# Patient Record
Sex: FEMALE | Race: OTHER | HISPANIC OR LATINO
[De-identification: names, ages, dates, MRNs, and addresses within clinical notes are randomized per-mention and may not be internally consistent; named-entity substitution may affect disease eponyms.]

---

## 2023-04-20 ENCOUNTER — TRANSCRIPTION ENCOUNTER (OUTPATIENT)
Age: 58
End: 2023-04-20

## 2023-04-20 ENCOUNTER — INPATIENT (INPATIENT)
Facility: HOSPITAL | Age: 58
LOS: 0 days | Discharge: ANOTHER HOSPITAL | DRG: 65 | End: 2023-04-21
Attending: INTERNAL MEDICINE | Admitting: INTERNAL MEDICINE
Payer: MEDICAID

## 2023-04-20 VITALS
HEIGHT: 62 IN | RESPIRATION RATE: 16 BRPM | DIASTOLIC BLOOD PRESSURE: 72 MMHG | SYSTOLIC BLOOD PRESSURE: 119 MMHG | OXYGEN SATURATION: 100 % | HEART RATE: 66 BPM | WEIGHT: 179.9 LBS

## 2023-04-20 DIAGNOSIS — Z90.710 ACQUIRED ABSENCE OF BOTH CERVIX AND UTERUS: Chronic | ICD-10-CM

## 2023-04-20 DIAGNOSIS — Z98.890 OTHER SPECIFIED POSTPROCEDURAL STATES: Chronic | ICD-10-CM

## 2023-04-20 DIAGNOSIS — I63.9 CEREBRAL INFARCTION, UNSPECIFIED: ICD-10-CM

## 2023-04-20 DIAGNOSIS — Z90.49 ACQUIRED ABSENCE OF OTHER SPECIFIED PARTS OF DIGESTIVE TRACT: Chronic | ICD-10-CM

## 2023-04-20 LAB
ALBUMIN SERPL ELPH-MCNC: 3.8 G/DL — SIGNIFICANT CHANGE UP (ref 3.5–5)
ALP SERPL-CCNC: 113 U/L — SIGNIFICANT CHANGE UP (ref 40–120)
ALT FLD-CCNC: 56 U/L DA — SIGNIFICANT CHANGE UP (ref 10–60)
ANION GAP SERPL CALC-SCNC: 6 MMOL/L — SIGNIFICANT CHANGE UP (ref 5–17)
APTT BLD: 35.3 SEC — SIGNIFICANT CHANGE UP (ref 27.5–35.5)
AST SERPL-CCNC: 28 U/L — SIGNIFICANT CHANGE UP (ref 10–40)
BASOPHILS # BLD AUTO: 0.01 K/UL — SIGNIFICANT CHANGE UP (ref 0–0.2)
BASOPHILS NFR BLD AUTO: 0.2 % — SIGNIFICANT CHANGE UP (ref 0–2)
BILIRUB SERPL-MCNC: 0.6 MG/DL — SIGNIFICANT CHANGE UP (ref 0.2–1.2)
BUN SERPL-MCNC: 20 MG/DL — HIGH (ref 7–18)
CALCIUM SERPL-MCNC: 9.4 MG/DL — SIGNIFICANT CHANGE UP (ref 8.4–10.5)
CHLORIDE SERPL-SCNC: 106 MMOL/L — SIGNIFICANT CHANGE UP (ref 96–108)
CO2 SERPL-SCNC: 28 MMOL/L — SIGNIFICANT CHANGE UP (ref 22–31)
CREAT SERPL-MCNC: 0.72 MG/DL — SIGNIFICANT CHANGE UP (ref 0.5–1.3)
EGFR: 98 ML/MIN/1.73M2 — SIGNIFICANT CHANGE UP
EOSINOPHIL # BLD AUTO: 0 K/UL — SIGNIFICANT CHANGE UP (ref 0–0.5)
EOSINOPHIL NFR BLD AUTO: 0 % — SIGNIFICANT CHANGE UP (ref 0–6)
ETHANOL SERPL-MCNC: 4 MG/DL — SIGNIFICANT CHANGE UP (ref 0–10)
GLUCOSE SERPL-MCNC: 200 MG/DL — HIGH (ref 70–99)
HCT VFR BLD CALC: 42.4 % — SIGNIFICANT CHANGE UP (ref 34.5–45)
HGB BLD-MCNC: 14.2 G/DL — SIGNIFICANT CHANGE UP (ref 11.5–15.5)
IMM GRANULOCYTES NFR BLD AUTO: 0.2 % — SIGNIFICANT CHANGE UP (ref 0–0.9)
INR BLD: 1.05 RATIO — SIGNIFICANT CHANGE UP (ref 0.88–1.16)
LACTATE SERPL-SCNC: 2.4 MMOL/L — HIGH (ref 0.7–2)
LYMPHOCYTES # BLD AUTO: 0.69 K/UL — LOW (ref 1–3.3)
LYMPHOCYTES # BLD AUTO: 11.3 % — LOW (ref 13–44)
MAGNESIUM SERPL-MCNC: 2.2 MG/DL — SIGNIFICANT CHANGE UP (ref 1.6–2.6)
MCHC RBC-ENTMCNC: 29.6 PG — SIGNIFICANT CHANGE UP (ref 27–34)
MCHC RBC-ENTMCNC: 33.5 GM/DL — SIGNIFICANT CHANGE UP (ref 32–36)
MCV RBC AUTO: 88.5 FL — SIGNIFICANT CHANGE UP (ref 80–100)
MONOCYTES # BLD AUTO: 0.11 K/UL — SIGNIFICANT CHANGE UP (ref 0–0.9)
MONOCYTES NFR BLD AUTO: 1.8 % — LOW (ref 2–14)
NEUTROPHILS # BLD AUTO: 5.29 K/UL — SIGNIFICANT CHANGE UP (ref 1.8–7.4)
NEUTROPHILS NFR BLD AUTO: 86.5 % — HIGH (ref 43–77)
NRBC # BLD: 0 /100 WBCS — SIGNIFICANT CHANGE UP (ref 0–0)
PHOSPHATE SERPL-MCNC: 3.1 MG/DL — SIGNIFICANT CHANGE UP (ref 2.5–4.5)
PLATELET # BLD AUTO: 281 K/UL — SIGNIFICANT CHANGE UP (ref 150–400)
POTASSIUM SERPL-MCNC: 3.6 MMOL/L — SIGNIFICANT CHANGE UP (ref 3.5–5.3)
POTASSIUM SERPL-SCNC: 3.6 MMOL/L — SIGNIFICANT CHANGE UP (ref 3.5–5.3)
PROT SERPL-MCNC: 7.9 G/DL — SIGNIFICANT CHANGE UP (ref 6–8.3)
PROTHROM AB SERPL-ACNC: 12.5 SEC — SIGNIFICANT CHANGE UP (ref 10.5–13.4)
RBC # BLD: 4.79 M/UL — SIGNIFICANT CHANGE UP (ref 3.8–5.2)
RBC # FLD: 12.7 % — SIGNIFICANT CHANGE UP (ref 10.3–14.5)
SODIUM SERPL-SCNC: 140 MMOL/L — SIGNIFICANT CHANGE UP (ref 135–145)
TROPONIN I, HIGH SENSITIVITY RESULT: 40.9 NG/L — SIGNIFICANT CHANGE UP
WBC # BLD: 6.11 K/UL — SIGNIFICANT CHANGE UP (ref 3.8–10.5)
WBC # FLD AUTO: 6.11 K/UL — SIGNIFICANT CHANGE UP (ref 3.8–10.5)

## 2023-04-20 PROCEDURE — 99291 CRITICAL CARE FIRST HOUR: CPT

## 2023-04-20 PROCEDURE — 70498 CT ANGIOGRAPHY NECK: CPT | Mod: 26,MA

## 2023-04-20 PROCEDURE — 93010 ELECTROCARDIOGRAM REPORT: CPT

## 2023-04-20 PROCEDURE — 70496 CT ANGIOGRAPHY HEAD: CPT | Mod: 26,MA

## 2023-04-20 PROCEDURE — 0042T: CPT | Mod: MA

## 2023-04-20 RX ORDER — ALBUTEROL 90 UG/1
2 AEROSOL, METERED ORAL EVERY 6 HOURS
Refills: 0 | Status: DISCONTINUED | OUTPATIENT
Start: 2023-04-20 | End: 2023-04-21

## 2023-04-20 RX ORDER — SODIUM CHLORIDE 9 MG/ML
1000 INJECTION INTRAMUSCULAR; INTRAVENOUS; SUBCUTANEOUS ONCE
Refills: 0 | Status: COMPLETED | OUTPATIENT
Start: 2023-04-20 | End: 2023-04-20

## 2023-04-20 RX ORDER — DEXTROSE MONOHYDRATE, SODIUM CHLORIDE, AND POTASSIUM CHLORIDE 50; .745; 4.5 G/1000ML; G/1000ML; G/1000ML
1000 INJECTION, SOLUTION INTRAVENOUS
Refills: 0 | Status: DISCONTINUED | OUTPATIENT
Start: 2023-04-20 | End: 2023-04-21

## 2023-04-20 RX ORDER — ASPIRIN/CALCIUM CARB/MAGNESIUM 324 MG
300 TABLET ORAL DAILY
Refills: 0 | Status: DISCONTINUED | OUTPATIENT
Start: 2023-04-20 | End: 2023-04-21

## 2023-04-20 RX ORDER — PANTOPRAZOLE SODIUM 20 MG/1
40 TABLET, DELAYED RELEASE ORAL DAILY
Refills: 0 | Status: DISCONTINUED | OUTPATIENT
Start: 2023-04-20 | End: 2023-04-21

## 2023-04-20 RX ORDER — ACETAMINOPHEN 500 MG
1000 TABLET ORAL ONCE
Refills: 0 | Status: COMPLETED | OUTPATIENT
Start: 2023-04-20 | End: 2023-04-20

## 2023-04-20 RX ORDER — CHLORHEXIDINE GLUCONATE 213 G/1000ML
1 SOLUTION TOPICAL
Refills: 0 | Status: DISCONTINUED | OUTPATIENT
Start: 2023-04-20 | End: 2023-04-21

## 2023-04-20 RX ADMIN — Medication 400 MILLIGRAM(S): at 22:32

## 2023-04-20 RX ADMIN — DEXTROSE MONOHYDRATE, SODIUM CHLORIDE, AND POTASSIUM CHLORIDE 100 MILLILITER(S): 50; .745; 4.5 INJECTION, SOLUTION INTRAVENOUS at 23:39

## 2023-04-20 RX ADMIN — Medication 1000 MILLIGRAM(S): at 23:48

## 2023-04-20 RX ADMIN — Medication 300 MILLIGRAM(S): at 23:40

## 2023-04-20 RX ADMIN — SODIUM CHLORIDE 1000 MILLILITER(S): 9 INJECTION INTRAMUSCULAR; INTRAVENOUS; SUBCUTANEOUS at 19:17

## 2023-04-20 NOTE — ED ADULT NURSE NOTE - ED STAT RN HANDOFF DETAILS
pt stable for transfer to ICU, report given to ASAEL Reilly. pt stable for transfer to ICU, report given to ASAEL Chakraborty.

## 2023-04-20 NOTE — H&P ADULT - NSHPPHYSICALEXAM_GEN_ALL_CORE
Vital Signs Last 24 Hrs  T(C): 37.2 (20 Apr 2023 20:37), Max: 37.2 (20 Apr 2023 20:37)  T(F): 99 (20 Apr 2023 20:37), Max: 99 (20 Apr 2023 20:37)  HR: 74 (20 Apr 2023 20:37) (66 - 89)  BP: 147/86 (20 Apr 2023 20:37) (119/72 - 155/82)  RR: 18 (20 Apr 2023 20:37) (15 - 18)  SpO2: 95% (20 Apr 2023 20:37) (95% - 100%)    Parameters below as of 20 Apr 2023 20:37  Patient On (Oxygen Delivery Method): room air    GENERAL: NAD, lying in bed comfortably  HEAD:  Atraumatic, Normocephalic  EYES: EOMI, PERRLA, conjunctiva and sclera clear  ENT: Moist mucous membranes  NECK: Supple, No JVD  CHEST/LUNG: Clear to auscultation bilaterally; No rales, rhonchi, wheezing, or rubs. Unlabored respirations  HEART: Regular rate and rhythm; No murmurs, rubs, or gallops  ABDOMEN: Bowel sounds present; Soft, Nontender, Nondistended. No hepatomegaly  EXTREMITIES:  2+ Peripheral Pulses, brisk capillary refill. No clubbing, cyanosis, or edema  NERVOUS SYSTEM:  Alert & Oriented X3, speech clear. No deficits   MSK: FROM all 4 extremities, full and equal strength  SKIN: No rashes or lesions

## 2023-04-20 NOTE — ED ADULT TRIAGE NOTE - CHIEF COMPLAINT QUOTE
weak, lethargic since 0900  4 pm, numbness to Rt arm , slow verbal response  Last time seen normal at 0800 today

## 2023-04-20 NOTE — H&P ADULT - HISTORY OF PRESENT ILLNESS
Pt is a 56 y/o F presenting with right sided weakness and gaze deviation. Patient was undergoing preparation for colonoscopy. LKN 8am. As per daughter at 8am patient was playing with her grandchildren but around 840 am patient was getting dressed and fell and subsequently felt weak after. Daughter reports that patient had some episodes of vomiting at this time. She remained in bed for the remainder of the day. Daughter reports some slurred speech noted 1230-1PM and around 4PM, the patient's sister noted right sided weakness at which time EMS was called and patient was brought to ED. Arrives as CODE STROKE. Daughter reports that throughout day patient was in bed, not walking and not using her hands much, thus confirms that last time patient was at baseline and walking was at 8am. Pt is a 56 yo F from home, independent with PMH of Asthma, CAD (not on  meds), peripheral neuropathy and PSH of BATOOL, cholecystectomy, right knee surgery presented to the ED with right sided weakness and right facial numbness from this morning. Patient was undergoing preparation for colonoscopy. As per daughter at 8am patient was playing with her grandchildren but around 840 am patient was getting dressed and fell and subsequently felt weak after. Daughter reports that patient had some episodes of vomiting at this time. She had a syncopal episode at around 10 am and was witnessed by brother. No head trauma, She regained conscious after few minutes. She remained in bed for the remainder of the day. Daughter reports some slurred speech noted 1230-1PM and also was confused after. and around 4PM, the patient's sister noted right sided weakness at which time EMS was called and patient was brought to ED. No c/o chest pain, shortness of breath, fever, headache, abdominal pain, urinary complaints. No recent travel/sickness/ change in meds.

## 2023-04-20 NOTE — PATIENT PROFILE ADULT - LANGUAGE ASSISTANCE NEEDED
pt prefers to have daughter translate/No-Patient/Caregiver offered and refused free interpretation services.

## 2023-04-20 NOTE — PATIENT PROFILE ADULT - HOW PATIENT ADDRESSED, PROFILE
CAD (Coronary Artery Disease)    DM (Diabetes Mellitus)    ESRD (end stage renal disease) on dialysis    GERD (Gastroesophageal Reflux Disease)    HTN (Hypertension)    Hx of iron deficiency anemia    Hypercholesteremia    Ileostomy in place    Kidney Stones  s/p lithotripsy Radha

## 2023-04-20 NOTE — ED ADULT NURSE NOTE - OBJECTIVE STATEMENT
BIB EMS from home for AMS last seen normal 8am today, code stroke initiated BIB EMS from home for AMS last seen normal 8am today, on arrival arouse to verbal stimuli unable to fallow commands. code stroke initiated.

## 2023-04-20 NOTE — ED PROVIDER NOTE - PHYSICAL EXAMINATION
GENERAL: somnolent; arousable to stimuli   HEAD:  Atraumatic, Normocephalic  EYES: gaze deviation to right that can be overcome   ENT: MMM; oropharynx clear  NECK: Supple, No JVD  CHEST/LUNG: Clear to auscultation bilaterally; No wheeze  HEART: Regular rate and rhythm; No murmurs, rubs, or gallops  ABDOMEN: Soft, Nontender, Nondistended; Bowel sounds present  EXTREMITIES:  2+ Peripheral Pulses, No clubbing, cyanosis, or edema  PSYCH: AAOx1 to self  NEUROLOGY: see NIHSS for full details. +R sided hemiparesis +RUE dysmetria +R facial droop  SKIN: No rashes or lesions

## 2023-04-20 NOTE — ED PROVIDER NOTE - CRITICAL CARE ATTENDING CONTRIBUTION TO CARE
56 y/o F hx of CAD (no PCI) presenting with right sided weakness and gaze deviation.   Patient was undergoing preparation for colonoscopy the night prior. LKN 8am  NIHSS 10 on presentation. Out of window for TNK  CODE STROKE activated  CTH/CTA demonstrating acute cerebellar infarct with V3/V4 occlusion in posterior circulation   TeleStroke consulted to determine if patient candidate for endovascular thrombectomy, but deemed not a candidate to due evidence of evolving infarct on imaging.   ICU consulted for frequent neuro-checks given lethargy and risk for decompensation/hydrocephalus   Requiring frequent reassessment to determine no further clinical deterioration.    The patient's condition is critical. Management options were put in place to ensure further deterioration does not occur. In addition to the usual care provided, I have spent additional time with this patient through but not limited to the following: additional documentation  reviewing test results,  discussing with consultants,  discussing with patient / patient's family prognosis and course of care,  reassessment of patient's status and response to interventions. 58 y/o F hx of CAD (no PCI) presenting with right sided weakness and gaze deviation.   Patient was undergoing preparation for colonoscopy the night prior. LKN 8am  NIHSS 10 on presentation. Out of window for TNK  CODE STROKE activated  CTH/CTA demonstrating acute cerebellar infarct with V3/V4 occlusion in posterior circulation   TeleStroke consulted to determine if patient candidate for endovascular thrombectomy, but deemed not a candidate to due evidence of evolving infarct on imaging.   ICU consulted for frequent neuro-checks given lethargy and risk for decompensation/hydrocephalus   Requiring frequent reassessment to determine no further clinical deterioration as well as coordination with specialists to ensure patient receives proper care.    The patient's condition is critical. Management options were put in place to ensure further deterioration does not occur. In addition to the usual care provided, I have spent additional time with this patient through but not limited to the following: additional documentation  reviewing test results,  discussing with consultants,  discussing with patient / patient's family prognosis and course of care,  reassessment of patient's status and response to interventions.

## 2023-04-20 NOTE — ED PROVIDER NOTE - CLINICAL SUMMARY MEDICAL DECISION MAKING FREE TEXT BOX
56 y/o F hx of CAD (no PCI) presenting with right sided weakness and gaze deviation.   Patient was undergoing preparation for colonoscopy the night prior. LKN 8am  NIHSS 10 on presentation. Out of window for TNK  CODE STROKE activated  CTH/CTA demonstrating acute cerebellar infarct with V3/V4 occlusion in posterior circulation   TeleStroke consulted to determine if patient candidate for endovascular thrombectomy, but deemed not a candidate to due evidence of evolving infarct on imaging.   ICU consulted for frequent neuro-checks given lethargy and risk for decompensation/hydrocephalus. Accepted to ICU.   Requiring frequent reassessment to determine no further clinical deterioration.

## 2023-04-20 NOTE — STROKE CODE NOTE - ASSESSMENT/PLAN
57yoF unknown PMH presented with R sided weakness and ataxia. Last seen normal 8am. Of note, pt started colonoscopy bowel prep last night and thought she was just dehydrated when she felt unwell today.   NIHSS 10    -outside window for thrombolytic  - CTA showed V3/V4 occlusion  - CTH shows acute R PICA infarct  - not a candidate for thrombectomy due to evolving infarct on CTH  - recommend permissive HTN <220, fluid resus  - q1h neurochecks, monitor for lethargy or concern for hydrocephalus with posterior circulation stroke      Case discussed with Dr David

## 2023-04-20 NOTE — ED PROVIDER NOTE - PROGRESS NOTE DETAILS
Spoke with telestroke neurology regarding posterior circulation occlusion V3/V4. Given that patient already has evolving infarct on CT scan, would not be a candidate for endovascular thrombectomy. ICU consulted given neurology recommendation for q1 hour neurochecks. Accepted by ICu

## 2023-04-20 NOTE — ED PROVIDER NOTE - NSICDXPASTSURGICALHX_GEN_ALL_CORE_FT
PAST SURGICAL HISTORY:  S/P cholecystectomy     S/P right knee surgery     S/P total abdominal hysterectomy

## 2023-04-20 NOTE — H&P ADULT - NSHPREVIEWOFSYSTEMS_GEN_ALL_CORE
CONSTITUTIONAL: No weakness, fevers or chills, no weight loss   EYES/ENT: No visual changes;  No vertigo or throat pain   NECK: No pain or stiffness  RESPIRATORY: No cough, wheezing, hemoptysis; No shortness of breath  CARDIOVASCULAR: No chest pain or palpitations  GASTROINTESTINAL: No abdominal or epigastric pain. No nausea, vomiting, or hematemesis; No diarrhea or constipation. No melena or hematochezia.  GENITOURINARY: No discharge, hematuria  NEUROLOGICAL: No numbness or weakness  All other review of systems is negative unless indicated above. CONSTITUTIONAL: No weakness, fevers or chills, no weight loss   EYES/ENT: No visual changes;  No vertigo or throat pain   NECK: No pain or stiffness  RESPIRATORY: No cough, wheezing, hemoptysis; No shortness of breath  CARDIOVASCULAR: No chest pain or palpitations  GASTROINTESTINAL: No abdominal or epigastric pain. No hematemesis; No diarrhea or constipation. No melena or hematochezia.  GENITOURINARY: No discharge, hematuria  NEUROLOGICAL: as mentioned above  All other review of systems is negative unless indicated above.

## 2023-04-20 NOTE — PATIENT PROFILE ADULT - FALL HARM RISK - HARM RISK INTERVENTIONS

## 2023-04-20 NOTE — H&P ADULT - NSICDXPASTMEDICALHX_GEN_ALL_CORE_FT
PAST MEDICAL HISTORY:  Asthma     CAD (coronary artery disease)      PAST MEDICAL HISTORY:  Asthma     CAD (coronary artery disease)     Peripheral neuropathy

## 2023-04-20 NOTE — STROKE CODE NOTE - NIH STROKE SCALE: 3. VISUAL, QM
Patient was endorsed to my care. He has a history of depression with suicidal ideation. He is medically cleared and requires psychiatric hospitalization. They prefer [facility] hospitalization. They are awaiting final placement per [facility] crisis. No new issues occurred during my shift. (0) No visual loss

## 2023-04-20 NOTE — H&P ADULT - ATTENDING COMMENTS
56 yo F from home, with PMH of Asthma, CAD (not on  meds), peripheral neuropathy and PSH of BATOOL, cholecystectomy, right knee surgery presented to the ED with right sided weakness and right facial numbness from this morning. CODE STROKE called in ED. NIHSS score of 10. - CTA showed V3/V4 occlusion. CTH shows acute right PICA infarct. Telestroke consulted with Dr. Lugo at [facility]. Admitted to ICU for CVA requiring neuro checks Q1h.     Assessment   Acute ischemic CVA    PLAN:  - CTA head showed right vertebral arterial occlusion at its dural crossing junction V3 Atlantic and V4 intracranial segments with likely reversal of flow in its intracranial segment from the basilar. Possible posterior circulation infarct.  - CT brain perfusion scan showed acute infarction of the right posterior medial cerebellum within the right pica distribution.  Near equal volume abnormality in inflow and time to maximum perfusion imaging. Additional regions of delay of perfusion possible ischemia within the left posterior cerebral arterial distribution  - NIHSS on admission 10  - Not a candidate for thrombectomy due to evolving infarct on CTH  - failed dysphagia screen in ED  - NPO now  - started on rectal aspirin  - will start on PO Aspirin, Plavix (ABCD score >=4) and high intensity atorvastatin  - started on IVF  - Neurochecks Q1h  - aspiration precautions  - Overnight Telestroke consulted with Dr. Lugo at [facility]  - Neurology Dr. Gibson to be consulted  - Permissive HTN for 24 hors with goal of Systolics < 220  #Carotid Aneurysms  - CTA neck showed Saccular aneurysms of each internal carotid artery, approximately 0.8 cm on the right and 1.2 x 0.9 cm on the left. Possible tiny third saccular aneurysm on the right     Keep normothermic   Optimize glycemic control keep Glu 140 - 180   Supportive care including DVT prophylaxis

## 2023-04-20 NOTE — H&P ADULT - ASSESSMENT
ASSESSMENT:    PLAN:  #NEURO  #Cerebrovascular Accident  - pt presented with right sided weakness ad gaze deviation  - CTA head showed right vertebral arterial occlusion at its dural crossing junction V3 Atlantic and V4 intracranial segments with likely reversal of flow in its intracranial segment from the basilar. Possible posterior circulation infarct.  - CT brain perfusion scan showed acute infarction of the right posterior medial cerebellum within the right pica distribution.  Near equal volume abnormality in inflow and time to maximum perfusion imaging. Additional regions of delay of perfusion possible ischemia within the left posterior cerebral arterial distribution  - NIHSS on admission 10  - Not a candidate for thrombectomy due to evolving infarct on CTH  - started on Aspirin, Plavix and hig intensity atorvastatin  - Neurochecks Q1h  - Neurology Dr. Gibson to be consulted    #Carotid Aneurysms  - CTA neck showed Saccular aneurysms of each internal carotid artery, approximately 0.8 cm on the right and 1.2 x 0.9 cm on the left. Possible tiny third saccular aneurysm on the right    #PULMONARY  - not in respiratory distress and saturating well on RA    #CARDIOVASCULAR  - HR and BPs stable now  - Permissive HTN for 24 hors with goal of Systolics < 220    #GASTROINTESTINAL  - no active issues    #RENAL  - no active issues    #INFECTIOUS DISEASE  - no active issues    #ENDOCRINE  - no active issues    #HEME  - no active issues    #PROPHYLAXIS  -DVT                 SCDs  -GI                       PPI          DISPO                                  ADMIT TO ICU  CODE STATUS                           FULL CODE ASSESSMENT:    PLAN:  #NEURO  #Cerebrovascular Accident  - pt presented with right sided weakness ad gaze deviation  - CTA head showed right vertebral arterial occlusion at its dural crossing junction V3 Atlantic and V4 intracranial segments with likely reversal of flow in its intracranial segment from the basilar. Possible posterior circulation infarct.  - CT brain perfusion scan showed acute infarction of the right posterior medial cerebellum within the right pica distribution.  Near equal volume abnormality in inflow and time to maximum perfusion imaging. Additional regions of delay of perfusion possible ischemia within the left posterior cerebral arterial distribution  - NIHSS on admission 10  - Not a candidate for thrombectomy due to evolving infarct on CTH  - started on Aspirin, Plavix (ABCD score >2) and high intensity atorvastatin  - Neurochecks Q1h  - Neurology Dr. Gibson to be consulted    #Carotid Aneurysms  - CTA neck showed Saccular aneurysms of each internal carotid artery, approximately 0.8 cm on the right and 1.2 x 0.9 cm on the left. Possible tiny third saccular aneurysm on the right    #PULMONARY  - not in respiratory distress and saturating well on RA    #CARDIOVASCULAR  - HR and BPs stable now  - Permissive HTN for 24 hors with goal of Systolics < 220    #GASTROINTESTINAL  - no active issues    #RENAL  - no active issues    #INFECTIOUS DISEASE  - no active issues    #ENDOCRINE  - no active issues    #HEME  - no active issues    #PROPHYLAXIS  -DVT                 SCDs  -GI                       PPI          DISPO                                  ADMIT TO ICU  CODE STATUS                           FULL CODE ASSESSMENT:  Pt is a 56 yo F from home, independent with PMH of Asthma, CAD (not on  meds), peripheral neuropathy and PSH of BATOOL, cholecystectomy, right knee surgery presented to the ED with right sided weakness and right facial numbness from this morning. CODE STROKE called in ED. NIHSS score of 10. - CTA showed V3/V4 occlusion. CTH shows acute right PICA infarct. Overnight Telestroke consulted with Dr. Lugo at [facility]. Admitted to ICU for CVA requiring neuro checks Q1h.    PLAN:  #NEURO  #Cerebrovascular Accident  - pt presented with right sided weakness ad gaze deviation  - CTA head showed right vertebral arterial occlusion at its dural crossing junction V3 Atlantic and V4 intracranial segments with likely reversal of flow in its intracranial segment from the basilar. Possible posterior circulation infarct.  - CT brain perfusion scan showed acute infarction of the right posterior medial cerebellum within the right pica distribution.  Near equal volume abnormality in inflow and time to maximum perfusion imaging. Additional regions of delay of perfusion possible ischemia within the left posterior cerebral arterial distribution  - NIHSS on admission 10  - Not a candidate for thrombectomy due to evolving infarct on CTH  - failed dysphagia screen in ED  - NPO now  - started on rectal aspirin  - will start on PO Aspirin, Plavix (ABCD score >=4) and high intensity atorvastatin  - started on IVF  - Neurochecks Q1h  - aspiration precautions  - Overnight Telestroke consulted with Dr. Lugo at [facility]  - Neurology Dr. Gibson to be consulted    #Peripheral Neuropathy  at home on gabapentin  - primary team to confirm dose    #Carotid Aneurysms  - CTA neck showed Saccular aneurysms of each internal carotid artery, approximately 0.8 cm on the right and 1.2 x 0.9 cm on the left. Possible tiny third saccular aneurysm on the right    #PULMONARY  #Asthma  - not in respiratory distress and saturating well on RA  - started on home med of albuterol inhaler PRN    #CARDIOVASCULAR  - HR and BPs stable now  - Permissive HTN for 24 hors with goal of Systolics < 220    #GASTROINTESTINAL  - no active issues    #RENAL  - no active issues    #INFECTIOUS DISEASE  - no active issues    #ENDOCRINE  - no active issues    #HEME  - no active issues    #PROPHYLAXIS  -DVT                 SCDs  -GI                       PPI          DISPO                                  ADMIT TO ICU  CODE STATUS                           FULL CODE

## 2023-04-20 NOTE — ED PROVIDER NOTE - OBJECTIVE STATEMENT
58 y/o F presenting with right sided weakness and gaze deviation.     Patient was undergoing preparation for colonoscopy. LKN 8am. As per daughter at 8am patient was playing with her grandchildren but around 840 am patient was getting dressed and fell and subsequently felt weak after. Daughter reports that patient had some episodes of vomiting at this time. She remained in bed for the remainder of the day. Daughter reports some slurred speech noted 1230-1PM and around 4PM, the patient's sister noted right sided weakness at which time EMS was called and patient was brought to ED. Arrives as CODE STROKE. Daughter reports that throughout day patient was in bed, not walking and not using her hands much, thus confirms that last time patient was at baseline and walking was at 8am. 58 y/o F hx of CAD (no PCI) presenting with right sided weakness and gaze deviation.     Patient was undergoing preparation for colonoscopy. LKN 8am. As per daughter at 8am patient was playing with her grandchildren but around 840 am patient was getting dressed and fell and subsequently felt weak after. Daughter reports that patient had some episodes of vomiting at this time. She remained in bed for the remainder of the day. Daughter reports some slurred speech noted 1230-1PM and around 4PM, the patient's sister noted right sided weakness at which time EMS was called and patient was brought to ED. Arrives as CODE STROKE. Daughter reports that throughout day patient was in bed, not walking and not using her hands much, thus confirms that last time patient was at baseline and walking was at 8am. 58 y/o F hx of CAD (no PCI) presenting with right sided weakness and gaze deviation.     Patient was undergoing preparation for colonoscopy. LKN 8am. As per daughter at 8am patient was playing with her grandchildren but around 840 am patient was getting dressed and fell and subsequently felt weak after. Daughter reports that patient had some episodes of vomiting at this time. She remained in bed for the remainder of the day. Daughter reports some slurred speech noted 1230-1PM and around 4PM, the patient's sister noted right sided weakness at which time EMS was called and patient was brought to ED. Arrives as CODE STROKE. Daughter reports that throughout day patient was in bed, not walking and not using her hands much, thus confirms that last time patient was at baseline and walking was at 8am.  Notes mild headache but otherwise no chest pain/sob/fevers/chills/abdominal pain/dysuria

## 2023-04-21 ENCOUNTER — RESULT REVIEW (OUTPATIENT)
Age: 58
End: 2023-04-21

## 2023-04-21 ENCOUNTER — TRANSCRIPTION ENCOUNTER (OUTPATIENT)
Age: 58
End: 2023-04-21

## 2023-04-21 ENCOUNTER — INPATIENT (INPATIENT)
Facility: HOSPITAL | Age: 58
LOS: 33 days | Discharge: NURSING, REHAB OR LONG-TERM CARE | DRG: 3 | End: 2023-05-25
Attending: STUDENT IN AN ORGANIZED HEALTH CARE EDUCATION/TRAINING PROGRAM | Admitting: STUDENT IN AN ORGANIZED HEALTH CARE EDUCATION/TRAINING PROGRAM
Payer: COMMERCIAL

## 2023-04-21 VITALS
RESPIRATION RATE: 16 BRPM | DIASTOLIC BLOOD PRESSURE: 101 MMHG | HEART RATE: 70 BPM | TEMPERATURE: 99 F | OXYGEN SATURATION: 96 % | SYSTOLIC BLOOD PRESSURE: 168 MMHG

## 2023-04-21 VITALS
SYSTOLIC BLOOD PRESSURE: 137 MMHG | OXYGEN SATURATION: 95 % | HEART RATE: 75 BPM | DIASTOLIC BLOOD PRESSURE: 93 MMHG | RESPIRATION RATE: 14 BRPM

## 2023-04-21 DIAGNOSIS — J45.909 UNSPECIFIED ASTHMA, UNCOMPLICATED: ICD-10-CM

## 2023-04-21 DIAGNOSIS — Z98.890 OTHER SPECIFIED POSTPROCEDURAL STATES: Chronic | ICD-10-CM

## 2023-04-21 DIAGNOSIS — I25.10 ATHEROSCLEROTIC HEART DISEASE OF NATIVE CORONARY ARTERY WITHOUT ANGINA PECTORIS: ICD-10-CM

## 2023-04-21 DIAGNOSIS — Z90.710 ACQUIRED ABSENCE OF BOTH CERVIX AND UTERUS: Chronic | ICD-10-CM

## 2023-04-21 DIAGNOSIS — I63.9 CEREBRAL INFARCTION, UNSPECIFIED: ICD-10-CM

## 2023-04-21 DIAGNOSIS — Z90.49 ACQUIRED ABSENCE OF OTHER SPECIFIED PARTS OF DIGESTIVE TRACT: Chronic | ICD-10-CM

## 2023-04-21 DIAGNOSIS — G62.9 POLYNEUROPATHY, UNSPECIFIED: ICD-10-CM

## 2023-04-21 LAB
ALBUMIN SERPL ELPH-MCNC: 3.6 G/DL — SIGNIFICANT CHANGE UP (ref 3.5–5)
ALBUMIN SERPL ELPH-MCNC: 3.8 G/DL — SIGNIFICANT CHANGE UP (ref 3.3–5)
ALP SERPL-CCNC: 104 U/L — SIGNIFICANT CHANGE UP (ref 40–120)
ALP SERPL-CCNC: 112 U/L — SIGNIFICANT CHANGE UP (ref 40–120)
ALT FLD-CCNC: 59 U/L DA — SIGNIFICANT CHANGE UP (ref 10–60)
ALT FLD-CCNC: 91 U/L — HIGH (ref 10–45)
ANION GAP SERPL CALC-SCNC: 3 MMOL/L — LOW (ref 5–17)
ANION GAP SERPL CALC-SCNC: 9 MMOL/L — SIGNIFICANT CHANGE UP (ref 5–17)
APTT BLD: 29.9 SEC — SIGNIFICANT CHANGE UP (ref 27.5–35.5)
AST SERPL-CCNC: 41 U/L — HIGH (ref 10–40)
AST SERPL-CCNC: 74 U/L — HIGH (ref 10–40)
BASOPHILS # BLD AUTO: 0.02 K/UL — SIGNIFICANT CHANGE UP (ref 0–0.2)
BASOPHILS NFR BLD AUTO: 0.2 % — SIGNIFICANT CHANGE UP (ref 0–2)
BILIRUB SERPL-MCNC: 0.6 MG/DL — SIGNIFICANT CHANGE UP (ref 0.2–1.2)
BILIRUB SERPL-MCNC: 0.9 MG/DL — SIGNIFICANT CHANGE UP (ref 0.2–1.2)
BLD GP AB SCN SERPL QL: NEGATIVE — SIGNIFICANT CHANGE UP
BLD GP AB SCN SERPL QL: NEGATIVE — SIGNIFICANT CHANGE UP
BUN SERPL-MCNC: 11 MG/DL — SIGNIFICANT CHANGE UP (ref 7–23)
BUN SERPL-MCNC: 15 MG/DL — SIGNIFICANT CHANGE UP (ref 7–18)
CALCIUM SERPL-MCNC: 8.8 MG/DL — SIGNIFICANT CHANGE UP (ref 8.4–10.5)
CALCIUM SERPL-MCNC: 9 MG/DL — SIGNIFICANT CHANGE UP (ref 8.4–10.5)
CHLORIDE SERPL-SCNC: 109 MMOL/L — HIGH (ref 96–108)
CHLORIDE SERPL-SCNC: 109 MMOL/L — HIGH (ref 96–108)
CO2 SERPL-SCNC: 24 MMOL/L — SIGNIFICANT CHANGE UP (ref 22–31)
CO2 SERPL-SCNC: 29 MMOL/L — SIGNIFICANT CHANGE UP (ref 22–31)
CREAT SERPL-MCNC: 0.52 MG/DL — SIGNIFICANT CHANGE UP (ref 0.5–1.3)
CREAT SERPL-MCNC: 0.65 MG/DL — SIGNIFICANT CHANGE UP (ref 0.5–1.3)
EGFR: 103 ML/MIN/1.73M2 — SIGNIFICANT CHANGE UP
EGFR: 109 ML/MIN/1.73M2 — SIGNIFICANT CHANGE UP
EOSINOPHIL # BLD AUTO: 0 K/UL — SIGNIFICANT CHANGE UP (ref 0–0.5)
EOSINOPHIL NFR BLD AUTO: 0 % — SIGNIFICANT CHANGE UP (ref 0–6)
GLUCOSE BLDC GLUCOMTR-MCNC: 132 MG/DL — HIGH (ref 70–99)
GLUCOSE SERPL-MCNC: 148 MG/DL — HIGH (ref 70–99)
GLUCOSE SERPL-MCNC: 149 MG/DL — HIGH (ref 70–99)
HCT VFR BLD CALC: 39.3 % — SIGNIFICANT CHANGE UP (ref 34.5–45)
HCT VFR BLD CALC: 41.2 % — SIGNIFICANT CHANGE UP (ref 34.5–45)
HGB BLD-MCNC: 13.2 G/DL — SIGNIFICANT CHANGE UP (ref 11.5–15.5)
HGB BLD-MCNC: 13.8 G/DL — SIGNIFICANT CHANGE UP (ref 11.5–15.5)
IMM GRANULOCYTES NFR BLD AUTO: 0.2 % — SIGNIFICANT CHANGE UP (ref 0–0.9)
INR BLD: 1.1 — SIGNIFICANT CHANGE UP (ref 0.88–1.16)
LACTATE SERPL-SCNC: 1.8 MMOL/L — SIGNIFICANT CHANGE UP (ref 0.7–2)
LYMPHOCYTES # BLD AUTO: 1.54 K/UL — SIGNIFICANT CHANGE UP (ref 1–3.3)
LYMPHOCYTES # BLD AUTO: 18.5 % — SIGNIFICANT CHANGE UP (ref 13–44)
MAGNESIUM SERPL-MCNC: 2 MG/DL — SIGNIFICANT CHANGE UP (ref 1.6–2.6)
MAGNESIUM SERPL-MCNC: 2.2 MG/DL — SIGNIFICANT CHANGE UP (ref 1.6–2.6)
MCHC RBC-ENTMCNC: 29.4 PG — SIGNIFICANT CHANGE UP (ref 27–34)
MCHC RBC-ENTMCNC: 29.9 PG — SIGNIFICANT CHANGE UP (ref 27–34)
MCHC RBC-ENTMCNC: 33.5 GM/DL — SIGNIFICANT CHANGE UP (ref 32–36)
MCHC RBC-ENTMCNC: 33.6 GM/DL — SIGNIFICANT CHANGE UP (ref 32–36)
MCV RBC AUTO: 87.8 FL — SIGNIFICANT CHANGE UP (ref 80–100)
MCV RBC AUTO: 89.1 FL — SIGNIFICANT CHANGE UP (ref 80–100)
MONOCYTES # BLD AUTO: 0.52 K/UL — SIGNIFICANT CHANGE UP (ref 0–0.9)
MONOCYTES NFR BLD AUTO: 6.3 % — SIGNIFICANT CHANGE UP (ref 2–14)
MRSA PCR RESULT.: SIGNIFICANT CHANGE UP
NEUTROPHILS # BLD AUTO: 6.22 K/UL — SIGNIFICANT CHANGE UP (ref 1.8–7.4)
NEUTROPHILS NFR BLD AUTO: 74.8 % — SIGNIFICANT CHANGE UP (ref 43–77)
NRBC # BLD: 0 /100 WBCS — SIGNIFICANT CHANGE UP (ref 0–0)
NRBC # BLD: 0 /100 WBCS — SIGNIFICANT CHANGE UP (ref 0–0)
PA ADP PRP-ACNC: 313 PRU — SIGNIFICANT CHANGE UP (ref 194–418)
PHOSPHATE SERPL-MCNC: 2.5 MG/DL — SIGNIFICANT CHANGE UP (ref 2.5–4.5)
PHOSPHATE SERPL-MCNC: 2.7 MG/DL — SIGNIFICANT CHANGE UP (ref 2.5–4.5)
PLATELET # BLD AUTO: 266 K/UL — SIGNIFICANT CHANGE UP (ref 150–400)
PLATELET # BLD AUTO: 270 K/UL — SIGNIFICANT CHANGE UP (ref 150–400)
PLATELET RESPONSE ASPIRIN RESULT: 409 ARU — SIGNIFICANT CHANGE UP (ref 350–700)
POTASSIUM SERPL-MCNC: 3.6 MMOL/L — SIGNIFICANT CHANGE UP (ref 3.5–5.3)
POTASSIUM SERPL-MCNC: 3.8 MMOL/L — SIGNIFICANT CHANGE UP (ref 3.5–5.3)
POTASSIUM SERPL-SCNC: 3.6 MMOL/L — SIGNIFICANT CHANGE UP (ref 3.5–5.3)
POTASSIUM SERPL-SCNC: 3.8 MMOL/L — SIGNIFICANT CHANGE UP (ref 3.5–5.3)
PROT SERPL-MCNC: 6.7 G/DL — SIGNIFICANT CHANGE UP (ref 6–8.3)
PROT SERPL-MCNC: 7.3 G/DL — SIGNIFICANT CHANGE UP (ref 6–8.3)
PROTHROM AB SERPL-ACNC: 13.1 SEC — SIGNIFICANT CHANGE UP (ref 10.5–13.4)
RBC # BLD: 4.41 M/UL — SIGNIFICANT CHANGE UP (ref 3.8–5.2)
RBC # BLD: 4.69 M/UL — SIGNIFICANT CHANGE UP (ref 3.8–5.2)
RBC # FLD: 12.8 % — SIGNIFICANT CHANGE UP (ref 10.3–14.5)
RBC # FLD: 12.9 % — SIGNIFICANT CHANGE UP (ref 10.3–14.5)
RH IG SCN BLD-IMP: POSITIVE — SIGNIFICANT CHANGE UP
RH IG SCN BLD-IMP: POSITIVE — SIGNIFICANT CHANGE UP
S AUREUS DNA NOSE QL NAA+PROBE: SIGNIFICANT CHANGE UP
SODIUM SERPL-SCNC: 141 MMOL/L — SIGNIFICANT CHANGE UP (ref 135–145)
SODIUM SERPL-SCNC: 142 MMOL/L — SIGNIFICANT CHANGE UP (ref 135–145)
TROPONIN T SERPL-MCNC: <0.01 NG/ML — SIGNIFICANT CHANGE UP (ref 0–0.01)
TSH SERPL-MCNC: 0.15 UIU/ML — LOW (ref 0.27–4.2)
WBC # BLD: 10.37 K/UL — SIGNIFICANT CHANGE UP (ref 3.8–10.5)
WBC # BLD: 8.32 K/UL — SIGNIFICANT CHANGE UP (ref 3.8–10.5)
WBC # FLD AUTO: 10.37 K/UL — SIGNIFICANT CHANGE UP (ref 3.8–10.5)
WBC # FLD AUTO: 8.32 K/UL — SIGNIFICANT CHANGE UP (ref 3.8–10.5)

## 2023-04-21 PROCEDURE — 83605 ASSAY OF LACTIC ACID: CPT

## 2023-04-21 PROCEDURE — 70450 CT HEAD/BRAIN W/O DYE: CPT | Mod: 26,77

## 2023-04-21 PROCEDURE — 80307 DRUG TEST PRSMV CHEM ANLYZR: CPT

## 2023-04-21 PROCEDURE — 99285 EMERGENCY DEPT VISIT HI MDM: CPT

## 2023-04-21 PROCEDURE — 82962 GLUCOSE BLOOD TEST: CPT

## 2023-04-21 PROCEDURE — 61120 BURR HOLE FOR VENTR PUNCTURE: CPT | Mod: 59

## 2023-04-21 PROCEDURE — 61345 OTH CRANIAL DCMPRN PST FOSSA: CPT

## 2023-04-21 PROCEDURE — 88304 TISSUE EXAM BY PATHOLOGIST: CPT | Mod: 26

## 2023-04-21 PROCEDURE — 80053 COMPREHEN METABOLIC PANEL: CPT

## 2023-04-21 PROCEDURE — 69990 MICROSURGERY ADD-ON: CPT | Mod: 59

## 2023-04-21 PROCEDURE — 84100 ASSAY OF PHOSPHORUS: CPT

## 2023-04-21 PROCEDURE — 83735 ASSAY OF MAGNESIUM: CPT

## 2023-04-21 PROCEDURE — 85610 PROTHROMBIN TIME: CPT

## 2023-04-21 PROCEDURE — 70498 CT ANGIOGRAPHY NECK: CPT

## 2023-04-21 PROCEDURE — 99291 CRITICAL CARE FIRST HOUR: CPT

## 2023-04-21 PROCEDURE — 70450 CT HEAD/BRAIN W/O DYE: CPT | Mod: MA

## 2023-04-21 PROCEDURE — 70496 CT ANGIOGRAPHY HEAD: CPT | Mod: 26

## 2023-04-21 PROCEDURE — 87640 STAPH A DNA AMP PROBE: CPT

## 2023-04-21 PROCEDURE — 36415 COLL VENOUS BLD VENIPUNCTURE: CPT

## 2023-04-21 PROCEDURE — 87641 MR-STAPH DNA AMP PROBE: CPT

## 2023-04-21 PROCEDURE — 84484 ASSAY OF TROPONIN QUANT: CPT

## 2023-04-21 PROCEDURE — 0042T: CPT

## 2023-04-21 PROCEDURE — 88311 DECALCIFY TISSUE: CPT | Mod: 26

## 2023-04-21 PROCEDURE — 70498 CT ANGIOGRAPHY NECK: CPT | Mod: 26

## 2023-04-21 PROCEDURE — 85730 THROMBOPLASTIN TIME PARTIAL: CPT

## 2023-04-21 PROCEDURE — 70496 CT ANGIOGRAPHY HEAD: CPT | Mod: MA

## 2023-04-21 PROCEDURE — 93005 ELECTROCARDIOGRAM TRACING: CPT

## 2023-04-21 PROCEDURE — 85025 COMPLETE CBC W/AUTO DIFF WBC: CPT

## 2023-04-21 DEVICE — SURGICEL 4 X 8": Type: IMPLANTABLE DEVICE | Status: FUNCTIONAL

## 2023-04-21 DEVICE — CATH EVD BACTISEAL: Type: IMPLANTABLE DEVICE | Status: FUNCTIONAL

## 2023-04-21 DEVICE — MATRIX DURAGEN PLUS DURAL REGENERATION 4X5IN: Type: IMPLANTABLE DEVICE | Status: FUNCTIONAL

## 2023-04-21 DEVICE — COLLAGEN DURAMATRIX ONLAY 4X5IN: Type: IMPLANTABLE DEVICE | Status: FUNCTIONAL

## 2023-04-21 DEVICE — SURGIFOAM PAD 8CM X 12.5CM X 10MM (100): Type: IMPLANTABLE DEVICE | Status: FUNCTIONAL

## 2023-04-21 DEVICE — SURGIFLO HEMOSTATIC MATRIX KIT: Type: IMPLANTABLE DEVICE | Status: FUNCTIONAL

## 2023-04-21 DEVICE — SURGICEL FIBRILLAR 4 X 4": Type: IMPLANTABLE DEVICE | Status: FUNCTIONAL

## 2023-04-21 RX ORDER — MANNITOL
90 POWDER (GRAM) MISCELLANEOUS ONCE
Refills: 0 | Status: COMPLETED | OUTPATIENT
Start: 2023-04-21 | End: 2023-04-21

## 2023-04-21 RX ORDER — CHLORHEXIDINE GLUCONATE 213 G/1000ML
1 SOLUTION TOPICAL EVERY 12 HOURS
Refills: 0 | Status: COMPLETED | OUTPATIENT
Start: 2023-04-21 | End: 2023-04-22

## 2023-04-21 RX ORDER — POVIDONE-IODINE 5 %
1 AEROSOL (ML) TOPICAL ONCE
Refills: 0 | Status: DISCONTINUED | OUTPATIENT
Start: 2023-04-21 | End: 2023-04-22

## 2023-04-21 RX ORDER — ATORVASTATIN CALCIUM 80 MG/1
40 TABLET, FILM COATED ORAL AT BEDTIME
Refills: 0 | Status: DISCONTINUED | OUTPATIENT
Start: 2023-04-21 | End: 2023-04-21

## 2023-04-21 RX ORDER — SODIUM CHLORIDE 5 G/100ML
500 INJECTION, SOLUTION INTRAVENOUS
Refills: 0 | Status: DISCONTINUED | OUTPATIENT
Start: 2023-04-21 | End: 2023-04-21

## 2023-04-21 RX ORDER — SODIUM CHLORIDE 5 G/100ML
250 INJECTION, SOLUTION INTRAVENOUS ONCE
Refills: 0 | Status: COMPLETED | OUTPATIENT
Start: 2023-04-21 | End: 2023-04-21

## 2023-04-21 RX ORDER — ATORVASTATIN CALCIUM 80 MG/1
80 TABLET, FILM COATED ORAL AT BEDTIME
Refills: 0 | Status: DISCONTINUED | OUTPATIENT
Start: 2023-04-21 | End: 2023-04-22

## 2023-04-21 RX ORDER — CLOPIDOGREL BISULFATE 75 MG/1
75 TABLET, FILM COATED ORAL DAILY
Refills: 0 | Status: DISCONTINUED | OUTPATIENT
Start: 2023-04-21 | End: 2023-04-21

## 2023-04-21 RX ORDER — ACETAMINOPHEN 500 MG
650 TABLET ORAL ONCE
Refills: 0 | Status: COMPLETED | OUTPATIENT
Start: 2023-04-21 | End: 2023-04-21

## 2023-04-21 RX ORDER — SODIUM CHLORIDE 5 G/100ML
500 INJECTION, SOLUTION INTRAVENOUS
Refills: 0 | Status: DISCONTINUED | OUTPATIENT
Start: 2023-04-21 | End: 2023-04-22

## 2023-04-21 RX ORDER — MORPHINE SULFATE 50 MG/1
1 CAPSULE, EXTENDED RELEASE ORAL ONCE
Refills: 0 | Status: DISCONTINUED | OUTPATIENT
Start: 2023-04-21 | End: 2023-04-21

## 2023-04-21 RX ORDER — ACETAMINOPHEN 500 MG
650 TABLET ORAL EVERY 6 HOURS
Refills: 0 | Status: DISCONTINUED | OUTPATIENT
Start: 2023-04-21 | End: 2023-04-22

## 2023-04-21 RX ORDER — DESMOPRESSIN ACETATE 0.1 MG/1
35 TABLET ORAL ONCE
Refills: 0 | Status: DISCONTINUED | OUTPATIENT
Start: 2023-04-21 | End: 2023-04-22

## 2023-04-21 RX ORDER — SENNA PLUS 8.6 MG/1
2 TABLET ORAL AT BEDTIME
Refills: 0 | Status: DISCONTINUED | OUTPATIENT
Start: 2023-04-21 | End: 2023-04-22

## 2023-04-21 RX ORDER — SODIUM CHLORIDE 5 G/100ML
250 INJECTION, SOLUTION INTRAVENOUS ONCE
Refills: 0 | Status: DISCONTINUED | OUTPATIENT
Start: 2023-04-21 | End: 2023-04-21

## 2023-04-21 RX ORDER — SODIUM CHLORIDE 9 MG/ML
500 INJECTION, SOLUTION INTRAVENOUS ONCE
Refills: 0 | Status: DISCONTINUED | OUTPATIENT
Start: 2023-04-21 | End: 2023-04-21

## 2023-04-21 RX ORDER — ACETAMINOPHEN 500 MG
1000 TABLET ORAL ONCE
Refills: 0 | Status: DISCONTINUED | OUTPATIENT
Start: 2023-04-21 | End: 2023-04-21

## 2023-04-21 RX ORDER — DESMOPRESSIN ACETATE 0.1 MG/1
20 TABLET ORAL ONCE
Refills: 0 | Status: COMPLETED | OUTPATIENT
Start: 2023-04-21 | End: 2023-04-21

## 2023-04-21 RX ORDER — ACETAMINOPHEN 500 MG
1000 TABLET ORAL ONCE
Refills: 0 | Status: COMPLETED | OUTPATIENT
Start: 2023-04-21 | End: 2023-04-21

## 2023-04-21 RX ORDER — ASPIRIN/CALCIUM CARB/MAGNESIUM 324 MG
81 TABLET ORAL DAILY
Refills: 0 | Status: DISCONTINUED | OUTPATIENT
Start: 2023-04-21 | End: 2023-04-21

## 2023-04-21 RX ADMIN — Medication 650 MILLIGRAM(S): at 01:25

## 2023-04-21 RX ADMIN — Medication 400 MILLIGRAM(S): at 10:24

## 2023-04-21 RX ADMIN — SODIUM CHLORIDE 750 MILLILITER(S): 5 INJECTION, SOLUTION INTRAVENOUS at 14:25

## 2023-04-21 RX ADMIN — MORPHINE SULFATE 1 MILLIGRAM(S): 50 CAPSULE, EXTENDED RELEASE ORAL at 02:55

## 2023-04-21 RX ADMIN — Medication 81 MILLIGRAM(S): at 13:25

## 2023-04-21 RX ADMIN — Medication 900 GRAM(S): at 18:06

## 2023-04-21 RX ADMIN — SODIUM CHLORIDE 50 MILLILITER(S): 5 INJECTION, SOLUTION INTRAVENOUS at 14:25

## 2023-04-21 RX ADMIN — PANTOPRAZOLE SODIUM 40 MILLIGRAM(S): 20 TABLET, DELAYED RELEASE ORAL at 13:24

## 2023-04-21 RX ADMIN — Medication 650 MILLIGRAM(S): at 00:25

## 2023-04-21 RX ADMIN — MORPHINE SULFATE 1 MILLIGRAM(S): 50 CAPSULE, EXTENDED RELEASE ORAL at 03:00

## 2023-04-21 RX ADMIN — DESMOPRESSIN ACETATE 220 MICROGRAM(S): 0.1 TABLET ORAL at 14:02

## 2023-04-21 RX ADMIN — Medication 400 MILLIGRAM(S): at 17:02

## 2023-04-21 RX ADMIN — CHLORHEXIDINE GLUCONATE 1 APPLICATION(S): 213 SOLUTION TOPICAL at 06:32

## 2023-04-21 RX ADMIN — Medication 1000 MILLIGRAM(S): at 10:30

## 2023-04-21 RX ADMIN — Medication 1000 MILLIGRAM(S): at 18:39

## 2023-04-21 RX ADMIN — CLOPIDOGREL BISULFATE 75 MILLIGRAM(S): 75 TABLET, FILM COATED ORAL at 13:25

## 2023-04-21 NOTE — PROGRESS NOTE ADULT - SUBJECTIVE AND OBJECTIVE BOX
NSCU ATTENDING -- ADDITIONAL PROGRESS NOTE    Nighttime rounds were performed -- please refer to earlier Progress Note for HPI details.    56 y/o F with history of asthma and peripheral neuropathy transferred to [facility] from [facility] for management of malignant posterior fossa stroke.  The patient developed symptoms at approximately 0830 on 4/20: dysconjugate gaze, ataxia, facial droop, dysarthria.  She did not present to [facility] until later on in the afternoon during which she was not a candidate for TNK.  CTP showing bilateral cerebellar perfusion mismatch, CTA showing R. V4 occlusion and large bilateral cavernous/terminal ICA aneurysms.  Patient was admitted to the ICU and monitored.  On 4/21 the patient became more lethargic, at which point a repeat CTH was performed, showing bilateral cerebellar strokes with lateral compression of the fourth ventricle on the right.  Patient accepted to [facility] by Dr. Valadez, at which point he was given 250cc 3% NaCl, started on 50cc/h, given 20mcg DDAVP and transferred as tier 1.    Upon arrival to [facility], NIHSS = 12, but mental status slowly declined from lethargy to near obtundation.  Repeat CTH showing stable mass effect and hydrocephalus.  Patient taken emergently for SOC depression with EVD placement.      ICU Vital Signs Last 24 Hrs  T(C): 37.3 (21 Apr 2023 18:36), Max: 37.4 (21 Apr 2023 15:50)  T(F): 99.1 (21 Apr 2023 18:13), Max: 99.4 (21 Apr 2023 15:50)  HR: 71 (21 Apr 2023 18:36) (66 - 97)  BP: 144/64 (21 Apr 2023 18:36) (104/85 - 168/101)  BP(mean): 92 (21 Apr 2023 18:36) (75 - 127)  RR: 18 (21 Apr 2023 18:36) (12 - 23)  SpO2: 97% (21 Apr 2023 18:36) (92% - 97%)      04-21-23 @ 07:01  -  04-21-23 @ 19:43  --------------------------------------------------------  IN: 60 mL / OUT: 950 mL / NET: -890 mL      EXAM:   Neuro :    MS:     CN: PERRL, (+)gaze is dysconjugate (plegic to adduction OD, 'down and out,' and nystagmus with leftward gaze OS), (+)L. lower facial weakness, hearing grossly intact    Mot: bulk normal, tone normal, power full bilateral upper and lower proximal extremities  Chest: Clear, nonlabored respirations, no adventitious lung sounds bilaterally  Heart regular rate/rhythm, present S1/S2, no murmurs or rubs  Abdomen:soft and nontender   Extremities: No edema        MEDICATIONS:   acetaminophen     Tablet .. 650 milliGRAM(s) Oral every 6 hours PRN  atorvastatin 80 milliGRAM(s) Oral at bedtime  chlorhexidine 2% Cloths 1 Application(s) Topical every 12 hours  povidone iodine 5% Nasal Swab 1 Application(s) Both Nostrils once  senna 2 Tablet(s) Oral at bedtime  sodium chloride 3%. 500 milliLiter(s) (30 mL/Hr) IV Continuous <Continuous>      LABS:                      13.2   10.37 )-----------( 270      ( 21 Apr 2023 17:03 )             39.3     04-21    142  |  109<H>  |  11  ----------------------------<  149<H>  3.8   |  24  |  0.52    Ca    8.8      21 Apr 2023 17:03  Phos  2.7     04-21  Mg     2.0     04-21    TPro  6.7  /  Alb  3.8  /  TBili  0.9  /  DBili  x   /  AST  74<H>  /  ALT  91<H>  /  AlkPhos  112  04-21    LIVER FUNCTIONS - ( 21 Apr 2023 17:03 )  Alb: 3.8 g/dL / Pro: 6.7 g/dL / ALK PHOS: 112 U/L / ALT: 91 U/L / AST: 74 U/L / GGT: x             ASSESSMENT:   Bilateral cerebellar hemispheric strokes, post stroke day #1  Bilateral carotid terminus aneurysms  R. V4 occlusion  History of peripheral neuropathy and asthma    PLAN:  NEURO: Q1h neuro checks  to OR for SOC + EVD placement  EVD  Holding ASA/plavix (s/p DDAVP at [facility])  Sedation  Analgesia  Stroke core measures, stroke neurology consult  Activity: Bedrest    PULM:   CXR, ABG  Albuterol prn    CARDIO:   SBP goal 100-160, TTE w/ bubble, trop/baseline EKG    GI: NPO, will need enteric access    /RENAL:  On 3% @50cc/h (for goal Na 145-155), s/p 1g/kg mannitol on 4/21.  Monitor BMPs Q6   Garsia catheter for strict Is/Os  Check serum osm    HEME:  Maintain Hb > 7.0, PLT > 100,000, holding chemoppx    ID:  Monitor for infectious s/s, fever curve, leukocytosis    ENDO:   Glu goal 140-180mg/dL  ISS   Check A1c, LDL, TSH    SOCIAL/FAMILY:  [] awaiting [] updated at bedside [] family meeting    CODE STATUS:  [] Full Code [] DNR [] DNI [] Palliative/Comfort Care    DISPOSITION:  [] ICU [] Stroke Unit [] Floor [] EMU [] RCU [] PC    Time spent: ___ [] critical care minutes     NSCU ATTENDING -- ADDITIONAL PROGRESS NOTE    Nighttime rounds were performed -- please refer to earlier Progress Note for HPI details.    56 y/o F with history of asthma and peripheral neuropathy transferred to [facility] from [facility] for management of malignant posterior fossa stroke.  The patient developed symptoms at approximately 0830 on 4/20: dysconjugate gaze, ataxia, facial droop, dysarthria.  She did not present to [facility] until later on in the afternoon during which she was not a candidate for TNK.  CTP showing bilateral cerebellar perfusion mismatch, CTA showing R. V4 occlusion and large bilateral cavernous/terminal ICA aneurysms.  Patient was admitted to the ICU and monitored.  On 4/21 the patient became more lethargic, at which point a repeat CTH was performed, showing bilateral cerebellar strokes with lateral compression of the fourth ventricle on the right.  Patient accepted to [facility] by Dr. Valadez, at which point he was given 250cc 3% NaCl, started on 50cc/h, given 20mcg DDAVP and transferred as tier 1.     Upon arrival to [facility], NIHSS = 12, but mental status slowly declined from lethargy to near obtundation.  Repeat CTH showing stable mass effect and hydrocephalus. Patient taken emergently for SOC depression with EVD placement.      ICU Vital Signs Last 24 Hrs  T(C): 36.4 (22 Apr 2023 01:20), Max: 37.4 (21 Apr 2023 15:50)  T(F): 97.5 (22 Apr 2023 01:20), Max: 99.4 (21 Apr 2023 15:50)  HR: 71 (21 Apr 2023 18:36) (66 - 97)  BP: 144/64 (21 Apr 2023 18:36) (104/85 - 168/101)  BP(mean): 92 (21 Apr 2023 18:36) (75 - 127)  RR: 18 (21 Apr 2023 18:36) (12 - 23)  SpO2: 97% (21 Apr 2023 18:36) (92% - 97%)      04-21-23 @ 07:01  -  04-22-23 @ 01:41  --------------------------------------------------------  IN: 60 mL / OUT: 950 mL / NET: -890 mL      Vent settings:   AC 14/450/50/5      EXAM:   Neuro: Sedated and intubated  MS: Difficult to assess as on sedation, fresh post op.   CN: Pupils 2mm and brisk  Motor: Flaccid  Chest: ETT, Clear, nonlabored respirations, no adventitious lung sounds bilaterally  Heart regular rate/rhythm, present S1/S2, no murmurs or rubs  Abdomen: Soft and nontender   Extremities: No edema      MEDICATIONS:   acetaminophen     Tablet .. 650 milliGRAM(s) Oral every 6 hours PRN  atorvastatin 80 milliGRAM(s) Oral at bedtime  chlorhexidine 2% Cloths 1 Application(s) Topical every 12 hours  povidone iodine 5% Nasal Swab 1 Application(s) Both Nostrils once  senna 2 Tablet(s) Oral at bedtime  sodium chloride 3%. 500 milliLiter(s) (30 mL/Hr) IV Continuous <Continuous>      LABS:                   13.2   10.37 )-----------( 270      ( 21 Apr 2023 17:03 )             39.3     04-21    142  |  109<H>  |  11  ----------------------------<  149<H>  3.8   |  24  |  0.52    Ca    8.8      21 Apr 2023 17:03  Phos  2.7     04-21  Mg     2.0     04-21    TPro  6.7  /  Alb  3.8  /  TBili  0.9  /  DBili  x   /  AST  74<H>  /  ALT  91<H>  /  AlkPhos  112  04-21    LIVER FUNCTIONS - ( 21 Apr 2023 17:03 )  Alb: 3.8 g/dL / Pro: 6.7 g/dL / ALK PHOS: 112 U/L / ALT: 91 U/L / AST: 74 U/L / GGT: x           ABG - ( 22 Apr 2023 00:08 )  pH, Arterial: 7.37  pH, Blood: x     /  pCO2: 35    /  pO2: 284   / HCO3: 20    / Base Excess: -4.4  /  SaO2: x           ASSESSMENT:   Bilateral cerebellar hemispheric strokes  POD 0 s/p SOC for foramen magnum decompression and R parietooccipital EVD    Bilateral carotid terminus aneurysms  R. V4 occlusion  History of peripheral neuropathy and asthma      PLAN:  NEURO: Q1h neurochecks  S/P OR for SOC + EVD placement  EVD at 50ndK3N, ICPs, CPP, output.   Post op CT head reviewed. Repeat in am.  SG ELENA- monitor output  Holding ASA/plavix- see heme  Sedation: Propofol. RASS 0-neg 1  Analgesia: Fentanyl 25mcg Q2 prn for vent synchrony, analgesia  Stroke core measures, stroke neurology consult  Possible DSA next week  Activity: Bedrest    PULM: Full vent support  CXR, ABG  Albuterol prn  VAP bundle  CPAP as tolerated    CARDIAC:   SBP goal 100-140  TTE w/ bubble, trop/baseline EKG  Wean neosynephrine as tolerated (vasoplegia while on propofol)    GI: NPO, will need enteric access  PPI while intubated  Bowel regimen     /RENAL:  S/P 3% @50cc/h (for goal Na 140-145, s/p 1g/kg mannitol on 4/21.  Monitor BMPs post op and Q6   Garsia catheter for strict Is/Os  Check serum osm    HEME: S/P platelets and DDAVP for ASA/plavix reversal  Maintain Hb > 7.0, PLT > 100,000  Holding chemoppx as fresh post op  SCDs  LE dopplers    ID:  Monitor for infectious s/s, fever curve, leukocytosis  Post op Ancef    ENDO:   Glu goal 140-180mg/dL  ISS   Check A1c, LDL, TSH    SOCIAL/FAMILY:  [] awaiting [x] updated at bedside [] family meeting    CODE STATUS:  [x] Full Code [] DNR [] DNI [] Palliative/Comfort Care    DISPOSITION:  [x] ICU [] Stroke Unit [] Floor [] EMU [] RCU [] PC    Time spent: 60 critical care minutes

## 2023-04-21 NOTE — H&P ADULT - HISTORY OF PRESENT ILLNESS
58 yo F with PMH of Asthma, CAD (not on  meds), peripheral neuropathy and PSH of BATOOL, cholecystectomy, right knee surgery presented to [place] ED on 4/20 with right sided weakness and right facial numbness. Patient was undergoing preparation for colonoscopy. As per daughter at 8am patient was playing with her grandchildren but around 840 am patient was getting dressed and fell and subsequently felt weak after. Daughter reports that patient had some episodes of vomiting at this time. She had a syncopal episode at around 10 am and was witnessed by brother. No head trauma, She regained conscious after few minutes. She remained in bed for the remainder of the day. Daughter reports some slurred speech noted 1230-1PM and also was confused after. and around 4PM, the patient's sister noted right sided weakness at which time EMS was called and patient was brought to ED. No c/o chest pain, shortness of breath, fever, headache, abdominal pain, urinary complaints. No recent travel/sickness/ change in meds. Stroke code in ER: CTH neg for heme, Right PICA distribution acute infarction. CTA with saccular aneurysms of b/l carotids, approximately 0.8 cm on the right and 1.2 x 0.9 cm on the left. Possible tiny third saccular aneurysm on the right Posterior intracranialcirculation: Right vertebral arterial occlusion at its dural crossing junction V3 Atlantic and V4 intracranial segments with likely reversal of flow in its intracranial segment from the basilar. Right PICA faintly seen. 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  Not candidate for TNK/mechanical thrombectomy. CT scan repeated which showed: 1. Brain: Progression of acute infarction within the right cerebellar hemisphere, also extending into the left superior cerebellar hemisphere. New mass effect on the fourth ventricle causing stenosis versus occlusion with new third and lateral ventricular dilatation indicating ventricular obstruction at the level of the fourth ventricle. New upward and downward herniation of cerebellar parenchyma Right carotid system: No hemodynamically significant stenosis. Left carotid system: No hemodynamically significant stenosis. Vertebral circulation: Patent. Anterior intracranial circulation: Intact. Bilateral internal carotid saccular aneurysms. These findings are unchanged. Posterior intracranial circulation:    Improved flow within the right vertebral artery since 4/20/2023. New focal stenosis mid left vertebral artery, etiology uncertain, consider vasospasm and extrinsic compression in addition to new embolic disease. Brain perfusion: Perfusion images demonstrate normalization of the perfusion abnormality in the right cerebellar hemisphere present on 4/20/2023 despite evidence of progression of acute infarction.  Areas of apparent ischemia within the posterior fossa have progressed in extent, also again involving the left posterior cerebral arterial distribution. Tx to [facility] for SOC watch. On admission to [facility], NIHSS 12.  56 yo F with PMH of Asthma, CAD (not on  meds), peripheral neuropathy and PSH of BATOOL, cholecystectomy, right knee surgery presented to [place] ED on 4/20 with right sided weakness and right facial numbness. Patient was undergoing preparation for colonoscopy. As per daughter at 8am patient was playing with her grandchildren but around 840 am patient was getting dressed and fell and subsequently felt weak after. Daughter reports that patient had some episodes of vomiting at this time. She had a syncopal episode at around 10 am and was witnessed by brother. No head trauma, She regained conscious after few minutes. She remained in bed for the remainder of the day. Daughter reports some slurred speech noted 1230-1PM and also was confused after. and around 4PM, the patient's sister noted right sided weakness at which time EMS was called and patient was brought to ED. No c/o chest pain, shortness of breath, fever, headache, abdominal pain, urinary complaints. No recent travel/sickness/ change in meds. Stroke code in ER: CTH neg for heme, Right PICA distribution acute infarction. CTA with saccular aneurysms of b/l carotids, approximately 0.8 cm on the right and 1.2 x 0.9 cm on the left. Possible tiny third saccular aneurysm on the right Posterior intracranial circulation: Right vertebral arterial occlusion at its dural crossing junction V3 Atlantic and V4 intracranial segments with likely reversal of flow in its intracranial segment from the basilar. Right PICA faintly seen. 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  Not candidate for TNK/mechanical thrombectomy. CT scan repeated which showed: 1. Brain: Progression of acute infarction within the right cerebellar hemisphere, also extending into the left superior cerebellar hemisphere. New mass effect on the fourth ventricle causing stenosis versus occlusion with new third and lateral ventricular dilatation indicating ventricular obstruction at the level of the fourth ventricle. New upward and downward herniation of cerebellar parenchyma Right carotid system: No hemodynamically significant stenosis. Left carotid system: No hemodynamically significant stenosis. Vertebral circulation: Patent. Anterior intracranial circulation: Intact. Bilateral internal carotid saccular aneurysms. These findings are unchanged. Posterior intracranial circulation:    Improved flow within the right vertebral artery since 4/20/2023. New focal stenosis mid left vertebral artery, etiology uncertain, consider vasospasm and extrinsic compression in addition to new embolic disease. Brain perfusion: Perfusion images demonstrate normalization of the perfusion abnormality in the right cerebellar hemisphere present on 4/20/2023 despite evidence of progression of acute infarction.  Areas of apparent ischemia within the posterior fossa have progressed in extent, also again involving the left posterior cerebral arterial distribution. Tx to [facility] for SOC watch. On admission to [facility], NIHSS 12.

## 2023-04-21 NOTE — PHYSICAL THERAPY INITIAL EVALUATION ADULT - PERTINENT HX OF CURRENT PROBLEM, REHAB EVAL
Patient was brought to ED from home with weakness on  RUE and R LE; numbness on R side of face. CT Head shows progression of acute infarct within R cerebellar hemisphere extending into L superior cerebellar hemisphere.

## 2023-04-21 NOTE — DISCHARGE NOTE PROVIDER - HOSPITAL COURSE
Pt is a 56 yo F from home, independent with PMH of Asthma, CAD (not on  meds), peripheral neuropathy and PSH of BATOOL, cholecystectomy, right knee surgery presented to the ED with right sided weakness and right facial numbness from this morning. Patient was undergoing preparation for colonoscopy. As per daughter at 8am patient was playing with her grandchildren but around 840 am patient was getting dressed and fell and subsequently felt weak after. Daughter reports that patient had some episodes of vomiting at this time. She had a syncopal episode at around 10 am and was witnessed by brother. No head trauma, She regained conscious after few minutes. She remained in bed for the remainder of the day. Daughter reports some slurred speech noted 1230-1PM and also was confused after. and around 4PM, the patient's sister noted right sided weakness at which time EMS was called and patient was brought to ED. No c/o chest pain, shortness of breath, fever, headache, abdominal pain, urinary complaints. No recent travel/sickness/ change in meds. In the ED pt was a code stroke, she had CT head done performed which showed: No intracranial hemorrhage is appreciated. No intracranial mass lesion is appreciated. Right PICA distribution acute infarction MR may provide higher sensitivity imaging evaluation for the clinical indication of acute infarction. Pt had a CTA performed which showed: Right carotid system: No hemodynamically significant stenosis. Left carotid system: No hemodynamically significant stenosis. Vertebral circulation: Patent. Anterior intracranial circulation: Saccular aneurysms of each internal carotid artery, approximately 0.8 cm on the right and 1.2 x 0.9 cm on the left. Possible tiny third saccular aneurysm on the right Posterior intracranialcirculation: Right vertebral arterial occlusion at its dural crossing junction V3 Atlantic and V4 intracranial segments with likely reversal of flow in its intracranial segment from the basilar. Right PICA faintly seen. 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  Near equal volume abnormality in inflow and time to maximum perfusion imaging. Additional regions of delay of perfusion possible ischemia within the left posterior cerebral arterial distribution. ED attending Spoke with tele stroke neurology regarding posterior circulation occlusion V3/V4. Given that patient already has evolving infarct on CT scan, would not be a candidate for endovascular thrombectomy. Pt had CT scan repeated which showed: 1. Brain: Progression of acute infarction within the right cerebellar hemisphere, also extending into the left superior cerebellar hemisphere. New mass effect on the fourth ventricle causing stenosis versus occlusion with new third and lateral ventricular dilatation indicating ventricular obstruction at the level of the fourth ventricle. New upward and downward herniation of cerebellar parenchyma Right carotid system: No hemodynamically significant stenosis. Left carotid system: No hemodynamically significant stenosis. Vertebral circulation: Patent. Anterior intracranial circulation: Intact. Bilateral internal carotid saccular aneurysms. These findings are unchanged. Posterior intracranial circulation:    Improved flow within the right vertebral artery since 4/20/2023. New focal stenosis mid left vertebral artery, etiology uncertain, consider vasospasm and extrinsic compression in addition to new embolic disease. Brain perfusion: Perfusion images demonstrate normalization of the perfusion abnormality in the right cerebellar hemisphere present on 4/20/2023 despite evidence of progression of acute infarction.  Areas of apparent ischemia within the posterior fossa have progressed in extent, also again involving the left posterior cerebral arterial distribution. [facility] was contacted, spoke with Stroke team, Dr. Esa Bueno, who recommended getting the pt transferred to a facility with neuro ICU. [facility] was contacted but did not have any beds. [facility] was reached who accepted the pt, Dr. Kenyon Lopez and Dr. Ric Caballero. Pt is being transferred for further management.            Pt is a 58 yo F from home, independent with PMH of Asthma, CAD (not on  meds), peripheral neuropathy and PSH of BATOOL, cholecystectomy, right knee surgery presented to the ED with right sided weakness and right facial numbness from this morning. Patient was undergoing preparation for colonoscopy. As per daughter at 8am patient was playing with her grandchildren but around 840 am patient was getting dressed and fell and subsequently felt weak after. Daughter reports that patient had some episodes of vomiting at this time. She had a syncopal episode at around 10 am and was witnessed by brother. No head trauma, She regained conscious after few minutes. She remained in bed for the remainder of the day. Daughter reports some slurred speech noted 1230-1PM and also was confused after. and around 4PM, the patient's sister noted right sided weakness at which time EMS was called and patient was brought to ED. No c/o chest pain, shortness of breath, fever, headache, abdominal pain, urinary complaints. No recent travel/sickness/ change in meds. In the ED pt was a code stroke, she had CT head done performed which showed: No intracranial hemorrhage is appreciated. No intracranial mass lesion is appreciated. Right PICA distribution acute infarction MR may provide higher sensitivity imaging evaluation for the clinical indication of acute infarction. Pt had a CTA performed which showed: Right carotid system: No hemodynamically significant stenosis. Left carotid system: No hemodynamically significant stenosis. Vertebral circulation: Patent. Anterior intracranial circulation: Saccular aneurysms of each internal carotid artery, approximately 0.8 cm on the right and 1.2 x 0.9 cm on the left. Possible tiny third saccular aneurysm on the right Posterior intracranialcirculation: Right vertebral arterial occlusion at its dural crossing junction V3 Atlantic and V4 intracranial segments with likely reversal of flow in its intracranial segment from the basilar. Right PICA faintly seen. 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  Near equal volume abnormality in inflow and time to maximum perfusion imaging. Additional regions of delay of perfusion possible ischemia within the left posterior cerebral arterial distribution. ED attending Spoke with tele stroke neurology regarding posterior circulation occlusion V3/V4. Given that patient already has evolving infarct on CT scan, would not be a candidate for endovascular thrombectomy. Pt had CT scan repeated which showed: 1. Brain: Progression of acute infarction within the right cerebellar hemisphere, also extending into the left superior cerebellar hemisphere. New mass effect on the fourth ventricle causing stenosis versus occlusion with new third and lateral ventricular dilatation indicating ventricular obstruction at the level of the fourth ventricle. New upward and downward herniation of cerebellar parenchyma Right carotid system: No hemodynamically significant stenosis. Left carotid system: No hemodynamically significant stenosis. Vertebral circulation: Patent. Anterior intracranial circulation: Intact. Bilateral internal carotid saccular aneurysms. These findings are unchanged. Posterior intracranial circulation:    Improved flow within the right vertebral artery since 4/20/2023. New focal stenosis mid left vertebral artery, etiology uncertain, consider vasospasm and extrinsic compression in addition to new embolic disease. Brain perfusion: Perfusion images demonstrate normalization of the perfusion abnormality in the right cerebellar hemisphere present on 4/20/2023 despite evidence of progression of acute infarction.  Areas of apparent ischemia within the posterior fossa have progressed in extent, also again involving the left posterior cerebral arterial distribution. [facility] was contacted, spoke with Stroke team, Dr. Esa Bueno, who recommended getting the pt transferred to a facility with neuro ICU. [facility] was contacted but did not have any beds. [facility] was reached who accepted the pt, Dr. Kenyon Lopez and Dr. Ric Caballero. Pt is being transferred for further management.       for a full account of hospital course please refer to actual medical records for this is a brief summery

## 2023-04-21 NOTE — PHYSICAL THERAPY INITIAL EVALUATION ADULT - DIAGNOSIS, PT EVAL
Patient presented with deficits in AROM on  R UE and R LE(towards the end range), co-ordination deficits, headache worsening with supine to sit transfers

## 2023-04-21 NOTE — H&P ADULT - PROBLEM SELECTOR PLAN 1
- Vital/neuro q1   - CTH complete- pending official read  - Hold aspirin/plavix   - crani watch  - Needs DSA (Monday/Tuesday if stable) for B/L carotid aneurysms  - stroke consulted, f/u recs  - Plan for emergent craniectomy today w/ Dr. Valadez  - 1U of platelets ordered prior to OR

## 2023-04-21 NOTE — CHART NOTE - NSCHARTNOTEFT_GEN_A_CORE
Had NIH between 7 - 10 but slowly becoming more lethargic. Called and discussed with stroke neurology fellow at [facility] Dr. Bueno who recommended repeat CT/CTA. Repeat scans performed, now demonstrating worsening area of ischemic with mass effect. Re-consulted Dr. Bueno, no role for mechanical thrombectomy but could require neurosurgical evaluation. Called [facility], [facility], [facility] Kenyon Lopez and neurosurgery attending Dr. Dumont, recommended DDAVP and 3% hypertonic saline at 70mL/hour, and transfer to [facility]. Extensive update provided to patient and family.

## 2023-04-21 NOTE — PROGRESS NOTE ADULT - SUBJECTIVE AND OBJECTIVE BOX
INTERVAL HPI/OVERNIGHT EVENTS: ***    PRESSORS: [ ] YES [ ] NO  WHICH:    Antimicrobial:    Cardiovascular:    Pulmonary:  albuterol    90 MICROgram(s) HFA Inhaler 2 Puff(s) Inhalation every 6 hours PRN    Hematalogic:    Other:  aspirin Suppository 300 milliGRAM(s) Rectal daily  chlorhexidine 2% Cloths 1 Application(s) Topical <User Schedule>  dextrose 5% + sodium chloride 0.9% with potassium chloride 20 mEq/L 1000 milliLiter(s) IV Continuous <Continuous>  pantoprazole  Injectable 40 milliGRAM(s) IV Push daily    albuterol    90 MICROgram(s) HFA Inhaler 2 Puff(s) Inhalation every 6 hours PRN  aspirin Suppository 300 milliGRAM(s) Rectal daily  chlorhexidine 2% Cloths 1 Application(s) Topical <User Schedule>  dextrose 5% + sodium chloride 0.9% with potassium chloride 20 mEq/L 1000 milliLiter(s) IV Continuous <Continuous>  pantoprazole  Injectable 40 milliGRAM(s) IV Push daily    Drug Dosing Weight  Height (cm): 157.5 (20 Apr 2023 17:51)  Weight (kg): 85 (21 Apr 2023 06:00)  BMI (kg/m2): 34.3 (21 Apr 2023 06:00)  BSA (m2): 1.86 (21 Apr 2023 06:00)    CENTRAL LINE: [ ] YES [ ] NO  LOCATION:   DATE INSERTED:  REMOVE: [ ] YES [ ] NO  EXPLAIN:    JAUREGUI: [ ] YES [ ] NO    DATE INSERTED:  REMOVE:  [ ] YES [ ] NO  EXPLAIN:    A-LINE:  [ ] YES [ ] NO  LOCATION:   DATE INSERTED:  REMOVE:  [ ] YES [ ] NO  EXPLAIN:    PMH -reviewed admission note, no change since admission  PAST MEDICAL & SURGICAL HISTORY:  Asthma      CAD (coronary artery disease)      Peripheral neuropathy      S/P total abdominal hysterectomy      S/P cholecystectomy      S/P right knee surgery          ICU Vital Signs Last 24 Hrs  T(C): 37 (21 Apr 2023 06:00), Max: 37.2 (20 Apr 2023 20:37)  T(F): 98.6 (21 Apr 2023 06:00), Max: 99 (20 Apr 2023 20:37)  HR: 84 (21 Apr 2023 07:00) (66 - 97)  BP: 137/68 (21 Apr 2023 07:00) (104/85 - 158/89)  BP(mean): 88 (21 Apr 2023 07:00) (75 - 107)  ABP: --  ABP(mean): --  RR: 14 (21 Apr 2023 07:00) (12 - 23)  SpO2: 95% (21 Apr 2023 07:00) (95% - 100%)    O2 Parameters below as of 21 Apr 2023 06:00  Patient On (Oxygen Delivery Method): room air                  04-20 @ 07:01  -  04-21 @ 07:00  --------------------------------------------------------  IN: 800 mL / OUT: 0 mL / NET: 800 mL            PHYSICAL EXAM:    GENERAL: NAD, well-groomed, well-developed  HEAD:  Atraumatic, Normocephalic  EYES: EOMI, PERRLA, conjunctiva and sclera clear  ENMT: No tonsillar erythema, exudates, or enlargement; Moist mucous membranes, Good dentition, No lesions  NECK: Supple, normal appearance, No JVD; Normal thyroid; Trachea midline  NERVOUS SYSTEM:  Alert & Oriented X3,  Motor Strength 5/5 B/L upper and lower extremities; DTRs 2+ intact and symmetric  CHEST/LUNG: No chest deformity; Normal percussion bilaterally; No rales, rhonchi, wheezing   HEART: Regular rate and rhythm; No murmurs, rubs, or gallops  ABDOMEN: Soft, Nontender, Nondistended; Bowel sounds present  EXTREMITIES:  2+ Peripheral Pulses, No clubbing, cyanosis, or edema  LYMPH: No lymphadenopathy noted  SKIN: No rashes or lesions;  Good capillary refill      LABS:  CBC Full  -  ( 21 Apr 2023 04:55 )  WBC Count : 8.32 K/uL  RBC Count : 4.69 M/uL  Hemoglobin : 13.8 g/dL  Hematocrit : 41.2 %  Platelet Count - Automated : 266 K/uL  Mean Cell Volume : 87.8 fl  Mean Cell Hemoglobin : 29.4 pg  Mean Cell Hemoglobin Concentration : 33.5 gm/dL  Auto Neutrophil # : 6.22 K/uL  Auto Lymphocyte # : 1.54 K/uL  Auto Monocyte # : 0.52 K/uL  Auto Eosinophil # : 0.00 K/uL  Auto Basophil # : 0.02 K/uL  Auto Neutrophil % : 74.8 %  Auto Lymphocyte % : 18.5 %  Auto Monocyte % : 6.3 %  Auto Eosinophil % : 0.0 %  Auto Basophil % : 0.2 %    04-21    141  |  109<H>  |  15  ----------------------------<  148<H>  3.6   |  29  |  0.65    Ca    9.0      21 Apr 2023 04:55  Phos  2.5     04-21  Mg     2.2     04-21    TPro  7.3  /  Alb  3.6  /  TBili  0.6  /  DBili  x   /  AST  41<H>  /  ALT  59  /  AlkPhos  104  04-21    PT/INR - ( 20 Apr 2023 18:15 )   PT: 12.5 sec;   INR: 1.05 ratio         PTT - ( 20 Apr 2023 18:15 )  PTT:35.3 sec        RADIOLOGY & ADDITIONAL STUDIES REVIEWED:  ***    [ ]GOALS OF CARE DISCUSSION WITH PATIENT/FAMILY/PROXY:    CRITICAL CARE TIME SPENT: 35 minutes INTERVAL HPI/OVERNIGHT EVENTS: No acute overnight events. Pt seen at bedside, Ruby pink.     PRESSORS: [ ] YES [ ] NO  WHICH:    Antimicrobial:    Cardiovascular:    Pulmonary:  albuterol    90 MICROgram(s) HFA Inhaler 2 Puff(s) Inhalation every 6 hours PRN    Hematalogic:    Other:  aspirin Suppository 300 milliGRAM(s) Rectal daily  chlorhexidine 2% Cloths 1 Application(s) Topical <User Schedule>  dextrose 5% + sodium chloride 0.9% with potassium chloride 20 mEq/L 1000 milliLiter(s) IV Continuous <Continuous>  pantoprazole  Injectable 40 milliGRAM(s) IV Push daily    albuterol    90 MICROgram(s) HFA Inhaler 2 Puff(s) Inhalation every 6 hours PRN  aspirin Suppository 300 milliGRAM(s) Rectal daily  chlorhexidine 2% Cloths 1 Application(s) Topical <User Schedule>  dextrose 5% + sodium chloride 0.9% with potassium chloride 20 mEq/L 1000 milliLiter(s) IV Continuous <Continuous>  pantoprazole  Injectable 40 milliGRAM(s) IV Push daily    Drug Dosing Weight  Height (cm): 157.5 (20 Apr 2023 17:51)  Weight (kg): 85 (21 Apr 2023 06:00)  BMI (kg/m2): 34.3 (21 Apr 2023 06:00)  BSA (m2): 1.86 (21 Apr 2023 06:00)    CENTRAL LINE: [ ] YES [ ] NO  LOCATION:   DATE INSERTED:  REMOVE: [ ] YES [ ] NO  EXPLAIN:    JAUREGUI: [ ] YES [ ] NO    DATE INSERTED:  REMOVE:  [ ] YES [ ] NO  EXPLAIN:    A-LINE:  [ ] YES [ ] NO  LOCATION:   DATE INSERTED:  REMOVE:  [ ] YES [ ] NO  EXPLAIN:    PMH -reviewed admission note, no change since admission  PAST MEDICAL & SURGICAL HISTORY:  Asthma      CAD (coronary artery disease)      Peripheral neuropathy      S/P total abdominal hysterectomy      S/P cholecystectomy      S/P right knee surgery          ICU Vital Signs Last 24 Hrs  T(C): 37 (21 Apr 2023 06:00), Max: 37.2 (20 Apr 2023 20:37)  T(F): 98.6 (21 Apr 2023 06:00), Max: 99 (20 Apr 2023 20:37)  HR: 84 (21 Apr 2023 07:00) (66 - 97)  BP: 137/68 (21 Apr 2023 07:00) (104/85 - 158/89)  BP(mean): 88 (21 Apr 2023 07:00) (75 - 107)  ABP: --  ABP(mean): --  RR: 14 (21 Apr 2023 07:00) (12 - 23)  SpO2: 95% (21 Apr 2023 07:00) (95% - 100%)    O2 Parameters below as of 21 Apr 2023 06:00  Patient On (Oxygen Delivery Method): room air                  04-20 @ 07:01  -  04-21 @ 07:00  --------------------------------------------------------  IN: 800 mL / OUT: 0 mL / NET: 800 mL            PHYSICAL EXAM:    GENERAL: NAD, well-groomed, well-developed  HEAD:  Atraumatic, Normocephalic  EYES: EOMI, PERRLA, conjunctiva and sclera clear  ENMT: No tonsillar erythema, exudates, or enlargement; Moist mucous membranes, Good dentition, No lesions  NECK: Supple, normal appearance, No JVD; Normal thyroid; Trachea midline  NERVOUS SYSTEM:  Alert & Oriented X3,  Motor Strength 5/5 B/L upper and lower extremities; DTRs 2+ intact and symmetric  CHEST/LUNG: No chest deformity; Normal percussion bilaterally; No rales, rhonchi, wheezing   HEART: Regular rate and rhythm; No murmurs, rubs, or gallops  ABDOMEN: Soft, Nontender, Nondistended; Bowel sounds present  EXTREMITIES:  2+ Peripheral Pulses, No clubbing, cyanosis, or edema  LYMPH: No lymphadenopathy noted  SKIN: No rashes or lesions;  Good capillary refill      LABS:  CBC Full  -  ( 21 Apr 2023 04:55 )  WBC Count : 8.32 K/uL  RBC Count : 4.69 M/uL  Hemoglobin : 13.8 g/dL  Hematocrit : 41.2 %  Platelet Count - Automated : 266 K/uL  Mean Cell Volume : 87.8 fl  Mean Cell Hemoglobin : 29.4 pg  Mean Cell Hemoglobin Concentration : 33.5 gm/dL  Auto Neutrophil # : 6.22 K/uL  Auto Lymphocyte # : 1.54 K/uL  Auto Monocyte # : 0.52 K/uL  Auto Eosinophil # : 0.00 K/uL  Auto Basophil # : 0.02 K/uL  Auto Neutrophil % : 74.8 %  Auto Lymphocyte % : 18.5 %  Auto Monocyte % : 6.3 %  Auto Eosinophil % : 0.0 %  Auto Basophil % : 0.2 %    04-21    141  |  109<H>  |  15  ----------------------------<  148<H>  3.6   |  29  |  0.65    Ca    9.0      21 Apr 2023 04:55  Phos  2.5     04-21  Mg     2.2     04-21    TPro  7.3  /  Alb  3.6  /  TBili  0.6  /  DBili  x   /  AST  41<H>  /  ALT  59  /  AlkPhos  104  04-21    PT/INR - ( 20 Apr 2023 18:15 )   PT: 12.5 sec;   INR: 1.05 ratio         PTT - ( 20 Apr 2023 18:15 )  PTT:35.3 sec        RADIOLOGY & ADDITIONAL STUDIES REVIEWED:  ***    [ ]GOALS OF CARE DISCUSSION WITH PATIENT/FAMILY/PROXY:    CRITICAL CARE TIME SPENT: 35 minutes INTERVAL HPI/OVERNIGHT EVENTS: No acute overnight events. Pt seen at bedside, lerthargic, AAOx3. NIHSS score 6 this morning. NIHSS now 8. Pt complaining of posterior headache    PRESSORS: [ ] YES [x] NO  WHICH:    Antimicrobial:    Cardiovascular:    Pulmonary:  albuterol    90 MICROgram(s) HFA Inhaler 2 Puff(s) Inhalation every 6 hours PRN    Hematalogic:    Other:  aspirin Suppository 300 milliGRAM(s) Rectal daily  chlorhexidine 2% Cloths 1 Application(s) Topical <User Schedule>  dextrose 5% + sodium chloride 0.9% with potassium chloride 20 mEq/L 1000 milliLiter(s) IV Continuous <Continuous>  pantoprazole  Injectable 40 milliGRAM(s) IV Push daily    albuterol    90 MICROgram(s) HFA Inhaler 2 Puff(s) Inhalation every 6 hours PRN  aspirin Suppository 300 milliGRAM(s) Rectal daily  chlorhexidine 2% Cloths 1 Application(s) Topical <User Schedule>  dextrose 5% + sodium chloride 0.9% with potassium chloride 20 mEq/L 1000 milliLiter(s) IV Continuous <Continuous>  pantoprazole  Injectable 40 milliGRAM(s) IV Push daily    Drug Dosing Weight  Height (cm): 157.5 (20 Apr 2023 17:51)  Weight (kg): 85 (21 Apr 2023 06:00)  BMI (kg/m2): 34.3 (21 Apr 2023 06:00)  BSA (m2): 1.86 (21 Apr 2023 06:00)    CENTRAL LINE: [ ] YES [x] NO  LOCATION:   DATE INSERTED:  REMOVE: [ ] YES [ ] NO  EXPLAIN:    JAUREGUI: [ ] YES [x] NO    DATE INSERTED:  REMOVE:  [ ] YES [ ] NO  EXPLAIN:    A-LINE:  [ ] YES [x] NO  LOCATION:   DATE INSERTED:  REMOVE:  [ ] YES [ ] NO  EXPLAIN:    PMH -reviewed admission note, no change since admission  PAST MEDICAL & SURGICAL HISTORY:  Asthma      CAD (coronary artery disease)      Peripheral neuropathy      S/P total abdominal hysterectomy      S/P cholecystectomy      S/P right knee surgery          ICU Vital Signs Last 24 Hrs  T(C): 37 (21 Apr 2023 06:00), Max: 37.2 (20 Apr 2023 20:37)  T(F): 98.6 (21 Apr 2023 06:00), Max: 99 (20 Apr 2023 20:37)  HR: 84 (21 Apr 2023 07:00) (66 - 97)  BP: 137/68 (21 Apr 2023 07:00) (104/85 - 158/89)  BP(mean): 88 (21 Apr 2023 07:00) (75 - 107)  ABP: --  ABP(mean): --  RR: 14 (21 Apr 2023 07:00) (12 - 23)  SpO2: 95% (21 Apr 2023 07:00) (95% - 100%)    O2 Parameters below as of 21 Apr 2023 06:00  Patient On (Oxygen Delivery Method): room air                  04-20 @ 07:01  -  04-21 @ 07:00  --------------------------------------------------------  IN: 800 mL / OUT: 0 mL / NET: 800 mL            PHYSICAL EXAM:    GENERAL: NAD, well-groomed, well-developed  HEAD:  Atraumatic, Normocephalic  EYES: (+) right eye partial gaze palsy, PERRLA, conjunctiva and sclera clear  ENMT: No tonsillar erythema, exudates, or enlargement; Moist mucous membranes, Good dentition, No lesions  NECK: Supple, normal appearance, No JVD; Normal thyroid; Trachea midline  NERVOUS SYSTEM:  (+) right eye partial gaze palsy, right arm drift, left facial droop, dysarthria, NIHSS 8. Alert & Oriented X3 and symmetric  CHEST/LUNG: No chest deformity; Normal percussion bilaterally; No rales, rhonchi, wheezing   HEART: Regular rate and rhythm; No murmurs, rubs, or gallops  ABDOMEN: Soft, Nontender, Nondistended; Bowel sounds present  EXTREMITIES:  2+ Peripheral Pulses, No clubbing, cyanosis, or edema  LYMPH: No lymphadenopathy noted  SKIN: No rashes or lesions;  Good capillary refill      LABS:  CBC Full  -  ( 21 Apr 2023 04:55 )  WBC Count : 8.32 K/uL  RBC Count : 4.69 M/uL  Hemoglobin : 13.8 g/dL  Hematocrit : 41.2 %  Platelet Count - Automated : 266 K/uL  Mean Cell Volume : 87.8 fl  Mean Cell Hemoglobin : 29.4 pg  Mean Cell Hemoglobin Concentration : 33.5 gm/dL  Auto Neutrophil # : 6.22 K/uL  Auto Lymphocyte # : 1.54 K/uL  Auto Monocyte # : 0.52 K/uL  Auto Eosinophil # : 0.00 K/uL  Auto Basophil # : 0.02 K/uL  Auto Neutrophil % : 74.8 %  Auto Lymphocyte % : 18.5 %  Auto Monocyte % : 6.3 %  Auto Eosinophil % : 0.0 %  Auto Basophil % : 0.2 %    04-21    141  |  109<H>  |  15  ----------------------------<  148<H>  3.6   |  29  |  0.65    Ca    9.0      21 Apr 2023 04:55  Phos  2.5     04-21  Mg     2.2     04-21    TPro  7.3  /  Alb  3.6  /  TBili  0.6  /  DBili  x   /  AST  41<H>  /  ALT  59  /  AlkPhos  104  04-21    PT/INR - ( 20 Apr 2023 18:15 )   PT: 12.5 sec;   INR: 1.05 ratio         PTT - ( 20 Apr 2023 18:15 )  PTT:35.3 sec        RADIOLOGY & ADDITIONAL STUDIES REVIEWED:  ***    [ ]GOALS OF CARE DISCUSSION WITH PATIENT/FAMILY/PROXY:    CRITICAL CARE TIME SPENT: 35 minutes INTERVAL HPI/OVERNIGHT EVENTS: No acute overnight events. Pt seen at bedside, lerthargic, AAOx3. NIHSS score 6 this morning. NIHSS now 8. Pt complaining of posterior headache    PRESSORS: [ ] YES [x] NO  WHICH:    Antimicrobial:    Cardiovascular:    Pulmonary:  albuterol    90 MICROgram(s) HFA Inhaler 2 Puff(s) Inhalation every 6 hours PRN    Hematalogic:    Other:  aspirin Suppository 300 milliGRAM(s) Rectal daily  chlorhexidine 2% Cloths 1 Application(s) Topical <User Schedule>  dextrose 5% + sodium chloride 0.9% with potassium chloride 20 mEq/L 1000 milliLiter(s) IV Continuous <Continuous>  pantoprazole  Injectable 40 milliGRAM(s) IV Push daily    albuterol    90 MICROgram(s) HFA Inhaler 2 Puff(s) Inhalation every 6 hours PRN  aspirin Suppository 300 milliGRAM(s) Rectal daily  chlorhexidine 2% Cloths 1 Application(s) Topical <User Schedule>  dextrose 5% + sodium chloride 0.9% with potassium chloride 20 mEq/L 1000 milliLiter(s) IV Continuous <Continuous>  pantoprazole  Injectable 40 milliGRAM(s) IV Push daily    Drug Dosing Weight  Height (cm): 157.5 (20 Apr 2023 17:51)  Weight (kg): 85 (21 Apr 2023 06:00)  BMI (kg/m2): 34.3 (21 Apr 2023 06:00)  BSA (m2): 1.86 (21 Apr 2023 06:00)    CENTRAL LINE: [ ] YES [x] NO  LOCATION:   DATE INSERTED:  REMOVE: [ ] YES [ ] NO  EXPLAIN:    JAUREGUI: [ ] YES [x] NO    DATE INSERTED:  REMOVE:  [ ] YES [ ] NO  EXPLAIN:    A-LINE:  [ ] YES [x] NO  LOCATION:   DATE INSERTED:  REMOVE:  [ ] YES [ ] NO  EXPLAIN:    PMH -reviewed admission note, no change since admission  PAST MEDICAL & SURGICAL HISTORY:  Asthma      CAD (coronary artery disease)      Peripheral neuropathy      S/P total abdominal hysterectomy      S/P cholecystectomy      S/P right knee surgery          ICU Vital Signs Last 24 Hrs  T(C): 37 (21 Apr 2023 06:00), Max: 37.2 (20 Apr 2023 20:37)  T(F): 98.6 (21 Apr 2023 06:00), Max: 99 (20 Apr 2023 20:37)  HR: 84 (21 Apr 2023 07:00) (66 - 97)  BP: 137/68 (21 Apr 2023 07:00) (104/85 - 158/89)  BP(mean): 88 (21 Apr 2023 07:00) (75 - 107)  ABP: --  ABP(mean): --  RR: 14 (21 Apr 2023 07:00) (12 - 23)  SpO2: 95% (21 Apr 2023 07:00) (95% - 100%)    O2 Parameters below as of 21 Apr 2023 06:00  Patient On (Oxygen Delivery Method): room air                  04-20 @ 07:01  -  04-21 @ 07:00  --------------------------------------------------------  IN: 800 mL / OUT: 0 mL / NET: 800 mL            PHYSICAL EXAM:    GENERAL: NAD, well-groomed, well-developed  HEAD:  Atraumatic, Normocephalic  EYES: (+) right eye partial gaze palsy, PERRLA, conjunctiva and sclera clear  ENMT: No tonsillar erythema, exudates, or enlargement; Moist mucous membranes, Good dentition, No lesions  NECK: Supple, normal appearance, No JVD; Normal thyroid; Trachea midline  NERVOUS SYSTEM:  (+) right eye partial gaze palsy, right arm drift, left facial droop, dysarthria, NIHSS 8. Alert & Oriented X3 and symmetric  CHEST/LUNG: No chest deformity; Normal percussion bilaterally; No rales, rhonchi, wheezing   HEART: Regular rate and rhythm; No murmurs, rubs, or gallops  ABDOMEN: Soft, Nontender, Nondistended; Bowel sounds present  EXTREMITIES:  2+ Peripheral Pulses, No clubbing, cyanosis, or edema  LYMPH: No lymphadenopathy noted  SKIN: No rashes or lesions;  Good capillary refill      LABS:  CBC Full  -  ( 21 Apr 2023 04:55 )  WBC Count : 8.32 K/uL  RBC Count : 4.69 M/uL  Hemoglobin : 13.8 g/dL  Hematocrit : 41.2 %  Platelet Count - Automated : 266 K/uL  Mean Cell Volume : 87.8 fl  Mean Cell Hemoglobin : 29.4 pg  Mean Cell Hemoglobin Concentration : 33.5 gm/dL  Auto Neutrophil # : 6.22 K/uL  Auto Lymphocyte # : 1.54 K/uL  Auto Monocyte # : 0.52 K/uL  Auto Eosinophil # : 0.00 K/uL  Auto Basophil # : 0.02 K/uL  Auto Neutrophil % : 74.8 %  Auto Lymphocyte % : 18.5 %  Auto Monocyte % : 6.3 %  Auto Eosinophil % : 0.0 %  Auto Basophil % : 0.2 %    04-21    141  |  109<H>  |  15  ----------------------------<  148<H>  3.6   |  29  |  0.65    Ca    9.0      21 Apr 2023 04:55  Phos  2.5     04-21  Mg     2.2     04-21    TPro  7.3  /  Alb  3.6  /  TBili  0.6  /  DBili  x   /  AST  41<H>  /  ALT  59  /  AlkPhos  104  04-21    PT/INR - ( 20 Apr 2023 18:15 )   PT: 12.5 sec;   INR: 1.05 ratio         PTT - ( 20 Apr 2023 18:15 )  PTT:35.3 sec        RADIOLOGY & ADDITIONAL STUDIES REVIEWED:  ***    < from: CT Angio Neck w/ IV Cont (04.21.23 @ 12:10) >    ACC: [id number] EXAM:  CT BRAIN PERFUSION MAPS STROKE   ORDERED BY: ZONIA MCKNIGHT     ACC: [id number] EXAM:  CT ANGIO BRAIN STROKE PROTC IC   ORDERED BY: ZONIA MCKNIGHT     ACC: [id number] EXAM:  CT BRAIN   ORDERED BY: ZONIA MCKNIGHT     ACC: [id number] EXAM:  CT ANGIO NECK (W)AW IC   ORDERED BY: ZONIA MCKNIGHT     PROCEDURE DATE:  04/21/2023          INTERPRETATION:  Four examinations were performed on this patient:  1.  CT head without IV contrast  2.  CT Angiography of the carotid arteries with IV contrast (and without,   if performed)  3.  CT Angiography of the intracranial circulation with IV contrast (and   without, if performed)  4.  CT brain perfusion:   CT head with and without IV contrast      CLINICAL INFORMATION:      CVA/STROKE   PICA INFARCT    TECHNIQUE (CT head):  Contiguous axial 3 mm thick images were acquired.    This data set was reconstructed in the sagittal and coronal planes.    Contrast was not administered for this examination.    TECHNIQUE (CTAexaminations):   CT angiography images at 1 mm thickness   were acquired from the skull base to the skull vertex as a single helical   acquisition.   Images were acquired during rapid bolus intravenous   administration of  nonionic contrast  volume 130 cc administered  (or   default volume 100 mL).  This data set was reconstructed sagittal and   coronal images.  3D MIP reconstruction images were obtained.    Post   processing angiographic reconstruction of images was performed. This data   set was  reconstructed  and reviewed in multiple projections to evaluate   carotid morphology and intracranial vessels.   The magnitude of stenosis   was evaluated based on the diameter of an intact distal of the internal   carotid artery using NASCET criteria.    TECHNIQUE (CT brain perfusion):  Multiple sequential acquisitions through   the head during dynamic rapid bolus administration of intravenous   contrast administration 130 cc administered.   Helical 10 mm images were   obtained at multiple time points Perfusion data is reconstructed as   cerebral perfusion, cerebral blood volume, time to maximum perfusion and   mean transit time color-coded images at 10 mm thick sections.    ARTIFICIAL INTELLIGENCE:   This study was analyzed with AI algorithms   including deep machinelearning to assist in the evaluation of this brain   perfusion data set.    DOSE INFORMATION:   This scan was performed using automatic exposure   control (radiation dose reduction software) to obtain a diagnostic image   quality scan with patient dose as low as reasonably achievable.   Total   DLP for this examination is estimated at 3026 mGy-cm.    COMPARISON:    CT head stroke code angiography and perfusion from the   preceding day available for review.      FINDINGS:    CT HEAD:    BRAIN and CSF SPACES: Evolving acute infarction within the posterior   fossa has progressed. Acute infarction now involves a larger proportion   of the right cerebellar hemisphere extending into the vermis and superior   aspect, on 4/20/2023 was more limited within the inferior medial right   cerebellar hemisphere. Portions of the right lateral cerebellar   hemisphere remains spared. However, acute infarction may also extend into   the left superior cerebellar hemisphere. This more extensive acute   infarction is associated with no mass effect on the fourth ventricle,   appearing to significantly compromise or include the fourth ventricle in   the superior aspect near the inflow of the cerebral aqueduct. There is   there is likely mass effect on the cerebral aqueduct with upward   herniation partially effacing the quadrigeminal plate cistern and   impressing directly upon the inferior colliculus. At least mild downward   herniation at the right cerebellar peduncle is also present.    Increased mass effect within the posterior fossa is associated with no   dilatation of the lateral ventricles. This is evident at the temporal   horns and throughout. The third ventricle is now also mildly dilated.    The supratentorial brain demonstrates no parenchymal lesion. Cerebral   cortex remains intact, with preserved gray-white matter differentiation.   No hemorrhage is recognized.    HEAD AND NECK STRUCTURES:  The orbits are unremarkable.  The paranasal   sinuses remain clear. The left frontal sinus ishypoplastic. The nasal   septum deviates leftward right in its anterior aspect.  The nasopharynx   is symmetric.  The central skull base is intact.  The temporal bones   demonstrate patent petrous air cells.  The calvarium appears unremarkable.    CTA CAROTID CIRCULATION:    The thoracic aortic arch appears intact.  The great vessel origins remain   patent.    The right carotid circulation demonstrates an intact common carotid   artery.  The carotid bulb is intact  without hemodynamically significant   stenosis.  The internal carotid is patent to the skull base.    The left carotid circulation demonstrates an intact common carotid   artery.  The carotid bulb is intact  without hemodynamically significant   stenosis.  The internal carotid is patent to the skull base.    The vertebral arteries are patent.  The degree of vertebral asymmetry is   within physiologic limits. Each vertebral artery ascends in the neck with   near constant caliber.    CTA INTRACRANIAL CIRCULATION:    The ANTERIOR circulation demonstrates again demonstrates adequate inflow   from the internal carotid arteries, patent anterior cerebral and middle   cerebral arteries and bilateral cavernous carotid saccular aneurysms.    The POSTERIOR circulation demonstrates improved inflow at the abnormal   intracranial segment of the right vertebral artery identified on   4/20/2023. The right PICA is better demonstrated as a patent vessel.   There is improved flow that extends in continuity to the basilar   formation. The left vertebral artery again demonstrates a patent height.   However, demonstrates a new segment of high-grade narrowing in its middle   third (axial image 239) over a length of approximately 0.5 cm.    The principal dural venous sinuses are patent.  This includes the   superior sagittal sinus anterior and posterior limbs.  Each transverse   sinus is patent to sigmoid sinuses and patent jugular veins.    No abnormal vessel termination, intracranial aneurysm or vascular   malformation is identified.    CT BRAIN PERFUSION:    The arterial input function is rapid and physiologic in appearance. The   venous output function follows at an appropriate delay and higher peak,   physiologic.  The cursors for these data acquisitions appear   appropriatelypositioned.    Cerebral perfusion images demonstrate normalization of the previously   abnormal diminished inflow perfusion within the right posterior inferior   medial cerebellar hemisphere, location now inconspicuous.  The volume of   brain parenchyma receiving cerebral blood flow diminished further than   30% is now 0 mL. at a lower threshold of less than 34% no abnormality is   found in the right frontal parasagittal white matter.    Cerebral blood volume images demonstrate asymmetric region of abnormal   blood volume in the right frontal lobe corresponding to the low-grade   perfusion defect.    Mean transit time of brain perfusion and time to maximum perfusion images   demonstrate symmetric defects within the cerebellar parenchyma and within   the left posterior cerebral arterial distribution at threshold below a   greater than 6 seconds. These regions have a volume of 39 mL, increased   from the preceding day.   At the lower threshold of 4 seconds delay to   time to maximum perfusion within each cerebral hemisphere there are   patchy regions of delayed perfusion within the anterior to posterior   parasagittal distribution corresponding to the end arterial watershed   zones between the arterial distributions of the principal anterior middle   and posterior cerebral arteries, physiologic versus low level ischemia at   these locations. This abnormality includes the location of interval   perfusion abnormality in the right frontal lobe and is asymmetrically   greater on the right than the left.    ADDITIONAL FINDINGS:   None.      IMPRESSION:    1. Brain: Progression of acute infarction within the right cerebellar   hemisphere, also extending into the left superior cerebellar hemisphere.   New mass effect on the fourth ventricle causing stenosis versus occlusion   with new third and lateral ventricular dilatation indicating ventricular   obstruction at the level of the fourth ventricle. New upward and downward   herniation of cerebellar parenchyma    2.  Right carotid system:    No hemodynamically significant stenosis.    3.   Left carotid system:     No hemodynamically significant stenosis.    4.   Vertebral circulation:    Patent.    5.  Anterior intracranial circulation:     Intact. Bilateral internal   carotid saccular aneurysms. These findings are unchanged.    6.  Posterior intracranial circulation:    Improved flow within the right   vertebral artery since 4/20/2023. New focal stenosis mid left vertebral   artery, etiology uncertain, consider vasospasm and extrinsic compression   in addition to new embolic disease.    7.  Brain perfusion: Perfusion images demonstrate normalization of the   perfusion abnormality in the right cerebellar hemisphere present on   4/20/2023 despite evidence of progression of acute infarction.  Areas of   apparent ischemia within the posterior fossa have progressed in extent,   also again involving the left posterior cerebral arterial distribution      CRITICAL VALUE:    I discussed this case with the treating ICU physician   at  4/21/2023 12:30 PM.  Hospital policies for critical values including   read back policy were followed.  The verbal communication of the critical   value supplements this written report.    --- End of Report ---            VÍCTOR MARCELINO MD; Attending Radiologist  This document has been electronically signed. Apr 21 2023 12:58PM    < end of copied text >    [ ]GOALS OF CARE DISCUSSION WITH PATIENT/FAMILY/PROXY:    CRITICAL CARE TIME SPENT: 35 minutes

## 2023-04-21 NOTE — PROGRESS NOTE ADULT - ASSESSMENT
ASSESSMENT:  Pt is a 58 yo F from home, independent with PMH of Asthma, CAD (not on  meds), peripheral neuropathy and PSH of BATOOL, cholecystectomy, right knee surgery presented to the ED with right sided weakness and right facial numbness from this morning. CODE STROKE called in ED. NIHSS score of 10. - CTA showed V3/V4 occlusion. CTH shows acute right PICA infarct. Overnight Telestroke consulted with Dr. Lugo at [facility]. Admitted to ICU for CVA requiring neuro checks Q1h.    PLAN:  #NEURO  #Cerebrovascular Accident  - pt presented with right sided weakness ad gaze deviation  - CTA head showed right vertebral arterial occlusion at its dural crossing junction V3 Atlantic and V4 intracranial segments with likely reversal of flow in its intracranial segment from the basilar. Possible posterior circulation infarct.  - CT brain perfusion scan showed acute infarction of the right posterior medial cerebellum within the right pica distribution.  Near equal volume abnormality in inflow and time to maximum perfusion imaging. Additional regions of delay of perfusion possible ischemia within the left posterior cerebral arterial distribution  - NIHSS on admission 10  - Not a candidate for thrombectomy due to evolving infarct on CTH  - passed dysphagia screen  - started on rectal aspirin  - started on PO Aspirin, Plavix (ABCD score >=4) and high intensity atorvastatin  - started on IVF  - Neurochecks Q1h  - aspiration precautions  - Overnight Telestroke consulted with Dr. Lugo at [facility]  - Neurology Dr. Gibson to be consulted  - f/u repeat CT perfusion and CTH    #Peripheral Neuropathy  at home on gabapentin  - primary team to confirm dose    #Carotid Aneurysms  - CTA neck showed Saccular aneurysms of each internal carotid artery, approximately 0.8 cm on the right and 1.2 x 0.9 cm on the left. Possible tiny third saccular aneurysm on the right    #PULMONARY  #Asthma  - not in respiratory distress and saturating well on RA  - started on home med of albuterol inhaler PRN    #CARDIOVASCULAR  - HR and BPs stable now  - Permissive HTN for 24 hors with goal of Systolics < 220    #GASTROINTESTINAL  - no active issues    #RENAL  - no active issues    #INFECTIOUS DISEASE  - no active issues    #ENDOCRINE  - no active issues    #HEME  - no active issues    #PROPHYLAXIS  -DVT                 SCDs  -GI                       PPI          DISPO                                  ADMIT TO ICU  CODE STATUS                           FULL CODE

## 2023-04-21 NOTE — PROGRESS NOTE ADULT - ATTENDING COMMENTS
IMP: Pt is a 56 yo woman from home, independent with PMH of Asthma, CAD (not on  meds), peripheral neuropathy and PSH of BATOOL, cholecystectomy, right knee surgery presented to the ED with right sided weakness and right facial numbness from this morning. CODE STROKE called in ED. NIHSS score of 10. - CTA showed V3/V4 occlusion. CTH shows acute right PICA infarct. Overnight Telestroke consulted with Dr. Lugo at [facility]. Admitted to ICU for CVA requiring neuro checks Q1h.    PLAN:  #NEURO  #Cerebrovascular Accident  - pt presented with right sided weakness ad gaze deviation  - CTA head showed right vertebral arterial occlusion at its dural crossing junction V3 Atlantic and V4 intracranial segments with likely reversal of flow in its intracranial segment from the basilar. Possible posterior circulation infarct.  - CT brain perfusion scan showed acute infarction of the right posterior medial cerebellum within the right pica distribution.  Near equal volume abnormality in inflow and time to maximum perfusion imaging. Additional regions of delay of perfusion possible ischemia within the left posterior cerebral arterial distribution  - NIHSS on admission 10  - Not a candidate for thrombectomy due to evolving infarct on CTH  - passed dysphagia screen  - started on rectal aspirin  - started on PO Aspirin, Plavix (ABCD score >=4) and high intensity atorvastatin  - started on IVF  - Neurochecks Q1h  - aspiration precautions  - Overnight Telestroke consulted with Dr. Lugo at [facility]  - Neurology Dr. Gibson to be consulted  - f/u repeat CT perfusion and CTH as above : now demonstrating worsening area of ischemic with mass effect  - Pat is becomig more lethargic   - Transfer to  if surgical intervention is needed     #Peripheral Neuropathy  at home on gabapentin  - primary team to confirm dose    #Carotid Aneurysms  - CTA neck showed Saccular aneurysms of each internal carotid artery, approximately 0.8 cm on the right and 1.2 x 0.9 cm on the left. Possible tiny third saccular aneurysm on the right    #PULMONARY  #Asthma  - not in respiratory distress and saturating well on RA  - started on home med of albuterol inhaler PRN  - May need to be intubated if mental status progressively worst for airway protection     #CARDIOVASCULAR  - HR and BPs stable now  - Permissive HTN for 24 hors with goal of Systolics < 220    #GASTROINTESTINAL  - no active issues    #RENAL  - no active issues    #INFECTIOUS DISEASE  - no active issues    #ENDOCRINE  - no active issues    #HEME  - no active issues    #PROPHYLAXIS  -DVT                 SCDs  -GI                       PPI

## 2023-04-21 NOTE — DISCHARGE NOTE PROVIDER - NSDCQMANTITHROM_GEN_A_CORE
Add 72837 Cpt? (Important Note: In 2017 The Use Of 96008 Is Being Tracked By Cms To Determine Future Global Period Reimbursement For Global Periods): no Detail Level: Detailed Body Location Override (Optional - Billing Will Still Be Based On Selected Body Map Location If Applicable): Central Anterior neck No, not prescribed...

## 2023-04-21 NOTE — DISCHARGE NOTE PROVIDER - NSDCCPCAREPLAN_GEN_ALL_CORE_FT
PRINCIPAL DISCHARGE DIAGNOSIS  Diagnosis: Acute cerebrovascular accident (CVA)  Assessment and Plan of Treatment: you came into the hospital with a stroke that was confirmed by a CT scan. We spoke with neurologist who recomended getting you transfered to a neuro ICU at [facility] for further management.

## 2023-04-21 NOTE — PHYSICAL THERAPY INITIAL EVALUATION ADULT - FUNCTIONAL LIMITATIONS, PT EVAL
MODIFIED BLANCHE SCALE:    4: Moderately Severe Disability: Unable to walk without assistance and unable to look after own bodily needs without assistance./self-care/home management

## 2023-04-21 NOTE — PROGRESS NOTE ADULT - SUBJECTIVE AND OBJECTIVE BOX
***********************************************  ADULT NSICU PROGRESS NOTE  MAKSIM TRIVEDI [id number] [facility] 08EA [id number]  ***********************************************    HPI: 56 yo F with history of     ROS: negative except per mentioned above in 24h interval events.      HOSPITAL COURSE CARRIED FORWARD:    VITALS:    ICU Vital Signs Last 24 Hrs  T(C): 37.4 (21 Apr 2023 15:50), Max: 37.4 (21 Apr 2023 15:50)  T(F): 99.4 (21 Apr 2023 15:50), Max: 99.4 (21 Apr 2023 15:50)  HR: 73 (21 Apr 2023 17:00) (66 - 97)  BP: 150/70 (21 Apr 2023 17:00) (104/85 - 168/101)  BP(mean): 100 (21 Apr 2023 17:00) (75 - 127)  ABP: --  ABP(mean): --  RR: 19 (21 Apr 2023 17:00) (12 - 23)  SpO2: 96% (21 Apr 2023 17:00) (92% - 97%)    O2 Parameters below as of 21 Apr 2023 17:00  Patient On (Oxygen Delivery Method): room air                I&O's Summary    21 Apr 2023 07:01  -  21 Apr 2023 18:04  --------------------------------------------------------  IN: 0 mL / OUT: 150 mL / NET: -150 mL        EXAM:     Giovanna Coma Scale:     General: normocephalic, atraumatic, laying in bed, in no distress  Neuro     MS: A/Ox3, cooperative, normal attention, no neglect, comprehension intact, speech with preserved fluency, naming, and repetition    CN: PERRL, VF FTC, EOMI and no ptosis bilaterally, sensation intact to crude touch V1-V3, face symmetric, hearing grossly intact    Mot: bulk normal, tone normal, power 5/5 in bilateral upper and lower proximal extremities    Sens: intact to crude touch in bilateral upper and lower extremities    Reflexes: deferred    Coord: no dysmetria or ataxia on finger to nose/heel to shin, respectively, no focal bradykinetic movements    Gait: deferred  Chest: nonlabored respirations, no adventitious lung sounds bilaterally, heart regular rate/rhythm, present S1/S2, no murmurs or rubs  Abdomen: nondistended, soft and nontender without peritoneal signs, normoactive bowel sounds  Extremities: no clubbing, well-perfused, no edema    LABORATORY DATA:                            13.2   10.37 )-----------( 270      ( 21 Apr 2023 17:03 )             39.3     04-21    142  |  109<H>  |  11  ----------------------------<  149<H>  3.8   |  24  |  0.52    Ca    8.8      21 Apr 2023 17:03  Phos  2.7     04-21  Mg     2.0     04-21    TPro  6.7  /  Alb  3.8  /  TBili  0.9  /  DBili  x   /  AST  74<H>  /  ALT  91<H>  /  AlkPhos  112  04-21    LIVER FUNCTIONS - ( 21 Apr 2023 17:03 )  Alb: 3.8 g/dL / Pro: 6.7 g/dL / ALK PHOS: 112 U/L / ALT: 91 U/L / AST: 74 U/L / GGT: x           PT/INR - ( 21 Apr 2023 17:03 )   PT: 13.1 sec;   INR: 1.10          PTT - ( 21 Apr 2023 17:03 )  PTT:29.9 sec    IMAGING DATA:    CARDIOLOGY DATA:    MICROBIOLOGY DATA:        MEDICATIONS  (STANDING):  mannitol 20% IVPB 90 Gram(s) IV Intermittent once  sodium chloride 3%. 500 milliLiter(s) (30 mL/Hr) IV Continuous <Continuous>    MEDICATIONS  (PRN):      ***********************************************  ASSESSMENT AND PLAN  ***********************************************        NEURO - admit NSICU, Q1h neuro checks / Q1h vital signs  PULM - SpO2 goal > 92%, supplemental O2 and pulm toileting as needed  CARDIO - BP goal   GI - bowel regimen, stool count, diet:    /RENAL - monitor UOP/volume status, BUN/SCr  HEME - maintain Hb > 7.0, PLT > ***  ID - monitor for infectious s/s, fever curve, leukocytosis  ENDO - SGlu goal < 200 ***********************************************  ADULT NSICU PROGRESS NOTE  MAKSIM TRIVEDI [id number] [facility] 08EA [id number]  ***********************************************    HPI: 58 yo F with history of asthma and peripheral neuropathy transferred to [facility] NSICU from [facility] for management of malignant posterior fossa stroke.  The patient developed symptoms at approximately 0830 on 4/20: dysconjugate gaze, ataxia, facial droop, dysarthria.  She did not present to [facility] until later on in the afternoon during which she was not a candidate for TNK.  CTP showing bilateral cerebellar perfusion mismatch, CTA showing R. V4 occlusion and large bilateral cavernous/terminal ICA aneurysms.  Patient was admitted to the ICU and monitored.  On 4/21 the patient became more lethargic, at which point a repeat CTH was performed, showing bilateral cerebellar strokes with lateral compression of the fourth ventricle on the right.  Patient accepted to [facility] by Dr. Valadez, at which point he was given 250cc 3% NaCl, started on 50cc/h, given 20mcg DDAVP and transferred as tier 1.    Upon arrival to [facility], NIHSS = 12, but mental status slowly declined from lethargy to near obtundation.  Repeat CTH showing stable mass effect and hydrocephalus.  Patient taken emergently for SOC depression with EVD placement.    ROS: negative except per mentioned above in 24h interval events.      VITALS:    ICU Vital Signs Last 24 Hrs  T(C): 37.4 (21 Apr 2023 15:50), Max: 37.4 (21 Apr 2023 15:50)  T(F): 99.4 (21 Apr 2023 15:50), Max: 99.4 (21 Apr 2023 15:50)  HR: 73 (21 Apr 2023 17:00) (66 - 97)  BP: 150/70 (21 Apr 2023 17:00) (104/85 - 168/101)  BP(mean): 100 (21 Apr 2023 17:00) (75 - 127)  ABP: --  ABP(mean): --  RR: 19 (21 Apr 2023 17:00) (12 - 23)  SpO2: 96% (21 Apr 2023 17:00) (92% - 97%)    O2 Parameters below as of 21 Apr 2023 17:00  Patient On (Oxygen Delivery Method): room air    I&O's Summary    21 Apr 2023 07:01  -  21 Apr 2023 18:04  --------------------------------------------------------  IN: 0 mL / OUT: 150 mL / NET: -150 mL    EXAM:     Murchison Coma Scale: 3/4/6 = 13 -> 2/3/6 = 11    General: normocephalic, atraumatic, laying in bed, in no distress  Neuro     MS: obtunded, unable to mantain eye opening longer than 10seconds, oriented to self and hospital, does not state date, speech is fluent and comprehension is intact    CN: PERRL, (+)gaze is dysconjugate (plegic to adduction OD, 'down and out,' and nystagmus with leftward gaze OS), (+)L. lower facial weakness, hearing grossly intact    Mot: bulk normal, tone normal, power full bilateral upper and lower proximal extremities    Coord: bilateral UE ataxia    Gait: deferred  Chest: nonlabored respirations, no adventitious lung sounds bilaterally, heart regular rate/rhythm, present S1/S2, no murmurs or rubs  Abdomen: nondistended, soft and nontender without peritoneal signs, normoactive bowel sounds  Extremities: no clubbing, well-perfused, no edema    NIHSS = 12    LABORATORY DATA:                            13.2   10.37 )-----------( 270      ( 21 Apr 2023 17:03 )             39.3     04-21    142  |  109<H>  |  11  ----------------------------<  149<H>  3.8   |  24  |  0.52    Ca    8.8      21 Apr 2023 17:03  Phos  2.7     04-21  Mg     2.0     04-21    TPro  6.7  /  Alb  3.8  /  TBili  0.9  /  DBili  x   /  AST  74<H>  /  ALT  91<H>  /  AlkPhos  112  04-21    LIVER FUNCTIONS - ( 21 Apr 2023 17:03 )  Alb: 3.8 g/dL / Pro: 6.7 g/dL / ALK PHOS: 112 U/L / ALT: 91 U/L / AST: 74 U/L / GGT: x           PT/INR - ( 21 Apr 2023 17:03 )   PT: 13.1 sec;   INR: 1.10          PTT - ( 21 Apr 2023 17:03 )  PTT:29.9 sec    IMAGING DATA:    CARDIOLOGY DATA:    MICROBIOLOGY DATA:        MEDICATIONS  (STANDING):  mannitol 20% IVPB 90 Gram(s) IV Intermittent once  sodium chloride 3%. 500 milliLiter(s) (30 mL/Hr) IV Continuous <Continuous>    MEDICATIONS  (PRN):      ***********************************************  ASSESSMENT AND PLAN  ***********************************************    #Bilateral cerebellar hemispheric strokes, post stroke day #1  #Bilateral carotid terminus aneurysms  #R. V4 occlusion  #History of peripheral neuropathy and asthma    NEURO - admit NSICU, Q1h neuro checks / Q1h vital signs, repeat CTH showing hydro/evolution fo posterior fossa strokes, to OR for SOC + EVD placement, holding ASA/plavix (s/p DDAVP at [facility]), pain control when post operative  PULM - SpO2 goal > 92%, supplemental O2 and pulm toileting as needed, airway watch  CARDIO - BP goal < 160, TTE w/ bubble, trop/baseline EKG  GI - bowel regimen, stool count, diet: NPO, will need DHT  /RENAL - monitor UOP/volume status, BUN/SCr, 3% @50cc/h, s/p 1g/kg mannitol on 4/21, barrios, q6h SNa  HEME - maintain Hb > 7.0, PLT > 100,000, holding chemoppx  ID - monitor for infectious s/s, fever curve, leukocytosis  ENDO - SGlu goal < 200, RASSI

## 2023-04-21 NOTE — H&P ADULT - ASSESSMENT
56 yo F with PMH of Asthma, CAD (not on  meds), peripheral neuropathy and PSH of BATOOL, cholecystectomy, right knee surgery presented to [facility] ED on 4/20 with right sided weakness and right facial numbness. On repeat imaging of brain: Progression of acute infarction within the right cerebellar hemisphere, also extending into the left superior cerebellar hemisphere. New mass effect on the fourth ventricle causing stenosis versus occlusion with new third and lateral ventricular dilatation indicating ventricular obstruction at the level of the fourth ventricle. New upward and downward herniation of cerebellar parenchyma. Pt transferred to [facility] for further management. Pending emergenct subocciptial craniectomy.

## 2023-04-21 NOTE — H&P ADULT - NSHPLABSRESULTS_GEN_ALL_CORE
< from: CT Head No Cont (04.21.23 @ 12:09) >    IMPRESSION:    1. Brain: Progression of acute infarction within the right cerebellar   hemisphere, also extending into the left superior cerebellar hemisphere.   New mass effect on the fourth ventricle causing stenosis versus occlusion   with new third and lateral ventricular dilatation indicating ventricular   obstruction at the level of the fourth ventricle. New upward and downward   herniation of cerebellar parenchyma    2.  Right carotid system:    No hemodynamically significant stenosis.    3.   Left carotid system:     No hemodynamically significant stenosis.    4.   Vertebral circulation:    Patent.    5.  Anterior intracranial circulation:     Intact. Bilateral internal   carotid saccular aneurysms. These findings are unchanged.    6.  Posterior intracranial circulation:    Improved flow within the right   vertebral artery since 4/20/2023. New focal stenosis mid left vertebral   artery, etiology uncertain, consider vasospasm and extrinsic compression   in addition to new embolic disease.    7.  Brain perfusion: Perfusion images demonstrate normalization of the   perfusion abnormality in the right cerebellar hemisphere present on   4/20/2023 despite evidence of progression of acute infarction.  Areas of   apparent ischemia within the posterior fossa have progressed in extent,   also again involving the left posterior cerebral arterial distribution      CRITICAL VALUE:    I discussed this case with the treating ICU physician   at  4/21/2023 12:30 PM.  Hospital policies for critical values including   read back policy were followed.  The verbal communication of the critical   value supplements this written report.    --- End of Report ---      < end of copied text >    < from: CT Head No Cont (04.21.23 @ 17:33) >    IMPRESSION: Since 4/21/2023 at 11:57 AM, there is again an acute infarct   involving the right greater than left cerebellar hemispheres and the   cerebellar vermis. There is continued mass effect on the fourth ventricle   and cerebral aqueduct with effacement of the basal cisterns and   persistent dilation of the ventricular system.    Hydrocephalus, unchanged.    --- End of Report ---    < end of copied text >

## 2023-04-21 NOTE — DISCHARGE NOTE PROVIDER - NSDCMRMEDTOKEN_GEN_ALL_CORE_FT
Albuterol (Eqv-ProAir HFA) 90 mcg/inh inhalation aerosol: 2 puff(s) inhaled every 6 hours as needed for  shortness of breath and/or wheezing

## 2023-04-21 NOTE — PRE-ANESTHESIA EVALUATION ADULT - NSANTHPMHFT_GEN_ALL_CORE
57 year old female with PMH Asthma, CAD, Peripheral Neuropathy presenting with right sided weakness and right facial numbness due to posterior fossa stroke scheduled for emergency occipital craniectomy and extraventricular drain placement.     Cardiac: Positive for CAD. Hypertensive upon arrival.   Pulmonary: Positive for Asthma  Renal: Creatinine 0.52  Hepatic: Transaminases elevated at AST 74, ALT 91  Gastrointestinal: No known complaints/history  Endocrine: Peripheral neuropathy. unknown DM history.   Neurologic: Active occipital/posterior fossa stroke  Hematologic: Received aspirin and plavix. Received DDAVP    PSH: Right knee surgery, cholecystectomy, total abdominal hytsterectomy.

## 2023-04-21 NOTE — H&P ADULT - NSHPPHYSICALEXAM_GEN_ALL_CORE
General: patient seen laying supine in bed in NAD on RA  Neuro: AAOx3 (Congolese speaking), slurred speech, FC, OE to voice, L CN III palsy, R facial droop, b/l UE ataxia, b/l  pronator drift, strength 5/5 b/l UE and LE  HEENT: PERRL, EOMI  Neck: supple  Cardiac: RRR, S1S2  Pulmonary: chest rise symmetric  Abdomen: soft, nontender, nondistended  Ext: perfusing well  Skin: warm, dry

## 2023-04-22 LAB
A1C WITH ESTIMATED AVERAGE GLUCOSE RESULT: 5.9 % — HIGH (ref 4–5.6)
ALBUMIN SERPL ELPH-MCNC: 3.4 G/DL — SIGNIFICANT CHANGE UP (ref 3.3–5)
ALP SERPL-CCNC: 84 U/L — SIGNIFICANT CHANGE UP (ref 40–120)
ALT FLD-CCNC: 106 U/L — HIGH (ref 10–45)
ANION GAP SERPL CALC-SCNC: 11 MMOL/L — SIGNIFICANT CHANGE UP (ref 5–17)
ANION GAP SERPL CALC-SCNC: 8 MMOL/L — SIGNIFICANT CHANGE UP (ref 5–17)
ANION GAP SERPL CALC-SCNC: 9 MMOL/L — SIGNIFICANT CHANGE UP (ref 5–17)
APTT BLD: 20.7 SEC — LOW (ref 27.5–35.5)
AST SERPL-CCNC: 93 U/L — HIGH (ref 10–40)
BASE EXCESS BLDA CALC-SCNC: -3.1 MMOL/L — LOW (ref -2–3)
BASE EXCESS BLDA CALC-SCNC: -4.4 MMOL/L — LOW (ref -2–3)
BASE EXCESS BLDV CALC-SCNC: -1.4 MMOL/L — SIGNIFICANT CHANGE UP (ref -2–3)
BILIRUB SERPL-MCNC: 0.5 MG/DL — SIGNIFICANT CHANGE UP (ref 0.2–1.2)
BUN SERPL-MCNC: 10 MG/DL — SIGNIFICANT CHANGE UP (ref 7–23)
BUN SERPL-MCNC: 11 MG/DL — SIGNIFICANT CHANGE UP (ref 7–23)
BUN SERPL-MCNC: 11 MG/DL — SIGNIFICANT CHANGE UP (ref 7–23)
CA-I BLDA-SCNC: 1.13 MMOL/L — LOW (ref 1.15–1.33)
CA-I SERPL-SCNC: 1.16 MMOL/L — SIGNIFICANT CHANGE UP (ref 1.15–1.33)
CALCIUM SERPL-MCNC: 7.8 MG/DL — LOW (ref 8.4–10.5)
CALCIUM SERPL-MCNC: 8.3 MG/DL — LOW (ref 8.4–10.5)
CALCIUM SERPL-MCNC: 8.3 MG/DL — LOW (ref 8.4–10.5)
CHLORIDE SERPL-SCNC: 108 MMOL/L — SIGNIFICANT CHANGE UP (ref 96–108)
CHLORIDE SERPL-SCNC: 111 MMOL/L — HIGH (ref 96–108)
CHLORIDE SERPL-SCNC: 113 MMOL/L — HIGH (ref 96–108)
CHOLEST SERPL-MCNC: 155 MG/DL — SIGNIFICANT CHANGE UP
CK SERPL-CCNC: 126 U/L — SIGNIFICANT CHANGE UP (ref 25–170)
CO2 BLDA-SCNC: 21 MMOL/L — SIGNIFICANT CHANGE UP (ref 19–24)
CO2 BLDA-SCNC: 23 MMOL/L — SIGNIFICANT CHANGE UP (ref 19–24)
CO2 BLDV-SCNC: 23.9 MMOL/L — SIGNIFICANT CHANGE UP (ref 22–26)
CO2 SERPL-SCNC: 19 MMOL/L — LOW (ref 22–31)
CO2 SERPL-SCNC: 22 MMOL/L — SIGNIFICANT CHANGE UP (ref 22–31)
CO2 SERPL-SCNC: 23 MMOL/L — SIGNIFICANT CHANGE UP (ref 22–31)
COHGB MFR BLDA: 1.2 % — SIGNIFICANT CHANGE UP
CREAT SERPL-MCNC: 0.42 MG/DL — LOW (ref 0.5–1.3)
CREAT SERPL-MCNC: 0.45 MG/DL — LOW (ref 0.5–1.3)
CREAT SERPL-MCNC: 0.52 MG/DL — SIGNIFICANT CHANGE UP (ref 0.5–1.3)
EGFR: 109 ML/MIN/1.73M2 — SIGNIFICANT CHANGE UP
EGFR: 113 ML/MIN/1.73M2 — SIGNIFICANT CHANGE UP
EGFR: 115 ML/MIN/1.73M2 — SIGNIFICANT CHANGE UP
ESTIMATED AVERAGE GLUCOSE: 123 MG/DL — HIGH (ref 68–114)
GAS PNL BLDA: SIGNIFICANT CHANGE UP
GAS PNL BLDA: SIGNIFICANT CHANGE UP
GAS PNL BLDV: 140 MMOL/L — SIGNIFICANT CHANGE UP (ref 136–145)
GAS PNL BLDV: SIGNIFICANT CHANGE UP
GLUCOSE BLDA-MCNC: 214 MG/DL — HIGH (ref 70–99)
GLUCOSE BLDC GLUCOMTR-MCNC: 134 MG/DL — HIGH (ref 70–99)
GLUCOSE BLDC GLUCOMTR-MCNC: 155 MG/DL — HIGH (ref 70–99)
GLUCOSE BLDC GLUCOMTR-MCNC: 212 MG/DL — HIGH (ref 70–99)
GLUCOSE SERPL-MCNC: 152 MG/DL — HIGH (ref 70–99)
GLUCOSE SERPL-MCNC: 168 MG/DL — HIGH (ref 70–99)
GLUCOSE SERPL-MCNC: 216 MG/DL — HIGH (ref 70–99)
HCO3 BLDA-SCNC: 20 MMOL/L — LOW (ref 21–28)
HCO3 BLDA-SCNC: 22 MMOL/L — SIGNIFICANT CHANGE UP (ref 21–28)
HCO3 BLDV-SCNC: 23 MMOL/L — SIGNIFICANT CHANGE UP (ref 22–29)
HCT VFR BLD CALC: 31.7 % — LOW (ref 34.5–45)
HCT VFR BLD CALC: 31.9 % — LOW (ref 34.5–45)
HDLC SERPL-MCNC: 42 MG/DL — LOW
HGB BLD-MCNC: 10.5 G/DL — LOW (ref 11.5–15.5)
HGB BLD-MCNC: 10.8 G/DL — LOW (ref 11.5–15.5)
HGB BLDA-MCNC: 12.3 G/DL — SIGNIFICANT CHANGE UP (ref 11.7–16.1)
INR BLD: 1.17 — HIGH (ref 0.88–1.16)
LIPID PNL WITH DIRECT LDL SERPL: 92 MG/DL — SIGNIFICANT CHANGE UP
MAGNESIUM SERPL-MCNC: 1.8 MG/DL — SIGNIFICANT CHANGE UP (ref 1.6–2.6)
MAGNESIUM SERPL-MCNC: 2.3 MG/DL — SIGNIFICANT CHANGE UP (ref 1.6–2.6)
MCHC RBC-ENTMCNC: 30.1 PG — SIGNIFICANT CHANGE UP (ref 27–34)
MCHC RBC-ENTMCNC: 30.5 PG — SIGNIFICANT CHANGE UP (ref 27–34)
MCHC RBC-ENTMCNC: 33.1 GM/DL — SIGNIFICANT CHANGE UP (ref 32–36)
MCHC RBC-ENTMCNC: 33.9 GM/DL — SIGNIFICANT CHANGE UP (ref 32–36)
MCV RBC AUTO: 90.1 FL — SIGNIFICANT CHANGE UP (ref 80–100)
MCV RBC AUTO: 90.8 FL — SIGNIFICANT CHANGE UP (ref 80–100)
METHGB MFR BLDA: 0.8 % — SIGNIFICANT CHANGE UP
NON HDL CHOLESTEROL: 113 MG/DL — SIGNIFICANT CHANGE UP
NRBC # BLD: 0 /100 WBCS — SIGNIFICANT CHANGE UP (ref 0–0)
NRBC # BLD: 0 /100 WBCS — SIGNIFICANT CHANGE UP (ref 0–0)
OSMOLALITY SERPL: 306 MOSM/KG — HIGH (ref 275–300)
OXYHGB MFR BLDA: 97.8 % — HIGH (ref 90–95)
PA ADP PRP-ACNC: 326 PRU — SIGNIFICANT CHANGE UP (ref 194–418)
PCO2 BLDA: 35 MMHG — SIGNIFICANT CHANGE UP (ref 32–45)
PCO2 BLDA: 39 MMHG — SIGNIFICANT CHANGE UP (ref 32–45)
PCO2 BLDV: 36 MMHG — LOW (ref 39–42)
PH BLDA: 7.36 — SIGNIFICANT CHANGE UP (ref 7.35–7.45)
PH BLDA: 7.37 — SIGNIFICANT CHANGE UP (ref 7.35–7.45)
PH BLDV: 7.41 — SIGNIFICANT CHANGE UP (ref 7.32–7.43)
PHOSPHATE SERPL-MCNC: 2.5 MG/DL — SIGNIFICANT CHANGE UP (ref 2.5–4.5)
PHOSPHATE SERPL-MCNC: 3.1 MG/DL — SIGNIFICANT CHANGE UP (ref 2.5–4.5)
PLATELET # BLD AUTO: 293 K/UL — SIGNIFICANT CHANGE UP (ref 150–400)
PLATELET # BLD AUTO: 388 K/UL — SIGNIFICANT CHANGE UP (ref 150–400)
PO2 BLDA: 139 MMHG — HIGH (ref 83–108)
PO2 BLDA: 284 MMHG — HIGH (ref 83–108)
PO2 BLDV: 47 MMHG — HIGH (ref 25–45)
POTASSIUM BLDA-SCNC: 3.4 MMOL/L — LOW (ref 3.5–5.1)
POTASSIUM BLDV-SCNC: 3.6 MMOL/L — SIGNIFICANT CHANGE UP (ref 3.5–5.1)
POTASSIUM SERPL-MCNC: 3.4 MMOL/L — LOW (ref 3.5–5.3)
POTASSIUM SERPL-MCNC: 3.4 MMOL/L — LOW (ref 3.5–5.3)
POTASSIUM SERPL-MCNC: 4 MMOL/L — SIGNIFICANT CHANGE UP (ref 3.5–5.3)
POTASSIUM SERPL-SCNC: 3.4 MMOL/L — LOW (ref 3.5–5.3)
POTASSIUM SERPL-SCNC: 3.4 MMOL/L — LOW (ref 3.5–5.3)
POTASSIUM SERPL-SCNC: 4 MMOL/L — SIGNIFICANT CHANGE UP (ref 3.5–5.3)
PROCALCITONIN SERPL-MCNC: 0.02 NG/ML — SIGNIFICANT CHANGE UP (ref 0.02–0.1)
PROT SERPL-MCNC: 5.8 G/DL — LOW (ref 6–8.3)
PROTHROM AB SERPL-ACNC: 13.9 SEC — HIGH (ref 10.5–13.4)
RBC # BLD: 3.49 M/UL — LOW (ref 3.8–5.2)
RBC # BLD: 3.54 M/UL — LOW (ref 3.8–5.2)
RBC # FLD: 13.3 % — SIGNIFICANT CHANGE UP (ref 10.3–14.5)
RBC # FLD: 13.4 % — SIGNIFICANT CHANGE UP (ref 10.3–14.5)
SAO2 % BLDA: 99.3 % — HIGH (ref 94–98)
SAO2 % BLDA: 99.8 % — HIGH (ref 94–98)
SAO2 % BLDV: 81.5 % — SIGNIFICANT CHANGE UP (ref 67–88)
SODIUM BLDA-SCNC: 143 MMOL/L — SIGNIFICANT CHANGE UP (ref 136–145)
SODIUM SERPL-SCNC: 140 MMOL/L — SIGNIFICANT CHANGE UP (ref 135–145)
SODIUM SERPL-SCNC: 141 MMOL/L — SIGNIFICANT CHANGE UP (ref 135–145)
SODIUM SERPL-SCNC: 143 MMOL/L — SIGNIFICANT CHANGE UP (ref 135–145)
TRIGL SERPL-MCNC: 106 MG/DL — SIGNIFICANT CHANGE UP
WBC # BLD: 8.61 K/UL — SIGNIFICANT CHANGE UP (ref 3.8–10.5)
WBC # BLD: 9.68 K/UL — SIGNIFICANT CHANGE UP (ref 3.8–10.5)
WBC # FLD AUTO: 8.61 K/UL — SIGNIFICANT CHANGE UP (ref 3.8–10.5)
WBC # FLD AUTO: 9.68 K/UL — SIGNIFICANT CHANGE UP (ref 3.8–10.5)

## 2023-04-22 PROCEDURE — 99291 CRITICAL CARE FIRST HOUR: CPT

## 2023-04-22 PROCEDURE — 70450 CT HEAD/BRAIN W/O DYE: CPT | Mod: 26

## 2023-04-22 PROCEDURE — 70450 CT HEAD/BRAIN W/O DYE: CPT | Mod: 26,77

## 2023-04-22 PROCEDURE — 71045 X-RAY EXAM CHEST 1 VIEW: CPT | Mod: 26,76

## 2023-04-22 PROCEDURE — 93970 EXTREMITY STUDY: CPT | Mod: 26

## 2023-04-22 RX ORDER — ONDANSETRON 8 MG/1
4 TABLET, FILM COATED ORAL EVERY 6 HOURS
Refills: 0 | Status: DISCONTINUED | OUTPATIENT
Start: 2023-04-22 | End: 2023-04-28

## 2023-04-22 RX ORDER — IPRATROPIUM/ALBUTEROL SULFATE 18-103MCG
3 AEROSOL WITH ADAPTER (GRAM) INHALATION EVERY 6 HOURS
Refills: 0 | Status: DISCONTINUED | OUTPATIENT
Start: 2023-04-22 | End: 2023-04-25

## 2023-04-22 RX ORDER — ACETAMINOPHEN 500 MG
650 TABLET ORAL EVERY 6 HOURS
Refills: 0 | Status: DISCONTINUED | OUTPATIENT
Start: 2023-04-22 | End: 2023-04-28

## 2023-04-22 RX ORDER — CHLORHEXIDINE GLUCONATE 213 G/1000ML
15 SOLUTION TOPICAL EVERY 12 HOURS
Refills: 0 | Status: DISCONTINUED | OUTPATIENT
Start: 2023-04-22 | End: 2023-04-23

## 2023-04-22 RX ORDER — GLUCAGON INJECTION, SOLUTION 0.5 MG/.1ML
1 INJECTION, SOLUTION SUBCUTANEOUS ONCE
Refills: 0 | Status: DISCONTINUED | OUTPATIENT
Start: 2023-04-22 | End: 2023-04-23

## 2023-04-22 RX ORDER — POLYETHYLENE GLYCOL 3350 17 G/17G
17 POWDER, FOR SOLUTION ORAL DAILY
Refills: 0 | Status: DISCONTINUED | OUTPATIENT
Start: 2023-04-22 | End: 2023-04-23

## 2023-04-22 RX ORDER — SENNA PLUS 8.6 MG/1
2 TABLET ORAL AT BEDTIME
Refills: 0 | Status: DISCONTINUED | OUTPATIENT
Start: 2023-04-22 | End: 2023-04-28

## 2023-04-22 RX ORDER — SODIUM CHLORIDE 5 G/100ML
500 INJECTION, SOLUTION INTRAVENOUS
Refills: 0 | Status: DISCONTINUED | OUTPATIENT
Start: 2023-04-22 | End: 2023-04-23

## 2023-04-22 RX ORDER — NICARDIPINE HYDROCHLORIDE 30 MG/1
5 CAPSULE, EXTENDED RELEASE ORAL
Qty: 40 | Refills: 0 | Status: DISCONTINUED | OUTPATIENT
Start: 2023-04-22 | End: 2023-04-23

## 2023-04-22 RX ORDER — DEXTROSE 50 % IN WATER 50 %
12.5 SYRINGE (ML) INTRAVENOUS ONCE
Refills: 0 | Status: DISCONTINUED | OUTPATIENT
Start: 2023-04-22 | End: 2023-04-22

## 2023-04-22 RX ORDER — ATORVASTATIN CALCIUM 80 MG/1
80 TABLET, FILM COATED ORAL AT BEDTIME
Refills: 0 | Status: DISCONTINUED | OUTPATIENT
Start: 2023-04-22 | End: 2023-04-28

## 2023-04-22 RX ORDER — PANTOPRAZOLE SODIUM 20 MG/1
40 TABLET, DELAYED RELEASE ORAL DAILY
Refills: 0 | Status: DISCONTINUED | OUTPATIENT
Start: 2023-04-22 | End: 2023-04-23

## 2023-04-22 RX ORDER — FENTANYL CITRATE 50 UG/ML
25 INJECTION INTRAVENOUS ONCE
Refills: 0 | Status: DISCONTINUED | OUTPATIENT
Start: 2023-04-22 | End: 2023-04-22

## 2023-04-22 RX ORDER — INSULIN LISPRO 100/ML
VIAL (ML) SUBCUTANEOUS EVERY 6 HOURS
Refills: 0 | Status: DISCONTINUED | OUTPATIENT
Start: 2023-04-22 | End: 2023-04-22

## 2023-04-22 RX ORDER — CHLORHEXIDINE GLUCONATE 213 G/1000ML
1 SOLUTION TOPICAL
Refills: 0 | Status: DISCONTINUED | OUTPATIENT
Start: 2023-04-22 | End: 2023-04-28

## 2023-04-22 RX ORDER — INSULIN LISPRO 100/ML
VIAL (ML) SUBCUTANEOUS AT BEDTIME
Refills: 0 | Status: DISCONTINUED | OUTPATIENT
Start: 2023-04-22 | End: 2023-04-22

## 2023-04-22 RX ORDER — MAGNESIUM SULFATE 500 MG/ML
2 VIAL (ML) INJECTION ONCE
Refills: 0 | Status: COMPLETED | OUTPATIENT
Start: 2023-04-22 | End: 2023-04-22

## 2023-04-22 RX ORDER — POTASSIUM CHLORIDE 20 MEQ
10 PACKET (EA) ORAL
Refills: 0 | Status: COMPLETED | OUTPATIENT
Start: 2023-04-22 | End: 2023-04-22

## 2023-04-22 RX ORDER — FENTANYL CITRATE 50 UG/ML
25 INJECTION INTRAVENOUS EVERY 4 HOURS
Refills: 0 | Status: DISCONTINUED | OUTPATIENT
Start: 2023-04-22 | End: 2023-04-23

## 2023-04-22 RX ORDER — DEXTROSE 50 % IN WATER 50 %
25 SYRINGE (ML) INTRAVENOUS ONCE
Refills: 0 | Status: DISCONTINUED | OUTPATIENT
Start: 2023-04-22 | End: 2023-04-23

## 2023-04-22 RX ORDER — PHENYLEPHRINE HYDROCHLORIDE 10 MG/ML
0.1 INJECTION INTRAVENOUS
Qty: 40 | Refills: 0 | Status: DISCONTINUED | OUTPATIENT
Start: 2023-04-22 | End: 2023-04-22

## 2023-04-22 RX ORDER — DEXTROSE 50 % IN WATER 50 %
15 SYRINGE (ML) INTRAVENOUS ONCE
Refills: 0 | Status: DISCONTINUED | OUTPATIENT
Start: 2023-04-22 | End: 2023-04-22

## 2023-04-22 RX ORDER — PROPOFOL 10 MG/ML
5 INJECTION, EMULSION INTRAVENOUS
Qty: 1000 | Refills: 0 | Status: DISCONTINUED | OUTPATIENT
Start: 2023-04-22 | End: 2023-04-22

## 2023-04-22 RX ORDER — DEXTROSE 50 % IN WATER 50 %
25 SYRINGE (ML) INTRAVENOUS ONCE
Refills: 0 | Status: DISCONTINUED | OUTPATIENT
Start: 2023-04-22 | End: 2023-04-22

## 2023-04-22 RX ORDER — POLYETHYLENE GLYCOL 3350 17 G/17G
17 POWDER, FOR SOLUTION ORAL DAILY
Refills: 0 | Status: DISCONTINUED | OUTPATIENT
Start: 2023-04-22 | End: 2023-04-22

## 2023-04-22 RX ORDER — CEFAZOLIN SODIUM 1 G
2000 VIAL (EA) INJECTION EVERY 8 HOURS
Refills: 0 | Status: COMPLETED | OUTPATIENT
Start: 2023-04-22 | End: 2023-04-25

## 2023-04-22 RX ORDER — POTASSIUM CHLORIDE 20 MEQ
40 PACKET (EA) ORAL ONCE
Refills: 0 | Status: COMPLETED | OUTPATIENT
Start: 2023-04-22 | End: 2023-04-22

## 2023-04-22 RX ORDER — SODIUM CHLORIDE 5 G/100ML
500 INJECTION, SOLUTION INTRAVENOUS
Refills: 0 | Status: DISCONTINUED | OUTPATIENT
Start: 2023-04-22 | End: 2023-04-22

## 2023-04-22 RX ORDER — HYDRALAZINE HCL 50 MG
10 TABLET ORAL ONCE
Refills: 0 | Status: COMPLETED | OUTPATIENT
Start: 2023-04-22 | End: 2023-04-22

## 2023-04-22 RX ORDER — SODIUM CHLORIDE 9 MG/ML
1000 INJECTION, SOLUTION INTRAVENOUS
Refills: 0 | Status: DISCONTINUED | OUTPATIENT
Start: 2023-04-22 | End: 2023-04-22

## 2023-04-22 RX ORDER — POTASSIUM PHOSPHATE, MONOBASIC POTASSIUM PHOSPHATE, DIBASIC 236; 224 MG/ML; MG/ML
30 INJECTION, SOLUTION INTRAVENOUS ONCE
Refills: 0 | Status: COMPLETED | OUTPATIENT
Start: 2023-04-22 | End: 2023-04-22

## 2023-04-22 RX ORDER — FENTANYL CITRATE 50 UG/ML
12.5 INJECTION INTRAVENOUS EVERY 4 HOURS
Refills: 0 | Status: DISCONTINUED | OUTPATIENT
Start: 2023-04-22 | End: 2023-04-22

## 2023-04-22 RX ORDER — SODIUM CHLORIDE 9 MG/ML
1000 INJECTION INTRAMUSCULAR; INTRAVENOUS; SUBCUTANEOUS
Refills: 0 | Status: DISCONTINUED | OUTPATIENT
Start: 2023-04-22 | End: 2023-04-22

## 2023-04-22 RX ADMIN — CHLORHEXIDINE GLUCONATE 15 MILLILITER(S): 213 SOLUTION TOPICAL at 06:09

## 2023-04-22 RX ADMIN — Medication 100 MILLIGRAM(S): at 23:24

## 2023-04-22 RX ADMIN — CHLORHEXIDINE GLUCONATE 1 APPLICATION(S): 213 SOLUTION TOPICAL at 06:17

## 2023-04-22 RX ADMIN — Medication 100 MILLIGRAM(S): at 06:16

## 2023-04-22 RX ADMIN — FENTANYL CITRATE 25 MICROGRAM(S): 50 INJECTION INTRAVENOUS at 12:22

## 2023-04-22 RX ADMIN — FENTANYL CITRATE 25 MICROGRAM(S): 50 INJECTION INTRAVENOUS at 15:10

## 2023-04-22 RX ADMIN — SODIUM CHLORIDE 75 MILLILITER(S): 9 INJECTION INTRAMUSCULAR; INTRAVENOUS; SUBCUTANEOUS at 02:29

## 2023-04-22 RX ADMIN — FENTANYL CITRATE 25 MICROGRAM(S): 50 INJECTION INTRAVENOUS at 06:07

## 2023-04-22 RX ADMIN — FENTANYL CITRATE 25 MICROGRAM(S): 50 INJECTION INTRAVENOUS at 12:37

## 2023-04-22 RX ADMIN — FENTANYL CITRATE 25 MICROGRAM(S): 50 INJECTION INTRAVENOUS at 06:25

## 2023-04-22 RX ADMIN — PANTOPRAZOLE SODIUM 40 MILLIGRAM(S): 20 TABLET, DELAYED RELEASE ORAL at 11:29

## 2023-04-22 RX ADMIN — SODIUM CHLORIDE 75 MILLILITER(S): 9 INJECTION INTRAMUSCULAR; INTRAVENOUS; SUBCUTANEOUS at 11:15

## 2023-04-22 RX ADMIN — SODIUM CHLORIDE 75 MILLILITER(S): 5 INJECTION, SOLUTION INTRAVENOUS at 23:24

## 2023-04-22 RX ADMIN — Medication 10 MILLIGRAM(S): at 06:42

## 2023-04-22 RX ADMIN — NICARDIPINE HYDROCHLORIDE 25 MG/HR: 30 CAPSULE, EXTENDED RELEASE ORAL at 19:51

## 2023-04-22 RX ADMIN — NICARDIPINE HYDROCHLORIDE 25 MG/HR: 30 CAPSULE, EXTENDED RELEASE ORAL at 11:16

## 2023-04-22 RX ADMIN — Medication 100 MILLIEQUIVALENT(S): at 06:09

## 2023-04-22 RX ADMIN — Medication 100 MILLIEQUIVALENT(S): at 05:09

## 2023-04-22 RX ADMIN — Medication 100 MILLIGRAM(S): at 16:14

## 2023-04-22 RX ADMIN — SODIUM CHLORIDE 60 MILLILITER(S): 5 INJECTION, SOLUTION INTRAVENOUS at 17:24

## 2023-04-22 RX ADMIN — CHLORHEXIDINE GLUCONATE 15 MILLILITER(S): 213 SOLUTION TOPICAL at 17:25

## 2023-04-22 RX ADMIN — FENTANYL CITRATE 25 MICROGRAM(S): 50 INJECTION INTRAVENOUS at 23:50

## 2023-04-22 RX ADMIN — Medication 25 GRAM(S): at 03:30

## 2023-04-22 RX ADMIN — POTASSIUM PHOSPHATE, MONOBASIC POTASSIUM PHOSPHATE, DIBASIC 83.33 MILLIMOLE(S): 236; 224 INJECTION, SOLUTION INTRAVENOUS at 07:24

## 2023-04-22 RX ADMIN — FENTANYL CITRATE 25 MICROGRAM(S): 50 INJECTION INTRAVENOUS at 22:59

## 2023-04-22 RX ADMIN — FENTANYL CITRATE 25 MICROGRAM(S): 50 INJECTION INTRAVENOUS at 14:55

## 2023-04-22 RX ADMIN — Medication 2: at 06:52

## 2023-04-22 RX ADMIN — Medication 100 MILLIEQUIVALENT(S): at 03:30

## 2023-04-22 NOTE — CONSULT NOTE ADULT - ASSESSMENT
57 year old female w/ PMH of asthma, CAD (not on meds), peripheral neuropathy and PSH of BATOOL, cholechystectomy, and right knee surgery presented to [place] ED on 4/20 w/ right sided weakness and right facial numbness. Initially found to have a right PICA distribution acute infarct and a right vertebral artery occlusion. Not a candidate for any intervention. On repeat imaging had progression of acute infarct within the right and left cerebellar hemisphere with mass effect on the fourth ventricle and hydrocephalus and upward and downward herniation of the cerebellar parenchyma. She was transferred to [facility] on 4/21 and underwent an emergent SOC foramen magnum decompression and right parietal/occipital EVD placement. Stroke was consulted for further recommendations.     1)Secondary stroke prevention  - Holding all antiplatelets   - start Atorvastatin 80mg PO daily when cleared by NSGY    2) Stroke risk factors  - A1C: 5.9  - LDL: 92  - TSH: 0.152  - Consider hypercoagulable workup  - Obtain Echo w/ bubble study    3) Further management  - pending repeat CTH today  - obtain MRI brain without  - recommend SBP goal < 140, per primary team  - recommend q1hr stroke neuro checks  - may need outpt neurology follow up  - provide stroke education    DVT prophylaxis   - SCDs, chemical DVT prophylaxis on hold    To be discussed with Neurology Attending Dr. Kenyon Lopez 57 year old female w/ PMH of asthma, CAD (not on meds), peripheral neuropathy and PSH of BATOOL, cholecystectomy and right knee surgery presented to [place] ED on 4/20 w/ right sided weakness and right facial numbness. Initially found to have a right PICA distribution acute infarct and a right vertebral artery occlusion. Not a candidate for any intervention. On repeat imaging had progression of acute infarct within the right and left cerebellar hemisphere with mass effect on the fourth ventricle and hydrocephalus and upward and downward herniation of the cerebellar parenchyma. She was transferred to [facility] on 4/21 and underwent an emergent SOC foramen magnum decompression and right parietal/occipital EVD placement. Stroke was consulted for further recommendations.     1)Secondary stroke prevention  - Holding all antiplatelets   - start Atorvastatin 80mg PO daily when cleared by NSGY    2) Stroke risk factors  - A1C: 5.9  - LDL: 92  - TSH: 0.152  - Consider hypercoagulable workup  - Obtain Echo w/ bubble study, may eventually need SALVADOR    3) Further management  - pending repeat CTH today  - obtain MRI brain without when stable  - recommend SBP goal < 140, per primary team  - recommend q1hr stroke neuro checks  - may need outpt neurology follow up  - provide stroke education    DVT prophylaxis   - SCDs, chemical DVT prophylaxis on hold    To be discussed with Neurology Attending Dr. Kenyon Lopez

## 2023-04-22 NOTE — PROGRESS NOTE ADULT - SUBJECTIVE AND OBJECTIVE BOX
***********************************************  ADULT NSICU PROGRESS NOTE  MAKSIM TRIVEDI [id number] [facility] 08EA [id number] [id number]  ***********************************************    INTERVAL:    ROS: negative except per mentioned above in 24h interval events.      HPI: 56 yo F with history of asthma and peripheral neuropathy transferred to [facility] NSICU from [facility] for management of malignant posterior fossa stroke.  The patient developed symptoms at approximately 0830 on 4/20: dysconjugate gaze, ataxia, facial droop, dysarthria.  She did not present to [facility] until later on in the afternoon during which she was not a candidate for TNK.  CTP showing bilateral cerebellar perfusion mismatch, CTA showing R. V4 occlusion and large bilateral cavernous/terminal ICA aneurysms.  Patient was admitted to the ICU and monitored.  On 4/21 the patient became more lethargic, at which point a repeat CTH was performed, showing bilateral cerebellar strokes with lateral compression of the fourth ventricle on the right.  Patient accepted to [facility] by Dr. Valadez, at which point he was given 250cc 3% NaCl, started on 50cc/h, given 20mcg DDAVP and transferred as tier 1.    Upon arrival to [facility], NIHSS = 12, but mental status slowly declined from lethargy to near obtundation.  Repeat CTH showing stable mass effect and hydrocephalus.  Patient taken emergently for SOC depression with EVD placement.      VITALS:    ICU Vital Signs Last 24 Hrs  T(C): 37.4 (21 Apr 2023 15:50), Max: 37.4 (21 Apr 2023 15:50)  T(F): 99.4 (21 Apr 2023 15:50), Max: 99.4 (21 Apr 2023 15:50)  HR: 73 (21 Apr 2023 17:00) (66 - 97)  BP: 150/70 (21 Apr 2023 17:00) (104/85 - 168/101)  BP(mean): 100 (21 Apr 2023 17:00) (75 - 127)  ABP: --  ABP(mean): --  RR: 19 (21 Apr 2023 17:00) (12 - 23)  SpO2: 96% (21 Apr 2023 17:00) (92% - 97%)    O2 Parameters below as of 21 Apr 2023 17:00  Patient On (Oxygen Delivery Method): room air    I&O's Summary    21 Apr 2023 07:01  -  21 Apr 2023 18:04  --------------------------------------------------------  IN: 0 mL / OUT: 150 mL / NET: -150 mL    EXAM:     [place] Coma Scale: 3/4/6 = 13 -> 2/3/6 = 11    General: normocephalic, atraumatic, laying in bed, in no distress  Neuro     MS: obtunded, unable to mantain eye opening longer than 10seconds, oriented to self and hospital, does not state date, speech is fluent and comprehension is intact    CN: PERRL, (+)gaze is dysconjugate (plegic to adduction OD, 'down and out,' and nystagmus with leftward gaze OS), (+)L. lower facial weakness, hearing grossly intact    Mot: bulk normal, tone normal, power full bilateral upper and lower proximal extremities    Coord: bilateral UE ataxia    Gait: deferred  Chest: nonlabored respirations, no adventitious lung sounds bilaterally, heart regular rate/rhythm, present S1/S2, no murmurs or rubs  Abdomen: nondistended, soft and nontender without peritoneal signs, normoactive bowel sounds  Extremities: no clubbing, well-perfused, no edema    NIHSS = 12    LABORATORY DATA:                            13.2   10.37 )-----------( 270      ( 21 Apr 2023 17:03 )             39.3     04-21    142  |  109<H>  |  11  ----------------------------<  149<H>  3.8   |  24  |  0.52    Ca    8.8      21 Apr 2023 17:03  Phos  2.7     04-21  Mg     2.0     04-21    TPro  6.7  /  Alb  3.8  /  TBili  0.9  /  DBili  x   /  AST  74<H>  /  ALT  91<H>  /  AlkPhos  112  04-21    LIVER FUNCTIONS - ( 21 Apr 2023 17:03 )  Alb: 3.8 g/dL / Pro: 6.7 g/dL / ALK PHOS: 112 U/L / ALT: 91 U/L / AST: 74 U/L / GGT: x           PT/INR - ( 21 Apr 2023 17:03 )   PT: 13.1 sec;   INR: 1.10          PTT - ( 21 Apr 2023 17:03 )  PTT:29.9 sec    IMAGING DATA:    CARDIOLOGY DATA:    MICROBIOLOGY DATA:        MEDICATIONS  (STANDING):  mannitol 20% IVPB 90 Gram(s) IV Intermittent once  sodium chloride 3%. 500 milliLiter(s) (30 mL/Hr) IV Continuous <Continuous>    MEDICATIONS  (PRN):      ***********************************************  ASSESSMENT AND PLAN  ***********************************************    #Bilateral cerebellar hemispheric strokes, post stroke day #1  #Bilateral carotid terminus aneurysms  #R. V4 occlusion  #History of peripheral neuropathy and asthma    NEURO - admit NSICU, Q1h neuro checks / Q1h vital signs, repeat CTH showing hydro/evolution fo posterior fossa strokes, to OR for SOC + EVD placement, holding ASA/plavix (s/p DDAVP at [facility]), pain control when post operative  PULM - SpO2 goal > 92%, supplemental O2 and pulm toileting as needed, airway watch  CARDIO - BP goal < 160, TTE w/ bubble, trop/baseline EKG  GI - bowel regimen, stool count, diet: NPO, will need DHT  /RENAL - monitor UOP/volume status, BUN/SCr, 3% @50cc/h, s/p 1g/kg mannitol on 4/21, barrios, q6h SNa  HEME - maintain Hb > 7.0, PLT > 100,000, holding chemoppx  ID - monitor for infectious s/s, fever curve, leukocytosis  ENDO - SGlu goal < 200, RASSI ***********************************************  ADULT NSICU PROGRESS NOTE  MAKSIM TRIVEDI [id number] [facility] 08EA 807 01  ***********************************************    INTERVAL:    ROS: negative except per mentioned above in 24h interval events.      HPI: 56 yo F with history of asthma and peripheral neuropathy transferred to [facility] NSICU from [facility] for management of malignant posterior fossa stroke.  The patient developed symptoms at approximately 0830 on 4/20: dysconjugate gaze, ataxia, facial droop, dysarthria.  She did not present to [facility] until later on in the afternoon during which she was not a candidate for TNK.  CTP showing bilateral cerebellar perfusion mismatch, CTA showing R. V4 occlusion and large bilateral cavernous/terminal ICA aneurysms.  Patient was admitted to the ICU and monitored.  On 4/21 the patient became more lethargic, at which point a repeat CTH was performed, showing bilateral cerebellar strokes with lateral compression of the fourth ventricle on the right.  Patient accepted to [facility] by Dr. Valadez, at which point he was given 250cc 3% NaCl, started on 50cc/h, given 20mcg DDAVP and transferred as tier 1.    Upon arrival to [facility], NIHSS = 12, but mental status slowly declined from lethargy to near obtundation.  Repeat CTH showing stable mass effect and hydrocephalus.  Patient taken emergently for SOC depression with EVD placement.      VITALS:    ICU Vital Signs Last 24 Hrs  T(C): 37.4 (21 Apr 2023 15:50), Max: 37.4 (21 Apr 2023 15:50)  T(F): 99.4 (21 Apr 2023 15:50), Max: 99.4 (21 Apr 2023 15:50)  HR: 73 (21 Apr 2023 17:00) (66 - 97)  BP: 150/70 (21 Apr 2023 17:00) (104/85 - 168/101)  BP(mean): 100 (21 Apr 2023 17:00) (75 - 127)  ABP: --  ABP(mean): --  RR: 19 (21 Apr 2023 17:00) (12 - 23)  SpO2: 96% (21 Apr 2023 17:00) (92% - 97%)    O2 Parameters below as of 21 Apr 2023 17:00  Patient On (Oxygen Delivery Method): room air    I&O's Summary    21 Apr 2023 07:01  -  21 Apr 2023 18:04  --------------------------------------------------------  IN: 0 mL / OUT: 150 mL / NET: -150 mL    EXAM: PENDING UPDATE    [place] Coma Scale: 3/4/6 = 13 -> 2/3/6 = 11    General: normocephalic, atraumatic, laying in bed, in no distress  Neuro     MS: obtunded, unable to mantain eye opening longer than 10seconds, oriented to self and hospital, does not state date, speech is fluent and comprehension is intact    CN: PERRL, (+)gaze is dysconjugate (plegic to adduction OD, 'down and out,' and nystagmus with leftward gaze OS), (+)L. lower facial weakness, hearing grossly intact    Mot: bulk normal, tone normal, power full bilateral upper and lower proximal extremities    Coord: bilateral UE ataxia    Gait: deferred  Chest: nonlabored respirations, no adventitious lung sounds bilaterally, heart regular rate/rhythm, present S1/S2, no murmurs or rubs  Abdomen: nondistended, soft and nontender without peritoneal signs, normoactive bowel sounds  Extremities: no clubbing, well-perfused, no edema    NIHSS = 12    LABORATORY DATA:                            13.2   10.37 )-----------( 270      ( 21 Apr 2023 17:03 )             39.3     04-21    142  |  109<H>  |  11  ----------------------------<  149<H>  3.8   |  24  |  0.52    Ca    8.8      21 Apr 2023 17:03  Phos  2.7     04-21  Mg     2.0     04-21    TPro  6.7  /  Alb  3.8  /  TBili  0.9  /  DBili  x   /  AST  74<H>  /  ALT  91<H>  /  AlkPhos  112  04-21    LIVER FUNCTIONS - ( 21 Apr 2023 17:03 )  Alb: 3.8 g/dL / Pro: 6.7 g/dL / ALK PHOS: 112 U/L / ALT: 91 U/L / AST: 74 U/L / GGT: x           PT/INR - ( 21 Apr 2023 17:03 )   PT: 13.1 sec;   INR: 1.10          PTT - ( 21 Apr 2023 17:03 )  PTT:29.9 sec    IMAGING DATA:    CARDIOLOGY DATA:    MICROBIOLOGY DATA:        MEDICATIONS  (STANDING):  mannitol 20% IVPB 90 Gram(s) IV Intermittent once  sodium chloride 3%. 500 milliLiter(s) (30 mL/Hr) IV Continuous <Continuous>    MEDICATIONS  (PRN):      ***********************************************  ASSESSMENT AND PLAN  ***********************************************    #Bilateral cerebellar hemispheric strokes, post stroke day #1  #Bilateral carotid terminus aneurysms  #R. V4 occlusion  #History of peripheral neuropathy and asthma    NEURO - admit NSICU, Q1h neuro checks / Q1h vital signs, repeat CTH showing hydro/evolution fo posterior fossa strokes, to OR for SOC + EVD placement, holding ASA/plavix (s/p DDAVP at [facility]), pain control when post operative  PULM - SpO2 goal > 92%, supplemental O2 and pulm toileting as needed, airway watch  CARDIO - BP goal < 160, TTE w/ bubble, trop/baseline EKG  GI - bowel regimen, stool count, diet: NPO, will need DHT  /RENAL - monitor UOP/volume status, BUN/SCr, 3% @50cc/h, s/p 1g/kg mannitol on 4/21, barrios, q6h SNa  HEME - maintain Hb > 7.0, PLT > 100,000, holding chemoppx  ID - monitor for infectious s/s, fever curve, leukocytosis  ENDO - SGlu goal < 200, RASSI ***********************************************  ADULT NSICU PROGRESS NOTE  MAKSIM TRIVEDI [id number] [facility] 08EA 807 01  ***********************************************    INTERVAL:    - S/p SOC for foramen magnum decompression and R. parietooccipital EVD    ROS: negative except per mentioned above in 24h interval events.      HPI: 56 yo F with history of asthma and peripheral neuropathy transferred to [facility] malignant [facility] fossa stroke.  The patient developed symptoms at approximately 0830 on 4/20: dysconjugate gaze, ataxia, facial droop, dysarthria.  [facility] later on in the afternoon during which she was not a candidate for TNK.  CTP showing bilateral cerebellar perfusion mismatch, CTA showing R. V4 occlusion and large bilateral cavernous/terminal ICA aneurysms.  Patient was admitted to the ICU and monitored.  On 4/21 the patient became more lethargic, at which point a repeat CTH was performed, showing bilateral cerebellar strokes with lateral compression of the fourth ventricle on the right.  Patient accepted to [facility] by Dr. Valadez, at which point he was given 250cc 3% NaCl, started on 50cc/h, given 20mcg DDAVP and transferred as tier 1.    Upon arrival to [facility], NIHSS = 12, but mental status slowly declined from lethargy to near obtundation.  Repeat CTH showing stable mass effect and hydrocephalus.  Patient taken emergently for SOC depression with EVD placement.      VITALS:    ICU Vital Signs Last 24 Hrs  T(C): 37.6 (22 Apr 2023 17:25), Max: 37.6 (22 Apr 2023 17:25)  T(F): 99.6 (22 Apr 2023 17:25), Max: 99.6 (22 Apr 2023 17:25)  HR: 69 (22 Apr 2023 17:00) (62 - 87)  BP: 141/65 (22 Apr 2023 17:00) (116/55 - 153/68)  BP(mean): 93 (22 Apr 2023 17:00) (79 - 109)  ABP: 113/60 (22 Apr 2023 09:00) (113/60 - 161/81)  ABP(mean): 78 (22 Apr 2023 09:00) (78 - 113)  RR: 14 (22 Apr 2023 17:00) (12 - 18)  SpO2: 97% (22 Apr 2023 17:00) (96% - 100%)    O2 Parameters below as of 22 Apr 2023 17:00  Patient On (Oxygen Delivery Method): ventilator,CPAP    O2 Concentration (%): 50          EXAM: PENDING UPDATE    [place] Coma Scale: 2/1T/6 = 9    General: normocephalic, atraumatic, laying in bed, in no distress  Neuro     MS: lethargic (currently off sedation), unable to mantain eye opening longer than 10seconds, oriented to self and hospital, does not state date, speech is fluent and comprehension is intact    CN: PERRL, (+)gaze is dysconjugate (plegic to adduction OD, 'down and out,' and nystagmus with leftward gaze OS), (+)L. lower facial weakness,     Mot: bulk normal, tone normal, power UPPER 0/3, LOWER 3/3    Coord: unable to assess due to mental status    Gait: deferred  Chest: mechanically ventilated, coarse breath sounds, heart regular rate/rhythm, present S1/S2, no murmurs or rubs  Abdomen: nondistended, soft and nontender without peritoneal signs, normoactive bowel sounds  Extremities: no clubbing, well-perfused, no edema    LABORATORY DATA:    ABG - ( 22 Apr 2023 01:43 )  pH, Arterial: 7.36  pH, Blood: x     /  pCO2: 39    /  pO2: 139   / HCO3: 22    / Base Excess: -3.1  /  SaO2: 99.3                                    10.8   9.68  )-----------( 293      ( 22 Apr 2023 05:30 )             31.9     04-22    141  |  111<H>  |  11  ----------------------------<  168<H>  4.0   |  22  |  0.42<L>    Ca    8.3<L>      22 Apr 2023 05:30  Phos  3.1     04-22  Mg     2.3     04-22    TPro  5.8<L>  /  Alb  3.4  /  TBili  0.5  /  DBili  x   /  AST  93<H>  /  ALT  106<H>  /  AlkPhos  84  04-22      MEDICATIONS  (STANDING):  atorvastatin 80 milliGRAM(s) Oral at bedtime  ceFAZolin   IVPB 2000 milliGRAM(s) IV Intermittent every 8 hours  chlorhexidine 0.12% Liquid 15 milliLiter(s) Oral Mucosa every 12 hours  dextrose 50% Injectable 25 Gram(s) IV Push once  glucagon  Injectable 1 milliGRAM(s) IntraMuscular once  niCARdipine Infusion 5 mG/Hr (25 mL/Hr) IV Continuous <Continuous>  pantoprazole  Injectable 40 milliGRAM(s) IV Push daily  polyethylene glycol 3350 17 Gram(s) Oral daily  senna 2 Tablet(s) Oral at bedtime  sodium chloride 3%. 500 milliLiter(s) (60 mL/Hr) IV Continuous <Continuous>    MEDICATIONS  (PRN):  acetaminophen     Tablet .. 650 milliGRAM(s) Oral every 6 hours PRN Temp greater or equal to 38C (100.4F), Mild Pain (1 - 3)  albuterol/ipratropium for Nebulization 3 milliLiter(s) Nebulizer every 6 hours PRN Shortness of Breath and/or Wheezing  fentaNYL    Injectable 25 MICROGram(s) IV Push every 4 hours PRN Severe Pain (7 - 10)  ondansetron Injectable 4 milliGRAM(s) IV Push every 6 hours PRN Nausea and/or Vomiting      ***********************************************  ASSESSMENT AND PLAN  ***********************************************    #Bilateral cerebellar hemispheric strokes, post stroke day #2  #S/p SOC for foramen magnum decompression and R. parieto-occipital placement of EVD, POD#1  #Bilateral carotid terminus aneurysms  #R. V4 occlusion  #History of peripheral neuropathy and asthma    NEURO - admit NSICU, Q1h neuro checks / Q1h vital signs, repeat CTH in AM, R. PO EVD (monitor output and ICP), holding ASA/plavix ([facility]VP [facility]), pain control w/ prn fentanyl  PULM - SpO2 goal > 92%, supplemental O2 and pulm toileting as needed, pressure support ventilation 10/5/30%, will tentatively plan for extubation tomorrow  CARDIO - BP goal < 160, TTE w/ bubble, will need SALVADOR  GI - bowel regimen, stool count, diet: NPO, will need DHT  /RENAL - monitor UOP/volume status, BUN/SCr, 3% @50cc/h, s/p 1g/kg mannitol on 4/21, barrios, q6h SNa  HEME - maintain Hb > 7.0, PLT > 100,000, holding chemoppx  ID - monitor for infectious s/s, fever curve, leukocytosis  ENDO - SGlu goal < 200, RASSI ***********************************************  ADULT NSICU PROGRESS NOTE  MAKSIM TRIVEDI [id number] [facility] 08EA 807 01  ***********************************************    INTERVAL:    - S/p SOC for foramen magnum decompression and R. parietooccipital EVD    ROS: negative except per mentioned above in 24h interval events.      HPI: 56 yo F with history of asthma and peripheral neuropathy transferred to [facility] NSICU from [facility] for management of malignant posterior fossa stroke.  The patient developed symptoms at approximately 0830 on 4/20: dysconjugate gaze, ataxia, facial droop, dysarthria.  She did not present to [facility] until later on in the afternoon during which she was not a candidate for TNK.  CTP showing bilateral cerebellar perfusion mismatch, CTA showing R. V4 occlusion and large bilateral cavernous/terminal ICA aneurysms.  Patient was admitted to the ICU and monitored.  On 4/21 the patient became more lethargic, at which point a repeat CTH was performed, showing bilateral cerebellar strokes with lateral compression of the fourth ventricle on the right.  Patient accepted to [facility] by Dr. Valadez, at which point he was given 250cc 3% NaCl, started on 50cc/h, given 20mcg DDAVP and transferred as tier 1.    Upon arrival to [facility], NIHSS = 12, but mental status slowly declined from lethargy to near obtundation.  Repeat CTH showing stable mass effect and hydrocephalus.  Patient taken emergently for SOC depression with EVD placement.      VITALS:    ICU Vital Signs Last 24 Hrs  T(C): 37.6 (22 Apr 2023 17:25), Max: 37.6 (22 Apr 2023 17:25)  T(F): 99.6 (22 Apr 2023 17:25), Max: 99.6 (22 Apr 2023 17:25)  HR: 69 (22 Apr 2023 17:00) (62 - 87)  BP: 141/65 (22 Apr 2023 17:00) (116/55 - 153/68)  BP(mean): 93 (22 Apr 2023 17:00) (79 - 109)  ABP: 113/60 (22 Apr 2023 09:00) (113/60 - 161/81)  ABP(mean): 78 (22 Apr 2023 09:00) (78 - 113)  RR: 14 (22 Apr 2023 17:00) (12 - 18)  SpO2: 97% (22 Apr 2023 17:00) (96% - 100%)    O2 Parameters below as of 22 Apr 2023 17:00  Patient On (Oxygen Delivery Method): ventilator,CPAP    O2 Concentration (%): 50          EXAM: PENDING UPDATE    Giovanna Coma Scale: 2/1T/6 = 9    General: normocephalic, atraumatic, laying in bed, in no distress  Neuro     MS: lethargic (currently off sedation), unable to mantain eye opening longer than 10seconds, oriented to self and hospital, does not state date, speech is fluent and comprehension is intact    CN: PERRL, (+)gaze is dysconjugate (plegic to adduction OD, 'down and out,' and nystagmus with leftward gaze OS), (+)L. lower facial weakness,     Mot: bulk normal, tone normal, power UPPER 0/3, LOWER 3/3    Coord: unable to assess due to mental status    Gait: deferred  Chest: mechanically ventilated, coarse breath sounds, heart regular rate/rhythm, present S1/S2, no murmurs or rubs  Abdomen: nondistended, soft and nontender without peritoneal signs, normoactive bowel sounds  Extremities: no clubbing, well-perfused, no edema    LABORATORY DATA:    ABG - ( 22 Apr 2023 01:43 )  pH, Arterial: 7.36  pH, Blood: x     /  pCO2: 39    /  pO2: 139   / HCO3: 22    / Base Excess: -3.1  /  SaO2: 99.3                                    10.8   9.68  )-----------( 293      ( 22 Apr 2023 05:30 )             31.9     04-22    141  |  111<H>  |  11  ----------------------------<  168<H>  4.0   |  22  |  0.42<L>    Ca    8.3<L>      22 Apr 2023 05:30  Phos  3.1     04-22  Mg     2.3     04-22    TPro  5.8<L>  /  Alb  3.4  /  TBili  0.5  /  DBili  x   /  AST  93<H>  /  ALT  106<H>  /  AlkPhos  84  04-22      MEDICATIONS  (STANDING):  atorvastatin 80 milliGRAM(s) Oral at bedtime  ceFAZolin   IVPB 2000 milliGRAM(s) IV Intermittent every 8 hours  chlorhexidine 0.12% Liquid 15 milliLiter(s) Oral Mucosa every 12 hours  dextrose 50% Injectable 25 Gram(s) IV Push once  glucagon  Injectable 1 milliGRAM(s) IntraMuscular once  niCARdipine Infusion 5 mG/Hr (25 mL/Hr) IV Continuous <Continuous>  pantoprazole  Injectable 40 milliGRAM(s) IV Push daily  polyethylene glycol 3350 17 Gram(s) Oral daily  senna 2 Tablet(s) Oral at bedtime  sodium chloride 3%. 500 milliLiter(s) (60 mL/Hr) IV Continuous <Continuous>    MEDICATIONS  (PRN):  acetaminophen     Tablet .. 650 milliGRAM(s) Oral every 6 hours PRN Temp greater or equal to 38C (100.4F), Mild Pain (1 - 3)  albuterol/ipratropium for Nebulization 3 milliLiter(s) Nebulizer every 6 hours PRN Shortness of Breath and/or Wheezing  fentaNYL    Injectable 25 MICROGram(s) IV Push every 4 hours PRN Severe Pain (7 - 10)  ondansetron Injectable 4 milliGRAM(s) IV Push every 6 hours PRN Nausea and/or Vomiting      ***********************************************  ASSESSMENT AND PLAN  ***********************************************    #Bilateral cerebellar hemispheric strokes, post stroke day #2  #S/p SOC for foramen magnum decompression and R. parieto-occipital placement of EVD, POD#1  #Bilateral carotid terminus aneurysms  #R. V4 occlusion  #History of peripheral neuropathy and asthma    NEURO - admit NSICU, Q1h neuro checks / Q1h vital signs, repeat CTH in AM, R. PO EVD (monitor output and ICP), holding ASA/plavix (s/p DDAVP at [facility]), pain control w/ prn fentanyl  PULM - SpO2 goal > 92%, supplemental O2 and pulm toileting as needed, pressure support ventilation 10/5/30%, will tentatively plan for extubation tomorrow  CARDIO - BP goal < 160, TTE w/ bubble, will need SALVADOR  GI - bowel regimen, stool count, diet: NPO, will need DHT  /RENAL - monitor UOP/volume status, BUN/SCr, 3% NaCl, q6h Sodium  HEME - maintain Hb > 7.0, PLT > 100,000, holding chemoppx  ID - monitor for infectious s/s, fever curve, leukocytosis  ENDO - SGlu goal < 200, RASSI

## 2023-04-22 NOTE — PROGRESS NOTE ADULT - SUBJECTIVE AND OBJECTIVE BOX
HPI:  58 yo F with PMH of Asthma, CAD (not on  meds), peripheral neuropathy and PSH of BATOOL, cholecystectomy, right knee surgery presented to [place] ED on 4/20 with right sided weakness and right facial numbness. Patient was undergoing preparation for colonoscopy. As per daughter at 8am patient was playing with her grandchildren but around 840 am patient was getting dressed and fell and subsequently felt weak after. Daughter reports that patient had some episodes of vomiting at this time. She had a syncopal episode at around 10 am and was witnessed by brother. No head trauma, She regained conscious after few minutes. She remained in bed for the remainder of the day. Daughter reports some slurred speech noted 1230-1PM and also was confused after. and around 4PM, the patient's sister noted right sided weakness at which time EMS was called and patient was brought to ED. No c/o chest pain, shortness of breath, fever, headache, abdominal pain, urinary complaints. No recent travel/sickness/ change in meds. Stroke code in ER: CTH neg for heme, Right PICA distribution acute infarction. CTA with saccular aneurysms of b/l carotids, approximately 0.8 cm on the right and 1.2 x 0.9 cm on the left. Possible tiny third saccular aneurysm on the right Posterior intracranial circulation: Right vertebral arterial occlusion at its dural crossing junction V3 Atlantic and V4 intracranial segments with likely reversal of flow in its intracranial segment from the basilar. Right PICA faintly seen. 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  Not candidate for TNK/mechanical thrombectomy. CT scan repeated which showed: 1. Brain: Progression of acute infarction within the right cerebellar hemisphere, also extending into the left superior cerebellar hemisphere. New mass effect on the fourth ventricle causing stenosis versus occlusion with new third and lateral ventricular dilatation indicating ventricular obstruction at the level of the fourth ventricle. New upward and downward herniation of cerebellar parenchyma Right carotid system: No hemodynamically significant stenosis. Left carotid system: No hemodynamically significant stenosis. Vertebral circulation: Patent. Anterior intracranial circulation: Intact. Bilateral internal carotid saccular aneurysms. These findings are unchanged. Posterior intracranial circulation:    Improved flow within the right vertebral artery since 4/20/2023. New focal stenosis mid left vertebral artery, etiology uncertain, consider vasospasm and extrinsic compression in addition to new embolic disease. Brain perfusion: Perfusion images demonstrate normalization of the perfusion abnormality in the right cerebellar hemisphere present on 4/20/2023 despite evidence of progression of acute infarction.  Areas of apparent ischemia within the posterior fossa have progressed in extent, also again involving the left posterior cerebral arterial distribution. Tx to [facility] for SOC watch. On admission to [facility], NIHSS 12.  (21 Apr 2023 16:50)        Hospital Course:   4/21: Admitted for crani watch, CTH complete w/ unchanged hydrocephalus, taken for emergent occipital craniectomy.   4/22: POD1. Remains intubated overnight, on propofol and eliel. EVD@[id number]. Pending repeat CTH this am.      Vital Signs Last 24 Hrs  T(C): 36.4 (22 Apr 2023 01:20), Max: 37.4 (21 Apr 2023 15:50)  T(F): 97.5 (22 Apr 2023 01:20), Max: 99.4 (21 Apr 2023 15:50)  HR: 81 (22 Apr 2023 01:18) (66 - 97)  BP: 126/59 (22 Apr 2023 01:18) (104/85 - 168/101)  BP(mean): 85 (22 Apr 2023 01:18) (75 - 127)  RR: 18 (21 Apr 2023 18:36) (12 - 23)  SpO2: 100% (22 Apr 2023 01:18) (92% - 100%)    Parameters below as of 22 Apr 2023 01:06  Patient On (Oxygen Delivery Method): ventilator    O2 Concentration (%): 50    I&O's Detail    21 Apr 2023 07:01  -  22 Apr 2023 02:07  --------------------------------------------------------  IN:    sodium chloride 3%: 60 mL  Total IN: 60 mL    OUT:    Indwelling Catheter - Urethral (mL): 800 mL    Voided (mL): 150 mL  Total OUT: 950 mL    Total NET: -890 mL        I&O's Summary    21 Apr 2023 07:01  -  22 Apr 2023 02:07  --------------------------------------------------------  IN: 60 mL / OUT: 950 mL / NET: -890 mL        PHYSICAL EXAM:  General: NAD, comfortably lying in bed, intubated on full vent support  Cardiovascular: regular rate and rhythm   Respiratory: nonlabored breathing, normal chest rise   GI: abdomen soft, nontender, nondistended  Neuro: intubated, sedated  Pupils 2mm briskly reactive b/l  Minimal response in motors to noxious due to post op sedation   Extremities: distal pulses 2+ x4  Incision/wound: crani incision c/d/i, EVD site c/d/i  Drain: +EVD@[id number],+ELENA(SG) to half suction       TUBES/LINES:  [] CVC  [] A-line  [] Lumbar Drain  [] Ventriculostomy  [] ELENA  [] Barrios  [] NGT   [] Other    DIET:  [] NPO  [] Mechanical  [] Tube feeds    LABS:                        10.5   8.61  )-----------( 388      ( 22 Apr 2023 01:38 )             31.7     04-22    143  |  113<H>  |  11  ----------------------------<  216<H>  3.4<L>   |  19<L>  |  0.52    Ca    7.8<L>      22 Apr 2023 01:38  Phos  2.5     04-22  Mg     1.8     04-22    TPro  5.8<L>  /  Alb  3.4  /  TBili  0.5  /  DBili  x   /  AST  93<H>  /  ALT  106<H>  /  AlkPhos  84  04-22    PT/INR - ( 21 Apr 2023 17:03 )   PT: 13.1 sec;   INR: 1.10          PTT - ( 21 Apr 2023 17:03 )  PTT:29.9 sec    CARDIAC MARKERS ( 21 Apr 2023 17:03 )  x     / <0.01 ng/mL / x     / x     / x          CAPILLARY BLOOD GLUCOSE      POCT Blood Glucose.: 212 mg/dL (22 Apr 2023 00:20)  POCT Blood Glucose.: 132 mg/dL (21 Apr 2023 16:38)      Drug Levels: [] N/A    CSF Analysis: [] N/A      Allergies    No Known Allergies    Intolerances        Home Medications:  Albuterol (Eqv-ProAir HFA) 90 mcg/inh inhalation aerosol: 2 puff(s) inhaled every 6 hours as needed for  shortness of breath and/or wheezing (20 Apr 2023 22:36)      MEDICATIONS:  Antibiotics:  ceFAZolin   IVPB 2000 milliGRAM(s) IV Intermittent every 8 hours    Neuro:  acetaminophen     Tablet .. 650 milliGRAM(s) Oral every 6 hours PRN  fentaNYL    Injectable 25 MICROGram(s) IV Push every 4 hours PRN  ondansetron Injectable 4 milliGRAM(s) IV Push every 6 hours PRN  propofol Infusion 5 MICROgram(s)/kG/Min IV Continuous <Continuous>    Anticoagulation:    OTHER:  albuterol/ipratropium for Nebulization 3 milliLiter(s) Nebulizer every 6 hours PRN  atorvastatin 80 milliGRAM(s) Oral at bedtime  chlorhexidine 0.12% Liquid 15 milliLiter(s) Oral Mucosa every 12 hours  chlorhexidine 2% Cloths 1 Application(s) Topical every 12 hours  dextrose 50% Injectable 25 Gram(s) IV Push once  dextrose 50% Injectable 12.5 Gram(s) IV Push once  dextrose 50% Injectable 25 Gram(s) IV Push once  dextrose Oral Gel 15 Gram(s) Oral once PRN  glucagon  Injectable 1 milliGRAM(s) IntraMuscular once  insulin lispro (ADMELOG) corrective regimen sliding scale   SubCutaneous every 6 hours  pantoprazole  Injectable 40 milliGRAM(s) IV Push daily  phenylephrine    Infusion 0.1 MICROgram(s)/kG/Min IV Continuous <Continuous>  polyethylene glycol 3350 17 Gram(s) Oral daily  senna 2 Tablet(s) Oral at bedtime    IVF:  dextrose 5%. 1000 milliLiter(s) IV Continuous <Continuous>  dextrose 5%. 1000 milliLiter(s) IV Continuous <Continuous>    CULTURES:    RADIOLOGY & ADDITIONAL TESTS:      ASSESSMENT:  58 yo F with PMH of Asthma, CAD (not on  meds), peripheral neuropathy and PSH of BATOOL, cholecystectomy, right knee surgery presented to [place] ED on 4/20 with right sided weakness and right facial numbness. On repeat imaging of brain: Progression of acute infarction within the right cerebellar hemisphere, also extending into the left superior cerebellar hemisphere. New mass effect on the fourth ventricle causing stenosis versus occlusion with new third and lateral ventricular dilatation indicating ventricular obstruction at the level of the fourth ventricle. New upward and downward herniation of cerebellar parenchyma. Pt transferred to [facility] for further management. Now s/p SOC foramen magnum decompression and right parietal/occipital EVD placement (4/21).    Neuro   - Vital/coma neuro q1   - pain control prn  - HOB@30   - propofol for sedation   - fentanyl prn  - CTH complete- hydrocephalus unchanged  - CTH post op: with new heme in right lateral vent   - Hold aspirin/plavix - s/p DDAVP x2 and platlets x3  - Needs DSA (Monday/Tuesday if stable) for B/L carotid aneurysms  - stroke consulted, f/u recs  - ELENA (SG) to half suction  - EVD@[id number]  - pending repeat CTH in am    Cardio  - -140  - Eliel prn, wean as tolerated   - echo pending    Pulm  - intubated, full vent support  - vent settings 14/450/50/5  - albutrol prn for asthma     GI  - NPO   - protonnix   - bowel regimen     Renal  - barrios  - Na goal 140-145  - electrolytes prn     Heme  - SCD's, no SQL  - LE dopplers pending   - 1U platelets ordered preop  - intraop: 3 units platelts, 35 mcg of DDAVP    Endo   - ISS     ID  - ancef while ELENA in place     DISPO:  NSICU, full code, PT/OT    D/w Dr. Valadez and Dr. Lopez     Assessment:  Present when checked    []  GCS  E   V  M     Heart Failure: []Acute, [] acute on chronic , []chronic  Heart Failure:  [] Diastolic (HFpEF), [] Systolic (HFrEF), []Combined (HFpEF and HFrEF), [] RHF, [] Pulm HTN, [] Other    [] MAMTA, [] ATN, [] AIN, [] other  [] CKD1, [] CKD2, [] CKD 3, [] CKD 4, [] CKD 5, []ESRD    Encephalopathy: [] Metabolic, [] Hepatic, [] toxic, [] Neurological, [] Other    Abnormal Nurtitional Status: [] malnurtition (see nutrition note), [ ]underweight: BMI < 19, [] morbid obesity: BMI >40, [] Cachexia    [] Sepsis  [] hypovolemic shock,[] cardiogenic shock, [] hemorrhagic shock, [] neuogenic shock  [] Acute Respiratory Failure  []Cerebral edema, [] Brain compression/ herniation,   [] Functional quadriplegia  [] Acute blood loss anemia

## 2023-04-22 NOTE — CONSULT NOTE ADULT - SUBJECTIVE AND OBJECTIVE BOX
HPI:  56 yo F with PMH of Asthma, CAD (not on  meds), peripheral neuropathy and PSH of BATOOL, cholecystectomy, right knee surgery presented to [place] ED on 4/20 with right sided weakness and right facial numbness. On repeat imaging of brain: Progression of acute infarction within the right cerebellar hemisphere, also extending into the left superior cerebellar hemisphere. New mass effect on the fourth ventricle causing stenosis versus occlusion with new third and lateral ventricular dilatation indicating ventricular obstruction at the level of the fourth ventricle. New upward and downward herniation of cerebellar parenchyma. Pt transferred to [facility] for further management. Now s/p SOC foramen magnum decompression and right parietal/occipital EVD placement (4/21).    ENT consulted for NGT feeding tube placement. Primary team attempted multiple times but encountered resistance in nasopharynx. Patient is s/p suboccipital craniectomy and is to be placed in cervical collar soon.    ALLERGIES  No Known Allergies      PAST MEDICAL & SURGICAL HISTORY:  Asthma  CAD (coronary artery disease)  Peripheral neuropathy  S/P total abdominal hysterectomy  S/P cholecystectomy  S/P right knee surgery    MEDICATIONS  (STANDING):  atorvastatin 80 milliGRAM(s) Oral at bedtime  ceFAZolin   IVPB 2000 milliGRAM(s) IV Intermittent every 8 hours  chlorhexidine 0.12% Liquid 15 milliLiter(s) Oral Mucosa every 12 hours  dextrose 50% Injectable 25 Gram(s) IV Push once  glucagon  Injectable 1 milliGRAM(s) IntraMuscular once  niCARdipine Infusion 5 mG/Hr (25 mL/Hr) IV Continuous <Continuous>  pantoprazole  Injectable 40 milliGRAM(s) IV Push daily  polyethylene glycol 3350 17 Gram(s) Oral daily  senna 2 Tablet(s) Oral at bedtime  sodium chloride 3%. 500 milliLiter(s) (60 mL/Hr) IV Continuous <Continuous>    MEDICATIONS  (PRN):  acetaminophen     Tablet .. 650 milliGRAM(s) Oral every 6 hours PRN Temp greater or equal to 38C (100.4F), Mild Pain (1 - 3)  albuterol/ipratropium for Nebulization 3 milliLiter(s) Nebulizer every 6 hours PRN Shortness of Breath and/or Wheezing  fentaNYL    Injectable 25 MICROGram(s) IV Push every 4 hours PRN Severe Pain (7 - 10)  ondansetron Injectable 4 milliGRAM(s) IV Push every 6 hours PRN Nausea and/or Vomiting    SOCIAL HISTORY:  deferred    FAMILY HISTORY:  deferred        REVIEW OF SYSTEMS: deferred    LABS:  CBC-                        10.8   9.68  )-----------( 293      ( 22 Apr 2023 05:30 )             31.9     04-22    141  |  111<H>  |  11  ----------------------------<  168<H>  4.0   |  22  |  0.42<L>    Ca    8.3<L>      22 Apr 2023 05:30  Phos  3.1     04-22  Mg     2.3     04-22    TPro  5.8<L>  /  Alb  3.4  /  TBili  0.5  /  DBili  x   /  AST  93<H>  /  ALT  106<H>  /  AlkPhos  84  04-22    Coagulation Studies-  PT/INR - ( 22 Apr 2023 02:37 )   PT: 13.9 sec;   INR: 1.17          PTT - ( 22 Apr 2023 02:37 )  PTT:20.7 sec  Endocrine Panel-  --  --  8.3 mg/dL  --  --  7.8 mg/dL  0.152 uIU/mL  --  8.8 mg/dL  --  --  9.0 mg/dL  --  --  9.4 mg/dL      Vital Signs Last 24 Hrs  T(C): 36.3 (22 Apr 2023 14:00), Max: 37.3 (21 Apr 2023 18:13)  T(F): 97.4 (22 Apr 2023 14:00), Max: 99.1 (21 Apr 2023 18:13)  HR: 69 (22 Apr 2023 17:00) (62 - 86)  BP: 141/65 (22 Apr 2023 17:00) (116/55 - 153/68)  BP(mean): 93 (22 Apr 2023 17:00) (79 - 109)  RR: 14 (22 Apr 2023 17:00) (12 - 18)  SpO2: 97% (22 Apr 2023 17:00) (97% - 100%)    Parameters below as of 22 Apr 2023 17:00  Patient On (Oxygen Delivery Method): ventilator,CPAP    O2 Concentration (%): 50  PHYSICAL EXAM:  ENT EXAM-   Constitutional: intubated, lethargic, responsive only to some verbal and physical stimuli     Head:  normocephalic, atraumatic. s/p SOC  Nose: deviatiated septum to right, clear anteriorly. no gross external deformity.  OC/OP:  intubated, no bleeding from mouth  Neck:  soft, flat  Lymph:  No cervical adenopathy.    Nasal Endoscopy Findings:  Verbal consent obtained from present family members. Afrin applied to nasal cavity bilaterally. Nasal cavity clear bilaterally. with septal deviation to left. NGT placed under flexible endoscopic guidance in both nostrils. Short A-P diameter of nasopharynx precluding NGT (weighted and sump) from passing inferiorly via either nostril. Moderate nasopharyngeal edema. Nasal trumpet inserted into left nasal cavity and into nasopharynx. Weighted 12Fr NGT passed through nasal trumpet which was then cut and removed.     RADIOLOGY & ADDITIONAL STUDIES:    A/P:  57y Female as above now s/p NGT placement using endoscopic guidance and nasal trumpet    - F/u CXR to confirm placement in stomach  - Primary team can adjust length as needed   HPI:  58 yo F with PMH of Asthma, CAD (not on  meds), peripheral neuropathy and PSH of BATOOL, cholecystectomy, right knee surgery presented to [place] ED on 4/20 with right sided weakness and right facial numbness. On repeat imaging of brain: Progression of acute infarction within the right cerebellar hemisphere, also extending into the left superior cerebellar hemisphere. New mass effect on the fourth ventricle causing stenosis versus occlusion with new third and lateral ventricular dilatation indicating ventricular obstruction at the level of the fourth ventricle. New upward and downward herniation of cerebellar parenchyma. Pt transferred to [facility] for further management. Now s/p SOC foramen magnum decompression and right parietal/occipital EVD placement (4/21).    ENT consulted for NGT feeding tube placement. Primary team attempted multiple times but encountered resistance in nasopharynx. Patient is s/p suboccipital craniectomy and is to be placed in cervical collar soon.    ALLERGIES  No Known Allergies      PAST MEDICAL & SURGICAL HISTORY:  Asthma  CAD (coronary artery disease)  Peripheral neuropathy  S/P total abdominal hysterectomy  S/P cholecystectomy  S/P right knee surgery    MEDICATIONS  (STANDING):  atorvastatin 80 milliGRAM(s) Oral at bedtime  ceFAZolin   IVPB 2000 milliGRAM(s) IV Intermittent every 8 hours  chlorhexidine 0.12% Liquid 15 milliLiter(s) Oral Mucosa every 12 hours  dextrose 50% Injectable 25 Gram(s) IV Push once  glucagon  Injectable 1 milliGRAM(s) IntraMuscular once  niCARdipine Infusion 5 mG/Hr (25 mL/Hr) IV Continuous <Continuous>  pantoprazole  Injectable 40 milliGRAM(s) IV Push daily  polyethylene glycol 3350 17 Gram(s) Oral daily  senna 2 Tablet(s) Oral at bedtime  sodium chloride 3%. 500 milliLiter(s) (60 mL/Hr) IV Continuous <Continuous>    MEDICATIONS  (PRN):  acetaminophen     Tablet .. 650 milliGRAM(s) Oral every 6 hours PRN Temp greater or equal to 38C (100.4F), Mild Pain (1 - 3)  albuterol/ipratropium for Nebulization 3 milliLiter(s) Nebulizer every 6 hours PRN Shortness of Breath and/or Wheezing  fentaNYL    Injectable 25 MICROGram(s) IV Push every 4 hours PRN Severe Pain (7 - 10)  ondansetron Injectable 4 milliGRAM(s) IV Push every 6 hours PRN Nausea and/or Vomiting    SOCIAL HISTORY:  deferred    FAMILY HISTORY:  deferred        REVIEW OF SYSTEMS: deferred    LABS:  CBC-                        10.8   9.68  )-----------( 293      ( 22 Apr 2023 05:30 )             31.9     04-22    141  |  111<H>  |  11  ----------------------------<  168<H>  4.0   |  22  |  0.42<L>    Ca    8.3<L>      22 Apr 2023 05:30  Phos  3.1     04-22  Mg     2.3     04-22    TPro  5.8<L>  /  Alb  3.4  /  TBili  0.5  /  DBili  x   /  AST  93<H>  /  ALT  106<H>  /  AlkPhos  84  04-22    Coagulation Studies-  PT/INR - ( 22 Apr 2023 02:37 )   PT: 13.9 sec;   INR: 1.17          PTT - ( 22 Apr 2023 02:37 )  PTT:20.7 sec  Endocrine Panel-  --  --  8.3 mg/dL  --  --  7.8 mg/dL  0.152 uIU/mL  --  8.8 mg/dL  --  --  9.0 mg/dL  --  --  9.4 mg/dL      Vital Signs Last 24 Hrs  T(C): 36.3 (22 Apr 2023 14:00), Max: 37.3 (21 Apr 2023 18:13)  T(F): 97.4 (22 Apr 2023 14:00), Max: 99.1 (21 Apr 2023 18:13)  HR: 69 (22 Apr 2023 17:00) (62 - 86)  BP: 141/65 (22 Apr 2023 17:00) (116/55 - 153/68)  BP(mean): 93 (22 Apr 2023 17:00) (79 - 109)  RR: 14 (22 Apr 2023 17:00) (12 - 18)  SpO2: 97% (22 Apr 2023 17:00) (97% - 100%)    Parameters below as of 22 Apr 2023 17:00  Patient On (Oxygen Delivery Method): ventilator,CPAP    O2 Concentration (%): 50  PHYSICAL EXAM:  ENT EXAM-   Constitutional: intubated, lethargic, responsive only to some verbal and physical stimuli     Head:  normocephalic, atraumatic. s/p SOC  Nose: deviatiated septum to right, clear anteriorly. no gross external deformity.  OC/OP:  intubated, no bleeding from mouth  Neck:  soft, flat  Lymph:  No cervical adenopathy.    Nasal Endoscopy Findings:  Verbal consent obtained from present family members. With assistance form nursing, patient head elevated in supine position. Afrin applied to nasal cavity bilaterally. Nasal cavity clear bilaterally. with septal deviation to left. NGT placed under flexible endoscopic guidance in both nostrils. Short A-P diameter of nasopharynx precluding NGT (weighted and sump) from passing inferiorly via either nostril. Moderate nasopharyngeal edema. Nasal trumpet inserted into left nasal cavity and into nasopharynx. Weighted 12Fr NGT passed through nasal trumpet which was then cut and removed.     RADIOLOGY & ADDITIONAL STUDIES:    A/P:  57y Female as above now s/p NGT placement using endoscopic guidance and nasal trumpet    - F/u CXR to confirm placement in stomach  - Primary team can adjust length as needed

## 2023-04-22 NOTE — PROGRESS NOTE ADULT - SUBJECTIVE AND OBJECTIVE BOX
NSCU ATTENDING -- ADDITIONAL PROGRESS NOTE    Nighttime rounds were performed -- please refer to earlier Progress Note for HPI details.    58 y/o F with history of asthma and peripheral neuropathy transferred to [facility] from [facility] for management of malignant posterior fossa stroke.  The patient developed symptoms at approximately 0830 on 4/20: dysconjugate gaze, ataxia, facial droop, dysarthria.  She did not present to [facility] until later on in the afternoon during which she was not a candidate for TNK.  CTP showing bilateral cerebellar perfusion mismatch, CTA showing R. V4 occlusion and large bilateral cavernous/terminal ICA aneurysms.  Patient was admitted to the ICU and monitored.  On 4/21 the patient became more lethargic, at which point a repeat CTH was performed, showing bilateral cerebellar strokes with lateral compression of the fourth ventricle on the right.  Patient accepted to [facility] by Dr. Valadez, at which point he was given 250cc 3% NaCl, started on 50cc/h, given 20mcg DDAVP and transferred as tier 1.     Upon arrival to [facility], NIHSS = 12, but mental status slowly declined from lethargy to near obtundation.  Repeat CTH showing stable mass effect and hydrocephalus. Patient taken emergently for SOC depression with EVD placement.        ICU Vital Signs Last 24 Hrs  T(C): 37.6 (22 Apr 2023 17:25), Max: 37.6 (22 Apr 2023 17:25)  T(F): 99.6 (22 Apr 2023 17:25), Max: 99.6 (22 Apr 2023 17:25)  HR: 69 (22 Apr 2023 17:00) (62 - 87)  BP: 141/65 (22 Apr 2023 17:00) (116/55 - 153/68)  BP(mean): 93 (22 Apr 2023 17:00) (79 - 109)  ABP: 113/60 (22 Apr 2023 09:00) (113/60 - 161/81)  ABP(mean): 78 (22 Apr 2023 09:00) (78 - 113)  RR: 14 (22 Apr 2023 17:00) (12 - 18)  SpO2: 97% (22 Apr 2023 17:00) (96% - 100%)      04-21-23 @ 07:01  -  04-22-23 @ 07:00  --------------------------------------------------------  IN: 685.8 mL / OUT: 1579 mL / NET: -893.2 mL    04-22-23 @ 07:01  -  04-22-23 @ 18:27  --------------------------------------------------------  IN: 1457.5 mL / OUT: 970 mL / NET: 487.5 mL      Mode: CPAP with PS, FiO2: 30, PEEP: 5, PS: 10, MAP: 8, PIP: 15    EXAM:   Neuro:  MS:  CN: Pupils 2mm and brisk  Motor:  Chest: ETT, Clear, nonlabored respirations, no adventitious lung sounds bilaterally  Heart regular rate/rhythm, present S1/S2, no murmurs or rubs  Abdomen: Soft and nontender   Extremities: No edema      MEDICATIONS:   acetaminophen     Tablet .. 650 milliGRAM(s) Oral every 6 hours PRN  albuterol/ipratropium for Nebulization 3 milliLiter(s) Nebulizer every 6 hours PRN  atorvastatin 80 milliGRAM(s) Oral at bedtime  ceFAZolin   IVPB 2000 milliGRAM(s) IV Intermittent every 8 hours  chlorhexidine 0.12% Liquid 15 milliLiter(s) Oral Mucosa every 12 hours  dextrose 50% Injectable 25 Gram(s) IV Push once  fentaNYL    Injectable 25 MICROGram(s) IV Push every 4 hours PRN  glucagon  Injectable 1 milliGRAM(s) IntraMuscular once  niCARdipine Infusion 5 mG/Hr (25 mL/Hr) IV Continuous <Continuous>  ondansetron Injectable 4 milliGRAM(s) IV Push every 6 hours PRN  pantoprazole  Injectable 40 milliGRAM(s) IV Push daily  polyethylene glycol 3350 17 Gram(s) Oral daily  senna 2 Tablet(s) Oral at bedtime  sodium chloride 3%. 500 milliLiter(s) (60 mL/Hr) IV Continuous <Continuous>      LABS:                       10.8   9.68  )-----------( 293      ( 22 Apr 2023 05:30 )             31.9     04-22    141  |  111<H>  |  11  ----------------------------<  168<H>  4.0   |  22  |  0.42<L>    Ca    8.3<L>      22 Apr 2023 05:30  Phos  3.1     04-22  Mg     2.3     04-22    TPro  5.8<L>  /  Alb  3.4  /  TBili  0.5  /  DBili  x   /  AST  93<H>  /  ALT  106<H>  /  AlkPhos  84  04-22    LIVER FUNCTIONS - ( 22 Apr 2023 01:38 )  Alb: 3.4 g/dL / Pro: 5.8 g/dL / ALK PHOS: 84 U/L / ALT: 106 U/L / AST: 93 U/L / GGT: x           ABG - ( 22 Apr 2023 01:43 )  pH, Arterial: 7.36  pH, Blood: x     /  pCO2: 39    /  pO2: 139   / HCO3: 22    / Base Excess: -3.1  /  SaO2: 99.3          ASSESSMENT:   Bilateral cerebellar hemispheric strokes  POD 1 s/p SOC for foramen magnum decompression and R parietooccipital EVD  Bilateral carotid terminus aneurysms  R. V4 occlusion  History of peripheral neuropathy and asthma      PLAN:  NEURO: Q1h neurochecks  S/P OR for SOC + EVD placement  EVD at [id number], ICPs, CPP, output.   Post op CT head reviewed. CT 4/22:   SG ELENA- monitor output  Holding ASA/plavix- see heme. Continue Statin  Sedation: None. Analgesia: Fentanyl 25mcg Q4 prn for vent synchrony, analgesia  Stroke core measures, stroke neurology consult  Possible DSA next week  Activity: Bedrest    PULM: Full vent support  CXR, ABG  Albuterol prn  VAP bundle  CPAP as tolerated    CARDIAC:   SBP goal 100-140  TTE w/ bubble, trop/baseline EKG    GI: NPO, will need enteric access  PPI while intubated  Bowel regimen     /RENAL:  S/P 3% @60cc/h (for goal Na 145-140  Monitor BMPs post op and Q6   Garsia catheter for strict Is/Os  Serum osm    HEME: S/P platelets and DDAVP for ASA/plavix reversal  Maintain Hb > 7.0, PLT > 100,000  Holding chemoppx as fresh post op  SCDs  LE dopplers    ID:  Monitor for infectious s/s, fever curve, leukocytosis  Post op Ancef    ENDO:   Glu goal 140-180mg/dL  ISS   Check A1c, LDL, TSH    SOCIAL/FAMILY:  [] awaiting [x] updated at bedside [] family meeting    CODE STATUS:  [x] Full Code [] DNR [] DNI [] Palliative/Comfort Care    DISPOSITION:  [x] ICU [] Stroke Unit [] Floor [] EMU [] RCU [] PC    Time spent: 45 critical care minutes     NSCU ATTENDING -- ADDITIONAL PROGRESS NOTE    Nighttime rounds were performed -- please refer to earlier Progress Note for HPI details.    58 y/o F with history of asthma and peripheral neuropathy transferred to [facility]ICU from [facility] for management of malignant posterior fossa stroke.  The patient developed symptoms at approximately 0830 on 4/20: dysconjugate gaze, ataxia, facial droop, dysarthria.  She did not present to [facility] until later on in the afternoon during which she was not a candidate for TNK.  CTP showing bilateral cerebellar perfusion mismatch, CTA showing R. V4 occlusion and large bilateral cavernous/terminal ICA aneurysms.  Patient was admitted to the ICU and monitored.  On 4/21 the patient became more lethargic, at which point a repeat CTH was performed, showing bilateral cerebellar strokes with lateral compression of the fourth ventricle on the right.  Patient accepted to [facility] by Dr. Valadez, at which point he was given 250cc 3% NaCl, started on 50cc/h, given 20mcg DDAVP and transferred as tier 1.     Upon arrival to [facility], NIHSS = 12, but mental status slowly declined from lethargy to near obtundation.  Repeat CTH showing stable mass effect and hydrocephalus. Patient taken emergently for SOC depression with EVD placement.      ICU Vital Signs Last 24 Hrs  T(C): 37.6 (22 Apr 2023 17:25), Max: 37.6 (22 Apr 2023 17:25)  T(F): 99.6 (22 Apr 2023 17:25), Max: 99.6 (22 Apr 2023 17:25)  HR: 69 (22 Apr 2023 17:00) (62 - 87)  BP: 141/65 (22 Apr 2023 17:00) (116/55 - 153/68)  BP(mean): 93 (22 Apr 2023 17:00) (79 - 109)  ABP: 113/60 (22 Apr 2023 09:00) (113/60 - 161/81)  ABP(mean): 78 (22 Apr 2023 09:00) (78 - 113)  RR: 14 (22 Apr 2023 17:00) (12 - 18)  SpO2: 97% (22 Apr 2023 17:00) (96% - 100%)      04-21-23 @ 07:01  -  04-22-23 @ 07:00  --------------------------------------------------------  IN: 685.8 mL / OUT: 1579 mL / NET: -893.2 mL    04-22-23 @ 07:01  -  04-22-23 @ 18:27  --------------------------------------------------------  IN: 1457.5 mL / OUT: 970 mL / NET: 487.5 mL      Mode: CPAP with PS, FiO2: 30, PEEP: 5, PS: 10, MAP: 8, PIP: 15    EXAM:   MS: Patient opens eyes to voice/ commands  CN: Pupils 3mm and brisk, tracks, grimaces  Motor: RUE 0/5, LUE AG, LLE proximally AG, distally 5/5  RLE: distally AG   PF/DF 5/5  Chest: ETT, clear, no wheeze  Heart regular rate/rhythm, present S1/S2, no murmurs or rubs  Abdomen: Soft and nontender   Extremities: No edema      MEDICATIONS:   acetaminophen     Tablet .. 650 milliGRAM(s) Oral every 6 hours PRN  albuterol/ipratropium for Nebulization 3 milliLiter(s) Nebulizer every 6 hours PRN  atorvastatin 80 milliGRAM(s) Oral at bedtime  ceFAZolin   IVPB 2000 milliGRAM(s) IV Intermittent every 8 hours  chlorhexidine 0.12% Liquid 15 milliLiter(s) Oral Mucosa every 12 hours  dextrose 50% Injectable 25 Gram(s) IV Push once  fentaNYL    Injectable 25 MICROGram(s) IV Push every 4 hours PRN  glucagon  Injectable 1 milliGRAM(s) IntraMuscular once  niCARdipine Infusion 5 mG/Hr (25 mL/Hr) IV Continuous <Continuous>  ondansetron Injectable 4 milliGRAM(s) IV Push every 6 hours PRN  pantoprazole  Injectable 40 milliGRAM(s) IV Push daily  polyethylene glycol 3350 17 Gram(s) Oral daily  senna 2 Tablet(s) Oral at bedtime  sodium chloride 3%. 500 milliLiter(s) (60 mL/Hr) IV Continuous <Continuous>      LABS:                       10.8   9.68  )-----------( 293      ( 22 Apr 2023 05:30 )             31.9     04-22    141  |  111<H>  |  11  ----------------------------<  168<H>  4.0   |  22  |  0.42<L>    Ca    8.3<L>      22 Apr 2023 05:30  Phos  3.1     04-22  Mg     2.3     04-22    TPro  5.8<L>  /  Alb  3.4  /  TBili  0.5  /  DBili  x   /  AST  93<H>  /  ALT  106<H>  /  AlkPhos  84  04-22    LIVER FUNCTIONS - ( 22 Apr 2023 01:38 )  Alb: 3.4 g/dL / Pro: 5.8 g/dL / ALK PHOS: 84 U/L / ALT: 106 U/L / AST: 93 U/L / GGT: x           ABG - ( 22 Apr 2023 01:43 )  pH, Arterial: 7.36  pH, Blood: x     /  pCO2: 39    /  pO2: 139   / HCO3: 22    / Base Excess: -3.1  /  SaO2: 99.3          ASSESSMENT:   Bilateral cerebellar hemispheric strokes  POD 1 s/p SOC for foramen magnum decompression and R parietooccipital EVD  Bilateral carotid terminus aneurysms  R. V4 occlusion  History of peripheral neuropathy and asthma      PLAN:  NEURO: Q1h neurochecks  S/P OR for SOC + EVD placement  EVD at [id number], ICPs, CPP, output.   Post op CT head reviewed. CT 4/22:   SG ELENA- monitor output  Holding ASA/plavix- see heme. Continue Statin  Sedation: None. Analgesia: Fentanyl 25mcg Q4 prn for vent synchrony, analgesia  Stroke core measures, stroke neurology consult  Possible DSA next week  Activity: Bedrest    PULM: Full vent support  CXR, ABG  Albuterol prn  VAP bundle  CPAP as tolerated    CARDIAC:   SBP goal 100-140  TTE w/ bubble, trop/baseline EKG    GI: NPO, will need enteric access  PPI while intubated  Bowel regimen     /RENAL:  S/P 3% @60cc/h (for goal Na 145-140  Monitor BMPs post op and Q6   Garsia catheter for strict Is/Os  Serum osm    HEME: S/P platelets and DDAVP for ASA/plavix reversal  Maintain Hb > 7.0, PLT > 100,000  Holding chemoppx as fresh post op  SCDs  LE dopplers    ID:  Monitor for infectious s/s, fever curve, leukocytosis  Post op Ancef    ENDO:   Glu goal 140-180mg/dL  ISS   Check A1c, LDL, TSH    SOCIAL/FAMILY:  [] awaiting [x] updated at bedside [] family meeting    CODE STATUS:  [x] Full Code [] DNR [] DNI [] Palliative/Comfort Care    DISPOSITION:  [x] ICU [] Stroke Unit [] Floor [] EMU [] RCU [] PC    Time spent: 45 critical care minutes     NSCU ATTENDING -- ADDITIONAL PROGRESS NOTE    Nighttime rounds were performed -- please refer to earlier Progress Note for HPI details.    58 y/o F with history of asthma and peripheral neuropathy transferred to [facility] NSICU from [facility] for management of malignant posterior fossa stroke.  The patient developed symptoms at approximately 0830 on 4/20: dysconjugate gaze, ataxia, facial droop, dysarthria.  She did not present to [facility] until later on in the afternoon during which she was not a candidate for TNK.  CTP showing bilateral cerebellar perfusion mismatch, CTA showing R. V4 occlusion and large bilateral cavernous/terminal ICA aneurysms.  Patient was admitted to the ICU and monitored.  On 4/21 the patient became more lethargic, at which point a repeat CTH was performed, showing bilateral cerebellar strokes with lateral compression of the fourth ventricle on the right.  Patient accepted to [facility] by Dr. Valadez, at which point he was given 250cc 3% NaCl, started on 50cc/h, given 20mcg DDAVP and transferred as tier 1.     Upon arrival to [facility], NIHSS = 12, but mental status slowly declined from lethargy to near obtundation.  Repeat CTH showing stable mass effect and hydrocephalus. Patient taken emergently for SOC depression with EVD placement.    ICU Vital Signs Last 24 Hrs  T(C): 37.6 (22 Apr 2023 17:25), Max: 37.6 (22 Apr 2023 17:25)  T(F): 99.6 (22 Apr 2023 17:25), Max: 99.6 (22 Apr 2023 17:25)  HR: 69 (22 Apr 2023 17:00) (62 - 87)  BP: 141/65 (22 Apr 2023 17:00) (116/55 - 153/68)  BP(mean): 93 (22 Apr 2023 17:00) (79 - 109)  ABP: 113/60 (22 Apr 2023 09:00) (113/60 - 161/81)  ABP(mean): 78 (22 Apr 2023 09:00) (78 - 113)  RR: 14 (22 Apr 2023 17:00) (12 - 18)  SpO2: 97% (22 Apr 2023 17:00) (96% - 100%)      04-21-23 @ 07:01  -  04-22-23 @ 07:00  --------------------------------------------------------  IN: 685.8 mL / OUT: 1579 mL / NET: -893.2 mL    04-22-23 @ 07:01  -  04-22-23 @ 18:27  --------------------------------------------------------  IN: 1457.5 mL / OUT: 970 mL / NET: 487.5 mL      Mode: CPAP with PS, FiO2: 30, PEEP: 5, PS: 10, MAP: 8, PIP: 15    EXAM:   MS: Patient opens eyes to voice/ commands  CN: Pupils 3mm and brisk, tracks, grimaces  Motor: RUE 0/5, LUE AG, LLE proximally AG, distally 5/5  RLE: distally AG   PF/DF 5/5  Chest: ETT, clear, no wheeze  Heart regular rate/rhythm, present S1/S2, no murmurs or rubs  Abdomen: Soft and nontender   Extremities: No edema      MEDICATIONS:   acetaminophen     Tablet .. 650 milliGRAM(s) Oral every 6 hours PRN  albuterol/ipratropium for Nebulization 3 milliLiter(s) Nebulizer every 6 hours PRN  atorvastatin 80 milliGRAM(s) Oral at bedtime  ceFAZolin   IVPB 2000 milliGRAM(s) IV Intermittent every 8 hours  chlorhexidine 0.12% Liquid 15 milliLiter(s) Oral Mucosa every 12 hours  dextrose 50% Injectable 25 Gram(s) IV Push once  fentaNYL    Injectable 25 MICROGram(s) IV Push every 4 hours PRN  glucagon  Injectable 1 milliGRAM(s) IntraMuscular once  niCARdipine Infusion 5 mG/Hr (25 mL/Hr) IV Continuous <Continuous>  ondansetron Injectable 4 milliGRAM(s) IV Push every 6 hours PRN  pantoprazole  Injectable 40 milliGRAM(s) IV Push daily  polyethylene glycol 3350 17 Gram(s) Oral daily  senna 2 Tablet(s) Oral at bedtime  sodium chloride 3%. 500 milliLiter(s) (60 mL/Hr) IV Continuous <Continuous>      LABS:                       10.8   9.68  )-----------( 293      ( 22 Apr 2023 05:30 )             31.9     04-22    141  |  111<H>  |  11  ----------------------------<  168<H>  4.0   |  22  |  0.42<L>    Ca    8.3<L>      22 Apr 2023 05:30  Phos  3.1     04-22  Mg     2.3     04-22    TPro  5.8<L>  /  Alb  3.4  /  TBili  0.5  /  DBili  x   /  AST  93<H>  /  ALT  106<H>  /  AlkPhos  84  04-22    LIVER FUNCTIONS - ( 22 Apr 2023 01:38 )  Alb: 3.4 g/dL / Pro: 5.8 g/dL / ALK PHOS: 84 U/L / ALT: 106 U/L / AST: 93 U/L / GGT: x           ABG - ( 22 Apr 2023 01:43 )  pH, Arterial: 7.36  pH, Blood: x     /  pCO2: 39    /  pO2: 139   / HCO3: 22    / Base Excess: -3.1  /  SaO2: 99.3          ASSESSMENT:   Bilateral cerebellar hemispheric strokes  POD 1 s/p SOC for foramen magnum decompression and R parietooccipital EVD  Bilateral carotid terminus aneurysms  R. V4 occlusion  History of peripheral neuropathy and asthma      PLAN:  NEURO: Q1h neurochecks  S/P OR for SOC + EVD placement  EVD at [id number], ICPs, CPP, output.   Post op CT head reviewed. CT 4/22:   SG ELENA- monitor output  Holding ASA/plavix- see heme. Continue Statin  Sedation: None. Analgesia: Fentanyl 25mcg Q4 prn for vent synchrony, analgesia  Stroke core measures, stroke neurology consult  Possible DSA next week  Activity: Bedrest    PULM: Full vent support  CXR, ABG  Albuterol prn  VAP bundle  CPAP as tolerated    CARDIAC:   SBP goal 100-140  TTE w/ bubble, trop/baseline EKG    GI: NPO, will need enteric access  PPI while intubated  Bowel regimen     /RENAL:  S/P 3% @60cc/h (for goal Na 145-140  Monitor BMPs post op and Q6   Garsia catheter for strict Is/Os  Serum osm    HEME: S/P platelets and DDAVP for ASA/plavix reversal  Maintain Hb > 7.0, PLT > 100,000  Holding chemoppx as fresh post op  SCDs  LE dopplers    ID:  Monitor for infectious s/s, fever curve, leukocytosis  Post op Ancef    ENDO:   Glu goal 140-180mg/dL  ISS   Check A1c, LDL, TSH    SOCIAL/FAMILY:  [] awaiting [x] updated at bedside [] family meeting    CODE STATUS:  [x] Full Code [] DNR [] DNI [] Palliative/Comfort Care    DISPOSITION:  [x] ICU [] Stroke Unit [] Floor [] EMU [] RCU [] PC    Time spent: 45 critical care minutes

## 2023-04-22 NOTE — PROVIDER CONTACT NOTE (OTHER) - ASSESSMENT
Pt responds to voice, following commands on b/l LEs and LUE. T 101.1 HR 83. /59 RR 12 O2Sat 100% on 50% FiO2

## 2023-04-22 NOTE — CONSULT NOTE ADULT - SUBJECTIVE AND OBJECTIVE BOX
**STROKE CODE CONSULT NOTE**    Last known well time/Time of onset of symptoms:     HPI: 57 year old female w/ past medical hx of asthma, CAD (not on meds), peripheral neuropathy, total abdominal hysterectomy, cholecystectomy, presented to [facility] on 4/20 with right weakness and right facial numbness that she noted after getting dressed in the morning. History was obtained from chart review as patient is intubated and unable to contribute at this time. She had multiple episodes of vomiting and a syncopal event which she regained consciousness after a few minutes. In the afternoon she was noted to have slurred speech and right sided weakness and was BIBEMS to [facility].  Stroke code was called in the ED. CTH negative for hemorrhage, showed right PICA distribution acute infarct. CTA showed large saccular aneurysms of b/l carotids, right vertebral occlusion at V3, V4 with likely reversal of flow from the basilar, right PICA faintly seen. CTP showed bilateral cerebellar perfusion mismatch. Out of the window for Tenecteplase and not deemed a candidate for mechanical thrombectomy. Repeat CT showed progression of acute infarct in right cerebellum, extending into the left superior cerebellar hemisphere with mass effect on the fourth ventricle and likely obstruction with new third and lateral ventricular dilatation. Given hypertonics and 20mcg DDAVP. She was transferred to [facility] for suboccipital crani watch, on arrival she was noted to be awake but lethargic with NIHSS of 12. CT scan was repeated on arrival to [facility] that showed acute infarcts R>L cerebellar hemispheres and the cerebellar vermis with continued mass effect on the fourth ventricle and effacement of the basal cisterns and dilation of the ventricular system.  Mental status declined from lethargy to need obtundation and the patient was taken for an emergent SOC crani for decompression and had an R parietooccipital EVD placed. She remained intubated overnight on propofol, sedation weaned this AM with plan for CPAP trials and possible extubation.      T(C): 36.4 (04-22-23 @ 05:44), Max: 37.4 (04-21-23 @ 15:50)  HR: 67 (04-22-23 @ 08:46) (62 - 81)  BP: 143/65 (04-22-23 @ 08:00) (116/55 - 168/101)  RR: 14 (04-22-23 @ 08:00) (14 - 19)  SpO2: 100% (04-22-23 @ 08:46) (96% - 100%)    PAST MEDICAL & SURGICAL HISTORY:  Asthma      CAD (coronary artery disease)      Peripheral neuropathy      S/P total abdominal hysterectomy      S/P cholecystectomy      S/P right knee surgery          FAMILY HISTORY:  No pertinent family history in first degree relatives        SOCIAL HISTORY:   Patient lives with self at home.   Smoking status: Unable to assess  Drinking: Unable to assess  Drug Use: Unable to assess    ROS: Unable to assess d/t patient's condition    MEDICATIONS  (STANDING):  atorvastatin 80 milliGRAM(s) Oral at bedtime  ceFAZolin   IVPB 2000 milliGRAM(s) IV Intermittent every 8 hours  chlorhexidine 0.12% Liquid 15 milliLiter(s) Oral Mucosa every 12 hours  dextrose 5%. 1000 milliLiter(s) (50 mL/Hr) IV Continuous <Continuous>  dextrose 5%. 1000 milliLiter(s) (100 mL/Hr) IV Continuous <Continuous>  dextrose 50% Injectable 25 Gram(s) IV Push once  dextrose 50% Injectable 12.5 Gram(s) IV Push once  dextrose 50% Injectable 25 Gram(s) IV Push once  glucagon  Injectable 1 milliGRAM(s) IntraMuscular once  insulin lispro (ADMELOG) corrective regimen sliding scale   SubCutaneous every 6 hours  niCARdipine Infusion 5 mG/Hr (25 mL/Hr) IV Continuous <Continuous>  pantoprazole  Injectable 40 milliGRAM(s) IV Push daily  polyethylene glycol 3350 17 Gram(s) Oral daily  senna 2 Tablet(s) Oral at bedtime  sodium chloride 0.9%. 1000 milliLiter(s) (75 mL/Hr) IV Continuous <Continuous>    MEDICATIONS  (PRN):  acetaminophen     Tablet .. 650 milliGRAM(s) Oral every 6 hours PRN Temp greater or equal to 38C (100.4F), Mild Pain (1 - 3)  albuterol/ipratropium for Nebulization 3 milliLiter(s) Nebulizer every 6 hours PRN Shortness of Breath and/or Wheezing  dextrose Oral Gel 15 Gram(s) Oral once PRN Blood Glucose LESS THAN 70 milliGRAM(s)/deciliter  fentaNYL    Injectable 25 MICROGram(s) IV Push every 4 hours PRN Severe Pain (7 - 10)  ondansetron Injectable 4 milliGRAM(s) IV Push every 6 hours PRN Nausea and/or Vomiting    Allergies    No Known Allergies    Intolerances      Vital Signs Last 24 Hrs  T(C): 36.4 (22 Apr 2023 05:44), Max: 37.4 (21 Apr 2023 15:50)  T(F): 97.5 (22 Apr 2023 05:44), Max: 99.4 (21 Apr 2023 15:50)  HR: 67 (22 Apr 2023 08:46) (62 - 86)  BP: 143/65 (22 Apr 2023 08:00) (116/55 - 168/101)  BP(mean): 94 (22 Apr 2023 08:00) (79 - 127)  RR: 14 (22 Apr 2023 08:00) (12 - 19)  SpO2: 100% (22 Apr 2023 08:46) (94% - 100%)    Parameters below as of 22 Apr 2023 08:00  Patient On (Oxygen Delivery Method): ventilator    O2 Concentration (%): 50    Physical exam:  Constitutional: Intubated, appears comfortable  Eyes: Anicteric sclerae, moist conjunctivae, see below for CNs  Neck: trachea midline  Cardiovascular: Regular rate and rhythm on monitor  Pulmonary: No use of accessory muscles, tolerating vent settings  GI: Abdomen soft  Extremities: no edema    Neurologic:  -Mental status: Opens eyes to voice, able to track. Follows simple commands.  -Cranial nerves:   II: Blink to threat grossly intact   III, IV, VI: Pupils equally round and reactive to light, 2mm and brisk  V:  Corneal reflex intact  VII: Face appears symmetric  IX, X: Cough and gag intact  Motor/Sensation: LUE at least 3/5, able to hold antigravity for >10 seconds, RUE 0/5. Withdraws to tactile stimuli in b/l lower extremities.          NIHSS: 17    Fingerstick Blood Glucose: CAPILLARY BLOOD GLUCOSE  165 (20 Apr 2023 18:57)      POCT Blood Glucose.: 155 mg/dL (22 Apr 2023 06:49)    LABS:                        10.8   9.68  )-----------( 293      ( 22 Apr 2023 05:30 )             31.9     04-22    141  |  111<H>  |  11  ----------------------------<  168<H>  4.0   |  22  |  0.42<L>    Ca    8.3<L>      22 Apr 2023 05:30  Phos  3.1     04-22  Mg     2.3     04-22    TPro  5.8<L>  /  Alb  3.4  /  TBili  0.5  /  DBili  x   /  AST  93<H>  /  ALT  106<H>  /  AlkPhos  84  04-22    PT/INR - ( 22 Apr 2023 02:37 )   PT: 13.9 sec;   INR: 1.17          PTT - ( 22 Apr 2023 02:37 )  PTT:20.7 sec  CARDIAC MARKERS ( 22 Apr 2023 01:38 )  x     / x     / 126 U/L / x     / x      CARDIAC MARKERS ( 21 Apr 2023 17:03 )  x     / <0.01 ng/mL / x     / x     / x              RADIOLOGY & ADDITIONAL STUDIES:    < from: CT Brain Stroke Protocol (04.20.23 @ 18:03) >  IMPRESSION:    1. No intracranial hemorrhage is appreciated.    2. No intracranial mass lesion is appreciated.    3.  Right PICA distribution acute infarction    < end of copied text >    < from: CT Angio Brain Stroke Protocol  w/ IV Cont (04.20.23 @ 18:18) >  IMPRESSION:    1.   Right carotid system:    No hemodynamically significant stenosis.    2.   Left carotid system:     No hemodynamically significant stenosis.    3.   Vertebral circulation:    Patent.    4.  Anterior intracranial circulation:     Saccular aneurysms of each   internal carotid artery, approximately 0.8 cm on the right and 1.2 x 0.9   cm on the left. Possible tiny third saccular aneurysm on the right    5.  Posterior intracranialcirculation:    Right vertebral arterial   occlusion at its dural crossing junction V3 Atlantic and V4 intracranial   segments with likely reversal of flow in its intracranial segment from   the basilar. Right PICA faintly seen    6. Brain perfusion:Acute infarction of the right posterior medial   cerebellum within the right pica distribution.  Near equal volume   abnormality in inflow and time to maximum perfusion imaging. Additional   regions of delay of perfusion possible ischemia within the left posterior   cerebral arterial distribution    < from: CT Head No Cont (04.21.23 @ 12:09) >  IMPRESSION:    1. Brain: Progression of acute infarction within the right cerebellar   hemisphere, also extending into the left superior cerebellar hemisphere.   New mass effect on the fourth ventricle causing stenosis versus occlusion   with new third and lateral ventricular dilatation indicating ventricular   obstruction at the level of the fourth ventricle. New upward and downward   herniation of cerebellar parenchyma    2.  Right carotid system:    No hemodynamically significant stenosis.    3.   Left carotid system:     No hemodynamically significant stenosis.    4.   Vertebral circulation:    Patent.    5.  Anterior intracranial circulation:     Intact. Bilateral internal   carotid saccular aneurysms. These findings are unchanged.    6.  Posterior intracranial circulation:    Improved flow within the right   vertebral artery since 4/20/2023. New focal stenosis mid left vertebral   artery, etiology uncertain, consider vasospasm and extrinsic compression   in addition to new embolic disease.    7.  Brain perfusion: Perfusion images demonstrate normalization of the   perfusion abnormality in the right cerebellar hemisphere present on   4/20/2023 despite evidence of progression of acute infarction.  Areas of   apparent ischemia within the posterior fossa have progressed in extent,   also again involving the left posterior cerebral arterial distribution    < end of copied text >    < end of copied text >    < from: CT Head No Cont (04.21.23 @ 17:33) >  IMPRESSION: Since 4/21/2023 at 11:57 AM, there is again an acute infarct   involving the right greater than left cerebellar hemispheres and the   cerebellar vermis. There is continued mass effect on the fourth ventricle   and cerebral aqueduct with effacement of the basal cisterns and   persistent dilation of the ventricular system.    < end of copied text >                  **STROKE CONSULT NOTE**    Last known well time/Time of onset of symptoms:     HPI: 57 year old female w/ past medical hx of asthma, CAD (not on meds), peripheral neuropathy, total abdominal hysterectomy, cholecystectomy, presented to [facility] on 4/20 with right weakness and right facial numbness that she noted after getting dressed in the morning. History was obtained from chart review as patient is intubated and unable to contribute at this time. She had multiple episodes of vomiting and a syncopal event which she regained consciousness after a few minutes. In the afternoon she was noted to have slurred speech and right sided weakness and was BIBEMS to [facility].  Stroke code was called in the ED. CTH negative for hemorrhage, showed right PICA distribution acute infarct. CTA showed large saccular aneurysms of b/l carotids, right vertebral occlusion at V3, V4 with likely reversal of flow from the basilar, right PICA faintly seen. CTP showed bilateral cerebellar perfusion mismatch. Out of the window for Tenecteplase and not deemed a candidate for mechanical thrombectomy. Repeat CT showed progression of acute infarct in right cerebellum, extending into the left superior cerebellar hemisphere with mass effect on the fourth ventricle and likely obstruction with new third and lateral ventricular dilatation. Given hypertonics and 20mcg DDAVP. She was transferred to [facility] for suboccipital crani watch, on arrival she was noted to be awake but lethargic with NIHSS of 12. CT scan was repeated on arrival to [facility] that showed acute infarcts R>L cerebellar hemispheres and the cerebellar vermis with continued mass effect on the fourth ventricle and effacement of the basal cisterns and dilation of the ventricular system.  Mental status declined from lethargy to need obtundation and the patient was taken for an emergent SOC crani for decompression and had an R parietooccipital EVD placed. She remained intubated overnight on propofol, sedation weaned this AM with plan for CPAP trials and possible extubation.      T(C): 36.4 (04-22-23 @ 05:44), Max: 37.4 (04-21-23 @ 15:50)  HR: 67 (04-22-23 @ 08:46) (62 - 81)  BP: 143/65 (04-22-23 @ 08:00) (116/55 - 168/101)  RR: 14 (04-22-23 @ 08:00) (14 - 19)  SpO2: 100% (04-22-23 @ 08:46) (96% - 100%)    PAST MEDICAL & SURGICAL HISTORY:  Asthma      CAD (coronary artery disease)      Peripheral neuropathy      S/P total abdominal hysterectomy      S/P cholecystectomy      S/P right knee surgery          FAMILY HISTORY:  No pertinent family history in first degree relatives        SOCIAL HISTORY:   Patient lives with self at home.   Smoking status: Unable to assess  Drinking: Unable to assess  Drug Use: Unable to assess    ROS: Unable to assess d/t patient's condition    MEDICATIONS  (STANDING):  atorvastatin 80 milliGRAM(s) Oral at bedtime  ceFAZolin   IVPB 2000 milliGRAM(s) IV Intermittent every 8 hours  chlorhexidine 0.12% Liquid 15 milliLiter(s) Oral Mucosa every 12 hours  dextrose 5%. 1000 milliLiter(s) (50 mL/Hr) IV Continuous <Continuous>  dextrose 5%. 1000 milliLiter(s) (100 mL/Hr) IV Continuous <Continuous>  dextrose 50% Injectable 25 Gram(s) IV Push once  dextrose 50% Injectable 12.5 Gram(s) IV Push once  dextrose 50% Injectable 25 Gram(s) IV Push once  glucagon  Injectable 1 milliGRAM(s) IntraMuscular once  insulin lispro (ADMELOG) corrective regimen sliding scale   SubCutaneous every 6 hours  niCARdipine Infusion 5 mG/Hr (25 mL/Hr) IV Continuous <Continuous>  pantoprazole  Injectable 40 milliGRAM(s) IV Push daily  polyethylene glycol 3350 17 Gram(s) Oral daily  senna 2 Tablet(s) Oral at bedtime  sodium chloride 0.9%. 1000 milliLiter(s) (75 mL/Hr) IV Continuous <Continuous>    MEDICATIONS  (PRN):  acetaminophen     Tablet .. 650 milliGRAM(s) Oral every 6 hours PRN Temp greater or equal to 38C (100.4F), Mild Pain (1 - 3)  albuterol/ipratropium for Nebulization 3 milliLiter(s) Nebulizer every 6 hours PRN Shortness of Breath and/or Wheezing  dextrose Oral Gel 15 Gram(s) Oral once PRN Blood Glucose LESS THAN 70 milliGRAM(s)/deciliter  fentaNYL    Injectable 25 MICROGram(s) IV Push every 4 hours PRN Severe Pain (7 - 10)  ondansetron Injectable 4 milliGRAM(s) IV Push every 6 hours PRN Nausea and/or Vomiting    Allergies    No Known Allergies    Intolerances      Vital Signs Last 24 Hrs  T(C): 36.4 (22 Apr 2023 05:44), Max: 37.4 (21 Apr 2023 15:50)  T(F): 97.5 (22 Apr 2023 05:44), Max: 99.4 (21 Apr 2023 15:50)  HR: 67 (22 Apr 2023 08:46) (62 - 86)  BP: 143/65 (22 Apr 2023 08:00) (116/55 - 168/101)  BP(mean): 94 (22 Apr 2023 08:00) (79 - 127)  RR: 14 (22 Apr 2023 08:00) (12 - 19)  SpO2: 100% (22 Apr 2023 08:46) (94% - 100%)    Parameters below as of 22 Apr 2023 08:00  Patient On (Oxygen Delivery Method): ventilator    O2 Concentration (%): 50    Physical exam:  Constitutional: Intubated, appears comfortable  Eyes: Anicteric sclerae, moist conjunctivae, see below for CNs  Neck: trachea midline  Cardiovascular: Regular rate and rhythm on monitor  Pulmonary: No use of accessory muscles, tolerating vent settings  GI: Abdomen soft  Extremities: no edema    Neurologic:  -Mental status: Opens eyes to voice, able to track. Follows simple commands.  -Cranial nerves:   II: Blink to threat grossly intact   III, IV, VI: Pupils equally round and reactive to light, 2mm and brisk  V:  Corneal reflex intact  VII: Face appears symmetric  IX, X: Cough and gag intact  Motor/Sensation: LUE at least 3/5, able to hold antigravity for >10 seconds, RUE 0/5. Does not react to pain. Withdraws to tactile stimuli in b/l lower extremities.          NIHSS: 17    Fingerstick Blood Glucose: CAPILLARY BLOOD GLUCOSE  165 (20 Apr 2023 18:57)      POCT Blood Glucose.: 155 mg/dL (22 Apr 2023 06:49)    LABS:                        10.8   9.68  )-----------( 293      ( 22 Apr 2023 05:30 )             31.9     04-22    141  |  111<H>  |  11  ----------------------------<  168<H>  4.0   |  22  |  0.42<L>    Ca    8.3<L>      22 Apr 2023 05:30  Phos  3.1     04-22  Mg     2.3     04-22    TPro  5.8<L>  /  Alb  3.4  /  TBili  0.5  /  DBili  x   /  AST  93<H>  /  ALT  106<H>  /  AlkPhos  84  04-22    PT/INR - ( 22 Apr 2023 02:37 )   PT: 13.9 sec;   INR: 1.17          PTT - ( 22 Apr 2023 02:37 )  PTT:20.7 sec  CARDIAC MARKERS ( 22 Apr 2023 01:38 )  x     / x     / 126 U/L / x     / x      CARDIAC MARKERS ( 21 Apr 2023 17:03 )  x     / <0.01 ng/mL / x     / x     / x              RADIOLOGY & ADDITIONAL STUDIES:    < from: CT Brain Stroke Protocol (04.20.23 @ 18:03) >  IMPRESSION:    1. No intracranial hemorrhage is appreciated.    2. No intracranial mass lesion is appreciated.    3.  Right PICA distribution acute infarction    < end of copied text >    < from: CT Angio Brain Stroke Protocol  w/ IV Cont (04.20.23 @ 18:18) >  IMPRESSION:    1.   Right carotid system:    No hemodynamically significant stenosis.    2.   Left carotid system:     No hemodynamically significant stenosis.    3.   Vertebral circulation:    Patent.    4.  Anterior intracranial circulation:     Saccular aneurysms of each   internal carotid artery, approximately 0.8 cm on the right and 1.2 x 0.9   cm on the left. Possible tiny third saccular aneurysm on the right    5.  Posterior intracranialcirculation:    Right vertebral arterial   occlusion at its dural crossing junction V3 Atlantic and V4 intracranial   segments with likely reversal of flow in its intracranial segment from   the basilar. Right PICA faintly seen    6. Brain perfusion:Acute infarction of the right posterior medial   cerebellum within the right pica distribution.  Near equal volume   abnormality in inflow and time to maximum perfusion imaging. Additional   regions of delay of perfusion possible ischemia within the left posterior   cerebral arterial distribution    < from: CT Head No Cont (04.21.23 @ 12:09) >  IMPRESSION:    1. Brain: Progression of acute infarction within the right cerebellar   hemisphere, also extending into the left superior cerebellar hemisphere.   New mass effect on the fourth ventricle causing stenosis versus occlusion   with new third and lateral ventricular dilatation indicating ventricular   obstruction at the level of the fourth ventricle. New upward and downward   herniation of cerebellar parenchyma    2.  Right carotid system:    No hemodynamically significant stenosis.    3.   Left carotid system:     No hemodynamically significant stenosis.    4.   Vertebral circulation:    Patent.    5.  Anterior intracranial circulation:     Intact. Bilateral internal   carotid saccular aneurysms. These findings are unchanged.    6.  Posterior intracranial circulation:    Improved flow within the right   vertebral artery since 4/20/2023. New focal stenosis mid left vertebral   artery, etiology uncertain, consider vasospasm and extrinsic compression   in addition to new embolic disease.    7.  Brain perfusion: Perfusion images demonstrate normalization of the   perfusion abnormality in the right cerebellar hemisphere present on   4/20/2023 despite evidence of progression of acute infarction.  Areas of   apparent ischemia within the posterior fossa have progressed in extent,   also again involving the left posterior cerebral arterial distribution    < end of copied text >    < end of copied text >    < from: CT Head No Cont (04.21.23 @ 17:33) >  IMPRESSION: Since 4/21/2023 at 11:57 AM, there is again an acute infarct   involving the right greater than left cerebellar hemispheres and the   cerebellar vermis. There is continued mass effect on the fourth ventricle   and cerebral aqueduct with effacement of the basal cisterns and   persistent dilation of the ventricular system.    < end of copied text >                  **STROKE CONSULT NOTE**    Last known well time/Time of onset of symptoms:     HPI: 57 year old female w/ past medical hx of asthma, CAD (not on meds), peripheral neuropathy, total abdominal hysterectomy, cholecystectomy, presented to [facility] on 4/20 with right weakness and right facial numbness that she noted after getting dressed in the morning. History was obtained from chart review as patient is intubated and unable to contribute at this time. She had multiple episodes of vomiting and a syncopal event which she regained consciousness after a few minutes. In the afternoon she was noted to have slurred speech and right sided weakness and was BIBEMS to [facility].  Stroke code was called in the ED. CTH negative for hemorrhage, showed right PICA distribution acute infarct. CTA showed large saccular aneurysms of b/l carotids, right vertebral occlusion at V3, V4 with likely reversal of flow from the basilar, right PICA faintly seen. CTP showed bilateral cerebellar perfusion mismatch. Out of the window for Tenecteplase and not deemed a candidate for mechanical thrombectomy. Repeat CT showed progression of acute infarct in right cerebellum, extending into the left superior cerebellar hemisphere with mass effect on the fourth ventricle and likely obstruction with new third and lateral ventricular dilatation. Given hypertonics and 20mcg DDAVP. She was transferred to [facility] for suboccipital crani watch, on arrival she was noted to be awake but lethargic with NIHSS of 12. CT scan was repeated on arrival to [facility] that showed acute infarcts R>L cerebellar hemispheres and the cerebellar vermis with continued mass effect on the fourth ventricle and effacement of the basal cisterns and dilation of the ventricular system.  Mental status declined from lethargy to need obtundation and the patient was taken for an emergent SOC crani for decompression and had an R parietooccipital EVD placed. She remained intubated overnight on propofol, sedation weaned this AM with plan for CPAP trials and possible extubation.  Pending repeat CTH this AM.     T(C): 36.4 (04-22-23 @ 05:44), Max: 37.4 (04-21-23 @ 15:50)  HR: 67 (04-22-23 @ 08:46) (62 - 81)  BP: 143/65 (04-22-23 @ 08:00) (116/55 - 168/101)  RR: 14 (04-22-23 @ 08:00) (14 - 19)  SpO2: 100% (04-22-23 @ 08:46) (96% - 100%)    PAST MEDICAL & SURGICAL HISTORY:  Asthma      CAD (coronary artery disease)      Peripheral neuropathy      S/P total abdominal hysterectomy      S/P cholecystectomy      S/P right knee surgery          FAMILY HISTORY:  No pertinent family history in first degree relatives        SOCIAL HISTORY:   Patient lives with self at home.   Smoking status: Unable to assess  Drinking: Unable to assess  Drug Use: Unable to assess    ROS: Unable to assess d/t patient's condition    MEDICATIONS  (STANDING):  atorvastatin 80 milliGRAM(s) Oral at bedtime  ceFAZolin   IVPB 2000 milliGRAM(s) IV Intermittent every 8 hours  chlorhexidine 0.12% Liquid 15 milliLiter(s) Oral Mucosa every 12 hours  dextrose 5%. 1000 milliLiter(s) (50 mL/Hr) IV Continuous <Continuous>  dextrose 5%. 1000 milliLiter(s) (100 mL/Hr) IV Continuous <Continuous>  dextrose 50% Injectable 25 Gram(s) IV Push once  dextrose 50% Injectable 12.5 Gram(s) IV Push once  dextrose 50% Injectable 25 Gram(s) IV Push once  glucagon  Injectable 1 milliGRAM(s) IntraMuscular once  insulin lispro (ADMELOG) corrective regimen sliding scale   SubCutaneous every 6 hours  niCARdipine Infusion 5 mG/Hr (25 mL/Hr) IV Continuous <Continuous>  pantoprazole  Injectable 40 milliGRAM(s) IV Push daily  polyethylene glycol 3350 17 Gram(s) Oral daily  senna 2 Tablet(s) Oral at bedtime  sodium chloride 0.9%. 1000 milliLiter(s) (75 mL/Hr) IV Continuous <Continuous>    MEDICATIONS  (PRN):  acetaminophen     Tablet .. 650 milliGRAM(s) Oral every 6 hours PRN Temp greater or equal to 38C (100.4F), Mild Pain (1 - 3)  albuterol/ipratropium for Nebulization 3 milliLiter(s) Nebulizer every 6 hours PRN Shortness of Breath and/or Wheezing  dextrose Oral Gel 15 Gram(s) Oral once PRN Blood Glucose LESS THAN 70 milliGRAM(s)/deciliter  fentaNYL    Injectable 25 MICROGram(s) IV Push every 4 hours PRN Severe Pain (7 - 10)  ondansetron Injectable 4 milliGRAM(s) IV Push every 6 hours PRN Nausea and/or Vomiting    Allergies    No Known Allergies    Intolerances      Vital Signs Last 24 Hrs  T(C): 36.4 (22 Apr 2023 05:44), Max: 37.4 (21 Apr 2023 15:50)  T(F): 97.5 (22 Apr 2023 05:44), Max: 99.4 (21 Apr 2023 15:50)  HR: 67 (22 Apr 2023 08:46) (62 - 86)  BP: 143/65 (22 Apr 2023 08:00) (116/55 - 168/101)  BP(mean): 94 (22 Apr 2023 08:00) (79 - 127)  RR: 14 (22 Apr 2023 08:00) (12 - 19)  SpO2: 100% (22 Apr 2023 08:46) (94% - 100%)    Parameters below as of 22 Apr 2023 08:00  Patient On (Oxygen Delivery Method): ventilator    O2 Concentration (%): 50    Physical exam:  Constitutional: Intubated, appears comfortable  Eyes: Anicteric sclerae, moist conjunctivae, see below for CNs  Neck: trachea midline  Cardiovascular: Regular rate and rhythm on monitor  Pulmonary: No use of accessory muscles, tolerating vent settings  GI: Abdomen soft  Extremities: no edema    Neurologic:  -Mental status: Opens eyes to voice, able to track. Follows simple commands.  -Cranial nerves:   II: Blink to threat grossly intact   III, IV, VI: Pupils equally round and reactive to light, 2mm and brisk  V:  Corneal reflex intact  VII: Face appears symmetric  IX, X: Cough and gag intact  Motor/Sensation: LUE at least 3/5, able to hold antigravity for >10 seconds, RUE 0/5. Does not react to pain. Withdraws to tactile stimuli in b/l lower extremities.          NIHSS: 17    Fingerstick Blood Glucose: CAPILLARY BLOOD GLUCOSE  165 (20 Apr 2023 18:57)      POCT Blood Glucose.: 155 mg/dL (22 Apr 2023 06:49)    LABS:                        10.8   9.68  )-----------( 293      ( 22 Apr 2023 05:30 )             31.9     04-22    141  |  111<H>  |  11  ----------------------------<  168<H>  4.0   |  22  |  0.42<L>    Ca    8.3<L>      22 Apr 2023 05:30  Phos  3.1     04-22  Mg     2.3     04-22    TPro  5.8<L>  /  Alb  3.4  /  TBili  0.5  /  DBili  x   /  AST  93<H>  /  ALT  106<H>  /  AlkPhos  84  04-22    PT/INR - ( 22 Apr 2023 02:37 )   PT: 13.9 sec;   INR: 1.17          PTT - ( 22 Apr 2023 02:37 )  PTT:20.7 sec  CARDIAC MARKERS ( 22 Apr 2023 01:38 )  x     / x     / 126 U/L / x     / x      CARDIAC MARKERS ( 21 Apr 2023 17:03 )  x     / <0.01 ng/mL / x     / x     / x              RADIOLOGY & ADDITIONAL STUDIES:    < from: CT Brain Stroke Protocol (04.20.23 @ 18:03) >  IMPRESSION:    1. No intracranial hemorrhage is appreciated.    2. No intracranial mass lesion is appreciated.    3.  Right PICA distribution acute infarction    < end of copied text >    < from: CT Angio Brain Stroke Protocol  w/ IV Cont (04.20.23 @ 18:18) >  IMPRESSION:    1.   Right carotid system:    No hemodynamically significant stenosis.    2.   Left carotid system:     No hemodynamically significant stenosis.    3.   Vertebral circulation:    Patent.    4.  Anterior intracranial circulation:     Saccular aneurysms of each   internal carotid artery, approximately 0.8 cm on the right and 1.2 x 0.9   cm on the left. Possible tiny third saccular aneurysm on the right    5.  Posterior intracranialcirculation:    Right vertebral arterial   occlusion at its dural crossing junction V3 Atlantic and V4 intracranial   segments with likely reversal of flow in its intracranial segment from   the basilar. Right PICA faintly seen    6. Brain perfusion:Acute infarction of the right posterior medial   cerebellum within the right pica distribution.  Near equal volume   abnormality in inflow and time to maximum perfusion imaging. Additional   regions of delay of perfusion possible ischemia within the left posterior   cerebral arterial distribution    < from: CT Head No Cont (04.21.23 @ 12:09) >  IMPRESSION:    1. Brain: Progression of acute infarction within the right cerebellar   hemisphere, also extending into the left superior cerebellar hemisphere.   New mass effect on the fourth ventricle causing stenosis versus occlusion   with new third and lateral ventricular dilatation indicating ventricular   obstruction at the level of the fourth ventricle. New upward and downward   herniation of cerebellar parenchyma    2.  Right carotid system:    No hemodynamically significant stenosis.    3.   Left carotid system:     No hemodynamically significant stenosis.    4.   Vertebral circulation:    Patent.    5.  Anterior intracranial circulation:     Intact. Bilateral internal   carotid saccular aneurysms. These findings are unchanged.    6.  Posterior intracranial circulation:    Improved flow within the right   vertebral artery since 4/20/2023. New focal stenosis mid left vertebral   artery, etiology uncertain, consider vasospasm and extrinsic compression   in addition to new embolic disease.    7.  Brain perfusion: Perfusion images demonstrate normalization of the   perfusion abnormality in the right cerebellar hemisphere present on   4/20/2023 despite evidence of progression of acute infarction.  Areas of   apparent ischemia within the posterior fossa have progressed in extent,   also again involving the left posterior cerebral arterial distribution    < end of copied text >    < end of copied text >    < from: CT Head No Cont (04.21.23 @ 17:33) >  IMPRESSION: Since 4/21/2023 at 11:57 AM, there is again an acute infarct   involving the right greater than left cerebellar hemispheres and the   cerebellar vermis. There is continued mass effect on the fourth ventricle   and cerebral aqueduct with effacement of the basal cisterns and   persistent dilation of the ventricular system.    < end of copied text >                  **STROKE CONSULT NOTE**    Last known well time/Time of onset of symptoms:     HPI: 57 year old female w/ past medical hx of asthma, CAD (not on meds), peripheral neuropathy, total abdominal hysterectomy, cholecystectomy, presented to [facility] on 4/20 with right weakness and ataxia that was noted by her family in the afternoon. History was obtained from chart review as patient is intubated and unable to contribute at this time. She was last seen normal on 4/20 around 0800 by her family. Later had a fall and then syncopal event which she regained consciousness after a few minutes and went to rest in bed. In the afternoon she was noted by family to have slurred speech and right sided weakness and was BIBEMS to [facility].  Stroke code was called in the ED. CTH negative for hemorrhage, showed right PICA distribution acute infarct. CTA showed large saccular aneurysms of b/l carotids, right vertebral occlusion at V3, V4 with likely reversal of flow from the basilar, right PICA faintly seen. CTP showed bilateral cerebellar perfusion mismatch. Out of the window for Tenecteplase and not deemed a candidate for mechanical thrombectomy. She was started on ASA, Plavix at [facility]. She was found to have fluctuating exam at [facility] and was thought to be more lethargic, CT's were repeated. Repeat CT showed progression of acute infarct in right cerebellum, extending into the left superior cerebellar hemisphere with mass effect on the fourth ventricle and likely obstruction with new third and lateral ventricular dilatation. Given hypertonics and 20mcg DDAVP. She was transferred to [facility] for suboccipital crani watch, on arrival she was noted to be awake but lethargic with NIHSS of 12. CT scan was repeated on arrival to [facility] that showed acute infarcts R>L cerebellar hemispheres and the cerebellar vermis with continued mass effect on the fourth ventricle and effacement of the basal cisterns and dilation of the ventricular system.  Mental status declined from lethargy to need obtundation and the patient was taken for an emergent SOC crani for decompression and had an R parietooccipital EVD placed. Given DDAVP, 3units platelets. She remained intubated overnight on propofol, sedation weaned this AM with plan for CPAP trials and possible extubation.  Pending repeat CTH this AM.     T(C): 36.4 (04-22-23 @ 05:44), Max: 37.4 (04-21-23 @ 15:50)  HR: 67 (04-22-23 @ 08:46) (62 - 81)  BP: 143/65 (04-22-23 @ 08:00) (116/55 - 168/101)  RR: 14 (04-22-23 @ 08:00) (14 - 19)  SpO2: 100% (04-22-23 @ 08:46) (96% - 100%)    PAST MEDICAL & SURGICAL HISTORY:  Asthma      CAD (coronary artery disease)      Peripheral neuropathy      S/P total abdominal hysterectomy      S/P cholecystectomy      S/P right knee surgery          FAMILY HISTORY:  No pertinent family history in first degree relatives        SOCIAL HISTORY:   Patient lives with self at home.   Smoking status: Unable to assess  Drinking: Unable to assess  Drug Use: Unable to assess    ROS: Unable to assess d/t patient's condition    MEDICATIONS  (STANDING):  atorvastatin 80 milliGRAM(s) Oral at bedtime  ceFAZolin   IVPB 2000 milliGRAM(s) IV Intermittent every 8 hours  chlorhexidine 0.12% Liquid 15 milliLiter(s) Oral Mucosa every 12 hours  dextrose 5%. 1000 milliLiter(s) (50 mL/Hr) IV Continuous <Continuous>  dextrose 5%. 1000 milliLiter(s) (100 mL/Hr) IV Continuous <Continuous>  dextrose 50% Injectable 25 Gram(s) IV Push once  dextrose 50% Injectable 12.5 Gram(s) IV Push once  dextrose 50% Injectable 25 Gram(s) IV Push once  glucagon  Injectable 1 milliGRAM(s) IntraMuscular once  insulin lispro (ADMELOG) corrective regimen sliding scale   SubCutaneous every 6 hours  niCARdipine Infusion 5 mG/Hr (25 mL/Hr) IV Continuous <Continuous>  pantoprazole  Injectable 40 milliGRAM(s) IV Push daily  polyethylene glycol 3350 17 Gram(s) Oral daily  senna 2 Tablet(s) Oral at bedtime  sodium chloride 0.9%. 1000 milliLiter(s) (75 mL/Hr) IV Continuous <Continuous>    MEDICATIONS  (PRN):  acetaminophen     Tablet .. 650 milliGRAM(s) Oral every 6 hours PRN Temp greater or equal to 38C (100.4F), Mild Pain (1 - 3)  albuterol/ipratropium for Nebulization 3 milliLiter(s) Nebulizer every 6 hours PRN Shortness of Breath and/or Wheezing  dextrose Oral Gel 15 Gram(s) Oral once PRN Blood Glucose LESS THAN 70 milliGRAM(s)/deciliter  fentaNYL    Injectable 25 MICROGram(s) IV Push every 4 hours PRN Severe Pain (7 - 10)  ondansetron Injectable 4 milliGRAM(s) IV Push every 6 hours PRN Nausea and/or Vomiting    Allergies    No Known Allergies    Intolerances      Vital Signs Last 24 Hrs  T(C): 36.4 (22 Apr 2023 05:44), Max: 37.4 (21 Apr 2023 15:50)  T(F): 97.5 (22 Apr 2023 05:44), Max: 99.4 (21 Apr 2023 15:50)  HR: 67 (22 Apr 2023 08:46) (62 - 86)  BP: 143/65 (22 Apr 2023 08:00) (116/55 - 168/101)  BP(mean): 94 (22 Apr 2023 08:00) (79 - 127)  RR: 14 (22 Apr 2023 08:00) (12 - 19)  SpO2: 100% (22 Apr 2023 08:46) (94% - 100%)    Parameters below as of 22 Apr 2023 08:00  Patient On (Oxygen Delivery Method): ventilator    O2 Concentration (%): 50    Physical exam:  Constitutional: Intubated, appears comfortable  Eyes: Anicteric sclerae, moist conjunctivae, see below for CNs  Neck: trachea midline  Cardiovascular: Regular rate and rhythm on monitor  Pulmonary: No use of accessory muscles, tolerating vent settings  GI: Abdomen soft  Extremities: no edema    Neurologic:  -Mental status: Opens eyes to voice, able to track. Follows simple commands.  -Cranial nerves:   II: Blink to threat grossly intact   III, IV, VI: Pupils equally round and reactive to light, 2mm and brisk  V:  Corneal reflex intact  VII: Face appears symmetric  IX, X: Cough and gag intact  Motor/Sensation: LUE at least 3/5, able to hold antigravity for >10 seconds, RUE 0/5. Does not react to pain. Withdraws to tactile stimuli in b/l lower extremities.          NIHSS: 17    Fingerstick Blood Glucose: CAPILLARY BLOOD GLUCOSE  165 (20 Apr 2023 18:57)      POCT Blood Glucose.: 155 mg/dL (22 Apr 2023 06:49)    LABS:                        10.8   9.68  )-----------( 293      ( 22 Apr 2023 05:30 )             31.9     04-22    141  |  111<H>  |  11  ----------------------------<  168<H>  4.0   |  22  |  0.42<L>    Ca    8.3<L>      22 Apr 2023 05:30  Phos  3.1     04-22  Mg     2.3     04-22    TPro  5.8<L>  /  Alb  3.4  /  TBili  0.5  /  DBili  x   /  AST  93<H>  /  ALT  106<H>  /  AlkPhos  84  04-22    PT/INR - ( 22 Apr 2023 02:37 )   PT: 13.9 sec;   INR: 1.17          PTT - ( 22 Apr 2023 02:37 )  PTT:20.7 sec  CARDIAC MARKERS ( 22 Apr 2023 01:38 )  x     / x     / 126 U/L / x     / x      CARDIAC MARKERS ( 21 Apr 2023 17:03 )  x     / <0.01 ng/mL / x     / x     / x              RADIOLOGY & ADDITIONAL STUDIES:    < from: CT Brain Stroke Protocol (04.20.23 @ 18:03) >  IMPRESSION:    1. No intracranial hemorrhage is appreciated.    2. No intracranial mass lesion is appreciated.    3.  Right PICA distribution acute infarction    < end of copied text >    < from: CT Angio Brain Stroke Protocol  w/ IV Cont (04.20.23 @ 18:18) >  IMPRESSION:    1.   Right carotid system:    No hemodynamically significant stenosis.    2.   Left carotid system:     No hemodynamically significant stenosis.    3.   Vertebral circulation:    Patent.    4.  Anterior intracranial circulation:     Saccular aneurysms of each   internal carotid artery, approximately 0.8 cm on the right and 1.2 x 0.9   cm on the left. Possible tiny third saccular aneurysm on the right    5.  Posterior intracranialcirculation:    Right vertebral arterial   occlusion at its dural crossing junction V3 Atlantic and V4 intracranial   segments with likely reversal of flow in its intracranial segment from   the basilar. Right PICA faintly seen    6. Brain perfusion:Acute infarction of the right posterior medial   cerebellum within the right pica distribution.  Near equal volume   abnormality in inflow and time to maximum perfusion imaging. Additional   regions of delay of perfusion possible ischemia within the left posterior   cerebral arterial distribution    < from: CT Head No Cont (04.21.23 @ 12:09) >  IMPRESSION:    1. Brain: Progression of acute infarction within the right cerebellar   hemisphere, also extending into the left superior cerebellar hemisphere.   New mass effect on the fourth ventricle causing stenosis versus occlusion   with new third and lateral ventricular dilatation indicating ventricular   obstruction at the level of the fourth ventricle. New upward and downward   herniation of cerebellar parenchyma    2.  Right carotid system:    No hemodynamically significant stenosis.    3.   Left carotid system:     No hemodynamically significant stenosis.    4.   Vertebral circulation:    Patent.    5.  Anterior intracranial circulation:     Intact. Bilateral internal   carotid saccular aneurysms. These findings are unchanged.    6.  Posterior intracranial circulation:    Improved flow within the right   vertebral artery since 4/20/2023. New focal stenosis mid left vertebral   artery, etiology uncertain, consider vasospasm and extrinsic compression   in addition to new embolic disease.    7.  Brain perfusion: Perfusion images demonstrate normalization of the   perfusion abnormality in the right cerebellar hemisphere present on   4/20/2023 despite evidence of progression of acute infarction.  Areas of   apparent ischemia within the posterior fossa have progressed in extent,   also again involving the left posterior cerebral arterial distribution    < end of copied text >    < end of copied text >    < from: CT Head No Cont (04.21.23 @ 17:33) >  IMPRESSION: Since 4/21/2023 at 11:57 AM, there is again an acute infarct   involving the right greater than left cerebellar hemispheres and the   cerebellar vermis. There is continued mass effect on the fourth ventricle   and cerebral aqueduct with effacement of the basal cisterns and   persistent dilation of the ventricular system.    < end of copied text >

## 2023-04-23 LAB
ALBUMIN SERPL ELPH-MCNC: 3.5 G/DL — SIGNIFICANT CHANGE UP (ref 3.3–5)
ALBUMIN SERPL ELPH-MCNC: SIGNIFICANT CHANGE UP (ref 3.3–5)
ALP SERPL-CCNC: 101 U/L — SIGNIFICANT CHANGE UP (ref 40–120)
ALP SERPL-CCNC: SIGNIFICANT CHANGE UP (ref 40–120)
ALT FLD-CCNC: 136 U/L — HIGH (ref 10–45)
ALT FLD-CCNC: SIGNIFICANT CHANGE UP (ref 10–45)
ANION GAP SERPL CALC-SCNC: 6 MMOL/L — SIGNIFICANT CHANGE UP (ref 5–17)
ANION GAP SERPL CALC-SCNC: 8 MMOL/L — SIGNIFICANT CHANGE UP (ref 5–17)
ANION GAP SERPL CALC-SCNC: 8 MMOL/L — SIGNIFICANT CHANGE UP (ref 5–17)
ANION GAP SERPL CALC-SCNC: SIGNIFICANT CHANGE UP MMOL/L (ref 5–17)
APPEARANCE CSF: SIGNIFICANT CHANGE UP
APPEARANCE SPUN FLD: COLORLESS — SIGNIFICANT CHANGE UP
APPEARANCE UR: CLEAR — SIGNIFICANT CHANGE UP
APTT BLD: 29.1 SEC — SIGNIFICANT CHANGE UP (ref 27.5–35.5)
AST SERPL-CCNC: 89 U/L — HIGH (ref 10–40)
AST SERPL-CCNC: SIGNIFICANT CHANGE UP (ref 10–40)
BACTERIA # UR AUTO: PRESENT /HPF
BASE EXCESS BLDV CALC-SCNC: 0.7 MMOL/L — SIGNIFICANT CHANGE UP (ref -2–3)
BASOPHILS # BLD AUTO: 0.02 K/UL — SIGNIFICANT CHANGE UP (ref 0–0.2)
BASOPHILS NFR BLD AUTO: 0.3 % — SIGNIFICANT CHANGE UP (ref 0–2)
BILIRUB DIRECT SERPL-MCNC: <0.2 MG/DL — SIGNIFICANT CHANGE UP (ref 0–0.3)
BILIRUB INDIRECT FLD-MCNC: SIGNIFICANT CHANGE UP MG/DL (ref 0.2–1)
BILIRUB SERPL-MCNC: 0.7 MG/DL — SIGNIFICANT CHANGE UP (ref 0.2–1.2)
BILIRUB SERPL-MCNC: SIGNIFICANT CHANGE UP (ref 0.2–1.2)
BILIRUB UR-MCNC: NEGATIVE — SIGNIFICANT CHANGE UP
BUN SERPL-MCNC: 10 MG/DL — SIGNIFICANT CHANGE UP (ref 7–23)
BUN SERPL-MCNC: 12 MG/DL — SIGNIFICANT CHANGE UP (ref 7–23)
BUN SERPL-MCNC: 12 MG/DL — SIGNIFICANT CHANGE UP (ref 7–23)
BUN SERPL-MCNC: 13 MG/DL — SIGNIFICANT CHANGE UP (ref 7–23)
CA-I SERPL-SCNC: 1.22 MMOL/L — SIGNIFICANT CHANGE UP (ref 1.15–1.33)
CALCIUM SERPL-MCNC: 8.2 MG/DL — LOW (ref 8.4–10.5)
CALCIUM SERPL-MCNC: 8.3 MG/DL — LOW (ref 8.4–10.5)
CALCIUM SERPL-MCNC: 8.5 MG/DL — SIGNIFICANT CHANGE UP (ref 8.4–10.5)
CALCIUM SERPL-MCNC: SIGNIFICANT CHANGE UP (ref 8.4–10.5)
CHLORIDE SERPL-SCNC: 113 MMOL/L — HIGH (ref 96–108)
CHLORIDE SERPL-SCNC: 114 MMOL/L — HIGH (ref 96–108)
CHLORIDE SERPL-SCNC: 115 MMOL/L — HIGH (ref 96–108)
CHLORIDE SERPL-SCNC: SIGNIFICANT CHANGE UP (ref 96–108)
CO2 BLDV-SCNC: 26.6 MMOL/L — HIGH (ref 22–26)
CO2 SERPL-SCNC: 22 MMOL/L — SIGNIFICANT CHANGE UP (ref 22–31)
CO2 SERPL-SCNC: 23 MMOL/L — SIGNIFICANT CHANGE UP (ref 22–31)
CO2 SERPL-SCNC: 24 MMOL/L — SIGNIFICANT CHANGE UP (ref 22–31)
CO2 SERPL-SCNC: 24 MMOL/L — SIGNIFICANT CHANGE UP (ref 22–31)
COLOR CSF: ABNORMAL
COLOR SPEC: YELLOW — SIGNIFICANT CHANGE UP
CREAT SERPL-MCNC: 0.4 MG/DL — LOW (ref 0.5–1.3)
CREAT SERPL-MCNC: 0.41 MG/DL — LOW (ref 0.5–1.3)
CREAT SERPL-MCNC: 0.43 MG/DL — LOW (ref 0.5–1.3)
CREAT SERPL-MCNC: SIGNIFICANT CHANGE UP MG/DL (ref 0.5–1.3)
DIFF PNL FLD: ABNORMAL
EGFR: 114 ML/MIN/1.73M2 — SIGNIFICANT CHANGE UP
EGFR: 115 ML/MIN/1.73M2 — SIGNIFICANT CHANGE UP
EGFR: 116 ML/MIN/1.73M2 — SIGNIFICANT CHANGE UP
EOSINOPHIL # BLD AUTO: 0 K/UL — SIGNIFICANT CHANGE UP (ref 0–0.5)
EOSINOPHIL NFR BLD AUTO: 0 % — SIGNIFICANT CHANGE UP (ref 0–6)
EPI CELLS # UR: SIGNIFICANT CHANGE UP /HPF (ref 0–5)
GAS PNL BLDV: 143 MMOL/L — SIGNIFICANT CHANGE UP (ref 136–145)
GAS PNL BLDV: SIGNIFICANT CHANGE UP
GAS PNL BLDV: SIGNIFICANT CHANGE UP
GLUCOSE CSF-MCNC: 87 MG/DL — HIGH (ref 40–70)
GLUCOSE SERPL-MCNC: 140 MG/DL — HIGH (ref 70–99)
GLUCOSE SERPL-MCNC: 149 MG/DL — HIGH (ref 70–99)
GLUCOSE SERPL-MCNC: 154 MG/DL — HIGH (ref 70–99)
GLUCOSE SERPL-MCNC: 154 MG/DL — HIGH (ref 70–99)
GLUCOSE UR QL: NEGATIVE — SIGNIFICANT CHANGE UP
GRAM STN FLD: SIGNIFICANT CHANGE UP
GRAM STN FLD: SIGNIFICANT CHANGE UP
HCO3 BLDV-SCNC: 25 MMOL/L — SIGNIFICANT CHANGE UP (ref 22–29)
HCT VFR BLD CALC: 30 % — LOW (ref 34.5–45)
HGB BLD-MCNC: 9.8 G/DL — LOW (ref 11.5–15.5)
IMM GRANULOCYTES NFR BLD AUTO: 0.3 % — SIGNIFICANT CHANGE UP (ref 0–0.9)
INR BLD: 1.11 — SIGNIFICANT CHANGE UP (ref 0.88–1.16)
KETONES UR-MCNC: NEGATIVE — SIGNIFICANT CHANGE UP
LEUKOCYTE ESTERASE UR-ACNC: NEGATIVE — SIGNIFICANT CHANGE UP
LYMPHOCYTES # BLD AUTO: 1.11 K/UL — SIGNIFICANT CHANGE UP (ref 1–3.3)
LYMPHOCYTES # BLD AUTO: 16.5 % — SIGNIFICANT CHANGE UP (ref 13–44)
LYMPHOCYTES # CSF: 2 % — LOW (ref 40–80)
MAGNESIUM SERPL-MCNC: 1.9 MG/DL — SIGNIFICANT CHANGE UP (ref 1.6–2.6)
MCHC RBC-ENTMCNC: 30.1 PG — SIGNIFICANT CHANGE UP (ref 27–34)
MCHC RBC-ENTMCNC: 32.7 GM/DL — SIGNIFICANT CHANGE UP (ref 32–36)
MCV RBC AUTO: 92 FL — SIGNIFICANT CHANGE UP (ref 80–100)
MONOCYTES # BLD AUTO: 0.59 K/UL — SIGNIFICANT CHANGE UP (ref 0–0.9)
MONOCYTES NFR BLD AUTO: 8.8 % — SIGNIFICANT CHANGE UP (ref 2–14)
NEUTROPHILS # BLD AUTO: 4.98 K/UL — SIGNIFICANT CHANGE UP (ref 1.8–7.4)
NEUTROPHILS # CSF: 98 % — HIGH (ref 0–6)
NEUTROPHILS NFR BLD AUTO: 74.1 % — SIGNIFICANT CHANGE UP (ref 43–77)
NITRITE UR-MCNC: NEGATIVE — SIGNIFICANT CHANGE UP
NRBC # BLD: 0 /100 WBCS — SIGNIFICANT CHANGE UP (ref 0–0)
NRBC NFR CSF: 168 /UL — HIGH (ref 0–5)
PCO2 BLDV: 40 MMHG — SIGNIFICANT CHANGE UP (ref 39–42)
PH BLDV: 7.41 — SIGNIFICANT CHANGE UP (ref 7.32–7.43)
PH UR: 6 — SIGNIFICANT CHANGE UP (ref 5–8)
PLATELET # BLD AUTO: 252 K/UL — SIGNIFICANT CHANGE UP (ref 150–400)
PLATELET RESPONSE ASPIRIN RESULT: 566 ARU — SIGNIFICANT CHANGE UP (ref 350–700)
PO2 BLDV: 94 MMHG — HIGH (ref 25–45)
POTASSIUM BLDV-SCNC: 3.3 MMOL/L — LOW (ref 3.5–5.1)
POTASSIUM SERPL-MCNC: 3.6 MMOL/L — SIGNIFICANT CHANGE UP (ref 3.5–5.3)
POTASSIUM SERPL-MCNC: 4 MMOL/L — SIGNIFICANT CHANGE UP (ref 3.5–5.3)
POTASSIUM SERPL-MCNC: 4.1 MMOL/L — SIGNIFICANT CHANGE UP (ref 3.5–5.3)
POTASSIUM SERPL-MCNC: SIGNIFICANT CHANGE UP (ref 3.5–5.3)
POTASSIUM SERPL-SCNC: 3.6 MMOL/L — SIGNIFICANT CHANGE UP (ref 3.5–5.3)
POTASSIUM SERPL-SCNC: 4 MMOL/L — SIGNIFICANT CHANGE UP (ref 3.5–5.3)
POTASSIUM SERPL-SCNC: 4.1 MMOL/L — SIGNIFICANT CHANGE UP (ref 3.5–5.3)
POTASSIUM SERPL-SCNC: SIGNIFICANT CHANGE UP (ref 3.5–5.3)
PROT CSF-MCNC: 112 MG/DL — HIGH (ref 15–45)
PROT SERPL-MCNC: 6.1 G/DL — SIGNIFICANT CHANGE UP (ref 6–8.3)
PROT SERPL-MCNC: SIGNIFICANT CHANGE UP (ref 6–8.3)
PROT UR-MCNC: ABNORMAL MG/DL
PROTHROM AB SERPL-ACNC: 13.2 SEC — SIGNIFICANT CHANGE UP (ref 10.5–13.4)
RBC # BLD: 3.26 M/UL — LOW (ref 3.8–5.2)
RBC # CSF: HIGH /UL (ref 0–0)
RBC # FLD: 13.2 % — SIGNIFICANT CHANGE UP (ref 10.3–14.5)
RBC CASTS # UR COMP ASSIST: > 10 /HPF
SAO2 % BLDV: 98.6 % — HIGH (ref 67–88)
SODIUM SERPL-SCNC: 144 MMOL/L — SIGNIFICANT CHANGE UP (ref 135–145)
SODIUM SERPL-SCNC: 145 MMOL/L — SIGNIFICANT CHANGE UP (ref 135–145)
SODIUM SERPL-SCNC: 146 MMOL/L — HIGH (ref 135–145)
SODIUM SERPL-SCNC: SIGNIFICANT CHANGE UP (ref 135–145)
SP GR SPEC: >=1.03 — SIGNIFICANT CHANGE UP (ref 1–1.03)
SPECIMEN SOURCE: SIGNIFICANT CHANGE UP
SPECIMEN SOURCE: SIGNIFICANT CHANGE UP
TUBE TYPE: SIGNIFICANT CHANGE UP
UROBILINOGEN FLD QL: 0.2 E.U./DL — SIGNIFICANT CHANGE UP
WBC # BLD: 6.72 K/UL — SIGNIFICANT CHANGE UP (ref 3.8–10.5)
WBC # FLD AUTO: 6.72 K/UL — SIGNIFICANT CHANGE UP (ref 3.8–10.5)
WBC UR QL: < 5 /HPF — SIGNIFICANT CHANGE UP

## 2023-04-23 PROCEDURE — 99024 POSTOP FOLLOW-UP VISIT: CPT

## 2023-04-23 PROCEDURE — 99291 CRITICAL CARE FIRST HOUR: CPT | Mod: 24

## 2023-04-23 RX ORDER — SODIUM CHLORIDE 5 G/100ML
500 INJECTION, SOLUTION INTRAVENOUS
Refills: 0 | Status: DISCONTINUED | OUTPATIENT
Start: 2023-04-23 | End: 2023-04-24

## 2023-04-23 RX ORDER — TRAMADOL HYDROCHLORIDE 50 MG/1
25 TABLET ORAL EVERY 4 HOURS
Refills: 0 | Status: DISCONTINUED | OUTPATIENT
Start: 2023-04-23 | End: 2023-04-24

## 2023-04-23 RX ORDER — HYDROCORTISONE 20 MG
60 TABLET ORAL ONCE
Refills: 0 | Status: COMPLETED | OUTPATIENT
Start: 2023-04-23 | End: 2023-04-23

## 2023-04-23 RX ORDER — DOXAZOSIN MESYLATE 4 MG
2 TABLET ORAL AT BEDTIME
Refills: 0 | Status: DISCONTINUED | OUTPATIENT
Start: 2023-04-23 | End: 2023-04-25

## 2023-04-23 RX ORDER — HYDROMORPHONE HYDROCHLORIDE 2 MG/ML
0.25 INJECTION INTRAMUSCULAR; INTRAVENOUS; SUBCUTANEOUS ONCE
Refills: 0 | Status: DISCONTINUED | OUTPATIENT
Start: 2023-04-23 | End: 2023-04-23

## 2023-04-23 RX ORDER — ENOXAPARIN SODIUM 100 MG/ML
40 INJECTION SUBCUTANEOUS EVERY 24 HOURS
Refills: 0 | Status: DISCONTINUED | OUTPATIENT
Start: 2023-04-23 | End: 2023-04-23

## 2023-04-23 RX ORDER — SODIUM CHLORIDE 9 MG/ML
1000 INJECTION INTRAMUSCULAR; INTRAVENOUS; SUBCUTANEOUS
Refills: 0 | Status: DISCONTINUED | OUTPATIENT
Start: 2023-04-23 | End: 2023-04-24

## 2023-04-23 RX ORDER — POLYETHYLENE GLYCOL 3350 17 G/17G
17 POWDER, FOR SOLUTION ORAL DAILY
Refills: 0 | Status: DISCONTINUED | OUTPATIENT
Start: 2023-04-23 | End: 2023-04-28

## 2023-04-23 RX ORDER — DOXAZOSIN MESYLATE 4 MG
2 TABLET ORAL AT BEDTIME
Refills: 0 | Status: DISCONTINUED | OUTPATIENT
Start: 2023-04-23 | End: 2023-04-23

## 2023-04-23 RX ADMIN — SODIUM CHLORIDE 30 MILLILITER(S): 5 INJECTION, SOLUTION INTRAVENOUS at 20:28

## 2023-04-23 RX ADMIN — Medication 100 MILLIGRAM(S): at 23:37

## 2023-04-23 RX ADMIN — SODIUM CHLORIDE 60 MILLILITER(S): 5 INJECTION, SOLUTION INTRAVENOUS at 09:38

## 2023-04-23 RX ADMIN — CHLORHEXIDINE GLUCONATE 1 APPLICATION(S): 213 SOLUTION TOPICAL at 05:41

## 2023-04-23 RX ADMIN — Medication 650 MILLIGRAM(S): at 17:00

## 2023-04-23 RX ADMIN — PANTOPRAZOLE SODIUM 40 MILLIGRAM(S): 20 TABLET, DELAYED RELEASE ORAL at 11:50

## 2023-04-23 RX ADMIN — HYDROMORPHONE HYDROCHLORIDE 0.25 MILLIGRAM(S): 2 INJECTION INTRAMUSCULAR; INTRAVENOUS; SUBCUTANEOUS at 23:04

## 2023-04-23 RX ADMIN — CHLORHEXIDINE GLUCONATE 15 MILLILITER(S): 213 SOLUTION TOPICAL at 05:41

## 2023-04-23 RX ADMIN — Medication 100 MILLIGRAM(S): at 15:58

## 2023-04-23 RX ADMIN — ATORVASTATIN CALCIUM 80 MILLIGRAM(S): 80 TABLET, FILM COATED ORAL at 21:04

## 2023-04-23 RX ADMIN — Medication 650 MILLIGRAM(S): at 10:02

## 2023-04-23 RX ADMIN — Medication 2 MILLIGRAM(S): at 18:25

## 2023-04-23 RX ADMIN — POLYETHYLENE GLYCOL 3350 17 GRAM(S): 17 POWDER, FOR SOLUTION ORAL at 11:50

## 2023-04-23 RX ADMIN — FENTANYL CITRATE 25 MICROGRAM(S): 50 INJECTION INTRAVENOUS at 05:37

## 2023-04-23 RX ADMIN — FENTANYL CITRATE 25 MICROGRAM(S): 50 INJECTION INTRAVENOUS at 06:00

## 2023-04-23 RX ADMIN — Medication 40 MILLIEQUIVALENT(S): at 03:45

## 2023-04-23 RX ADMIN — Medication 650 MILLIGRAM(S): at 09:02

## 2023-04-23 RX ADMIN — Medication 650 MILLIGRAM(S): at 15:58

## 2023-04-23 RX ADMIN — Medication 100 MILLIGRAM(S): at 07:31

## 2023-04-23 RX ADMIN — SENNA PLUS 2 TABLET(S): 8.6 TABLET ORAL at 21:03

## 2023-04-23 RX ADMIN — Medication 60 MILLIGRAM(S): at 11:50

## 2023-04-23 RX ADMIN — SODIUM CHLORIDE 50 MILLILITER(S): 9 INJECTION INTRAMUSCULAR; INTRAVENOUS; SUBCUTANEOUS at 20:29

## 2023-04-23 RX ADMIN — HYDROMORPHONE HYDROCHLORIDE 0.25 MILLIGRAM(S): 2 INJECTION INTRAMUSCULAR; INTRAVENOUS; SUBCUTANEOUS at 21:43

## 2023-04-23 NOTE — PROVIDER CONTACT NOTE (OTHER) - SITUATION
Now that pt is extubated, right facial droop visualized. Neuro status otherwise the same. AxOx3, hypophonic and dysarthric but verbalizing.

## 2023-04-23 NOTE — PROGRESS NOTE ADULT - SUBJECTIVE AND OBJECTIVE BOX
Neurology Stroke Progress Note    INTERVAL HPI/OVERNIGHT EVENTS:  Patient seen and examined, remains intubated off sedation appears comfortable. Started 3% gtt yesterday. ICP's have been <10. Febrile 4/22 evening, pan cultured. Plan for extubation and repeat CTH today.     MEDICATIONS  (STANDING):  atorvastatin 80 milliGRAM(s) Oral at bedtime  ceFAZolin   IVPB 2000 milliGRAM(s) IV Intermittent every 8 hours  chlorhexidine 0.12% Liquid 15 milliLiter(s) Oral Mucosa every 12 hours  chlorhexidine 2% Cloths 1 Application(s) Topical <User Schedule>  enoxaparin Injectable 40 milliGRAM(s) SubCutaneous every 24 hours  pantoprazole  Injectable 40 milliGRAM(s) IV Push daily  polyethylene glycol 3350 17 Gram(s) Oral daily  senna 2 Tablet(s) Oral at bedtime  sodium chloride 3%. 500 milliLiter(s) (60 mL/Hr) IV Continuous <Continuous>    MEDICATIONS  (PRN):  acetaminophen   Oral Liquid .. 650 milliGRAM(s) Oral every 6 hours PRN Temp greater or equal to 38C (100.4F), Mild Pain (1 - 3)  albuterol/ipratropium for Nebulization 3 milliLiter(s) Nebulizer every 6 hours PRN Shortness of Breath and/or Wheezing  fentaNYL    Injectable 25 MICROGram(s) IV Push every 4 hours PRN Severe Pain (7 - 10)  ondansetron Injectable 4 milliGRAM(s) IV Push every 6 hours PRN Nausea and/or Vomiting      Allergies    No Known Allergies    Intolerances        Vital Signs Last 24 Hrs  T(C): 37.6 (23 Apr 2023 09:00), Max: 38.4 (22 Apr 2023 19:00)  T(F): 99.7 (23 Apr 2023 09:00), Max: 101.1 (22 Apr 2023 19:00)  HR: 74 (23 Apr 2023 09:30) (59 - 87)  BP: 151/68 (23 Apr 2023 09:30) (124/59 - 171/75)  BP(mean): 98 (23 Apr 2023 09:30) (85 - 133)  RR: 15 (23 Apr 2023 09:30) (12 - 16)  SpO2: 97% (23 Apr 2023 09:30) (95% - 100%)    Parameters below as of 23 Apr 2023 09:30  Patient On (Oxygen Delivery Method): ventilator,CPAP 5/5    O2 Concentration (%): 30    Physical exam:  General: No acute distress, awakens to voice, tolerating ETT  Eyes: Anicteric sclerae, moist conjunctivae, see below for CNs  Neck: trachea midline  Cardiovascular: Regular rate and rhythm on monitor   Pulmonary: No use of accessory muscles  GI: Abdomen soft  Extremities:  no edema    Neurologic:  -Mental status: Awakens to voice, ETT in place. Following commands.   -Cranial nerves:   II: BTT intact.  III, IV, VI: Extraocular movements are grossly intact without nystagmus. Pupils equally round and reactive to light 2mm and brisk  V:  Facial sensation V1-V3 grossly intact   VII: Face appears symmetric, difficult to assess d/t ETT  Motor: Normal bulk and tone. LUE at least 4/5 able to lift for >10 seconds, weakly resists. RUE able to maintain slight antigravity 3-/5, weak  noted today. Bilateral lower extremities able to lift antigravity at least 3/5, unable to assess for drive but patient moving spontaneously and following commands.   Sensation: Sensation grossly intact patient moves to tactile stimuli on the left and right lower extremity, painful stim on the right upper extremity.   Coordination: Unable to test      LABS:                        9.8    6.72  )-----------( 252      ( 23 Apr 2023 05:30 )             30.0     04-23    144  |  114<H>  |  12  ----------------------------<  154<H>  4.1   |  24  |  0.43<L>    Ca    8.5      23 Apr 2023 07:00  Phos  3.1     04-22  Mg     See Note     04-23    TPro  See Note  /  Alb  See Note  /  TBili  See Note  /  DBili  x   /  AST  See Note  /  ALT  See Note  /  AlkPhos  See Note  04-23    PT/INR - ( 22 Apr 2023 02:37 )   PT: 13.9 sec;   INR: 1.17          PTT - ( 22 Apr 2023 02:37 )  PTT:20.7 sec  Urinalysis Basic - ( 22 Apr 2023 20:28 )    Color: Yellow / Appearance: Clear / SG: >=1.030 / pH: x  Gluc: x / Ketone: NEGATIVE  / Bili: Negative / Urobili: 0.2 E.U./dL   Blood: x / Protein: Trace mg/dL / Nitrite: NEGATIVE   Leuk Esterase: NEGATIVE / RBC: > 10 /HPF / WBC < 5 /HPF   Sq Epi: x / Non Sq Epi: x / Bacteria: Present /HPF        RADIOLOGY & ADDITIONAL TESTS:    < from: CT Head No Cont (04.22.23 @ 15:30) >  IMPRESSION:  Intervalincrease in right intraventricular hemorrhage, possibly   representing redistribution. Stable ventricular size and position of the   right intraventricular drainage catheter.    Continued evolution of bilateral cerebellar infarcts with associated post   surgical changes, as above. No change in mass effect on the fourth   ventricle and cerebral aqueduct with effacement of the quadrigeminal   cistern and crowding of the foramen magnum.    < end of copied text >

## 2023-04-23 NOTE — DIETITIAN INITIAL EVALUATION ADULT - PERTINENT MEDS FT
MEDICATIONS  (STANDING):  atorvastatin 80 milliGRAM(s) Oral at bedtime  ceFAZolin   IVPB 2000 milliGRAM(s) IV Intermittent every 8 hours  chlorhexidine 2% Cloths 1 Application(s) Topical <User Schedule>  enoxaparin Injectable 40 milliGRAM(s) SubCutaneous every 24 hours  hydrocortisone sodium succinate Injectable 60 milliGRAM(s) IV Push once  pantoprazole  Injectable 40 milliGRAM(s) IV Push daily  polyethylene glycol 3350 17 Gram(s) Oral daily  senna 2 Tablet(s) Oral at bedtime  sodium chloride 3%. 500 milliLiter(s) (60 mL/Hr) IV Continuous <Continuous>    MEDICATIONS  (PRN):  acetaminophen   Oral Liquid .. 650 milliGRAM(s) Oral every 6 hours PRN Temp greater or equal to 38C (100.4F), Mild Pain (1 - 3)  albuterol/ipratropium for Nebulization 3 milliLiter(s) Nebulizer every 6 hours PRN Shortness of Breath and/or Wheezing  fentaNYL    Injectable 25 MICROGram(s) IV Push every 4 hours PRN Severe Pain (7 - 10)  ondansetron Injectable 4 milliGRAM(s) IV Push every 6 hours PRN Nausea and/or Vomiting

## 2023-04-23 NOTE — DIETITIAN INITIAL EVALUATION ADULT - NS FNS DIET ORDER
Diet, NPO with Tube Feed:   Tube Feeding Modality: Nasogastric  Glucerna 1.2 Ras (GLUCERNARTH)  Total Volume for 24 Hours (mL): 1200  Total Number of Cans: 5  Continuous  Starting Tube Feed Rate {mL per Hour}: 20  Increase Tube Feed Rate by (mL): 20     Every 4 hours  Until Goal Tube Feed Rate (mL per Hour): 50  Tube Feed Duration (in Hours): 24  Tube Feed Start Time: 21:00 (04-22-23 @ 20:56)

## 2023-04-23 NOTE — PROGRESS NOTE ADULT - SUBJECTIVE AND OBJECTIVE BOX
***********************************************  ADULT NSICU PM PROGRESS NOTE  MAKSIM TRIVEDI [id number] [facility] [id number]  ***********************************************    PM Note: extubated to HFNC w/ oral airway & ETCO2 monitoring.  VBG showing acceptable ventilation.  Hypercoag/autoimmune profiles sent.        ROS: negative except per mentioned above in 24h interval events.      HOSPITAL COURSE:  HPI: 56 yo F with history of asthma and peripheral neuropathy transferred to [facility] NSICU from [facility] for management of malignant posterior fossa stroke.  The patient developed symptoms at approximately 0830 on 4/20: dysconjugate gaze, ataxia, facial droop, dysarthria.  She did not present to [facility] until later on in the afternoon during which she was not a candidate for TNK.  CTP showing bilateral cerebellar perfusion mismatch, CTA showing R. V4 occlusion and large bilateral cavernous/terminal ICA aneurysms.  Patient was admitted to the ICU and monitored.  On 4/21 the patient became more lethargic, at which point a repeat CTH was performed, showing bilateral cerebellar strokes with lateral compression of the fourth ventricle on the right.  Patient accepted to [facility] by Dr. Valadez, at which point he was given 250cc 3% NaCl, started on 50cc/h, given 20mcg DDAVP and transferred as tier 1.  Upon arrival to [facility], NIHSS = 12, but mental status slowly declined from lethargy to near obtundation.  Repeat CTH showing stable mass effect and hydrocephalus.  Patient taken emergently for SOC depression with EVD placement.    4/22: rescan for RUE flaccidity, increase in posterior fossa edema      VITALS:    ICU Vital Signs Last 24 Hrs  T(C): 37.3 (23 Apr 2023 21:00), Max: 38.1 (23 Apr 2023 00:00)  T(F): 99.2 (23 Apr 2023 21:00), Max: 100.6 (23 Apr 2023 00:00)  HR: 64 (23 Apr 2023 21:00) (59 - 89)  BP: 147/67 (23 Apr 2023 21:00) (128/60 - 171/75)  BP(mean): 97 (23 Apr 2023 21:00) (87 - 133)  ABP: --  ABP(mean): --  RR: 14 (23 Apr 2023 21:00) (10 - 18)  SpO2: 100% (23 Apr 2023 21:00) (95% - 100%)    O2 Parameters below as of 23 Apr 2023 21:00  Patient On (Oxygen Delivery Method): nasal cannula  O2 Flow (L/min): 6            EXAM:     Giovanna Coma Scale: 3/1/6 = 10    General: normocephalic, suboccipital crani wound, laying in bed, in no distress  Neuro     MS: Giovanna Coma Scale: 3/1/6 = 10, follows commands, cooperative with examination    CN: PERRL, (+)R. gaze, plegia to left gaze    Mot: bulk normal, tone normal, power UPPER 2(withdrawals POB)/3(at least), LOWER 3/3  Chest: (+)upper airway rhonchi, lung fields are CTA, heart regular rate/rhythm, present S1/S2, no murmurs or rubs  Abdomen: nondistended, soft and nontender without peritoneal signs, normoactive bowel sounds  Extremities: no clubbing, well-perfused, no edema    LABORATORY DATA:    ABG - ( 22 Apr 2023 01:43 )  pH, Arterial: 7.36  pH, Blood: x     /  pCO2: 39    /  pO2: 139   / HCO3: 22    / Base Excess: -3.1  /  SaO2: 99.3                                    9.8    6.72  )-----------( 252      ( 23 Apr 2023 05:30 )             30.0     04-23    145  |  113<H>  |  13  ----------------------------<  154<H>  3.6   |  24  |  0.41<L>    Ca    8.2<L>      23 Apr 2023 18:27  Phos  3.1     04-22  Mg     See Note     04-23    TPro  6.1  /  Alb  3.5  /  TBili  0.7  /  DBili  <0.2  /  AST  89<H>  /  ALT  136<H>  /  AlkPhos  101  04-23      Culture - Sputum (collected 04-23-23 @ 10:14)  Source: ET Tube ET Tube  Gram Stain (04-23-23 @ 16:56):    Rare epithelial cells    Rare White blood cells    No organisms seen      MEDICATIONS  (STANDING):  atorvastatin 80 milliGRAM(s) Oral at bedtime  ceFAZolin   IVPB 2000 milliGRAM(s) IV Intermittent every 8 hours  chlorhexidine 2% Cloths 1 Application(s) Topical <User Schedule>  doxazosin 2 milliGRAM(s) Oral at bedtime  polyethylene glycol 3350 17 Gram(s) Oral daily  senna 2 Tablet(s) Oral at bedtime  sodium chloride 0.9%. 1000 milliLiter(s) (50 mL/Hr) IV Continuous <Continuous>  sodium chloride 3%. 500 milliLiter(s) (30 mL/Hr) IV Continuous <Continuous>    MEDICATIONS  (PRN):  acetaminophen   Oral Liquid .. 650 milliGRAM(s) Oral every 6 hours PRN Temp greater or equal to 38C (100.4F), Mild Pain (1 - 3)  albuterol/ipratropium for Nebulization 3 milliLiter(s) Nebulizer every 6 hours PRN Shortness of Breath and/or Wheezing  fentaNYL    Injectable 25 MICROGram(s) IV Push every 4 hours PRN Severe Pain (7 - 10)  ondansetron Injectable 4 milliGRAM(s) IV Push every 6 hours PRN Nausea and/or Vomiting      ***********************************************  ASSESSMENT AND PLAN  ***********************************************    #Bilateral cerebellar hemispheric strokes, post stroke day #3  #S/p SOC for foramen magnum decompression and R. parieto-occipital placement of EVD, POD#2  #Bilateral carotid terminus aneurysms  #R. V4 occlusion  #History of peripheral neuropathy and asthma    NEURO - admit NSICU, Q1h neuro checks / Q1h vital signs, R. PO EVD (monitor output and ICP), holding ASA/plavix (s/p DDAVP at [facility]), pain control w/ prn fentanyl, pending DSA w/ Dr. Minor, autoimmune panel/hypercoag panel  PULM - SpO2 goal > 92%, supplemental O2 and pulm toileting as needed, s/p solumedrol 60mg iv 4h prior to extubation  on 4/23  CARDIO - BP goal < 160, TTE w/ bubble, will need SALVADOR  GI - bowel regimen, stool count, diet: NPO, DHT, holding TFs for now  /RENAL - monitor UOP/volume status, BUN/SCr, 3% NaCl, q6h Sodium, SNa goal 140 - 145  HEME - maintain Hb > 7.0, PLT > 100,000, SQL  ID - monitor for infectious s/s, fever curve, leukocytosis  ENDO - SGlu goal < 200, RASSI

## 2023-04-23 NOTE — AIRWAY REMOVAL NOTE  ADULT & PEDS - ARTIFICAL AIRWAY REMOVAL COMMENTS
NSICU team at bedside, OPA applied and pt placed on HFNC. Minimal secretions. MD Lopez aware, will continue to monitor.

## 2023-04-23 NOTE — DIETITIAN INITIAL EVALUATION ADULT - OTHER CALCULATIONS
IBW used for calculations as pt >120% of IBW (177%). Needs adjusted for critical care requiring vent support.

## 2023-04-23 NOTE — PROGRESS NOTE ADULT - ASSESSMENT
57 year old female w/ PMH of asthma, CAD (not on meds), peripheral neuropathy and PSH of BATOOL, cholecystectomy and right knee surgery presented to [place] ED on 4/20 w/ right sided weakness and right facial numbness. Initially found to have a right PICA distribution acute infarct and a right vertebral artery occlusion. Not a candidate for any intervention. On repeat imaging had progression of acute infarct within the right and left cerebellar hemisphere with mass effect on the fourth ventricle and hydrocephalus and upward and downward herniation of the cerebellar parenchyma. She was transferred to [facility] on 4/21 and underwent an emergent SOC foramen magnum decompression and right parietal/occipital EVD placement.  Stroke was consulted for further recommendations.     1)Secondary stroke prevention  - Holding all antiplatelets   - start Atorvastatin 80mg PO daily when cleared by NSGY    2) Stroke risk factors  - A1C: 5.9  - LDL: 92  - TSH: 0.152  - Consider hypercoagulable workup  - Obtain Echo w/ bubble study, may eventually need SALVADOR    3) Further management  - pending repeat CTH today  - CTH 4/22: Interval increase in right intraventricular hemorrhage, possibly redistribution. Stable ventricular size. Evolution of bilateral cerebellar infarcts. Stable mass effect on the fourth ventricle and cerebral aqueduct with effacement of the quadrigeminal cistern and crowding of the foramen magnum.  - consider MRI brain when stable, although strokes are seen on CT  - recommend SBP goal < 140, per primary team  - recommend q1hr stroke neuro checks  - Plan for diagnostic angio on 4/24 as patient was found to have 0.8cm right ICA aneurysm, 1.2x0.9cm left ICA aneurysm  - may need outpt neurology follow up  - provide stroke education    DVT prophylaxis   - SCDs, chemical DVT prophylaxis on hold    To be discussed with Neurology Attending Dr. Kenyon Lopez 57 year old female w/ PMH of asthma, CAD (not on meds), peripheral neuropathy and PSH of BATOOL, cholecystectomy and right knee surgery presented to [place] ED on 4/20 w/ right sided weakness and right facial numbness. Initially found to have a right PICA distribution acute infarct and a right vertebral artery occlusion. Not a candidate for any intervention. On repeat imaging had progression of acute infarct within the right and left cerebellar hemisphere with mass effect on the fourth ventricle and hydrocephalus and upward and downward herniation of the cerebellar parenchyma. She was transferred to [facility] on 4/21 and underwent an emergent SOC foramen magnum decompression and right parietal/occipital EVD placement.  Stroke was consulted for further recommendations.     1)Secondary stroke prevention  - Holding all antiplatelets   - start Atorvastatin 80mg PO daily when cleared by NSGY    2) Stroke risk factors  - A1C: 5.9  - LDL: 92  - TSH: 0.152  - Consider hypercoagulable workup  - Obtain Echo w/ bubble study, may eventually need SALVADOR    3) Further management  - pending repeat CTH today  - CTH 4/22: Interval increase in right intraventricular hemorrhage, possibly redistribution. Stable ventricular size. Evolution of bilateral cerebellar infarcts. Stable mass effect on the fourth ventricle and cerebral aqueduct with effacement of the quadrigeminal cistern and crowding of the foramen magnum.  - consider MRI brain when stable, although strokes are seen on CT  - recommend SBP goal < 140, per primary team  - recommend q1hr stroke neuro checks  - Plan for diagnostic angio to eval 0.8cm right ICA aneurysm, 1.2x0.9cm left ICA aneurysm  - may need outpt neurology follow up  - provide stroke education    DVT prophylaxis   - SCDs, chemical DVT prophylaxis on hold    To be discussed with Neurology Attending Dr. Kenyon Lopez

## 2023-04-23 NOTE — DIETITIAN INITIAL EVALUATION ADULT - DIET TYPE
Recommend SLP evaluation prior to PO intake to assess for safest, least restrictive texture modification

## 2023-04-23 NOTE — DIETITIAN INITIAL EVALUATION ADULT - PROBLEM/PLAN-1
09/17/18 1400   Group 1   Start Time 1230   Stop Time 1330   Length (min) 60 Min   Group Type Inpatient   Group Name group psychotherapy   Focus of Group anger   Attendance Late   Participation Active   Patient Response Appropriate feedback;Attentive   Group Notes Pt participated in the activity and was focused during group      Left message to call back.    Sarah Guzman RN.     DISPLAY PLAN FREE TEXT

## 2023-04-23 NOTE — PROGRESS NOTE ADULT - SUBJECTIVE AND OBJECTIVE BOX
HPI:  56 yo F with PMH of Asthma, CAD (not on  meds), peripheral neuropathy and PSH of BATOOL, cholecystectomy, right knee surgery presented to [place] ED on 4/20 with right sided weakness and right facial numbness. Patient was undergoing preparation for colonoscopy. As per daughter at 8am patient was playing with her grandchildren but around 840 am patient was getting dressed and fell and subsequently felt weak after. Daughter reports that patient had some episodes of vomiting at this time. She had a syncopal episode at around 10 am and was witnessed by brother. No head trauma, She regained conscious after few minutes. She remained in bed for the remainder of the day. Daughter reports some slurred speech noted 1230-1PM and also was confused after. and around 4PM, the patient's sister noted right sided weakness at which time EMS was called and patient was brought to ED. No c/o chest pain, shortness of breath, fever, headache, abdominal pain, urinary complaints. No recent travel/sickness/ change in meds. Stroke code in ER: CTH neg for heme, Right PICA distribution acute infarction. CTA with saccular aneurysms of b/l carotids, approximately 0.8 cm on the right and 1.2 x 0.9 cm on the left. Possible tiny third saccular aneurysm on the right Posterior intracranial circulation: Right vertebral arterial occlusion at its dural crossing junction V3 Atlantic and V4 intracranial segments with likely reversal of flow in its intracranial segment from the basilar. Right PICA faintly seen. 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  Not candidate for TNK/mechanical thrombectomy. CT scan repeated which showed: 1. Brain: Progression of acute infarction within the right cerebellar hemisphere, also extending into the left superior cerebellar hemisphere. New mass effect on the fourth ventricle causing stenosis versus occlusion with new third and lateral ventricular dilatation indicating ventricular obstruction at the level of the fourth ventricle. New upward and downward herniation of cerebellar parenchyma Right carotid system: No hemodynamically significant stenosis. Left carotid system: No hemodynamically significant stenosis. Vertebral circulation: Patent. Anterior intracranial circulation: Intact. Bilateral internal carotid saccular aneurysms. These findings are unchanged. Posterior intracranial circulation:    Improved flow within the right vertebral artery since 4/20/2023. New focal stenosis mid left vertebral artery, etiology uncertain, consider vasospasm and extrinsic compression in addition to new embolic disease. Brain perfusion: Perfusion images demonstrate normalization of the perfusion abnormality in the right cerebellar hemisphere present on 4/20/2023 despite evidence of progression of acute infarction.  Areas of apparent ischemia within the posterior fossa have progressed in extent, also again involving the left posterior cerebral arterial distribution. Tx to [facility] for SOC watch. On admission to [facility], NIHSS 12.  (21 Apr 2023 16:50)    INTERVAL EVENTS:  Remains intubated, tolerating CPAP. Possible extubation today.    HOSPITAL COURSE:  4/21: Admitted for crani watch, CTH complete w/ unchanged hydrocephalus, taken for emergent occipital craniectomy.   4/22: POD1. Remains intubated overnight, on propofol and dank. EVD@[id number]. Pending repeat CTH this am. Given hydralazine 10mg x1. Started on cardene for SBP high 140s-150s. Sub-therapeutic on plavix, therapeutic on asa, rpt asa accumetrics until subtherapeutic. LE dopplers neg for DVT, but showing superficial venous thrombosis in the proximal portion of the right greater saphenous vein. Started on 3% @60 for na goal 145-150. NGT placed by ENT. Febrile, pancultured.  4/23: POD 1. 3% increased to 75. NGT readjusted.       Vital Signs Last 24 Hrs  T(C): 38.1 (23 Apr 2023 00:00), Max: 38.4 (22 Apr 2023 19:00)  T(F): 100.6 (23 Apr 2023 00:00), Max: 101.1 (22 Apr 2023 19:00)  HR: 75 (23 Apr 2023 00:00) (62 - 87)  BP: 139/63 (22 Apr 2023 23:00) (116/55 - 153/68)  BP(mean): 91 (22 Apr 2023 23:00) (79 - 109)  RR: 12 (22 Apr 2023 23:00) (12 - 16)  SpO2: 95% (23 Apr 2023 00:00) (95% - 100%)    Parameters below as of 23 Apr 2023 01:00  Patient On (Oxygen Delivery Method): ventilator,CPAP    O2 Concentration (%): 50    I&O's Summary    21 Apr 2023 07:01  -  22 Apr 2023 07:00  --------------------------------------------------------  IN: 685.8 mL / OUT: 1579 mL / NET: -893.2 mL    22 Apr 2023 07:01  -  23 Apr 2023 00:23  --------------------------------------------------------  IN: 2095 mL / OUT: 1848 mL / NET: 247 mL        PHYSICAL EXAM:  General: NAD, comfortably lying in bed, intubated, off sedation  Cardiovascular: regular rate and rhythm   Respiratory: nonlabored breathing, normal chest rise   GI: abdomen soft, nontender, nondistended  Neuro: opens eyes to voice, tracks, Pupils 2mm briskly reactive b/l, EOMI, follows commands, RUE 0/5, LUE antigravity, LLE 5/5, RLE distally antigravity (dorsi/plantar flex 5/5)  Extremities: distal pulses 2+ x4  Incision/wound: crani incision c/d/i, EVD site c/d/i  Drain: +EVD@[id number],+ELENA(SG) to half suction     LABS:                        10.8   9.68  )-----------( 293      ( 22 Apr 2023 05:30 )             31.9     04-22    140  |  108  |  10  ----------------------------<  152<H>  3.4<L>   |  23  |  0.45<L>    Ca    8.3<L>      22 Apr 2023 20:37  Phos  3.1     04-22  Mg     2.3     04-22    TPro  5.8<L>  /  Alb  3.4  /  TBili  0.5  /  DBili  x   /  AST  93<H>  /  ALT  106<H>  /  AlkPhos  84  04-22    PT/INR - ( 22 Apr 2023 02:37 )   PT: 13.9 sec;   INR: 1.17          PTT - ( 22 Apr 2023 02:37 )  PTT:20.7 sec    CARDIAC MARKERS ( 22 Apr 2023 01:38 )  x     / x     / 126 U/L / x     / x      CARDIAC MARKERS ( 21 Apr 2023 17:03 )  x     / <0.01 ng/mL / x     / x     / x          CAPILLARY BLOOD GLUCOSE      POCT Blood Glucose.: 134 mg/dL (22 Apr 2023 11:27)  POCT Blood Glucose.: 155 mg/dL (22 Apr 2023 06:49)      Drug Levels: [] N/A    CSF Analysis: [] N/A  Glucose, CSF: 87 mg/dL (04-22 @ 20:28)  Protein, CSF: 112 mg/dL (04-22 @ 20:28)      Allergies    No Known Allergies    Intolerances      MEDICATIONS:  Antibiotics:  ceFAZolin   IVPB 2000 milliGRAM(s) IV Intermittent every 8 hours    Neuro:  acetaminophen   Oral Liquid .. 650 milliGRAM(s) Oral every 6 hours PRN  fentaNYL    Injectable 25 MICROGram(s) IV Push every 4 hours PRN  ondansetron Injectable 4 milliGRAM(s) IV Push every 6 hours PRN    Anticoagulation:    OTHER:  albuterol/ipratropium for Nebulization 3 milliLiter(s) Nebulizer every 6 hours PRN  atorvastatin 80 milliGRAM(s) Oral at bedtime  chlorhexidine 0.12% Liquid 15 milliLiter(s) Oral Mucosa every 12 hours  chlorhexidine 2% Cloths 1 Application(s) Topical <User Schedule>  dextrose 50% Injectable 25 Gram(s) IV Push once  glucagon  Injectable 1 milliGRAM(s) IntraMuscular once  niCARdipine Infusion 5 mG/Hr IV Continuous <Continuous>  pantoprazole  Injectable 40 milliGRAM(s) IV Push daily  polyethylene glycol 3350 17 Gram(s) Oral daily  senna 2 Tablet(s) Oral at bedtime    IVF:  potassium chloride   Powder 40 milliEquivalent(s) Oral once  sodium chloride 3%. 500 milliLiter(s) IV Continuous <Continuous>    CULTURES:    RADIOLOGY & ADDITIONAL TESTS:      ASSESSMENT:  56 yo F with PMH of Asthma, CAD (not on meds), peripheral presented to [place] ED on 4/20 with right sided weakness and right facial numbness. Acute infarction within the right cerebellar hemisphere, also extending into the left superior cerebellar hemisphere. New mass effect on the fourth ventricle causing stenosis versus occlusion with new third and lateral ventricular dilatation indicating ventricular obstruction at the level of the fourth ventricle. New upward and downward herniation of cerebellar parenchyma. Pt transferred to [facility] for further management. NIHSS 12. Now s/p SOC foramen magnum decompression and right parietal/occipital EVD placement (4/21).    Neuro   - Vitals/neuro q1h   - fentanyl PRN  - Needs DSA (Monday/Tuesday if stable) for B/L carotid aneurysms  - SG ELENA drain to half suction, monitor output  - EVD@[id number]; monitor ICP/output  - CTH 4/22 with increase right IVH, possibly redistribution. Stable vent size. Repeat 4/23   - stroke consulted, f/u recs  - soft collar    Cardio  - -140; on cardene gtt   - echo pending    Pulm  - intubated, CPAP 30/10/5  - albuterol prn for asthma     GI  - NGT placed by ENT; TF started 4/22, hold in AM for possible extubation  - Protonix 40mg qd for GI ppx while intubated   - bowel regimen     Renal  - f/u TOV (Garsia 4/21-22)  - Na goal 145-155  - 3% @ 75    Heme  - SCDs, hold DVT chemoppx  - s/p 3 units platelets, 35 mcg of DDAVP for ASA/Plavix reversal  - LE dopplers 4/22 neg for DVT, but showing superficial venous thrombosis in the proximal portion of the right greater saphenous vein; consider repeat doppler in 1 week    Endo   - ISS   - A1C 5.9    ID  - Ancef while ELENA in place   - febrile, f/u panculture 4/22    DISPO:  NSICU, full code, PT/OT pending    D/w Dr. Valadez and Dr. Bueno

## 2023-04-23 NOTE — DIETITIAN INITIAL EVALUATION ADULT - PERTINENT LABORATORY DATA
04-23    144  |  114<H>  |  12  ----------------------------<  154<H>  4.1   |  24  |  0.43<L>    Ca    8.5      23 Apr 2023 07:00  Phos  3.1     04-22  Mg     See Note     04-23    TPro  6.1  /  Alb  3.5  /  TBili  0.7  /  DBili  <0.2  /  AST  89<H>  /  ALT  136<H>  /  AlkPhos  101  04-23  POCT Blood Glucose.: 134 mg/dL (04-22-23 @ 11:27)  A1C with Estimated Average Glucose Result: 5.9 % (04-22-23 @ 05:30)

## 2023-04-23 NOTE — PROGRESS NOTE ADULT - SUBJECTIVE AND OBJECTIVE BOX
***********************************************  ADULT NSICU PROGRESS NOTE  MAKSIM TRIVEDI [id number] [facility] [id number] [id number]  ***********************************************    INTERVAL:      ROS: negative except per mentioned above in 24h interval events.      HPI: 58 yo F with history of asthma and peripheral neuropathy transferred to [facility] NSICU from [facility] for management of malignant posterior fossa stroke.  The patient developed symptoms at approximately 0830 on 4/20: dysconjugate gaze, ataxia, facial droop, dysarthria.  She did not present to [facility] until later on in the afternoon during which she was not a candidate for TNK.  CTP showing bilateral cerebellar perfusion mismatch, CTA showing R. V4 occlusion and large bilateral cavernous/terminal ICA aneurysms.  Patient was admitted to the ICU and monitored.  On 4/21 the patient became more lethargic, at which point a repeat CTH was performed, showing bilateral cerebellar strokes with lateral compression of the fourth ventricle on the right.  Patient accepted to [facility] by Dr. Valadez, at which point he was given 250cc 3% NaCl, started on 50cc/h, given 20mcg DDAVP and transferred as tier 1.    Upon arrival to [facility], NIHSS = 12, but mental status slowly declined from lethargy to near obtundation.  Repeat CTH showing stable mass effect and hydrocephalus.  Patient taken emergently for SOC depression with EVD placement.      VITALS:    ICU Vital Signs Last 24 Hrs  T(C): 37.6 (22 Apr 2023 17:25), Max: 37.6 (22 Apr 2023 17:25)  T(F): 99.6 (22 Apr 2023 17:25), Max: 99.6 (22 Apr 2023 17:25)  HR: 69 (22 Apr 2023 17:00) (62 - 87)  BP: 141/65 (22 Apr 2023 17:00) (116/55 - 153/68)  BP(mean): 93 (22 Apr 2023 17:00) (79 - 109)  ABP: 113/60 (22 Apr 2023 09:00) (113/60 - 161/81)  ABP(mean): 78 (22 Apr 2023 09:00) (78 - 113)  RR: 14 (22 Apr 2023 17:00) (12 - 18)  SpO2: 97% (22 Apr 2023 17:00) (96% - 100%)    O2 Parameters below as of 22 Apr 2023 17:00  Patient On (Oxygen Delivery Method): ventilator,CPAP    O2 Concentration (%): 50          EXAM: PENDING UPDATE    [place] Coma Scale: 2/1T/6 = 9    General: normocephalic, atraumatic, laying in bed, in no distress  Neuro     MS: lethargic (currently off sedation), unable to mantain eye opening longer than 10seconds, oriented to self and hospital, does not state date, speech is fluent and comprehension is intact    CN: PERRL, (+)gaze is dysconjugate (plegic to adduction OD, 'down and out,' and nystagmus with leftward gaze OS), (+)L. lower facial weakness,     Mot: bulk normal, tone normal, power UPPER 0/3, LOWER 3/3    Coord: unable to assess due to mental status    Gait: deferred  Chest: mechanically ventilated, coarse breath sounds, heart regular rate/rhythm, present S1/S2, no murmurs or rubs  Abdomen: nondistended, soft and nontender without peritoneal signs, normoactive bowel sounds  Extremities: no clubbing, well-perfused, no edema    LABORATORY DATA:    ABG - ( 22 Apr 2023 01:43 )  pH, Arterial: 7.36  pH, Blood: x     /  pCO2: 39    /  pO2: 139   / HCO3: 22    / Base Excess: -3.1  /  SaO2: 99.3                                    10.8   9.68  )-----------( 293      ( 22 Apr 2023 05:30 )             31.9     04-22    141  |  111<H>  |  11  ----------------------------<  168<H>  4.0   |  22  |  0.42<L>    Ca    8.3<L>      22 Apr 2023 05:30  Phos  3.1     04-22  Mg     2.3     04-22    TPro  5.8<L>  /  Alb  3.4  /  TBili  0.5  /  DBili  x   /  AST  93<H>  /  ALT  106<H>  /  AlkPhos  84  04-22      MEDICATIONS  (STANDING):  atorvastatin 80 milliGRAM(s) Oral at bedtime  ceFAZolin   IVPB 2000 milliGRAM(s) IV Intermittent every 8 hours  chlorhexidine 0.12% Liquid 15 milliLiter(s) Oral Mucosa every 12 hours  dextrose 50% Injectable 25 Gram(s) IV Push once  glucagon  Injectable 1 milliGRAM(s) IntraMuscular once  niCARdipine Infusion 5 mG/Hr (25 mL/Hr) IV Continuous <Continuous>  pantoprazole  Injectable 40 milliGRAM(s) IV Push daily  polyethylene glycol 3350 17 Gram(s) Oral daily  senna 2 Tablet(s) Oral at bedtime  sodium chloride 3%. 500 milliLiter(s) (60 mL/Hr) IV Continuous <Continuous>    MEDICATIONS  (PRN):  acetaminophen     Tablet .. 650 milliGRAM(s) Oral every 6 hours PRN Temp greater or equal to 38C (100.4F), Mild Pain (1 - 3)  albuterol/ipratropium for Nebulization 3 milliLiter(s) Nebulizer every 6 hours PRN Shortness of Breath and/or Wheezing  fentaNYL    Injectable 25 MICROGram(s) IV Push every 4 hours PRN Severe Pain (7 - 10)  ondansetron Injectable 4 milliGRAM(s) IV Push every 6 hours PRN Nausea and/or Vomiting      ***********************************************  ASSESSMENT AND PLAN  ***********************************************    #Bilateral cerebellar hemispheric strokes, post stroke day #2  #S/p SOC for foramen magnum decompression and R. parieto-occipital placement of EVD, POD#1  #Bilateral carotid terminus aneurysms  #R. V4 occlusion  #History of peripheral neuropathy and asthma    NEURO - admit NSICU, Q1h neuro checks / Q1h vital signs, repeat CTH in AM, R. PO EVD (monitor output and ICP), holding ASA/plavix (s/p DDAVP at [facility]), pain control w/ prn fentanyl  PULM - SpO2 goal > 92%, supplemental O2 and pulm toileting as needed, pressure support ventilation 10/5/30%, will tentatively plan for extubation tomorrow  CARDIO - BP goal < 160, TTE w/ bubble, will need SALVADOR  GI - bowel regimen, stool count, diet: NPO, will need DHT  /RENAL - monitor UOP/volume status, BUN/SCr, 3% NaCl, q6h Sodium  HEME - maintain Hb > 7.0, PLT > 100,000, holding chemoppx  ID - monitor for infectious s/s, fever curve, leukocytosis  ENDO - SGlu goal < 200, RASSI ***********************************************  ADULT NSICU PROGRESS NOTE  MAKSIM TRIVEDI [id number] [facility] 08EA [id number]  ***********************************************    INTERVAL: no acute overnight events  - No cuff leak this morning; solu-medrol 60x1, will attempt extubation this evening      ROS: negative except per mentioned above in 24h interval events.      HOSPITAL COURSE:  HPI: 58 yo F with history of asthma and peripheral neuropathy transferred [facility] management of malignant posterior fossa stroke.  The patient developed symptoms at approximately 0830 on 4/20: dysconjugate gaze, ataxia, facial droop, dysarthria.  She did not [facility] to [facility] until later on in the afternoon during which she was not a candidate for TNK.  CTP showing bilateral cerebellar perfusion mismatch, CTA showing R. V4 occlusion and large bilateral cavernous/terminal ICA aneurysms.  Patient was admitted to the ICU and monitored.  On 4/21 the patient became more lethargic, at which point a repeat CTH was performed, showing bilateral cerebellar strokes with lateral compression of the fourth ventricle on the right.  Patient accepted to [facility] by Dr. Valadez, at which point he was given 250cc 3% NaCl, started on 50cc/h, given 20mcg DDAVP and transferred as tier 1.  Upon arrival to [facility], NIHSS = 12, but mental status slowly declined from lethargy to near obtundation.  Repeat CTH showing stable mass effect and hydrocephalus.  Patient taken emergently for SOC depression with EVD placement.    4/22: rescan for RUE flaccidity, increase in posterior fossa edema      VITALS:    ICU Vital Signs Last 24 Hrs  T(C): 37.6 (23 Apr 2023 09:00), Max: 38.4 (22 Apr 2023 19:00)  T(F): 99.7 (23 Apr 2023 09:00), Max: 101.1 (22 Apr 2023 19:00)  HR: 73 (23 Apr 2023 11:00) (59 - 87)  BP: 140/62 (23 Apr 2023 11:00) (124/59 - 171/75)  BP(mean): 89 (23 Apr 2023 11:00) (85 - 133)  ABP: --  ABP(mean): --  RR: 12 (23 Apr 2023 11:00) (12 - 18)  SpO2: 97% (23 Apr 2023 11:00) (95% - 100%)    O2 Parameters below as of 23 Apr 2023 11:00  Patient On (Oxygen Delivery Method): ventilator,CPAP 5/5    O2 Concentration (%): 30          EXAM: PENDING UPDATE    [place] Coma Scale: 3/1T/6 = 10    General: normocephalic, atraumatic, laying in bed, in no distress  Neuro     MS: [place] Coma Scale: 3/1T/6 = 10, follows commands, cooperative with examination, normal attention    CN: PERRL, (+)R. gaze, plegia to left gaze     Mot: bulk normal, tone normal, power UPPER 0/3, LOWER 3/3  Chest: mechanically ventilated, coarse breath sounds, heart regular rate/rhythm, present S1/S2, no murmurs or rubs  Abdomen: nondistended, soft and nontender without peritoneal signs, normoactive bowel sounds  Extremities: no clubbing, well-perfused, no edema    LABORATORY DATA:    ABG - ( 22 Apr 2023 01:43 )  pH, Arterial: 7.36  pH, Blood: x     /  pCO2: 39    /  pO2: 139   / HCO3: 22    / Base Excess: -3.1  /  SaO2: 99.3                                    9.8    6.72  )-----------( 252      ( 23 Apr 2023 05:30 )             30.0     04-23    144  |  114<H>  |  12  ----------------------------<  154<H>  4.1   |  24  |  0.43<L>    Ca    8.5      23 Apr 2023 07:00  Phos  3.1     04-22  Mg     See Note     04-23    TPro  6.1  /  Alb  3.5  /  TBili  0.7  /  DBili  <0.2  /  AST  89<H>  /  ALT  136<H>  /  AlkPhos  101  04-23      Culture - CSF with Gram Stain (collected 04-22-23 @ 20:37)  Source: .CSF CSF  Gram Stain (04-23-23 @ 00:38):    Few to Moderate WBCs    No organisms seen    Culture - Blood (collected 04-22-23 @ 20:28)  Source: .Blood Blood-Peripheral  Preliminary Report (04-23-23 @ 11:00):    No growth at 12 hours    Culture - Blood (collected 04-22-23 @ 20:28)  Source: .Blood Blood-Peripheral  Preliminary Report (04-23-23 @ 11:00):    No growth at 12 hours      MEDICATIONS  (STANDING):  atorvastatin 80 milliGRAM(s) Oral at bedtime  ceFAZolin   IVPB 2000 milliGRAM(s) IV Intermittent every 8 hours  chlorhexidine 2% Cloths 1 Application(s) Topical <User Schedule>  enoxaparin Injectable 40 milliGRAM(s) SubCutaneous every 24 hours  hydrocortisone sodium succinate Injectable 60 milliGRAM(s) IV Push once  pantoprazole  Injectable 40 milliGRAM(s) IV Push daily  polyethylene glycol 3350 17 Gram(s) Oral daily  senna 2 Tablet(s) Oral at bedtime  sodium chloride 3%. 500 milliLiter(s) (60 mL/Hr) IV Continuous <Continuous>    MEDICATIONS  (PRN):  acetaminophen   Oral Liquid .. 650 milliGRAM(s) Oral every 6 hours PRN Temp greater or equal to 38C (100.4F), Mild Pain (1 - 3)  albuterol/ipratropium for Nebulization 3 milliLiter(s) Nebulizer every 6 hours PRN Shortness of Breath and/or Wheezing  fentaNYL    Injectable 25 MICROGram(s) IV Push every 4 hours PRN Severe Pain (7 - 10)  ondansetron Injectable 4 milliGRAM(s) IV Push every 6 hours PRN Nausea and/or Vomiting      ***********************************************  ASSESSMENT AND PLAN  ***********************************************    #Bilateral cerebellar hemispheric strokes, post stroke day #3  #S/p SOC for foramen magnum decompression and R. parieto-occipital placement of EVD, POD#2  #Bilateral carotid terminus aneurysms  #R. V4 occlusion  #History of peripheral neuropathy and asthma    NEURO - admit NSICU, Q1h neuro checks / Q1h vital signs, R. PO EVD (monitor output and ICP), holding ASA/plavix ([facility] DDAVP [facility]), pain control w/ prn fentanyl, DSA monday versus tuesday (Dr. Minor), autoimmune panel/hypercoag panel  PULM - SpO2 goal > 92%, supplemental O2 and pulm toileting as needed, pressure support ventilation 5/5/30%, will tentatively plan for extubation later today after 60 solu-medrol (abscence of cuff leak)  CARDIO - BP goal < 160, TTE w/ bubble, will need SALVADOR  GI - bowel regimen, stool count, diet: NPO, will need DHT  /RENAL - monitor UOP/volume status, BUN/SCr, 3% NaCl, q6h Sodium  HEME - maintain Hb > 7.0, PLT > 100,000, holding chemoppx  ID - monitor for infectious s/s, fever curve, leukocytosis  ENDO - SGlu goal < 200, RASSI

## 2023-04-23 NOTE — AIRWAY REMOVAL NOTE  ADULT & PEDS - POSITIVE LEAK TEST
no leak at 11AM; steroid dose given per MD Lopez. No leak heard at 5pm on test, MD Lopez aware prior to extubation/done

## 2023-04-23 NOTE — DIETITIAN INITIAL EVALUATION ADULT - EDUCATION DIETARY MODIFICATIONS
Type 2 diabetes mellitus with diabetic nephropathy, with long-term current use of insulin unable to verbalize/demonstrate/(0) unable to meet; needs instruction

## 2023-04-23 NOTE — PATIENT PROFILE ADULT - INTERNATIONAL TRAVEL
Ana was advised per verbal release that information was sent to them in the mail, they had tried to call and schedule and got no answer.  I gave her the number to Dr. Jaramillo office to call and schedule appt   No

## 2023-04-23 NOTE — DIETITIAN INITIAL EVALUATION ADULT - OTHER INFO
58 yo F with PMH of Asthma, CAD (not on meds), peripheral presented to [place] ED on 4/20 with right sided weakness and right facial numbness. Acute infarction within the right cerebellar hemisphere, also extending into the left superior cerebellar hemisphere. New mass effect on the fourth ventricle causing stenosis versus occlusion with new third and lateral ventricular dilatation indicating ventricular obstruction at the level of the fourth ventricle. New upward and downward herniation of cerebellar parenchyma. Pt transferred to [facility] for further management. NIHSS 12. Now s/p SOC foramen magnum decompression and right parietal/occipital EVD placement (4/21).    On assessment, pt resting in bed on vent support. CPAP to vent- plan for possible extubation today. Tmax 99.7F. - off all sedation and pressor support. Currently remains NPO with EN feeds via NGT. No n/v/d/c. No abd distention or discomfort. Skin surgical incision, yissel score 16.   58 yo F with PMH of Asthma, CAD (not on meds), peripheral presented to [place] ED on 4/20 with right sided weakness and right facial numbness. Acute infarction within the right cerebellar hemisphere, also extending into the left superior cerebellar hemisphere. New mass effect on the fourth ventricle causing stenosis versus occlusion with new third and lateral ventricular dilatation indicating ventricular obstruction at the level of the fourth ventricle. New upward and downward herniation of cerebellar parenchyma. Pt transferred to [facility] for further management. NIHSS 12. Now s/p SOC foramen magnum decompression and right parietal/occipital EVD placement (4/21).    On assessment, pt resting in bed on vent support. CPAP to vent- plan for possible extubation today. Tmax 99.7F. - off all sedation and pressor support. Currently remains NPO with EN feeds via NGT. No n/v/d/c. No abd distention or discomfort. Skin surgical incision, yissel score 16. Edema: +2 pitting right hand and wrist. Unable to assess UBW nor appetite PTA. NKFA per EMR. Education deferred. RD to follow

## 2023-04-23 NOTE — PATIENT PROFILE ADULT - FALL HARM RISK - HARM RISK INTERVENTIONS
[facility]    Hospitalist Progress Note    Assessment & Plan   73 year old male who was admitted on 1/15/2020. With confusion and UTI:    Impression:   Principal Problem:    Catheter-associated urinary tract infection (H)  Active Problems:    Hx of Right CVA with Left hemiparesis -- 2010    Toxic encephalopathy    Urinary retention, S/P Suprapubic Catheter     Seizure disorder related to CVA       Plan:  Discussed with brother -- agreeable to DNR/DNI, and OK with discharge back to LT, but memory care unable to take him until tomorrow.     DVT Prophylaxis: Pneumatic Compression Devices  Code Status: DNR/DNI    Disposition: Expected discharge tomorrow    Dioni Miller MD  Pager [phone]  Cell Phone [phone]  Text Page (7am to 6pm)    Interval History   No complaints, improved oral intake.     Physical Exam   Temp: 97.5  F (36.4  C) Temp src: Oral BP: 111/61 Pulse: 64 Heart Rate: 65 Resp: 16 SpO2: 99 % O2 Device: None (Room air)    Vitals:    01/15/20 0329 01/15/20 1453 01/18/20 0611   Weight: 77.5 kg (170 lb 13.7 oz) 72.3 kg (159 lb 4.8 oz) 71.1 kg (156 lb 12.8 oz)     Vital Signs with Ranges  Temp:  [97.3  F (36.3  C)-98  F (36.7  C)] 97.5  F (36.4  C)  Pulse:  [64] 64  Heart Rate:  [59-65] 65  Resp:  [16-18] 16  BP: (101-145)/(46-68) 111/61  SpO2:  [97 %-99 %] 99 %  I/O last 3 completed shifts:  In: 2630 [P.O.:730; I.V.:1900]  Out: 1850 [Urine:1850]    # Pain Assessment:  Current Pain Score 1/18/2020   Patient currently in pain? denies   Pain score (0-10) -   Josiah verdin pain level was assessed and he currently denies pain.        Constitutional: Awake, alert, cooperative, no apparent distress  Respiratory: Clear to auscultation bilaterally, no crackles or wheezing  Cardiovascular: Regular rate and rhythm, normal S1 and S2, and no murmur noted  GI: Normal bowel sounds, soft, non-distended, non-tender  Extrem: No calf tenderness, no ankle edema  Neuro: Ox1, left hemiparersis  Has  suprapubic catheter     Medications       amoxicillin-clavulanate  750 mg Oral BID     apixaban ANTICOAGULANT  2.5 mg Oral BID     finasteride  5 mg Oral Daily     multivitamin w/minerals  1 tablet Oral Daily     perampanel  4 mg Oral At Bedtime     senna-docusate  1 tablet Oral BID     simvastatin  10 mg Oral At Bedtime     sodium chloride (PF)  3 mL Intracatheter Q8H     valproic acid  1,000 mg Oral At Bedtime     valproic acid  500 mg Oral QAM     zonisamide  300 mg Oral QAM       Data   Recent Labs   Lab 01/16/20  0801 01/15/20  0336   WBC 3.9* 4.7   HGB 11.2* 12.1*   MCV 99 100    165    141   POTASSIUM 4.0 3.9   CHLORIDE 113* 110*   CO2 26 25   BUN 14 19   CR 0.65* 0.71   ANIONGAP 2* 6   BREANN 8.4* 8.3*   GLC 90 93   ALBUMIN 2.2* 2.4*   PROTTOTAL 6.6* 7.2   BILITOTAL 0.1* 0.2   ALKPHOS 59 65   ALT 10 11   AST 11 14       Imaging:   No results found for this or any previous visit (from the past 24 hour(s)).    Assistance with ambulation/Assistance OOB with selected safe patient handling equipment/Communicate Risk of Fall with Harm to all staff/Discuss with provider need for PT consult/Monitor gait and stability/Reinforce activity limits and safety measures with patient and family/Sit up slowly, dangle for a short time, stand at bedside before walking/Tailored Fall Risk Interventions/Use of alarms - bed, chair and/or voice tab/Visual Cue: Yellow wristband and red socks/Bed in lowest position, wheels locked, appropriate side rails in place/Call bell, personal items and telephone in reach/Instruct patient to call for assistance before getting out of bed or chair/Non-slip footwear when patient is out of bed/[place] to call system/Physically safe environment - no spills, clutter or unnecessary equipment/Purposeful Proactive Rounding/Room/bathroom lighting operational, light cord in reach

## 2023-04-24 LAB
ALBUMIN SERPL ELPH-MCNC: 3.4 G/DL — SIGNIFICANT CHANGE UP (ref 3.3–5)
ALP SERPL-CCNC: 99 U/L — SIGNIFICANT CHANGE UP (ref 40–120)
ALT FLD-CCNC: 107 U/L — HIGH (ref 10–45)
ANION GAP SERPL CALC-SCNC: 5 MMOL/L — SIGNIFICANT CHANGE UP (ref 5–17)
ANION GAP SERPL CALC-SCNC: 7 MMOL/L — SIGNIFICANT CHANGE UP (ref 5–17)
ANION GAP SERPL CALC-SCNC: 8 MMOL/L — SIGNIFICANT CHANGE UP (ref 5–17)
AST SERPL-CCNC: 50 U/L — HIGH (ref 10–40)
BILIRUB SERPL-MCNC: 0.5 MG/DL — SIGNIFICANT CHANGE UP (ref 0.2–1.2)
BUN SERPL-MCNC: 12 MG/DL — SIGNIFICANT CHANGE UP (ref 7–23)
BUN SERPL-MCNC: 13 MG/DL — SIGNIFICANT CHANGE UP (ref 7–23)
BUN SERPL-MCNC: 14 MG/DL — SIGNIFICANT CHANGE UP (ref 7–23)
C3 SERPL-MCNC: 111 MG/DL — SIGNIFICANT CHANGE UP (ref 81–157)
C4 SERPL-MCNC: 24 MG/DL — SIGNIFICANT CHANGE UP (ref 13–39)
CALCIUM SERPL-MCNC: 8.3 MG/DL — LOW (ref 8.4–10.5)
CALCIUM SERPL-MCNC: 8.4 MG/DL — SIGNIFICANT CHANGE UP (ref 8.4–10.5)
CALCIUM SERPL-MCNC: 8.6 MG/DL — SIGNIFICANT CHANGE UP (ref 8.4–10.5)
CHLORIDE SERPL-SCNC: 110 MMOL/L — HIGH (ref 96–108)
CHLORIDE SERPL-SCNC: 110 MMOL/L — HIGH (ref 96–108)
CHLORIDE SERPL-SCNC: 114 MMOL/L — HIGH (ref 96–108)
CO2 SERPL-SCNC: 24 MMOL/L — SIGNIFICANT CHANGE UP (ref 22–31)
CO2 SERPL-SCNC: 26 MMOL/L — SIGNIFICANT CHANGE UP (ref 22–31)
CO2 SERPL-SCNC: 26 MMOL/L — SIGNIFICANT CHANGE UP (ref 22–31)
CREAT SERPL-MCNC: 0.36 MG/DL — LOW (ref 0.5–1.3)
CREAT SERPL-MCNC: 0.37 MG/DL — LOW (ref 0.5–1.3)
CREAT SERPL-MCNC: 0.4 MG/DL — LOW (ref 0.5–1.3)
EGFR: 116 ML/MIN/1.73M2 — SIGNIFICANT CHANGE UP
EGFR: 118 ML/MIN/1.73M2 — SIGNIFICANT CHANGE UP
EGFR: 119 ML/MIN/1.73M2 — SIGNIFICANT CHANGE UP
GLUCOSE SERPL-MCNC: 129 MG/DL — HIGH (ref 70–99)
GLUCOSE SERPL-MCNC: 130 MG/DL — HIGH (ref 70–99)
GLUCOSE SERPL-MCNC: 133 MG/DL — HIGH (ref 70–99)
HAV IGM SER-ACNC: SIGNIFICANT CHANGE UP
HBV CORE AB SER-ACNC: SIGNIFICANT CHANGE UP
HBV CORE IGM SER-ACNC: SIGNIFICANT CHANGE UP
HBV SURFACE AB SER-ACNC: REACTIVE
HBV SURFACE AG SER-ACNC: SIGNIFICANT CHANGE UP
HCT VFR BLD CALC: 29.9 % — LOW (ref 34.5–45)
HGB BLD-MCNC: 10 G/DL — LOW (ref 11.5–15.5)
LACTATE CSF-MCNC: 4 MMOL/L — HIGH (ref 1.1–2.4)
MAGNESIUM SERPL-MCNC: 2.1 MG/DL — SIGNIFICANT CHANGE UP (ref 1.6–2.6)
MCHC RBC-ENTMCNC: 30.5 PG — SIGNIFICANT CHANGE UP (ref 27–34)
MCHC RBC-ENTMCNC: 33.4 GM/DL — SIGNIFICANT CHANGE UP (ref 32–36)
MCV RBC AUTO: 91.2 FL — SIGNIFICANT CHANGE UP (ref 80–100)
NRBC # BLD: 0 /100 WBCS — SIGNIFICANT CHANGE UP (ref 0–0)
PHOSPHATE SERPL-MCNC: 1.9 MG/DL — LOW (ref 2.5–4.5)
PLATELET # BLD AUTO: 241 K/UL — SIGNIFICANT CHANGE UP (ref 150–400)
POTASSIUM SERPL-MCNC: 3.4 MMOL/L — LOW (ref 3.5–5.3)
POTASSIUM SERPL-MCNC: 3.7 MMOL/L — SIGNIFICANT CHANGE UP (ref 3.5–5.3)
POTASSIUM SERPL-MCNC: 4.3 MMOL/L — SIGNIFICANT CHANGE UP (ref 3.5–5.3)
POTASSIUM SERPL-SCNC: 3.4 MMOL/L — LOW (ref 3.5–5.3)
POTASSIUM SERPL-SCNC: 3.7 MMOL/L — SIGNIFICANT CHANGE UP (ref 3.5–5.3)
POTASSIUM SERPL-SCNC: 4.3 MMOL/L — SIGNIFICANT CHANGE UP (ref 3.5–5.3)
PROT SERPL-MCNC: 6.1 G/DL — SIGNIFICANT CHANGE UP (ref 6–8.3)
RBC # BLD: 3.28 M/UL — LOW (ref 3.8–5.2)
RBC # FLD: 13.2 % — SIGNIFICANT CHANGE UP (ref 10.3–14.5)
SODIUM SERPL-SCNC: 141 MMOL/L — SIGNIFICANT CHANGE UP (ref 135–145)
SODIUM SERPL-SCNC: 143 MMOL/L — SIGNIFICANT CHANGE UP (ref 135–145)
SODIUM SERPL-SCNC: 146 MMOL/L — HIGH (ref 135–145)
WBC # BLD: 6.5 K/UL — SIGNIFICANT CHANGE UP (ref 3.8–10.5)
WBC # FLD AUTO: 6.5 K/UL — SIGNIFICANT CHANGE UP (ref 3.8–10.5)

## 2023-04-24 PROCEDURE — 99024 POSTOP FOLLOW-UP VISIT: CPT

## 2023-04-24 PROCEDURE — 99291 CRITICAL CARE FIRST HOUR: CPT

## 2023-04-24 PROCEDURE — 99233 SBSQ HOSP IP/OBS HIGH 50: CPT

## 2023-04-24 PROCEDURE — 93306 TTE W/DOPPLER COMPLETE: CPT | Mod: 26

## 2023-04-24 PROCEDURE — 70450 CT HEAD/BRAIN W/O DYE: CPT | Mod: 26

## 2023-04-24 RX ORDER — HYDRALAZINE HCL 50 MG
10 TABLET ORAL ONCE
Refills: 0 | Status: COMPLETED | OUTPATIENT
Start: 2023-04-24 | End: 2023-04-24

## 2023-04-24 RX ORDER — POTASSIUM CHLORIDE 20 MEQ
40 PACKET (EA) ORAL ONCE
Refills: 0 | Status: COMPLETED | OUTPATIENT
Start: 2023-04-24 | End: 2023-04-24

## 2023-04-24 RX ORDER — SODIUM CHLORIDE 9 MG/ML
4 INJECTION INTRAMUSCULAR; INTRAVENOUS; SUBCUTANEOUS EVERY 6 HOURS
Refills: 0 | Status: DISCONTINUED | OUTPATIENT
Start: 2023-04-24 | End: 2023-04-28

## 2023-04-24 RX ORDER — ACETYLCYSTEINE 200 MG/ML
4 VIAL (ML) MISCELLANEOUS EVERY 6 HOURS
Refills: 0 | Status: DISCONTINUED | OUTPATIENT
Start: 2023-04-24 | End: 2023-04-28

## 2023-04-24 RX ORDER — ENOXAPARIN SODIUM 100 MG/ML
40 INJECTION SUBCUTANEOUS EVERY 24 HOURS
Refills: 0 | Status: DISCONTINUED | OUTPATIENT
Start: 2023-04-24 | End: 2023-04-25

## 2023-04-24 RX ORDER — POTASSIUM PHOSPHATE, MONOBASIC POTASSIUM PHOSPHATE, DIBASIC 236; 224 MG/ML; MG/ML
30 INJECTION, SOLUTION INTRAVENOUS ONCE
Refills: 0 | Status: COMPLETED | OUTPATIENT
Start: 2023-04-24 | End: 2023-04-24

## 2023-04-24 RX ORDER — HYDROMORPHONE HYDROCHLORIDE 2 MG/ML
0.25 INJECTION INTRAMUSCULAR; INTRAVENOUS; SUBCUTANEOUS ONCE
Refills: 0 | Status: DISCONTINUED | OUTPATIENT
Start: 2023-04-24 | End: 2023-04-24

## 2023-04-24 RX ORDER — OXYCODONE HYDROCHLORIDE 5 MG/1
5 TABLET ORAL ONCE
Refills: 0 | Status: DISCONTINUED | OUTPATIENT
Start: 2023-04-24 | End: 2023-04-24

## 2023-04-24 RX ORDER — OXYCODONE HYDROCHLORIDE 5 MG/1
5 TABLET ORAL EVERY 4 HOURS
Refills: 0 | Status: DISCONTINUED | OUTPATIENT
Start: 2023-04-24 | End: 2023-04-28

## 2023-04-24 RX ORDER — SODIUM CHLORIDE 5 G/100ML
500 INJECTION, SOLUTION INTRAVENOUS
Refills: 0 | Status: DISCONTINUED | OUTPATIENT
Start: 2023-04-24 | End: 2023-04-24

## 2023-04-24 RX ORDER — AMLODIPINE BESYLATE 2.5 MG/1
5 TABLET ORAL DAILY
Refills: 0 | Status: DISCONTINUED | OUTPATIENT
Start: 2023-04-24 | End: 2023-04-28

## 2023-04-24 RX ORDER — OXYCODONE HYDROCHLORIDE 5 MG/1
5 TABLET ORAL EVERY 4 HOURS
Refills: 0 | Status: DISCONTINUED | OUTPATIENT
Start: 2023-04-24 | End: 2023-04-24

## 2023-04-24 RX ORDER — SODIUM CHLORIDE 9 MG/ML
1000 INJECTION INTRAMUSCULAR; INTRAVENOUS; SUBCUTANEOUS
Refills: 0 | Status: DISCONTINUED | OUTPATIENT
Start: 2023-04-24 | End: 2023-04-24

## 2023-04-24 RX ORDER — ACETAMINOPHEN 500 MG
1000 TABLET ORAL ONCE
Refills: 0 | Status: COMPLETED | OUTPATIENT
Start: 2023-04-24 | End: 2023-04-24

## 2023-04-24 RX ORDER — SODIUM CHLORIDE 5 G/100ML
500 INJECTION, SOLUTION INTRAVENOUS
Refills: 0 | Status: DISCONTINUED | OUTPATIENT
Start: 2023-04-24 | End: 2023-04-25

## 2023-04-24 RX ADMIN — OXYCODONE HYDROCHLORIDE 5 MILLIGRAM(S): 5 TABLET ORAL at 19:36

## 2023-04-24 RX ADMIN — POLYETHYLENE GLYCOL 3350 17 GRAM(S): 17 POWDER, FOR SOLUTION ORAL at 11:26

## 2023-04-24 RX ADMIN — TRAMADOL HYDROCHLORIDE 25 MILLIGRAM(S): 50 TABLET ORAL at 03:00

## 2023-04-24 RX ADMIN — HYDROMORPHONE HYDROCHLORIDE 0.25 MILLIGRAM(S): 2 INJECTION INTRAMUSCULAR; INTRAVENOUS; SUBCUTANEOUS at 21:26

## 2023-04-24 RX ADMIN — OXYCODONE HYDROCHLORIDE 5 MILLIGRAM(S): 5 TABLET ORAL at 18:50

## 2023-04-24 RX ADMIN — Medication 1000 MILLIGRAM(S): at 14:16

## 2023-04-24 RX ADMIN — Medication 100 MILLIGRAM(S): at 17:00

## 2023-04-24 RX ADMIN — POTASSIUM PHOSPHATE, MONOBASIC POTASSIUM PHOSPHATE, DIBASIC 83.33 MILLIMOLE(S): 236; 224 INJECTION, SOLUTION INTRAVENOUS at 08:24

## 2023-04-24 RX ADMIN — TRAMADOL HYDROCHLORIDE 25 MILLIGRAM(S): 50 TABLET ORAL at 10:00

## 2023-04-24 RX ADMIN — AMLODIPINE BESYLATE 5 MILLIGRAM(S): 2.5 TABLET ORAL at 14:38

## 2023-04-24 RX ADMIN — TRAMADOL HYDROCHLORIDE 25 MILLIGRAM(S): 50 TABLET ORAL at 02:00

## 2023-04-24 RX ADMIN — Medication 4 MILLILITER(S): at 21:42

## 2023-04-24 RX ADMIN — HYDROMORPHONE HYDROCHLORIDE 0.25 MILLIGRAM(S): 2 INJECTION INTRAMUSCULAR; INTRAVENOUS; SUBCUTANEOUS at 07:42

## 2023-04-24 RX ADMIN — TRAMADOL HYDROCHLORIDE 25 MILLIGRAM(S): 50 TABLET ORAL at 09:14

## 2023-04-24 RX ADMIN — Medication 10 MILLIGRAM(S): at 14:38

## 2023-04-24 RX ADMIN — HYDROMORPHONE HYDROCHLORIDE 0.25 MILLIGRAM(S): 2 INJECTION INTRAMUSCULAR; INTRAVENOUS; SUBCUTANEOUS at 06:26

## 2023-04-24 RX ADMIN — CHLORHEXIDINE GLUCONATE 1 APPLICATION(S): 213 SOLUTION TOPICAL at 05:26

## 2023-04-24 RX ADMIN — Medication 100 MILLIGRAM(S): at 07:42

## 2023-04-24 RX ADMIN — HYDROMORPHONE HYDROCHLORIDE 0.25 MILLIGRAM(S): 2 INJECTION INTRAMUSCULAR; INTRAVENOUS; SUBCUTANEOUS at 21:45

## 2023-04-24 RX ADMIN — ATORVASTATIN CALCIUM 80 MILLIGRAM(S): 80 TABLET, FILM COATED ORAL at 21:23

## 2023-04-24 RX ADMIN — Medication 1000 MILLIGRAM(S): at 13:38

## 2023-04-24 RX ADMIN — Medication 2 MILLIGRAM(S): at 21:24

## 2023-04-24 RX ADMIN — Medication 1 CAPSULE(S): at 17:45

## 2023-04-24 RX ADMIN — Medication 1 CAPSULE(S): at 17:08

## 2023-04-24 RX ADMIN — OXYCODONE HYDROCHLORIDE 5 MILLIGRAM(S): 5 TABLET ORAL at 14:16

## 2023-04-24 RX ADMIN — OXYCODONE HYDROCHLORIDE 5 MILLIGRAM(S): 5 TABLET ORAL at 13:39

## 2023-04-24 RX ADMIN — Medication 3 MILLILITER(S): at 20:04

## 2023-04-24 RX ADMIN — SENNA PLUS 2 TABLET(S): 8.6 TABLET ORAL at 21:24

## 2023-04-24 RX ADMIN — Medication 40 MILLIEQUIVALENT(S): at 05:26

## 2023-04-24 NOTE — PROGRESS NOTE ADULT - SUBJECTIVE AND OBJECTIVE BOX
Neurology Stroke Progress Note    INTERVAL HPI/OVERNIGHT EVENTS:  Patient seen and examined.  number [id number] used. Underwent repeat NCHCT this morning, successfully extubated now on nasal cannula    MEDICATIONS  (STANDING):  atorvastatin 80 milliGRAM(s) Oral at bedtime  ceFAZolin   IVPB 2000 milliGRAM(s) IV Intermittent every 8 hours  chlorhexidine 2% Cloths 1 Application(s) Topical <User Schedule>  doxazosin 2 milliGRAM(s) Oral at bedtime  polyethylene glycol 3350 17 Gram(s) Oral daily  senna 2 Tablet(s) Oral at bedtime  sodium chloride 0.9%. 1000 milliLiter(s) (30 mL/Hr) IV Continuous <Continuous>  sodium chloride 3%. 500 milliLiter(s) (50 mL/Hr) IV Continuous <Continuous>    MEDICATIONS  (PRN):  acetaminophen   Oral Liquid .. 650 milliGRAM(s) Oral every 6 hours PRN Temp greater or equal to 38C (100.4F), Mild Pain (1 - 3)  albuterol/ipratropium for Nebulization 3 milliLiter(s) Nebulizer every 6 hours PRN Shortness of Breath and/or Wheezing  ondansetron Injectable 4 milliGRAM(s) IV Push every 6 hours PRN Nausea and/or Vomiting  traMADol 25 milliGRAM(s) Oral every 4 hours PRN Moderate Pain (4 - 6)      Allergies    No Known Allergies    Intolerances        Vital Signs Last 24 Hrs  T(C): 36.8 (24 Apr 2023 09:10), Max: 37.6 (23 Apr 2023 14:00)  T(F): 98.2 (24 Apr 2023 09:10), Max: 99.7 (23 Apr 2023 14:00)  HR: 72 (24 Apr 2023 12:00) (56 - 89)  BP: 179/82 (24 Apr 2023 12:00) (128/60 - 179/82)  BP(mean): 118 (24 Apr 2023 12:00) (87 - 118)  RR: 17 (24 Apr 2023 12:00) (10 - 24)  SpO2: 99% (24 Apr 2023 12:00) (95% - 100%)    Parameters below as of 24 Apr 2023 12:00  Patient On (Oxygen Delivery Method): nasal cannula  O2 Flow (L/min): 3      Physical exam:  General: No acute distress  Eyes: Anicteric sclerae, moist conjunctivae, see below for CNs  Neck: trachea midline  Cardiovascular: Regular rate and rhythm, noted to have intermittent PVC's   Pulmonary:  No use of accessory muscles  GI: Abdomen soft, non-distended, non-tender  Extremities: no edema    Neurologic:  -Mental status: Lethargic, arouses briefly to voice and able to follow most commands. Cannot show two fingers on right hand when asked to but able to attempt to smile and close/open eyes when asked as well as move extremities. Dysarthric, attempts to say name and that she is in the hospital.   -Cranial nerves:   II: Inconsistent blink to threat.  III, IV, VI: right gaze preference but able to cross midline. pupils 3mm and brisk.    VII: Face appears grossly symmetrical/  Motor: left upper extremity at least 4/5 able to lift for >10 seconds. right upper extremity 2/5 with weak hand . Bilateral lower extremities able to lift antigravity at least 3/5, left lower extremity able to maintain higher compared to right lower extremity. right lower extremity with drift but does not hit bed within 5 seconds.   Sensation: grossly intact  Coordination: unable to perform 2/2 lethargy and weakness    LABS:                        10.0   6.50  )-----------( 241      ( 24 Apr 2023 02:39 )             29.9     04-24    141  |  110<H>  |  12  ----------------------------<  130<H>  4.3   |  26  |  0.36<L>    Ca    8.4      24 Apr 2023 10:14  Phos  1.9     04-24  Mg     2.1     04-24    TPro  6.1  /  Alb  3.4  /  TBili  0.5  /  DBili  x   /  AST  50<H>  /  ALT  107<H>  /  AlkPhos  99  04-24    PT/INR - ( 23 Apr 2023 18:51 )   PT: 13.2 sec;   INR: 1.11          PTT - ( 23 Apr 2023 18:51 )  PTT:29.1 sec  Urinalysis Basic - ( 22 Apr 2023 20:28 )    Color: Yellow / Appearance: Clear / SG: >=1.030 / pH: x  Gluc: x / Ketone: NEGATIVE  / Bili: Negative / Urobili: 0.2 E.U./dL   Blood: x / Protein: Trace mg/dL / Nitrite: NEGATIVE   Leuk Esterase: NEGATIVE / RBC: > 10 /HPF / WBC < 5 /HPF   Sq Epi: x / Non Sq Epi: x / Bacteria: Present /HPF        RADIOLOGY & ADDITIONAL TESTS:

## 2023-04-24 NOTE — PHYSICAL THERAPY INITIAL EVALUATION ADULT - PERTINENT HX OF CURRENT PROBLEM, REHAB EVAL
58yo Romanian-speaking F w/ PMH of asthma, CAD (not on meds), peripheral neuropathy and PSH of BATOOL, cholecystectomy and right knee surgery presented to [place] ED on 4/20 w/ right sided weakness and right facial numbness. Initially found to have a right PICA distribution acute infarct and a right vertebral artery occlusion. Not a candidate for any intervention. On repeat imaging had progression of acute infarct within the right and left cerebellar hemisphere with mass effect on the fourth ventricle and hydrocephalus and upward and downward herniation of the cerebellar parenchyma. She was transferred to [facility] on 4/21 and underwent an emergent SOC foramen magnum decompression and right parietal/occipital EVD placement.  Stroke was consulted for further recommendations. Now s/p occipital craniectomy.

## 2023-04-24 NOTE — PHYSICAL THERAPY INITIAL EVALUATION ADULT - IMPAIRMENTS FOUND, PT EVAL
aerobic capacity/endurance/arousal, attention, and cognition/cranial and peripheral nerve integrity/fine motor/gait, locomotion, and balance/gross motor/muscle strength/neuromotor development and sensory integration/poor safety awareness/posture/ROM/sensory integrity

## 2023-04-24 NOTE — PROGRESS NOTE ADULT - SUBJECTIVE AND OBJECTIVE BOX
=============================  NSCU ATTENDING PROGRESS NOTE  =============================    Patient is a 56 y/o F with history of asthma and peripheral neuropathy transferred to [facility] NSICU from [facility] for management of malignant posterior fossa stroke.  The patient developed symptoms at approximately 0830 on 4/20: dysconjugate gaze, ataxia, facial droop, dysarthria.  She did not present to [facility] until later on in the afternoon during which she was not a candidate for TNK.  CTP showing bilateral cerebellar perfusion mismatch, CTA showing R. V4 occlusion and large bilateral cavernous/terminal ICA aneurysms.  Patient was admitted to the ICU and monitored.  On 4/21 the patient became more lethargic, at which point a repeat CTH was performed, showing bilateral cerebellar strokes with lateral compression of the fourth ventricle on the right.  Patient accepted to [facility] by Dr. Valadez, at which point he was given 250cc 3% NaCl, started on 50cc/h, given 20mcg DDAVP and transferred as tier 1.     Upon arrival to [facility], NIHSS = 12, but mental status slowly declined from lethargy to near obtundation.  Repeat CTH showing stable mass effect and hydrocephalus. Patient taken emergently for SOC depression with EVD placement.    HOSP COURSE:       ICU Vital Signs Last 24 Hrs  T(C): 36.9 (24 Apr 2023 06:12), Max: 37.6 (23 Apr 2023 09:00)  T(F): 98.4 (24 Apr 2023 06:12), Max: 99.7 (23 Apr 2023 09:00)  HR: 61 (24 Apr 2023 07:00) (56 - 89)  BP: 152/64 (24 Apr 2023 07:00) (128/60 - 163/74)  BP(mean): 92 (24 Apr 2023 07:00) (87 - 106)  RR: 13 (24 Apr 2023 07:00) (10 - 24)  SpO2: 100% (24 Apr 2023 07:00) (95% - 100%)      04-23-23 @ 07:01  -  04-24-23 @ 07:00  --------------------------------------------------------  IN: 2295 mL / OUT: 2597 mL / NET: -302 mL      EXAM:   MS: Patient opens eyes to voice/ commands  CN: Pupils 3mm and brisk, tracks, grimaces  Motor: RUE 0/5, LUE AG, LLE proximally AG, distally 5/5  RLE: distally AG   PF/DF 5/5  Chest: Clear, no wheeze  Heart regular rate/rhythm, present S1/S2, no murmurs or rubs  Abdomen: Soft and nontender   Extremities: No edema      MEDICATIONS:   acetaminophen   Oral Liquid .. 650 milliGRAM(s) Oral every 6 hours PRN  albuterol/ipratropium for Nebulization 3 milliLiter(s) Nebulizer every 6 hours PRN  atorvastatin 80 milliGRAM(s) Oral at bedtime  ceFAZolin   IVPB 2000 milliGRAM(s) IV Intermittent every 8 hours  chlorhexidine 2% Cloths 1 Application(s) Topical <User Schedule>  doxazosin 2 milliGRAM(s) Oral at bedtime  ondansetron Injectable 4 milliGRAM(s) IV Push every 6 hours PRN  polyethylene glycol 3350 17 Gram(s) Oral daily  potassium phosphate IVPB 30 milliMole(s) IV Intermittent once  senna 2 Tablet(s) Oral at bedtime  sodium chloride 0.9%. 1000 milliLiter(s) (50 mL/Hr) IV Continuous <Continuous>  sodium chloride 3%. 500 milliLiter(s) (30 mL/Hr) IV Continuous <Continuous>  traMADol 25 milliGRAM(s) Oral every 4 hours PRN      LABS:                         10.0   6.50  )-----------( 241      ( 24 Apr 2023 02:39 )             29.9     04-24    146<H>  |  114<H>  |  14  ----------------------------<  129<H>  3.4<L>   |  24  |  0.40<L>    Ca    8.3<L>      24 Apr 2023 02:39  Phos  1.9     04-24  Mg     2.1     04-24    TPro  6.1  /  Alb  3.5  /  TBili  0.7  /  DBili  <0.2  /  AST  89<H>  /  ALT  136<H>  /  AlkPhos  101  04-23    LIVER FUNCTIONS - ( 23 Apr 2023 07:00 )  Alb: 3.5 g/dL / Pro: 6.1 g/dL / ALK PHOS: 101 U/L / ALT: 136 U/L / AST: 89 U/L / GGT: x            =============================  NSCU ATTENDING PROGRESS NOTE  =============================    Patient is a 56 y/o F with history of asthma and peripheral neuropathy transferred to [facility] NSICU from [facility] for management of malignant posterior fossa stroke.  The patient developed symptoms at approximately 0830 on 4/20: dysconjugate gaze, ataxia, facial droop, dysarthria.  She did not present to [facility] until later on in the afternoon during which she was not a candidate for TNK.  CTP showing bilateral cerebellar perfusion mismatch, CTA showing R. V4 occlusion and large bilateral cavernous/terminal ICA aneurysms.  Patient was admitted to the ICU and monitored.  On 4/21 the patient became more lethargic, at which point a repeat CTH was performed, showing bilateral cerebellar strokes with lateral compression of the fourth ventricle on the right.  Patient accepted to [facility] by Dr. Valadez, at which point he was given 250cc 3% NaCl, started on 50cc/h, given 20mcg DDAVP and transferred as tier 1.     Upon arrival to [facility], NIHSS = 12, but mental status slowly declined from lethargy to near obtundation.  Repeat CTH showing stable mass effect and hydrocephalus. Patient taken emergently for SOC depression with EVD placement.      ICU Vital Signs Last 24 Hrs  T(C): 36.9 (24 Apr 2023 06:12), Max: 37.6 (23 Apr 2023 09:00)  T(F): 98.4 (24 Apr 2023 06:12), Max: 99.7 (23 Apr 2023 09:00)  HR: 61 (24 Apr 2023 07:00) (56 - 89)  BP: 152/64 (24 Apr 2023 07:00) (128/60 - 163/74)  BP(mean): 92 (24 Apr 2023 07:00) (87 - 106)  RR: 13 (24 Apr 2023 07:00) (10 - 24)  SpO2: 100% (24 Apr 2023 07:00) (95% - 100%)      04-23-23 @ 07:01  -  04-24-23 @ 07:00  --------------------------------------------------------  IN: 2295 mL / OUT: 2597 mL / NET: -302 mL      EXAM:   MS: Patient opens eyes to voice/ commands. Oriented x 2-3 by nodding  CN: Pupils 3mm and brisk, tracks,  Motor: Power 5/5 LUE, 3/5 in RUE  B/L lowers 4/5 - some effort dependence  Chest: Clear, no wheeze  Heart regular rate/rhythm, present S1/S2, no murmurs or rubs  Abdomen: Soft and nontender   Extremities: No edema      MEDICATIONS:   acetaminophen   Oral Liquid .. 650 milliGRAM(s) Oral every 6 hours PRN  albuterol/ipratropium for Nebulization 3 milliLiter(s) Nebulizer every 6 hours PRN  atorvastatin 80 milliGRAM(s) Oral at bedtime  ceFAZolin   IVPB 2000 milliGRAM(s) IV Intermittent every 8 hours  chlorhexidine 2% Cloths 1 Application(s) Topical <User Schedule>  doxazosin 2 milliGRAM(s) Oral at bedtime  ondansetron Injectable 4 milliGRAM(s) IV Push every 6 hours PRN  polyethylene glycol 3350 17 Gram(s) Oral daily  potassium phosphate IVPB 30 milliMole(s) IV Intermittent once  senna 2 Tablet(s) Oral at bedtime  sodium chloride 0.9%. 1000 milliLiter(s) (50 mL/Hr) IV Continuous <Continuous>  sodium chloride 3%. 500 milliLiter(s) (30 mL/Hr) IV Continuous <Continuous>  traMADol 25 milliGRAM(s) Oral every 4 hours PRN      LABS:                         10.0   6.50  )-----------( 241      ( 24 Apr 2023 02:39 )             29.9     04-24    146<H>  |  114<H>  |  14  ----------------------------<  129<H>  3.4<L>   |  24  |  0.40<L>    Ca    8.3<L>      24 Apr 2023 02:39  Phos  1.9     04-24  Mg     2.1     04-24    TPro  6.1  /  Alb  3.5  /  TBili  0.7  /  DBili  <0.2  /  AST  89<H>  /  ALT  136<H>  /  AlkPhos  101  04-23    LIVER FUNCTIONS - ( 23 Apr 2023 07:00 )  Alb: 3.5 g/dL / Pro: 6.1 g/dL / ALK PHOS: 101 U/L / ALT: 136 U/L / AST: 89 U/L / GGT: x

## 2023-04-24 NOTE — OCCUPATIONAL THERAPY INITIAL EVALUATION ADULT - REFERRAL TO ANOTHER SERVICE NEEDED, OT EVAL
PT following; Requested Speech Language Pathology consult (for Language eval due to dysarthria) Neurosx MD Bueno aware./physical therapy/speech language pathology

## 2023-04-24 NOTE — PROGRESS NOTE ADULT - SUBJECTIVE AND OBJECTIVE BOX
PM EVENTS: no acute overnight events as of documentation time of this note.  SNa 143    ROS: negative except as mentioned above.    T(C): 37.2 (04-24-23 @ 17:00), Max: 37.3 (04-23-23 @ 20:00)  HR: 66 (04-24-23 @ 18:00) (56 - 78)  BP: 137/62 (04-24-23 @ 18:00) (128/60 - 179/82)  RR: 12 (04-24-23 @ 18:00) (9 - 24)  SpO2: 94% (04-24-23 @ 18:00) (94% - 100%)    Mode: standby      I&O's Summary    23 Apr 2023 07:01  -  24 Apr 2023 07:00  --------------------------------------------------------  IN: 2295 mL / OUT: 2598 mL / NET: -303 mL    24 Apr 2023 07:01  -  24 Apr 2023 18:31  --------------------------------------------------------  IN: 1348 mL / OUT: 712 mL / NET: 636 mL        EXAM:     [place] Coma Scale: 3/1/6 = 10    General: normocephalic, suboccipital crani wound, laying in bed, in no distress  Neuro     MS: [place] Coma Scale: 3/1/6 = 10, follows commands, cooperative with examination    CN: PERRL, (+)R. gaze, plegia to left gaze    Mot: bulk normal, tone normal, power UPPER 2(withdrawals POB)/3(at least), LOWER 3/3  Chest: (+)upper airway rhonchi, lung fields are CTA, heart regular rate/rhythm, present S1/S2, no murmurs or rubs  Abdomen: nondistended, soft and nontender without peritoneal signs, normoactive bowel sounds  Extremities: no clubbing, well-perfused, no edema                                10.0   6.50  )-----------( 241      ( 24 Apr 2023 02:39 )             29.9     04-24    143  |  110<H>  |  13  ----------------------------<  133<H>  3.7   |  26  |  0.37<L>    Ca    8.6      24 Apr 2023 17:16  Phos  1.9     04-24  Mg     2.1     04-24    TPro  6.1  /  Alb  3.4  /  TBili  0.5  /  DBili  x   /  AST  50<H>  /  ALT  107<H>  /  AlkPhos  99  04-24      MEDICATIONS  (STANDING):  amLODIPine   Tablet 5 milliGRAM(s) Oral daily  atorvastatin 80 milliGRAM(s) Oral at bedtime  ceFAZolin   IVPB 2000 milliGRAM(s) IV Intermittent every 8 hours  chlorhexidine 2% Cloths 1 Application(s) Topical <User Schedule>  doxazosin 2 milliGRAM(s) Oral at bedtime  polyethylene glycol 3350 17 Gram(s) Oral daily  senna 2 Tablet(s) Oral at bedtime  sodium chloride 3%. 500 milliLiter(s) (50 mL/Hr) IV Continuous <Continuous>    MEDICATIONS  (PRN):  acetaminophen   Oral Liquid .. 650 milliGRAM(s) Oral every 6 hours PRN Temp greater or equal to 38C (100.4F), Mild Pain (1 - 3)  acetaminophen 300 mG/butalbital 50 mG/ caffeine 40 mG 1 Capsule(s) Oral every 6 hours PRN headache  albuterol/ipratropium for Nebulization 3 milliLiter(s) Nebulizer every 6 hours PRN Shortness of Breath and/or Wheezing  ondansetron Injectable 4 milliGRAM(s) IV Push every 6 hours PRN Nausea and/or Vomiting  oxyCODONE    IR 5 milliGRAM(s) Oral every 4 hours PRN Moderate Pain (4 - 6)      Please see the day's note documented by Dr. Bueno for detailed ongoing assessment and plan.      #Bilateral cerebellar hemispheric strokes, post stroke day #3  #S/p SOC for foramen magnum decompression and R. parieto-occipital placement of EVD, POD#2  #Bilateral carotid terminus aneurysms  #R. V4 occlusion  #History of peripheral neuropathy and asthma    - Remains in NSICU for frequent neuro checks, cerebral edema/hemorrhagic transformation watch, tenuous respiratory status  - F/u Chem 7 tonight, stroke workup ongoing         PM EVENTS: no acute overnight events as of documentation time of this note.  SNa 143    ROS: negative except as mentioned above.    T(C): 37.2 (04-24-23 @ 17:00), Max: 37.3 (04-23-23 @ 20:00)  HR: 66 (04-24-23 @ 18:00) (56 - 78)  BP: 137/62 (04-24-23 @ 18:00) (128/60 - 179/82)  RR: 12 (04-24-23 @ 18:00) (9 - 24)  SpO2: 94% (04-24-23 @ 18:00) (94% - 100%)    Mode: standby      I&O's Summary    23 Apr 2023 07:01  -  24 Apr 2023 07:00  --------------------------------------------------------  IN: 2295 mL / OUT: 2598 mL / NET: -303 mL    24 Apr 2023 07:01  -  24 Apr 2023 18:31  --------------------------------------------------------  IN: 1348 mL / OUT: 712 mL / NET: 636 mL        EXAM:     [place] Coma Scale: 4/1/6 = 11    General: normocephalic, suboccipital crani wound, laying in bed, in no distress  Neuro     MS: [place] Coma Scale: 4/1/6 = 11, follows commands, cooperative with examination    CN: PERRL, (+)R. gaze, plegia to left gaze    Mot: bulk normal, tone normal, power UPPER 2(withdrawals POB)/3(at least), LOWER 3/3  Chest: (+)upper airway rhonchi, lung fields are CTA, heart regular rate/rhythm, present S1/S2, no murmurs or rubs  Abdomen: nondistended, soft and nontender without peritoneal signs, normoactive bowel sounds  Extremities: no clubbing, well-perfused, no edema                                10.0   6.50  )-----------( 241      ( 24 Apr 2023 02:39 )             29.9     04-24    143  |  110<H>  |  13  ----------------------------<  133<H>  3.7   |  26  |  0.37<L>    Ca    8.6      24 Apr 2023 17:16  Phos  1.9     04-24  Mg     2.1     04-24    TPro  6.1  /  Alb  3.4  /  TBili  0.5  /  DBili  x   /  AST  50<H>  /  ALT  107<H>  /  AlkPhos  99  04-24      MEDICATIONS  (STANDING):  amLODIPine   Tablet 5 milliGRAM(s) Oral daily  atorvastatin 80 milliGRAM(s) Oral at bedtime  ceFAZolin   IVPB 2000 milliGRAM(s) IV Intermittent every 8 hours  chlorhexidine 2% Cloths 1 Application(s) Topical <User Schedule>  doxazosin 2 milliGRAM(s) Oral at bedtime  polyethylene glycol 3350 17 Gram(s) Oral daily  senna 2 Tablet(s) Oral at bedtime  sodium chloride 3%. 500 milliLiter(s) (50 mL/Hr) IV Continuous <Continuous>    MEDICATIONS  (PRN):  acetaminophen   Oral Liquid .. 650 milliGRAM(s) Oral every 6 hours PRN Temp greater or equal to 38C (100.4F), Mild Pain (1 - 3)  acetaminophen 300 mG/butalbital 50 mG/ caffeine 40 mG 1 Capsule(s) Oral every 6 hours PRN headache  albuterol/ipratropium for Nebulization 3 milliLiter(s) Nebulizer every 6 hours PRN Shortness of Breath and/or Wheezing  ondansetron Injectable 4 milliGRAM(s) IV Push every 6 hours PRN Nausea and/or Vomiting  oxyCODONE    IR 5 milliGRAM(s) Oral every 4 hours PRN Moderate Pain (4 - 6)      Please see the day's note documented by Dr. Bueno for detailed ongoing assessment and plan.      #Bilateral cerebellar hemispheric strokes, post stroke day #3  #S/p SOC for foramen magnum decompression and R. parieto-occipital placement of EVD, POD#2  #Bilateral carotid terminus aneurysms  #R. V4 occlusion  #History of peripheral neuropathy and asthma    - Remains in NSICU for frequent neuro checks, cerebral edema/hemorrhagic transformation watch, tenuous respiratory status  - F/u Chem 7 tonight, stroke workup ongoing

## 2023-04-24 NOTE — OCCUPATIONAL THERAPY INITIAL EVALUATION ADULT - VISUAL ASSESSMENT: TRACKING
Pt with R gaze preference. Unable to formally assess visual tracking, pt able to track therapists moving on R side up to midline.

## 2023-04-24 NOTE — PHYSICAL THERAPY INITIAL EVALUATION ADULT - GENERAL OBSERVATIONS, REHAB EVAL
PT IE Completed. MRS: #5. Pt received semi-supine in bed, +multiple IVs, L wrist restraint, +Purewick, +EVD, +ICU monitoring, rectal probe, SCDs (disconnected) NG, lethargic A&Ox2, +2LNC, reporting posterior head pain (RN aware), occipital crani dressing with dried bloody drainage, 1 crani ELENA, family at bedside, and agreeable to work with PT, ASAEL Boogie notified. OT Iad present throughout. Pt transferred from OSH for acute bilateral CVA with 4th ventricle mass, hydrocephalus and herniation of cerebellar parenchyma. PT exam shows flaccid RUE, impaired sitting balance and overall functional strength, garbled speech and lethargic. Pt completed bed mobility with MaxAx2 person assist. Pt left as found, ASAEL Boogie at bedside, +call bell within reach, Nsx team updated of pt's mobility and SBP elevated to 170s (outside of parameters) , Pt can benefit from PT in order to improve quality of life by increasing strength/endurance/balance with functional mobility and ADLS.

## 2023-04-24 NOTE — PHYSICAL THERAPY INITIAL EVALUATION ADULT - IMPAIRMENTS CONTRIBUTING IMPAIRED BED MOBILITY, REHAB EVAL
impaired balance/cognition/impaired coordination/impaired motor control/impaired postural control/decreased strength No

## 2023-04-24 NOTE — PHYSICAL THERAPY INITIAL EVALUATION ADULT - PASSIVE RANGE OF MOTION EXAMINATION, REHAB EVAL
Left UE Passive ROM was WFL (within functional limits)/Right UE Passive ROM was WFL (within functional limits)/bilateral lower extremity Passive ROM was WFL (within functional limits)

## 2023-04-24 NOTE — CHART NOTE - NSCHARTNOTEFT_GEN_A_CORE
POD 3. Cont 3% with NS while NPO, start TF today. TF started. LFTs downtrending. Sodium 141. Increased 3% to 50, NS to 30. Repeat BMP ordered for 5:30PM. Tramadol 25 for pain with no relief, oxy 5 and 1g tylenol ordered, stability CT stable, speech language consult for dysarthria. Given hydralazine 10mg IVP for SBP>160, started amlodipine 5mg. C/o mild headache, given fioricet x1. Echo with bubble completed, negative for PFO, EF 55-60%. stroke neuro rec hypercoagulable and vasculitis w/u, labs ordered as well as SALVADOR and loop recorder placement.

## 2023-04-24 NOTE — PHYSICAL THERAPY INITIAL EVALUATION ADULT - SITTING BALANCE: STATIC
Pt requiring MaxAx1 to maintain sitting; Present with L lateral shift. Pt also reports dizziness./poor minus
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
## 2023-04-24 NOTE — OCCUPATIONAL THERAPY INITIAL EVALUATION ADULT - GENERAL OBSERVATIONS, REHAB EVAL
OT IE Completed. MRS 5. Pt's RN Chuyita and Neurosx DIEGO Marques cleared pt for OT. Pt received semisupine in bed, +tele, +2L O2 via NC, +EVD, +cranial ELENA drain, +suboccipital dressing in tact (mild saturation RN aware), +b/l SCDs (disconnected), +NGT (disconnected), +L wrist restraint, +LUE IVs infusing, +purewick, +rectal probe, NAD, agreeable to OT. PT Betsey present, pt's  and 2 sons present. Pt tolerated session fairly, lethargic throughout. Pt left as found, all lines in tact, needs in reach, ASAEL Boogie present at end of session.

## 2023-04-24 NOTE — OCCUPATIONAL THERAPY INITIAL EVALUATION ADULT - NS ASR FOLLOW COMMAND OT EVAL
able to follow approx 70% one step commands. Pt able to respond to yes/no questions in Sinhala/able to follow single-step instructions

## 2023-04-24 NOTE — OCCUPATIONAL THERAPY INITIAL EVALUATION ADULT - PERTINENT HX OF CURRENT PROBLEM, REHAB EVAL
56 yo F with PMH of Asthma, CAD (not on meds), peripheral presented to [place] ED on 4/20 with right sided weakness and right facial numbness. Acute infarction within the right cerebellar hemisphere, also extending into the left superior cerebellar hemisphere. New mass effect on the fourth ventricle causing stenosis versus occlusion with new third and lateral ventricular dilatation indicating ventricular obstruction at the level of the fourth ventricle. New upward and downward herniation of cerebellar parenchyma. Pt transferred to [facility] for further management. NIHSS 12.

## 2023-04-24 NOTE — OCCUPATIONAL THERAPY INITIAL EVALUATION ADULT - DIAGNOSIS, OT EVAL
Pt presents with dysarthria, decreased command following, flaccid RUE, RLE weakness, decreased postural control, decreased functional endurance, decreased cognitive processing, impacting her ability to independently complete ADLs, functional transfers, and functional mobility.

## 2023-04-24 NOTE — PROGRESS NOTE ADULT - ASSESSMENT
57 year old female w/ PMH of asthma, CAD (not on meds), peripheral neuropathy and PSH of BATOOL, cholecystectomy and right knee surgery presented to [place] ED on 4/20 w/ right sided weakness and right facial numbness. Initially found to have a right PICA distribution acute infarct and a right vertebral artery occlusion. Not a candidate for any intervention. On repeat imaging had progression of acute infarct within the right and left cerebellar hemisphere with mass effect on the fourth ventricle and hydrocephalus and upward and downward herniation of the cerebellar parenchyma. She was transferred to [facility] on 4/21 and underwent an emergent SOC foramen magnum decompression and right parietal/occipital EVD placement.  Stroke was consulted for further recommendations.     1)Secondary stroke prevention  - Holding all antiplatelets   - continue Atorvastatin 80mg PO daily when cleared by NSGY    2) Stroke risk factors  - A1C: 5.9  - LDL: 92  - TSH: 0.152  - hypercoagulable work up sent as well as vasculitis panel  - Obtain Echo w/ bubble study, will eventually need SALVADOR as well as ILR    3) Further management  - pending repeat CTH today; performed, will follow up official report  - consider MRI brain when stable, although strokes are seen on CT  - recommend SBP goal < 140, per primary team  - recommend q1hr stroke neuro checks  - Plan for diagnostic angio to eval 0.8cm right ICA aneurysm, 1.2x0.9cm left ICA aneurysm  - may need outpt neurology follow up  - provide stroke education    DVT prophylaxis   - SCDs, chemical DVT prophylaxis on hold    To be discussed with Neurology Attending Dr. Kenyon Lopez   57 year old female w/ PMH of asthma, CAD (not on meds), peripheral neuropathy and PSH of BATOOL, cholecystectomy and right knee surgery presented to [place] ED on 4/20 w/ right sided weakness and right facial numbness. Initially found to have a right PICA distribution acute infarct and a right vertebral artery occlusion. Not a candidate for any intervention. On repeat imaging had progression of acute infarct within the right and left cerebellar hemisphere with mass effect on the fourth ventricle and hydrocephalus and upward and downward herniation of the cerebellar parenchyma. She was transferred to [facility] on 4/21 and underwent an emergent SOC foramen magnum decompression and right parietal/occipital EVD placement.  Stroke was consulted for further recommendations.     1)Secondary stroke prevention  - Holding all antiplatelets   - continue Atorvastatin 80mg PO daily when cleared by NSGY    2) Stroke risk factors  - A1C: 5.9  - LDL: 92  - TSH: 0.152  - hypercoagulable work up sent as well as vasculitis panel  - Obtain Echo w/ bubble study, will eventually need SALVADOR as well as ILR    3) Further management  - pending repeat CTH today; performed, will follow up official report  - consider MRI brain when stable, although strokes are seen on CT  - recommend SBP goal < 140, per primary team  - recommend q1hr stroke neuro checks  - Plan for diagnostic angio to eval 0.8cm right ICA aneurysm, 1.2x0.9cm left ICA aneurysm  - may need outpt neurology follow up  - provide stroke education    DVT prophylaxis   - SCDs, chemical DVT prophylaxis on hold    To be discussed with Neurology Attending Dr. Farrah Maria   57 year old female w/ PMH of asthma, CAD (not on meds), peripheral neuropathy and PSH of BATOOL, cholecystectomy and right knee surgery presented to [place] ED on 4/20 w/ right sided weakness and right facial numbness. Initially found to have a right PICA distribution acute infarct and a right vertebral artery occlusion. Not a candidate for any intervention. On repeat imaging had progression of acute infarct within the right and left cerebellar hemisphere with mass effect on the fourth ventricle and hydrocephalus and upward and downward herniation of the cerebellar parenchyma. She was transferred to [facility] on 4/21 and underwent an emergent SOC foramen magnum decompression and right parietal/occipital EVD placement.  Stroke was consulted for further recommendations.     1)Secondary stroke prevention  - Holding all antiplatelets   - continue Atorvastatin 80mg PO daily when cleared by NSGY    2) Stroke risk factors  - A1C: 5.9  - LDL: 92  - TSH: 0.152  - hypercoagulable work up sent as well as vasculitis panel -  would add ESR/CRP, CHRISTIANE/BINDU, RF, HIV, syphilis, lupus Anticoagulant, anti- Beta 2 GP, and cardiolipin ABs  - Obtain Echo w/ bubble study, will eventually need SALVADOR as well as ILR    3) Further management  - pending repeat CTH today; performed, will follow up official report  - consider MRI brain with and without contrast when stable  - recommend SBP goal < 140, per primary team  - recommend q1hr stroke neuro checks  - Plan for diagnostic angio to eval 0.8cm right ICA aneurysm, 1.2x0.9cm left ICA aneurysm  - may need outpt neurology follow up  - provide stroke education    DVT prophylaxis   - SCDs, chemical DVT prophylaxis on hold    To be discussed with Neurology Attending Dr. Farrah Maria

## 2023-04-24 NOTE — PHYSICAL THERAPY INITIAL EVALUATION ADULT - BED MOBILITY LIMITATIONS, REHAB EVAL
decreased ability to use arms for pushing/pulling/decreased ability to use legs for bridging/pushing/impaired ability to control trunk for mobility Pt sat EOB with MaxAx1 for ~3mins. Reported dizziness./decreased ability to use arms for pushing/pulling/decreased ability to use legs for bridging/pushing/impaired ability to control trunk for mobility
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
## 2023-04-24 NOTE — PHYSICAL THERAPY INITIAL EVALUATION ADULT - MANUAL MUSCLE TESTING RESULTS, REHAB EVAL
See OT note for UE strength testing. LE MMTs grossly assessed in supine only as pt unable to tolerate sitting for increased time and demonstrates decreased command following.

## 2023-04-24 NOTE — OCCUPATIONAL THERAPY INITIAL EVALUATION ADULT - RANGE OF MOTION EXAMINATION, UPPER EXTREMITY
RUE flaccid however PROM WNL./Left UE Active ROM was WNL (within normal limits)/Left UE Passive ROM was WNL (within normal limits)/Right UE Passive ROM was WNL (within normal limits)

## 2023-04-24 NOTE — OCCUPATIONAL THERAPY INITIAL EVALUATION ADULT - MANUAL MUSCLE TESTING RESULTS, REHAB EVAL
LUE shoulder grossly 3-/5, L elbow flexion/extension grossly 3-/5, L  strength 4-/5. RUE shoulder flexion/extension 0/5, R elbow flexion/extension 0/5, R  0/5. L hip flexion grossly 3-/5, L knee flexion/extension grossly 3-/5, L ankle DF/PF 3-/5. R hip flexion grossly 2/5, R knee flexion/extension grossly 2/5, R ankle DF/PF 2/5.

## 2023-04-24 NOTE — PROGRESS NOTE ADULT - SUBJECTIVE AND OBJECTIVE BOX
HPI:  56 yo F with PMH of Asthma, CAD (not on  meds), peripheral neuropathy and PSH of BATOOL, cholecystectomy, right knee surgery presented to [place] ED on 4/20 with right sided weakness and right facial numbness. Patient was undergoing preparation for colonoscopy. As per daughter at 8am patient was playing with her grandchildren but around 840 am patient was getting dressed and fell and subsequently felt weak after. Daughter reports that patient had some episodes of vomiting at this time. She had a syncopal episode at around 10 am and was witnessed by brother. No head trauma, She regained conscious after few minutes. She remained in bed for the remainder of the day. Daughter reports some slurred speech noted 1230-1PM and also was confused after. and around 4PM, the patient's sister noted right sided weakness at which time EMS was called and patient was brought to ED. No c/o chest pain, shortness of breath, fever, headache, abdominal pain, urinary complaints. No recent travel/sickness/ change in meds. Stroke code in ER: CTH neg for heme, Right PICA distribution acute infarction. CTA with saccular aneurysms of b/l carotids, approximately 0.8 cm on the right and 1.2 x 0.9 cm on the left. Possible tiny third saccular aneurysm on the right Posterior intracranial circulation: Right vertebral arterial occlusion at its dural crossing junction V3 Atlantic and V4 intracranial segments with likely reversal of flow in its intracranial segment from the basilar. Right PICA faintly seen. 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  Not candidate for TNK/mechanical thrombectomy. CT scan repeated which showed: 1. Brain: Progression of acute infarction within the right cerebellar hemisphere, also extending into the left superior cerebellar hemisphere. New mass effect on the fourth ventricle causing stenosis versus occlusion with new third and lateral ventricular dilatation indicating ventricular obstruction at the level of the fourth ventricle. New upward and downward herniation of cerebellar parenchyma Right carotid system: No hemodynamically significant stenosis. Left carotid system: No hemodynamically significant stenosis. Vertebral circulation: Patent. Anterior intracranial circulation: Intact. Bilateral internal carotid saccular aneurysms. These findings are unchanged. Posterior intracranial circulation:    Improved flow within the right vertebral artery since 4/20/2023. New focal stenosis mid left vertebral artery, etiology uncertain, consider vasospasm and extrinsic compression in addition to new embolic disease. Brain perfusion: Perfusion images demonstrate normalization of the perfusion abnormality in the right cerebellar hemisphere present on 4/20/2023 despite evidence of progression of acute infarction.  Areas of apparent ischemia within the posterior fossa have progressed in extent, also again involving the left posterior cerebral arterial distribution. Tx to [facility] for SOC watch. On admission to [facility], NIHSS 12.  (21 Apr 2023 16:50)    INTERVAL EVENTS:  Extubated yest to HFNC, weaned to NC. Neuro exam improving.     HOSPITAL COURSE:  4/21: Admitted for crani watch, CTH complete w/ unchanged hydrocephalus, taken for emergent occipital craniectomy.   4/22: POD1. Remains intubated overnight, on propofol and dank. EVD@24ilW24. Pending repeat CTH this am. Given hydralazine 10mg x1. Started on cardene for SBP high 140s-150s. Sub-therapeutic on plavix, therapeutic on asa, rpt asa accumetrics until subtherapeutic. LE dopplers neg for DVT, but showing superficial venous thrombosis in the proximal portion of the right greater saphenous vein. Started on 3% @60 for na goal 145-150. NGT placed by ENT. Febrile, pancultured.  4/23: POD 2. 3% increased to 75. NGT readjusted. UA neg. SBP liberalized to 160. Plan to extubate. 3% decreased to 60. Failed extubation d/t no cuff leak, given 60mg solumedrol, plan to extubate in 6 hrs. SCx1 for post void residual >400, started on cardura at bedtime. New blood in buretrol, stopped SQL. Clot in EVD cleared. Neuro exam improving.   4/24: POD 3. Cont 3% with NS while NPO, start TF today.       Vital Signs Last 24 Hrs  T(C): 37.3 (23 Apr 2023 21:00), Max: 38.1 (23 Apr 2023 00:29)  T(F): 99.2 (23 Apr 2023 21:00), Max: 100.6 (23 Apr 2023 00:29)  HR: 56 (24 Apr 2023 00:00) (56 - 89)  BP: 135/64 (23 Apr 2023 23:00) (128/60 - 171/75)  BP(mean): 92 (23 Apr 2023 23:00) (87 - 133)  RR: 17 (24 Apr 2023 00:00) (10 - 18)  SpO2: 100% (24 Apr 2023 00:00) (95% - 100%)    Parameters below as of 24 Apr 2023 00:00  Patient On (Oxygen Delivery Method): nasal cannula  O2 Flow (L/min): 6      I&O's Summary    22 Apr 2023 07:01  -  23 Apr 2023 07:00  --------------------------------------------------------  IN: 2595 mL / OUT: 2157 mL / NET: 438 mL    23 Apr 2023 07:01  -  24 Apr 2023 00:26  --------------------------------------------------------  IN: 1705 mL / OUT: 1646 mL / NET: 59 mL        PHYSICAL EXAM:  General: NAD, comfortably lying in bed, intubated, off sedation  Cardiovascular: regular rate and rhythm   Respiratory: nonlabored breathing, normal chest rise   GI: abdomen soft, nontender, nondistended  Neuro: opens eyes to voice, tracks, Pupils 2mm briskly reactive b/l, EOMI, hypophonic and dysarthric, AOx3 with significant encouragement, follows commands, RUE briskly withdraws, HG 1/5, LUE 5/5, LLE 5/5, RLE 4/5  Extremities: distal pulses 2+ x4  Incision/wound: crani incision c/d/i, EVD site c/d/i  Drain: +EVD@64psP15,+ELENA(SG) to half suction     LABS:                        9.8    6.72  )-----------( 252      ( 23 Apr 2023 05:30 )             30.0     04-23    145  |  113<H>  |  13  ----------------------------<  154<H>  3.6   |  24  |  0.41<L>    Ca    8.2<L>      23 Apr 2023 18:27  Phos  3.1     04-22  Mg     See Note     04-23    TPro  6.1  /  Alb  3.5  /  TBili  0.7  /  DBili  <0.2  /  AST  89<H>  /  ALT  136<H>  /  AlkPhos  101  04-23    PT/INR - ( 23 Apr 2023 18:51 )   PT: 13.2 sec;   INR: 1.11          PTT - ( 23 Apr 2023 18:51 )  PTT:29.1 sec  Urinalysis Basic - ( 22 Apr 2023 20:28 )    Color: Yellow / Appearance: Clear / SG: >=1.030 / pH: x  Gluc: x / Ketone: NEGATIVE  / Bili: Negative / Urobili: 0.2 E.U./dL   Blood: x / Protein: Trace mg/dL / Nitrite: NEGATIVE   Leuk Esterase: NEGATIVE / RBC: > 10 /HPF / WBC < 5 /HPF   Sq Epi: x / Non Sq Epi: x / Bacteria: Present /HPF      CARDIAC MARKERS ( 22 Apr 2023 01:38 )  x     / x     / 126 U/L / x     / x          CAPILLARY BLOOD GLUCOSE          Drug Levels: [] N/A    CSF Analysis: [] N/A      Allergies    No Known Allergies    Intolerances      MEDICATIONS:  Antibiotics:  ceFAZolin   IVPB 2000 milliGRAM(s) IV Intermittent every 8 hours    Neuro:  acetaminophen   Oral Liquid .. 650 milliGRAM(s) Oral every 6 hours PRN  ondansetron Injectable 4 milliGRAM(s) IV Push every 6 hours PRN  traMADol 25 milliGRAM(s) Oral every 4 hours PRN    Anticoagulation:    OTHER:  albuterol/ipratropium for Nebulization 3 milliLiter(s) Nebulizer every 6 hours PRN  atorvastatin 80 milliGRAM(s) Oral at bedtime  chlorhexidine 2% Cloths 1 Application(s) Topical <User Schedule>  doxazosin 2 milliGRAM(s) Oral at bedtime  polyethylene glycol 3350 17 Gram(s) Oral daily  senna 2 Tablet(s) Oral at bedtime    IVF:  sodium chloride 0.9%. 1000 milliLiter(s) IV Continuous <Continuous>  sodium chloride 3%. 500 milliLiter(s) IV Continuous <Continuous>    CULTURES:  Culture Results:   No growth at 1 day. (04-22 @ 20:28)  Culture Results:   No growth at 1 day. (04-22 @ 20:28)    RADIOLOGY & ADDITIONAL TESTS:      ASSESSMENT:  56 yo F with PMH of Asthma, CAD (not on meds), peripheral presented to [place] ED on 4/20 with right sided weakness and right facial numbness. Acute infarction within the right cerebellar hemisphere, also extending into the left superior cerebellar hemisphere. New mass effect on the fourth ventricle causing stenosis versus occlusion with new third and lateral ventricular dilatation indicating ventricular obstruction at the level of the fourth ventricle. New upward and downward herniation of cerebellar parenchyma. Pt transferred to [facility] for further management. NIHSS 12. Now s/p SOC foramen magnum decompression and right parietal/occipital EVD placement (4/21).    Neuro   - Vitals/neuro q1h   - Plan for DSA for B/L carotid aneurysms with Dr. Minor this admission  - SG ELENA drain to half suction, monitor output  - EVD@85ysU85; monitor ICP/output  - CTH 4/22 with increase right IVH, possibly redistribution. Stable vent size.   - stroke consulted, f/u recs    Cardio  - -160  - TTE pending    Pulm  - extubated 4/23, on NC  - albuterol prn for asthma     GI  - NGT placed by ENT; start TF 4/23  - bowel regimen     Renal  - Na goal 145-150, 3% @ 30, NS @ 50  - Voiding   - cardura 2mg at bedtime for urinary retention     Heme  - SCDs  - s/p 3 units platelets, 35 mcg of DDAVP for ASA/Plavix reversal  - LE dopplers 4/22 neg for DVT, but showing superficial venous thrombosis in the proximal portion of the right greater saphenous vein; consider repeat doppler in 1 week    Endo   - no issues   - A1C 5.9    ID  - Ancef while ELENA in place   - febrile, f/u panculture 4/22    DISPO:  NSICU, full code, PT/OT pending    D/w Dr. Valadez and Dr. Bueno    HPI:  58 yo F with PMH of Asthma, CAD (not on  meds), peripheral neuropathy and PSH of BATOOL, cholecystectomy, right knee surgery presented to [place] ED on 4/20 with right sided weakness and right facial numbness. Patient was undergoing preparation for colonoscopy. As per daughter at 8am patient was playing with her grandchildren but around 840 am patient was getting dressed and fell and subsequently felt weak after. Daughter reports that patient had some episodes of vomiting at this time. She had a syncopal episode at around 10 am and was witnessed by brother. No head trauma, She regained conscious after few minutes. She remained in bed for the remainder of the day. Daughter reports some slurred speech noted 1230-1PM and also was confused after. and around 4PM, the patient's sister noted right sided weakness at which time EMS was called and patient was brought to ED. No c/o chest pain, shortness of breath, fever, headache, abdominal pain, urinary complaints. No recent travel/sickness/ change in meds. Stroke code in ER: CTH neg for heme, Right PICA distribution acute infarction. CTA with saccular aneurysms of b/l carotids, approximately 0.8 cm on the right and 1.2 x 0.9 cm on the left. Possible tiny third saccular aneurysm on the right Posterior intracranial circulation: Right vertebral arterial occlusion at its dural crossing junction V3 Atlantic and V4 intracranial segments with likely reversal of flow in its intracranial segment from the basilar. Right PICA faintly seen. 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  Not candidate for TNK/mechanical thrombectomy. CT scan repeated which showed: 1. Brain: Progression of acute infarction within the right cerebellar hemisphere, also extending into the left superior cerebellar hemisphere. New mass effect on the fourth ventricle causing stenosis versus occlusion with new third and lateral ventricular dilatation indicating ventricular obstruction at the level of the fourth ventricle. New upward and downward herniation of cerebellar parenchyma Right carotid system: No hemodynamically significant stenosis. Left carotid system: No hemodynamically significant stenosis. Vertebral circulation: Patent. Anterior intracranial circulation: Intact. Bilateral internal carotid saccular aneurysms. These findings are unchanged. Posterior intracranial circulation:    Improved flow within the right vertebral artery since 4/20/2023. New focal stenosis mid left vertebral artery, etiology uncertain, consider vasospasm and extrinsic compression in addition to new embolic disease. Brain perfusion: Perfusion images demonstrate normalization of the perfusion abnormality in the right cerebellar hemisphere present on 4/20/2023 despite evidence of progression of acute infarction.  Areas of apparent ischemia within the posterior fossa have progressed in extent, also again involving the left posterior cerebral arterial distribution. Tx to [facility] for SOC watch. On admission to [facility], NIHSS 12.  (21 Apr 2023 16:50)    INTERVAL EVENTS:  Extubated yest to HFNC, weaned to NC. Neuro exam improving.     HOSPITAL COURSE:  4/21: Admitted for crani watch, CTH complete w/ unchanged hydrocephalus, taken for emergent occipital craniectomy.   4/22: POD1. Remains intubated overnight, on propofol and dank. EVD@32vwO72. Pending repeat CTH this am. Given hydralazine 10mg x1. Started on cardene for SBP high 140s-150s. Sub-therapeutic on plavix, therapeutic on asa, rpt asa accumetrics until subtherapeutic. LE dopplers neg for DVT, but showing superficial venous thrombosis in the proximal portion of the right greater saphenous vein. Started on 3% @60 for na goal 145-150. NGT placed by ENT. Febrile, pancultured.  4/23: POD 2. 3% increased to 75. NGT readjusted. UA neg. SBP liberalized to 160. Plan to extubate. 3% decreased to 60. Failed extubation d/t no cuff leak, given 60mg solumedrol, plan to extubate in 6 hrs. SCx1 for post void residual >400, started on cardura at bedtime. New blood in buretrol, stopped SQL. Clot in EVD cleared. Neuro exam improving.   4/24: POD 3. Cont 3% with NS while NPO, start TF today.       Vital Signs Last 24 Hrs  T(C): 37.3 (23 Apr 2023 21:00), Max: 38.1 (23 Apr 2023 00:29)  T(F): 99.2 (23 Apr 2023 21:00), Max: 100.6 (23 Apr 2023 00:29)  HR: 56 (24 Apr 2023 00:00) (56 - 89)  BP: 135/64 (23 Apr 2023 23:00) (128/60 - 171/75)  BP(mean): 92 (23 Apr 2023 23:00) (87 - 133)  RR: 17 (24 Apr 2023 00:00) (10 - 18)  SpO2: 100% (24 Apr 2023 00:00) (95% - 100%)    Parameters below as of 24 Apr 2023 00:00  Patient On (Oxygen Delivery Method): nasal cannula  O2 Flow (L/min): 6      I&O's Summary    22 Apr 2023 07:01  -  23 Apr 2023 07:00  --------------------------------------------------------  IN: 2595 mL / OUT: 2157 mL / NET: 438 mL    23 Apr 2023 07:01  -  24 Apr 2023 00:26  --------------------------------------------------------  IN: 1705 mL / OUT: 1646 mL / NET: 59 mL        PHYSICAL EXAM:  General: NAD, comfortably lying in bed, on NC  Cardiovascular: regular rate and rhythm   Respiratory: nonlabored breathing, normal chest rise   GI: abdomen soft, nontender, nondistended; +NGT  Neuro: opens eyes to voice, tracks, Pupils 2mm briskly reactive b/l, EOMI, hypophonic and dysarthric, AOx3 with significant encouragement, follows commands, RUE briskly withdraws, HG 1/5, LUE 5/5, LLE 5/5, RLE 4/5  Extremities: distal pulses 2+ x4  Incision/wound: crani incision c/d/i, EVD site c/d/i  Drain: +EVD@[id number],+ELENA(SG) to half suction     LABS:                        9.8    6.72  )-----------( 252      ( 23 Apr 2023 05:30 )             30.0     04-23    145  |  113<H>  |  13  ----------------------------<  154<H>  3.6   |  24  |  0.41<L>    Ca    8.2<L>      23 Apr 2023 18:27  Phos  3.1     04-22  Mg     See Note     04-23    TPro  6.1  /  Alb  3.5  /  TBili  0.7  /  DBili  <0.2  /  AST  89<H>  /  ALT  136<H>  /  AlkPhos  101  04-23    PT/INR - ( 23 Apr 2023 18:51 )   PT: 13.2 sec;   INR: 1.11          PTT - ( 23 Apr 2023 18:51 )  PTT:29.1 sec  Urinalysis Basic - ( 22 Apr 2023 20:28 )    Color: Yellow / Appearance: Clear / SG: >=1.030 / pH: x  Gluc: x / Ketone: NEGATIVE  / Bili: Negative / Urobili: 0.2 E.U./dL   Blood: x / Protein: Trace mg/dL / Nitrite: NEGATIVE   Leuk Esterase: NEGATIVE / RBC: > 10 /HPF / WBC < 5 /HPF   Sq Epi: x / Non Sq Epi: x / Bacteria: Present /HPF      CARDIAC MARKERS ( 22 Apr 2023 01:38 )  x     / x     / 126 U/L / x     / x          CAPILLARY BLOOD GLUCOSE          Drug Levels: [] N/A    CSF Analysis: [] N/A      Allergies    No Known Allergies    Intolerances      MEDICATIONS:  Antibiotics:  ceFAZolin   IVPB 2000 milliGRAM(s) IV Intermittent every 8 hours    Neuro:  acetaminophen   Oral Liquid .. 650 milliGRAM(s) Oral every 6 hours PRN  ondansetron Injectable 4 milliGRAM(s) IV Push every 6 hours PRN  traMADol 25 milliGRAM(s) Oral every 4 hours PRN    Anticoagulation:    OTHER:  albuterol/ipratropium for Nebulization 3 milliLiter(s) Nebulizer every 6 hours PRN  atorvastatin 80 milliGRAM(s) Oral at bedtime  chlorhexidine 2% Cloths 1 Application(s) Topical <User Schedule>  doxazosin 2 milliGRAM(s) Oral at bedtime  polyethylene glycol 3350 17 Gram(s) Oral daily  senna 2 Tablet(s) Oral at bedtime    IVF:  sodium chloride 0.9%. 1000 milliLiter(s) IV Continuous <Continuous>  sodium chloride 3%. 500 milliLiter(s) IV Continuous <Continuous>    CULTURES:  Culture Results:   No growth at 1 day. (04-22 @ 20:28)  Culture Results:   No growth at 1 day. (04-22 @ 20:28)    RADIOLOGY & ADDITIONAL TESTS:      ASSESSMENT:  58 yo F with PMH of Asthma, CAD (not on meds), peripheral presented to [place] ED on 4/20 with right sided weakness and right facial numbness. Acute infarction within the right cerebellar hemisphere, also extending into the left superior cerebellar hemisphere. New mass effect on the fourth ventricle causing stenosis versus occlusion with new third and lateral ventricular dilatation indicating ventricular obstruction at the level of the fourth ventricle. New upward and downward herniation of cerebellar parenchyma. Pt transferred to [facility] for further management. NIHSS 12. Now s/p SOC foramen magnum decompression and right parietal/occipital EVD placement (4/21).    Neuro   - Vitals/neuro q1h   - Plan for DSA for B/L carotid aneurysms with Dr. Minor this admission  - SG ELENA drain to half suction, monitor output  - EVD@[id number]; monitor ICP/output  - CTH 4/22 with increase right IVH, possibly redistribution. Stable vent size.   - stroke consulted, f/u recs    Cardio  - -160  - TTE pending    Pulm  - extubated 4/23, on NC  - albuterol prn for asthma     GI  - NGT placed by ENT; start TF 4/23  - bowel regimen     Renal  - Na goal 145-150, 3% @ 30, NS @ 50  - Voiding   - cardura 2mg at bedtime for urinary retention     Heme  - SCDs  - s/p 3 units platelets, 35 mcg of DDAVP for ASA/Plavix reversal  - LE dopplers 4/22 neg for DVT, but showing superficial venous thrombosis in the proximal portion of the right greater saphenous vein; consider repeat doppler in 1 week    Endo   - no issues   - A1C 5.9    ID  - Ancef while ELENA in place   - febrile, f/u panculture 4/22    DISPO:  NSICU, full code, PT/OT pending    D/w Dr. Valadez and Dr. Bueno

## 2023-04-24 NOTE — OCCUPATIONAL THERAPY INITIAL EVALUATION ADULT - SENSORY TESTS
Cranial nerve testing limited due to decreased command following and lethargy. CN Testing: V1-V3 sensation in tact; b/l frontalis intact; b/l buccinator intact; smile symmetrical (however pt with closed mouth smile, unable to show teeth); tongue protrusion at midline with decreased strength, b/l lateralization in tact; b/l eyes open/close intact

## 2023-04-24 NOTE — OCCUPATIONAL THERAPY INITIAL EVALUATION ADULT - IMPAIRMENTS CONTRIBUTING IMPAIRED BED MOBILITY, REHAB EVAL
impaired balance/cognition/impaired coordination/impaired motor control/abnormal muscle tone/pain/decreased strength

## 2023-04-24 NOTE — OCCUPATIONAL THERAPY INITIAL EVALUATION ADULT - MD ORDER
Pt is POD#2 s/p suboccipital craniectomy foramen magnum decompression and right parietal/occipital EVD placement performed on 4/22/23.

## 2023-04-24 NOTE — OCCUPATIONAL THERAPY INITIAL EVALUATION ADULT - MODIFIED CLINICAL TEST OF SENSORY INTEGRATION IN BALANCE TEST
Pt sat EOB ~3 mins with decreased head/neck control requiring min-mod A to bring head to midline position (pt resting head in L lateral flexion). Pt reporting dizziness in sitting, increased SBP (outside parameters, see vitals section for details) furthermore pt returned to semisupine, RN Chuyita made aware and Neurosx MD Bueno made aware.

## 2023-04-24 NOTE — PHYSICAL THERAPY INITIAL EVALUATION ADULT - MODALITIES TREATMENT COMMENTS
Cranial Nerves II - XII: II: R gaze preference intermittently but mostly dysconjugate gaze. III, IV, VI: Deferred 2/2 lethargy. V: grossly facial sensation intact to light touch V1-V3 b/l VII: no ptosis, able to furrow brows and close both eyebrows VIII: NT XI: NT b/l XII: tongue protrusion midline and able to deviate R/L. Further testing to occur in future sessions.

## 2023-04-24 NOTE — OCCUPATIONAL THERAPY INITIAL EVALUATION ADULT - LEVEL OF CONSCIOUSNESS, OT EVAL
Pt requiring max verbal cues to maintain eyes open throughout. Eyes open approx 60% of session./lethargic/somnolent

## 2023-04-24 NOTE — OCCUPATIONAL THERAPY INITIAL EVALUATION ADULT - BALANCE DISTURBANCE, IDENTIFIED IMPAIRMENT CONTRIBUTE, REHAB EVAL
impaired coordination/impaired motor control/pain/impaired postural control/decreased strength Doctor's office

## 2023-04-24 NOTE — PROVIDER CONTACT NOTE (OTHER) - SITUATION
Pt's SpO2 desaturated to 89%; OPA placed and used 14Fr catheter to suction pharyngeally. While suctioning, SpO2 dropped to high 60's. Stopped suctioning and pt returned to SpO2 92% on RA

## 2023-04-25 LAB
ALBUMIN SERPL ELPH-MCNC: 3.2 G/DL — LOW (ref 3.3–5)
ALP SERPL-CCNC: 100 U/L — SIGNIFICANT CHANGE UP (ref 40–120)
ALT FLD-CCNC: 76 U/L — HIGH (ref 10–45)
ANION GAP SERPL CALC-SCNC: 5 MMOL/L — SIGNIFICANT CHANGE UP (ref 5–17)
ANION GAP SERPL CALC-SCNC: 6 MMOL/L — SIGNIFICANT CHANGE UP (ref 5–17)
ANION GAP SERPL CALC-SCNC: 8 MMOL/L — SIGNIFICANT CHANGE UP (ref 5–17)
ANTI-RIBONUCLEAR PROTEIN: 0.3 AI — SIGNIFICANT CHANGE UP
AST SERPL-CCNC: 31 U/L — SIGNIFICANT CHANGE UP (ref 10–40)
AT III ACT/NOR PPP CHRO: 117 % — SIGNIFICANT CHANGE UP (ref 85–135)
BASE EXCESS BLDA CALC-SCNC: 2.9 MMOL/L — SIGNIFICANT CHANGE UP (ref -2–3)
BASE EXCESS BLDA CALC-SCNC: 3.9 MMOL/L — HIGH (ref -2–3)
BILIRUB SERPL-MCNC: 0.4 MG/DL — SIGNIFICANT CHANGE UP (ref 0.2–1.2)
BUN SERPL-MCNC: 12 MG/DL — SIGNIFICANT CHANGE UP (ref 7–23)
BUN SERPL-MCNC: 13 MG/DL — SIGNIFICANT CHANGE UP (ref 7–23)
BUN SERPL-MCNC: 14 MG/DL — SIGNIFICANT CHANGE UP (ref 7–23)
CALCIUM SERPL-MCNC: 8.3 MG/DL — LOW (ref 8.4–10.5)
CALCIUM SERPL-MCNC: 8.3 MG/DL — LOW (ref 8.4–10.5)
CALCIUM SERPL-MCNC: 8.6 MG/DL — SIGNIFICANT CHANGE UP (ref 8.4–10.5)
CHLORIDE SERPL-SCNC: 108 MMOL/L — SIGNIFICANT CHANGE UP (ref 96–108)
CHLORIDE SERPL-SCNC: 110 MMOL/L — HIGH (ref 96–108)
CHLORIDE SERPL-SCNC: 112 MMOL/L — HIGH (ref 96–108)
CO2 BLDA-SCNC: 29 MMOL/L — HIGH (ref 19–24)
CO2 BLDA-SCNC: 30 MMOL/L — HIGH (ref 19–24)
CO2 SERPL-SCNC: 27 MMOL/L — SIGNIFICANT CHANGE UP (ref 22–31)
CO2 SERPL-SCNC: 27 MMOL/L — SIGNIFICANT CHANGE UP (ref 22–31)
CO2 SERPL-SCNC: 28 MMOL/L — SIGNIFICANT CHANGE UP (ref 22–31)
CREAT SERPL-MCNC: 0.36 MG/DL — LOW (ref 0.5–1.3)
CREAT SERPL-MCNC: 0.37 MG/DL — LOW (ref 0.5–1.3)
CREAT SERPL-MCNC: 0.43 MG/DL — LOW (ref 0.5–1.3)
CRP SERPL-MCNC: 17.4 MG/L — HIGH (ref 0–4)
CULTURE RESULTS: SIGNIFICANT CHANGE UP
DSDNA AB SER-ACNC: <12 IU/ML — SIGNIFICANT CHANGE UP
EGFR: 114 ML/MIN/1.73M2 — SIGNIFICANT CHANGE UP
EGFR: 118 ML/MIN/1.73M2 — SIGNIFICANT CHANGE UP
EGFR: 119 ML/MIN/1.73M2 — SIGNIFICANT CHANGE UP
ENA SM AB FLD QL: <0.2 AI — SIGNIFICANT CHANGE UP
ERYTHROCYTE [SEDIMENTATION RATE] IN BLOOD: 19 MM/HR — SIGNIFICANT CHANGE UP
GAS PNL BLDA: SIGNIFICANT CHANGE UP
GBM IGG SER-ACNC: <0.2 — SIGNIFICANT CHANGE UP (ref 0–0.9)
GLUCOSE SERPL-MCNC: 120 MG/DL — HIGH (ref 70–99)
GLUCOSE SERPL-MCNC: 137 MG/DL — HIGH (ref 70–99)
GLUCOSE SERPL-MCNC: 144 MG/DL — HIGH (ref 70–99)
HCO3 BLDA-SCNC: 28 MMOL/L — SIGNIFICANT CHANGE UP (ref 21–28)
HCO3 BLDA-SCNC: 28 MMOL/L — SIGNIFICANT CHANGE UP (ref 21–28)
HCT VFR BLD CALC: 31.5 % — LOW (ref 34.5–45)
HGB BLD-MCNC: 10.3 G/DL — LOW (ref 11.5–15.5)
HIV 1+2 AB+HIV1 P24 AG SERPL QL IA: SIGNIFICANT CHANGE UP
MAGNESIUM SERPL-MCNC: 2 MG/DL — SIGNIFICANT CHANGE UP (ref 1.6–2.6)
MCHC RBC-ENTMCNC: 29.8 PG — SIGNIFICANT CHANGE UP (ref 27–34)
MCHC RBC-ENTMCNC: 32.7 GM/DL — SIGNIFICANT CHANGE UP (ref 32–36)
MCV RBC AUTO: 91 FL — SIGNIFICANT CHANGE UP (ref 80–100)
NRBC # BLD: 0 /100 WBCS — SIGNIFICANT CHANGE UP (ref 0–0)
PCO2 BLDA: 42 MMHG — SIGNIFICANT CHANGE UP (ref 32–45)
PCO2 BLDA: 43 MMHG — SIGNIFICANT CHANGE UP (ref 32–45)
PH BLDA: 7.42 — SIGNIFICANT CHANGE UP (ref 7.35–7.45)
PH BLDA: 7.44 — SIGNIFICANT CHANGE UP (ref 7.35–7.45)
PHOSPHATE SERPL-MCNC: 2.8 MG/DL — SIGNIFICANT CHANGE UP (ref 2.5–4.5)
PLATELET # BLD AUTO: 254 K/UL — SIGNIFICANT CHANGE UP (ref 150–400)
PO2 BLDA: 67 MMHG — LOW (ref 83–108)
PO2 BLDA: 89 MMHG — SIGNIFICANT CHANGE UP (ref 83–108)
POTASSIUM SERPL-MCNC: 3.6 MMOL/L — SIGNIFICANT CHANGE UP (ref 3.5–5.3)
POTASSIUM SERPL-MCNC: 3.6 MMOL/L — SIGNIFICANT CHANGE UP (ref 3.5–5.3)
POTASSIUM SERPL-MCNC: 3.8 MMOL/L — SIGNIFICANT CHANGE UP (ref 3.5–5.3)
POTASSIUM SERPL-SCNC: 3.6 MMOL/L — SIGNIFICANT CHANGE UP (ref 3.5–5.3)
POTASSIUM SERPL-SCNC: 3.6 MMOL/L — SIGNIFICANT CHANGE UP (ref 3.5–5.3)
POTASSIUM SERPL-SCNC: 3.8 MMOL/L — SIGNIFICANT CHANGE UP (ref 3.5–5.3)
PROT C ACT/NOR PPP: 119 % — SIGNIFICANT CHANGE UP (ref 74–150)
PROT S FREE AG PPP IA-ACNC: 107 % — SIGNIFICANT CHANGE UP (ref 61–131)
PROT SERPL-MCNC: 5.7 G/DL — LOW (ref 6–8.3)
RBC # BLD: 3.46 M/UL — LOW (ref 3.8–5.2)
RBC # FLD: 12.9 % — SIGNIFICANT CHANGE UP (ref 10.3–14.5)
RHEUMATOID FACT SERPL-ACNC: <10 IU/ML — SIGNIFICANT CHANGE UP (ref 0–13)
SAO2 % BLDA: 96.6 % — SIGNIFICANT CHANGE UP (ref 94–98)
SAO2 % BLDA: 99.3 % — HIGH (ref 94–98)
SODIUM SERPL-SCNC: 141 MMOL/L — SIGNIFICANT CHANGE UP (ref 135–145)
SODIUM SERPL-SCNC: 145 MMOL/L — SIGNIFICANT CHANGE UP (ref 135–145)
SODIUM SERPL-SCNC: 145 MMOL/L — SIGNIFICANT CHANGE UP (ref 135–145)
SPECIMEN SOURCE: SIGNIFICANT CHANGE UP
T PALLIDUM AB TITR SER: NEGATIVE — SIGNIFICANT CHANGE UP
WBC # BLD: 5.84 K/UL — SIGNIFICANT CHANGE UP (ref 3.8–10.5)
WBC # FLD AUTO: 5.84 K/UL — SIGNIFICANT CHANGE UP (ref 3.8–10.5)

## 2023-04-25 PROCEDURE — 71045 X-RAY EXAM CHEST 1 VIEW: CPT | Mod: 26,77

## 2023-04-25 PROCEDURE — 71045 X-RAY EXAM CHEST 1 VIEW: CPT | Mod: 26

## 2023-04-25 PROCEDURE — 31575 DIAGNOSTIC LARYNGOSCOPY: CPT

## 2023-04-25 PROCEDURE — 99291 CRITICAL CARE FIRST HOUR: CPT

## 2023-04-25 PROCEDURE — 99024 POSTOP FOLLOW-UP VISIT: CPT

## 2023-04-25 PROCEDURE — 99233 SBSQ HOSP IP/OBS HIGH 50: CPT

## 2023-04-25 PROCEDURE — 99221 1ST HOSP IP/OBS SF/LOW 40: CPT | Mod: 25

## 2023-04-25 PROCEDURE — 70450 CT HEAD/BRAIN W/O DYE: CPT | Mod: 26

## 2023-04-25 RX ORDER — PANTOPRAZOLE SODIUM 20 MG/1
40 TABLET, DELAYED RELEASE ORAL
Refills: 0 | Status: DISCONTINUED | OUTPATIENT
Start: 2023-04-25 | End: 2023-04-25

## 2023-04-25 RX ORDER — IPRATROPIUM/ALBUTEROL SULFATE 18-103MCG
3 AEROSOL WITH ADAPTER (GRAM) INHALATION EVERY 6 HOURS
Refills: 0 | Status: DISCONTINUED | OUTPATIENT
Start: 2023-04-25 | End: 2023-04-28

## 2023-04-25 RX ORDER — POTASSIUM CHLORIDE 20 MEQ
10 PACKET (EA) ORAL ONCE
Refills: 0 | Status: COMPLETED | OUTPATIENT
Start: 2023-04-25 | End: 2023-04-25

## 2023-04-25 RX ORDER — CHLORHEXIDINE GLUCONATE 213 G/1000ML
15 SOLUTION TOPICAL EVERY 12 HOURS
Refills: 0 | Status: DISCONTINUED | OUTPATIENT
Start: 2023-04-25 | End: 2023-04-28

## 2023-04-25 RX ORDER — PANTOPRAZOLE SODIUM 20 MG/1
40 TABLET, DELAYED RELEASE ORAL DAILY
Refills: 0 | Status: DISCONTINUED | OUTPATIENT
Start: 2023-04-25 | End: 2023-04-25

## 2023-04-25 RX ORDER — SODIUM CHLORIDE 5 G/100ML
500 INJECTION, SOLUTION INTRAVENOUS
Refills: 0 | Status: DISCONTINUED | OUTPATIENT
Start: 2023-04-25 | End: 2023-04-26

## 2023-04-25 RX ORDER — DEXMEDETOMIDINE HYDROCHLORIDE IN 0.9% SODIUM CHLORIDE 4 UG/ML
0.2 INJECTION INTRAVENOUS
Qty: 400 | Refills: 0 | Status: DISCONTINUED | OUTPATIENT
Start: 2023-04-25 | End: 2023-04-25

## 2023-04-25 RX ORDER — DEXMEDETOMIDINE HYDROCHLORIDE IN 0.9% SODIUM CHLORIDE 4 UG/ML
0.2 INJECTION INTRAVENOUS
Qty: 400 | Refills: 0 | Status: DISCONTINUED | OUTPATIENT
Start: 2023-04-25 | End: 2023-04-28

## 2023-04-25 RX ORDER — PANTOPRAZOLE SODIUM 20 MG/1
40 TABLET, DELAYED RELEASE ORAL DAILY
Refills: 0 | Status: DISCONTINUED | OUTPATIENT
Start: 2023-04-25 | End: 2023-04-28

## 2023-04-25 RX ORDER — SODIUM CHLORIDE 9 MG/ML
3 INJECTION INTRAMUSCULAR; INTRAVENOUS; SUBCUTANEOUS EVERY 6 HOURS
Refills: 0 | Status: DISCONTINUED | OUTPATIENT
Start: 2023-04-25 | End: 2023-04-28

## 2023-04-25 RX ORDER — SUCCINYLCHOLINE CHLORIDE 100 MG/5ML
100 SYRINGE (ML) INTRAVENOUS ONCE
Refills: 0 | Status: COMPLETED | OUTPATIENT
Start: 2023-04-25 | End: 2023-04-25

## 2023-04-25 RX ORDER — POTASSIUM CHLORIDE 20 MEQ
40 PACKET (EA) ORAL ONCE
Refills: 0 | Status: COMPLETED | OUTPATIENT
Start: 2023-04-25 | End: 2023-04-25

## 2023-04-25 RX ORDER — SODIUM,POTASSIUM PHOSPHATES 278-250MG
1 POWDER IN PACKET (EA) ORAL ONCE
Refills: 0 | Status: DISCONTINUED | OUTPATIENT
Start: 2023-04-25 | End: 2023-04-25

## 2023-04-25 RX ORDER — POTASSIUM PHOSPHATE, MONOBASIC POTASSIUM PHOSPHATE, DIBASIC 236; 224 MG/ML; MG/ML
15 INJECTION, SOLUTION INTRAVENOUS ONCE
Refills: 0 | Status: COMPLETED | OUTPATIENT
Start: 2023-04-25 | End: 2023-04-25

## 2023-04-25 RX ORDER — FENTANYL CITRATE 50 UG/ML
12.5 INJECTION INTRAVENOUS ONCE
Refills: 0 | Status: DISCONTINUED | OUTPATIENT
Start: 2023-04-25 | End: 2023-04-25

## 2023-04-25 RX ORDER — ENOXAPARIN SODIUM 100 MG/ML
40 INJECTION SUBCUTANEOUS EVERY 24 HOURS
Refills: 0 | Status: DISCONTINUED | OUTPATIENT
Start: 2023-04-25 | End: 2023-04-27

## 2023-04-25 RX ORDER — DOXAZOSIN MESYLATE 4 MG
4 TABLET ORAL AT BEDTIME
Refills: 0 | Status: DISCONTINUED | OUTPATIENT
Start: 2023-04-25 | End: 2023-04-26

## 2023-04-25 RX ORDER — FENTANYL CITRATE 50 UG/ML
25 INJECTION INTRAVENOUS
Refills: 0 | Status: DISCONTINUED | OUTPATIENT
Start: 2023-04-25 | End: 2023-04-28

## 2023-04-25 RX ORDER — PROPOFOL 10 MG/ML
10 INJECTION, EMULSION INTRAVENOUS
Qty: 1000 | Refills: 0 | Status: DISCONTINUED | OUTPATIENT
Start: 2023-04-25 | End: 2023-04-25

## 2023-04-25 RX ORDER — DOXAZOSIN MESYLATE 4 MG
2 TABLET ORAL ONCE
Refills: 0 | Status: COMPLETED | OUTPATIENT
Start: 2023-04-25 | End: 2023-04-25

## 2023-04-25 RX ORDER — POTASSIUM CHLORIDE 20 MEQ
40 PACKET (EA) ORAL ONCE
Refills: 0 | Status: DISCONTINUED | OUTPATIENT
Start: 2023-04-25 | End: 2023-04-25

## 2023-04-25 RX ORDER — HYDROMORPHONE HYDROCHLORIDE 2 MG/ML
0.25 INJECTION INTRAMUSCULAR; INTRAVENOUS; SUBCUTANEOUS ONCE
Refills: 0 | Status: DISCONTINUED | OUTPATIENT
Start: 2023-04-25 | End: 2023-04-25

## 2023-04-25 RX ADMIN — Medication 40 MILLIEQUIVALENT(S): at 22:59

## 2023-04-25 RX ADMIN — POTASSIUM PHOSPHATE, MONOBASIC POTASSIUM PHOSPHATE, DIBASIC 62.5 MILLIMOLE(S): 236; 224 INJECTION, SOLUTION INTRAVENOUS at 08:30

## 2023-04-25 RX ADMIN — FENTANYL CITRATE 12.5 MICROGRAM(S): 50 INJECTION INTRAVENOUS at 10:10

## 2023-04-25 RX ADMIN — Medication 4 MILLILITER(S): at 10:00

## 2023-04-25 RX ADMIN — Medication 4 MILLIGRAM(S): at 21:39

## 2023-04-25 RX ADMIN — Medication 4 MILLILITER(S): at 22:23

## 2023-04-25 RX ADMIN — FENTANYL CITRATE 25 MICROGRAM(S): 50 INJECTION INTRAVENOUS at 23:36

## 2023-04-25 RX ADMIN — DEXMEDETOMIDINE HYDROCHLORIDE IN 0.9% SODIUM CHLORIDE 4.42 MICROGRAM(S)/KG/HR: 4 INJECTION INTRAVENOUS at 23:37

## 2023-04-25 RX ADMIN — Medication 2 MILLIGRAM(S): at 15:07

## 2023-04-25 RX ADMIN — Medication 1 CAPSULE(S): at 00:45

## 2023-04-25 RX ADMIN — SENNA PLUS 2 TABLET(S): 8.6 TABLET ORAL at 21:40

## 2023-04-25 RX ADMIN — FENTANYL CITRATE 12.5 MICROGRAM(S): 50 INJECTION INTRAVENOUS at 10:00

## 2023-04-25 RX ADMIN — Medication 100 MILLIGRAM(S): at 07:41

## 2023-04-25 RX ADMIN — FENTANYL CITRATE 25 MICROGRAM(S): 50 INJECTION INTRAVENOUS at 13:25

## 2023-04-25 RX ADMIN — PANTOPRAZOLE SODIUM 40 MILLIGRAM(S): 20 TABLET, DELAYED RELEASE ORAL at 11:20

## 2023-04-25 RX ADMIN — Medication 3 MILLILITER(S): at 17:29

## 2023-04-25 RX ADMIN — SODIUM CHLORIDE 3 GRAM(S): 9 INJECTION INTRAMUSCULAR; INTRAVENOUS; SUBCUTANEOUS at 15:07

## 2023-04-25 RX ADMIN — Medication 100 MILLIGRAM(S): at 17:11

## 2023-04-25 RX ADMIN — Medication 3 MILLILITER(S): at 22:24

## 2023-04-25 RX ADMIN — ATORVASTATIN CALCIUM 80 MILLIGRAM(S): 80 TABLET, FILM COATED ORAL at 21:39

## 2023-04-25 RX ADMIN — ENOXAPARIN SODIUM 40 MILLIGRAM(S): 100 INJECTION SUBCUTANEOUS at 00:07

## 2023-04-25 RX ADMIN — SODIUM CHLORIDE 4 MILLILITER(S): 9 INJECTION INTRAMUSCULAR; INTRAVENOUS; SUBCUTANEOUS at 05:14

## 2023-04-25 RX ADMIN — SODIUM CHLORIDE 4 MILLILITER(S): 9 INJECTION INTRAMUSCULAR; INTRAVENOUS; SUBCUTANEOUS at 10:11

## 2023-04-25 RX ADMIN — Medication 3 MILLILITER(S): at 10:00

## 2023-04-25 RX ADMIN — CHLORHEXIDINE GLUCONATE 15 MILLILITER(S): 213 SOLUTION TOPICAL at 17:11

## 2023-04-25 RX ADMIN — Medication 100 MILLIEQUIVALENT(S): at 08:30

## 2023-04-25 RX ADMIN — Medication 4 MILLILITER(S): at 05:13

## 2023-04-25 RX ADMIN — FENTANYL CITRATE 12.5 MICROGRAM(S): 50 INJECTION INTRAVENOUS at 09:45

## 2023-04-25 RX ADMIN — ENOXAPARIN SODIUM 40 MILLIGRAM(S): 100 INJECTION SUBCUTANEOUS at 21:40

## 2023-04-25 RX ADMIN — FENTANYL CITRATE 25 MICROGRAM(S): 50 INJECTION INTRAVENOUS at 18:49

## 2023-04-25 RX ADMIN — Medication 1 CAPSULE(S): at 00:07

## 2023-04-25 RX ADMIN — Medication 4 MILLILITER(S): at 17:28

## 2023-04-25 RX ADMIN — DEXMEDETOMIDINE HYDROCHLORIDE IN 0.9% SODIUM CHLORIDE 4.42 MICROGRAM(S)/KG/HR: 4 INJECTION INTRAVENOUS at 10:11

## 2023-04-25 RX ADMIN — OXYCODONE HYDROCHLORIDE 5 MILLIGRAM(S): 5 TABLET ORAL at 15:12

## 2023-04-25 RX ADMIN — AMLODIPINE BESYLATE 5 MILLIGRAM(S): 2.5 TABLET ORAL at 05:20

## 2023-04-25 RX ADMIN — FENTANYL CITRATE 25 MICROGRAM(S): 50 INJECTION INTRAVENOUS at 18:24

## 2023-04-25 RX ADMIN — FENTANYL CITRATE 25 MICROGRAM(S): 50 INJECTION INTRAVENOUS at 13:40

## 2023-04-25 RX ADMIN — Medication 100 MILLIGRAM(S): at 10:10

## 2023-04-25 RX ADMIN — FENTANYL CITRATE 12.5 MICROGRAM(S): 50 INJECTION INTRAVENOUS at 10:25

## 2023-04-25 RX ADMIN — CHLORHEXIDINE GLUCONATE 1 APPLICATION(S): 213 SOLUTION TOPICAL at 05:20

## 2023-04-25 RX ADMIN — OXYCODONE HYDROCHLORIDE 5 MILLIGRAM(S): 5 TABLET ORAL at 15:46

## 2023-04-25 RX ADMIN — FENTANYL CITRATE 25 MICROGRAM(S): 50 INJECTION INTRAVENOUS at 23:19

## 2023-04-25 RX ADMIN — Medication 100 MILLIGRAM(S): at 02:13

## 2023-04-25 RX ADMIN — SODIUM CHLORIDE 3 GRAM(S): 9 INJECTION INTRAMUSCULAR; INTRAVENOUS; SUBCUTANEOUS at 21:41

## 2023-04-25 NOTE — PROGRESS NOTE ADULT - ASSESSMENT
57 year old female w/ PMH of asthma, CAD (not on meds), peripheral neuropathy and PSH of BATOOL, cholecystectomy and right knee surgery presented to [place] ED on 4/20 w/ right sided weakness and right facial numbness. Initially found to have a right PICA distribution acute infarct and a right vertebral artery occlusion. Not a candidate for any intervention. On repeat imaging had progression of acute infarct within the right and left cerebellar hemisphere with mass effect on the fourth ventricle and hydrocephalus and upward and downward herniation of the cerebellar parenchyma. She was transferred to [facility] on 4/21 and underwent an emergent SOC foramen magnum decompression and right parietal/occipital EVD placement.  Stroke was consulted for further recommendations.     1)Secondary stroke prevention  - Holding all antiplatelets   - continue Atorvastatin 80mg PO daily when cleared by NSGY    2) Stroke risk factors  - A1C: 5.9  - LDL: 92  - TSH: 0.152  - hypercoagulable work up sent as well as vasculitis panel   - CRP 17.4/ESR 19    3) Further management  - pending repeat CTH today  - consider MRI brain with and without contrast when stable  - recommend SBP goal < 140, per primary team  - recommend q1hr stroke neuro checks  - Plan for diagnostic angio to eval 0.8cm right ICA aneurysm, 1.2x0.9cm left ICA aneurysm  - may need outpt neurology follow up  - provide stroke education    DVT prophylaxis   - SCDs, chemical DVT prophylaxis on hold    Discussed with Neurology Attending Dr. Farrah Maria 57 year old female w/ PMH of asthma, CAD (not on meds), peripheral neuropathy and PSH of BATOOL, cholecystectomy and right knee surgery presented to [place] ED on 4/20 w/ right sided weakness and right facial numbness. Initially found to have a right PICA distribution acute infarct and a right vertebral artery occlusion. Not a candidate for any intervention. On repeat imaging had progression of acute infarct within the right and left cerebellar hemisphere with mass effect on the fourth ventricle and hydrocephalus and upward and downward herniation of the cerebellar parenchyma. She was transferred to [facility] on 4/21 and underwent an emergent SOC foramen magnum decompression and right parietal/occipital EVD placement.  Stroke was consulted for further recommendations.     1)Secondary stroke prevention  - Holding all antiplatelets   - continue Atorvastatin 80mg PO daily when cleared by NSGY    2) Stroke risk factors  - A1C: 5.9  - LDL: 92  - TSH: 0.152  - hypercoagulable work up sent as well as vasculitis panel sent, f/u results  - CRP 17.4/ESR 19    3) Further management  - repeat CTH today- stable  - consider MRI brain with and without contrast when stable  - recommend SBP goal < 140, per primary team  - recommend q1hr stroke neuro checks  - Plan for diagnostic angio to eval 0.8cm right ICA aneurysm, 1.2x0.9cm left ICA aneurysm  - may need outpt neurology follow up  - provide stroke education    DVT prophylaxis   - SCDs, chemical DVT prophylaxis on hold    Discussed with Neurology Attending Dr. Farrah Maria

## 2023-04-25 NOTE — CHART NOTE - NSCHARTNOTEFT_GEN_A_CORE
Patient on HFNC 40/40 overnight, receiving nebs/chest PT. At approx 6am, pt with increased secretions, poor cough and unable to clear secretions. Oral airway placed and 14Fr suction used; most secretions were pooled in oropharynx. O2 sats were persistently in high 80s/low 90s after deep suctioning. HFNC settings increased without significant improvement. Pt with increased work of breathing. Given significant strokes and tenuous respiratory status, anesthesia called to intubate pt. CXR confirmed proper position of ETT. Pt sedated on propofol gtt. Plan for repeat CTH today. no

## 2023-04-25 NOTE — PROGRESS NOTE ADULT - ASSESSMENT
ASSESSMENT:   Bilateral cerebellar hemispheric strokes  POD 4 s/p SOC for foramen magnum decompression and R parietooccipital EVD  Bilateral carotid terminus aneurysms  R. V4 occlusion  History of peripheral neuropathy and asthma  Bulbar dysfunction. Respiratory insufficiency    PLAN:  NEURO: Q1h neurochecks  EVD at 87emV0O, ICPs, CPP, output.   Post op CT head reviewed. CT 4/22:   SG ELENA- monitor output  Holding ASA/plavix- see heme. Continue Statin  Stroke core measures, stroke neurology consult  Will need MRI brain w and w/o  Hypercoag and vasculitis w/u  Pending DSA   Activity: Bedrest    PULM: Reintubated  ABG, CXR  Will discuss early trach/PEG with family  Pulm toilet    CARDIAC:   SBP goal 100-140  TTE w/ bubble, trop/baseline EKG    GI: NGT  PPI no longer indicated  Bowel regimen     /RENAL: Urine retention  On 3% at 30cc/hr for Na goal 145-150  Monitor BMPs Q6  Strict Is/Os  Continue cardura    HEME: S/P 3U platelets and 35mcg DDAVP for ASA/plavix reversal  Maintain Hb > 7.0, PLT > 100,000  SCDs, SQL pending stable CT head  LE dopplers: superficial venous thrombosis in proximal portion of right greater saphenous vein  Repeat dopplers 4/29    ID:  Monitor for infectious s/s, fever curve, leukocytosis  Post op Ancef per NSGY    ENDO:   Glu goal 140-180mg/dL  ISS   A1c, LDL, TSH    SOCIAL/FAMILY:  [] awaiting [x] updated at bedside [] family meeting    CODE STATUS:  [x] Full Code [] DNR [] DNI [] Palliative/Comfort Care    DISPOSITION:  [x] ICU [] Stroke Unit [] Floor [] EMU [] RCU [] PC    Time spent: 60 critical care minutes     ASSESSMENT:   Bilateral cerebellar hemispheric strokes  POD 4 s/p SOC for foramen magnum decompression and R parietooccipital EVD  Bilateral carotid terminus aneurysms  R. V4 occlusion  History of peripheral neuropathy and asthma  Bulbar dysfunction. Respiratory insufficiency    PLAN:  NEURO: Q1h neurochecks  EVD at 0cmH2O, ICPs, CPP, output.   Post op CT head reviewed. CT 4/24: stable.  CT 4/25.  SG ELENA- monitor output. Off suction.   Holding ASA/plavix until 4/28. Continue Statin  Stroke core measures, stroke neurology consult  Will need MRI brain w and w/o  Hypercoagulable and vasculitis w/u  Pending DSA   Activity: Bedrest. PT/OT as tolerated.    PULM: Reintubated  ABG, CXR  Will discuss early trach/PEG with family  Pulm toilet    CARDIAC:   SBP goal 100-160  TTE w/ bubble: No PFO EF 55-60%, mild LVH.  Amlodipine    GI: NGT, CXR to confirm. Transaminitis  Diet: NPO for now. Start TFs later today  PPI as intubated  Bowel regimen   LBM: 4/25  LFTs normalized    /RENAL: Urine retention  On 3% at 50cc/hr for Na goal 145-150; salt tabs  Monitor BMPs Q6  Strict Is/Os  Continue cardura (increase to 4mg)    HEME: S/P 3U platelets and 35mcg DDAVP for ASA/plavix reversal  Maintain Hb > 7.0, PLT > 100,000  SCDs, SQL Anti Xa 4/27 2:00am  LE dopplers: superficial venous thrombosis in proximal portion of right greater saphenous vein  Repeat dopplers 4/29    ID:  Monitor for infectious s/s, fever curve, leukocytosis  Post op Ancef per NSGY  Febrile 4/23. Cultures NGTD (UA, blood, CSF,    ENDO:   Glu goal 140-180mg/dL  ISS   A1c 5.9, LDL92, TSH    SOCIAL/FAMILY:  [] awaiting [x] updated at bedside [] family meeting    CODE STATUS:  [x] Full Code [] DNR [] DNI [] Palliative/Comfort Care    DISPOSITION:  [x] ICU [] Stroke Unit [] Floor [] EMU [] RCU [] PC    Time spent: 60 critical care minutes

## 2023-04-25 NOTE — PROVIDER CONTACT NOTE (OTHER) - ASSESSMENT
pts work of breathing increased. RR was above 20 (see vitals flowsheet). pt became tachycardic, BP was increasing. HFNC increased to 50/50 with no improvement.

## 2023-04-25 NOTE — CHART NOTE - NSCHARTNOTEFT_GEN_A_CORE
POD 4. Cont HFNC. Increased secertions on HFNC, reintubated. CXR confirmed good placement. EVD dropped to 5cmH20 for goal of draining 5-10cc/hr and drain taken off suction. Pending CTH. EVD drained 0cc/hr, dropped to 0. NGT replaced. Dc'd fioricet. Started on PPI for intubation. Increased cardura to 4mg for urinary retention, given an additional 2mg now. Started salt tabs 3 q 6. Given 12.5mg fentanyl x1. NGT replaced, CXR shown in lung. NGT removed, repeat CXR showing no pneumothorax. Pending ENT consult for NGT placement. CTH stable. NGT replaced by ENT, confirmed in proper spot. Restarted TF. [id number]. Na 141 from 146. Increased 3% to 60. EVD raised to 3cmH20. ETT pulled back 1cm by RT.

## 2023-04-25 NOTE — CONSULT NOTE ADULT - ASSESSMENT
-------------------------------  ASSESSMENT/PLAN:    IMPRESSION: MAKSIM TRIVEDI  is a  57y Female as above now s/p NGT placement using endoscopic guidance and nasal trumpet    Plan:  - F/u CXR to confirm placement in stomach  - Primary team can adjust length as needed  - Patient discussed with Dr. Suazo who agrees       Thank you for the kind referral and for allowing me to share in the care of MAKSIM TRIVEDI If you have any questions, please do not hesitate to contact me.     Sincerely,  Jasmyne Larry PA-C  04-25-23 @ 13:19

## 2023-04-25 NOTE — PROGRESS NOTE ADULT - SUBJECTIVE AND OBJECTIVE BOX
HPI:  58 yo F with PMH of Asthma, CAD (not on  meds), peripheral neuropathy and PSH of BATOOL, cholecystectomy, right knee surgery presented to [facility] ED on 4/20 with right sided weakness and right facial numbness. Patient was undergoing preparation for colonoscopy. As per daughter at 8am patient was playing with her grandchildren but around 840 am patient was getting dressed and fell and subsequently felt weak after. Daughter reports that patient had some episodes of vomiting at this time. She had a syncopal episode at around 10 am and was witnessed by brother. No head trauma, She regained conscious after few minutes. She remained in bed for the remainder of the day. Daughter reports some slurred speech noted 1230-1PM and also was confused after. and around 4PM, the patient's sister noted right sided weakness at which time EMS was called and patient was brought to ED. No c/o chest pain, shortness of breath, fever, headache, abdominal pain, urinary complaints. No recent travel/sickness/ change in meds. Stroke code in ER: CTH neg for heme, Right PICA distribution acute infarction. CTA with saccular aneurysms of b/l carotids, approximately 0.8 cm on the right and 1.2 x 0.9 cm on the left. Possible tiny third saccular aneurysm on the right Posterior intracranial circulation: Right vertebral arterial occlusion at its dural crossing junction V3 Atlantic and V4 intracranial segments with likely reversal of flow in its intracranial segment from the basilar. Right PICA faintly seen. 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  Not candidate for TNK/mechanical thrombectomy. CT scan repeated which showed: 1. Brain: Progression of acute infarction within the right cerebellar hemisphere, also extending into the left superior cerebellar hemisphere. New mass effect on the fourth ventricle causing stenosis versus occlusion with new third and lateral ventricular dilatation indicating ventricular obstruction at the level of the fourth ventricle. New upward and downward herniation of cerebellar parenchyma Right carotid system: No hemodynamically significant stenosis. Left carotid system: No hemodynamically significant stenosis. Vertebral circulation: Patent. Anterior intracranial circulation: Intact. Bilateral internal carotid saccular aneurysms. These findings are unchanged. Posterior intracranial circulation:    Improved flow within the right vertebral artery since 4/20/2023. New focal stenosis mid left vertebral artery, etiology uncertain, consider vasospasm and extrinsic compression in addition to new embolic disease. Brain perfusion: Perfusion images demonstrate normalization of the perfusion abnormality in the right cerebellar hemisphere present on 4/20/2023 despite evidence of progression of acute infarction.  Areas of apparent ischemia within the posterior fossa have progressed in extent, also again involving the left posterior cerebral arterial distribution. Tx to [facility] for SOC watch. On admission to [facility], NIHSS 12.  (21 Apr 2023 16:50)    INTERVAL EVENTS:  Increased secretions, improving with nebs, chest PT, deep suctioning. HFNC re-applied for desats during suctioning.    HOSPITAL COURSE:  4/21: Admitted for crani watch, CTH complete w/ unchanged hydrocephalus, taken for emergent occipital craniectomy.   4/22: POD1. Remains intubated overnight, on propofol and dank. EVD@[id number]. Pending repeat CTH this am. Given hydralazine 10mg x1. Started on cardene for SBP high 140s-150s. Sub-therapeutic on plavix, therapeutic on asa, rpt asa accumetrics until subtherapeutic. LE dopplers neg for DVT, but showing superficial venous thrombosis in the proximal portion of the right greater saphenous vein. Started on 3% @60 for na goal 145-150. NGT placed by ENT. Febrile, pancultured.  4/23: POD 2. 3% increased to 75. NGT readjusted. UA neg. SBP liberalized to 160. Plan to extubate. 3% decreased to 60. Failed extubation d/t no cuff leak, given 60mg solumedrol, plan to extubate in 6 hrs. SCx1 for post void residual >400, started on cardura at bedtime. New blood in buretrol, stopped SQL. Clot in EVD cleared. Neuro exam improving.   4/24: POD 3. Cont 3% with NS while NPO, start TF today. TF started. LFTs downtrending. Sodium 141. Increased 3% to 50, NS to 30. Repeat BMP ordered for 5:30PM. Tramadol 25 for pain with no relief, oxy 5 and 1g tylenol ordered, stability CT stable, speech language consult for dysarthria. Given hydralazine 10mg IVP for SBP>160, started amlodipine 5mg. C/o mild headache, given fioricet x1, stroke neuro rec SALVADOR and loop recorder placement. Echo with bubble completed, negative for PFO, EF 55-60%. J HFNC started for desats with suctioning.   4/25: POD 4. Cont HFNC.       Vital Signs Last 24 Hrs  T(C): 37.5 (24 Apr 2023 21:30), Max: 37.5 (24 Apr 2023 21:30)  T(F): 99.5 (24 Apr 2023 21:30), Max: 99.5 (24 Apr 2023 21:30)  HR: 67 (24 Apr 2023 23:00) (57 - 82)  BP: 129/60 (24 Apr 2023 23:00) (129/58 - 179/82)  BP(mean): 86 (24 Apr 2023 23:00) (84 - 118)  RR: 12 (24 Apr 2023 23:00) (9 - 24)  SpO2: 96% (24 Apr 2023 23:00) (89% - 100%)    Parameters below as of 24 Apr 2023 23:00  Patient On (Oxygen Delivery Method): nasal cannula, high flow  O2 Flow (L/min): 40  O2 Concentration (%): 40    I&O's Summary    23 Apr 2023 07:01  -  24 Apr 2023 07:00  --------------------------------------------------------  IN: 2295 mL / OUT: 2598 mL / NET: -303 mL    24 Apr 2023 07:01  -  25 Apr 2023 00:14  --------------------------------------------------------  IN: 2018 mL / OUT: 1639 mL / NET: 379 mL      PHYSICAL EXAM:  General: NAD, comfortably lying in bed, on NC  Cardiovascular: regular rate and rhythm   Respiratory: nonlabored breathing, normal chest rise   GI: abdomen soft, nontender, nondistended; +NGT  Neuro: opens eyes to voice, tracks, Pupils 2mm briskly reactive b/l, EOMI, hypophonic and dysarthric, AOx3 with significant encouragement, follows commands, RUE briskly withdraws, HG 1/5, LUE 5/5, LLE 5/5, RLE 4/5  Extremities: distal pulses 2+ x4  Incision/wound: crani incision c/d/i, EVD site c/d/i  Drain: +EVD@[id number],+ELENA(SG) to half suction     LABS:                        10.0   6.50  )-----------( 241      ( 24 Apr 2023 02:39 )             29.9     04-24    143  |  110<H>  |  13  ----------------------------<  133<H>  3.7   |  26  |  0.37<L>    Ca    8.6      24 Apr 2023 17:16  Phos  1.9     04-24  Mg     2.1     04-24    TPro  6.1  /  Alb  3.4  /  TBili  0.5  /  DBili  x   /  AST  50<H>  /  ALT  107<H>  /  AlkPhos  99  04-24    PT/INR - ( 23 Apr 2023 18:51 )   PT: 13.2 sec;   INR: 1.11          PTT - ( 23 Apr 2023 18:51 )  PTT:29.1 sec        CAPILLARY BLOOD GLUCOSE          Drug Levels: [] N/A    CSF Analysis: [] N/A      Allergies    No Known Allergies    Intolerances      MEDICATIONS:  Antibiotics:  ceFAZolin   IVPB 2000 milliGRAM(s) IV Intermittent every 8 hours    Neuro:  acetaminophen   Oral Liquid .. 650 milliGRAM(s) Oral every 6 hours PRN  acetaminophen 300 mG/butalbital 50 mG/ caffeine 40 mG 1 Capsule(s) Oral every 6 hours PRN  ondansetron Injectable 4 milliGRAM(s) IV Push every 6 hours PRN  oxyCODONE    IR 5 milliGRAM(s) Oral every 4 hours PRN    Anticoagulation:  enoxaparin Injectable 40 milliGRAM(s) SubCutaneous every 24 hours    OTHER:  acetylcysteine 20%  Inhalation 4 milliLiter(s) Inhalation every 6 hours  albuterol/ipratropium for Nebulization 3 milliLiter(s) Nebulizer every 6 hours PRN  amLODIPine   Tablet 5 milliGRAM(s) Oral daily  atorvastatin 80 milliGRAM(s) Oral at bedtime  chlorhexidine 2% Cloths 1 Application(s) Topical <User Schedule>  doxazosin 2 milliGRAM(s) Oral at bedtime  polyethylene glycol 3350 17 Gram(s) Oral daily  senna 2 Tablet(s) Oral at bedtime  sodium chloride 3%  Inhalation 4 milliLiter(s) Inhalation every 6 hours    IVF:  sodium chloride 3%. 500 milliLiter(s) IV Continuous <Continuous>    CULTURES:  Culture Results:   Normal Respiratory Domi present to date (04-23 @ 10:14)  Culture Results:   No growth to date (04-22 @ 20:37)    RADIOLOGY & ADDITIONAL TESTS:      ASSESSMENT:  58 yo F with PMH of Asthma, CAD (not on meds), peripheral presented to [facility] ED on 4/20 with right sided weakness and right facial numbness. Acute infarction within the right cerebellar hemisphere, also extending into the left superior cerebellar hemisphere. New mass effect on the fourth ventricle causing stenosis versus occlusion with new third and lateral ventricular dilatation indicating ventricular obstruction at the level of the fourth ventricle. New upward and downward herniation of cerebellar parenchyma. Pt transferred to [facility] for further management. NIHSS 12. Now s/p SOC foramen magnum decompression and right parietal/occipital EVD placement (4/21).    Neuro   - Vitals/neuro q1h   - Plan for DSA for B/L carotid aneurysms with Dr. Minor this admission  - SG ELENA drain to half suction, monitor output  - EVD@[id number]; monitor ICP/output  - CTH 4/22 with increase right IVH, possibly redistribution. Stable vent size.   - CTH 4/24 stable  - Repeat CTH 4/24: stable.   - stroke consulted, f/u recs  - pain control with fioricet prn, oxycodone prn    Cardio  - -160  - TTE 4/24: negative for PFO, mild LVH, mild dilation of L atrium, EF 55-60%  - Amlodipine 5mg daily started 4/24    Pulm  - extubated 4/23, HFNC 40/40  - albuterol prn for asthma     GI  - NGT placed by ENT; started TF Glucerna @ 50  - bowel regimen   - last BM 4/24    Renal  - Na goal 145-150, 3% @ 50  - Voiding   - cardura 2mg at bedtime for urinary retention     Heme  - SCDs, SQL for DVT ppx  - s/p 3 units platelets, 35 mcg of DDAVP for ASA/Plavix reversal  - LE dopplers 4/22 neg for DVT, but showing superficial venous thrombosis in the proximal portion of the right greater saphenous vein; consider repeat doppler in 1 week    Endo   - no issues   - A1C 5.9    ID  - Ancef while ELENA in place   - f/u panculture 4/22    DISPO:  NSICU, full code  PT/OT rec acute inpatient rehab: patient can tolerate 3 hrs PT/OT daily      D/w Dr. Valadez and Dr. Lopez    HPI:  56 yo F with PMH of Asthma, CAD (not on  meds), peripheral neuropathy and PSH of BATOOL, cholecystectomy, right knee surgery presented to [facility] ED on 4/20 with right sided weakness and right facial numbness. Patient was undergoing preparation for colonoscopy. As per daughter at 8am patient was playing with her grandchildren but around 840 am patient was getting dressed and fell and subsequently felt weak after. Daughter reports that patient had some episodes of vomiting at this time. She had a syncopal episode at around 10 am and was witnessed by brother. No head trauma, She regained conscious after few minutes. She remained in bed for the remainder of the day. Daughter reports some slurred speech noted 1230-1PM and also was confused after. and around 4PM, the patient's sister noted right sided weakness at which time EMS was called and patient was brought to ED. No c/o chest pain, shortness of breath, fever, headache, abdominal pain, urinary complaints. No recent travel/sickness/ change in meds. Stroke code in ER: CTH neg for heme, Right PICA distribution acute infarction. CTA with saccular aneurysms of b/l carotids, approximately 0.8 cm on the right and 1.2 x 0.9 cm on the left. Possible tiny third saccular aneurysm on the right Posterior intracranial circulation: Right vertebral arterial occlusion at its dural crossing junction V3 Atlantic and V4 intracranial segments with likely reversal of flow in its intracranial segment from the basilar. Right PICA faintly seen. 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  Not candidate for TNK/mechanical thrombectomy. CT scan repeated which showed: 1. Brain: Progression of acute infarction within the right cerebellar hemisphere, also extending into the left superior cerebellar hemisphere. New mass effect on the fourth ventricle causing stenosis versus occlusion with new third and lateral ventricular dilatation indicating ventricular obstruction at the level of the fourth ventricle. New upward and downward herniation of cerebellar parenchyma Right carotid system: No hemodynamically significant stenosis. Left carotid system: No hemodynamically significant stenosis. Vertebral circulation: Patent. Anterior intracranial circulation: Intact. Bilateral internal carotid saccular aneurysms. These findings are unchanged. Posterior intracranial circulation:    Improved flow within the right vertebral artery since 4/20/2023. New focal stenosis mid left vertebral artery, etiology uncertain, consider vasospasm and extrinsic compression in addition to new embolic disease. Brain perfusion: Perfusion images demonstrate normalization of the perfusion abnormality in the right cerebellar hemisphere present on 4/20/2023 despite evidence of progression of acute infarction.  Areas of apparent ischemia within the posterior fossa have progressed in extent, also again involving the left posterior cerebral arterial distribution. Tx to [facility] for SOC watch. On admission to [facility], NIHSS 12.  (21 Apr 2023 16:50)    INTERVAL EVENTS:  Increased secretions, improving with nebs, chest PT, deep suctioning. HFNC re-applied for desats during suctioning.    HOSPITAL COURSE:  4/21: Admitted for crani watch, CTH complete w/ unchanged hydrocephalus, taken for emergent occipital craniectomy.   4/22: POD1. Remains intubated overnight, on propofol and dank. EVD@[id number]. Pending repeat CTH this am. Given hydralazine 10mg x1. Started on cardene for SBP high 140s-150s. Sub-therapeutic on plavix, therapeutic on asa, rpt asa accumetrics until subtherapeutic. LE dopplers neg for DVT, but showing superficial venous thrombosis in the proximal portion of the right greater saphenous vein. Started on 3% @60 for na goal 145-150. NGT placed by ENT. Febrile, pancultured.  4/23: POD 2. 3% increased to 75. NGT readjusted. UA neg. SBP liberalized to 160. Plan to extubate. 3% decreased to 60. Failed extubation d/t no cuff leak, given 60mg solumedrol, plan to extubate in 6 hrs. SCx1 for post void residual >400, started on cardura at bedtime. New blood in buretrol, stopped SQL. Clot in EVD cleared. Neuro exam improving.   4/24: POD 3. Cont 3% with NS while NPO, start TF today. TF started. LFTs downtrending. Sodium 141. Increased 3% to 50, NS to 30. Repeat BMP ordered for 5:30PM. Tramadol 25 for pain with no relief, oxy 5 and 1g tylenol ordered, stability CT stable, speech language consult for dysarthria. Given hydralazine 10mg IVP for SBP>160, started amlodipine 5mg. C/o mild headache, given fioricet x1, stroke neuro rec SALVADOR and loop recorder placement. Echo with bubble completed, negative for PFO, EF 55-60%. J HFNC started for desats with suctioning.   4/25: POD 4. Cont HFNC.       Vital Signs Last 24 Hrs  T(C): 37.5 (24 Apr 2023 21:30), Max: 37.5 (24 Apr 2023 21:30)  T(F): 99.5 (24 Apr 2023 21:30), Max: 99.5 (24 Apr 2023 21:30)  HR: 67 (24 Apr 2023 23:00) (57 - 82)  BP: 129/60 (24 Apr 2023 23:00) (129/58 - 179/82)  BP(mean): 86 (24 Apr 2023 23:00) (84 - 118)  RR: 12 (24 Apr 2023 23:00) (9 - 24)  SpO2: 96% (24 Apr 2023 23:00) (89% - 100%)    Parameters below as of 24 Apr 2023 23:00  Patient On (Oxygen Delivery Method): nasal cannula, high flow  O2 Flow (L/min): 40  O2 Concentration (%): 40    I&O's Summary    23 Apr 2023 07:01  -  24 Apr 2023 07:00  --------------------------------------------------------  IN: 2295 mL / OUT: 2598 mL / NET: -303 mL    24 Apr 2023 07:01  -  25 Apr 2023 00:14  --------------------------------------------------------  IN: 2018 mL / OUT: 1639 mL / NET: 379 mL      PHYSICAL EXAM:  General: NAD, comfortably lying in bed, on NC  Cardiovascular: regular rate and rhythm   Respiratory: nonlabored breathing, normal chest rise   GI: abdomen soft, nontender, nondistended; +NGT  Neuro: opens eyes to voice, tracks, Pupils 2mm briskly reactive b/l, EOMI, hypophonic and dysarthric, AOx3 with significant encouragement, follows commands, RUE 2/5 proximally, HG 1/5, LUE 5/5, LLE 5/5, RLE 4/5  Extremities: distal pulses 2+ x4  Incision/wound: crani incision c/d/i, EVD site c/d/i  Drain: +EVD@[id number],+ELENA(SG) to half suction     LABS:                        10.0   6.50  )-----------( 241      ( 24 Apr 2023 02:39 )             29.9     04-24    143  |  110<H>  |  13  ----------------------------<  133<H>  3.7   |  26  |  0.37<L>    Ca    8.6      24 Apr 2023 17:16  Phos  1.9     04-24  Mg     2.1     04-24    TPro  6.1  /  Alb  3.4  /  TBili  0.5  /  DBili  x   /  AST  50<H>  /  ALT  107<H>  /  AlkPhos  99  04-24    PT/INR - ( 23 Apr 2023 18:51 )   PT: 13.2 sec;   INR: 1.11          PTT - ( 23 Apr 2023 18:51 )  PTT:29.1 sec        CAPILLARY BLOOD GLUCOSE          Drug Levels: [] N/A    CSF Analysis: [] N/A      Allergies    No Known Allergies    Intolerances      MEDICATIONS:  Antibiotics:  ceFAZolin   IVPB 2000 milliGRAM(s) IV Intermittent every 8 hours    Neuro:  acetaminophen   Oral Liquid .. 650 milliGRAM(s) Oral every 6 hours PRN  acetaminophen 300 mG/butalbital 50 mG/ caffeine 40 mG 1 Capsule(s) Oral every 6 hours PRN  ondansetron Injectable 4 milliGRAM(s) IV Push every 6 hours PRN  oxyCODONE    IR 5 milliGRAM(s) Oral every 4 hours PRN    Anticoagulation:  enoxaparin Injectable 40 milliGRAM(s) SubCutaneous every 24 hours    OTHER:  acetylcysteine 20%  Inhalation 4 milliLiter(s) Inhalation every 6 hours  albuterol/ipratropium for Nebulization 3 milliLiter(s) Nebulizer every 6 hours PRN  amLODIPine   Tablet 5 milliGRAM(s) Oral daily  atorvastatin 80 milliGRAM(s) Oral at bedtime  chlorhexidine 2% Cloths 1 Application(s) Topical <User Schedule>  doxazosin 2 milliGRAM(s) Oral at bedtime  polyethylene glycol 3350 17 Gram(s) Oral daily  senna 2 Tablet(s) Oral at bedtime  sodium chloride 3%  Inhalation 4 milliLiter(s) Inhalation every 6 hours    IVF:  sodium chloride 3%. 500 milliLiter(s) IV Continuous <Continuous>    CULTURES:  Culture Results:   Normal Respiratory Domi present to date (04-23 @ 10:14)  Culture Results:   No growth to date (04-22 @ 20:37)    RADIOLOGY & ADDITIONAL TESTS:      ASSESSMENT:  56 yo F with PMH of Asthma, CAD (not on meds), peripheral presented to [facility] ED on 4/20 with right sided weakness and right facial numbness. Acute infarction within the right cerebellar hemisphere, also extending into the left superior cerebellar hemisphere. New mass effect on the fourth ventricle causing stenosis versus occlusion with new third and lateral ventricular dilatation indicating ventricular obstruction at the level of the fourth ventricle. New upward and downward herniation of cerebellar parenchyma. Pt transferred to [facility] for further management. NIHSS 12. Now s/p SOC foramen magnum decompression and right parietal/occipital EVD placement (4/21).    Neuro   - Vitals/neuro q1h   - Plan for DSA for B/L carotid aneurysms with Dr. Minor this admission  - SG ELENA drain to half suction, monitor output  - EVD@[id number]; monitor ICP/output  - CTH 4/22 with increase right IVH, possibly redistribution. Stable vent size.   - CTH 4/24 stable  - Repeat CTH 4/24: stable.   - stroke consulted, f/u recs  - pain control with fioricet prn, oxycodone prn    Cardio  - -160  - TTE 4/24: negative for PFO, mild LVH, mild dilation of L atrium, EF 55-60%  - Amlodipine 5mg daily started 4/24    Pulm  - extubated 4/23, HFNC 40/40  - albuterol prn for asthma     GI  - NGT placed by ENT; started TF Glucerna @ 50  - bowel regimen   - last BM 4/24    Renal  - Na goal 145-150, 3% @ 50  - Voiding   - cardura 2mg at bedtime for urinary retention     Heme  - SCDs, SQL for DVT ppx  - s/p 3 units platelets, 35 mcg of DDAVP for ASA/Plavix reversal  - LE dopplers 4/22 neg for DVT, but showing superficial venous thrombosis in the proximal portion of the right greater saphenous vein; consider repeat doppler in 1 week    Endo   - no issues   - A1C 5.9    ID  - Ancef while ELENA in place   - f/u panculture 4/22    DISPO:  NSICU, full code  PT/OT rec acute inpatient rehab: patient can tolerate 3 hrs PT/OT daily      D/w Dr. Valadez and Dr. Lopez

## 2023-04-25 NOTE — PROGRESS NOTE ADULT - SUBJECTIVE AND OBJECTIVE BOX
Neurology Stroke Progress Note    INTERVAL HPI/OVERNIGHT EVENTS:  Patient seen and examined patient intubated this morning for increased secretions. Now sedated with precedex. Opens eyes to voice/tactile and following commands.     MEDICATIONS  (STANDING):  acetylcysteine 20%  Inhalation 4 milliLiter(s) Inhalation every 6 hours  albuterol/ipratropium for Nebulization 3 milliLiter(s) Nebulizer every 6 hours  amLODIPine   Tablet 5 milliGRAM(s) Oral daily  atorvastatin 80 milliGRAM(s) Oral at bedtime  chlorhexidine 0.12% Liquid 15 milliLiter(s) Oral Mucosa every 12 hours  chlorhexidine 2% Cloths 1 Application(s) Topical <User Schedule>  dexMEDEtomidine Infusion 0.2 MICROgram(s)/kG/Hr (4.42 mL/Hr) IV Continuous <Continuous>  doxazosin 4 milliGRAM(s) Oral at bedtime  enoxaparin Injectable 40 milliGRAM(s) SubCutaneous every 24 hours  pantoprazole  Injectable 40 milliGRAM(s) IV Push daily  polyethylene glycol 3350 17 Gram(s) Oral daily  senna 2 Tablet(s) Oral at bedtime  sodium chloride 3 Gram(s) Oral every 6 hours  sodium chloride 3%  Inhalation 4 milliLiter(s) Inhalation every 6 hours  sodium chloride 3%. 500 milliLiter(s) (60 mL/Hr) IV Continuous <Continuous>    MEDICATIONS  (PRN):  acetaminophen   Oral Liquid .. 650 milliGRAM(s) Oral every 6 hours PRN Temp greater or equal to 38C (100.4F), Mild Pain (1 - 3)  fentaNYL    Injectable 25 MICROGram(s) IV Push every 2 hours PRN Severe Pain (7 - 10), vent dysynchrony  ondansetron Injectable 4 milliGRAM(s) IV Push every 6 hours PRN Nausea and/or Vomiting  oxyCODONE    IR 5 milliGRAM(s) Oral every 4 hours PRN Moderate Pain (4 - 6)      Allergies    No Known Allergies    Intolerances        Vital Signs Last 24 Hrs  T(C): 38 (25 Apr 2023 15:00), Max: 38 (25 Apr 2023 15:00)  T(F): 100.4 (25 Apr 2023 15:00), Max: 100.4 (25 Apr 2023 15:00)  HR: 66 (25 Apr 2023 17:00) (63 - 100)  BP: 133/64 (25 Apr 2023 17:00) (124/57 - 171/76)  BP(mean): 92 (25 Apr 2023 17:00) (81 - 111)  RR: 16 (25 Apr 2023 17:00) (11 - 23)  SpO2: 96% (25 Apr 2023 17:00) (86% - 100%)    Parameters below as of 25 Apr 2023 17:00  Patient On (Oxygen Delivery Method): ventilator  O2 Flow (L/min): 30      Physical exam:  General: No acute distress, awake and alert  Eyes: Anicteric sclerae, moist conjunctivae, see below for CNs  Neck: trachea midline  Cardiovascular: Regular rate and rhythm on monitor  Pulmonary: No use of accessory muscles  GI: Abdomen soft  Extremities: no edema    Neurologic:  -Mental status: Awakens to voice, ETT in place. Following commands.   -Cranial nerves:   II: BTT diminished on the right. +corneals L>R.  III, IV, VI: Extraocular movements are grossly intact without nystagmus. Pupils equally round and reactive to light 2mm and brisk  V:  Facial sensation V1-V3 grossly intact   VII: Face appears symmetric, difficult to assess d/t ETT  Motor: Normal bulk and tone. LUE at least 4/5 able to lift for >10 seconds, weakly resists. RUE weak  noted today, unable to lift antigravity and no movement. Bilateral lower extremities able to lift antigravity at least 3/5, unable to assess for drive but patient moving spontaneously and following commands.   Sensation: Sensation grossly intact patient moves to tactile stimuli on the left and right lower extremity, painful stim on the right upper extremity.   Coordination: Unable to test      LABS:                        10.3   5.84  )-----------( 254      ( 25 Apr 2023 05:30 )             31.5     04-25    141  |  108  |  13  ----------------------------<  120<H>  3.8   |  27  |  0.36<L>    Ca    8.3<L>      25 Apr 2023 14:48  Phos  2.8     04-25  Mg     2.0     04-25    TPro  5.7<L>  /  Alb  3.2<L>  /  TBili  0.4  /  DBili  x   /  AST  31  /  ALT  76<H>  /  AlkPhos  100  04-25    PT/INR - ( 23 Apr 2023 18:51 )   PT: 13.2 sec;   INR: 1.11          PTT - ( 23 Apr 2023 18:51 )  PTT:29.1 sec      RADIOLOGY & ADDITIONAL TESTS:     Neurology Stroke Progress Note    INTERVAL HPI/OVERNIGHT EVENTS:  Patient seen and examined patient intubated this morning for increased secretions. Now sedated with precedex. Opens eyes to voice/tactile and following commands. Patient's daughter reports that after her second child the patient suffered from three miscarriages, no other history of clotting issues.    MEDICATIONS  (STANDING):  acetylcysteine 20%  Inhalation 4 milliLiter(s) Inhalation every 6 hours  albuterol/ipratropium for Nebulization 3 milliLiter(s) Nebulizer every 6 hours  amLODIPine   Tablet 5 milliGRAM(s) Oral daily  atorvastatin 80 milliGRAM(s) Oral at bedtime  chlorhexidine 0.12% Liquid 15 milliLiter(s) Oral Mucosa every 12 hours  chlorhexidine 2% Cloths 1 Application(s) Topical <User Schedule>  dexMEDEtomidine Infusion 0.2 MICROgram(s)/kG/Hr (4.42 mL/Hr) IV Continuous <Continuous>  doxazosin 4 milliGRAM(s) Oral at bedtime  enoxaparin Injectable 40 milliGRAM(s) SubCutaneous every 24 hours  pantoprazole  Injectable 40 milliGRAM(s) IV Push daily  polyethylene glycol 3350 17 Gram(s) Oral daily  senna 2 Tablet(s) Oral at bedtime  sodium chloride 3 Gram(s) Oral every 6 hours  sodium chloride 3%  Inhalation 4 milliLiter(s) Inhalation every 6 hours  sodium chloride 3%. 500 milliLiter(s) (60 mL/Hr) IV Continuous <Continuous>    MEDICATIONS  (PRN):  acetaminophen   Oral Liquid .. 650 milliGRAM(s) Oral every 6 hours PRN Temp greater or equal to 38C (100.4F), Mild Pain (1 - 3)  fentaNYL    Injectable 25 MICROGram(s) IV Push every 2 hours PRN Severe Pain (7 - 10), vent dysynchrony  ondansetron Injectable 4 milliGRAM(s) IV Push every 6 hours PRN Nausea and/or Vomiting  oxyCODONE    IR 5 milliGRAM(s) Oral every 4 hours PRN Moderate Pain (4 - 6)      Allergies    No Known Allergies    Intolerances        Vital Signs Last 24 Hrs  T(C): 38 (25 Apr 2023 15:00), Max: 38 (25 Apr 2023 15:00)  T(F): 100.4 (25 Apr 2023 15:00), Max: 100.4 (25 Apr 2023 15:00)  HR: 66 (25 Apr 2023 17:00) (63 - 100)  BP: 133/64 (25 Apr 2023 17:00) (124/57 - 171/76)  BP(mean): 92 (25 Apr 2023 17:00) (81 - 111)  RR: 16 (25 Apr 2023 17:00) (11 - 23)  SpO2: 96% (25 Apr 2023 17:00) (86% - 100%)    Parameters below as of 25 Apr 2023 17:00  Patient On (Oxygen Delivery Method): ventilator  O2 Flow (L/min): 30      Physical exam:  General: No acute distress, awake and alert  Eyes: Anicteric sclerae, moist conjunctivae, see below for CNs  Neck: trachea midline  Cardiovascular: Regular rate and rhythm on monitor  Pulmonary: No use of accessory muscles  GI: Abdomen soft  Extremities: no edema    Neurologic:  -Mental status: Awakens to voice, ETT in place. Following commands.   -Cranial nerves:   II: BTT diminished on the right. +corneals L>R.  III, IV, VI: Extraocular movements are grossly intact without nystagmus. Pupils equally round and reactive to light 2mm and brisk  V:  Facial sensation V1-V3 grossly intact   VII: Face appears symmetric, difficult to assess d/t ETT  Motor: Normal bulk and tone. LUE at least 4/5 able to lift for >10 seconds, weakly resists. RUE weak  noted today, unable to lift antigravity and no movement. Bilateral lower extremities able to lift antigravity at least 3/5, unable to assess for drive but patient moving spontaneously and following commands.   Sensation: Sensation grossly intact patient moves to tactile stimuli on the left and right lower extremity, painful stim on the right upper extremity.   Coordination: Unable to test      LABS:                        10.3   5.84  )-----------( 254      ( 25 Apr 2023 05:30 )             31.5     04-25    141  |  108  |  13  ----------------------------<  120<H>  3.8   |  27  |  0.36<L>    Ca    8.3<L>      25 Apr 2023 14:48  Phos  2.8     04-25  Mg     2.0     04-25    TPro  5.7<L>  /  Alb  3.2<L>  /  TBili  0.4  /  DBili  x   /  AST  31  /  ALT  76<H>  /  AlkPhos  100  04-25    PT/INR - ( 23 Apr 2023 18:51 )   PT: 13.2 sec;   INR: 1.11          PTT - ( 23 Apr 2023 18:51 )  PTT:29.1 sec      RADIOLOGY & ADDITIONAL TESTS:

## 2023-04-25 NOTE — PROVIDER CONTACT NOTE (OTHER) - SITUATION
LCA Cine(s)  injected and visualized utilizing power injector system. pt was not tolerating HFNC at 40/40

## 2023-04-25 NOTE — PROGRESS NOTE ADULT - SUBJECTIVE AND OBJECTIVE BOX
PM EVENTS: no acute overnight events as of documentation time of this note.    ROS: negative except as mentioned above.    T(C): 37.9 (04-25-23 @ 17:49), Max: 38 (04-25-23 @ 15:00)  HR: 80 (04-25-23 @ 19:00) (63 - 100)  BP: 137/63 (04-25-23 @ 19:00) (124/57 - 171/76)  RR: 18 (04-25-23 @ 19:00) (11 - 23)  SpO2: 96% (04-25-23 @ 19:00) (86% - 100%)    Mode: AC/ CMV (Assist Control/ Continuous Mandatory Ventilation)  RR (machine): 16  TV (machine): 300  FiO2: 30  PEEP: 5  ITime: 1  MAP: 11  PIP: 17      I&O's Summary    24 Apr 2023 07:01  -  25 Apr 2023 07:00  --------------------------------------------------------  IN: 2743.3 mL / OUT: 2377 mL / NET: 366.3 mL    25 Apr 2023 07:01  -  25 Apr 2023 19:54  --------------------------------------------------------  IN: 904.6 mL / OUT: 1181 mL / NET: -276.4 mL        EXAM:     [place] Coma Scale:     General: normocephalic, atraumatic, laying in bed, in no distress  Neuro     MS: A/Ox3, cooperative, normal attention, no neglect, comprehension intact, speech with preserved fluency, naming, and repetition    CN: PERRL, VF FTC, EOMI and no ptosis bilaterally, sensation intact to crude touch V1-V3, face symmetric, hearing grossly intact    Mot: bulk normal, tone normal, power 5/5 in bilateral upper and lower proximal extremities    Sens: intact to crude touch in bilateral upper and lower extremities    Reflexes: deferred    Coord: no dysmetria or ataxia on finger to nose/heel to shin, respectively, no focal bradykinetic movements    Gait: deferred  Chest: nonlabored respirations, no adventitious lung sounds bilaterally, heart regular rate/rhythm, present S1/S2, no murmurs or rubs  Abdomen: nondistended, soft and nontender without peritoneal signs, normoactive bowel sounds  Extremities: no clubbing, well-perfused, no edema      ABG - ( 25 Apr 2023 12:08 )  pH, Arterial: 7.44  pH, Blood: x     /  pCO2: 42    /  pO2: 89    / HCO3: 28    / Base Excess: 3.9   /  SaO2: 99.3                                    10.3   5.84  )-----------( 254      ( 25 Apr 2023 05:30 )             31.5     04-25    141  |  108  |  13  ----------------------------<  120<H>  3.8   |  27  |  0.36<L>    Ca    8.3<L>      25 Apr 2023 14:48  Phos  2.8     04-25  Mg     2.0     04-25    TPro  5.7<L>  /  Alb  3.2<L>  /  TBili  0.4  /  DBili  x   /  AST  31  /  ALT  76<H>  /  AlkPhos  100  04-25      MEDICATIONS  (STANDING):  acetylcysteine 20%  Inhalation 4 milliLiter(s) Inhalation every 6 hours  albuterol/ipratropium for Nebulization 3 milliLiter(s) Nebulizer every 6 hours  amLODIPine   Tablet 5 milliGRAM(s) Oral daily  atorvastatin 80 milliGRAM(s) Oral at bedtime  chlorhexidine 0.12% Liquid 15 milliLiter(s) Oral Mucosa every 12 hours  chlorhexidine 2% Cloths 1 Application(s) Topical <User Schedule>  dexMEDEtomidine Infusion 0.2 MICROgram(s)/kG/Hr (4.42 mL/Hr) IV Continuous <Continuous>  doxazosin 4 milliGRAM(s) Oral at bedtime  enoxaparin Injectable 40 milliGRAM(s) SubCutaneous every 24 hours  pantoprazole  Injectable 40 milliGRAM(s) IV Push daily  polyethylene glycol 3350 17 Gram(s) Oral daily  senna 2 Tablet(s) Oral at bedtime  sodium chloride 3 Gram(s) Oral every 6 hours  sodium chloride 3%  Inhalation 4 milliLiter(s) Inhalation every 6 hours  sodium chloride 3%. 500 milliLiter(s) (60 mL/Hr) IV Continuous <Continuous>    MEDICATIONS  (PRN):  acetaminophen   Oral Liquid .. 650 milliGRAM(s) Oral every 6 hours PRN Temp greater or equal to 38C (100.4F), Mild Pain (1 - 3)  fentaNYL    Injectable 25 MICROGram(s) IV Push every 2 hours PRN Severe Pain (7 - 10), vent dysynchrony  ondansetron Injectable 4 milliGRAM(s) IV Push every 6 hours PRN Nausea and/or Vomiting  oxyCODONE    IR 5 milliGRAM(s) Oral every 4 hours PRN Moderate Pain (4 - 6)      Please see the day's note documented by  *** for detailed ongoing assessment and plan.  Additions to plan are as follows:    - None.         PM EVENTS: no acute overnight events as of documentation time of this note.    ROS: negative except as mentioned above.    T(C): 37.9 (04-25-23 @ 17:49), Max: 38 (04-25-23 @ 15:00)  HR: 80 (04-25-23 @ 19:00) (63 - 100)  BP: 137/63 (04-25-23 @ 19:00) (124/57 - 171/76)  RR: 18 (04-25-23 @ 19:00) (11 - 23)  SpO2: 96% (04-25-23 @ 19:00) (86% - 100%)    Mode: AC/ CMV (Assist Control/ Continuous Mandatory Ventilation)  RR (machine): 16  TV (machine): 300  FiO2: 30  PEEP: 5  ITime: 1  MAP: 11  PIP: 17      I&O's Summary    24 Apr 2023 07:01  -  25 Apr 2023 07:00  --------------------------------------------------------  IN: 2743.3 mL / OUT: 2377 mL / NET: 366.3 mL    25 Apr 2023 07:01  -  25 Apr 2023 19:54  --------------------------------------------------------  IN: 904.6 mL / OUT: 1181 mL / NET: -276.4 mL        EXAM:     [place] Coma Scale:     General: normocephalic, atraumatic, laying in bed, in no distress  Neuro     MS: A/Ox3, cooperative, normal attention, no neglect, comprehension intact, speech with preserved fluency, naming, and repetition    CN: PERRL, VF FTC, EOMI and no ptosis bilaterally, sensation intact to crude touch V1-V3, face symmetric, hearing grossly intact    Mot: bulk normal, tone normal, power 5/5 in bilateral upper and lower proximal extremities    Sens: intact to crude touch in bilateral upper and lower extremities    Reflexes: deferred    Coord: no dysmetria or ataxia on finger to nose/heel to shin, respectively, no focal bradykinetic movements    Gait: deferred  Chest: nonlabored respirations, no adventitious lung sounds bilaterally, heart regular rate/rhythm, present S1/S2, no murmurs or rubs  Abdomen: nondistended, soft and nontender without peritoneal signs, normoactive bowel sounds  Extremities: no clubbing, well-perfused, no edema      ABG - ( 25 Apr 2023 12:08 )  pH, Arterial: 7.44  pH, Blood: x     /  pCO2: 42    /  pO2: 89    / HCO3: 28    / Base Excess: 3.9   /  SaO2: 99.3                                    10.3   5.84  )-----------( 254      ( 25 Apr 2023 05:30 )             31.5     04-25    141  |  108  |  13  ----------------------------<  120<H>  3.8   |  27  |  0.36<L>    Ca    8.3<L>      25 Apr 2023 14:48  Phos  2.8     04-25  Mg     2.0     04-25    TPro  5.7<L>  /  Alb  3.2<L>  /  TBili  0.4  /  DBili  x   /  AST  31  /  ALT  76<H>  /  AlkPhos  100  04-25      MEDICATIONS  (STANDING):  acetylcysteine 20%  Inhalation 4 milliLiter(s) Inhalation every 6 hours  albuterol/ipratropium for Nebulization 3 milliLiter(s) Nebulizer every 6 hours  amLODIPine   Tablet 5 milliGRAM(s) Oral daily  atorvastatin 80 milliGRAM(s) Oral at bedtime  chlorhexidine 0.12% Liquid 15 milliLiter(s) Oral Mucosa every 12 hours  chlorhexidine 2% Cloths 1 Application(s) Topical <User Schedule>  dexMEDEtomidine Infusion 0.2 MICROgram(s)/kG/Hr (4.42 mL/Hr) IV Continuous <Continuous>  doxazosin 4 milliGRAM(s) Oral at bedtime  enoxaparin Injectable 40 milliGRAM(s) SubCutaneous every 24 hours  pantoprazole  Injectable 40 milliGRAM(s) IV Push daily  polyethylene glycol 3350 17 Gram(s) Oral daily  senna 2 Tablet(s) Oral at bedtime  sodium chloride 3 Gram(s) Oral every 6 hours  sodium chloride 3%  Inhalation 4 milliLiter(s) Inhalation every 6 hours  sodium chloride 3%. 500 milliLiter(s) (60 mL/Hr) IV Continuous <Continuous>    MEDICATIONS  (PRN):  acetaminophen   Oral Liquid .. 650 milliGRAM(s) Oral every 6 hours PRN Temp greater or equal to 38C (100.4F), Mild Pain (1 - 3)  fentaNYL    Injectable 25 MICROGram(s) IV Push every 2 hours PRN Severe Pain (7 - 10), vent dysynchrony  ondansetron Injectable 4 milliGRAM(s) IV Push every 6 hours PRN Nausea and/or Vomiting  oxyCODONE    IR 5 milliGRAM(s) Oral every 4 hours PRN Moderate Pain (4 - 6)      Please see the day's note documented by Dr. Bueno for detailed ongoing assessment and plan.

## 2023-04-25 NOTE — CONSULT NOTE ADULT - SUBJECTIVE AND OBJECTIVE BOX
Telephone Encounter by Bernardo Howard MD at 09/27/18 08:16 AM     Author:  Bernardo Howard MD Service:  (none) Author Type:  Physician     Filed:  09/27/18 08:17 AM Encounter Date:  9/26/2018 Status:  Signed     :  Bernardo Howard MD (Physician)            I'm not sure what more we can do here - I had an appointment with him yesterday which he cancelled as he was too weak to come in per patient. He reported worsening symptoms and was informed he needed to be seen asap (either here by me or in the ER if he is unable to leave the house due to his weakness).  He declined both options and per nurse his wife was demanding that he just be given an antibiotic over the phone.  They were informed that that course of action is inappropriate and unsafe.[RH1.1M]       Revision History        User Key Date/Time User Provider Type Action    > RH1.1 09/27/18 08:17 AM Bernardo Howard MD Physician Sign    M - Manual               OTOLARYNGOLOGY (ENT) CONSULTATION NOTE    PATIENT: MAKSIM TRIVEDI     MRN: [id number]       : 67  DATE OF ADMISSION:23  DATE OF SERVICE:  23 @ 13:18    CHIEF COMPLAINT: unable to place NGT     HISTORY OF PRESENT ILLNESS:  MAKSIM TRIVEDI  58 yo F with PMH of Asthma, CAD (not on  meds), peripheral neuropathy and PSH of BATOOL, cholecystectomy, right knee surgery presented to [place] ED on  with right sided weakness and right facial numbness. On repeat imaging of brain: Progression of acute infarction within the right cerebellar hemisphere, also extending into the left superior cerebellar hemisphere. New mass effect on the fourth ventricle causing stenosis versus occlusion with new third and lateral ventricular dilatation indicating ventricular obstruction at the level of the fourth ventricle. New upward and downward herniation of cerebellar parenchyma. Pt transferred to [facility] for further management. Now s/p SOC foramen magnum decompression and right parietal/occipital EVD placement ().    ENT consulted for NGT feeding tube placement. Primary team attempted multiple times but encountered resistance in nasopharynx. Patient is s/p suboccipital craniectomy and is to be placed in cervical collar soon. ENT placed NGT prior on , however NGT was removed when patient was reintubated. ENT consulted again for NGT placement  after multiple failed attempts by primary team.     PAST MEDICAL HISTORY:  Asthma    CAD (coronary artery disease)    Peripheral neuropathy        CURRENT MEDICATIONS   acetaminophen   Oral Liquid .. 650 Oral PRN  acetylcysteine 20%  Inhalation 4 Inhalation  albuterol/ipratropium for Nebulization 3 Nebulizer  amLODIPine   Tablet 5 Oral  atorvastatin 80 Oral  ceFAZolin   IVPB 2000 IV Intermittent  chlorhexidine 0.12% Liquid 15 Oral Mucosa  chlorhexidine 2% Cloths 1 Topical  dexMEDEtomidine Infusion 0.2 IV Continuous  doxazosin 2 Oral  doxazosin 4 Oral  enoxaparin Injectable 40 SubCutaneous  fentaNYL    Injectable 25 IV Push PRN  ondansetron Injectable 4 IV Push PRN  oxyCODONE    IR 5 Oral PRN  pantoprazole  Injectable 40 IV Push  polyethylene glycol 3350 17 Oral  senna 2 Oral  sodium chloride 3 Oral  sodium chloride 3%  Inhalation 4 Inhalation  sodium chloride 3%. 500 IV Continuous      HOME MEDICATIONS:  Albuterol (Eqv-ProAir HFA) 90 mcg/inh inhalation aerosol      ALLERGIES:  No Known Allergies    SOCIAL HISTORY: Pertinent included in HPI   FAMILY HISTORY  No pertinent family history in first degree relatives        SURGICAL HISTORY:  S/P total abdominal hysterectomy    S/P cholecystectomy    S/P right knee surgery        REVIEW OF SYSTEMS: The patient was asked and responded to a review of systems regarding the following symptoms: constitutional, eye, ears, nose, mouth, throat, cardiovascular, respiratory. Pertinent factors have been included in the HPI.     PHYSICAL EXAMINATION:    The pertinent positive and negative examination findings were:    Constitutional: intubated, lethargic, responsive only to some verbal and physical stimuli     Head:  normocephalic, atraumatic. s/p SOC  Nose: deviatiated septum to right, clear anteriorly. no gross external deformity.  OC/OP:  intubated, no bleeding from mouth  Neck:  soft, flat  Lymph:  No cervical adenopathy.    Vital Signs:  T(C): 37.7 (23 @ 09:00), Max: 37.7 (23 @ 09:00)  HR: 63 (23 @ 13:00) (63 - 100)  BP: 133/63 (23 @ 13:00) (124/57 - 175/77)  RR: 17 (23 @ 13:00) (11 - 23)  SpO2: 98% (23 @ 13:00) (86% - 100%)                        10.3   5.84  )-----------( 254      ( 2023 05:30 )             31.5    04-    145  |  110<H>  |  12  ----------------------------<  144<H>  3.6   |  27  |  0.37<L>    Ca    8.3<L>      2023 05:30  Phos  2.8     04-25  Mg     2.0     04-25    TPro  5.7<L>  /  Alb  3.2<L>  /  TBili  0.4  /  DBili  x   /  AST  31  /  ALT  76<H>  /  AlkPhos  100  04-25   PT/INR - ( 2023 18:51 )   PT: 13.2 sec;   INR: 1.11          PTT - ( 2023 18:51 )  PTT:29.1 [id number]          PROCEDURE NOTE:PROCEDURE NOTE:    Procedure: Flexible laryngoscopy (CPT 43099)     Pre-Procedure Diagnosis: respiratory distress requiring intubation     Post-Procedure Diagnosis: same       Indications for Procedure: MAKSIM TRIVEDI is [id number] See above full HPI for further details. A detailed assessment of the nasal cavity, nasopharynx, hypopharynx, oropharynx, and larynx was required. An indirect mirror examination was not sufficient for complete evaluation due to patient discomfort or incomplete view. Therefore flexible laryngoscopy was performed.       Description of Procedure: After obtaining verbal consent, a flexible fiberoptic laryngoscope was inserted into the right nasal cavity. The nasal anatomy was examined for evidence of  nasal cavity obstruction. The septum was examined for clinically significant deviation.  Passing the flexible scope into the oropharynx and hypopharynx allowed examination of the base of tongue and vallecula and epiglottis. The piriform sinuses were examined for lesions and pooling of secretions or visible aspiration. The false vocal cords and true vocal cords were examined. The true vocal cords were examined for mobility, symmetry and closure. The visualized portion of the subglottis examined. The pharynx was examined for  visible extrinsic compression. The patient tolerated the procedure well without complications.      Findings: Afrin applied to nasal cavity bilaterally. Nasal cavity clear bilaterally. with septal deviation to left. NGT placed under flexible endoscopic guidance in both nostrils. Short A-P diameter of nasopharynx precluding NGT (weighted and sump) from passing inferiorly via either nostril. Moderate nasopharyngeal edema. Nasal trumpet inserted into left nasal cavity and into nasopharynx. Weighted 12Fr NGT passed through nasal trumpet which was then cut and removed.

## 2023-04-25 NOTE — PROGRESS NOTE ADULT - SUBJECTIVE AND OBJECTIVE BOX
=============================  NSCU ATTENDING PROGRESS NOTE  =============================    Patient is a 58 y/o F with history of asthma and peripheral neuropathy transferred to [facility] NSICU from [facility] for management of malignant posterior fossa stroke.  The patient developed symptoms at approximately 0830 on 4/20: dysconjugate gaze, ataxia, facial droop, dysarthria.  She did not present to [facility] until later on in the afternoon during which she was not a candidate for TNK.  CTP showing bilateral cerebellar perfusion mismatch, CTA showing R. V4 occlusion and large bilateral cavernous/terminal ICA aneurysms.  Patient was admitted to the ICU and monitored.  On 4/21 the patient became more lethargic, at which point a repeat CTH was performed, showing bilateral cerebellar strokes with lateral compression of the fourth ventricle on the right.  Patient accepted to [facility] by Dr. Valadez, at which point he was given 250cc 3% NaCl, started on 50cc/h, given 20mcg DDAVP and transferred as tier 1.     Upon arrival to [facility], NIHSS = 12, but mental status slowly declined from lethargy to near obtundation.  Repeat CTH showing stable mass effect and hydrocephalus. Patient taken emergently for SOC depression with EVD placement.      ICU Vital Signs Last 24 Hrs  T(C): 37.4 (25 Apr 2023 05:38), Max: 37.5 (24 Apr 2023 21:30)  T(F): 99.3 (25 Apr 2023 05:38), Max: 99.5 (24 Apr 2023 21:30)  HR: 76 (25 Apr 2023 05:39) (64 - 88)  BP: 144/66 (25 Apr 2023 05:00) (124/57 - 179/82)  BP(mean): 95 (25 Apr 2023 05:00) (81 - 118)  RR: 13 (25 Apr 2023 05:00) (9 - 23)  SpO2: 99% (25 Apr 2023 05:39) (89% - 100%)      04-24-23 @ 07:01  -  04-25-23 @ 07:00  --------------------------------------------------------  IN: 2688 mL / OUT: 1762 mL / NET: 926 mL      EXAM:   MS: Patient opens eyes to voice/ commands. Oriented x 2-3 by nodding  HEENT: ETT  CN: Pupils 3mm and brisk, tracks,  Motor: Power 5/5 LUE, 3/5 in RUE  B/L lowers 4/5 - some effort dependence  Chest: Diminished  Heart regular rate/rhythm, present S1/S2, no murmurs or rubs  Abdomen: Soft and nontender   Extremities: No edema      MEDICATIONS:   acetaminophen   Oral Liquid .. 650 milliGRAM(s) Oral every 6 hours PRN  acetaminophen 300 mG/butalbital 50 mG/ caffeine 40 mG 1 Capsule(s) Oral every 6 hours PRN  acetylcysteine 20%  Inhalation 4 milliLiter(s) Inhalation every 6 hours  albuterol/ipratropium for Nebulization 3 milliLiter(s) Nebulizer every 6 hours PRN  amLODIPine   Tablet 5 milliGRAM(s) Oral daily  atorvastatin 80 milliGRAM(s) Oral at bedtime  ceFAZolin   IVPB 2000 milliGRAM(s) IV Intermittent every 8 hours  chlorhexidine 0.12% Liquid 15 milliLiter(s) Oral Mucosa every 12 hours  chlorhexidine 2% Cloths 1 Application(s) Topical <User Schedule>  doxazosin 2 milliGRAM(s) Oral at bedtime  enoxaparin Injectable 40 milliGRAM(s) SubCutaneous every 24 hours  ondansetron Injectable 4 milliGRAM(s) IV Push every 6 hours PRN  oxyCODONE    IR 5 milliGRAM(s) Oral every 4 hours PRN  polyethylene glycol 3350 17 Gram(s) Oral daily  potassium chloride  10 mEq/100 mL IVPB 10 milliEquivalent(s) IV Intermittent once  potassium phosphate IVPB 15 milliMole(s) IV Intermittent once  propofol Infusion 10 MICROgram(s)/kG/Min (5.3 mL/Hr) IV Continuous <Continuous>  senna 2 Tablet(s) Oral at bedtime  sodium chloride 3%  Inhalation 4 milliLiter(s) Inhalation every 6 hours  sodium chloride 3%. 500 milliLiter(s) (50 mL/Hr) IV Continuous <Continuous>  succinylcholine Injectable 100 milliGRAM(s) IV Push once    LABS:                     10.3   5.84  )-----------( 254      ( 25 Apr 2023 05:30 )             31.5     04-25    145  |  110<H>  |  12  ----------------------------<  144<H>  3.6   |  27  |  0.37<L>    Ca    8.3<L>      25 Apr 2023 05:30  Phos  2.8     04-25  Mg     2.0     04-25    TPro  5.7<L>  /  Alb  3.2<L>  /  TBili  0.4  /  DBili  x   /  AST  31  /  ALT  76<H>  /  AlkPhos  100  04-25    LIVER FUNCTIONS - ( 25 Apr 2023 05:30 )  Alb: 3.2 g/dL / Pro: 5.7 g/dL / ALK PHOS: 100 U/L / ALT: 76 U/L / AST: 31 U/L / GGT: x           ABG - ( 25 Apr 2023 06:50 )  pH, Arterial: 7.42  pH, Blood: x     /  pCO2: 43    /  pO2: 67    / HCO3: 28    / Base Excess: 2.9   /  SaO2: 96.6                     =============================  NSCU ATTENDING PROGRESS NOTE  =============================    Patient is a 56 y/o F with history of asthma and peripheral neuropathy transferred to [facility] NSICU from [facility] for management of malignant posterior fossa stroke.  The patient developed symptoms at approximately 0830 on 4/20: dysconjugate gaze, ataxia, facial droop, dysarthria.  She did not present to [facility] until later on in the afternoon during which she was not a candidate for TNK.  CTP showing bilateral cerebellar perfusion mismatch, CTA showing R. V4 occlusion and large bilateral cavernous/terminal ICA aneurysms.  Patient was admitted to the ICU and monitored.  On 4/21 the patient became more lethargic, at which point a repeat CTH was performed, showing bilateral cerebellar strokes with lateral compression of the fourth ventricle on the right.  Patient accepted to [facility] by Dr. Valadez, at which point he was given 250cc 3% NaCl, started on 50cc/h, given 20mcg DDAVP and transferred as tier 1.     Upon arrival to [facility], NIHSS = 12, but mental status slowly declined from lethargy to near obtundation.  Repeat CTH showing stable mass effect and hydrocephalus. Patient taken emergently for SOC depression with EVD placement.      Patient reintubated due to poor clearance of secretions    ICU Vital Signs Last 24 Hrs  T(C): 37.4 (25 Apr 2023 05:38), Max: 37.5 (24 Apr 2023 21:30)  T(F): 99.3 (25 Apr 2023 05:38), Max: 99.5 (24 Apr 2023 21:30)  HR: 76 (25 Apr 2023 05:39) (64 - 88)  BP: 144/66 (25 Apr 2023 05:00) (124/57 - 179/82)  BP(mean): 95 (25 Apr 2023 05:00) (81 - 118)  RR: 13 (25 Apr 2023 05:00) (9 - 23)  SpO2: 99% (25 Apr 2023 05:39) (89% - 100%)      04-24-23 @ 07:01  -  04-25-23 @ 07:00  --------------------------------------------------------  IN: 2688 mL / OUT: 1762 mL / NET: 926 mL      EXAM:   MS: Patient opens eyes to voice/ commands. Oriented x 2-3 by nodding  HEENT: ETT  CN: Pupils 3mm and brisk, tracks,  Motor: Power 5/5 LUE, 3/5 in RUE  B/L lowers 4/5 - some effort dependence  Chest: Diminished  Heart regular rate/rhythm, present S1/S2, no murmurs or rubs  Abdomen: Soft and nontender   Extremities: No edema      MEDICATIONS:   acetaminophen   Oral Liquid .. 650 milliGRAM(s) Oral every 6 hours PRN  acetaminophen 300 mG/butalbital 50 mG/ caffeine 40 mG 1 Capsule(s) Oral every 6 hours PRN  acetylcysteine 20%  Inhalation 4 milliLiter(s) Inhalation every 6 hours  albuterol/ipratropium for Nebulization 3 milliLiter(s) Nebulizer every 6 hours PRN  amLODIPine   Tablet 5 milliGRAM(s) Oral daily  atorvastatin 80 milliGRAM(s) Oral at bedtime  ceFAZolin   IVPB 2000 milliGRAM(s) IV Intermittent every 8 hours  chlorhexidine 0.12% Liquid 15 milliLiter(s) Oral Mucosa every 12 hours  chlorhexidine 2% Cloths 1 Application(s) Topical <User Schedule>  doxazosin 2 milliGRAM(s) Oral at bedtime  enoxaparin Injectable 40 milliGRAM(s) SubCutaneous every 24 hours  ondansetron Injectable 4 milliGRAM(s) IV Push every 6 hours PRN  oxyCODONE    IR 5 milliGRAM(s) Oral every 4 hours PRN  polyethylene glycol 3350 17 Gram(s) Oral daily  potassium chloride  10 mEq/100 mL IVPB 10 milliEquivalent(s) IV Intermittent once  potassium phosphate IVPB 15 milliMole(s) IV Intermittent once  propofol Infusion 10 MICROgram(s)/kG/Min (5.3 mL/Hr) IV Continuous <Continuous>  senna 2 Tablet(s) Oral at bedtime  sodium chloride 3%  Inhalation 4 milliLiter(s) Inhalation every 6 hours  sodium chloride 3%. 500 milliLiter(s) (50 mL/Hr) IV Continuous <Continuous>  succinylcholine Injectable 100 milliGRAM(s) IV Push once      LABS:                     10.3   5.84  )-----------( 254      ( 25 Apr 2023 05:30 )             31.5     04-25    145  |  110<H>  |  12  ----------------------------<  144<H>  3.6   |  27  |  0.37<L>    Ca    8.3<L>      25 Apr 2023 05:30  Phos  2.8     04-25  Mg     2.0     04-25    TPro  5.7<L>  /  Alb  3.2<L>  /  TBili  0.4  /  DBili  x   /  AST  31  /  ALT  76<H>  /  AlkPhos  100  04-25    LIVER FUNCTIONS - ( 25 Apr 2023 05:30 )  Alb: 3.2 g/dL / Pro: 5.7 g/dL / ALK PHOS: 100 U/L / ALT: 76 U/L / AST: 31 U/L / GGT: x           ABG - ( 25 Apr 2023 06:50 )  pH, Arterial: 7.42  pH, Blood: x     /  pCO2: 43    /  pO2: 67    / HCO3: 28    / Base Excess: 2.9   /  SaO2: 96.6

## 2023-04-26 LAB
ANA TITR SER: NEGATIVE — SIGNIFICANT CHANGE UP
ANION GAP SERPL CALC-SCNC: 5 MMOL/L — SIGNIFICANT CHANGE UP (ref 5–17)
ANION GAP SERPL CALC-SCNC: 6 MMOL/L — SIGNIFICANT CHANGE UP (ref 5–17)
ANION GAP SERPL CALC-SCNC: 7 MMOL/L — SIGNIFICANT CHANGE UP (ref 5–17)
AUTO DIFF PNL BLD: NEGATIVE — SIGNIFICANT CHANGE UP
BUN SERPL-MCNC: 14 MG/DL — SIGNIFICANT CHANGE UP (ref 7–23)
BUN SERPL-MCNC: 15 MG/DL — SIGNIFICANT CHANGE UP (ref 7–23)
BUN SERPL-MCNC: 15 MG/DL — SIGNIFICANT CHANGE UP (ref 7–23)
C-ANCA SER-ACNC: NEGATIVE — SIGNIFICANT CHANGE UP
CALCIUM SERPL-MCNC: 8.5 MG/DL — SIGNIFICANT CHANGE UP (ref 8.4–10.5)
CALCIUM SERPL-MCNC: 8.7 MG/DL — SIGNIFICANT CHANGE UP (ref 8.4–10.5)
CALCIUM SERPL-MCNC: 8.9 MG/DL — SIGNIFICANT CHANGE UP (ref 8.4–10.5)
CHLORIDE SERPL-SCNC: 115 MMOL/L — HIGH (ref 96–108)
CHLORIDE SERPL-SCNC: 115 MMOL/L — HIGH (ref 96–108)
CHLORIDE SERPL-SCNC: 116 MMOL/L — HIGH (ref 96–108)
CO2 SERPL-SCNC: 27 MMOL/L — SIGNIFICANT CHANGE UP (ref 22–31)
CO2 SERPL-SCNC: 27 MMOL/L — SIGNIFICANT CHANGE UP (ref 22–31)
CO2 SERPL-SCNC: 28 MMOL/L — SIGNIFICANT CHANGE UP (ref 22–31)
CREAT SERPL-MCNC: 0.35 MG/DL — LOW (ref 0.5–1.3)
CREAT SERPL-MCNC: 0.42 MG/DL — LOW (ref 0.5–1.3)
CREAT SERPL-MCNC: 0.42 MG/DL — LOW (ref 0.5–1.3)
EGFR: 115 ML/MIN/1.73M2 — SIGNIFICANT CHANGE UP
EGFR: 115 ML/MIN/1.73M2 — SIGNIFICANT CHANGE UP
EGFR: 120 ML/MIN/1.73M2 — SIGNIFICANT CHANGE UP
GAS PNL BLDA: SIGNIFICANT CHANGE UP
GLUCOSE SERPL-MCNC: 141 MG/DL — HIGH (ref 70–99)
GLUCOSE SERPL-MCNC: 143 MG/DL — HIGH (ref 70–99)
GLUCOSE SERPL-MCNC: 166 MG/DL — HIGH (ref 70–99)
HCT VFR BLD CALC: 30 % — LOW (ref 34.5–45)
HGB BLD-MCNC: 9.7 G/DL — LOW (ref 11.5–15.5)
MAGNESIUM SERPL-MCNC: 2.2 MG/DL — SIGNIFICANT CHANGE UP (ref 1.6–2.6)
MCHC RBC-ENTMCNC: 29.9 PG — SIGNIFICANT CHANGE UP (ref 27–34)
MCHC RBC-ENTMCNC: 32.3 GM/DL — SIGNIFICANT CHANGE UP (ref 32–36)
MCV RBC AUTO: 92.6 FL — SIGNIFICANT CHANGE UP (ref 80–100)
MPO AB + PR3 PNL SER: SIGNIFICANT CHANGE UP
NRBC # BLD: 0 /100 WBCS — SIGNIFICANT CHANGE UP (ref 0–0)
P-ANCA SER-ACNC: NEGATIVE — SIGNIFICANT CHANGE UP
PHOSPHATE SERPL-MCNC: 3.2 MG/DL — SIGNIFICANT CHANGE UP (ref 2.5–4.5)
PLATELET # BLD AUTO: 246 K/UL — SIGNIFICANT CHANGE UP (ref 150–400)
POTASSIUM SERPL-MCNC: 3.7 MMOL/L — SIGNIFICANT CHANGE UP (ref 3.5–5.3)
POTASSIUM SERPL-MCNC: 3.9 MMOL/L — SIGNIFICANT CHANGE UP (ref 3.5–5.3)
POTASSIUM SERPL-MCNC: 4.1 MMOL/L — SIGNIFICANT CHANGE UP (ref 3.5–5.3)
POTASSIUM SERPL-SCNC: 3.7 MMOL/L — SIGNIFICANT CHANGE UP (ref 3.5–5.3)
POTASSIUM SERPL-SCNC: 3.9 MMOL/L — SIGNIFICANT CHANGE UP (ref 3.5–5.3)
POTASSIUM SERPL-SCNC: 4.1 MMOL/L — SIGNIFICANT CHANGE UP (ref 3.5–5.3)
RBC # BLD: 3.24 M/UL — LOW (ref 3.8–5.2)
RBC # FLD: 13 % — SIGNIFICANT CHANGE UP (ref 10.3–14.5)
SODIUM SERPL-SCNC: 147 MMOL/L — HIGH (ref 135–145)
SODIUM SERPL-SCNC: 148 MMOL/L — HIGH (ref 135–145)
SODIUM SERPL-SCNC: 151 MMOL/L — HIGH (ref 135–145)
WBC # BLD: 4.9 K/UL — SIGNIFICANT CHANGE UP (ref 3.8–10.5)
WBC # FLD AUTO: 4.9 K/UL — SIGNIFICANT CHANGE UP (ref 3.8–10.5)

## 2023-04-26 PROCEDURE — 99291 CRITICAL CARE FIRST HOUR: CPT

## 2023-04-26 PROCEDURE — 99233 SBSQ HOSP IP/OBS HIGH 50: CPT

## 2023-04-26 PROCEDURE — 71045 X-RAY EXAM CHEST 1 VIEW: CPT | Mod: 26,77

## 2023-04-26 PROCEDURE — 71045 X-RAY EXAM CHEST 1 VIEW: CPT | Mod: 26

## 2023-04-26 RX ORDER — POTASSIUM CHLORIDE 20 MEQ
10 PACKET (EA) ORAL ONCE
Refills: 0 | Status: COMPLETED | OUTPATIENT
Start: 2023-04-26 | End: 2023-04-26

## 2023-04-26 RX ORDER — DOXAZOSIN MESYLATE 4 MG
4 TABLET ORAL ONCE
Refills: 0 | Status: COMPLETED | OUTPATIENT
Start: 2023-04-26 | End: 2023-04-26

## 2023-04-26 RX ORDER — DOXAZOSIN MESYLATE 4 MG
8 TABLET ORAL AT BEDTIME
Refills: 0 | Status: DISCONTINUED | OUTPATIENT
Start: 2023-04-26 | End: 2023-04-28

## 2023-04-26 RX ADMIN — DEXMEDETOMIDINE HYDROCHLORIDE IN 0.9% SODIUM CHLORIDE 4.42 MICROGRAM(S)/KG/HR: 4 INJECTION INTRAVENOUS at 14:21

## 2023-04-26 RX ADMIN — POLYETHYLENE GLYCOL 3350 17 GRAM(S): 17 POWDER, FOR SOLUTION ORAL at 11:54

## 2023-04-26 RX ADMIN — AMLODIPINE BESYLATE 5 MILLIGRAM(S): 2.5 TABLET ORAL at 05:16

## 2023-04-26 RX ADMIN — Medication 3 MILLILITER(S): at 23:11

## 2023-04-26 RX ADMIN — Medication 10 MILLIEQUIVALENT(S): at 07:45

## 2023-04-26 RX ADMIN — SODIUM CHLORIDE 4 MILLILITER(S): 9 INJECTION INTRAMUSCULAR; INTRAVENOUS; SUBCUTANEOUS at 17:39

## 2023-04-26 RX ADMIN — ENOXAPARIN SODIUM 40 MILLIGRAM(S): 100 INJECTION SUBCUTANEOUS at 21:34

## 2023-04-26 RX ADMIN — SODIUM CHLORIDE 4 MILLILITER(S): 9 INJECTION INTRAMUSCULAR; INTRAVENOUS; SUBCUTANEOUS at 05:48

## 2023-04-26 RX ADMIN — SODIUM CHLORIDE 3 GRAM(S): 9 INJECTION INTRAMUSCULAR; INTRAVENOUS; SUBCUTANEOUS at 16:11

## 2023-04-26 RX ADMIN — SODIUM CHLORIDE 4 MILLILITER(S): 9 INJECTION INTRAMUSCULAR; INTRAVENOUS; SUBCUTANEOUS at 09:54

## 2023-04-26 RX ADMIN — PANTOPRAZOLE SODIUM 40 MILLIGRAM(S): 20 TABLET, DELAYED RELEASE ORAL at 11:52

## 2023-04-26 RX ADMIN — Medication 4 MILLILITER(S): at 05:42

## 2023-04-26 RX ADMIN — ATORVASTATIN CALCIUM 80 MILLIGRAM(S): 80 TABLET, FILM COATED ORAL at 21:50

## 2023-04-26 RX ADMIN — Medication 4 MILLILITER(S): at 09:54

## 2023-04-26 RX ADMIN — SODIUM CHLORIDE 4 MILLILITER(S): 9 INJECTION INTRAMUSCULAR; INTRAVENOUS; SUBCUTANEOUS at 23:11

## 2023-04-26 RX ADMIN — Medication 650 MILLIGRAM(S): at 11:53

## 2023-04-26 RX ADMIN — Medication 8 MILLIGRAM(S): at 21:35

## 2023-04-26 RX ADMIN — CHLORHEXIDINE GLUCONATE 15 MILLILITER(S): 213 SOLUTION TOPICAL at 17:10

## 2023-04-26 RX ADMIN — OXYCODONE HYDROCHLORIDE 5 MILLIGRAM(S): 5 TABLET ORAL at 16:30

## 2023-04-26 RX ADMIN — Medication 650 MILLIGRAM(S): at 21:07

## 2023-04-26 RX ADMIN — SODIUM CHLORIDE 50 MILLILITER(S): 5 INJECTION, SOLUTION INTRAVENOUS at 05:17

## 2023-04-26 RX ADMIN — SENNA PLUS 2 TABLET(S): 8.6 TABLET ORAL at 21:34

## 2023-04-26 RX ADMIN — Medication 4 MILLIGRAM(S): at 11:55

## 2023-04-26 RX ADMIN — SODIUM CHLORIDE 3 GRAM(S): 9 INJECTION INTRAMUSCULAR; INTRAVENOUS; SUBCUTANEOUS at 03:18

## 2023-04-26 RX ADMIN — OXYCODONE HYDROCHLORIDE 5 MILLIGRAM(S): 5 TABLET ORAL at 10:15

## 2023-04-26 RX ADMIN — Medication 650 MILLIGRAM(S): at 12:00

## 2023-04-26 RX ADMIN — SODIUM CHLORIDE 50 MILLILITER(S): 5 INJECTION, SOLUTION INTRAVENOUS at 14:49

## 2023-04-26 RX ADMIN — OXYCODONE HYDROCHLORIDE 5 MILLIGRAM(S): 5 TABLET ORAL at 21:51

## 2023-04-26 RX ADMIN — Medication 4 MILLILITER(S): at 23:11

## 2023-04-26 RX ADMIN — CHLORHEXIDINE GLUCONATE 15 MILLILITER(S): 213 SOLUTION TOPICAL at 05:16

## 2023-04-26 RX ADMIN — Medication 3 MILLILITER(S): at 09:54

## 2023-04-26 RX ADMIN — Medication 4 MILLILITER(S): at 17:39

## 2023-04-26 RX ADMIN — OXYCODONE HYDROCHLORIDE 5 MILLIGRAM(S): 5 TABLET ORAL at 18:00

## 2023-04-26 RX ADMIN — OXYCODONE HYDROCHLORIDE 5 MILLIGRAM(S): 5 TABLET ORAL at 21:34

## 2023-04-26 RX ADMIN — Medication 3 MILLILITER(S): at 05:47

## 2023-04-26 RX ADMIN — CHLORHEXIDINE GLUCONATE 1 APPLICATION(S): 213 SOLUTION TOPICAL at 05:16

## 2023-04-26 RX ADMIN — SODIUM CHLORIDE 3 GRAM(S): 9 INJECTION INTRAMUSCULAR; INTRAVENOUS; SUBCUTANEOUS at 09:31

## 2023-04-26 RX ADMIN — Medication 650 MILLIGRAM(S): at 19:47

## 2023-04-26 RX ADMIN — OXYCODONE HYDROCHLORIDE 5 MILLIGRAM(S): 5 TABLET ORAL at 09:31

## 2023-04-26 RX ADMIN — SODIUM CHLORIDE 3 GRAM(S): 9 INJECTION INTRAMUSCULAR; INTRAVENOUS; SUBCUTANEOUS at 21:34

## 2023-04-26 RX ADMIN — Medication 3 MILLILITER(S): at 17:38

## 2023-04-26 NOTE — PROGRESS NOTE ADULT - SUBJECTIVE AND OBJECTIVE BOX
SUBJECTIVE: Unable to assess d/t mental status. Appears comfortable with family in the room.     HOSPITAL COURSE:  4/21: Admitted for crani watch, CTH complete w/ unchanged hydrocephalus, taken for emergent occipital craniectomy.   4/22: POD1. Remains intubated overnight, on propofol and dank. EVD@29gwG38. Pending repeat CTH this am. Given hydralazine 10mg x1. Started on cardene for SBP high 140s-150s. Sub-therapeutic on plavix, therapeutic on asa, rpt asa accumetrics until subtherapeutic. LE dopplers neg for DVT, but showing superficial venous thrombosis in the proximal portion of the right greater saphenous vein. Started on 3% @60 for na goal 145-150. NGT placed by ENT. Febrile, pancultured.  4/23: POD 2. 3% increased to 75. NGT readjusted. UA neg. SBP liberalized to 160. Plan to extubate. 3% decreased to 60. Failed extubation d/t no cuff leak, given 60mg solumedrol, plan to extubate in 6 hrs. SCx1 for post void residual >400, started on cardura at bedtime. New blood in buretrol, stopped SQL. Clot in EVD cleared. Neuro exam improving.   4/24: POD 3. Cont 3% with NS while NPO, start TF today. TF started. LFTs downtrending. Sodium 141. Increased 3% to 50, NS to 30. Repeat BMP ordered for 5:30PM. Tramadol 25 for pain with no relief, oxy 5 and 1g tylenol ordered, stability CT stable, speech language consult for dysarthria. Given hydralazine 10mg IVP for SBP>160, started amlodipine 5mg. C/o mild headache, given fioricet x1, stroke neuro rec SALVADOR and loop recorder placement. Echo with bubble completed, negative for PFO, EF 55-60%. J HFNC started for desats with suctioning.   4/25: POD 4. Cont HFNC. Increased secertions on HFNC, reintubated. CXR confirmed good placement. EVD dropped to 5cmH20 for goal of draining 5-10cc/hr and SG ELENA drain taken off suction. Pending CTH. EVD drained 0cc/hr, dropped to 0. NGT replaced. Dc'd fioricet. Started on PPI for intubation. Increased cardura to 4mg for urinary retention, given an additional 2mg now. Started salt tabs 3 q 6. Given 12.5mg fentanyl x1. NGT replaced, CXR shown in lung. NGT removed, repeat CXR showing no pneumothorax. Pending ENT consult for NGT placement. CTH stable. NGT replaced by ENT, confirmed in proper spot. Restarted TF. Tetsphi809.4F. Na 141 from 146. Increased 3% to 60. EVD raised to 3cmH20. ETT pulled back 1cm by RT.   4/26: POD 5 SOC. Intubated, sedated with precedex     Vital Signs Last 24 Hrs  T(C): 37.5 (26 Apr 2023 00:09), Max: 38 (25 Apr 2023 15:00)  T(F): 99.5 (26 Apr 2023 00:09), Max: 100.4 (25 Apr 2023 15:00)  HR: 64 (26 Apr 2023 00:00) (62 - 100)  BP: 112/55 (26 Apr 2023 00:00) (112/55 - 165/77)  BP(mean): 79 (26 Apr 2023 00:00) (79 - 111)  RR: 16 (26 Apr 2023 00:00) (12 - 21)  SpO2: 97% (26 Apr 2023 00:00) (86% - 100%)    Parameters below as of 26 Apr 2023 00:00  Patient On (Oxygen Delivery Method): ventilator    O2 Concentration (%): 30    I&O's Summary    24 Apr 2023 07:01  -  25 Apr 2023 07:00  --------------------------------------------------------  IN: 2743.3 mL / OUT: 2377 mL / NET: 366.3 mL    25 Apr 2023 07:01  -  26 Apr 2023 00:33  --------------------------------------------------------  IN: 1434.6 mL / OUT: 1512 mL / NET: -77.4 mL    PHYSICAL EXAM:  General: NAD, comfortably lying in bed, intubated   Cardiovascular: regular rate and rhythm   Respiratory: nonlabored breathing, normal chest rise   GI: abdomen soft, nontender, nondistended; +NGT  Neuro: opens eyes to voice, tracks, Pupils 2mm briskly reactive b/l, EOMI, follows commands, RUE 2/5 proximally, HG 1/5, LUE 5/5, LLE 5/5, RLE 4/5  Extremities: distal pulses 2+ x 4  Incision/wound: crani incision c/d/i, EVD site c/d/i  Drain: +EVD@ 3 cmH20,+ELENA(SG) off suction     LABS:                        10.3   5.84  )-----------( 254      ( 25 Apr 2023 05:30 )             31.5     04-25    145  |  112<H>  |  14  ----------------------------<  137<H>  3.6   |  28  |  0.43<L>    Ca    8.6      25 Apr 2023 21:53  Phos  2.8     04-25  Mg     2.0     04-25    TPro  5.7<L>  /  Alb  3.2<L>  /  TBili  0.4  /  DBili  x   /  AST  31  /  ALT  76<H>  /  AlkPhos  100  04-25            CAPILLARY BLOOD GLUCOSE          Drug Levels: [] N/A    CSF Analysis: [] N/A      Allergies    No Known Allergies    Intolerances      MEDICATIONS:  Antibiotics:    Neuro:  acetaminophen   Oral Liquid .. 650 milliGRAM(s) Oral every 6 hours PRN  dexMEDEtomidine Infusion 0.2 MICROgram(s)/kG/Hr IV Continuous <Continuous>  fentaNYL    Injectable 25 MICROGram(s) IV Push every 2 hours PRN  ondansetron Injectable 4 milliGRAM(s) IV Push every 6 hours PRN  oxyCODONE    IR 5 milliGRAM(s) Oral every 4 hours PRN    Anticoagulation:  enoxaparin Injectable 40 milliGRAM(s) SubCutaneous every 24 hours    OTHER:  acetylcysteine 20%  Inhalation 4 milliLiter(s) Inhalation every 6 hours  albuterol/ipratropium for Nebulization 3 milliLiter(s) Nebulizer every 6 hours  amLODIPine   Tablet 5 milliGRAM(s) Oral daily  atorvastatin 80 milliGRAM(s) Oral at bedtime  chlorhexidine 0.12% Liquid 15 milliLiter(s) Oral Mucosa every 12 hours  chlorhexidine 2% Cloths 1 Application(s) Topical <User Schedule>  doxazosin 4 milliGRAM(s) Oral at bedtime  pantoprazole  Injectable 40 milliGRAM(s) IV Push daily  polyethylene glycol 3350 17 Gram(s) Oral daily  senna 2 Tablet(s) Oral at bedtime  sodium chloride 3%  Inhalation 4 milliLiter(s) Inhalation every 6 hours    IVF:  sodium chloride 3 Gram(s) Oral every 6 hours  sodium chloride 3%. 500 milliLiter(s) IV Continuous <Continuous>    CULTURES:  Culture Results:   Normal Respiratory Domi present (04-23 @ 10:14)  Culture Results:   No growth to date (04-22 @ 20:37)    RADIOLOGY & ADDITIONAL TESTS:  < from: CT Head No Cont (04.25.23 @ 10:50) >    INTERPRETATION:  Clinical history/reason for exam: Status post   suboccipital craniectomy, follow-up    Technique: Portable CT of the head is performed. 5 mm and 1.25 mm images   are provided, reviewed in bone and soft tissue algorithm. No contrast   material was used.    Comparison:CT head 4/24/2023    Findings:    Again noted status post bilateral suboccipital craniectomy. There is a   stablesmall extra-axial fluid collection deep to craniectomy site. Right   occipital approach EVD entering via a right occipital miroslava hole and   terminating in the right lateral ventricle in stable position. There is   stable intraventricular hemorrhage. Ventricles are stable in size and   configuration from prior exam.    There is no new intracranial hemorrhage..    Continued evolution of bilateral cerebellar infarctions are noted..    No acute calvarial fracture. Surgical drainage catheters noted in the   craniectomy bed..    The mastoid air cells and visualized paranasal sinuses are well   ventilated.    Impression:    Since 4/24/2023, no significant interval change.    < end of copied text >      ASSESSMENT:  56 yo F with PMH of Asthma, CAD (not on meds), peripheral presented to [place] ED on 4/20 with right sided weakness and right facial numbness. Acute infarction within the right cerebellar hemisphere, also extending into the left superior cerebellar hemisphere. New mass effect on the fourth ventricle causing stenosis versus occlusion with new third and lateral ventricular dilatation indicating ventricular obstruction at the level of the fourth ventricle. New upward and downward herniation of cerebellar parenchyma. Pt transferred to [facility] for further management. NIHSS 12. Now s/p SOC foramen magnum decompression and right parietal/occipital EVD placement (4/21). Course c/b respiratory insuffiency d/t bulbar dysfunction.     Neuro   - Vitals/neuro q1h   - Plan for DSA for B/L carotid aneurysms with Dr. Minor this admission  - SG ELENA drain off suction, monitor output  - EVD@3cmH20; monitor ICP/output  - CTH 4/22 with increase right IVH, possibly redistribution. Stable vent size.   - Repeat CTH 4/24: stable, 4/25 stable   - stroke consulted, f/u recs  - pain control with tylenol prn, oxycodone prn  - sedation: precedex gtt   - analgesia: fentanyl prn     Cardio  - -160  - TTE 4/24: negative for PFO, mild LVH, mild dilation of L atrium, EF 55-60%  - Amlodipine 5mg daily started 4/24    Pulm  - reintubated 4/25 on /30/16/8  - Chest PT, nebs q 6     GI  - NGT placed by ENT; started TF Glucerna @ 50  - Bowel regimen, last BM 4/24  - Protonix while intubated  - Trend LFTs q 72 hrs (4/28)     Renal  - Na goal 145-150, 3% @ 50, salt tabs 3 q 6  - cardura 4mg at bedtime for urinary retention   - Voiding     Heme  - SCDs, SQL for DVT ppx  - s/p 3 units platelets, 35 mcg of DDAVP for ASA/Plavix reversal  - LE dopplers 4/22 neg for DVT, but showing superficial venous thrombosis in the proximal portion of the right greater saphenous vein; consider repeat doppler in 1 week    Endo   - no issues     ID  - Ancef while ELENA in place   - f/u panculture 4/22, NGTD    DISPO:  - NSICU, full code  - PT/OT rec acute inpatient rehab: patient can tolerate 3 hrs PT/OT daily    D/w Dr. Valadez and Dr. Lopez

## 2023-04-26 NOTE — CONSULT NOTE ADULT - SUBJECTIVE AND OBJECTIVE BOX
Patient is a 56 y/o F with history of asthma and peripheral neuropathy transferred to [facility] NSICU from [facility] for management of malignant posterior fossa stroke. The patient developed symptoms at approximately 0830 on 4/20: dysconjugate gaze, ataxia, facial droop, dysarthria.  She did not present to [facility] until later on in the afternoon during which she was not a candidate for TNK. CTA head/neck showing R V4 occlusion and large bilateral cavernous/terminal ICA aneurysms.  Patient was admitted to the ICU and monitored.  On 4/21 the patient became more lethargic, at which point a repeat CTH was performed, showing bilateral cerebellar strokes with lateral compression of the fourth ventricle on the right.  Patient accepted to [facility] by Dr. Valadez, at which point he was given 250cc 3% NaCl, started on 50cc/h, given 20mcg DDAVP and transferred as tier 1.     Upon arrival to [facility], mental status slowly declined from lethargy to near obtundation.  Repeat CTH showing stable mass effect and hydrocephalus. Patient taken emergently for suboccipital craniotomy with EVD placement. Patient remained intubated post-operatively until 4/24/23 after which she had an uneventful extubation. However, by the next morning, she was noted to have difficulty tolerating secretions and was re-intubated for airway protection.     ROS: Negative except as noted above. Full ROS not obtained; patient is intubated and sedated on precedex    PE:   Gen: Intubated, sedated, intermittently opens eyes spontaneously and occasionally will respond to questions appropriately  OC/OP: Orotracheally intubated, limited oral cavity exam, no obvious trauma or lesions of the oral cavity, limited examination of the oropharynx  Neck: Somewhat short neck, able to be extended, palpable cricoid cartilage 2-3 finger breaths above the sternal notch  Resp: Mechanically ventilated

## 2023-04-26 NOTE — PROGRESS NOTE ADULT - ASSESSMENT
57 year old female w/ PMH of asthma, CAD (not on meds), peripheral neuropathy and PSH of BATOOL, cholecystectomy and right knee surgery presented to [place] ED on 4/20 w/ right sided weakness and right facial numbness. Initially found to have a right PICA distribution acute infarct and a right vertebral artery occlusion. Not a candidate for any intervention. On repeat imaging had progression of acute infarct within the right and left cerebellar hemisphere with mass effect on the fourth ventricle and hydrocephalus and upward and downward herniation of the cerebellar parenchyma. She was transferred to [facility] on 4/21 and underwent an emergent SOC foramen magnum decompression and right parietal/occipital EVD placement.  Stroke was consulted for further recommendations.     1)Secondary stroke prevention  - Holding all antiplatelets   - continue Atorvastatin 80mg PO daily when cleared by NSGY    2) Stroke risk factors  - A1C: 5.9  - LDL: 92  - TSH: 0.152  - hypercoagulable work up sent as well as vasculitis panel sent, f/u results  - CRP 17.4/ESR 19    3) Further management  - repeat CTH 4/25- stable  - consider MRI brain with and without contrast when stable  - recommend SBP goal < 140, per primary team  - recommend q1hr stroke neuro checks  - Plan for diagnostic angio to eval 0.8cm right ICA aneurysm, 1.2x0.9cm left ICA aneurysm  - may need outpt neurology follow up  - provide stroke education    DVT prophylaxis   - SCDs, chemical DVT prophylaxis on hold    Discussed with Neurology Attending Dr. Farrah Maria   57 year old female w/ PMH of asthma, CAD (not on meds), peripheral neuropathy and PSH of BATOOL, cholecystectomy and right knee surgery presented to [place] ED on 4/20 w/ right sided weakness and right facial numbness. Initially found to have a right PICA distribution acute infarct and a right vertebral artery occlusion. Not a candidate for any intervention. On repeat imaging had progression of acute infarct within the right and left cerebellar hemisphere with mass effect on the fourth ventricle and hydrocephalus and upward and downward herniation of the cerebellar parenchyma. She was transferred to [facility] on 4/21 and underwent an emergent SOC foramen magnum decompression and right parietal/occipital EVD placement.  Stroke was consulted for further recommendations.     1)Secondary stroke prevention  - Holding all antiplatelets (plan to likely start aspirin 4/28)   - Atorvastatin 80mg PO daily when cleared by NSGY    2) Stroke risk factors  - A1C: 5.9  - LDL: 92  - TSH: 0.152  - hypercoagulable work up sent as well as vasculitis panel sent, f/u results  - CRP 17.4/ESR 19    3) Further management  - repeat CTH 4/25- stable  - consider MRI brain with and without contrast when stable   - recommend SBP goal < 140, per primary team  - recommend q1hr stroke neuro checks  - Plan for diagnostic angio likely 4/28 to eval posterior circulation for dissection, 0.8cm right ICA aneurysm, 1.2x0.9cm left ICA aneurysm  - may need outpt neurology follow up  - provide stroke education    DVT prophylaxis   - SCDs, DVT prophylaxis with Lovenox    Discussed with Neurology Attending Dr. Farrah Maria   57 year old female w/ PMH of asthma, CAD (not on meds), peripheral neuropathy and PSH of BATOOL, cholecystectomy and right knee surgery presented to [place] ED on 4/20 w/ right sided weakness and right facial numbness. Initially found to have a right PICA distribution acute infarct and a right vertebral artery occlusion. Not a candidate for any intervention. On repeat imaging had progression of acute infarct within the right and left cerebellar hemisphere with mass effect on the fourth ventricle and hydrocephalus and upward and downward herniation of the cerebellar parenchyma. She was transferred to [facility] on 4/21 and underwent an emergent SOC foramen magnum decompression and right parietal/occipital EVD placement.  Stroke was consulted for further recommendations.     1)Secondary stroke prevention  - Holding all antiplatelets (plan to likely start aspirin 4/28)   - Atorvastatin 80mg PO daily when cleared by NSGY    2) Stroke risk factors  - A1C: 5.9  - LDL: 92  - TSH: 0.152  - hypercoagulable work up sent as well as vasculitis panel sent, f/u results  - CRP 17.4/ESR 19  - Collateral from family that patient suffered from 3 miscarriages after her first 2 children and that the patient's mother suffered from an "aortic tear" that has been worsening    3) Further management  - repeat CTH 4/25- stable  - consider MRI brain with and without contrast when stable   - recommend SBP goal < 140, per primary team  - recommend q1hr stroke neuro checks  - Plan for diagnostic angio likely 4/28 to eval posterior circulation for dissection, 0.8cm right ICA aneurysm, 1.2x0.9cm left ICA aneurysm  - may need outpt neurology follow up  - provide stroke education    DVT prophylaxis   - SCDs, DVT prophylaxis with Lovenox    Discussed with Neurology Attending Dr. Farrah Maria

## 2023-04-26 NOTE — PROGRESS NOTE ADULT - ASSESSMENT
ASSESSMENT:   Bilateral cerebellar hemispheric strokes  POD 5 s/p SOC for foramen magnum decompression and R parietooccipital EVD  Bilateral carotid terminus aneurysms  R. V4 occlusion  History of peripheral neuropathy and asthma  Bulbar dysfunction. Respiratory insufficiency    PLAN:  NEURO: Q1h neurochecks  EVD at 3cmH2O, ICPs, CPP, output.   Post op CT head reviewed. CT 4/24, 25 stable.  SG ELENA- monitor output. Off suction.   Holding ASA/plavix until 4/28. Continue Statin  Stroke core measures, stroke neurology following  Will need MRI brain w and w/o  Hypercoagulable and vasculitis w/u  Pending DSA   Activity: Bedrest. PT/OT as tolerated.    PULM: Reintubated  ABG, CXR  Will discuss early trach/PEG with family  Pulm toilet    CARDIAC:   SBP goal 100-160  TTE w/ bubble: No PFO EF 55-60%, mild LVH.  Amlodipine    GI:  Diet: TFs   PPI as intubated  Bowel regimen   LBM: 4/25  LFTs normalized    /RENAL: Urine retention  On 3% at 50cc/hr for Na goal 145-150; salt tabs  Monitor BMPs Q6  Strict Is/Os  Continue cardura     HEME: S/P 3U platelets and 35mcg DDAVP for ASA/plavix reversal  Maintain Hb > 7.0, PLT > 100,000  SCDs, SQL Anti Xa 4/27 2:00am  LE dopplers: superficial venous thrombosis in proximal portion of right greater saphenous vein  Repeat dopplers 4/29    ID:  Monitor for infectious s/s, fever curve, leukocytosis  Post op Ancef per NSGY  Febrile 4/23. Cultures NGTD (UA, blood, CSF,    ENDO:   Glu goal 140-180mg/dL  ISS   A1c 5.9, LDL92, TSH    SOCIAL/FAMILY:  [] awaiting [x] updated at bedside [] family meeting    CODE STATUS:  [x] Full Code [] DNR [] DNI [] Palliative/Comfort Care    DISPOSITION:  [x] ICU [] Stroke Unit [] Floor [] EMU [] RCU [] PC    Time spent: 60 critical care minutes     ASSESSMENT:   Bilateral cerebellar hemispheric strokes  POD 5 s/p SOC for foramen magnum decompression and R parietooccipital EVD  Bilateral carotid terminus aneurysms  R. V4 occlusion  History of peripheral neuropathy and asthma  Bulbar dysfunction. Respiratory insufficiency    PLAN:  NEURO: Q1h neurochecks  EVD at 3cmH2O, ICPs, CPP, output.   Post op CT head reviewed. CT 4/24, 25 stable.  SG ELENA- monitor output. Off suction.   Holding ASA/plavix until 4/28. Continue Statin  Stroke core measures, stroke neurology following  Will need MRI brain w and w/o  Hypercoagulable and vasculitis w/u  Pending DSA   Activity: Bedrest. PT/OT as tolerated.    PULM: Reintubated; secretions thin  ABG repeat, CXR clear  Will discuss early trach/PEG with family  Continue pulm toilet    CARDIAC:   SBP goal 100-160  TTE w/ bubble: No PFO EF 55-60%, mild LVH.  On amlodipine    GI:  Diet: TFs   PPI as intubated  Bowel regimen   LBM: 4/25  LFTs normalized; repeat 4/28    /RENAL: Urine retention  On 3% at 50cc/hr for Na goal 140-150; salt tabs  Monitor chloride  Monitor BMPs Q6  Strict Is/Os  Continue cardura (increase to 8mg)    HEME: S/P 3U platelets and 35mcg DDAVP for ASA/plavix reversal  Maintain Hb > 7.0, PLT > 100,000  SCDs, SQL Anti Xa 4/27 2:00am  LE dopplers: superficial venous thrombosis in proximal portion of right greater saphenous vein  Repeat dopplers 4/29  Follow up hypercoagulable profille    ID:  Monitor for infectious s/s, fever curve, leukocytosis  Post op Ancef per NSGY  Febrile 4/23. Cultures NGTD (UA, blood, CSF)    ENDO:   Glu goal 140-180mg/dL  ISS   A1c 5.9, LDL92, TSH    SOCIAL/FAMILY:  [] awaiting [x] updated at bedside [] family meeting    CODE STATUS:  [x] Full Code [] DNR [] DNI [] Palliative/Comfort Care    DISPOSITION:  [x] ICU [] Stroke Unit [] Floor [] EMU [] RCU [] PC    Time spent: 60 critical care minutes

## 2023-04-26 NOTE — PROGRESS NOTE ADULT - SUBJECTIVE AND OBJECTIVE BOX
Neurology Stroke Progress Note    INTERVAL HPI/OVERNIGHT EVENTS:  Patient seen and examined no acute overnight events, appears more attentive this morning.     MEDICATIONS  (STANDING):  acetylcysteine 20%  Inhalation 4 milliLiter(s) Inhalation every 6 hours  albuterol/ipratropium for Nebulization 3 milliLiter(s) Nebulizer every 6 hours  amLODIPine   Tablet 5 milliGRAM(s) Oral daily  atorvastatin 80 milliGRAM(s) Oral at bedtime  chlorhexidine 0.12% Liquid 15 milliLiter(s) Oral Mucosa every 12 hours  chlorhexidine 2% Cloths 1 Application(s) Topical <User Schedule>  dexMEDEtomidine Infusion 0.2 MICROgram(s)/kG/Hr (4.42 mL/Hr) IV Continuous <Continuous>  doxazosin 4 milliGRAM(s) Oral at bedtime  enoxaparin Injectable 40 milliGRAM(s) SubCutaneous every 24 hours  pantoprazole  Injectable 40 milliGRAM(s) IV Push daily  polyethylene glycol 3350 17 Gram(s) Oral daily  senna 2 Tablet(s) Oral at bedtime  sodium chloride 3 Gram(s) Oral every 6 hours  sodium chloride 3%  Inhalation 4 milliLiter(s) Inhalation every 6 hours  sodium chloride 3%. 500 milliLiter(s) (50 mL/Hr) IV Continuous <Continuous>    MEDICATIONS  (PRN):  acetaminophen   Oral Liquid .. 650 milliGRAM(s) Oral every 6 hours PRN Temp greater or equal to 38C (100.4F), Mild Pain (1 - 3)  fentaNYL    Injectable 25 MICROGram(s) IV Push every 2 hours PRN Severe Pain (7 - 10), vent dysynchrony  ondansetron Injectable 4 milliGRAM(s) IV Push every 6 hours PRN Nausea and/or Vomiting  oxyCODONE    IR 5 milliGRAM(s) Oral every 4 hours PRN Moderate Pain (4 - 6)      Allergies    No Known Allergies    Intolerances        Vital Signs Last 24 Hrs  T(C): 36.6 (26 Apr 2023 07:00), Max: 38 (25 Apr 2023 15:00)  T(F): 97.9 (26 Apr 2023 07:00), Max: 100.4 (25 Apr 2023 15:00)  HR: 60 (26 Apr 2023 09:00) (56 - 94)  BP: 152/69 (26 Apr 2023 09:00) (106/51 - 165/77)  BP(mean): 99 (26 Apr 2023 09:00) (74 - 111)  RR: 20 (26 Apr 2023 09:00) (14 - 20)  SpO2: 98% (26 Apr 2023 09:00) (92% - 99%)    Parameters below as of 26 Apr 2023 09:00  Patient On (Oxygen Delivery Method): ventilator    O2 Concentration (%): 30    Physical exam:  General: No acute distress, awake and alert  Eyes: Anicteric sclerae, moist conjunctivae, see below for CNs  Neck: trachea midline  Cardiovascular: Regular rate and rhythm on monitor  Pulmonary: No use of accessory muscles  GI: Abdomen soft  Extremities: no edema    Neurologic:  -Mental status: Awakens to voice, ETT in place. Following commands.   -Cranial nerves:   II: BTT diminished on the right. +corneals b/l.  III, IV, VI: Extraocular movements are grossly intact without nystagmus, no gaze preference appreciated. Pupils equally round and reactive to light 2mm and brisk  VII: Face appears symmetric, difficult to assess d/t ETT  Motor: Normal bulk and tone. LUE at least 4/5 able to lift for >10 seconds, weakly resists. RUE weak  noted, unable to lift antigravity with gravity removed appears to have 2/5 strength proximally. Bilateral lower extremities able to lift antigravity at least 4/5, unable to assess for drive but patient moving spontaneously and following commands.   Sensation: Sensation grossly intact patient moves to tactile stimuli on the left and right lower extremity, painful stim on the right upper extremity.   Coordination: Unable to test        LABS:                        9.7    4.90  )-----------( 246      ( 26 Apr 2023 04:29 )             30.0     04-26    147<H>  |  115<H>  |  15  ----------------------------<  141<H>  3.9   |  27  |  0.42<L>    Ca    8.5      26 Apr 2023 04:29  Phos  3.2     04-26  Mg     2.2     04-26    TPro  5.7<L>  /  Alb  3.2<L>  /  TBili  0.4  /  DBili  x   /  AST  31  /  ALT  76<H>  /  AlkPhos  100  04-25          RADIOLOGY & ADDITIONAL TESTS:    < from: CT Head No Cont (04.25.23 @ 10:50) >  Findings:    Again noted status post bilateral suboccipital craniectomy. There is a   stablesmall extra-axial fluid collection deep to craniectomy site. Right   occipital approach EVD entering via a right occipital miroslava hole and   terminating in the right lateral ventricle in stable position. There is   stable intraventricular hemorrhage. Ventricles are stable in size and   configuration from prior exam.    There is no new intracranial hemorrhage..    Continued evolution of bilateral cerebellar infarctions are noted..    No acute calvarial fracture. Surgical drainage catheters noted in the   craniectomy bed..    The mastoid air cells and visualized paranasal sinuses are well   ventilated.    Impression:    Since 4/24/2023, no significant interval change.           Neurology Stroke Progress Note    INTERVAL HPI/OVERNIGHT EVENTS:  Patient seen and examined no acute overnight events, appears more attentive this morning.     MEDICATIONS  (STANDING):  acetylcysteine 20%  Inhalation 4 milliLiter(s) Inhalation every 6 hours  albuterol/ipratropium for Nebulization 3 milliLiter(s) Nebulizer every 6 hours  amLODIPine   Tablet 5 milliGRAM(s) Oral daily  atorvastatin 80 milliGRAM(s) Oral at bedtime  chlorhexidine 0.12% Liquid 15 milliLiter(s) Oral Mucosa every 12 hours  chlorhexidine 2% Cloths 1 Application(s) Topical <User Schedule>  dexMEDEtomidine Infusion 0.2 MICROgram(s)/kG/Hr (4.42 mL/Hr) IV Continuous <Continuous>  doxazosin 4 milliGRAM(s) Oral at bedtime  enoxaparin Injectable 40 milliGRAM(s) SubCutaneous every 24 hours  pantoprazole  Injectable 40 milliGRAM(s) IV Push daily  polyethylene glycol 3350 17 Gram(s) Oral daily  senna 2 Tablet(s) Oral at bedtime  sodium chloride 3 Gram(s) Oral every 6 hours  sodium chloride 3%  Inhalation 4 milliLiter(s) Inhalation every 6 hours  sodium chloride 3%. 500 milliLiter(s) (50 mL/Hr) IV Continuous <Continuous>    MEDICATIONS  (PRN):  acetaminophen   Oral Liquid .. 650 milliGRAM(s) Oral every 6 hours PRN Temp greater or equal to 38C (100.4F), Mild Pain (1 - 3)  fentaNYL    Injectable 25 MICROGram(s) IV Push every 2 hours PRN Severe Pain (7 - 10), vent dysynchrony  ondansetron Injectable 4 milliGRAM(s) IV Push every 6 hours PRN Nausea and/or Vomiting  oxyCODONE    IR 5 milliGRAM(s) Oral every 4 hours PRN Moderate Pain (4 - 6)      Allergies    No Known Allergies    Intolerances        Vital Signs Last 24 Hrs  T(C): 36.6 (26 Apr 2023 07:00), Max: 38 (25 Apr 2023 15:00)  T(F): 97.9 (26 Apr 2023 07:00), Max: 100.4 (25 Apr 2023 15:00)  HR: 60 (26 Apr 2023 09:00) (56 - 94)  BP: 152/69 (26 Apr 2023 09:00) (106/51 - 165/77)  BP(mean): 99 (26 Apr 2023 09:00) (74 - 111)  RR: 20 (26 Apr 2023 09:00) (14 - 20)  SpO2: 98% (26 Apr 2023 09:00) (92% - 99%)    Parameters below as of 26 Apr 2023 09:00  Patient On (Oxygen Delivery Method): ventilator    O2 Concentration (%): 30    Physical exam:  General: No acute distress, awake and alert  Eyes: Anicteric sclerae, moist conjunctivae, see below for CNs  Neck: trachea midline  Cardiovascular: Regular rate and rhythm on monitor  Pulmonary: No use of accessory muscles  GI: Abdomen soft  Extremities: no edema    Neurologic:  -Mental status: Awakens to voice, ETT in place. Following commands.   -Cranial nerves:   II: BTT diminished on the right. +corneals b/l.  III, IV, VI: Unable to adduct R eye, upward beating nystagmus noted. Pupils equally round and reactive to light 2mm and brisk  VII: Face appears symmetric, difficult to assess d/t ETT  Motor: Normal bulk and tone. LUE at least 4/5 able to lift for >10 seconds, weakly resists. RUE weak  noted, unable to lift antigravity with gravity removed appears to have 2/5 strength proximally. Bilateral lower extremities able to lift antigravity at least 4/5, unable to assess for drive but patient moving spontaneously and following commands.   Sensation: Sensation grossly intact patient moves to tactile stimuli on the left and right lower extremity, painful stim on the right upper extremity.   Coordination: Unable to test        LABS:                        9.7    4.90  )-----------( 246      ( 26 Apr 2023 04:29 )             30.0     04-26    147<H>  |  115<H>  |  15  ----------------------------<  141<H>  3.9   |  27  |  0.42<L>    Ca    8.5      26 Apr 2023 04:29  Phos  3.2     04-26  Mg     2.2     04-26    TPro  5.7<L>  /  Alb  3.2<L>  /  TBili  0.4  /  DBili  x   /  AST  31  /  ALT  76<H>  /  AlkPhos  100  04-25          RADIOLOGY & ADDITIONAL TESTS:    < from: CT Head No Cont (04.25.23 @ 10:50) >  Findings:    Again noted status post bilateral suboccipital craniectomy. There is a   stablesmall extra-axial fluid collection deep to craniectomy site. Right   occipital approach EVD entering via a right occipital miroslava hole and   terminating in the right lateral ventricle in stable position. There is   stable intraventricular hemorrhage. Ventricles are stable in size and   configuration from prior exam.    There is no new intracranial hemorrhage..    Continued evolution of bilateral cerebellar infarctions are noted..    No acute calvarial fracture. Surgical drainage catheters noted in the   craniectomy bed..    The mastoid air cells and visualized paranasal sinuses are well   ventilated.    Impression:    Since 4/24/2023, no significant interval change.

## 2023-04-26 NOTE — PROGRESS NOTE ADULT - SUBJECTIVE AND OBJECTIVE BOX
PM EVENTS: no acute overnight events as of documentation time of this note.    ROS: negative except as mentioned above.    T(C): 37.9 (04-25-23 @ 17:49), Max: 38 (04-25-23 @ 15:00)  HR: 80 (04-25-23 @ 19:00) (63 - 100)  BP: 137/63 (04-25-23 @ 19:00) (124/57 - 171/76)  RR: 18 (04-25-23 @ 19:00) (11 - 23)  SpO2: 96% (04-25-23 @ 19:00) (86% - 100%)    Mode: AC/ CMV (Assist Control/ Continuous Mandatory Ventilation)  RR (machine): 16  TV (machine): 300  FiO2: 30  PEEP: 5  ITime: 1  MAP: 11  PIP: 17      I&O's Summary    24 Apr 2023 07:01  -  25 Apr 2023 07:00  --------------------------------------------------------  IN: 2743.3 mL / OUT: 2377 mL / NET: 366.3 mL    25 Apr 2023 07:01  -  25 Apr 2023 19:54  --------------------------------------------------------  IN: 904.6 mL / OUT: 1181 mL / NET: -276.4 mL        EXAM:     [place] Coma Scale:     General: normocephalic, atraumatic, laying in bed, in no distress  Neuro     MS: A/Ox3, cooperative, normal attention, no neglect, comprehension intact, speech with preserved fluency, naming, and repetition    CN: PERRL, VF FTC, EOMI and no ptosis bilaterally, sensation intact to crude touch V1-V3, face symmetric, hearing grossly intact    Mot: bulk normal, tone normal, power 5/5 in bilateral upper and lower proximal extremities    Sens: intact to crude touch in bilateral upper and lower extremities    Reflexes: deferred    Coord: no dysmetria or ataxia on finger to nose/heel to shin, respectively, no focal bradykinetic movements    Gait: deferred  Chest: nonlabored respirations, no adventitious lung sounds bilaterally, heart regular rate/rhythm, present S1/S2, no murmurs or rubs  Abdomen: nondistended, soft and nontender without peritoneal signs, normoactive bowel sounds  Extremities: no clubbing, well-perfused, no edema      ABG - ( 25 Apr 2023 12:08 )  pH, Arterial: 7.44  pH, Blood: x     /  pCO2: 42    /  pO2: 89    / HCO3: 28    / Base Excess: 3.9   /  SaO2: 99.3                                    10.3   5.84  )-----------( 254      ( 25 Apr 2023 05:30 )             31.5     04-25    141  |  108  |  13  ----------------------------<  120<H>  3.8   |  27  |  0.36<L>    Ca    8.3<L>      25 Apr 2023 14:48  Phos  2.8     04-25  Mg     2.0     04-25    TPro  5.7<L>  /  Alb  3.2<L>  /  TBili  0.4  /  DBili  x   /  AST  31  /  ALT  76<H>  /  AlkPhos  100  04-25      MEDICATIONS  (STANDING):  acetylcysteine 20%  Inhalation 4 milliLiter(s) Inhalation every 6 hours  albuterol/ipratropium for Nebulization 3 milliLiter(s) Nebulizer every 6 hours  amLODIPine   Tablet 5 milliGRAM(s) Oral daily  atorvastatin 80 milliGRAM(s) Oral at bedtime  chlorhexidine 0.12% Liquid 15 milliLiter(s) Oral Mucosa every 12 hours  chlorhexidine 2% Cloths 1 Application(s) Topical <User Schedule>  dexMEDEtomidine Infusion 0.2 MICROgram(s)/kG/Hr (4.42 mL/Hr) IV Continuous <Continuous>  doxazosin 4 milliGRAM(s) Oral at bedtime  enoxaparin Injectable 40 milliGRAM(s) SubCutaneous every 24 hours  pantoprazole  Injectable 40 milliGRAM(s) IV Push daily  polyethylene glycol 3350 17 Gram(s) Oral daily  senna 2 Tablet(s) Oral at bedtime  sodium chloride 3 Gram(s) Oral every 6 hours  sodium chloride 3%  Inhalation 4 milliLiter(s) Inhalation every 6 hours  sodium chloride 3%. 500 milliLiter(s) (60 mL/Hr) IV Continuous <Continuous>    MEDICATIONS  (PRN):  acetaminophen   Oral Liquid .. 650 milliGRAM(s) Oral every 6 hours PRN Temp greater or equal to 38C (100.4F), Mild Pain (1 - 3)  fentaNYL    Injectable 25 MICROGram(s) IV Push every 2 hours PRN Severe Pain (7 - 10), vent dysynchrony  ondansetron Injectable 4 milliGRAM(s) IV Push every 6 hours PRN Nausea and/or Vomiting  oxyCODONE    IR 5 milliGRAM(s) Oral every 4 hours PRN Moderate Pain (4 - 6)      Please see the day's note documented by Dr. Bueno for detailed ongoing assessment and plan.   PM EVENTS: no acute overnight events as of documentation time of this note.    ROS: negative except as mentioned above.    ICU Vital Signs Last 24 Hrs  T(C): 37.5 (26 Apr 2023 21:00), Max: 37.5 (26 Apr 2023 21:00)  T(F): 99.5 (26 Apr 2023 21:00), Max: 99.5 (26 Apr 2023 21:00)  HR: 60 (27 Apr 2023 00:00) (55 - 71)  BP: 105/51 (27 Apr 2023 00:00) (105/51 - 153/69)  BP(mean): 73 (27 Apr 2023 00:00) (73 - 103)  ABP: --  ABP(mean): --  RR: 20 (27 Apr 2023 00:00) (10 - 29)  SpO2: 97% (27 Apr 2023 00:00) (95% - 100%)    O2 Parameters below as of 27 Apr 2023 00:00  Patient On (Oxygen Delivery Method): ventilator    O2 Concentration (%): 30        EXAM:     [place] Coma Scale: 4/1/6 = 11    General: normocephalic, suboccipital crani wound, laying in bed, in no distress  Neuro     MS: [place] Coma Scale: 4/1/6 = 11, follows commands, cooperative with examination    CN: PERRL, (+)R. gaze, plegia to left gaze    Mot: bulk normal, tone normal, power UPPER 2(withdrawals POB)/3(at least), LOWER 3/3  Chest: (+)upper airway rhonchi, lung fields are CTA, heart regular rate/rhythm, present S1/S2, no murmurs or rubs  Abdomen: nondistended, soft and nontender without peritoneal signs, normoactive bowel sounds  Extremities: no clubbing, well-perfused, no edema      ABG - ( 26 Apr 2023 14:52 )  pH, Arterial: 7.42  pH, Blood: x     /  pCO2: 46    /  pO2: 96    / HCO3: 30    / Base Excess: 4.5   /  SaO2: 99.7                                    9.7    4.90  )-----------( 246      ( 26 Apr 2023 04:29 )             30.0     04-26    151<H>  |  116<H>  |  15  ----------------------------<  166<H>  3.7   |  28  |  0.42<L>    Ca    8.9      26 Apr 2023 20:12  Phos  3.2     04-26  Mg     2.2     04-26    TPro  5.7<L>  /  Alb  3.2<L>  /  TBili  0.4  /  DBili  x   /  AST  31  /  ALT  76<H>  /  AlkPhos  100  04-25      MEDICATIONS  (STANDING):  acetylcysteine 20%  Inhalation 4 milliLiter(s) Inhalation every 6 hours  albuterol/ipratropium for Nebulization 3 milliLiter(s) Nebulizer every 6 hours  amLODIPine   Tablet 5 milliGRAM(s) Oral daily  atorvastatin 80 milliGRAM(s) Oral at bedtime  chlorhexidine 0.12% Liquid 15 milliLiter(s) Oral Mucosa every 12 hours  chlorhexidine 2% Cloths 1 Application(s) Topical <User Schedule>  dexMEDEtomidine Infusion 0.2 MICROgram(s)/kG/Hr (4.42 mL/Hr) IV Continuous <Continuous>  doxazosin 8 milliGRAM(s) Oral at bedtime  enoxaparin Injectable 40 milliGRAM(s) SubCutaneous every 24 hours  pantoprazole  Injectable 40 milliGRAM(s) IV Push daily  polyethylene glycol 3350 17 Gram(s) Oral daily  senna 2 Tablet(s) Oral at bedtime  sodium chloride 3 Gram(s) Oral every 6 hours  sodium chloride 3%  Inhalation 4 milliLiter(s) Inhalation every 6 hours    MEDICATIONS  (PRN):  acetaminophen   Oral Liquid .. 650 milliGRAM(s) Oral every 6 hours PRN Temp greater or equal to 38C (100.4F), Mild Pain (1 - 3)  fentaNYL    Injectable 25 MICROGram(s) IV Push every 2 hours PRN Severe Pain (7 - 10), vent dysynchrony  ondansetron Injectable 4 milliGRAM(s) IV Push every 6 hours PRN Nausea and/or Vomiting  oxyCODONE    IR 5 milliGRAM(s) Oral every 4 hours PRN Moderate Pain (4 - 6)        Please see the day's note documented by Dr. Bueno for detailed ongoing assessment and plan.      - PS to VC/AC  - Trach/PEG planning  - EVD    Remains in NSICU

## 2023-04-26 NOTE — CONSULT NOTE ADULT - ASSESSMENT
57F with bilateral cerebellar hemispheric strokes, POD 5 s/p SOC for foramen magnum decompression and R parietooccipital EVD, bilateral carotid terminus aneurysms, R V4 occlusion. ENT consulted for tracheostomy. Family is amenable. Coagulation studies are normal. Anatomy is amenable to tracheostomy.  - ENT team will coordinate date/time for bedside procedure vs OR  - NPO/IVF the evening prior to surgery  - pre-op CBC, CMP, T&S, COVID  - page ENT with questions/concerns

## 2023-04-26 NOTE — PROGRESS NOTE ADULT - SUBJECTIVE AND OBJECTIVE BOX
=============================  NSCU ATTENDING PROGRESS NOTE  =============================    Patient is a 56 y/o F with history of asthma and peripheral neuropathy transferred to [facility] NSICU from [facility] for management of malignant posterior fossa stroke.  The patient developed symptoms at approximately 0830 on 4/20: dysconjugate gaze, ataxia, facial droop, dysarthria.  She did not present to [facility] until later on in the afternoon during which she was not a candidate for TNK.  CTP showing bilateral cerebellar perfusion mismatch, CTA showing R. V4 occlusion and large bilateral cavernous/terminal ICA aneurysms.  Patient was admitted to the ICU and monitored.  On 4/21 the patient became more lethargic, at which point a repeat CTH was performed, showing bilateral cerebellar strokes with lateral compression of the fourth ventricle on the right.  Patient accepted to [facility] by Dr. Valadez, at which point he was given 250cc 3% NaCl, started on 50cc/h, given 20mcg DDAVP and transferred as tier 1.     Upon arrival to [facility], NIHSS = 12, but mental status slowly declined from lethargy to near obtundation.  Repeat CTH showing stable mass effect and hydrocephalus. Patient taken emergently for SOC depression with EVD placement.    Patient reintubated due to poor clearance of secretions      ICU Vital Signs Last 24 Hrs  T(C): 36.6 (26 Apr 2023 07:00), Max: 38 (25 Apr 2023 15:00)  T(F): 97.9 (26 Apr 2023 07:00), Max: 100.4 (25 Apr 2023 15:00)  HR: 59 (26 Apr 2023 07:00) (56 - 94)  BP: 153/69 (26 Apr 2023 07:00) (106/51 - 165/77)  BP(mean): 99 (26 Apr 2023 07:00) (74 - 111)  RR: 18 (26 Apr 2023 07:00) (14 - 20)  SpO2: 98% (26 Apr 2023 07:00) (92% - 99%)      04-25-23 @ 07:01  -  04-26-23 @ 07:00  --------------------------------------------------------  IN: 2048.6 mL / OUT: 2640 mL / NET: -591.4 mL    04-26-23 @ 07:01  -  04-26-23 @ 08:18  --------------------------------------------------------  IN: 104 mL / OUT: 2 mL / NET: 102 mL      Mode: AC/ CMV (Assist Control/ Continuous Mandatory Ventilation), RR (machine): 16, TV (machine): 300, FiO2: 30, PEEP: 5, ITime: 1, MAP: 8, PIP: 16      EXAM:   MS: Patient opens eyes to voice/ commands. Oriented x 2-3 by nodding  HEENT: ETT  CN: Pupils 3mm and brisk, tracks,  Motor: Power 5/5 LUE, 3/5 in RUE  B/L lowers 4/5 - some effort dependence  Chest: Diminished  Heart regular rate/rhythm, present S1/S2, no murmurs or rubs  Abdomen: Soft and nontender   Extremities: No edema      MEDICATIONS:   acetaminophen   Oral Liquid .. 650 milliGRAM(s) Oral every 6 hours PRN  acetylcysteine 20%  Inhalation 4 milliLiter(s) Inhalation every 6 hours  albuterol/ipratropium for Nebulization 3 milliLiter(s) Nebulizer every 6 hours  amLODIPine   Tablet 5 milliGRAM(s) Oral daily  atorvastatin 80 milliGRAM(s) Oral at bedtime  chlorhexidine 0.12% Liquid 15 milliLiter(s) Oral Mucosa every 12 hours  chlorhexidine 2% Cloths 1 Application(s) Topical <User Schedule>  dexMEDEtomidine Infusion 0.2 MICROgram(s)/kG/Hr (4.42 mL/Hr) IV Continuous <Continuous>  doxazosin 4 milliGRAM(s) Oral at bedtime  enoxaparin Injectable 40 milliGRAM(s) SubCutaneous every 24 hours  fentaNYL    Injectable 25 MICROGram(s) IV Push every 2 hours PRN  ondansetron Injectable 4 milliGRAM(s) IV Push every 6 hours PRN  oxyCODONE    IR 5 milliGRAM(s) Oral every 4 hours PRN  pantoprazole  Injectable 40 milliGRAM(s) IV Push daily  polyethylene glycol 3350 17 Gram(s) Oral daily  senna 2 Tablet(s) Oral at bedtime  sodium chloride 3 Gram(s) Oral every 6 hours  sodium chloride 3%  Inhalation 4 milliLiter(s) Inhalation every 6 hours  sodium chloride 3%. 500 milliLiter(s) (50 mL/Hr) IV Continuous <Continuous>               LABS:              9.7    4.90  )-----------( 246      ( 26 Apr 2023 04:29 )             30.0     04-26    147<H>  |  115<H>  |  15  ----------------------------<  141<H>  3.9   |  27  |  0.42<L>    Ca    8.5      26 Apr 2023 04:29  Phos  3.2     04-26  Mg     2.2     04-26    TPro  5.7<L>  /  Alb  3.2<L>  /  TBili  0.4  /  DBili  x   /  AST  31  /  ALT  76<H>  /  AlkPhos  100  04-25    LIVER FUNCTIONS - ( 25 Apr 2023 05:30 )  Alb: 3.2 g/dL / Pro: 5.7 g/dL / ALK PHOS: 100 U/L / ALT: 76 U/L / AST: 31 U/L / GGT: x           ABG - ( 25 Apr 2023 12:08 )  pH, Arterial: 7.44  pH, Blood: x     /  pCO2: 42    /  pO2: 89    / HCO3: 28    / Base Excess: 3.9   /  SaO2: 99.3                               =============================  NSCU ATTENDING PROGRESS NOTE  =============================    Patient is a 56 y/o F with history of asthma and peripheral neuropathy transferred to [facility] NSICU from [facility] for management of malignant posterior fossa stroke.  The patient developed symptoms at approximately 0830 on 4/20: dysconjugate gaze, ataxia, facial droop, dysarthria.  She did not present to [facility] until later on in the afternoon during which she was not a candidate for TNK.  CTP showing bilateral cerebellar perfusion mismatch, CTA showing R. V4 occlusion and large bilateral cavernous/terminal ICA aneurysms.  Patient was admitted to the ICU and monitored.  On 4/21 the patient became more lethargic, at which point a repeat CTH was performed, showing bilateral cerebellar strokes with lateral compression of the fourth ventricle on the right.  Patient accepted to [facility] by Dr. Valadez, at which point he was given 250cc 3% NaCl, started on 50cc/h, given 20mcg DDAVP and transferred as tier 1.     Upon arrival to [facility], NIHSS = 12, but mental status slowly declined from lethargy to near obtundation.  Repeat CTH showing stable mass effect and hydrocephalus. Patient taken emergently for SOC depression with EVD placement.    Patient reintubated due to poor clearance of secretions    ICU Vital Signs Last 24 Hrs  T(C): 36.6 (26 Apr 2023 07:00), Max: 38 (25 Apr 2023 15:00)  T(F): 97.9 (26 Apr 2023 07:00), Max: 100.4 (25 Apr 2023 15:00)  HR: 59 (26 Apr 2023 07:00) (56 - 94)  BP: 153/69 (26 Apr 2023 07:00) (106/51 - 165/77)  BP(mean): 99 (26 Apr 2023 07:00) (74 - 111)  RR: 18 (26 Apr 2023 07:00) (14 - 20)  SpO2: 98% (26 Apr 2023 07:00) (92% - 99%)      04-25-23 @ 07:01  -  04-26-23 @ 07:00  --------------------------------------------------------  IN: 2048.6 mL / OUT: 2640 mL / NET: -591.4 mL    04-26-23 @ 07:01  -  04-26-23 @ 08:18  --------------------------------------------------------  IN: 104 mL / OUT: 2 mL / NET: 102 mL      Mode: AC/ CMV (Assist Control/ Continuous Mandatory Ventilation), RR (machine): 16, TV (machine): 300, FiO2: 30, PEEP: 8, ITime: 1, MAP: 8, PIP: 16    EXAM:   MS: Patient opens eyes to voice/ commands. Oriented x 2-3 by nodding  HEENT: ETT  CN: Pupils 3mm and brisk, tracks,  Motor: Power 5/5 LUE, 3/5 in RUE  B/L lowers 4/5 - some effort dependence  Chest: Diminished  Heart regular rate/rhythm, present S1/S2, no murmurs or rubs  Abdomen: Soft and nontender   Extremities: No edema      MEDICATIONS:   acetaminophen   Oral Liquid .. 650 milliGRAM(s) Oral every 6 hours PRN  acetylcysteine 20%  Inhalation 4 milliLiter(s) Inhalation every 6 hours  albuterol/ipratropium for Nebulization 3 milliLiter(s) Nebulizer every 6 hours  amLODIPine   Tablet 5 milliGRAM(s) Oral daily  atorvastatin 80 milliGRAM(s) Oral at bedtime  chlorhexidine 0.12% Liquid 15 milliLiter(s) Oral Mucosa every 12 hours  chlorhexidine 2% Cloths 1 Application(s) Topical <User Schedule>  dexMEDEtomidine Infusion 0.2 MICROgram(s)/kG/Hr (4.42 mL/Hr) IV Continuous <Continuous>  doxazosin 4 milliGRAM(s) Oral at bedtime  enoxaparin Injectable 40 milliGRAM(s) SubCutaneous every 24 hours  fentaNYL    Injectable 25 MICROGram(s) IV Push every 2 hours PRN  ondansetron Injectable 4 milliGRAM(s) IV Push every 6 hours PRN  oxyCODONE    IR 5 milliGRAM(s) Oral every 4 hours PRN  pantoprazole  Injectable 40 milliGRAM(s) IV Push daily  polyethylene glycol 3350 17 Gram(s) Oral daily  senna 2 Tablet(s) Oral at bedtime  sodium chloride 3 Gram(s) Oral every 6 hours  sodium chloride 3%  Inhalation 4 milliLiter(s) Inhalation every 6 hours  sodium chloride 3%. 500 milliLiter(s) (50 mL/Hr) IV Continuous <Continuous>               LABS:              9.7    4.90  )-----------( 246      ( 26 Apr 2023 04:29 )             30.0     04-26    147<H>  |  115<H>  |  15  ----------------------------<  141<H>  3.9   |  27  |  0.42<L>    Ca    8.5      26 Apr 2023 04:29  Phos  3.2     04-26  Mg     2.2     04-26    TPro  5.7<L>  /  Alb  3.2<L>  /  TBili  0.4  /  DBili  x   /  AST  31  /  ALT  76<H>  /  AlkPhos  100  04-25    LIVER FUNCTIONS - ( 25 Apr 2023 05:30 )  Alb: 3.2 g/dL / Pro: 5.7 g/dL / ALK PHOS: 100 U/L / ALT: 76 U/L / AST: 31 U/L / GGT: x           ABG - ( 25 Apr 2023 12:08 )  pH, Arterial: 7.44  pH, Blood: x     /  pCO2: 42    /  pO2: 89    / HCO3: 28    / Base Excess: 3.9   /  SaO2: 99.3

## 2023-04-26 NOTE — CHART NOTE - NSCHARTNOTEFT_GEN_A_CORE
Admitting Diagnosis:   Patient is a 57y old  Female who presents with a chief complaint of bilateral cerebellar strokes (26 Apr 2023 09:41)    PAST MEDICAL & SURGICAL HISTORY:  Asthma  CAD (coronary artery disease)  Peripheral neuropathy  S/P total abdominal hysterectomy  S/P cholecystectomy  S/P right knee surgery    Current Nutrition Order:  NPO diet  EN regimen: Glucerna 1.2 @ 50 ml/hr x24hrs via NGT to provide 1200 ml TV, 1440 kcal, 72g pro.     PO Intake: Good (%) [   ]  Fair (50-75%) [   ] Poor (<25%) [   ]- N/A    GI Issues:   WDL, no n/v/d/c   No abd distention or discomfort  NGT in place for EN feeds    Pain:  No pain noted at this time    Skin Integrity:  Surgical incision, yissel score 15  EVD in place  +1 pitting edema BL hands  No pressure ulcers noted    Labs:   04-26    147<H>  |  115<H>  |  15  ----------------------------<  141<H>  3.9   |  27  |  0.42<L>    Ca    8.5      26 Apr 2023 04:29  Phos  3.2     04-26  Mg     2.2     04-26    TPro  5.7<L>  /  Alb  3.2<L>  /  TBili  0.4  /  DBili  x   /  AST  31  /  ALT  76<H>  /  AlkPhos  100  04-25    Medications:  MEDICATIONS  (STANDING):  acetylcysteine 20%  Inhalation 4 milliLiter(s) Inhalation every 6 hours  albuterol/ipratropium for Nebulization 3 milliLiter(s) Nebulizer every 6 hours  amLODIPine   Tablet 5 milliGRAM(s) Oral daily  atorvastatin 80 milliGRAM(s) Oral at bedtime  chlorhexidine 0.12% Liquid 15 milliLiter(s) Oral Mucosa every 12 hours  chlorhexidine 2% Cloths 1 Application(s) Topical <User Schedule>  dexMEDEtomidine Infusion 0.2 MICROgram(s)/kG/Hr (4.42 mL/Hr) IV Continuous <Continuous>  doxazosin 8 milliGRAM(s) Oral at bedtime  enoxaparin Injectable 40 milliGRAM(s) SubCutaneous every 24 hours  pantoprazole  Injectable 40 milliGRAM(s) IV Push daily  polyethylene glycol 3350 17 Gram(s) Oral daily  senna 2 Tablet(s) Oral at bedtime  sodium chloride 3 Gram(s) Oral every 6 hours  sodium chloride 3%  Inhalation 4 milliLiter(s) Inhalation every 6 hours  sodium chloride 3%. 500 milliLiter(s) (50 mL/Hr) IV Continuous <Continuous>    MEDICATIONS  (PRN):  acetaminophen   Oral Liquid .. 650 milliGRAM(s) Oral every 6 hours PRN Temp greater or equal to 38C (100.4F), Mild Pain (1 - 3)  fentaNYL    Injectable 25 MICROGram(s) IV Push every 2 hours PRN Severe Pain (7 - 10), vent dysynchrony  ondansetron Injectable 4 milliGRAM(s) IV Push every 6 hours PRN Nausea and/or Vomiting  oxyCODONE    IR 5 milliGRAM(s) Oral every 4 hours PRN Moderate Pain (4 - 6)    Admitting Anthropometrics:  Height for BMI (FEET)	5 Feet  Height for BMI (INCHES)	2 Inch(s)  Height for BMI (CENTIMETERS)	157.48 Centimeter(s)  Weight for BMI (lbs)	195 lb  Weight for BMI (kg)	88.5 kg  Body Mass Index	35.6     Weight Change:   No new weights obtained during this admission. Please see nutr recs below.     Nutrition Focused Physical Exam: Completed [   ]  Not Pertinent [   ]    Estimated energy needs:   IBW used for calculations as pt >120% of IBW (177%). Needs adjusted for critical care requiring vent support.  Kcal (25-30 kcal/kg): 7152-5223 kcal  Protein (1.4-1.6 g/kg): 70-80g pro  **Fluids per team    Subjective:   58 yo F with PMH of Asthma, CAD (not on meds), peripheral presented to [place] ED on 4/20 with right sided weakness and right facial numbness. Acute infarction within the right cerebellar hemisphere, also extending into the left superior cerebellar hemisphere. New mass effect on the fourth ventricle causing stenosis versus occlusion with new third and lateral ventricular dilatation indicating ventricular obstruction at the level of the fourth ventricle. New upward and downward herniation of cerebellar parenchyma. Pt transferred to [facility] for further management. NIHSS 12. Now s/p SOC foramen magnum decompression and right parietal/occipital EVD placement (4/21). Pt was weaned and extubated 4/23, but quickly reintubated 4/25. Remains in NSICU. On assessment, pt resting in bed remains on full vent support. Vent; AC/VC. Tmax 97.9F, MAP 95. Currently remains NPO with EN feeds running at goal rate meeting 100% of est needs. No n/v/d/c. No abd distention or discomfort. No s/sx of intolerance. Education deferred at this time. RD to follow.      Previous Nutrition Diagnosis: Inadequate Oral Intake RT inability to meet est needs via PO AEB NPO, reliant on EN feeds to meet 100% of est needs    Active [ x  ]  Resolved [   ]    If resolved, new PES:     Goal:  Consistently meet >75% est needs via feasible route    Recommendations:  1. NPO diet   >> Recommend SLP evaluation prior to PO intake to assess for safest, least restrictive texture modification   2. Cont with current EN regimen: Glucerna 1.2 @ 50 ml/hr x24hrs via NGT to provide 1200 ml TV, 1440 kcal, 72g pro.   >>FWF per team  >> Maintain aspiration precautions at all times  3. Pain and bowel regimen per team   4. Cont to monitor lytes and replete prn   5. RD diet edu prn     Education:   Deferred at this time    Risk Level: High [ x  ] Moderate [   ] Low [   ]

## 2023-04-27 ENCOUNTER — TRANSCRIPTION ENCOUNTER (OUTPATIENT)
Age: 58
End: 2023-04-27

## 2023-04-27 LAB
ANION GAP SERPL CALC-SCNC: 5 MMOL/L — SIGNIFICANT CHANGE UP (ref 5–17)
B2 GLYCOPROT1 AB SER QL: NEGATIVE — SIGNIFICANT CHANGE UP
BUN SERPL-MCNC: 17 MG/DL — SIGNIFICANT CHANGE UP (ref 7–23)
CALCIUM SERPL-MCNC: 8.9 MG/DL — SIGNIFICANT CHANGE UP (ref 8.4–10.5)
CARDIOLIPIN AB SER-ACNC: POSITIVE
CARDIOLIPIN IGM SER-MCNC: 28.4 MPL — HIGH (ref 0–12.5)
CARDIOLIPIN IGM SER-MCNC: <5 GPL — SIGNIFICANT CHANGE UP (ref 0–12.5)
CHLORIDE SERPL-SCNC: 114 MMOL/L — HIGH (ref 96–108)
CO2 SERPL-SCNC: 30 MMOL/L — SIGNIFICANT CHANGE UP (ref 22–31)
CREAT SERPL-MCNC: 0.42 MG/DL — LOW (ref 0.5–1.3)
CULTURE RESULTS: SIGNIFICANT CHANGE UP
CULTURE RESULTS: SIGNIFICANT CHANGE UP
DEPRECATED CARDIOLIPIN IGA SER: 9.2 APL — SIGNIFICANT CHANGE UP (ref 0–12.5)
DNA PLOIDY SPEC FC-IMP: SIGNIFICANT CHANGE UP
EGFR: 115 ML/MIN/1.73M2 — SIGNIFICANT CHANGE UP
GLUCOSE SERPL-MCNC: 138 MG/DL — HIGH (ref 70–99)
HCT VFR BLD CALC: 27.9 % — LOW (ref 34.5–45)
HGB BLD-MCNC: 9.1 G/DL — LOW (ref 11.5–15.5)
LMWH PPP CHRO-ACNC: 0.23 IU/ML — LOW (ref 0.5–1.1)
MAGNESIUM SERPL-MCNC: 2.2 MG/DL — SIGNIFICANT CHANGE UP (ref 1.6–2.6)
MCHC RBC-ENTMCNC: 30.2 PG — SIGNIFICANT CHANGE UP (ref 27–34)
MCHC RBC-ENTMCNC: 32.6 GM/DL — SIGNIFICANT CHANGE UP (ref 32–36)
MCV RBC AUTO: 92.7 FL — SIGNIFICANT CHANGE UP (ref 80–100)
NRBC # BLD: 0 /100 WBCS — SIGNIFICANT CHANGE UP (ref 0–0)
PHOSPHATE SERPL-MCNC: 4 MG/DL — SIGNIFICANT CHANGE UP (ref 2.5–4.5)
PLATELET # BLD AUTO: 219 K/UL — SIGNIFICANT CHANGE UP (ref 150–400)
POTASSIUM SERPL-MCNC: 3.7 MMOL/L — SIGNIFICANT CHANGE UP (ref 3.5–5.3)
POTASSIUM SERPL-SCNC: 3.7 MMOL/L — SIGNIFICANT CHANGE UP (ref 3.5–5.3)
PTR INTERPRETATION: SIGNIFICANT CHANGE UP
RBC # BLD: 3.01 M/UL — LOW (ref 3.8–5.2)
RBC # FLD: 13.5 % — SIGNIFICANT CHANGE UP (ref 10.3–14.5)
SODIUM SERPL-SCNC: 149 MMOL/L — HIGH (ref 135–145)
SPECIMEN SOURCE: SIGNIFICANT CHANGE UP
SPECIMEN SOURCE: SIGNIFICANT CHANGE UP
WBC # BLD: 6.8 K/UL — SIGNIFICANT CHANGE UP (ref 3.8–10.5)
WBC # FLD AUTO: 6.8 K/UL — SIGNIFICANT CHANGE UP (ref 3.8–10.5)

## 2023-04-27 PROCEDURE — 99291 CRITICAL CARE FIRST HOUR: CPT

## 2023-04-27 PROCEDURE — 71045 X-RAY EXAM CHEST 1 VIEW: CPT | Mod: 26

## 2023-04-27 PROCEDURE — 99024 POSTOP FOLLOW-UP VISIT: CPT

## 2023-04-27 RX ORDER — SODIUM CHLORIDE 9 MG/ML
1000 INJECTION INTRAMUSCULAR; INTRAVENOUS; SUBCUTANEOUS
Refills: 0 | Status: DISCONTINUED | OUTPATIENT
Start: 2023-04-28 | End: 2023-04-28

## 2023-04-27 RX ORDER — POTASSIUM CHLORIDE 20 MEQ
20 PACKET (EA) ORAL
Refills: 0 | Status: COMPLETED | OUTPATIENT
Start: 2023-04-27 | End: 2023-04-27

## 2023-04-27 RX ORDER — FENTANYL CITRATE 50 UG/ML
12.5 INJECTION INTRAVENOUS ONCE
Refills: 0 | Status: DISCONTINUED | OUTPATIENT
Start: 2023-04-27 | End: 2023-04-27

## 2023-04-27 RX ORDER — POTASSIUM PHOSPHATE, MONOBASIC POTASSIUM PHOSPHATE, DIBASIC 236; 224 MG/ML; MG/ML
30 INJECTION, SOLUTION INTRAVENOUS ONCE
Refills: 0 | Status: COMPLETED | OUTPATIENT
Start: 2023-04-27 | End: 2023-04-27

## 2023-04-27 RX ADMIN — DEXMEDETOMIDINE HYDROCHLORIDE IN 0.9% SODIUM CHLORIDE 4.42 MICROGRAM(S)/KG/HR: 4 INJECTION INTRAVENOUS at 11:06

## 2023-04-27 RX ADMIN — PANTOPRAZOLE SODIUM 40 MILLIGRAM(S): 20 TABLET, DELAYED RELEASE ORAL at 11:37

## 2023-04-27 RX ADMIN — Medication 8 MILLIGRAM(S): at 22:40

## 2023-04-27 RX ADMIN — OXYCODONE HYDROCHLORIDE 5 MILLIGRAM(S): 5 TABLET ORAL at 03:08

## 2023-04-27 RX ADMIN — SODIUM CHLORIDE 3 GRAM(S): 9 INJECTION INTRAMUSCULAR; INTRAVENOUS; SUBCUTANEOUS at 15:35

## 2023-04-27 RX ADMIN — SODIUM CHLORIDE 4 MILLILITER(S): 9 INJECTION INTRAMUSCULAR; INTRAVENOUS; SUBCUTANEOUS at 05:32

## 2023-04-27 RX ADMIN — Medication 4 MILLILITER(S): at 16:33

## 2023-04-27 RX ADMIN — FENTANYL CITRATE 25 MICROGRAM(S): 50 INJECTION INTRAVENOUS at 22:59

## 2023-04-27 RX ADMIN — Medication 4 MILLILITER(S): at 05:35

## 2023-04-27 RX ADMIN — Medication 650 MILLIGRAM(S): at 10:27

## 2023-04-27 RX ADMIN — Medication 20 MILLIEQUIVALENT(S): at 05:15

## 2023-04-27 RX ADMIN — OXYCODONE HYDROCHLORIDE 5 MILLIGRAM(S): 5 TABLET ORAL at 14:30

## 2023-04-27 RX ADMIN — POLYETHYLENE GLYCOL 3350 17 GRAM(S): 17 POWDER, FOR SOLUTION ORAL at 11:38

## 2023-04-27 RX ADMIN — CHLORHEXIDINE GLUCONATE 15 MILLILITER(S): 213 SOLUTION TOPICAL at 18:46

## 2023-04-27 RX ADMIN — OXYCODONE HYDROCHLORIDE 5 MILLIGRAM(S): 5 TABLET ORAL at 10:28

## 2023-04-27 RX ADMIN — SODIUM CHLORIDE 4 MILLILITER(S): 9 INJECTION INTRAMUSCULAR; INTRAVENOUS; SUBCUTANEOUS at 16:32

## 2023-04-27 RX ADMIN — Medication 3 MILLILITER(S): at 22:16

## 2023-04-27 RX ADMIN — SENNA PLUS 2 TABLET(S): 8.6 TABLET ORAL at 22:40

## 2023-04-27 RX ADMIN — Medication 4 MILLILITER(S): at 11:21

## 2023-04-27 RX ADMIN — AMLODIPINE BESYLATE 5 MILLIGRAM(S): 2.5 TABLET ORAL at 05:17

## 2023-04-27 RX ADMIN — Medication 20 MILLIEQUIVALENT(S): at 07:10

## 2023-04-27 RX ADMIN — SODIUM CHLORIDE 3 GRAM(S): 9 INJECTION INTRAMUSCULAR; INTRAVENOUS; SUBCUTANEOUS at 03:08

## 2023-04-27 RX ADMIN — POTASSIUM PHOSPHATE, MONOBASIC POTASSIUM PHOSPHATE, DIBASIC 83.33 MILLIMOLE(S): 236; 224 INJECTION, SOLUTION INTRAVENOUS at 08:52

## 2023-04-27 RX ADMIN — SODIUM CHLORIDE 4 MILLILITER(S): 9 INJECTION INTRAMUSCULAR; INTRAVENOUS; SUBCUTANEOUS at 11:22

## 2023-04-27 RX ADMIN — SODIUM CHLORIDE 3 GRAM(S): 9 INJECTION INTRAMUSCULAR; INTRAVENOUS; SUBCUTANEOUS at 22:40

## 2023-04-27 RX ADMIN — Medication 3 MILLILITER(S): at 11:20

## 2023-04-27 RX ADMIN — FENTANYL CITRATE 25 MICROGRAM(S): 50 INJECTION INTRAVENOUS at 12:15

## 2023-04-27 RX ADMIN — OXYCODONE HYDROCHLORIDE 5 MILLIGRAM(S): 5 TABLET ORAL at 03:38

## 2023-04-27 RX ADMIN — ATORVASTATIN CALCIUM 80 MILLIGRAM(S): 80 TABLET, FILM COATED ORAL at 22:40

## 2023-04-27 RX ADMIN — OXYCODONE HYDROCHLORIDE 5 MILLIGRAM(S): 5 TABLET ORAL at 09:12

## 2023-04-27 RX ADMIN — Medication 3 MILLILITER(S): at 05:35

## 2023-04-27 RX ADMIN — Medication 3 MILLILITER(S): at 16:33

## 2023-04-27 RX ADMIN — Medication 4 MILLILITER(S): at 22:16

## 2023-04-27 RX ADMIN — Medication 650 MILLIGRAM(S): at 17:44

## 2023-04-27 RX ADMIN — FENTANYL CITRATE 12.5 MICROGRAM(S): 50 INJECTION INTRAVENOUS at 03:33

## 2023-04-27 RX ADMIN — Medication 650 MILLIGRAM(S): at 09:12

## 2023-04-27 RX ADMIN — FENTANYL CITRATE 25 MICROGRAM(S): 50 INJECTION INTRAVENOUS at 17:40

## 2023-04-27 RX ADMIN — FENTANYL CITRATE 12.5 MICROGRAM(S): 50 INJECTION INTRAVENOUS at 03:50

## 2023-04-27 RX ADMIN — FENTANYL CITRATE 25 MICROGRAM(S): 50 INJECTION INTRAVENOUS at 18:00

## 2023-04-27 RX ADMIN — SODIUM CHLORIDE 3 GRAM(S): 9 INJECTION INTRAMUSCULAR; INTRAVENOUS; SUBCUTANEOUS at 09:12

## 2023-04-27 RX ADMIN — CHLORHEXIDINE GLUCONATE 15 MILLILITER(S): 213 SOLUTION TOPICAL at 05:17

## 2023-04-27 RX ADMIN — OXYCODONE HYDROCHLORIDE 5 MILLIGRAM(S): 5 TABLET ORAL at 15:30

## 2023-04-27 RX ADMIN — Medication 650 MILLIGRAM(S): at 16:21

## 2023-04-27 RX ADMIN — FENTANYL CITRATE 25 MICROGRAM(S): 50 INJECTION INTRAVENOUS at 11:37

## 2023-04-27 RX ADMIN — CHLORHEXIDINE GLUCONATE 1 APPLICATION(S): 213 SOLUTION TOPICAL at 05:17

## 2023-04-27 NOTE — PROGRESS NOTE ADULT - SUBJECTIVE AND OBJECTIVE BOX
PRE-OP NOTE     Planned procedure: tracheostomy  Surgeon:  Jessica    A/P:   58yo F w Bilateral cerebellar hemispheric strokes sp SOC for foramen magnum decompression and R parietooccipital EVD, Bilateral carotid terminus aneurysms, History of peripheral neuropathy and asthma, Bulbar dysfunction. Respiratory insufficiency. Intubated and failed extubation, reintubated 4/25/23.     -To OR 4/28/23  -Operative consent in chart. Family agreeable  -NPO and hold all tube feeds for OR, IVF  -Pain/nausea control  -SQH, SCDs  - Please page with questions  ([place]/MEET)  - pre-op labs coags  - need to hold lovenox tonight      Nito Alcazar MD  Department of Otolaryngology - Head and Neck Surgery

## 2023-04-27 NOTE — PROGRESS NOTE ADULT - SUBJECTIVE AND OBJECTIVE BOX
S/Overnight events:  JIM overnight. Patient resting comfortably.       Hospital Course:   4/21: Admitted for crani watch, CTH complete w/ unchanged hydrocephalus, taken for emergent occipital craniectomy.   4/22: POD1. Remains intubated overnight, on propofol and dank. EVD@[id number]. Pending repeat CTH this am. Given hydralazine 10mg x1. Started on cardene for SBP high 140s-150s. Sub-therapeutic on plavix, therapeutic on asa, rpt asa accumetrics until subtherapeutic. LE dopplers neg for DVT, but showing superficial venous thrombosis in the proximal portion of the right greater saphenous vein. Started on 3% @60 for na goal 145-150. NGT placed by ENT. Febrile, pancultured.  4/23: POD 2. 3% increased to 75. NGT readjusted. UA neg. SBP liberalized to 160. Plan to extubate. 3% decreased to 60. Failed extubation d/t no cuff leak, given 60mg solumedrol, plan to extubate in 6 hrs. SCx1 for post void residual >400, started on cardura at bedtime. New blood in buretrol, stopped SQL. Clot in EVD cleared. Neuro exam improving.   4/24: POD 3. Cont 3% with NS while NPO, start TF today. TF started. LFTs downtrending. Sodium 141. Increased 3% to 50, NS to 30. Repeat BMP ordered for 5:30PM. Tramadol 25 for pain with no relief, oxy 5 and 1g tylenol ordered, stability CT stable, speech language consult for dysarthria. Given hydralazine 10mg IVP for SBP>160, started amlodipine 5mg. C/o mild headache, given fioricet x1, stroke neuro rec SALVADOR and loop recorder placement. Echo with bubble completed, negative for PFO, EF 55-60%. J HFNC started for desats with suctioning.   4/25: POD 4. Cont HFNC. Increased secertions on HFNC, reintubated. CXR confirmed good placement. EVD dropped to 5cmH20 for goal of draining 5-10cc/hr and SG ELENA drain taken off suction. Pending CTH. EVD drained 0cc/hr, dropped to 0. NGT replaced. Dc'd fioricet. Started on PPI for intubation. Increased cardura to 4mg for urinary retention, given an additional 2mg now. Started salt tabs 3 q 6. Given 12.5mg fentanyl x1. NGT replaced, CXR shown in lung. NGT removed, repeat CXR showing no pneumothorax. Pending ENT consult for NGT placement. CTH stable. NGT replaced by ENT, confirmed in proper spot. Restarted TF. [id number]. Na 141 from 146. Increased 3% to 60. EVD raised to 3cmH20. ETT pulled back 1cm by RT.  4/26: POD 5 SOC. Intubated, remains on precedex, ABG ordered with improving PaO2, BMP sodium 148, 3% changed to 30cc/hr, cardura increased to 8mg for tonight, tolerating cpap  4/27: POD 6 SOC. Respiratory rate sustained 6, returned to FVS. Na 151, dc'd 3%, f/u AM sodium. Nurse noticed ETT 19 at the lip and was 20 prior, CXR shows ETT @ 5cm above jono. Pending advancement of ETT 1cm.       Vital Signs Last 24 Hrs  T(C): 37.5 (26 Apr 2023 21:00), Max: 37.5 (26 Apr 2023 21:00)  T(F): 99.5 (26 Apr 2023 21:00), Max: 99.5 (26 Apr 2023 21:00)  HR: 60 (27 Apr 2023 00:00) (55 - 71)  BP: 105/51 (27 Apr 2023 00:00) (105/51 - 153/69)  BP(mean): 73 (27 Apr 2023 00:00) (73 - 103)  RR: 20 (27 Apr 2023 00:00) (10 - 29)  SpO2: 97% (27 Apr 2023 00:00) (95% - 100%)    Parameters below as of 27 Apr 2023 00:00  Patient On (Oxygen Delivery Method): ventilator    O2 Concentration (%): 30    I&O's Detail    25 Apr 2023 07:01  -  26 Apr 2023 07:00  --------------------------------------------------------  IN:    Dexmedetomidine: 64 mL    Dexmedetomidine: 44 mL    Enteral Tube Flush: 60 mL    Glucerna: 610 mL    Propofol: 10.6 mL    sodium chloride 3%: 450 mL    sodium chloride 3%: 810 mL  Total IN: 2048.6 mL    OUT:    Drain (mL): 30 mL    External Ventricular Device (mL): 180 mL    Intermittent Catheterization - Urethral (mL): 680 mL    Voided (mL): 1750 mL  Total OUT: 2640 mL    Total NET: -591.4 mL      26 Apr 2023 07:01  -  27 Apr 2023 00:48  --------------------------------------------------------  IN:    Dexmedetomidine: 68 mL    Enteral Tube Flush: 305 mL    Glucerna: 850 mL    sodium chloride 3%: 650 mL  Total IN: 1873 mL    OUT:    Drain (mL): 10 mL    External Ventricular Device (mL): 203 mL    Intermittent Catheterization - Urethral (mL): 450 mL    Voided (mL): 925 mL  Total OUT: 1588 mL    Total NET: 285 mL        I&O's Summary    25 Apr 2023 07:01  -  26 Apr 2023 07:00  --------------------------------------------------------  IN: 2048.6 mL / OUT: 2640 mL / NET: -591.4 mL    26 Apr 2023 07:01  -  27 Apr 2023 00:48  --------------------------------------------------------  IN: 1873 mL / OUT: 1588 mL / NET: 285 mL      PHYSICAL EXAM:  Constitutional: awake, resting comfortably in bed, NAD.   Respiratory: + intubated on FVS. non-labored breathing. Normal chest rise.   Cardiovascular: Regular rate and rhythm  Gastrointestinal:  +NGT. Soft, nontender, nondistended.  .  Vascular: Extremities warm, no ulcers, no discoloration of skin.   Neurological: Gen: OES, tracks, FC, R gaze does not cross midline.     CN II-XII grossly intact.    Motor: RUE/RLE 3/5. LUE/LLE 5/5.      Sens: Sensation intact to light touch throughout.    Extremities: warm and well perfused.   Wound/incision: SOC incision c/d/i with aquacell in place. +EVD     TUBES/LINES:  [] CVC  [] A-line  [] Lumbar Drain  [x] Ventriculostomy  [] Other    DIET:  [] NPO  [] Mechanical  [x] Tube feeds    LABS:                        9.7    4.90  )-----------( 246      ( 26 Apr 2023 04:29 )             30.0     04-26    151<H>  |  116<H>  |  15  ----------------------------<  166<H>  3.7   |  28  |  0.42<L>    Ca    8.9      26 Apr 2023 20:12  Phos  3.2     04-26  Mg     2.2     04-26    TPro  5.7<L>  /  Alb  3.2<L>  /  TBili  0.4  /  DBili  x   /  AST  31  /  ALT  76<H>  /  AlkPhos  100  04-25            CAPILLARY BLOOD GLUCOSE          Drug Levels: [] N/A    CSF Analysis: [] N/A      Allergies    No Known Allergies    Intolerances      MEDICATIONS:  Antibiotics:    Neuro:  acetaminophen   Oral Liquid .. 650 milliGRAM(s) Oral every 6 hours PRN  dexMEDEtomidine Infusion 0.2 MICROgram(s)/kG/Hr IV Continuous <Continuous>  fentaNYL    Injectable 25 MICROGram(s) IV Push every 2 hours PRN  ondansetron Injectable 4 milliGRAM(s) IV Push every 6 hours PRN  oxyCODONE    IR 5 milliGRAM(s) Oral every 4 hours PRN    Anticoagulation:  enoxaparin Injectable 40 milliGRAM(s) SubCutaneous every 24 hours    OTHER:  acetylcysteine 20%  Inhalation 4 milliLiter(s) Inhalation every 6 hours  albuterol/ipratropium for Nebulization 3 milliLiter(s) Nebulizer every 6 hours  amLODIPine   Tablet 5 milliGRAM(s) Oral daily  atorvastatin 80 milliGRAM(s) Oral at bedtime  chlorhexidine 0.12% Liquid 15 milliLiter(s) Oral Mucosa every 12 hours  chlorhexidine 2% Cloths 1 Application(s) Topical <User Schedule>  doxazosin 8 milliGRAM(s) Oral at bedtime  pantoprazole  Injectable 40 milliGRAM(s) IV Push daily  polyethylene glycol 3350 17 Gram(s) Oral daily  senna 2 Tablet(s) Oral at bedtime  sodium chloride 3%  Inhalation 4 milliLiter(s) Inhalation every 6 hours    IVF:  sodium chloride 3 Gram(s) Oral every 6 hours    CULTURES:  Culture Results:   Normal Respiratory Domi present (04-23 @ 10:14)  Culture Results:   No growth to date (04-22 @ 20:37)    RADIOLOGY & ADDITIONAL TESTS:      ASSESSMENT:  58 yo F with PMH of Asthma, CAD (not on meds), peripheral presented to [place] ED on 4/20 with right sided weakness and right facial numbness. Acute infarction within the right cerebellar hemisphere, also extending into the left superior cerebellar hemisphere. New mass effect on the fourth ventricle causing stenosis versus occlusion with new third and lateral ventricular dilatation indicating ventricular obstruction at the level of the fourth ventricle. New upward and downward herniation of cerebellar parenchyma. Pt transferred to [facility] for further management. NIHSS 12. Now s/p SOC foramen magnum decompression and right parietal/occipital EVD placement (4/21). Course c/b respiratory insuffiency d/t bulbar dysfunction.     STROKE    No pertinent family history in first degree relatives    Handoff    Asthma    CAD (coronary artery disease)    Peripheral neuropathy    Cerebellar stroke    Cerebellar stroke    Cerebellar infarct    CAD (coronary artery disease)    Asthma    Peripheral neuropathy    Decompressive craniectomy    Insertion, external ventricular drain    S/P total abdominal hysterectomy    S/P cholecystectomy    S/P right knee surgery    [email or web address]        PLAN:    Neuro   - Vitals/neuro q1h   - Plan for DSA for B/L carotid aneurysms with Dr. Minor this admission  - SG ELENA drain off suction, monitor output  - EVD@3cmH20; monitor ICP/output  - CTH 4/22 with increase right IVH, possibly redistribution. Stable vent size.   - Repeat CTH 4/24: stable, 4/25 stable   - stroke consulted, f/u recs  - pain control with tylenol prn, oxycodone prn  - sedation: precedex gtt   - analgesia: fentanyl prn     Cardio  - -160  - TTE 4/24: negative for PFO, mild LVH, mild dilation of L atrium, EF 55-60%  - Amlodipine 5mg daily started 4/24    Pulm  - reintubated 4/25 on /30/16/8  - Chest PT, nebs q 6     GI  - NGT placed by ENT; started TF Glucerna @ 50  - Bowel regimen, last BM 4/24  - Protonix while intubated  - Trend LFTs q 72 hrs (4/28)     Renal  - Na goal 145-150, salt tabs 3 q 6  - cardura 8mg at bedtime for urinary retention   - SC prn    Heme  - SCDs, SQL for DVT ppx  - s/p 3 units platelets, 35 mcg of DDAVP for ASA/Plavix reversal  - LE dopplers 4/22 neg for DVT, but showing superficial venous thrombosis in the proximal portion of the right greater saphenous vein; consider repeat doppler in 1 week    Endo   - no issues     ID  - f/u panculture 4/22, NGTD    DISPO:  - NSICU, full code  - PT/OT rec acute inpatient rehab: patient can tolerate 3 hrs PT/OT daily    D/w Dr. Valadez and Dr. Lopez       Assessment:  Present when checked    []  GCS  E   V  M     Heart Failure: []Acute, [] acute on chronic , []chronic  Heart Failure:  [] Diastolic (HFpEF), [] Systolic (HFrEF), []Combined (HFpEF and HFrEF), [] RHF, [] Pulm HTN, [] Other    [] MAMTA, [] ATN, [] AIN, [] other  [] CKD1, [] CKD2, [] CKD 3, [] CKD 4, [] CKD 5, []ESRD    Encephalopathy: [] Metabolic, [] Hepatic, [] toxic, [] Neurological, [] Other    Abnormal Nurtitional Status: [] malnurtition (see nutrition note), [ ]underweight: BMI < 19, [] morbid obesity: BMI >40, [] Cachexia    [] Sepsis  [] hypovolemic shock,[] cardiogenic shock, [] hemorrhagic shock, [] neuogenic shock  [] Acute Respiratory Failure  []Cerebral edema, [] Brain compression/ herniation,   [] Functional quadriplegia  [] Acute blood loss anemia

## 2023-04-27 NOTE — PROGRESS NOTE ADULT - SUBJECTIVE AND OBJECTIVE BOX
PM EVENTS: no acute overnight events as of documentation time of this note.    ROS: negative except as mentioned above.    ICU Vital Signs Last 24 Hrs  T(C): 37.7 (27 Apr 2023 17:00), Max: 37.7 (27 Apr 2023 17:00)  T(F): 99.9 (27 Apr 2023 17:00), Max: 99.9 (27 Apr 2023 17:00)  HR: 60 (27 Apr 2023 18:00) (55 - 77)  BP: 98/52 (27 Apr 2023 18:00) (98/52 - 138/63)  BP(mean): 73 (27 Apr 2023 18:00) (73 - 96)  ABP: --  ABP(mean): --  RR: 18 (27 Apr 2023 18:00) (10 - 26)  SpO2: 97% (27 Apr 2023 18:00) (94% - 100%)    O2 Parameters below as of 27 Apr 2023 18:00  Patient On (Oxygen Delivery Method): ventilator    O2 Concentration (%): 30          EXAM:     [place] Coma Scale: 4/1/6 = 11    General: normocephalic, suboccipital crani wound, laying in bed, in no distress  Neuro     MS: [place] Coma Scale: 4/1/6 = 11, follows commands, cooperative with examination    CN: PERRL, (+)R. gaze, plegia to left gaze    Mot: bulk normal, tone normal, power UPPER 2(withdrawals POB)/3(at least), LOWER 3/3  Chest: (+)upper airway rhonchi, lung fields are CTA, heart regular rate/rhythm, present S1/S2, no murmurs or rubs  Abdomen: nondistended, soft and nontender without peritoneal signs, normoactive bowel sounds  Extremities: no clubbing, well-perfused, no edema      ABG - ( 26 Apr 2023 14:52 )  pH, Arterial: 7.42  pH, Blood: x     /  pCO2: 46    /  pO2: 96    / HCO3: 30    / Base Excess: 4.5   /  SaO2: 99.7                                    9.1    6.80  )-----------( 219      ( 27 Apr 2023 02:19 )             27.9     04-27    149<H>  |  114<H>  |  17  ----------------------------<  138<H>  3.7   |  30  |  0.42<L>    Ca    8.9      27 Apr 2023 02:19  Phos  4.0     04-27  Mg     2.2     04-27        MEDICATIONS  (STANDING):  acetylcysteine 20%  Inhalation 4 milliLiter(s) Inhalation every 6 hours  albuterol/ipratropium for Nebulization 3 milliLiter(s) Nebulizer every 6 hours  amLODIPine   Tablet 5 milliGRAM(s) Oral daily  atorvastatin 80 milliGRAM(s) Oral at bedtime  chlorhexidine 0.12% Liquid 15 milliLiter(s) Oral Mucosa every 12 hours  chlorhexidine 2% Cloths 1 Application(s) Topical <User Schedule>  dexMEDEtomidine Infusion 0.2 MICROgram(s)/kG/Hr (4.42 mL/Hr) IV Continuous <Continuous>  doxazosin 8 milliGRAM(s) Oral at bedtime  pantoprazole  Injectable 40 milliGRAM(s) IV Push daily  polyethylene glycol 3350 17 Gram(s) Oral daily  senna 2 Tablet(s) Oral at bedtime  sodium chloride 3 Gram(s) Oral every 6 hours  sodium chloride 3%  Inhalation 4 milliLiter(s) Inhalation every 6 hours    MEDICATIONS  (PRN):  acetaminophen   Oral Liquid .. 650 milliGRAM(s) Oral every 6 hours PRN Temp greater or equal to 38C (100.4F), Mild Pain (1 - 3)  fentaNYL    Injectable 25 MICROGram(s) IV Push every 2 hours PRN Severe Pain (7 - 10), vent dysynchrony  ondansetron Injectable 4 milliGRAM(s) IV Push every 6 hours PRN Nausea and/or Vomiting  oxyCODONE    IR 5 milliGRAM(s) Oral every 4 hours PRN Moderate Pain (4 - 6)      Please see the day's note documented by Dr. Bueno for detailed ongoing assessment and plan.      - Ventilatory support  - Trach/PEG planning  - EVD    Remains in NSICU PM EVENTS: no acute overnight events as of documentation time of this note.    ROS: negative except as mentioned above.    ICU Vital Signs Last 24 Hrs  T(C): 37.7 (27 Apr 2023 17:00), Max: 37.7 (27 Apr 2023 17:00)  T(F): 99.9 (27 Apr 2023 17:00), Max: 99.9 (27 Apr 2023 17:00)  HR: 60 (27 Apr 2023 18:00) (55 - 77)  BP: 98/52 (27 Apr 2023 18:00) (98/52 - 138/63)  BP(mean): 73 (27 Apr 2023 18:00) (73 - 96)  ABP: --  ABP(mean): --  RR: 18 (27 Apr 2023 18:00) (10 - 26)  SpO2: 97% (27 Apr 2023 18:00) (94% - 100%)    O2 Parameters below as of 27 Apr 2023 18:00  Patient On (Oxygen Delivery Method): ventilator    O2 Concentration (%): 30          EXAM:     [place] Coma Scale: 4/1/6 = 11    General: normocephalic, suboccipital crani wound, laying in bed, in no distress  Neuro     MS: [place] Coma Scale: 4/1/6 = 11, follows commands, cooperative with examination    CN: PERRL, (+)R. gaze, plegia to left gaze    Mot: bulk normal, tone normal, power UPPER 3/4, LOWER 3(at least)/3(at least)  Extremities: no clubbing, well-perfused, no edema      ABG - ( 26 Apr 2023 14:52 )  pH, Arterial: 7.42  pH, Blood: x     /  pCO2: 46    /  pO2: 96    / HCO3: 30    / Base Excess: 4.5   /  SaO2: 99.7                                    9.1    6.80  )-----------( 219      ( 27 Apr 2023 02:19 )             27.9     04-27    149<H>  |  114<H>  |  17  ----------------------------<  138<H>  3.7   |  30  |  0.42<L>    Ca    8.9      27 Apr 2023 02:19  Phos  4.0     04-27  Mg     2.2     04-27        MEDICATIONS  (STANDING):  acetylcysteine 20%  Inhalation 4 milliLiter(s) Inhalation every 6 hours  albuterol/ipratropium for Nebulization 3 milliLiter(s) Nebulizer every 6 hours  amLODIPine   Tablet 5 milliGRAM(s) Oral daily  atorvastatin 80 milliGRAM(s) Oral at bedtime  chlorhexidine 0.12% Liquid 15 milliLiter(s) Oral Mucosa every 12 hours  chlorhexidine 2% Cloths 1 Application(s) Topical <User Schedule>  dexMEDEtomidine Infusion 0.2 MICROgram(s)/kG/Hr (4.42 mL/Hr) IV Continuous <Continuous>  doxazosin 8 milliGRAM(s) Oral at bedtime  pantoprazole  Injectable 40 milliGRAM(s) IV Push daily  polyethylene glycol 3350 17 Gram(s) Oral daily  senna 2 Tablet(s) Oral at bedtime  sodium chloride 3 Gram(s) Oral every 6 hours  sodium chloride 3%  Inhalation 4 milliLiter(s) Inhalation every 6 hours    MEDICATIONS  (PRN):  acetaminophen   Oral Liquid .. 650 milliGRAM(s) Oral every 6 hours PRN Temp greater or equal to 38C (100.4F), Mild Pain (1 - 3)  fentaNYL    Injectable 25 MICROGram(s) IV Push every 2 hours PRN Severe Pain (7 - 10), vent dysynchrony  ondansetron Injectable 4 milliGRAM(s) IV Push every 6 hours PRN Nausea and/or Vomiting  oxyCODONE    IR 5 milliGRAM(s) Oral every 4 hours PRN Moderate Pain (4 - 6)    Please see the day's note documented by Dr. Bueno for detailed ongoing assessment and plan.      - Ventilatory support  - Trach/PEG planning  - EVD    Remains in NSICU

## 2023-04-27 NOTE — CHART NOTE - NSCHARTNOTEFT_GEN_A_CORE
4/27: POD 6 SOC. Respiratory rate sustained 6, returned to FVS. Na 151, dc'd 3%, f/u AM sodium. Nurse noticed ETT 19 at the lip and was 20 prior, CXR shows ETT @ 5cm above jono, ET tube advanced 1cm, repeat CXR confirms appropriate placement. Thick inline secretions with suctioning. ENT trach placement Fri likely, PEG likely Mon. Keep ELENA drain until no output, EVD to remaine @3. Start ASA 81mg daily tomorrow.

## 2023-04-27 NOTE — PROGRESS NOTE ADULT - ASSESSMENT
ASSESSMENT:   Bilateral cerebellar hemispheric strokes  POD 6s/p SOC for foramen magnum decompression and R parietooccipital EVD  Bilateral carotid terminus aneurysms  R. V4 occlusion  History of peripheral neuropathy and asthma  Bulbar dysfunction. Respiratory insufficiency    PLAN:  NEURO: Q1h neurochecks  EVD at 3cmH2O, ICPs, CPP, output.   Post op CT head reviewed. CT 4/24, 25 stable.  SG ELENA- monitor output. Off suction.   Holding ASA/plavix until 4/28 per NSGY. Continue Statin  Stroke core measures, stroke neurology following  Will need MRI brain w and w/o  Hypercoagulable and vasculitis w/u  Pending DSA   Activity: Bedrest. PT/OT as tolerated.    PULM: Reintubated; secretions thin  ABG repeat, CXR clear  ENT consult for trach  Continue pulm toilet    CARDIAC:   SBP goal 100-160  TTE w/ bubble: No PFO EF 55-60%, mild LVH.  On amlodipine    GI:  Diet: TFs   PPI as intubated  Bowel regimen   LBM: 4/25  LFTs normalized; repeat 4/28  Gen surgery consult for PEG    /RENAL: Urine retention  Na goal 140-145. DC 3%  Monitor BMPs Q6  Strict Is/Os  Continue cardura  8mg    HEME: S/P 3U platelets and 35mcg DDAVP for ASA/plavix reversal  Maintain Hb > 7.0, PLT > 100,000  SCDs, SQL Anti Xa 4/27 2:00am  LE dopplers: superficial venous thrombosis in proximal portion of right greater saphenous vein  Repeat dopplers 4/29  Follow up hypercoagulable profille    ID:  Monitor for infectious s/s, fever curve, leukocytosis  Post op Ancef per NSGY  Febrile 4/23. Cultures NGTD (UA, blood, CSF)    ENDO:   Glu goal 140-180mg/dL  ISS   A1c 5.9, LDL92, TSH    SOCIAL/FAMILY:  [] awaiting [x] updated at bedside [] family meeting    CODE STATUS:  [x] Full Code [] DNR [] DNI [] Palliative/Comfort Care    DISPOSITION:  [x] ICU [] Stroke Unit [] Floor [] EMU [] RCU [] PC    Time spent: 60 critical care minutes     ASSESSMENT:   Bilateral cerebellar hemispheric strokes  POD 6 S/P  SOC for foramen magnum decompression and R parietooccipital EVD  Bilateral carotid terminus aneurysms  R. V4 occlusion  History of peripheral neuropathy and asthma  Bulbar dysfunction. Respiratory insufficiency    PLAN:  NEURO: Q1h neurochecks  EVD at 3cmH2O.Monitor ICPs, CPP, output.   Post op CT head reviewed. CT 4/24, 25 stable.  SG ELENA- monitor output. Off suction.   Start ASA/plavix on 4/28 per NSGY. Continue Statin  Stroke core measures, stroke neurology following  Will need MRI brain w and w/o  Hypercoagulable and vasculitis w/u  Pending DSA   Sedation: Precedex. RASS 0- neg 1  Activity: PT/OT as tolerated.    PULM: Reintubated; secretions thick  ABG repeat, CXR clear  ENT consult for trach  Continue pulm toilet  CPAP as tolerated    CARDIAC:   SBP goal 100-160  TTE w/ bubble: No PFO EF 55-60%, mild LVH.  On amlodipine    GI:  Diet: TFs   PPI as intubated  Bowel regimen LBM: 4/25  LFTs normalized; repeat 4/28  Gen surgery consult for PEG    /RENAL: Urine retention  Na goal 140-145. Off 3%  Monitor BMPs daily  Monitor Is/Os  Continue cardura  8mg    HEME: Rule out hypercoagulability; history of 2 miscarriages; rule out antiphospholipid Ab syndrome  Maintain Hb > 7.0, PLT > 100,000  SCDs, SQL Anti Xa 4/27 2:00am  LE dopplers: superficial venous thrombosis in proximal portion of right greater saphenous vein  Repeat dopplers 4/29  Follow up hypercoagulable profile: Anticardiolipin Ab positive.  Heme      ID:  Monitor for infectious s/s, fever curve, leukocytosis  Post op Ancef per NSGY  Febrile 4/23. Cultures NGTD (UA, blood, CSF)    ENDO:   Glu goal 140-180mg/dL  ISS   A1c 5.9, LDL92, TSH    SOCIAL/FAMILY:  [] awaiting [x] updated at bedside [] family meeting    CODE STATUS:  [x] Full Code [] DNR [] DNI [] Palliative/Comfort Care    DISPOSITION:  [x] ICU [] Stroke Unit [] Floor [] EMU [] RCU [] PC    Time spent: 60 critical care minutes

## 2023-04-27 NOTE — PROGRESS NOTE ADULT - SUBJECTIVE AND OBJECTIVE BOX
=============================  NSCU ATTENDING PROGRESS NOTE  =============================    Patient is a 58 y/o F with history of asthma and peripheral neuropathy transferred to [facility] from [facility] for management of malignant posterior fossa stroke.  The patient developed symptoms at approximately 0830 on 4/20: dysconjugate gaze, ataxia, facial droop, dysarthria.  She did not present to [facility] until later on in the afternoon during which she was not a candidate for TNK.  CTP showing bilateral cerebellar perfusion mismatch, CTA showing R. V4 occlusion and large bilateral cavernous/terminal ICA aneurysms.  Patient was admitted to the ICU and monitored.  On 4/21 the patient became more lethargic, at which point a repeat CTH was performed, showing bilateral cerebellar strokes with lateral compression of the fourth ventricle on the right.  Patient accepted to [facility] by Dr. Valadez, at which point he was given 250cc 3% NaCl, started on 50cc/h, given 20mcg DDAVP and transferred as tier 1.     Upon arrival to [facility], NIHSS = 12, but mental status slowly declined from lethargy to near obtundation.  Repeat CTH showing stable mass effect and hydrocephalus. Patient taken emergently for SOC depression with EVD placement.    Patient reintubated due to poor clearance of secretions      ICU Vital Signs Last 24 Hrs  T(C): 36.7 (27 Apr 2023 05:27), Max: 37.5 (26 Apr 2023 21:00)  T(F): 98.1 (27 Apr 2023 05:27), Max: 99.5 (26 Apr 2023 21:00)  HR: 66 (27 Apr 2023 07:00) (55 - 71)  BP: 125/58 (27 Apr 2023 07:00) (105/51 - 152/69)  BP(mean): 84 (27 Apr 2023 07:00) (73 - 103)  RR: 15 (27 Apr 2023 07:00) (10 - 29)  SpO2: 97% (27 Apr 2023 07:00) (96% - 100%)      04-26-23 @ 07:01  -  04-27-23 @ 07:00  --------------------------------------------------------  IN: 2397 mL / OUT: 1631 mL / NET: 766 mL      Mode: AC/ CMV (Assist Control/ Continuous Mandatory Ventilation), RR (machine): 16, TV (machine): 300, FiO2: 30, PEEP: 8, ITime: 1, MAP: 12, PIP: 17      EXAM:   MS: Patient opens eyes to voice/ commands. Oriented x 2-3 by nodding  HEENT: ETT  CN: Pupils 3mm and brisk, tracks,  Motor: Power 5/5 LUE, RUE 2/5, withdraws  B/L lowers 4/5 - some effort dependence  Chest: Diminished  Heart regular rate/rhythm, present S1/S2, no murmurs or rubs  Abdomen: Soft and nontender   Extremities: No edema      MEDICATIONS:   acetaminophen   Oral Liquid .. 650 milliGRAM(s) Oral every 6 hours PRN  acetylcysteine 20%  Inhalation 4 milliLiter(s) Inhalation every 6 hours  albuterol/ipratropium for Nebulization 3 milliLiter(s) Nebulizer every 6 hours  amLODIPine   Tablet 5 milliGRAM(s) Oral daily  atorvastatin 80 milliGRAM(s) Oral at bedtime  chlorhexidine 0.12% Liquid 15 milliLiter(s) Oral Mucosa every 12 hours  chlorhexidine 2% Cloths 1 Application(s) Topical <User Schedule>  dexMEDEtomidine Infusion 0.2 MICROgram(s)/kG/Hr (4.42 mL/Hr) IV Continuous <Continuous>  doxazosin 8 milliGRAM(s) Oral at bedtime  enoxaparin Injectable 40 milliGRAM(s) SubCutaneous every 24 hours  fentaNYL    Injectable 25 MICROGram(s) IV Push every 2 hours PRN  ondansetron Injectable 4 milliGRAM(s) IV Push every 6 hours PRN  oxyCODONE    IR 5 milliGRAM(s) Oral every 4 hours PRN  pantoprazole  Injectable 40 milliGRAM(s) IV Push daily  polyethylene glycol 3350 17 Gram(s) Oral daily  potassium phosphate IVPB 30 milliMole(s) IV Intermittent once  senna 2 Tablet(s) Oral at bedtime  sodium chloride 3 Gram(s) Oral every 6 hours  sodium chloride 3%  Inhalation 4 milliLiter(s) Inhalation every 6 hours    LABS:                       9.1    6.80  )-----------( 219      ( 27 Apr 2023 02:19 )             27.9     04-27    149<H>  |  114<H>  |  17  ----------------------------<  138<H>  3.7   |  30  |  0.42<L>    Ca    8.9      27 Apr 2023 02:19  Phos  4.0     04-27  Mg     2.2     04-27      ABG - ( 26 Apr 2023 14:52 )  pH, Arterial: 7.42  pH, Blood: x     /  pCO2: 46    /  pO2: 96    / HCO3: 30    / Base Excess: 4.5   /  SaO2: 99.7                     =============================  NSCU ATTENDING PROGRESS NOTE  =============================    Patient is a 58 y/o F with history of asthma and peripheral neuropathy transferred to [facility]ICU from [facility] for management of malignant posterior fossa stroke.  The patient developed symptoms at approximately 0830 on 4/20: dysconjugate gaze, ataxia, facial droop, dysarthria.  She did not present to [facility] until later on in the afternoon during which she was not a candidate for TNK.  CTP showing bilateral cerebellar perfusion mismatch, CTA showing R. V4 occlusion and large bilateral cavernous/terminal ICA aneurysms.  Patient was admitted to the ICU and monitored.  On 4/21 the patient became more lethargic, at which point a repeat CTH was performed, showing bilateral cerebellar strokes with lateral compression of the fourth ventricle on the right.  Patient accepted to [facility] by Dr. Valadez, at which point he was given 250cc 3% NaCl, started on 50cc/h, given 20mcg DDAVP and transferred as tier 1.     Upon arrival to [facility], NIHSS = 12, but mental status slowly declined from lethargy to near obtundation.  Repeat CTH showing stable mass effect and hydrocephalus. Patient taken emergently for SOC depression with EVD placement.    Patient reintubated due to poor clearance of secretions      ICU Vital Signs Last 24 Hrs  T(C): 36.7 (27 Apr 2023 05:27), Max: 37.5 (26 Apr 2023 21:00)  T(F): 98.1 (27 Apr 2023 05:27), Max: 99.5 (26 Apr 2023 21:00)  HR: 66 (27 Apr 2023 07:00) (55 - 71)  BP: 125/58 (27 Apr 2023 07:00) (105/51 - 152/69)  BP(mean): 84 (27 Apr 2023 07:00) (73 - 103)  RR: 15 (27 Apr 2023 07:00) (10 - 29)  SpO2: 97% (27 Apr 2023 07:00) (96% - 100%)      04-26-23 @ 07:01  -  04-27-23 @ 07:00  --------------------------------------------------------  IN: 2397 mL / OUT: 1631 mL / NET: 766 mL      Mode: AC/ CMV (Assist Control/ Continuous Mandatory Ventilation), RR (machine): 16, TV (machine): 300, FiO2: 30, PEEP: 8, ITime: 1, MAP: 12, PIP: 17      EXAM:   MS: Patient opens eyes to voice/ commands. Oriented x 3 by nodding  HEENT: ETT  CN: Pupils 3mm and brisk, tracks,  Motor: Power 5/5 LUE, RUE 2/5, withdraws  B/L lowers 4/5 - some effort dependence  Chest: Diminished  Heart regular rate/rhythm, present S1/S2, no murmurs or rubs  Abdomen: Soft and nontender   Extremities: No edema      MEDICATIONS:   acetaminophen   Oral Liquid .. 650 milliGRAM(s) Oral every 6 hours PRN  acetylcysteine 20%  Inhalation 4 milliLiter(s) Inhalation every 6 hours  albuterol/ipratropium for Nebulization 3 milliLiter(s) Nebulizer every 6 hours  amLODIPine   Tablet 5 milliGRAM(s) Oral daily  atorvastatin 80 milliGRAM(s) Oral at bedtime  chlorhexidine 0.12% Liquid 15 milliLiter(s) Oral Mucosa every 12 hours  chlorhexidine 2% Cloths 1 Application(s) Topical <User Schedule>  dexMEDEtomidine Infusion 0.2 MICROgram(s)/kG/Hr (4.42 mL/Hr) IV Continuous <Continuous>  doxazosin 8 milliGRAM(s) Oral at bedtime  enoxaparin Injectable 40 milliGRAM(s) SubCutaneous every 24 hours  fentaNYL    Injectable 25 MICROGram(s) IV Push every 2 hours PRN  ondansetron Injectable 4 milliGRAM(s) IV Push every 6 hours PRN  oxyCODONE    IR 5 milliGRAM(s) Oral every 4 hours PRN  pantoprazole  Injectable 40 milliGRAM(s) IV Push daily  polyethylene glycol 3350 17 Gram(s) Oral daily  potassium phosphate IVPB 30 milliMole(s) IV Intermittent once  senna 2 Tablet(s) Oral at bedtime  sodium chloride 3 Gram(s) Oral every 6 hours  sodium chloride 3%  Inhalation 4 milliLiter(s) Inhalation every 6 hours    LABS:                       9.1    6.80  )-----------( 219      ( 27 Apr 2023 02:19 )             27.9     04-27    149<H>  |  114<H>  |  17  ----------------------------<  138<H>  3.7   |  30  |  0.42<L>    Ca    8.9      27 Apr 2023 02:19  Phos  4.0     04-27  Mg     2.2     04-27      ABG - ( 26 Apr 2023 14:52 )  pH, Arterial: 7.42  pH, Blood: x     /  pCO2: 46    /  pO2: 96    / HCO3: 30    / Base Excess: 4.5   /  SaO2: 99.7

## 2023-04-28 ENCOUNTER — TRANSCRIPTION ENCOUNTER (OUTPATIENT)
Age: 58
End: 2023-04-28

## 2023-04-28 DIAGNOSIS — D62 ACUTE POSTHEMORRHAGIC ANEMIA: ICD-10-CM

## 2023-04-28 DIAGNOSIS — R76.0 RAISED ANTIBODY TITER: ICD-10-CM

## 2023-04-28 LAB
ALBUMIN SERPL ELPH-MCNC: 3.3 G/DL — SIGNIFICANT CHANGE UP (ref 3.3–5)
ALP SERPL-CCNC: 100 U/L — SIGNIFICANT CHANGE UP (ref 40–120)
ALT FLD-CCNC: 36 U/L — SIGNIFICANT CHANGE UP (ref 10–45)
ANION GAP SERPL CALC-SCNC: 11 MMOL/L — SIGNIFICANT CHANGE UP (ref 5–17)
APTT BLD: 31.6 SEC — SIGNIFICANT CHANGE UP (ref 27.5–35.5)
AST SERPL-CCNC: 14 U/L — SIGNIFICANT CHANGE UP (ref 10–40)
BASE EXCESS BLDV CALC-SCNC: 6.6 MMOL/L — HIGH (ref -2–3)
BASE EXCESS BLDV CALC-SCNC: 7.6 MMOL/L — HIGH (ref -2–3)
BILIRUB SERPL-MCNC: 0.4 MG/DL — SIGNIFICANT CHANGE UP (ref 0.2–1.2)
BLD GP AB SCN SERPL QL: NEGATIVE — SIGNIFICANT CHANGE UP
BUN SERPL-MCNC: 15 MG/DL — SIGNIFICANT CHANGE UP (ref 7–23)
CA-I SERPL-SCNC: 1.21 MMOL/L — SIGNIFICANT CHANGE UP (ref 1.15–1.33)
CALCIUM SERPL-MCNC: 8.3 MG/DL — LOW (ref 8.4–10.5)
CHLORIDE SERPL-SCNC: 104 MMOL/L — SIGNIFICANT CHANGE UP (ref 96–108)
CO2 BLDV-SCNC: 33.3 MMOL/L — HIGH (ref 22–26)
CO2 BLDV-SCNC: 36.2 MMOL/L — HIGH (ref 22–26)
CO2 SERPL-SCNC: 31 MMOL/L — SIGNIFICANT CHANGE UP (ref 22–31)
CONFIRM APTT STACLOT: POSITIVE
CREAT SERPL-MCNC: 0.47 MG/DL — LOW (ref 0.5–1.3)
DRVVT RATIO: 1.05 RATIO — HIGH (ref 0–1.03)
DRVVT SCREEN TO CONFIRM RATIO: ABNORMAL
EGFR: 112 ML/MIN/1.73M2 — SIGNIFICANT CHANGE UP
GAS PNL BLDV: 141 MMOL/L — SIGNIFICANT CHANGE UP (ref 136–145)
GAS PNL BLDV: SIGNIFICANT CHANGE UP
GLUCOSE SERPL-MCNC: 130 MG/DL — HIGH (ref 70–99)
HCO3 BLDV-SCNC: 32 MMOL/L — HIGH (ref 22–29)
HCO3 BLDV-SCNC: 34 MMOL/L — HIGH (ref 22–29)
HCT VFR BLD CALC: 27.8 % — LOW (ref 34.5–45)
HGB BLD-MCNC: 9.1 G/DL — LOW (ref 11.5–15.5)
INR BLD: 1.08 — SIGNIFICANT CHANGE UP (ref 0.88–1.16)
MAGNESIUM SERPL-MCNC: 2.2 MG/DL — SIGNIFICANT CHANGE UP (ref 1.6–2.6)
MCHC RBC-ENTMCNC: 30.2 PG — SIGNIFICANT CHANGE UP (ref 27–34)
MCHC RBC-ENTMCNC: 32.7 GM/DL — SIGNIFICANT CHANGE UP (ref 32–36)
MCV RBC AUTO: 92.4 FL — SIGNIFICANT CHANGE UP (ref 80–100)
NRBC # BLD: 0 /100 WBCS — SIGNIFICANT CHANGE UP (ref 0–0)
PCO2 BLDV: 47 MMHG — HIGH (ref 39–42)
PCO2 BLDV: 57 MMHG — HIGH (ref 39–42)
PH BLDV: 7.39 — SIGNIFICANT CHANGE UP (ref 7.32–7.43)
PH BLDV: 7.44 — HIGH (ref 7.32–7.43)
PHOSPHATE SERPL-MCNC: 3.9 MG/DL — SIGNIFICANT CHANGE UP (ref 2.5–4.5)
PLATELET # BLD AUTO: 213 K/UL — SIGNIFICANT CHANGE UP (ref 150–400)
PO2 BLDV: 43 MMHG — SIGNIFICANT CHANGE UP (ref 25–45)
PO2 BLDV: 67 MMHG — HIGH (ref 25–45)
POTASSIUM BLDV-SCNC: 3.8 MMOL/L — SIGNIFICANT CHANGE UP (ref 3.5–5.1)
POTASSIUM SERPL-MCNC: 3.8 MMOL/L — SIGNIFICANT CHANGE UP (ref 3.5–5.3)
POTASSIUM SERPL-SCNC: 3.8 MMOL/L — SIGNIFICANT CHANGE UP (ref 3.5–5.3)
PROT SERPL-MCNC: 5.6 G/DL — LOW (ref 6–8.3)
PROTHROM AB SERPL-ACNC: 12.9 SEC — SIGNIFICANT CHANGE UP (ref 10.5–13.4)
RBC # BLD: 3.01 M/UL — LOW (ref 3.8–5.2)
RBC # FLD: 13.2 % — SIGNIFICANT CHANGE UP (ref 10.3–14.5)
RH IG SCN BLD-IMP: POSITIVE — SIGNIFICANT CHANGE UP
SAO2 % BLDV: 75.3 % — SIGNIFICANT CHANGE UP (ref 67–88)
SAO2 % BLDV: 93.9 % — HIGH (ref 67–88)
SODIUM SERPL-SCNC: 146 MMOL/L — HIGH (ref 135–145)
WBC # BLD: 9.28 K/UL — SIGNIFICANT CHANGE UP (ref 3.8–10.5)
WBC # FLD AUTO: 9.28 K/UL — SIGNIFICANT CHANGE UP (ref 3.8–10.5)

## 2023-04-28 PROCEDURE — 99291 CRITICAL CARE FIRST HOUR: CPT

## 2023-04-28 PROCEDURE — 71045 X-RAY EXAM CHEST 1 VIEW: CPT | Mod: 26

## 2023-04-28 PROCEDURE — 70450 CT HEAD/BRAIN W/O DYE: CPT | Mod: 26

## 2023-04-28 PROCEDURE — 31600 PLANNED TRACHEOSTOMY: CPT

## 2023-04-28 PROCEDURE — ZZZZZ: CPT

## 2023-04-28 PROCEDURE — 99232 SBSQ HOSP IP/OBS MODERATE 35: CPT

## 2023-04-28 PROCEDURE — 99221 1ST HOSP IP/OBS SF/LOW 40: CPT

## 2023-04-28 DEVICE — TUBE TRACH SZ 6 CUFF FLEX REUSE: Type: IMPLANTABLE DEVICE | Status: FUNCTIONAL

## 2023-04-28 RX ORDER — DOXAZOSIN MESYLATE 4 MG
8 TABLET ORAL AT BEDTIME
Refills: 0 | Status: DISCONTINUED | OUTPATIENT
Start: 2023-04-28 | End: 2023-05-01

## 2023-04-28 RX ORDER — ENOXAPARIN SODIUM 100 MG/ML
40 INJECTION SUBCUTANEOUS EVERY 24 HOURS
Refills: 0 | Status: DISCONTINUED | OUTPATIENT
Start: 2023-04-28 | End: 2023-04-28

## 2023-04-28 RX ORDER — CHLORHEXIDINE GLUCONATE 213 G/1000ML
1 SOLUTION TOPICAL
Refills: 0 | Status: DISCONTINUED | OUTPATIENT
Start: 2023-04-28 | End: 2023-05-02

## 2023-04-28 RX ORDER — DEXMEDETOMIDINE HYDROCHLORIDE IN 0.9% SODIUM CHLORIDE 4 UG/ML
0.2 INJECTION INTRAVENOUS
Qty: 400 | Refills: 0 | Status: DISCONTINUED | OUTPATIENT
Start: 2023-04-28 | End: 2023-04-28

## 2023-04-28 RX ORDER — AMLODIPINE BESYLATE 2.5 MG/1
5 TABLET ORAL DAILY
Refills: 0 | Status: DISCONTINUED | OUTPATIENT
Start: 2023-04-28 | End: 2023-05-01

## 2023-04-28 RX ORDER — IPRATROPIUM/ALBUTEROL SULFATE 18-103MCG
3 AEROSOL WITH ADAPTER (GRAM) INHALATION EVERY 6 HOURS
Refills: 0 | Status: DISCONTINUED | OUTPATIENT
Start: 2023-04-28 | End: 2023-05-01

## 2023-04-28 RX ORDER — FENTANYL CITRATE 50 UG/ML
0.5 INJECTION INTRAVENOUS
Qty: 2500 | Refills: 0 | Status: DISCONTINUED | OUTPATIENT
Start: 2023-04-28 | End: 2023-05-01

## 2023-04-28 RX ORDER — CHLORHEXIDINE GLUCONATE 213 G/1000ML
15 SOLUTION TOPICAL EVERY 12 HOURS
Refills: 0 | Status: DISCONTINUED | OUTPATIENT
Start: 2023-04-28 | End: 2023-04-28

## 2023-04-28 RX ORDER — FENTANYL CITRATE 50 UG/ML
25 INJECTION INTRAVENOUS
Refills: 0 | Status: DISCONTINUED | OUTPATIENT
Start: 2023-04-28 | End: 2023-04-28

## 2023-04-28 RX ORDER — ONDANSETRON 8 MG/1
4 TABLET, FILM COATED ORAL EVERY 6 HOURS
Refills: 0 | Status: DISCONTINUED | OUTPATIENT
Start: 2023-04-28 | End: 2023-05-02

## 2023-04-28 RX ORDER — SODIUM CHLORIDE 9 MG/ML
1000 INJECTION INTRAMUSCULAR; INTRAVENOUS; SUBCUTANEOUS
Refills: 0 | Status: DISCONTINUED | OUTPATIENT
Start: 2023-04-28 | End: 2023-04-29

## 2023-04-28 RX ORDER — POTASSIUM CHLORIDE 20 MEQ
10 PACKET (EA) ORAL
Refills: 0 | Status: COMPLETED | OUTPATIENT
Start: 2023-04-28 | End: 2023-04-28

## 2023-04-28 RX ORDER — NOREPINEPHRINE BITARTRATE/D5W 8 MG/250ML
0.05 PLASTIC BAG, INJECTION (ML) INTRAVENOUS
Qty: 8 | Refills: 0 | Status: DISCONTINUED | OUTPATIENT
Start: 2023-04-28 | End: 2023-04-28

## 2023-04-28 RX ORDER — PROPOFOL 10 MG/ML
10 INJECTION, EMULSION INTRAVENOUS
Qty: 500 | Refills: 0 | Status: DISCONTINUED | OUTPATIENT
Start: 2023-04-28 | End: 2023-04-28

## 2023-04-28 RX ORDER — PANTOPRAZOLE SODIUM 20 MG/1
40 TABLET, DELAYED RELEASE ORAL DAILY
Refills: 0 | Status: DISCONTINUED | OUTPATIENT
Start: 2023-04-28 | End: 2023-05-02

## 2023-04-28 RX ORDER — OXYCODONE HYDROCHLORIDE 5 MG/1
5 TABLET ORAL EVERY 4 HOURS
Refills: 0 | Status: DISCONTINUED | OUTPATIENT
Start: 2023-04-28 | End: 2023-05-02

## 2023-04-28 RX ORDER — SODIUM CHLORIDE 9 MG/ML
3 INJECTION INTRAMUSCULAR; INTRAVENOUS; SUBCUTANEOUS EVERY 6 HOURS
Refills: 0 | Status: DISCONTINUED | OUTPATIENT
Start: 2023-04-28 | End: 2023-05-01

## 2023-04-28 RX ORDER — POTASSIUM CHLORIDE 20 MEQ
20 PACKET (EA) ORAL ONCE
Refills: 0 | Status: DISCONTINUED | OUTPATIENT
Start: 2023-04-28 | End: 2023-04-28

## 2023-04-28 RX ORDER — POLYETHYLENE GLYCOL 3350 17 G/17G
17 POWDER, FOR SOLUTION ORAL DAILY
Refills: 0 | Status: DISCONTINUED | OUTPATIENT
Start: 2023-04-28 | End: 2023-04-30

## 2023-04-28 RX ORDER — SENNA PLUS 8.6 MG/1
2 TABLET ORAL AT BEDTIME
Refills: 0 | Status: DISCONTINUED | OUTPATIENT
Start: 2023-04-28 | End: 2023-05-02

## 2023-04-28 RX ORDER — SODIUM CHLORIDE 9 MG/ML
4 INJECTION INTRAMUSCULAR; INTRAVENOUS; SUBCUTANEOUS EVERY 6 HOURS
Refills: 0 | Status: DISCONTINUED | OUTPATIENT
Start: 2023-04-28 | End: 2023-05-01

## 2023-04-28 RX ORDER — FENTANYL CITRATE 50 UG/ML
25 INJECTION INTRAVENOUS ONCE
Refills: 0 | Status: DISCONTINUED | OUTPATIENT
Start: 2023-04-28 | End: 2023-04-30

## 2023-04-28 RX ORDER — ATORVASTATIN CALCIUM 80 MG/1
80 TABLET, FILM COATED ORAL AT BEDTIME
Refills: 0 | Status: DISCONTINUED | OUTPATIENT
Start: 2023-04-28 | End: 2023-05-02

## 2023-04-28 RX ORDER — PROPOFOL 10 MG/ML
10 INJECTION, EMULSION INTRAVENOUS
Qty: 1000 | Refills: 0 | Status: DISCONTINUED | OUTPATIENT
Start: 2023-04-28 | End: 2023-04-28

## 2023-04-28 RX ORDER — CHLORHEXIDINE GLUCONATE 213 G/1000ML
15 SOLUTION TOPICAL EVERY 12 HOURS
Refills: 0 | Status: DISCONTINUED | OUTPATIENT
Start: 2023-04-28 | End: 2023-05-02

## 2023-04-28 RX ORDER — ACETYLCYSTEINE 200 MG/ML
4 VIAL (ML) MISCELLANEOUS EVERY 6 HOURS
Refills: 0 | Status: DISCONTINUED | OUTPATIENT
Start: 2023-04-28 | End: 2023-05-01

## 2023-04-28 RX ADMIN — FENTANYL CITRATE 25 MICROGRAM(S): 50 INJECTION INTRAVENOUS at 18:04

## 2023-04-28 RX ADMIN — Medication 4 MILLILITER(S): at 22:04

## 2023-04-28 RX ADMIN — AMLODIPINE BESYLATE 5 MILLIGRAM(S): 2.5 TABLET ORAL at 05:40

## 2023-04-28 RX ADMIN — Medication 100 MILLIEQUIVALENT(S): at 08:43

## 2023-04-28 RX ADMIN — FENTANYL CITRATE 25 MICROGRAM(S): 50 INJECTION INTRAVENOUS at 00:23

## 2023-04-28 RX ADMIN — SODIUM CHLORIDE 4 MILLILITER(S): 9 INJECTION INTRAMUSCULAR; INTRAVENOUS; SUBCUTANEOUS at 16:51

## 2023-04-28 RX ADMIN — SENNA PLUS 2 TABLET(S): 8.6 TABLET ORAL at 22:46

## 2023-04-28 RX ADMIN — SODIUM CHLORIDE 4 MILLILITER(S): 9 INJECTION INTRAMUSCULAR; INTRAVENOUS; SUBCUTANEOUS at 22:08

## 2023-04-28 RX ADMIN — POLYETHYLENE GLYCOL 3350 17 GRAM(S): 17 POWDER, FOR SOLUTION ORAL at 19:36

## 2023-04-28 RX ADMIN — Medication 4 MILLILITER(S): at 10:06

## 2023-04-28 RX ADMIN — Medication 3 MILLILITER(S): at 16:47

## 2023-04-28 RX ADMIN — SODIUM CHLORIDE 3 GRAM(S): 9 INJECTION INTRAMUSCULAR; INTRAVENOUS; SUBCUTANEOUS at 04:28

## 2023-04-28 RX ADMIN — SODIUM CHLORIDE 3 GRAM(S): 9 INJECTION INTRAMUSCULAR; INTRAVENOUS; SUBCUTANEOUS at 19:36

## 2023-04-28 RX ADMIN — FENTANYL CITRATE 25 MICROGRAM(S): 50 INJECTION INTRAVENOUS at 06:47

## 2023-04-28 RX ADMIN — DEXMEDETOMIDINE HYDROCHLORIDE IN 0.9% SODIUM CHLORIDE 4.42 MICROGRAM(S)/KG/HR: 4 INJECTION INTRAVENOUS at 04:41

## 2023-04-28 RX ADMIN — Medication 4 MILLILITER(S): at 05:27

## 2023-04-28 RX ADMIN — PANTOPRAZOLE SODIUM 40 MILLIGRAM(S): 20 TABLET, DELAYED RELEASE ORAL at 19:37

## 2023-04-28 RX ADMIN — FENTANYL CITRATE 4.42 MICROGRAM(S)/KG/HR: 50 INJECTION INTRAVENOUS at 19:04

## 2023-04-28 RX ADMIN — FENTANYL CITRATE 25 MICROGRAM(S): 50 INJECTION INTRAVENOUS at 07:09

## 2023-04-28 RX ADMIN — CHLORHEXIDINE GLUCONATE 15 MILLILITER(S): 213 SOLUTION TOPICAL at 18:04

## 2023-04-28 RX ADMIN — FENTANYL CITRATE 25 MICROGRAM(S): 50 INJECTION INTRAVENOUS at 03:17

## 2023-04-28 RX ADMIN — CHLORHEXIDINE GLUCONATE 15 MILLILITER(S): 213 SOLUTION TOPICAL at 05:40

## 2023-04-28 RX ADMIN — SODIUM CHLORIDE 80 MILLILITER(S): 9 INJECTION INTRAMUSCULAR; INTRAVENOUS; SUBCUTANEOUS at 00:20

## 2023-04-28 RX ADMIN — FENTANYL CITRATE 25 MICROGRAM(S): 50 INJECTION INTRAVENOUS at 03:25

## 2023-04-28 RX ADMIN — Medication 8 MILLIGRAM(S): at 22:46

## 2023-04-28 RX ADMIN — Medication 4 MILLILITER(S): at 16:48

## 2023-04-28 RX ADMIN — Medication 100 MILLIEQUIVALENT(S): at 07:22

## 2023-04-28 RX ADMIN — ATORVASTATIN CALCIUM 80 MILLIGRAM(S): 80 TABLET, FILM COATED ORAL at 22:46

## 2023-04-28 RX ADMIN — SODIUM CHLORIDE 80 MILLILITER(S): 9 INJECTION INTRAMUSCULAR; INTRAVENOUS; SUBCUTANEOUS at 16:26

## 2023-04-28 RX ADMIN — CHLORHEXIDINE GLUCONATE 1 APPLICATION(S): 213 SOLUTION TOPICAL at 05:40

## 2023-04-28 RX ADMIN — AMLODIPINE BESYLATE 5 MILLIGRAM(S): 2.5 TABLET ORAL at 19:35

## 2023-04-28 RX ADMIN — Medication 3 MILLILITER(S): at 22:05

## 2023-04-28 RX ADMIN — Medication 3 MILLILITER(S): at 10:06

## 2023-04-28 RX ADMIN — Medication 3 MILLILITER(S): at 05:26

## 2023-04-28 NOTE — BRIEF OPERATIVE NOTE - OPERATION/FINDINGS
Tracheostomy tube exchange performed under sterile condition. Old tracheostomy tube noted to have  balloon cut. The new tube was exchanged with a 6 shiley cuffed tracheostomy tube. Secured with x4 2-0 silk sutures and a soft velcro tie. End tidal volume was confirmed. Surgicel x3 pieces were placed into the tracheostomy stoma. Ms. Garnica was s/p open tracheotomy in OR by ENT service a few hours earlier on 4/38/2023.  The tracheostomy tube was found to have no  balloon (only 2 cm of tubing) after she was noted to be losing volumes on the ventilator.  ENT service was called to assess, and decision was made to do urgent trach tube exchange at bedside in ICU.  Informed consent was obtained from patient's  via Malay language line  after risks and benefits were obtained.    The patient was sedated with IV fentanyl and propofol.  She was positioned supine, and care was taken not to disturb her ventricular drain or occipital craniotomy defect.  FiO2 was increased to 100%.  She was prepped and draped in sterile fashion.  Timeout protocol was performed.  Surgicel gauze was removed from the stoma, and sutures were removed from the trach tube. Retractors were placed in the stoma.  The 6 Shiley tracheotomy tube was removed.  A hook was placed under the cricoid, and the tracheal window was easily visualized.  The new 6 cuffed Shiley tracheotomy tube was placed easily, and the  balloon was inflated.  After the ventilator circuit was connected, she had visible chest rise and CO2 return.  Surgicel gauze was placed into the stoma.  The horizontal incision was closed with 4-0 chromic suture on the right side to reduce stomal wound.  The trach tube flange was secured to the skin with 2-0 silk sutures.  An Allevyn trach sponge and Velcro trach tube ties were placed appropriately.  FiO2 was returned to her baseline.  The patient tolerated the procedure in stable condition. INDICATIONS:  Ms. Garnica was s/p open tracheotomy in OR by ENT service a few hours earlier on 4/38/2023.  The tracheostomy tube was found to have no  balloon (only 2 cm of tubing) after she was noted to be losing volumes on the ventilator.  ENT service was called to assess, and decision was made to do urgent trach tube exchange at bedside in ICU.  Informed consent was obtained from patient's  via Bahamian language line  after risks and benefits were obtained.      PROCEDURE:  The patient was sedated with IV fentanyl and propofol.  She was positioned supine, and care was taken not to disturb her ventricular drain or occipital craniotomy defect.  FiO2 was increased to 100%.  She was prepped and draped in sterile fashion.  Timeout protocol was performed.  Surgicel gauze was removed from the stoma, and sutures were removed from the trach tube. Retractors were placed in the stoma.  The 6 Shiley tracheotomy tube was removed.  A hook was placed under the cricoid, and the tracheal window was easily visualized.  The new 6 cuffed Shiley tracheotomy tube was placed easily, and the  balloon was inflated.  After the ventilator circuit was connected, she had visible chest rise and CO2 return.  Surgicel gauze was placed into the stoma.  The horizontal incision was closed with 4-0 chromic suture on the right side to reduce stomal wound.  The trach tube flange was secured to the skin with 2-0 silk sutures.  An Allevyn trach sponge and Velcro trach tube ties were placed appropriately.  FiO2 was returned to her baseline.  The patient tolerated the procedure in stable condition.

## 2023-04-28 NOTE — PROGRESS NOTE ADULT - SUBJECTIVE AND OBJECTIVE BOX
HPI:  58 yo F with PMH of Asthma, CAD (not on  meds), peripheral neuropathy and PSH of BATOOL, cholecystectomy, right knee surgery presented to [place] ED on 4/20 with right sided weakness and right facial numbness. Patient was undergoing preparation for colonoscopy. As per daughter at 8am patient was playing with her grandchildren but around 840 am patient was getting dressed and fell and subsequently felt weak after. Daughter reports that patient had some episodes of vomiting at this time. She had a syncopal episode at around 10 am and was witnessed by brother. No head trauma, She regained conscious after few minutes. She remained in bed for the remainder of the day. Daughter reports some slurred speech noted 1230-1PM and also was confused after. and around 4PM, the patient's sister noted right sided weakness at which time EMS was called and patient was brought to ED. No c/o chest pain, shortness of breath, fever, headache, abdominal pain, urinary complaints. No recent travel/sickness/ change in meds. Stroke code in ER: CTH neg for heme, Right PICA distribution acute infarction. CTA with saccular aneurysms of b/l carotids, approximately 0.8 cm on the right and 1.2 x 0.9 cm on the left. Possible tiny third saccular aneurysm on the right Posterior intracranial circulation: Right vertebral arterial occlusion at its dural crossing junction V3 Atlantic and V4 intracranial segments with likely reversal of flow in its intracranial segment from the basilar. Right PICA faintly seen. 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  Not candidate for TNK/mechanical thrombectomy. CT scan repeated which showed: 1. Brain: Progression of acute infarction within the right cerebellar hemisphere, also extending into the left superior cerebellar hemisphere. New mass effect on the fourth ventricle causing stenosis versus occlusion with new third and lateral ventricular dilatation indicating ventricular obstruction at the level of the fourth ventricle. New upward and downward herniation of cerebellar parenchyma Right carotid system: No hemodynamically significant stenosis. Left carotid system: No hemodynamically significant stenosis. Vertebral circulation: Patent. Anterior intracranial circulation: Intact. Bilateral internal carotid saccular aneurysms. These findings are unchanged. Posterior intracranial circulation:    Improved flow within the right vertebral artery since 4/20/2023. New focal stenosis mid left vertebral artery, etiology uncertain, consider vasospasm and extrinsic compression in addition to new embolic disease. Brain perfusion: Perfusion images demonstrate normalization of the perfusion abnormality in the right cerebellar hemisphere present on 4/20/2023 despite evidence of progression of acute infarction.  Areas of apparent ischemia within the posterior fossa have progressed in extent, also again involving the left posterior cerebral arterial distribution. Tx to [facility] for SOC watch. On admission to [facility], NIHSS 12.  (21 Apr 2023 16:50)    OVERNIGHT EVENTS: Episode of presumed discomfort (biting on endotracheal tube), given fentanyl 25mg x 1 IV. Remains neuro stable on full vent support.    HOSPITAL COURSE:      Vital Signs Last 24 Hrs  T(C): 37.6 (28 Apr 2023 00:19), Max: 37.7 (27 Apr 2023 17:00)  T(F): 99.7 (28 Apr 2023 00:19), Max: 99.9 (27 Apr 2023 17:00)  HR: 62 (28 Apr 2023 00:00) (59 - 77)  BP: 112/57 (28 Apr 2023 00:00) (98/50 - 138/63)  BP(mean): 82 (28 Apr 2023 00:00) (71 - 96)  RR: 19 (28 Apr 2023 00:00) (15 - 26)  SpO2: 95% (28 Apr 2023 00:00) (93% - 99%)    Parameters below as of 28 Apr 2023 00:00  Patient On (Oxygen Delivery Method): ventilator    O2 Concentration (%): 30    I&O's Summary    26 Apr 2023 07:01  -  27 Apr 2023 07:00  --------------------------------------------------------  IN: 2451 mL / OUT: 1635 mL / NET: 816 mL    27 Apr 2023 07:01  -  28 Apr 2023 00:33  --------------------------------------------------------  IN: 1730 mL / OUT: 1568 mL / NET: 162 mL        PHYSICAL EXAM: +precedex, performed in Nepalese   General: NAD, pt lying in bed, +intubated   HEENT: PERRL 2mm briskly reactive. Face appears symmetric, +right gaze, cannot cross midline +NGT/ETT   Cardiovascular: RRR, normal S1 and S2   Respiratory: lungs CTAB, no wheezing, rhonchi, or crackles   GI: normoactive BS to auscultation, abd soft, NTND   Neuro: A&Ox1 with nodding to choices, Follows simple commands (open and close eyes, simple motor). LUE/LLE 5/5, RUE distally 4/5, proximally 3/5, +right drift (hits bed), RLE 3/5. SILT throughout   Extremities: warm and well perfused.   Wound/incision: +SOC incision, C/D/I   Drain: +SGJP in place to thumbprint suction, +EVD open at 3cmH20.       TUBES/LINES:  [] Garsia  [] Lumbar Drain  [x] Wound Drains  [x] Others - EVD      DIET:  [x] NPO - for trach   [] Mechanical  [] Tube feeds    LABS:                        9.1    6.80  )-----------( 219      ( 27 Apr 2023 02:19 )             27.9     04-27    149<H>  |  114<H>  |  17  ----------------------------<  138<H>  3.7   |  30  |  0.42<L>    Ca    8.9      27 Apr 2023 02:19  Phos  4.0     04-27  Mg     2.2     04-27              CAPILLARY BLOOD GLUCOSE          Drug Levels: [] N/A    CSF Analysis: [] N/A      Allergies    No Known Allergies    Intolerances      MEDICATIONS:  Antibiotics:    Neuro:  acetaminophen   Oral Liquid .. 650 milliGRAM(s) Oral every 6 hours PRN  dexMEDEtomidine Infusion 0.2 MICROgram(s)/kG/Hr IV Continuous <Continuous>  fentaNYL    Injectable 25 MICROGram(s) IV Push every 2 hours PRN  ondansetron Injectable 4 milliGRAM(s) IV Push every 6 hours PRN  oxyCODONE    IR 5 milliGRAM(s) Oral every 4 hours PRN    Anticoagulation:    OTHER:  acetylcysteine 20%  Inhalation 4 milliLiter(s) Inhalation every 6 hours  albuterol/ipratropium for Nebulization 3 milliLiter(s) Nebulizer every 6 hours  amLODIPine   Tablet 5 milliGRAM(s) Oral daily  atorvastatin 80 milliGRAM(s) Oral at bedtime  chlorhexidine 0.12% Liquid 15 milliLiter(s) Oral Mucosa every 12 hours  chlorhexidine 2% Cloths 1 Application(s) Topical <User Schedule>  doxazosin 8 milliGRAM(s) Oral at bedtime  pantoprazole  Injectable 40 milliGRAM(s) IV Push daily  polyethylene glycol 3350 17 Gram(s) Oral daily  senna 2 Tablet(s) Oral at bedtime  sodium chloride 3%  Inhalation 4 milliLiter(s) Inhalation every 6 hours    IVF:  sodium chloride 3 Gram(s) Oral every 6 hours  sodium chloride 0.9%. 1000 milliLiter(s) IV Continuous <Continuous>    CULTURES:  Culture Results:   Normal Respiratory Domi present (04-23 @ 10:14)  Culture Results:   No growth to date (04-22 @ 20:37)    RADIOLOGY & ADDITIONAL TESTS:  < from: Xray Chest 1 View- PORTABLE-Routine (Xray Chest 1 View- PORTABLE-Routine in AM.) (04.27.23 @ 05:18) >  FINDINGS: Size of cardiac silhouette unchanged. Probable cardiomegaly.   Endotracheal tube tip 3.2 cm proximal to jono. The lungs are grossly   clear. No pleural effusion or pneumothorax. NG tube noted with distal   aspect obscured in abdomen.    IMPRESSION:  NG tube noted with distal aspect obscured in abdomen.    < end of copied text >      ASSESSMENT:  58 yo F with PMH of Asthma, CAD (not on meds), peripheral presented to [place] ED on 4/20 with right sided weakness and right facial numbness. Acute infarction within the right cerebellar hemisphere, also extending into the left superior cerebellar hemisphere. New mass effect on the fourth ventricle causing stenosis versus occlusion with new third and lateral ventricular dilatation indicating ventricular obstruction at the level of the fourth ventricle. New upward and downward herniation of cerebellar parenchyma. Pt transferred to [facility] for further management. NIHSS 12. Now s/p SOC foramen magnum decompression and right parietal/occipital EVD placement (4/21). Course c/b respiratory insuffiency d/t bulbar dysfunction.   STROKE    No pertinent family history in first degree relatives    Handoff    Asthma    CAD (coronary artery disease)    Peripheral neuropathy    Cerebellar stroke    Cerebellar stroke    Cerebellar infarct    CAD (coronary artery disease)    Asthma    Peripheral neuropathy    Decompressive craniectomy    Insertion, external ventricular drain    S/P total abdominal hysterectomy    S/P cholecystectomy    S/P right knee surgery    [email or web address]        PLAN:  Neuro   - Vitals/neuro q1h   - Plan for DSA for B/L carotid aneurysms and new left vertebral stenosis with Dr. Minor this admission  - SG ELENA drain off suction, monitor output  - EVD@3cmH20; monitor ICP/output  - CTH 4/22 with increase right IVH, possibly redistribution. Stable vent size.   - Repeat CTH 4/24: stable, 4/25 stable   - stroke consulted, f/u recs  - pain control with tylenol prn, oxycodone prn  - sedation: precedex gtt   - analgesia: fentanyl prn     Cardio  - -160  - TTE 4/24: negative for PFO, mild LVH, mild dilation of L atrium, EF 55-60%  - Amlodipine 5mg daily started 4/24    Pulm  - reintubated 4/25 on /30/16/8  - Chest PT, nebs q 6   - pending trach placement     GI  - NGT placed by ENT; started TF Glucerna @ 50, pending PEG placement   - Bowel regimen, last BM 4/25  - Protonix while intubated  - Trend LFTs q 72 hrs (4/28)     Renal  - Na goal 140-145, salt tabs 3 q 6  - cardura 8mg at bedtime for urinary retention   - SC prn    Endo   - no issues     Heme  - SCDs, SQL held for trach  - antixa 0.23 4/27  - s/p 3 units platelets, 35 mcg of DDAVP for ASA/Plavix reversal  - LE dopplers 4/22 neg for DVT, but showing superficial venous thrombosis in the proximal portion of the right greater saphenous vein; consider repeat doppler in 1 week  - Positive anticardiolipin Ab 4/27    ID  - f/u panculture 4/22, NGTD    DISPO:  - NSICU, full code  - PT/OT rec acute inpatient rehab: patient can tolerate 3 hrs PT/OT daily    D/w Dr. Valadez and Dr. Lopez     Assessment:  Present when checked    []  GCS  E   V  M     Heart Failure: []Acute, [] acute on chronic , []chronic  Heart Failure:  [] Diastolic (HFpEF), [] Systolic (HFrEF), []Combined (HFpEF and HFrEF), [] RHF, [] Pulm HTN, [] Other    [] MAMTA, [] ATN, [] AIN, [] other  [] CKD1, [] CKD2, [] CKD 3, [] CKD 4, [] CKD 5, []ESRD    Encephalopathy: [] Metabolic, [] Hepatic, [] toxic, [] Neurological, [] Other    Abnormal Nurtitional Status: [] malnurtition (see nutrition note), [ ]underweight: BMI < 19, [] morbid obesity: BMI >40, [] Cachexia    [] Sepsis  [] hypovolemic shock,[] cardiogenic shock, [] hemorrhagic shock, [] neuogenic shock  [] Acute Respiratory Failure  []Cerebral edema, [] Brain compression/ herniation,   [] Functional quadriplegia  [] Acute blood loss anemia

## 2023-04-28 NOTE — CONSULT NOTE ADULT - ASSESSMENT
57 year old female w/ PMH of asthma, CAD (not on meds), HTN, prior miscarriages X3, peripheral neuropathy and PSH of BATOOL, cholecystectomy and right knee surgery presented to [place] ED on 4/20 w/ right sided weakness and right facial numbness. Now s/p Trach with NGT feeding access. General Surgery consulted for placement of PEG feeding access. Afebrile and HDS. Abdomen soft with well healed incision. No abdominal imaging to review.     - Plan for bedside PEG placement on Monday in ICU  - Plan discussed with family, consent placed in chart, all questions answered  - Preop on Sunday: Preop for OR: NPO after MN: hold tube feeds, AM CBC, BMP, Mg, Phos, Coag, TandS  - Surgery Team 4C will continue to follow. Please page Team 4 with questions/clinical changes. [phone]

## 2023-04-28 NOTE — PROGRESS NOTE ADULT - NS ATTEND AMEND GEN_ALL_CORE FT
The patient is a 57-year-old female with a history of CAD, hypertension, prior miscarriages X3, and neuropathy admitted with a right cerebellar stroke setting of right intracranial vertebral artery occlusion with course complicated by progressive infarction involving bilateral cerebellar hemispheres and L PCA territory, new L VA stenosis, significant edema mass effect with hydrocephalus status post SOC and EVD placement 4/21. Most recent scan shows stable R>L cerebellar infarcts and mild hemorrhage of the R cerebellar infarct though more superior view is limited by scan. TTE with LAE but otherwise unremarkable.     Agree with plan for angiogram, pending serum vasculitis labs. Ideally would have SALVADOR. MRI w/ w/o contrast when able. ASA when able from surgical standpoint. Continue statin.
The patient is a 57-year-old female with a history of CAD, hypertension, prior miscarriages X3, and neuropathy admitted with a right cerebellar stroke setting of right intracranial vertebral artery occlusion with course complicated by progressive infarction involving bilateral cerebellar hemispheres and L PCA territory, new L VA stenosis, significant edema mass effect with hydrocephalus status post SOC and EVD placement 4/21. TTE with LAE but otherwise unremarkable.  +anti-CL AB, LA. Hematology eval pending. May need SALVADOR/ILR.  Angio pending to better evaluate aneurysms/potential VA dissections. ASA when able. Continue statin.
The patient is a 57-year-old female with a history of CAD, hypertension, prior miscarriages X3, and neuropathy admitted with a right cerebellar stroke setting of right intracranial vertebral artery occlusion with course complicated by progressive infarction involving bilateral cerebellar hemispheres and L PCA territory, new L VA stenosis, significant edema mass effect with hydrocephalus status post SOC and EVD placement 4/21. Most recent scan shows stable R>L cerebellar infarcts and mild hemorrhage of the R cerebellar infarct though more superior view is limited by scan. TTE with LAE but otherwise unremarkable.     Agree with plan for angiogram, pending hypercoag labs. Ideally would have SALVADOR. May need ILR. MRI w/ w/o contrast when able. May need ILR. ASA when able from surgical standpoint. Continue statin.
The patient is a 57-year-old female with a history of CAD, hypertension, and neuropathy admitted with a right cerebellar stroke setting of right intracranial vertebral artery occlusion with course complicated by progressive infarction involving bilateral cerebellar hemispheres and L PCA territory, new L VA stenosis, significant edema mass effect with hydrocephalus status post SOC and EVD placement 4/21. Most recent scan shows stable R>L cerebellar infarcts and mild hemorrhage of the R cerebellar infarct though more superior view is limited by scan. CSF with 246K RBC, 168 TNCs, protein 112, glucose 87.     Agree with plan for angiogram, pending serum vasculitis labs. Would add ESR/CRP, CHRISTIANE/BINDU, RF, HIV, syphilis, lupus Anticoagulant, anti- Beta 2 GP, and cardiolipin ABs. Will consider additional vasculitis studies pending angio. MRI w/ w/o contrast when able. ASA when able from surgical standpoint. Continue statin.

## 2023-04-28 NOTE — PROGRESS NOTE ADULT - SUBJECTIVE AND OBJECTIVE BOX
Neurology Stroke Progress Note    INTERVAL HPI/OVERNIGHT EVENTS:  Patient seen and examined. Daughter used for translation.    MEDICATIONS  (STANDING):    MEDICATIONS  (PRN):      Allergies    No Known Allergies    Intolerances        Vital Signs Last 24 Hrs  T(C): 37.6 (28 Apr 2023 11:20), Max: 37.9 (28 Apr 2023 06:16)  T(F): 99.7 (28 Apr 2023 09:37), Max: 100.2 (28 Apr 2023 06:16)  HR: 62 (28 Apr 2023 11:20) (55 - 78)  BP: 117/56 (28 Apr 2023 11:20) (98/50 - 138/63)  BP(mean): 81 (28 Apr 2023 11:20) (71 - 91)  RR: 21 (28 Apr 2023 11:20) (16 - 24)  SpO2: 95% (28 Apr 2023 11:20) (93% - 99%)    Parameters below as of 28 Apr 2023 11:20    O2 Flow (L/min): 30  O2 Concentration (%): 30    Neurologic:  -Mental status: Awakens to voice, ETT in place. Following simple commands.   -Cranial nerves:   II: BTT diminished on the right. +corneals b/l.  III, IV, VI: R gaze, Pupils equally round and reactive to light 2mm and brisk  VII: Face appears symmetric, difficult to assess d/t ETT  Motor: Normal bulk and tone. LUE at least 4/5 able to lift for >10 seconds, weakly resists. RUE weak  noted, unable to lift antigravity with gravity removed appears to have 2/5 strength proximally. Bilateral lower extremities able to lift antigravity at least 4/5, unable to assess for drive but patient moving spontaneously and following commands.   Sensation: Sensation grossly intact patient moves to tactile stimuli on the left and right lower extremity, painful stim on the right upper extremity.   Coordination: Unable to test      LABS:                        9.1    9.28  )-----------( 213      ( 28 Apr 2023 05:13 )             27.8     04-28    146<H>  |  104  |  15  ----------------------------<  130<H>  3.8   |  31  |  0.47<L>    Ca    8.3<L>      28 Apr 2023 05:13  Phos  3.9     04-28  Mg     2.2     04-28    TPro  5.6<L>  /  Alb  3.3  /  TBili  0.4  /  DBili  x   /  AST  14  /  ALT  36  /  AlkPhos  100  04-28    PT/INR - ( 28 Apr 2023 05:13 )   PT: 12.9 sec;   INR: 1.08          PTT - ( 28 Apr 2023 05:13 )  PTT:31.6 sec      RADIOLOGY & ADDITIONAL TESTS:  < from: CT Head No Cont (04.28.23 @ 11:41) >  Impression:    Since prior CT head 4/25/2023, decreased size of the lateral and third   ventricles. Otherwise no significant change.    < end of copied text >

## 2023-04-28 NOTE — CONSULT NOTE ADULT - SUBJECTIVE AND OBJECTIVE BOX
*** NOTE IN PROGRESS ***    Hematology Oncology Progress / Consult Note      HPI:  58 yo F with PMH of Asthma, CAD (not on  meds), peripheral neuropathy and PSH of BATOOL, cholecystectomy, right knee surgery presented to [place] ED on 4/20 with right sided weakness and right facial numbness. Patient was undergoing preparation for colonoscopy. As per daughter at 8am patient was playing with her grandchildren but around 840 am patient was getting dressed and fell and subsequently felt weak after. Daughter reports that patient had some episodes of vomiting at this time. She had a syncopal episode at around 10 am and was witnessed by brother. No head trauma, She regained conscious after few minutes. She remained in bed for the remainder of the day. Daughter reports some slurred speech noted 1230-1PM and also was confused after. and around 4PM, the patient's sister noted right sided weakness at which time EMS was called and patient was brought to ED. No c/o chest pain, shortness of breath, fever, headache, abdominal pain, urinary complaints. No recent travel/sickness/ change in meds. Stroke code in ER: CTH neg for heme, Right PICA distribution acute infarction. CTA with saccular aneurysms of b/l carotids, approximately 0.8 cm on the right and 1.2 x 0.9 cm on the left. Possible tiny third saccular aneurysm on the right Posterior intracranial circulation: Right vertebral arterial occlusion at its dural crossing junction V3 Atlantic and V4 intracranial segments with likely reversal of flow in its intracranial segment from the basilar. Right PICA faintly seen. 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  Not candidate for TNK/mechanical thrombectomy. CT scan repeated which showed: 1. Brain: Progression of acute infarction within the right cerebellar hemisphere, also extending into the left superior cerebellar hemisphere. New mass effect on the fourth ventricle causing stenosis versus occlusion with new third and lateral ventricular dilatation indicating ventricular obstruction at the level of the fourth ventricle. New upward and downward herniation of cerebellar parenchyma Right carotid system: No hemodynamically significant stenosis. Left carotid system: No hemodynamically significant stenosis. Vertebral circulation: Patent. Anterior intracranial circulation: Intact. Bilateral internal carotid saccular aneurysms. These findings are unchanged. Posterior intracranial circulation:    Improved flow within the right vertebral artery since 4/20/2023. New focal stenosis mid left vertebral artery, etiology uncertain, consider vasospasm and extrinsic compression in addition to new embolic disease. Brain perfusion: Perfusion images demonstrate normalization of the perfusion abnormality in the right cerebellar hemisphere present on 4/20/2023 despite evidence of progression of acute infarction.  Areas of apparent ischemia within the posterior fossa have progressed in extent, also again involving the left posterior cerebral arterial distribution. Tx to [facility] for SOC watch. On admission to [facility], NIHSS 12.  (21 Apr 2023 16:50)      Interval History:    SUBJECTIVE: Patient seen and examined at bedside.    OBJECTIVE:    VITAL SIGNS:  ICU Vital Signs Last 24 Hrs  T(C): 37.6 (28 Apr 2023 11:20), Max: 37.9 (28 Apr 2023 06:16)  T(F): 99.7 (28 Apr 2023 09:37), Max: 100.2 (28 Apr 2023 06:16)  HR: 62 (28 Apr 2023 11:20) (55 - 78)  BP: 117/56 (28 Apr 2023 11:20) (98/50 - 138/63)  BP(mean): 81 (28 Apr 2023 11:20) (71 - 91)  ABP: --  ABP(mean): --  RR: 21 (28 Apr 2023 11:20) (16 - 24)  SpO2: 95% (28 Apr 2023 11:20) (93% - 99%)    O2 Parameters below as of 28 Apr 2023 11:20    O2 Flow (L/min): 30  O2 Concentration (%): 30      Mode: standby    04-27 @ 07:01 - 04-28 @ 07:00  --------------------------------------------------------  IN: 2562 mL / OUT: 1846 mL / NET: 716 mL    04-28 @ 07:01 - 04-28 @ 13:42  --------------------------------------------------------  IN: 284 mL / OUT: 227 mL / NET: 57 mL      CAPILLARY BLOOD GLUCOSE          PHYSICAL EXAM:  General: NAD, answering questions, pleasantly conversant  HEENT: NC/AT; PERRL, clear conjunctiva  Neck: supple  Respiratory: CTA b/l  Cardiovascular: +S1/S2; RRR  Abdomen: soft, NT/ND; +BS x4  Extremities: WWP, 2+ peripheral pulses b/l; no LE edema  Skin: normal color and turgor; no rash  Neurological:     MEDICATIONS:  MEDICATIONS  (STANDING):    MEDICATIONS  (PRN):      ALLERGIES:  Allergies    No Known Allergies    Intolerances        LABS:                        9.1    9.28  )-----------( 213      ( 28 Apr 2023 05:13 )             27.8     04-28    146<H>  |  104  |  15  ----------------------------<  130<H>  3.8   |  31  |  0.47<L>    Ca    8.3<L>      28 Apr 2023 05:13  Phos  3.9     04-28  Mg     2.2     04-28    TPro  5.6<L>  /  Alb  3.3  /  TBili  0.4  /  DBili  x   /  AST  14  /  ALT  36  /  AlkPhos  100  04-28    PT/INR - ( 28 Apr 2023 05:13 )   PT: 12.9 sec;   INR: 1.08          PTT - ( 28 Apr 2023 05:13 )  PTT:31.6 sec                RADIOLOGY & ADDITIONAL TESTS: Reviewed.   Hematology Oncology Consult Note      HPI:  56 yo F with PMH of Asthma, CAD (not on  meds), peripheral neuropathy and PSH of BATOOL, cholecystectomy, right knee surgery presented to [place] ED on 4/20 with right sided weakness and right facial numbness. Patient was undergoing preparation for colonoscopy. As per daughter at 8am patient was playing with her grandchildren but around 840 am patient was getting dressed and fell and subsequently felt weak after. Daughter reports that patient had some episodes of vomiting at this time. She had a syncopal episode at around 10 am and was witnessed by brother. No head trauma, She regained conscious after few minutes. She remained in bed for the remainder of the day. Daughter reports some slurred speech noted 1230-1PM and also was confused after. and around 4PM, the patient's sister noted right sided weakness at which time EMS was called and patient was brought to ED. No c/o chest pain, shortness of breath, fever, headache, abdominal pain, urinary complaints. No recent travel/sickness/ change in meds. Stroke code in ER: CTH neg for heme, Right PICA distribution acute infarction. CTA with saccular aneurysms of b/l carotids, approximately 0.8 cm on the right and 1.2 x 0.9 cm on the left. Possible tiny third saccular aneurysm on the right Posterior intracranial circulation: Right vertebral arterial occlusion at its dural crossing junction V3 Atlantic and V4 intracranial segments with likely reversal of flow in its intracranial segment from the basilar. Right PICA faintly seen. 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  Not candidate for TNK/mechanical thrombectomy. CT scan repeated which showed: 1. Brain: Progression of acute infarction within the right cerebellar hemisphere, also extending into the left superior cerebellar hemisphere. New mass effect on the fourth ventricle causing stenosis versus occlusion with new third and lateral ventricular dilatation indicating ventricular obstruction at the level of the fourth ventricle. New upward and downward herniation of cerebellar parenchyma Right carotid system: No hemodynamically significant stenosis. Left carotid system: No hemodynamically significant stenosis. Vertebral circulation: Patent. Anterior intracranial circulation: Intact. Bilateral internal carotid saccular aneurysms. These findings are unchanged. Posterior intracranial circulation:    Improved flow within the right vertebral artery since 4/20/2023. New focal stenosis mid left vertebral artery, etiology uncertain, consider vasospasm and extrinsic compression in addition to new embolic disease. Brain perfusion: Perfusion images demonstrate normalization of the perfusion abnormality in the right cerebellar hemisphere present on 4/20/2023 despite evidence of progression of acute infarction.  Areas of apparent ischemia within the posterior fossa have progressed in extent, also again involving the left posterior cerebral arterial distribution. Tx to [facility] for SOC watch. On admission to [facility], NIHSS 12.  (21 Apr 2023 16:50)      Interval History: s/p emergent SOC foramen magnum decompression and right parietal/occipital EVD placement.    SUBJECTIVE: Patient seen and examined at bedside with patient's children. Unable to assess full review of systems due to patients mental status due to recent procedure.    OBJECTIVE:    VITAL SIGNS:  ICU Vital Signs Last 24 Hrs  T(C): 37.6 (28 Apr 2023 11:20), Max: 37.9 (28 Apr 2023 06:16)  T(F): 99.7 (28 Apr 2023 09:37), Max: 100.2 (28 Apr 2023 06:16)  HR: 62 (28 Apr 2023 11:20) (55 - 78)  BP: 117/56 (28 Apr 2023 11:20) (98/50 - 138/63)  BP(mean): 81 (28 Apr 2023 11:20) (71 - 91)  ABP: --  ABP(mean): --  RR: 21 (28 Apr 2023 11:20) (16 - 24)  SpO2: 95% (28 Apr 2023 11:20) (93% - 99%)    O2 Parameters below as of 28 Apr 2023 11:20    O2 Flow (L/min): 30  O2 Concentration (%): 30      Mode: standby    04-27 @ 07:01 - 04-28 @ 07:00  --------------------------------------------------------  IN: 2562 mL / OUT: 1846 mL / NET: 716 mL    04-28 @ 07:01 - 04-28 @ 13:42  --------------------------------------------------------  IN: 284 mL / OUT: 227 mL / NET: 57 mL      CAPILLARY BLOOD GLUCOSE          PHYSICAL EXAM:  General: resting, NAD, overweight  HEENT: NC/AT; PERRL, clear conjunctiva  Neck: supple, +trach  Respiratory: CTA b/l  Cardiovascular: +S1/S2; RRR  Abdomen: soft, NT/ND; +BS x4  Extremities: WWP, 2+ peripheral pulses b/l; no LE edema  Skin: normal color and turgor; no rash  Neurological: sleeping    MEDICATIONS:  MEDICATIONS  (STANDING):    MEDICATIONS  (PRN):      ALLERGIES:  Allergies    No Known Allergies    Intolerances        LABS:                        9.1    9.28  )-----------( 213      ( 28 Apr 2023 05:13 )             27.8     04-28    146<H>  |  104  |  15  ----------------------------<  130<H>  3.8   |  31  |  0.47<L>    Ca    8.3<L>      28 Apr 2023 05:13  Phos  3.9     04-28  Mg     2.2     04-28    TPro  5.6<L>  /  Alb  3.3  /  TBili  0.4  /  DBili  x   /  AST  14  /  ALT  36  /  AlkPhos  100  04-28    PT/INR - ( 28 Apr 2023 05:13 )   PT: 12.9 sec;   INR: 1.08          PTT - ( 28 Apr 2023 05:13 )  PTT:31.6 sec                RADIOLOGY & ADDITIONAL TESTS: Reviewed.

## 2023-04-28 NOTE — PROGRESS NOTE ADULT - ASSESSMENT
57 year old female w/ PMH of asthma, CAD (not on meds), HTN, prior miscarriages X3, peripheral neuropathy and PSH of BATOOL, cholecystectomy and right knee surgery presented to [place] ED on 4/20 w/ right sided weakness and right facial numbness. Initially found to have a right PICA distribution acute infarct and a right vertebral artery occlusion. Not a candidate for any intervention. On repeat imaging had progression of acute infarct within the right and left cerebellar hemisphere with mass effect on the fourth ventricle and hydrocephalus and upward and downward herniation of the cerebellar parenchyma. She was transferred to [facility] on 4/21 and underwent an emergent SOC foramen magnum decompression and right parietal/occipital EVD placement. Stroke was consulted for further recommendations. TTE with LAE but otherwise unremarkable.       1)Secondary stroke prevention  - ASA when able from surgical standpoint. Continue statin.     2) Stroke risk factors  - A1C: 5.9  - LDL: 92  - TSH: 0.152  - hypercoagulable work up sent as well as vasculitis panel sent, f/u results  - follow up repeat anti-cardiolipin level outpatient in 12 weeks  - CRP 17.4/ESR 19  - Collateral from family that patient suffered from 3 miscarriages after her first 2 children and that the patient's mother suffered from an "aortic tear" that has been worsening    3) Further management  - consider MRI brain with and without contrast when stable   - recommend SBP goal < 140, per primary team  - recommend q1hr stroke neuro checks  - Plan for diagnostic angio to eval posterior circulation for dissection, 0.8cm right ICA aneurysm, 1.2x0.9cm left ICA aneurysm  - Ideally would have SALVADOR. May need ILR.  - outpt neurology follow up  - provide stroke education    DVT prophylaxis   - SCDs, DVT prophylaxis with Lovenox    Discussed with Neurology Attending Dr. Farrah Maria   57 year old female w/ PMH of asthma, CAD (not on meds), HTN, prior miscarriages X3, peripheral neuropathy and PSH of BATOOL, cholecystectomy and right knee surgery presented to [place] ED on 4/20 w/ right sided weakness and right facial numbness. Initially found to have a right PICA distribution acute infarct and a right vertebral artery occlusion. Not a candidate for any intervention. On repeat imaging had progression of acute infarct within the right and left cerebellar hemisphere with mass effect on the fourth ventricle and hydrocephalus and upward and downward herniation of the cerebellar parenchyma. She was transferred to [facility] on 4/21 and underwent an emergent SOC foramen magnum decompression and right parietal/occipital EVD placement. Stroke was consulted for further recommendations. TTE with LAE but otherwise unremarkable.       1)Secondary stroke prevention  - ASA when able from surgical standpoint. Continue statin.     2) Stroke risk factors  - A1C: 5.9  - LDL: 92  - TSH: 0.152  - hypercoagulable work up sent as well as vasculitis panel sent, f/u results - so far pos DRVVT, hexagonal phase, anticardiolipin  - agree with heme consult given + hypercoags so far and hx of miscarriages  - follow up repeat anti-cardiolipin level outpatient in 12 weeks  - CRP 17.4/ESR 19  - Collateral from family that patient suffered from 3 miscarriages after her first 2 children and that the patient's mother suffered from an "aortic tear" that has been worsening    3) Further management  - consider MRI brain with and without contrast when stable   - recommend SBP goal < 140, per primary team  - recommend q1hr stroke neuro checks  - Plan for diagnostic angio to eval posterior circulation for dissection, 0.8cm right ICA aneurysm, 1.2x0.9cm left ICA aneurysm  - Ideally would have SALVADOR. May need ILR.  - outpt neurology follow up  - provide stroke education    DVT prophylaxis   - SCDs, DVT prophylaxis with Lovenox    Discussed with Neurology Attending Dr. Farrah Maria   57 year old female w/ PMH of asthma, CAD (not on meds), HTN, prior miscarriages X3, peripheral neuropathy and PSH of BATOOL, cholecystectomy and right knee surgery presented to [place] ED on 4/20 w/ right sided weakness and right facial numbness. Initially found to have a right PICA distribution acute infarct and a right vertebral artery occlusion. Not a candidate for any intervention. On repeat imaging had progression of acute infarct within the right and left cerebellar hemisphere with mass effect on the fourth ventricle and hydrocephalus and upward and downward herniation of the cerebellar parenchyma. She was transferred to [facility] on 4/21 and underwent an emergent SOC foramen magnum decompression and right parietal/occipital EVD placement. Stroke was consulted for further recommendations. TTE with LAE but otherwise unremarkable.       1)Secondary stroke prevention  - ASA when able from surgical standpoint. Continue statin.     2) Stroke risk factors  - A1C: 5.9  - LDL: 92  - TSH: 0.152  - +anti-CL AB, LA. Hematology eval pending.     - follow up repeat anti-cardiolipin level outpatient in 12 weeks  - CRP 17.4/ESR 19  - Collateral from family that patient suffered from 3 miscarriages after her first 2 children and that the patient's mother suffered from an "aortic tear" that has been worsening    3) Further management  - consider MRI brain with and without contrast when stable   - recommend SBP goal < 140, per primary team  - recommend q1hr stroke neuro checks  - angio pending to better evaluate aneurysms/potential VA dissections.   - May need SALVADOR/ILR  - outpt neurology follow up  - provide stroke education    DVT prophylaxis   - per primary team    Discussed with Neurology Attending Dr. Farrah Maria

## 2023-04-28 NOTE — PROGRESS NOTE ADULT - ASSESSMENT
ASSESSMENT:   Bilateral cerebellar hemispheric strokes  POD 7 S/P  SOC for foramen magnum decompression and R parietooccipital EVD  Bilateral carotid terminus aneurysms  R. V4 occlusion  History of peripheral neuropathy and asthma  Bulbar dysfunction. Respiratory insufficiency    PLAN:  NEURO: Q1h neurochecks  EVD at 3cmH2O.Monitor ICPs, CPP, output.   Post op CT head reviewed. CT 4/24, 25 stable.  SG ELENA- monitor output. Off suction.   Start ASA/plavix on 4/28 per NSGY (held as patient pending trach today). Continue Statin  Stroke core measures, stroke neurology following. Hypercoagulable and vasculitis w/u  Will need MRI brain w and w/o.Pending DSA.  Sedation: Precedex. RASS 0- neg 1  Activity: PT/OT as tolerated.    PULM: Reintubated; secretions thick  ABG repeat, CXR clear  ENT consult for trach  Continue pulm toilet  CPAP as tolerated    CARDIAC:   SBP goal 100-160  TTE w/ bubble: No PFO EF 55-60%, mild LVH.  On amlodipine    GI:  Diet: TFs   PPI as intubated  Bowel regimen LBM: 4/25  LFTs normalized; repeat 4/28  Gen surgery consult for PEG    /RENAL: Urine retention  Na goal 140-145. Off 3%  Monitor BMPs daily  Monitor Is/Os  Continue cardura  8mg    HEME: Rule out hypercoagulability; history of 2 miscarriages; rule out antiphospholipid Ab syndrome  Maintain Hb > 7.0, PLT > 100,000  SCDs, SQL Anti Xa 4/27 2:00am  LE dopplers: superficial venous thrombosis in proximal portion of right greater saphenous vein  Repeat dopplers 4/29  Follow up hypercoagulable profile: Anticardiolipin Ab positive.  Heme      ID:  Monitor for infectious s/s, fever curve, leukocytosis  Post op Ancef per NSGY  Febrile 4/23. Cultures NGTD (UA, blood, CSF)    ENDO:   Glu goal 140-180mg/dL  ISS   A1c 5.9, LDL92, TSH    SOCIAL/FAMILY:  [] awaiting [x] updated at bedside [] family meeting    CODE STATUS:  [x] Full Code [] DNR [] DNI [] Palliative/Comfort Care    DISPOSITION:  [x] ICU [] Stroke Unit [] Floor [] EMU [] RCU [] PC    Time spent: 60 critical care minutes     ASSESSMENT:   Bilateral cerebellar hemispheric strokes  POD 7 S/P  SOC for foramen magnum decompression and R parietooccipital EVD  Bilateral carotid terminus aneurysms  R. V4 occlusion  History of peripheral neuropathy and asthma  Bulbar dysfunction. Respiratory insufficiency    PLAN:  NEURO: Q1h neurochecks  EVD at 3cmH2O.Monitor ICPs, CPP, output.   Post op CT head reviewed. CT 4/24, 25 stable.  SG ELENA- monitor output. Off suction.   Start ASA/plavix on 4/28 per NSGY (held as patient pending trach today). Continue Statin  Stroke core measures, stroke neurology following. Hypercoagulable and vasculitis w/u  Will need MRI brain w and w/o. Pending DSA likely   Sedation: Precedex. RASS 0- neg 1  Activity: PT/OT as tolerated.    PULM: Reintubated; secretions thick  ABG repeat, CXR clear  ENT on for trach; padmini for 4/28  Continue pulm toilet  CPAP as tolerated    CARDIAC:   SBP goal 100-160  TTE w/ bubble: No PFO EF 55-60%, mild LVH.  On amlodipine    GI:  Diet: TFs   PPI as intubated  Bowel regimen LBM: 4/27  LFTs normalized; repeat 4/28  Gen surgery consult for PEG- likely 5/1    /RENAL: Urine retention  Na goal 140-145. Continue salt tabs  Monitor BMPs daily  Monitor Is/Os  Continue cardura  8mg    HEME: Rule out hypercoagulability; history of 2 miscarriages; rule out antiphospholipid Ab syndrome  Maintain Hb > 7.0, PLT > 100,000  SCDs, SQL Anti Xa 4/27 2:00am  LE dopplers: superficial venous thrombosis in proximal portion of right greater saphenous vein  Repeat dopplers 4/29  Follow up hypercoagulable profile: Anticardiolipin Ab positive.  Heme c/s pending    ID:  Monitor for infectious s/s, fever curve, leukocytosis  Post op Ancef per NSGY  Febrile 4/23. Cultures NGTD (UA, blood, CSF)    ENDO:   Glu goal 140-180mg/dL  ISS   A1c 5.9, LDL92, TSH    SOCIAL/FAMILY:  [] awaiting [x] updated at bedside [] family meeting    CODE STATUS:  [x] Full Code [] DNR [] DNI [] Palliative/Comfort Care    DISPOSITION:  [x] ICU [] Stroke Unit [] Floor [] EMU [] RCU [] PCU    Time spent: 60 critical care minutes

## 2023-04-28 NOTE — CONSULT NOTE ADULT - SUBJECTIVE AND OBJECTIVE BOX
Surgery Consult    Consulting Surgical Team:   [ ] Team 1 - Colorectal/Surgical Oncology ([phone])    [ ] Team 2 - MIS/Bariatric Surgery ([phone])     [ x ] Team 4 - Acute Care Surgery ([phone])     [ ] Team 5 - General Surgery/Breast/Pediatric Surgery ([phone])    Consulting Attending: Dr. Gant    HPI:  56 yo F with PMH of Asthma, CAD (not on  meds), peripheral neuropathy and PSH of BATOOL, cholecystectomy, right knee surgery presented to [place] ED on 4/20 with right sided weakness and right facial numbness. Patient was undergoing preparation for colonoscopy. As per daughter at 8am patient was playing with her grandchildren but around 840 am patient was getting dressed and fell and subsequently felt weak after. Daughter reports that patient had some episodes of vomiting at this time. She had a syncopal episode at around 10 am and was witnessed by brother. No head trauma, She regained conscious after few minutes. She remained in bed for the remainder of the day. Daughter reports some slurred speech noted 1230-1PM and also was confused after. and around 4PM, the patient's sister noted right sided weakness at which time EMS was called and patient was brought to ED. No c/o chest pain, shortness of breath, fever, headache, abdominal pain, urinary complaints. No recent travel/sickness/ change in meds. Stroke code in ER: CTH neg for heme, Right PICA distribution acute infarction. CTA with saccular aneurysms of b/l carotids, approximately 0.8 cm on the right and 1.2 x 0.9 cm on the left. Possible tiny third saccular aneurysm on the right Posterior intracranial circulation: Right vertebral arterial occlusion at its dural crossing junction V3 [place] and V4 intracranial segments with likely reversal of flow in its intracranial segment from the basilar. Right PICA faintly seen. 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  Not candidate for TNK/mechanical thrombectomy. CT scan repeated which showed: 1. Brain: Progression of acute infarction within the right cerebellar hemisphere, also extending into the left superior cerebellar hemisphere. New mass effect on the fourth ventricle causing stenosis versus occlusion with new third and lateral ventricular dilatation indicating ventricular obstruction at the level of the fourth ventricle. New upward and downward herniation of cerebellar parenchyma Right carotid system: No hemodynamically significant stenosis. Left carotid system: No hemodynamically significant stenosis. Vertebral circulation: Patent. Anterior intracranial circulation: Intact. Bilateral internal carotid saccular aneurysms. These findings are unchanged. Posterior intracranial circulation:    Improved flow within the right vertebral artery since 4/20/2023. New focal stenosis mid left vertebral artery, etiology uncertain, consider vasospasm and extrinsic compression in addition to new embolic disease. Brain perfusion: Perfusion images demonstrate normalization of the perfusion abnormality in the right cerebellar hemisphere present on 4/20/2023 despite evidence of progression of acute infarction.  Areas of apparent ischemia within the posterior fossa have progressed in extent, also again involving the left posterior cerebral arterial distribution. Tx to [facility] for SOC watch. On admission to [facility], NIHSS 12.  (21 Apr 2023 16:50)      General Surgery consulted for PEG placement. Pt with hx of open cholecystectomy admitted to NSxg service for management of cerebellar bleed. Currently receiving tube feeds via DHT and tolerating well. S/p Trach today. WIth bowel movements.       PAST MEDICAL HISTORY:  Asthma    CAD (coronary artery disease)    Peripheral neuropathy        PAST SURGICAL HISTORY:  S/P total abdominal hysterectomy    S/P cholecystectomy    S/P right knee surgery        MEDICATIONS:      ALLERGIES:  No Known Allergies      SOCHX:   Social History:      FAMHX:   FAMILY HISTORY:  No pertinent family history in first degree relatives          Vitals:  Vital Signs Last 24 Hrs  T(C): 36.8 (28 Apr 2023 14:10), Max: 37.9 (28 Apr 2023 06:16)  T(F): 98.2 (28 Apr 2023 14:10), Max: 100.2 (28 Apr 2023 06:16)  HR: 50 (28 Apr 2023 16:10) (50 - 78)  BP: 120/60 (28 Apr 2023 16:10) (98/50 - 129/60)  BP(mean): 86 (28 Apr 2023 16:10) (71 - 86)  RR: 16 (28 Apr 2023 16:10) (12 - 24)  SpO2: 97% (28 Apr 2023 16:10) (93% - 99%)    Parameters below as of 28 Apr 2023 15:40  Patient On (Oxygen Delivery Method): ventilator      Mode: standby      PHYSICAL EXAM:  Physical Exam  General: NAD, resting comfortably in bed  Pulm: Nonlabored breathing, no respiratory distress, Trach  Abd: soft, non distended, upper midline incision well healed  Extrem: WWP, no edema,    I&o's:  I&O's Summary    27 Apr 2023 07:01  -  28 Apr 2023 07:00  --------------------------------------------------------  IN: 2562 mL / OUT: 1846 mL / NET: 716 mL    28 Apr 2023 07:01  -  28 Apr 2023 17:29  --------------------------------------------------------  IN: 394 mL / OUT: 478 mL / NET: -84 mL        LABS:                        9.1    9.28  )-----------( 213      ( 28 Apr 2023 05:13 )             27.8     04-28    146<H>  |  104  |  15  ----------------------------<  130<H>  3.8   |  31  |  0.47<L>    Ca    8.3<L>      28 Apr 2023 05:13  Phos  3.9     04-28  Mg     2.2     04-28    TPro  5.6<L>  /  Alb  3.3  /  TBili  0.4  /  DBili  x   /  AST  14  /  ALT  36  /  AlkPhos  100  04-28    Lactate:    PT/INR - ( 28 Apr 2023 05:13 )   PT: 12.9 sec;   INR: 1.08          PTT - ( 28 Apr 2023 05:13 )  PTT:31.6 sec              MICRO:     IMAGING:

## 2023-04-28 NOTE — CONSULT NOTE ADULT - ASSESSMENT
56 y/o history of pregnancy loss, presented with stroke which converted to Hemorrhagic and was found to have positive lupus anticoagulants and minimally elevated IgM anticardiolipin... was also found to have a saphenous vein thrombosis in spite of SQ daily Lovenox 57 year old female w/ PMH of asthma, CAD (not on meds), HTN, prior miscarriages X3, peripheral neuropathy and PSH of BATOOL, cholecystectomy and right knee surgery presented to [place] ED on 4/20 w/ right sided weakness and right facial numbness. Initially found to have a right PICA distribution acute infarct and a right vertebral artery occlusion. Not a candidate for any intervention. On repeat imaging had progression of acute infarct within the right and left cerebellar hemisphere with mass effect on the fourth ventricle and hydrocephalus and upward and downward herniation of the cerebellar parenchyma. She was transferred to [facility] on 4/21 and underwent an emergent SOC foramen magnum decompression and right parietal/occipital EVD placement. Hematology consulted for hypercoagulable work up.    # Acute CVA  Per patient's children, no personal history of clotting or bleeding disorder. Patient's mother with history of stroke. Daughter endorses that patient had 3 miscarriages (early). She thinks her mother was possibly on some type of blood thinner following a pregnancy at [facility] in 2009, but does not remember the name or reason, which was stopped after a few months. No other reported family history of clotting. Factor V Leiden and Prothrombin gene mutation negative. Lupus anticoagulant was positive 1.05 (range 0-1.03), however, the patient had received low molecular weight heparin for DVT ppx and may represent a false positive (as LA is a clot based assay). Beta 2 glycoprotein negative. Anticardiolipin antibody screen positive, with elevated IgM 28.4, but titers below threshold for APS in [place] criteria (>40 Mpl).     Of note, patient with bilateral LE ultrasound on 4/22/23 showing no evidence of deep venous thrombosis in either lower extremity, but positive for superficial venous thrombosis is present in the proximal portion of the right greater saphenous vein in the thigh.    Plan:  - Would repeat Lupus Anticoagulant in AM while patient is off of all heparin based products, if negative this is unlikely to be APLS  - Repeat ultrasound of bilateral LE for clot location, size, and evidence of propogation  - These results would indicate whether patient met criteria for full vs prophylactic anticoagulation (when feasible from a neurosurgical perspective) and in general patient will remain at risk for DVT formation given likely prolonged immobility    # Anemia, normocytic  No reported history of anemia, possibly 2/2 critical illness and recent procedures.  - Check folate, B12, iron studies with total binding, and ferritin    Discussed with hematology oncology attending Dr. Ting Hernandez. Recommendations are considered final after attending attestation.

## 2023-04-28 NOTE — PROGRESS NOTE ADULT - TIME BILLING
Review of chart information, interpretation of data, evaluation of patient, coordination of care.

## 2023-04-28 NOTE — PROGRESS NOTE ADULT - SUBJECTIVE AND OBJECTIVE BOX
PM EVENTS:     - S/p tracheostomy. 6CN75R placed  - Upon return to floor,  blood appeared to be missing, found underneath IV pole  - S/p trach exchange at bedside w/ ENT 6CN75R, prop gtt, 50fent    ROS: negative except as mentioned above.      ICU Vital Signs Last 24 Hrs  T(C): 37.4 (28 Apr 2023 17:15), Max: 37.9 (28 Apr 2023 06:16)  T(F): 99.4 (28 Apr 2023 17:15), Max: 100.2 (28 Apr 2023 06:16)  HR: 55 (28 Apr 2023 19:00) (50 - 78)  BP: 110/51 (28 Apr 2023 19:00) (98/50 - 129/60)  BP(mean): 73 (28 Apr 2023 19:00) (71 - 91)  ABP: --  ABP(mean): --  RR: 11 (28 Apr 2023 19:00) (11 - 24)  SpO2: 99% (28 Apr 2023 19:00) (93% - 100%)    O2 Parameters below as of 28 Apr 2023 17:00  Patient On (Oxygen Delivery Method): ventilator    O2 Concentration (%): 30        EXAM:     [place] Coma Scale: 4/1/6 = 11    General: normocephalic, suboccipital crani wound, laying in bed, in no distress  Neuro     MS: [place] Coma Scale: 4/1/6 = 11, follows commands, cooperative with examination    CN: PERRL, (+)R. gaze, plegia to left gaze    Mot: bulk normal, tone normal, power UPPER 3/4, LOWER 3(at least)/3(at least)  Extremities: no clubbing, well-perfused, no edema                            9.1    9.28  )-----------( 213      ( 28 Apr 2023 05:13 )             27.8     04-28    146<H>  |  104  |  15  ----------------------------<  130<H>  3.8   |  31  |  0.47<L>    Ca    8.3<L>      28 Apr 2023 05:13  Phos  3.9     04-28  Mg     2.2     04-28    TPro  5.6<L>  /  Alb  3.3  /  TBili  0.4  /  DBili  x   /  AST  14  /  ALT  36  /  AlkPhos  100  04-28      MEDICATIONS  (STANDING):  acetylcysteine 20%  Inhalation 4 milliLiter(s) Inhalation every 6 hours  albuterol/ipratropium for Nebulization 3 milliLiter(s) Nebulizer every 6 hours  amLODIPine   Tablet 5 milliGRAM(s) Oral daily  atorvastatin 80 milliGRAM(s) Oral at bedtime  chlorhexidine 0.12% Liquid 15 milliLiter(s) Oral Mucosa every 12 hours  chlorhexidine 2% Cloths 1 Application(s) Topical <User Schedule>  dexMEDEtomidine Infusion 0.2 MICROgram(s)/kG/Hr (4.42 mL/Hr) IV Continuous <Continuous>  doxazosin 8 milliGRAM(s) Oral at bedtime  fentaNYL    Injectable 25 MICROGram(s) IV Push once  fentaNYL   Infusion 0.5 MICROgram(s)/kG/Hr (4.42 mL/Hr) IV Continuous <Continuous>  norepinephrine Infusion 0.05 MICROgram(s)/kG/Min (8.29 mL/Hr) IV Continuous <Continuous>  pantoprazole  Injectable 40 milliGRAM(s) IV Push daily  polyethylene glycol 3350 17 Gram(s) Oral daily  propofol Infusion 10 MICROgram(s)/kG/Min (5.3 mL/Hr) IV Continuous <Continuous>  senna 2 Tablet(s) Oral at bedtime  sodium chloride 3 Gram(s) Oral every 6 hours  sodium chloride 0.9%. 1000 milliLiter(s) (80 mL/Hr) IV Continuous <Continuous>  sodium chloride 3%  Inhalation 4 milliLiter(s) Inhalation every 6 hours    MEDICATIONS  (PRN):  ondansetron Injectable 4 milliGRAM(s) IV Push every 6 hours PRN Nausea and/or Vomiting  oxyCODONE    IR 5 milliGRAM(s) Oral every 4 hours PRN Moderate Pain (4 - 6)      Please see the day's note documented by Dr. Bueno for detailed ongoing assessment and plan.      - Ventilatory support  - PEG planning  - EVD    Remains in NSICU PM EVENTS:     - S/p tracheostomy. 6CN75R placed  - Upon return to floor,  blood appeared to be missing, found underneath IV pole  - S/p trach exchange at bedside w/ ENT 6CN75R, prop gtt, 50fent    ROS: negative except as mentioned above.      ICU Vital Signs Last 24 Hrs  T(C): 37.4 (28 Apr 2023 17:15), Max: 37.9 (28 Apr 2023 06:16)  T(F): 99.4 (28 Apr 2023 17:15), Max: 100.2 (28 Apr 2023 06:16)  HR: 55 (28 Apr 2023 19:00) (50 - 78)  BP: 110/51 (28 Apr 2023 19:00) (98/50 - 129/60)  BP(mean): 73 (28 Apr 2023 19:00) (71 - 91)  ABP: --  ABP(mean): --  RR: 11 (28 Apr 2023 19:00) (11 - 24)  SpO2: 99% (28 Apr 2023 19:00) (93% - 100%)    O2 Parameters below as of 28 Apr 2023 17:00  Patient On (Oxygen Delivery Method): ventilator    O2 Concentration (%): 30        EXAM:     [place] Coma Scale: 4/1/6 = 11    General: normocephalic, suboccipital crani wound, tracheostomy, laying in bed, in no distress  Neuro     MS: [place] Coma Scale: 4/1/6 = 11, follows commands, cooperative with examination    CN: PERRL, (+)R. gaze, plegia to left gaze    Mot: bulk normal, tone normal, power UPPER 3/4, LOWER 4/3  Extremities: no clubbing, well-perfused, no edema                            9.1    9.28  )-----------( 213      ( 28 Apr 2023 05:13 )             27.8     04-28    146<H>  |  104  |  15  ----------------------------<  130<H>  3.8   |  31  |  0.47<L>    Ca    8.3<L>      28 Apr 2023 05:13  Phos  3.9     04-28  Mg     2.2     04-28    TPro  5.6<L>  /  Alb  3.3  /  TBili  0.4  /  DBili  x   /  AST  14  /  ALT  36  /  AlkPhos  100  04-28      MEDICATIONS  (STANDING):  acetylcysteine 20%  Inhalation 4 milliLiter(s) Inhalation every 6 hours  albuterol/ipratropium for Nebulization 3 milliLiter(s) Nebulizer every 6 hours  amLODIPine   Tablet 5 milliGRAM(s) Oral daily  atorvastatin 80 milliGRAM(s) Oral at bedtime  chlorhexidine 0.12% Liquid 15 milliLiter(s) Oral Mucosa every 12 hours  chlorhexidine 2% Cloths 1 Application(s) Topical <User Schedule>  dexMEDEtomidine Infusion 0.2 MICROgram(s)/kG/Hr (4.42 mL/Hr) IV Continuous <Continuous>  doxazosin 8 milliGRAM(s) Oral at bedtime  fentaNYL    Injectable 25 MICROGram(s) IV Push once  fentaNYL   Infusion 0.5 MICROgram(s)/kG/Hr (4.42 mL/Hr) IV Continuous <Continuous>  norepinephrine Infusion 0.05 MICROgram(s)/kG/Min (8.29 mL/Hr) IV Continuous <Continuous>  pantoprazole  Injectable 40 milliGRAM(s) IV Push daily  polyethylene glycol 3350 17 Gram(s) Oral daily  propofol Infusion 10 MICROgram(s)/kG/Min (5.3 mL/Hr) IV Continuous <Continuous>  senna 2 Tablet(s) Oral at bedtime  sodium chloride 3 Gram(s) Oral every 6 hours  sodium chloride 0.9%. 1000 milliLiter(s) (80 mL/Hr) IV Continuous <Continuous>  sodium chloride 3%  Inhalation 4 milliLiter(s) Inhalation every 6 hours    MEDICATIONS  (PRN):  ondansetron Injectable 4 milliGRAM(s) IV Push every 6 hours PRN Nausea and/or Vomiting  oxyCODONE    IR 5 milliGRAM(s) Oral every 4 hours PRN Moderate Pain (4 - 6)      Please see the day's note documented by Dr. Bueno for detailed ongoing assessment and plan.      - Ventilatory support  - PEG planning  - EVD    Remains in NSICU

## 2023-04-28 NOTE — CHART NOTE - NSCHARTNOTEFT_GEN_A_CORE
Patient s/p tracheostomy with ENT today, found to have cuff cut. ENT to replace at bedside. Holding TFs until resolved. SQL and ASA also held with hematology consulted for anti-cardiolipin antibody positive findings. Will f/u further lab studies. Continue EVD drainange. Plan for PEG on Monday and Cerebral angiogram on Tuesday.

## 2023-04-28 NOTE — PROGRESS NOTE ADULT - SUBJECTIVE AND OBJECTIVE BOX
=============================  NSCU ATTENDING PROGRESS NOTE  =============================    Patient is a 58 y/o F with history of asthma and peripheral neuropathy transferred to [facility] NS[facility] from Replaced by [facility] for management of malignant posterior fossa stroke.  The patient developed symptoms at approximately 0830 on 4/20: dysconjugate gaze, ataxia, facial droop, dysarthria.  She did not present to [facility] until later on in the afternoon during which she was not a candidate for TNK.  CTP showing bilateral cerebellar perfusion mismatch, CTA showing R. V4 occlusion and large bilateral cavernous/terminal ICA aneurysms.  Patient was admitted to the ICU and monitored.  On 4/21 the patient became more lethargic, at which point a repeat CTH was performed, showing bilateral cerebellar strokes with lateral compression of the fourth ventricle on the right.  Patient accepted to [facility] by Dr. Valadez, at which point he was given 250cc 3% NaCl, started on 50cc/h, given 20mcg DDAVP and transferred as tier 1.     Upon arrival to [facility], NIHSS = 12, but mental status slowly declined from lethargy to near obtundation.  Repeat CTH showing stable mass effect and hydrocephalus. Patient taken emergently for SOC depression with EVD placement.    Patient reintubated due to poor clearance of secretions      ICU Vital Signs Last 24 Hrs  T(C): 37.9 (28 Apr 2023 06:16), Max: 37.9 (28 Apr 2023 06:16)  T(F): 100.2 (28 Apr 2023 06:16), Max: 100.2 (28 Apr 2023 06:16)  HR: 72 (28 Apr 2023 07:00) (55 - 77)  BP: 129/60 (28 Apr 2023 07:00) (98/50 - 138/63)  BP(mean): 86 (28 Apr 2023 07:00) (71 - 96)  RR: 24 (28 Apr 2023 07:00) (16 - 26)  SpO2: 96% (28 Apr 2023 07:00) (93% - 99%)      04-27-23 @ 07:01  -  04-28-23 @ 07:00  --------------------------------------------------------  IN: 2462 mL / OUT: 1846 mL / NET: 616 mL      Mode: AC/ CMV (Assist Control/ Continuous Mandatory Ventilation), RR (machine): 16, TV (machine): 300, FiO2: 30, PEEP: 8, ITime: 1, MAP: 11, PIP: 16      EXAM:   MS: Patient opens eyes to voice/ commands. Oriented x 3 by nodding  HEENT: ETT  CN: Pupils 3mm and brisk, tracks,  Motor: Power 5/5 LUE, RUE 3/5  B/L lowers 4/5 - some effort dependence  Chest: Diminished breath sounds B/L  Heart regular rate/rhythm, present S1/S2, no murmurs or rubs  Abdomen: Soft and nontender   Extremities: No edema      MEDICATIONS:   acetaminophen   Oral Liquid .. 650 milliGRAM(s) Oral every 6 hours PRN  acetylcysteine 20%  Inhalation 4 milliLiter(s) Inhalation every 6 hours  albuterol/ipratropium for Nebulization 3 milliLiter(s) Nebulizer every 6 hours  amLODIPine   Tablet 5 milliGRAM(s) Oral daily  atorvastatin 80 milliGRAM(s) Oral at bedtime  chlorhexidine 0.12% Liquid 15 milliLiter(s) Oral Mucosa every 12 hours  chlorhexidine 2% Cloths 1 Application(s) Topical <User Schedule>  dexMEDEtomidine Infusion 0.2 MICROgram(s)/kG/Hr (4.42 mL/Hr) IV Continuous <Continuous>  doxazosin 8 milliGRAM(s) Oral at bedtime  fentaNYL    Injectable 25 MICROGram(s) IV Push every 2 hours PRN  ondansetron Injectable 4 milliGRAM(s) IV Push every 6 hours PRN  oxyCODONE    IR 5 milliGRAM(s) Oral every 4 hours PRN  pantoprazole  Injectable 40 milliGRAM(s) IV Push daily  polyethylene glycol 3350 17 Gram(s) Oral daily  potassium chloride  10 mEq/100 mL IVPB 10 milliEquivalent(s) IV Intermittent every 1 hour  senna 2 Tablet(s) Oral at bedtime  sodium chloride 3 Gram(s) Oral every 6 hours  sodium chloride 0.9%. 1000 milliLiter(s) (80 mL/Hr) IV Continuous <Continuous>  sodium chloride 3%  Inhalation 4 milliLiter(s) Inhalation every 6 hours    LABS:                       9.1    9.28  )-----------( 213      ( 28 Apr 2023 05:13 )             27.8     04-28    146<H>  |  104  |  15  ----------------------------<  130<H>  3.8   |  31  |  0.47<L>    Ca    8.3<L>      28 Apr 2023 05:13  Phos  3.9     04-28  Mg     2.2     04-28    TPro  5.6<L>  /  Alb  3.3  /  TBili  0.4  /  DBili  x   /  AST  14  /  ALT  36  /  AlkPhos  100  04-28    LIVER FUNCTIONS - ( 28 Apr 2023 05:13 )  Alb: 3.3 g/dL / Pro: 5.6 g/dL / ALK PHOS: 100 U/L / ALT: 36 U/L / AST: 14 U/L / GGT: x           ABG - ( 26 Apr 2023 14:52 )  pH, Arterial: 7.42  pH, Blood: x     /  pCO2: 46    /  pO2: 96    / HCO3: 30    / Base Excess: 4.5   /  SaO2: 99.7                           =============================  NSCU ATTENDING PROGRESS NOTE  =============================    Patient is a 56 y/o F with history of asthma and peripheral neuropathy transferred to [facility]ICU from [facility] for management of malignant posterior fossa stroke.  The patient developed symptoms at approximately 0830 on 4/20: dysconjugate gaze, ataxia, facial droop, dysarthria.  She did not present to [facility] until later on in the afternoon during which she was not a candidate for TNK.  CTP showing bilateral cerebellar perfusion mismatch, CTA showing R. V4 occlusion and large bilateral cavernous/terminal ICA aneurysms.  Patient was admitted to the ICU and monitored.  On 4/21 the patient became more lethargic, at which point a repeat CTH was performed, showing bilateral cerebellar strokes with lateral compression of the fourth ventricle on the right.  Patient accepted to [facility] by Dr. Valadez, at which point he was given 250cc 3% NaCl, started on 50cc/h, given 20mcg DDAVP and transferred as tier 1.     Upon arrival to [facility], NIHSS = 12, but mental status slowly declined from lethargy to near obtundation.  Repeat CTH showing stable mass effect and hydrocephalus. Patient taken emergently for SOC depression with EVD placement.    Patient reintubated due to poor clearance of secretions      ICU Vital Signs Last 24 Hrs  T(C): 37.9 (28 Apr 2023 06:16), Max: 37.9 (28 Apr 2023 06:16)  T(F): 100.2 (28 Apr 2023 06:16), Max: 100.2 (28 Apr 2023 06:16)  HR: 72 (28 Apr 2023 07:00) (55 - 77)  BP: 129/60 (28 Apr 2023 07:00) (98/50 - 138/63)  BP(mean): 86 (28 Apr 2023 07:00) (71 - 96)  RR: 24 (28 Apr 2023 07:00) (16 - 26)  SpO2: 96% (28 Apr 2023 07:00) (93% - 99%)      04-27-23 @ 07:01  -  04-28-23 @ 07:00  --------------------------------------------------------  IN: 2462 mL / OUT: 1846 mL / NET: 616 mL      Mode: AC/ CMV (Assist Control/ Continuous Mandatory Ventilation), RR (machine): 16, TV (machine): 300, FiO2: 30, PEEP: 8, ITime: 1, MAP: 11, PIP: 16      EXAM:   MS: Patient opens eyes to voice/ commands. Oriented x 3 by nodding  HEENT: ETT  CN: Pupils 3mm and brisk, tracks  Motor: Power 5/5 LUE, RUE 3/5  B/L lowers 4/5 - some effort dependence  Chest: Diminished breath sounds B/L  Heart regular rate/rhythm, present S1/S2, no murmurs or rubs  Abdomen: Soft and nontender   Extremities: No edema      MEDICATIONS:   acetaminophen   Oral Liquid .. 650 milliGRAM(s) Oral every 6 hours PRN  acetylcysteine 20%  Inhalation 4 milliLiter(s) Inhalation every 6 hours  albuterol/ipratropium for Nebulization 3 milliLiter(s) Nebulizer every 6 hours  amLODIPine   Tablet 5 milliGRAM(s) Oral daily  atorvastatin 80 milliGRAM(s) Oral at bedtime  chlorhexidine 0.12% Liquid 15 milliLiter(s) Oral Mucosa every 12 hours  chlorhexidine 2% Cloths 1 Application(s) Topical <User Schedule>  dexMEDEtomidine Infusion 0.2 MICROgram(s)/kG/Hr (4.42 mL/Hr) IV Continuous <Continuous>  doxazosin 8 milliGRAM(s) Oral at bedtime  fentaNYL    Injectable 25 MICROGram(s) IV Push every 2 hours PRN  ondansetron Injectable 4 milliGRAM(s) IV Push every 6 hours PRN  oxyCODONE    IR 5 milliGRAM(s) Oral every 4 hours PRN  pantoprazole  Injectable 40 milliGRAM(s) IV Push daily  polyethylene glycol 3350 17 Gram(s) Oral daily  potassium chloride  10 mEq/100 mL IVPB 10 milliEquivalent(s) IV Intermittent every 1 hour  senna 2 Tablet(s) Oral at bedtime  sodium chloride 3 Gram(s) Oral every 6 hours  sodium chloride 0.9%. 1000 milliLiter(s) (80 mL/Hr) IV Continuous <Continuous>  sodium chloride 3%  Inhalation 4 milliLiter(s) Inhalation every 6 hours    LABS:                       9.1    9.28  )-----------( 213      ( 28 Apr 2023 05:13 )             27.8     04-28    146<H>  |  104  |  15  ----------------------------<  130<H>  3.8   |  31  |  0.47<L>    Ca    8.3<L>      28 Apr 2023 05:13  Phos  3.9     04-28  Mg     2.2     04-28    TPro  5.6<L>  /  Alb  3.3  /  TBili  0.4  /  DBili  x   /  AST  14  /  ALT  36  /  AlkPhos  100  04-28    LIVER FUNCTIONS - ( 28 Apr 2023 05:13 )  Alb: 3.3 g/dL / Pro: 5.6 g/dL / ALK PHOS: 100 U/L / ALT: 36 U/L / AST: 14 U/L / GGT: x           ABG - ( 26 Apr 2023 14:52 )  pH, Arterial: 7.42  pH, Blood: x     /  pCO2: 46    /  pO2: 96    / HCO3: 30    / Base Excess: 4.5   /  SaO2: 99.7

## 2023-04-29 LAB
ANION GAP SERPL CALC-SCNC: 9 MMOL/L — SIGNIFICANT CHANGE UP (ref 5–17)
BASE EXCESS BLDA CALC-SCNC: 4.1 MMOL/L — HIGH (ref -2–3)
BUN SERPL-MCNC: 15 MG/DL — SIGNIFICANT CHANGE UP (ref 7–23)
CALCIUM SERPL-MCNC: 8.8 MG/DL — SIGNIFICANT CHANGE UP (ref 8.4–10.5)
CHLORIDE SERPL-SCNC: 102 MMOL/L — SIGNIFICANT CHANGE UP (ref 96–108)
CO2 BLDA-SCNC: 30 MMOL/L — HIGH (ref 19–24)
CO2 SERPL-SCNC: 29 MMOL/L — SIGNIFICANT CHANGE UP (ref 22–31)
CREAT SERPL-MCNC: 0.39 MG/DL — LOW (ref 0.5–1.3)
D DIMER BLD IA.RAPID-MCNC: 767 NG/ML DDU — HIGH
EGFR: 117 ML/MIN/1.73M2 — SIGNIFICANT CHANGE UP
FERRITIN SERPL-MCNC: 160 NG/ML — HIGH (ref 15–150)
GLUCOSE SERPL-MCNC: 128 MG/DL — HIGH (ref 70–99)
HCO3 BLDA-SCNC: 28 MMOL/L — SIGNIFICANT CHANGE UP (ref 21–28)
HCT VFR BLD CALC: 28.7 % — LOW (ref 34.5–45)
HGB BLD-MCNC: 9.6 G/DL — LOW (ref 11.5–15.5)
IRON SATN MFR SERPL: 14 % — SIGNIFICANT CHANGE UP (ref 14–50)
IRON SATN MFR SERPL: 27 UG/DL — LOW (ref 30–160)
MAGNESIUM SERPL-MCNC: 1.9 MG/DL — SIGNIFICANT CHANGE UP (ref 1.6–2.6)
MCHC RBC-ENTMCNC: 30.1 PG — SIGNIFICANT CHANGE UP (ref 27–34)
MCHC RBC-ENTMCNC: 33.4 GM/DL — SIGNIFICANT CHANGE UP (ref 32–36)
MCV RBC AUTO: 90 FL — SIGNIFICANT CHANGE UP (ref 80–100)
NRBC # BLD: 0 /100 WBCS — SIGNIFICANT CHANGE UP (ref 0–0)
PCO2 BLDA: 41 MMHG — SIGNIFICANT CHANGE UP (ref 32–45)
PH BLDA: 7.45 — SIGNIFICANT CHANGE UP (ref 7.35–7.45)
PHOSPHATE SERPL-MCNC: 3.1 MG/DL — SIGNIFICANT CHANGE UP (ref 2.5–4.5)
PLATELET # BLD AUTO: 223 K/UL — SIGNIFICANT CHANGE UP (ref 150–400)
PO2 BLDA: 97 MMHG — SIGNIFICANT CHANGE UP (ref 83–108)
POTASSIUM SERPL-MCNC: 3.5 MMOL/L — SIGNIFICANT CHANGE UP (ref 3.5–5.3)
POTASSIUM SERPL-SCNC: 3.5 MMOL/L — SIGNIFICANT CHANGE UP (ref 3.5–5.3)
RBC # BLD: 3.19 M/UL — LOW (ref 3.8–5.2)
RBC # BLD: 3.2 M/UL — LOW (ref 3.8–5.2)
RBC # FLD: 12.6 % — SIGNIFICANT CHANGE UP (ref 10.3–14.5)
RETICS #: 69.1 K/UL — SIGNIFICANT CHANGE UP (ref 25–125)
RETICS/RBC NFR: 2.2 % — SIGNIFICANT CHANGE UP (ref 0.5–2.5)
SAO2 % BLDA: 99.6 % — HIGH (ref 94–98)
SODIUM SERPL-SCNC: 140 MMOL/L — SIGNIFICANT CHANGE UP (ref 135–145)
TIBC SERPL-MCNC: 200 UG/DL — LOW (ref 220–430)
UIBC SERPL-MCNC: 173 UG/DL — SIGNIFICANT CHANGE UP (ref 110–370)
WBC # BLD: 7.14 K/UL — SIGNIFICANT CHANGE UP (ref 3.8–10.5)
WBC # FLD AUTO: 7.14 K/UL — SIGNIFICANT CHANGE UP (ref 3.8–10.5)

## 2023-04-29 PROCEDURE — 71045 X-RAY EXAM CHEST 1 VIEW: CPT | Mod: 26

## 2023-04-29 PROCEDURE — 36600 WITHDRAWAL OF ARTERIAL BLOOD: CPT

## 2023-04-29 PROCEDURE — 93970 EXTREMITY STUDY: CPT | Mod: 26

## 2023-04-29 PROCEDURE — 36415 COLL VENOUS BLD VENIPUNCTURE: CPT

## 2023-04-29 PROCEDURE — 99291 CRITICAL CARE FIRST HOUR: CPT

## 2023-04-29 RX ORDER — ACETAMINOPHEN 500 MG
650 TABLET ORAL EVERY 6 HOURS
Refills: 0 | Status: DISCONTINUED | OUTPATIENT
Start: 2023-04-29 | End: 2023-04-30

## 2023-04-29 RX ORDER — ACETAMINOPHEN 500 MG
650 TABLET ORAL EVERY 6 HOURS
Refills: 0 | Status: DISCONTINUED | OUTPATIENT
Start: 2023-04-29 | End: 2023-04-29

## 2023-04-29 RX ORDER — CHLORHEXIDINE GLUCONATE 213 G/1000ML
15 SOLUTION TOPICAL EVERY 12 HOURS
Refills: 0 | Status: DISCONTINUED | OUTPATIENT
Start: 2023-04-29 | End: 2023-04-29

## 2023-04-29 RX ORDER — MAGNESIUM SULFATE 500 MG/ML
1 VIAL (ML) INJECTION ONCE
Refills: 0 | Status: COMPLETED | OUTPATIENT
Start: 2023-04-29 | End: 2023-04-29

## 2023-04-29 RX ORDER — ENOXAPARIN SODIUM 100 MG/ML
40 INJECTION SUBCUTANEOUS EVERY 24 HOURS
Refills: 0 | Status: DISCONTINUED | OUTPATIENT
Start: 2023-04-29 | End: 2023-05-01

## 2023-04-29 RX ORDER — POTASSIUM CHLORIDE 20 MEQ
40 PACKET (EA) ORAL ONCE
Refills: 0 | Status: COMPLETED | OUTPATIENT
Start: 2023-04-29 | End: 2023-04-29

## 2023-04-29 RX ADMIN — CHLORHEXIDINE GLUCONATE 15 MILLILITER(S): 213 SOLUTION TOPICAL at 05:38

## 2023-04-29 RX ADMIN — Medication 40 MILLIEQUIVALENT(S): at 08:48

## 2023-04-29 RX ADMIN — Medication 4 MILLILITER(S): at 09:23

## 2023-04-29 RX ADMIN — Medication 4 MILLILITER(S): at 21:13

## 2023-04-29 RX ADMIN — AMLODIPINE BESYLATE 5 MILLIGRAM(S): 2.5 TABLET ORAL at 05:38

## 2023-04-29 RX ADMIN — FENTANYL CITRATE 4.42 MICROGRAM(S)/KG/HR: 50 INJECTION INTRAVENOUS at 21:29

## 2023-04-29 RX ADMIN — ENOXAPARIN SODIUM 40 MILLIGRAM(S): 100 INJECTION SUBCUTANEOUS at 21:30

## 2023-04-29 RX ADMIN — Medication 3 MILLILITER(S): at 05:53

## 2023-04-29 RX ADMIN — Medication 3 MILLILITER(S): at 21:13

## 2023-04-29 RX ADMIN — Medication 4 MILLILITER(S): at 05:52

## 2023-04-29 RX ADMIN — SENNA PLUS 2 TABLET(S): 8.6 TABLET ORAL at 21:29

## 2023-04-29 RX ADMIN — PANTOPRAZOLE SODIUM 40 MILLIGRAM(S): 20 TABLET, DELAYED RELEASE ORAL at 11:26

## 2023-04-29 RX ADMIN — ATORVASTATIN CALCIUM 80 MILLIGRAM(S): 80 TABLET, FILM COATED ORAL at 21:31

## 2023-04-29 RX ADMIN — CHLORHEXIDINE GLUCONATE 15 MILLILITER(S): 213 SOLUTION TOPICAL at 17:44

## 2023-04-29 RX ADMIN — SODIUM CHLORIDE 3 GRAM(S): 9 INJECTION INTRAMUSCULAR; INTRAVENOUS; SUBCUTANEOUS at 00:48

## 2023-04-29 RX ADMIN — SODIUM CHLORIDE 3 GRAM(S): 9 INJECTION INTRAMUSCULAR; INTRAVENOUS; SUBCUTANEOUS at 05:38

## 2023-04-29 RX ADMIN — Medication 650 MILLIGRAM(S): at 21:59

## 2023-04-29 RX ADMIN — Medication 650 MILLIGRAM(S): at 21:28

## 2023-04-29 RX ADMIN — SODIUM CHLORIDE 4 MILLILITER(S): 9 INJECTION INTRAMUSCULAR; INTRAVENOUS; SUBCUTANEOUS at 09:36

## 2023-04-29 RX ADMIN — SODIUM CHLORIDE 3 GRAM(S): 9 INJECTION INTRAMUSCULAR; INTRAVENOUS; SUBCUTANEOUS at 23:17

## 2023-04-29 RX ADMIN — SODIUM CHLORIDE 3 GRAM(S): 9 INJECTION INTRAMUSCULAR; INTRAVENOUS; SUBCUTANEOUS at 17:44

## 2023-04-29 RX ADMIN — Medication 3 MILLILITER(S): at 09:24

## 2023-04-29 RX ADMIN — Medication 4 MILLILITER(S): at 16:48

## 2023-04-29 RX ADMIN — POLYETHYLENE GLYCOL 3350 17 GRAM(S): 17 POWDER, FOR SOLUTION ORAL at 11:26

## 2023-04-29 RX ADMIN — SODIUM CHLORIDE 4 MILLILITER(S): 9 INJECTION INTRAMUSCULAR; INTRAVENOUS; SUBCUTANEOUS at 17:00

## 2023-04-29 RX ADMIN — CHLORHEXIDINE GLUCONATE 1 APPLICATION(S): 213 SOLUTION TOPICAL at 05:38

## 2023-04-29 RX ADMIN — Medication 3 MILLILITER(S): at 16:49

## 2023-04-29 RX ADMIN — SODIUM CHLORIDE 4 MILLILITER(S): 9 INJECTION INTRAMUSCULAR; INTRAVENOUS; SUBCUTANEOUS at 05:54

## 2023-04-29 RX ADMIN — Medication 100 GRAM(S): at 08:48

## 2023-04-29 RX ADMIN — SODIUM CHLORIDE 3 GRAM(S): 9 INJECTION INTRAMUSCULAR; INTRAVENOUS; SUBCUTANEOUS at 11:26

## 2023-04-29 RX ADMIN — SODIUM CHLORIDE 4 MILLILITER(S): 9 INJECTION INTRAMUSCULAR; INTRAVENOUS; SUBCUTANEOUS at 21:13

## 2023-04-29 RX ADMIN — Medication 8 MILLIGRAM(S): at 21:31

## 2023-04-29 NOTE — PROGRESS NOTE ADULT - ASSESSMENT
57 year old female w/ PMH of asthma, CAD (not on meds), HTN, prior miscarriages X3, peripheral neuropathy and PSH of BATOOL, cholecystectomy and right knee surgery presented to [place] ED on 4/20 w/ right sided weakness and right facial numbness. Now s/p Trach with NGT feeding access. General Surgery consulted for placement of PEG feeding access. Afebrile and HDS. Abdomen soft with well healed incision. No abdominal imaging to review.     Recommend  - Monitor fevers  - Plan for bedside PEG placement on Monday in ICU  - Plan discussed with family, consent placed in chart, all questions answered  - Preop on Sunday: Preop for OR: NPO after MN: hold tube feeds, AM CBC, BMP, Mg, Phos, Coag, TandS  - Surgery Team 4C will continue to follow. Please page Team 4 with questions/clinical changes. [phone]

## 2023-04-29 NOTE — PROGRESS NOTE ADULT - SUBJECTIVE AND OBJECTIVE BOX
56yo F w Bilateral cerebellar hemispheric strokes sp SOC for foramen magnum decompression and R parietooccipital EVD, Bilateral carotid terminus aneurysms, History of peripheral neuropathy and asthma, Bulbar dysfunction. Respiratory insufficiency. Intubated and failed extubation, reintubated 4/25/23. Now s/p creation tracheostomy 4/28/23 c/b tracheostomy tube exchange POD0 due to damaged tracheostomy tube.    Interval 4/29: NAEON. Saturating well on vent with no cuff leak.    ROS:  Negative except above    PE:   ICU Vital Signs Last 24 Hrs  T(C): 37.2 (29 Apr 2023 05:44), Max: 37.6 (28 Apr 2023 11:20)  T(F): 99 (29 Apr 2023 05:44), Max: 99.7 (29 Apr 2023 00:59)  HR: 80 (29 Apr 2023 10:00) (50 - 81)  BP: 121/55 (29 Apr 2023 10:00) (106/52 - 155/70)  BP(mean): 79 (29 Apr 2023 10:00) (73 - 100)  ABP: --  ABP(mean): --  RR: 16 (29 Apr 2023 10:00) (11 - 21)  SpO2: 95% (29 Apr 2023 10:00) (94% - 100%)    O2 Parameters below as of 29 Apr 2023 10:00  Patient On (Oxygen Delivery Method): ventilator    O2 Concentration (%): 40    Gen: Sedated, intermittently responsive to questions  Eyes: Open spontaneously  Nose: Nares clear anteriorly  OC/OP: Dry mucosa, no lesions  Neck: [id number] tracheostomy in place, secured with 4-point suture and trach tie, barrier dressing in place, cuff up  Resp: Mechanically ventilated

## 2023-04-29 NOTE — PROGRESS NOTE ADULT - NUTRITIONAL ASSESSMENT
56yo F w Bilateral cerebellar hemispheric strokes sp SOC for foramen magnum decompression and R parietooccipital EVD, Bilateral carotid terminus aneurysms, History of peripheral neuropathy and asthma, Bulbar dysfunction. Respiratory insufficiency. Intubated and failed extubation, reintubated 4/25/23. Now s/p creation tracheostomy 4/28/23 c/b tracheostomy tube exchange POD0 due to damaged tracheostomy tube.    - routine tracheostomy care: gentle suctioning PRN, exchange inner cannula qd, humidification of supplemental O2  - ENT to remove sutures and exchange tracheostomy POD7  - page ENT with questions/concerns

## 2023-04-29 NOTE — PROGRESS NOTE ADULT - SUBJECTIVE AND OBJECTIVE BOX
HPI:  58 yo F with PMH of Asthma, CAD (not on  meds), peripheral neuropathy and PSH of BATOOL, cholecystectomy, right knee surgery presented to [place] ED on 4/20 with right sided weakness and right facial numbness. Patient was undergoing preparation for colonoscopy. As per daughter at 8am patient was playing with her grandchildren but around 840 am patient was getting dressed and fell and subsequently felt weak after. Daughter reports that patient had some episodes of vomiting at this time. She had a syncopal episode at around 10 am and was witnessed by brother. No head trauma, She regained conscious after few minutes. She remained in bed for the remainder of the day. Daughter reports some slurred speech noted 1230-1PM and also was confused after. and around 4PM, the patient's sister noted right sided weakness at which time EMS was called and patient was brought to ED. No c/o chest pain, shortness of breath, fever, headache, abdominal pain, urinary complaints. No recent travel/sickness/ change in meds. Stroke code in ER: CTH neg for heme, Right PICA distribution acute infarction. CTA with saccular aneurysms of b/l carotids, approximately 0.8 cm on the right and 1.2 x 0.9 cm on the left. Possible tiny third saccular aneurysm on the right Posterior intracranial circulation: Right vertebral arterial occlusion at its dural crossing junction V3 Atlantic and V4 intracranial segments with likely reversal of flow in its intracranial segment from the basilar. Right PICA faintly seen. 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  Not candidate for TNK/mechanical thrombectomy. CT scan repeated which showed: 1. Brain: Progression of acute infarction within the right cerebellar hemisphere, also extending into the left superior cerebellar hemisphere. New mass effect on the fourth ventricle causing stenosis versus occlusion with new third and lateral ventricular dilatation indicating ventricular obstruction at the level of the fourth ventricle. New upward and downward herniation of cerebellar parenchyma Right carotid system: No hemodynamically significant stenosis. Left carotid system: No hemodynamically significant stenosis. Vertebral circulation: Patent. Anterior intracranial circulation: Intact. Bilateral internal carotid saccular aneurysms. These findings are unchanged. Posterior intracranial circulation:    Improved flow within the right vertebral artery since 4/20/2023. New focal stenosis mid left vertebral artery, etiology uncertain, consider vasospasm and extrinsic compression in addition to new embolic disease. Brain perfusion: Perfusion images demonstrate normalization of the perfusion abnormality in the right cerebellar hemisphere present on 4/20/2023 despite evidence of progression of acute infarction.  Areas of apparent ischemia within the posterior fossa have progressed in extent, also again involving the left posterior cerebral arterial distribution. Tx to [facility] for SOC watch. On admission to [facility], NIHSS 12.  (21 Apr 2023 16:50)    OVERNIGHT EVENTS: JIM overnight, neuro stable remains on fentanyl drip s/p trach placement, remains off sedation.    HOSPITAL COURSE:  4/21: Admitted for crani watch, CTH complete w/ unchanged hydrocephalus, taken for emergent occipital craniectomy.   4/22: POD1. Remains intubated overnight, on propofol and dank. EVD@[id number]. Pending repeat CTH this am. Given hydralazine 10mg x1. Started on cardene for SBP high 140s-150s. Sub-therapeutic on plavix, therapeutic on asa, rpt asa accumetrics until subtherapeutic. LE dopplers neg for DVT, but showing superficial venous thrombosis in the proximal portion of the right greater saphenous vein. Started on 3% @60 for na goal 145-150. NGT placed by ENT. Febrile, pancultured.  4/23: POD 2. 3% increased to 75. NGT readjusted. UA neg. SBP liberalized to 160. Plan to extubate. 3% decreased to 60. Failed extubation d/t no cuff leak, given 60mg solumedrol, plan to extubate in 6 hrs. SCx1 for post void residual >400, started on cardura at bedtime. New blood in buretrol, stopped SQL. Clot in EVD cleared. Neuro exam improving.   4/24: POD 3. Cont 3% with NS while NPO, start TF today. TF started. LFTs downtrending. Sodium 141. Increased 3% to 50, NS to 30. Repeat BMP ordered for 5:30PM. Tramadol 25 for pain with no relief, oxy 5 and 1g tylenol ordered, stability CT stable, speech language consult for dysarthria. Given hydralazine 10mg IVP for SBP>160, started amlodipine 5mg. C/o mild headache, given fioricet x1, stroke neuro rec SALVADOR and loop recorder placement. Echo with bubble completed, negative for PFO, EF 55-60%. J HFNC started for desats with suctioning.   4/25: POD 4. Cont HFNC. Increased secertions on HFNC, reintubated. CXR confirmed good placement. EVD dropped to 5cmH20 for goal of draining 5-10cc/hr and SG ELENA drain taken off suction. Pending CTH. EVD drained 0cc/hr, dropped to 0. NGT replaced. Dc'd fioricet. Started on PPI for intubation. Increased cardura to 4mg for urinary retention, given an additional 2mg now. Started salt tabs 3 q 6. Given 12.5mg fentanyl x1. NGT replaced, CXR shown in lung. NGT removed, repeat CXR showing no pneumothorax. Pending ENT consult for NGT placement. CTH stable. NGT replaced by ENT, confirmed in proper spot. Restarted TF. [id number]. Na 141 from 146. Increased 3% to 60. EVD raised to 3cmH20. ETT pulled back 1cm by RT.  4/26: POD 5 SOC. Intubated, remains on precedex, ABG ordered with improving PaO2, BMP sodium 148, 3% changed to 30cc/hr, cardura increased to 8mg for tonight, tolerating cpap  4/27: POD 6 SOC. Respiratory rate sustained 6, returned to FVS. Na 151, dc'd 3%, f/u AM sodium. Nurse noticed ETT 19 at the lip and was 20 prior, CXR shows ETT @ 5cm above jono, ET tube advanced 1cm, repeat CXR confirms appropriate placement. Thick inline secretions with suctioning. ENT trach placement Fri likely, PEG likely Mon. Keep ELENA drain until no output, EVD to remain @3. Start ASA 81mg daily tomorrow.   4/28: POD7 SOC, neuro stable, given fentanyl x 1 overnight for presumed discomfort (biting on ETT), remains on full vent support. CTH today stable in comparison to 4/25. 6 cuffed trach placed by ENT in OR, but came down without cuff. Replaced at bedside by ENT. On fentanyl gtt.   4/29: POD8 SOC, JIM overnight     Vital Signs Last 24 Hrs  T(C): 37.5 (28 Apr 2023 22:05), Max: 37.9 (28 Apr 2023 06:16)  T(F): 99.5 (28 Apr 2023 22:05), Max: 100.2 (28 Apr 2023 06:16)  HR: 68 (29 Apr 2023 00:00) (50 - 78)  BP: 129/67 (29 Apr 2023 00:00) (106/52 - 155/70)  BP(mean): 89 (29 Apr 2023 00:00) (73 - 100)  RR: 17 (29 Apr 2023 00:00) (11 - 24)  SpO2: 94% (29 Apr 2023 00:00) (93% - 100%)    Parameters below as of 29 Apr 2023 00:00  Patient On (Oxygen Delivery Method): ventilator    O2 Concentration (%): 40    I&O's Summary    27 Apr 2023 07:01  -  28 Apr 2023 07:00  --------------------------------------------------------  IN: 2562 mL / OUT: 1846 mL / NET: 716 mL    28 Apr 2023 07:01  -  29 Apr 2023 00:27  --------------------------------------------------------  IN: 1476.2 mL / OUT: 1169 mL / NET: 307.2 mL        PHYSICAL EXAM: Exam performed in Greek   General: NAD, pt lying in bed,   HEENT: PERRL 2mm briskly reactive. Face appears symmetric, +right gaze, cannot cross midline +trach, +NGT   Cardiovascular: RRR, normal S1 and S2   Respiratory: lungs CTAB, no wheezing, rhonchi, or crackles   GI: normoactive BS to auscultation, abd soft, NTND   Neuro: A&Ox2 with nodding to choices, Follows simple commands (open and close eyes, simple motor). LUE/LLE 5/5, RUE handgrip 3/5, bicep tricep 4/5, proximally 2-3/5, +right drift (hits bed), RLE 4/5. SILT throughout   Extremities: warm and well perfused.   Wound/incision: +SOC incision, C/D/I   Drain: +SGJP in place to thumbprint suction, +EVD open at 3cmH20.     TUBES/LINES:  [] Garsia  [x] Lumbar Drain - SGJP   [] Wound Drains  [x] Others - EVD      DIET:  [] NPO  [] Mechanical  [x] Tube feeds    LABS:                        9.1    9.28  )-----------( 213      ( 28 Apr 2023 05:13 )             27.8     04-28    146<H>  |  104  |  15  ----------------------------<  130<H>  3.8   |  31  |  0.47<L>    Ca    8.3<L>      28 Apr 2023 05:13  Phos  3.9     04-28  Mg     2.2     04-28    TPro  5.6<L>  /  Alb  3.3  /  TBili  0.4  /  DBili  x   /  AST  14  /  ALT  36  /  AlkPhos  100  04-28    PT/INR - ( 28 Apr 2023 05:13 )   PT: 12.9 sec;   INR: 1.08          PTT - ( 28 Apr 2023 05:13 )  PTT:31.6 sec        CAPILLARY BLOOD GLUCOSE          Drug Levels: [] N/A    CSF Analysis: [] N/A      Allergies    No Known Allergies    Intolerances      MEDICATIONS:  Antibiotics:    Neuro:  fentaNYL    Injectable 25 MICROGram(s) IV Push once  fentaNYL   Infusion 0.5 MICROgram(s)/kG/Hr IV Continuous <Continuous>  ondansetron Injectable 4 milliGRAM(s) IV Push every 6 hours PRN  oxyCODONE    IR 5 milliGRAM(s) Oral every 4 hours PRN    Anticoagulation:    OTHER:  acetylcysteine 20%  Inhalation 4 milliLiter(s) Inhalation every 6 hours  albuterol/ipratropium for Nebulization 3 milliLiter(s) Nebulizer every 6 hours  amLODIPine   Tablet 5 milliGRAM(s) Oral daily  atorvastatin 80 milliGRAM(s) Oral at bedtime  chlorhexidine 0.12% Liquid 15 milliLiter(s) Oral Mucosa every 12 hours  chlorhexidine 2% Cloths 1 Application(s) Topical <User Schedule>  doxazosin 8 milliGRAM(s) Oral at bedtime  pantoprazole  Injectable 40 milliGRAM(s) IV Push daily  polyethylene glycol 3350 17 Gram(s) Oral daily  senna 2 Tablet(s) Oral at bedtime  sodium chloride 3%  Inhalation 4 milliLiter(s) Inhalation every 6 hours    IVF:  sodium chloride 3 Gram(s) Oral every 6 hours  sodium chloride 0.9%. 1000 milliLiter(s) IV Continuous <Continuous>    CULTURES:  Culture Results:   Normal Respiratory Domi present (04-23 @ 10:14)  Culture Results:   No growth to date (04-22 @ 20:37)    RADIOLOGY & ADDITIONAL TESTS:  < from: CT Head No Cont (04.28.23 @ 11:41) >    Findings:    Again demonstrated status post bilateral suboccipital craniectomy. Stable   extra-axial fluid collection deep to craniectomy site. Right occipital   approach EVD in stable position. There is decreased size of the lateral   and third ventricles. Again demonstrated is intraventricular hemorrhage.   There is hemorrhage along the catheter tract which is not significantly   changed from prior exam.    No new intracranial hemorrhage is noted..    Continued evolution of bilateral cerebellar infarctions..    There is no acute calvarial fracture..    Nasogastric tube is partially visualized.    Impression:    Since prior CT head 4/25/2023, decreased size of the lateral and third   ventricles. Otherwise no significant change.    < end of copied text >      ASSESSMENT:  58 yo F with PMH of Asthma, CAD (not on meds), peripheral presented to [place] ED on 4/20 with right sided weakness and right facial numbness. Acute infarction within the right cerebellar hemisphere, also extending into the left superior cerebellar hemisphere. New mass effect on the fourth ventricle causing stenosis versus occlusion with new third and lateral ventricular dilatation indicating ventricular obstruction at the level of the fourth ventricle. New upward and downward herniation of cerebellar parenchyma. Pt transferred to [facility] for further management. NIHSS 12. Now s/p SOC foramen magnum decompression and right parietal/occipital EVD placement (4/21). Course c/b respiratory insuffiency d/t bulbar dysfunction, s/p trach 4/28/23.     STROKE    No pertinent family history in first degree relatives    Handoff    Asthma    CAD (coronary artery disease)    Peripheral neuropathy    Cerebellar stroke    Respiratory failure, unspecified with hypoxia    Cerebellar stroke    Respiratory failure, unspecified with hypoxia    Cerebellar infarct    CAD (coronary artery disease)    Asthma    Peripheral neuropathy    Lupus anticoagulant positive    Anemia due to acute blood loss    Decompressive craniectomy    Insertion, external ventricular drain    Planned tracheostomy    Tracheostomy tube change    S/P total abdominal hysterectomy    S/P cholecystectomy    S/P right knee surgery    [email or web address]        PLAN:  Neuro   - Vitals/neuro q1h   - Plan for DSA for B/L carotid aneurysms and new left vertebral stenosis with Dr. Minor this admission  - SG ELENA drain off suction, monitor output  - EVD@3cmH20; monitor ICP/output  - CTH 4/22 with increase right IVH, possibly redistribution. Stable vent size.   - Repeat CTH 4/24: stable, 4/25 stable   - stroke consulted, f/u recs  - pain control with tylenol prn, oxycodone prn  - analgesia: fentanyl gtt prn     Cardio  - -160  - TTE 4/24: negative for PFO, mild LVH, mild dilation of L atrium, EF 55-60%  - Amlodipine 5mg daily started 4/24    Pulm  - reintubated 4/25 on /40/16/8  - Chest PT, nebs q 6   - 6 cuffed trach placed by ENT 4/28, missing cuff, replaced trach at bedside.    GI  - NGT placed by ENT; started TF Glucerna @ 50, pending PEG placement   - Bowel regimen, last BM 4/28  - Protonix while intubated  - Trend LFTs q 72 hrs     Renal  - Na goal 140-145, salt tabs 3 q 6  - cardura 8mg at bedtime for urinary retention   - voiding via primafit     Endo   - no issues     Heme  - SCDs, SQL held for trach  - antixa 0.23 4/27  - s/p 3 units platelets, 35 mcg of DDAVP for ASA/Plavix reversal  - LE dopplers 4/22 neg for DVT, but showing superficial venous thrombosis in the proximal portion of the right greater saphenous vein; consider repeat doppler in 1 week  - Positive anticardiolipin Ab 4/27  - Repeat lupus panel pending     ID  - f/u panculture 4/22, NGTD    DISPO:  - NSICU, full code  - PT/OT rec acute inpatient rehab: patient can tolerate 3 hrs PT/OT daily    D/w Dr. Valadez and Dr. Lopez     Assessment:  Present when checked    []  GCS  E   V  M     Heart Failure: []Acute, [] acute on chronic , []chronic  Heart Failure:  [] Diastolic (HFpEF), [] Systolic (HFrEF), []Combined (HFpEF and HFrEF), [] RHF, [] Pulm HTN, [] Other    [] MAMTA, [] ATN, [] AIN, [] other  [] CKD1, [] CKD2, [] CKD 3, [] CKD 4, [] CKD 5, []ESRD    Encephalopathy: [] Metabolic, [] Hepatic, [] toxic, [] Neurological, [] Other    Abnormal Nurtitional Status: [] malnurtition (see nutrition note), [ ]underweight: BMI < 19, [] morbid obesity: BMI >40, [] Cachexia    [] Sepsis  [] hypovolemic shock,[] cardiogenic shock, [] hemorrhagic shock, [] neuogenic shock  [] Acute Respiratory Failure  []Cerebral edema, [] Brain compression/ herniation,   [] Functional quadriplegia  [] Acute blood loss anemia

## 2023-04-29 NOTE — PROGRESS NOTE ADULT - SUBJECTIVE AND OBJECTIVE BOX
SUBJECTIVE: Pt seen and examined at bedside this am by surgery team. Patient is lying comfortably in bed with no complaints. Denies pain. Patient with trach and dobhoff tube with feeds.   Fever to 101.3 today.     MEDICATIONS  (STANDING):  acetylcysteine 20%  Inhalation 4 milliLiter(s) Inhalation every 6 hours  albuterol/ipratropium for Nebulization 3 milliLiter(s) Nebulizer every 6 hours  amLODIPine   Tablet 5 milliGRAM(s) Oral daily  atorvastatin 80 milliGRAM(s) Oral at bedtime  chlorhexidine 0.12% Liquid 15 milliLiter(s) Oral Mucosa every 12 hours  chlorhexidine 2% Cloths 1 Application(s) Topical <User Schedule>  doxazosin 8 milliGRAM(s) Oral at bedtime  enoxaparin Injectable 40 milliGRAM(s) SubCutaneous every 24 hours  fentaNYL    Injectable 25 MICROGram(s) IV Push once  fentaNYL   Infusion 0.5 MICROgram(s)/kG/Hr (4.42 mL/Hr) IV Continuous <Continuous>  pantoprazole  Injectable 40 milliGRAM(s) IV Push daily  polyethylene glycol 3350 17 Gram(s) Oral daily  senna 2 Tablet(s) Oral at bedtime  sodium chloride 3 Gram(s) Oral every 6 hours  sodium chloride 3%  Inhalation 4 milliLiter(s) Inhalation every 6 hours    MEDICATIONS  (PRN):  acetaminophen   Oral Liquid .. 650 milliGRAM(s) Oral every 6 hours PRN Temp greater or equal to 38.5C (101.3F)  ondansetron Injectable 4 milliGRAM(s) IV Push every 6 hours PRN Nausea and/or Vomiting  oxyCODONE    IR 5 milliGRAM(s) Oral every 4 hours PRN Moderate Pain (4 - 6)      Vital Signs Last 24 Hrs  T(C): 38.2 (29 Apr 2023 23:00), Max: 38.5 (29 Apr 2023 21:00)  T(F): 100.8 (29 Apr 2023 23:00), Max: 101.3 (29 Apr 2023 21:00)  HR: 76 (29 Apr 2023 23:00) (66 - 100)  BP: 104/53 (29 Apr 2023 23:00) (101/50 - 158/70)  BP(mean): 75 (29 Apr 2023 23:00) (71 - 101)  RR: 16 (29 Apr 2023 23:00) (16 - 18)  SpO2: 95% (29 Apr 2023 23:00) (94% - 99%)    Parameters below as of 29 Apr 2023 23:00  Patient On (Oxygen Delivery Method): ventilator    O2 Concentration (%): 40    Physical Exam  General: Patient is doing well and lying in bed comfortably  Constitutional: awake and alert  Pulm: Nonlabored breathing, no respiratory distress; trach in place  CV: Regular rate and rhythm, normal sinus rhythm  Abd:  soft, nontender, nondistended. No rebound, no guarding.   Extremities: warm, well perfused, no edema  Drains: wildaoff    I&O's Detail    28 Apr 2023 07:01  -  29 Apr 2023 07:00  --------------------------------------------------------  IN:    Dexmedetomidine: 36 mL    Dexmedetomidine: 30 mL    Enteral Tube Flush: 270 mL    FentaNYL: 90.9 mL    Glucerna: 410 mL    IV PiggyBack: 100 mL    Propofol: 5.3 mL    sodium chloride 0.9%: 160 mL    sodium chloride 0.9%: 1040 mL  Total IN: 2142.2 mL    OUT:    Drain (mL): 40 mL    External Ventricular Device (mL): 238 mL    Norepinephrine: 0 mL    Voided (mL): 1675 mL  Total OUT: 1953 mL    Total NET: 189.2 mL      29 Apr 2023 07:01  -  29 Apr 2023 23:33  --------------------------------------------------------  IN:    Enteral Tube Flush: 560 mL    FentaNYL: 126.7 mL    Glucerna: 850 mL    IV PiggyBack: 100 mL  Total IN: 1636.7 mL    OUT:    Drain (mL): 75 mL    External Ventricular Device (mL): 96 mL    Voided (mL): 1100 mL  Total OUT: 1271 mL    Total NET: 365.7 mL        LABS:                        9.6    7.14  )-----------( 223      ( 29 Apr 2023 04:46 )             28.7     04-29    140  |  102  |  15  ----------------------------<  128<H>  3.5   |  29  |  0.39<L>    Ca    8.8      29 Apr 2023 04:46  Phos  3.1     04-29  Mg     1.9     04-29    TPro  5.6<L>  /  Alb  3.3  /  TBili  0.4  /  DBili  x   /  AST  14  /  ALT  36  /  AlkPhos  100  04-28    PT/INR - ( 28 Apr 2023 05:13 )   PT: 12.9 sec;   INR: 1.08          PTT - ( 28 Apr 2023 05:13 )  PTT:31.6 sec

## 2023-04-29 NOTE — CHART NOTE - NSCHARTNOTEFT_GEN_A_CORE
Incision cleaned and dressing changed. On fentanyl gtt. EKG completed due to increased frequency PVCs on telemetry, frequency of PVCs improved with titrating up fentanyl gtt. ABG drawn.  Repeat LE dopplers completed showing persistant superficial thrombus, no DVT. No EVD waveform during day, flushed distally without improvement. Exam unchanged.  EVD dropped to 0 for low output in afternoon.

## 2023-04-29 NOTE — PROGRESS NOTE ADULT - SUBJECTIVE AND OBJECTIVE BOX
=============================  NSCU ATTENDING PROGRESS NOTE  =============================    Patient is a 58 y/o F with history of asthma and peripheral neuropathy transferred to [facility] NSICU from [facility] for management of malignant posterior fossa stroke.  The patient developed symptoms at approximately 0830 on 4/20: dysconjugate gaze, ataxia, facial droop, dysarthria.  She did not present to [facility] until later on in the afternoon during which she was not a candidate for TNK.  CTP showing bilateral cerebellar perfusion mismatch, CTA showing R. V4 occlusion and large bilateral cavernous/terminal ICA aneurysms.  Patient was admitted to the ICU and monitored.  On 4/21 the patient became more lethargic, at which point a repeat CTH was performed, showing bilateral cerebellar strokes with lateral compression of the fourth ventricle on the right.  Patient accepted to [facility] by Dr. Valadez, at which point he was given 250cc 3% NaCl, started on 50cc/h, given 20mcg DDAVP and transferred as tier 1.     Upon arrival to [facility], NIHSS = 12, but mental status slowly declined from lethargy to near obtundation.  Repeat CTH showing stable mass effect and hydrocephalus. Patient taken emergently for SOC depression with EVD placement.    Patient reintubated due to poor clearance of secretions            ICU Vital Signs Last 24 Hrs  T(C): 37.2 (29 Apr 2023 05:44), Max: 37.6 (28 Apr 2023 09:37)  T(F): 99 (29 Apr 2023 05:44), Max: 99.7 (28 Apr 2023 09:37)  HR: 66 (29 Apr 2023 06:00) (50 - 81)  BP: 142/65 (29 Apr 2023 06:00) (106/52 - 155/70)  BP(mean): 93 (29 Apr 2023 06:00) (73 - 100)  ABP: --  ABP(mean): --  RR: 16 (29 Apr 2023 06:00) (11 - 24)  SpO2: 95% (29 Apr 2023 06:00) (94% - 100%)      04-28-23 @ 07:01  -  04-29-23 @ 07:00  --------------------------------------------------------  IN: 1962.2 mL / OUT: 1943 mL / NET: 19.2 mL        EXAM:   MS: Patient opens eyes to voice/ commands. Oriented x 3 by nodding  HEENT: ETT  CN: Pupils 3mm and brisk, tracks  Motor: Power 5/5 LUE, RUE 3/5  B/L lowers 4/5 - some effort dependence  Chest: Diminished breath sounds B/L  Heart regular rate/rhythm, present S1/S2, no murmurs or rubs  Abdomen: Soft and nontender   Extremities: No edema      Mode: AC/ CMV (Assist Control/ Continuous Mandatory Ventilation), RR (machine): 16, TV (machine): 400, FiO2: 40, PEEP: 8, ITime: 1, MAP: 13, PIP: 22  acetylcysteine 20%  Inhalation 4 milliLiter(s) Inhalation every 6 hours  albuterol/ipratropium for Nebulization 3 milliLiter(s) Nebulizer every 6 hours  amLODIPine   Tablet 5 milliGRAM(s) Oral daily  atorvastatin 80 milliGRAM(s) Oral at bedtime  chlorhexidine 0.12% Liquid 15 milliLiter(s) Oral Mucosa every 12 hours  chlorhexidine 2% Cloths 1 Application(s) Topical <User Schedule>  doxazosin 8 milliGRAM(s) Oral at bedtime  fentaNYL    Injectable 25 MICROGram(s) IV Push once  fentaNYL   Infusion 0.5 MICROgram(s)/kG/Hr (4.42 mL/Hr) IV Continuous <Continuous>  ondansetron Injectable 4 milliGRAM(s) IV Push every 6 hours PRN  oxyCODONE    IR 5 milliGRAM(s) Oral every 4 hours PRN  pantoprazole  Injectable 40 milliGRAM(s) IV Push daily  polyethylene glycol 3350 17 Gram(s) Oral daily  senna 2 Tablet(s) Oral at bedtime  sodium chloride 3 Gram(s) Oral every 6 hours  sodium chloride 3%  Inhalation 4 milliLiter(s) Inhalation every 6 hours                          9.6    7.14  )-----------( 223      ( 29 Apr 2023 04:46 )             28.7     04-29    140  |  102  |  15  ----------------------------<  128<H>  3.5   |  29  |  0.39<L>    Ca    8.8      29 Apr 2023 04:46  Phos  3.1     04-29  Mg     1.9     04-29    TPro  5.6<L>  /  Alb  3.3  /  TBili  0.4  /  DBili  x   /  AST  14  /  ALT  36  /  AlkPhos  100  04-28    LIVER FUNCTIONS - ( 28 Apr 2023 05:13 )  Alb: 3.3 g/dL / Pro: 5.6 g/dL / ALK PHOS: 100 U/L / ALT: 36 U/L / AST: 14 U/L / GGT: x                                      =============================  NSCU ATTENDING PROGRESS NOTE  =============================    Patient is a 58 y/o F with history of asthma and peripheral neuropathy transferred to [facility] NSICU from [facility] for management of malignant posterior fossa stroke.  The patient developed symptoms at approximately 0830 on 4/20: dysconjugate gaze, ataxia, facial droop, dysarthria.  She did not present to [facility] until later on in the afternoon during which she was not a candidate for TNK.  CTP showing bilateral cerebellar perfusion mismatch, CTA showing R. V4 occlusion and large bilateral cavernous/terminal ICA aneurysms.  Patient was admitted to the ICU and monitored.  On 4/21 the patient became more lethargic, at which point a repeat CTH was performed, showing bilateral cerebellar strokes with lateral compression of the fourth ventricle on the right.  Patient accepted to [facility] by Dr. Valadez, at which point he was given 250cc 3% NaCl, started on 50cc/h, given 20mcg DDAVP and transferred as tier 1.     Upon arrival to [facility], NIHSS = 12, but mental status slowly declined from lethargy to near obtundation.  Repeat CTH showing stable mass effect and hydrocephalus. Patient taken emergently for SOC depression with EVD placement.    Patient reintubated due to poor clearance of secretions      ICU Vital Signs Last 24 Hrs  T(C): 37.2 (29 Apr 2023 05:44), Max: 37.6 (28 Apr 2023 09:37)  T(F): 99 (29 Apr 2023 05:44), Max: 99.7 (28 Apr 2023 09:37)  HR: 66 (29 Apr 2023 06:00) (50 - 81)  BP: 142/65 (29 Apr 2023 06:00) (106/52 - 155/70)  BP(mean): 93 (29 Apr 2023 06:00) (73 - 100)  RR: 16 (29 Apr 2023 06:00) (11 - 24)  SpO2: 95% (29 Apr 2023 06:00) (94% - 100%)      04-28-23 @ 07:01  -  04-29-23 @ 07:00  --------------------------------------------------------  IN: 1962.2 mL / OUT: 1943 mL / NET: 19.2 mL      Mode: AC/ CMV (Assist Control/ Continuous Mandatory Ventilation), RR (machine): 16, TV (machine): 400, FiO2: 40, PEEP: 8, ITime: 1, MAP: 13, PIP: 22      EXAM:   MS: Patient opens eyes to voice/ commands. Oriented x 1-2 by nodding  HEENT: Trach to vent, NGT L nare  CN: Pupils 3mm and brisk, tracks  Motor: Power 5/5 LUE, RUE 3/5  B/L lowers 4/5   Chest: Diminished breath sounds B/L  Heart regular rate/rhythm, present S1/S2, no murmurs or rubs  Abdomen: Soft and nontender   Extremities: No edema      MEDICATIONS:   acetylcysteine 20%  Inhalation 4 milliLiter(s) Inhalation every 6 hours  albuterol/ipratropium for Nebulization 3 milliLiter(s) Nebulizer every 6 hours  amLODIPine   Tablet 5 milliGRAM(s) Oral daily  atorvastatin 80 milliGRAM(s) Oral at bedtime  chlorhexidine 0.12% Liquid 15 milliLiter(s) Oral Mucosa every 12 hours  chlorhexidine 2% Cloths 1 Application(s) Topical <User Schedule>  doxazosin 8 milliGRAM(s) Oral at bedtime  fentaNYL    Injectable 25 MICROGram(s) IV Push once  fentaNYL   Infusion 0.5 MICROgram(s)/kG/Hr (4.42 mL/Hr) IV Continuous <Continuous>  ondansetron Injectable 4 milliGRAM(s) IV Push every 6 hours PRN  oxyCODONE    IR 5 milliGRAM(s) Oral every 4 hours PRN  pantoprazole  Injectable 40 milliGRAM(s) IV Push daily  polyethylene glycol 3350 17 Gram(s) Oral daily  senna 2 Tablet(s) Oral at bedtime  sodium chloride 3 Gram(s) Oral every 6 hours  sodium chloride 3%  Inhalation 4 milliLiter(s) Inhalation every 6 hours    LABS:                      9.6    7.14  )-----------( 223      ( 29 Apr 2023 04:46 )             28.7     04-29    140  |  102  |  15  ----------------------------<  128<H>  3.5   |  29  |  0.39<L>    Ca    8.8      29 Apr 2023 04:46  Phos  3.1     04-29  Mg     1.9     04-29    TPro  5.6<L>  /  Alb  3.3  /  TBili  0.4  /  DBili  x   /  AST  14  /  ALT  36  /  AlkPhos  100  04-28    LIVER FUNCTIONS - ( 28 Apr 2023 05:13 )  Alb: 3.3 g/dL / Pro: 5.6 g/dL / ALK PHOS: 100 U/L / ALT: 36 U/L / AST: 14 U/L / GGT: x

## 2023-04-29 NOTE — PROGRESS NOTE ADULT - ASSESSMENT
ASSESSMENT:   Bilateral cerebellar hemispheric strokes  POD 8 S/P  SOC for foramen magnum decompression and R parietooccipital EVD  Bilateral carotid terminus aneurysms  R. V4 occlusion  History of peripheral neuropathy and asthma  Bulbar dysfunction. Respiratory insufficiency    PLAN:  NEURO: Q1h neurochecks  EVD at 3cmH2O.Monitor ICPs, CPP, output.   Post op CT head reviewed. CT 4/24, 25 stable.  SG ELENA- monitor output. Off suction.   Start ASA/plavix on 4/28 per NSGY (held as patient pending trach today). Continue Statin  Stroke core measures, stroke neurology following. Hypercoagulable and vasculitis w/u  Will need MRI brain w and w/o. Pending DSA likely   Sedation: Fentanyl gtt, Precedex. RASS 0- neg 1  Activity: PT/OT as tolerated.    PULM: Reintubated; secretions thick  ABG repeat, CXR clear  S/P trach on 4/28. Exchanged at bedside 4/28 as *  Continue pulm toilet  CPAP as tolerated    CARDIAC:   SBP goal 100-160  TTE w/ bubble: No PFO EF 55-60%, mild LVH.  On amlodipine    GI:  Diet: TFs   PPI as intubated  Bowel regimen LBM: 4/27  LFTs normalized; repeat 4/28  Gen surgery consult for PEG- likely 5/1    /RENAL: Urine retention  Na goal 140-145. Continue salt tabs  Monitor BMPs daily  Monitor Is/Os  Continue cardura  8mg    HEME: Rule out hypercoagulability; history of 2 miscarriages; rule out antiphospholipid Ab syndrome  Maintain Hb > 7.0, PLT > 100,000  SCDs, SQL Anti Xa 4/27 2:00am  LE dopplers: superficial venous thrombosis in proximal portion of right greater saphenous vein  Repeat dopplers 4/29  Follow up hypercoagulable profile: Anticardiolipin Ab positive.  Heme c/s pending    ID:  Monitor for infectious s/s, fever curve, leukocytosis  Post op Ancef per NSGY  Febrile 4/23. Cultures NGTD (UA, blood, CSF)    ENDO:   Glu goal 140-180mg/dL  ISS   A1c 5.9, LDL92, TSH    SOCIAL/FAMILY:  [] awaiting [x] updated at bedside [] family meeting    CODE STATUS:  [x] Full Code [] DNR [] DNI [] Palliative/Comfort Care    DISPOSITION:  [x] ICU [] Stroke Unit [] Floor [] EMU [] RCU [] PCU    Time spent: 60 critical care minutes     ASSESSMENT:   Bilateral cerebellar hemispheric strokes  POD 8 S/P  SOC for foramen magnum decompression and R parietooccipital EVD  Bilateral carotid terminus aneurysms  R. V4 occlusion  History of peripheral neuropathy and asthma  Bulbar dysfunction. Respiratory insufficiency    PLAN:  NEURO: Q1h neurochecks  EVD at 3cmH2O.Monitor ICPs, CPP, output.   Post op CT head reviewed. CT 4/24, 25 stable.  SG ELENA- monitor output. Off suction.   Start ASA after PEG (held as preop and with bloody secretions. Continue Statin  Stroke core measures, stroke neurology following. Hypercoagulable and vasculitis w/u  Will need MRI brain w and w/o. Pending DSA likely 5/2.  Sedation: Fentanyl gtt, Off precedex. RASS 0- neg 1  Activity: PT/OT as tolerated.    PULM: Trached  Exchanged at bedside 4/28.  Continue pulm toilet  ABG, CXR  CPAP as tolerated    CARDIAC:   SBP goal 100-160  TTE w/ bubble: No PFO EF 55-60%, mild LVH.  On amlodipine    GI:  Diet: TFs at goal  PPI as intubated  Bowel regimen LBM: 4/28  LFTs normalized;  Gen surgery consult for PEG- likely 5/1    /RENAL: Urine retention  Na goal 140-145. Continue salt tabs. Monitor BMPs daily  Monitor Is/Os  Continue cardura 8mg    HEME: Rule out hypercoagulability; history of 2 miscarriages; rule out antiphospholipid Ab syndrome  Maintain Hb > 7.0, PLT > 100,000  SCDs, SQL Anti Xa 4/27 2:00am  LE dopplers: superficial venous thrombosis in proximal portion of right greater saphenous vein  Repeat dopplers 4/29  Follow up hypercoagulable profile: Anticardiolipin Ab positive, lupus anticoagulant positive (repeat pending)  Heme following, appreciate recommendation  Anemia w/u    ID:  Monitor for infectious s/s, fever curve, leukocytosis  Febrile 4/23. Cultures NGTD (UA, blood, CSF). CXR clear.     ENDO:   Glu goal 140-180mg/dL  ISS   A1c 5.9, LDL92, TSH    SOCIAL/FAMILY:  [] awaiting [x] updated at bedside [] family meeting    CODE STATUS:  [x] Full Code [] DNR [] DNI [] Palliative/Comfort Care    DISPOSITION:  [x] ICU [] Stroke Unit [] Floor [] EMU [] RCU [] PCU    Time spent: 60 critical care minutes

## 2023-04-30 ENCOUNTER — TRANSCRIPTION ENCOUNTER (OUTPATIENT)
Age: 58
End: 2023-04-30

## 2023-04-30 LAB
ANION GAP SERPL CALC-SCNC: 7 MMOL/L — SIGNIFICANT CHANGE UP (ref 5–17)
APPEARANCE CSF: SIGNIFICANT CHANGE UP
APPEARANCE SPUN FLD: SIGNIFICANT CHANGE UP
APPEARANCE UR: ABNORMAL
APTT BLD: 33 SEC — SIGNIFICANT CHANGE UP (ref 27.5–35.5)
BACTERIA # UR AUTO: PRESENT /HPF
BILIRUB UR-MCNC: NEGATIVE — SIGNIFICANT CHANGE UP
BLD GP AB SCN SERPL QL: NEGATIVE — SIGNIFICANT CHANGE UP
BUN SERPL-MCNC: 18 MG/DL — SIGNIFICANT CHANGE UP (ref 7–23)
CALCIUM SERPL-MCNC: 7.8 MG/DL — LOW (ref 8.4–10.5)
CHLORIDE SERPL-SCNC: 103 MMOL/L — SIGNIFICANT CHANGE UP (ref 96–108)
CO2 SERPL-SCNC: 28 MMOL/L — SIGNIFICANT CHANGE UP (ref 22–31)
COLOR CSF: ABNORMAL
COLOR SPEC: YELLOW — SIGNIFICANT CHANGE UP
COMMENT - URINE: SIGNIFICANT CHANGE UP
CREAT SERPL-MCNC: 0.45 MG/DL — LOW (ref 0.5–1.3)
CSF COMMENTS: SIGNIFICANT CHANGE UP
DIFF PNL FLD: ABNORMAL
EGFR: 113 ML/MIN/1.73M2 — SIGNIFICANT CHANGE UP
EPI CELLS # UR: SIGNIFICANT CHANGE UP /HPF (ref 0–5)
FOLATE SERPL-MCNC: 12 NG/ML — SIGNIFICANT CHANGE UP
GLUCOSE CSF-MCNC: 89 MG/DL — HIGH (ref 40–70)
GLUCOSE SERPL-MCNC: 152 MG/DL — HIGH (ref 70–99)
GLUCOSE UR QL: NEGATIVE — SIGNIFICANT CHANGE UP
GRAM STN FLD: SIGNIFICANT CHANGE UP
GRAM STN FLD: SIGNIFICANT CHANGE UP
HCT VFR BLD CALC: 26.8 % — LOW (ref 34.5–45)
HGB BLD-MCNC: 8.8 G/DL — LOW (ref 11.5–15.5)
INR BLD: 1.11 — SIGNIFICANT CHANGE UP (ref 0.88–1.16)
KETONES UR-MCNC: NEGATIVE — SIGNIFICANT CHANGE UP
LEUKOCYTE ESTERASE UR-ACNC: ABNORMAL
LYMPHOCYTES # CSF: 1 % — LOW (ref 40–80)
MAGNESIUM SERPL-MCNC: 2.2 MG/DL — SIGNIFICANT CHANGE UP (ref 1.6–2.6)
MCHC RBC-ENTMCNC: 29.8 PG — SIGNIFICANT CHANGE UP (ref 27–34)
MCHC RBC-ENTMCNC: 32.8 GM/DL — SIGNIFICANT CHANGE UP (ref 32–36)
MCV RBC AUTO: 90.8 FL — SIGNIFICANT CHANGE UP (ref 80–100)
MRSA PCR RESULT.: NEGATIVE — SIGNIFICANT CHANGE UP
NEUTROPHILS # CSF: 0 % — SIGNIFICANT CHANGE UP (ref 0–6)
NITRITE UR-MCNC: POSITIVE
NRBC # BLD: 0 /100 WBCS — SIGNIFICANT CHANGE UP (ref 0–0)
NRBC NFR CSF: 1 /UL — SIGNIFICANT CHANGE UP (ref 0–5)
PH UR: 6.5 — SIGNIFICANT CHANGE UP (ref 5–8)
PHOSPHATE SERPL-MCNC: 3.7 MG/DL — SIGNIFICANT CHANGE UP (ref 2.5–4.5)
PLATELET # BLD AUTO: 213 K/UL — SIGNIFICANT CHANGE UP (ref 150–400)
POTASSIUM SERPL-MCNC: 3.5 MMOL/L — SIGNIFICANT CHANGE UP (ref 3.5–5.3)
POTASSIUM SERPL-SCNC: 3.5 MMOL/L — SIGNIFICANT CHANGE UP (ref 3.5–5.3)
PROCALCITONIN SERPL-MCNC: 0.03 NG/ML — SIGNIFICANT CHANGE UP (ref 0.02–0.1)
PROT CSF-MCNC: 21 MG/DL — SIGNIFICANT CHANGE UP (ref 15–45)
PROT UR-MCNC: ABNORMAL MG/DL
PROTHROM AB SERPL-ACNC: 13.2 SEC — SIGNIFICANT CHANGE UP (ref 10.5–13.4)
RBC # BLD: 2.95 M/UL — LOW (ref 3.8–5.2)
RBC # CSF: 3050 /UL — HIGH (ref 0–0)
RBC # FLD: 13 % — SIGNIFICANT CHANGE UP (ref 10.3–14.5)
RBC CASTS # UR COMP ASSIST: < 5 /HPF — SIGNIFICANT CHANGE UP
RH IG SCN BLD-IMP: POSITIVE — SIGNIFICANT CHANGE UP
S AUREUS DNA NOSE QL NAA+PROBE: NEGATIVE — SIGNIFICANT CHANGE UP
SODIUM SERPL-SCNC: 138 MMOL/L — SIGNIFICANT CHANGE UP (ref 135–145)
SP GR SPEC: 1.02 — SIGNIFICANT CHANGE UP (ref 1–1.03)
SPECIMEN SOURCE: SIGNIFICANT CHANGE UP
SPECIMEN SOURCE: SIGNIFICANT CHANGE UP
TUBE TYPE: SIGNIFICANT CHANGE UP
UROBILINOGEN FLD QL: 4 E.U./DL
VIT B12 SERPL-MCNC: 1403 PG/ML — HIGH (ref 232–1245)
WBC # BLD: 7.63 K/UL — SIGNIFICANT CHANGE UP (ref 3.8–10.5)
WBC # FLD AUTO: 7.63 K/UL — SIGNIFICANT CHANGE UP (ref 3.8–10.5)
WBC UR QL: ABNORMAL /HPF

## 2023-04-30 PROCEDURE — 99024 POSTOP FOLLOW-UP VISIT: CPT

## 2023-04-30 PROCEDURE — 99291 CRITICAL CARE FIRST HOUR: CPT

## 2023-04-30 RX ORDER — SODIUM CHLORIDE 9 MG/ML
500 INJECTION INTRAMUSCULAR; INTRAVENOUS; SUBCUTANEOUS ONCE
Refills: 0 | Status: COMPLETED | OUTPATIENT
Start: 2023-04-30 | End: 2023-04-30

## 2023-04-30 RX ORDER — PIPERACILLIN AND TAZOBACTAM 4; .5 G/20ML; G/20ML
4.5 INJECTION, POWDER, LYOPHILIZED, FOR SOLUTION INTRAVENOUS EVERY 8 HOURS
Refills: 0 | Status: DISCONTINUED | OUTPATIENT
Start: 2023-04-30 | End: 2023-05-01

## 2023-04-30 RX ORDER — POTASSIUM CHLORIDE 20 MEQ
40 PACKET (EA) ORAL ONCE
Refills: 0 | Status: COMPLETED | OUTPATIENT
Start: 2023-04-30 | End: 2023-04-30

## 2023-04-30 RX ORDER — PIPERACILLIN AND TAZOBACTAM 4; .5 G/20ML; G/20ML
4.5 INJECTION, POWDER, LYOPHILIZED, FOR SOLUTION INTRAVENOUS ONCE
Refills: 0 | Status: COMPLETED | OUTPATIENT
Start: 2023-04-30 | End: 2023-04-30

## 2023-04-30 RX ORDER — PIPERACILLIN AND TAZOBACTAM 4; .5 G/20ML; G/20ML
4.5 INJECTION, POWDER, LYOPHILIZED, FOR SOLUTION INTRAVENOUS EVERY 8 HOURS
Refills: 0 | Status: DISCONTINUED | OUTPATIENT
Start: 2023-04-30 | End: 2023-04-30

## 2023-04-30 RX ORDER — SODIUM CHLORIDE 9 MG/ML
1000 INJECTION INTRAMUSCULAR; INTRAVENOUS; SUBCUTANEOUS
Refills: 0 | Status: DISCONTINUED | OUTPATIENT
Start: 2023-04-30 | End: 2023-05-01

## 2023-04-30 RX ORDER — ACETAMINOPHEN 500 MG
650 TABLET ORAL EVERY 6 HOURS
Refills: 0 | Status: DISCONTINUED | OUTPATIENT
Start: 2023-04-30 | End: 2023-05-02

## 2023-04-30 RX ORDER — PIPERACILLIN AND TAZOBACTAM 4; .5 G/20ML; G/20ML
4.5 INJECTION, POWDER, LYOPHILIZED, FOR SOLUTION INTRAVENOUS EVERY 6 HOURS
Refills: 0 | Status: DISCONTINUED | OUTPATIENT
Start: 2023-04-30 | End: 2023-04-30

## 2023-04-30 RX ORDER — PIPERACILLIN AND TAZOBACTAM 4; .5 G/20ML; G/20ML
3.38 INJECTION, POWDER, LYOPHILIZED, FOR SOLUTION INTRAVENOUS EVERY 8 HOURS
Refills: 0 | Status: DISCONTINUED | OUTPATIENT
Start: 2023-04-30 | End: 2023-04-30

## 2023-04-30 RX ADMIN — SODIUM CHLORIDE 1000 MILLILITER(S): 9 INJECTION INTRAMUSCULAR; INTRAVENOUS; SUBCUTANEOUS at 02:08

## 2023-04-30 RX ADMIN — Medication 4 MILLILITER(S): at 17:11

## 2023-04-30 RX ADMIN — PANTOPRAZOLE SODIUM 40 MILLIGRAM(S): 20 TABLET, DELAYED RELEASE ORAL at 13:14

## 2023-04-30 RX ADMIN — SODIUM CHLORIDE 1000 MILLILITER(S): 9 INJECTION INTRAMUSCULAR; INTRAVENOUS; SUBCUTANEOUS at 00:18

## 2023-04-30 RX ADMIN — SODIUM CHLORIDE 4 MILLILITER(S): 9 INJECTION INTRAMUSCULAR; INTRAVENOUS; SUBCUTANEOUS at 22:29

## 2023-04-30 RX ADMIN — ENOXAPARIN SODIUM 40 MILLIGRAM(S): 100 INJECTION SUBCUTANEOUS at 21:25

## 2023-04-30 RX ADMIN — Medication 8 MILLIGRAM(S): at 21:26

## 2023-04-30 RX ADMIN — Medication 3 MILLILITER(S): at 11:48

## 2023-04-30 RX ADMIN — CHLORHEXIDINE GLUCONATE 15 MILLILITER(S): 213 SOLUTION TOPICAL at 06:27

## 2023-04-30 RX ADMIN — FENTANYL CITRATE 4.42 MICROGRAM(S)/KG/HR: 50 INJECTION INTRAVENOUS at 23:42

## 2023-04-30 RX ADMIN — CHLORHEXIDINE GLUCONATE 1 APPLICATION(S): 213 SOLUTION TOPICAL at 06:19

## 2023-04-30 RX ADMIN — PIPERACILLIN AND TAZOBACTAM 200 GRAM(S): 4; .5 INJECTION, POWDER, LYOPHILIZED, FOR SOLUTION INTRAVENOUS at 13:13

## 2023-04-30 RX ADMIN — SODIUM CHLORIDE 3 GRAM(S): 9 INJECTION INTRAMUSCULAR; INTRAVENOUS; SUBCUTANEOUS at 23:26

## 2023-04-30 RX ADMIN — PIPERACILLIN AND TAZOBACTAM 25 GRAM(S): 4; .5 INJECTION, POWDER, LYOPHILIZED, FOR SOLUTION INTRAVENOUS at 21:27

## 2023-04-30 RX ADMIN — Medication 3 MILLILITER(S): at 19:10

## 2023-04-30 RX ADMIN — Medication 3 MILLILITER(S): at 22:30

## 2023-04-30 RX ADMIN — CHLORHEXIDINE GLUCONATE 15 MILLILITER(S): 213 SOLUTION TOPICAL at 17:41

## 2023-04-30 RX ADMIN — SODIUM CHLORIDE 4 MILLILITER(S): 9 INJECTION INTRAMUSCULAR; INTRAVENOUS; SUBCUTANEOUS at 05:32

## 2023-04-30 RX ADMIN — Medication 4 MILLILITER(S): at 22:30

## 2023-04-30 RX ADMIN — SODIUM CHLORIDE 3 GRAM(S): 9 INJECTION INTRAMUSCULAR; INTRAVENOUS; SUBCUTANEOUS at 06:27

## 2023-04-30 RX ADMIN — SODIUM CHLORIDE 4 MILLILITER(S): 9 INJECTION INTRAMUSCULAR; INTRAVENOUS; SUBCUTANEOUS at 19:10

## 2023-04-30 RX ADMIN — PIPERACILLIN AND TAZOBACTAM 25 GRAM(S): 4; .5 INJECTION, POWDER, LYOPHILIZED, FOR SOLUTION INTRAVENOUS at 17:41

## 2023-04-30 RX ADMIN — AMLODIPINE BESYLATE 5 MILLIGRAM(S): 2.5 TABLET ORAL at 06:27

## 2023-04-30 RX ADMIN — Medication 4 MILLILITER(S): at 05:32

## 2023-04-30 RX ADMIN — Medication 4 MILLILITER(S): at 11:46

## 2023-04-30 RX ADMIN — SODIUM CHLORIDE 3 GRAM(S): 9 INJECTION INTRAMUSCULAR; INTRAVENOUS; SUBCUTANEOUS at 13:22

## 2023-04-30 RX ADMIN — Medication 40 MILLIEQUIVALENT(S): at 10:19

## 2023-04-30 RX ADMIN — Medication 3 MILLILITER(S): at 05:31

## 2023-04-30 RX ADMIN — SODIUM CHLORIDE 4 MILLILITER(S): 9 INJECTION INTRAMUSCULAR; INTRAVENOUS; SUBCUTANEOUS at 11:47

## 2023-04-30 RX ADMIN — SODIUM CHLORIDE 3 GRAM(S): 9 INJECTION INTRAMUSCULAR; INTRAVENOUS; SUBCUTANEOUS at 17:41

## 2023-04-30 RX ADMIN — ATORVASTATIN CALCIUM 80 MILLIGRAM(S): 80 TABLET, FILM COATED ORAL at 21:26

## 2023-04-30 RX ADMIN — SENNA PLUS 2 TABLET(S): 8.6 TABLET ORAL at 21:27

## 2023-04-30 NOTE — PROGRESS NOTE ADULT - SUBJECTIVE AND OBJECTIVE BOX
56yo F w Bilateral cerebellar hemispheric strokes sp SOC for foramen magnum decompression and R parietooccipital EVD, Bilateral carotid terminus aneurysms, History of peripheral neuropathy and asthma, Bulbar dysfunction. Respiratory insufficiency. Intubated and failed extubation, reintubated 4/25/23. Now s/p creation tracheostomy 4/28/23 c/b tracheostomy tube exchange POD0 due to damaged tracheostomy tube.    Interval 4/29: NAEON. Saturating well on vent with no cuff leak.  Interval 4/30: NAEON. Saturating well on vent with no cuff leak.     ROS:  Negative except above    PE:   ICU Vital Signs Last 24 Hrs  T(C): 37.6 (30 Apr 2023 09:09), Max: 38.5 (29 Apr 2023 21:00)  T(F): 99.7 (30 Apr 2023 09:09), Max: 101.3 (29 Apr 2023 21:00)  HR: 75 (30 Apr 2023 10:00) (66 - 100)  BP: 115/56 (30 Apr 2023 10:00) (77/38 - 158/70)  BP(mean): 81 (30 Apr 2023 10:00) (52 - 101)  ABP: --  ABP(mean): --  RR: 17 (30 Apr 2023 10:00) (16 - 23)  SpO2: 95% (30 Apr 2023 10:00) (94% - 99%)    O2 Parameters below as of 30 Apr 2023 10:00  Patient On (Oxygen Delivery Method): ventilator    O2 Concentration (%): 40        Gen: Sedated, intermittently responsive to questions  Eyes: Open spontaneously  Nose: Nares clear anteriorly  OC/OP: Dry mucosa, no lesions  Neck: [id number] tracheostomy in place, secured with 4-point suture and trach tie, barrier dressing in place, cuff up  Resp: Mechanically ventilated

## 2023-04-30 NOTE — PROGRESS NOTE ADULT - SUBJECTIVE AND OBJECTIVE BOX
HPI:  56 yo F with PMH of Asthma, CAD (not on  meds), peripheral neuropathy and PSH of BATOOL, cholecystectomy, right knee surgery presented to [facility] on 4/20 with right sided weakness and right facial numbness. Patient was undergoing preparation for colonoscopy. As per daughter at 8am patient was playing with her grandchildren but around 840 am patient was getting dressed and fell and subsequently felt weak after. Daughter reports that patient had some episodes of vomiting at this time. She had a syncopal episode at around 10 am and was witnessed by brother. No head trauma, She regained conscious after few minutes. She remained in bed for the remainder of the day. Daughter reports some slurred speech noted 1230-1PM and also was confused after. and around 4PM, the patient's sister noted right sided weakness at which time EMS was called and patient was brought to ED. No c/o chest pain, shortness of breath, fever, headache, abdominal pain, urinary complaints. No recent travel/sickness/ change in meds. Stroke code in ER: CTH neg for heme, Right PICA distribution acute infarction. CTA with saccular aneurysms of b/l carotids, approximately 0.8 cm on the right and 1.2 x 0.9 cm on the left. Possible tiny third saccular aneurysm on the right Posterior intracranialcirculation: Right vertebral arterial occlusion at its dural crossing junction V3 Atlantic and V4 intracranial segments with likely reversal of flow in its intracranial segment from the basilar. Right PICA faintly seen. 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  Not candidate for TNK/mechanical thrombectomy. CT scan repeated which showed: 1. Brain: Progression of acute infarction within the right cerebellar hemisphere, also extending into the left superior cerebellar hemisphere. New mass effect on the fourth ventricle causing stenosis versus occlusion with new third and lateral ventricular dilatation indicating ventricular obstruction at the level of the fourth ventricle. New upward and downward herniation of cerebellar parenchyma Right carotid system: No hemodynamically significant stenosis. Left carotid system: No hemodynamically significant stenosis. Vertebral circulation: Patent. Anterior intracranial circulation: Intact. Bilateral internal carotid saccular aneurysms. These findings are unchanged. Posterior intracranial circulation:    Improved flow within the right vertebral artery since 4/20/2023. New focal stenosis mid left vertebral artery, etiology uncertain, consider vasospasm and extrinsic compression in addition to new embolic disease. Brain perfusion: Perfusion images demonstrate normalization of the perfusion abnormality in the right cerebellar hemisphere present on 4/20/2023 despite evidence of progression of acute infarction.  Areas of apparent ischemia within the posterior fossa have progressed in extent, also again involving the left posterior cerebral arterial distribution. Tx to [facility] for SOC watch. On admission to [facility], NIHSS 12.       Hospital Course:   4/21: Admitted for crani watch, CTH complete w/ unchanged hydrocephalus, taken for emergent occipital craniectomy.   4/22: POD1. Remains intubated overnight, on propofol and dank. EVD@[id number]. Pending repeat CTH this am. Given hydralazine 10mg x1. Started on cardene for SBP high 140s-150s. Sub-therapeutic on plavix, therapeutic on asa, rpt asa accumetrics until subtherapeutic. LE dopplers neg for DVT, but showing superficial venous thrombosis in the proximal portion of the right greater saphenous vein. Started on 3% @60 for na goal 145-150. NGT placed by ENT. Febrile, pancultured.  4/23: POD 2. 3% increased to 75. NGT readjusted. UA neg. SBP liberalized to 160. Plan to extubate. 3% decreased to 60. Failed extubation d/t no cuff leak, given 60mg solumedrol, plan to extubate in 6 hrs. SCx1 for post void residual >400, started on cardura at bedtime. New blood in buretrol, stopped SQL. Clot in EVD cleared. Neuro exam improving.   4/24: POD 3. Cont 3% with NS while NPO, start TF today. TF started. LFTs downtrending. Sodium 141. Increased 3% to 50, NS to 30. Repeat BMP ordered for 5:30PM. Tramadol 25 for pain with no relief, oxy 5 and 1g tylenol ordered, stability CT stable, speech language consult for dysarthria. Given hydralazine 10mg IVP for SBP>160, started amlodipine 5mg. C/o mild headache, given fioricet x1, stroke neuro rec SALVADOR and loop recorder placement. Echo with bubble completed, negative for PFO, EF 55-60%. J HFNC started for desats with suctioning.   4/25: POD 4. Cont HFNC. Increased secertions on HFNC, reintubated. CXR confirmed good placement. EVD dropped to 5cmH20 for goal of draining 5-10cc/hr and SG ELENA drain taken off suction. Pending CTH. EVD drained 0cc/hr, dropped to 0. NGT replaced. Dc'd fioricet. Started on PPI for intubation. Increased cardura to 4mg for urinary retention, given an additional 2mg now. Started salt tabs 3 q 6. Given 12.5mg fentanyl x1. NGT replaced, CXR shown in lung. NGT removed, repeat CXR showing no pneumothorax. Pending ENT consult for NGT placement. CTH stable. NGT replaced by ENT, confirmed in proper spot. Restarted TF. [id number]. Na 141 from 146. Increased 3% to 60. EVD raised to 3cmH20. ETT pulled back 1cm by RT.  4/26: POD 5 SOC. Intubated, remains on precedex, ABG ordered with improving PaO2, BMP sodium 148, 3% changed to 30cc/hr, cardura increased to 8mg for tonight, tolerating cpap  4/27: POD 6 SOC. Respiratory rate sustained 6, returned to FVS. Na 151, dc'd 3%, f/u AM sodium. Nurse noticed ETT 19 at the lip and was 20 prior, CXR shows ETT @ 5cm above jono, ET tube advanced 1cm, repeat CXR confirms appropriate placement. Thick inline secretions with suctioning. ENT trach placement Fri likely, PEG likely Mon. Keep ELENA drain until no output, EVD to remain @3. Start ASA 81mg daily tomorrow.   4/28: POD7 SOC, neuro stable, given fentanyl x 1 overnight for presumed discomfort (biting on ETT), remains on full vent support. CTH today stable in comparison to 4/25. 6 cuffed trach placed by ENT in OR, but came down without cuff. Replaced at bedside by ENT. On fentanyl gtt.   4/29: POD8 SOC, JIM overnight. Incision cleaned and dressing changed. On fentanyl gtt. EKG completed due to increased frequency PVCs on telemetry, frequency of PVCs improved with titrating up fentanyl gtt. ABG drawn.  Repeat LE dopplers completed showing persistant superficial thrombus, no DVT. No EVD waveform during day, flushed distally without improvement. Exam unchanged.  EVD dropped to 0 for low output in afternoon.   4/30: POD9. Neuro stable, febrile to 101.3F, given tylenol and pan cx sent. SBP dipped to low 90s, HR stable in 70s with some PVCs, decreased fentanyl gtt and given 500cc bolus of NS. Pend PEG placement Mon and angio Tues. D/c ELENA drain today. F/u heme recs.         Vital Signs Last 24 Hrs  T(C): 37.3 (30 Apr 2023 01:13), Max: 38.5 (29 Apr 2023 21:00)  T(F): 99.1 (30 Apr 2023 01:13), Max: 101.3 (29 Apr 2023 21:00)  HR: 74 (30 Apr 2023 01:00) (66 - 100)  BP: 108/54 (30 Apr 2023 01:00) (77/38 - 158/70)  BP(mean): 76 (30 Apr 2023 01:00) (52 - 101)  RR: 17 (30 Apr 2023 01:00) (16 - 18)  SpO2: 99% (30 Apr 2023 01:00) (94% - 99%)    Parameters below as of 30 Apr 2023 01:00  Patient On (Oxygen Delivery Method): ventilator    O2 Concentration (%): 40    I&O's Detail    28 Apr 2023 07:01  -  29 Apr 2023 07:00  --------------------------------------------------------  IN:    Dexmedetomidine: 36 mL    Dexmedetomidine: 30 mL    Enteral Tube Flush: 270 mL    FentaNYL: 90.9 mL    Glucerna: 410 mL    IV PiggyBack: 100 mL    Propofol: 5.3 mL    sodium chloride 0.9%: 160 mL    sodium chloride 0.9%: 1040 mL  Total IN: 2142.2 mL    OUT:    Drain (mL): 40 mL    External Ventricular Device (mL): 238 mL    Norepinephrine: 0 mL    Voided (mL): 1675 mL  Total OUT: 1953 mL    Total NET: 189.2 mL      29 Apr 2023 07:01  -  30 Apr 2023 01:35  --------------------------------------------------------  IN:    Enteral Tube Flush: 560 mL    FentaNYL: 129 mL    Glucerna: 950 mL    IV PiggyBack: 100 mL    Sodium Chloride 0.9% Bolus: 500 mL  Total IN: 2239 mL    OUT:    Drain (mL): 75 mL    External Ventricular Device (mL): 103 mL    Voided (mL): 1100 mL  Total OUT: 1278 mL    Total NET: 961 mL        I&O's Summary    28 Apr 2023 07:01  -  29 Apr 2023 07:00  --------------------------------------------------------  IN: 2142.2 mL / OUT: 1953 mL / NET: 189.2 mL    29 Apr 2023 07:01  -  30 Apr 2023 01:35  --------------------------------------------------------  IN: 2239 mL / OUT: 1278 mL / NET: 961 mL        PHYSICAL EXAM:  General: NAD, pt is comfortably laying in bed, A&O x 1-2 with choice (nods head, examined in [place]ic), +trach to full vent   HEENT: OE spont and to voice, R gaze preference (does not cross midline), PERRL 2mm, attempts to track   Cardiovascular: RRR, normal S1 and S2 + PVCs   Respiratory: lungs CTAB, no wheezing, rhonchi, or crackles   GI: normoactive BS to auscultation, abd soft, NTND   Neuro: nods head Y/N to questions, moves all extremities spontaneously, PRAVEEN/LL 5/5, RU/RLE 3/5   Wound/incision: SOC incision site with sutures and dressing C/D/I   Drain: SG ELENA x1, EVD @0cmH2O, open     TUBES/LINES:  [] CVC  [] A-line  [] Lumbar Drain  [X] Ventriculostomy  [X] Other - wound drain     DIET:  [] NPO  [] Mechanical  [X] Tube feeds      LABS:  PT/INR - ( 28 Apr 2023 05:13 )   PT: 12.9 sec;   INR: 1.08          PTT - ( 28 Apr 2023 05:13 )  PTT:31.6 sec        CAPILLARY BLOOD GLUCOSE          Drug Levels: [] N/A    CSF Analysis: [] N/A      Allergies    No Known Allergies    Intolerances      MEDICATIONS:  Antibiotics:    Neuro:  acetaminophen   Oral Liquid .. 650 milliGRAM(s) Oral every 6 hours PRN  fentaNYL   Infusion 0.5 MICROgram(s)/kG/Hr IV Continuous <Continuous>  ondansetron Injectable 4 milliGRAM(s) IV Push every 6 hours PRN  oxyCODONE    IR 5 milliGRAM(s) Oral every 4 hours PRN    Anticoagulation:  enoxaparin Injectable 40 milliGRAM(s) SubCutaneous every 24 hours    OTHER:  acetylcysteine 20%  Inhalation 4 milliLiter(s) Inhalation every 6 hours  albuterol/ipratropium for Nebulization 3 milliLiter(s) Nebulizer every 6 hours  amLODIPine   Tablet 5 milliGRAM(s) Oral daily  atorvastatin 80 milliGRAM(s) Oral at bedtime  chlorhexidine 0.12% Liquid 15 milliLiter(s) Oral Mucosa every 12 hours  chlorhexidine 2% Cloths 1 Application(s) Topical <User Schedule>  doxazosin 8 milliGRAM(s) Oral at bedtime  pantoprazole  Injectable 40 milliGRAM(s) IV Push daily  polyethylene glycol 3350 17 Gram(s) Oral daily  senna 2 Tablet(s) Oral at bedtime  sodium chloride 3%  Inhalation 4 milliLiter(s) Inhalation every 6 hours    IVF:  sodium chloride 3 Gram(s) Oral every 6 hours  sodium chloride 0.9%. 1000 milliLiter(s) IV Continuous <Continuous>    CULTURES:  Culture Results:   Normal Respiratory Domi present (04-23 @ 10:14)  Culture Results:   No growth to date (04-22 @ 20:37)    RADIOLOGY & ADDITIONAL TESTS:      ASSESSMENT:  56 yo F with PMH of Asthma, CAD (not on meds), peripheral presented to [facility] ED on 4/20 with right sided weakness and right facial numbness. Acute infarction within the right cerebellar hemisphere, also extending into the left superior cerebellar hemisphere. New mass effect on the fourth ventricle causing stenosis versus occlusion with new third and lateral ventricular dilatation indicating ventricular obstruction at the level of the fourth ventricle. New upward and downward herniation of cerebellar parenchyma. Pt transferred to [facility] for further management. NIHSS 12. Now s/p SOC foramen magnum decompression and right parietal/occipital EVD placement (4/21). Course c/b respiratory insuffiency d/t bulbar dysfunction, s/p trach 4/28/23.       STROKE    No pertinent family history in first degree relatives    Handoff    Asthma    CAD (coronary artery disease)    Peripheral neuropathy    Cerebellar stroke    Respiratory failure, unspecified with hypoxia    Cerebellar stroke    Respiratory failure, unspecified with hypoxia    Cerebellar infarct    CAD (coronary artery disease)    Asthma    Peripheral neuropathy    Lupus anticoagulant positive    Anemia due to acute blood loss    Decompressive craniectomy    Insertion, external ventricular drain    Planned tracheostomy    Tracheostomy tube change    S/P total abdominal hysterectomy    S/P cholecystectomy    S/P right knee surgery    [email or web address]        PLAN:  Neuro   - Vitals/neuro q1h   - Plan for DSA for B/L carotid aneurysms and new left vertebral stenosis with Dr. Minor this admission  - SG ELENA drain off suction, monitor output  - EVD@0cmH20; monitor ICP/output  - CTH 4/22 with increase right IVH, possibly redistribution. Stable vent size.   - Repeat CTH 4/24: stable, 4/25 stable   - stroke consulted, f/u recs  - pain control with tylenol prn, oxycodone prn, fent gtt (weaning)     Cardio  - -160  - TTE 4/24: negative for PFO, mild LVH, mild dilation of L atrium, EF 55-60%  - Amlodipine 5mg daily started 4/24    Pulm  - reintubated 4/25 on /40/16/8  - Chest PT, nebs q 6   - 6 cuffed trach placed by ENT 4/28, missing cuff, replaced trach at bedside. --> ENT to remove trach sutures POD7 (5/5)    GI  - NGT placed by ENT; started TF Glucerna @ 50, pending PEG placement 5/1  - Bowel regimen, last BM 4/28  - Protonix while intubated  - Trend LFTs q 72 hrs     Renal  - Na goal 140-145, salt tabs 3 q 6  - cardura 8mg at bedtime for urinary retention   - voiding via primafit     Endo   - no issues     Heme  - SCDs, SQL 40mg QD (ppx) resumed 4/30  - antixa 0.23 4/27  - s/p 3 units platelets, 35 mcg of DDAVP for ASA/Plavix reversal  - LE dopplers 4/22 neg for DVT, but showing superficial venous thrombosis in the proximal portion of the right greater saphenous vein; repeat on 4/29 with persistant superficial thrombus   - Heme following for positive anticardiolipin Ab 4/27, recs appreciated   - Repeat lupus panel pending     ID  - f/u panculture 4/22, NGTD    DISPO:  - NSICU, full code  - PT/OT rec acute inpatient rehab: patient can tolerate 3 hrs PT/OT daily    D/w Dr. Valadez and Dr. Bueno

## 2023-04-30 NOTE — CHART NOTE - NSCHARTNOTEFT_GEN_A_CORE
Febrile to 101.3F o/n, given tylenol and pan cx sent. SBP dipped to low 90s o/n, HR stable in 70s with some PVCs, decreased fentanyl gtt and given 500cc bolus of NS x 2. Pend PEG placement Mon and angio Tues. D/c ELENA drain today. F/u heme recs. Positive UA. Infiltrates found on CXR. Started on Zosyn 4.5g Q6hrs.

## 2023-04-30 NOTE — PROGRESS NOTE ADULT - ASSESSMENT
58yo F w Bilateral cerebellar hemispheric strokes sp SOC for foramen magnum decompression and R parietooccipital EVD, Bilateral carotid terminus aneurysms, History of peripheral neuropathy and asthma, Bulbar dysfunction. Respiratory insufficiency. Intubated and failed extubation, reintubated 4/25/23. Now s/p creation tracheostomy 4/28/23 c/b tracheostomy tube exchange POD0 due to damaged tracheostomy tube.    - routine tracheostomy care: gentle suctioning PRN, exchange inner cannula qd, humidification of supplemental O2  - ENT to remove sutures and exchange tracheostomy POD7  - page ENT with questions/concerns

## 2023-04-30 NOTE — PROGRESS NOTE ADULT - ASSESSMENT
ASSESSMENT:   Bilateral cerebellar hemispheric strokes  POD 9 S/P  SOC for foramen magnum decompression and R parietooccipital EVD  Bilateral carotid terminus aneurysms  R. V4 occlusion  History of peripheral neuropathy and asthma  Bulbar dysfunction. Respiratory insufficiency    PLAN:  NEURO: Q1h neurochecks  EVD at 0cmH2O.Monitor ICPs, CPP, output.   Post op CT head reviewed. CT 4/24, 25 stable.  SG ELENA- monitor output. Off suction.   Start ASA after PEG (held as preop and with bloody secretions. Continue Statin  Stroke core measures, stroke neurology following. Hypercoagulable and vasculitis w/u  Will need MRI brain w and w/o. Pending DSA likely 5/2.  Sedation: Fentanyl gtt, Off precedex. RASS 0- neg 1  Activity: PT/OT as tolerated.    PULM: Trached  Exchanged at bedside 4/28.  Continue pulm toilet  ABG, CXR  CPAP as tolerated    CARDIAC:   SBP goal 100-160  TTE w/ bubble: No PFO EF 55-60%, mild LVH.  On amlodipine    GI:  Diet: TFs at goal; NPO at MN for PEG  PPI as intubated  Bowel regimen LBM: 4/28  Gen surgery consult for PEG- likely 5/1    /RENAL: Urine retention  Na goal 140-145. Continue salt tabs. Monitor BMPs daily  Monitor Is/Os  Continue cardura 8mg    HEME: Rule out hypercoagulability; history of 2 miscarriages; rule out antiphospholipid Ab syndrome  SCDs, SQL Anti Xa: 0.23  LE dopplers: superficial venous thrombosis in proximal portion of right greater saphenous vein  Repeat dopplers stable 4/29  Follow up hypercoagulable profile: Anticardiolipin Ab positive, lupus anticoagulant positive (repeat pending)**  Heme following, appreciate recommendation  Anemia w/u    ID: [id number]   UA 4/30   Cultures 4/23 NGTD (UA, blood, CSF). CXR clear.       ENDO:   Glu goal 140-180mg/dL  ISS   A1c 5.9, LDL 92, TSH    SOCIAL/FAMILY:  [] awaiting [x] updated at bedside [] family meeting    CODE STATUS:  [x] Full Code [] DNR [] DNI [] Palliative/Comfort Care    DISPOSITION:  [x] ICU [] Stroke Unit [] Floor [] EMU [] RCU [] PCU    Time spent: 60 critical care minutes     ASSESSMENT:   Bilateral cerebellar hemispheric strokes  POD 9 S/PSOC for foramen magnum decompression and R parietooccipital EVD  Bilateral carotid terminus aneurysms  R. V4 occlusion  History of peripheral neuropathy and asthma  Bulbar dysfunction. Respiratory insufficiency    PLAN:  NEURO: Q1h neurochecks  EVD at 0cmH2O. Monitor ICPs, CPP, output.   Post op CT head reviewed. CT 4/24, 25 stable.  SG ELENA- monitor output. Off suction. DC ELENA drain  Start ASA after PEG (held as preop and with blood tinged secretions) Continue Statin  Stroke core measures, stroke neurology following. Hypercoagulable and vasculitis w/u  Will need MRI brain w and w/o. Pending DSA likely 5/2.  Sedation: Fentanyl gtt. RASS 0- neg 1  Activity: PT/OT as tolerated.    PULM: Trached  Exchanged at bedside 4/28.  Continue pulm toilet  ABG, CXR  CPAP as tolerated  POCUS    CARDIAC:   SBP goal 100-160  TTE w/ bubble: No PFO EF 55-60%, mild LVH.  On amlodipine    GI:  Diet: TFs at goal; NPO at MN for PEG  PPI as intubated  Bowel regimen LBM: 4/30  Gen surgery consult for PEG- likely 5/1    /RENAL: Urine retention  Na goal 135-145. Continue salt tabs. Monitor BMPs daily  Monitor Is/Os. Has primafit  Continue cardura 8mg    HEME: Rule out hypercoagulability; history of 2 miscarriages; rule out antiphospholipid Ab syndrome  SCDs, SQL Anti Xa: 0.23  LE dopplers: superficial venous thrombosis in proximal portion of right greater saphenous vein  Repeat dopplers stable 4/29  Follow up hypercoagulable profile: Anticardiolipin Ab positive, lupus anticoagulant positive (repeat pending)  Heme following, appreciate recommendation  Anemia w/u    ID: Fevers   UA positive 4/30, CXR LLL infiltrate, sputum with WBCs and GPC in pairs   Start Zosyn empirically (possible HCAP)  Cultures 4/23 NGTD (UA, blood, CSF). CXR clear.  MRSA nares    ENDO:   Glu goal 140-180mg/dL  ISS   A1c 5.9, LDL 92, TSH    SOCIAL/FAMILY:  [] awaiting [x] updated at bedside [] family meeting    CODE STATUS:  [x] Full Code [] DNR [] DNI [] Palliative/Comfort Care    DISPOSITION:  [x] ICU [] Stroke Unit [] Floor [] EMU [] RCU [] PCU    Time spent: 60 critical care minutes     ASSESSMENT:   Bilateral cerebellar hemispheric strokes  POD 9 S/PSOC for foramen magnum decompression and R parietooccipital EVD  Bilateral carotid terminus aneurysms  R. V4 occlusion  History of peripheral neuropathy and asthma  Bulbar dysfunction. Respiratory insufficiency    PLAN:  NEURO: Q1h neurochecks  EVD at 0cmH2O. Monitor ICPs, CPP, output.   Post op CT head reviewed. CT 4/24, 25 stable.  SG ELENA- monitor output. Off suction. DC ELENA drain  Start ASA after PEG (held as preop and with blood tinged secretions) Continue Statin  Stroke core measures, stroke neurology following. Hypercoagulable and vasculitis w/u  Will need MRI brain w and w/o. Pending DSA likely 5/2.  Sedation: Fentanyl gtt. RASS 0- neg 1  Activity: PT/OT as tolerated.    PULM: Trached  Exchanged at bedside 4/28.  Continue pulm toilet  ABG, CXR  CPAP as tolerated  POCUS    CARDIAC:   SBP goal 100-160  TTE w/ bubble: No PFO EF 55-60%, mild LVH. ?SALVADOR/ILR  On amlodipine    GI:  Diet: TFs at goal; NPO at MN for PEG  PPI as intubated  Bowel regimen LBM: 4/30  Gen surgery consult for PEG- likely 5/1    /RENAL: Urine retention  Na goal 135-145. Continue salt tabs. Monitor BMPs daily  Monitor Is/Os. Has primafit  Continue cardura 8mg    HEME: Rule out hypercoagulability; history of 2 miscarriages; rule out antiphospholipid Ab syndrome  SCDs, SQL Anti Xa: 0.23  LE dopplers: superficial venous thrombosis in proximal portion of right greater saphenous vein  Repeat dopplers stable 4/29  Follow up hypercoagulable profile: Anticardiolipin Ab positive, lupus anticoagulant positive (repeat pending)  Heme following, appreciate recommendation  Anemia w/u    ID: Fevers   UA positive 4/30, CXR LLL infiltrate, sputum with WBCs and GPC in pairs   Start Zosyn empirically (possible HCAP)  Cultures 4/23 NGTD (UA, blood, CSF). CXR clear.  MRSA nares    ENDO:   Glu goal 140-180mg/dL  ISS   A1c 5.9, LDL 92, TSH    SOCIAL/FAMILY:  [] awaiting [x] updated at bedside [] family meeting    CODE STATUS:  [x] Full Code [] DNR [] DNI [] Palliative/Comfort Care    DISPOSITION:  [x] ICU [] Stroke Unit [] Floor [] EMU [] RCU [] PCU    Time spent: 60 critical care minutes

## 2023-04-30 NOTE — PROGRESS NOTE ADULT - ASSESSMENT
57 year old female w/ PMH of asthma, CAD (not on meds), HTN, prior miscarriages X3, peripheral neuropathy and PSH of BATOOL, cholecystectomy and right knee surgery presented to [facility] ED on 4/20 w/ right sided weakness and right facial numbness. Now s/p Trach with NGT feeding access. General Surgery consulted for placement of PEG feeding access. Afebrile and HDS. Abdomen soft with well healed incision. No abdominal imaging to review.     Recommendations:  - Plan for bedside PEG placement on Monday in ICU  - Plan discussed with family, consent placed in chart, all questions answered  - Preop on Sunday: Preop for OR: NPO after MN: hold tube feeds, AM CBC, BMP, Mg, Phos, Coag, TandSx2  - Surgery Team 4C will continue to follow. Please page Team 4 with questions/clinical changes. [phone]

## 2023-04-30 NOTE — PROCEDURE NOTE - ADDITIONAL PROCEDURE DETAILS
7cc CSF drawn from ventriculostomy in a sterile manner with out complication
Sites cleansed with chlorhexidine, two sets of blood cultures collected in sterile fashion via venipuncture x2. No complications, tolerated well.
Ultrasound guided
All necessary equipment and materials for sterile procedure were gathered. Procedure, pt name and  were all confirmed. EVD was clamped and CSF port was cleaned thoroughly with chlorhexidine prep. 7 cc of CSF were drained and placed into two collection cups in sterile fashion. Once complete, EVD was opened back up. Pt tolerated procedure well and there were no complications.
icu team at bedside maintained care of pt throughout intubation- resp at beside place pt on vent following intubation
Pt was informed on steps of procedure, location/patient name/ confirmed. Drain insertion site was visualized. Insertion site and surrounding area was cleaned with chlorhexidine, drain was taken off suction. ELENA drain suture was visualized and removed without difficulty. ELENA drain was removed without resistance, and pressure was held to insertion site with gauze. A single suture was placed spanning the drain insertion site. Incision and stapled drain site were dressed with tegaderm and gauze. Pt tolerated procedure well.

## 2023-04-30 NOTE — PROGRESS NOTE ADULT - SUBJECTIVE AND OBJECTIVE BOX
GENERAL SURGERY CONSULT NOTE    SUBJECTIVE:  Patient seen and examined by chief resident and team on AM rounds.     MEDICATIONS  (STANDING):  acetylcysteine 20%  Inhalation 4 milliLiter(s) Inhalation every 6 hours  albuterol/ipratropium for Nebulization 3 milliLiter(s) Nebulizer every 6 hours  amLODIPine   Tablet 5 milliGRAM(s) Oral daily  atorvastatin 80 milliGRAM(s) Oral at bedtime  chlorhexidine 0.12% Liquid 15 milliLiter(s) Oral Mucosa every 12 hours  chlorhexidine 2% Cloths 1 Application(s) Topical <User Schedule>  doxazosin 8 milliGRAM(s) Oral at bedtime  enoxaparin Injectable 40 milliGRAM(s) SubCutaneous every 24 hours  fentaNYL   Infusion 0.5 MICROgram(s)/kG/Hr (4.42 mL/Hr) IV Continuous <Continuous>  pantoprazole  Injectable 40 milliGRAM(s) IV Push daily  piperacillin/tazobactam IVPB.. 4.5 Gram(s) IV Intermittent every 8 hours  polyethylene glycol 3350 17 Gram(s) Oral daily  senna 2 Tablet(s) Oral at bedtime  sodium chloride 3 Gram(s) Oral every 6 hours  sodium chloride 0.9%. 1000 milliLiter(s) (75 mL/Hr) IV Continuous <Continuous>  sodium chloride 3%  Inhalation 4 milliLiter(s) Inhalation every 6 hours    MEDICATIONS  (PRN):  acetaminophen   Oral Liquid .. 650 milliGRAM(s) Oral every 6 hours PRN Temp greater or equal to 38.5C (101.3F)  ondansetron Injectable 4 milliGRAM(s) IV Push every 6 hours PRN Nausea and/or Vomiting  oxyCODONE    IR 5 milliGRAM(s) Oral every 4 hours PRN Moderate Pain (4 - 6)      Vital Signs Last 24 Hrs  T(C): 37.6 (2023 09:09), Max: 38.5 (2023 21:00)  T(F): 99.7 (2023 09:09), Max: 101.3 (2023 21:00)  HR: 79 (2023 11:00) (66 - 100)  BP: 117/56 (2023 11:00) (77/38 - 158/70)  BP(mean): 80 (2023 11:00) (52 - 101)  RR: 19 (2023 11:00) (16 - 23)  SpO2: 96% (2023 11:00) (95% - 99%)    Parameters below as of 2023 11:00  Patient On (Oxygen Delivery Method): ventilator    O2 Concentration (%): 40    PHYSICAL EXAM:  Constitutional: A&Ox3    Respiratory: non labored breathing, no respiratory distress    Cardiovascular: NSR, RRR    Gastrointestinal:                 Incision:    Genitourinary:    Extremities: (-) edema                  I&O's Detail    2023 07:01  -  2023 07:00  --------------------------------------------------------  IN:    Enteral Tube Flush: 620 mL    FentaNYL: 150 mL    Glucerna: 1200 mL    IV PiggyBack: 100 mL    Sodium Chloride 0.9% Bolus: 1000 mL  Total IN: 3070 mL    OUT:    Drain (mL): 155 mL    External Ventricular Device (mL): 132 mL    Intermittent Catheterization - Urethral (mL): 250 mL    Voided (mL): 1400 mL  Total OUT: 1937 mL    Total NET: 1133 mL      2023 07:01  -  2023 11:27  --------------------------------------------------------  IN:    Enteral Tube Flush: 60 mL    FentaNYL: 19.3 mL    Glucerna: 250 mL  Total IN: 329.3 mL    OUT:    Drain (mL): 50 mL    External Ventricular Device (mL): 18 mL  Total OUT: 68 mL    Total NET: 261.3 mL          LABS:                        8.8    7.63  )-----------( 213      ( 2023 04:55 )             26.8     30    138  |  103  |  18  ----------------------------<  152<H>  3.5   |  28  |  0.45<L>    Ca    7.8<L>      2023 04:55  Phos  3.7     04-30  Mg     2.2     04-30      PT/INR - ( 2023 04:55 )   PT: 13.2 sec;   INR: 1.11          PTT - ( 2023 04:55 )  PTT:33.0 sec  Urinalysis Basic - ( 2023 05:11 )    Color: Yellow / Appearance: Cloudy / S.025 / pH: x  Gluc: x / Ketone: NEGATIVE  / Bili: Negative / Urobili: 4.0 E.U./dL   Blood: x / Protein: Trace mg/dL / Nitrite: POSITIVE   Leuk Esterase: Moderate / RBC: < 5 /HPF / WBC Many /HPF   Sq Epi: x / Non Sq Epi: x / Bacteria: Present /HPF        RADIOLOGY & ADDITIONAL STUDIES: GENERAL SURGERY CONSULT NOTE    SUBJECTIVE:  Patient seen and examined by chief resident and team on AM rounds.     MEDICATIONS  (STANDING):  acetylcysteine 20%  Inhalation 4 milliLiter(s) Inhalation every 6 hours  albuterol/ipratropium for Nebulization 3 milliLiter(s) Nebulizer every 6 hours  amLODIPine   Tablet 5 milliGRAM(s) Oral daily  atorvastatin 80 milliGRAM(s) Oral at bedtime  chlorhexidine 0.12% Liquid 15 milliLiter(s) Oral Mucosa every 12 hours  chlorhexidine 2% Cloths 1 Application(s) Topical <User Schedule>  doxazosin 8 milliGRAM(s) Oral at bedtime  enoxaparin Injectable 40 milliGRAM(s) SubCutaneous every 24 hours  fentaNYL   Infusion 0.5 MICROgram(s)/kG/Hr (4.42 mL/Hr) IV Continuous <Continuous>  pantoprazole  Injectable 40 milliGRAM(s) IV Push daily  piperacillin/tazobactam IVPB.. 4.5 Gram(s) IV Intermittent every 8 hours  polyethylene glycol 3350 17 Gram(s) Oral daily  senna 2 Tablet(s) Oral at bedtime  sodium chloride 3 Gram(s) Oral every 6 hours  sodium chloride 0.9%. 1000 milliLiter(s) (75 mL/Hr) IV Continuous <Continuous>  sodium chloride 3%  Inhalation 4 milliLiter(s) Inhalation every 6 hours    MEDICATIONS  (PRN):  acetaminophen   Oral Liquid .. 650 milliGRAM(s) Oral every 6 hours PRN Temp greater or equal to 38.5C (101.3F)  ondansetron Injectable 4 milliGRAM(s) IV Push every 6 hours PRN Nausea and/or Vomiting  oxyCODONE    IR 5 milliGRAM(s) Oral every 4 hours PRN Moderate Pain (4 - 6)      Vital Signs Last 24 Hrs  T(C): 37.6 (2023 09:09), Max: 38.5 (2023 21:00)  T(F): 99.7 (2023 09:09), Max: 101.3 (2023 21:00)  HR: 79 (2023 11:00) (66 - 100)  BP: 117/56 (2023 11:00) (77/38 - 158/70)  BP(mean): 80 (2023 11:00) (52 - 101)  RR: 19 (2023 11:00) (16 - 23)  SpO2: 96% (2023 11:00) (95% - 99%)    Parameters below as of 2023 11:00  Patient On (Oxygen Delivery Method): ventilator    O2 Concentration (%): 40    PHYSICAL EXAM:  Constitutional: Patient opens eyes to voice/ commands. Oriented x 1-2 by nodding  HEENT: Trach to vent, NGT L nare  Respiratory: non labored breathing, no respiratory distress  Cardiovascular: NSR, RRR  Gastrointestinal: soft, NTND abdomen. no rebound or guarding.   Extremities: (-) edema          I&O's Detail    2023 07:01  -  2023 07:00  --------------------------------------------------------  IN:    Enteral Tube Flush: 620 mL    FentaNYL: 150 mL    Glucerna: 1200 mL    IV PiggyBack: 100 mL    Sodium Chloride 0.9% Bolus: 1000 mL  Total IN: 3070 mL    OUT:    Drain (mL): 155 mL    External Ventricular Device (mL): 132 mL    Intermittent Catheterization - Urethral (mL): 250 mL    Voided (mL): 1400 mL  Total OUT: 1937 mL    Total NET: 1133 mL      2023 07:01  -  2023 11:27  --------------------------------------------------------  IN:    Enteral Tube Flush: 60 mL    FentaNYL: 19.3 mL    Glucerna: 250 mL  Total IN: 329.3 mL    OUT:    Drain (mL): 50 mL    External Ventricular Device (mL): 18 mL  Total OUT: 68 mL    Total NET: 261.3 mL          LABS:                        8.8    7.63  )-----------( 213      ( 2023 04:55 )             26.8     04-30    138  |  103  |  18  ----------------------------<  152<H>  3.5   |  28  |  0.45<L>    Ca    7.8<L>      2023 04:55  Phos  3.7     04-  Mg     2.2     -30      PT/INR - ( 2023 04:55 )   PT: 13.2 sec;   INR: 1.11          PTT - ( 2023 04:55 )  PTT:33.0 sec  Urinalysis Basic - ( 2023 05:11 )    Color: Yellow / Appearance: Cloudy / S.025 / pH: x  Gluc: x / Ketone: NEGATIVE  / Bili: Negative / Urobili: 4.0 E.U./dL   Blood: x / Protein: Trace mg/dL / Nitrite: POSITIVE   Leuk Esterase: Moderate / RBC: < 5 /HPF / WBC Many /HPF   Sq Epi: x / Non Sq Epi: x / Bacteria: Present /HPF        RADIOLOGY & ADDITIONAL STUDIES: GENERAL SURGERY CONSULT NOTE    SUBJECTIVE:  Patient seen and examined by chief resident and team on AM rounds.     MEDICATIONS  (STANDING):  acetylcysteine 20%  Inhalation 4 milliLiter(s) Inhalation every 6 hours  albuterol/ipratropium for Nebulization 3 milliLiter(s) Nebulizer every 6 hours  amLODIPine   Tablet 5 milliGRAM(s) Oral daily  atorvastatin 80 milliGRAM(s) Oral at bedtime  chlorhexidine 0.12% Liquid 15 milliLiter(s) Oral Mucosa every 12 hours  chlorhexidine 2% Cloths 1 Application(s) Topical <User Schedule>  doxazosin 8 milliGRAM(s) Oral at bedtime  enoxaparin Injectable 40 milliGRAM(s) SubCutaneous every 24 hours  fentaNYL   Infusion 0.5 MICROgram(s)/kG/Hr (4.42 mL/Hr) IV Continuous <Continuous>  pantoprazole  Injectable 40 milliGRAM(s) IV Push daily  piperacillin/tazobactam IVPB.. 4.5 Gram(s) IV Intermittent every 8 hours  polyethylene glycol 3350 17 Gram(s) Oral daily  senna 2 Tablet(s) Oral at bedtime  sodium chloride 3 Gram(s) Oral every 6 hours  sodium chloride 0.9%. 1000 milliLiter(s) (75 mL/Hr) IV Continuous <Continuous>  sodium chloride 3%  Inhalation 4 milliLiter(s) Inhalation every 6 hours    MEDICATIONS  (PRN):  acetaminophen   Oral Liquid .. 650 milliGRAM(s) Oral every 6 hours PRN Temp greater or equal to 38.5C (101.3F)  ondansetron Injectable 4 milliGRAM(s) IV Push every 6 hours PRN Nausea and/or Vomiting  oxyCODONE    IR 5 milliGRAM(s) Oral every 4 hours PRN Moderate Pain (4 - 6)      Vital Signs Last 24 Hrs  T(C): 37.6 (2023 09:09), Max: 38.5 (2023 21:00)  T(F): 99.7 (2023 09:09), Max: 101.3 (2023 21:00)  HR: 79 (2023 11:00) (66 - 100)  BP: 117/56 (2023 11:00) (77/38 - 158/70)  BP(mean): 80 (2023 11:00) (52 - 101)  RR: 19 (2023 11:00) (16 - 23)  SpO2: 96% (2023 11:00) (95% - 99%)    Parameters below as of 2023 11:00  Patient On (Oxygen Delivery Method): ventilator    O2 Concentration (%): 40    PHYSICAL EXAM:  Constitutional: Patient opens eyes to voice.  HEENT: Tracheostomy to vent, NGT in left nostril  Respiratory: non labored breathing, no respiratory distress  Cardiovascular: NSR, RRR  Gastrointestinal: soft, NTND abdomen. no rebound or guarding.   Extremities: (-) edema          I&O's Detail    2023 07:01  -  2023 07:00  --------------------------------------------------------  IN:    Enteral Tube Flush: 620 mL    FentaNYL: 150 mL    Glucerna: 1200 mL    IV PiggyBack: 100 mL    Sodium Chloride 0.9% Bolus: 1000 mL  Total IN: 3070 mL    OUT:    Drain (mL): 155 mL    External Ventricular Device (mL): 132 mL    Intermittent Catheterization - Urethral (mL): 250 mL    Voided (mL): 1400 mL  Total OUT: 1937 mL    Total NET: 1133 mL      2023 07:01  -  2023 11:27  --------------------------------------------------------  IN:    Enteral Tube Flush: 60 mL    FentaNYL: 19.3 mL    Glucerna: 250 mL  Total IN: 329.3 mL    OUT:    Drain (mL): 50 mL    External Ventricular Device (mL): 18 mL  Total OUT: 68 mL    Total NET: 261.3 mL          LABS:                        8.8    7.63  )-----------( 213      ( 2023 04:55 )             26.8     04-30    138  |  103  |  18  ----------------------------<  152<H>  3.5   |  28  |  0.45<L>    Ca    7.8<L>      2023 04:55  Phos  3.7     04-30  Mg     2.2     04-30      PT/INR - ( 2023 04:55 )   PT: 13.2 sec;   INR: 1.11          PTT - ( 2023 04:55 )  PTT:33.0 sec  Urinalysis Basic - ( 2023 05:11 )    Color: Yellow / Appearance: Cloudy / S.025 / pH: x  Gluc: x / Ketone: NEGATIVE  / Bili: Negative / Urobili: 4.0 E.U./dL   Blood: x / Protein: Trace mg/dL / Nitrite: POSITIVE   Leuk Esterase: Moderate / RBC: < 5 /HPF / WBC Many /HPF   Sq Epi: x / Non Sq Epi: x / Bacteria: Present /HPF        RADIOLOGY & ADDITIONAL STUDIES:

## 2023-04-30 NOTE — PROGRESS NOTE ADULT - SUBJECTIVE AND OBJECTIVE BOX
=============================  NSCU ATTENDING PROGRESS NOTE  =============================    HPI:  58 yo F with PMH of Asthma, CAD (not on  meds), peripheral neuropathy and PSH of BATOOL, cholecystectomy, right knee surgery. She presented to [place] ED on 4/20 with right sided weakness and right facial numbness. Patient was undergoing preparation for colonoscopy. As per daughter at 8am patient was playing with her grandchildren but around 840 am patient was getting dressed and fell and subsequently felt weak after. Daughter reports that patient had some episodes of vomiting at this time. She had a syncopal episode at around 10 am and was witnessed by brother. No head trauma, She regained conscious after few minutes. She remained in bed for the remainder of the day. Daughter reports some slurred speech noted 1230-1PM and also was confused after. and around 4PM, the patient's sister noted right sided weakness at which time EMS was called and patient was brought to ED. No c/o chest pain, shortness of breath, fever, headache, abdominal pain, urinary complaints. No recent travel/sickness/ change in meds. Stroke code in ER: CTH neg for heme, Right PICA distribution acute infarction. CTA with saccular aneurysms of b/l carotids, approximately 0.8 cm on the right and 1.2 x 0.9 cm on the left. Possible tiny third saccular aneurysm on the right Posterior intracranial circulation: Right vertebral arterial occlusion at its dural crossing junction V3 Atlantic and V4 intracranial segments with likely reversal of flow in its intracranial segment from the basilar. Right PICA faintly seen. 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  Not candidate for TNK/mechanical thrombectomy. CT scan repeated which showed: 1. Brain: Progression of acute infarction within the right cerebellar hemisphere, also extending into the left superior cerebellar hemisphere. New mass effect on the fourth ventricle causing stenosis versus occlusion with new third and lateral ventricular dilatation indicating ventricular obstruction at the level of the fourth ventricle. New upward and downward herniation of cerebellar parenchyma Right carotid system: No hemodynamically significant stenosis. Left carotid system: No hemodynamically significant stenosis. Vertebral circulation: Patent. Anterior intracranial circulation: Intact. Bilateral internal carotid saccular aneurysms. These findings are unchanged. Posterior intracranial circulation:    Improved flow within the right vertebral artery since 4/20/2023. New focal stenosis mid left vertebral artery, etiology uncertain, consider vasospasm and extrinsic compression in addition to new embolic disease. Brain perfusion: Perfusion images demonstrate normalization of the perfusion abnormality in the right cerebellar hemisphere present on 4/20/2023 despite evidence of progression of acute infarction.  Areas of apparent ischemia within the posterior fossa have progressed in extent, also again involving the left posterior cerebral arterial distribution. Tx to [facility] for SOC watch.   Patient given 250cc 3% NaCl, started on 3% 50cc/h, given 20mcg DDAVP and transferred as tier 1.     On admission to [facility], NIHSS 12.  Upon arrival to [facility], mental status slowly declined from lethargy to near obtundation.  Repeat CT head showed stable mass effect and hydrocephalus. Patient taken emergently for SOC depression with EVD placement.    Patient reintubated due to poor clearance of secretions.  (21 Apr 2023 16:50)      On admission, the patient was:  GCS:  Noe-Norman:  modified Michelle:  ICH score:  NIHSS: 12      ICU Vital Signs Last 24 Hrs  T(C): 37.3 (30 Apr 2023 05:49), Max: 38.5 (29 Apr 2023 21:00)  T(F): 99.1 (30 Apr 2023 05:49), Max: 101.3 (29 Apr 2023 21:00)  HR: 90 (30 Apr 2023 07:00) (66 - 100)  BP: 126/61 (30 Apr 2023 07:00) (77/38 - 158/70)  BP(mean): 88 (30 Apr 2023 07:00) (52 - 101)  RR: 16 (30 Apr 2023 07:00) (16 - 23)  SpO2: 98% (30 Apr 2023 07:00) (94% - 99%)      04-29-23 @ 07:01  -  04-30-23 @ 07:00  --------------------------------------------------------  IN: 3070 mL / OUT: 1837 mL / NET: 1233 mL    04-30-23 @ 07:01  -  04-30-23 @ 07:11  --------------------------------------------------------  IN: 54.4 mL / OUT: 0 mL / NET: 54.4 mL    Mode: AC/ CMV (Assist Control/ Continuous Mandatory Ventilation), RR (machine): 16, TV (machine): 400, FiO2: 40, PEEP: 8, ITime: 1, MAP: 12, PIP: 25        EXAM:   MS: Patient opens eyes to voice/ commands. Oriented x 1-2 by nodding  HEENT: Trach to vent, NGT L nare  CN: Pupils 3mm and brisk, tracks  Motor: Power 5/5 LUE, RUE 3/5  B/L lowers 4/5   Chest: Diminished breath sounds B/L  Heart regular rate/rhythm, present S1/S2, no murmurs or rubs  Abdomen: Soft and nontender   Extremities: No edema    MEDICATIONS:   acetaminophen   Oral Liquid .. 650 milliGRAM(s) Oral every 6 hours PRN  acetylcysteine 20%  Inhalation 4 milliLiter(s) Inhalation every 6 hours  albuterol/ipratropium for Nebulization 3 milliLiter(s) Nebulizer every 6 hours  amLODIPine   Tablet 5 milliGRAM(s) Oral daily  atorvastatin 80 milliGRAM(s) Oral at bedtime  chlorhexidine 0.12% Liquid 15 milliLiter(s) Oral Mucosa every 12 hours  chlorhexidine 2% Cloths 1 Application(s) Topical <User Schedule>  doxazosin 8 milliGRAM(s) Oral at bedtime  enoxaparin Injectable 40 milliGRAM(s) SubCutaneous every 24 hours  fentaNYL   Infusion 0.5 MICROgram(s)/kG/Hr (4.42 mL/Hr) IV Continuous <Continuous>  ondansetron Injectable 4 milliGRAM(s) IV Push every 6 hours PRN  oxyCODONE    IR 5 milliGRAM(s) Oral every 4 hours PRN  pantoprazole  Injectable 40 milliGRAM(s) IV Push daily  polyethylene glycol 3350 17 Gram(s) Oral daily  senna 2 Tablet(s) Oral at bedtime  sodium chloride 3 Gram(s) Oral every 6 hours  sodium chloride 0.9%. 1000 milliLiter(s) (75 mL/Hr) IV Continuous <Continuous>  sodium chloride 3%  Inhalation 4 milliLiter(s) Inhalation every 6 hours    LABS:                      8.8    7.63  )-----------( 213      ( 30 Apr 2023 04:55 )             26.8     04-30    138  |  103  |  18  ----------------------------<  152<H>  3.5   |  28  |  0.45<L>    Ca    7.8<L>      30 Apr 2023 04:55  Phos  3.7     04-30  Mg     2.2     04-30        ABG - ( 29 Apr 2023 13:14 )  pH, Arterial: 7.45  pH, Blood: x     /  pCO2: 41    /  pO2: 97    / HCO3: 28    / Base Excess: 4.1   /  SaO2: 99.6                                   =============================  NSCU ATTENDING PROGRESS NOTE  =============================    HPI:  58 y/o F with PMH of Asthma, CAD (not on  meds), peripheral neuropathy and PSH of BATOOL, cholecystectomy, right knee surgery. She presented to [place] ED on 4/20 with right sided weakness and right facial numbness. Patient was undergoing preparation for colonoscopy. As per daughter at 8am patient was playing with her grandchildren but around 840 am patient was getting dressed and fell and subsequently felt weak after. Daughter reports that patient had some episodes of vomiting at this time. She had a syncopal episode at around 10 am and was witnessed by brother. No head trauma, She regained conscious after few minutes. She remained in bed for the remainder of the day. Daughter reports some slurred speech noted 1230-1PM and also was confused after. and around 4PM, the patient's sister noted right sided weakness at which time EMS was called and patient was brought to ED. No c/o chest pain, shortness of breath, fever, headache, abdominal pain, urinary complaints. No recent travel/sickness/ change in meds. Stroke code in ER: CTH neg for heme, Right PICA distribution acute infarction. CTA with saccular aneurysms of b/l carotids, approximately 0.8 cm on the right and 1.2 x 0.9 cm on the left. Possible tiny third saccular aneurysm on the right Posterior intracranial circulation: Right vertebral arterial occlusion at its dural crossing junction V3 Atlantic and V4 intracranial segments with likely reversal of flow in its intracranial segment from the basilar. Right PICA faintly seen. Brain perfusion: Acute infarction of the right posterior medial cerebellum within the right PICA distribution.  Not candidate for TNK/mechanical thrombectomy. CT scan repeated which showed: 1. Brain: Progression of acute infarction within the right cerebellar hemisphere, also extending into the left superior cerebellar hemisphere. New mass effect on the fourth ventricle causing stenosis versus occlusion with new third and lateral ventricular dilatation indicating ventricular obstruction at the level of the fourth ventricle. New upward and downward herniation of cerebellar parenchyma Right carotid system: No hemodynamically significant stenosis. Left carotid system: No hemodynamically significant stenosis. Vertebral circulation: Patent. Anterior intracranial circulation: Intact. Bilateral internal carotid saccular aneurysms. Posterior intracranial circulation:    Improved flow within the right vertebral artery since 4/20/2023. New focal stenosis mid left vertebral artery, etiology uncertain, consider vasospasm and extrinsic compression in addition to new embolic disease. Brain perfusion: Perfusion images demonstrate normalization of the perfusion abnormality in the right cerebellar hemisphere present on 4/20/2023 despite evidence of progression of acute infarction.  Areas of apparent ischemia within the posterior fossa have progressed in extent, also again involving the left posterior cerebral arterial distribution. Tx to [facility] for SOC watch.   Patient given 250cc 3% NaCl, started on 3% 50cc/h, given 20mcg DDAVP and transferred as tier 1.     On admission to [facility], NIHSS 12.  Upon arrival to [facility], mental status slowly declined from lethargy to near obtundation.  Repeat CT head showed stable mass effect and hydrocephalus. Patient taken emergently for SOC depression with EVD placement.    Patient reintubated due to poor clearance of secretions.  (21 Apr 2023 16:50)      On admission, the patient was:  GCS:  Noe-Norman:  modified Michelle:  ICH score:  NIHSS: 12    ICU Vital Signs Last 24 Hrs  T(C): 37.3 (30 Apr 2023 05:49), Max: 38.5 (29 Apr 2023 21:00)  T(F): 99.1 (30 Apr 2023 05:49), Max: 101.3 (29 Apr 2023 21:00)  HR: 90 (30 Apr 2023 07:00) (66 - 100)  BP: 126/61 (30 Apr 2023 07:00) (77/38 - 158/70)  BP(mean): 88 (30 Apr 2023 07:00) (52 - 101)  RR: 16 (30 Apr 2023 07:00) (16 - 23)  SpO2: 98% (30 Apr 2023 07:00) (94% - 99%)      04-29-23 @ 07:01  -  04-30-23 @ 07:00  --------------------------------------------------------  IN: 3070 mL / OUT: 1837 mL / NET: 1233 mL    04-30-23 @ 07:01  -  04-30-23 @ 07:11  --------------------------------------------------------  IN: 54.4 mL / OUT: 0 mL / NET: 54.4 mL    Mode: AC/ CMV (Assist Control/ Continuous Mandatory Ventilation), RR (machine): 16, TV (machine): 400, FiO2: 40, PEEP: 8, ITime: 1, MAP: 12, PIP: 25        EXAM:   MS: Patient opens eyes to voice/ commands. Oriented x 1-2 by nodding  HEENT: Trach to vent, NGT L nare  CN: Pupils 3mm and brisk, tracks  Motor: Power 5/5 LUE, RUE 3/5  B/L lowers 4/5   Chest: Diminished breath sounds B/L  Heart regular rate/rhythm, present S1/S2, no murmurs or rubs  Abdomen: Soft and nontender   Extremities: No edema    MEDICATIONS:   acetaminophen   Oral Liquid .. 650 milliGRAM(s) Oral every 6 hours PRN  acetylcysteine 20%  Inhalation 4 milliLiter(s) Inhalation every 6 hours  albuterol/ipratropium for Nebulization 3 milliLiter(s) Nebulizer every 6 hours  amLODIPine   Tablet 5 milliGRAM(s) Oral daily  atorvastatin 80 milliGRAM(s) Oral at bedtime  chlorhexidine 0.12% Liquid 15 milliLiter(s) Oral Mucosa every 12 hours  chlorhexidine 2% Cloths 1 Application(s) Topical <User Schedule>  doxazosin 8 milliGRAM(s) Oral at bedtime  enoxaparin Injectable 40 milliGRAM(s) SubCutaneous every 24 hours  fentaNYL   Infusion 0.5 MICROgram(s)/kG/Hr (4.42 mL/Hr) IV Continuous <Continuous>  ondansetron Injectable 4 milliGRAM(s) IV Push every 6 hours PRN  oxyCODONE    IR 5 milliGRAM(s) Oral every 4 hours PRN  pantoprazole  Injectable 40 milliGRAM(s) IV Push daily  polyethylene glycol 3350 17 Gram(s) Oral daily  senna 2 Tablet(s) Oral at bedtime  sodium chloride 3 Gram(s) Oral every 6 hours  sodium chloride 0.9%. 1000 milliLiter(s) (75 mL/Hr) IV Continuous <Continuous>  sodium chloride 3%  Inhalation 4 milliLiter(s) Inhalation every 6 hours    LABS:                      8.8    7.63  )-----------( 213      ( 30 Apr 2023 04:55 )             26.8     04-30    138  |  103  |  18  ----------------------------<  152<H>  3.5   |  28  |  0.45<L>    Ca    7.8<L>      30 Apr 2023 04:55  Phos  3.7     04-30  Mg     2.2     04-30        ABG - ( 29 Apr 2023 13:14 )  pH, Arterial: 7.45  pH, Blood: x     /  pCO2: 41    /  pO2: 97    / HCO3: 28    / Base Excess: 4.1   /  SaO2: 99.6                                   =============================  NSCU ATTENDING PROGRESS NOTE  =============================    HPI:  56 y/o F with PMH of Asthma, CAD (not on  meds), peripheral neuropathy and PSH of BATOOL, cholecystectomy, right knee surgery. She presented to [place] ED on 4/20 with right sided weakness and right facial numbness. Patient was undergoing preparation for colonoscopy. As per daughter at 8am patient was playing with her grandchildren but around 840 am patient was getting dressed and fell and subsequently felt weak after. Daughter reports that patient had some episodes of vomiting at this time. She had a syncopal episode at around 10 am and was witnessed by brother. No head trauma, She regained conscious after few minutes. She remained in bed for the remainder of the day. Daughter reports some slurred speech noted 1230-1PM and also was confused after. and around 4PM, the patient's sister noted right sided weakness at which time EMS was called and patient was brought to ED. No c/o chest pain, shortness of breath, fever, headache, abdominal pain, urinary complaints. No recent travel/sickness/ change in meds. Stroke code in ER: CTH neg for heme, Right PICA distribution acute infarction. CTA with saccular aneurysms of b/l carotids, approximately 0.8 cm on the right and 1.2 x 0.9 cm on the left. Possible tiny third saccular aneurysm on the right Posterior intracranial circulation: Right vertebral arterial occlusion at its dural crossing junction V3 Atlantic and V4 intracranial segments with likely reversal of flow in its intracranial segment from the basilar. Right PICA faintly seen. Brain perfusion: Acute infarction of the right posterior medial cerebellum within the right PICA distribution.  Not candidate for TNK/mechanical thrombectomy. CT scan repeated which showed: 1. Brain: Progression of acute infarction within the right cerebellar hemisphere, also extending into the left superior cerebellar hemisphere. New mass effect on the fourth ventricle causing stenosis versus occlusion with new third and lateral ventricular dilatation indicating ventricular obstruction at the level of the fourth ventricle. New upward and downward herniation of cerebellar parenchyma Right carotid system: No hemodynamically significant stenosis. Left carotid system: No hemodynamically significant stenosis. Vertebral circulation: Patent. Anterior intracranial circulation: Intact. Bilateral internal carotid saccular aneurysms. Posterior intracranial circulation:    Improved flow within the right vertebral artery since 4/20/2023. New focal stenosis mid left vertebral artery, etiology uncertain, consider vasospasm and extrinsic compression in addition to new embolic disease. Brain perfusion: Perfusion images demonstrate normalization of the perfusion abnormality in the right cerebellar hemisphere present on 4/20/2023 despite evidence of progression of acute infarction.  Areas of apparent ischemia within the posterior fossa have progressed in extent, also again involving the left posterior cerebral arterial distribution. Tx to [facility] for SOC watch.   Patient given 250cc 3% NaCl, started on 3% 50cc/h, given 20mcg DDAVP and transferred as tier 1.     On admission to [facility], NIHSS 12.  Upon arrival to [facility], mental status slowly declined from lethargy to near obtundation.  Repeat CT head showed stable mass effect and hydrocephalus. Patient taken emergently for SOC depression with EVD placement.    Patient reintubated due to poor clearance of secretions.  (21 Apr 2023 16:50)      On admission, the patient was:  GCS:  Noe-Norman:  modified Michelle:  ICH score:  NIHSS: 12    ICU Vital Signs Last 24 Hrs  T(C): 37.3 (30 Apr 2023 05:49), Max: 38.5 (29 Apr 2023 21:00)  T(F): 99.1 (30 Apr 2023 05:49), Max: 101.3 (29 Apr 2023 21:00)  HR: 90 (30 Apr 2023 07:00) (66 - 100)  BP: 126/61 (30 Apr 2023 07:00) (77/38 - 158/70)  BP(mean): 88 (30 Apr 2023 07:00) (52 - 101)  RR: 16 (30 Apr 2023 07:00) (16 - 23)  SpO2: 98% (30 Apr 2023 07:00) (94% - 99%)      04-29-23 @ 07:01  -  04-30-23 @ 07:00  --------------------------------------------------------  IN: 3070 mL / OUT: 1837 mL / NET: 1233 mL    04-30-23 @ 07:01  -  04-30-23 @ 07:11  --------------------------------------------------------  IN: 54.4 mL / OUT: 0 mL / NET: 54.4 mL      Mode: AC/ CMV (Assist Control/ Continuous Mandatory Ventilation), RR (machine): 16, TV (machine): 400, FiO2: 40, PEEP: 8, ITime: 1, MAP: 12, PIP: 25      EXAM:   MS: Patient opens eyes to voice/ commands. Oriented x 1-2 by nodding  HEENT: Trach to vent, NGT L nare  CN: Pupils 3mm and brisk, tracks  Motor: Power 5/5 LUE, RUE 3/5  B/L lowers 4/5   Chest: Diminished breath sounds B/L  Heart regular rate/rhythm, present S1/S2, no murmurs or rubs  Abdomen: Soft and nontender   Extremities: No edema    MEDICATIONS:   acetaminophen   Oral Liquid .. 650 milliGRAM(s) Oral every 6 hours PRN  acetylcysteine 20%  Inhalation 4 milliLiter(s) Inhalation every 6 hours  albuterol/ipratropium for Nebulization 3 milliLiter(s) Nebulizer every 6 hours  amLODIPine   Tablet 5 milliGRAM(s) Oral daily  atorvastatin 80 milliGRAM(s) Oral at bedtime  chlorhexidine 0.12% Liquid 15 milliLiter(s) Oral Mucosa every 12 hours  chlorhexidine 2% Cloths 1 Application(s) Topical <User Schedule>  doxazosin 8 milliGRAM(s) Oral at bedtime  enoxaparin Injectable 40 milliGRAM(s) SubCutaneous every 24 hours  fentaNYL   Infusion 0.5 MICROgram(s)/kG/Hr (4.42 mL/Hr) IV Continuous <Continuous>  ondansetron Injectable 4 milliGRAM(s) IV Push every 6 hours PRN  oxyCODONE    IR 5 milliGRAM(s) Oral every 4 hours PRN  pantoprazole  Injectable 40 milliGRAM(s) IV Push daily  polyethylene glycol 3350 17 Gram(s) Oral daily  senna 2 Tablet(s) Oral at bedtime  sodium chloride 3 Gram(s) Oral every 6 hours  sodium chloride 0.9%. 1000 milliLiter(s) (75 mL/Hr) IV Continuous <Continuous>  sodium chloride 3%  Inhalation 4 milliLiter(s) Inhalation every 6 hours    LABS:                      8.8    7.63  )-----------( 213      ( 30 Apr 2023 04:55 )             26.8     04-30    138  |  103  |  18  ----------------------------<  152<H>  3.5   |  28  |  0.45<L>    Ca    7.8<L>      30 Apr 2023 04:55  Phos  3.7     04-30  Mg     2.2     04-30        ABG - ( 29 Apr 2023 13:14 )  pH, Arterial: 7.45  pH, Blood: x     /  pCO2: 41    /  pO2: 97    / HCO3: 28    / Base Excess: 4.1   /  SaO2: 99.6

## 2023-05-01 ENCOUNTER — TRANSCRIPTION ENCOUNTER (OUTPATIENT)
Age: 58
End: 2023-05-01

## 2023-05-01 LAB
ANION GAP SERPL CALC-SCNC: 9 MMOL/L — SIGNIFICANT CHANGE UP (ref 5–17)
APTT BLD: 32 SEC — SIGNIFICANT CHANGE UP (ref 27.5–35.5)
BLD GP AB SCN SERPL QL: NEGATIVE — SIGNIFICANT CHANGE UP
BUN SERPL-MCNC: 16 MG/DL — SIGNIFICANT CHANGE UP (ref 7–23)
CALCIUM SERPL-MCNC: 8.8 MG/DL — SIGNIFICANT CHANGE UP (ref 8.4–10.5)
CHLORIDE SERPL-SCNC: 105 MMOL/L — SIGNIFICANT CHANGE UP (ref 96–108)
CO2 SERPL-SCNC: 26 MMOL/L — SIGNIFICANT CHANGE UP (ref 22–31)
CONFIRM APTT STACLOT: POSITIVE
CREAT SERPL-MCNC: 0.46 MG/DL — LOW (ref 0.5–1.3)
DRVVT RATIO: 1.03 RATIO — SIGNIFICANT CHANGE UP (ref 0–1.03)
DRVVT SCREEN TO CONFIRM RATIO: SIGNIFICANT CHANGE UP
EGFR: 112 ML/MIN/1.73M2 — SIGNIFICANT CHANGE UP
GLUCOSE SERPL-MCNC: 128 MG/DL — HIGH (ref 70–99)
HCT VFR BLD CALC: 28.3 % — LOW (ref 34.5–45)
HGB BLD-MCNC: 9.3 G/DL — LOW (ref 11.5–15.5)
INR BLD: 1.13 — SIGNIFICANT CHANGE UP (ref 0.88–1.16)
MAGNESIUM SERPL-MCNC: 2.3 MG/DL — SIGNIFICANT CHANGE UP (ref 1.6–2.6)
MCHC RBC-ENTMCNC: 30.1 PG — SIGNIFICANT CHANGE UP (ref 27–34)
MCHC RBC-ENTMCNC: 32.9 GM/DL — SIGNIFICANT CHANGE UP (ref 32–36)
MCV RBC AUTO: 91.6 FL — SIGNIFICANT CHANGE UP (ref 80–100)
NRBC # BLD: 0 /100 WBCS — SIGNIFICANT CHANGE UP (ref 0–0)
PHOSPHATE SERPL-MCNC: 3.6 MG/DL — SIGNIFICANT CHANGE UP (ref 2.5–4.5)
PLATELET # BLD AUTO: 224 K/UL — SIGNIFICANT CHANGE UP (ref 150–400)
POTASSIUM SERPL-MCNC: 4.1 MMOL/L — SIGNIFICANT CHANGE UP (ref 3.5–5.3)
POTASSIUM SERPL-SCNC: 4.1 MMOL/L — SIGNIFICANT CHANGE UP (ref 3.5–5.3)
PROTHROM AB SERPL-ACNC: 13.5 SEC — HIGH (ref 10.5–13.4)
RBC # BLD: 3.09 M/UL — LOW (ref 3.8–5.2)
RBC # FLD: 13.1 % — SIGNIFICANT CHANGE UP (ref 10.3–14.5)
RH IG SCN BLD-IMP: POSITIVE — SIGNIFICANT CHANGE UP
SODIUM SERPL-SCNC: 140 MMOL/L — SIGNIFICANT CHANGE UP (ref 135–145)
WBC # BLD: 8.18 K/UL — SIGNIFICANT CHANGE UP (ref 3.8–10.5)
WBC # FLD AUTO: 8.18 K/UL — SIGNIFICANT CHANGE UP (ref 3.8–10.5)

## 2023-05-01 PROCEDURE — 99291 CRITICAL CARE FIRST HOUR: CPT

## 2023-05-01 PROCEDURE — 43246 EGD PLACE GASTROSTOMY TUBE: CPT | Mod: 53

## 2023-05-01 PROCEDURE — 99222 1ST HOSP IP/OBS MODERATE 55: CPT

## 2023-05-01 PROCEDURE — 71045 X-RAY EXAM CHEST 1 VIEW: CPT | Mod: 26,77

## 2023-05-01 PROCEDURE — 71045 X-RAY EXAM CHEST 1 VIEW: CPT | Mod: 26

## 2023-05-01 RX ORDER — MIDAZOLAM HYDROCHLORIDE 1 MG/ML
2 INJECTION, SOLUTION INTRAMUSCULAR; INTRAVENOUS ONCE
Refills: 0 | Status: DISCONTINUED | OUTPATIENT
Start: 2023-05-01 | End: 2023-05-01

## 2023-05-01 RX ORDER — IPRATROPIUM/ALBUTEROL SULFATE 18-103MCG
3 AEROSOL WITH ADAPTER (GRAM) INHALATION EVERY 8 HOURS
Refills: 0 | Status: DISCONTINUED | OUTPATIENT
Start: 2023-05-01 | End: 2023-05-02

## 2023-05-01 RX ORDER — SODIUM CHLORIDE 9 MG/ML
1 INJECTION INTRAMUSCULAR; INTRAVENOUS; SUBCUTANEOUS EVERY 6 HOURS
Refills: 0 | Status: DISCONTINUED | OUTPATIENT
Start: 2023-05-01 | End: 2023-05-01

## 2023-05-01 RX ORDER — CARVEDILOL PHOSPHATE 80 MG/1
3.12 CAPSULE, EXTENDED RELEASE ORAL EVERY 12 HOURS
Refills: 0 | Status: DISCONTINUED | OUTPATIENT
Start: 2023-05-01 | End: 2023-05-02

## 2023-05-01 RX ORDER — SODIUM CHLORIDE 9 MG/ML
4 INJECTION INTRAMUSCULAR; INTRAVENOUS; SUBCUTANEOUS EVERY 8 HOURS
Refills: 0 | Status: DISCONTINUED | OUTPATIENT
Start: 2023-05-01 | End: 2023-05-02

## 2023-05-01 RX ORDER — FENTANYL CITRATE 50 UG/ML
12.5 INJECTION INTRAVENOUS
Refills: 0 | Status: DISCONTINUED | OUTPATIENT
Start: 2023-05-01 | End: 2023-05-02

## 2023-05-01 RX ORDER — SODIUM CHLORIDE 9 MG/ML
1 INJECTION INTRAMUSCULAR; INTRAVENOUS; SUBCUTANEOUS EVERY 6 HOURS
Refills: 0 | Status: DISCONTINUED | OUTPATIENT
Start: 2023-05-01 | End: 2023-05-02

## 2023-05-01 RX ORDER — ACETYLCYSTEINE 200 MG/ML
4 VIAL (ML) MISCELLANEOUS EVERY 8 HOURS
Refills: 0 | Status: DISCONTINUED | OUTPATIENT
Start: 2023-05-01 | End: 2023-05-02

## 2023-05-01 RX ORDER — DOXAZOSIN MESYLATE 4 MG
4 TABLET ORAL AT BEDTIME
Refills: 0 | Status: DISCONTINUED | OUTPATIENT
Start: 2023-05-01 | End: 2023-05-02

## 2023-05-01 RX ORDER — ERTAPENEM SODIUM 1 G/1
1000 INJECTION, POWDER, LYOPHILIZED, FOR SOLUTION INTRAMUSCULAR; INTRAVENOUS EVERY 24 HOURS
Refills: 0 | Status: DISCONTINUED | OUTPATIENT
Start: 2023-05-01 | End: 2023-05-02

## 2023-05-01 RX ORDER — SODIUM CHLORIDE 9 MG/ML
1000 INJECTION INTRAMUSCULAR; INTRAVENOUS; SUBCUTANEOUS
Refills: 0 | Status: DISCONTINUED | OUTPATIENT
Start: 2023-05-01 | End: 2023-05-02

## 2023-05-01 RX ORDER — ACETAMINOPHEN 500 MG
1000 TABLET ORAL ONCE
Refills: 0 | Status: COMPLETED | OUTPATIENT
Start: 2023-05-01 | End: 2023-05-01

## 2023-05-01 RX ADMIN — PIPERACILLIN AND TAZOBACTAM 25 GRAM(S): 4; .5 INJECTION, POWDER, LYOPHILIZED, FOR SOLUTION INTRAVENOUS at 05:48

## 2023-05-01 RX ADMIN — SODIUM CHLORIDE 4 MILLILITER(S): 9 INJECTION INTRAMUSCULAR; INTRAVENOUS; SUBCUTANEOUS at 05:40

## 2023-05-01 RX ADMIN — Medication 4 MILLILITER(S): at 18:30

## 2023-05-01 RX ADMIN — Medication 4 MILLILITER(S): at 05:42

## 2023-05-01 RX ADMIN — SODIUM CHLORIDE 4 MILLILITER(S): 9 INJECTION INTRAMUSCULAR; INTRAVENOUS; SUBCUTANEOUS at 10:03

## 2023-05-01 RX ADMIN — CHLORHEXIDINE GLUCONATE 15 MILLILITER(S): 213 SOLUTION TOPICAL at 05:47

## 2023-05-01 RX ADMIN — MIDAZOLAM HYDROCHLORIDE 2 MILLIGRAM(S): 1 INJECTION, SOLUTION INTRAMUSCULAR; INTRAVENOUS at 10:29

## 2023-05-01 RX ADMIN — AMLODIPINE BESYLATE 5 MILLIGRAM(S): 2.5 TABLET ORAL at 05:47

## 2023-05-01 RX ADMIN — Medication 3 MILLILITER(S): at 21:31

## 2023-05-01 RX ADMIN — Medication 3 MILLILITER(S): at 05:39

## 2023-05-01 RX ADMIN — PIPERACILLIN AND TAZOBACTAM 25 GRAM(S): 4; .5 INJECTION, POWDER, LYOPHILIZED, FOR SOLUTION INTRAVENOUS at 13:23

## 2023-05-01 RX ADMIN — SODIUM CHLORIDE 4 MILLILITER(S): 9 INJECTION INTRAMUSCULAR; INTRAVENOUS; SUBCUTANEOUS at 18:30

## 2023-05-01 RX ADMIN — Medication 4 MILLILITER(S): at 10:02

## 2023-05-01 RX ADMIN — Medication 4 MILLILITER(S): at 21:31

## 2023-05-01 RX ADMIN — Medication 1000 MILLIGRAM(S): at 23:25

## 2023-05-01 RX ADMIN — Medication 3 MILLILITER(S): at 10:03

## 2023-05-01 RX ADMIN — Medication 400 MILLIGRAM(S): at 23:09

## 2023-05-01 RX ADMIN — CHLORHEXIDINE GLUCONATE 15 MILLILITER(S): 213 SOLUTION TOPICAL at 17:43

## 2023-05-01 RX ADMIN — SODIUM CHLORIDE 1 GRAM(S): 9 INJECTION INTRAMUSCULAR; INTRAVENOUS; SUBCUTANEOUS at 12:43

## 2023-05-01 RX ADMIN — PANTOPRAZOLE SODIUM 40 MILLIGRAM(S): 20 TABLET, DELAYED RELEASE ORAL at 12:39

## 2023-05-01 RX ADMIN — Medication 3 MILLILITER(S): at 15:14

## 2023-05-01 RX ADMIN — ERTAPENEM SODIUM 120 MILLIGRAM(S): 1 INJECTION, POWDER, LYOPHILIZED, FOR SOLUTION INTRAMUSCULAR; INTRAVENOUS at 22:42

## 2023-05-01 RX ADMIN — CHLORHEXIDINE GLUCONATE 1 APPLICATION(S): 213 SOLUTION TOPICAL at 05:43

## 2023-05-01 RX ADMIN — SODIUM CHLORIDE 3 GRAM(S): 9 INJECTION INTRAMUSCULAR; INTRAVENOUS; SUBCUTANEOUS at 05:48

## 2023-05-01 NOTE — PROGRESS NOTE ADULT - ASSESSMENT
57 year old female w/ PMH of asthma, CAD (not on meds), HTN, prior miscarriages X3, peripheral neuropathy and PSH of BATOOL, cholecystectomy and right knee surgery presented to [place] ED on 4/20 w/ right sided weakness and right facial numbness. Initially found to have a right PICA distribution acute infarct and a right vertebral artery occlusion. Not a candidate for any intervention. On repeat imaging had progression of acute infarct within the right and left cerebellar hemisphere with mass effect on the fourth ventricle and hydrocephalus and upward and downward herniation of the cerebellar parenchyma. She was transferred to [facility] on 4/21 and underwent an emergent SOC foramen magnum decompression and right parietal/occipital EVD placement. Hematology consulted for hypercoagulable work up.    # Acute CVA  Per patient's children, no personal history of clotting or bleeding disorder. Patient's mother with history of stroke. Daughter endorses that patient had 3 miscarriages (early). She thinks her mother was possibly on some type of blood thinner following a pregnancy at [facility] in 2009, but does not remember the name or reason, which was stopped after a few months. No other reported family history of clotting. Factor V Leiden and Prothrombin gene mutation negative. Lupus anticoagulant was positive 1.05 (range 0-1.03), however, the patient had received low molecular weight heparin for DVT ppx and may represent a false positive (as LA is a clot based assay). Beta 2 glycoprotein negative. Anticardiolipin antibody screen positive, with elevated IgM 28.4, but titers below threshold for APS in Sappor criteria (>40 Mpl).     Of note, patient with bilateral LE ultrasound on 4/22/23 showing no evidence of deep venous thrombosis in either lower extremity, but positive for superficial venous thrombosis is present in the proximal portion of the right greater saphenous vein in the thigh.    Plan:  - Lupus anticoagulant negative (04/29/23) while off heparin products. Anti-[id number] screen (04/25) negative. Hexagonal phase (04/29/23). Anticardiolipin Ab IgG < 5, IgM 28.4, IgA 9.2. The patient does not meet criteria for APLS.   - Repeat Duplex LE (04/29/23) shows persistent superficial venous thrombosis (superficial right greater saphenous vein); with minimal involvement of the junction with common femoral vein.   - Would advise supportive treatment of superficial venous thrombosis with warm compresses. Consider Aspirin 81 mg QD as outpatient if cleared by Neurology/Stroke team.     # Anemia, normocytic  - No reported history of anemia. Possibly 2/2 critical illness and recent procedures.  - Iron studies consistent with anemia of chronic disease, possible component of concomitant iron deficiency anemia. Folate 12. B12 1403.   - ESR 19    To discuss with Dr. Gee Kilgore 57 year old female w/ PMH of asthma, CAD (not on meds), HTN, prior miscarriages X3, peripheral neuropathy and PSH of BATOOL, cholecystectomy and right knee surgery presented to [facility] ED on 4/20 w/ right sided weakness and right facial numbness. Initially found to have a right PICA distribution acute infarct and a right vertebral artery occlusion. Not a candidate for any intervention. On repeat imaging had progression of acute infarct within the right and left cerebellar hemisphere with mass effect on the fourth ventricle and hydrocephalus and upward and downward herniation of the cerebellar parenchyma. She was transferred to [facility] on 4/21 and underwent an emergent SOC foramen magnum decompression and right parietal/occipital EVD placement. Hematology consulted for hypercoagulable work up.    # Acute CVA  Per patient's children, no personal history of clotting or bleeding disorder. Patient's mother with history of stroke. Daughter endorses that patient had 3 miscarriages (early). She thinks her mother was possibly on some type of blood thinner following a pregnancy at [facility] in 2009, but does not remember the name or reason, which was stopped after a few months. No other reported family history of clotting. Factor V Leiden and Prothrombin gene mutation negative. Lupus anticoagulant was positive 1.05 (range 0-1.03), however, the patient had received low molecular weight heparin for DVT ppx and may represent a false positive (as LA is a clot based assay). Beta 2 glycoprotein negative. Anticardiolipin antibody screen positive, with elevated IgM 28.4, but titers below threshold for APS in Sappor criteria (>40 Mpl).     Of note, patient with bilateral LE ultrasound on 4/22/23 showing no evidence of deep venous thrombosis in either lower extremity, but positive for superficial venous thrombosis is present in the proximal portion of the right greater saphenous vein in the thigh.    Plan:  - Lupus anticoagulant negative (04/29/23) while off heparin products. Anti-[id number] screen (04/25) negative. Hexagonal phase (04/29/23). Anticardiolipin Ab IgG < 5, IgM 28.4, IgA 9.2. The patient does not meet criteria for APLS.   - Repeat Duplex LE (04/29/23) shows persistent superficial venous thrombosis (superficial right greater saphenous vein); with minimal involvement of the junction with common femoral vein.   - Consider repeating Venous Duplex LE in 2-3 days to document stability of the SVT given its approximation to the CFV.   - There remains concern for hypercoagulable state given her history of possible DVT during pregnancy and early miscarriages.   - Send Protein C function assay with reflex, Protein S activity, Protein S free antigen, and anti-thrombin III assay with reflex as patient is off heparin products.     # Anemia, normocytic  - No reported history of anemia. Possibly 2/2 critical illness and recent procedures.  - Iron studies consistent with anemia of chronic disease, possible component of concomitant iron deficiency anemia. Folate 12. B12 1403.   - ESR 19    Discussed with Dr. Gee Kilgore

## 2023-05-01 NOTE — PROGRESS NOTE ADULT - SUBJECTIVE AND OBJECTIVE BOX
INTERVAL HISTORY: HPI:  58 yo F with PMH of Asthma, CAD (not on  meds), peripheral neuropathy and PSH of BATOOL, cholecystectomy, right knee surgery presented to [place] ED on  with right sided weakness and right facial numbness. Patient was undergoing preparation for colonoscopy. As per daughter at 8am patient was playing with her grandchildren but around 840 am patient was getting dressed and fell and subsequently felt weak after. Daughter reports that patient had some episodes of vomiting at this time. She had a syncopal episode at around 10 am and was witnessed by brother. No head trauma, She regained conscious after few minutes. She remained in bed for the remainder of the day. Daughter reports some slurred speech noted 1230-1PM and also was confused after. and around 4PM, the patient's sister noted right sided weakness at which time EMS was called and patient was brought to ED. No c/o chest pain, shortness of breath, fever, headache, abdominal pain, urinary complaints. No recent travel/sickness/ change in meds. Stroke code in ER: CTH neg for heme, Right PICA distribution acute infarction. CTA with saccular aneurysms of b/l carotids, approximately 0.8 cm on the right and 1.2 x 0.9 cm on the left. Possible tiny third saccular aneurysm on the right Posterior intracranial circulation: Right vertebral arterial occlusion at its dural crossing junction V3 Atlantic and V4 intracranial segments with likely reversal of flow in its intracranial segment from the basilar. Right PICA faintly seen. 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  Not candidate for TNK/mechanical thrombectomy. CT scan repeated which showed: 1. Brain: Progression of acute infarction within the right cerebellar hemisphere, also extending into the left superior cerebellar hemisphere. New mass effect on the fourth ventricle causing stenosis versus occlusion with new third and lateral ventricular dilatation indicating ventricular obstruction at the level of the fourth ventricle. New upward and downward herniation of cerebellar parenchyma Right carotid system: No hemodynamically significant stenosis. Left carotid system: No hemodynamically significant stenosis. Vertebral circulation: Patent. Anterior intracranial circulation: Intact. Bilateral internal carotid saccular aneurysms. These findings are unchanged. Posterior intracranial circulation:    Improved flow within the right vertebral artery since 2023. New focal stenosis mid left vertebral artery, etiology uncertain, consider vasospasm and extrinsic compression in addition to new embolic disease. Brain perfusion: Perfusion images demonstrate normalization of the perfusion abnormality in the right cerebellar hemisphere present on 2023 despite evidence of progression of acute infarction.  Areas of apparent ischemia within the posterior fossa have progressed in extent, also again involving the left posterior cerebral arterial distribution. Tx to [facility] for SOC watch. On admission to [facility], NIHSS 12.  (2023 16:50)      MEDICATIONS  (STANDING):  acetylcysteine 20%  Inhalation 4 milliLiter(s) Inhalation every 8 hours  albuterol/ipratropium for Nebulization 3 milliLiter(s) Nebulizer every 8 hours  atorvastatin 80 milliGRAM(s) Oral at bedtime  carvedilol 3.125 milliGRAM(s) Oral every 12 hours  chlorhexidine 0.12% Liquid 15 milliLiter(s) Oral Mucosa every 12 hours  chlorhexidine 2% Cloths 1 Application(s) Topical <User Schedule>  doxazosin 4 milliGRAM(s) Oral at bedtime  pantoprazole  Injectable 40 milliGRAM(s) IV Push daily  piperacillin/tazobactam IVPB.. 4.5 Gram(s) IV Intermittent every 8 hours  senna 2 Tablet(s) Oral at bedtime  sodium chloride 1 Gram(s) Oral every 6 hours  sodium chloride 0.9%. 1000 milliLiter(s) (85 mL/Hr) IV Continuous <Continuous>  sodium chloride 3%  Inhalation 4 milliLiter(s) Inhalation every 8 hours    MEDICATIONS  (PRN):  acetaminophen   Oral Liquid .. 650 milliGRAM(s) Oral every 6 hours PRN Temp greater or equal to 38C (100.4F), Mild Pain (1 - 3)  fentaNYL    Injectable 12.5 MICROGram(s) IV Push every 2 hours PRN Severe Pain (7 - 10)  ondansetron Injectable 4 milliGRAM(s) IV Push every 6 hours PRN Nausea and/or Vomiting  oxyCODONE    IR 5 milliGRAM(s) Oral every 4 hours PRN Moderate Pain (4 - 6)      Drug Dosing Weight  Height (cm): 157.5 (2023 10:49)  Weight (kg): 88.4 (2023 11:20)  BMI (kg/m2): 35.6 (2023 11:20)  BSA (m2): 1.89 (2023 11:20)    PAST MEDICAL & SURGICAL HISTORY:  Asthma      CAD (coronary artery disease)      Peripheral neuropathy      S/P total abdominal hysterectomy      S/P cholecystectomy      S/P right knee surgery          REVIEW OF SYSTEMS: [ ] Unable to Assess due to neurologic exam   [ ] All ROS addressed below are non-contributory, except:  Neuro: [ ] Headache [ ] Back pain [ ] Numbness [ ] Weakness [ ] Ataxia [ ] Dizziness [ ] Aphasia [ ] Dysarthria [ ] Visual disturbance  Resp: [ ] Shortness of breath/dyspnea, [ ] Orthopnea [ ] Cough  CV: [ ] Chest pain [ ] Palpitation [ ] Lightheadedness [ ] Syncope  Renal: [ ] Thirst [ ] Edema  GI: [ ] Nausea [ ] Emesis [ ] Abdominal pain [ ] Constipation [ ] Diarrhea  Hem: [ ] Hematemesis [ ] bright red blood per rectum  ID: [ ] Fever [ ] Chills [ ] Dysuria  ENT: [ ] Rhinorrhea    PHYSICAL EXAM:  General: No Acute Distress, +trach in place   Neurological: AOx2 to choice, R eye 6th nerve palsy, b/l horizontal nystagmus, b/l UE & LE dysmetria, RUE & RLE 4+/5 w/ mild drift   Pulmonary: mild b/l rhonci   Cardiovascular: sinus justo   Gastrointestinal: Soft, Nontender, Nondistended   Extremities: No calf tenderness   Incision: CDI    ICU Vital Signs Last 24 Hrs  T(C): 37.2 (01 May 2023 14:22), Max: 37.9 (2023 20:15)  T(F): 99 (01 May 2023 14:22), Max: 100.3 (2023 20:15)  HR: 69 (01 May 2023 16:00) (61 - 78)  BP: 123/63 (01 May 2023 16:00) (101/48 - 139/57)  BP(mean): 91 (01 May 2023 16:00) (69 - 91)  RR: 16 (01 May 2023 16:00) (16 - 27)  SpO2: 98% (01 May 2023 16:00) (94% - 99%)    O2 Parameters below as of 01 May 2023 16:00  Patient On (Oxygen Delivery Method): ventilator    O2 Concentration (%): 40        I&O's Detail    2023 07:01  -  01 May 2023 07:00  --------------------------------------------------------  IN:    Enteral Tube Flush: 280 mL    FentaNYL: 164.5 mL    Glucerna: 800 mL    IV PiggyBack: 350 mL    sodium chloride 0.9%: 600 mL  Total IN: 2194.5 mL    OUT:    Drain (mL): 50 mL    External Ventricular Device (mL): 245 mL    Voided (mL): 1400 mL  Total OUT: 1695 mL    Total NET: 499.5 mL      01 May 2023 07:  -  01 May 2023 17:20  --------------------------------------------------------  IN:    FentaNYL: 7.1 mL    Glucerna: 100 mL    IV PiggyBack: 50 mL    sodium chloride 0.9%: 300 mL    sodium chloride 0.9%: 170 mL  Total IN: 627.1 mL    OUT:    External Ventricular Device (mL): 133 mL    Voided (mL): 850 mL  Total OUT: 983 mL    Total NET: -355.9 mL        Mode: AC/ CMV (Assist Control/ Continuous Mandatory Ventilation)  RR (machine): 16  TV (machine): 400  FiO2: 40  PEEP: 8  ITime: 1  MAP: 11  PIP: 20        LABS:  CBC Full  -  ( 01 May 2023 05:26 )  WBC Count : 8.18 K/uL  RBC Count : 3.09 M/uL  Hemoglobin : 9.3 g/dL  Hematocrit : 28.3 %  Platelet Count - Automated : 224 K/uL  Mean Cell Volume : 91.6 fl  Mean Cell Hemoglobin : 30.1 pg  Mean Cell Hemoglobin Concentration : 32.9 gm/dL  Auto Neutrophil # : x  Auto Lymphocyte # : x  Auto Monocyte # : x  Auto Eosinophil # : x  Auto Basophil # : x  Auto Neutrophil % : x  Auto Lymphocyte % : x  Auto Monocyte % : x  Auto Eosinophil % : x  Auto Basophil % : x        140  |  105  |  16  ----------------------------<  128<H>  4.1   |  26  |  0.46<L>    Ca    8.8      01 May 2023 05:26  Phos  3.6     05-  Mg     2.3     05-01      PT/INR - ( 01 May 2023 05:26 )   PT: 13.5 sec;   INR: 1.13          PTT - ( 01 May 2023 05:26 )  PTT:32.0 sec  Urinalysis Basic - ( 2023 05:11 )    Color: Yellow / Appearance: Cloudy / S.025 / pH: x  Gluc: x / Ketone: NEGATIVE  / Bili: Negative / Urobili: 4.0 E.U./dL   Blood: x / Protein: Trace mg/dL / Nitrite: POSITIVE   Leuk Esterase: Moderate / RBC: < 5 /HPF / WBC Many /HPF   Sq Epi: x / Non Sq Epi: x / Bacteria: Present /HPF        RADIOLOGY & ADDITIONAL STUDIES:

## 2023-05-01 NOTE — PROGRESS NOTE ADULT - SUBJECTIVE AND OBJECTIVE BOX
SUBJECTIVE: Pt seen and examined at bedside this am. On trach, off pressors.    MEDICATIONS  (STANDING):  acetylcysteine 20%  Inhalation 4 milliLiter(s) Inhalation every 8 hours  albuterol/ipratropium for Nebulization 3 milliLiter(s) Nebulizer every 8 hours  atorvastatin 80 milliGRAM(s) Oral at bedtime  carvedilol 3.125 milliGRAM(s) Oral every 12 hours  chlorhexidine 0.12% Liquid 15 milliLiter(s) Oral Mucosa every 12 hours  chlorhexidine 2% Cloths 1 Application(s) Topical <User Schedule>  doxazosin 4 milliGRAM(s) Oral at bedtime  pantoprazole  Injectable 40 milliGRAM(s) IV Push daily  piperacillin/tazobactam IVPB.. 4.5 Gram(s) IV Intermittent every 8 hours  senna 2 Tablet(s) Oral at bedtime  sodium chloride 1 Gram(s) Oral every 6 hours  sodium chloride 0.9%. 1000 milliLiter(s) (85 mL/Hr) IV Continuous <Continuous>  sodium chloride 3%  Inhalation 4 milliLiter(s) Inhalation every 8 hours    MEDICATIONS  (PRN):  acetaminophen   Oral Liquid .. 650 milliGRAM(s) Oral every 6 hours PRN Temp greater or equal to 38C (100.4F), Mild Pain (1 - 3)  fentaNYL    Injectable 12.5 MICROGram(s) IV Push every 2 hours PRN Severe Pain (7 - 10)  ondansetron Injectable 4 milliGRAM(s) IV Push every 6 hours PRN Nausea and/or Vomiting  oxyCODONE    IR 5 milliGRAM(s) Oral every 4 hours PRN Moderate Pain (4 - 6)      Vital Signs Last 24 Hrs  T(C): 37.2 (01 May 2023 14:22), Max: 37.9 (2023 20:15)  T(F): 99 (01 May 2023 14:22), Max: 100.3 (2023 20:15)  HR: 70 (01 May 2023 15:00) (61 - 80)  BP: 113/62 (01 May 2023 15:00) (101/48 - 139/57)  BP(mean): 82 (01 May 2023 15:00) (69 - 90)  RR: 16 (01 May 2023 15:00) (16 - 27)  SpO2: 98% (01 May 2023 15:00) (94% - 99%)    Parameters below as of 01 May 2023 15:00  Patient On (Oxygen Delivery Method): ventilator    O2 Concentration (%): 40    Physical Exam  General: NAD, resting comfortably in bed  Pulmonary: trach, vent, Nonlabored breathing, no respiratory distress  CV: NSR  Abd: soft, NT/ND, no guarding, scar in upper abdomen  Extremities: (-) edema, warm, well-perfused      I&O's Detail    2023 07:01  -  01 May 2023 07:00  --------------------------------------------------------  IN:    Enteral Tube Flush: 280 mL    FentaNYL: 164.5 mL    Glucerna: 800 mL    IV PiggyBack: 350 mL    sodium chloride 0.9%: 600 mL  Total IN: 2194.5 mL    OUT:    Drain (mL): 50 mL    External Ventricular Device (mL): 245 mL    Voided (mL): 1400 mL  Total OUT: 1695 mL    Total NET: 499.5 mL      01 May 2023 07:01  -  01 May 2023 15:56  --------------------------------------------------------  IN:    FentaNYL: 7.1 mL    Glucerna: 100 mL    IV PiggyBack: 50 mL    sodium chloride 0.9%: 300 mL    sodium chloride 0.9%: 170 mL  Total IN: 627.1 mL    OUT:    External Ventricular Device (mL): 116 mL    Voided (mL): 850 mL  Total OUT: 966 mL    Total NET: -338.9 mL          LABS:                        9.3    8.18  )-----------( 224      ( 01 May 2023 05:26 )             28.3     05-01    140  |  105  |  16  ----------------------------<  128<H>  4.1   |  26  |  0.46<L>    Ca    8.8      01 May 2023 05:26  Phos  3.6     05-  Mg     2.3     05-01      PT/INR - ( 01 May 2023 05:26 )   PT: 13.5 sec;   INR: 1.13          PTT - ( 01 May 2023 05:26 )  PTT:32.0 sec  Urinalysis Basic - ( 2023 05:11 )    Color: Yellow / Appearance: Cloudy / S.025 / pH: x  Gluc: x / Ketone: NEGATIVE  / Bili: Negative / Urobili: 4.0 E.U./dL   Blood: x / Protein: Trace mg/dL / Nitrite: POSITIVE   Leuk Esterase: Moderate / RBC: < 5 /HPF / WBC Many /HPF   Sq Epi: x / Non Sq Epi: x / Bacteria: Present /HPF        RADIOLOGY & ADDITIONAL STUDIES:

## 2023-05-01 NOTE — PROGRESS NOTE ADULT - SUBJECTIVE AND OBJECTIVE BOX
58yo F w Bilateral cerebellar hemispheric strokes sp SOC for foramen magnum decompression and R parietooccipital EVD, Bilateral carotid terminus aneurysms, History of peripheral neuropathy and asthma, Bulbar dysfunction. Respiratory insufficiency. Intubated and failed extubation, reintubated 4/25/23. Now s/p creation tracheostomy 4/28/23 c/b tracheostomy tube exchange POD0 due to damaged tracheostomy tube.    Interval 4/29: NAEON. Saturating well on vent with no cuff leak.  Interval 4/30: NAEON. Saturating well on vent with no cuff leak.   Interval 5/1: No acute overnight events.  Remains on vent. No cuff leak.     ALLERGIES  No Known Allergies    MEDICATIONS  (STANDING):  acetylcysteine 20%  Inhalation 4 milliLiter(s) Inhalation every 6 hours  albuterol/ipratropium for Nebulization 3 milliLiter(s) Nebulizer every 6 hours  amLODIPine   Tablet 5 milliGRAM(s) Oral daily  atorvastatin 80 milliGRAM(s) Oral at bedtime  chlorhexidine 0.12% Liquid 15 milliLiter(s) Oral Mucosa every 12 hours  chlorhexidine 2% Cloths 1 Application(s) Topical <User Schedule>  doxazosin 8 milliGRAM(s) Oral at bedtime  enoxaparin Injectable 40 milliGRAM(s) SubCutaneous every 24 hours  fentaNYL   Infusion 0.5 MICROgram(s)/kG/Hr (4.42 mL/Hr) IV Continuous <Continuous>  pantoprazole  Injectable 40 milliGRAM(s) IV Push daily  piperacillin/tazobactam IVPB.. 4.5 Gram(s) IV Intermittent every 8 hours  senna 2 Tablet(s) Oral at bedtime  sodium chloride 3 Gram(s) Oral every 6 hours  sodium chloride 0.9%. 1000 milliLiter(s) (75 mL/Hr) IV Continuous <Continuous>  sodium chloride 3%  Inhalation 4 milliLiter(s) Inhalation every 6 hours    MEDICATIONS  (PRN):  acetaminophen   Oral Liquid .. 650 milliGRAM(s) Enteral Tube every 6 hours PRN Temp greater or equal to 38C (100.4F), Mild Pain (1 - 3)  ondansetron Injectable 4 milliGRAM(s) IV Push every 6 hours PRN Nausea and/or Vomiting  oxyCODONE    IR 5 milliGRAM(s) Oral every 4 hours PRN Moderate Pain (4 - 6)    LABS                         9.3    8.18  )-----------( 224      ( 01 May 2023 05:26 )             28.3    05-01    140  |  105  |  16  ----------------------------<  128<H>  4.1   |  26  |  0.46<L>    Ca    8.8      01 May 2023 05:26  Phos  3.6     05-01  Mg     2.3     05-01        PT/INR - ( 01 May 2023 05:26 )   PT: 13.5 sec;   INR: 1.13          PTT - ( 01 May 2023 05:26 )  PTT:32.0 sec        INs & OUTs  Drains:     04-30 @ 07:01  -  05-01 @ 07:00  --------------------------------------------------------  OUT:    Drain (mL): 50 mL    External Ventricular Device (mL): 245 mL  Total OUT: 295 mL        VITAL SIGNS:  ICU Vital Signs Last 24 Hrs  T(C): 36.4 (01 May 2023 05:18), Max: 37.9 (30 Apr 2023 20:15)  T(F): 97.6 (01 May 2023 05:18), Max: 100.3 (30 Apr 2023 20:15)  HR: 62 (01 May 2023 07:00) (61 - 91)  BP: 115/55 (01 May 2023 07:00) (101/48 - 119/58)  BP(mean): 79 (01 May 2023 07:00) (69 - 82)  ABP: --  ABP(mean): --  RR: 16 (01 May 2023 07:00) (16 - 27)  SpO2: 96% (01 May 2023 07:00) (94% - 98%)    O2 Parameters below as of 01 May 2023 07:00  Patient On (Oxygen Delivery Method): ventilator    O2 Concentration (%): 40      Gen: Sedated, intermittently responsive to questions  Eyes: Open spontaneously  Nose: Nares clear anteriorly  OC/OP: Dry mucosa, no lesions  Neck: [id number] tracheostomy in place, secured with 4-point suture and trach tie, barrier dressing in place, cuff up  Resp: Mechanically ventilated

## 2023-05-01 NOTE — CONSULT NOTE ADULT - ATTENDING COMMENTS
57F h/o CAD, asthma, peripheral neuropathy p/w acute onset of R sided weakness and R facial numbness on 4/20, sent to [facility], found to have malignant posterior fossa stroke. She was intubated and initially monitored at ICU, then became more lethargic, found to have b/l cerebellar stroke with lateral compression of 4th ventricle on R. She was transferred to [facility] and underwent EVC placement on 4/22. She was extubated on 4/23, then reinstubated on 4/25 due to increased secretion and hypoxia. She became febrile to 100.4.  She got trach on 4/28. She became febrile to 101 on 4/29.  She was pan-cultured. ET tube culture growing Pleuralibacter gergoviae and strep spp. BCx ngtd. UCx growing 100k E.coli.  Source of fever likely either VAP or UTI. She was started on zosyn. Since Pleuralibacter can be ESBL producing, switch zosyn to ertapenem 1g IV q24h.  f/u susceptibility. f/u BCx and CSF culture.    Team 1 will follow you.  Case d/w primary team.    Victoria Singer MD, MS  Infectious Disease attending  work cell [phone]   For any questions during evening/weekend/holiday, please page ID on call
Discussed with fellow on rounds

## 2023-05-01 NOTE — PROGRESS NOTE ADULT - SUBJECTIVE AND OBJECTIVE BOX
LENGTH OF HOSPITAL STAY: 10d    SUBJECTIVE: No acute overnight events    HISTORY OF PRESENTING ILLNESS:   58 yo F with PMH of Asthma, CAD (not on  meds), peripheral neuropathy and PSH of BATOOL, cholecystectomy, right knee surgery presented to [place] ED on  with right sided weakness and right facial numbness. Patient was undergoing preparation for colonoscopy. As per daughter at 8am patient was playing with her grandchildren but around 840 am patient was getting dressed and fell and subsequently felt weak after. Daughter reports that patient had some episodes of vomiting at this time. She had a syncopal episode at around 10 am and was witnessed by brother. No head trauma, She regained conscious after few minutes. She remained in bed for the remainder of the day. Daughter reports some slurred speech noted 1230-1PM and also was confused after. and around 4PM, the patient's sister noted right sided weakness at which time EMS was called and patient was brought to ED. No c/o chest pain, shortness of breath, fever, headache, abdominal pain, urinary complaints. No recent travel/sickness/ change in meds. Stroke code in ER: CTH neg for heme, Right PICA distribution acute infarction. CTA with saccular aneurysms of b/l carotids, approximately 0.8 cm on the right and 1.2 x 0.9 cm on the left. Possible tiny third saccular aneurysm on the right Posterior intracranial circulation: Right vertebral arterial occlusion at its dural crossing junction V3 Atlantic and V4 intracranial segments with likely reversal of flow in its intracranial segment from the basilar. Right PICA faintly seen. 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  Not candidate for TNK/mechanical thrombectomy. CT scan repeated which showed: 1. Brain: Progression of acute infarction within the right cerebellar hemisphere, also extending into the left superior cerebellar hemisphere. New mass effect on the fourth ventricle causing stenosis versus occlusion with new third and lateral ventricular dilatation indicating ventricular obstruction at the level of the fourth ventricle. New upward and downward herniation of cerebellar parenchyma Right carotid system: No hemodynamically significant stenosis. Left carotid system: No hemodynamically significant stenosis. Vertebral circulation: Patent. Anterior intracranial circulation: Intact. Bilateral internal carotid saccular aneurysms. These findings are unchanged. Posterior intracranial circulation:    Improved flow within the right vertebral artery since 2023. New focal stenosis mid left vertebral artery, etiology uncertain, consider vasospasm and extrinsic compression in addition to new embolic disease. Brain perfusion: Perfusion images demonstrate normalization of the perfusion abnormality in the right cerebellar hemisphere present on 2023 despite evidence of progression of acute infarction.  Areas of apparent ischemia within the posterior fossa have progressed in extent, also again involving the left posterior cerebral arterial distribution. Tx to [facility] for SOC watch. On admission to [facility], NIHSS 12.  (2023 16:50)    PAST MEDICAL & SURGICAL HISTORY:  Asthma  CAD (coronary artery disease)  Peripheral neuropathy  S/P total abdominal hysterectomy  S/P cholecystectomy  S/P right knee surgery    ALLERGIES:  No Known Allergies    MEDICATIONS:  STANDING MEDICATIONS  acetylcysteine 20%  Inhalation 4 milliLiter(s) Inhalation every 8 hours  albuterol/ipratropium for Nebulization 3 milliLiter(s) Nebulizer every 8 hours  atorvastatin 80 milliGRAM(s) Oral at bedtime  carvedilol 3.125 milliGRAM(s) Oral every 12 hours  chlorhexidine 0.12% Liquid 15 milliLiter(s) Oral Mucosa every 12 hours  chlorhexidine 2% Cloths 1 Application(s) Topical <User Schedule>  doxazosin 4 milliGRAM(s) Oral at bedtime  pantoprazole  Injectable 40 milliGRAM(s) IV Push daily  piperacillin/tazobactam IVPB.. 4.5 Gram(s) IV Intermittent every 8 hours  senna 2 Tablet(s) Oral at bedtime  sodium chloride 1 Gram(s) Oral every 6 hours  sodium chloride 0.9%. 1000 milliLiter(s) IV Continuous <Continuous>  sodium chloride 3%  Inhalation 4 milliLiter(s) Inhalation every 8 hours    PRN MEDICATIONS  acetaminophen   Oral Liquid .. 650 milliGRAM(s) Oral every 6 hours PRN  fentaNYL    Injectable 12.5 MICROGram(s) IV Push every 2 hours PRN  ondansetron Injectable 4 milliGRAM(s) IV Push every 6 hours PRN  oxyCODONE    IR 5 milliGRAM(s) Oral every 4 hours PRN    VITALS:   T(F): 98  HR: 70  BP: 128/63  RR: 21  SpO2: 97%    LABS:                        9.3    8.18  )-----------( 224      ( 01 May 2023 05:26 )             28.3     05-01    140  |  105  |  16  ----------------------------<  128<H>  4.1   |  26  |  0.46<L>    Ca    8.8      01 May 2023 05:26  Phos  3.6     05  Mg     2.3         PT/INR - ( 01 May 2023 05:26 )   PT: 13.5 sec;   INR: 1.13       PTT - ( 01 May 2023 05:26 )  PTT:32.0 sec  Urinalysis Basic - ( 2023 05:11 )    Color: Yellow / Appearance: Cloudy / S.025 / pH: x  Gluc: x / Ketone: NEGATIVE  / Bili: Negative / Urobili: 4.0 E.U./dL   Blood: x / Protein: Trace mg/dL / Nitrite: POSITIVE   Leuk Esterase: Moderate / RBC: < 5 /HPF / WBC Many /HPF   Sq Epi: x / Non Sq Epi: x / Bacteria: Present /HPF    ABG - ( 2023 13:14 )  pH, Arterial: 7.45  pH, Blood: x     /  pCO2: 41    /  pO2: 97    / HCO3: 28    / Base Excess: 4.1   /  SaO2: 99.6      Culture - CSF with Gram Stain (collected 2023 06:51)  Source: .CSF CSF  Gram Stain (2023 08:35):    No organisms seen    No polymorphonuclear cells seen  Preliminary Report (01 May 2023 10:19):    No growth to date.    Culture - Urine (collected 2023 05:11)  Source: Catheterized None  Preliminary Report (01 May 2023 11:21):    Culture in progress    Urinalysis with Rflx Culture (collected 2023 05:11)    Culture - Blood (collected 2023 21:07)  Source: .Blood Blood  Preliminary Report (2023 23:01):    No growth at 1 day.    Culture - Blood (collected 2023 21:07)  Source: .Blood Blood  Preliminary Report (2023 23:01):    No growth at 1 day.    Culture - Sputum (collected 2023 21:06)  Source: ET Tube ET Tube  Gram Stain (2023 06:57):    Rare epithelial cells    Numerous White blood cells    Few Gram positive cocci in pairs    RADIOLOGY:  < from: US Duplex Venous Lower Ext Complete, Bilateral (23 @ 12:18) >  No evidence of acute deep venous thrombosis in either lower extremity.  Persistent thrombosis of the superficial right greater saphenous vein   with minimal involvement of the junction with CFV.    < end of copied text >    PHYSICAL EXAM:  Constitutional: WDWN resting comfortably in bed; NAD  Head: NC/AT  Eyes: PERRL, EOMI, anicteric sclera  ENT: no nasal discharge; uvula midline, no oropharyngeal erythema or exudates; MMM  Neck: supple; no JVD or thyromegaly  Respiratory: CTA B/L; no W/R/R, no retractions  Cardiac: +S1/S2; RRR; no M/R/G; PMI non-displaced  Gastrointestinal: soft, NT/ND; no rebound or guarding; +BSx4  Back: spine midline, no bony tenderness or step-offs; no CVAT B/L  Extremities: WWP, no clubbing or cyanosis; no peripheral edema. Capillary refill <2 sec  Musculoskeletal: NROM x4; no joint swelling, tenderness or erythema  Vascular: 2+ radial, femoral, DP/PT pulses B/L  Dermatologic: skin warm, dry and intact; no rashes, wounds, or scars  Lymphatic: no submandibular or cervical LAD  Neurologic: AAOx3; CNII-XII grossly intact; no focal deficits  Psychiatric: affect and characteristics of appearance, verbalizations, behaviors are appropriate     LENGTH OF HOSPITAL STAY: 10d    SUBJECTIVE: No acute overnight events    HISTORY OF PRESENTING ILLNESS:   56 yo F with PMH of Asthma, CAD (not on  meds), peripheral neuropathy and PSH of BATOOL, cholecystectomy, right knee surgery presented to [place] ED on  with right sided weakness and right facial numbness. Patient was undergoing preparation for colonoscopy. As per daughter at 8am patient was playing with her grandchildren but around 840 am patient was getting dressed and fell and subsequently felt weak after. Daughter reports that patient had some episodes of vomiting at this time. She had a syncopal episode at around 10 am and was witnessed by brother. No head trauma, She regained conscious after few minutes. She remained in bed for the remainder of the day. Daughter reports some slurred speech noted 1230-1PM and also was confused after. and around 4PM, the patient's sister noted right sided weakness at which time EMS was called and patient was brought to ED. No c/o chest pain, shortness of breath, fever, headache, abdominal pain, urinary complaints. No recent travel/sickness/ change in meds. Stroke code in ER: CTH neg for heme, Right PICA distribution acute infarction. CTA with saccular aneurysms of b/l carotids, approximately 0.8 cm on the right and 1.2 x 0.9 cm on the left. Possible tiny third saccular aneurysm on the right Posterior intracranial circulation: Right vertebral arterial occlusion at its dural crossing junction V3 Atlantic and V4 intracranial segments with likely reversal of flow in its intracranial segment from the basilar. Right PICA faintly seen. 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  Not candidate for TNK/mechanical thrombectomy. CT scan repeated which showed: 1. Brain: Progression of acute infarction within the right cerebellar hemisphere, also extending into the left superior cerebellar hemisphere. New mass effect on the fourth ventricle causing stenosis versus occlusion with new third and lateral ventricular dilatation indicating ventricular obstruction at the level of the fourth ventricle. New upward and downward herniation of cerebellar parenchyma Right carotid system: No hemodynamically significant stenosis. Left carotid system: No hemodynamically significant stenosis. Vertebral circulation: Patent. Anterior intracranial circulation: Intact. Bilateral internal carotid saccular aneurysms. These findings are unchanged. Posterior intracranial circulation:    Improved flow within the right vertebral artery since 2023. New focal stenosis mid left vertebral artery, etiology uncertain, consider vasospasm and extrinsic compression in addition to new embolic disease. Brain perfusion: Perfusion images demonstrate normalization of the perfusion abnormality in the right cerebellar hemisphere present on 2023 despite evidence of progression of acute infarction.  Areas of apparent ischemia within the posterior fossa have progressed in extent, also again involving the left posterior cerebral arterial distribution. Tx to [facility] for SOC watch. On admission to [facility], NIHSS 12.  (2023 16:50)    PAST MEDICAL & SURGICAL HISTORY:  Asthma  CAD (coronary artery disease)  Peripheral neuropathy  S/P total abdominal hysterectomy  S/P cholecystectomy  S/P right knee surgery    ALLERGIES:  No Known Allergies    MEDICATIONS:  STANDING MEDICATIONS  acetylcysteine 20%  Inhalation 4 milliLiter(s) Inhalation every 8 hours  albuterol/ipratropium for Nebulization 3 milliLiter(s) Nebulizer every 8 hours  atorvastatin 80 milliGRAM(s) Oral at bedtime  carvedilol 3.125 milliGRAM(s) Oral every 12 hours  chlorhexidine 0.12% Liquid 15 milliLiter(s) Oral Mucosa every 12 hours  chlorhexidine 2% Cloths 1 Application(s) Topical <User Schedule>  doxazosin 4 milliGRAM(s) Oral at bedtime  pantoprazole  Injectable 40 milliGRAM(s) IV Push daily  piperacillin/tazobactam IVPB.. 4.5 Gram(s) IV Intermittent every 8 hours  senna 2 Tablet(s) Oral at bedtime  sodium chloride 1 Gram(s) Oral every 6 hours  sodium chloride 0.9%. 1000 milliLiter(s) IV Continuous <Continuous>  sodium chloride 3%  Inhalation 4 milliLiter(s) Inhalation every 8 hours    PRN MEDICATIONS  acetaminophen   Oral Liquid .. 650 milliGRAM(s) Oral every 6 hours PRN  fentaNYL    Injectable 12.5 MICROGram(s) IV Push every 2 hours PRN  ondansetron Injectable 4 milliGRAM(s) IV Push every 6 hours PRN  oxyCODONE    IR 5 milliGRAM(s) Oral every 4 hours PRN    VITALS:   T(F): 98  HR: 70  BP: 128/63  RR: 21  SpO2: 97%    LABS:                        9.3    8.18  )-----------( 224      ( 01 May 2023 05:26 )             28.3     05-01    140  |  105  |  16  ----------------------------<  128<H>  4.1   |  26  |  0.46<L>    Ca    8.8      01 May 2023 05:26  Phos  3.6     05  Mg     2.3         PT/INR - ( 01 May 2023 05:26 )   PT: 13.5 sec;   INR: 1.13       PTT - ( 01 May 2023 05:26 )  PTT:32.0 sec  Urinalysis Basic - ( 2023 05:11 )    Color: Yellow / Appearance: Cloudy / S.025 / pH: x  Gluc: x / Ketone: NEGATIVE  / Bili: Negative / Urobili: 4.0 E.U./dL   Blood: x / Protein: Trace mg/dL / Nitrite: POSITIVE   Leuk Esterase: Moderate / RBC: < 5 /HPF / WBC Many /HPF   Sq Epi: x / Non Sq Epi: x / Bacteria: Present /HPF    ABG - ( 2023 13:14 )  pH, Arterial: 7.45  pH, Blood: x     /  pCO2: 41    /  pO2: 97    / HCO3: 28    / Base Excess: 4.1   /  SaO2: 99.6      Culture - CSF with Gram Stain (collected 2023 06:51)  Source: .CSF CSF  Gram Stain (2023 08:35):    No organisms seen    No polymorphonuclear cells seen  Preliminary Report (01 May 2023 10:19):    No growth to date.    Culture - Urine (collected 2023 05:11)  Source: Catheterized None  Preliminary Report (01 May 2023 11:21):    Culture in progress    Urinalysis with Rflx Culture (collected 2023 05:11)    Culture - Blood (collected 2023 21:07)  Source: .Blood Blood  Preliminary Report (2023 23:01):    No growth at 1 day.    Culture - Blood (collected 2023 21:07)  Source: .Blood Blood  Preliminary Report (2023 23:01):    No growth at 1 day.    Culture - Sputum (collected 2023 21:06)  Source: ET Tube ET Tube  Gram Stain (2023 06:57):    Rare epithelial cells    Numerous White blood cells    Few Gram positive cocci in pairs    RADIOLOGY:  < from: US Duplex Venous Lower Ext Complete, Bilateral (23 @ 12:18) >  No evidence of acute deep venous thrombosis in either lower extremity.  Persistent thrombosis of the superficial right greater saphenous vein   with minimal involvement of the junction with CFV.    < end of copied text >    PHYSICAL EXAM:  Constitutional: Resting comfortably in bed; NAD  Head: NC/AT  Eyes: PERRL, EOMI, anicteric sclera  Respiratory: CTA  Cardiac: +S1/S2; RRR  Gastrointestinal: soft, NT/ND  Neurologic: AAOx1-2

## 2023-05-01 NOTE — PROGRESS NOTE ADULT - SUBJECTIVE AND OBJECTIVE BOX
Surgery: Diagnostic cerebral angiogram   Consent: Signed by HCP,  James Regalado                        No Known Allergies      OVERNIGHT EVENTS: JIM overnight. Neuro stable.     T(C): 36.7 (23 @ 08:54), Max: 37.9 (23 @ 20:15)  HR: 70 (23 @ 11:00) (61 - 91)  BP: 128/63 (23 @ 11:00) (101/48 - 128/63)  RR: 21 (23 @ 11:00) (16 - 27)  SpO2: 97% (23 @ 11:00) (94% - 98%)  Wt(kg): --      PHYSICAL EXAM:  General: NAD, pt is comfortably laying in bed, A&O x 1-2 with choice (nods head, examined in [place]), +trach to full vent   HEENT: OE spont and to voice, R gaze preference (does not cross midline), PERRL 2mm, attempts to track   Cardiovascular: RRR   Respiratory: non-labored breathing. Normal chest rise.   GI: normoactive BS to auscultation, abd soft, NTND   Neuro: nods head Y/N to questions, Left tongue deviation, moves all extremities spontaneously, PRAVEEN/LL 5/5, RUE 4/5, RLE HF 4/5 KE/PF/DF 4/5.    Wound/incision: SOC incision site with sutures and dressing C/D/I   Drain: EVD @0cmH2O, open         140  |  105  |  16  ----------------------------<  128<H>  4.1   |  26  |  0.46<L>    Ca    8.8      01 May 2023 05:26  Phos  3.6     05  Mg     2.3           CBC Full  -  ( 01 May 2023 05:26 )  WBC Count : 8.18 K/uL  RBC Count : 3.09 M/uL  Hemoglobin : 9.3 g/dL  Hematocrit : 28.3 %  Platelet Count - Automated : 224 K/uL  Mean Cell Volume : 91.6 fl  Mean Cell Hemoglobin : 30.1 pg  Mean Cell Hemoglobin Concentration : 32.9 gm/dL  Auto Neutrophil # : x  Auto Lymphocyte # : x  Auto Monocyte # : x  Auto Eosinophil # : x  Auto Basophil # : x  Auto Neutrophil % : x  Auto Lymphocyte % : x  Auto Monocyte % : x  Auto Eosinophil % : x  Auto Basophil % : x    PT/INR - ( 01 May 2023 05:26 )   PT: 13.5 sec;   INR: 1.13          PTT - ( 01 May 2023 05:26 )  PTT:32.0 sec    Pregnancy test:  Type & Screen (in past 72hrs):    CXR:  b/l pleural effusions   EK/1 intermittent PVCs  ECHO: : EF 55-60%  Medical Clearances: medically optimized by neurointensivist   Other Clearances:     Last dose of antiplatelet/anticoagulation dru23 @ 10PM    Implanted Devices (pacemaker, drug pump...etc):  []YES   [x] NO                  If yes --> EPS consulted to interrogate/adjust device:    3M nasal swab ordered?  _Y  _xN  Cranial surgery: Order written for hair to be shampooed night before surgery  [] yes   [x]no                 Assessment: 56 y/o female transferred from [place] with right sided weakness and right facial numbness. Found to have acute infarction within the right cerebellar hemisphere, causing herniation. Now s/p SOC foramen magnum decompression and right parietal/occipital EVD placement (). Course c/b respiratory insuffiency d/t bulbar dysfunction, s/p trach 23.       Plan:  - consent signed by HCP ()   - T+Sx2  - coags  - NPO after midnight  - IVF     Assessment:  Present when checked    []  GCS  E   V  M     Heart Failure: []Acute, [] acute on chronic , []chronic  Heart Failure:  [] Diastolic (HFpEF), [] Systolic (HFrEF), []Combined (HFpEF and HFrEF), [] RHF, [] Pulm HTN, [] Other    [] MAMTA, [] ATN, [] AIN, [] other  [] CKD1, [] CKD2, [] CKD 3, [] CKD 4, [] CKD 5, []ESRD    Encephalopathy: [] Metabolic, [] Hepatic, [] toxic, [] Neurological, [] Other    Abnormal Nurtitional Status: [] malnurtition (see nutrition note), [ ]underweight: BMI < 19, [] morbid obesity: BMI >40, [] Cachexia    [] Sepsis  [] hypovolemic shock,[] cardiogenic shock, [] hemorrhagic shock, [] neuogenic shock  [] Acute Respiratory Failure  []Cerebral edema, [] Brain compression/ herniation,   [] Functional quadriplegia  [] Acute blood loss anemia   Surgery: Diagnostic cerebral angiogram   Consent: Signed by HCP,  James Regalado ([phone])                       No Known Allergies      OVERNIGHT EVENTS: JIM overnight. Neuro stable.     T(C): 36.7 (23 @ 08:54), Max: 37.9 (23 @ 20:15)  HR: 70 (23 @ 11:00) (61 - 91)  BP: 128/63 (23 @ 11:00) (101/48 - 128/63)  RR: 21 (23 @ 11:00) (16 - 27)  SpO2: 97% (23 @ 11:00) (94% - 98%)  Wt(kg): --      PHYSICAL EXAM:  General: NAD, pt is comfortably laying in bed, A&O x 1-2 with choice (nods head, examined in [place]), +trach to full vent   HEENT: OE spont and to voice, R gaze preference (does not cross midline), PERRL 2mm, attempts to track   Cardiovascular: RRR   Respiratory: non-labored breathing. Normal chest rise.   GI: normoactive BS to auscultation, abd soft, NTND   Neuro: nods head Y/N to questions, Left tongue deviation, moves all extremities spontaneously, PRAVEEN/LL 5/5, RUE 4/5, RLE HF 4/5 KE/PF/DF 4/5.    Wound/incision: SOC incision site with sutures and dressing C/D/I   Drain: EVD @0cmH2O, open         140  |  105  |  16  ----------------------------<  128<H>  4.1   |  26  |  0.46<L>    Ca    8.8      01 May 2023 05:26  Phos  3.6       Mg     2.3           CBC Full  -  ( 01 May 2023 05:26 )  WBC Count : 8.18 K/uL  RBC Count : 3.09 M/uL  Hemoglobin : 9.3 g/dL  Hematocrit : 28.3 %  Platelet Count - Automated : 224 K/uL  Mean Cell Volume : 91.6 fl  Mean Cell Hemoglobin : 30.1 pg  Mean Cell Hemoglobin Concentration : 32.9 gm/dL  Auto Neutrophil # : x  Auto Lymphocyte # : x  Auto Monocyte # : x  Auto Eosinophil # : x  Auto Basophil # : x  Auto Neutrophil % : x  Auto Lymphocyte % : x  Auto Monocyte % : x  Auto Eosinophil % : x  Auto Basophil % : x    PT/INR - ( 01 May 2023 05:26 )   PT: 13.5 sec;   INR: 1.13          PTT - ( 01 May 2023 05:26 )  PTT:32.0 sec    Pregnancy test:  Type & Screen (in past 72hrs):    CXR:  b/l pleural effusions   EK/1 intermittent PVCs  ECHO: : EF 55-60%  Medical Clearances: medically optimized by neurointensivist   Other Clearances:     Last dose of antiplatelet/anticoagulation dru23 @ 10PM    Implanted Devices (pacemaker, drug pump...etc):  []YES   [x] NO                  If yes --> EPS consulted to interrogate/adjust device:    3M nasal swab ordered?  _Y  _xN  Cranial surgery: Order written for hair to be shampooed night before surgery  [] yes   [x]no                 Assessment: 58 y/o female transferred from [place] with right sided weakness and right facial numbness. Found to have acute infarction within the right cerebellar hemisphere, causing herniation. Now s/p SOC foramen magnum decompression and right parietal/occipital EVD placement (). Course c/b respiratory insuffiency d/t bulbar dysfunction, s/p trach 23.       Plan:  - consent signed by HCP ()   - T+Sx2  - coags  - NPO after midnight  - IVF     Assessment:  Present when checked    []  GCS  E   V  M     Heart Failure: []Acute, [] acute on chronic , []chronic  Heart Failure:  [] Diastolic (HFpEF), [] Systolic (HFrEF), []Combined (HFpEF and HFrEF), [] RHF, [] Pulm HTN, [] Other    [] MAMTA, [] ATN, [] AIN, [] other  [] CKD1, [] CKD2, [] CKD 3, [] CKD 4, [] CKD 5, []ESRD    Encephalopathy: [] Metabolic, [] Hepatic, [] toxic, [] Neurological, [] Other    Abnormal Nurtitional Status: [] malnurtition (see nutrition note), [ ]underweight: BMI < 19, [] morbid obesity: BMI >40, [] Cachexia    [] Sepsis  [] hypovolemic shock,[] cardiogenic shock, [] hemorrhagic shock, [] neuogenic shock  [] Acute Respiratory Failure  []Cerebral edema, [] Brain compression/ herniation,   [] Functional quadriplegia  [] Acute blood loss anemia

## 2023-05-01 NOTE — PROGRESS NOTE ADULT - ASSESSMENT
57 year old female w/ PMH of asthma, CAD (not on meds), HTN, prior miscarriages X3, peripheral neuropathy and PSH of BATOOL, cholecystectomy and right knee surgery presented to [place] ED on 4/20 w/ right sided weakness and right facial numbness. Now s/p Trach with NGT feeding access. General Surgery consulted for placement of PEG feeding access. Unable to place PEG at bedside today.     - Will attempt lap placement of G tube tomorrow  - Preop on tonight: Preop for OR: NPO after MN: hold tube feeds, AM CBC, BMP, Mg, Phos, Coag, TandS  - Surgery Team 4C will continue to follow. Please page Team 4 with questions/clinical changes. [phone]

## 2023-05-01 NOTE — CHART NOTE - NSCHARTNOTEFT_GEN_A_CORE
POD 10. Neuro stable. Dc'd fentanyl gtt. PEG failed placement at bedside with Dr. Gant, plan for PEG in OR tomorrow afternoon with Dr. Layton. Dc'd amlodipine and started carvedilol 3.125 BID for PVCs . Made 3% inhalation and Mucomyst q8hr. Salt tabs made 1 q 6. Decreased cardura to 4mg daily. Urine culture growing E. Coli and sputum culture growing pluralibacter gergoviae and strep species. ID consulted, f/u recs. Failed CPAP trial @ 3pm. Added am labs per heme/onc for hypercoagulable workup. Pending repeat LE dopplers in 2-3 days. NGT clogged, removed, ENT failed attempt at replacement, will keep NPO for PEG placement tmw. Repeat xray in am for concern for aspiration. POD 10. Neuro stable. Dc'd fentanyl gtt. PEG failed placement at bedside with Dr. Gant, plan for PEG in OR tomorrow afternoon with Dr. Layton. Dc'd amlodipine and started carvedilol 3.125 BID for PVCs . Made 3% inhalation and Mucomyst q8hr. Salt tabs made 1 q 6. Decreased cardura to 4mg daily. Urine culture growing E. Coli and sputum culture growing pluralibacter gergoviae and strep species. ID consulted, f/u recs. Failed CPAP trial @ 3pm. Added am labs per heme/onc for hypercoagulable workup. Pending repeat LE dopplers in 2-3 days. NGT clogged, removed, ENT failed attempt at replacement, will keep NPO for PEG placement tmw. Repeat xray in am for concern for aspiration. Abx changed from Zosyn to Ertapenem 1g Q24hrs. per ID 2/2 possible ESBL growth.

## 2023-05-01 NOTE — PROGRESS NOTE ADULT - ASSESSMENT
58 y/o female transferred from [place] with right sided weakness and right facial numbness. Found to have acute infarction within the right cerebellar hemisphere, causing herniation. Now s/p SOC foramen magnum decompression and right parietal/occipital EVD placement (4/21). Course c/b respiratory insuffiency d/t bulbar dysfunction, s/p trach 4/28/23 failed bedside  PEG placement 5/1; pending OR placement of PEG & DSA for stroke w/u     PLAN:  Neuro   - Vitals/neuro q1h   - Plan for DSA for B/L carotid aneurysms and new left vertebral stenosis with Dr. Minor 5/2  - EVD@0cmH20; monitor ICP/output  - Repeat CTH 4/25 stable   - Stroke following; vasculitis w/u & hypercoagulable w/u pending   - pain control with tylenol prn, oxycodone prn, d/c fent ggt - start PRN 12.5mcg fent IVP q2 hrs    Cardio  - -160  - TTE 4/24: negative for PFO, mild LVH, mild dilation of L atrium, EF 55-60%  - d/c amlodipine start coreg 3.125mg BID for PVCs & HTN management     Pulm  - /40/16/8, CPAP trials as tolerated; has apenic episodes   - Chest PT, nebs q 8  - 6 cuffed trach placed by ENT 4/28, missing cuff, replaced trach at bedside. --> ENT to remove trach sutures POD7 (5/5)    GI  - NGT placed by ENT; NPO for OR tomorrow   - failed PEG attempt at bedside plan for OR placement of PEG tube tomorrow after DSA  - Protonix while intubated  - transaminitis resolved     Renal  - Na goal 135-145, salt tabs 1 q 6  - Cardura decrease from 8 mg to 4mg at bedtime for urinary retention   - Voiding via primafit     Endo   - no issues     Heme  - SCDs, SQL 40 mg QD (ppx) hold for OR tomorrow   - s/p 3 units platelets, 35 mcg of DDAVP for ASA/Plavix reversal  - LE dopplers 4/22 neg for DVT, but showing superficial venous thrombosis in the proximal portion of the right greater saphenous vein; repeat on 4/29 with persistant superficial thrombus   - Heme following for positive anticardiolipin Ab 4/27, recs appreciated ; requesting AC insetting of hypercoagulable state; pending nsgy approval post VPS placement     ID  - Last panculture 4/30, MRSA (-), +ecoli in urine; sputum culture growing pluralibacter & strep species   - c/w zosyn IV for now  -c/s ID for abx stewardship ?pluraibacter abx resistance pending sensitivities   -CSF culture NGTD     DISPO:  - NSICU, full code  - PT/OT rec acute inpatient rehab: patient can tolerate 3 hrs PT/OT daily

## 2023-05-01 NOTE — CONSULT NOTE ADULT - ASSESSMENT
56 yo F with PMH of Asthma, CAD (not on  meds), peripheral neuropathy and PSH of BATOOL, cholecystectomy, right knee surgery presented to [place] ED on 4/20 with right sided weakness and right facial numbness, found to have b/l cerebellar strokes now s/p suboccipital craniotomy for decompression on 4/22 with course c/b respiratory insufficiency requiring trach placement. Patient with new fever overnight 4/29-4/30, cultures sent and started on zosyn. Sputum cultures growing pluralibacter and strep species, urine cultures growing ecoli. ID consulted for antibiotic recommendations.        56 yo F with PMH of Asthma, CAD (not on  meds), peripheral neuropathy and PSH of BATOOL, cholecystectomy, right knee surgery presenting with right sided weakness and right facial numbness, found to have b/l cerebellar strokes now s/p suboccipital craniotomy for decompression on 4/22 with course c/b respiratory insufficiency requiring trach placement. Patient with new fever overnight 4/29-4/30, cultures sent and started on zosyn. Sputum cultures growing pluralibacter and strep species, urine cultures growing ecoli. ID consulted for antibiotic recommendations.     RECOMMENDATIONS:   -f/u BCs 4/29; NGTD   -sputum Cx 4/29 growing pluralibacter gergoviae and moderate strep species; f/u sensitivities   -UCs 4/30 growing ecoli   -f/u CSF Cx 4/30; NGTD   -d/c zosyn   -start IV ertapenem 1g q24hrs as pluralibacter usually ESBL     ID team 1 will continue to follow   Recommendations not final until attested by attending

## 2023-05-01 NOTE — CONSULT NOTE ADULT - SUBJECTIVE AND OBJECTIVE BOX
HPI: 56 yo F with PMH of Asthma, CAD (not on  meds), peripheral neuropathy and PSH of BATOOL, cholecystectomy, right knee surgery presented to [place] ED on 4/20 with right sided weakness and right facial numbness. Patient was undergoing preparation for colonoscopy. As per daughter at 8am patient was playing with her grandchildren but around 840 am patient was getting dressed and fell and subsequently felt weak after. Daughter reports that patient had some episodes of vomiting at this time. She had a syncopal episode at around 10 am and was witnessed by brother. No head trauma, She regained conscious after few minutes. She remained in bed for the remainder of the day. Daughter reports some slurred speech noted 1230-1PM and also was confused after. and around 4PM, the patient's sister noted right sided weakness at which time EMS was called and patient was brought to ED. No c/o chest pain, shortness of breath, fever, headache, abdominal pain, urinary complaints. No recent travel/sickness/ change in meds. Stroke code in ER: CTH neg for heme, Right PICA distribution acute infarction. CTA with saccular aneurysms of b/l carotids, approximately 0.8 cm on the right and 1.2 x 0.9 cm on the left. Possible tiny third saccular aneurysm on the right Posterior intracranial circulation: Right vertebral arterial occlusion at its dural crossing junction V3 Atlantic and V4 intracranial segments with likely reversal of flow in its intracranial segment from the basilar. Right PICA faintly seen. 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  Not candidate for TNK/mechanical thrombectomy. CT scan repeated which showed: 1. Brain: Progression of acute infarction within the right cerebellar hemisphere, also extending into the left superior cerebellar hemisphere. New mass effect on the fourth ventricle causing stenosis versus occlusion with new third and lateral ventricular dilatation indicating ventricular obstruction at the level of the fourth ventricle. New upward and downward herniation of cerebellar parenchyma Right carotid system: No hemodynamically significant stenosis. Left carotid system: No hemodynamically significant stenosis. Vertebral circulation: Patent. Anterior intracranial circulation: Intact. Bilateral internal carotid saccular aneurysms. These findings are unchanged. Posterior intracranial circulation:   Improved flow within the right vertebral artery since 4/20/2023. New focal stenosis mid left vertebral artery, etiology uncertain, consider vasospasm and extrinsic compression in addition to new embolic disease. Brain perfusion: Perfusion images demonstrate normalization of the perfusion abnormality in the right cerebellar hemisphere present on 4/20/2023 despite evidence of progression of acute infarction.  Areas of apparent ischemia within the posterior fossa have progressed in extent, also again involving the left posterior cerebral arterial distribution. Tx to [facility] for SOC watch.   Patient is now s/p suboccipital craniectomy for decompression on 4/22. Course c/b respiratory insufficiency for which she failed extubation and required trach on 4/28. Overnight 4/29-4/30, patient with new fever to 101.3F, cultures sent and started on zosyn. Sputum cultures growing pluralibacter and strep species, urine cultures growing ecoli. Patient is now pending PEG placement.     HPI: 58 yo F with PMH of Asthma, CAD (not on  meds), peripheral neuropathy and PSH of BATOOL, cholecystectomy, right knee surgery presented to [place] ED on  with right sided weakness and right facial numbness. Patient was undergoing preparation for colonoscopy. As per daughter at 8am patient was playing with her grandchildren but around 840 am patient was getting dressed and fell and subsequently felt weak after. Daughter reports that patient had some episodes of vomiting at this time. She had a syncopal episode at around 10 am and was witnessed by brother. No head trauma, She regained conscious after few minutes. She remained in bed for the remainder of the day. Daughter reports some slurred speech noted 1230-1PM and also was confused after. and around 4PM, the patient's sister noted right sided weakness at which time EMS was called and patient was brought to ED. No c/o chest pain, shortness of breath, fever, headache, abdominal pain, urinary complaints. No recent travel/sickness/ change in meds. Stroke code in ER: CTH neg for heme, Right PICA distribution acute infarction. CTA with saccular aneurysms of b/l carotids, approximately 0.8 cm on the right and 1.2 x 0.9 cm on the left. Possible tiny third saccular aneurysm on the right Posterior intracranial circulation: Right vertebral arterial occlusion at its dural crossing junction V3 Atlantic and V4 intracranial segments with likely reversal of flow in its intracranial segment from the basilar. Right PICA faintly seen. 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  Not candidate for TNK/mechanical thrombectomy. CT scan repeated which showed: 1. Brain: Progression of acute infarction within the right cerebellar hemisphere, also extending into the left superior cerebellar hemisphere. New mass effect on the fourth ventricle causing stenosis versus occlusion with new third and lateral ventricular dilatation indicating ventricular obstruction at the level of the fourth ventricle. New upward and downward herniation of cerebellar parenchyma Right carotid system: No hemodynamically significant stenosis. Left carotid system: No hemodynamically significant stenosis. Vertebral circulation: Patent. Anterior intracranial circulation: Intact. Bilateral internal carotid saccular aneurysms. These findings are unchanged. Posterior intracranial circulation:   Improved flow within the right vertebral artery since 2023. New focal stenosis mid left vertebral artery, etiology uncertain, consider vasospasm and extrinsic compression in addition to new embolic disease. Brain perfusion: Perfusion images demonstrate normalization of the perfusion abnormality in the right cerebellar hemisphere present on 2023 despite evidence of progression of acute infarction.  Areas of apparent ischemia within the posterior fossa have progressed in extent, also again involving the left posterior cerebral arterial distribution. Tx to [facility] for SOC watch.   Patient is now s/p suboccipital craniectomy for decompression on . Course c/b respiratory insufficiency for which she failed extubation and required trach on . Overnight -, patient with new fever to 101.3F, cultures sent and started on zosyn. Sputum cultures growing pluralibacter and strep species, urine cultures growing ecoli. Patient is now pending PEG placement.    SUBJECTIVE / INTERVAL HPI: Patient seen and examined at bedside. Subjective/ROS limited by patient condition. Overnight 100.3F rectal temp at 9pm, 100.2F rectal at 1am. Pending PEG placement tomorrow as unsuccessful today.      PHYSICAL EXAM:    General: lying in bed in NAD; non-verbal but follows commands   HEENT: NC/AT; PERRL, anicteric sclera; MMM  Neck: supple  Cardiovascular: +S1/S2, RRR  Respiratory: CTA anteriorly; lungs vented; no W/R/R. On trach collar with trach to vent.   Gastrointestinal: soft, NT/ND; +BSx4  Extremities: WWP; no edema, clubbing or cyanosis  Neurological: neuro exam limited by patient condition however moves all extremities and follows commands   Dermatologic: no appreciable wounds or damage to the skin    VITAL SIGNS:  Vital Signs Last 24 Hrs  T(C): 37.2 (01 May 2023 14:22), Max: 37.9 (2023 20:15)  T(F): 99 (01 May 2023 14:22), Max: 100.3 (2023 20:15)  HR: 69 (01 May 2023 16:00) (61 - 78)  BP: 123/63 (01 May 2023 16:00) (101/48 - 139/57)  BP(mean): 91 (01 May 2023 16:00) (69 - 91)  RR: 16 (01 May 2023 16:00) (16 - 27)  SpO2: 98% (01 May 2023 16:00) (94% - 99%)    Parameters below as of 01 May 2023 16:00  Patient On (Oxygen Delivery Method): ventilator    O2 Concentration (%): 40      MEDICATIONS:  MEDICATIONS  (STANDING):  acetylcysteine 20%  Inhalation 4 milliLiter(s) Inhalation every 8 hours  albuterol/ipratropium for Nebulization 3 milliLiter(s) Nebulizer every 8 hours  atorvastatin 80 milliGRAM(s) Oral at bedtime  carvedilol 3.125 milliGRAM(s) Oral every 12 hours  chlorhexidine 0.12% Liquid 15 milliLiter(s) Oral Mucosa every 12 hours  chlorhexidine 2% Cloths 1 Application(s) Topical <User Schedule>  doxazosin 4 milliGRAM(s) Oral at bedtime  pantoprazole  Injectable 40 milliGRAM(s) IV Push daily  piperacillin/tazobactam IVPB.. 4.5 Gram(s) IV Intermittent every 8 hours  senna 2 Tablet(s) Oral at bedtime  sodium chloride 1 Gram(s) Oral every 6 hours  sodium chloride 0.9%. 1000 milliLiter(s) (85 mL/Hr) IV Continuous <Continuous>  sodium chloride 3%  Inhalation 4 milliLiter(s) Inhalation every 8 hours    MEDICATIONS  (PRN):  acetaminophen   Oral Liquid .. 650 milliGRAM(s) Oral every 6 hours PRN Temp greater or equal to 38C (100.4F), Mild Pain (1 - 3)  fentaNYL    Injectable 12.5 MICROGram(s) IV Push every 2 hours PRN Severe Pain (7 - 10)  ondansetron Injectable 4 milliGRAM(s) IV Push every 6 hours PRN Nausea and/or Vomiting  oxyCODONE    IR 5 milliGRAM(s) Oral every 4 hours PRN Moderate Pain (4 - 6)      ALLERGIES:  Allergies    No Known Allergies    Intolerances        LABS:                        9.3    8.18  )-----------( 224      ( 01 May 2023 05:26 )             28.3     05-01    140  |  105  |  16  ----------------------------<  128<H>  4.1   |  26  |  0.46<L>    Ca    8.8      01 May 2023 05:26  Phos  3.6     05-01  Mg     2.3     05-01      PT/INR - ( 01 May 2023 05:26 )   PT: 13.5 sec;   INR: 1.13          PTT - ( 01 May 2023 05:26 )  PTT:32.0 sec  Urinalysis Basic - ( 2023 05:11 )    Color: Yellow / Appearance: Cloudy / S.025 / pH: x  Gluc: x / Ketone: NEGATIVE  / Bili: Negative / Urobili: 4.0 E.U./dL   Blood: x / Protein: Trace mg/dL / Nitrite: POSITIVE   Leuk Esterase: Moderate / RBC: < 5 /HPF / WBC Many /HPF   Sq Epi: x / Non Sq Epi: x / Bacteria: Present /HPF      CAPILLARY BLOOD GLUCOSE          RADIOLOGY & ADDITIONAL TESTS: Reviewed.

## 2023-05-01 NOTE — PROGRESS NOTE ADULT - SUBJECTIVE AND OBJECTIVE BOX
SUBJECTIVE: Patient has trach in place, appears comfortable in bed. Unable to assess further ROS d/t mental status.     HOSPITAL COURSE:  : Admitted for crani watch, CTH complete w/ unchanged hydrocephalus, taken for emergent occipital craniectomy.   : POD1. Remains intubated overnight, on propofol and dank. EVD@21xxW22. Pending repeat CTH this am. Given hydralazine 10mg x1. Started on cardene for SBP high 140s-150s. Sub-therapeutic on plavix, therapeutic on asa, rpt asa accumetrics until subtherapeutic. LE dopplers neg for DVT, but showing superficial venous thrombosis in the proximal portion of the right greater saphenous vein. Started on 3% @60 for na goal 145-150. NGT placed by ENT. Febrile, pancultured.  : POD 2. 3% increased to 75. NGT readjusted. UA neg. SBP liberalized to 160. Plan to extubate. 3% decreased to 60. Failed extubation d/t no cuff leak, given 60mg solumedrol, plan to extubate in 6 hrs. SCx1 for post void residual >400, started on cardura at bedtime. New blood in buretrol, stopped SQL. Clot in EVD cleared. Neuro exam improving.   : POD 3. Cont 3% with NS while NPO, start TF today. TF started. LFTs downtrending. Sodium 141. Increased 3% to 50, NS to 30. Repeat BMP ordered for 5:30PM. Tramadol 25 for pain with no relief, oxy 5 and 1g tylenol ordered, stability CT stable, speech language consult for dysarthria. Given hydralazine 10mg IVP for SBP>160, started amlodipine 5mg. C/o mild headache, given fioricet x1, stroke neuro rec SALVADOR and loop recorder placement. Echo with bubble completed, negative for PFO, EF 55-60%. J HFNC started for desats with suctioning.   : POD 4. Cont HFNC. Increased secertions on HFNC, reintubated. CXR confirmed good placement. EVD dropped to 5cmH20 for goal of draining 5-10cc/hr and SG ELENA drain taken off suction. Pending CTH. EVD drained 0cc/hr, dropped to 0. NGT replaced. Dc'd fioricet. Started on PPI for intubation. Increased cardura to 4mg for urinary retention, given an additional 2mg now. Started salt tabs 3 q 6. Given 12.5mg fentanyl x1. NGT replaced, CXR shown in lung. NGT removed, repeat CXR showing no pneumothorax. Pending ENT consult for NGT placement. CTH stable. NGT replaced by ENT, confirmed in proper spot. Restarted TF. [id number]. Na 141 from 146. Increased 3% to 60. EVD raised to 3cmH20. ETT pulled back 1cm by RT.  : POD 5 SOC. Intubated, remains on precedex, ABG ordered with improving PaO2, BMP sodium 148, 3% changed to 30cc/hr, cardura increased to 8mg for tonight, tolerating cpap  : POD 6 SOC. Respiratory rate sustained 6, returned to FVS. Na 151, dc'd 3%, f/u AM sodium. Nurse noticed ETT 19 at the lip and was 20 prior, CXR shows ETT @ 5cm above jono, ET tube advanced 1cm, repeat CXR confirms appropriate placement. Thick inline secretions with suctioning. ENT trach placement Fri likely, PEG likely Mon. Keep ELENA drain until no output, EVD to remain @3. Start ASA 81mg daily tomorrow.   : POD7 SOC, neuro stable, given fentanyl x 1 overnight for presumed discomfort (biting on ETT), remains on full vent support. CTH today stable in comparison to . 6 cuffed trach placed by ENT in OR, but came down without cuff. Replaced at bedside by ENT. On fentanyl gtt.   : POD8 SOC, JIM overnight. Incision cleaned and dressing changed. On fentanyl gtt. EKG completed due to increased frequency PVCs on telemetry, frequency of PVCs improved with titrating up fentanyl gtt. ABG drawn.  Repeat LE dopplers completed showing persistant superficial thrombus, no DVT. No EVD waveform during day, flushed distally without improvement. Exam unchanged.  EVD dropped to 0 for low output in afternoon.   : POD9. Neuro stable, febrile to 101.3F o/n, given tylenol and pan cx sent. SBP dipped to low 90s o/n, HR stable in 70s with some PVCs, decreased fentanyl gtt and given 500cc bolus of NS x 2. Pend PEG placement Mon and angio Tues. D/c'd ELENA drain today and suture placed. F/u heme recs. Positive UA. Infiltrates found on CXR. Started on Zosyn 4.5g Q6hrs .   : POD 10. Neuro stable.     Vital Signs Last 24 Hrs  T(C): 37.9 (2023 21:00), Max: 37.9 (2023 20:15)  T(F): 100.2 (2023 21:00), Max: 100.3 (2023 20:15)  HR: 65 (01 May 2023 00:00) (65 - 91)  BP: 101/48 (01 May 2023 00:00) (90/46 - 143/64)  BP(mean): 69 (01 May 2023 00:00) (66 - 92)  RR: 16 (01 May 2023 00:00) (16 - 23)  SpO2: 94% (01 May 2023 00:00) (94% - 99%)    Parameters below as of 01 May 2023 00:00  Patient On (Oxygen Delivery Method): ventilator    O2 Concentration (%): 40    I&O's Summary    2023 07:01  -  2023 07:00  --------------------------------------------------------  IN: 3070 mL / OUT: 1937 mL / NET: 1133 mL    2023 07:01  -  01 May 2023 00:14  --------------------------------------------------------  IN: 1646.6 mL / OUT: 1134 mL / NET: 512.6 mL    PHYSICAL EXAM:  General: NAD, pt is comfortably laying in bed, A&O x 1-2 with choice (nods head, examined in [place]), +trach to full vent   HEENT: OE spont and to voice, R gaze preference (does not cross midline), PERRL 2mm, attempts to track   Cardiovascular: RRR, normal S1 and S2 + PVCs   Respiratory: lungs CTAB, no wheezing, rhonchi, or crackles   GI: normoactive BS to auscultation, abd soft, NTND   Neuro: nods head Y/N to questions, moves all extremities spontaneously, PRAVEEN/LL 5/5, RUE 2/5, RLE 3/5   Wound/incision: SOC incision site with sutures and dressing C/D/I   Drain: EVD @0cmH2O, open     LABS:                        8.8    7.63  )-----------( 213      ( 2023 04:55 )             26.8         138  |  103  |  18  ----------------------------<  152<H>  3.5   |  28  |  0.45<L>    Ca    7.8<L>      2023 04:55  Phos  3.7       Mg     2.2           PT/INR - ( 2023 04:55 )   PT: 13.2 sec;   INR: 1.11          PTT - ( 2023 04:55 )  PTT:33.0 sec  Urinalysis Basic - ( 2023 05:11 )    Color: Yellow / Appearance: Cloudy / S.025 / pH: x  Gluc: x / Ketone: NEGATIVE  / Bili: Negative / Urobili: 4.0 E.U./dL   Blood: x / Protein: Trace mg/dL / Nitrite: POSITIVE   Leuk Esterase: Moderate / RBC: < 5 /HPF / WBC Many /HPF   Sq Epi: x / Non Sq Epi: x / Bacteria: Present /HPF    CAPILLARY BLOOD GLUCOSE    Drug Levels: [] N/A    CSF Analysis: [] N/A  RBC Count - Spinal Fluid: 3050 /uL ( @ 06:51)  CSF Lymphocytes: 1 % ( @ 06:51)  Glucose, CSF: 89 mg/dL ( @ 06:51)  Protein, CSF: 21 mg/dL ( @ 06:51)    Allergies    No Known Allergies    Intolerances    MEDICATIONS:  Antibiotics:  piperacillin/tazobactam IVPB.. 4.5 Gram(s) IV Intermittent every 8 hours    Neuro:  acetaminophen   Oral Liquid .. 650 milliGRAM(s) Enteral Tube every 6 hours PRN  fentaNYL   Infusion 0.5 MICROgram(s)/kG/Hr IV Continuous <Continuous>  ondansetron Injectable 4 milliGRAM(s) IV Push every 6 hours PRN  oxyCODONE    IR 5 milliGRAM(s) Oral every 4 hours PRN    Anticoagulation:  enoxaparin Injectable 40 milliGRAM(s) SubCutaneous every 24 hours    OTHER:  acetylcysteine 20%  Inhalation 4 milliLiter(s) Inhalation every 6 hours  albuterol/ipratropium for Nebulization 3 milliLiter(s) Nebulizer every 6 hours  amLODIPine   Tablet 5 milliGRAM(s) Oral daily  atorvastatin 80 milliGRAM(s) Oral at bedtime  chlorhexidine 0.12% Liquid 15 milliLiter(s) Oral Mucosa every 12 hours  chlorhexidine 2% Cloths 1 Application(s) Topical <User Schedule>  doxazosin 8 milliGRAM(s) Oral at bedtime  pantoprazole  Injectable 40 milliGRAM(s) IV Push daily  senna 2 Tablet(s) Oral at bedtime  sodium chloride 3%  Inhalation 4 milliLiter(s) Inhalation every 6 hours    IVF:  sodium chloride 3 Gram(s) Oral every 6 hours  sodium chloride 0.9%. 1000 milliLiter(s) IV Continuous <Continuous>    CULTURES:  Culture Results:   No growth at 1 day. ( @ 21:07)  Culture Results:   No growth at 1 day. ( @ 21:07)    RADIOLOGY & ADDITIONAL TESTS:      ASSESSMENT:  58 y/o female transferred from [place] with right sided weakness and right facial numbness. Found to have acute infarction within the right cerebellar hemisphere, causing herniation. Now s/p SOC foramen magnum decompression and right parietal/occipital EVD placement (). Course c/b respiratory insuffiency d/t bulbar dysfunction, s/p trach 23.     PLAN:  Neuro   - Vitals/neuro q1h   - Plan for DSA for B/L carotid aneurysms and new left vertebral stenosis with Dr. Minor   - SG ELENA dc'd  - EVD@0cmH20; monitor ICP/output  - Repeat CTH  stable   - Stroke consulted, f/u recs  - pain control with tylenol prn, oxycodone prn, fent gtt (weaning)     Cardio  - -160  - TTE : negative for PFO, mild LVH, mild dilation of L atrium, EF 55-60%  - Amlodipine 5 mg daily started     Pulm  - /40/16/8, CPAP trials as tolerated  - Chest PT, nebs q 6   - 6 cuffed trach placed by ENT , missing cuff, replaced trach at bedside. --> ENT to remove trach sutures POD7 ()    GI  - NGT placed by ENT; started TF Glucerna @ 50, pending PEG placement , NPO  - Bowel regimen, last BM   - Protonix while intubated  - Trend LFTs q 72 hrs     Renal  - Na goal 135-145, salt tabs 3 q 6  - Cardura 8 mg at bedtime for urinary retention   - Voiding via primafit     Endo   - no issues     Heme  - SCDs, SQL 40 mg QD (ppx) resumed   - s/p 3 units platelets, 35 mcg of DDAVP for ASA/Plavix reversal  - LE dopplers  neg for DVT, but showing superficial venous thrombosis in the proximal portion of the right greater saphenous vein; repeat on  with persistant superficial thrombus   - Heme following for positive anticardiolipin Ab , recs appreciated     ID  - Last panculture , MRSA (-), +UA pending cx   - Empiric zosyn    DISPO:  - NSICU, full code  - PT/OT rec acute inpatient rehab: patient can tolerate 3 hrs PT/OT daily    D/w Dr. Valadez and Dr. Bueno

## 2023-05-01 NOTE — BRIEF OPERATIVE NOTE - OPERATION/FINDINGS
Aborted PEG placement. Endoscopy revealing grossly normal esophagus, stomach, and first portion of duodenum. Skin incision made and attempted to advance needle percutaneously into stomach, however was hubbed and still not visualized in the stomach. Aborted procedure as was deemed unsafe to continue.

## 2023-05-02 ENCOUNTER — APPOINTMENT (OUTPATIENT)
Dept: NEUROSURGERY | Facility: HOSPITAL | Age: 58
End: 2023-05-02

## 2023-05-02 ENCOUNTER — TRANSCRIPTION ENCOUNTER (OUTPATIENT)
Age: 58
End: 2023-05-02

## 2023-05-02 LAB
-  AMIKACIN: SIGNIFICANT CHANGE UP
-  AMPICILLIN/SULBACTAM: SIGNIFICANT CHANGE UP
-  AMPICILLIN: SIGNIFICANT CHANGE UP
-  CEFAZOLIN: SIGNIFICANT CHANGE UP
-  CEFTRIAXONE: SIGNIFICANT CHANGE UP
-  CIPROFLOXACIN: SIGNIFICANT CHANGE UP
-  CLINDAMYCIN: SIGNIFICANT CHANGE UP
-  ERTAPENEM: SIGNIFICANT CHANGE UP
-  ERYTHROMYCIN: SIGNIFICANT CHANGE UP
-  GENTAMICIN: SIGNIFICANT CHANGE UP
-  MEROPENEM: SIGNIFICANT CHANGE UP
-  MEROPENEM: SIGNIFICANT CHANGE UP
-  NITROFURANTOIN: SIGNIFICANT CHANGE UP
-  NITROFURANTOIN: SIGNIFICANT CHANGE UP
-  PENICILLIN: SIGNIFICANT CHANGE UP
-  PIPERACILLIN/TAZOBACTAM: SIGNIFICANT CHANGE UP
-  TOBRAMYCIN: SIGNIFICANT CHANGE UP
-  TRIMETHOPRIM/SULFAMETHOXAZOLE: SIGNIFICANT CHANGE UP
ANION GAP SERPL CALC-SCNC: 8 MMOL/L — SIGNIFICANT CHANGE UP (ref 5–17)
APTT BLD: 34.9 SEC — SIGNIFICANT CHANGE UP (ref 27.5–35.5)
BUN SERPL-MCNC: 11 MG/DL — SIGNIFICANT CHANGE UP (ref 7–23)
CALCIUM SERPL-MCNC: 8.9 MG/DL — SIGNIFICANT CHANGE UP (ref 8.4–10.5)
CHLORIDE SERPL-SCNC: 103 MMOL/L — SIGNIFICANT CHANGE UP (ref 96–108)
CO2 SERPL-SCNC: 24 MMOL/L — SIGNIFICANT CHANGE UP (ref 22–31)
CREAT SERPL-MCNC: 0.4 MG/DL — LOW (ref 0.5–1.3)
CULTURE RESULTS: SIGNIFICANT CHANGE UP
CULTURE RESULTS: SIGNIFICANT CHANGE UP
EGFR: 116 ML/MIN/1.73M2 — SIGNIFICANT CHANGE UP
GLUCOSE SERPL-MCNC: 108 MG/DL — HIGH (ref 70–99)
HCT VFR BLD CALC: 27 % — LOW (ref 34.5–45)
HGB BLD-MCNC: 9 G/DL — LOW (ref 11.5–15.5)
INR BLD: 1.12 — SIGNIFICANT CHANGE UP (ref 0.88–1.16)
MAGNESIUM SERPL-MCNC: 2.2 MG/DL — SIGNIFICANT CHANGE UP (ref 1.6–2.6)
MCHC RBC-ENTMCNC: 29.8 PG — SIGNIFICANT CHANGE UP (ref 27–34)
MCHC RBC-ENTMCNC: 33.3 GM/DL — SIGNIFICANT CHANGE UP (ref 32–36)
MCV RBC AUTO: 89.4 FL — SIGNIFICANT CHANGE UP (ref 80–100)
METHOD TYPE: SIGNIFICANT CHANGE UP
NRBC # BLD: 0 /100 WBCS — SIGNIFICANT CHANGE UP (ref 0–0)
ORGANISM # SPEC MICROSCOPIC CNT: SIGNIFICANT CHANGE UP
PHOSPHATE SERPL-MCNC: 3.7 MG/DL — SIGNIFICANT CHANGE UP (ref 2.5–4.5)
PLATELET # BLD AUTO: 258 K/UL — SIGNIFICANT CHANGE UP (ref 150–400)
POTASSIUM SERPL-MCNC: 3.9 MMOL/L — SIGNIFICANT CHANGE UP (ref 3.5–5.3)
POTASSIUM SERPL-SCNC: 3.9 MMOL/L — SIGNIFICANT CHANGE UP (ref 3.5–5.3)
PROTHROM AB SERPL-ACNC: 13.3 SEC — SIGNIFICANT CHANGE UP (ref 10.5–13.4)
RAPID RVP RESULT: SIGNIFICANT CHANGE UP
RBC # BLD: 3.02 M/UL — LOW (ref 3.8–5.2)
RBC # FLD: 12.6 % — SIGNIFICANT CHANGE UP (ref 10.3–14.5)
SARS-COV-2 RNA SPEC QL NAA+PROBE: SIGNIFICANT CHANGE UP
SODIUM SERPL-SCNC: 135 MMOL/L — SIGNIFICANT CHANGE UP (ref 135–145)
SPECIMEN SOURCE: SIGNIFICANT CHANGE UP
SPECIMEN SOURCE: SIGNIFICANT CHANGE UP
WBC # BLD: 6.63 K/UL — SIGNIFICANT CHANGE UP (ref 3.8–10.5)
WBC # FLD AUTO: 6.63 K/UL — SIGNIFICANT CHANGE UP (ref 3.8–10.5)

## 2023-05-02 PROCEDURE — 36226 PLACE CATH VERTEBRAL ART: CPT | Mod: 50,58

## 2023-05-02 PROCEDURE — 36224 PLACE CATH CAROTD ART: CPT | Mod: 50,58

## 2023-05-02 PROCEDURE — 43653 LAPAROSCOPY GASTROSTOMY: CPT

## 2023-05-02 PROCEDURE — 99232 SBSQ HOSP IP/OBS MODERATE 35: CPT | Mod: GC

## 2023-05-02 PROCEDURE — 76377 3D RENDER W/INTRP POSTPROCES: CPT | Mod: 26

## 2023-05-02 PROCEDURE — 71045 X-RAY EXAM CHEST 1 VIEW: CPT | Mod: 26

## 2023-05-02 PROCEDURE — 99291 CRITICAL CARE FIRST HOUR: CPT

## 2023-05-02 PROCEDURE — 76937 US GUIDE VASCULAR ACCESS: CPT | Mod: 26

## 2023-05-02 PROCEDURE — 99292 CRITICAL CARE ADDL 30 MIN: CPT

## 2023-05-02 DEVICE — KIT PEG ENDOVIVE ENFIT 20FR PUSH 2/BX: Type: IMPLANTABLE DEVICE | Site: ABDOMEN | Status: FUNCTIONAL

## 2023-05-02 DEVICE — PEG KT SAFETY 20FR: Type: IMPLANTABLE DEVICE | Site: ABDOMEN | Status: FUNCTIONAL

## 2023-05-02 RX ORDER — SODIUM CHLORIDE 9 MG/ML
1 INJECTION INTRAMUSCULAR; INTRAVENOUS; SUBCUTANEOUS EVERY 6 HOURS
Refills: 0 | Status: DISCONTINUED | OUTPATIENT
Start: 2023-05-02 | End: 2023-05-02

## 2023-05-02 RX ORDER — FENTANYL CITRATE 50 UG/ML
12.5 INJECTION INTRAVENOUS
Refills: 0 | Status: DISCONTINUED | OUTPATIENT
Start: 2023-05-02 | End: 2023-05-05

## 2023-05-02 RX ORDER — LACTOBACILLUS ACIDOPHILUS 100MM CELL
1 CAPSULE ORAL DAILY
Refills: 0 | Status: DISCONTINUED | OUTPATIENT
Start: 2023-05-02 | End: 2023-05-08

## 2023-05-02 RX ORDER — ATORVASTATIN CALCIUM 80 MG/1
80 TABLET, FILM COATED ORAL AT BEDTIME
Refills: 0 | Status: DISCONTINUED | OUTPATIENT
Start: 2023-05-02 | End: 2023-05-08

## 2023-05-02 RX ORDER — OXYCODONE HYDROCHLORIDE 5 MG/1
5 TABLET ORAL EVERY 4 HOURS
Refills: 0 | Status: DISCONTINUED | OUTPATIENT
Start: 2023-05-02 | End: 2023-05-02

## 2023-05-02 RX ORDER — ACETAMINOPHEN 500 MG
650 TABLET ORAL EVERY 6 HOURS
Refills: 0 | Status: DISCONTINUED | OUTPATIENT
Start: 2023-05-02 | End: 2023-05-08

## 2023-05-02 RX ORDER — IPRATROPIUM/ALBUTEROL SULFATE 18-103MCG
3 AEROSOL WITH ADAPTER (GRAM) INHALATION EVERY 6 HOURS
Refills: 0 | Status: DISCONTINUED | OUTPATIENT
Start: 2023-05-02 | End: 2023-05-04

## 2023-05-02 RX ORDER — DOXAZOSIN MESYLATE 4 MG
4 TABLET ORAL AT BEDTIME
Refills: 0 | Status: DISCONTINUED | OUTPATIENT
Start: 2023-05-02 | End: 2023-05-02

## 2023-05-02 RX ORDER — POTASSIUM CHLORIDE 20 MEQ
10 PACKET (EA) ORAL ONCE
Refills: 0 | Status: COMPLETED | OUTPATIENT
Start: 2023-05-02 | End: 2023-05-02

## 2023-05-02 RX ORDER — PANTOPRAZOLE SODIUM 20 MG/1
40 TABLET, DELAYED RELEASE ORAL DAILY
Refills: 0 | Status: DISCONTINUED | OUTPATIENT
Start: 2023-05-02 | End: 2023-05-02

## 2023-05-02 RX ORDER — CHLORHEXIDINE GLUCONATE 213 G/1000ML
15 SOLUTION TOPICAL EVERY 12 HOURS
Refills: 0 | Status: DISCONTINUED | OUTPATIENT
Start: 2023-05-02 | End: 2023-05-08

## 2023-05-02 RX ORDER — SENNA PLUS 8.6 MG/1
2 TABLET ORAL AT BEDTIME
Refills: 0 | Status: DISCONTINUED | OUTPATIENT
Start: 2023-05-02 | End: 2023-05-02

## 2023-05-02 RX ORDER — SODIUM CHLORIDE 9 MG/ML
1 INJECTION INTRAMUSCULAR; INTRAVENOUS; SUBCUTANEOUS EVERY 6 HOURS
Refills: 0 | Status: DISCONTINUED | OUTPATIENT
Start: 2023-05-02 | End: 2023-05-05

## 2023-05-02 RX ORDER — PROPOFOL 10 MG/ML
10 INJECTION, EMULSION INTRAVENOUS
Qty: 1000 | Refills: 0 | Status: DISCONTINUED | OUTPATIENT
Start: 2023-05-02 | End: 2023-05-02

## 2023-05-02 RX ORDER — SODIUM CHLORIDE 9 MG/ML
1000 INJECTION INTRAMUSCULAR; INTRAVENOUS; SUBCUTANEOUS
Refills: 0 | Status: DISCONTINUED | OUTPATIENT
Start: 2023-05-02 | End: 2023-05-05

## 2023-05-02 RX ORDER — DOXAZOSIN MESYLATE 4 MG
4 TABLET ORAL AT BEDTIME
Refills: 0 | Status: DISCONTINUED | OUTPATIENT
Start: 2023-05-02 | End: 2023-05-03

## 2023-05-02 RX ORDER — OXYCODONE HYDROCHLORIDE 5 MG/1
5 TABLET ORAL EVERY 4 HOURS
Refills: 0 | Status: DISCONTINUED | OUTPATIENT
Start: 2023-05-02 | End: 2023-05-07

## 2023-05-02 RX ORDER — ACETYLCYSTEINE 200 MG/ML
4 VIAL (ML) MISCELLANEOUS EVERY 8 HOURS
Refills: 0 | Status: DISCONTINUED | OUTPATIENT
Start: 2023-05-02 | End: 2023-05-02

## 2023-05-02 RX ORDER — SODIUM CHLORIDE 9 MG/ML
4 INJECTION INTRAMUSCULAR; INTRAVENOUS; SUBCUTANEOUS EVERY 6 HOURS
Refills: 0 | Status: DISCONTINUED | OUTPATIENT
Start: 2023-05-02 | End: 2023-05-04

## 2023-05-02 RX ORDER — SODIUM CHLORIDE 9 MG/ML
4 INJECTION INTRAMUSCULAR; INTRAVENOUS; SUBCUTANEOUS EVERY 8 HOURS
Refills: 0 | Status: DISCONTINUED | OUTPATIENT
Start: 2023-05-02 | End: 2023-05-02

## 2023-05-02 RX ORDER — ERTAPENEM SODIUM 1 G/1
1000 INJECTION, POWDER, LYOPHILIZED, FOR SOLUTION INTRAMUSCULAR; INTRAVENOUS EVERY 24 HOURS
Refills: 0 | Status: DISCONTINUED | OUTPATIENT
Start: 2023-05-02 | End: 2023-05-04

## 2023-05-02 RX ORDER — ATORVASTATIN CALCIUM 80 MG/1
80 TABLET, FILM COATED ORAL AT BEDTIME
Refills: 0 | Status: DISCONTINUED | OUTPATIENT
Start: 2023-05-02 | End: 2023-05-02

## 2023-05-02 RX ORDER — CHLORHEXIDINE GLUCONATE 213 G/1000ML
1 SOLUTION TOPICAL
Refills: 0 | Status: DISCONTINUED | OUTPATIENT
Start: 2023-05-02 | End: 2023-05-08

## 2023-05-02 RX ORDER — PANTOPRAZOLE SODIUM 20 MG/1
40 TABLET, DELAYED RELEASE ORAL DAILY
Refills: 0 | Status: DISCONTINUED | OUTPATIENT
Start: 2023-05-02 | End: 2023-05-03

## 2023-05-02 RX ORDER — CARVEDILOL PHOSPHATE 80 MG/1
3.12 CAPSULE, EXTENDED RELEASE ORAL EVERY 12 HOURS
Refills: 0 | Status: DISCONTINUED | OUTPATIENT
Start: 2023-05-02 | End: 2023-05-08

## 2023-05-02 RX ORDER — CARVEDILOL PHOSPHATE 80 MG/1
3.12 CAPSULE, EXTENDED RELEASE ORAL EVERY 12 HOURS
Refills: 0 | Status: DISCONTINUED | OUTPATIENT
Start: 2023-05-02 | End: 2023-05-02

## 2023-05-02 RX ORDER — ACETYLCYSTEINE 200 MG/ML
4 VIAL (ML) MISCELLANEOUS EVERY 6 HOURS
Refills: 0 | Status: DISCONTINUED | OUTPATIENT
Start: 2023-05-02 | End: 2023-05-04

## 2023-05-02 RX ADMIN — ATORVASTATIN CALCIUM 80 MILLIGRAM(S): 80 TABLET, FILM COATED ORAL at 22:05

## 2023-05-02 RX ADMIN — CHLORHEXIDINE GLUCONATE 1 APPLICATION(S): 213 SOLUTION TOPICAL at 06:00

## 2023-05-02 RX ADMIN — Medication 3 MILLILITER(S): at 05:01

## 2023-05-02 RX ADMIN — Medication 4 MILLIGRAM(S): at 22:06

## 2023-05-02 RX ADMIN — Medication 4 MILLILITER(S): at 17:08

## 2023-05-02 RX ADMIN — CHLORHEXIDINE GLUCONATE 15 MILLILITER(S): 213 SOLUTION TOPICAL at 06:00

## 2023-05-02 RX ADMIN — SODIUM CHLORIDE 1 GRAM(S): 9 INJECTION INTRAMUSCULAR; INTRAVENOUS; SUBCUTANEOUS at 23:46

## 2023-05-02 RX ADMIN — SODIUM CHLORIDE 4 MILLILITER(S): 9 INJECTION INTRAMUSCULAR; INTRAVENOUS; SUBCUTANEOUS at 02:31

## 2023-05-02 RX ADMIN — SODIUM CHLORIDE 4 MILLILITER(S): 9 INJECTION INTRAMUSCULAR; INTRAVENOUS; SUBCUTANEOUS at 17:11

## 2023-05-02 RX ADMIN — ERTAPENEM SODIUM 120 MILLIGRAM(S): 1 INJECTION, POWDER, LYOPHILIZED, FOR SOLUTION INTRAMUSCULAR; INTRAVENOUS at 22:05

## 2023-05-02 RX ADMIN — Medication 100 MILLIEQUIVALENT(S): at 07:42

## 2023-05-02 RX ADMIN — CHLORHEXIDINE GLUCONATE 15 MILLILITER(S): 213 SOLUTION TOPICAL at 17:59

## 2023-05-02 RX ADMIN — Medication 3 MILLILITER(S): at 17:08

## 2023-05-02 RX ADMIN — PANTOPRAZOLE SODIUM 40 MILLIGRAM(S): 20 TABLET, DELAYED RELEASE ORAL at 22:06

## 2023-05-02 RX ADMIN — SODIUM CHLORIDE 85 MILLILITER(S): 9 INJECTION INTRAMUSCULAR; INTRAVENOUS; SUBCUTANEOUS at 23:59

## 2023-05-02 NOTE — PROGRESS NOTE ADULT - SUBJECTIVE AND OBJECTIVE BOX
infectious diseases progress note:  MAKSIM TRIVEDI is a 57yFemale patient    STROKE      Cerebellar infarct    CAD (coronary artery disease)    Asthma    Peripheral neuropathy    Lupus anticoagulant positive    Anemia due to acute blood loss        ROS:  CONSTITUTIONAL:  Negative fever or chills, feels well, good appetite  EYES:  Negative  blurry vision or double vision  CARDIOVASCULAR:  Negative for chest pain or palpitations  RESPIRATORY:  Negative for cough, wheezing, or SOB   GASTROINTESTINAL:  Negative for nausea, vomiting, diarrhea, constipation, or abdominal pain  GENITOURINARY:  Negative frequency, urgency or dysuria  NEUROLOGIC:  No headache, confusion, dizziness, lightheadedness    Allergies    No Known Allergies    Intolerances        ANTIBIOTICS/RELEVANT:  antimicrobials  ertapenem  IVPB 1000 milliGRAM(s) IV Intermittent every 24 hours    immunologic:    OTHER:  acetaminophen   Oral Liquid .. 650 milliGRAM(s) Oral every 6 hours PRN  acetylcysteine 20%  Inhalation 4 milliLiter(s) Inhalation every 8 hours  albuterol/ipratropium for Nebulization 3 milliLiter(s) Nebulizer every 8 hours  atorvastatin 80 milliGRAM(s) Oral at bedtime  carvedilol 3.125 milliGRAM(s) Oral every 12 hours  chlorhexidine 0.12% Liquid 15 milliLiter(s) Oral Mucosa every 12 hours  chlorhexidine 2% Cloths 1 Application(s) Topical <User Schedule>  doxazosin 4 milliGRAM(s) Oral at bedtime  fentaNYL    Injectable 12.5 MICROGram(s) IV Push every 2 hours PRN  ondansetron Injectable 4 milliGRAM(s) IV Push every 6 hours PRN  oxyCODONE    IR 5 milliGRAM(s) Oral every 4 hours PRN  pantoprazole  Injectable 40 milliGRAM(s) IV Push daily  senna 2 Tablet(s) Oral at bedtime  sodium chloride 1 Gram(s) Oral every 6 hours  sodium chloride 0.9%. 1000 milliLiter(s) IV Continuous <Continuous>  sodium chloride 3%  Inhalation 4 milliLiter(s) Inhalation every 8 hours      Objective:  Vital Signs Last 24 Hrs  T(C): 37 (02 May 2023 05:49), Max: 37.3 (01 May 2023 17:41)  T(F): 98.6 (02 May 2023 05:49), Max: 99.2 (01 May 2023 21:39)  HR: 59 (02 May 2023 07:00) (58 - 81)  BP: 119/57 (02 May 2023 07:00) (108/53 - 139/57)  BP(mean): 82 (02 May 2023 07:00) (75 - 95)  RR: 21 (02 May 2023 07:00) (11 - 27)  SpO2: 97% (02 May 2023 07:00) (94% - 100%)    Parameters below as of 02 May 2023 07:00  Patient On (Oxygen Delivery Method): ventilator    O2 Concentration (%): 40    PHYSICAL EXAM:  Constitutional:Well-developed, well nourishe  Eyes:MIRTHA, EOMI  Ear/Nose/Throat: no oral lesion, no sinus tenderness on percussion	  Neck:no JVD, no lymphadenopathy, supple  Respiratory: CTA siri  Cardiovascular: S1S2 RRR, no murmurs  Gastrointestinal:soft, (+) BS, no HSM  Extremities:no e/e/c        LABS:                        9.0    6.63  )-----------( 258      ( 02 May 2023 05:03 )             27.0     05-02    135  |  103  |  11  ----------------------------<  108<H>  3.9   |  24  |  0.40<L>    Ca    8.9      02 May 2023 05:03  Phos  3.7     05-02  Mg     2.2     05-02      PT/INR - ( 02 May 2023 05:03 )   PT: 13.3 sec;   INR: 1.12          PTT - ( 02 May 2023 05:03 )  PTT:34.9 sec        MICROBIOLOGY:        RADIOLOGY & ADDITIONAL STUDIES: *INCOMPLETE NOTE*    Hospital course:    INTERVAL HPI/OVERNIGHT EVENTS:  As per night team, no overnight events. Patient seen and examined at bedside. Patient denies fever, chills, dizziness, weakness, HA, CP, SOB, N/V/D/C    VITALS  Vital Signs Last 24 Hrs  T(C): 37 (02 May 2023 08:09), Max: 37.3 (01 May 2023 17:41)  T(F): 98 (02 May 2023 07:18), Max: 99.2 (01 May 2023 21:39)  HR: 86 (02 May 2023 08:38) (58 - 86)  BP: 130/61 (02 May 2023 08:09) (108/53 - 139/57)  BP(mean): 88 (02 May 2023 08:09) (75 - 95)  RR: 18 (02 May 2023 08:38) (11 - 27)  SpO2: 99% (02 May 2023 08:38) (94% - 100%)    Parameters below as of 02 May 2023 08:30  Patient On (Oxygen Delivery Method): ventilator        CAPILLARY BLOOD GLUCOSE          PHYSICAL EXAM  General: NAD, sitting comfortably in bed   HEENT: PERRL/ EOMI, no scleral icterus, MMM  Neck: Supple, no JVD  Respiratory: lungs CTA b/l, no wheezes/crackles, no accessory muscle use  Cardiovascular: Regular rhythm/rate; +S1 +S2, no murmurs  Gastrointestinal: Soft, NTND, normoactive BS, no rebound, no guarding  Genitourinary: no suprapubic tenderness  Extremities: WWP, no cyanosis, no clubbing, no edema, pulses equal  Neurological: A&Ox3, no gross focal deficits, follows commands  Skin: Normal temperature, warm, dry    MEDICATIONS  (STANDING):    MEDICATIONS  (PRN):      No Known Allergies      LABS                        9.0    6.63  )-----------( 258      ( 02 May 2023 05:03 )             27.0     05-02    135  |  103  |  11  ----------------------------<  108<H>  3.9   |  24  |  0.40<L>    Ca    8.9      02 May 2023 05:03  Phos  3.7     05-02  Mg     2.2     05-02      PT/INR - ( 02 May 2023 05:03 )   PT: 13.3 sec;   INR: 1.12          PTT - ( 02 May 2023 05:03 )  PTT:34.9 sec          RADIOLOGY & ADDITIONAL TESTS: Reviewed INFECTIOUS DISEASES CONSULT FOLLOW-UP NOTE    INTERVAL HPI/OVERNIGHT EVENTS:  Afebrile overnight. No new oxygen requirements. Went for PEG placement today AM.     ROS:   Constitutional, eyes, ENT, cardiovascular, respiratory, gastrointestinal, genitourinary, integumentary, neurological, psychiatric and heme/lymph are otherwise negative other than noted above       ANTIBIOTICS/RELEVANT:    MEDICATIONS  (STANDING):  acetylcysteine 20%  Inhalation 4 milliLiter(s) Inhalation every 6 hours  albuterol/ipratropium for Nebulization 3 milliLiter(s) Nebulizer every 6 hours  atorvastatin 80 milliGRAM(s) Oral at bedtime  carvedilol 3.125 milliGRAM(s) Oral every 12 hours  chlorhexidine 0.12% Liquid 15 milliLiter(s) Oral Mucosa every 12 hours  chlorhexidine 2% Cloths 1 Application(s) Topical <User Schedule>  doxazosin 4 milliGRAM(s) Oral at bedtime  ertapenem  IVPB 1000 milliGRAM(s) IV Intermittent every 24 hours  lactobacillus acidophilus 1 Tablet(s) Oral daily  pantoprazole   Suspension 40 milliGRAM(s) Oral daily  propofol Infusion 10 MICROgram(s)/kG/Min (5.3 mL/Hr) IV Continuous <Continuous>  sodium chloride 1 Gram(s) Oral every 6 hours  sodium chloride 0.9%. 1000 milliLiter(s) (85 mL/Hr) IV Continuous <Continuous>  sodium chloride 3%  Inhalation 4 milliLiter(s) Inhalation every 6 hours    MEDICATIONS  (PRN):  acetaminophen   Oral Liquid .. 650 milliGRAM(s) Oral every 6 hours PRN Temp greater or equal to 38C (100.4F), Mild Pain (1 - 3)  fentaNYL    Injectable 12.5 MICROGram(s) IV Push every 2 hours PRN Severe Pain (7 - 10)  oxyCODONE    IR 5 milliGRAM(s) Oral every 4 hours PRN Moderate Pain (4 - 6)        Vital Signs Last 24 Hrs  T(C): 37 (02 May 2023 08:09), Max: 37.3 (01 May 2023 17:41)  T(F): 98 (02 May 2023 07:18), Max: 99.2 (01 May 2023 21:39)  HR: 61 (02 May 2023 11:40) (58 - 86)  BP: 148/63 (02 May 2023 11:40) (104/54 - 148/63)  BP(mean): 91 (02 May 2023 11:40) (74 - 95)  RR: 16 (02 May 2023 11:40) (11 - 25)  SpO2: 96% (02 May 2023 11:40) (92% - 100%)    Parameters below as of 02 May 2023 10:25  Patient On (Oxygen Delivery Method): ventilator    O2 Concentration (%): 40    05-01-23 @ 07:01  -  05-02-23 @ 07:00  --------------------------------------------------------  IN: 1557.1 mL / OUT: 2317 mL / NET: -759.9 mL    05-02-23 @ 07:01  -  05-02-23 @ 11:58  --------------------------------------------------------  IN: 85 mL / OUT: 12 mL / NET: 73 mL      PHYSICAL EXAM:  General: lying in bed in NAD; non-verbal but follows commands   HEENT: NC/AT; PERRL, anicteric sclera; MMM  Neck: supple  Cardiovascular: +S1/S2, RRR  Respiratory: CTA anteriorly; lungs vented; no W/R/R. On trach collar with trach to vent.   Gastrointestinal: soft, NT/ND; +BSx4  Extremities: WWP; no edema, clubbing or cyanosis  Neurological: neuro exam limited by patient condition however moves all extremities and follows commands       LABS:                        9.0    6.63  )-----------( 258      ( 02 May 2023 05:03 )             27.0     05-02    135  |  103  |  11  ----------------------------<  108<H>  3.9   |  24  |  0.40<L>    Ca    8.9      02 May 2023 05:03  Phos  3.7     05-02  Mg     2.2     05-02      PT/INR - ( 02 May 2023 05:03 )   PT: 13.3 sec;   INR: 1.12          PTT - ( 02 May 2023 05:03 )  PTT:34.9 sec      MICROBIOLOGY:      RADIOLOGY & ADDITIONAL STUDIES:  Reviewed

## 2023-05-02 NOTE — CHART NOTE - NSCHARTNOTEFT_GEN_A_CORE
Patient removed TR band, found laying in bed with bleeding wrist. Pressure applied to R wrist until hemostasis achieved and TR band re-inflated w/ 16cc air. restraints placed on L arm. Patient hemodynamically and neuro stable.

## 2023-05-02 NOTE — PROGRESS NOTE ADULT - ASSESSMENT
56 y/o female transferred from [place] with right sided weakness and right facial numbness. Found to have acute infarction within the right cerebellar hemisphere, causing herniation. Now s/p SOC foramen magnum decompression and right parietal/occipital EVD placement (4/21). Course c/b respiratory insuffiency d/t bulbar dysfunction, s/p trach 4/28/23 failed bedside  PEG placement 5/1; pending OR placement of PEG & DSA for stroke w/u     PLAN:  Neuro   - Vitals/neuro q1h   - Plan for DSA for B/L carotid aneurysms and new left vertebral stenosis with Dr. Minor 5/2  - EVD raised to @5cmH20; monitor ICP/output  - Repeat CTH 4/25 stable   - Stroke following; vasculitis w/u & hypercoagulable w/u pending   - pain control with tylenol prn, oxycodone prn, PRN 12.5mcg fent IVP q2 hrs    Cardio  - -160  - TTE 4/24: negative for PFO, mild LVH, mild dilation of L atrium, EF 55-60%  - c/w coreg 3.125mg BID for PVCs & HTN management     Pulm  - /40/16/8, CPAP trials as tolerated; has apenic episodes   - Chest PT, nebs q 6  - 6 cuffed trach placed by ENT 4/28, missing cuff, replaced trach at bedside. --> ENT to remove trach sutures POD7 (5/5)    GI  - NGT placed by ENT; NPO for OR today  - failed PEG attempt at bedside plan for OR placement of PEG tube today   - Protonix while intubated  - transaminitis resolved     Renal  - Na goal 135-145, salt tabs 1 q 6  - Cardura 4mg at bedtime for urinary retention   - Voiding via primafit     Endo   - no issues     Heme  - SCDs, SQL 40 mg QD (ppx) hold for OR tomorrow   - s/p 3 units platelets, 35 mcg of DDAVP for ASA/Plavix reversal  - LE dopplers 4/22 neg for DVT, but showing superficial venous thrombosis in the proximal portion of the right greater saphenous vein; repeat on 4/29 with persistant superficial thrombus   - Heme following for positive anticardiolipin Ab 4/27, recs appreciated ; requesting AC insetting of hypercoagulable state; pending nsgy approval post VPS placement     ID  - Last panculture 4/30, MRSA (-), +ecoli in urine; sputum culture growing pluralibacter & strep species   ESBL ecoli urine - contact precautions   -zosyn changed to ertapenem   -CSF culture NGTD   -ID following    DISPO:  - NSICU, full code  - PT/OT rec acute inpatient rehab: patient can tolerate 3 hrs PT/OT daily

## 2023-05-02 NOTE — PROGRESS NOTE ADULT - SUBJECTIVE AND OBJECTIVE BOX
NEUROSURGERY POST OP NOTE:    POD# 0 S/P dx cerebral angiogram    S: nonverbal      T(C): 36.8 (05-02-23 @ 16:43), Max: 37.3 (05-01-23 @ 17:41)  HR: 65 (05-02-23 @ 17:13) (57 - 86)  BP: 164/68 (05-02-23 @ 17:13) (104/54 - 164/68)  RR: 16 (05-02-23 @ 17:13) (11 - 25)  SpO2: 97% (05-02-23 @ 17:13) (92% - 100%)      05-01-23 @ 07:01  -  05-02-23 @ 07:00  --------------------------------------------------------  IN: 1557.1 mL / OUT: 2317 mL / NET: -759.9 mL    05-02-23 @ 07:01  -  05-02-23 @ 17:17  --------------------------------------------------------  IN: 755 mL / OUT: 483 mL / NET: 272 mL        acetaminophen   Oral Liquid .. 650 milliGRAM(s) Oral every 6 hours PRN  acetylcysteine 20%  Inhalation 4 milliLiter(s) Inhalation every 6 hours  albuterol/ipratropium for Nebulization 3 milliLiter(s) Nebulizer every 6 hours  atorvastatin 80 milliGRAM(s) Oral at bedtime  carvedilol 3.125 milliGRAM(s) Oral every 12 hours  chlorhexidine 0.12% Liquid 15 milliLiter(s) Oral Mucosa every 12 hours  chlorhexidine 2% Cloths 1 Application(s) Topical <User Schedule>  doxazosin 4 milliGRAM(s) Oral at bedtime  ertapenem  IVPB 1000 milliGRAM(s) IV Intermittent every 24 hours  fentaNYL    Injectable 12.5 MICROGram(s) IV Push every 2 hours PRN  lactobacillus acidophilus 1 Tablet(s) Oral daily  oxyCODONE    IR 5 milliGRAM(s) Oral every 4 hours PRN  pantoprazole  Injectable 40 milliGRAM(s) IV Push daily  sodium chloride 1 Gram(s) Oral every 6 hours  sodium chloride 0.9%. 1000 milliLiter(s) IV Continuous <Continuous>  sodium chloride 3%  Inhalation 4 milliLiter(s) Inhalation every 6 hours      RADIOLOGY:     Exam:    WOUND/DRAINS:    DEVICES:       Assessment: 58 y/o female transferred from [place] with right sided weakness and right facial numbness. Found to have acute infarction within the right cerebellar hemisphere, causing herniation. Now s/p SOC foramen magnum decompression and right parietal/occipital EVD placement (4/21). Course c/b respiratory insuffiency d/t bulbar dysfunction, s/p trach 4/28/23. s/p PEG 5/2 with GI, s/p dx cerebral angiogram 5/2.      Plan:  Neuro   - Vitals/neuro q1h   - dx angio 5/2: b/l cavernous ICA aneurysms, plan to be discussed vascular conference  - EVD @ 5 cmH20; monitor ICP/output   - Repeat CTH 4/25 stable   - Stroke consulted, f/u recs   - Pain control with tylenol prn, oxycodone prn,  fent pushes 12.5mg q2hr prn     Cardio  - -160   - TTE 4/24: negative for PFO, mild LVH, mild dilation of L atrium, EF 55-60%   - Carvedilol 3.125 BID     Pulm  - /40/16/8, CPAP trials as tolerated  - Chest PT, 3% inhalation/duoneb/mucomyst q 6 hrs standing   - 6 cuffed trach placed by ENT 4/28, missing cuff, replaced trach at bedside. --> ENT to remove trach sutures POD7 (5/5)    GI  - PEG placed 5/2, NPO, restart feeds 5/3 10am  - Bowel regimen, last BM 4/30  - Protonix while intubated  - Trend LFTs q 72 hrs     Renal  - Na goal 135-145, salt tabs 1 q 6   - Cardura 4 mg at bedtime for urinary retention   - Voiding via primafit   - IVF    Endo   - no issues     Heme  - SCDs, SQL held for PEG tomorrow in OR.   - s/p 3 units platelets, 35 mcg of DDAVP for ASA/Plavix reversal  - LE dopplers 4/22 neg for DVT, but showing superficial venous thrombosis in the proximal portion of the right greater saphenous vein; repeat on 4/29 with persistant superficial thrombus   - Heme following for positive anticardiolipin Ab 4/27, recs appreciated     ID  - Last panculture 4/30, MRSA (-), +UA cx ESBL, +sputum cx pluralibacter gergoviae, strep pneumoniae   - ID recs: Ertapenem 1g Q24hrs (5/1- )  - +isolation precautions - ESBL in urine     DISPO:  - NSICU, full code   - PT/OT rec acute inpatient rehab: patient can tolerate 3 hrs PT/OT daily    D/w Dr. Valadez and Dr. Garcia    Assessment:  Present when checked    []  GCS  E   V  M     Heart Failure: []Acute, [] acute on chronic , []chronic  Heart Failure:  [] Diastolic (HFpEF), [] Systolic (HFrEF), []Combined (HFpEF and HFrEF), [] RHF, [] Pulm HTN, [] Other    [] MAMTA, [] ATN, [] AIN, [] other  [] CKD1, [] CKD2, [] CKD 3, [] CKD 4, [] CKD 5, []ESRD    Encephalopathy: [] Metabolic, [] Hepatic, [] toxic, [] Neurological, [] Other    Abnormal Nurtitional Status: [] malnurtition (see nutrition note), [ ]underweight: BMI < 19, [] morbid obesity: BMI >40, [] Cachexia    [] Sepsis  [] hypovolemic shock,[] cardiogenic shock, [] hemorrhagic shock, [] neuogenic shock  [] Acute Respiratory Failure  []Cerebral edema, [] Brain compression/ herniation,   [] Functional quadriplegia  [] Acute blood loss anemia       NEUROSURGERY POST OP NOTE:    POD# 0 S/P dx cerebral angiogram    S: nonverbal      T(C): 36.8 (05-02-23 @ 16:43), Max: 37.3 (05-01-23 @ 17:41)  HR: 65 (05-02-23 @ 17:13) (57 - 86)  BP: 164/68 (05-02-23 @ 17:13) (104/54 - 164/68)  RR: 16 (05-02-23 @ 17:13) (11 - 25)  SpO2: 97% (05-02-23 @ 17:13) (92% - 100%)      05-01-23 @ 07:01  -  05-02-23 @ 07:00  --------------------------------------------------------  IN: 1557.1 mL / OUT: 2317 mL / NET: -759.9 mL    05-02-23 @ 07:01  -  05-02-23 @ 17:17  --------------------------------------------------------  IN: 755 mL / OUT: 483 mL / NET: 272 mL        acetaminophen   Oral Liquid .. 650 milliGRAM(s) Oral every 6 hours PRN  acetylcysteine 20%  Inhalation 4 milliLiter(s) Inhalation every 6 hours  albuterol/ipratropium for Nebulization 3 milliLiter(s) Nebulizer every 6 hours  atorvastatin 80 milliGRAM(s) Oral at bedtime  carvedilol 3.125 milliGRAM(s) Oral every 12 hours  chlorhexidine 0.12% Liquid 15 milliLiter(s) Oral Mucosa every 12 hours  chlorhexidine 2% Cloths 1 Application(s) Topical <User Schedule>  doxazosin 4 milliGRAM(s) Oral at bedtime  ertapenem  IVPB 1000 milliGRAM(s) IV Intermittent every 24 hours  fentaNYL    Injectable 12.5 MICROGram(s) IV Push every 2 hours PRN  lactobacillus acidophilus 1 Tablet(s) Oral daily  oxyCODONE    IR 5 milliGRAM(s) Oral every 4 hours PRN  pantoprazole  Injectable 40 milliGRAM(s) IV Push daily  sodium chloride 1 Gram(s) Oral every 6 hours  sodium chloride 0.9%. 1000 milliLiter(s) IV Continuous <Continuous>  sodium chloride 3%  Inhalation 4 milliLiter(s) Inhalation every 6 hours      RADIOLOGY:     Exam:  GEN: laying in bed, NAD  NEURO: not alert. does not FC, OE spont, face symmetric. +R gaze preference . LUE 5, LLE 4+. RUE delt 2, distally 5. RLE 4+  CV: RRR +S1/S2  PULM: CTAB  GI: Abd soft, NT/ND  EXT: ext warm, dry, nontender    WOUND/DRAINS: R wrist w/ TR band, c/d/i    DEVICES:       Assessment: 56 y/o female transferred from [place] with right sided weakness and right facial numbness. Found to have acute infarction within the right cerebellar hemisphere, causing herniation. Now s/p SOC foramen magnum decompression and right parietal/occipital EVD placement (4/21). Course c/b respiratory insuffiency d/t bulbar dysfunction, s/p trach 4/28/23. s/p PEG 5/2 with GI, s/p dx cerebral angiogram 5/2.      Plan:  Neuro   - Vitals/neuro q1h   - dx angio 5/2: b/l cavernous ICA aneurysms, plan to be discussed vascular conference  - EVD @ 5 cmH20; monitor ICP/output   - Repeat CTH 4/25 stable   - Stroke consulted, f/u recs   - Pain control with tylenol prn, oxycodone prn,  fent pushes 12.5mg q2hr prn     Cardio  - -160   - TTE 4/24: negative for PFO, mild LVH, mild dilation of L atrium, EF 55-60%   - Carvedilol 3.125 BID     Pulm  - /40/16/8, CPAP trials as tolerated  - Chest PT, 3% inhalation/duoneb/mucomyst q 6 hrs standing   - 6 cuffed trach placed by ENT 4/28, missing cuff, replaced trach at bedside. --> ENT to remove trach sutures POD7 (5/5)    GI  - PEG placed 5/2, NPO, restart feeds 5/3 10am  - Bowel regimen, last BM 4/30  - Protonix while intubated  - Trend LFTs q 72 hrs     Renal  - Na goal 135-145, salt tabs 1 q 6   - Cardura 4 mg at bedtime for urinary retention   - Voiding via primafit   - IVF    Endo   - no issues     Heme  - SCDs, SQL held for PEG tomorrow in OR.   - s/p 3 units platelets, 35 mcg of DDAVP for ASA/Plavix reversal  - LE dopplers 4/22 neg for DVT, but showing superficial venous thrombosis in the proximal portion of the right greater saphenous vein; repeat on 4/29 with persistant superficial thrombus   - Heme following for positive anticardiolipin Ab 4/27, recs appreciated     ID  - Last panculture 4/30, MRSA (-), +UA cx ESBL, +sputum cx pluralibacter gergoviae, strep pneumoniae   - ID recs: Ertapenem 1g Q24hrs (5/1- )  - +isolation precautions - ESBL in urine     DISPO:  - NSICU, full code   - PT/OT rec acute inpatient rehab: patient can tolerate 3 hrs PT/OT daily    D/w Dr. Valadez and Dr. Garcia    Assessment:  Present when checked    []  GCS  E   V  M     Heart Failure: []Acute, [] acute on chronic , []chronic  Heart Failure:  [] Diastolic (HFpEF), [] Systolic (HFrEF), []Combined (HFpEF and HFrEF), [] RHF, [] Pulm HTN, [] Other    [] MAMTA, [] ATN, [] AIN, [] other  [] CKD1, [] CKD2, [] CKD 3, [] CKD 4, [] CKD 5, []ESRD    Encephalopathy: [] Metabolic, [] Hepatic, [] toxic, [] Neurological, [] Other    Abnormal Nurtitional Status: [] malnurtition (see nutrition note), [ ]underweight: BMI < 19, [] morbid obesity: BMI >40, [] Cachexia    [] Sepsis  [] hypovolemic shock,[] cardiogenic shock, [] hemorrhagic shock, [] neuogenic shock  [] Acute Respiratory Failure  []Cerebral edema, [] Brain compression/ herniation,   [] Functional quadriplegia  [] Acute blood loss anemia

## 2023-05-02 NOTE — PROGRESS NOTE ADULT - SUBJECTIVE AND OBJECTIVE BOX
=================================  NEUROCRITICAL CARE ATTENDING NOTE  =================================    MAKSIM TRIVEDI   MRN-[id number]  Summary:  57y/F  with Asthma, CAD (not on  meds), peripheral neuropathy and PSH of BATOOL, cholecystectomy, right knee surgery presented to [place] ED on 4/20 with right sided weakness and right facial numbness. Patient was undergoing preparation for colonoscopy. As per daughter at 8am patient was playing with her grandchildren but around 840 am patient was getting dressed and fell and subsequently felt weak after. Daughter reports that patient had some episodes of vomiting at this time. She had a syncopal episode at around 10 am and was witnessed by brother. No head trauma, She regained conscious after few minutes. She remained in bed for the remainder of the day. Daughter reports some slurred speech noted 1230-1PM and also was confused after. and around 4PM, the patient's sister noted right sided weakness at which time EMS was called and patient was brought to ED. No c/o chest pain, shortness of breath, fever, headache, abdominal pain, urinary complaints. No recent travel/sickness/ change in meds. Stroke code in ER: CTH neg for heme, Right PICA distribution acute infarction. CTA with saccular aneurysms of b/l carotids, approximately 0.8 cm on the right and 1.2 x 0.9 cm on the left. Possible tiny third saccular aneurysm on the right Posterior intracranial circulation: Right vertebral arterial occlusion at its dural crossing junction V3 Atlantic and V4 intracranial segments with likely reversal of flow in its intracranial segment from the basilar. Right PICA faintly seen. 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  Not candidate for TNK/mechanical thrombectomy. CT scan repeated which showed: 1. Brain: Progression of acute infarction within the right cerebellar hemisphere, also extending into the left superior cerebellar hemisphere. New mass effect on the fourth ventricle causing stenosis versus occlusion with new third and lateral ventricular dilatation indicating ventricular obstruction at the level of the fourth ventricle. New upward and downward herniation of cerebellar parenchyma Right carotid system: No hemodynamically significant stenosis. Left carotid system: No hemodynamically significant stenosis. Vertebral circulation: Patent. Anterior intracranial circulation: Intact. Bilateral internal carotid saccular aneurysms. These findings are unchanged. Posterior intracranial circulation:    Improved flow within the right vertebral artery since 4/20/2023. New focal stenosis mid left vertebral artery, etiology uncertain, consider vasospasm and extrinsic compression in addition to new embolic disease. Brain perfusion: Perfusion images demonstrate normalization of the perfusion abnormality in the right cerebellar hemisphere present on 4/20/2023 despite evidence of progression of acute infarction.  Areas of apparent ischemia within the posterior fossa have progressed in extent, also again involving the left posterior cerebral arterial distribution. Tx to [facility] for SOC watch. On admission to [facility], NIHSS 12.  (21 Apr 2023 16:50)    COURSE IN THE HOSPITAL:  4/21: Admitted for crani watch, CTH complete w/ unchanged hydrocephalus, taken for emergent occipital craniectomy.   4/22: POD1. Remains intubated overnight, on propofol and dank. [id number]. Pending repeat CTH this am. Given hydralazine 10mg x1. Started on cardene for SBP high 140s-150s. Sub-therapeutic on plavix, therapeutic on asa, rpt asa accumetrics until subtherapeutic. LE dopplers neg for DVT, but showing superficial venous thrombosis in the proximal portion of the right greater saphenous vein. Started on 3% @60 for na goal 145-150. NGT placed by ENT. Febrile, pancultured.  4/23: POD 2. 3% increased to 75. NGT readjusted. UA neg. SBP liberalized to 160. Plan to extubate. 3% decreased to 60. Failed extubation d/t no cuff leak, given 60mg solumedrol, plan to extubate in 6 hrs. SCx1 for post void residual >400, started on cardura at bedtime. New blood in buretrol, stopped SQL. Clot in EVD cleared. Neuro exam improving.   4/24: POD 3. Cont 3% with NS while NPO, start TF today. TF started. LFTs downtrending. Sodium 141. Increased 3% to 50, NS to 30. Repeat BMP ordered for 5:30PM. Tramadol 25 for pain with no relief, oxy 5 and 1g tylenol ordered, stability CT stable, speech language consult for dysarthria. Given hydralazine 10mg IVP for SBP>160, started amlodipine 5mg. C/o mild headache, given fioricet x1, stroke neuro rec SALVADOR and loop recorder placement. Echo with bubble completed, negative for PFO, EF 55-60%. J HFNC started for desats with suctioning.   4/25: POD 4. Cont HFNC. Increased secertions on HFNC, reintubated. CXR confirmed good placement. EVD dropped to 5cmH20 for goal of draining 5-10cc/hr and SG ELENA drain taken off suction. Pending CTH. EVD drained 0cc/hr, dropped to 0. NGT replaced. Dc'd fioricet. Started on PPI for intubation. Increased cardura to 4mg for urinary retention, given an additional 2mg now. Started salt tabs 3 q 6. Given 12.5mg fentanyl x1. NGT replaced, CXR shown in lung. NGT removed, repeat CXR showing no pneumothorax. Pending ENT consult for NGT placement. CTH stable. NGT replaced by ENT, confirmed in proper spot. Restarted TF. [id number]. Na 141 from 146. Increased 3% to 60. EVD raised to 3cmH20. ETT pulled back 1cm by RT.  4/26: POD 5 SOC. Intubated, remains on precedex, ABG ordered with improving PaO2, BMP sodium 148, 3% changed to 30cc/hr, cardura increased to 8mg for tonight, tolerating cpap  4/27: POD 6 SOC. Respiratory rate sustained 6, returned to FVS. Na 151, dc'd 3%, f/u AM sodium. Nurse noticed ETT 19 at the lip and was 20 prior, CXR shows ETT @ 5cm above jono, ET tube advanced 1cm, repeat CXR confirms appropriate placement. Thick inline secretions with suctioning. ENT trach placement Fri likely, PEG likely Mon. Keep ELENA drain until no output, EVD to remain @3. Start ASA 81mg daily tomorrow.   4/28: POD7 SOC, neuro stable, given fentanyl x 1 overnight for presumed discomfort (biting on ETT), remains on full vent support. CTH today stable in comparison to 4/25. 6 cuffed trach placed by ENT in OR, but came down without cuff. Replaced at bedside by ENT. On fentanyl gtt.    4/29: POD8 SOC, JIM overnight. Incision cleaned and dressing changed. On fentanyl gtt. EKG completed due to increased frequency PVCs on telemetry, frequency of PVCs improved with titrating up fentanyl gtt. ABG drawn.  Repeat LE dopplers completed showing persistant superficial thrombus, no DVT. No EVD waveform during day, flushed distally without improvement. Exam unchanged.  EVD dropped to 0 for low output in afternoon.   4/30: POD9. Neuro stable, febrile to 101.3F o/n, given tylenol and pan cx sent. SBP dipped to low 90s o/n, HR stable in 70s with some PVCs, decreased fentanyl gtt and given 500cc bolus of NS x 2. Pend PEG placement Mon and angio Tues. D/c'd ELENA drain today and suture placed. F/u heme recs. Positive UA. Infiltrates found on CXR. Started on Zosyn 4.5g Q6hrs .   5/1: POD 10. Neuro stable. Dc'd fentanyl gtt. PEG failed placement at bedside with Dr. Gant, plan for PEG in OR tomorrow afternoon with Dr. Layton. Dc'd amlodipine and started carvedilol 3.125 BID for PVCs . Made 3% inhalation and mucomyst q8hr. Failed PEG at bedside. Salt tabs made 1 q 6. Decreased cardura to 4mg daily. Urine culture growing E. Coli and sputum culture growing pluralibacter gergoviae and strep species. ID consulted, f/u recs. Failed CPAP trial @ 3pm. Added am labs per heme/onc for hypercoguable workup. Pending repeat LE dopplers in 2-3 days. NGT clogged, removed, ENT failed attempt at replacement, will keep NPO for PEG placement tmw. Repeat xray in am for concern for aspration. Pt abx switched from Zosyn to Ertapenem 1g Q24hrs. per ID recs, possible ESBL growth.   5/2: POD 11. Neuro stable. diagnostic cerebral angiogram (bilateral carotid cavernous aneurysm) and PEG placement. NPO; pulled out radial TR band (right)    Past Medical History: Asthma CAD (coronary artery disease) Peripheral neuropathy  Allergies:  No Known Allergies  Home meds:   ·	Albuterol (Eqv-ProAir HFA) 90 mcg/inh inhalation aerosol: 2 puff(s) inhaled every 6 hours as needed for  shortness of breath and/or wheezing    PHYSICAL EXAMINATION  T(C): 36.8 (05-02 @ 16:43), Max: 37.3 (05-01 @ 21:39) HR: 68 (05-02 @ 20:00) (57 - 86) BP: 140/65 (05-02 @ 20:00) (104/54 - 164/68) RR: 17 (05-02 @ 20:00) (11 - 25) SpO2: 100% (05-02 @ 20:00) (92% - 100%)  NEUROLOGIC EXAMINATION:  Patient awake, alert, nods/shakes head?, oriented x2, FC, RUE 2/5 HG 1/5 L UE 5/5 L LE 5/5 R LE 4/5  GENERAL: trached AC 40% +8 400 16  EENT:  anicteric, trach  CARDIOVASCULAR: (+) S1 S2, normal rate and regular rhythm  PULMONARY: clear to auscultation bilaterally  ABDOMEN: soft, nontender with normoactive bowel sounds  EXTREMITIES: no edema; good distal pulses  SKIN: no rash    LABS: 05-02               9.0    6.63  )-----------( 258      ( 02 May 2023 05:03 )             27.0     135  |  103  |  11  ----------------------------<  108<H>  3.9   |  24  |  0.40<L>    Ca    8.9      02 May 2023 05:03  Phos  3.7     05-02  Mg     2.2     05-02 05-01 @ 07:01  -  05-02 @ 07:00  IN: 1557.1 mL / OUT: 2317 mL / NET: -759.9 mL    Bacteriology:    Culture - CSF with Gram Stain (collected 04-30)  Gram Stain (04-30):    No organisms seen    No polymorphonuclear cells seen  Preliminary Report (05-01):    No growth to date.    Culture - Urine (collected 04-30)  Final Report (05-02):    >100,000 CFU/ml Escherichia coli ESBL    >100,000 CFU/ml Escherichia coli ESBL Strain #2    Result called to and read back by_ Mr. MYKEL Wright ASAEL  05/02/2023 09:56:41  Organism: Escherichia coli  Escherichia coli ESBL (05-02)  Organism: Escherichia coli ESBL (05-02)    Sensitivities:      Method Type: ELENI      -  Ampicillin: R >16 These ampicillin results predict results for amoxicillin      -  Ampicillin/Sulbactam: R 8/4 Enterobacter, Klebsiella aerogenes, Citrobacter, and Serratia may develop resistance during prolonged therapy (3-4 days)      -  Cefazolin: R >16 For uncomplicated UTI with K. pneumoniae, E. coli, or P. mirablis: ELENI <=16 is sensitive and ELENI >=32 is resistant. This also predicts results for oral agents cefaclor, cefdinir, cefpodoxime, cefprozil, cefuroxime axetil, cephalexin and locarbef for uncomplicated UTI. Note that some isolates may be susceptible to these agents while testing resistant to cefazolin.      -  Ceftriaxone: R >32 Enterobacter, Klebsiella aerogenes, Citrobacter, and Serratia may develop resistance during prolonged therapy      -  Ciprofloxacin: I 0.5      -  Ertapenem: S <=0.5      -  Gentamicin: S <=2      -  Meropenem: S <=1      -  Nitrofurantoin: S <=32 Should not be used to treat pyelonephritis      -  Piperacillin/Tazobactam: R <=8      -  Tobramycin: S <=2      -  Trimethoprim/Sulfamethoxazole: S <=0.5/9.5  Organism: Escherichia coli (05-02)    Sensitivities:      Method Type: ELENI      -  Ampicillin: R >16 These ampicillin results predict results for amoxicillin      -  Ampicillin/Sulbactam: R >16/8 Enterobacter, Klebsiella aerogenes, Citrobacter, and Serratia may develop resistance during prolonged therapy (3-4 days)      -  Cefazolin: R >16 For uncomplicated UTI with K. pneumoniae, E. coli, or P. mirablis: ELENI <=16 is sensitive and ELENI >=32 is resistant. This also predicts results for oral agents cefaclor, cefdinir, cefpodoxime, cefprozil, cefuroxime axetil, cephalexin and locarbef for uncomplicated UTI. Note that some isolates may be susceptible to these agents while testing resistant to cefazolin.      -  Ceftriaxone: R >32 Enterobacter, Klebsiella aerogenes, Citrobacter, and Serratia may develop resistance during prolonged therapy      -  Ciprofloxacin: R >2      -  Ertapenem: S <=0.5      -  Gentamicin: R >8      -  Meropenem: S <=1      -  Nitrofurantoin: R >64 Should not be used to treat pyelonephritis      -  Piperacillin/Tazobactam: SDD 16      -  Tobramycin: I 8      -  Trimethoprim/Sulfamethoxazole: R >2/38      -  Amikacin: S <=16    Culture - Blood (collected 04-29)  Preliminary Report (05-01):    No growth at 2 days.    Culture - Blood (collected 04-29)  Preliminary Report (05-01):    No growth at 2 days.    Culture - Sputum (collected 04-29)  Gram Stain (04-30):    Rare epithelial cells    Numerous White blood cells    Few Gram positive cocci in pairs  Final Report (05-02):    Few Pluralibacter gergoviae (most closely resembling)    Moderate Streptococcus pneumoniae    Therapy requires maximum dose of Ceftriaxone and/or    Penicillin. Interpretive criteria as follows:    Ceftriaxone breakpoints for meningitis infections:    <=0.5=Sensitive, 1.0=Intermediate, >=2.0=Resistant    Penicillin breakpoints for meningitis infections:    <=0.06=Sensitive, >= 0.12=Resistant    Ceftriaxone breakpoints for non-meningitis infections:    <=1.0=Sensitive, 2.0=Intermediate, >=4.0=Resistant    Penicillin breakpoints for non-meningitis infections:    <=2.0=Sensitive, 4.0=Intermediate, >=8.0=Resistant    Oral Penicillin breakpoints:    <=0.06=Sensitive, 0.12-1.0=Intermediate, >=2.0=Resistant    Please note: In case of suspected meningitis, CSF    interpretive criteria must be used independent of specimen source.    Routine respiratory charlotte absent  Organism: Streptococcus pneumoniae  Streptococcus pneumoniae  Pluralibacter gergoviae (05-02)  Organism: Pluralibacter gergoviae (05-02)    Sensitivities:      Method Type: ELENI      -  Ampicillin: S <=8 These ampicillin results predict results for amoxicillin      -  Ampicillin/Sulbactam: S <=4/2 Enterobacter, Klebsiella aerogenes, Citrobacter, and Serratia may develop resistance during prolonged therapy (3-4 days)      -  Cefazolin: S <=2 Enterobacter, Klebsiella aerogenes, Citrobacter, and Serratia may develop resistance during prolonged therapy (3-4 days)      -  Ceftriaxone: S <=1 Enterobacter, Klebsiella aerogenes, Citrobacter, and Serratia may develop resistance during prolonged therapy      -  Ciprofloxacin: S <=0.25      -  Ertapenem: S <=0.5      -  Gentamicin: S <=2      -  Piperacillin/Tazobactam: S <=8      -  Tobramycin: S <=2      -  Trimethoprim/Sulfamethoxazole: S <=0.5/9.5  Organism: Streptococcus pneumoniae (05-02)    Sensitivities:      Method Type: ETEST      -  Ceftriaxone: See note 0.75      -  Penicillin: See note 1.5  Organism: Streptococcus pneumoniae (05-02)    Sensitivities:      Method Type: KB      -  Clindamycin: S      -  Erythromycin: R Predicts results for azithromycin.      CSF studies:  EEG:  Neuroimaging:  Other imaging:    MEDICATIONS: 05-02    ·	ertapenem  IVPB 1000 IV Intermittent every 24 hours  ·	carvedilol 3.125 milliGRAM(s) Oral every 12 hours  ·	doxazosin 4 milliGRAM(s) Oral at bedtime  ·	acetylcysteine 20%  Inhalation 4 Inhalation every 6 hours  ·	albuterol/ipratropium for Nebulization 3 Nebulizer every 6 hours  ·	sodium chloride 3%  Inhalation 4 Inhalation every 6 hours  ·	pantoprazole  Injectable 40 IV Push daily  ·	atorvastatin 80 Oral at bedtime  ·	lactobacillus acidophilus 1 Oral daily  ·	sodium chloride 1 Oral every 6 hours  ·	acetaminophen   Oral Liquid .. 650 Oral every 6 hours PRN  ·	fentaNYL    Injectable 12.5 IV Push every 2 hours PRN  ·	oxyCODONE    IR 5 Oral every 4 hours PRN    IV FLUIDS: NS@85cc/hr  DRIPS:  DIET: NPO  Lines:  Drains:      External Ventricular Device (mL): 245 mL - @ 0 cm H20  05/02 EVD raised to 5 output 367 ICPs 1-7  Wounds:    CODE STATUS:  Full Code                       GOALS OF CARE:  aggressive                      DISPOSITION:  ICU

## 2023-05-02 NOTE — PROGRESS NOTE ADULT - ASSESSMENT
57 year old female w/ PMH of asthma, CAD (not on meds), HTN, prior miscarriages X3, peripheral neuropathy and PSH of BATOOL, cholecystectomy and right knee surgery presented to [place] ED on 4/20 w/ right sided weakness and right facial numbness. Now s/p Trach with NGT feeding access. General Surgery consulted for placement of PEG feeding access. Unable to place PEG at bedside (5/1).     - lap placement of G tube today  - Surgery Team 4C will continue to follow. Please page Team 4 with questions/clinical changes. [phone]

## 2023-05-02 NOTE — PROGRESS NOTE ADULT - SUBJECTIVE AND OBJECTIVE BOX
INTERVAL HISTORY: HPI:  58 yo F with PMH of Asthma, CAD (not on  meds), peripheral neuropathy and PSH of BATOOL, cholecystectomy, right knee surgery presented to [place] ED on 4/20 with right sided weakness and right facial numbness. Patient was undergoing preparation for colonoscopy. As per daughter at 8am patient was playing with her grandchildren but around 840 am patient was getting dressed and fell and subsequently felt weak after. Daughter reports that patient had some episodes of vomiting at this time. She had a syncopal episode at around 10 am and was witnessed by brother. No head trauma, She regained conscious after few minutes. She remained in bed for the remainder of the day. Daughter reports some slurred speech noted 1230-1PM and also was confused after. and around 4PM, the patient's sister noted right sided weakness at which time EMS was called and patient was brought to ED. No c/o chest pain, shortness of breath, fever, headache, abdominal pain, urinary complaints. No recent travel/sickness/ change in meds. Stroke code in ER: CTH neg for heme, Right PICA distribution acute infarction. CTA with saccular aneurysms of b/l carotids, approximately 0.8 cm on the right and 1.2 x 0.9 cm on the left. Possible tiny third saccular aneurysm on the right Posterior intracranial circulation: Right vertebral arterial occlusion at its dural crossing junction V3 Atlantic and V4 intracranial segments with likely reversal of flow in its intracranial segment from the basilar. Right PICA faintly seen. 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  Not candidate for TNK/mechanical thrombectomy. CT scan repeated which showed: 1. Brain: Progression of acute infarction within the right cerebellar hemisphere, also extending into the left superior cerebellar hemisphere. New mass effect on the fourth ventricle causing stenosis versus occlusion with new third and lateral ventricular dilatation indicating ventricular obstruction at the level of the fourth ventricle. New upward and downward herniation of cerebellar parenchyma Right carotid system: No hemodynamically significant stenosis. Left carotid system: No hemodynamically significant stenosis. Vertebral circulation: Patent. Anterior intracranial circulation: Intact. Bilateral internal carotid saccular aneurysms. These findings are unchanged. Posterior intracranial circulation:    Improved flow within the right vertebral artery since 4/20/2023. New focal stenosis mid left vertebral artery, etiology uncertain, consider vasospasm and extrinsic compression in addition to new embolic disease. Brain perfusion: Perfusion images demonstrate normalization of the perfusion abnormality in the right cerebellar hemisphere present on 4/20/2023 despite evidence of progression of acute infarction.  Areas of apparent ischemia within the posterior fossa have progressed in extent, also again involving the left posterior cerebral arterial distribution. Tx to [facility] for SOC watch. On admission to [facility], NIHSS 12.  (21 Apr 2023 16:50)    PAST MEDICAL & SURGICAL HISTORY:  Asthma  CAD (coronary artery disease)  Peripheral neuropathy  S/P total abdominal hysterectomy  S/P cholecystectomy  S/P right knee surgery      REVIEW OF SYSTEMS: [ ] Unable to Assess due to neurologic exam   [ ] All ROS addressed below are non-contributory, except:  Neuro: [ ] Headache [ ] Back pain [ ] Numbness [ ] Weakness [ ] Ataxia [ ] Dizziness [ ] Aphasia [ ] Dysarthria [ ] Visual disturbance  Resp: [ ] Shortness of breath/dyspnea, [ ] Orthopnea [ ] Cough  CV: [ ] Chest pain [ ] Palpitation [ ] Lightheadedness [ ] Syncope  Renal: [ ] Thirst [ ] Edema  GI: [ ] Nausea [ ] Emesis [ ] Abdominal pain [ ] Constipation [ ] Diarrhea  Hem: [ ] Hematemesis [ ] bright red blood per rectum  ID: [ ] Fever [ ] Chills [ ] Dysuria  ENT: [ ] Rhinorrhea    PHYSICAL EXAM:  General: No Acute Distress, +trach in place   Neurological: AOx2 to choice, R eye 6th nerve palsy, b/l horizontal nystagmus, b/l UE & LE dysmetria, RUE & RLE 4+/5 w/ mild drift   Pulmonary: mild b/l rhonci   Cardiovascular: sinus justo   Gastrointestinal: Soft, Nontender, Nondistended   Extremities: No calf tenderness   Incision: CDI            ICU Vital Signs Last 24 Hrs  T(C): 37 (02 May 2023 08:09), Max: 37.3 (01 May 2023 17:41)  T(F): 98 (02 May 2023 07:18), Max: 99.2 (01 May 2023 21:39)  HR: 61 (02 May 2023 11:10) (58 - 86)  BP: 118/57 (02 May 2023 11:10) (104/54 - 139/57)  BP(mean): 78 (02 May 2023 11:10) (74 - 95)  ABP: --  ABP(mean): --  RR: 17 (02 May 2023 11:10) (11 - 25)  SpO2: 96% (02 May 2023 11:10) (92% - 100%)      05-01-23 @ 07:01  -  05-02-23 @ 07:00  --------------------------------------------------------  IN: 1557.1 mL / OUT: 2317 mL / NET: -759.9 mL    05-02-23 @ 07:01  -  05-02-23 @ 11:43  --------------------------------------------------------  IN: 85 mL / OUT: 12 mL / NET: 73 mL        Mode: AC/ CMV (Assist Control/ Continuous Mandatory Ventilation), RR (machine): 16, TV (machine): 400, FiO2: 40, PEEP: 8, ITime: 1, MAP: 12, PIP: 18    acetaminophen   Oral Liquid .. 650 milliGRAM(s) Oral every 6 hours PRN  acetylcysteine 20%  Inhalation 4 milliLiter(s) Inhalation every 6 hours  albuterol/ipratropium for Nebulization 3 milliLiter(s) Nebulizer every 6 hours  atorvastatin 80 milliGRAM(s) Oral at bedtime  carvedilol 3.125 milliGRAM(s) Oral every 12 hours  chlorhexidine 0.12% Liquid 15 milliLiter(s) Oral Mucosa every 12 hours  chlorhexidine 2% Cloths 1 Application(s) Topical <User Schedule>  doxazosin 4 milliGRAM(s) Oral at bedtime  ertapenem  IVPB 1000 milliGRAM(s) IV Intermittent every 24 hours  fentaNYL    Injectable 12.5 MICROGram(s) IV Push every 2 hours PRN  lactobacillus acidophilus 1 Tablet(s) Oral daily  oxyCODONE    IR 5 milliGRAM(s) Oral every 4 hours PRN  pantoprazole   Suspension 40 milliGRAM(s) Oral daily  propofol Infusion 10 MICROgram(s)/kG/Min (5.3 mL/Hr) IV Continuous <Continuous>  sodium chloride 1 Gram(s) Oral every 6 hours  sodium chloride 0.9%. 1000 milliLiter(s) (85 mL/Hr) IV Continuous <Continuous>  sodium chloride 3%  Inhalation 4 milliLiter(s) Inhalation every 6 hours      LABS:  Na: 135 (05-02 @ 05:03), 140 (05-01 @ 05:26), 138 (04-30 @ 04:55)  K: 3.9 (05-02 @ 05:03), 4.1 (05-01 @ 05:26), 3.5 (04-30 @ 04:55)  Cl: 103 (05-02 @ 05:03), 105 (05-01 @ 05:26), 103 (04-30 @ 04:55)  CO2: 24 (05-02 @ 05:03), 26 (05-01 @ 05:26), 28 (04-30 @ 04:55)  BUN: 11 (05-02 @ 05:03), 16 (05-01 @ 05:26), 18 (04-30 @ 04:55)  Cr: 0.40 (05-02 @ 05:03), 0.46 (05-01 @ 05:26), 0.45 (04-30 @ 04:55)  Glu: 108(05-02 @ 05:03), 128(05-01 @ 05:26), 152(04-30 @ 04:55)    Hgb: 9.0 (05-02 @ 05:03), 9.3 (05-01 @ 05:26), 8.8 (04-30 @ 04:55)  Hct: 27.0 (05-02 @ 05:03), 28.3 (05-01 @ 05:26), 26.8 (04-30 @ 04:55)  WBC: 6.63 (05-02 @ 05:03), 8.18 (05-01 @ 05:26), 7.63 (04-30 @ 04:55)  Plt: 258 (05-02 @ 05:03), 224 (05-01 @ 05:26), 213 (04-30 @ 04:55)    INR: 1.12 05-02-23 @ 05:03, 1.13 05-01-23 @ 05:26, 1.11 04-30-23 @ 04:55  PTT: 34.9 05-02-23 @ 05:03, 32.0 05-01-23 @ 05:26, 33.0 04-30-23 @ 04:55

## 2023-05-02 NOTE — PROGRESS NOTE ADULT - SUBJECTIVE AND OBJECTIVE BOX
S: No acute complaints, appears comfortable    O:  T(C): --  T(F): --  HR: 60 (05-02-23 @ 12:00) (58 - 86)  BP: 135/60 (05-02-23 @ 12:00) (104/54 - 148/63)  RR: 20 (05-02-23 @ 12:00) (16 - 20)  SpO2: 96% (05-02-23 @ 12:00) (92% - 99%)  Wt(kg): --                        9.0    6.63  )-----------( 258      ( 02 May 2023 05:03 )             27.0     05-02    135  |  103  |  11  ----------------------------<  108<H>  3.9   |  24  |  0.40<L>    Ca    8.9      02 May 2023 05:03  Phos  3.7     05-02  Mg     2.2     05-02        Gen: NAD, resting comfortably in bed  C/V: NSR  Pulm: trach, vent,, no respiratory distress  Abd: soft, NT/ND, PEG tube in place without erythema, minimal bleeding, dressing dry/intact  Extrem: WWP, no calf edema, SCDs in place      A/P: 57yFemale s/p laparoscopic PEG tube placement  - Hold meds through PEG tube until tonight (start after 10pm)  - Hold TF  - Surgery Team 4C will continue to follow. Please page Team 4 with questions/clinical changes. [phone]

## 2023-05-02 NOTE — BRIEF OPERATIVE NOTE - OPERATION/FINDINGS
Patient was brought to the angio suite, placed on x-ray table, placed on standard monitor by anesthesiologist. Right wrist and B/l groins were prepped and draped in usual sterile fashion.   5 [place] short slender sheath was used in the right radial artery for access. Radial cocktail containing 2,000 units of heparin, 2.5mg of verapamil, and 200mcg of nitroglycerin were given.   A diagnostic angiogram was performed under monitored anesthesia care. B/l ICAs, b/l ECAs and b/l vertebral artery were injected and studied.     Findings:   There is a wide-necked multilobulated left cavernous ICA aneurysm measuring 11mm.  There is a wide-necked right cavernous ICA aneurysm measuring 7x8mm.     Full report to follow  Patient tolerated the procedure well and at baseline neurological condition.   Right radial vascular access site was applied TR band with 16cc of air  There is no hematoma.   Patient was transferred back to NSICU    Above discussed with Dr. Minor.

## 2023-05-02 NOTE — PROGRESS NOTE ADULT - ASSESSMENT
57y/F with  acute CVA, R cerebellar, brain copmression, cerebral edema, s/p SOC  acute respiratory failure, bulbar dysfunction  asthma  CAD  peripheral neuropathy  superficial thrombosis, proximal R greater saphenous    PLAN:   NEURO: neurochecks q1h, PRN pain meds with acetaminophen / opiates  DSA tomorrow  EVD 0cm H20 monitor output  REHAB:  physical therapy evaluation and management    EARLY MOB:      PULM:  full vent support, CPAP trials, CXR, nebs, s/p trach  CARDIO:  SBP goal 100-160mm Hg  ENDO:  Blood sugar goals 140-180 mg/dL, continue insulin sliding scale  GI:  PPI for GI prophylaxis while intubated  DIET:  NPO after MN, for PEG  RENAL:  cont cardura, Na goal 135-145, salt tabs  HEM/ONC: s/p 3 units platelets, 35 ug DDVP, (+) aCL  VTE Prophylaxis: SCDs, SQL held    ID: afebrile, no leukocytosis, continue ertapenem  Social: will update family    Active issues:  What's keeping patient in the ICU? close monitoring, EVD  What is this patient's dispo plan? stepdown once EVD removed    ATTENDING ATTESTATION:  I was physically present for the key portions of the evaluation and management (E/M) service provided.  I agree with the above history, physical and plan, which I have reviewed and edited where appropriate.    Patient at high risk for neurological deterioration or death due to:  ICU delirium, aspiration PNA, DVT / PE.  Critical care time:  I have personally provided 45 minutes of critical care time, excluding time spent on separate procedures.      Plan discussed with RN, house staff.

## 2023-05-02 NOTE — PROGRESS NOTE ADULT - SUBJECTIVE AND OBJECTIVE BOX
=================================  NEUROCRITICAL CARE ATTENDING NOTE  =================================    MAKSIM TRIVEDI   MRN-[id number]  Summary:  57y/F  with Asthma, CAD (not on  meds), peripheral neuropathy and PSH of BATOOL, cholecystectomy, right knee surgery presented to [facility] ED on 4/20 with right sided weakness and right facial numbness. Patient was undergoing preparation for colonoscopy. As per daughter at 8am patient was playing with her grandchildren but around 840 am patient was getting dressed and fell and subsequently felt weak after. Daughter reports that patient had some episodes of vomiting at this time. She had a syncopal episode at around 10 am and was witnessed by brother. No head trauma, She regained conscious after few minutes. She remained in bed for the remainder of the day. Daughter reports some slurred speech noted 1230-1PM and also was confused after. and around 4PM, the patient's sister noted right sided weakness at which time EMS was called and patient was brought to ED. No c/o chest pain, shortness of breath, fever, headache, abdominal pain, urinary complaints. No recent travel/sickness/ change in meds. Stroke code in ER: CTH neg for heme, Right PICA distribution acute infarction. CTA with saccular aneurysms of b/l carotids, approximately 0.8 cm on the right and 1.2 x 0.9 cm on the left. Possible tiny third saccular aneurysm on the right Posterior intracranial circulation: Right vertebral arterial occlusion at its dural crossing junction V3 Atlantic and V4 intracranial segments with likely reversal of flow in its intracranial segment from the basilar. Right PICA faintly seen. 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  Not candidate for TNK/mechanical thrombectomy. CT scan repeated which showed: 1. Brain: Progression of acute infarction within the right cerebellar hemisphere, also extending into the left superior cerebellar hemisphere. New mass effect on the fourth ventricle causing stenosis versus occlusion with new third and lateral ventricular dilatation indicating ventricular obstruction at the level of the fourth ventricle. New upward and downward herniation of cerebellar parenchyma Right carotid system: No hemodynamically significant stenosis. Left carotid system: No hemodynamically significant stenosis. Vertebral circulation: Patent. Anterior intracranial circulation: Intact. Bilateral internal carotid saccular aneurysms. These findings are unchanged. Posterior intracranial circulation:    Improved flow within the right vertebral artery since 4/20/2023. New focal stenosis mid left vertebral artery, etiology uncertain, consider vasospasm and extrinsic compression in addition to new embolic disease. Brain perfusion: Perfusion images demonstrate normalization of the perfusion abnormality in the right cerebellar hemisphere present on 4/20/2023 despite evidence of progression of acute infarction.  Areas of apparent ischemia within the posterior fossa have progressed in extent, also again involving the left posterior cerebral arterial distribution. Tx to [facility] for SOC watch. On admission to [facility], NIHSS 12.  (21 Apr 2023 16:50)    COURSE IN THE HOSPITAL:  4/21: Admitted for crani watch, CTH complete w/ unchanged hydrocephalus, taken for emergent occipital craniectomy.   4/22: POD1. Remains intubated overnight, on propofol and dank. EVD@[id number]. Pending repeat CTH this am. Given hydralazine 10mg x1. Started on cardene for SBP high 140s-150s. Sub-therapeutic on plavix, therapeutic on asa, rpt asa accumetrics until subtherapeutic. LE dopplers neg for DVT, but showing superficial venous thrombosis in the proximal portion of the right greater saphenous vein. Started on 3% @60 for na goal 145-150. NGT placed by ENT. Febrile, pancultured.  4/23: POD 2. 3% increased to 75. NGT readjusted. UA neg. SBP liberalized to 160. Plan to extubate. 3% decreased to 60. Failed extubation d/t no cuff leak, given 60mg solumedrol, plan to extubate in 6 hrs. SCx1 for post void residual >400, started on cardura at bedtime. New blood in buretrol, stopped SQL. Clot in EVD cleared. Neuro exam improving.   4/24: POD 3. Cont 3% with NS while NPO, start TF today. TF started. LFTs downtrending. Sodium 141. Increased 3% to 50, NS to 30. Repeat BMP ordered for 5:30PM. Tramadol 25 for pain with no relief, oxy 5 and 1g tylenol ordered, stability CT stable, speech language consult for dysarthria. Given hydralazine 10mg IVP for SBP>160, started amlodipine 5mg. C/o mild headache, given fioricet x1, stroke neuro rec SALVADOR and loop recorder placement. Echo with bubble completed, negative for PFO, EF 55-60%. J HFNC started for desats with suctioning.   4/25: POD 4. Cont HFNC. Increased secertions on HFNC, reintubated. CXR confirmed good placement. EVD dropped to 5cmH20 for goal of draining 5-10cc/hr and SG ELENA drain taken off suction. Pending CTH. EVD drained 0cc/hr, dropped to 0. NGT replaced. Dc'd fioricet. Started on PPI for intubation. Increased cardura to 4mg for urinary retention, given an additional 2mg now. Started salt tabs 3 q 6. Given 12.5mg fentanyl x1. NGT replaced, CXR shown in lung. NGT removed, repeat CXR showing no pneumothorax. Pending ENT consult for NGT placement. CTH stable. NGT replaced by ENT, confirmed in proper spot. Restarted TF. Nuuerps433.4F. Na 141 from 146. Increased 3% to 60. EVD raised to 3cmH20. ETT pulled back 1cm by RT.  4/26: POD 5 SOC. Intubated, remains on precedex, ABG ordered with improving PaO2, BMP sodium 148, 3% changed to 30cc/hr, cardura increased to 8mg for tonight, tolerating cpap  4/27: POD 6 SOC. Respiratory rate sustained 6, returned to FVS. Na 151, dc'd 3%, f/u AM sodium. Nurse noticed ETT 19 at the lip and was 20 prior, CXR shows ETT @ 5cm above jono, ET tube advanced 1cm, repeat CXR confirms appropriate placement. Thick inline secretions with suctioning. ENT trach placement Fri likely, PEG likely Mon. Keep ELENA drain until no output, EVD to remain @3. Start ASA 81mg daily tomorrow.   4/28: POD7 SOC, neuro stable, given fentanyl x 1 overnight for presumed discomfort (biting on ETT), remains on full vent support. CTH today stable in comparison to 4/25. 6 cuffed trach placed by ENT in OR, but came down without cuff. Replaced at bedside by ENT. On fentanyl gtt.    4/29: POD8 SOC, JIM overnight. Incision cleaned and dressing changed. On fentanyl gtt. EKG completed due to increased frequency PVCs on telemetry, frequency of PVCs improved with titrating up fentanyl gtt. ABG drawn.  Repeat LE dopplers completed showing persistant superficial thrombus, no DVT. No EVD waveform during day, flushed distally without improvement. Exam unchanged.  EVD dropped to 0 for low output in afternoon.   4/30: POD9. Neuro stable, febrile to 101.3F o/n, given tylenol and pan cx sent. SBP dipped to low 90s o/n, HR stable in 70s with some PVCs, decreased fentanyl gtt and given 500cc bolus of NS x 2. Pend PEG placement Mon and angio Tues. D/c'd ELENA drain today and suture placed. F/u heme recs. Positive UA. Infiltrates found on CXR. Started on Zosyn 4.5g Q6hrs .   5/1: POD 10. Neuro stable. Dc'd fentanyl gtt. PEG failed placement at bedside with Dr. Gant, plan for PEG in OR tomorrow afternoon with Dr. Layton. Dc'd amlodipine and started carvedilol 3.125 BID for PVCs . Made 3% inhalation and mucomyst q8hr. Failed PEG at bedside. Salt tabs made 1 q 6. Decreased cardura to 4mg daily. Urine culture growing E. Coli and sputum culture growing pluralibacter gergoviae and strep species. ID consulted, f/u recs. Failed CPAP trial @ 3pm. Added am labs per heme/onc for hypercoguable workup. Pending repeat LE dopplers in 2-3 days. NGT clogged, removed, ENT failed attempt at replacement, will keep NPO for PEG placement tmw. Repeat xray in am for concern for aspration. Pt abx switched from Zosyn to Ertapenem 1g Q24hrs. per ID recs, possible ESBL growth.   5/2: POD 11. Neuro stable. Pre-op for diagnostic cerebral angiogram and PEG placement. NPO.       Past Medical History: Asthma CAD (coronary artery disease) Peripheral neuropathy  Allergies:  No Known Allergies  Home meds:   ·	Albuterol (Eqv-ProAir HFA) 90 mcg/inh inhalation aerosol: 2 puff(s) inhaled every 6 hours as needed for  shortness of breath and/or wheezing    PHYSICAL EXAMINATION  T(C): 36.6 (05-02 @ 00:42), Max: 37.9 (05-01 @ 01:03) HR: 63 (05-02 @ 00:43) (61 - 81) BP: 113/56 (05-02 @ 00:00) (105/52 - 139/57) RR: 16 (05-02 @ 00:43) (16 - 27) SpO2: 97% (05-02 @ 00:43) (94% - 99%)    NEUROLOGIC EXAMINATION:  Patient opens eyes to voice, MIRTHA, hypophonic, dysarthric, oriented x3, FC, RUE 2/5 HG 1/5 L UE 5/5 L LE 5/5 R LE 4/5  GENERAL: intubated  EENT:  anicteric  CARDIOVASCULAR: (+) S1 S2, normal rate and regular rhythm  PULMONARY: clear to auscultation bilaterally  ABDOMEN: soft, nontender with normoactive bowel sounds  EXTREMITIES: no edema  SKIN: no rash    LABS:               9.3    8.18  )-----------( 224      ( 01 May 2023 05:26 )             28.3     140  |  105  |  16  ----------------------------<  128<H>  4.1   |  26  |  0.46<L>    Ca    8.8      01 May 2023 05:26  Phos  3.6     05-01  Mg     2.3     05-01    04-30 @ 07:01  -  05-01 @ 07:00  IN: 2194.5 mL / OUT: 1695 mL / NET: 499.5 mL    Bacteriology:  CSF studies:  EEG:  Neuroimaging:  Other imaging:    MEDICATIONS: 05-02    ·	ertapenem  IVPB 1000 IV Intermittent every 24 hours  ·	carvedilol 3.125 milliGRAM(s) Oral every 12 hours  ·	doxazosin 4 milliGRAM(s) Oral at bedtime  ·	acetylcysteine 20%  Inhalation 4 Inhalation every 8 hours  ·	albuterol/ipratropium for Nebulization 3 Nebulizer every 8 hours  ·	sodium chloride 3%  Inhalation 4 Inhalation every 8 hours  ·	pantoprazole  Injectable 40 IV Push daily  ·	senna 2 Oral at bedtime  ·	atorvastatin 80 Oral at bedtime  ·	sodium chloride 1 Oral every 6 hours  ·	acetaminophen   Oral Liquid .. 650 Oral every 6 hours PRN  ·	fentaNYL    Injectable 12.5 IV Push every 2 hours PRN  ·	ondansetron Injectable 4 IV Push every 6 hours PRN  ·	oxyCODONE    IR 5 Oral every 4 hours PRN    IV FLUIDS:  DRIPS:  DIET:  Lines:  Drains:      External Ventricular Device (mL): 245 mL - @ 0 cm H20  Wounds:    CODE STATUS:  Full Code                       GOALS OF CARE:  aggressive                      DISPOSITION:  ICU

## 2023-05-02 NOTE — PROGRESS NOTE ADULT - SUBJECTIVE AND OBJECTIVE BOX
SUBJECTIVE: Pt seen and examined at bedside this am. Trached, on vent.    MEDICATIONS  (STANDING):  acetylcysteine 20%  Inhalation 4 milliLiter(s) Inhalation every 8 hours  albuterol/ipratropium for Nebulization 3 milliLiter(s) Nebulizer every 8 hours  atorvastatin 80 milliGRAM(s) Oral at bedtime  carvedilol 3.125 milliGRAM(s) Oral every 12 hours  chlorhexidine 0.12% Liquid 15 milliLiter(s) Oral Mucosa every 12 hours  chlorhexidine 2% Cloths 1 Application(s) Topical <User Schedule>  doxazosin 4 milliGRAM(s) Oral at bedtime  ertapenem  IVPB 1000 milliGRAM(s) IV Intermittent every 24 hours  pantoprazole  Injectable 40 milliGRAM(s) IV Push daily  potassium chloride  10 mEq/100 mL IVPB 10 milliEquivalent(s) IV Intermittent once  senna 2 Tablet(s) Oral at bedtime  sodium chloride 1 Gram(s) Oral every 6 hours  sodium chloride 0.9%. 1000 milliLiter(s) (85 mL/Hr) IV Continuous <Continuous>  sodium chloride 3%  Inhalation 4 milliLiter(s) Inhalation every 8 hours    MEDICATIONS  (PRN):  acetaminophen   Oral Liquid .. 650 milliGRAM(s) Oral every 6 hours PRN Temp greater or equal to 38C (100.4F), Mild Pain (1 - 3)  fentaNYL    Injectable 12.5 MICROGram(s) IV Push every 2 hours PRN Severe Pain (7 - 10)  ondansetron Injectable 4 milliGRAM(s) IV Push every 6 hours PRN Nausea and/or Vomiting  oxyCODONE    IR 5 milliGRAM(s) Oral every 4 hours PRN Moderate Pain (4 - 6)      Vital Signs Last 24 Hrs  T(C): 37 (02 May 2023 05:49), Max: 37.3 (01 May 2023 17:41)  T(F): 98.6 (02 May 2023 05:49), Max: 99.2 (01 May 2023 21:39)  HR: 59 (02 May 2023 07:00) (58 - 81)  BP: 119/57 (02 May 2023 07:00) (108/53 - 139/57)  BP(mean): 82 (02 May 2023 07:00) (75 - 95)  RR: 21 (02 May 2023 07:00) (11 - 27)  SpO2: 97% (02 May 2023 07:00) (94% - 100%)    Parameters below as of 02 May 2023 07:00  Patient On (Oxygen Delivery Method): ventilator    O2 Concentration (%): 40    Physical Exam  General: NAD, resting comfortably in bed  Pulmonary: Tach, vent, Nonlabored breathing, no respiratory distress  CV: NSR  Abd: soft, NT/ND, no guarding, incision c/d/i  Extremities: (-) edema, warm, well-perfused      I&O's Detail    01 May 2023 07:01  -  02 May 2023 07:00  --------------------------------------------------------  IN:    FentaNYL: 7.1 mL    Glucerna: 100 mL    IV PiggyBack: 200 mL    sodium chloride 0.9%: 300 mL    sodium chloride 0.9%: 850 mL  Total IN: 1457.1 mL    OUT:    External Ventricular Device (mL): 367 mL    Voided (mL): 1950 mL  Total OUT: 2317 mL    Total NET: -859.9 mL          LABS:                        9.0    6.63  )-----------( 258      ( 02 May 2023 05:03 )             27.0     05-02    135  |  103  |  11  ----------------------------<  108<H>  3.9   |  24  |  0.40<L>    Ca    8.9      02 May 2023 05:03  Phos  3.7     05-02  Mg     2.2     05-02      PT/INR - ( 02 May 2023 05:03 )   PT: 13.3 sec;   INR: 1.12          PTT - ( 02 May 2023 05:03 )  PTT:34.9 sec      RADIOLOGY & ADDITIONAL STUDIES:

## 2023-05-02 NOTE — PROGRESS NOTE ADULT - ASSESSMENT
58 yo F with PMH of Asthma, CAD (not on  meds), peripheral neuropathy and PSH of BATOOL, cholecystectomy, right knee surgery presenting with right sided weakness and right facial numbness, found to have b/l cerebellar strokes now s/p suboccipital craniotomy for decompression on 4/22 with course c/b respiratory insufficiency requiring trach placement. Patient with new fever overnight 4/29-4/30, cultures sent and started on zosyn. Sputum cultures growing pluralibacter and strep species, urine cultures growing ecoli. ID consulted for antibiotic recommendations.     RECOMMENDATIONS:   -BCs 4/29; NGTD   -sputum Cx 4/29 growing pluralibacter gergoviae and strep pneumo; final   -UCs 4/30 growing ESBL sensitive to Ertapenem   -f/u CSF Cx 4/30; NGTD   -c/w IV ertapenem 1g q24hrs for 7 days (5/1-5/8)    ID team 1 will signed off. Thank you for the consult. Please re-consult as needed.   Recommendations not final until attested by attending            56 yo F with PMH of Asthma, CAD (not on  meds), peripheral neuropathy and PSH of BATOOL, cholecystectomy, right knee surgery presenting with right sided weakness and right facial numbness, found to have b/l cerebellar strokes now s/p suboccipital craniotomy for decompression on 4/22 with course c/b respiratory insufficiency requiring trach placement. Patient with new fever overnight 4/29-4/30, cultures sent and started on zosyn. Sputum cultures growing pluralibacter and strep species, urine cultures growing ecoli. ID consulted for antibiotic recommendations.     RECOMMENDATIONS:   -BCs 4/29; NGTD   -sputum Cx 4/29 growing pluralibacter gergoviae and strep pneumo; final   -UCs 4/30 growing ESBL sensitive to Ertapenem   -f/u CSF Cx 4/30; NGTD   -c/w IV ertapenem 1g q24hrs for 7 days (5/1-5/8)    ID team 1 will continue to follow. Thank you for the consult.   Recommendations not final until attested by attending            58 yo F with PMH of Asthma, CAD (not on  meds), peripheral neuropathy and PSH of BATOOL, cholecystectomy, right knee surgery presenting with right sided weakness and right facial numbness, found to have b/l cerebellar strokes now s/p suboccipital craniotomy for decompression on 4/22 with course c/b respiratory insufficiency requiring trach placement. Patient with new fever overnight 4/29-4/30, cultures sent and started on zosyn. Sputum cultures growing pluralibacter and strep species, urine cultures growing ecoli. ID consulted for antibiotic recommendations.     RECOMMENDATIONS:   -BCs 4/29; NGTD   -sputum Cx 4/29 growing pluralibacter gergoviae and strep pneumo; final   -UCs 4/30 growing ESBL sensitive to Ertapenem   -f/u CSF Cx 4/30; NGTD   -c/w IV ertapenem 1g q24hrs for likely 7 days (5/1-5/8)    ID team 1 will continue to follow. Thank you for the consult.   Recommendations not final until attested by attending

## 2023-05-02 NOTE — PROGRESS NOTE ADULT - ATTENDING COMMENTS
#VAP, UTI, hypoxic respiratory failure, ESBL E.coli infection    Patient underwent G-tube placement.  Per RN, patient has a lot of secretion, q1h suctioning requirement.  UCx grew ESBL E.coli. ET tube culture grew Pleuralibcter gergoviae and strep pneumo.  BCx all ngtd.  Cont ertapenem 1g IV 24h. Duration TBD based on clinical course, likely 7 days.       Team 1 will follow you.  Case d/w primary team.    Victoria Singer MD, MS  Infectious Disease attending  work cell [phone]   For any questions during evening/weekend/holiday, please page ID on call #VAP, UTI, hypoxic respiratory failure, ESBL E.coli infection    Patient underwent G-tube placement.  Per RN, patient has a lot of secretion, q1h suctioning requirement.  UCx grew ESBL E.coli. ET tube culture grew Pleuralibcter gergoviae and strep pneumo.  BCx all ngtd.  Cont ertapenem 1g IV 24h. Duration TBD based on clinical course, likely 7 days.   Since CSF remains negative and patient afebrile, no contraindication for VPS placement.       Team 1 will follow you.  Case d/w primary team.    Victoria Singer MD, MS  Infectious Disease attending  work cell [phone]   For any questions during evening/weekend/holiday, please page ID on call

## 2023-05-02 NOTE — PROGRESS NOTE ADULT - ASSESSMENT
57y/F with  acute CVA, R cerebellar, brain compression cerebral edema, s/p SOC  UTI ESBL  acute respiratory failure, bulbar dysfunction  asthma  CAD  peripheral neuropathy  superficial thrombosis, proximal R greater saphenous    PLAN:   NEURO: neurochecks q1h, PRN pain meds with acetaminophen / opiates  EVD 5cm H20 monitor output, challenge EVD - raise to 10 mm Hg tomorrow  stroke on board, ASA once cleared by neurosurgery  REHAB:  physical therapy evaluation and management    EARLY MOB:  HOB up    PULM:  full vent support, CPAP trials, CXR, nebs, s/p trach; decrease PEEP to +6  CARDIO:  SBP goal 100-160mm Hg  ENDO:  Blood sugar goals 140-180 mg/dL, continue insulin sliding scale, continue high dose statins  GI:  PPI for GI prophylaxis while intubated  DIET:  start meds at 10 p.m., feeds tomorrow at 10 a.m.  RENAL:  cont cardura, Na goal 135-145, salt tabs, IVF while NPO  HEM/ONC: s/p 3 units platelets, 35 ug DDAVP, (+) aCL  VTE Prophylaxis: SCDs, SQL held for PEG procedure, re-evalute tomorrow  ID: afebrile, no leukocytosis, continue ertapenem for ESBL UTI until 05/08 last day  Social: will update family    Active issues:  What's keeping patient in the ICU? close monitoring, EVD  What is this patient's dispo plan? stepdown once EVD removed    ATTENDING ATTESTATION:  I was physically present for the key portions of the evaluation and management (E/M) service provided.  I agree with the above history, physical and plan, which I have reviewed and edited where appropriate.    Patient at high risk for neurological deterioration or death due to:  ICU delirium, aspiration PNA, DVT / PE.  Critical care time:  I have personally provided 45 minutes of critical care time, excluding time spent on separate procedures.      Plan discussed with RN, house staff.

## 2023-05-02 NOTE — BRIEF OPERATIVE NOTE - OPERATION/FINDINGS
Abdomen entered in LUQ via Veress needed. Single 5mm port placed L abdomen using Optiview. Esophagogastroscopy performed and stomach visualized with directed abdominal wall contact on laparoscopic view. PEG placed under visualization. 4cm at bumper, 3cm at skin. Port site closed with 4-0 Monocryl and Dermabond.

## 2023-05-03 ENCOUNTER — TRANSCRIPTION ENCOUNTER (OUTPATIENT)
Age: 58
End: 2023-05-03

## 2023-05-03 DIAGNOSIS — J95.03 MALFUNCTION OF TRACHEOSTOMY STOMA: ICD-10-CM

## 2023-05-03 LAB
ANION GAP SERPL CALC-SCNC: 14 MMOL/L — SIGNIFICANT CHANGE UP (ref 5–17)
AT III ACT/NOR PPP CHRO: 105 % — SIGNIFICANT CHANGE UP (ref 85–135)
BUN SERPL-MCNC: 10 MG/DL — SIGNIFICANT CHANGE UP (ref 7–23)
CALCIUM SERPL-MCNC: 8.1 MG/DL — LOW (ref 8.4–10.5)
CHLORIDE SERPL-SCNC: 101 MMOL/L — SIGNIFICANT CHANGE UP (ref 96–108)
CO2 SERPL-SCNC: 22 MMOL/L — SIGNIFICANT CHANGE UP (ref 22–31)
CREAT SERPL-MCNC: 0.42 MG/DL — LOW (ref 0.5–1.3)
EGFR: 115 ML/MIN/1.73M2 — SIGNIFICANT CHANGE UP
GLUCOSE SERPL-MCNC: 94 MG/DL — SIGNIFICANT CHANGE UP (ref 70–99)
HCT VFR BLD CALC: 30.3 % — LOW (ref 34.5–45)
HGB BLD-MCNC: 10 G/DL — LOW (ref 11.5–15.5)
MAGNESIUM SERPL-MCNC: 2 MG/DL — SIGNIFICANT CHANGE UP (ref 1.6–2.6)
MCHC RBC-ENTMCNC: 29.7 PG — SIGNIFICANT CHANGE UP (ref 27–34)
MCHC RBC-ENTMCNC: 33 GM/DL — SIGNIFICANT CHANGE UP (ref 32–36)
MCV RBC AUTO: 89.9 FL — SIGNIFICANT CHANGE UP (ref 80–100)
NRBC # BLD: 0 /100 WBCS — SIGNIFICANT CHANGE UP (ref 0–0)
PHOSPHATE SERPL-MCNC: 3.6 MG/DL — SIGNIFICANT CHANGE UP (ref 2.5–4.5)
PLATELET # BLD AUTO: 273 K/UL — SIGNIFICANT CHANGE UP (ref 150–400)
POTASSIUM SERPL-MCNC: 3.5 MMOL/L — SIGNIFICANT CHANGE UP (ref 3.5–5.3)
POTASSIUM SERPL-SCNC: 3.5 MMOL/L — SIGNIFICANT CHANGE UP (ref 3.5–5.3)
PROT C ACT/NOR PPP: 112 % — SIGNIFICANT CHANGE UP (ref 74–150)
PROT S FREE AG PPP IA-ACNC: 95 % — SIGNIFICANT CHANGE UP (ref 61–131)
RBC # BLD: 3.37 M/UL — LOW (ref 3.8–5.2)
RBC # FLD: 12.7 % — SIGNIFICANT CHANGE UP (ref 10.3–14.5)
SODIUM SERPL-SCNC: 137 MMOL/L — SIGNIFICANT CHANGE UP (ref 135–145)
WBC # BLD: 8.67 K/UL — SIGNIFICANT CHANGE UP (ref 3.8–10.5)
WBC # FLD AUTO: 8.67 K/UL — SIGNIFICANT CHANGE UP (ref 3.8–10.5)

## 2023-05-03 PROCEDURE — 99232 SBSQ HOSP IP/OBS MODERATE 35: CPT | Mod: GC

## 2023-05-03 PROCEDURE — 93970 EXTREMITY STUDY: CPT | Mod: 26

## 2023-05-03 PROCEDURE — 99291 CRITICAL CARE FIRST HOUR: CPT

## 2023-05-03 PROCEDURE — 99292 CRITICAL CARE ADDL 30 MIN: CPT

## 2023-05-03 RX ORDER — ENOXAPARIN SODIUM 100 MG/ML
40 INJECTION SUBCUTANEOUS ONCE
Refills: 0 | Status: DISCONTINUED | OUTPATIENT
Start: 2023-05-03 | End: 2023-05-03

## 2023-05-03 RX ORDER — PANTOPRAZOLE SODIUM 20 MG/1
40 TABLET, DELAYED RELEASE ORAL DAILY
Refills: 0 | Status: DISCONTINUED | OUTPATIENT
Start: 2023-05-03 | End: 2023-05-08

## 2023-05-03 RX ORDER — POTASSIUM CHLORIDE 20 MEQ
40 PACKET (EA) ORAL EVERY 4 HOURS
Refills: 0 | Status: DISCONTINUED | OUTPATIENT
Start: 2023-05-03 | End: 2023-05-03

## 2023-05-03 RX ORDER — POTASSIUM CHLORIDE 20 MEQ
40 PACKET (EA) ORAL EVERY 4 HOURS
Refills: 0 | Status: COMPLETED | OUTPATIENT
Start: 2023-05-03 | End: 2023-05-03

## 2023-05-03 RX ORDER — ENOXAPARIN SODIUM 100 MG/ML
40 INJECTION SUBCUTANEOUS EVERY 24 HOURS
Refills: 0 | Status: DISCONTINUED | OUTPATIENT
Start: 2023-05-03 | End: 2023-05-07

## 2023-05-03 RX ADMIN — Medication 40 MILLIEQUIVALENT(S): at 10:01

## 2023-05-03 RX ADMIN — Medication 3 MILLILITER(S): at 22:34

## 2023-05-03 RX ADMIN — Medication 4 MILLILITER(S): at 06:24

## 2023-05-03 RX ADMIN — SODIUM CHLORIDE 4 MILLILITER(S): 9 INJECTION INTRAMUSCULAR; INTRAVENOUS; SUBCUTANEOUS at 00:26

## 2023-05-03 RX ADMIN — SODIUM CHLORIDE 4 MILLILITER(S): 9 INJECTION INTRAMUSCULAR; INTRAVENOUS; SUBCUTANEOUS at 16:11

## 2023-05-03 RX ADMIN — Medication 4 MILLILITER(S): at 09:41

## 2023-05-03 RX ADMIN — SODIUM CHLORIDE 4 MILLILITER(S): 9 INJECTION INTRAMUSCULAR; INTRAVENOUS; SUBCUTANEOUS at 09:42

## 2023-05-03 RX ADMIN — Medication 40 MILLIEQUIVALENT(S): at 06:57

## 2023-05-03 RX ADMIN — Medication 3 MILLILITER(S): at 16:11

## 2023-05-03 RX ADMIN — CHLORHEXIDINE GLUCONATE 15 MILLILITER(S): 213 SOLUTION TOPICAL at 06:35

## 2023-05-03 RX ADMIN — Medication 650 MILLIGRAM(S): at 06:36

## 2023-05-03 RX ADMIN — Medication 4 MILLILITER(S): at 00:24

## 2023-05-03 RX ADMIN — SODIUM CHLORIDE 4 MILLILITER(S): 9 INJECTION INTRAMUSCULAR; INTRAVENOUS; SUBCUTANEOUS at 06:24

## 2023-05-03 RX ADMIN — Medication 3 MILLILITER(S): at 09:41

## 2023-05-03 RX ADMIN — OXYCODONE HYDROCHLORIDE 5 MILLIGRAM(S): 5 TABLET ORAL at 10:21

## 2023-05-03 RX ADMIN — ATORVASTATIN CALCIUM 80 MILLIGRAM(S): 80 TABLET, FILM COATED ORAL at 23:07

## 2023-05-03 RX ADMIN — CARVEDILOL PHOSPHATE 3.12 MILLIGRAM(S): 80 CAPSULE, EXTENDED RELEASE ORAL at 06:35

## 2023-05-03 RX ADMIN — Medication 3 MILLILITER(S): at 06:23

## 2023-05-03 RX ADMIN — Medication 650 MILLIGRAM(S): at 07:14

## 2023-05-03 RX ADMIN — Medication 1 TABLET(S): at 11:37

## 2023-05-03 RX ADMIN — ERTAPENEM SODIUM 120 MILLIGRAM(S): 1 INJECTION, POWDER, LYOPHILIZED, FOR SOLUTION INTRAMUSCULAR; INTRAVENOUS at 23:07

## 2023-05-03 RX ADMIN — SODIUM CHLORIDE 1 GRAM(S): 9 INJECTION INTRAMUSCULAR; INTRAVENOUS; SUBCUTANEOUS at 23:13

## 2023-05-03 RX ADMIN — CARVEDILOL PHOSPHATE 3.12 MILLIGRAM(S): 80 CAPSULE, EXTENDED RELEASE ORAL at 17:49

## 2023-05-03 RX ADMIN — SODIUM CHLORIDE 1 GRAM(S): 9 INJECTION INTRAMUSCULAR; INTRAVENOUS; SUBCUTANEOUS at 17:49

## 2023-05-03 RX ADMIN — Medication 4 MILLILITER(S): at 22:33

## 2023-05-03 RX ADMIN — OXYCODONE HYDROCHLORIDE 5 MILLIGRAM(S): 5 TABLET ORAL at 11:13

## 2023-05-03 RX ADMIN — CHLORHEXIDINE GLUCONATE 1 APPLICATION(S): 213 SOLUTION TOPICAL at 06:35

## 2023-05-03 RX ADMIN — Medication 3 MILLILITER(S): at 00:25

## 2023-05-03 RX ADMIN — SODIUM CHLORIDE 1 GRAM(S): 9 INJECTION INTRAMUSCULAR; INTRAVENOUS; SUBCUTANEOUS at 06:37

## 2023-05-03 RX ADMIN — CHLORHEXIDINE GLUCONATE 15 MILLILITER(S): 213 SOLUTION TOPICAL at 17:49

## 2023-05-03 RX ADMIN — SODIUM CHLORIDE 1 GRAM(S): 9 INJECTION INTRAMUSCULAR; INTRAVENOUS; SUBCUTANEOUS at 11:37

## 2023-05-03 RX ADMIN — PANTOPRAZOLE SODIUM 40 MILLIGRAM(S): 20 TABLET, DELAYED RELEASE ORAL at 11:40

## 2023-05-03 NOTE — ANESTHESIA FOLLOW-UP NOTE - NSEVALATION_GEN_ALL_CORE
No apparent complications or complaints regarding anesthesia care at this time

## 2023-05-03 NOTE — PROGRESS NOTE ADULT - SUBJECTIVE AND OBJECTIVE BOX
Neurology Stroke Progress Note    INTERVAL HPI/OVERNIGHT EVENTS:  Patient seen and examined. s/p angio 5/2.  #[id number] used.     MEDICATIONS  (STANDING):  acetylcysteine 20%  Inhalation 4 milliLiter(s) Inhalation every 6 hours  albuterol/ipratropium for Nebulization 3 milliLiter(s) Nebulizer every 6 hours  atorvastatin 80 milliGRAM(s) Oral at bedtime  carvedilol 3.125 milliGRAM(s) Oral every 12 hours  chlorhexidine 0.12% Liquid 15 milliLiter(s) Oral Mucosa every 12 hours  chlorhexidine 2% Cloths 1 Application(s) Topical <User Schedule>  enoxaparin Injectable 40 milliGRAM(s) SubCutaneous every 24 hours  ertapenem  IVPB 1000 milliGRAM(s) IV Intermittent every 24 hours  lactobacillus acidophilus 1 Tablet(s) Oral daily  pantoprazole   Suspension 40 milliGRAM(s) Oral daily  sodium chloride 1 Gram(s) Oral every 6 hours  sodium chloride 0.9%. 1000 milliLiter(s) (85 mL/Hr) IV Continuous <Continuous>  sodium chloride 3%  Inhalation 4 milliLiter(s) Inhalation every 6 hours    MEDICATIONS  (PRN):  acetaminophen   Oral Liquid .. 650 milliGRAM(s) Oral every 6 hours PRN Temp greater or equal to 38C (100.4F), Mild Pain (1 - 3)  fentaNYL    Injectable 12.5 MICROGram(s) IV Push every 2 hours PRN Severe Pain (7 - 10)  oxyCODONE    IR 5 milliGRAM(s) Oral every 4 hours PRN Moderate Pain (4 - 6)      Allergies    No Known Allergies    Intolerances        Vital Signs Last 24 Hrs  T(C): 36.6 (03 May 2023 05:37), Max: 37.1 (02 May 2023 21:59)  T(F): 97.9 (03 May 2023 05:37), Max: 98.7 (02 May 2023 21:59)  HR: 82 (03 May 2023 13:00) (57 - 93)  BP: 123/58 (03 May 2023 13:00) (107/56 - 164/68)  BP(mean): 84 (03 May 2023 13:00) (72 - 102)  RR: 18 (03 May 2023 13:00) (15 - 25)  SpO2: 96% (03 May 2023 13:00) (95% - 100%)    Parameters below as of 03 May 2023 13:00  Patient On (Oxygen Delivery Method): CPAP       Neurologic:  -Mental status: Awakens to voice, ETT in place. Following simple commands. Able to show 2 fingers and make a fist.   -Cranial nerves:   III, IV, VI: no gaze limitation on R gaze. On L gaze, unable adduct right eye past midline with a corrective saccad noted in the left eye  VII: Face appears symmetric, difficult to assess d/t ETT  Motor: some effort against gravity in bilateral UE. patient was undergoing bilateral LE dopplers during examination so did not asses LE.       LABS:                        10.0   8.67  )-----------( 273      ( 03 May 2023 05:30 )             30.3     05-03    137  |  101  |  10  ----------------------------<  94  3.5   |  22  |  0.42<L>    Ca    8.1<L>      03 May 2023 05:30  Phos  3.6     05-03  Mg     2.0     05-03      PT/INR - ( 02 May 2023 05:03 )   PT: 13.3 sec;   INR: 1.12          PTT - ( 02 May 2023 05:03 )  PTT:34.9 sec      RADIOLOGY & ADDITIONAL TESTS:    reviewed

## 2023-05-03 NOTE — PROGRESS NOTE ADULT - SUBJECTIVE AND OBJECTIVE BOX
INFECTIOUS DISEASES CONSULT FOLLOW-UP NOTE    INTERVAL HPI/OVERNIGHT EVENTS:  No acute events overnight. Suctioning needs q2hr.     ROS:   Constitutional, eyes, ENT, cardiovascular, respiratory, gastrointestinal, genitourinary, integumentary, neurological, psychiatric and heme/lymph are otherwise negative other than noted above       ANTIBIOTICS/RELEVANT:    MEDICATIONS  (STANDING):  acetylcysteine 20%  Inhalation 4 milliLiter(s) Inhalation every 6 hours  albuterol/ipratropium for Nebulization 3 milliLiter(s) Nebulizer every 6 hours  atorvastatin 80 milliGRAM(s) Oral at bedtime  carvedilol 3.125 milliGRAM(s) Oral every 12 hours  chlorhexidine 0.12% Liquid 15 milliLiter(s) Oral Mucosa every 12 hours  chlorhexidine 2% Cloths 1 Application(s) Topical <User Schedule>  enoxaparin Injectable 40 milliGRAM(s) SubCutaneous every 24 hours  ertapenem  IVPB 1000 milliGRAM(s) IV Intermittent every 24 hours  lactobacillus acidophilus 1 Tablet(s) Oral daily  pantoprazole   Suspension 40 milliGRAM(s) Oral daily  sodium chloride 1 Gram(s) Oral every 6 hours  sodium chloride 0.9%. 1000 milliLiter(s) (85 mL/Hr) IV Continuous <Continuous>  sodium chloride 3%  Inhalation 4 milliLiter(s) Inhalation every 6 hours    MEDICATIONS  (PRN):  acetaminophen   Oral Liquid .. 650 milliGRAM(s) Oral every 6 hours PRN Temp greater or equal to 38C (100.4F), Mild Pain (1 - 3)  fentaNYL    Injectable 12.5 MICROGram(s) IV Push every 2 hours PRN Severe Pain (7 - 10)  oxyCODONE    IR 5 milliGRAM(s) Oral every 4 hours PRN Moderate Pain (4 - 6)        Vital Signs Last 24 Hrs  T(C): 36.6 (03 May 2023 05:37), Max: 37.1 (02 May 2023 21:59)  T(F): 97.9 (03 May 2023 05:37), Max: 98.7 (02 May 2023 21:59)  HR: 80 (03 May 2023 10:00) (57 - 93)  BP: 131/60 (03 May 2023 10:00) (104/54 - 164/68)  BP(mean): 86 (03 May 2023 10:00) (72 - 102)  RR: 15 (03 May 2023 10:00) (15 - 25)  SpO2: 97% (03 May 2023 10:00) (92% - 100%)    Parameters below as of 03 May 2023 10:00  Patient On (Oxygen Delivery Method): ventilator,CPAP        05-02-23 @ 07:01  -  05-03-23 @ 07:00  --------------------------------------------------------  IN: 2160 mL / OUT: 1842 mL / NET: 318 mL    05-03-23 @ 07:01  -  05-03-23 @ 10:35  --------------------------------------------------------  IN: 255 mL / OUT: 25 mL / NET: 230 mL      PHYSICAL EXAM:  Constitutional: alert, NAD  Eyes: the sclera and conjunctiva were normal.   ENT: the ears and nose were normal in appearance.   Neck: the appearance of the neck was normal and the neck was supple.   Pulmonary: no respiratory distress and lungs were clear to auscultation bilaterally.   Heart: heart rate was normal and rhythm regular, normal S1 and S2  Vascular:. there was no peripheral edema  Abdomen: normal bowel sounds, soft, non-tender  Neurological: no focal deficits.   Psychiatric: the affect was normal        LABS:                        10.0   8.67  )-----------( 273      ( 03 May 2023 05:30 )             30.3     05-03    137  |  101  |  10  ----------------------------<  94  3.5   |  22  |  0.42<L>    Ca    8.1<L>      03 May 2023 05:30  Phos  3.6     05-03  Mg     2.0     05-03      PT/INR - ( 02 May 2023 05:03 )   PT: 13.3 sec;   INR: 1.12          PTT - ( 02 May 2023 05:03 )  PTT:34.9 sec      MICROBIOLOGY:      RADIOLOGY & ADDITIONAL STUDIES:  Reviewed

## 2023-05-03 NOTE — PROGRESS NOTE ADULT - ASSESSMENT
58 y/o female transferred from [place] with right sided weakness and right facial numbness. Found to have acute infarction within the right cerebellar hemisphere, causing herniation. Now s/p SOC foramen magnum decompression and right parietal/occipital EVD placement (4/21). Course c/b respiratory insuffiency d/t bulbar dysfunction, s/p trach 4/28/23 failed bedside  PEG placement 5/1; pending OR placement of PEG & DSA for stroke w/u     PLAN:  Neuro   - Vitals/neuro q1h   - s/p DSA for B/L carotid aneurysms pending discussion on management   - EVD raised to @[id number]; monitor ICP/output  - Repeat CTH 4/25 stable   - Stroke following  - pain control with tylenol prn, oxycodone prn, PRN 12.5mcg fent IVP q2 hrs    Cardio  - -160  - TTE 4/24: negative for PFO, mild LVH, mild dilation of L atrium, EF 55-60%  - c/w coreg 3.125mg BID for PVCs & HTN management     Pulm  - /40/16/8, CPAP trials as tolerated; has apenic episodes   - Chest PT, nebs q 6  - 6 cuffed trach placed by ENT 4/28, missing cuff, replaced trach at bedside. --> ENT to remove trach sutures POD7 (5/5)    GI  s/p PEG 5/2; start tube feeds   - Protonix while intubated  - transaminitis resolved     Renal  - Na goal 135-145, salt tabs 1 q 6  - d/c Cardura   - Voiding via primafit     Endo   - no issues     Heme  - SCDs, SQL 40 mg QD (ppx)  - s/p 3 units platelets, 35 mcg of DDAVP for ASA/Plavix reversal  - LE dopplers 4/22 neg for DVT, but showing superficial venous thrombosis in the proximal portion of the right greater saphenous vein; repeat on 4/29 with persistant superficial thrombus   - Heme following for positive anticardiolipin Ab 4/27, recs appreciated ; requesting AC insetting of hypercoagulable state; pending nsgy approval post VPS placement     ID  - Last panculture 4/30, MRSA (-), +ecoli in urine; sputum culture growing pluralibacter & strep species   ESBL ecoli urine - contact precautions   -c/w ertapenem likey 7 days   -CSF culture NGTD   -ID following    DISPO:  - NSICU, full code  - PT/OT rec acute inpatient rehab: patient can tolerate 3 hrs PT/OT daily

## 2023-05-03 NOTE — PROGRESS NOTE ADULT - SUBJECTIVE AND OBJECTIVE BOX
Pre-op Diagnosis: DVT  Procedure: IVC filter   Surgeon: Phu     Consent: In chart                           10.0   8.67  )-----------( 273      ( 03 May 2023 05:30 )             30.3     05-03    137  |  101  |  10  ----------------------------<  94  3.5   |  22  |  0.42<L>    Ca    8.1<L>      03 May 2023 05:30  Phos  3.6     05-03  Mg     2.0     05-03      PT/INR - ( 02 May 2023 05:03 )   PT: 13.3 sec;   INR: 1.12          PTT - ( 02 May 2023 05:03 )  PTT:34.9 sec      Type & Screen:   5/1/23      (*With most recent within 72hrs of OR)  CXR:5/2/23  EKG: please obtain EKG tonight           Is patient on ACE/ARB? [x ]No [ ]Yes   *If yes, please hold any ACE/ARB the day of surgery    Is patient on Lantus at bedtime?  [x ]No [ ]Yes   *If yes, please half the dose the night before OR since patient will be NPO    Does patient have a contrast allergy? [x ]No [ ]Yes  *If yes, please pre-medicate per protocol    Is patient on anticoagulation? [ x]No [ ] Yes  *If yes, please discuss with team when to hold it    Is the patient Female and <54yo [ x]No [ ] Yes  If yes, pregnancy test must be documented in the chart    Is patient on dialysis? [ x]No [ ]Yes  *If yes, please obtain all labs including K level EARLY the day of surgery   *Also, will NOT require IVF past midnight    A/P: 57yFemale pre-op for above procedure  1. NPO past midnight, except medications  2. IVF at midnight:   3. [1 ] Blood on hold, Units:

## 2023-05-03 NOTE — PROGRESS NOTE ADULT - ASSESSMENT
57 year old female w/ PMH of asthma, CAD (not on meds), HTN, prior miscarriages X3, peripheral neuropathy and PSH of BATOOL, cholecystectomy and right knee surgery presented to [place] ED on 4/20 w/ right sided weakness and right facial numbness. Initially found to have a right PICA distribution acute infarct and a right vertebral artery occlusion. Not a candidate for any intervention. On repeat imaging had progression of acute infarct within the right and left cerebellar hemisphere with mass effect on the fourth ventricle and hydrocephalus and upward and downward herniation of the cerebellar parenchyma. She was transferred to [facility] on 4/21 and underwent an emergent SOC foramen magnum decompression and right parietal/occipital EVD placement. Stroke was consulted for further recommendations. TTE with LAE but otherwise unremarkable. s/p DSA for B/L carotid aneurysms pending discussion on management. Course c/b respiratory insuffiency d/t bulbar dysfunction, s/p trach 4/28/23. s/p PEG 5/2 with GI. Stroke mechanism ESUS +/- hypercoag state.       1)Secondary stroke prevention  - ASA when able from nsgy standpoint. Continue statin.     2) Stroke risk factors  - A1C: 5.9  - LDL: 92  - TSH: 0.152  - Heme following for positive anticardiolipin Ab 4/27, recs appreciated   - CRP 17.4/ESR 19  - Collateral from family that patient suffered from 3 miscarriages after her first 2 children and that the patient's mother suffered from an "aortic tear" that has been worsening    3) Further management  - consider MRI brain with and without contrast when stable   - will need SALVADOR/ILR  - dx angio 5/2: b/l cavernous ICA aneurysms, plan to be discussed vascular conference  - recommend SBP goal < 140, per primary team  - recommend q1hr stroke neuro checks  - outpt neurology follow up  - provide stroke education      Discussed with Dr. Bueno and Dr. Fajardo       57 year old female w/ PMH of asthma, CAD (not on meds), HTN, prior miscarriages X3, peripheral neuropathy and PSH of BATOOL, cholecystectomy and right knee surgery presented to [place] ED on 4/20 w/ right sided weakness and right facial numbness. Initially found to have a right PICA distribution acute infarct and a right vertebral artery occlusion. Not a candidate for any intervention. On repeat imaging had progression of acute infarct within the right and left cerebellar hemisphere with mass effect on the fourth ventricle and hydrocephalus and upward and downward herniation of the cerebellar parenchyma. She was transferred to [facility] on 4/21 and underwent an emergent SOC foramen magnum decompression and right parietal/occipital EVD placement. Stroke was consulted for further recommendations. TTE with LAE but otherwise unremarkable. s/p DSA for B/L carotid aneurysms pending discussion on management. Course c/b respiratory insuffiency d/t bulbar dysfunction, s/p trach 4/28/23. s/p PEG 5/2 with GI. Stroke mechanism ESUS +/- hypercoag state.       1)Secondary stroke prevention  - Continue statin.   - given new DVT, okay from stroke perspective to start AC, can begin once surgically cleared    2) Stroke risk factors  - A1C: 5.9  - LDL: 92  - TSH: 0.152  - Heme following for positive anticardiolipin Ab 4/27, recs appreciated   - CRP 17.4/ESR 19  - Collateral from family that patient suffered from 3 miscarriages after her first 2 children and that the patient's mother suffered from an "aortic tear" that has been worsening    3) Further management  - consider MRI brain with and without contrast when stable   - will need SALVADOR/ILR  - dx angio 5/2: b/l cavernous ICA aneurysms, plan to be discussed vascular conference  - recommend SBP goal < 140, per primary team  - recommend q1hr stroke neuro checks  - outpt neurology follow up  - provide stroke education      Discussed with Dr. Bueno and Dr. Fajardo

## 2023-05-03 NOTE — CONSULT NOTE ADULT - ASSESSMENT
57 year old female w/ PMH of asthma, CAD (not on meds), HTN, prior miscarriages X3, peripheral neuropathy and PSH of BATOOL, cholecystectomy and right knee surgery presented to [place] ED on 4/20 w/ right sided weakness and right facial numbness. Initially found to have a right PICA distribution acute infarct and a right vertebral artery occlusion. Not a candidate for any intervention. On repeat imaging had progression of acute infarct within the right and left cerebellar hemisphere with mass effect on the fourth ventricle and hydrocephalus and upward and downward herniation of the cerebellar parenchyma. She was transferred to [facility] on 4/21 and underwent an emergent SOC foramen magnum decompression and right parietal/occipital EVD placement. Stroke was consulted for further recommendations. TTE with LAE but otherwise unremarkable. s/p DSA for B/L carotid aneurysms pending discussion on management. Course c/b respiratory insuffiency d/t bulbar dysfunction, s/p trach 4/28/23. s/p PEG 5/2 with GI. Stroke mechanism ESUS +/- hypercoag state. Vascular consult for IVC filter. Pt has superficial vein thrombosis of proximal and mid right greater saphenous vein extending to the saphenous thrombus and new left intra-muscular calf vein show most recently on duplex today.     Recs:  IVC filter in am  Pre-op: EKG, CXR, AM CBC, BMP, coags, Mg, Phos, type and screen x2,   Rest of care per primary team   Discussed with attending

## 2023-05-03 NOTE — PROGRESS NOTE ADULT - ASSESSMENT
56 yo F with PMH of Asthma, CAD (not on  meds), peripheral neuropathy and PSH of BATOOL, cholecystectomy, right knee surgery presenting with right sided weakness and right facial numbness, found to have b/l cerebellar strokes now s/p suboccipital craniotomy for decompression on 4/22 with course c/b respiratory insufficiency requiring trach placement. Patient with new fever overnight 4/29-4/30, cultures sent and started on zosyn. Sputum cultures growing pluralibacter and strep species, urine cultures growing ecoli. ID consulted for antibiotic recommendations.     RECOMMENDATIONS:   -BCs 4/29; NGTD   -sputum Cx 4/29 growing pluralibacter gergoviae and strep pneumo; final   -UCs 4/30 growing ESBL sensitive to Ertapenem   -f/u CSF Cx 4/30; NGTD   -c/w IV ertapenem 1g q24hrs for likely 7-10 days (5/1-)        ID team 1 will continue to follow. Thank you for the consult.   Recommendations not final until attested by attending            56 yo F with PMH of Asthma, CAD (not on  meds), peripheral neuropathy and PSH of BATOOL, cholecystectomy, right knee surgery presenting with right sided weakness and right facial numbness, found to have b/l cerebellar strokes now s/p suboccipital craniotomy for decompression on 4/22 with course c/b respiratory insufficiency requiring trach placement. Patient with new fever overnight 4/29-4/30, cultures sent and started on zosyn. Sputum cultures growing pluralibacter and strep species, urine cultures growing ecoli. ID consulted for antibiotic recommendations.     RECOMMENDATIONS:   -BCs 4/29; NGTD   -sputum Cx 4/29 growing pluralibacter gergoviae and strep pneumo; final   -UCs 4/30 growing ESBL sensitive to Ertapenem   -f/u CSF Cx 4/30; NGTD   -c/w IV ertapenem 1g q24hrs for 7 days (5/1-5/7)  - no contraindication for VPS placement         Thank you for the consult.   Recommendations not final until attested by attending

## 2023-05-03 NOTE — PROGRESS NOTE ADULT - SUBJECTIVE AND OBJECTIVE BOX
INTERVAL HISTORY: HPI:  56 yo F with PMH of Asthma, CAD (not on  meds), peripheral neuropathy and PSH of BATOOL, cholecystectomy, right knee surgery presented to [facility] on 4/20 with right sided weakness and right facial numbness. Patient was undergoing preparation for colonoscopy. As per daughter at 8am patient was playing with her grandchildren but around 840 am patient was getting dressed and fell and subsequently felt weak after. Daughter reports that patient had some episodes of vomiting at this time. She had a syncopal episode at around 10 am and was witnessed by brother. No head trauma, She regained conscious after few minutes. She remained in bed for the remainder of the day. Daughter reports some slurred speech noted 1230-1PM and also was confused after. and around 4PM, the patient's sister noted right sided weakness at which time EMS was called and patient was brought to ED. No c/o chest pain, shortness of breath, fever, headache, abdominal pain, urinary complaints. No recent travel/sickness/ change in meds. Stroke code in ER: CTH neg for heme, Right PICA distribution acute infarction. CTA with saccular aneurysms of b/l carotids, approximately 0.8 cm on the right and 1.2 x 0.9 cm on the left. Possible tiny third saccular aneurysm on the right Posterior intracranial circulation: Right vertebral arterial occlusion at its dural crossing junction V3 Atlantic and V4 intracranial segments with likely reversal of flow in its intracranial segment from the basilar. Right PICA faintly seen. 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  Not candidate for TNK/mechanical thrombectomy. CT scan repeated which showed: 1. Brain: Progression of acute infarction within the right cerebellar hemisphere, also extending into the left superior cerebellar hemisphere. New mass effect on the fourth ventricle causing stenosis versus occlusion with new third and lateral ventricular dilatation indicating ventricular obstruction at the level of the fourth ventricle. New upward and downward herniation of cerebellar parenchyma Right carotid system: No hemodynamically significant stenosis. Left carotid system: No hemodynamically significant stenosis. Vertebral circulation: Patent. Anterior intracranial circulation: Intact. Bilateral internal carotid saccular aneurysms. These findings are unchanged. Posterior intracranial circulation:    Improved flow within the right vertebral artery since 4/20/2023. New focal stenosis mid left vertebral artery, etiology uncertain, consider vasospasm and extrinsic compression in addition to new embolic disease. Brain perfusion: Perfusion images demonstrate normalization of the perfusion abnormality in the right cerebellar hemisphere present on 4/20/2023 despite evidence of progression of acute infarction.  Areas of apparent ischemia within the posterior fossa have progressed in extent, also again involving the left posterior cerebral arterial distribution. Tx to [facility] for SOC watch. On admission to [facility], NIHSS 12.  (21 Apr 2023 16:50)    PAST MEDICAL & SURGICAL HISTORY:  Asthma  CAD (coronary artery disease)  Peripheral neuropathy  S/P total abdominal hysterectomy  S/P cholecystectomy  S/P right knee surgery      REVIEW OF SYSTEMS: [ ] Unable to Assess due to neurologic exam   [ ] All ROS addressed below are non-contributory, except:  Neuro: [ ] Headache [ ] Back pain [ ] Numbness [ ] Weakness [ ] Ataxia [ ] Dizziness [ ] Aphasia [ ] Dysarthria [ ] Visual disturbance  Resp: [ ] Shortness of breath/dyspnea, [ ] Orthopnea [ ] Cough  CV: [ ] Chest pain [ ] Palpitation [ ] Lightheadedness [ ] Syncope  Renal: [ ] Thirst [ ] Edema  GI: [ ] Nausea [ ] Emesis [ ] Abdominal pain [ ] Constipation [ ] Diarrhea  Hem: [ ] Hematemesis [ ] bright red blood per rectum  ID: [ ] Fever [ ] Chills [ ] Dysuria  ENT: [ ] Rhinorrhea    PHYSICAL EXAM:  General: No Acute Distress, +trach in place   Neurological: AOx2 to choice, R eye 6th nerve palsy, b/l horizontal nystagmus, b/l UE & LE dysmetria, RUE & RLE 4+/5 w/ mild drift   Pulmonary: mild b/l rhonci   Cardiovascular: sinus justo   Gastrointestinal: Soft, Nontender, Nondistended   Extremities: No calf tenderness   Incision: CDI            ICU Vital Signs Last 24 Hrs  T(C): 36.6 (03 May 2023 05:37), Max: 37.1 (02 May 2023 21:59)  T(F): 97.9 (03 May 2023 05:37), Max: 98.7 (02 May 2023 21:59)  HR: 80 (03 May 2023 10:00) (57 - 93)  BP: 131/60 (03 May 2023 10:00) (104/54 - 164/68)  BP(mean): 86 (03 May 2023 10:00) (72 - 102)  ABP: --  ABP(mean): --  RR: 15 (03 May 2023 10:00) (15 - 25)  SpO2: 97% (03 May 2023 10:00) (92% - 100%)      05-02-23 @ 07:01  -  05-03-23 @ 07:00  --------------------------------------------------------  IN: 2160 mL / OUT: 1842 mL / NET: 318 mL    05-03-23 @ 07:01  -  05-03-23 @ 10:41  --------------------------------------------------------  IN: 255 mL / OUT: 625 mL / NET: -370 mL        Mode: AC/ CMV (Assist Control/ Continuous Mandatory Ventilation), RR (machine): 16, TV (machine): 400, FiO2: 40, PEEP: 6, ITime: 1, MAP: 8.9, PIP: 17    acetaminophen   Oral Liquid .. 650 milliGRAM(s) Oral every 6 hours PRN  acetylcysteine 20%  Inhalation 4 milliLiter(s) Inhalation every 6 hours  albuterol/ipratropium for Nebulization 3 milliLiter(s) Nebulizer every 6 hours  atorvastatin 80 milliGRAM(s) Oral at bedtime  carvedilol 3.125 milliGRAM(s) Oral every 12 hours  chlorhexidine 0.12% Liquid 15 milliLiter(s) Oral Mucosa every 12 hours  chlorhexidine 2% Cloths 1 Application(s) Topical <User Schedule>  enoxaparin Injectable 40 milliGRAM(s) SubCutaneous every 24 hours  ertapenem  IVPB 1000 milliGRAM(s) IV Intermittent every 24 hours  fentaNYL    Injectable 12.5 MICROGram(s) IV Push every 2 hours PRN  lactobacillus acidophilus 1 Tablet(s) Oral daily  oxyCODONE    IR 5 milliGRAM(s) Oral every 4 hours PRN  pantoprazole   Suspension 40 milliGRAM(s) Oral daily  sodium chloride 1 Gram(s) Oral every 6 hours  sodium chloride 0.9%. 1000 milliLiter(s) (85 mL/Hr) IV Continuous <Continuous>  sodium chloride 3%  Inhalation 4 milliLiter(s) Inhalation every 6 hours      LABS:  Na: 137 (05-03 @ 05:30), 135 (05-02 @ 05:03), 140 (05-01 @ 05:26)  K: 3.5 (05-03 @ 05:30), 3.9 (05-02 @ 05:03), 4.1 (05-01 @ 05:26)  Cl: 101 (05-03 @ 05:30), 103 (05-02 @ 05:03), 105 (05-01 @ 05:26)  CO2: 22 (05-03 @ 05:30), 24 (05-02 @ 05:03), 26 (05-01 @ 05:26)  BUN: 10 (05-03 @ 05:30), 11 (05-02 @ 05:03), 16 (05-01 @ 05:26)  Cr: 0.42 (05-03 @ 05:30), 0.40 (05-02 @ 05:03), 0.46 (05-01 @ 05:26)  Glu: 94(05-03 @ 05:30), 108(05-02 @ 05:03), 128(05-01 @ 05:26)    Hgb: 10.0 (05-03 @ 05:30), 9.0 (05-02 @ 05:03), 9.3 (05-01 @ 05:26)  Hct: 30.3 (05-03 @ 05:30), 27.0 (05-02 @ 05:03), 28.3 (05-01 @ 05:26)  WBC: 8.67 (05-03 @ 05:30), 6.63 (05-02 @ 05:03), 8.18 (05-01 @ 05:26)  Plt: 273 (05-03 @ 05:30), 258 (05-02 @ 05:03), 224 (05-01 @ 05:26)    INR: 1.12 05-02-23 @ 05:03, 1.13 05-01-23 @ 05:26  PTT: 34.9 05-02-23 @ 05:03, 32.0 05-01-23 @ 05:26

## 2023-05-03 NOTE — PROGRESS NOTE ADULT - SUBJECTIVE AND OBJECTIVE BOX
=================================  NEUROCRITICAL CARE ATTENDING NOTE  =================================    MAKSIM TRIVEDI   MRN-[id number]  Summary:  57y/F  with Asthma, CAD (not on  meds), peripheral neuropathy and PSH of BATOOL, cholecystectomy, right knee surgery presented to [place] ED on 4/20 with right sided weakness and right facial numbness. Patient was undergoing preparation for colonoscopy. As per daughter at 8am patient was playing with her grandchildren but around 840 am patient was getting dressed and fell and subsequently felt weak after. Daughter reports that patient had some episodes of vomiting at this time. She had a syncopal episode at around 10 am and was witnessed by brother. No head trauma, She regained conscious after few minutes. She remained in bed for the remainder of the day. Daughter reports some slurred speech noted 1230-1PM and also was confused after. and around 4PM, the patient's sister noted right sided weakness at which time EMS was called and patient was brought to ED. No c/o chest pain, shortness of breath, fever, headache, abdominal pain, urinary complaints. No recent travel/sickness/ change in meds. Stroke code in ER: CTH neg for heme, Right PICA distribution acute infarction. CTA with saccular aneurysms of b/l carotids, approximately 0.8 cm on the right and 1.2 x 0.9 cm on the left. Possible tiny third saccular aneurysm on the right Posterior intracranial circulation: Right vertebral arterial occlusion at its dural crossing junction V3 Atlantic and V4 intracranial segments with likely reversal of flow in its intracranial segment from the basilar. Right PICA faintly seen. 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  Not candidate for TNK/mechanical thrombectomy. CT scan repeated which showed: 1. Brain: Progression of acute infarction within the right cerebellar hemisphere, also extending into the left superior cerebellar hemisphere. New mass effect on the fourth ventricle causing stenosis versus occlusion with new third and lateral ventricular dilatation indicating ventricular obstruction at the level of the fourth ventricle. New upward and downward herniation of cerebellar parenchyma Right carotid system: No hemodynamically significant stenosis. Left carotid system: No hemodynamically significant stenosis. Vertebral circulation: Patent. Anterior intracranial circulation: Intact. Bilateral internal carotid saccular aneurysms. These findings are unchanged. Posterior intracranial circulation:    Improved flow within the right vertebral artery since 4/20/2023. New focal stenosis mid left vertebral artery, etiology uncertain, consider vasospasm and extrinsic compression in addition to new embolic disease. Brain perfusion: Perfusion images demonstrate normalization of the perfusion abnormality in the right cerebellar hemisphere present on 4/20/2023 despite evidence of progression of acute infarction.  Areas of apparent ischemia within the posterior fossa have progressed in extent, also again involving the left posterior cerebral arterial distribution. Tx to [facility] for SOC watch. On admission to [facility], NIHSS 12.  (21 Apr 2023 16:50)    COURSE IN THE HOSPITAL:  4/21: Admitted for crani watch, CTH complete w/ unchanged hydrocephalus, taken for emergent occipital craniectomy.   4/22: POD1. Remains intubated overnight, on propofol and dank. EVD@[id number]. Pending repeat CTH this am. Given hydralazine 10mg x1. Started on cardene for SBP high 140s-150s. Sub-therapeutic on plavix, therapeutic on asa, rpt asa accumetrics until subtherapeutic. LE dopplers neg for DVT, but showing superficial venous thrombosis in the proximal portion of the right greater saphenous vein. Started on 3% @60 for na goal 145-150. NGT placed by ENT. Febrile, pancultured.  4/23: POD 2. 3% increased to 75. NGT readjusted. UA neg. SBP liberalized to 160. Plan to extubate. 3% decreased to 60. Failed extubation d/t no cuff leak, given 60mg solumedrol, plan to extubate in 6 hrs. SCx1 for post void residual >400, started on cardura at bedtime. New blood in buretrol, stopped SQL. Clot in EVD cleared. Neuro exam improving.   4/24: POD 3. Cont 3% with NS while NPO, start TF today. TF started. LFTs downtrending. Sodium 141. Increased 3% to 50, NS to 30. Repeat BMP ordered for 5:30PM. Tramadol 25 for pain with no relief, oxy 5 and 1g tylenol ordered, stability CT stable, speech language consult for dysarthria. Given hydralazine 10mg IVP for SBP>160, started amlodipine 5mg. C/o mild headache, given fioricet x1, stroke neuro rec SALVADOR and loop recorder placement. Echo with bubble completed, negative for PFO, EF 55-60%. J HFNC started for desats with suctioning.   4/25: POD 4. Cont HFNC. Increased secertions on HFNC, reintubated. CXR confirmed good placement. EVD dropped to 5cmH20 for goal of draining 5-10cc/hr and SG ELENA drain taken off suction. Pending CTH. EVD drained 0cc/hr, dropped to 0. NGT replaced. Dc'd fioricet. Started on PPI for intubation. Increased cardura to 4mg for urinary retention, given an additional 2mg now. Started salt tabs 3 q 6. Given 12.5mg fentanyl x1. NGT replaced, CXR shown in lung. NGT removed, repeat CXR showing no pneumothorax. Pending ENT consult for NGT placement. CTH stable. NGT replaced by ENT, confirmed in proper spot. Restarted TF. [id number]. Na 141 from 146. Increased 3% to 60. EVD raised to 3cmH20. ETT pulled back 1cm by RT.  4/26: POD 5 SOC. Intubated, remains on precedex, ABG ordered with improving PaO2, BMP sodium 148, 3% changed to 30cc/hr, cardura increased to 8mg for tonight, tolerating cpap  4/27: POD 6 SOC. Respiratory rate sustained 6, returned to FVS. Na 151, dc'd 3%, f/u AM sodium. Nurse noticed ETT 19 at the lip and was 20 prior, CXR shows ETT @ 5cm above jono, ET tube advanced 1cm, repeat CXR confirms appropriate placement. Thick inline secretions with suctioning. ENT trach placement Fri likely, PEG likely Mon. Keep ELENA drain until no output, EVD to remain @3. Start ASA 81mg daily tomorrow.   4/28: POD7 SOC, neuro stable, given fentanyl x 1 overnight for presumed discomfort (biting on ETT), remains on full vent support. CTH today stable in comparison to 4/25. 6 cuffed trach placed by ENT in OR, but came down without cuff. Replaced at bedside by ENT. On fentanyl gtt.    4/29: POD8 SOC, JIM overnight. Incision cleaned and dressing changed. On fentanyl gtt. EKG completed due to increased frequency PVCs on telemetry, frequency of PVCs improved with titrating up fentanyl gtt. ABG drawn.  Repeat LE dopplers completed showing persistant superficial thrombus, no DVT. No EVD waveform during day, flushed distally without improvement. Exam unchanged.  EVD dropped to 0 for low output in afternoon.   4/30: POD9. Neuro stable, febrile to 101.3F o/n, given tylenol and pan cx sent. SBP dipped to low 90s o/n, HR stable in 70s with some PVCs, decreased fentanyl gtt and given 500cc bolus of NS x 2. Pend PEG placement Mon and angio Tues. D/c'd ELENA drain today and suture placed. F/u heme recs. Positive UA. Infiltrates found on CXR. Started on Zosyn 4.5g Q6hrs .   5/1: POD 10. Neuro stable. Dc'd fentanyl gtt. PEG failed placement at bedside with Dr. Gant, plan for PEG in OR tomorrow afternoon with Dr. Layton. Dc'd amlodipine and started carvedilol 3.125 BID for PVCs . Made 3% inhalation and mucomyst q8hr. Failed PEG at bedside. Salt tabs made 1 q 6. Decreased cardura to 4mg daily. Urine culture growing E. Coli and sputum culture growing pluralibacter gergoviae and strep species. ID consulted, f/u recs. Failed CPAP trial @ 3pm. Added am labs per heme/onc for hypercoguable workup. Pending repeat LE dopplers in 2-3 days. NGT clogged, removed, ENT failed attempt at replacement, will keep NPO for PEG placement tmw. Repeat xray in am for concern for aspration. Pt abx switched from Zosyn to Ertapenem 1g Q24hrs. per ID recs, possible ESBL growth.   5/2: POD 11. Neuro stable. diagnostic cerebral angiogram (bilateral carotid cavernous aneurysm) and PEG placement. NPO; pulled out radial TR band (right)    Past Medical History: Asthma CAD (coronary artery disease) Peripheral neuropathy  Allergies:  No Known Allergies  Home meds:   ·	Albuterol (Eqv-ProAir HFA) 90 mcg/inh inhalation aerosol: 2 puff(s) inhaled every 6 hours as needed for  shortness of breath and/or wheezing    PHYSICAL EXAMINATION  T(C): 36.8 (05-02 @ 16:43), Max: 37.3 (05-01 @ 21:39) HR: 68 (05-02 @ 20:00) (57 - 86) BP: 140/65 (05-02 @ 20:00) (104/54 - 164/68) RR: 17 (05-02 @ 20:00) (11 - 25) SpO2: 100% (05-02 @ 20:00) (92% - 100%)  NEUROLOGIC EXAMINATION:  Patient awake, alert, nods/shakes head?, oriented x2, FC, RUE 2/5 HG 1/5 L UE 5/5 L LE 5/5 R LE 4/5  GENERAL: trached AC 40% +8 400 16  EENT:  anicteric, trach  CARDIOVASCULAR: (+) S1 S2, normal rate and regular rhythm  PULMONARY: clear to auscultation bilaterally  ABDOMEN: soft, nontender with normoactive bowel sounds  EXTREMITIES: no edema; good distal pulses  SKIN: no rash    LABS: 05-02               9.0    6.63  )-----------( 258      ( 02 May 2023 05:03 )             27.0     135  |  103  |  11  ----------------------------<  108<H>  3.9   |  24  |  0.40<L>    Ca    8.9      02 May 2023 05:03  Phos  3.7     05-02  Mg     2.2     05-02 05-01 @ 07:01  -  05-02 @ 07:00  IN: 1557.1 mL / OUT: 2317 mL / NET: -759.9 mL    Bacteriology:    Culture - CSF with Gram Stain (collected 04-30)  Gram Stain (04-30):    No organisms seen    No polymorphonuclear cells seen  Preliminary Report (05-01):    No growth to date.    Culture - Urine (collected 04-30)  Final Report (05-02):    >100,000 CFU/ml Escherichia coli ESBL    >100,000 CFU/ml Escherichia coli ESBL Strain #2    Result called to and read back by_ Mr. MYKEL Wright ASAEL  05/02/2023 09:56:41  Organism: Escherichia coli  Escherichia coli ESBL (05-02)  Organism: Escherichia coli ESBL (05-02)    Sensitivities:      Method Type: ELENI      -  Ampicillin: R >16 These ampicillin results predict results for amoxicillin      -  Ampicillin/Sulbactam: R 8/4 Enterobacter, Klebsiella aerogenes, Citrobacter, and Serratia may develop resistance during prolonged therapy (3-4 days)      -  Cefazolin: R >16 For uncomplicated UTI with K. pneumoniae, E. coli, or P. mirablis: ELENI <=16 is sensitive and ELENI >=32 is resistant. This also predicts results for oral agents cefaclor, cefdinir, cefpodoxime, cefprozil, cefuroxime axetil, cephalexin and locarbef for uncomplicated UTI. Note that some isolates may be susceptible to these agents while testing resistant to cefazolin.      -  Ceftriaxone: R >32 Enterobacter, Klebsiella aerogenes, Citrobacter, and Serratia may develop resistance during prolonged therapy      -  Ciprofloxacin: I 0.5      -  Ertapenem: S <=0.5      -  Gentamicin: S <=2      -  Meropenem: S <=1      -  Nitrofurantoin: S <=32 Should not be used to treat pyelonephritis      -  Piperacillin/Tazobactam: R <=8      -  Tobramycin: S <=2      -  Trimethoprim/Sulfamethoxazole: S <=0.5/9.5  Organism: Escherichia coli (05-02)    Sensitivities:      Method Type: ELENI      -  Ampicillin: R >16 These ampicillin results predict results for amoxicillin      -  Ampicillin/Sulbactam: R >16/8 Enterobacter, Klebsiella aerogenes, Citrobacter, and Serratia may develop resistance during prolonged therapy (3-4 days)      -  Cefazolin: R >16 For uncomplicated UTI with K. pneumoniae, E. coli, or P. mirablis: ELENI <=16 is sensitive and ELENI >=32 is resistant. This also predicts results for oral agents cefaclor, cefdinir, cefpodoxime, cefprozil, cefuroxime axetil, cephalexin and locarbef for uncomplicated UTI. Note that some isolates may be susceptible to these agents while testing resistant to cefazolin.      -  Ceftriaxone: R >32 Enterobacter, Klebsiella aerogenes, Citrobacter, and Serratia may develop resistance during prolonged therapy      -  Ciprofloxacin: R >2      -  Ertapenem: S <=0.5      -  Gentamicin: R >8      -  Meropenem: S <=1      -  Nitrofurantoin: R >64 Should not be used to treat pyelonephritis      -  Piperacillin/Tazobactam: SDD 16      -  Tobramycin: I 8      -  Trimethoprim/Sulfamethoxazole: R >2/38      -  Amikacin: S <=16    Culture - Blood (collected 04-29)  Preliminary Report (05-01):    No growth at 2 days.    Culture - Blood (collected 04-29)  Preliminary Report (05-01):    No growth at 2 days.    Culture - Sputum (collected 04-29)  Gram Stain (04-30):    Rare epithelial cells    Numerous White blood cells    Few Gram positive cocci in pairs  Final Report (05-02):    Few Pluralibacter gergoviae (most closely resembling)    Moderate Streptococcus pneumoniae    Therapy requires maximum dose of Ceftriaxone and/or    Penicillin. Interpretive criteria as follows:    Ceftriaxone breakpoints for meningitis infections:    <=0.5=Sensitive, 1.0=Intermediate, >=2.0=Resistant    Penicillin breakpoints for meningitis infections:    <=0.06=Sensitive, >= 0.12=Resistant    Ceftriaxone breakpoints for non-meningitis infections:    <=1.0=Sensitive, 2.0=Intermediate, >=4.0=Resistant    Penicillin breakpoints for non-meningitis infections:    <=2.0=Sensitive, 4.0=Intermediate, >=8.0=Resistant    Oral Penicillin breakpoints:    <=0.06=Sensitive, 0.12-1.0=Intermediate, >=2.0=Resistant    Please note: In case of suspected meningitis, CSF    interpretive criteria must be used independent of specimen source.    Routine respiratory charlotte absent  Organism: Streptococcus pneumoniae  Streptococcus pneumoniae  Pluralibacter gergoviae (05-02)  Organism: Pluralibacter gergoviae (05-02)    Sensitivities:      Method Type: ELENI      -  Ampicillin: S <=8 These ampicillin results predict results for amoxicillin      -  Ampicillin/Sulbactam: S <=4/2 Enterobacter, Klebsiella aerogenes, Citrobacter, and Serratia may develop resistance during prolonged therapy (3-4 days)      -  Cefazolin: S <=2 Enterobacter, Klebsiella aerogenes, Citrobacter, and Serratia may develop resistance during prolonged therapy (3-4 days)      -  Ceftriaxone: S <=1 Enterobacter, Klebsiella aerogenes, Citrobacter, and Serratia may develop resistance during prolonged therapy      -  Ciprofloxacin: S <=0.25      -  Ertapenem: S <=0.5      -  Gentamicin: S <=2      -  Piperacillin/Tazobactam: S <=8      -  Tobramycin: S <=2      -  Trimethoprim/Sulfamethoxazole: S <=0.5/9.5  Organism: Streptococcus pneumoniae (05-02)    Sensitivities:      Method Type: ETEST      -  Ceftriaxone: See note 0.75      -  Penicillin: See note 1.5  Organism: Streptococcus pneumoniae (05-02)    Sensitivities:      Method Type: KB      -  Clindamycin: S      -  Erythromycin: R Predicts results for azithromycin.      CSF studies:  EEG:  Neuroimaging:  Other imaging:    MEDICATIONS: 05-02    ·	ertapenem  IVPB 1000 IV Intermittent every 24 hours  ·	carvedilol 3.125 milliGRAM(s) Oral every 12 hours  ·	doxazosin 4 milliGRAM(s) Oral at bedtime  ·	acetylcysteine 20%  Inhalation 4 Inhalation every 6 hours  ·	albuterol/ipratropium for Nebulization 3 Nebulizer every 6 hours  ·	sodium chloride 3%  Inhalation 4 Inhalation every 6 hours  ·	pantoprazole  Injectable 40 IV Push daily  ·	atorvastatin 80 Oral at bedtime  ·	lactobacillus acidophilus 1 Oral daily  ·	sodium chloride 1 Oral every 6 hours  ·	acetaminophen   Oral Liquid .. 650 Oral every 6 hours PRN  ·	fentaNYL    Injectable 12.5 IV Push every 2 hours PRN  ·	oxyCODONE    IR 5 Oral every 4 hours PRN    IV FLUIDS: NS@85cc/hr  DRIPS:  DIET: NPO  Lines:  Drains:      External Ventricular Device (mL): 245 mL - @ 0 cm H20  05/02 EVD raised to 5 output 367 ICPs 1-7  Wounds:    CODE STATUS:  Full Code                       GOALS OF CARE:  aggressive                      DISPOSITION:  ICU =================================  NEUROCRITICAL CARE ATTENDING NOTE  =================================    MAKSIM TRIVEDI   MRN-[id number]  Summary:  57y/F  with Asthma, CAD (not on  meds), peripheral neuropathy and PSH of BATOOL, cholecystectomy, right knee surgery presented to [place] ED on  with right sided weakness and right facial numbness. Patient was undergoing preparation for colonoscopy. As per daughter at 8am patient was playing with her grandchildren but around 840 am patient was getting dressed and fell and subsequently felt weak after. Daughter reports that patient had some episodes of vomiting at this time. She had a syncopal episode at around 10 am and was witnessed by brother. No head trauma, She regained conscious after few minutes. She remained in bed for the remainder of the day. Daughter reports some slurred speech noted 1230-1PM and also was confused after. and around 4PM, the patient's sister noted right sided weakness at which time EMS was called and patient was brought to ED. No c/o chest pain, shortness of breath, fever, headache, abdominal pain, urinary complaints. No recent travel/sickness/ change in meds. Stroke code in ER: CTH neg for heme, Right PICA distribution acute infarction. CTA with saccular aneurysms of b/l carotids, approximately 0.8 cm on the right and 1.2 x 0.9 cm on the left. Possible tiny third saccular aneurysm on the right Posterior intracranial circulation: Right vertebral arterial occlusion at its dural crossing junction V3 Atlantic and V4 intracranial segments with likely reversal of flow in its intracranial segment from the basilar. Right PICA faintly seen. 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  Not candidate for TNK/mechanical thrombectomy. CT scan repeated which showed: 1. Brain: Progression of acute infarction within the right cerebellar hemisphere, also extending into the left superior cerebellar hemisphere. New mass effect on the fourth ventricle causing stenosis versus occlusion with new third and lateral ventricular dilatation indicating ventricular obstruction at the level of the fourth ventricle. New upward and downward herniation of cerebellar parenchyma Right carotid system: No hemodynamically significant stenosis. Left carotid system: No hemodynamically significant stenosis. Vertebral circulation: Patent. Anterior intracranial circulation: Intact. Bilateral internal carotid saccular aneurysms. These findings are unchanged. Posterior intracranial circulation:    Improved flow within the right vertebral artery since 2023. New focal stenosis mid left vertebral artery, etiology uncertain, consider vasospasm and extrinsic compression in addition to new embolic disease. Brain perfusion: Perfusion images demonstrate normalization of the perfusion abnormality in the right cerebellar hemisphere present on 2023 despite evidence of progression of acute infarction.  Areas of apparent ischemia within the posterior fossa have progressed in extent, also again involving the left posterior cerebral arterial distribution. Tx to [facility] for SOC watch. On admission to [facility], NIHSS 12.  (2023 16:50)    COURSE IN THE HOSPITAL:  : Admitted for crani watch, CTH complete w/ unchanged hydrocephalus, taken for emergent occipital craniectomy.   : POD1. Remains intubated overnight, on propofol and dank. EVD@[id number]. Pending repeat CTH this am. Given hydralazine 10mg x1. Started on cardene for SBP high 140s-150s. Sub-therapeutic on plavix, therapeutic on asa, rpt asa accumetrics until subtherapeutic. LE dopplers neg for DVT, but showing superficial venous thrombosis in the proximal portion of the right greater saphenous vein. Started on 3% @60 for na goal 145-150. NGT placed by ENT. Febrile, pancultured.  : POD 2. 3% increased to 75. NGT readjusted. UA neg. SBP liberalized to 160. Plan to extubate. 3% decreased to 60. Failed extubation d/t no cuff leak, given 60mg solumedrol, plan to extubate in 6 hrs. SCx1 for post void residual >400, started on cardura at bedtime. New blood in buretrol, stopped SQL. Clot in EVD cleared. Neuro exam improving.   : POD 3. Cont 3% with NS while NPO, start TF today. TF started. LFTs downtrending. Sodium 141. Increased 3% to 50, NS to 30. Repeat BMP ordered for 5:30PM. Tramadol 25 for pain with no relief, oxy 5 and 1g tylenol ordered, stability CT stable, speech language consult for dysarthria. Given hydralazine 10mg IVP for SBP>160, started amlodipine 5mg. C/o mild headache, given fioricet x1, stroke neuro rec SALVADOR and loop recorder placement. Echo with bubble completed, negative for PFO, EF 55-60%. J HFNC started for desats with suctioning.   : POD 4. Cont HFNC. Increased secertions on HFNC, reintubated. CXR confirmed good placement. EVD dropped to 5cmH20 for goal of draining 5-10cc/hr and SG ELENA drain taken off suction. Pending CTH. EVD drained 0cc/hr, dropped to 0. NGT replaced. Dc'd fioricet. Started on PPI for intubation. Increased cardura to 4mg for urinary retention, given an additional 2mg now. Started salt tabs 3 q 6. Given 12.5mg fentanyl x1. NGT replaced, CXR shown in lung. NGT removed, repeat CXR showing no pneumothorax. Pending ENT consult for NGT placement. CTH stable. NGT replaced by ENT, confirmed in proper spot. Restarted TF. [id number]. Na 141 from 146. Increased 3% to 60. EVD raised to 3cmH20. ETT pulled back 1cm by RT.  : POD 5 SOC. Intubated, remains on precedex, ABG ordered with improving PaO2, BMP sodium 148, 3% changed to 30cc/hr, cardura increased to 8mg for tonight, tolerating cpap  : POD 6 SOC. Respiratory rate sustained 6, returned to FVS. Na 151, dc'd 3%, f/u AM sodium. Nurse noticed ETT 19 at the lip and was 20 prior, CXR shows ETT @ 5cm above jono, ET tube advanced 1cm, repeat CXR confirms appropriate placement. Thick inline secretions with suctioning. ENT trach placement Fri likely, PEG likely Mon. Keep ELENA drain until no output, EVD to remain @3. Start ASA 81mg daily tomorrow.   : POD7 SOC, neuro stable, given fentanyl x 1 overnight for presumed discomfort (biting on ETT), remains on full vent support. CTH today stable in comparison to . 6 cuffed trach placed by ENT in OR, but came down without cuff. Replaced at bedside by ENT. On fentanyl gtt.    : POD8 SOC, JIM overnight. Incision cleaned and dressing changed. On fentanyl gtt. EKG completed due to increased frequency PVCs on telemetry, frequency of PVCs improved with titrating up fentanyl gtt. ABG drawn.  Repeat LE dopplers completed showing persistant superficial thrombus, no DVT. No EVD waveform during day, flushed distally without improvement. Exam unchanged.  EVD dropped to 0 for low output in afternoon.   : POD9. Neuro stable, febrile to 101.3F o/n, given tylenol and pan cx sent. SBP dipped to low 90s o/n, HR stable in 70s with some PVCs, decreased fentanyl gtt and given 500cc bolus of NS x 2. Pend PEG placement Mon and angio Tu. D/c'd ELENA drain today and suture placed. F/u heme recs. Positive UA. Infiltrates found on CXR. Started on Zosyn 4.5g Q6hrs .   : POD 10. Neuro stable. Dc'd fentanyl gtt. PEG failed placement at bedside with Dr. Gant, plan for PEG in OR tomorrow afternoon with Dr. Layton. Dc'd amlodipine and started carvedilol 3.125 BID for PVCs . Made 3% inhalation and mucomyst q8hr. Failed PEG at bedside. Salt tabs made 1 q 6. Decreased cardura to 4mg daily. Urine culture growing E. Coli and sputum culture growing pluralibacter gergoviae and strep species. ID consulted, f/u recs. Failed CPAP trial @ 3pm. Added am labs per heme/onc for hypercoguable workup. Pending repeat LE dopplers in 2-3 days. NGT clogged, removed, ENT failed attempt at replacement, will keep NPO for PEG placement tmw. Repeat xray in am for concern for aspration. Pt abx switched from Zosyn to Ertapenem 1g Q24hrs. per ID recs, possible ESBL growth.   : POD 11. Neuro stable. diagnostic cerebral angiogram (bilateral carotid cavernous aneurysm) and PEG placement. NPO; pulled out radial TR band (right), EVD raised to 10    POD12 EVD raised to 15, then down to 5cm H20, CPAP today    Past Medical History: Asthma CAD (coronary artery disease) Peripheral neuropathy  Allergies:  No Known Allergies  Home meds:   ·	Albuterol (Eqv-ProAir HFA) 90 mcg/inh inhalation aerosol: 2 puff(s) inhaled every 6 hours as needed for  shortness of breath and/or wheezing    PHYSICAL EXAMINATION  T(C): 37.1 ( @ 18:00), Max: 37.1 ( @ 21:59) HR: 74 ( @ 21:00) (66 - 93) BP: 122/56 ( @ 21:00) (107/56 - 160/65) RR: 16 ( @ 21:00) (15 - 25) SpO2: 96% ( @ 21:00) (94% - 98%)  NEUROLOGIC EXAMINATION:  Patient awake, alert, nods/shakes head?, oriented x2, FC, RUE 3/5 RLE 4/5 L UE 5/5  L LE 5/5  GENERAL: trached AC 40% +6 400 16  EENT:  anicteric, trach  CARDIOVASCULAR: (+) S1 S2, normal rate and regular rhythm  PULMONARY: clear to auscultation bilaterally  ABDOMEN: soft, nontender with normoactive bowel sounds  EXTREMITIES: no edema; good distal pulses  SKIN: no rash    LABS:                10.0 (9.0)  8.67  )-----------( 273      ( 03 May 2023 05:30 )             30.3     137  |  101  |  10  ----------------------------<  94  3.5   |  22  |  0.42<L>    Ca    8.1<L>      03 May 2023 05:30  Phos  3.6     -03  Mg     2.0     -02 @ 07:01  -   @ 07:00  IN: 2160 mL / OUT: 1842 mL / NET: 318 mL      Bacteriology:     CSF NGTD   urine culture ESBL x2 strains   Blood NG3D x2   sputum:  pluralibacter gergoviae, mod strep pneumoniae    CSF studies:    L   *** [id number] WBC1 *** %N   %L1     EEG:  Neuroimaging:  Other imaging:      MEDICATIONS:     ·	enoxaparin Injectable 40 SubCutaneous every 24 hours  ·	ertapenem  IVPB 1000 IV Intermittent every 24 hours  ·	carvedilol 3.125 milliGRAM(s) Oral every 12 hours  ·	acetylcysteine 20%  Inhalation 4 Inhalation every 6 hours  ·	albuterol/ipratropium for Nebulization 3 Nebulizer every 6 hours  ·	sodium chloride 3%  Inhalation 4 Inhalation every 6 hours  ·	pantoprazole   Suspension 40 Oral daily  ·	atorvastatin 80 Oral at bedtime  ·	lactobacillus acidophilus 1 Oral daily  ·	sodium chloride 1 Oral every 6 hours  ·	sodium chloride 0.9%. 1000 IV Continuous <Continuous>  ·	acetaminophen   Oral Liquid .. 650 Oral every 6 hours PRN  ·	fentaNYL    Injectable 12.5 IV Push every 2 hours PRN  ·	oxyCODONE    IR 5 Oral every 4 hours PRN    IV FLUIDS: NS@85cc/hr  DRIPS:  DIET: NPO after MN  Lines:  Drains:      External Ventricular Device (mL): 245 mL - @ 0 cm H20  / EVD raised to 5 output 367 ICPs 1-7  03 EVD at 5cm H20 ICPs <10  Wounds:    CODE STATUS:  Full Code                       GOALS OF CARE:  aggressive                      DISPOSITION:  ICU

## 2023-05-03 NOTE — PROGRESS NOTE ADULT - NSPROGADDITIONALINFOA_GEN_ALL_CORE
STROKE FELLOW    57F w/ PMH of Asthma, CAD, HTN, prior miscarriage x3, peripheral neuropathy, presented w/ right sided weakness and facial numbness on 4/20 found to have bilateral cerebellar infarctions (R. and L. PICA) s/p posterior fossa craniectomy and s/p PEG and Trach. CTA h/n negative.     Catheter angiogram today demonstrated bilateral cavernous ICA aneurysms. No athersclerotic disease demonstrated.     TTE mild LA enlargement  A1c 5.9  LDL 92  +La positive (1 positive, 1 negative); Anticardiolipin ab positive IgM (28.4)     Imp: Bilateral cerebellar infarction likely secondary to embolic stroke of undetermined source (ESUS) +/- hypercoagulability.     Plan:  SALVADOR   ILR prior to discharge  Start ASA 81mg once cleared from neurosurgical perspective STROKE FELLOW    57F w/ PMH of Asthma, CAD, HTN, prior miscarriage x3, peripheral neuropathy, presented w/ right sided weakness and facial numbness on 4/20 found to have bilateral cerebellar infarctions (R. and L. PICA) s/p posterior fossa craniectomy and s/p PEG and Trach. CTA h/n negative.     Catheter angiogram today demonstrated bilateral cavernous ICA aneurysms. No athersclerotic disease demonstrated.     TTE mild LA enlargement  A1c 5.9  LDL 92  +La positive (1 positive, 1 negative); Anticardiolipin ab positive IgM (28.4)   5.3.23 bilateral US LE +new L. calf DVT     Imp: Bilateral cerebellar infarction likely secondary to embolic stroke of undetermined source (ESUS) +/- hypercoagulability.     Plan:  SALVADOR   ILR prior to discharge  No neurovascular contraindication to start Anticoagulation given DVT once cleared from surgical perspective

## 2023-05-03 NOTE — PROGRESS NOTE ADULT - ASSESSMENT
57 year old female w/ PMH of asthma, CAD (not on meds), HTN, prior miscarriages X3, peripheral neuropathy and PSH of BATOOL, cholecystectomy and right knee surgery presented to [place] ED on 4/20 w/ right sided weakness and right facial numbness. Now s/p Trach with NGT feeding access. General Surgery consulted for placement of PEG feeding access. Unable to place PEG at bedside (5/1). POD 1 from lap placement of PEG tube (5/2)    - Ok to start trickle tube feeds and advance by 10cc every 6 hours to goal  - Surgery Team 4C will continue to follow. Please page Team 4 with questions/clinical changes. [phone]

## 2023-05-03 NOTE — PROGRESS NOTE ADULT - ATTENDING COMMENTS
#VAP, UTI, hypoxic respiratory failure, ESBL E.coli infection    Patient afebrile, secretion improving and suctioning requirement improved to q3h per team.   UCx grew ESBL E.coli. ET tube culture grew Pleuralibcter gergoviae and strep pneumo.  BCx all ngtd.  Cont ertapenem 1g IV 24h. Duration is total 7 days.   Since CSF remains negative and patient afebrile, no contraindication for VPS placement.     Thank you for your consult.  Please re-consult us or call us with questions.  Case d/w primary team.    Victoria Singer MD, MS  Infectious Disease attending  work cell [phone]  For any questions during evening/weekend/holiday, please page ID on call

## 2023-05-03 NOTE — PROGRESS NOTE ADULT - ASSESSMENT
57y/F with  acute CVA, R cerebellar, brain compression cerebral edema, s/p SOC  UTI ESBL  acute respiratory failure, bulbar dysfunction  asthma  CAD  peripheral neuropathy  superficial thrombosis, proximal R greater saphenous    PLAN:   NEURO: neurochecks q1h, PRN pain meds with acetaminophen / opiates  EVD 5cm H20 monitor output, challenge EVD - raise to 10 mm Hg tomorrow  stroke on board, ASA once cleared by neurosurgery  REHAB:  physical therapy evaluation and management    EARLY MOB:  HOB up    PULM:  full vent support, CPAP trials, CXR, nebs, s/p trach; decrease PEEP to +6  CARDIO:  SBP goal 100-160mm Hg  ENDO:  Blood sugar goals 140-180 mg/dL, continue insulin sliding scale, continue high dose statins  GI:  PPI for GI prophylaxis while intubated  DIET:  start meds at 10 p.m., feeds tomorrow at 10 a.m.  RENAL:  cont cardura, Na goal 135-145, salt tabs, IVF while NPO  HEM/ONC: s/p 3 units platelets, 35 ug DDAVP, (+) aCL  VTE Prophylaxis: SCDs, SQL held for PEG procedure, re-evalute tomorrow  ID: afebrile, no leukocytosis, continue ertapenem for ESBL UTI until 05/08 last day  Social: will update family    Active issues:  What's keeping patient in the ICU? close monitoring, EVD  What is this patient's dispo plan? stepdown once EVD removed    ATTENDING ATTESTATION:  I was physically present for the key portions of the evaluation and management (E/M) service provided.  I agree with the above history, physical and plan, which I have reviewed and edited where appropriate.    Patient at high risk for neurological deterioration or death due to:  ICU delirium, aspiration PNA, DVT / PE.  Critical care time:  I have personally provided 45 minutes of critical care time, excluding time spent on separate procedures.      Plan discussed with RN, house staff. 57y/F with  acute CVA, R cerebellar, brain compression cerebral edema, s/p SOC  UTI ESBL  acute respiratory failure, bulbar dysfunction  asthma  CAD  peripheral neuropathy  superficial thrombosis, proximal R greater saphenous    PLAN:   NEURO: neurochecks q1h, PRN pain meds with acetaminophen / opiates  EVD 5cm H20 likely VPS?  stroke on board, full AC once cleared by NSG  REHAB:  physical therapy evaluation and management    EARLY MOB:  HOB up    PULM:  full vent support, CPAP trials, CXR, nebs, s/p trach, continuous nebs  CARDIO:  SBP goal 100-160mm Hg  ENDO:  Blood sugar goals 140-180 mg/dL, continue insulin sliding scale, continue high dose statins  GI:  PPI for GI prophylaxis while intubated  DIET:  NPO after MN, restart TF after procedure  RENAL:  cont cardura, Na goal 135-145, salt tabs, IVF while NPO  HEM/ONC: s/p 3 units platelets, 35 ug DDAVP, (+) aCL  VTE Prophylaxis: SCDs, for IVC filter insertion tomorrow; SQL   ID: afebrile, no leukocytosis, continue ertapenem for ESBL UTI until 05/08 last day  Social: will update family    Active issues:  What's keeping patient in the ICU? close monitoring, EVD  What is this patient's dispo plan? stepdown once EVD removed    ATTENDING ATTESTATION:  I was physically present for the key portions of the evaluation and management (E/M) service provided.  I agree with the above history, physical and plan, which I have reviewed and edited where appropriate.    Patient at high risk for neurological deterioration or death due to:  ICU delirium, aspiration PNA, DVT / PE.  Critical care time:  I have personally provided 45 minutes of critical care time, excluding time spent on separate procedures.      Plan discussed with RN, house staff.

## 2023-05-03 NOTE — PROGRESS NOTE ADULT - SUBJECTIVE AND OBJECTIVE BOX
SUBJECTIVE: Pt seen and examined at bedside this am. No acute complaints. On trach, vented, off pressors    MEDICATIONS  (STANDING):  acetylcysteine 20%  Inhalation 4 milliLiter(s) Inhalation every 6 hours  albuterol/ipratropium for Nebulization 3 milliLiter(s) Nebulizer every 6 hours  atorvastatin 80 milliGRAM(s) Oral at bedtime  carvedilol 3.125 milliGRAM(s) Oral every 12 hours  chlorhexidine 0.12% Liquid 15 milliLiter(s) Oral Mucosa every 12 hours  chlorhexidine 2% Cloths 1 Application(s) Topical <User Schedule>  enoxaparin Injectable 40 milliGRAM(s) SubCutaneous every 24 hours  ertapenem  IVPB 1000 milliGRAM(s) IV Intermittent every 24 hours  lactobacillus acidophilus 1 Tablet(s) Oral daily  pantoprazole   Suspension 40 milliGRAM(s) Oral daily  sodium chloride 1 Gram(s) Oral every 6 hours  sodium chloride 0.9%. 1000 milliLiter(s) (85 mL/Hr) IV Continuous <Continuous>  sodium chloride 3%  Inhalation 4 milliLiter(s) Inhalation every 6 hours    MEDICATIONS  (PRN):  acetaminophen   Oral Liquid .. 650 milliGRAM(s) Oral every 6 hours PRN Temp greater or equal to 38C (100.4F), Mild Pain (1 - 3)  fentaNYL    Injectable 12.5 MICROGram(s) IV Push every 2 hours PRN Severe Pain (7 - 10)  oxyCODONE    IR 5 milliGRAM(s) Oral every 4 hours PRN Moderate Pain (4 - 6)      Vital Signs Last 24 Hrs  T(C): 36.6 (03 May 2023 05:37), Max: 37.1 (02 May 2023 21:59)  T(F): 97.9 (03 May 2023 05:37), Max: 98.7 (02 May 2023 21:59)  HR: 80 (03 May 2023 10:00) (57 - 93)  BP: 131/60 (03 May 2023 10:00) (104/54 - 164/68)  BP(mean): 86 (03 May 2023 10:00) (72 - 102)  RR: 15 (03 May 2023 10:00) (15 - 25)  SpO2: 97% (03 May 2023 10:00) (92% - 100%)    Parameters below as of 03 May 2023 10:00  Patient On (Oxygen Delivery Method): ventilator,CPAP        Physical Exam  General: NAD, resting comfortably in bed  Pulmonary: Tach, vent, Nonlabored breathing, no respiratory distress  CV: NSR  Abd: soft, NT/ND, no guarding, incision c/d/i, PEG tube in place (bumper @5) no erythema/bleeding  Extremities: (-) edema, warm, well-perfused    I&O's Detail    02 May 2023 07:01  -  03 May 2023 07:00  --------------------------------------------------------  IN:    Enteral Tube Flush: 250 mL    IV PiggyBack: 50 mL    Propofol: 75 mL    sodium chloride 0.9%: 85 mL    sodium chloride 0.9%: 1700 mL  Total IN: 2160 mL    OUT:    External Ventricular Device (mL): 142 mL    Voided (mL): 1700 mL  Total OUT: 1842 mL    Total NET: 318 mL      03 May 2023 07:01  -  03 May 2023 10:37  --------------------------------------------------------  IN:    sodium chloride 0.9%: 255 mL  Total IN: 255 mL    OUT:    External Ventricular Device (mL): 25 mL    Voided (mL): 600 mL  Total OUT: 625 mL    Total NET: -370 mL          LABS:                        10.0   8.67  )-----------( 273      ( 03 May 2023 05:30 )             30.3     05-03    137  |  101  |  10  ----------------------------<  94  3.5   |  22  |  0.42<L>    Ca    8.1<L>      03 May 2023 05:30  Phos  3.6     05-03  Mg     2.0     05-03      PT/INR - ( 02 May 2023 05:03 )   PT: 13.3 sec;   INR: 1.12          PTT - ( 02 May 2023 05:03 )  PTT:34.9 sec      RADIOLOGY & ADDITIONAL STUDIES:

## 2023-05-03 NOTE — PROGRESS NOTE ADULT - SUBJECTIVE AND OBJECTIVE BOX
SUBJECTIVE: Unable to assess ROS d/t mental status.     HOSPITAL COURSE:  4/21: Admitted for crani watch, CTH complete w/ unchanged hydrocephalus, taken for emergent occipital craniectomy.   4/22: POD1. Remains intubated overnight, on propofol and dank. EVD@[id number]. Pending repeat CTH this am. Given hydralazine 10mg x1. Started on cardene for SBP high 140s-150s. Sub-therapeutic on plavix, therapeutic on asa, rpt asa accumetrics until subtherapeutic. LE dopplers neg for DVT, but showing superficial venous thrombosis in the proximal portion of the right greater saphenous vein. Started on 3% @60 for na goal 145-150. NGT placed by ENT. Febrile, pancultured.  4/23: POD 2. 3% increased to 75. NGT readjusted. UA neg. SBP liberalized to 160. Plan to extubate. 3% decreased to 60. Failed extubation d/t no cuff leak, given 60mg solumedrol, plan to extubate in 6 hrs. SCx1 for post void residual >400, started on cardura at bedtime. New blood in buretrol, stopped SQL. Clot in EVD cleared. Neuro exam improving.   4/24: POD 3. Cont 3% with NS while NPO, start TF today. TF started. LFTs downtrending. Sodium 141. Increased 3% to 50, NS to 30. Repeat BMP ordered for 5:30PM. Tramadol 25 for pain with no relief, oxy 5 and 1g tylenol ordered, stability CT stable, speech language consult for dysarthria. Given hydralazine 10mg IVP for SBP>160, started amlodipine 5mg. C/o mild headache, given fioricet x1, stroke neuro rec SALVADOR and loop recorder placement. Echo with bubble completed, negative for PFO, EF 55-60%. J HFNC started for desats with suctioning.   4/25: POD 4. Cont HFNC. Increased secertions on HFNC, reintubated. CXR confirmed good placement. EVD dropped to 5cmH20 for goal of draining 5-10cc/hr and SG ELENA drain taken off suction. Pending CTH. EVD drained 0cc/hr, dropped to 0. NGT replaced. Dc'd fioricet. Started on PPI for intubation. Increased cardura to 4mg for urinary retention, given an additional 2mg now. Started salt tabs 3 q 6. Given 12.5mg fentanyl x1. NGT replaced, CXR shown in lung. NGT removed, repeat CXR showing no pneumothorax. Pending ENT consult for NGT placement. CTH stable. NGT replaced by ENT, confirmed in proper spot. Restarted TF. Cujuksp685.4F. Na 141 from 146. Increased 3% to 60. EVD raised to 3cmH20. ETT pulled back 1cm by RT.  4/26: POD 5 SOC. Intubated, remains on precedex, ABG ordered with improving PaO2, BMP sodium 148, 3% changed to 30cc/hr, cardura increased to 8mg for tonight, tolerating cpap  4/27: POD 6 SOC. Respiratory rate sustained 6, returned to FVS. Na 151, dc'd 3%, f/u AM sodium. Nurse noticed ETT 19 at the lip and was 20 prior, CXR shows ETT @ 5cm above jono, ET tube advanced 1cm, repeat CXR confirms appropriate placement. Thick inline secretions with suctioning. ENT trach placement Fri likely, PEG likely Mon. Keep ELENA drain until no output, EVD to remain @3. Start ASA 81mg daily tomorrow.   4/28: POD7 SOC, neuro stable, given fentanyl x 1 overnight for presumed discomfort (biting on ETT), remains on full vent support. CTH today stable in comparison to 4/25. 6 cuffed trach placed by ENT in OR, but came down without cuff. Replaced at bedside by ENT. On fentanyl gtt.    4/29: POD8 SOC, JIM overnight. Incision cleaned and dressing changed. On fentanyl gtt. EKG completed due to increased frequency PVCs on telemetry, frequency of PVCs improved with titrating up fentanyl gtt. ABG drawn.  Repeat LE dopplers completed showing persistant superficial thrombus, no DVT. No EVD waveform during day, flushed distally without improvement. Exam unchanged.  EVD dropped to 0 for low output in afternoon.   4/30: POD9. Neuro stable, febrile to 101.3F o/n, given tylenol and pan cx sent. SBP dipped to low 90s o/n, HR stable in 70s with some PVCs, decreased fentanyl gtt and given 500cc bolus of NS x 2. Pend PEG placement Mon and angio Tues. D/c'd ELENA drain today and suture placed. F/u heme recs. Positive UA. Infiltrates found on CXR. Started on Zosyn 4.5g Q6hrs .   5/1: POD 10. Neuro stable. Dc'd fentanyl gtt. PEG failed placement at bedside with Dr. Gant, plan for PEG in OR tomorrow afternoon with Dr. Layton. Dc'd amlodipine and started carvedilol 3.125 BID for PVCs . Made 3% inhalation and mucomyst q8hr. Failed PEG at bedside. Salt tabs made 1 q 6. Decreased cardura to 4mg daily. Urine culture growing E. Coli and sputum culture growing pluralibacter gergoviae and strep species. ID consulted, f/u recs. Failed CPAP trial @ 3pm. Added am labs per heme/onc for hypercoguable workup. Pending repeat LE dopplers in 2-3 days. NGT clogged, removed, ENT failed attempt at replacement, will keep NPO for PEG placement tmw. Repeat xray in am for concern for aspration. Pt abx switched from Zosyn to Ertapenem 1g Q24hrs. per ID recs, possible ESBL growth.   5/2: POD 11. Neuro stable. Pre-op for diagnostic cerebral angiogram and PEG placement. NPO. EVD raised to 5 cmH20. Sputum culture speciated to step pneumoniae, sensitive to ertapenem. 3% inhalation/mucomyst made q 6 hr d/t thick secretions. PEG placed in OR. POD0 dx cerebral angio. EVD raised to 10.   5/3: POD 12. Neuro stable. PEG feeds @ 10 AM. + Trach - FVS. EVD @ 10.     Vital Signs Last 24 Hrs  T(C): 36.8 (03 May 2023 00:43), Max: 37.1 (02 May 2023 21:59)  T(F): 98.3 (03 May 2023 00:43), Max: 98.7 (02 May 2023 21:59)  HR: 66 (03 May 2023 01:00) (57 - 86)  BP: 121/58 (03 May 2023 01:00) (104/54 - 164/68)  BP(mean): 84 (03 May 2023 01:00) (74 - 102)  RR: 16 (03 May 2023 01:00) (11 - 25)  SpO2: 97% (03 May 2023 01:00) (92% - 100%)    Parameters below as of 03 May 2023 01:00  Patient On (Oxygen Delivery Method): ventilator    O2 Concentration (%): 40    I&O's Summary    01 May 2023 07:01  -  02 May 2023 07:00  --------------------------------------------------------  IN: 1557.1 mL / OUT: 2317 mL / NET: -759.9 mL    02 May 2023 07:01  -  03 May 2023 01:05  --------------------------------------------------------  IN: 1465 mL / OUT: 1166 mL / NET: 299 mL    PHYSICAL EXAM:  GEN: laying in bed, NAD  ENT: + Trach   NEURO: not alert. does not FC, OE spont, face symmetric. +R gaze preference. LUE 5, LLE 4+. RUE delt 2, distally 5. RLE 4+  CV: RRR +S1/S2  PULM: CTAB  GI: Abd soft, NT/ND  EXT: ext warm, dry, nontender  Vascular: right radial access with gauze/tegaderm clean/dry. Radial pulse 2+, good capillary refill. Bruising noted along right forearm.     LABS:                        9.0    6.63  )-----------( 258      ( 02 May 2023 05:03 )             27.0     05-02    135  |  103  |  11  ----------------------------<  108<H>  3.9   |  24  |  0.40<L>    Ca    8.9      02 May 2023 05:03  Phos  3.7     05-02  Mg     2.2     05-02    PT/INR - ( 02 May 2023 05:03 )   PT: 13.3 sec;   INR: 1.12       PTT - ( 02 May 2023 05:03 )  PTT:34.9 sec    CAPILLARY BLOOD GLUCOSE    Drug Levels: [] N/A    CSF Analysis: [] N/A    Allergies    No Known Allergies    Intolerances    MEDICATIONS:  Antibiotics:  ertapenem  IVPB 1000 milliGRAM(s) IV Intermittent every 24 hours    Neuro:  acetaminophen   Oral Liquid .. 650 milliGRAM(s) Oral every 6 hours PRN  fentaNYL    Injectable 12.5 MICROGram(s) IV Push every 2 hours PRN  oxyCODONE    IR 5 milliGRAM(s) Oral every 4 hours PRN    Anticoagulation:    OTHER:  acetylcysteine 20%  Inhalation 4 milliLiter(s) Inhalation every 6 hours  albuterol/ipratropium for Nebulization 3 milliLiter(s) Nebulizer every 6 hours  atorvastatin 80 milliGRAM(s) Oral at bedtime  carvedilol 3.125 milliGRAM(s) Oral every 12 hours  chlorhexidine 0.12% Liquid 15 milliLiter(s) Oral Mucosa every 12 hours  chlorhexidine 2% Cloths 1 Application(s) Topical <User Schedule>  doxazosin 4 milliGRAM(s) Oral at bedtime  lactobacillus acidophilus 1 Tablet(s) Oral daily  pantoprazole  Injectable 40 milliGRAM(s) IV Push daily  sodium chloride 3%  Inhalation 4 milliLiter(s) Inhalation every 6 hours    IVF:  sodium chloride 1 Gram(s) Oral every 6 hours  sodium chloride 0.9%. 1000 milliLiter(s) IV Continuous <Continuous>    CULTURES:  Culture Results:   No growth to date. (04-30 @ 06:51)  Culture Results:   >100,000 CFU/ml Escherichia coli ESBL  >100,000 CFU/ml Escherichia coli ESBL Strain #2  Result called to and read back by_ Mr. MYKEL Wright ASAEL  05/02/2023 09:56:41 (04-30 @ 05:11)    RADIOLOGY & ADDITIONAL TESTS:      ASSESSMENT:  56 y/o female transferred from [place] with right sided weakness and right facial numbness. Found to have acute infarction within the right cerebellar hemisphere, causing herniation. Now s/p SOC foramen magnum decompression and right parietal/occipital EVD placement (4/21). Course c/b respiratory insuffiency d/t bulbar dysfunction, s/p trach 4/28/23. s/p PEG 5/2 with GI, s/p dx cerebral angiogram 5/2.     PLAN:  Neuro   - Vitals/neuro q1h   - dx angio 5/2: b/l cavernous ICA aneurysms, plan to be discussed vascular conference  - EVD @ 10 cmH20; monitor ICP/output   - Repeat CTH 4/25 stable   - Stroke consulted, f/u recs   - Pain control with tylenol prn, oxycodone prn, fent pushes 12.5mg q2hr prn     Cardio  - -160   - TTE 4/24: negative for PFO, mild LVH, mild dilation of L atrium, EF 55-60%   - Carvedilol 3.125 BID     Pulm  - /40/16/8, CPAP trials as tolerated  - Chest PT, 3% inhalation/duoneb/mucomyst q 6 hrs standing   - 6 cuffed trach placed by ENT 4/28, missing cuff, replaced trach at bedside. --> ENT to remove trach sutures POD7 (5/5)    GI  - PEG placed 5/2, NPO, restart feeds 5/3 10am  - Bowel regimen, last BM 4/30  - Protonix while intubated  - Trend LFTs q 72 hrs     Renal  - Na goal 135-145, salt tabs 1 q 6   - Cardura 4 mg at bedtime for urinary retention   - Voiding via primafit   - IVF    Endo   - Ator    Heme  - SCDs, SQL held for PEG tomorrow in OR   - s/p 3 units platelets, 35 mcg of DDAVP for ASA/Plavix reversal  - LE dopplers 4/22 neg for DVT, but showing superficial venous thrombosis in the proximal portion of the right greater saphenous vein; repeat on 4/29 with persistant superficial thrombus   - Heme following for positive anticardiolipin Ab 4/27, recs appreciated     ID  - ID recs: Ertapenem 1g Q24hrs (5/1-5/8) x 7 days  - Last panculture 4/30, MRSA (-), +UA cx ESBL, +sputum cx pluralibacter gergoviae, strep pneumoniae   - +isolation precautions - ESBL in urine     DISPO:  - NSICU, full code   - PT/OT rec acute inpatient rehab: patient can tolerate 3 hrs PT/OT daily    D/w Dr. Valadez and Dr. Garcia

## 2023-05-03 NOTE — CHART NOTE - NSCHARTNOTEFT_GEN_A_CORE
Repeat dopplers show stable R superficial thrombus and new L IM calf DVT. Vascular consulted for IVC filter. EVD raised to [id number]. ICPs WNL. Plan to get CTH tomorrow. PEG feeds began @ 10 AM, plan to increase 10cc every 6h per general surgery. Pt remains on full vent support. Neuro exam stable.

## 2023-05-03 NOTE — CONSULT NOTE ADULT - SUBJECTIVE AND OBJECTIVE BOX
HPI:  58 yo F with PMH of Asthma, CAD (not on  meds), peripheral neuropathy and PSH of BATOOL, cholecystectomy, right knee surgery presented to [place] ED on 4/20 with right sided weakness and right facial numbness. Patient was undergoing preparation for colonoscopy. As per daughter at 8am patient was playing with her grandchildren but around 840 am patient was getting dressed and fell and subsequently felt weak after. Daughter reports that patient had some episodes of vomiting at this time. She had a syncopal episode at around 10 am and was witnessed by brother. No head trauma, She regained conscious after few minutes. She remained in bed for the remainder of the day. Daughter reports some slurred speech noted 1230-1PM and also was confused after. and around 4PM, the patient's sister noted right sided weakness at which time EMS was called and patient was brought to ED. No c/o chest pain, shortness of breath, fever, headache, abdominal pain, urinary complaints. No recent travel/sickness/ change in meds. Stroke code in ER: CTH neg for heme, Right PICA distribution acute infarction. CTA with saccular aneurysms of b/l carotids, approximately 0.8 cm on the right and 1.2 x 0.9 cm on the left. Possible tiny third saccular aneurysm on the right Posterior intracranial circulation: Right vertebral arterial occlusion at its dural crossing junction V3 Atlantic and V4 intracranial segments with likely reversal of flow in its intracranial segment from the basilar. Right PICA faintly seen. 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  Not candidate for TNK/mechanical thrombectomy. CT scan repeated which showed: 1. Brain: Progression of acute infarction within the right cerebellar hemisphere, also extending into the left superior cerebellar hemisphere. New mass effect on the fourth ventricle causing stenosis versus occlusion with new third and lateral ventricular dilatation indicating ventricular obstruction at the level of the fourth ventricle. New upward and downward herniation of cerebellar parenchyma Right carotid system: No hemodynamically significant stenosis. Left carotid system: No hemodynamically significant stenosis. Vertebral circulation: Patent. Anterior intracranial circulation: Intact. Bilateral internal carotid saccular aneurysms. These findings are unchanged. Posterior intracranial circulation:    Improved flow within the right vertebral artery since 4/20/2023. New focal stenosis mid left vertebral artery, etiology uncertain, consider vasospasm and extrinsic compression in addition to new embolic disease. Brain perfusion: Perfusion images demonstrate normalization of the perfusion abnormality in the right cerebellar hemisphere present on 4/20/2023 despite evidence of progression of acute infarction.  Areas of apparent ischemia within the posterior fossa have progressed in extent, also again involving the left posterior cerebral arterial distribution. Tx to [facility] for SOC watch. On admission to [facility], NIHSS 12.  (21 Apr 2023 16:50)      Consult note: Vascular consulted for IVC filter. Pt is unable to participate in HPI at this time given mental status. Son is at bedside and was able to answer questions. States that his mom does not appear to be in pain. Pt has trach and peg at this time. Pt has superficial vein thrombosis of proximal and mid right greater saphenous vein extending to the saphenous thrombus and new left intra-muscular calf vein. Pt is currently getting hypercoagulability work up at this time. Pt is unable to get AC at this time given EVD.     PAST MEDICAL HISTORY:  Asthma    CAD (coronary artery disease)    Peripheral neuropathy        PAST SURGICAL HISTORY:  S/P total abdominal hysterectomy    S/P cholecystectomy    S/P right knee surgery        MEDICATIONS:      ALLERGIES:  No Known Allergies      SOCHX:   Social History:      FAMHX:   FAMILY HISTORY:  No pertinent family history in first degree relatives          Vitals:  Vital Signs Last 24 Hrs  T(C): 37.1 (03 May 2023 18:00), Max: 37.1 (02 May 2023 21:59)  T(F): 98.7 (03 May 2023 18:00), Max: 98.7 (02 May 2023 21:59)  HR: 76 (03 May 2023 18:00) (64 - 93)  BP: 134/63 (03 May 2023 18:00) (107/56 - 145/64)  BP(mean): 91 (03 May 2023 18:00) (72 - 98)  RR: 16 (03 May 2023 18:00) (15 - 25)  SpO2: 95% (03 May 2023 18:00) (94% - 100%)    Parameters below as of 03 May 2023 18:00  Patient On (Oxygen Delivery Method): ventilator      Mode: AC/ CMV (Assist Control/ Continuous Mandatory Ventilation)  RR (machine): 16  FiO2: 40  PEEP: 6  ITime: 1  MAP: 8  PIP: 17      PHYSICAL EXAM:  Physical Exam  General: NAD, resting comfortably in bed,   Pulm: trach in place   Abd: soft, non distended, incisions, non tender  Extrem: WWP, no edema, non erythematous or painful   Neuro: A/O x 3, CNs II-XII grossly intact, no focal deficits, normal sensation  Pulses: palpable distal pulses     I&o's:  I&O's Summary    02 May 2023 07:01  -  03 May 2023 07:00  --------------------------------------------------------  IN: 2160 mL / OUT: 1842 mL / NET: 318 mL    03 May 2023 07:01  -  03 May 2023 18:34  --------------------------------------------------------  IN: 1065 mL / OUT: 1275 mL / NET: -210 mL        LABS:                        10.0   8.67  )-----------( 273      ( 03 May 2023 05:30 )             30.3     05-03    137  |  101  |  10  ----------------------------<  94  3.5   |  22  |  0.42<L>    Ca    8.1<L>      03 May 2023 05:30  Phos  3.6     05-03  Mg     2.0     05-03      Lactate:    PT/INR - ( 02 May 2023 05:03 )   PT: 13.3 sec;   INR: 1.12          PTT - ( 02 May 2023 05:03 )  PTT:34.9 sec              MICRO:     IMAGING:

## 2023-05-04 LAB
ANION GAP SERPL CALC-SCNC: 7 MMOL/L — SIGNIFICANT CHANGE UP (ref 5–17)
APTT BLD: 36.9 SEC — HIGH (ref 27.5–35.5)
BLD GP AB SCN SERPL QL: NEGATIVE — SIGNIFICANT CHANGE UP
BUN SERPL-MCNC: 13 MG/DL — SIGNIFICANT CHANGE UP (ref 7–23)
CALCIUM SERPL-MCNC: 8.7 MG/DL — SIGNIFICANT CHANGE UP (ref 8.4–10.5)
CHLORIDE SERPL-SCNC: 103 MMOL/L — SIGNIFICANT CHANGE UP (ref 96–108)
CO2 SERPL-SCNC: 25 MMOL/L — SIGNIFICANT CHANGE UP (ref 22–31)
CONFIRM APTT STACLOT: POSITIVE
CREAT SERPL-MCNC: 0.4 MG/DL — LOW (ref 0.5–1.3)
CULTURE RESULTS: SIGNIFICANT CHANGE UP
CULTURE RESULTS: SIGNIFICANT CHANGE UP
DRVVT RATIO: 0.95 RATIO — SIGNIFICANT CHANGE UP (ref 0–1.03)
DRVVT SCREEN TO CONFIRM RATIO: SIGNIFICANT CHANGE UP
EGFR: 116 ML/MIN/1.73M2 — SIGNIFICANT CHANGE UP
GLUCOSE SERPL-MCNC: 94 MG/DL — SIGNIFICANT CHANGE UP (ref 70–99)
HCT VFR BLD CALC: 26.5 % — LOW (ref 34.5–45)
HGB BLD-MCNC: 8.9 G/DL — LOW (ref 11.5–15.5)
INR BLD: 1.17 — HIGH (ref 0.88–1.16)
MAGNESIUM SERPL-MCNC: 2 MG/DL — SIGNIFICANT CHANGE UP (ref 1.6–2.6)
MCHC RBC-ENTMCNC: 29.9 PG — SIGNIFICANT CHANGE UP (ref 27–34)
MCHC RBC-ENTMCNC: 33.6 GM/DL — SIGNIFICANT CHANGE UP (ref 32–36)
MCV RBC AUTO: 88.9 FL — SIGNIFICANT CHANGE UP (ref 80–100)
NRBC # BLD: 0 /100 WBCS — SIGNIFICANT CHANGE UP (ref 0–0)
OB PNL STL: NEGATIVE — SIGNIFICANT CHANGE UP
PHOSPHATE SERPL-MCNC: 3 MG/DL — SIGNIFICANT CHANGE UP (ref 2.5–4.5)
PLATELET # BLD AUTO: 285 K/UL — SIGNIFICANT CHANGE UP (ref 150–400)
POTASSIUM SERPL-MCNC: 3.6 MMOL/L — SIGNIFICANT CHANGE UP (ref 3.5–5.3)
POTASSIUM SERPL-SCNC: 3.6 MMOL/L — SIGNIFICANT CHANGE UP (ref 3.5–5.3)
PROTHROM AB SERPL-ACNC: 14 SEC — HIGH (ref 10.5–13.4)
RBC # BLD: 2.98 M/UL — LOW (ref 3.8–5.2)
RBC # FLD: 12.8 % — SIGNIFICANT CHANGE UP (ref 10.3–14.5)
RH IG SCN BLD-IMP: POSITIVE — SIGNIFICANT CHANGE UP
SODIUM SERPL-SCNC: 135 MMOL/L — SIGNIFICANT CHANGE UP (ref 135–145)
SPECIMEN SOURCE: SIGNIFICANT CHANGE UP
SPECIMEN SOURCE: SIGNIFICANT CHANGE UP
SURGICAL PATHOLOGY STUDY: SIGNIFICANT CHANGE UP
WBC # BLD: 8.26 K/UL — SIGNIFICANT CHANGE UP (ref 3.8–10.5)
WBC # FLD AUTO: 8.26 K/UL — SIGNIFICANT CHANGE UP (ref 3.8–10.5)

## 2023-05-04 PROCEDURE — 99291 CRITICAL CARE FIRST HOUR: CPT

## 2023-05-04 PROCEDURE — 99222 1ST HOSP IP/OBS MODERATE 55: CPT | Mod: 25

## 2023-05-04 PROCEDURE — 99292 CRITICAL CARE ADDL 30 MIN: CPT

## 2023-05-04 PROCEDURE — 37191 INS ENDOVAS VENA CAVA FILTR: CPT | Mod: GC

## 2023-05-04 PROCEDURE — 70450 CT HEAD/BRAIN W/O DYE: CPT | Mod: 26

## 2023-05-04 RX ORDER — FERROUS SULFATE 325(65) MG
325 TABLET ORAL
Refills: 0 | Status: DISCONTINUED | OUTPATIENT
Start: 2023-05-04 | End: 2023-05-08

## 2023-05-04 RX ORDER — POTASSIUM CHLORIDE 20 MEQ
40 PACKET (EA) ORAL ONCE
Refills: 0 | Status: COMPLETED | OUTPATIENT
Start: 2023-05-04 | End: 2023-05-04

## 2023-05-04 RX ORDER — ASCORBIC ACID 60 MG
500 TABLET,CHEWABLE ORAL
Refills: 0 | Status: DISCONTINUED | OUTPATIENT
Start: 2023-05-04 | End: 2023-05-08

## 2023-05-04 RX ORDER — ERTAPENEM SODIUM 1 G/1
1000 INJECTION, POWDER, LYOPHILIZED, FOR SOLUTION INTRAMUSCULAR; INTRAVENOUS EVERY 24 HOURS
Refills: 0 | Status: COMPLETED | OUTPATIENT
Start: 2023-05-04 | End: 2023-05-06

## 2023-05-04 RX ORDER — SODIUM CHLORIDE 9 MG/ML
4 INJECTION INTRAMUSCULAR; INTRAVENOUS; SUBCUTANEOUS EVERY 8 HOURS
Refills: 0 | Status: DISCONTINUED | OUTPATIENT
Start: 2023-05-04 | End: 2023-05-05

## 2023-05-04 RX ORDER — ACETYLCYSTEINE 200 MG/ML
4 VIAL (ML) MISCELLANEOUS EVERY 8 HOURS
Refills: 0 | Status: DISCONTINUED | OUTPATIENT
Start: 2023-05-04 | End: 2023-05-05

## 2023-05-04 RX ORDER — IPRATROPIUM/ALBUTEROL SULFATE 18-103MCG
3 AEROSOL WITH ADAPTER (GRAM) INHALATION EVERY 8 HOURS
Refills: 0 | Status: DISCONTINUED | OUTPATIENT
Start: 2023-05-04 | End: 2023-05-05

## 2023-05-04 RX ORDER — POTASSIUM CHLORIDE 20 MEQ
40 PACKET (EA) ORAL EVERY 4 HOURS
Refills: 0 | Status: DISCONTINUED | OUTPATIENT
Start: 2023-05-04 | End: 2023-05-04

## 2023-05-04 RX ADMIN — FENTANYL CITRATE 12.5 MICROGRAM(S): 50 INJECTION INTRAVENOUS at 19:46

## 2023-05-04 RX ADMIN — ATORVASTATIN CALCIUM 80 MILLIGRAM(S): 80 TABLET, FILM COATED ORAL at 22:51

## 2023-05-04 RX ADMIN — OXYCODONE HYDROCHLORIDE 5 MILLIGRAM(S): 5 TABLET ORAL at 15:16

## 2023-05-04 RX ADMIN — CHLORHEXIDINE GLUCONATE 15 MILLILITER(S): 213 SOLUTION TOPICAL at 06:11

## 2023-05-04 RX ADMIN — CHLORHEXIDINE GLUCONATE 15 MILLILITER(S): 213 SOLUTION TOPICAL at 17:48

## 2023-05-04 RX ADMIN — ERTAPENEM SODIUM 120 MILLIGRAM(S): 1 INJECTION, POWDER, LYOPHILIZED, FOR SOLUTION INTRAMUSCULAR; INTRAVENOUS at 22:51

## 2023-05-04 RX ADMIN — Medication 40 MILLIEQUIVALENT(S): at 07:34

## 2023-05-04 RX ADMIN — Medication 4 MILLILITER(S): at 23:00

## 2023-05-04 RX ADMIN — PANTOPRAZOLE SODIUM 40 MILLIGRAM(S): 20 TABLET, DELAYED RELEASE ORAL at 11:15

## 2023-05-04 RX ADMIN — CARVEDILOL PHOSPHATE 3.12 MILLIGRAM(S): 80 CAPSULE, EXTENDED RELEASE ORAL at 06:12

## 2023-05-04 RX ADMIN — SODIUM CHLORIDE 1 GRAM(S): 9 INJECTION INTRAMUSCULAR; INTRAVENOUS; SUBCUTANEOUS at 17:49

## 2023-05-04 RX ADMIN — SODIUM CHLORIDE 1 GRAM(S): 9 INJECTION INTRAMUSCULAR; INTRAVENOUS; SUBCUTANEOUS at 11:15

## 2023-05-04 RX ADMIN — Medication 1 TABLET(S): at 11:15

## 2023-05-04 RX ADMIN — Medication 650 MILLIGRAM(S): at 06:11

## 2023-05-04 RX ADMIN — Medication 650 MILLIGRAM(S): at 23:06

## 2023-05-04 RX ADMIN — Medication 3 MILLILITER(S): at 11:15

## 2023-05-04 RX ADMIN — SODIUM CHLORIDE 1 GRAM(S): 9 INJECTION INTRAMUSCULAR; INTRAVENOUS; SUBCUTANEOUS at 06:12

## 2023-05-04 RX ADMIN — Medication 40 MILLIEQUIVALENT(S): at 12:21

## 2023-05-04 RX ADMIN — SODIUM CHLORIDE 4 MILLILITER(S): 9 INJECTION INTRAMUSCULAR; INTRAVENOUS; SUBCUTANEOUS at 22:56

## 2023-05-04 RX ADMIN — CHLORHEXIDINE GLUCONATE 1 APPLICATION(S): 213 SOLUTION TOPICAL at 06:25

## 2023-05-04 RX ADMIN — Medication 3 MILLILITER(S): at 05:30

## 2023-05-04 RX ADMIN — Medication 3 MILLILITER(S): at 15:16

## 2023-05-04 RX ADMIN — FENTANYL CITRATE 12.5 MICROGRAM(S): 50 INJECTION INTRAVENOUS at 18:26

## 2023-05-04 RX ADMIN — ENOXAPARIN SODIUM 40 MILLIGRAM(S): 100 INJECTION SUBCUTANEOUS at 22:51

## 2023-05-04 RX ADMIN — ENOXAPARIN SODIUM 40 MILLIGRAM(S): 100 INJECTION SUBCUTANEOUS at 01:15

## 2023-05-04 RX ADMIN — Medication 4 MILLILITER(S): at 12:37

## 2023-05-04 RX ADMIN — Medication 3 MILLILITER(S): at 22:55

## 2023-05-04 RX ADMIN — Medication 325 MILLIGRAM(S): at 15:16

## 2023-05-04 RX ADMIN — Medication 500 MILLIGRAM(S): at 15:16

## 2023-05-04 RX ADMIN — SODIUM CHLORIDE 4 MILLILITER(S): 9 INJECTION INTRAMUSCULAR; INTRAVENOUS; SUBCUTANEOUS at 11:15

## 2023-05-04 RX ADMIN — OXYCODONE HYDROCHLORIDE 5 MILLIGRAM(S): 5 TABLET ORAL at 16:00

## 2023-05-04 RX ADMIN — Medication 4 MILLILITER(S): at 05:29

## 2023-05-04 RX ADMIN — CARVEDILOL PHOSPHATE 3.12 MILLIGRAM(S): 80 CAPSULE, EXTENDED RELEASE ORAL at 17:48

## 2023-05-04 NOTE — PROGRESS NOTE ADULT - SUBJECTIVE AND OBJECTIVE BOX
INTERVAL HISTORY: HPI:  58 yo F with PMH of Asthma, CAD (not on  meds), peripheral neuropathy and PSH of BATOOL, cholecystectomy, right knee surgery presented to [place] ED on 4/20 with right sided weakness and right facial numbness. Patient was undergoing preparation for colonoscopy. As per daughter at 8am patient was playing with her grandchildren but around 840 am patient was getting dressed and fell and subsequently felt weak after. Daughter reports that patient had some episodes of vomiting at this time. She had a syncopal episode at around 10 am and was witnessed by brother. No head trauma, She regained conscious after few minutes. She remained in bed for the remainder of the day. Daughter reports some slurred speech noted 1230-1PM and also was confused after. and around 4PM, the patient's sister noted right sided weakness at which time EMS was called and patient was brought to ED. No c/o chest pain, shortness of breath, fever, headache, abdominal pain, urinary complaints. No recent travel/sickness/ change in meds. Stroke code in ER: CTH neg for heme, Right PICA distribution acute infarction. CTA with saccular aneurysms of b/l carotids, approximately 0.8 cm on the right and 1.2 x 0.9 cm on the left. Possible tiny third saccular aneurysm on the right Posterior intracranial circulation: Right vertebral arterial occlusion at its dural crossing junction V3 Atlantic and V4 intracranial segments with likely reversal of flow in its intracranial segment from the basilar. Right PICA faintly seen. 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  Not candidate for TNK/mechanical thrombectomy. CT scan repeated which showed: 1. Brain: Progression of acute infarction within the right cerebellar hemisphere, also extending into the left superior cerebellar hemisphere. New mass effect on the fourth ventricle causing stenosis versus occlusion with new third and lateral ventricular dilatation indicating ventricular obstruction at the level of the fourth ventricle. New upward and downward herniation of cerebellar parenchyma Right carotid system: No hemodynamically significant stenosis. Left carotid system: No hemodynamically significant stenosis. Vertebral circulation: Patent. Anterior intracranial circulation: Intact. Bilateral internal carotid saccular aneurysms. These findings are unchanged. Posterior intracranial circulation:    Improved flow within the right vertebral artery since 4/20/2023. New focal stenosis mid left vertebral artery, etiology uncertain, consider vasospasm and extrinsic compression in addition to new embolic disease. Brain perfusion: Perfusion images demonstrate normalization of the perfusion abnormality in the right cerebellar hemisphere present on 4/20/2023 despite evidence of progression of acute infarction.  Areas of apparent ischemia within the posterior fossa have progressed in extent, also again involving the left posterior cerebral arterial distribution. Tx to [facility] for SOC watch. On admission to [facility], NIHSS 12.  (21 Apr 2023 16:50)    PAST MEDICAL & SURGICAL HISTORY:  Asthma  CAD (coronary artery disease)  Peripheral neuropathy  S/P total abdominal hysterectomy  S/P cholecystectomy  S/P right knee surgery      REVIEW OF SYSTEMS: [x ] Unable to Assess due to neurologic exam   [ ] All ROS addressed below are non-contributory, except:  Neuro: [ ] Headache [ ] Back pain [ ] Numbness [ ] Weakness [ ] Ataxia [ ] Dizziness [ ] Aphasia [ ] Dysarthria [ ] Visual disturbance  Resp: [ ] Shortness of breath/dyspnea, [ ] Orthopnea [ ] Cough  CV: [ ] Chest pain [ ] Palpitation [ ] Lightheadedness [ ] Syncope  Renal: [ ] Thirst [ ] Edema  GI: [ ] Nausea [ ] Emesis [ ] Abdominal pain [ ] Constipation [ ] Diarrhea  Hem: [ ] Hematemesis [ ] bright red blood per rectum  ID: [ ] Fever [ ] Chills [ ] Dysuria  ENT: [ ] Rhinorrhea    PHYSICAL EXAM:  General: No Acute Distress, +trach in place   Neurological: AOx2 to choice, R eye 6th nerve palsy, b/l horizontal nystagmus, b/l UE & LE dysmetria, RUE & RLE 4+/5 w/ mild drift   Pulmonary: clear  Cardiovascular: sinus justo   Gastrointestinal: Soft, Nontender, Nondistended, peg tomorow  Extremities: No calf tenderness   Incision: CDI      ICU Vital Signs Last 24 Hrs  T(C): 37.1 (04 May 2023 11:39), Max: 37.3 (03 May 2023 22:15)  T(F): 98.8 (04 May 2023 05:36), Max: 99.1 (03 May 2023 22:15)  HR: 68 (04 May 2023 12:00) (62 - 82)  BP: 118/57 (04 May 2023 12:00) (103/56 - 160/65)  BP(mean): 82 (04 May 2023 12:00) (76 - 93)  RR: 16 (04 May 2023 12:00) (15 - 21)  SpO2: 96% (04 May 2023 12:00) (92% - 98%)      05-03-23 @ 07:01  -  05-04-23 @ 07:00  --------------------------------------------------------  IN: 2520 mL / OUT: 1895 mL / NET: 625 mL    05-04-23 @ 07:01  -  05-04-23 @ 12:35  --------------------------------------------------------  IN: 255 mL / OUT: 54 mL / NET: 201 mL        Mode: AC/ CMV (Assist Control/ Continuous Mandatory Ventilation), RR (machine): 16, TV (machine): 400, FiO2: 40, PEEP: 6, ITime: 1, MAP: 11, PIP: 24    acetaminophen   Oral Liquid .. 650 milliGRAM(s) Oral every 6 hours PRN  acetylcysteine 20%  Inhalation 4 milliLiter(s) Inhalation every 6 hours  albuterol/ipratropium for Nebulization 3 milliLiter(s) Nebulizer every 6 hours  atorvastatin 80 milliGRAM(s) Oral at bedtime  carvedilol 3.125 milliGRAM(s) Oral every 12 hours  chlorhexidine 0.12% Liquid 15 milliLiter(s) Oral Mucosa every 12 hours  chlorhexidine 2% Cloths 1 Application(s) Topical <User Schedule>  enoxaparin Injectable 40 milliGRAM(s) SubCutaneous every 24 hours  ertapenem  IVPB 1000 milliGRAM(s) IV Intermittent every 24 hours  fentaNYL    Injectable 12.5 MICROGram(s) IV Push every 2 hours PRN  lactobacillus acidophilus 1 Tablet(s) Oral daily  oxyCODONE    IR 5 milliGRAM(s) Oral every 4 hours PRN  pantoprazole   Suspension 40 milliGRAM(s) Oral daily  sodium chloride 1 Gram(s) Oral every 6 hours  sodium chloride 0.9%. 1000 milliLiter(s) (85 mL/Hr) IV Continuous <Continuous>  sodium chloride 3%  Inhalation 4 milliLiter(s) Inhalation every 6 hours      LABS:  Na: 135 (05-04 @ 05:05), 137 (05-03 @ 05:30), 135 (05-02 @ 05:03)  K: 3.6 (05-04 @ 05:05), 3.5 (05-03 @ 05:30), 3.9 (05-02 @ 05:03)  Cl: 103 (05-04 @ 05:05), 101 (05-03 @ 05:30), 103 (05-02 @ 05:03)  CO2: 25 (05-04 @ 05:05), 22 (05-03 @ 05:30), 24 (05-02 @ 05:03)  BUN: 13 (05-04 @ 05:05), 10 (05-03 @ 05:30), 11 (05-02 @ 05:03)  Cr: 0.40 (05-04 @ 05:05), 0.42 (05-03 @ 05:30), 0.40 (05-02 @ 05:03)  Glu: 94(05-04 @ 05:05), 94(05-03 @ 05:30), 108(05-02 @ 05:03)    Hgb: 8.9 (05-04 @ 05:05), 10.0 (05-03 @ 05:30), 9.0 (05-02 @ 05:03)  Hct: 26.5 (05-04 @ 05:05), 30.3 (05-03 @ 05:30), 27.0 (05-02 @ 05:03)  WBC: 8.26 (05-04 @ 05:05), 8.67 (05-03 @ 05:30), 6.63 (05-02 @ 05:03)  Plt: 285 (05-04 @ 05:05), 273 (05-03 @ 05:30), 258 (05-02 @ 05:03)    INR: 1.17 05-04-23 @ 05:05, 1.12 05-02-23 @ 05:03  PTT: 36.9 05-04-23 @ 05:05, 34.9 05-02-23 @ 05:03

## 2023-05-04 NOTE — PROGRESS NOTE ADULT - ASSESSMENT
57y/F with  acute CVA, R cerebellar, brain compression cerebral edema, s/p SOC  UTI ESBL  acute respiratory failure, bulbar dysfunction  asthma  CAD  peripheral neuropathy  superficial thrombosis, proximal R greater saphenous  prediabetic     PLAN:   NEURO: neurochecks q1h, PRN pain meds with acetaminophen / opiates  EVD 5cm H20 likely VPS?  stroke on board, full AC once cleared by NSG  REHAB:  physical therapy evaluation and management    EARLY MOB:  HOB up    PULM:  CPAP wean, CXR, nebs, s/p trach, continuous nebs  CARDIO:  SBP goal 100-160mm Hg  ENDO:  Blood sugar goals 140-180 mg/dL, continue insulin sliding scale, continue high dose statins  GI:  PPI for GI prophylaxis while intubated  DIET:  restart tube feeds, switch to Jevity  RENAL:  cont cardura, Na goal 135-145, salt tabs, IVF while NPO  HEM/ONC: s/p 3 units platelets, 35 ug DDAVP, (+) aCL  VTE Prophylaxis: SCDs, s/p IVC filter, SQL   ID: afebrile, no leukocytosis, continue ertapenem for ESBL UTI until 05/08 last day  Social: will update family    Active issues:  What's keeping patient in the ICU? close monitoring, EVD  What is this patient's dispo plan? stepdown once EVD removed    ATTENDING ATTESTATION:  I was physically present for the key portions of the evaluation and management (E/M) service provided.  I agree with the above history, physical and plan, which I have reviewed and edited where appropriate.    Patient at high risk for neurological deterioration or death due to:  ICU delirium, aspiration PNA, DVT / PE.  Critical care time:  I have personally provided 45 minutes of critical care time, excluding time spent on separate procedures.      Plan discussed with RN, house staff.

## 2023-05-04 NOTE — PROGRESS NOTE ADULT - ASSESSMENT
57 year old female w/ PMH of asthma, CAD (not on meds), HTN, prior miscarriages X3, peripheral neuropathy and PSH of BATOOL, cholecystectomy and right knee surgery presented to [place] ED on 4/20 w/ right sided weakness and right facial numbness. Initially found to have a right PICA distribution acute infarct and a right vertebral artery occlusion. Not a candidate for any intervention. On repeat imaging had progression of acute infarct within the right and left cerebellar hemisphere with mass effect on the fourth ventricle and hydrocephalus and upward and downward herniation of the cerebellar parenchyma. She was transferred to [facility] on 4/21 and underwent an emergent SOC foramen magnum decompression and right parietal/occipital EVD placement. Stroke was consulted for further recommendations. TTE with LAE but otherwise unremarkable. s/p DSA for B/L carotid aneurysms, no surgical intervention unless symptomatic. Course c/b respiratory insuffiency d/t bulbar dysfunction, s/p trach 4/28/23, s/p PEG 5/2, b/l DVTs. Planned for IVC filter 5/4 with vascular. Stroke mechanism ESUS +/- hypercoag state.       1)Secondary stroke prevention  - Continue statin  - start aspirin after IVC filter once patient is cleared from vascular and neurosurgery perspective    2) Stroke risk factors  - A1C: 5.9  - LDL: 92  - TSH: 0.152  - Heme following for positive anticardiolipin Ab 4/27, recs appreciated   - CRP 17.4/ESR 19  - Collateral from family that patient suffered from 3 miscarriages after her first 2 children and that the patient's mother suffered from an "aortic tear" that has been worsening    3) Further management  - consider MRI brain with and without contrast when stable   - will need SALVADOR/ILR  - dx angio 5/2: b/l cavernous ICA aneurysms, no surgical intervention unless symptomatic   - recommend SBP goal < 140, per primary team  - recommend q1hr stroke neuro checks  - outpt neurology follow up  - provide stroke education    Discussed with Dr. Bueno and Dr. Fajardo       57 year old female w/ PMH of asthma, CAD (not on meds), HTN, prior miscarriages X3, peripheral neuropathy and PSH of BATOOL, cholecystectomy and right knee surgery presented to [place] ED on 4/20 w/ right sided weakness and right facial numbness. Initially found to have a right PICA distribution acute infarct and a right vertebral artery occlusion. Not a candidate for any intervention. On repeat imaging had progression of acute infarct within the right and left cerebellar hemisphere with mass effect on the fourth ventricle and hydrocephalus and upward and downward herniation of the cerebellar parenchyma. She was transferred to [facility] on 4/21 and underwent an emergent SOC foramen magnum decompression and right parietal/occipital EVD placement. Stroke was consulted for further recommendations. TTE with LAE but otherwise unremarkable. s/p DSA for B/L carotid aneurysms, no surgical intervention unless symptomatic. Course c/b respiratory insuffiency d/t bulbar dysfunction, s/p trach 4/28/23, s/p PEG 5/2, b/l DVTs. Planned for IVC filter 5/4 with vascular. Stroke mechanism ESUS and hypercoag state      1)Secondary stroke prevention  - Continue statin  - start AC after IVC filter placement when cleared from surgical perspective for hypercoag     2) Stroke risk factors  - A1C: 5.9  - LDL: 92  - TSH: 0.152  - Heme following for positive anticardiolipin Ab 4/27, recs appreciated   - CRP 17.4/ESR 19  - Collateral from family that patient suffered from 3 miscarriages after her first 2 children and that the patient's mother suffered from an "aortic tear" that has been worsening    3) Further management  - consider MRI brain with and without contrast when stable   - will need SALVADOR/ILR  - dx angio 5/2: b/l cavernous ICA aneurysms, no surgical intervention unless symptomatic   - recommend SBP goal < 140, per primary team  - recommend q1hr stroke neuro checks  - outpt neurology follow up  - provide stroke education    Discussed with Dr. Bueno and Dr. Fajardo       57 year old female w/ PMH of asthma, CAD (not on meds), HTN, prior miscarriages X3, peripheral neuropathy and PSH of BATOOL, cholecystectomy and right knee surgery presented to [place] ED on 4/20 w/ right sided weakness and right facial numbness. Initially found to have a right PICA distribution acute infarct and a right vertebral artery occlusion. Not a candidate for any intervention. On repeat imaging had progression of acute infarct within the right and left cerebellar hemisphere with mass effect on the fourth ventricle and hydrocephalus and upward and downward herniation of the cerebellar parenchyma. She was transferred to [facility] on 4/21 and underwent an emergent SOC foramen magnum decompression and right parietal/occipital EVD placement. Stroke was consulted for further recommendations. TTE with LAE but otherwise unremarkable. s/p DSA for B/L carotid aneurysms, no surgical intervention unless symptomatic. Course c/b respiratory insuffiencey d/t bulbar dysfunction, s/p trach 4/28/23, s/p PEG 5/2, b/l DVTs. Planned for IVC filter 5/4 with vascular. Stroke mechanism ESUS and hypercoag state      1)Secondary stroke prevention  - Continue statin  - start AC after IVC filter placement when cleared from surgical perspective for hypercoagable state per heme. Patient will not need ILR if on lifelong AC.     2) Stroke risk factors  - A1C: 5.9  - LDL: 92  - TSH: 0.152  - Heme following for positive anticardiolipin Ab 4/27  - CRP 17.4/ESR 19  - Collateral from family that patient suffered from 3 miscarriages after her first 2 children and that the patient's mother suffered from an "aortic tear" that has been worsening    3) Further management  - consider MRI brain without contrast when able  - dx angio 5/2: b/l cavernous ICA aneurysms, no surgical intervention unless symptomatic   - recommend SBP goal < 140, per primary team  - recommend q1hr stroke neuro checks  - outpt neurology follow up  - provide stroke education    Discussed with Dr. Bueno and Dr. Fajardo

## 2023-05-04 NOTE — CHART NOTE - NSCHARTNOTEFT_GEN_A_CORE
Admitting Diagnosis:   Patient is a 57y old  Female who presents with a chief complaint of bilateral cerebellar strokes (04 May 2023 10:53)    PAST MEDICAL & SURGICAL HISTORY:  Asthma  CAD (coronary artery disease)  Peripheral neuropathy  S/P total abdominal hysterectomy  S/P cholecystectomy  S/P right knee surgery    Current Nutrition Order:   Diet, NPO after Midnight:      NPO Start Date: 03-May-2023,   NPO Start Time: 23:59    PO Intake: Good (%) [   ]  Fair (50-75%) [   ] Poor (<25%) [   ]- N/A    GI Issues:   WDL, last BM 5/2  No n/v/d/c  No abd distention noted  PEG in place    Pain:  3/10 head- currently on pain regimen    Skin Integrity:  Surgical incision, yissel score 14  No edema noted  No pressure ulcers noted    Labs:   05-04    135  |  103  |  13  ----------------------------<  94  3.6   |  25  |  0.40<L>    Ca    8.7      04 May 2023 05:05  Phos  3.0     05-04  Mg     2.0     05-04    Medications:  MEDICATIONS  (STANDING):  acetylcysteine 20%  Inhalation 4 milliLiter(s) Inhalation every 6 hours  albuterol/ipratropium for Nebulization 3 milliLiter(s) Nebulizer every 6 hours  atorvastatin 80 milliGRAM(s) Oral at bedtime  carvedilol 3.125 milliGRAM(s) Oral every 12 hours  chlorhexidine 0.12% Liquid 15 milliLiter(s) Oral Mucosa every 12 hours  chlorhexidine 2% Cloths 1 Application(s) Topical <User Schedule>  enoxaparin Injectable 40 milliGRAM(s) SubCutaneous every 24 hours  ertapenem  IVPB 1000 milliGRAM(s) IV Intermittent every 24 hours  lactobacillus acidophilus 1 Tablet(s) Oral daily  pantoprazole   Suspension 40 milliGRAM(s) Oral daily  potassium chloride   Powder 40 milliEquivalent(s) Oral once  sodium chloride 1 Gram(s) Oral every 6 hours  sodium chloride 0.9%. 1000 milliLiter(s) (85 mL/Hr) IV Continuous <Continuous>  sodium chloride 3%  Inhalation 4 milliLiter(s) Inhalation every 6 hours    MEDICATIONS  (PRN):  acetaminophen   Oral Liquid .. 650 milliGRAM(s) Oral every 6 hours PRN Temp greater or equal to 38C (100.4F), Mild Pain (1 - 3)  fentaNYL    Injectable 12.5 MICROGram(s) IV Push every 2 hours PRN Severe Pain (7 - 10)  oxyCODONE    IR 5 milliGRAM(s) Oral every 4 hours PRN Moderate Pain (4 - 6)    Weight:  Daily     Daily     Weight Change:     Nutrition Focused Physical Exam: Completed [   ]  Not Pertinent [   ]  Muscle Wasting- Temporal [   ]  Clavicle/Pectoral [   ]  Shoulder/Deltoid [   ]  Scapula [   ]  Interosseous [   ]  Quadriceps [   ]  Gastrocnemius [   ]  Fat Wasting- Orbital [   ]  Buccal [   ]  Triceps [   ]  Rib [   ]  Suspect [PCM] 2/2 to physical assessment, [poor intake], and [wt loss]; please see malnutrition chart note.    Estimated energy needs:     Subjective:     Previous Nutrition Diagnosis:    Active [   ]  Resolved [   ]    If resolved, new PES:     Goal:    Recommendations:    Education:     Risk Level: High [   ] Moderate [   ] Low [   ] Admitting Diagnosis:   Patient is a 57y old  Female who presents with a chief complaint of bilateral cerebellar strokes (04 May 2023 10:53)    PAST MEDICAL & SURGICAL HISTORY:  Asthma  CAD (coronary artery disease)  Peripheral neuropathy  S/P total abdominal hysterectomy  S/P cholecystectomy  S/P right knee surgery    Current Nutrition Order:   Diet, NPO after Midnight:      NPO Start Date: 03-May-2023,   NPO Start Time: 23:59    PO Intake: Good (%) [   ]  Fair (50-75%) [   ] Poor (<25%) [   ]- N/A    GI Issues:   WDL, last BM 5/2  No n/v/d/c  No abd distention noted  PEG in place    Pain:  3/10 head- currently on pain regimen    Skin Integrity:  Surgical incision, yissel score 14  No edema noted  No pressure ulcers noted    Labs:   05-04    135  |  103  |  13  ----------------------------<  94  3.6   |  25  |  0.40<L>    Ca    8.7      04 May 2023 05:05  Phos  3.0     05-04  Mg     2.0     05-04    Medications:  MEDICATIONS  (STANDING):  acetylcysteine 20%  Inhalation 4 milliLiter(s) Inhalation every 6 hours  albuterol/ipratropium for Nebulization 3 milliLiter(s) Nebulizer every 6 hours  atorvastatin 80 milliGRAM(s) Oral at bedtime  carvedilol 3.125 milliGRAM(s) Oral every 12 hours  chlorhexidine 0.12% Liquid 15 milliLiter(s) Oral Mucosa every 12 hours  chlorhexidine 2% Cloths 1 Application(s) Topical <User Schedule>  enoxaparin Injectable 40 milliGRAM(s) SubCutaneous every 24 hours  ertapenem  IVPB 1000 milliGRAM(s) IV Intermittent every 24 hours  lactobacillus acidophilus 1 Tablet(s) Oral daily  pantoprazole   Suspension 40 milliGRAM(s) Oral daily  potassium chloride   Powder 40 milliEquivalent(s) Oral once  sodium chloride 1 Gram(s) Oral every 6 hours  sodium chloride 0.9%. 1000 milliLiter(s) (85 mL/Hr) IV Continuous <Continuous>  sodium chloride 3%  Inhalation 4 milliLiter(s) Inhalation every 6 hours    MEDICATIONS  (PRN):  acetaminophen   Oral Liquid .. 650 milliGRAM(s) Oral every 6 hours PRN Temp greater or equal to 38C (100.4F), Mild Pain (1 - 3)  fentaNYL    Injectable 12.5 MICROGram(s) IV Push every 2 hours PRN Severe Pain (7 - 10)  oxyCODONE    IR 5 milliGRAM(s) Oral every 4 hours PRN Moderate Pain (4 - 6)    Admitting Anthropometrics:  Admitting Anthropometrics:  Height for BMI (FEET)	5 Feet  Height for BMI (INCHES)	2 Inch(s)  Height for BMI (CENTIMETERS)	157.48 Centimeter(s)  Weight for BMI (lbs)	195 lb  Weight for BMI (kg)	88.5 kg  Body Mass Index	35.6     Weight Change: no new wts to review at this time, last wt taken on admit 4/21      Estimated energy needs:   IBW used for calculations as pt >120% of IBW (177%). Needs adjusted for critical care requiring vent support.  Kcal (25-30 kcal/kg): 3795-7457 kcal  Protein (1.4-1.6 g/kg): 70-80g pro  **Fluids per team    Subjective:  56 yo F with PMH of Asthma, CAD (not on meds), peripheral presented to [place] ED on 4/20 with right sided weakness and right facial numbness. Acute infarction within the right cerebellar hemisphere, also extending into the left superior cerebellar hemisphere. New mass effect on the fourth ventricle causing stenosis versus occlusion with new third and lateral ventricular dilatation indicating ventricular obstruction at the level of the fourth ventricle. New upward and downward herniation of cerebellar parenchyma. Pt transferred to [facility] for further management. NIHSS 12. Now s/p SOC foramen magnum decompression and right parietal/occipital EVD placement (4/21). Pt was weaned and extubated 4/23, but quickly reintubated 4/25. s/p trach 4/28. Pend PEG placement today (5/1) and follow up dx cerebral angio. EVD raised to 10 5/2. Plan for IVCF 5/4. On assessment, pt resting in bed on full vent support. Vent; AC/VC. Tmax 98.8F. MAP 82- off all sedation and pressor support. Currently NPO for OR today. No n/v/d/c. No abd distention or discomfort. Education remains deferred. RD to follow.      Previous Nutrition Diagnosis: Inadequate Oral Intake RT inability to meet est needs via PO AEB NPO, reliant on EN feeds to meet 100% of est needs    Active [ X ]  Resolved [   ]    Goal:  - restart TF within 24-48hrs  - Consistently meets > 75% EER via most appropriate route of nutrition     Recommendations:   1. When medically feasible, restart TF of Glucerna 1.2 @20ml/hr. If tolerated, advance by 10ml q4hrs to goal rate of 50 ml/hr x24hrs to provide 1200 ml TV, 1440 kcal, 72g pr   >>FWF per team  >> Maintain aspiration precautions at all times  2. Pain and bowel regimen per team  3. Cont to monitor lytes and replete prn   4. RD to remain available for recs adjustment prn or will follow up pt per organizational policy     Education: Deferred     Risk Level: High [ X ] Moderate [   ] Low [   ].

## 2023-05-04 NOTE — PROGRESS NOTE ADULT - SUBJECTIVE AND OBJECTIVE BOX
SUBJECTIVE: Patient seen and examined at bedside.    carvedilol 3.125 milliGRAM(s) Oral every 12 hours  enoxaparin Injectable 40 milliGRAM(s) SubCutaneous every 24 hours  ertapenem  IVPB 1000 milliGRAM(s) IV Intermittent every 24 hours    MEDICATIONS  (PRN):  acetaminophen   Oral Liquid .. 650 milliGRAM(s) Oral every 6 hours PRN Temp greater or equal to 38C (100.4F), Mild Pain (1 - 3)  fentaNYL    Injectable 12.5 MICROGram(s) IV Push every 2 hours PRN Severe Pain (7 - 10)  oxyCODONE    IR 5 milliGRAM(s) Oral every 4 hours PRN Moderate Pain (4 - 6)      I&O's Detail    03 May 2023 07:01  -  04 May 2023 07:00  --------------------------------------------------------  IN:    Enteral Tube Flush: 200 mL    Glucerna: 230 mL    IV PiggyBack: 50 mL    sodium chloride 0.9%: 2040 mL  Total IN: 2520 mL    OUT:    External Ventricular Device (mL): 195 mL    Voided (mL): 1700 mL  Total OUT: 1895 mL    Total NET: 625 mL      04 May 2023 07:01  -  04 May 2023 10:12  --------------------------------------------------------  IN:    sodium chloride 0.9%: 170 mL  Total IN: 170 mL    OUT:    External Ventricular Device (mL): 7 mL  Total OUT: 7 mL    Total NET: 163 mL          T(C): 37.1 (05-04-23 @ 05:36), Max: 37.3 (05-03-23 @ 22:15)  HR: 62 (05-04-23 @ 10:00) (62 - 82)  BP: 125/59 (05-04-23 @ 10:00) (103/56 - 160/65)  RR: 16 (05-04-23 @ 10:00) (15 - 18)  SpO2: 92% (05-04-23 @ 10:00) (92% - 98%)    GENERAL: NAD, Resting comfortably in bed, awake, opens eyes spontaneously  HEENT: NCAT, MMM, Normal conjunctiva, PERRL  RESP: Nonlabored breathing, No respiratory distress  CARD: Normal rate, Normal peripheral perfusion  GI: Soft, ND, NT, No guarding, No rebound tenderness  EXTREM: WWP, No edema, No gross deformity of extremities  SKIN: No rashes, no lesions  NEURO: AAOx3, No focal motor or sensory deficits  PSYCH: Affect and characteristics of appearance, verbalizations, and behaviors are appropriate    LABS:                        8.9    8.26  )-----------( 285      ( 04 May 2023 05:05 )             26.5     05-04    135  |  103  |  13  ----------------------------<  94  3.6   |  25  |  0.40<L>    Ca    8.7      04 May 2023 05:05  Phos  3.0     05-04  Mg     2.0     05-04      PT/INR - ( 04 May 2023 05:05 )   PT: 14.0 sec;   INR: 1.17          PTT - ( 04 May 2023 05:05 )  PTT:36.9 sec      RADIOLOGY & ADDITIONAL STUDIES:      Culture - CSF with Gram Stain (collected 04-30-23 @ 06:51)  Source: .CSF CSF  Gram Stain (04-30-23 @ 08:35):    No organisms seen    No polymorphonuclear cells seen  Preliminary Report (05-03-23 @ 13:32):    No growth to date    Culture - Urine (collected 04-30-23 @ 05:11)  Source: Catheterized None  Final Report (05-02-23 @ 10:03):    >100,000 CFU/ml Escherichia coli ESBL    >100,000 CFU/ml Escherichia coli ESBL Strain #2    Result called to and read back by_ Mr. MYKEL Wright RN  05/02/2023 09:56:41  Organism: Escherichia coli  Escherichia coli ESBL (05-02-23 @ 10:03)  Organism: Escherichia coli ESBL (05-02-23 @ 10:03)      Method Type: ELENI      -  Ampicillin: R >16 These ampicillin results predict results for amoxicillin      -  Ampicillin/Sulbactam: R 8/4 Enterobacter, Klebsiella aerogenes, Citrobacter, and Serratia may develop resistance during prolonged therapy (3-4 days)      -  Cefazolin: R >16 For uncomplicated UTI with K. pneumoniae, E. coli, or P. mirablis: ELENI <=16 is sensitive and LEENI >=32 is resistant. This also predicts results for oral agents cefaclor, cefdinir, cefpodoxime, cefprozil, cefuroxime axetil, cephalexin and locarbef for uncomplicated UTI. Note that some isolates may be susceptible to these agents while testing resistant to cefazolin.      -  Ceftriaxone: R >32 Enterobacter, Klebsiella aerogenes, Citrobacter, and Serratia may develop resistance during prolonged therapy      -  Ciprofloxacin: I 0.5      -  Ertapenem: S <=0.5      -  Gentamicin: S <=2      -  Meropenem: S <=1      -  Nitrofurantoin: S <=32 Should not be used to treat pyelonephritis      -  Piperacillin/Tazobactam: R <=8      -  Tobramycin: S <=2      -  Trimethoprim/Sulfamethoxazole: S <=0.5/9.5  Organism: Escherichia coli (05-02-23 @ 10:03)      Method Type: ELENI      -  Ampicillin: R >16 These ampicillin results predict results for amoxicillin      -  Ampicillin/Sulbactam: R >16/8 Enterobacter, Klebsiella aerogenes, Citrobacter, and Serratia may develop resistance during prolonged therapy (3-4 days)      -  Cefazolin: R >16 For uncomplicated UTI with K. pneumoniae, E. coli, or P. mirablis: ELENI <=16 is sensitive and ELENI >=32 is resistant. This also predicts results for oral agents cefaclor, cefdinir, cefpodoxime, cefprozil, cefuroxime axetil, cephalexin and locarbef for uncomplicated UTI. Note that some isolates may be susceptible to these agents while testing resistant to cefazolin.      -  Ceftriaxone: R >32 Enterobacter, Klebsiella aerogenes, Citrobacter, and Serratia may develop resistance during prolonged therapy      -  Ciprofloxacin: R >2      -  Ertapenem: S <=0.5      -  Gentamicin: R >8      -  Meropenem: S <=1      -  Nitrofurantoin: R >64 Should not be used to treat pyelonephritis      -  Piperacillin/Tazobactam: SDD 16      -  Tobramycin: I 8      -  Trimethoprim/Sulfamethoxazole: R >2/38      -  Amikacin: S <=16    Urinalysis with Rflx Culture (collected 04-30-23 @ 05:11)    Culture - Blood (collected 04-29-23 @ 21:07)  Source: .Blood Blood  Preliminary Report (05-03-23 @ 23:00):    No growth at 4 days.    Culture - Blood (collected 04-29-23 @ 21:07)  Source: .Blood Blood  Preliminary Report (05-03-23 @ 23:00):    No growth at 4 days.    Culture - Sputum (collected 04-29-23 @ 21:06)  Source: ET Tube ET Tube  Gram Stain (04-30-23 @ 06:57):    Rare epithelial cells    Numerous White blood cells    Few Gram positive cocci in pairs  Final Report (05-02-23 @ 09:09):    Few Pluralibacter gergoviae (most closely resembling)    Moderate Streptococcus pneumoniae    Therapy requires maximum dose of Ceftriaxone and/or    Penicillin. Interpretive criteria as follows:    Ceftriaxone breakpoints for meningitis infections:    <=0.5=Sensitive, 1.0=Intermediate, >=2.0=Resistant    Penicillin breakpoints for meningitis infections:    <=0.06=Sensitive, >= 0.12=Resistant    Ceftriaxone breakpoints for non-meningitis infections:    <=1.0=Sensitive, 2.0=Intermediate, >=4.0=Resistant    Penicillin breakpoints for non-meningitis infections:    <=2.0=Sensitive, 4.0=Intermediate, >=8.0=Resistant    Oral Penicillin breakpoints:    <=0.06=Sensitive, 0.12-1.0=Intermediate, >=2.0=Resistant    Please note: In case of suspected meningitis, CSF    interpretive criteria must be used independent of specimen source.    Routine respiratory charlotte absent  Organism: Streptococcus pneumoniae  Streptococcus pneumoniae  Pluralibacter gergoviae (05-02-23 @ 09:09)  Organism: Pluralibacter gergoviae (05-02-23 @ 09:09)      Method Type: ELENI      -  Ampicillin: S <=8 These ampicillin results predict results for amoxicillin      -  Ampicillin/Sulbactam: S <=4/2 Enterobacter, Klebsiella aerogenes, Citrobacter, and Serratia may develop resistance during prolonged therapy (3-4 days)      -  Cefazolin: S <=2 Enterobacter, Klebsiella aerogenes, Citrobacter, and Serratia may develop resistance during prolonged therapy (3-4 days)      -  Ceftriaxone: S <=1 Enterobacter, Klebsiella aerogenes, Citrobacter, and Serratia may develop resistance during prolonged therapy      -  Ciprofloxacin: S <=0.25      -  Ertapenem: S <=0.5      -  Gentamicin: S <=2      -  Piperacillin/Tazobactam: S <=8      -  Tobramycin: S <=2      -  Trimethoprim/Sulfamethoxazole: S <=0.5/9.5  Organism: Streptococcus pneumoniae (05-02-23 @ 09:09)      Method Type: ETEST      -  Ceftriaxone: See note 0.75      -  Penicillin: See note 1.5  Organism: Streptococcus pneumoniae (05-02-23 @ 09:09)      Method Type: KB      -  Clindamycin: S      -  Erythromycin: R Predicts results for azithromycin.     SUBJECTIVE: Patient seen and examined at bedside.  There were no acute events overnight, and patient's vital signs were within normal limits.  Patient is tolerating tube feeds without nausea, emesis and abdominal pain.     carvedilol 3.125 milliGRAM(s) Oral every 12 hours  enoxaparin Injectable 40 milliGRAM(s) SubCutaneous every 24 hours  ertapenem  IVPB 1000 milliGRAM(s) IV Intermittent every 24 hours    MEDICATIONS  (PRN):  acetaminophen   Oral Liquid .. 650 milliGRAM(s) Oral every 6 hours PRN Temp greater or equal to 38C (100.4F), Mild Pain (1 - 3)  fentaNYL    Injectable 12.5 MICROGram(s) IV Push every 2 hours PRN Severe Pain (7 - 10)  oxyCODONE    IR 5 milliGRAM(s) Oral every 4 hours PRN Moderate Pain (4 - 6)      I&O's Detail    03 May 2023 07:01  -  04 May 2023 07:00  --------------------------------------------------------  IN:    Enteral Tube Flush: 200 mL    Glucerna: 230 mL    IV PiggyBack: 50 mL    sodium chloride 0.9%: 2040 mL  Total IN: 2520 mL    OUT:    External Ventricular Device (mL): 195 mL    Voided (mL): 1700 mL  Total OUT: 1895 mL    Total NET: 625 mL      04 May 2023 07:01  -  04 May 2023 10:12  --------------------------------------------------------  IN:    sodium chloride 0.9%: 170 mL  Total IN: 170 mL    OUT:    External Ventricular Device (mL): 7 mL  Total OUT: 7 mL    Total NET: 163 mL          T(C): 37.1 (05-04-23 @ 05:36), Max: 37.3 (05-03-23 @ 22:15)  HR: 62 (05-04-23 @ 10:00) (62 - 82)  BP: 125/59 (05-04-23 @ 10:00) (103/56 - 160/65)  RR: 16 (05-04-23 @ 10:00) (15 - 18)  SpO2: 92% (05-04-23 @ 10:00) (92% - 98%)    GENERAL: NAD, Resting comfortably in bed, awake, opens eyes spontaneously  HEENT: NCAT, MMM, Normal conjunctiva  RESP: Nonlabored breathing on tracheostomy ventilator support, No respiratory distress  CARD: Normal rate, Normal peripheral perfusion  GI: Soft, ND, NT, No guarding, No rebound tenderness, PEG tube in place without discharge and bleeding  EXTREM: WWP, No edema, No gross deformity of extremities  SKIN: No rashes, no lesions, PEG site incision is clean/dry/intact without erythema/fluctuance  NEURO: Awake and alert, No focal motor or sensory deficits  PSYCH: Affect and characteristics of appearance, verbalizations, and behaviors are appropriate    LABS:                        8.9    8.26  )-----------( 285      ( 04 May 2023 05:05 )             26.5     05-04    135  |  103  |  13  ----------------------------<  94  3.6   |  25  |  0.40<L>    Ca    8.7      04 May 2023 05:05  Phos  3.0     05-04  Mg     2.0     05-04      PT/INR - ( 04 May 2023 05:05 )   PT: 14.0 sec;   INR: 1.17          PTT - ( 04 May 2023 05:05 )  PTT:36.9 sec      RADIOLOGY & ADDITIONAL STUDIES:      Culture - CSF with Gram Stain (collected 04-30-23 @ 06:51)  Source: .CSF CSF  Gram Stain (04-30-23 @ 08:35):    No organisms seen    No polymorphonuclear cells seen  Preliminary Report (05-03-23 @ 13:32):    No growth to date    Culture - Urine (collected 04-30-23 @ 05:11)  Source: Catheterized None  Final Report (05-02-23 @ 10:03):    >100,000 CFU/ml Escherichia coli ESBL    >100,000 CFU/ml Escherichia coli ESBL Strain #2    Result called to and read back by_ Mr. MYKEL Wright RN  05/02/2023 09:56:41  Organism: Escherichia coli  Escherichia coli ESBL (05-02-23 @ 10:03)  Organism: Escherichia coli ESBL (05-02-23 @ 10:03)      Method Type: ELENI      -  Ampicillin: R >16 These ampicillin results predict results for amoxicillin      -  Ampicillin/Sulbactam: R 8/4 Enterobacter, Klebsiella aerogenes, Citrobacter, and Serratia may develop resistance during prolonged therapy (3-4 days)      -  Cefazolin: R >16 For uncomplicated UTI with K. pneumoniae, E. coli, or P. mirablis: ELENI <=16 is sensitive and ELENI >=32 is resistant. This also predicts results for oral agents cefaclor, cefdinir, cefpodoxime, cefprozil, cefuroxime axetil, cephalexin and locarbef for uncomplicated UTI. Note that some isolates may be susceptible to these agents while testing resistant to cefazolin.      -  Ceftriaxone: R >32 Enterobacter, Klebsiella aerogenes, Citrobacter, and Serratia may develop resistance during prolonged therapy      -  Ciprofloxacin: I 0.5      -  Ertapenem: S <=0.5      -  Gentamicin: S <=2      -  Meropenem: S <=1      -  Nitrofurantoin: S <=32 Should not be used to treat pyelonephritis      -  Piperacillin/Tazobactam: R <=8      -  Tobramycin: S <=2      -  Trimethoprim/Sulfamethoxazole: S <=0.5/9.5  Organism: Escherichia coli (05-02-23 @ 10:03)      Method Type: ELENI      -  Ampicillin: R >16 These ampicillin results predict results for amoxicillin      -  Ampicillin/Sulbactam: R >16/8 Enterobacter, Klebsiella aerogenes, Citrobacter, and Serratia may develop resistance during prolonged therapy (3-4 days)      -  Cefazolin: R >16 For uncomplicated UTI with K. pneumoniae, E. coli, or P. mirablis: ELENI <=16 is sensitive and ELENI >=32 is resistant. This also predicts results for oral agents cefaclor, cefdinir, cefpodoxime, cefprozil, cefuroxime axetil, cephalexin and locarbef for uncomplicated UTI. Note that some isolates may be susceptible to these agents while testing resistant to cefazolin.      -  Ceftriaxone: R >32 Enterobacter, Klebsiella aerogenes, Citrobacter, and Serratia may develop resistance during prolonged therapy      -  Ciprofloxacin: R >2      -  Ertapenem: S <=0.5      -  Gentamicin: R >8      -  Meropenem: S <=1      -  Nitrofurantoin: R >64 Should not be used to treat pyelonephritis      -  Piperacillin/Tazobactam: SDD 16      -  Tobramycin: I 8      -  Trimethoprim/Sulfamethoxazole: R >2/38      -  Amikacin: S <=16    Urinalysis with Rflx Culture (collected 04-30-23 @ 05:11)    Culture - Blood (collected 04-29-23 @ 21:07)  Source: .Blood Blood  Preliminary Report (05-03-23 @ 23:00):    No growth at 4 days.    Culture - Blood (collected 04-29-23 @ 21:07)  Source: .Blood Blood  Preliminary Report (05-03-23 @ 23:00):    No growth at 4 days.    Culture - Sputum (collected 04-29-23 @ 21:06)  Source: ET Tube ET Tube  Gram Stain (04-30-23 @ 06:57):    Rare epithelial cells    Numerous White blood cells    Few Gram positive cocci in pairs  Final Report (05-02-23 @ 09:09):    Few Pluralibacter gergoviae (most closely resembling)    Moderate Streptococcus pneumoniae    Therapy requires maximum dose of Ceftriaxone and/or    Penicillin. Interpretive criteria as follows:    Ceftriaxone breakpoints for meningitis infections:    <=0.5=Sensitive, 1.0=Intermediate, >=2.0=Resistant    Penicillin breakpoints for meningitis infections:    <=0.06=Sensitive, >= 0.12=Resistant    Ceftriaxone breakpoints for non-meningitis infections:    <=1.0=Sensitive, 2.0=Intermediate, >=4.0=Resistant    Penicillin breakpoints for non-meningitis infections:    <=2.0=Sensitive, 4.0=Intermediate, >=8.0=Resistant    Oral Penicillin breakpoints:    <=0.06=Sensitive, 0.12-1.0=Intermediate, >=2.0=Resistant    Please note: In case of suspected meningitis, CSF    interpretive criteria must be used independent of specimen source.    Routine respiratory charlotte absent  Organism: Streptococcus pneumoniae  Streptococcus pneumoniae  Pluralibacter gergoviae (05-02-23 @ 09:09)  Organism: Pluralibacter gergoviae (05-02-23 @ 09:09)      Method Type: ELENI      -  Ampicillin: S <=8 These ampicillin results predict results for amoxicillin      -  Ampicillin/Sulbactam: S <=4/2 Enterobacter, Klebsiella aerogenes, Citrobacter, and Serratia may develop resistance during prolonged therapy (3-4 days)      -  Cefazolin: S <=2 Enterobacter, Klebsiella aerogenes, Citrobacter, and Serratia may develop resistance during prolonged therapy (3-4 days)      -  Ceftriaxone: S <=1 Enterobacter, Klebsiella aerogenes, Citrobacter, and Serratia may develop resistance during prolonged therapy      -  Ciprofloxacin: S <=0.25      -  Ertapenem: S <=0.5      -  Gentamicin: S <=2      -  Piperacillin/Tazobactam: S <=8      -  Tobramycin: S <=2      -  Trimethoprim/Sulfamethoxazole: S <=0.5/9.5  Organism: Streptococcus pneumoniae (05-02-23 @ 09:09)      Method Type: ETEST      -  Ceftriaxone: See note 0.75      -  Penicillin: See note 1.5  Organism: Streptococcus pneumoniae (05-02-23 @ 09:09)      Method Type: KB      -  Clindamycin: S      -  Erythromycin: R Predicts results for azithromycin.

## 2023-05-04 NOTE — CHART NOTE - NSCHARTNOTEFT_GEN_A_CORE
POD 13. JIM o/n. Hemoglobin 8.9 from 10. FOBT negative. Started iron and vitamin c every other day for iron deficiency anemia. s/p IVC filter with vascular today. Pending post-procedure CTH. Remains on FVS with CPAP trials as tolerated.

## 2023-05-04 NOTE — PROGRESS NOTE ADULT - SUBJECTIVE AND OBJECTIVE BOX
=================================  NEUROCRITICAL CARE ATTENDING NOTE  =================================    MAKSIM TRIVEDI   MRN-[id number]  Summary:  57y/F  with Asthma, CAD (not on  meds), peripheral neuropathy and PSH of BATOOL, cholecystectomy, right knee surgery presented to [facility] on  with right sided weakness and right facial numbness. Patient was undergoing preparation for colonoscopy. As per daughter at 8am patient was playing with her grandchildren but around 840 am patient was getting dressed and fell and subsequently felt weak after. Daughter reports that patient had some episodes of vomiting at this time. She had a syncopal episode at around 10 am and was witnessed by brother. No head trauma, She regained conscious after few minutes. She remained in bed for the remainder of the day. Daughter reports some slurred speech noted 1230-1PM and also was confused after. and around 4PM, the patient's sister noted right sided weakness at which time EMS was called and patient was brought to ED. No c/o chest pain, shortness of breath, fever, headache, abdominal pain, urinary complaints. No recent travel/sickness/ change in meds. Stroke code in ER: CTH neg for heme, Right PICA distribution acute infarction. CTA with saccular aneurysms of b/l carotids, approximately 0.8 cm on the right and 1.2 x 0.9 cm on the left. Possible tiny third saccular aneurysm on the right Posterior intracranial circulation: Right vertebral arterial occlusion at its dural crossing junction V3 Atlantic and V4 intracranial segments with likely reversal of flow in its intracranial segment from the basilar. Right PICA faintly seen. 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  Not candidate for TNK/mechanical thrombectomy. CT scan repeated which showed: 1. Brain: Progression of acute infarction within the right cerebellar hemisphere, also extending into the left superior cerebellar hemisphere. New mass effect on the fourth ventricle causing stenosis versus occlusion with new third and lateral ventricular dilatation indicating ventricular obstruction at the level of the fourth ventricle. New upward and downward herniation of cerebellar parenchyma Right carotid system: No hemodynamically significant stenosis. Left carotid system: No hemodynamically significant stenosis. Vertebral circulation: Patent. Anterior intracranial circulation: Intact. Bilateral internal carotid saccular aneurysms. These findings are unchanged. Posterior intracranial circulation:    Improved flow within the right vertebral artery since 2023. New focal stenosis mid left vertebral artery, etiology uncertain, consider vasospasm and extrinsic compression in addition to new embolic disease. Brain perfusion: Perfusion images demonstrate normalization of the perfusion abnormality in the right cerebellar hemisphere present on 2023 despite evidence of progression of acute infarction.  Areas of apparent ischemia within the posterior fossa have progressed in extent, also again involving the left posterior cerebral arterial distribution. Tx to [facility] for SOC watch. On admission to [facility], NIHSS 12.  (2023 16:50)    COURSE IN THE HOSPITAL:  : Admitted for crani watch, CTH complete w/ unchanged hydrocephalus, taken for emergent occipital craniectomy.   : POD1. Remains intubated overnight, on propofol and dank. EVD@[id number]. Pending repeat CTH this am. Given hydralazine 10mg x1. Started on cardene for SBP high 140s-150s. Sub-therapeutic on plavix, therapeutic on asa, rpt asa accumetrics until subtherapeutic. LE dopplers neg for DVT, but showing superficial venous thrombosis in the proximal portion of the right greater saphenous vein. Started on 3% @60 for na goal 145-150. NGT placed by ENT. Febrile, pancultured.  : POD 2. 3% increased to 75. NGT readjusted. UA neg. SBP liberalized to 160. Plan to extubate. 3% decreased to 60. Failed extubation d/t no cuff leak, given 60mg solumedrol, plan to extubate in 6 hrs. SCx1 for post void residual >400, started on cardura at bedtime. New blood in buretrol, stopped SQL. Clot in EVD cleared. Neuro exam improving.   : POD 3. Cont 3% with NS while NPO, start TF today. TF started. LFTs downtrending. Sodium 141. Increased 3% to 50, NS to 30. Repeat BMP ordered for 5:30PM. Tramadol 25 for pain with no relief, oxy 5 and 1g tylenol ordered, stability CT stable, speech language consult for dysarthria. Given hydralazine 10mg IVP for SBP>160, started amlodipine 5mg. C/o mild headache, given fioricet x1, stroke neuro rec SALVADOR and loop recorder placement. Echo with bubble completed, negative for PFO, EF 55-60%. J HFNC started for desats with suctioning.   : POD 4. Cont HFNC. Increased secertions on HFNC, reintubated. CXR confirmed good placement. EVD dropped to 5cmH20 for goal of draining 5-10cc/hr and SG ELENA drain taken off suction. Pending CTH. EVD drained 0cc/hr, dropped to 0. NGT replaced. Dc'd fioricet. Started on PPI for intubation. Increased cardura to 4mg for urinary retention, given an additional 2mg now. Started salt tabs 3 q 6. Given 12.5mg fentanyl x1. NGT replaced, CXR shown in lung. NGT removed, repeat CXR showing no pneumothorax. Pending ENT consult for NGT placement. CTH stable. NGT replaced by ENT, confirmed in proper spot. Restarted TF. [id number]. Na 141 from 146. Increased 3% to 60. EVD raised to 3cmH20. ETT pulled back 1cm by RT.  : POD 5 SOC. Intubated, remains on precedex, ABG ordered with improving PaO2, BMP sodium 148, 3% changed to 30cc/hr, cardura increased to 8mg for tonight, tolerating cpap  : POD 6 SOC. Respiratory rate sustained 6, returned to FVS. Na 151, dc'd 3%, f/u AM sodium. Nurse noticed ETT 19 at the lip and was 20 prior, CXR shows ETT @ 5cm above jono, ET tube advanced 1cm, repeat CXR confirms appropriate placement. Thick inline secretions with suctioning. ENT trach placement Fri likely, PEG likely Mon. Keep ELENA drain until no output, EVD to remain @3. Start ASA 81mg daily tomorrow.   : POD7 SOC, neuro stable, given fentanyl x 1 overnight for presumed discomfort (biting on ETT), remains on full vent support. CTH today stable in comparison to . 6 cuffed trach placed by ENT in OR, but came down without cuff. Replaced at bedside by ENT. On fentanyl gtt.    : POD8 SOC, JIM overnight. Incision cleaned and dressing changed. On fentanyl gtt. EKG completed due to increased frequency PVCs on telemetry, frequency of PVCs improved with titrating up fentanyl gtt. ABG drawn.  Repeat LE dopplers completed showing persistant superficial thrombus, no DVT. No EVD waveform during day, flushed distally without improvement. Exam unchanged.  EVD dropped to 0 for low output in afternoon.   : POD9. Neuro stable, febrile to 101.3F o/n, given tylenol and pan cx sent. SBP dipped to low 90s o/n, HR stable in 70s with some PVCs, decreased fentanyl gtt and given 500cc bolus of NS x 2. Pend PEG placement Mon and angio Tu. D/c'd ELENA drain today and suture placed. F/u heme recs. Positive UA. Infiltrates found on CXR. Started on Zosyn 4.5g Q6hrs .   : POD 10. Neuro stable. Dc'd fentanyl gtt. PEG failed placement at bedside with Dr. Gant, plan for PEG in OR tomorrow afternoon with Dr. Layton. Dc'd amlodipine and started carvedilol 3.125 BID for PVCs . Made 3% inhalation and mucomyst q8hr. Failed PEG at bedside. Salt tabs made 1 q 6. Decreased cardura to 4mg daily. Urine culture growing E. Coli and sputum culture growing pluralibacter gergoviae and strep species. ID consulted, f/u recs. Failed CPAP trial @ 3pm. Added am labs per heme/onc for hypercoguable workup. Pending repeat LE dopplers in 2-3 days. NGT clogged, removed, ENT failed attempt at replacement, will keep NPO for PEG placement tmw. Repeat xray in am for concern for aspration. Pt abx switched from Zosyn to Ertapenem 1g Q24hrs. per ID recs, possible ESBL growth.   : POD 11. Neuro stable. diagnostic cerebral angiogram (bilateral carotid cavernous aneurysm) and PEG placement. NPO; pulled out radial TR band (right), EVD raised to 10    POD12 EVD raised to 15, then down to 5cm H20, CPAP today   POD13 EVD 5    Past Medical History: Asthma CAD (coronary artery disease) Peripheral neuropathy  Allergies:  No Known Allergies  Home meds:   ·	Albuterol (Eqv-ProAir HFA) 90 mcg/inh inhalation aerosol: 2 puff(s) inhaled every 6 hours as needed for  shortness of breath and/or wheezing    PHYSICAL EXAMINATION  T(C): 36.7 ( @ 18:11), Max: 37.1 ( @ 01:15) HR: 68 ( @ 21:22) (62 - 82) BP: 130/58 ( @ 21:00) (103/56 - 168/103) RR: 21 ( @ 21:00) (16 - 37) SpO2: 96% ( @ :22) (92% - 100%)  NEUROLOGIC EXAMINATION:  Patient awake, alert, oriented x2, FC, RUE 3/5 RLE 4/5 L UE 5/5  L LE 5/5  GENERAL: trached CPAP 10/5 40%  EENT:  anicteric, trach  CARDIOVASCULAR: (+) S1 S2, normal rate and regular rhythm  PULMONARY: clear to auscultation bilaterally  ABDOMEN: soft, nontender with normoactive bowel sounds  EXTREMITIES: no edema; good distal pulses  SKIN: no rash    LABS:              (8.67 ) 8.9  (10.0)  8.26  )-----------( 285      ( 04 May 2023 05:05 )             26.5     135  |  103  |  13  ----------------------------<  94  3.6   |  25  |  0.40<L>    Ca    8.7      04 May 2023 05:05  Phos  3.0     -  Mg     2.0         05-03 @ 07:01  -   @ 07:00  IN: 2520 mL / OUT: 1895 mL / NET: 625 mL    HbA1C = 5.9 ()  LDL = 92 ()   HDL = 42 ()  TG = 106 ()  TSH = 0.152 ()    Bacteriology:     CSF NGTD   urine culture ESBL x2 strains   Blood NG4D x2   sputum:  pluralibacter gergoviae, mod strep pneumoniae    CSF studies:    L   *** [id number] WBC1 *** %N   %L1     EEG:  Neuroimaging:  Other imaging:    MEDICATIONS:     ·	enoxaparin Injectable 40 SubCutaneous every 24 hours  ·	ertapenem  IVPB 1000 IV Intermittent every 24 hours  ·	carvedilol 3.125 milliGRAM(s) Oral every 12 hours  ·	acetylcysteine 20%  Inhalation 4 Inhalation every 8 hours  ·	albuterol/ipratropium for Nebulization 3 Nebulizer every 8 hours  ·	sodium chloride 3%  Inhalation 4 Inhalation every 8 hours  ·	pantoprazole   Suspension 40 Oral daily  ·	atorvastatin 80 Oral at bedtime  ·	ascorbic acid 500 Oral <User Schedule>  ·	ferrous    sulfate 325 Oral <User Schedule>  ·	lactobacillus acidophilus 1 Oral daily  ·	sodium chloride 1 Oral every 6 hours  ·	acetaminophen   Oral Liquid .. 650 Oral every 6 hours PRN  ·	fentaNYL    Injectable 12.5 IV Push every 2 hours PRN  ·	oxyCODONE    IR 5 Oral every 4 hours PRN    IV FLUIDS: NS@85cc/hr  DRIPS:  DIET: NPO   Lines:  Drains:      External Ventricular Device (mL): 245 mL - @ 0 cm H20   EVD raised to 5 output 367 ICPs 1-7  /03 EVD at 5cm H20 ICPs <10  /04 EVD at 5cm H20   Wounds:    CODE STATUS:  Full Code                       GOALS OF CARE:  aggressive                      DISPOSITION:  ICU

## 2023-05-04 NOTE — PROGRESS NOTE ADULT - SUBJECTIVE AND OBJECTIVE BOX
POST-OPERATIVE NOTE    Procedure: IVC filter    Diagnosis/Indication: DVT    Surgeon: Dr. Bay    S: Unable to obtain, pt sedated    O:  T(C): 36.7 (05-04-23 @ 18:11), Max: 36.7 (05-04-23 @ 18:11)  T(F): 98.1 (05-04-23 @ 18:11), Max: 98.1 (05-04-23 @ 18:11)  HR: 65 (05-04-23 @ 18:00) (63 - 79)  BP: 140/63 (05-04-23 @ 18:00) (128/103 - 168/103)  RR: 17 (05-04-23 @ 18:00) (16 - 31)  SpO2: 100% (05-04-23 @ 18:00) (96% - 100%)  Wt(kg): --                        8.9    8.26  )-----------( 285      ( 04 May 2023 05:05 )             26.5     05-04    135  |  103  |  13  ----------------------------<  94  3.6   |  25  |  0.40<L>    Ca    8.7      04 May 2023 05:05  Phos  3.0     05-04  Mg     2.0     05-04        Gen: NAD, trach in place  C/V: NSR  Pulm: trach in palce  Abd: soft, NT/ND  Extrem: WWP, no edema. Right groin soft, nontender, no palpable hematoma. Dressing c/d/i.      A/P: 57y Female s/p IVC filter placement    Vascular will sign off  Pain/nausea control  Plan per primary team

## 2023-05-04 NOTE — PROGRESS NOTE ADULT - SUBJECTIVE AND OBJECTIVE BOX
HPI:  56 yo F with PMH of Asthma, CAD (not on  meds), peripheral neuropathy and PSH of BATOOL, cholecystectomy, right knee surgery presented to [place] ED on 4/20 with right sided weakness and right facial numbness. Patient was undergoing preparation for colonoscopy. As per daughter at 8am patient was playing with her grandchildren but around 840 am patient was getting dressed and fell and subsequently felt weak after. Daughter reports that patient had some episodes of vomiting at this time. She had a syncopal episode at around 10 am and was witnessed by brother. No head trauma, She regained conscious after few minutes. She remained in bed for the remainder of the day. Daughter reports some slurred speech noted 1230-1PM and also was confused after. and around 4PM, the patient's sister noted right sided weakness at which time EMS was called and patient was brought to ED. No c/o chest pain, shortness of breath, fever, headache, abdominal pain, urinary complaints. No recent travel/sickness/ change in meds. Stroke code in ER: CTH neg for heme, Right PICA distribution acute infarction. CTA with saccular aneurysms of b/l carotids, approximately 0.8 cm on the right and 1.2 x 0.9 cm on the left. Possible tiny third saccular aneurysm on the right Posterior intracranial circulation: Right vertebral arterial occlusion at its dural crossing junction V3 Atlantic and V4 intracranial segments with likely reversal of flow in its intracranial segment from the basilar. Right PICA faintly seen. 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  Not candidate for TNK/mechanical thrombectomy. CT scan repeated which showed: 1. Brain: Progression of acute infarction within the right cerebellar hemisphere, also extending into the left superior cerebellar hemisphere. New mass effect on the fourth ventricle causing stenosis versus occlusion with new third and lateral ventricular dilatation indicating ventricular obstruction at the level of the fourth ventricle. New upward and downward herniation of cerebellar parenchyma Right carotid system: No hemodynamically significant stenosis. Left carotid system: No hemodynamically significant stenosis. Vertebral circulation: Patent. Anterior intracranial circulation: Intact. Bilateral internal carotid saccular aneurysms. These findings are unchanged. Posterior intracranial circulation:    Improved flow within the right vertebral artery since 4/20/2023. New focal stenosis mid left vertebral artery, etiology uncertain, consider vasospasm and extrinsic compression in addition to new embolic disease. Brain perfusion: Perfusion images demonstrate normalization of the perfusion abnormality in the right cerebellar hemisphere present on 4/20/2023 despite evidence of progression of acute infarction.  Areas of apparent ischemia within the posterior fossa have progressed in extent, also again involving the left posterior cerebral arterial distribution. Tx to [facility] for SOC watch. On admission to [facility], NIHSS 12.  (21 Apr 2023 16:50)    INTERVAL EVENTS:  planned for IVCF today    HOSPITAL COURSE:  4/21: Admitted for crani watch, CTH complete w/ unchanged hydrocephalus, taken for emergent occipital craniectomy.   4/22: POD1. Remains intubated overnight, on propofol and dank. EVD@[id number]. Pending repeat CTH this am. Given hydralazine 10mg x1. Started on cardene for SBP high 140s-150s. Sub-therapeutic on plavix, therapeutic on asa, rpt asa accumetrics until subtherapeutic. LE dopplers neg for DVT, but showing superficial venous thrombosis in the proximal portion of the right greater saphenous vein. Started on 3% @60 for na goal 145-150. NGT placed by ENT. Febrile, pancultured.  4/23: POD 2. 3% increased to 75. NGT readjusted. UA neg. SBP liberalized to 160. Plan to extubate. 3% decreased to 60. Failed extubation d/t no cuff leak, given 60mg solumedrol, plan to extubate in 6 hrs. SCx1 for post void residual >400, started on cardura at bedtime. New blood in buretrol, stopped SQL. Clot in EVD cleared. Neuro exam improving.   4/24: POD 3. Cont 3% with NS while NPO, start TF today. TF started. LFTs downtrending. Sodium 141. Increased 3% to 50, NS to 30. Repeat BMP ordered for 5:30PM. Tramadol 25 for pain with no relief, oxy 5 and 1g tylenol ordered, stability CT stable, speech language consult for dysarthria. Given hydralazine 10mg IVP for SBP>160, started amlodipine 5mg. C/o mild headache, given fioricet x1, stroke neuro rec SALVADOR and loop recorder placement. Echo with bubble completed, negative for PFO, EF 55-60%. J HFNC started for desats with suctioning.   4/25: POD 4. Cont HFNC. Increased secertions on HFNC, reintubated. CXR confirmed good placement. EVD dropped to 5cmH20 for goal of draining 5-10cc/hr and SG ELENA drain taken off suction. Pending CTH. EVD drained 0cc/hr, dropped to 0. NGT replaced. Dc'd fioricet. Started on PPI for intubation. Increased cardura to 4mg for urinary retention, given an additional 2mg now. Started salt tabs 3 q 6. Given 12.5mg fentanyl x1. NGT replaced, CXR shown in lung. NGT removed, repeat CXR showing no pneumothorax. Pending ENT consult for NGT placement. CTH stable. NGT replaced by ENT, confirmed in proper spot. Restarted TF. [id number]. Na 141 from 146. Increased 3% to 60. EVD raised to 3cmH20. ETT pulled back 1cm by RT.  4/26: POD 5 SOC. Intubated, remains on precedex, ABG ordered with improving PaO2, BMP sodium 148, 3% changed to 30cc/hr, cardura increased to 8mg for tonight, tolerating cpap  4/27: POD 6 SOC. Respiratory rate sustained 6, returned to FVS. Na 151, dc'd 3%, f/u AM sodium. Nurse noticed ETT 19 at the lip and was 20 prior, CXR shows ETT @ 5cm above jono, ET tube advanced 1cm, repeat CXR confirms appropriate placement. Thick inline secretions with suctioning. ENT trach placement Fri likely, PEG likely Mon. Keep ELENA drain until no output, EVD to remain @3. Start ASA 81mg daily tomorrow.   4/28: POD7 SOC, neuro stable, given fentanyl x 1 overnight for presumed discomfort (biting on ETT), remains on full vent support. CTH today stable in comparison to 4/25. 6 cuffed trach placed by ENT in OR, but came down without cuff. Replaced at bedside by ENT. On fentanyl gtt.    4/29: POD8 SOC, JIM overnight. Incision cleaned and dressing changed. On fentanyl gtt. EKG completed due to increased frequency PVCs on telemetry, frequency of PVCs improved with titrating up fentanyl gtt. ABG drawn.  Repeat LE dopplers completed showing persistant superficial thrombus, no DVT. No EVD waveform during day, flushed distally without improvement. Exam unchanged.  EVD dropped to 0 for low output in afternoon.   4/30: POD9. Neuro stable, febrile to 101.3F o/n, given tylenol and pan cx sent. SBP dipped to low 90s o/n, HR stable in 70s with some PVCs, decreased fentanyl gtt and given 500cc bolus of NS x 2. Pend PEG placement Mon and angio Tues. D/c'd ELENA drain today and suture placed. F/u heme recs. Positive UA. Infiltrates found on CXR. Started on Zosyn 4.5g Q6hrs .   5/1: POD 10. Neuro stable. Dc'd fentanyl gtt. PEG failed placement at bedside with Dr. Gant, plan for PEG in OR tomorrow afternoon with Dr. Layton. Dc'd amlodipine and started carvedilol 3.125 BID for PVCs . Made 3% inhalation and mucomyst q8hr. Failed PEG at bedside. Salt tabs made 1 q 6. Decreased cardura to 4mg daily. Urine culture growing E. Coli and sputum culture growing pluralibacter gergoviae and strep species. ID consulted, f/u recs. Failed CPAP trial @ 3pm. Added am labs per heme/onc for hypercoguable workup. Pending repeat LE dopplers in 2-3 days. NGT clogged, removed, ENT failed attempt at replacement, will keep NPO for PEG placement tmw. Repeat xray in am for concern for aspration. Pt abx switched from Zosyn to Ertapenem 1g Q24hrs. per ID recs, possible ESBL growth.   5/2: POD 11. Neuro stable. Pre-op for diagnostic cerebral angiogram and PEG placement. NPO. EVD raised to 5 cmH20. Sputum culture speciated to step pneumoniae, sensitive to ertapenem. 3% inhalation/mucomyst made q 6 hr d/t thick secretions. PEG placed in OR. POD0 dx cerebral angio. EVD raised to 10.   5/3: POD 12. Neuro stable. PEG feeds began @ 10 AM, plan to increase 10cc every 6h per general surgery. Repeat dopplers show stable R superficial thrombus and new L IM calf DVT. Vascular consulted for IVC filter. Plan to get CTH tomorrow.  5/4: POD 13. JIM o/n. Plan for IVCF today      Vital Signs Last 24 Hrs  T(C): 37.1 (04 May 2023 01:15), Max: 37.3 (03 May 2023 22:15)  T(F): 98.7 (04 May 2023 01:15), Max: 99.1 (03 May 2023 22:15)  HR: 67 (04 May 2023 01:00) (67 - 91)  BP: 120/56 (04 May 2023 01:00) (107/56 - 160/65)  BP(mean): 80 (04 May 2023 01:00) (72 - 93)  RR: 16 (04 May 2023 01:00) (15 - 25)  SpO2: 97% (04 May 2023 01:00) (94% - 98%)    Parameters below as of 04 May 2023 01:00  Patient On (Oxygen Delivery Method): ventilator    O2 Concentration (%): 40    I&O's Summary    02 May 2023 07:01  -  03 May 2023 07:00  --------------------------------------------------------  IN: 2160 mL / OUT: 1842 mL / NET: 318 mL    03 May 2023 07:01  -  04 May 2023 02:26  --------------------------------------------------------  IN: 1885 mL / OUT: 1841 mL / NET: 44 mL        PHYSICAL EXAM:  GEN: laying in bed, NAD  ENT: + Trach   NEURO: not alert. does not FC, OE spont, face symmetric. +R gaze preference. LUE 5, LLE 4+. RUE delt 2, distally 5. RLE 4+  CV: RRR +S1/S2  PULM: CTAB  GI: Abd soft, NT/ND  EXT: ext warm, dry, nontender  Vascular: right radial access with gauze/tegaderm clean/dry. Radial pulse 2+, good capillary refill. Bruising noted along right forearm.   Wound: SOC site and EVD site c/d/i with dressing    LABS:                        10.0   8.67  )-----------( 273      ( 03 May 2023 05:30 )             30.3     05-03    137  |  101  |  10  ----------------------------<  94  3.5   |  22  |  0.42<L>    Ca    8.1<L>      03 May 2023 05:30  Phos  3.6     05-03  Mg     2.0     05-03      PT/INR - ( 02 May 2023 05:03 )   PT: 13.3 sec;   INR: 1.12          PTT - ( 02 May 2023 05:03 )  PTT:34.9 sec        CAPILLARY BLOOD GLUCOSE          Drug Levels: [] N/A    CSF Analysis: [] N/A      Allergies    No Known Allergies    Intolerances      MEDICATIONS:  Antibiotics:  ertapenem  IVPB 1000 milliGRAM(s) IV Intermittent every 24 hours    Neuro:  acetaminophen   Oral Liquid .. 650 milliGRAM(s) Oral every 6 hours PRN  fentaNYL    Injectable 12.5 MICROGram(s) IV Push every 2 hours PRN  oxyCODONE    IR 5 milliGRAM(s) Oral every 4 hours PRN    Anticoagulation:  enoxaparin Injectable 40 milliGRAM(s) SubCutaneous every 24 hours    OTHER:  acetylcysteine 20%  Inhalation 4 milliLiter(s) Inhalation every 6 hours  albuterol/ipratropium for Nebulization 3 milliLiter(s) Nebulizer every 6 hours  atorvastatin 80 milliGRAM(s) Oral at bedtime  carvedilol 3.125 milliGRAM(s) Oral every 12 hours  chlorhexidine 0.12% Liquid 15 milliLiter(s) Oral Mucosa every 12 hours  chlorhexidine 2% Cloths 1 Application(s) Topical <User Schedule>  lactobacillus acidophilus 1 Tablet(s) Oral daily  pantoprazole   Suspension 40 milliGRAM(s) Oral daily  sodium chloride 3%  Inhalation 4 milliLiter(s) Inhalation every 6 hours    IVF:  sodium chloride 1 Gram(s) Oral every 6 hours  sodium chloride 0.9%. 1000 milliLiter(s) IV Continuous <Continuous>    CULTURES:  Culture Results:   No growth to date (04-30 @ 06:51)  Culture Results:   >100,000 CFU/ml Escherichia coli ESBL  >100,000 CFU/ml Escherichia coli ESBL Strain #2  Result called to and read back by_ Mr. MYKEL Wright ASAEL  05/02/2023 09:56:41 (04-30 @ 05:11)    RADIOLOGY & ADDITIONAL TESTS:      ASSESSMENT:  56 y/o female transferred from [place] with right sided weakness and right facial numbness. Found to have acute infarction within the right cerebellar hemisphere, causing herniation. Now s/p SOC foramen magnum decompression and right parietal/occipital EVD placement (4/21). Course c/b respiratory insuffiency d/t bulbar dysfunction, s/p trach 4/28/23. s/p PEG 5/2 with GI, s/p dx cerebral angiogram 5/2.     PLAN:  Neuro   - Vitals/neuro q1h   - dx angio 5/2: b/l cavernous ICA aneurysms, plan to be discussed vascular conference  - EVD @5cm H2O, monitor ICP/output   - CTH 4/28 stable; repeat today   - Stroke consulted, f/u recs   - Pain control with tylenol prn, oxycodone prn, fent pushes 12.5mg q2hr prn     Cardio  - -160   - TTE 4/24: negative for PFO, mild LVH, mild dilation of L atrium, EF 55-60%   - Carvedilol 3.125 BID     Pulm  - VC//40/16/6, CPAP trials as tolerated  - Chest PT, 3% inhalation/duoneb/mucomyst q 6 hrs standing   - 6 cuffed trach placed by ENT 4/28, missing cuff, replaced trach at bedside. --> ENT to remove trach sutures POD7 (5/5)    GI  - TF via PEG (placed 5/2); NPO for procedure  - Bowel regimen, last BM 5/2  - Protonix while on vent  - Trend LFTs q 72 hrs     Renal  - Na goal 135-145, salt tabs 1 q 6   - Voiding via primafit   - NS @ 85cc/h while NPO    Endo   - cont lipitor     Heme  - SQL   - plan for IVCF 5/4  - LE dopplers 4/22 neg for DVT, but showing superficial venous thrombosis in the proximal portion of the right greater saphenous vein; repeat on 4/29 with persistant superficial thrombus, 5/3 new DVT L IM calf and unchanged R superficial vein thrombus  - Heme following for positive anticardiolipin Ab 4/27, recs appreciated     ID  - ID recs: Ertapenem 1g Q24hrs (5/1-5/8) x 7 days  - Last panculture 4/30, MRSA (-), +UA cx ESBL, +sputum cx pluralibacter gergoviae, strep pneumoniae   - +isolation precautions - ESBL in urine     DISPO:  - NSICU, full code   - PT/OT rec acute inpatient rehab: patient can tolerate 3 hrs PT/OT daily    D/w Dr. Valadez and Dr. Worley

## 2023-05-04 NOTE — PROGRESS NOTE ADULT - SUBJECTIVE AND OBJECTIVE BOX
Neurology Stroke Progress Note    INTERVAL HPI/OVERNIGHT EVENTS:  Patient seen and examined. Repeat duplex yesterday with stable R superficial thrombus with new L IM calf DVT. Plan for IVC filter with vascular today.     MEDICATIONS  (STANDING):  acetylcysteine 20%  Inhalation 4 milliLiter(s) Inhalation every 6 hours  albuterol/ipratropium for Nebulization 3 milliLiter(s) Nebulizer every 6 hours  atorvastatin 80 milliGRAM(s) Oral at bedtime  carvedilol 3.125 milliGRAM(s) Oral every 12 hours  chlorhexidine 0.12% Liquid 15 milliLiter(s) Oral Mucosa every 12 hours  chlorhexidine 2% Cloths 1 Application(s) Topical <User Schedule>  enoxaparin Injectable 40 milliGRAM(s) SubCutaneous every 24 hours  ertapenem  IVPB 1000 milliGRAM(s) IV Intermittent every 24 hours  lactobacillus acidophilus 1 Tablet(s) Oral daily  pantoprazole   Suspension 40 milliGRAM(s) Oral daily  potassium chloride    Tablet ER 40 milliEquivalent(s) Oral every 4 hours  sodium chloride 1 Gram(s) Oral every 6 hours  sodium chloride 0.9%. 1000 milliLiter(s) (85 mL/Hr) IV Continuous <Continuous>  sodium chloride 3%  Inhalation 4 milliLiter(s) Inhalation every 6 hours    MEDICATIONS  (PRN):  acetaminophen   Oral Liquid .. 650 milliGRAM(s) Oral every 6 hours PRN Temp greater or equal to 38C (100.4F), Mild Pain (1 - 3)  fentaNYL    Injectable 12.5 MICROGram(s) IV Push every 2 hours PRN Severe Pain (7 - 10)  oxyCODONE    IR 5 milliGRAM(s) Oral every 4 hours PRN Moderate Pain (4 - 6)      Allergies    No Known Allergies    Intolerances        Vital Signs Last 24 Hrs  T(C): 37.1 (04 May 2023 05:36), Max: 37.3 (03 May 2023 22:15)  T(F): 98.8 (04 May 2023 05:36), Max: 99.1 (03 May 2023 22:15)  HR: 62 (04 May 2023 10:00) (62 - 82)  BP: 125/59 (04 May 2023 10:00) (103/56 - 160/65)  BP(mean): 83 (04 May 2023 10:00) (75 - 93)  RR: 16 (04 May 2023 10:00) (15 - 18)  SpO2: 92% (04 May 2023 10:00) (92% - 98%)    Parameters below as of 04 May 2023 10:00  Patient On (Oxygen Delivery Method): ventilator    O2 Concentration (%): 40    Physical exam:  General: No acute distress, awake and alert  Eyes: Anicteric sclerae, moist conjunctivae, see below for CNs  Neck: trachea midline, FROM, supple, no thyromegaly or lymphadenopathy  Pulmonary: on trach  GI: Abdomen soft, non-distended, non-tender  Extremities:  no edema    Neurologic:  -Mental status: Awake, alert, oriented to person, place, and time (mouths words). No verbal speech, but mouths answers to words. Follows commands with repetition (better when family at bedside).  -Cranial nerves:   II: BTT absent   III, IV, VI: Disconjugate gaze, left eye does not adduct. Pupils equally round and reactive to light  VII: Left facial droop  VIII: Hearing is bilaterally intact   Motor: Normal bulk and tone. RUE with effort against gravity 2+/5,  strength 3/5. LUE 4/5 strength. B/l LE at least 3/5, no drift.   Sensation: Intact to light touch bilaterally. No neglect or extinction on double simultaneous testing.  Coordination: No dysmetria on finger-to-nose on the left arm    LABS:                        8.9    8.26  )-----------( 285      ( 04 May 2023 05:05 )             26.5     05-04    135  |  103  |  13  ----------------------------<  94  3.6   |  25  |  0.40<L>    Ca    8.7      04 May 2023 05:05  Phos  3.0     05-04  Mg     2.0     05-04      PT/INR - ( 04 May 2023 05:05 )   PT: 14.0 sec;   INR: 1.17          PTT - ( 04 May 2023 05:05 )  PTT:36.9 sec      RADIOLOGY & ADDITIONAL TESTS:     Neurology Stroke Progress Note   [id number]    INTERVAL HPI/OVERNIGHT EVENTS:  Patient seen and examined. Repeat duplex yesterday with stable R superficial thrombus with new L IM calf DVT. Plan for IVC filter with vascular today.     MEDICATIONS  (STANDING):  acetylcysteine 20%  Inhalation 4 milliLiter(s) Inhalation every 6 hours  albuterol/ipratropium for Nebulization 3 milliLiter(s) Nebulizer every 6 hours  atorvastatin 80 milliGRAM(s) Oral at bedtime  carvedilol 3.125 milliGRAM(s) Oral every 12 hours  chlorhexidine 0.12% Liquid 15 milliLiter(s) Oral Mucosa every 12 hours  chlorhexidine 2% Cloths 1 Application(s) Topical <User Schedule>  enoxaparin Injectable 40 milliGRAM(s) SubCutaneous every 24 hours  ertapenem  IVPB 1000 milliGRAM(s) IV Intermittent every 24 hours  lactobacillus acidophilus 1 Tablet(s) Oral daily  pantoprazole   Suspension 40 milliGRAM(s) Oral daily  potassium chloride    Tablet ER 40 milliEquivalent(s) Oral every 4 hours  sodium chloride 1 Gram(s) Oral every 6 hours  sodium chloride 0.9%. 1000 milliLiter(s) (85 mL/Hr) IV Continuous <Continuous>  sodium chloride 3%  Inhalation 4 milliLiter(s) Inhalation every 6 hours    MEDICATIONS  (PRN):  acetaminophen   Oral Liquid .. 650 milliGRAM(s) Oral every 6 hours PRN Temp greater or equal to 38C (100.4F), Mild Pain (1 - 3)  fentaNYL    Injectable 12.5 MICROGram(s) IV Push every 2 hours PRN Severe Pain (7 - 10)  oxyCODONE    IR 5 milliGRAM(s) Oral every 4 hours PRN Moderate Pain (4 - 6)      Allergies    No Known Allergies    Intolerances        Vital Signs Last 24 Hrs  T(C): 37.1 (04 May 2023 05:36), Max: 37.3 (03 May 2023 22:15)  T(F): 98.8 (04 May 2023 05:36), Max: 99.1 (03 May 2023 22:15)  HR: 62 (04 May 2023 10:00) (62 - 82)  BP: 125/59 (04 May 2023 10:00) (103/56 - 160/65)  BP(mean): 83 (04 May 2023 10:00) (75 - 93)  RR: 16 (04 May 2023 10:00) (15 - 18)  SpO2: 92% (04 May 2023 10:00) (92% - 98%)    Parameters below as of 04 May 2023 10:00  Patient On (Oxygen Delivery Method): ventilator    O2 Concentration (%): 40    Physical exam:  General: No acute distress, awake and alert  Eyes: Anicteric sclerae, moist conjunctivae, see below for CNs  Neck: trachea midline, FROM, supple, no thyromegaly or lymphadenopathy  Pulmonary: on trach  GI: Abdomen soft, non-distended, non-tender  Extremities:  no edema    Neurologic:  -Mental status: Awake, alert, oriented to person, place, and time (mouths words). No verbal speech, but mouths answers to words. Follows commands with repetition (better when family at bedside).  -Cranial nerves:   II: BTT absent   III, IV, VI: Disconjugate gaze, left eye does not adduct. Pupils equally round and reactive to light  VII: Left facial droop  VIII: Hearing is bilaterally intact   Motor: Normal bulk and tone. RUE with effort against gravity 2+/5,  strength 3/5. LUE 4/5 strength. B/l LE at least 3/5, no drift.   Sensation: Intact to light touch bilaterally. No neglect or extinction on double simultaneous testing.  Coordination: No dysmetria on finger-to-nose on the left arm    LABS:                        8.9    8.26  )-----------( 285      ( 04 May 2023 05:05 )             26.5     05-04    135  |  103  |  13  ----------------------------<  94  3.6   |  25  |  0.40<L>    Ca    8.7      04 May 2023 05:05  Phos  3.0     05-04  Mg     2.0     05-04      PT/INR - ( 04 May 2023 05:05 )   PT: 14.0 sec;   INR: 1.17          PTT - ( 04 May 2023 05:05 )  PTT:36.9 sec      RADIOLOGY & ADDITIONAL TESTS:

## 2023-05-04 NOTE — BRIEF OPERATIVE NOTE - OPERATION/FINDINGS
Prepped and draped under local anesthesia. Cook Celect IVC filter deployed infrarenally. No complications encountered. Normal angulation. Completion venogram demonstrates patent IVC.

## 2023-05-04 NOTE — PROGRESS NOTE ADULT - ASSESSMENT
57-year-old female with PMHx of HTN, CAD, PAD, asthma, miscarriages X3 and PSHx of BATOOL, cholecystectomy and R knee surgery presented to [place] ED on 04/20/2023 w/ right sided weakness and right facial numbness and transferred to [facility] for further management.  Patient is now s/p tracheostomy with NGT feeding access. General Surgery consulted for placement of PEG feeding access and is POD 2 s/p laparoscopic PEG placement (05/02/2023) after aborted bedside attempt the prior day (05/01/2023). Patient is tolerating meds and tube feeds via the PEG tube placement.    PLAN  - No further surgical intervention indicated at this time  - Surgery Team 4 will sign-off  - Patient discussed with the Attending Surgeon and the Chief Resident

## 2023-05-04 NOTE — PROGRESS NOTE ADULT - NSPROGADDITIONALINFOA_GEN_ALL_CORE
Stroke Fellow Attestation    57F w/ PMH of Asthma, CAD, HTN, prior miscarriage x3, peripheral neuropathy, presented w/ right sided weakness and facial numbness on 4/20 found to have bilateral cerebellar infarctions (R. and L. PICA) s/p posterior fossa craniectomy and s/p PEG and Trach. CTA h/n negative.     Catheter angiogram today demonstrated bilateral cavernous ICA aneurysms. No athersclerotic disease demonstrated.     TTE mild LA enlargement  A1c 5.9  LDL 92  +La positive (1 positive, 1 negative); Anticardiolipin ab positive IgM (28.4)   5.3.23 bilateral US LE +new L. 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     Plan:  -If strong suspicion for APLS per hematology, patient will likely require lifelong anticoagulation with Coumadin; If so, no need for ILR placement or SALVADOR as this wont ; Patient should be bridged heparin to coumadin once surgically safe. ASA 81mg in the interim.   -Normotension Stroke Fellow Attestation    57F w/ PMH of Asthma, CAD, HTN, prior miscarriage x3, peripheral neuropathy, presented w/ right sided weakness and facial numbness on 4/20 found to have bilateral cerebellar infarctions (R. and L. PICA) s/p posterior fossa craniectomy and s/p PEG and Trach. CTA h/n negative.     Catheter angiogram today demonstrated bilateral cavernous ICA aneurysms. No athersclerotic disease demonstrated.     TTE mild LA enlargement  A1c 5.9  LDL 92  +La positive (1 positive, 1 negative); Anticardiolipin ab positive IgM (28.4)   5.3.23 bilateral US LE +new L. 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     Plan:  -If strong suspicion for APLS per hematology, patient will likely require lifelong anticoagulation with Coumadin; If so, no need for ILR placement or SALVADOR as this wont ; Patient should be bridged heparin to coumadin once surgically safe. ASA 81mg in the interim.   -Normotension  -Bilateral cavernous ICA aneurysms as per NSG; Monitoring for now given cavernous location.

## 2023-05-04 NOTE — PROGRESS NOTE ADULT - ASSESSMENT
58 y/o female transferred from [place] with right sided weakness and right facial numbness. Found to have acute infarction within the right cerebellar hemisphere, causing herniation. Now s/p SOC foramen magnum decompression and right parietal/occipital EVD placement (4/21). Course c/b respiratory insuffiency d/t bulbar dysfunction, s/p trach 4/28/23 failed bedside  PEG placement 5/1; pending OR placement of PEG & DSA for stroke w/u     PLAN:  Neuro   - Vitals/neuro q1h   - s/p DSA for B/L carotid aneurysms pending discussion on management   - EVD raised to @5cmH20; monitor ICP/output  - Repeat CTH 4/25 stable   - Stroke following  - pain control with tylenol prn, oxycodone prn, PRN 12.5mcg fent IVP q2 hrs    Cardio  - -160  - TTE 4/24: negative for PFO, mild LVH, mild dilation of L atrium, EF 55-60%  - c/w coreg 3.125mg BID for PVCs & HTN management     Pulm  - /40/16/8, CPAP trials as tolerated; has apenic episodes   - Chest PT, nebs q 8  - 6 cuffed trach placed by ENT 4/28, missing cuff, replaced trach at bedside. --> ENT to remove trach sutures POD7 (5/5)    GI  s/p PEG 5/2; start tube feeds   - Protonix while intubated  - transaminitis resolved     Renal  - Na goal 135-145, salt tabs 1 q 6  - Voiding via primafit     Endo   - no issues     Heme  - SCDs, SQL 40 mg QD (ppx)  - s/p 3 units platelets, 35 mcg of DDAVP for ASA/Plavix reversal  - LE dopplers 4/22 neg for DVT, but showing superficial venous thrombosis in the proximal portion of the right greater saphenous vein; repeat on 4/29 with persistent superficial thrombus   - Heme following for positive anticardiolipin Ab 4/27 & hexagonal positive, recs appreciated ; requesting AC insetting of hypercoagulable state; pending nsgy approval post VPS placement   -IVC filter placement 5/4 in setting of LE DVT and hypercoagulable state w/o ability to put pt on AC     ID  - Last panculture 4/30, MRSA (-), +ecoli in urine; sputum culture growing pluralibacter & strep species   ESBL ecoli urine - contact precautions   -c/w ertapenem likely 7 days   -CSF culture NGTD   -ID following    DISPO:  - NSICU, full code  - PT/OT rec acute inpatient rehab: patient can tolerate 3 hrs PT/OT daily

## 2023-05-05 LAB
ANION GAP SERPL CALC-SCNC: 12 MMOL/L — SIGNIFICANT CHANGE UP (ref 5–17)
APPEARANCE CSF: CLEAR — SIGNIFICANT CHANGE UP
APPEARANCE SPUN FLD: ABNORMAL
BUN SERPL-MCNC: 11 MG/DL — SIGNIFICANT CHANGE UP (ref 7–23)
CALCIUM SERPL-MCNC: 9 MG/DL — SIGNIFICANT CHANGE UP (ref 8.4–10.5)
CHLORIDE SERPL-SCNC: 100 MMOL/L — SIGNIFICANT CHANGE UP (ref 96–108)
CO2 SERPL-SCNC: 24 MMOL/L — SIGNIFICANT CHANGE UP (ref 22–31)
COLOR CSF: ABNORMAL
CREAT SERPL-MCNC: 0.44 MG/DL — LOW (ref 0.5–1.3)
CSF COMMENTS: SIGNIFICANT CHANGE UP
EGFR: 113 ML/MIN/1.73M2 — SIGNIFICANT CHANGE UP
GLUCOSE CSF-MCNC: 59 MG/DL — SIGNIFICANT CHANGE UP (ref 40–70)
GLUCOSE SERPL-MCNC: 91 MG/DL — SIGNIFICANT CHANGE UP (ref 70–99)
GRAM STN FLD: SIGNIFICANT CHANGE UP
HCT VFR BLD CALC: 28.4 % — LOW (ref 34.5–45)
HGB BLD-MCNC: 9.7 G/DL — LOW (ref 11.5–15.5)
MAGNESIUM SERPL-MCNC: 2 MG/DL — SIGNIFICANT CHANGE UP (ref 1.6–2.6)
MCHC RBC-ENTMCNC: 30.1 PG — SIGNIFICANT CHANGE UP (ref 27–34)
MCHC RBC-ENTMCNC: 34.2 GM/DL — SIGNIFICANT CHANGE UP (ref 32–36)
MCV RBC AUTO: 88.2 FL — SIGNIFICANT CHANGE UP (ref 80–100)
NEUTROPHILS # CSF: 0 % — SIGNIFICANT CHANGE UP (ref 0–6)
NRBC # BLD: 0 /100 WBCS — SIGNIFICANT CHANGE UP (ref 0–0)
NRBC NFR CSF: 0 /UL — SIGNIFICANT CHANGE UP (ref 0–5)
PHOSPHATE SERPL-MCNC: 3.5 MG/DL — SIGNIFICANT CHANGE UP (ref 2.5–4.5)
PLATELET # BLD AUTO: 326 K/UL — SIGNIFICANT CHANGE UP (ref 150–400)
POTASSIUM SERPL-MCNC: 3.7 MMOL/L — SIGNIFICANT CHANGE UP (ref 3.5–5.3)
POTASSIUM SERPL-SCNC: 3.7 MMOL/L — SIGNIFICANT CHANGE UP (ref 3.5–5.3)
PROT CSF-MCNC: 50 MG/DL — HIGH (ref 15–45)
PROT S FREE PPP-ACNC: 50 % — LOW (ref 63–140)
RBC # BLD: 3.22 M/UL — LOW (ref 3.8–5.2)
RBC # CSF: 33 /UL — HIGH (ref 0–0)
RBC # FLD: 12.7 % — SIGNIFICANT CHANGE UP (ref 10.3–14.5)
SODIUM SERPL-SCNC: 136 MMOL/L — SIGNIFICANT CHANGE UP (ref 135–145)
SPECIMEN SOURCE: SIGNIFICANT CHANGE UP
TUBE TYPE: SIGNIFICANT CHANGE UP
WBC # BLD: 5.41 K/UL — SIGNIFICANT CHANGE UP (ref 3.8–10.5)
WBC # FLD AUTO: 5.41 K/UL — SIGNIFICANT CHANGE UP (ref 3.8–10.5)

## 2023-05-05 PROCEDURE — 99292 CRITICAL CARE ADDL 30 MIN: CPT | Mod: 24

## 2023-05-05 PROCEDURE — 31502 CHANGE OF WINDPIPE AIRWAY: CPT

## 2023-05-05 PROCEDURE — 99291 CRITICAL CARE FIRST HOUR: CPT

## 2023-05-05 PROCEDURE — 99024 POSTOP FOLLOW-UP VISIT: CPT

## 2023-05-05 PROCEDURE — 99232 SBSQ HOSP IP/OBS MODERATE 35: CPT | Mod: 25

## 2023-05-05 PROCEDURE — 99291 CRITICAL CARE FIRST HOUR: CPT | Mod: 24

## 2023-05-05 RX ORDER — POTASSIUM CHLORIDE 20 MEQ
40 PACKET (EA) ORAL ONCE
Refills: 0 | Status: COMPLETED | OUTPATIENT
Start: 2023-05-05 | End: 2023-05-05

## 2023-05-05 RX ORDER — SODIUM CHLORIDE 9 MG/ML
1 INJECTION INTRAMUSCULAR; INTRAVENOUS; SUBCUTANEOUS
Refills: 0 | Status: DISCONTINUED | OUTPATIENT
Start: 2023-05-05 | End: 2023-05-06

## 2023-05-05 RX ORDER — IPRATROPIUM/ALBUTEROL SULFATE 18-103MCG
3 AEROSOL WITH ADAPTER (GRAM) INHALATION EVERY 12 HOURS
Refills: 0 | Status: DISCONTINUED | OUTPATIENT
Start: 2023-05-05 | End: 2023-05-06

## 2023-05-05 RX ORDER — SODIUM CHLORIDE 9 MG/ML
4 INJECTION INTRAMUSCULAR; INTRAVENOUS; SUBCUTANEOUS EVERY 12 HOURS
Refills: 0 | Status: DISCONTINUED | OUTPATIENT
Start: 2023-05-05 | End: 2023-05-06

## 2023-05-05 RX ORDER — FENTANYL CITRATE 50 UG/ML
12.5 INJECTION INTRAVENOUS EVERY 4 HOURS
Refills: 0 | Status: DISCONTINUED | OUTPATIENT
Start: 2023-05-05 | End: 2023-05-06

## 2023-05-05 RX ADMIN — Medication 1 TABLET(S): at 12:13

## 2023-05-05 RX ADMIN — ENOXAPARIN SODIUM 40 MILLIGRAM(S): 100 INJECTION SUBCUTANEOUS at 22:28

## 2023-05-05 RX ADMIN — CHLORHEXIDINE GLUCONATE 1 APPLICATION(S): 213 SOLUTION TOPICAL at 06:27

## 2023-05-05 RX ADMIN — ATORVASTATIN CALCIUM 80 MILLIGRAM(S): 80 TABLET, FILM COATED ORAL at 22:28

## 2023-05-05 RX ADMIN — SODIUM CHLORIDE 1 GRAM(S): 9 INJECTION INTRAMUSCULAR; INTRAVENOUS; SUBCUTANEOUS at 00:00

## 2023-05-05 RX ADMIN — CHLORHEXIDINE GLUCONATE 15 MILLILITER(S): 213 SOLUTION TOPICAL at 19:51

## 2023-05-05 RX ADMIN — PANTOPRAZOLE SODIUM 40 MILLIGRAM(S): 20 TABLET, DELAYED RELEASE ORAL at 12:12

## 2023-05-05 RX ADMIN — FENTANYL CITRATE 12.5 MICROGRAM(S): 50 INJECTION INTRAVENOUS at 18:22

## 2023-05-05 RX ADMIN — CHLORHEXIDINE GLUCONATE 15 MILLILITER(S): 213 SOLUTION TOPICAL at 06:31

## 2023-05-05 RX ADMIN — FENTANYL CITRATE 12.5 MICROGRAM(S): 50 INJECTION INTRAVENOUS at 23:22

## 2023-05-05 RX ADMIN — Medication 650 MILLIGRAM(S): at 00:00

## 2023-05-05 RX ADMIN — Medication 650 MILLIGRAM(S): at 12:14

## 2023-05-05 RX ADMIN — Medication 4 MILLILITER(S): at 06:10

## 2023-05-05 RX ADMIN — ERTAPENEM SODIUM 120 MILLIGRAM(S): 1 INJECTION, POWDER, LYOPHILIZED, FOR SOLUTION INTRAMUSCULAR; INTRAVENOUS at 22:28

## 2023-05-05 RX ADMIN — Medication 650 MILLIGRAM(S): at 13:00

## 2023-05-05 RX ADMIN — CARVEDILOL PHOSPHATE 3.12 MILLIGRAM(S): 80 CAPSULE, EXTENDED RELEASE ORAL at 06:32

## 2023-05-05 RX ADMIN — Medication 3 MILLILITER(S): at 17:19

## 2023-05-05 RX ADMIN — SODIUM CHLORIDE 4 MILLILITER(S): 9 INJECTION INTRAMUSCULAR; INTRAVENOUS; SUBCUTANEOUS at 17:20

## 2023-05-05 RX ADMIN — Medication 3 MILLILITER(S): at 06:10

## 2023-05-05 RX ADMIN — FENTANYL CITRATE 12.5 MICROGRAM(S): 50 INJECTION INTRAVENOUS at 17:40

## 2023-05-05 RX ADMIN — OXYCODONE HYDROCHLORIDE 5 MILLIGRAM(S): 5 TABLET ORAL at 14:00

## 2023-05-05 RX ADMIN — OXYCODONE HYDROCHLORIDE 5 MILLIGRAM(S): 5 TABLET ORAL at 15:00

## 2023-05-05 RX ADMIN — SODIUM CHLORIDE 4 MILLILITER(S): 9 INJECTION INTRAMUSCULAR; INTRAVENOUS; SUBCUTANEOUS at 06:10

## 2023-05-05 RX ADMIN — SODIUM CHLORIDE 1 GRAM(S): 9 INJECTION INTRAMUSCULAR; INTRAVENOUS; SUBCUTANEOUS at 22:28

## 2023-05-05 RX ADMIN — Medication 40 MILLIEQUIVALENT(S): at 12:12

## 2023-05-05 NOTE — PROGRESS NOTE ADULT - ASSESSMENT
57y/F with  acute CVA, R cerebellar, brain compression cerebral edema, s/p SOC  UTI ESBL  acute respiratory failure, bulbar dysfunction  asthma  CAD  peripheral neuropathy  superficial thrombosis, proximal R greater saphenous  prediabetic     PLAN:   NEURO: neurochecks q1h, PRN pain meds with acetaminophen / opiates  EVD 10cm H20, challenge 20 sunday, clamp monday?  stroke on board, full AC once cleared by NSG  REHAB:  physical therapy evaluation and management    EARLY MOB:  HOB up    PULM:  CPAP 10/5 40%, CXR, nebs, s/p trach, continuous nebs  CARDIO:  SBP goal 100-160mm Hg  ENDO:  Blood sugar goals 140-180 mg/dL, continue insulin sliding scale, continue high dose statins  GI:  PPI for GI prophylaxis while intubated  DIET:  Jevity  RENAL:  cont cardura, Na goal 135-145, salt tabs, IVL  HEM/ONC: s/p 3 units platelets, 35 ug DDAVP, (+) aCL; ferrous sulfate  VTE Prophylaxis: SCDs, s/p IVC filter, SQL   ID: afebrile, no leukocytosis, continue ertapenem for ESBL UTI until 05/07 last day  Social: will update family    Active issues:  What's keeping patient in the ICU? close monitoring, EVD  What is this patient's dispo plan? stepdown once EVD removed    ATTENDING ATTESTATION:  I was physically present for the key portions of the evaluation and management (E/M) service provided.  I agree with the above history, physical and plan, which I have reviewed and edited where appropriate.    Patient at high risk for neurological deterioration or death due to:  ICU delirium, aspiration PNA, DVT / PE.  Critical care time:  I have personally provided 45 minutes of critical care time, excluding time spent on separate procedures.      Plan discussed with RN, house staff.

## 2023-05-05 NOTE — PROGRESS NOTE ADULT - SUBJECTIVE AND OBJECTIVE BOX
OTOLARYNGOLOGY (ENT) PROGRESS NOTE    PATIENT: MAKSIM TRIVEDI  MRN: [id number]  : 67  [id number]  DATE OF SERVICE:  23  			         ID:58yo F w Bilateral cerebellar hemispheric strokes sp SOC for foramen magnum decompression and R parietooccipital EVD, Bilateral carotid terminus aneurysms, History of peripheral neuropathy and asthma, Bulbar dysfunction. Respiratory insufficiency. Intubated and failed extubation, reintubated 23. Now s/p creation tracheostomy 23 c/b tracheostomy tube exchange POD0 due to damaged tracheostomy tube.     ; patient currently sating well on stable vent settings, cuff inflated, trach change to 6 cuffed bedside and sutures remove today   ALLERGIES:  No Known Allergies      MEDICATIONS:  Antiinfectives:   ertapenem  IVPB 1000 milliGRAM(s) IV Intermittent every 24 hours    IV fluids:  ascorbic acid 500 milliGRAM(s) Oral <User Schedule>  ferrous    sulfate 325 milliGRAM(s) Oral <User Schedule>  potassium chloride   Powder 40 milliEquivalent(s) Oral once  sodium chloride 1 Gram(s) Oral every 6 hours  sodium chloride 0.9%. 1000 milliLiter(s) IV Continuous <Continuous>    Hematologic/Anticoagulation:  enoxaparin Injectable 40 milliGRAM(s) SubCutaneous every 24 hours    Pain medications/Neuro:  acetaminophen   Oral Liquid .. 650 milliGRAM(s) Oral every 6 hours PRN  fentaNYL    Injectable 12.5 MICROGram(s) IV Push every 2 hours PRN  oxyCODONE    IR 5 milliGRAM(s) Oral every 4 hours PRN    Endocrine Medications:   atorvastatin 80 milliGRAM(s) Oral at bedtime    All other standing medications:   acetylcysteine 20%  Inhalation 4 milliLiter(s) Inhalation every 8 hours  albuterol/ipratropium for Nebulization 3 milliLiter(s) Nebulizer every 8 hours  carvedilol 3.125 milliGRAM(s) Oral every 12 hours  chlorhexidine 0.12% Liquid 15 milliLiter(s) Oral Mucosa every 12 hours  chlorhexidine 2% Cloths 1 Application(s) Topical <User Schedule>  lactobacillus acidophilus 1 Tablet(s) Oral daily  pantoprazole   Suspension 40 milliGRAM(s) Oral daily  sodium chloride 3%  Inhalation 4 milliLiter(s) Inhalation every 8 hours    All other PRN medications:    Vital Signs Last 24 Hrs  T(C): 36.6 (05 May 2023 05:52), Max: 37.1 (04 May 2023 11:39)  T(F): 97.8 (05 May 2023 05:52), Max: 98.1 (04 May 2023 18:11)  HR: 67 (05 May 2023 08:00) (58 - 79)  BP: 158/64 (05 May 2023 08:00) (103/73 - 168/103)  BP(mean): 92 (05 May 2023 08:00) (79 - 126)  RR: 24 (05 May 2023 08:00) (15 - 37)  SpO2: 98% (05 May 2023 08:00) (92% - 100%)    Parameters below as of 05 May 2023 08:00  Patient On (Oxygen Delivery Method): ventilator    O2 Concentration (%): 40       @ 07:01  -   @ 07:00  --------------------------------------------------------  IN:    Enteral Tube Flush: 550 mL    IV PiggyBack: 50 mL    Jevity 1.2: 120 mL    sodium chloride 0.9%: 2040 mL  Total IN: 2760 mL    OUT:    External Ventricular Device (mL): 149 mL    Voided (mL): 2400 mL  Total OUT: 2549 mL    Total NET: 211 mL          23 @ 07:01  -  23 @ 07:00  --------------------------------------------------------  IN:  Total IN: 0 mL    OUT:    External Ventricular Device (mL): 149 mL  Total OUT: 149 mL    Total NET: -149 mL      Vent  Mode: AC/ CMV (Assist Control/ Continuous Mandatory Ventilation), RR (machine): 16, TV (machine): 400, FiO2: 40, PEEP: 5, ITime: 1, MAP: 9, PIP: 21    PHYSICAL EXAM:  Gen: Sedated, intermittently responsive to questions  Eyes: Open spontaneously  Nose: Nares clear anteriorly  OC/OP: Dry mucosa, no lesions  Neck: 6CN75R tracheostomy in place, secured with 4-point suture( removed) and trach tie, barrier dressing in place, cuff up  Resp: Mechanically ventilated      LABS                       9.7    5.41  )-----------( 326      ( 05 May 2023 05:14 )             28.4    05    136  |  100  |  11  ----------------------------<  91  3.7   |  24  |  0.44<L>    Ca    9.0      05 May 2023 05:14  Phos  3.5     -  Mg     2.0                Coagulation Studies-   PT/INR - ( 04 May 2023 05:05 )   PT: 14.0 sec;   INR: 1.17          PTT - ( 04 May 2023 05:05 )  PTT:36.9 sec    Endocrine Panel-  Calcium, Total Serum: 9.0 mg/dL ( @ 05:14)                MICROBIOLOGY:  Culture - Sputum (collected 23 @ 21:06)  Source: ET Tube ET Tube  Gram Stain (23 @ 06:57):    Rare epithelial cells    Numerous White blood cells    Few Gram positive cocci in pairs  Final Report (23 @ 09:09):    Few Pluralibacter gergoviae (most closely resembling)    Moderate Streptococcus pneumoniae    Therapy requires maximum dose of Ceftriaxone and/or    Penicillin. Interpretive criteria as follows:    Ceftriaxone breakpoints for meningitis infections:    <=0.5=Sensitive, 1.0=Intermediate, >=2.0=Resistant    Penicillin breakpoints for meningitis infections:    <=0.06=Sensitive, >= 0.12=Resistant    Ceftriaxone breakpoints for non-meningitis infections:    <=1.0=Sensitive, 2.0=Intermediate, >=4.0=Resistant    Penicillin breakpoints for non-meningitis infections:    <=2.0=Sensitive, 4.0=Intermediate, >=8.0=Resistant    Oral Penicillin breakpoints:    <=0.06=Sensitive, 0.12-1.0=Intermediate, >=2.0=Resistant    Please note: In case of suspected meningitis, CSF    interpretive criteria must be used independent of specimen source.    Routine respiratory charlotte absent  Organism: Streptococcus pneumoniae  Streptococcus pneumoniae  Pluralibacter gergoviae (23 @ 09:09)  Organism: Brannon godinez (23 @ 09:09)      Method Type: ELENI      -  Ampicillin: S <=8 These ampicillin results predict results for amoxicillin      -  Ampicillin/Sulbactam: S <=4/2 Enterobacter, Klebsiella aerogenes, Citrobacter, and Serratia may develop resistance during prolonged therapy (3-4 days)      -  Cefazolin: S <=2 Enterobacter, Klebsiella aerogenes, Citrobacter, and Serratia may develop resistance during prolonged therapy (3-4 days)      -  Ceftriaxone: S <=1 Enterobacter, Klebsiella aerogenes, Citrobacter, and Serratia may develop resistance during prolonged therapy      -  Ciprofloxacin: S <=0.25      -  Ertapenem: S <=0.5      -  Gentamicin: S <=2      -  Piperacillin/Tazobactam: S <=8      -  Tobramycin: S <=2      -  Trimethoprim/Sulfamethoxazole: S <=0.5/9.5  Organism: Streptococcus pneumoniae (23 @ 09:09)      Method Type: ETEST      -  Ceftriaxone: See note 0.75      -  Penicillin: See note 1.5  Organism: Streptococcus pneumoniae (23 @ 09:09)      Method Type: KB      -  Clindamycin: S      -  Erythromycin: R Predicts results for azithromycin.      Culture Results:   No growth to date (23 @ 06:51)  Culture Results:   >100,000 CFU/ml Escherichia coli ESBL  >100,000 CFU/ml Escherichia coli ESBL Strain #2  Result called to and read back by_ Mr. HOGUE Adriana VYAS  2023 09:56:41 (23 @ 05:11)  Culture Results:   No growth at 5 days. (23 @ 21:07)  Culture Results:   No growth at 5 days. (23 @ 21:07)  Culture Results:   Few Pluralibacter tobiasae (most closely resembling)  Moderate Streptococcus pneumoniae  Therapy requires maximum dose of Ceftriaxone and/or  Penicillin. Interpretive criteria as follows:  Ceftriaxone breakpoints for meningitis infections:  <=0.5=Sensitive, 1.0=Intermediate, >=2.0=Resistant  Penicillin breakpoints for meningitis infections:  <=0.06=Sensitive, >= 0.12=Resistant  Ceftriaxone breakpoints for non-meningitis infections:  <=1.0=Sensitive, 2.0=Intermediate, >=4.0=Resistant  Penicillin breakpoints for non-meningitis infections:  <=2.0=Sensitive, 4.0=Intermediate, >=8.0=Resistant  Oral Penicillin breakpoints:  <=0.06=Sensitive, 0.12-1.0=Intermediate, >=2.0=Resistant  Please note: In case of suspected meningitis, CSF  interpretive criteria must be used independent of specimen source.  Routine respiratory charlotte absent (23 @ 21:06)  Culture Results:   Normal Respiratory Charlotte present (23 @ 10:14)  Culture Results:   No growth to date (23 @ 20:37)  Culture Results:   No growth at 5 days. (23 @ 20:28)  Culture Results:   No growth at 5 days. (23 @ 20:28)      Culture - CSF with Gram Stain (collected 23 @ 05:06)  Source: .CSF CSF  Gram Stain (23 @ 06:02):    No organisms seen    No White blood cells    Culture - CSF with Gram Stain (collected 23 @ 06:51)  Source: .CSF CSF  Gram Stain (23 @ 08:35):    No organisms seen    No polymorphonuclear cells seen  Preliminary Report (23 @ 13:32):    No growth to date    Culture - Urine (collected 23 @ 05:11)  Source: Catheterized None  Final Report (23 @ 10:03):    >100,000 CFU/ml Escherichia coli ESBL    >100,000 CFU/ml Escherichia coli ESBL Strain #2    Result called to and read back by_ Mr. HOGUE Adriana VYAS  2023 09:56:41  Organism: Escherichia coli  Escherichia coli ESBL (23 @ 10:03)  Organism: Escherichia coli ESBL (23 @ 10:03)      Method Type: ELENI      -  Ampicillin: R >16 These ampicillin results predict results for amoxicillin      -  Ampicillin/Sulbactam: R /4 Enterobacter, Klebsiella aerogenes, Citrobacter, and Serratia may develop resistance during prolonged therapy (3-4 days)      -  Cefazolin: R >16 For uncomplicated UTI with K. pneumoniae, E. coli, or P. mirablis: ELENI <=16 is sensitive and ELENI >=32 is resistant. This also predicts results for oral agents cefaclor, cefdinir, cefpodoxime, cefprozil, cefuroxime axetil, cephalexin and locarbef for uncomplicated UTI. Note that some isolates may be susceptible to these agents while testing resistant to cefazolin.      -  Ceftriaxone: R >32 Enterobacter, Klebsiella aerogenes, Citrobacter, and Serratia may develop resistance during prolonged therapy      -  Ciprofloxacin: I 0.5      -  Ertapenem: S <=0.5      -  Gentamicin: S <=2      -  Meropenem: S <=1      -  Nitrofurantoin: S <=32 Should not be used to treat pyelonephritis      -  Piperacillin/Tazobactam: R <=8      -  Tobramycin: S <=2      -  Trimethoprim/Sulfamethoxazole: S <=0.5/9.5  Organism: Escherichia coli (23 @ 10:03)      Method Type: ELENI      -  Ampicillin: R >16 These ampicillin results predict results for amoxicillin      -  Ampicillin/Sulbactam: R >16/8 Enterobacter, Klebsiella aerogenes, Citrobacter, and Serratia may develop resistance during prolonged therapy (3-4 days)      -  Cefazolin: R >16 For uncomplicated UTI with K. pneumoniae, E. coli, or P. mirablis: ELENI <=16 is sensitive and ELENI >=32 is resistant. This also predicts results for oral agents cefaclor, cefdinir, cefpodoxime, cefprozil, cefuroxime axetil, cephalexin and locarbef for uncomplicated UTI. Note that some isolates may be susceptible to these agents while testing resistant to cefazolin.      -  Ceftriaxone: R >32 Enterobacter, Klebsiella aerogenes, Citrobacter, and Serratia may develop resistance during prolonged therapy      -  Ciprofloxacin: R >2      -  Ertapenem: S <=0.5      -  Gentamicin: R >8      -  Meropenem: S <=1      -  Nitrofurantoin: R >64 Should not be used to treat pyelonephritis      -  Piperacillin/Tazobactam: SDD 16      -  Tobramycin: I 8      -  Trimethoprim/Sulfamethoxazole: R >2/38      -  Amikacin: S <=16    Urinalysis with Rflx Culture (collected 23 @ 05:11)    Culture - Blood (collected 23 @ 21:07)  Source: .Blood Blood  Final Report (23 @ 23:01):    No growth at 5 days.    Culture - Blood (collected 23 @ 21:07)  Source: .Blood Blood  Final Report (23 @ 23:01):    No growth at 5 days.    Culture - Sputum (collected 23 @ 21:06)  Source: ET Tube ET Tube  Gram Stain (23 @ 06:57):    Rare epithelial cells    Numerous White blood cells    Few Gram positive cocci in pairs  Final Report (23 @ 09:09):    Few Pluralibacter gergoviae (most closely resembling)    Moderate Streptococcus pneumoniae    Therapy requires maximum dose of Ceftriaxone and/or    Penicillin. Interpretive criteria as follows:    Ceftriaxone breakpoints for meningitis infections:    <=0.5=Sensitive, 1.0=Intermediate, >=2.0=Resistant    Penicillin breakpoints for meningitis infections:    <=0.06=Sensitive, >= 0.12=Resistant    Ceftriaxone breakpoints for non-meningitis infections:    <=1.0=Sensitive, 2.0=Intermediate, >=4.0=Resistant    Penicillin breakpoints for non-meningitis infections:    <=2.0=Sensitive, 4.0=Intermediate, >=8.0=Resistant    Oral Penicillin breakpoints:    <=0.06=Sensitive, 0.12-1.0=Intermediate, >=2.0=Resistant    Please note: In case of suspected meningitis, CSF    interpretive criteria must be used independent of specimen source.    Routine respiratory charlotte absent  Organism: Streptococcus pneumoniae  Streptococcus pneumoniae  Pluralibacter gergoviae (23 @ 09:09)  Organism: Pluralibacter gergoviae (23 @ 09:09)      Method Type: ELENI      -  Ampicillin: S <=8 These ampicillin results predict results for amoxicillin      -  Ampicillin/Sulbactam: S <=4/2 Enterobacter, Klebsiella aerogenes, Citrobacter, and Serratia may develop resistance during prolonged therapy (3-4 days)      -  Cefazolin: S <=2 Enterobacter, Klebsiella aerogenes, Citrobacter, and Serratia may develop resistance during prolonged therapy (3-4 days)      -  Ceftriaxone: S <=1 Enterobacter, Klebsiella aerogenes, Citrobacter, and Serratia may develop resistance during prolonged therapy      -  Ciprofloxacin: S <=0.25      -  Ertapenem: S <=0.5      -  Gentamicin: S <=2      -  Piperacillin/Tazobactam: S <=8      -  Tobramycin: S <=2      -  Trimethoprim/Sulfamethoxazole: S <=0.5/9.5  Organism: Streptococcus pneumoniae (23 @ 09:09)      Method Type: ETEST      -  Ceftriaxone: See note 0.75      -  Penicillin: See note 1.5  Organism: Streptococcus pneumoniae (23 @ 09:09)      Method Type: KB      -  Clindamycin: S      -  Erythromycin: R Predicts results for azithromycin.      PROCEDURE NOTE:     Procedure: Tracheostomy exchange prior to maturation of tract (CPT 71815)      Pre-Procedure Diagnosis:  respiratory distress vent dependent , tracheostomy dependence  Post-Procedure Diagnosis:  tracheostomy dependence     Indications for Procedure:  is a MAKSIM TRIVEDI 57yFemalluis presented with respiratory distress s/p tracheostomy tube, ventilator dependent  status post tracheostomy on  See above full HPI for further details. On POD 7  a tracheostomy exchange was required to ensure the stoma and tract were healing appropriately and to change patient to cuffless tracheostomy.     Description of Procedure: After obtaining verbal consent, the patient was positioned with shoulder roll supine. Flexible suctioning was performed to clear the secretions. The existing 6CN75R  tracheostomy was checked and ready with obturator. I did remove the 4 silk sutures. Next, the velcroe tie was removed and the tracheostomy tube was removed. The stoma was well healed. The tract appeared well formed without granulation or collapse. I did clear the skin with saline, and place an Allevyn dressing inferior to the stoma. I did replace the new 6CN75R tracheostomy without difficulty and secured with velcroe trach tie. The patient tolerated the procedure well without complications.     Findings:  - Well formed stoma and tract  - Easy, uncomplicated placement of 6CN75R ([id number] )

## 2023-05-05 NOTE — PROGRESS NOTE ADULT - ASSESSMENT
56 y/o female transferred from [place] with right sided weakness and right facial numbness. Found to have acute infarction within the right cerebellar hemisphere, causing herniation. Now s/p SOC foramen magnum decompression and right parietal/occipital EVD placement (4/21). Course c/b respiratory insuffiency d/t bulbar dysfunction, s/p trach 4/28/23 failed bedside  PEG placement 5/1; pending OR placement of PEG & DSA for stroke w/u     PLAN:  Neuro   - Vitals/neuro q1h   - s/p DSA for B/L carotid aneurysms pending discussion on management   - EVD raised to @[id number]; monitor ICP/output increase by 5cmh20  - CT head Monday if pseudomengiocele larger plan for shunt 5/10  - Repeat CTH 4/25 stable   - Stroke following  - pain control with tylenol prn, oxycodone prn, PRN 12.5mcg fent IVP q2 hrs    Cardio  - -160  - TTE 4/24: negative for PFO, mild LVH, mild dilation of L atrium, EF 55-60%  - c/w coreg 3.125mg BID for PVCs & HTN management     Pulm  - /40/16/8, CPAP trials as tolerated; has apenic episodes   - Chest PT, nebs q 12 d/c mucomyst c/w duoneb & 3% nacl inhlation  - 6 cuffed trach placed by ENT 4/28, missing cuff, replaced trach at bedside. --> ENT to remove trach sutures POD7 (5/5)    GI  s/p PEG 5/2; c/w tube feeds goal 50cc/hr  - Protonix   - transaminitis resolved     Renal  - Na goal 135-145, salt tabs 1 q 12  - Voiding via primafit     Endo   - no issues     Heme  - SCDs, SQL 40 mg QD (ppx)  - s/p 3 units platelets, 35 mcg of DDAVP for ASA/Plavix reversal  - LE dopplers 4/22 neg for DVT, but showing superficial venous thrombosis in the proximal portion of the right greater saphenous vein; repeat on 4/29 with persistent superficial thrombus   - Heme following for positive anticardiolipin Ab 4/27 & hexagonal positive, recs appreciated ; requesting AC insetting of hypercoagulable state; pending nsgy approval post VPS placement   -IVC filter placement 5/4 in setting of LE DVT and hypercoagulable state w/o ability to put pt on AC     ID  - Last panculture 4/30, MRSA (-), +ecoli in urine; sputum culture growing pluralibacter & strep species   ESBL ecoli urine - contact precautions   -c/w ertapenem end date 5/7  -CSF culture NGTD   -ID following    DISPO:  - NSICU, full code  - PT/OT rec acute inpatient rehab: patient can tolerate 3 hrs PT/OT daily

## 2023-05-05 NOTE — PROGRESS NOTE ADULT - SUBJECTIVE AND OBJECTIVE BOX
INTERVAL HISTORY: HPI:  58 yo F with PMH of Asthma, CAD (not on  meds), peripheral neuropathy and PSH of BATOOL, cholecystectomy, right knee surgery presented to [place] ED on 4/20 with right sided weakness and right facial numbness. Patient was undergoing preparation for colonoscopy. As per daughter at 8am patient was playing with her grandchildren but around 840 am patient was getting dressed and fell and subsequently felt weak after. Daughter reports that patient had some episodes of vomiting at this time. She had a syncopal episode at around 10 am and was witnessed by brother. No head trauma, She regained conscious after few minutes. She remained in bed for the remainder of the day. Daughter reports some slurred speech noted 1230-1PM and also was confused after. and around 4PM, the patient's sister noted right sided weakness at which time EMS was called and patient was brought to ED. No c/o chest pain, shortness of breath, fever, headache, abdominal pain, urinary complaints. No recent travel/sickness/ change in meds. Stroke code in ER: CTH neg for heme, Right PICA distribution acute infarction. CTA with saccular aneurysms of b/l carotids, approximately 0.8 cm on the right and 1.2 x 0.9 cm on the left. Possible tiny third saccular aneurysm on the right Posterior intracranial circulation: Right vertebral arterial occlusion at its dural crossing junction V3 Atlantic and V4 intracranial segments with likely reversal of flow in its intracranial segment from the basilar. Right PICA faintly seen. 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  Not candidate for TNK/mechanical thrombectomy. CT scan repeated which showed: 1. Brain: Progression of acute infarction within the right cerebellar hemisphere, also extending into the left superior cerebellar hemisphere. New mass effect on the fourth ventricle causing stenosis versus occlusion with new third and lateral ventricular dilatation indicating ventricular obstruction at the level of the fourth ventricle. New upward and downward herniation of cerebellar parenchyma Right carotid system: No hemodynamically significant stenosis. Left carotid system: No hemodynamically significant stenosis. Vertebral circulation: Patent. Anterior intracranial circulation: Intact. Bilateral internal carotid saccular aneurysms. These findings are unchanged. Posterior intracranial circulation:    Improved flow within the right vertebral artery since 4/20/2023. New focal stenosis mid left vertebral artery, etiology uncertain, consider vasospasm and extrinsic compression in addition to new embolic disease. Brain perfusion: Perfusion images demonstrate normalization of the perfusion abnormality in the right cerebellar hemisphere present on 4/20/2023 despite evidence of progression of acute infarction.  Areas of apparent ischemia within the posterior fossa have progressed in extent, also again involving the left posterior cerebral arterial distribution. Tx to [facility] for SOC watch. On admission to [facility], NIHSS 12.  (21 Apr 2023 16:50)    PAST MEDICAL & SURGICAL HISTORY:  Asthma  CAD (coronary artery disease)  Peripheral neuropathy  S/P total abdominal hysterectomy  S/P cholecystectomy  S/P right knee surgery      REVIEW OF SYSTEMS: [x ] Unable to Assess due to neurologic exam   [ ] All ROS addressed below are non-contributory, except:  Neuro: [ ] Headache [ ] Back pain [ ] Numbness [ ] Weakness [ ] Ataxia [ ] Dizziness [ ] Aphasia [ ] Dysarthria [ ] Visual disturbance  Resp: [ ] Shortness of breath/dyspnea, [ ] Orthopnea [ ] Cough  CV: [ ] Chest pain [ ] Palpitation [ ] Lightheadedness [ ] Syncope  Renal: [ ] Thirst [ ] Edema  GI: [ ] Nausea [ ] Emesis [ ] Abdominal pain [ ] Constipation [ ] Diarrhea  Hem: [ ] Hematemesis [ ] bright red blood per rectum  ID: [ ] Fever [ ] Chills [ ] Dysuria  ENT: [ ] Rhinorrhea    PHYSICAL EXAM:  General: No Acute Distress, +trach in place   Neurological: AOx2 to choice, R eye 6th nerve palsy, b/l horizontal nystagmus, b/l UE & LE dysmetria, RUE & RLE 4+/5 w/ mild drift   Pulmonary: clear  Cardiovascular: sinus justo   Gastrointestinal: Soft, Nontender, Nondistended, peg   Extremities: No calf tenderness   Incision: CDI      ICU Vital Signs Last 24 Hrs  T(C): 36.6 (05 May 2023 05:52), Max: 37.1 (04 May 2023 11:39)  T(F): 97.8 (05 May 2023 05:52), Max: 98.1 (04 May 2023 18:11)  HR: 73 (05 May 2023 10:00) (58 - 79)  BP: 105/66 (05 May 2023 10:00) (103/73 - 168/103)  BP(mean): 72 (05 May 2023 10:00) (72 - 126)  RR: 18 (05 May 2023 10:00) (15 - 37)  SpO2: 98% (05 May 2023 10:00) (92% - 100%)      05-04-23 @ 07:01  -  05-05-23 @ 07:00  --------------------------------------------------------  IN: 2760 mL / OUT: 2999 mL / NET: -239 mL    05-05-23 @ 07:01  -  05-05-23 @ 10:35  --------------------------------------------------------  IN: 500 mL / OUT: 10 mL / NET: 490 mL        Mode: AC/ CMV (Assist Control/ Continuous Mandatory Ventilation), RR (machine): 16, TV (machine): 400, FiO2: 40, PEEP: 5, ITime: 1, MAP: 9.9, PIP: 21    acetaminophen   Oral Liquid .. 650 milliGRAM(s) Oral every 6 hours PRN  acetylcysteine 20%  Inhalation 4 milliLiter(s) Inhalation every 8 hours  albuterol/ipratropium for Nebulization 3 milliLiter(s) Nebulizer every 8 hours  ascorbic acid 500 milliGRAM(s) Oral <User Schedule>  atorvastatin 80 milliGRAM(s) Oral at bedtime  carvedilol 3.125 milliGRAM(s) Oral every 12 hours  chlorhexidine 0.12% Liquid 15 milliLiter(s) Oral Mucosa every 12 hours  chlorhexidine 2% Cloths 1 Application(s) Topical <User Schedule>  enoxaparin Injectable 40 milliGRAM(s) SubCutaneous every 24 hours  ertapenem  IVPB 1000 milliGRAM(s) IV Intermittent every 24 hours  fentaNYL    Injectable 12.5 MICROGram(s) IV Push every 2 hours PRN  ferrous    sulfate 325 milliGRAM(s) Oral <User Schedule>  lactobacillus acidophilus 1 Tablet(s) Oral daily  oxyCODONE    IR 5 milliGRAM(s) Oral every 4 hours PRN  pantoprazole   Suspension 40 milliGRAM(s) Oral daily  potassium chloride   Powder 40 milliEquivalent(s) Oral once  sodium chloride 1 Gram(s) Oral every 6 hours  sodium chloride 0.9%. 1000 milliLiter(s) (85 mL/Hr) IV Continuous <Continuous>  sodium chloride 3%  Inhalation 4 milliLiter(s) Inhalation every 8 hours      LABS:  Na: 136 (05-05 @ 05:14), 135 (05-04 @ 05:05), 137 (05-03 @ 05:30)  K: 3.7 (05-05 @ 05:14), 3.6 (05-04 @ 05:05), 3.5 (05-03 @ 05:30)  Cl: 100 (05-05 @ 05:14), 103 (05-04 @ 05:05), 101 (05-03 @ 05:30)  CO2: 24 (05-05 @ 05:14), 25 (05-04 @ 05:05), 22 (05-03 @ 05:30)  BUN: 11 (05-05 @ 05:14), 13 (05-04 @ 05:05), 10 (05-03 @ 05:30)  Cr: 0.44 (05-05 @ 05:14), 0.40 (05-04 @ 05:05), 0.42 (05-03 @ 05:30)  Glu: 91(05-05 @ 05:14), 94(05-04 @ 05:05), 94(05-03 @ 05:30)    Hgb: 9.7 (05-05 @ 05:14), 8.9 (05-04 @ 05:05), 10.0 (05-03 @ 05:30)  Hct: 28.4 (05-05 @ 05:14), 26.5 (05-04 @ 05:05), 30.3 (05-03 @ 05:30)  WBC: 5.41 (05-05 @ 05:14), 8.26 (05-04 @ 05:05), 8.67 (05-03 @ 05:30)  Plt: 326 (05-05 @ 05:14), 285 (05-04 @ 05:05), 273 (05-03 @ 05:30)    INR: 1.17 05-04-23 @ 05:05  PTT: 36.9 05-04-23 @ 05:05      Culture - CSF with Gram Stain (collected 05-05-23 @ 05:06)  Source: .CSF CSF  Gram Stain (05-05-23 @ 06:02):    No organisms seen    No White blood cells

## 2023-05-05 NOTE — PROGRESS NOTE ADULT - SUBJECTIVE AND OBJECTIVE BOX
LENGTH OF HOSPITAL STAY: 14d    SUBJECTIVE: No acute overnight events    HISTORY OF PRESENTING ILLNESS:   56 yo F with PMH of Asthma, CAD (not on  meds), peripheral neuropathy and PSH of BATOOL, cholecystectomy, right knee surgery presented to [place] ED on 4/20 with right sided weakness and right facial numbness. Patient was undergoing preparation for colonoscopy. As per daughter at 8am patient was playing with her grandchildren but around 840 am patient was getting dressed and fell and subsequently felt weak after. Daughter reports that patient had some episodes of vomiting at this time. She had a syncopal episode at around 10 am and was witnessed by brother. No head trauma, She regained conscious after few minutes. She remained in bed for the remainder of the day. Daughter reports some slurred speech noted 1230-1PM and also was confused after. and around 4PM, the patient's sister noted right sided weakness at which time EMS was called and patient was brought to ED. No c/o chest pain, shortness of breath, fever, headache, abdominal pain, urinary complaints. No recent travel/sickness/ change in meds. Stroke code in ER: CTH neg for heme, Right PICA distribution acute infarction. CTA with saccular aneurysms of b/l carotids, approximately 0.8 cm on the right and 1.2 x 0.9 cm on the left. Possible tiny third saccular aneurysm on the right Posterior intracranial circulation: Right vertebral arterial occlusion at its dural crossing junction V3 Atlantic and V4 intracranial segments with likely reversal of flow in its intracranial segment from the basilar. Right PICA faintly seen. 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  Not candidate for TNK/mechanical thrombectomy. CT scan repeated which showed: 1. Brain: Progression of acute infarction within the right cerebellar hemisphere, also extending into the left superior cerebellar hemisphere. New mass effect on the fourth ventricle causing stenosis versus occlusion with new third and lateral ventricular dilatation indicating ventricular obstruction at the level of the fourth ventricle. New upward and downward herniation of cerebellar parenchyma Right carotid system: No hemodynamically significant stenosis. Left carotid system: No hemodynamically significant stenosis. Vertebral circulation: Patent. Anterior intracranial circulation: Intact. Bilateral internal carotid saccular aneurysms. These findings are unchanged. Posterior intracranial circulation:    Improved flow within the right vertebral artery since 4/20/2023. New focal stenosis mid left vertebral artery, etiology uncertain, consider vasospasm and extrinsic compression in addition to new embolic disease. Brain perfusion: Perfusion images demonstrate normalization of the perfusion abnormality in the right cerebellar hemisphere present on 4/20/2023 despite evidence of progression of acute infarction.  Areas of apparent ischemia within the posterior fossa have progressed in extent, also again involving the left posterior cerebral arterial distribution. Tx to [facility] for SOC watch. On admission to [facility], NIHSS 12.  (21 Apr 2023 16:50)    PAST MEDICAL & SURGICAL HISTORY:  Asthma  CAD (coronary artery disease)  Peripheral neuropathy  S/P total abdominal hysterectomy  S/P cholecystectomy  S/P right knee surgery    ALLERGIES:  No Known Allergies    MEDICATIONS:  STANDING MEDICATIONS  albuterol/ipratropium for Nebulization 3 milliLiter(s) Nebulizer every 12 hours  ascorbic acid 500 milliGRAM(s) Oral <User Schedule>  atorvastatin 80 milliGRAM(s) Oral at bedtime  carvedilol 3.125 milliGRAM(s) Oral every 12 hours  chlorhexidine 0.12% Liquid 15 milliLiter(s) Oral Mucosa every 12 hours  chlorhexidine 2% Cloths 1 Application(s) Topical <User Schedule>  enoxaparin Injectable 40 milliGRAM(s) SubCutaneous every 24 hours  ertapenem  IVPB 1000 milliGRAM(s) IV Intermittent every 24 hours  ferrous    sulfate 325 milliGRAM(s) Oral <User Schedule>  lactobacillus acidophilus 1 Tablet(s) Oral daily  pantoprazole   Suspension 40 milliGRAM(s) Oral daily  sodium chloride 1 Gram(s) Oral two times a day  sodium chloride 3%  Inhalation 4 milliLiter(s) Inhalation every 12 hours    PRN MEDICATIONS  acetaminophen   Oral Liquid .. 650 milliGRAM(s) Oral every 6 hours PRN  fentaNYL    Injectable 12.5 MICROGram(s) IV Push every 4 hours PRN  oxyCODONE    IR 5 milliGRAM(s) Oral every 4 hours PRN    VITALS:   T(F): 98.7  HR: 63  BP: 113/59  RR: 22  SpO2: 98%    LABS:                        9.7    5.41  )-----------( 326      ( 05 May 2023 05:14 )             28.4     05-05    136  |  100  |  11  ----------------------------<  91  3.7   |  24  |  0.44<L>    Ca    9.0      05 May 2023 05:14  Phos  3.5     05-05  Mg     2.0     05-05    PT/INR - ( 04 May 2023 05:05 )   PT: 14.0 sec;   INR: 1.17       PTT - ( 04 May 2023 05:05 )  PTT:36.9 sec    Culture - CSF with Gram Stain (collected 05 May 2023 05:06)  Source: .CSF CSF  Gram Stain (05 May 2023 06:02):    No organisms seen    No White blood cells    RADIOLOGY:  < from: CT Head No Cont (05.04.23 @ 18:45) >  Improved mass effect and cytotoxic edema from bilateral cerebellar   infarcts compared to 4/28/23 without hemorrhagic transformation.    Right transoccipital EVD in place and lateral and third ventricles are   stable in size. Hemorrhage along path of EVD is resolving.    < end of copied text >    < from: US Duplex Venous Lower Ext Complete, Bilateral (05.03.23 @ 14:02) >  IMPRESSION:  1.  Since 4/29/2023, unchanged superficial vein thrombosis of the   proximal and mid right greater saphenous vein extending to the   saphenofemoral junction.  2.  New deep venous thrombosis in a left intramuscular calf vein.    < end of copied text >    PHYSICAL EXAM:  Constitutional: Resting comfortably in bed; NAD  Head: NC/AT  Eyes: PERRL, EOMI, anicteric sclera  Respiratory: CTA  Cardiac: +S1/S2; RRR  Gastrointestinal: soft, NT/ND  Neurologic: AAOx1-2     LENGTH OF HOSPITAL STAY: 14d    SUBJECTIVE: No acute overnight events    HISTORY OF PRESENTING ILLNESS:   56 yo F with PMH of Asthma, CAD (not on  meds), peripheral neuropathy and PSH of BATOOL, cholecystectomy, right knee surgery presented to [place] ED on 4/20 with right sided weakness and right facial numbness. Patient was undergoing preparation for colonoscopy. As per daughter at 8am patient was playing with her grandchildren but around 840 am patient was getting dressed and fell and subsequently felt weak after. Daughter reports that patient had some episodes of vomiting at this time. She had a syncopal episode at around 10 am and was witnessed by brother. No head trauma, She regained conscious after few minutes. She remained in bed for the remainder of the day. Daughter reports some slurred speech noted 1230-1PM and also was confused after. and around 4PM, the patient's sister noted right sided weakness at which time EMS was called and patient was brought to ED. No c/o chest pain, shortness of breath, fever, headache, abdominal pain, urinary complaints. No recent travel/sickness/ change in meds. Stroke code in ER: CTH neg for heme, Right PICA distribution acute infarction. CTA with saccular aneurysms of b/l carotids, approximately 0.8 cm on the right and 1.2 x 0.9 cm on the left. Possible tiny third saccular aneurysm on the right Posterior intracranial circulation: Right vertebral arterial occlusion at its dural crossing junction V3 Atlantic and V4 intracranial segments with likely reversal of flow in its intracranial segment from the basilar. Right PICA faintly seen. 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  Not candidate for TNK/mechanical thrombectomy. CT scan repeated which showed: 1. Brain: Progression of acute infarction within the right cerebellar hemisphere, also extending into the left superior cerebellar hemisphere. New mass effect on the fourth ventricle causing stenosis versus occlusion with new third and lateral ventricular dilatation indicating ventricular obstruction at the level of the fourth ventricle. New upward and downward herniation of cerebellar parenchyma Right carotid system: No hemodynamically significant stenosis. Left carotid system: No hemodynamically significant stenosis. Vertebral circulation: Patent. Anterior intracranial circulation: Intact. Bilateral internal carotid saccular aneurysms. These findings are unchanged. Posterior intracranial circulation:    Improved flow within the right vertebral artery since 4/20/2023. New focal stenosis mid left vertebral artery, etiology uncertain, consider vasospasm and extrinsic compression in addition to new embolic disease. Brain perfusion: Perfusion images demonstrate normalization of the perfusion abnormality in the right cerebellar hemisphere present on 4/20/2023 despite evidence of progression of acute infarction.  Areas of apparent ischemia within the posterior fossa have progressed in extent, also again involving the left posterior cerebral arterial distribution. Tx to [facility] for SOC watch. On admission to [facility], NIHSS 12.  (21 Apr 2023 16:50)    PAST MEDICAL & SURGICAL HISTORY:  Asthma  CAD (coronary artery disease)  Peripheral neuropathy  S/P total abdominal hysterectomy  S/P cholecystectomy  S/P right knee surgery    ALLERGIES:  No Known Allergies    MEDICATIONS:  STANDING MEDICATIONS  albuterol/ipratropium for Nebulization 3 milliLiter(s) Nebulizer every 12 hours  ascorbic acid 500 milliGRAM(s) Oral <User Schedule>  atorvastatin 80 milliGRAM(s) Oral at bedtime  carvedilol 3.125 milliGRAM(s) Oral every 12 hours  chlorhexidine 0.12% Liquid 15 milliLiter(s) Oral Mucosa every 12 hours  chlorhexidine 2% Cloths 1 Application(s) Topical <User Schedule>  enoxaparin Injectable 40 milliGRAM(s) SubCutaneous every 24 hours  ertapenem  IVPB 1000 milliGRAM(s) IV Intermittent every 24 hours  ferrous    sulfate 325 milliGRAM(s) Oral <User Schedule>  lactobacillus acidophilus 1 Tablet(s) Oral daily  pantoprazole   Suspension 40 milliGRAM(s) Oral daily  sodium chloride 1 Gram(s) Oral two times a day  sodium chloride 3%  Inhalation 4 milliLiter(s) Inhalation every 12 hours    PRN MEDICATIONS  acetaminophen   Oral Liquid .. 650 milliGRAM(s) Oral every 6 hours PRN  fentaNYL    Injectable 12.5 MICROGram(s) IV Push every 4 hours PRN  oxyCODONE    IR 5 milliGRAM(s) Oral every 4 hours PRN    VITALS:   T(F): 98.7  HR: 63  BP: 113/59  RR: 22  SpO2: 98%    LABS:                        9.7    5.41  )-----------( 326      ( 05 May 2023 05:14 )             28.4     05-05    136  |  100  |  11  ----------------------------<  91  3.7   |  24  |  0.44<L>    Ca    9.0      05 May 2023 05:14  Phos  3.5     05-05  Mg     2.0     05-05    PT/INR - ( 04 May 2023 05:05 )   PT: 14.0 sec;   INR: 1.17       PTT - ( 04 May 2023 05:05 )  PTT:36.9 sec    Culture - CSF with Gram Stain (collected 05 May 2023 05:06)  Source: .CSF CSF  Gram Stain (05 May 2023 06:02):    No organisms seen    No White blood cells    RADIOLOGY:  < from: CT Head No Cont (05.04.23 @ 18:45) >  Improved mass effect and cytotoxic edema from bilateral cerebellar   infarcts compared to 4/28/23 without hemorrhagic transformation.    Right transoccipital EVD in place and lateral and third ventricles are   stable in size. Hemorrhage along path of EVD is resolving.    < end of copied text >    < from: US Duplex Venous Lower Ext Complete, Bilateral (05.03.23 @ 14:02) >  IMPRESSION:  1.  Since 4/29/2023, unchanged superficial vein thrombosis of the   proximal and mid right greater saphenous vein extending to the   saphenofemoral junction.  2.  New deep venous thrombosis in a left intramuscular calf vein.    < end of copied text >    PHYSICAL EXAM:  Constitutional: Resting comfortably in bed; NAD  Head: NC/AT, trach  Eyes: PERRL, EOMI, anicteric sclera  Respiratory: CTA  Cardiac: +S1/S2; RRR  Gastrointestinal: soft, NT/ND  Neurologic: AAOx1-2

## 2023-05-05 NOTE — PROGRESS NOTE ADULT - SUBJECTIVE AND OBJECTIVE BOX
S/Overnight events:  JIM overnight. Neuro stable.     Hospital Course:   4/21: Admitted for crani watch, CTH complete w/ unchanged hydrocephalus, taken for emergent occipital craniectomy.   4/22: POD1. Remains intubated overnight, on propofol and dank. EVD@17fpW15. Pending repeat CTH this am. Given hydralazine 10mg x1. Started on cardene for SBP high 140s-150s. Sub-therapeutic on plavix, therapeutic on asa, rpt asa accumetrics until subtherapeutic. LE dopplers neg for DVT, but showing superficial venous thrombosis in the proximal portion of the right greater saphenous vein. Started on 3% @60 for na goal 145-150. NGT placed by ENT. Febrile, pancultured.  4/23: POD 2. 3% increased to 75. NGT readjusted. UA neg. SBP liberalized to 160. Plan to extubate. 3% decreased to 60. Failed extubation d/t no cuff leak, given 60mg solumedrol, plan to extubate in 6 hrs. SCx1 for post void residual >400, started on cardura at bedtime. New blood in buretrol, stopped SQL. Clot in EVD cleared. Neuro exam improving.   4/24: POD 3. Cont 3% with NS while NPO, start TF today. TF started. LFTs downtrending. Sodium 141. Increased 3% to 50, NS to 30. Repeat BMP ordered for 5:30PM. Tramadol 25 for pain with no relief, oxy 5 and 1g tylenol ordered, stability CT stable, speech language consult for dysarthria. Given hydralazine 10mg IVP for SBP>160, started amlodipine 5mg. C/o mild headache, given fioricet x1, stroke neuro rec SALVADOR and loop recorder placement. Echo with bubble completed, negative for PFO, EF 55-60%. J HFNC started for desats with suctioning.   4/25: POD 4. Cont HFNC. Increased secertions on HFNC, reintubated. CXR confirmed good placement. EVD dropped to 5cmH20 for goal of draining 5-10cc/hr and SG LEENA drain taken off suction. Pending CTH. EVD drained 0cc/hr, dropped to 0. NGT replaced. Dc'd fioricet. Started on PPI for intubation. Increased cardura to 4mg for urinary retention, given an additional 2mg now. Started salt tabs 3 q 6. Given 12.5mg fentanyl x1. NGT replaced, CXR shown in lung. NGT removed, repeat CXR showing no pneumothorax. Pending ENT consult for NGT placement. CTH stable. NGT replaced by ENT, confirmed in proper spot. Restarted TF. [id number]. Na 141 from 146. Increased 3% to 60. EVD raised to 3cmH20. ETT pulled back 1cm by RT.  4/26: POD 5 SOC. Intubated, remains on precedex, ABG ordered with improving PaO2, BMP sodium 148, 3% changed to 30cc/hr, cardura increased to 8mg for tonight, tolerating cpap  4/27: POD 6 SOC. Respiratory rate sustained 6, returned to FVS. Na 151, dc'd 3%, f/u AM sodium. Nurse noticed ETT 19 at the lip and was 20 prior, CXR shows ETT @ 5cm above jono, ET tube advanced 1cm, repeat CXR confirms appropriate placement. Thick inline secretions with suctioning. ENT trach placement Fri likely, PEG likely Mon. Keep ELENA drain until no output, EVD to remain @3. Start ASA 81mg daily tomorrow.   4/28: POD7 SOC, neuro stable, given fentanyl x 1 overnight for presumed discomfort (biting on ETT), remains on full vent support. CTH today stable in comparison to 4/25. 6 cuffed trach placed by ENT in OR, but came down without cuff. Replaced at bedside by ENT. On fentanyl gtt.    4/29: POD8 SOC, JIM overnight. Incision cleaned and dressing changed. On fentanyl gtt. EKG completed due to increased frequency PVCs on telemetry, frequency of PVCs improved with titrating up fentanyl gtt. ABG drawn.  Repeat LE dopplers completed showing persistant superficial thrombus, no DVT. No EVD waveform during day, flushed distally without improvement. Exam unchanged.  EVD dropped to 0 for low output in afternoon.   4/30: POD9. Neuro stable, febrile to 101.3F o/n, given tylenol and pan cx sent. SBP dipped to low 90s o/n, HR stable in 70s with some PVCs, decreased fentanyl gtt and given 500cc bolus of NS x 2. Pend PEG placement Mon and angio Tues. D/c'd ELENA drain today and suture placed. F/u heme recs. Positive UA. Infiltrates found on CXR. Started on Zosyn 4.5g Q6hrs .   5/1: POD 10. Neuro stable. Dc'd fentanyl gtt. PEG failed placement at bedside with Dr. Gant, plan for PEG in OR tomorrow afternoon with Dr. Layton. Dc'd amlodipine and started carvedilol 3.125 BID for PVCs . Made 3% inhalation and mucomyst q8hr. Failed PEG at bedside. Salt tabs made 1 q 6. Decreased cardura to 4mg daily. Urine culture growing E. Coli and sputum culture growing pluralibacter gergoviae and strep species. ID consulted, f/u recs. Failed CPAP trial @ 3pm. Added am labs per heme/onc for hypercoguable workup. Pending repeat LE dopplers in 2-3 days. NGT clogged, removed, ENT failed attempt at replacement, will keep NPO for PEG placement tmw. Repeat xray in am for concern for aspration. Pt abx switched from Zosyn to Ertapenem 1g Q24hrs. per ID recs, possible ESBL growth.   5/2: POD 11. Neuro stable. Pre-op for diagnostic cerebral angiogram and PEG placement. NPO. EVD raised to 5 cmH20. Sputum culture speciated to step pneumoniae, sensitive to ertapenem. 3% inhalation/mucomyst made q 6 hr d/t thick secretions. PEG placed in OR. POD0 dx cerebral angio. EVD raised to 10.   5/3: POD 12. Neuro stable. PEG feeds began @ 10 AM, plan to increase 10cc every 6h per general surgery. Repeat dopplers show stable R superficial thrombus and new L IM calf DVT. Vascular consulted for IVC filter. Plan to get CTH tomorrow.  5/4: POD 13. JIM o/n. s/p IVC filter with vascular today. Pending post-procedure CTH. FOBT negative. Started iron and vitamin c every other day for iron deficiency anemia.   5/5: POD14. CSF cx sent to r/o infection from new gas in right temporal horn seen on CTH. Restarted TF, changed to Jevity 1.2, f/u nutrition recs.       Vital Signs Last 24 Hrs  T(C): 36.6 (05 May 2023 01:25), Max: 37.1 (04 May 2023 05:36)  T(F): 97.9 (05 May 2023 01:25), Max: 98.8 (04 May 2023 05:36)  HR: 64 (05 May 2023 03:00) (58 - 82)  BP: 132/60 (05 May 2023 03:00) (103/73 - 168/103)  BP(mean): 87 (05 May 2023 03:00) (78 - 126)  RR: 16 (05 May 2023 03:00) (16 - 37)  SpO2: 98% (05 May 2023 03:00) (92% - 100%)    Parameters below as of 05 May 2023 03:00  Patient On (Oxygen Delivery Method): ventilator        I&O's Detail    03 May 2023 07:01  -  04 May 2023 07:00  --------------------------------------------------------  IN:    Enteral Tube Flush: 200 mL    Glucerna: 230 mL    IV PiggyBack: 50 mL    sodium chloride 0.9%: 2040 mL  Total IN: 2520 mL    OUT:    External Ventricular Device (mL): 195 mL    Voided (mL): 1700 mL  Total OUT: 1895 mL    Total NET: 625 mL      04 May 2023 07:01  -  05 May 2023 04:36  --------------------------------------------------------  IN:    Enteral Tube Flush: 450 mL    IV PiggyBack: 50 mL    Jevity 1.2: 40 mL    sodium chloride 0.9%: 1785 mL  Total IN: 2325 mL    OUT:    External Ventricular Device (mL): 131 mL    Voided (mL): 2400 mL  Total OUT: 2531 mL    Total NET: -206 mL        I&O's Summary    03 May 2023 07:01  -  04 May 2023 07:00  --------------------------------------------------------  IN: 2520 mL / OUT: 1895 mL / NET: 625 mL    04 May 2023 07:01  -  05 May 2023 04:36  --------------------------------------------------------  IN: 2325 mL / OUT: 2531 mL / NET: -206 mL        PHYSICAL EXAM:  Constitutional: OES. Nods to questions. AAOx1 to self.   Respiratory: +Trach to vent. non-labored breathing. Normal chest rise.   Cardiovascular: Regular rate and rhythm  Gastrointestinal:  +PEG Soft, nontender, nondistended.  .  Vascular: Extremities warm, no ulcers, no discoloration of skin.   Neurological: Gen: AA&O x 1 to self.    R gaze, otherwise CN II-XII grossly intact.    Motor: FC, shows 2 fingers b/l, wiggles toes b/l. L side strong. RUE antigravity. Lower extremities spontaneous.     Sens: Sensation intact to light touch throughout.    Extremities: warm and well perfused   Wound/incision: SOC incision c/d/i with dressing in place. +EVD.     TUBES/LINES:  [] CVC  [] A-line  [] Lumbar Drain  [x] Ventriculostomy  [] Other    DIET:  [] NPO  [] Mechanical  [x] Tube feeds    LABS:                        8.9    8.26  )-----------( 285      ( 04 May 2023 05:05 )             26.5     05-04    135  |  103  |  13  ----------------------------<  94  3.6   |  25  |  0.40<L>    Ca    8.7      04 May 2023 05:05  Phos  3.0     05-04  Mg     2.0     05-04      PT/INR - ( 04 May 2023 05:05 )   PT: 14.0 sec;   INR: 1.17          PTT - ( 04 May 2023 05:05 )  PTT:36.9 sec        CAPILLARY BLOOD GLUCOSE          Drug Levels: [] N/A    CSF Analysis: [] N/A      Allergies    No Known Allergies    Intolerances      MEDICATIONS:  Antibiotics:  ertapenem  IVPB 1000 milliGRAM(s) IV Intermittent every 24 hours    Neuro:  acetaminophen   Oral Liquid .. 650 milliGRAM(s) Oral every 6 hours PRN  fentaNYL    Injectable 12.5 MICROGram(s) IV Push every 2 hours PRN  oxyCODONE    IR 5 milliGRAM(s) Oral every 4 hours PRN    Anticoagulation:  enoxaparin Injectable 40 milliGRAM(s) SubCutaneous every 24 hours    OTHER:  acetylcysteine 20%  Inhalation 4 milliLiter(s) Inhalation every 8 hours  albuterol/ipratropium for Nebulization 3 milliLiter(s) Nebulizer every 8 hours  atorvastatin 80 milliGRAM(s) Oral at bedtime  carvedilol 3.125 milliGRAM(s) Oral every 12 hours  chlorhexidine 0.12% Liquid 15 milliLiter(s) Oral Mucosa every 12 hours  chlorhexidine 2% Cloths 1 Application(s) Topical <User Schedule>  lactobacillus acidophilus 1 Tablet(s) Oral daily  pantoprazole   Suspension 40 milliGRAM(s) Oral daily  sodium chloride 3%  Inhalation 4 milliLiter(s) Inhalation every 8 hours    IVF:  ascorbic acid 500 milliGRAM(s) Oral <User Schedule>  ferrous    sulfate 325 milliGRAM(s) Oral <User Schedule>  sodium chloride 1 Gram(s) Oral every 6 hours  sodium chloride 0.9%. 1000 milliLiter(s) IV Continuous <Continuous>    CULTURES:  Culture Results:   No growth to date (04-30 @ 06:51)  Culture Results:   >100,000 CFU/ml Escherichia coli ESBL  >100,000 CFU/ml Escherichia coli ESBL Strain #2  Result called to and read back by_ Mr. HOGUE Adriana VYAS  05/02/2023 09:56:41 (04-30 @ 05:11)    RADIOLOGY & ADDITIONAL TESTS:      ASSESSMENT:  58 y/o female transferred from [place] with right sided weakness and right facial numbness. Found to have acute infarction within the right cerebellar hemisphere, causing herniation. Now s/p SOC foramen magnum decompression and right parietal/occipital EVD placement (4/21). Course c/b respiratory insuffiency d/t bulbar dysfunction, s/p trach 4/28/23. s/p PEG 5/2 with GI, s/p dx cerebral angiogram 5/2.       STROKE    No pertinent family history in first degree relatives    Handoff    Asthma    CAD (coronary artery disease)    Peripheral neuropathy    Cerebellar stroke    Respiratory failure, unspecified with hypoxia    History of DVT (deep vein thrombosis)    Cerebellar stroke    Respiratory failure, unspecified with hypoxia    History of DVT of lower extremity    Cerebellar infarct    CAD (coronary artery disease)    Asthma    Peripheral neuropathy    Lupus anticoagulant positive    Anemia due to acute blood loss    Tracheostomy malfunction    Decompressive craniectomy    Insertion, external ventricular drain    Planned tracheostomy    Tracheostomy tube change    Esophagogastroduodenoscopy (EGD) with anesthesia    Insertion, PEG tube, laparoscopic    Angiogram, carotid and cerebral, bilateral    Tracheostomy tube change    IVC filter placement    S/P total abdominal hysterectomy    S/P cholecystectomy    S/P right knee surgery    [email or web address]        PLAN:    PLAN:  Neuro   - Vitals/neuro q1h   - dx angio 5/2: b/l cavernous ICA aneurysms, plan to be discussed vascular conference  - EVD @5cm H2O, monitor ICP/output   - CTH 4/28 stable; 5/4 new gas in right temporal horn   - Stroke consulted, f/u recs   - Pain control with tylenol prn, oxycodone prn, fent pushes 12.5mg q2hr prn     Cardio  - -160   - s/p IVC filter 5/4  - TTE 4/24: negative for PFO, mild LVH, mild dilation of L atrium, EF 55-60%   - Carvedilol 3.125 BID     Pulm  - VC//40/16/6, CPAP trials as tolerated  - Chest PT, 3% inhalation/duoneb/mucomyst q 8 hrs standing   - 6 cuffed trach placed by ENT 4/28, missing cuff, replaced trach at bedside. --> ENT to remove trach sutures POD7 (5/5)    GI  - TF via PEG (placed 5/2);   - Bowel regimen, last BM 5/2  - Protonix while on vent  - Trend LFTs q 72 hrs   - FOBT 5/4 negative     Renal  - Na goal 135-145, salt tabs 1 q 6   - Voiding via primafit   - NS @ 85cc/h while NPO    Endo   - cont lipitor     Heme  - SQL   - s/p IVCF 5/4  - LE dopplers 4/22 neg for DVT, but showing superficial venous thrombosis in the proximal portion of the right greater saphenous vein; repeat on 4/29 with persistant superficial thrombus, 5/3 new DVT L IM calf and unchanged R superficial vein thrombus  - Heme following for positive anticardiolipin Ab 4/27, recs appreciated   - Iron and vitamin c every other day    ID  - ID recs: Ertapenem 1g Q24hrs (5/1-5/8) x 7 days  - Last panculture 4/30, MRSA (-), +UA cx ESBL, +sputum cx pluralibacter gergoviae, strep pneumoniae   - +isolation precautions - ESBL in urine   - f/u CSF culture 5/4  - ID cleared for VPS     DISPO:  - NSICU, full code   - PT/OT rec acute inpatient rehab: patient can tolerate 3 hrs PT/OT daily    D/w Dr. Valadez and Dr. Worley    Assessment:  Present when checked    []  GCS  E   V  M     Heart Failure: []Acute, [] acute on chronic , []chronic  Heart Failure:  [] Diastolic (HFpEF), [] Systolic (HFrEF), []Combined (HFpEF and HFrEF), [] RHF, [] Pulm HTN, [] Other    [] MAMTA, [] ATN, [] AIN, [] other  [] CKD1, [] CKD2, [] CKD 3, [] CKD 4, [] CKD 5, []ESRD    Encephalopathy: [] Metabolic, [] Hepatic, [] toxic, [] Neurological, [] Other    Abnormal Nurtitional Status: [] malnurtition (see nutrition note), [ ]underweight: BMI < 19, [] morbid obesity: BMI >40, [] Cachexia    [] Sepsis  [] hypovolemic shock,[] cardiogenic shock, [] hemorrhagic shock, [] neuogenic shock  [] Acute Respiratory Failure  []Cerebral edema, [] Brain compression/ herniation,   [] Functional quadriplegia  [] Acute blood loss anemia

## 2023-05-05 NOTE — CHART NOTE - NSCHARTNOTEFT_GEN_A_CORE
CSF cx sent to r/o infection from new gas in right temporal horn seen on CTH. Restarted TF, changed to Jevity 1.2, f/u nutrition recs. EVD raised to 10 today, plan to challenge over the weekend and CTH on Monday, possible VPS next Wednesday. ENT removed sutures today. Salt tabs decreased 1q12.

## 2023-05-05 NOTE — PROGRESS NOTE ADULT - SUBJECTIVE AND OBJECTIVE BOX
=================================  NEUROCRITICAL CARE ATTENDING NOTE  =================================    MAKSIM TRIVEDI   MRN-[id number]  Summary:  57y/F  with Asthma, CAD (not on  meds), peripheral neuropathy and PSH of BATOOL, cholecystectomy, right knee surgery presented to [place] ED on  with right sided weakness and right facial numbness. Patient was undergoing preparation for colonoscopy. As per daughter at 8am patient was playing with her grandchildren but around 840 am patient was getting dressed and fell and subsequently felt weak after. Daughter reports that patient had some episodes of vomiting at this time. She had a syncopal episode at around 10 am and was witnessed by brother. No head trauma, She regained conscious after few minutes. She remained in bed for the remainder of the day. Daughter reports some slurred speech noted 1230-1PM and also was confused after. and around 4PM, the patient's sister noted right sided weakness at which time EMS was called and patient was brought to ED. No c/o chest pain, shortness of breath, fever, headache, abdominal pain, urinary complaints. No recent travel/sickness/ change in meds. Stroke code in ER: CTH neg for heme, Right PICA distribution acute infarction. CTA with saccular aneurysms of b/l carotids, approximately 0.8 cm on the right and 1.2 x 0.9 cm on the left. Possible tiny third saccular aneurysm on the right Posterior intracranial circulation: Right vertebral arterial occlusion at its dural crossing junction V3 Atlantic and V4 intracranial segments with likely reversal of flow in its intracranial segment from the basilar. Right PICA faintly seen. 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  Not candidate for TNK/mechanical thrombectomy. CT scan repeated which showed: 1. Brain: Progression of acute infarction within the right cerebellar hemisphere, also extending into the left superior cerebellar hemisphere. New mass effect on the fourth ventricle causing stenosis versus occlusion with new third and lateral ventricular dilatation indicating ventricular obstruction at the level of the fourth ventricle. New upward and downward herniation of cerebellar parenchyma Right carotid system: No hemodynamically significant stenosis. Left carotid system: No hemodynamically significant stenosis. Vertebral circulation: Patent. Anterior intracranial circulation: Intact. Bilateral internal carotid saccular aneurysms. These findings are unchanged. Posterior intracranial circulation:    Improved flow within the right vertebral artery since 2023. New focal stenosis mid left vertebral artery, etiology uncertain, consider vasospasm and extrinsic compression in addition to new embolic disease. Brain perfusion: Perfusion images demonstrate normalization of the perfusion abnormality in the right cerebellar hemisphere present on 2023 despite evidence of progression of acute infarction.  Areas of apparent ischemia within the posterior fossa have progressed in extent, also again involving the left posterior cerebral arterial distribution. Tx to [facility] for SOC watch. On admission to [facility], NIHSS 12.  (2023 16:50)    COURSE IN THE HOSPITAL:  : Admitted for crani watch, CTH complete w/ unchanged hydrocephalus, taken for emergent occipital craniectomy.   : POD1. Remains intubated overnight, on propofol and dank. EVD@05ubN97. Pending repeat CTH this am. Given hydralazine 10mg x1. Started on cardene for SBP high 140s-150s. Sub-therapeutic on plavix, therapeutic on asa, rpt asa accumetrics until subtherapeutic. LE dopplers neg for DVT, but showing superficial venous thrombosis in the proximal portion of the right greater saphenous vein. Started on 3% @60 for na goal 145-150. NGT placed by ENT. Febrile, pancultured.  : POD 2. 3% increased to 75. NGT readjusted. UA neg. SBP liberalized to 160. Plan to extubate. 3% decreased to 60. Failed extubation d/t no cuff leak, given 60mg solumedrol, plan to extubate in 6 hrs. SCx1 for post void residual >400, started on cardura at bedtime. New blood in buretrol, stopped SQL. Clot in EVD cleared. Neuro exam improving.   : POD 3. Cont 3% with NS while NPO, start TF today. TF started. LFTs downtrending. Sodium 141. Increased 3% to 50, NS to 30. Repeat BMP ordered for 5:30PM. Tramadol 25 for pain with no relief, oxy 5 and 1g tylenol ordered, stability CT stable, speech language consult for dysarthria. Given hydralazine 10mg IVP for SBP>160, started amlodipine 5mg. C/o mild headache, given fioricet x1, stroke neuro rec SALVADOR and loop recorder placement. Echo with bubble completed, negative for PFO, EF 55-60%. J HFNC started for desats with suctioning.   : POD 4. Cont HFNC. Increased secertions on HFNC, reintubated. CXR confirmed good placement. EVD dropped to 5cmH20 for goal of draining 5-10cc/hr and SG ELENA drain taken off suction. Pending CTH. EVD drained 0cc/hr, dropped to 0. NGT replaced. Dc'd fioricet. Started on PPI for intubation. Increased cardura to 4mg for urinary retention, given an additional 2mg now. Started salt tabs 3 q 6. Given 12.5mg fentanyl x1. NGT replaced, CXR shown in lung. NGT removed, repeat CXR showing no pneumothorax. Pending ENT consult for NGT placement. CTH stable. NGT replaced by ENT, confirmed in proper spot. Restarted TF. Goisked649.4F. Na 141 from 146. Increased 3% to 60. EVD raised to 3cmH20. ETT pulled back 1cm by RT.  : POD 5 SOC. Intubated, remains on precedex, ABG ordered with improving PaO2, BMP sodium 148, 3% changed to 30cc/hr, cardura increased to 8mg for tonight, tolerating cpap  : POD 6 SOC. Respiratory rate sustained 6, returned to FVS. Na 151, dc'd 3%, f/u AM sodium. Nurse noticed ETT 19 at the lip and was 20 prior, CXR shows ETT @ 5cm above jono, ET tube advanced 1cm, repeat CXR confirms appropriate placement. Thick inline secretions with suctioning. ENT trach placement Fri likely, PEG likely Mon. Keep ELENA drain until no output, EVD to remain @3. Start ASA 81mg daily tomorrow.   : POD7 SOC, neuro stable, given fentanyl x 1 overnight for presumed discomfort (biting on ETT), remains on full vent support. CTH today stable in comparison to . 6 cuffed trach placed by ENT in OR, but came down without cuff. Replaced at bedside by ENT. On fentanyl gtt.    : POD8 SOC, JIM overnight. Incision cleaned and dressing changed. On fentanyl gtt. EKG completed due to increased frequency PVCs on telemetry, frequency of PVCs improved with titrating up fentanyl gtt. ABG drawn.  Repeat LE dopplers completed showing persistant superficial thrombus, no DVT. No EVD waveform during day, flushed distally without improvement. Exam unchanged.  EVD dropped to 0 for low output in afternoon.   : POD9. Neuro stable, febrile to 101.3F o/n, given tylenol and pan cx sent. SBP dipped to low 90s o/n, HR stable in 70s with some PVCs, decreased fentanyl gtt and given 500cc bolus of NS x 2. Pend PEG placement Mon and angio Tu. D/c'd ELENA drain today and suture placed. F/u heme recs. Positive UA. Infiltrates found on CXR. Started on Zosyn 4.5g Q6hrs .   : POD 10. Neuro stable. Dc'd fentanyl gtt. PEG failed placement at bedside with Dr. Gant, plan for PEG in OR tomorrow afternoon with Dr. Layton. Dc'd amlodipine and started carvedilol 3.125 BID for PVCs . Made 3% inhalation and mucomyst q8hr. Failed PEG at bedside. Salt tabs made 1 q 6. Decreased cardura to 4mg daily. Urine culture growing E. Coli and sputum culture growing pluralibacter gergoviae and strep species. ID consulted, f/u recs. Failed CPAP trial @ 3pm. Added am labs per heme/onc for hypercoguable workup. Pending repeat LE dopplers in 2-3 days. NGT clogged, removed, ENT failed attempt at replacement, will keep NPO for PEG placement tmw. Repeat xray in am for concern for aspration. Pt abx switched from Zosyn to Ertapenem 1g Q24hrs. per ID recs, possible ESBL growth.   : POD 11. Neuro stable. diagnostic cerebral angiogram (bilateral carotid cavernous aneurysm) and PEG placement. NPO; pulled out radial TR band (right), EVD raised to 10    POD12 EVD raised to 15, then down to 5cm H20, CPAP today   POD13 EVD 5   POD 14      Past Medical History: Asthma CAD (coronary artery disease) Peripheral neuropathy  Allergies:  No Known Allergies  Home meds:   ·	Albuterol (Eqv-ProAir HFA) 90 mcg/inh inhalation aerosol: 2 puff(s) inhaled every 6 hours as needed for  shortness of breath and/or wheezing    PHYSICAL EXAMINATION  T(C): 37.2 ( @ 17:00), Max: 37.2 ( @ 17:00) HR: 66 ( @ 20:32) (58 - 89) BP: 93/48 ( @ 20:00) (93/48 - 158/64) RR: 16 ( @ 20:32) (15 - 24) SpO2: 96% ( @ 20:32) (91% - 99%)  NEUROLOGIC EXAMINATION:  Patient awake, alert, oriented x2, FC, RUE 3/5 RLE 4/5 L UE 5/5  L LE 5/5  GENERAL: trached CPAP 10/5 40%  EENT:  anicteric, trach  CARDIOVASCULAR: (+) S1 S2, normal rate and regular rhythm  PULMONARY: clear to auscultation bilaterally  ABDOMEN: soft, nontender with normoactive bowel sounds  EXTREMITIES: no edema; good distal pulses  SKIN: no rash    LABS:                         9.7    5.41  )-----------( 326      ( 05 May 2023 05:14 )             28.4     136  |  100  |  11  ----------------------------<  91  3.7   |  24  |  0.44<L>    Ca    9.0      05 May 2023 05:14  Phos  3.5     -  Mg     2.0     04 @ 07:01  -   @ 07:00  IN: 2760 mL / OUT: 2999 mL / NET: -239 mL      HbA1C = 5.9 ()  LDL = 92 ()   HDL = 42 ()  TG = 106 ()  TSH = 0.152 ()    Bacteriology:     CSF NGTD   urine culture ESBL x2 strains   Blood NG4D x2   sputum:  pluralibacter gergoviae, mod strep pneumoniae    CSF studies:    L   *** [id number] WBC1 *** %N   %L1     EEG:  Neuroimaging:  Other imaging:    MEDICATIONS:     ·	enoxaparin Injectable 40 SubCutaneous every 24 hours  ·	ertapenem  IVPB 1000 IV Intermittent every 24 hours  ·	carvedilol 3.125 milliGRAM(s) Oral every 12 hours  ·	albuterol/ipratropium for Nebulization 3 Nebulizer every 12 hours  ·	sodium chloride 3%  Inhalation 4 Inhalation every 12 hours  ·	pantoprazole   Suspension 40 Oral daily  ·	atorvastatin 80 Oral at bedtime  ·	ascorbic acid 500 Oral <User Schedule>  ·	ferrous    sulfate 325 Oral <User Schedule>  ·	lactobacillus acidophilus 1 Oral daily  ·	sodium chloride 1 Oral two times a day  ·	acetaminophen   Oral Liquid .. 650 Oral every 6 hours PRN  ·	fentaNYL    Injectable 12.5 IV Push every 4 hours PRN  ·	oxyCODONE    IR 5 Oral every 4 hours PRN    IV FLUIDS: NS@85cc/hr  DRIPS:  DIET: NPO   Lines:  Drains:      External Ventricular Device (mL): 245 mL - @ 0 cm H20   EVD raised to 5 output 367 ICPs 1-7  / EVD at 5cm H20 ICPs <10  /04 EVD at 5cm H20   Wounds:    CODE STATUS:  Full Code                       GOALS OF CARE:  aggressive                      DISPOSITION:  ICU

## 2023-05-05 NOTE — PROGRESS NOTE ADULT - ASSESSMENT
Assessment and Plan:  MAKSIM TRIVEDI is a  56yo F w Bilateral cerebellar hemispheric strokes sp SOC for foramen magnum decompression and R parietooccipital EVD, Bilateral carotid terminus aneurysms, History of peripheral neuropathy and asthma, Bulbar dysfunction. Respiratory insufficiency. Intubated and failed extubation, reintubated 4/25/23. Now s/p creation tracheostomy 4/28/23 c/b tracheostomy tube exchange POD0 due to damaged tracheostomy tube.    Plan:  - Tracheostomy change to 6 cuffed on POD 7- sutures removed, trach foam placed.   - routine tracheostomy care: gentle suctioning PRN, exchange inner cannula qd, humidification of supplemental O2  - Rest of care as per primary team     [facility] at [phone] with any questions/concerns.    Jasmyne Larry PA-C  05-05-23 @ 09:34

## 2023-05-05 NOTE — PROGRESS NOTE ADULT - ASSESSMENT
57 year old female w/ PMH of asthma, CAD (not on meds), HTN, prior miscarriages X3, peripheral neuropathy and PSH of BATOOL, cholecystectomy and right knee surgery presented to [place] ED on 4/20 w/ right sided weakness and right facial numbness. Initially found to have a right PICA distribution acute infarct and a right vertebral artery occlusion. Not a candidate for any intervention. On repeat imaging had progression of acute infarct within the right and left cerebellar hemisphere with mass effect on the fourth ventricle and hydrocephalus and upward and downward herniation of the cerebellar parenchyma. She was transferred to [facility] on 4/21 and underwent an emergent SOC foramen magnum decompression and right parietal/occipital EVD placement. Stroke was consulted for further recommendations. TTE with LAE but otherwise unremarkable. s/p DSA for B/L carotid aneurysms, no surgical intervention unless symptomatic. Course c/b respiratory insuffiencey d/t bulbar dysfunction, s/p trach 4/28/23, s/p PEG 5/2, b/l DVTs s/p IVC filter 5/4. New gas in temporal horn on rpt CTH 5/4, CSF culture sent. Planning for VPS placement 5/10. Stroke mechanism hypercoag state from APLS.     1)Secondary stroke prevention  - Continue statin  - start coumadin after IVC filter placement when cleared from surgical perspective for APLS. Patient will not need an ILR if she will be on lifelong AC. For now, if patient is not cleared for AC would at least start aspirin 81mg daily    2) Stroke risk factors  - A1C: 5.9  - LDL: 92  - TSH: 0.152  - Heme following for positive anticardiolipin Ab x2, new diagnosis APLS on this admission  - CRP 17.4/ESR 19  - Collateral from family that patient suffered from 3 miscarriages after her first 2 children and that the patient's mother suffered from an "aortic tear" that has been worsening    3) Further management  - no need for MRI as strokes seen on CTH with known mechanism  - dx angio 5/2: b/l cavernous ICA aneurysms, no surgical intervention unless symptomatic per vascular conference discussion   - recommend SBP goal < 140, per primary team  - recommend q1hr stroke neuro checks  - outpt neurology follow up  - provide stroke education    Discussed with Dr. Bueno and Dr. Fajardo

## 2023-05-05 NOTE — PROGRESS NOTE ADULT - SUBJECTIVE AND OBJECTIVE BOX
Neurology Stroke Progress Note    INTERVAL HPI/OVERNIGHT EVENTS:  Patient seen and examined. IVC filter placed 5/4. Rpt CTH after filter placement with new gas in right temporal horn. CSF culture sent.     MEDICATIONS  (STANDING):  albuterol/ipratropium for Nebulization 3 milliLiter(s) Nebulizer every 12 hours  ascorbic acid 500 milliGRAM(s) Oral <User Schedule>  atorvastatin 80 milliGRAM(s) Oral at bedtime  carvedilol 3.125 milliGRAM(s) Oral every 12 hours  chlorhexidine 0.12% Liquid 15 milliLiter(s) Oral Mucosa every 12 hours  chlorhexidine 2% Cloths 1 Application(s) Topical <User Schedule>  enoxaparin Injectable 40 milliGRAM(s) SubCutaneous every 24 hours  ertapenem  IVPB 1000 milliGRAM(s) IV Intermittent every 24 hours  ferrous    sulfate 325 milliGRAM(s) Oral <User Schedule>  lactobacillus acidophilus 1 Tablet(s) Oral daily  pantoprazole   Suspension 40 milliGRAM(s) Oral daily  sodium chloride 1 Gram(s) Oral two times a day  sodium chloride 3%  Inhalation 4 milliLiter(s) Inhalation every 12 hours    MEDICATIONS  (PRN):  acetaminophen   Oral Liquid .. 650 milliGRAM(s) Oral every 6 hours PRN Temp greater or equal to 38C (100.4F), Mild Pain (1 - 3)  fentaNYL    Injectable 12.5 MICROGram(s) IV Push every 4 hours PRN Severe Pain (7 - 10)  oxyCODONE    IR 5 milliGRAM(s) Oral every 4 hours PRN Moderate Pain (4 - 6)      Allergies    No Known Allergies    Intolerances        Vital Signs Last 24 Hrs  T(C): 37.2 (05 May 2023 17:00), Max: 37.2 (05 May 2023 17:00)  T(F): 99 (05 May 2023 17:00), Max: 99 (05 May 2023 17:00)  HR: 68 (05 May 2023 17:12) (58 - 89)  BP: 121/55 (05 May 2023 17:00) (103/73 - 158/64)  BP(mean): 78 (05 May 2023 17:00) (72 - 105)  RR: 18 (05 May 2023 17:12) (15 - 24)  SpO2: 98% (05 May 2023 17:12) (91% - 99%)    Parameters below as of 05 May 2023 17:12  Patient On (Oxygen Delivery Method): ventilator    O2 Concentration (%): 40    Physical exam:  General: Trach, no acute distress, awakens to voice   Eyes: Anicteric sclerae, moist conjunctivae, see below for CNs  Neck: trachea midline, FROM, supple, no thyromegaly or lymphadenopathy  GI: Abdomen soft, non-distended, non-tender  Extremities: b/l LE edema    Neurologic:  -Mental status: Awake, alert, oriented to person, place, and time (mouths words). No verbal speech, but mouths answers to words. Follows commands with repetition.   -Cranial nerves:   II: BTT absent   III, IV, VI: Disconjugate gaze, left eye does not adduct. Pupils equally round and reactive to light  VII: Left facial droop  VIII: Hearing is bilaterally intact   Motor: Normal bulk and tone. RUE with effort against gravity 2+/5,  strength 3/5. LUE 4/5 strength. B/l LE at least 3/5, no drift.   Sensation: Intact to light touch bilaterally. No neglect or extinction on double simultaneous testing.  Coordination: No dysmetria on finger-to-nose on the left arm      LABS:                        9.7    5.41  )-----------( 326      ( 05 May 2023 05:14 )             28.4     05-05    136  |  100  |  11  ----------------------------<  91  3.7   |  24  |  0.44<L>    Ca    9.0      05 May 2023 05:14  Phos  3.5     05-05  Mg     2.0     05-05      PT/INR - ( 04 May 2023 05:05 )   PT: 14.0 sec;   INR: 1.17          PTT - ( 04 May 2023 05:05 )  PTT:36.9 sec      RADIOLOGY & ADDITIONAL TESTS:  < from: CT Head No Cont (05.04.23 @ 18:45) >  Compared to the 04/20/2023 study there is much improved mass effect at   the posterior fossa and decreased cytotoxic edema at bilateral cerebellar   infarcts, right inferior and left superior. No hemorrhagic   transformation. Fourth ventricle has mildly reexpanded. There is a right   transoccipital EVD with tip in the body of the right lateral ventricle.   The lateral and third ventricles are similar in size aside from mild   dilatation of the right temporal horn due to gas. There is decreased   hemorrhage along the path of the drain. Minute hemorrhage is noted in the   right occipital horn as seen on coronal image 62. Supratentorial brain is   otherwise preserved without transcortical infarct or new hemorrhage.    < end of copied text >

## 2023-05-05 NOTE — PROGRESS NOTE ADULT - ASSESSMENT
57 year old female w/ PMH of asthma, CAD (not on meds), HTN, prior miscarriages X3, peripheral neuropathy and PSH of BATOOL, cholecystectomy and right knee surgery presented to [place] ED on 4/20 w/ right sided weakness and right facial numbness. Initially found to have a right PICA distribution acute infarct and a right vertebral artery occlusion. Not a candidate for any intervention. On repeat imaging had progression of acute infarct within the right and left cerebellar hemisphere with mass effect on the fourth ventricle and hydrocephalus and upward and downward herniation of the cerebellar parenchyma. She was transferred to [facility] on 4/21 and underwent an emergent SOC foramen magnum decompression and right parietal/occipital EVD placement. Hematology consulted for hypercoagulable work up.    # Acute CVA  Per patient's children, no personal history of clotting or bleeding disorder. Patient's mother with history of stroke. Daughter endorses that patient had 3 miscarriages (early). She thinks her mother was possibly on some type of blood thinner following a pregnancy at [facility] in 2009, but does not remember the name or reason, which was stopped after a few months. No other reported family history of clotting. Factor V Leiden and Prothrombin gene mutation negative. Lupus anticoagulant was positive 1.05 (range 0-1.03), however, the patient had received low molecular weight heparin for DVT ppx and may represent a false positive (as LA is a clot based assay). Beta 2 glycoprotein negative. Anticardiolipin antibody screen positive, with elevated IgM 28.4, but titers below threshold for APS in [place] criteria (>40 Mpl).     Of note, patient with bilateral LE ultrasound on 4/22/23 showing no evidence of deep venous thrombosis in either lower extremity, but positive for superficial venous thrombosis is present in the proximal portion of the right greater saphenous vein in the thigh. Repeat US Duplex on 05/03/23 shows new DVT in left intramuscular calf vein.     Plan:  - Lupus anticoagulant negative (04/29/23) while off heparin products. Anti-lnqv2lobrdklgttul screen (04/25) negative. Hexagonal phase (04/29/23). Anticardiolipin Ab IgG < 5, IgM 28.4, IgA 9.2. The patient does not meet criteria for APLS.   - Repeat Duplex LE (04/29/23) shows persistent superficial venous thrombosis (superficial right greater saphenous vein); with minimal involvement of the junction with common femoral vein.   - There remains concern for hypercoagulable state given her history of possible DVT during pregnancy and early miscarriages. She has two hexagonal phase positive tests, indicating the presence of Lupus anticoagulant, possibly suggestive of APLS. She will need to repeat this test in 12 weeks to definitively diagnose APLS.  - While off heparin products, Protein C function assay with reflex (112%), Protein S activity (50%), Protein S free antigen (95%), and anti-thrombin III assay with reflex (105%). Although Protein S activity is low, free Protein S level is best test for true deficiency. Activated protein C resistance can sometimes interfere with activity of protein S, although Factor V Leiden has been ruled out. Send activated protein C resistance.   - Will recommend retrieval of the IVC as soon as possible as patient may have an underlying hypercoagulable state that will promote thrombosis. The IVC filter does not prevent formation of clot above the filter. Recommend initiation of anticoagulation whenever possible per primary team/Neurosurgery.      # Anemia, normocytic  - No reported history of anemia. Possibly 2/2 critical illness and recent procedures.  - Iron studies consistent with anemia of chronic disease, possible component of concomitant iron deficiency anemia. Folate 12. B12 1403.   - ESR 19    Discussed with Dr. Gee Kilgore 57 year old female w/ PMH of asthma, CAD (not on meds), HTN, prior miscarriages X3, peripheral neuropathy and PSH of BATOOL, cholecystectomy and right knee surgery presented to [place] ED on 4/20 w/ right sided weakness and right facial numbness. Initially found to have a right PICA distribution acute infarct and a right vertebral artery occlusion. Not a candidate for any intervention. On repeat imaging had progression of acute infarct within the right and left cerebellar hemisphere with mass effect on the fourth ventricle and hydrocephalus and upward and downward herniation of the cerebellar parenchyma. She was transferred to [facility] on 4/21 and underwent an emergent SOC foramen magnum decompression and right parietal/occipital EVD placement. Hematology consulted for hypercoagulable work up.    # Acute CVA  Per patient's children, no personal history of clotting or bleeding disorder. Patient's mother with history of stroke. Daughter endorses that patient had 3 miscarriages (early). She thinks her mother was possibly on some type of blood thinner following a pregnancy at [facility] in 2009, but does not remember the name or reason, which was stopped after a few months. No other reported family history of clotting. Factor V Leiden and Prothrombin gene mutation negative. Lupus anticoagulant was positive 1.05 (range 0-1.03), however, the patient had received low molecular weight heparin for DVT ppx and may represent a false positive (as LA is a clot based assay). Beta 2 glycoprotein negative. Anticardiolipin antibody screen positive, with elevated IgM 28.4, but titers below threshold for APS in [place] criteria (>40 Mpl).     Of note, patient with bilateral LE ultrasound on 4/22/23 showing no evidence of deep venous thrombosis in either lower extremity, but positive for superficial venous thrombosis is present in the proximal portion of the right greater saphenous vein in the thigh. Repeat US Duplex on 05/03/23 shows new DVT in left intramuscular calf vein.     Plan:  - Lupus anticoagulant negative (04/29/23) while off heparin products. Anti-[id number] screen (04/25) negative. Hexagonal phase (04/29/23). Anticardiolipin Ab IgG < 5, IgM 28.4, IgA 9.2. The patient does not meet criteria for APLS.   - Repeat Duplex LE (04/29/23) shows persistent superficial venous thrombosis (superficial right greater saphenous vein); with minimal involvement of the junction with common femoral vein.   - There remains concern for hypercoagulable state given her history of possible DVT during pregnancy and early miscarriages. She has two hexagonal phase positive tests, indicating the presence of Lupus anticoagulant, possibly suggestive of APLS. She will need to repeat this test in 12 weeks to definitively diagnose APLS.  - While off heparin products, Protein C function assay with reflex (112%), Protein S activity (50%), Protein S free antigen (95%), and anti-thrombin III assay with reflex (105%). Although Protein S activity is low, free Protein S level is best test for true deficiency. Activated protein C resistance can sometimes interfere with activity of protein S, although Factor V Leiden has been ruled out. Send activated protein C resistance.   - s/p IVC filter (05/04/23) with Vascular. Will recommend retrieval of the IVC as soon as possible as patient may have an underlying hypercoagulable state that will promote thrombosis. The IVC filter does not prevent formation of clot above the filter. Recommend initiation of anticoagulation whenever possible per primary team/Neurosurgery.      # Anemia, normocytic  - No reported history of anemia. Possibly 2/2 critical illness and recent procedures.  - Iron studies consistent with anemia of chronic disease, possible component of concomitant iron deficiency anemia. Folate 12. B12 1403.   - ESR 19    Discussed with Dr. Gee Kilgore 57 year old female w/ PMH of asthma, CAD (not on meds), HTN, prior miscarriages X3, peripheral neuropathy and PSH of BATOOL, cholecystectomy and right knee surgery presented to [place] ED on 4/20 w/ right sided weakness and right facial numbness. Initially found to have a right PICA distribution acute infarct and a right vertebral artery occlusion. Not a candidate for any intervention. On repeat imaging had progression of acute infarct within the right and left cerebellar hemisphere with mass effect on the fourth ventricle and hydrocephalus and upward and downward herniation of the cerebellar parenchyma. She was transferred to [facility] on 4/21 and underwent an emergent SOC foramen magnum decompression and right parietal/occipital EVD placement. Hematology consulted for hypercoagulable work up.    # Acute CVA  Per patient's children, no personal history of clotting or bleeding disorder. Patient's mother with history of stroke. Daughter endorses that patient had 3 miscarriages (early). She thinks her mother was possibly on some type of blood thinner following a pregnancy at [facility] in 2009, but does not remember the name or reason, which was stopped after a few months. No other reported family history of clotting. Factor V Leiden and Prothrombin gene mutation negative. Lupus anticoagulant was positive 1.05 (range 0-1.03), however, the patient had received low molecular weight heparin for DVT ppx and may represent a false positive (as LA is a clot based assay). Beta 2 glycoprotein negative. Anticardiolipin antibody screen positive, with elevated IgM 28.4, but titers below threshold for APS in [place] criteria (>40 Mpl).     Of note, patient with bilateral LE ultrasound on 4/22/23 showing no evidence of deep venous thrombosis in either lower extremity, but positive for superficial venous thrombosis is present in the proximal portion of the right greater saphenous vein in the thigh. Repeat US Duplex on 05/03/23 shows new DVT in left intramuscular calf vein.     Plan:  - DRVVT negative (04/29/23) while off heparin products. Anti-uxsu0lestfnnizllo screen (04/25) negative. Hexagonal phase (04/29/23) positive. Anticardiolipin Ab IgG < 5, IgM 28.4, IgA 9.2.   - Repeat Duplex LE (04/29/23) shows persistent superficial venous thrombosis (superficial right greater saphenous vein); with minimal involvement of the junction with common femoral vein.   - There remains concern for hypercoagulable state given her history of possible DVT during pregnancy and early miscarriages. She has two hexagonal phase positive tests, indicating the presence of Lupus anticoagulant, possibly suggestive of APLS. She will need to repeat this test in 12 weeks to definitively diagnose APLS.  - While off heparin products, Protein C function assay with reflex (112%), Protein S activity (50%), Protein S free antigen (95%), and anti-thrombin III assay with reflex (105%). Although Protein S activity is low, free Protein S level is best test for true deficiency. Activated protein C resistance can sometimes interfere with activity of protein S, although Factor V Leiden has been ruled out. Send activated protein C resistance.   - s/p IVC filter (05/04/23) with Vascular. Will recommend retrieval of the IVC as soon as possible as patient may have an underlying hypercoagulable state that will promote thrombosis. The IVC filter does not prevent formation of clot above the filter. Recommend initiation of anticoagulation whenever possible per primary team/Neurosurgery.      # Anemia, normocytic  - No reported history of anemia. Possibly 2/2 critical illness and recent procedures.  - Iron studies consistent with anemia of chronic disease, possible component of concomitant iron deficiency anemia. Folate 12. B12 1403.   - ESR 19    Discussed with Dr. Gee Kilgore

## 2023-05-06 LAB
ANION GAP SERPL CALC-SCNC: 8 MMOL/L — SIGNIFICANT CHANGE UP (ref 5–17)
BUN SERPL-MCNC: 14 MG/DL — SIGNIFICANT CHANGE UP (ref 7–23)
CALCIUM SERPL-MCNC: 8.7 MG/DL — SIGNIFICANT CHANGE UP (ref 8.4–10.5)
CHLORIDE SERPL-SCNC: 103 MMOL/L — SIGNIFICANT CHANGE UP (ref 96–108)
CO2 SERPL-SCNC: 27 MMOL/L — SIGNIFICANT CHANGE UP (ref 22–31)
CREAT SERPL-MCNC: 0.45 MG/DL — LOW (ref 0.5–1.3)
EGFR: 113 ML/MIN/1.73M2 — SIGNIFICANT CHANGE UP
GLUCOSE SERPL-MCNC: 155 MG/DL — HIGH (ref 70–99)
HCT VFR BLD CALC: 31.6 % — LOW (ref 34.5–45)
HGB BLD-MCNC: 10.5 G/DL — LOW (ref 11.5–15.5)
MAGNESIUM SERPL-MCNC: 2 MG/DL — SIGNIFICANT CHANGE UP (ref 1.6–2.6)
MCHC RBC-ENTMCNC: 29.5 PG — SIGNIFICANT CHANGE UP (ref 27–34)
MCHC RBC-ENTMCNC: 33.2 GM/DL — SIGNIFICANT CHANGE UP (ref 32–36)
MCV RBC AUTO: 88.8 FL — SIGNIFICANT CHANGE UP (ref 80–100)
NRBC # BLD: 0 /100 WBCS — SIGNIFICANT CHANGE UP (ref 0–0)
PHOSPHATE SERPL-MCNC: 3.8 MG/DL — SIGNIFICANT CHANGE UP (ref 2.5–4.5)
PLATELET # BLD AUTO: 354 K/UL — SIGNIFICANT CHANGE UP (ref 150–400)
POTASSIUM SERPL-MCNC: 4 MMOL/L — SIGNIFICANT CHANGE UP (ref 3.5–5.3)
POTASSIUM SERPL-SCNC: 4 MMOL/L — SIGNIFICANT CHANGE UP (ref 3.5–5.3)
RBC # BLD: 3.56 M/UL — LOW (ref 3.8–5.2)
RBC # FLD: 13.1 % — SIGNIFICANT CHANGE UP (ref 10.3–14.5)
SODIUM SERPL-SCNC: 138 MMOL/L — SIGNIFICANT CHANGE UP (ref 135–145)
WBC # BLD: 5.88 K/UL — SIGNIFICANT CHANGE UP (ref 3.8–10.5)
WBC # FLD AUTO: 5.88 K/UL — SIGNIFICANT CHANGE UP (ref 3.8–10.5)

## 2023-05-06 PROCEDURE — 99291 CRITICAL CARE FIRST HOUR: CPT

## 2023-05-06 PROCEDURE — 99292 CRITICAL CARE ADDL 30 MIN: CPT

## 2023-05-06 RX ORDER — SODIUM CHLORIDE 9 MG/ML
4 INJECTION INTRAMUSCULAR; INTRAVENOUS; SUBCUTANEOUS EVERY 8 HOURS
Refills: 0 | Status: DISCONTINUED | OUTPATIENT
Start: 2023-05-06 | End: 2023-05-06

## 2023-05-06 RX ORDER — IPRATROPIUM/ALBUTEROL SULFATE 18-103MCG
3 AEROSOL WITH ADAPTER (GRAM) INHALATION EVERY 8 HOURS
Refills: 0 | Status: DISCONTINUED | OUTPATIENT
Start: 2023-05-06 | End: 2023-05-08

## 2023-05-06 RX ORDER — PSYLLIUM SEED (WITH DEXTROSE)
1 POWDER (GRAM) ORAL
Refills: 0 | Status: DISCONTINUED | OUTPATIENT
Start: 2023-05-06 | End: 2023-05-08

## 2023-05-06 RX ADMIN — Medication 1 TABLET(S): at 12:10

## 2023-05-06 RX ADMIN — Medication 325 MILLIGRAM(S): at 15:49

## 2023-05-06 RX ADMIN — SODIUM CHLORIDE 1 GRAM(S): 9 INJECTION INTRAMUSCULAR; INTRAVENOUS; SUBCUTANEOUS at 07:01

## 2023-05-06 RX ADMIN — CARVEDILOL PHOSPHATE 3.12 MILLIGRAM(S): 80 CAPSULE, EXTENDED RELEASE ORAL at 07:01

## 2023-05-06 RX ADMIN — Medication 3 MILLILITER(S): at 04:07

## 2023-05-06 RX ADMIN — SODIUM CHLORIDE 4 MILLILITER(S): 9 INJECTION INTRAMUSCULAR; INTRAVENOUS; SUBCUTANEOUS at 04:08

## 2023-05-06 RX ADMIN — SODIUM CHLORIDE 4 MILLILITER(S): 9 INJECTION INTRAMUSCULAR; INTRAVENOUS; SUBCUTANEOUS at 17:02

## 2023-05-06 RX ADMIN — FENTANYL CITRATE 12.5 MICROGRAM(S): 50 INJECTION INTRAVENOUS at 00:30

## 2023-05-06 RX ADMIN — PANTOPRAZOLE SODIUM 40 MILLIGRAM(S): 20 TABLET, DELAYED RELEASE ORAL at 12:11

## 2023-05-06 RX ADMIN — ERTAPENEM SODIUM 120 MILLIGRAM(S): 1 INJECTION, POWDER, LYOPHILIZED, FOR SOLUTION INTRAMUSCULAR; INTRAVENOUS at 22:54

## 2023-05-06 RX ADMIN — CHLORHEXIDINE GLUCONATE 15 MILLILITER(S): 213 SOLUTION TOPICAL at 17:55

## 2023-05-06 RX ADMIN — CHLORHEXIDINE GLUCONATE 1 APPLICATION(S): 213 SOLUTION TOPICAL at 07:01

## 2023-05-06 RX ADMIN — CHLORHEXIDINE GLUCONATE 15 MILLILITER(S): 213 SOLUTION TOPICAL at 07:01

## 2023-05-06 RX ADMIN — ATORVASTATIN CALCIUM 80 MILLIGRAM(S): 80 TABLET, FILM COATED ORAL at 22:09

## 2023-05-06 RX ADMIN — Medication 500 MILLIGRAM(S): at 15:49

## 2023-05-06 RX ADMIN — SODIUM CHLORIDE 4 MILLILITER(S): 9 INJECTION INTRAMUSCULAR; INTRAVENOUS; SUBCUTANEOUS at 21:10

## 2023-05-06 RX ADMIN — SODIUM CHLORIDE 1 GRAM(S): 9 INJECTION INTRAMUSCULAR; INTRAVENOUS; SUBCUTANEOUS at 17:55

## 2023-05-06 RX ADMIN — Medication 3 MILLILITER(S): at 17:04

## 2023-05-06 RX ADMIN — Medication 1 PACKET(S): at 17:55

## 2023-05-06 RX ADMIN — ENOXAPARIN SODIUM 40 MILLIGRAM(S): 100 INJECTION SUBCUTANEOUS at 22:09

## 2023-05-06 RX ADMIN — Medication 3 MILLILITER(S): at 21:10

## 2023-05-06 NOTE — PROGRESS NOTE ADULT - SUBJECTIVE AND OBJECTIVE BOX
=================================  NEUROCRITICAL CARE ATTENDING NOTE  =================================    MAKSIM TRIVEDI   MRN-[id number]  Summary:  57y/F  with Asthma, CAD (not on  meds), peripheral neuropathy and PSH of BATOOL, cholecystectomy, right knee surgery presented to [place] ED on  with right sided weakness and right facial numbness. Patient was undergoing preparation for colonoscopy. As per daughter at 8am patient was playing with her grandchildren but around 840 am patient was getting dressed and fell and subsequently felt weak after. Daughter reports that patient had some episodes of vomiting at this time. She had a syncopal episode at around 10 am and was witnessed by brother. No head trauma, She regained conscious after few minutes. She remained in bed for the remainder of the day. Daughter reports some slurred speech noted 1230-1PM and also was confused after. and around 4PM, the patient's sister noted right sided weakness at which time EMS was called and patient was brought to ED. No c/o chest pain, shortness of breath, fever, headache, abdominal pain, urinary complaints. No recent travel/sickness/ change in meds. Stroke code in ER: CTH neg for heme, Right PICA distribution acute infarction. CTA with saccular aneurysms of b/l carotids, approximately 0.8 cm on the right and 1.2 x 0.9 cm on the left. Possible tiny third saccular aneurysm on the right Posterior intracranial circulation: Right vertebral arterial occlusion at its dural crossing junction V3 Atlantic and V4 intracranial segments with likely reversal of flow in its intracranial segment from the basilar. Right PICA faintly seen. 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  Not candidate for TNK/mechanical thrombectomy. CT scan repeated which showed: 1. Brain: Progression of acute infarction within the right cerebellar hemisphere, also extending into the left superior cerebellar hemisphere. New mass effect on the fourth ventricle causing stenosis versus occlusion with new third and lateral ventricular dilatation indicating ventricular obstruction at the level of the fourth ventricle. New upward and downward herniation of cerebellar parenchyma Right carotid system: No hemodynamically significant stenosis. Left carotid system: No hemodynamically significant stenosis. Vertebral circulation: Patent. Anterior intracranial circulation: Intact. Bilateral internal carotid saccular aneurysms. These findings are unchanged. Posterior intracranial circulation:    Improved flow within the right vertebral artery since 2023. New focal stenosis mid left vertebral artery, etiology uncertain, consider vasospasm and extrinsic compression in addition to new embolic disease. Brain perfusion: Perfusion images demonstrate normalization of the perfusion abnormality in the right cerebellar hemisphere present on 2023 despite evidence of progression of acute infarction.  Areas of apparent ischemia within the posterior fossa have progressed in extent, also again involving the left posterior cerebral arterial distribution. Tx to [facility] for SOC watch. On admission to [facility], NIHSS 12.  (2023 16:50)    COURSE IN THE HOSPITAL:  : Admitted for crani watch, CTH complete w/ unchanged hydrocephalus, taken for emergent occipital craniectomy.   : POD1. Remains intubated overnight, on propofol and dank. EVD@05mnB28. Pending repeat CTH this am. Given hydralazine 10mg x1. Started on cardene for SBP high 140s-150s. Sub-therapeutic on plavix, therapeutic on asa, rpt asa accumetrics until subtherapeutic. LE dopplers neg for DVT, but showing superficial venous thrombosis in the proximal portion of the right greater saphenous vein. Started on 3% @60 for na goal 145-150. NGT placed by ENT. Febrile, pancultured.  : POD 2. 3% increased to 75. NGT readjusted. UA neg. SBP liberalized to 160. Plan to extubate. 3% decreased to 60. Failed extubation d/t no cuff leak, given 60mg solumedrol, plan to extubate in 6 hrs. SCx1 for post void residual >400, started on cardura at bedtime. New blood in buretrol, stopped SQL. Clot in EVD cleared. Neuro exam improving.   : POD 3. Cont 3% with NS while NPO, start TF today. TF started. LFTs downtrending. Sodium 141. Increased 3% to 50, NS to 30. Repeat BMP ordered for 5:30PM. Tramadol 25 for pain with no relief, oxy 5 and 1g tylenol ordered, stability CT stable, speech language consult for dysarthria. Given hydralazine 10mg IVP for SBP>160, started amlodipine 5mg. C/o mild headache, given fioricet x1, stroke neuro rec SALVADOR and loop recorder placement. Echo with bubble completed, negative for PFO, EF 55-60%. J HFNC started for desats with suctioning.   : POD 4. Cont HFNC. Increased secertions on HFNC, reintubated. CXR confirmed good placement. EVD dropped to 5cmH20 for goal of draining 5-10cc/hr and SG ELENA drain taken off suction. Pending CTH. EVD drained 0cc/hr, dropped to 0. NGT replaced. Dc'd fioricet. Started on PPI for intubation. Increased cardura to 4mg for urinary retention, given an additional 2mg now. Started salt tabs 3 q 6. Given 12.5mg fentanyl x1. NGT replaced, CXR shown in lung. NGT removed, repeat CXR showing no pneumothorax. Pending ENT consult for NGT placement. CTH stable. NGT replaced by ENT, confirmed in proper spot. Restarted TF. [id number]. Na 141 from 146. Increased 3% to 60. EVD raised to 3cmH20. ETT pulled back 1cm by RT.  : POD 5 SOC. Intubated, remains on precedex, ABG ordered with improving PaO2, BMP sodium 148, 3% changed to 30cc/hr, cardura increased to 8mg for tonight, tolerating cpap  : POD 6 SOC. Respiratory rate sustained 6, returned to FVS. Na 151, dc'd 3%, f/u AM sodium. Nurse noticed ETT 19 at the lip and was 20 prior, CXR shows ETT @ 5cm above jono, ET tube advanced 1cm, repeat CXR confirms appropriate placement. Thick inline secretions with suctioning. ENT trach placement Fri likely, PEG likely Mon. Keep ELENA drain until no output, EVD to remain @3. Start ASA 81mg daily tomorrow.   : POD7 SOC, neuro stable, given fentanyl x 1 overnight for presumed discomfort (biting on ETT), remains on full vent support. CTH today stable in comparison to . 6 cuffed trach placed by ENT in OR, but came down without cuff. Replaced at bedside by ENT. On fentanyl gtt.    : POD8 SOC, JIM overnight. Incision cleaned and dressing changed. On fentanyl gtt. EKG completed due to increased frequency PVCs on telemetry, frequency of PVCs improved with titrating up fentanyl gtt. ABG drawn.  Repeat LE dopplers completed showing persistant superficial thrombus, no DVT. No EVD waveform during day, flushed distally without improvement. Exam unchanged.  EVD dropped to 0 for low output in afternoon.   : POD9. Neuro stable, febrile to 101.3F o/n, given tylenol and pan cx sent. SBP dipped to low 90s o/n, HR stable in 70s with some PVCs, decreased fentanyl gtt and given 500cc bolus of NS x 2. Pend PEG placement Mon and angio Tu. D/c'd ELENA drain today and suture placed. F/u heme recs. Positive UA. Infiltrates found on CXR. Started on Zosyn 4.5g Q6hrs .   : POD 10. Neuro stable. Dc'd fentanyl gtt. PEG failed placement at bedside with Dr. Gant, plan for PEG in OR tomorrow afternoon with Dr. Layton. Dc'd amlodipine and started carvedilol 3.125 BID for PVCs . Made 3% inhalation and mucomyst q8hr. Failed PEG at bedside. Salt tabs made 1 q 6. Decreased cardura to 4mg daily. Urine culture growing E. Coli and sputum culture growing pluralibacter gergoviae and strep species. ID consulted, f/u recs. Failed CPAP trial @ 3pm. Added am labs per heme/onc for hypercoguable workup. Pending repeat LE dopplers in 2-3 days. NGT clogged, removed, ENT failed attempt at replacement, will keep NPO for PEG placement tmw. Repeat xray in am for concern for aspration. Pt abx switched from Zosyn to Ertapenem 1g Q24hrs. per ID recs, possible ESBL growth.   : POD 11. Neuro stable. diagnostic cerebral angiogram (bilateral carotid cavernous aneurysm) and PEG placement. NPO; pulled out radial TR band (right), EVD raised to 10    POD12 EVD raised to 15, then down to 5cm H20, CPAP today   POD13 EVD 5   POD 14    0506 POD 15 EVD raised to 15    Past Medical History: Asthma CAD (coronary artery disease) Peripheral neuropathy  Allergies:  No Known Allergies  Home meds:   ·	Albuterol (Eqv-ProAir HFA) 90 mcg/inh inhalation aerosol: 2 puff(s) inhaled every 6 hours as needed for  shortness of breath and/or wheezing    PHYSICAL EXAMINATION  T(C): 37.2 ( @ 19:00), Max: 37.9 ( @ 17:01)  HR: 67 ( @ :) (60 - 79)  BP: 96/51 ( @ 19:00) (90/51 - 122/57)  RR: 16 (:) (16 - 24)  SpO2: 96% ( @ :) (92% - 99%)    NEUROLOGIC EXAMINATION:  Patient awake, alert, oriented x1, FC, R gaze preference, RUE R 4/5 RLE 4/5 L UE 5/5  L LE 5/5  GENERAL: trached CPAP 10/5 40%  EENT:  anicteric, trach  CARDIOVASCULAR: (+) S1 S2, normal rate and regular rhythm  PULMONARY: clear to auscultation bilaterally  ABDOMEN: soft, nontender with normoactive bowel sounds  EXTREMITIES: no edema; good distal pulses  SKIN: no rash    LABS:                10.5   5.88  )-----------( 354      ( 06 May 2023 05:30 )             31.6     138  |  103  |  14  ----------------------------<  155<H>  4.0   |  27  |  0.45<L>    Ca    8.7      06 May 2023 05:30  Phos  3.8       Mg     2.0     - @ 07:01  -   @ 07:00  IN: 1750 mL / OUT: 971 mL / NET: 779 mL     @ 07:01  -   @ 19:31  IN: 1070 mL / OUT: 661 mL / NET: 409 mL     HbA1C = 5.9 (-)  LDL = 92 (-)   HDL = 42 (-)  TG = 106 ()  TSH = 0.152 ()    Bacteriology:     CSF NGTD   urine culture ESBL x2 strains   Blood NG4D x2   sputum:  pluralibacter gergoviae, mod strep pneumoniae    CSF studies:    L   *** [id number] WBC1 *** %N   %L1     EEG:  Neuroimaging:  Other imaging:    MEDICATIONS:     ·	enoxaparin Injectable 40 SubCutaneous every 24 hours  ·	ertapenem  IVPB 1000 IV Intermittent every 24 hours  ·	carvedilol 3.125 milliGRAM(s) Oral every 12 hours  ·	albuterol/ipratropium for Nebulization 3 Nebulizer every 8 hours  ·	sodium chloride 3%  Inhalation 4 Inhalation every 8 hours  ·	pantoprazole   Suspension 40 Oral daily  ·	psyllium Powder 1 Oral two times a day  ·	atorvastatin 80 Oral at bedtime  ·	ascorbic acid 500 Oral <User Schedule>  ·	ferrous    sulfate 325 Oral <User Schedule>  ·	lactobacillus acidophilus 1 Oral daily  ·	sodium chloride 1 Oral two times a day  ·	acetaminophen   Oral Liquid .. 650 Oral every 6 hours PRN  ·	fentaNYL    Injectable 12.5 IV Push every 4 hours PRN  ·	oxyCODONE    IR 5 Oral every 4 hours PRN    IV FLUIDS: NS@85cc/hr  DRIPS:  DIET: NPO   Lines:  Drains:      External Ventricular Device (mL): 245 mL - @ 0 cm H20   EVD raised to 5 output 367 ICPs 1-7  05/03 EVD at 5cm H20 ICPs <10  /04 EVD at 5cm H20   Wounds:    CODE STATUS:  Full Code                       GOALS OF CARE:  aggressive                      DISPOSITION:  ICU =================================  NEUROCRITICAL CARE ATTENDING NOTE  =================================    MAKSIM TRIVEDI   MRN-[id number]  Summary:  57y/F  with Asthma, CAD (not on  meds), peripheral neuropathy and PSH of BATOOL, cholecystectomy, right knee surgery presented to [place] ED on  with right sided weakness and right facial numbness. Patient was undergoing preparation for colonoscopy. As per daughter at 8am patient was playing with her grandchildren but around 840 am patient was getting dressed and fell and subsequently felt weak after. Daughter reports that patient had some episodes of vomiting at this time. She had a syncopal episode at around 10 am and was witnessed by brother. No head trauma, She regained conscious after few minutes. She remained in bed for the remainder of the day. Daughter reports some slurred speech noted 1230-1PM and also was confused after. and around 4PM, the patient's sister noted right sided weakness at which time EMS was called and patient was brought to ED. No c/o chest pain, shortness of breath, fever, headache, abdominal pain, urinary complaints. No recent travel/sickness/ change in meds. Stroke code in ER: CTH neg for heme, Right PICA distribution acute infarction. CTA with saccular aneurysms of b/l carotids, approximately 0.8 cm on the right and 1.2 x 0.9 cm on the left. Possible tiny third saccular aneurysm on the right Posterior intracranial circulation: Right vertebral arterial occlusion at its dural crossing junction V3 Atlantic and V4 intracranial segments with likely reversal of flow in its intracranial segment from the basilar. Right PICA faintly seen. 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  Not candidate for TNK/mechanical thrombectomy. CT scan repeated which showed: 1. Brain: Progression of acute infarction within the right cerebellar hemisphere, also extending into the left superior cerebellar hemisphere. New mass effect on the fourth ventricle causing stenosis versus occlusion with new third and lateral ventricular dilatation indicating ventricular obstruction at the level of the fourth ventricle. New upward and downward herniation of cerebellar parenchyma Right carotid system: No hemodynamically significant stenosis. Left carotid system: No hemodynamically significant stenosis. Vertebral circulation: Patent. Anterior intracranial circulation: Intact. Bilateral internal carotid saccular aneurysms. These findings are unchanged. Posterior intracranial circulation:    Improved flow within the right vertebral artery since 2023. New focal stenosis mid left vertebral artery, etiology uncertain, consider vasospasm and extrinsic compression in addition to new embolic disease. Brain perfusion: Perfusion images demonstrate normalization of the perfusion abnormality in the right cerebellar hemisphere present on 2023 despite evidence of progression of acute infarction.  Areas of apparent ischemia within the posterior fossa have progressed in extent, also again involving the left posterior cerebral arterial distribution. Tx to [facility] for SOC watch. On admission to [facility], NIHSS 12.  (2023 16:50)    COURSE IN THE HOSPITAL:  : Admitted for crani watch, CTH complete w/ unchanged hydrocephalus, taken for emergent occipital craniectomy.   : POD1. Remains intubated overnight, on propofol and dank. EVD@03euB70. Pending repeat CTH this am. Given hydralazine 10mg x1. Started on cardene for SBP high 140s-150s. Sub-therapeutic on plavix, therapeutic on asa, rpt asa accumetrics until subtherapeutic. LE dopplers neg for DVT, but showing superficial venous thrombosis in the proximal portion of the right greater saphenous vein. Started on 3% @60 for na goal 145-150. NGT placed by ENT. Febrile, pancultured.  : POD 2. 3% increased to 75. NGT readjusted. UA neg. SBP liberalized to 160. Plan to extubate. 3% decreased to 60. Failed extubation d/t no cuff leak, given 60mg solumedrol, plan to extubate in 6 hrs. SCx1 for post void residual >400, started on cardura at bedtime. New blood in buretrol, stopped SQL. Clot in EVD cleared. Neuro exam improving.   : POD 3. Cont 3% with NS while NPO, start TF today. TF started. LFTs downtrending. Sodium 141. Increased 3% to 50, NS to 30. Repeat BMP ordered for 5:30PM. Tramadol 25 for pain with no relief, oxy 5 and 1g tylenol ordered, stability CT stable, speech language consult for dysarthria. Given hydralazine 10mg IVP for SBP>160, started amlodipine 5mg. C/o mild headache, given fioricet x1, stroke neuro rec SALVADOR and loop recorder placement. Echo with bubble completed, negative for PFO, EF 55-60%. J HFNC started for desats with suctioning.   : POD 4. Cont HFNC. Increased secertions on HFNC, reintubated. CXR confirmed good placement. EVD dropped to 5cmH20 for goal of draining 5-10cc/hr and SG ELENA drain taken off suction. Pending CTH. EVD drained 0cc/hr, dropped to 0. NGT replaced. Dc'd fioricet. Started on PPI for intubation. Increased cardura to 4mg for urinary retention, given an additional 2mg now. Started salt tabs 3 q 6. Given 12.5mg fentanyl x1. NGT replaced, CXR shown in lung. NGT removed, repeat CXR showing no pneumothorax. Pending ENT consult for NGT placement. CTH stable. NGT replaced by ENT, confirmed in proper spot. Restarted TF. [id number]. Na 141 from 146. Increased 3% to 60. EVD raised to 3cmH20. ETT pulled back 1cm by RT.  : POD 5 SOC. Intubated, remains on precedex, ABG ordered with improving PaO2, BMP sodium 148, 3% changed to 30cc/hr, cardura increased to 8mg for tonight, tolerating cpap  : POD 6 SOC. Respiratory rate sustained 6, returned to FVS. Na 151, dc'd 3%, f/u AM sodium. Nurse noticed ETT 19 at the lip and was 20 prior, CXR shows ETT @ 5cm above jono, ET tube advanced 1cm, repeat CXR confirms appropriate placement. Thick inline secretions with suctioning. ENT trach placement Fri likely, PEG likely Mon. Keep ELENA drain until no output, EVD to remain @3. Start ASA 81mg daily tomorrow.   : POD7 SOC, neuro stable, given fentanyl x 1 overnight for presumed discomfort (biting on ETT), remains on full vent support. CTH today stable in comparison to . 6 cuffed trach placed by ENT in OR, but came down without cuff. Replaced at bedside by ENT. On fentanyl gtt.    : POD8 SOC, JIM overnight. Incision cleaned and dressing changed. On fentanyl gtt. EKG completed due to increased frequency PVCs on telemetry, frequency of PVCs improved with titrating up fentanyl gtt. ABG drawn.  Repeat LE dopplers completed showing persistant superficial thrombus, no DVT. No EVD waveform during day, flushed distally without improvement. Exam unchanged.  EVD dropped to 0 for low output in afternoon.   : POD9. Neuro stable, febrile to 101.3F o/n, given tylenol and pan cx sent. SBP dipped to low 90s o/n, HR stable in 70s with some PVCs, decreased fentanyl gtt and given 500cc bolus of NS x 2. Pend PEG placement Mon and angio Tu. D/c'd ELENA drain today and suture placed. F/u heme recs. Positive UA. Infiltrates found on CXR. Started on Zosyn 4.5g Q6hrs .   : POD 10. Neuro stable. Dc'd fentanyl gtt. PEG failed placement at bedside with Dr. Gant, plan for PEG in OR tomorrow afternoon with Dr. Layton. Dc'd amlodipine and started carvedilol 3.125 BID for PVCs . Made 3% inhalation and mucomyst q8hr. Failed PEG at bedside. Salt tabs made 1 q 6. Decreased cardura to 4mg daily. Urine culture growing E. Coli and sputum culture growing pluralibacter gergoviae and strep species. ID consulted, f/u recs. Failed CPAP trial @ 3pm. Added am labs per heme/onc for hypercoguable workup. Pending repeat LE dopplers in 2-3 days. NGT clogged, removed, ENT failed attempt at replacement, will keep NPO for PEG placement tmw. Repeat xray in am for concern for aspration. Pt abx switched from Zosyn to Ertapenem 1g Q24hrs. per ID recs, possible ESBL growth.   : POD 11. Neuro stable. diagnostic cerebral angiogram (bilateral carotid cavernous aneurysm) and PEG placement. NPO; pulled out radial TR band (right), EVD raised to 10    POD12 EVD raised to 15, then down to 5cm H20, CPAP today   POD13 EVD 5   POD 14    05 POD 15 EVD raised to 15; CPAP 8 hours    Past Medical History: Asthma CAD (coronary artery disease) Peripheral neuropathy  Allergies:  No Known Allergies  Home meds:   ·	Albuterol (Eqv-ProAir HFA) 90 mcg/inh inhalation aerosol: 2 puff(s) inhaled every 6 hours as needed for  shortness of breath and/or wheezing    PHYSICAL EXAMINATION  T(C): 37.2 ( @ 19:00), Max: 37.9 ( @ 17:01) HR: 67 ( @ :00) (60 - 79) BP: 96/51 ( @ 19:00) (90/51 - 122/57) RR: 16 ( @ :) (16 - 24) SpO2: 96% ( @ :) (92% - 99%)  NEUROLOGIC EXAMINATION:  Patient awake, alert, oriented x3, FC, R gaze preference, RUE R 4/5 RLE 4/5 L UE 5/5  L LE 5/5  GENERAL: trached CPAP 10/5 40%  EENT:  anicteric, trach  CARDIOVASCULAR: (+) S1 S2, normal rate and regular rhythm  PULMONARY: clear to auscultation bilaterally  ABDOMEN: soft, nontender with normoactive bowel sounds  EXTREMITIES: no edema; good distal pulses  SKIN: no rash    LABS:                10.5   5.88  )-----------( 354      ( 06 May 2023 05:30 )             31.6     138  |  103  |  14  ----------------------------<  155<H>  4.0   |  27  |  0.45<L>    Ca    8.7      06 May 2023 05:30  Phos  3.8       Mg     2.0      @ 07:01  -   @ 07:00  IN: 1750 mL / OUT: 971 mL / NET: 779 mL     @ 07:01  -   @ 19:31  IN: 1070 mL / OUT: 661 mL / NET: 409 mL     HbA1C = 5.9 (-)  LDL = 92 (-)   HDL = 42 (-)  TG = 106 (-)  TSH = 0.152 ()    Bacteriology:   CSF NGTD     CSF NGTD   urine culture ESBL x2 strains   Blood NG5D x2   sputum:  pluralibacter gergoviae, mod strep pneumoniae    CSF studies:    L   *** [id number] WBC1 *** %N   %L1     EEG:  Neuroimaging:  Other imaging:    MEDICATIONS:     ·	enoxaparin Injectable 40 SubCutaneous every 24 hours  ·	ertapenem  IVPB 1000 IV Intermittent every 24 hours  ·	carvedilol 3.125 milliGRAM(s) Oral every 12 hours  ·	albuterol/ipratropium for Nebulization 3 Nebulizer every 8 hours  ·	sodium chloride 3%  Inhalation 4 Inhalation every 8 hours  ·	pantoprazole   Suspension 40 Oral daily  ·	psyllium Powder 1 Oral two times a day  ·	atorvastatin 80 Oral at bedtime  ·	ascorbic acid 500 Oral <User Schedule>  ·	ferrous    sulfate 325 Oral <User Schedule>  ·	lactobacillus acidophilus 1 Oral daily  ·	sodium chloride 1 Oral two times a day  ·	acetaminophen   Oral Liquid .. 650 Oral every 6 hours PRN  ·	fentaNYL    Injectable 12.5 IV Push every 4 hours PRN  ·	oxyCODONE    IR 5 Oral every 4 hours PRN    IV FLUIDS: IVL  DRIPS:  DIET: Jevity @50cc/hr  Lines:  Drains:      External Ventricular Device (mL): 245 mL - @ 0 cm H20   EVD raised to 5 output 367 ICPs 1-7   EVD at 5cm H20 ICPs <10   EVD at 5cm H20    EVD at 15cm H20 ICPs < 10  Wounds:    CODE STATUS:  Full Code                       GOALS OF CARE:  aggressive                      DISPOSITION:  ICU

## 2023-05-06 NOTE — PROGRESS NOTE ADULT - ASSESSMENT
57y/F with  acute CVA, R cerebellar, brain compression cerebral edema, s/p SOC  UTI ESBL  acute respiratory failure, bulbar dysfunction  asthma  CAD  peripheral neuropathy  superficial thrombosis, proximal R greater saphenous  prediabetic     PLAN:   NEURO: neurochecks q1h, PRN pain meds with acetaminophen / opiates  EVD 10cm H20, challenge 20 sunday, clamp monday, scan monday  stroke on board, full AC once cleared by NSG  REHAB:  physical therapy evaluation and management    EARLY MOB:  HOB up    PULM:  CPAP 10/5 40%, CXR, nebs, s/p trach, continuous nebs  CARDIO:  SBP goal 100-160mm Hg  ENDO:  Blood sugar goals 140-180 mg/dL, continue insulin sliding scale, continue high dose statins  GI:  PPI for GI prophylaxis while intubated  DIET:  Jevity  RENAL:  cont cardura, Na goal 135-145, salt tabs, IVL  HEM/ONC: s/p 3 units platelets, 35 ug DDAVP, (+) aCL; ferrous sulfate  VTE Prophylaxis: SCDs, s/p IVC filter, SQL   ID: afebrile, no leukocytosis, continue ertapenem for ESBL UTI until 05/07 last day  Social: will update family    Active issues:  What's keeping patient in the ICU? close monitoring, EVD  What is this patient's dispo plan? stepdown once EVD removed    ATTENDING ATTESTATION:  I was physically present for the key portions of the evaluation and management (E/M) service provided.  I agree with the above history, physical and plan, which I have reviewed and edited where appropriate.    Patient at high risk for neurological deterioration or death due to:  ICU delirium, aspiration PNA, DVT / PE.  Critical care time:  I have personally provided 45 minutes of critical care time, excluding time spent on separate procedures.      Plan discussed with RN, house staff. 57y/F with  acute CVA, R cerebellar, brain compression cerebral edema, s/p SOC  UTI ESBL  acute respiratory failure, bulbar dysfunction  asthma  CAD  peripheral neuropathy  superficial thrombosis, proximal R greater saphenous  prediabetic     PLAN:   NEURO: neurochecks q1h, PRN pain meds with acetaminophen / opiates  EVD 15cm H20, challenge 20 sunday, clamp and scan on Monday   stroke on board, full AC once cleared by NSG  REHAB:  physical therapy evaluation and management    EARLY MOB:  HOB up    PULM:  CPAP tomorrow 10/5 40%, decrease nebs, s/p trach  CARDIO:  SBP goal 100-160mm Hg  ENDO:  Blood sugar goals 140-180 mg/dL, off insulin sliding scale, continue high dose statins  GI:  PPI for GI prophylaxis while intubated  DIET:  Jevity at goal  RENAL:  IVL off cardura, Na goal 135-145, d/c salt tabs  HEM/ONC: s/p 3 units platelets, 35 ug DDAVP, (+) aCL; ferrous sulfate  VTE Prophylaxis: SCDs, s/p IVC filter, SQL   ID: afebrile, no leukocytosis, continue ertapenem for ESBL UTI until 05/07 last day  Social: will update family    Active issues:  What's keeping patient in the ICU? close monitoring, EVD, vent requirements   What is this patient's dispo plan? stepdown once EVD removed and off vent    ATTENDING ATTESTATION:  I was physically present for the key portions of the evaluation and management (E/M) service provided.  I agree with the above history, physical and plan, which I have reviewed and edited where appropriate.    Patient at high risk for neurological deterioration or death due to:  ICU delirium, aspiration PNA, DVT / PE.  Critical care time:  I have personally provided 45 minutes of critical care time, excluding time spent on separate procedures.      Plan discussed with RN, house staff.

## 2023-05-06 NOTE — PROVIDER CONTACT NOTE (OTHER) - ASSESSMENT
Hydrocephalus? EVD still patent, dropped below ICP level and was draining . Neuro exam otherwise unchanged

## 2023-05-06 NOTE — PROGRESS NOTE ADULT - SUBJECTIVE AND OBJECTIVE BOX
INTERVAL HISTORY: HPI:  56 yo F with PMH of Asthma, CAD (not on  meds), peripheral neuropathy and PSH of BATOOL, cholecystectomy, right knee surgery presented to [place] ED on 4/20 with right sided weakness and right facial numbness. Patient was undergoing preparation for colonoscopy. As per daughter at 8am patient was playing with her grandchildren but around 840 am patient was getting dressed and fell and subsequently felt weak after. Daughter reports that patient had some episodes of vomiting at this time. She had a syncopal episode at around 10 am and was witnessed by brother. No head trauma, She regained conscious after few minutes. She remained in bed for the remainder of the day. Daughter reports some slurred speech noted 1230-1PM and also was confused after. and around 4PM, the patient's sister noted right sided weakness at which time EMS was called and patient was brought to ED. No c/o chest pain, shortness of breath, fever, headache, abdominal pain, urinary complaints. No recent travel/sickness/ change in meds. Stroke code in ER: CTH neg for heme, Right PICA distribution acute infarction. CTA with saccular aneurysms of b/l carotids, approximately 0.8 cm on the right and 1.2 x 0.9 cm on the left. Possible tiny third saccular aneurysm on the right Posterior intracranial circulation: Right vertebral arterial occlusion at its dural crossing junction V3 Atlantic and V4 intracranial segments with likely reversal of flow in its intracranial segment from the basilar. Right PICA faintly seen. 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  Not candidate for TNK/mechanical thrombectomy. CT scan repeated which showed: 1. Brain: Progression of acute infarction within the right cerebellar hemisphere, also extending into the left superior cerebellar hemisphere. New mass effect on the fourth ventricle causing stenosis versus occlusion with new third and lateral ventricular dilatation indicating ventricular obstruction at the level of the fourth ventricle. New upward and downward herniation of cerebellar parenchyma Right carotid system: No hemodynamically significant stenosis. Left carotid system: No hemodynamically significant stenosis. Vertebral circulation: Patent. Anterior intracranial circulation: Intact. Bilateral internal carotid saccular aneurysms. These findings are unchanged. Posterior intracranial circulation:    Improved flow within the right vertebral artery since 4/20/2023. New focal stenosis mid left vertebral artery, etiology uncertain, consider vasospasm and extrinsic compression in addition to new embolic disease. Brain perfusion: Perfusion images demonstrate normalization of the perfusion abnormality in the right cerebellar hemisphere present on 4/20/2023 despite evidence of progression of acute infarction.  Areas of apparent ischemia within the posterior fossa have progressed in extent, also again involving the left posterior cerebral arterial distribution. Tx to [facility] for SOC watch. On admission to [facility], NIHSS 12.  (21 Apr 2023 16:50)    PAST MEDICAL & SURGICAL HISTORY:  Asthma  CAD (coronary artery disease)  Peripheral neuropathy  S/P total abdominal hysterectomy  S/P cholecystectomy  S/P right knee surgery      REVIEW OF SYSTEMS: [x ] Unable to Assess due to neurologic exam   [ ] All ROS addressed below are non-contributory, except:  Neuro: [ ] Headache [ ] Back pain [ ] Numbness [ ] Weakness [ ] Ataxia [ ] Dizziness [ ] Aphasia [ ] Dysarthria [ ] Visual disturbance  Resp: [ ] Shortness of breath/dyspnea, [ ] Orthopnea [ ] Cough  CV: [ ] Chest pain [ ] Palpitation [ ] Lightheadedness [ ] Syncope  Renal: [ ] Thirst [ ] Edema  GI: [ ] Nausea [ ] Emesis [ ] Abdominal pain [ ] Constipation [ ] Diarrhea  Hem: [ ] Hematemesis [ ] bright red blood per rectum  ID: [ ] Fever [ ] Chills [ ] Dysuria  ENT: [ ] Rhinorrhea    PHYSICAL EXAM:  General: No Acute Distress, +trach in place   Neurological: AOx2 to choice, R eye 6th nerve palsy, b/l horizontal nystagmus, b/l UE & LE dysmetria, RUE & RLE 4+/5 w/ mild drift   Pulmonary: clear  Cardiovascular: sinus justo   Gastrointestinal: Soft, Nontender, Nondistended, peg   Extremities: No calf tenderness   Incision: CDI            ICU Vital Signs Last 24 Hrs  T(C): 37.1 (06 May 2023 05:28), Max: 37.3 (06 May 2023 01:19)  T(F): 98.8 (06 May 2023 05:28), Max: 99.2 (06 May 2023 01:19)  HR: 65 (06 May 2023 07:00) (60 - 89)  BP: 100/59 (06 May 2023 07:00) (90/51 - 135/60)  BP(mean): 76 (06 May 2023 07:00) (65 - 91)  ABP: --  ABP(mean): --  RR: 16 (06 May 2023 07:00) (16 - 22)  SpO2: 95% (06 May 2023 07:00) (91% - 99%)      05-05-23 @ 07:01  -  05-06-23 @ 07:00  --------------------------------------------------------  IN: 1750 mL / OUT: 971 mL / NET: 779 mL        Mode: AC/ CMV (Assist Control/ Continuous Mandatory Ventilation), RR (machine): 16, TV (machine): 400, FiO2: 40, PEEP: 5, ITime: 1, MAP: 10, PIP: 22    acetaminophen   Oral Liquid .. 650 milliGRAM(s) Oral every 6 hours PRN  albuterol/ipratropium for Nebulization 3 milliLiter(s) Nebulizer every 12 hours  ascorbic acid 500 milliGRAM(s) Oral <User Schedule>  atorvastatin 80 milliGRAM(s) Oral at bedtime  carvedilol 3.125 milliGRAM(s) Oral every 12 hours  chlorhexidine 0.12% Liquid 15 milliLiter(s) Oral Mucosa every 12 hours  chlorhexidine 2% Cloths 1 Application(s) Topical <User Schedule>  enoxaparin Injectable 40 milliGRAM(s) SubCutaneous every 24 hours  ertapenem  IVPB 1000 milliGRAM(s) IV Intermittent every 24 hours  fentaNYL    Injectable 12.5 MICROGram(s) IV Push every 4 hours PRN  ferrous    sulfate 325 milliGRAM(s) Oral <User Schedule>  lactobacillus acidophilus 1 Tablet(s) Oral daily  oxyCODONE    IR 5 milliGRAM(s) Oral every 4 hours PRN  pantoprazole   Suspension 40 milliGRAM(s) Oral daily  sodium chloride 1 Gram(s) Oral two times a day  sodium chloride 3%  Inhalation 4 milliLiter(s) Inhalation every 12 hours      LABS:  Na: 138 (05-06 @ 05:30), 136 (05-05 @ 05:14), 135 (05-04 @ 05:05)  K: 4.0 (05-06 @ 05:30), 3.7 (05-05 @ 05:14), 3.6 (05-04 @ 05:05)  Cl: 103 (05-06 @ 05:30), 100 (05-05 @ 05:14), 103 (05-04 @ 05:05)  CO2: 27 (05-06 @ 05:30), 24 (05-05 @ 05:14), 25 (05-04 @ 05:05)  BUN: 14 (05-06 @ 05:30), 11 (05-05 @ 05:14), 13 (05-04 @ 05:05)  Cr: 0.45 (05-06 @ 05:30), 0.44 (05-05 @ 05:14), 0.40 (05-04 @ 05:05)  Glu: 155(05-06 @ 05:30), 91(05-05 @ 05:14), 94(05-04 @ 05:05)    Hgb: 10.5 (05-06 @ 05:30), 9.7 (05-05 @ 05:14), 8.9 (05-04 @ 05:05)  Hct: 31.6 (05-06 @ 05:30), 28.4 (05-05 @ 05:14), 26.5 (05-04 @ 05:05)  WBC: 5.88 (05-06 @ 05:30), 5.41 (05-05 @ 05:14), 8.26 (05-04 @ 05:05)  Plt: 354 (05-06 @ 05:30), 326 (05-05 @ 05:14), 285 (05-04 @ 05:05)    INR: 1.17 05-04-23 @ 05:05  PTT: 36.9 05-04-23 @ 05:05

## 2023-05-06 NOTE — PROGRESS NOTE ADULT - ASSESSMENT
56 y/o female transferred from [place] with right sided weakness and right facial numbness. Found to have acute infarction within the right cerebellar hemisphere, causing herniation. Now s/p SOC foramen magnum decompression and right parietal/occipital EVD placement (4/21). Course c/b respiratory insuffiency d/t bulbar dysfunction, s/p trach 4/28/23 failed bedside  PEG placement 5/1; pending OR placement of PEG & DSA for stroke w/u     PLAN:  Neuro   - Vitals/neuro q1h   - s/p DSA for B/L carotid aneurysms pending discussion on management   - EVD raised to @[id number]; monitor ICP/output increase by 5cmh20  - CT head Monday if pseudomengiocele larger plan for shunt 5/10  - Repeat CTH 4/25 stable   - Stroke following  - pain control with tylenol prn, oxycodone prn, PRN 12.5mcg fent IVP q2 hrs    Cardio  - -160  - TTE 4/24: negative for PFO, mild LVH, mild dilation of L atrium, EF 55-60%  - c/w coreg 3.125mg BID for PVCs & HTN management     Pulm  - /40/16/8, CPAP trials as tolerated; has apenic episodes   - Chest PT, nebs q 12 d/c mucomyst c/w duoneb & 3% nacl inhlation  - 6 cuffed trach placed by ENT 4/28, missing cuff, replaced trach at bedside. --> ENT to remove trach sutures POD7 (5/5)    GI  s/p PEG 5/2; c/w tube feeds goal 50cc/hr  - Protonix   - transaminitis resolved     Renal  - Na goal 135-145, salt tabs 1 q 12  - Voiding via primafit     Endo   - no issues     Heme  - SCDs, SQL 40 mg QD (ppx)  - s/p 3 units platelets, 35 mcg of DDAVP for ASA/Plavix reversal  - LE dopplers 4/22 neg for DVT, but showing superficial venous thrombosis in the proximal portion of the right greater saphenous vein; repeat on 4/29 with persistent superficial thrombus   - Heme following for positive anticardiolipin Ab 4/27 & hexagonal positive, recs appreciated ; requesting AC insetting of hypercoagulable state; pending nsgy approval post VPS placement   -IVC filter placement 5/4 in setting of LE DVT and hypercoagulable state w/o ability to put pt on AC     ID  - Last panculture 4/30, MRSA (-), +ecoli in urine; sputum culture growing pluralibacter & strep species   ESBL ecoli urine - contact precautions   -c/w ertapenem end date 5/7  -CSF culture NGTD   -ID following    DISPO:  - NSICU, full code  - PT/OT rec acute inpatient rehab: patient can tolerate 3 hrs PT/OT daily   56 y/o female transferred from [place] with right sided weakness and right facial numbness. Found to have acute infarction within the right cerebellar hemisphere, causing herniation. Now s/p SOC foramen magnum decompression and right parietal/occipital EVD placement (4/21). Course c/b respiratory insuffiency d/t bulbar dysfunction, s/p trach 4/28/23 failed bedside  PEG placement 5/1; pending OR placement of PEG & DSA for stroke w/u     PLAN:  Neuro   - Vitals/neuro q1h   - s/p DSA for B/L carotid aneurysms pending discussion on management   - EVD raised to @[id number]; monitor ICP/output increase by 5cmh20  - CT head Monday if pseudomengiocele larger plan for shunt 5/10  - Repeat CTH 4/25 stable   - Stroke following  - pain control with tylenol prn, oxycodone prn, PRN 12.5mcg fent IVP q2 hrs    Cardio  - SBP   - TTE 4/24: negative for PFO, mild LVH, mild dilation of L atrium, EF 55-60%  - c/w coreg 3.125mg BID for PVCs & HTN management     Pulm  - /40/16/8, CPAP trials as tolerated; has apenic episodes   - Chest PT, nebs q 12 d/c mucomyst c/w duoneb & 3% nacl inhlation  - 6 cuffed trach placed by ENT 4/28, missing cuff, replaced trach at bedside. --> ENT to remove trach sutures POD7 (5/5)    GI  s/p PEG 5/2; c/w tube feeds goal 50cc/hr  - Protonix   - transaminitis resolved     Renal  - Na goal 135-145, salt tabs 1 q 12  - Voiding via primafit     Endo   - no issues     Heme  - SCDs, SQL 40 mg QD (ppx)  - s/p 3 units platelets, 35 mcg of DDAVP for ASA/Plavix reversal  - LE dopplers 4/22 neg for DVT, but showing superficial venous thrombosis in the proximal portion of the right greater saphenous vein; repeat on 4/29 with persistent superficial thrombus   - Heme following for positive anticardiolipin Ab 4/27 & hexagonal positive, recs appreciated ; requesting AC insetting of hypercoagulable state; pending nsgy approval post VPS placement   -IVC filter placement 5/4 in setting of LE DVT and hypercoagulable state w/o ability to put pt on AC     ID  - Last panculture 4/30, MRSA (-), +ecoli in urine; sputum culture growing pluralibacter & strep species   ESBL ecoli urine - contact precautions   -c/w ertapenem end date 5/7  -CSF culture NGTD   -ID following    DISPO:  - NSICU, full code  - PT/OT rec acute inpatient rehab: patient can tolerate 3 hrs PT/OT daily

## 2023-05-07 ENCOUNTER — TRANSCRIPTION ENCOUNTER (OUTPATIENT)
Age: 58
End: 2023-05-07

## 2023-05-07 LAB
ANION GAP SERPL CALC-SCNC: 10 MMOL/L — SIGNIFICANT CHANGE UP (ref 5–17)
BUN SERPL-MCNC: 10 MG/DL — SIGNIFICANT CHANGE UP (ref 7–23)
CALCIUM SERPL-MCNC: 9.3 MG/DL — SIGNIFICANT CHANGE UP (ref 8.4–10.5)
CHLORIDE SERPL-SCNC: 103 MMOL/L — SIGNIFICANT CHANGE UP (ref 96–108)
CO2 SERPL-SCNC: 27 MMOL/L — SIGNIFICANT CHANGE UP (ref 22–31)
CREAT SERPL-MCNC: 0.41 MG/DL — LOW (ref 0.5–1.3)
EGFR: 115 ML/MIN/1.73M2 — SIGNIFICANT CHANGE UP
GLUCOSE SERPL-MCNC: 139 MG/DL — HIGH (ref 70–99)
HCT VFR BLD CALC: 30.4 % — LOW (ref 34.5–45)
HGB BLD-MCNC: 10.3 G/DL — LOW (ref 11.5–15.5)
MAGNESIUM SERPL-MCNC: 2.1 MG/DL — SIGNIFICANT CHANGE UP (ref 1.6–2.6)
MCHC RBC-ENTMCNC: 30.2 PG — SIGNIFICANT CHANGE UP (ref 27–34)
MCHC RBC-ENTMCNC: 33.9 GM/DL — SIGNIFICANT CHANGE UP (ref 32–36)
MCV RBC AUTO: 89.1 FL — SIGNIFICANT CHANGE UP (ref 80–100)
NRBC # BLD: 0 /100 WBCS — SIGNIFICANT CHANGE UP (ref 0–0)
PHOSPHATE SERPL-MCNC: 4.3 MG/DL — SIGNIFICANT CHANGE UP (ref 2.5–4.5)
PLATELET # BLD AUTO: 370 K/UL — SIGNIFICANT CHANGE UP (ref 150–400)
POTASSIUM SERPL-MCNC: 4.2 MMOL/L — SIGNIFICANT CHANGE UP (ref 3.5–5.3)
POTASSIUM SERPL-SCNC: 4.2 MMOL/L — SIGNIFICANT CHANGE UP (ref 3.5–5.3)
RBC # BLD: 3.41 M/UL — LOW (ref 3.8–5.2)
RBC # FLD: 13.2 % — SIGNIFICANT CHANGE UP (ref 10.3–14.5)
SARS-COV-2 RNA SPEC QL NAA+PROBE: SIGNIFICANT CHANGE UP
SODIUM SERPL-SCNC: 140 MMOL/L — SIGNIFICANT CHANGE UP (ref 135–145)
WBC # BLD: 6.04 K/UL — SIGNIFICANT CHANGE UP (ref 3.8–10.5)
WBC # FLD AUTO: 6.04 K/UL — SIGNIFICANT CHANGE UP (ref 3.8–10.5)

## 2023-05-07 PROCEDURE — 99291 CRITICAL CARE FIRST HOUR: CPT

## 2023-05-07 PROCEDURE — 99024 POSTOP FOLLOW-UP VISIT: CPT

## 2023-05-07 RX ORDER — ENOXAPARIN SODIUM 100 MG/ML
40 INJECTION SUBCUTANEOUS
Refills: 0 | Status: DISCONTINUED | OUTPATIENT
Start: 2023-05-07 | End: 2023-05-07

## 2023-05-07 RX ORDER — OXYCODONE HYDROCHLORIDE 5 MG/1
10 TABLET ORAL EVERY 4 HOURS
Refills: 0 | Status: DISCONTINUED | OUTPATIENT
Start: 2023-05-07 | End: 2023-05-08

## 2023-05-07 RX ORDER — SODIUM CHLORIDE 9 MG/ML
1000 INJECTION INTRAMUSCULAR; INTRAVENOUS; SUBCUTANEOUS
Refills: 0 | Status: DISCONTINUED | OUTPATIENT
Start: 2023-05-08 | End: 2023-05-08

## 2023-05-07 RX ORDER — HYDROMORPHONE HYDROCHLORIDE 2 MG/ML
0.25 INJECTION INTRAMUSCULAR; INTRAVENOUS; SUBCUTANEOUS ONCE
Refills: 0 | Status: DISCONTINUED | OUTPATIENT
Start: 2023-05-07 | End: 2023-05-07

## 2023-05-07 RX ORDER — OXYCODONE HYDROCHLORIDE 5 MG/1
5 TABLET ORAL EVERY 4 HOURS
Refills: 0 | Status: DISCONTINUED | OUTPATIENT
Start: 2023-05-07 | End: 2023-05-08

## 2023-05-07 RX ADMIN — CARVEDILOL PHOSPHATE 3.12 MILLIGRAM(S): 80 CAPSULE, EXTENDED RELEASE ORAL at 05:49

## 2023-05-07 RX ADMIN — Medication 650 MILLIGRAM(S): at 12:00

## 2023-05-07 RX ADMIN — Medication 3 MILLILITER(S): at 21:41

## 2023-05-07 RX ADMIN — CARVEDILOL PHOSPHATE 3.12 MILLIGRAM(S): 80 CAPSULE, EXTENDED RELEASE ORAL at 17:34

## 2023-05-07 RX ADMIN — Medication 1 PACKET(S): at 17:34

## 2023-05-07 RX ADMIN — Medication 3 MILLILITER(S): at 05:59

## 2023-05-07 RX ADMIN — Medication 650 MILLIGRAM(S): at 11:12

## 2023-05-07 RX ADMIN — CHLORHEXIDINE GLUCONATE 15 MILLILITER(S): 213 SOLUTION TOPICAL at 05:49

## 2023-05-07 RX ADMIN — Medication 1 TABLET(S): at 11:13

## 2023-05-07 RX ADMIN — OXYCODONE HYDROCHLORIDE 5 MILLIGRAM(S): 5 TABLET ORAL at 16:30

## 2023-05-07 RX ADMIN — OXYCODONE HYDROCHLORIDE 5 MILLIGRAM(S): 5 TABLET ORAL at 12:00

## 2023-05-07 RX ADMIN — CHLORHEXIDINE GLUCONATE 15 MILLILITER(S): 213 SOLUTION TOPICAL at 17:34

## 2023-05-07 RX ADMIN — ENOXAPARIN SODIUM 40 MILLIGRAM(S): 100 INJECTION SUBCUTANEOUS at 17:34

## 2023-05-07 RX ADMIN — OXYCODONE HYDROCHLORIDE 5 MILLIGRAM(S): 5 TABLET ORAL at 15:25

## 2023-05-07 RX ADMIN — ATORVASTATIN CALCIUM 80 MILLIGRAM(S): 80 TABLET, FILM COATED ORAL at 21:51

## 2023-05-07 RX ADMIN — Medication 3 MILLILITER(S): at 13:01

## 2023-05-07 RX ADMIN — PANTOPRAZOLE SODIUM 40 MILLIGRAM(S): 20 TABLET, DELAYED RELEASE ORAL at 11:13

## 2023-05-07 RX ADMIN — CHLORHEXIDINE GLUCONATE 1 APPLICATION(S): 213 SOLUTION TOPICAL at 05:49

## 2023-05-07 RX ADMIN — OXYCODONE HYDROCHLORIDE 5 MILLIGRAM(S): 5 TABLET ORAL at 21:52

## 2023-05-07 RX ADMIN — OXYCODONE HYDROCHLORIDE 5 MILLIGRAM(S): 5 TABLET ORAL at 11:12

## 2023-05-07 RX ADMIN — OXYCODONE HYDROCHLORIDE 5 MILLIGRAM(S): 5 TABLET ORAL at 23:00

## 2023-05-07 NOTE — PROGRESS NOTE ADULT - ASSESSMENT
58 y/o female transferred from [place] with right sided weakness and right facial numbness. Found to have acute infarction within the right cerebellar hemisphere, causing herniation. Now s/p SOC foramen magnum decompression and right parietal/occipital EVD placement (4/21). Course c/b respiratory insuffiency d/t bulbar dysfunction, s/p trach 4/28/23 failed bedside  PEG placement 5/1; pending OR placement of PEG & DSA for stroke w/u     PLAN:  Neuro   - Vitals/neuro q1h   - s/p DSA for B/L carotid aneurysms pending discussion on management   - EVD raised to @[id number]; monitor ICP/output - CT head Monday if pseudomengiocele larger plan for shunt 5/10  - Repeat CTH 4/25 stable   - Stroke following  - pain control with tylenol prn, oxycodone prn, PRN 12.5mcg fent IVP q2 hrs    Cardio  - SBP   - TTE 4/24: negative for PFO, mild LVH, mild dilation of L atrium, EF 55-60%  - c/w coreg 3.125mg BID for PVCs & HTN management     Pulm  - /40/16/8, CPAP trials as tolerated; has apenic episodes   - Chest PT, nebs q 12 d/c mucomyst c/w duoneb & 3% nacl inhlation  - 6 cuffed trach placed by ENT 4/28, missing cuff, replaced trach at bedside. --> ENT to remove trach sutures POD7 (5/5)    GI  s/p PEG 5/2; c/w tube feeds goal 50cc/hr  - Protonix   -loose stool in setting of abx use, low concern for c diff, rectal tube in place; metamucil BID, bananna flakes, probiotics   - transaminitis resolved     Renal  - Na goal 135-145,  - Voiding via primafit     Endo   - no issues     Heme  - SCDs, SQL 40 mg QD (ppx)  - s/p 3 units platelets, 35 mcg of DDAVP for ASA/Plavix reversal  - LE dopplers 4/22 neg for DVT, but showing superficial venous thrombosis in the proximal portion of the right greater saphenous vein; repeat on 4/29 with persistent superficial thrombus   - Heme following for positive anticardiolipin Ab 4/27 & hexagonal positive, recs appreciated ; requesting AC insetting of hypercoagulable state; pending nsgy approval post VPS placement   -IVC filter placement 5/4 in setting of LE DVT and hypercoagulable state w/o ability to put pt on AC     ID  - Last panculture 4/30, MRSA (-), +ecoli in urine; sputum culture growing pluralibacter & strep species   ESBL ecoli urine - contact precautions   -c/w ertapenem end date 5/7  -CSF culture NGTD   -ID following    DISPO:  - NSICU, full code  - PT/OT rec acute inpatient rehab: patient can tolerate 3 hrs PT/OT daily

## 2023-05-07 NOTE — PROGRESS NOTE ADULT - ASSESSMENT
57y/F with  acute CVA, R cerebellar, brain compression cerebral edema, s/p SOC  UTI ESBL  acute respiratory failure, bulbar dysfunction  asthma  CAD  peripheral neuropathy  superficial thrombosis, proximal R greater saphenous  prediabetic     PLAN:   NEURO: neurochecks q1h, PRN pain meds with acetaminophen / opiates  EVD 15cm H20, challenge 20 sunday, clamp and scan on Monday   stroke on board, full AC once cleared by NSG  REHAB:  physical therapy evaluation and management    EARLY MOB:  HOB up    PULM:  CPAP tomorrow 10/5 40%, decrease nebs, s/p trach  CARDIO:  SBP goal 100-160mm Hg  ENDO:  Blood sugar goals 140-180 mg/dL, off insulin sliding scale, continue high dose statins  GI:  PPI for GI prophylaxis while intubated  DIET:  Jevity at goal  RENAL:  IVL off cardura, Na goal 135-145, d/c salt tabs  HEM/ONC: s/p 3 units platelets, 35 ug DDAVP, (+) aCL; ferrous sulfate  VTE Prophylaxis: SCDs, s/p IVC filter, SQL   ID: afebrile, no leukocytosis, continue ertapenem for ESBL UTI until 05/07 last day  Social: will update family    Active issues:  What's keeping patient in the ICU? close monitoring, EVD, vent requirements   What is this patient's dispo plan? stepdown once EVD removed and off vent    ATTENDING ATTESTATION:  I was physically present for the key portions of the evaluation and management (E/M) service provided.  I agree with the above history, physical and plan, which I have reviewed and edited where appropriate.    Patient at high risk for neurological deterioration or death due to:  ICU delirium, aspiration PNA, DVT / PE.  Critical care time:  I have personally provided 45 minutes of critical care time, excluding time spent on separate procedures.      Plan discussed with RN, house staff. 57y/F with  acute CVA, R cerebellar, brain compression cerebral edema, s/p SOC  UTI ESBL  acute respiratory failure, bulbar dysfunction  asthma  CAD  peripheral neuropathy  superficial thrombosis, proximal R greater saphenous  prediabetic     PLAN:   NEURO: neurochecks q1h, PRN pain meds with acetaminophen / opiates  EVD 20cm H20, VPS tomorrow  stroke on board, full AC once cleared by NSG  REHAB:  physical therapy evaluation and management    EARLY MOB:  HOB up    PULM:  keep full vent support, cont nebs s/p trach  CARDIO:  SBP goal 100-160mm Hg  ENDO:  Blood sugar goals 140-180 mg/dL, continue high dose statins  GI:  PPI for GI prophylaxis while intubated  DIET:  NPO after midnight  RENAL:  NS@75cc/hr, after MN, Na goal 135-145  HEM/ONC: s/p 3 units platelets, 35 ug DDAVP, (+) aCL; ferrous sulfate  VTE Prophylaxis: SCDs, s/p IVC filter, off SQL (going to OR tomorrow)  ID: afebrile, no leukocytosis, s/p 7days ertapenem, ancef periop x 3 doses then d/c    Social: will update family    Active issues:  What's keeping patient in the ICU? close monitoring, EVD, vent requirements   What is this patient's dispo plan? stepdown once EVD removed and off vent    ATTENDING ATTESTATION:  I was physically present for the key portions of the evaluation and management (E/M) service provided.  I agree with the above history, physical and plan, which I have reviewed and edited where appropriate.    Patient at high risk for neurological deterioration or death due to:  ICU delirium, aspiration PNA, DVT / PE.  Critical care time:  I have personally provided 45 minutes of critical care time, excluding time spent on separate procedures.      Plan discussed with RN, house staff.

## 2023-05-07 NOTE — CHART NOTE - NSCHARTNOTEFT_GEN_A_CORE
POD 16: D/c'd salt tabs and 3% neb. Wean trach to CPAP as tolerated. Pan cx NGTD. EVD raised today to [id number].

## 2023-05-07 NOTE — PROGRESS NOTE ADULT - SUBJECTIVE AND OBJECTIVE BOX
=================================  NEUROCRITICAL CARE ATTENDING NOTE  =================================    MAKSIM TRIVEDI   MRN-[id number]  Summary:  57y/F  with Asthma, CAD (not on  meds), peripheral neuropathy and PSH of BATOOL, cholecystectomy, right knee surgery presented to [place] ED on  with right sided weakness and right facial numbness. Patient was undergoing preparation for colonoscopy. As per daughter at 8am patient was playing with her grandchildren but around 840 am patient was getting dressed and fell and subsequently felt weak after. Daughter reports that patient had some episodes of vomiting at this time. She had a syncopal episode at around 10 am and was witnessed by brother. No head trauma, She regained conscious after few minutes. She remained in bed for the remainder of the day. Daughter reports some slurred speech noted 1230-1PM and also was confused after. and around 4PM, the patient's sister noted right sided weakness at which time EMS was called and patient was brought to ED. No c/o chest pain, shortness of breath, fever, headache, abdominal pain, urinary complaints. No recent travel/sickness/ change in meds. Stroke code in ER: CTH neg for heme, Right PICA distribution acute infarction. CTA with saccular aneurysms of b/l carotids, approximately 0.8 cm on the right and 1.2 x 0.9 cm on the left. Possible tiny third saccular aneurysm on the right Posterior intracranial circulation: Right vertebral arterial occlusion at its dural crossing junction V3 Atlantic and V4 intracranial segments with likely reversal of flow in its intracranial segment from the basilar. Right PICA faintly seen. 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  Not candidate for TNK/mechanical thrombectomy. CT scan repeated which showed: 1. Brain: Progression of acute infarction within the right cerebellar hemisphere, also extending into the left superior cerebellar hemisphere. New mass effect on the fourth ventricle causing stenosis versus occlusion with new third and lateral ventricular dilatation indicating ventricular obstruction at the level of the fourth ventricle. New upward and downward herniation of cerebellar parenchyma Right carotid system: No hemodynamically significant stenosis. Left carotid system: No hemodynamically significant stenosis. Vertebral circulation: Patent. Anterior intracranial circulation: Intact. Bilateral internal carotid saccular aneurysms. These findings are unchanged. Posterior intracranial circulation:    Improved flow within the right vertebral artery since 2023. New focal stenosis mid left vertebral artery, etiology uncertain, consider vasospasm and extrinsic compression in addition to new embolic disease. Brain perfusion: Perfusion images demonstrate normalization of the perfusion abnormality in the right cerebellar hemisphere present on 2023 despite evidence of progression of acute infarction.  Areas of apparent ischemia within the posterior fossa have progressed in extent, also again involving the left posterior cerebral arterial distribution. Tx to [facility] for SOC watch. On admission to [facility], NIHSS 12.  (2023 16:50)    COURSE IN THE HOSPITAL:  : Admitted for crani watch, CTH complete w/ unchanged hydrocephalus, taken for emergent occipital craniectomy.   : POD1. Remains intubated overnight, on propofol and dank. EVD@[id number]. Pending repeat CTH this am. Given hydralazine 10mg x1. Started on cardene for SBP high 140s-150s. Sub-therapeutic on plavix, therapeutic on asa, rpt asa accumetrics until subtherapeutic. LE dopplers neg for DVT, but showing superficial venous thrombosis in the proximal portion of the right greater saphenous vein. Started on 3% @60 for na goal 145-150. NGT placed by ENT. Febrile, pancultured.  : POD 2. 3% increased to 75. NGT readjusted. UA neg. SBP liberalized to 160. Plan to extubate. 3% decreased to 60. Failed extubation d/t no cuff leak, given 60mg solumedrol, plan to extubate in 6 hrs. SCx1 for post void residual >400, started on cardura at bedtime. New blood in buretrol, stopped SQL. Clot in EVD cleared. Neuro exam improving.   : POD 3. Cont 3% with NS while NPO, start TF today. TF started. LFTs downtrending. Sodium 141. Increased 3% to 50, NS to 30. Repeat BMP ordered for 5:30PM. Tramadol 25 for pain with no relief, oxy 5 and 1g tylenol ordered, stability CT stable, speech language consult for dysarthria. Given hydralazine 10mg IVP for SBP>160, started amlodipine 5mg. C/o mild headache, given fioricet x1, stroke neuro rec SALVADOR and loop recorder placement. Echo with bubble completed, negative for PFO, EF 55-60%. J HFNC started for desats with suctioning.   : POD 4. Cont HFNC. Increased secertions on HFNC, reintubated. CXR confirmed good placement. EVD dropped to 5cmH20 for goal of draining 5-10cc/hr and SG ELENA drain taken off suction. Pending CTH. EVD drained 0cc/hr, dropped to 0. NGT replaced. Dc'd fioricet. Started on PPI for intubation. Increased cardura to 4mg for urinary retention, given an additional 2mg now. Started salt tabs 3 q 6. Given 12.5mg fentanyl x1. NGT replaced, CXR shown in lung. NGT removed, repeat CXR showing no pneumothorax. Pending ENT consult for NGT placement. CTH stable. NGT replaced by ENT, confirmed in proper spot. Restarted TF. [id number]. Na 141 from 146. Increased 3% to 60. EVD raised to 3cmH20. ETT pulled back 1cm by RT.  : POD 5 SOC. Intubated, remains on precedex, ABG ordered with improving PaO2, BMP sodium 148, 3% changed to 30cc/hr, cardura increased to 8mg for tonight, tolerating cpap  : POD 6 SOC. Respiratory rate sustained 6, returned to FVS. Na 151, dc'd 3%, f/u AM sodium. Nurse noticed ETT 19 at the lip and was 20 prior, CXR shows ETT @ 5cm above jono, ET tube advanced 1cm, repeat CXR confirms appropriate placement. Thick inline secretions with suctioning. ENT trach placement Fri likely, PEG likely Mon. Keep ELENA drain until no output, EVD to remain @3. Start ASA 81mg daily tomorrow.   : POD7 SOC, neuro stable, given fentanyl x 1 overnight for presumed discomfort (biting on ETT), remains on full vent support. CTH today stable in comparison to . 6 cuffed trach placed by ENT in OR, but came down without cuff. Replaced at bedside by ENT. On fentanyl gtt.    : POD8 SOC, JIM overnight. Incision cleaned and dressing changed. On fentanyl gtt. EKG completed due to increased frequency PVCs on telemetry, frequency of PVCs improved with titrating up fentanyl gtt. ABG drawn.  Repeat LE dopplers completed showing persistant superficial thrombus, no DVT. No EVD waveform during day, flushed distally without improvement. Exam unchanged.  EVD dropped to 0 for low output in afternoon.   : POD9. Neuro stable, febrile to 101.3F o/n, given tylenol and pan cx sent. SBP dipped to low 90s o/n, HR stable in 70s with some PVCs, decreased fentanyl gtt and given 500cc bolus of NS x 2. Pend PEG placement Mon and angio Tu. D/c'd ELENA drain today and suture placed. F/u heme recs. Positive UA. Infiltrates found on CXR. Started on Zosyn 4.5g Q6hrs .   : POD 10. Neuro stable. Dc'd fentanyl gtt. PEG failed placement at bedside with Dr. Gant, plan for PEG in OR tomorrow afternoon with Dr. Layton. Dc'd amlodipine and started carvedilol 3.125 BID for PVCs . Made 3% inhalation and mucomyst q8hr. Failed PEG at bedside. Salt tabs made 1 q 6. Decreased cardura to 4mg daily. Urine culture growing E. Coli and sputum culture growing pluralibacter gergoviae and strep species. ID consulted, f/u recs. Failed CPAP trial @ 3pm. Added am labs per heme/onc for hypercoguable workup. Pending repeat LE dopplers in 2-3 days. NGT clogged, removed, ENT failed attempt at replacement, will keep NPO for PEG placement tmw. Repeat xray in am for concern for aspration. Pt abx switched from Zosyn to Ertapenem 1g Q24hrs. per ID recs, possible ESBL growth.   : POD 11. Neuro stable. diagnostic cerebral angiogram (bilateral carotid cavernous aneurysm) and PEG placement. NPO; pulled out radial TR band (right), EVD raised to 10    POD12 EVD raised to 15, then down to 5cm H20, CPAP today   POD13 EVD 5   POD 14    05 POD 15 EVD raised to 15; CPAP 8 hours    Past Medical History: Asthma CAD (coronary artery disease) Peripheral neuropathy  Allergies:  No Known Allergies  Home meds:   ·	Albuterol (Eqv-ProAir HFA) 90 mcg/inh inhalation aerosol: 2 puff(s) inhaled every 6 hours as needed for  shortness of breath and/or wheezing    PHYSICAL EXAMINATION  T(C): 37.2 ( @ 19:00), Max: 37.9 ( @ 17:01) HR: 67 ( @ :00) (60 - 79) BP: 96/51 ( @ 19:00) (90/51 - 122/57) RR: 16 ( @ :) (16 - 24) SpO2: 96% ( @ :) (92% - 99%)  NEUROLOGIC EXAMINATION:  Patient awake, alert, oriented x3, FC, R gaze preference, RUE R 4/5 RLE 4/5 L UE 5/5  L LE 5/5  GENERAL: trached CPAP 10/5 40%  EENT:  anicteric, trach  CARDIOVASCULAR: (+) S1 S2, normal rate and regular rhythm  PULMONARY: clear to auscultation bilaterally  ABDOMEN: soft, nontender with normoactive bowel sounds  EXTREMITIES: no edema; good distal pulses  SKIN: no rash    LABS:                10.5   5.88  )-----------( 354      ( 06 May 2023 05:30 )             31.6     138  |  103  |  14  ----------------------------<  155<H>  4.0   |  27  |  0.45<L>    Ca    8.7      06 May 2023 05:30  Phos  3.8       Mg     2.0      @ 07:01  -   @ 07:00  IN: 1750 mL / OUT: 971 mL / NET: 779 mL     @ 07:01  -   @ 19:31  IN: 1070 mL / OUT: 661 mL / NET: 409 mL     HbA1C = 5.9 (-)  LDL = 92 (-)   HDL = 42 (-)  TG = 106 (-)  TSH = 0.152 ()    Bacteriology:   CSF NGTD     CSF NGTD   urine culture ESBL x2 strains   Blood NG5D x2   sputum:  pluralibacter gergoviae, mod strep pneumoniae    CSF studies:    L   *** [id number] WBC1 *** %N   %L1     EEG:  Neuroimaging:  Other imaging:    MEDICATIONS:     ·	enoxaparin Injectable 40 SubCutaneous every 24 hours  ·	ertapenem  IVPB 1000 IV Intermittent every 24 hours  ·	carvedilol 3.125 milliGRAM(s) Oral every 12 hours  ·	albuterol/ipratropium for Nebulization 3 Nebulizer every 8 hours  ·	sodium chloride 3%  Inhalation 4 Inhalation every 8 hours  ·	pantoprazole   Suspension 40 Oral daily  ·	psyllium Powder 1 Oral two times a day  ·	atorvastatin 80 Oral at bedtime  ·	ascorbic acid 500 Oral <User Schedule>  ·	ferrous    sulfate 325 Oral <User Schedule>  ·	lactobacillus acidophilus 1 Oral daily  ·	sodium chloride 1 Oral two times a day  ·	acetaminophen   Oral Liquid .. 650 Oral every 6 hours PRN  ·	fentaNYL    Injectable 12.5 IV Push every 4 hours PRN  ·	oxyCODONE    IR 5 Oral every 4 hours PRN    IV FLUIDS: IVL  DRIPS:  DIET: Jevity @50cc/hr  Lines:  Drains:      External Ventricular Device (mL): 245 mL - @ 0 cm H20   EVD raised to 5 output 367 ICPs 1-7   EVD at 5cm H20 ICPs <10   EVD at 5cm H20    EVD at 15cm H20 ICPs < 10  Wounds:    CODE STATUS:  Full Code                       GOALS OF CARE:  aggressive                      DISPOSITION:  ICU =================================  NEUROCRITICAL CARE ATTENDING NOTE  =================================    MAKSIM TRIVEDI   MRN-[id number]  Summary:  57y/F  with Asthma, CAD (not on  meds), peripheral neuropathy and PSH of BATOOL, cholecystectomy, right knee surgery presented to [place] ED on  with right sided weakness and right facial numbness. Patient was undergoing preparation for colonoscopy. As per daughter at 8am patient was playing with her grandchildren but around 840 am patient was getting dressed and fell and subsequently felt weak after. Daughter reports that patient had some episodes of vomiting at this time. She had a syncopal episode at around 10 am and was witnessed by brother. No head trauma, She regained conscious after few minutes. She remained in bed for the remainder of the day. Daughter reports some slurred speech noted 1230-1PM and also was confused after. and around 4PM, the patient's sister noted right sided weakness at which time EMS was called and patient was brought to ED. No c/o chest pain, shortness of breath, fever, headache, abdominal pain, urinary complaints. No recent travel/sickness/ change in meds. Stroke code in ER: CTH neg for heme, Right PICA distribution acute infarction. CTA with saccular aneurysms of b/l carotids, approximately 0.8 cm on the right and 1.2 x 0.9 cm on the left. Possible tiny third saccular aneurysm on the right Posterior intracranial circulation: Right vertebral arterial occlusion at its dural crossing junction V3 Atlantic and V4 intracranial segments with likely reversal of flow in its intracranial segment from the basilar. Right PICA faintly seen. 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  Not candidate for TNK/mechanical thrombectomy. CT scan repeated which showed: 1. Brain: Progression of acute infarction within the right cerebellar hemisphere, also extending into the left superior cerebellar hemisphere. New mass effect on the fourth ventricle causing stenosis versus occlusion with new third and lateral ventricular dilatation indicating ventricular obstruction at the level of the fourth ventricle. New upward and downward herniation of cerebellar parenchyma Right carotid system: No hemodynamically significant stenosis. Left carotid system: No hemodynamically significant stenosis. Vertebral circulation: Patent. Anterior intracranial circulation: Intact. Bilateral internal carotid saccular aneurysms. These findings are unchanged. Posterior intracranial circulation:    Improved flow within the right vertebral artery since 2023. New focal stenosis mid left vertebral artery, etiology uncertain, consider vasospasm and extrinsic compression in addition to new embolic disease. Brain perfusion: Perfusion images demonstrate normalization of the perfusion abnormality in the right cerebellar hemisphere present on 2023 despite evidence of progression of acute infarction.  Areas of apparent ischemia within the posterior fossa have progressed in extent, also again involving the left posterior cerebral arterial distribution. Tx to [facility] for SOC watch. On admission to [facility], NIHSS 12.  (2023 16:50)    COURSE IN THE HOSPITAL:  : Admitted for crani watch, CTH complete w/ unchanged hydrocephalus, taken for emergent occipital craniectomy.   : POD1. Remains intubated overnight, on propofol and dank. EVD@[id number]. Pending repeat CTH this am. Given hydralazine 10mg x1. Started on cardene for SBP high 140s-150s. Sub-therapeutic on plavix, therapeutic on asa, rpt asa accumetrics until subtherapeutic. LE dopplers neg for DVT, but showing superficial venous thrombosis in the proximal portion of the right greater saphenous vein. Started on 3% @60 for na goal 145-150. NGT placed by ENT. Febrile, pancultured.  : POD 2. 3% increased to 75. NGT readjusted. UA neg. SBP liberalized to 160. Plan to extubate. 3% decreased to 60. Failed extubation d/t no cuff leak, given 60mg solumedrol, plan to extubate in 6 hrs. SCx1 for post void residual >400, started on cardura at bedtime. New blood in buretrol, stopped SQL. Clot in EVD cleared. Neuro exam improving.   : POD 3. Cont 3% with NS while NPO, start TF today. TF started. LFTs downtrending. Sodium 141. Increased 3% to 50, NS to 30. Repeat BMP ordered for 5:30PM. Tramadol 25 for pain with no relief, oxy 5 and 1g tylenol ordered, stability CT stable, speech language consult for dysarthria. Given hydralazine 10mg IVP for SBP>160, started amlodipine 5mg. C/o mild headache, given fioricet x1, stroke neuro rec SALVADOR and loop recorder placement. Echo with bubble completed, negative for PFO, EF 55-60%. J HFNC started for desats with suctioning.   : POD 4. Cont HFNC. Increased secertions on HFNC, reintubated. CXR confirmed good placement. EVD dropped to 5cmH20 for goal of draining 5-10cc/hr and SG ELENA drain taken off suction. Pending CTH. EVD drained 0cc/hr, dropped to 0. NGT replaced. Dc'd fioricet. Started on PPI for intubation. Increased cardura to 4mg for urinary retention, given an additional 2mg now. Started salt tabs 3 q 6. Given 12.5mg fentanyl x1. NGT replaced, CXR shown in lung. NGT removed, repeat CXR showing no pneumothorax. Pending ENT consult for NGT placement. CTH stable. NGT replaced by ENT, confirmed in proper spot. Restarted TF. [id number]. Na 141 from 146. Increased 3% to 60. EVD raised to 3cmH20. ETT pulled back 1cm by RT.  : POD 5 SOC. Intubated, remains on precedex, ABG ordered with improving PaO2, BMP sodium 148, 3% changed to 30cc/hr, cardura increased to 8mg for tonight, tolerating cpap  : POD 6 SOC. Respiratory rate sustained 6, returned to FVS. Na 151, dc'd 3%, f/u AM sodium. Nurse noticed ETT 19 at the lip and was 20 prior, CXR shows ETT @ 5cm above jono, ET tube advanced 1cm, repeat CXR confirms appropriate placement. Thick inline secretions with suctioning. ENT trach placement Fri likely, PEG likely Mon. Keep ELENA drain until no output, EVD to remain @3. Start ASA 81mg daily tomorrow.   : POD7 SOC, neuro stable, given fentanyl x 1 overnight for presumed discomfort (biting on ETT), remains on full vent support. CTH today stable in comparison to . 6 cuffed trach placed by ENT in OR, but came down without cuff. Replaced at bedside by ENT. On fentanyl gtt.    : POD8 SOC, JIM overnight. Incision cleaned and dressing changed. On fentanyl gtt. EKG completed due to increased frequency PVCs on telemetry, frequency of PVCs improved with titrating up fentanyl gtt. ABG drawn.  Repeat LE dopplers completed showing persistant superficial thrombus, no DVT. No EVD waveform during day, flushed distally without improvement. Exam unchanged.  EVD dropped to 0 for low output in afternoon.   : POD9. Neuro stable, febrile to 101.3F o/n, given tylenol and pan cx sent. SBP dipped to low 90s o/n, HR stable in 70s with some PVCs, decreased fentanyl gtt and given 500cc bolus of NS x 2. Pend PEG placement Mon and angio Tu. D/c'd ELENA drain today and suture placed. F/u heme recs. Positive UA. Infiltrates found on CXR. Started on Zosyn 4.5g Q6hrs .   : POD 10. Neuro stable. Dc'd fentanyl gtt. PEG failed placement at bedside with Dr. Gant, plan for PEG in OR tomorrow afternoon with Dr. Layton. Dc'd amlodipine and started carvedilol 3.125 BID for PVCs . Made 3% inhalation and mucomyst q8hr. Failed PEG at bedside. Salt tabs made 1 q 6. Decreased cardura to 4mg daily. Urine culture growing E. Coli and sputum culture growing pluralibacter gergoviae and strep species. ID consulted, f/u recs. Failed CPAP trial @ 3pm. Added am labs per heme/onc for hypercoguable workup. Pending repeat LE dopplers in 2-3 days. NGT clogged, removed, ENT failed attempt at replacement, will keep NPO for PEG placement tmw. Repeat xray in am for concern for aspration. Pt abx switched from Zosyn to Ertapenem 1g Q24hrs. per ID recs, possible ESBL growth.   : POD 11. Neuro stable. diagnostic cerebral angiogram (bilateral carotid cavernous aneurysm) and PEG placement. NPO; pulled out radial TR band (right), EVD raised to 10    POD12 EVD raised to 15, then down to 5cm H20, CPAP today   POD13 EVD 5   POD 14    05/ POD 15 EVD raised to 15; CPAP 8 hours   POD 16 EVD raised to 20 CPAP     Past Medical History: Asthma CAD (coronary artery disease) Peripheral neuropathy  Allergies:  No Known Allergies  Home meds:   ·	Albuterol (Eqv-ProAir HFA) 90 mcg/inh inhalation aerosol: 2 puff(s) inhaled every 6 hours as needed for  shortness of breath and/or wheezing    PHYSICAL EXAMINATION  T(C): 37 ( @ 17:45), Max: 37.6 ( @ 00:17) HR: 64 ( @ 21:00) (61 - 81) BP: 104/52 ( @ 21:00) (95/54 - 142/56) RR: 20 ( @ 21:00) (15 - 25) SpO2: 96% ( @ 21:00) (92% - 99%)  NEUROLOGIC EXAMINATION:  Patient awake, alert, oriented x3, FC, R gaze preference, RUE R 4/5 RLE 4/5 L UE 5/5  L LE 5/5  GENERAL: trached CPAP 10/5 40%  EENT:  anicteric, trach  CARDIOVASCULAR: (+) S1 S2, normal rate and regular rhythm  PULMONARY: clear to auscultation bilaterally  ABDOMEN: soft, nontender with normoactive bowel sounds  EXTREMITIES: no edema; good distal pulses  SKIN: no rash    LABS:                         10.3   6.04  )-----------( 370      ( 07 May 2023 05:30 )             30.4     140  |  103  |  10  ----------------------------<  139<H>  4.2   |  27  |  0.41<L>    Ca    9.3      07 May 2023 05:30  Phos  4.3       Mg     2.1     06 @ 07:01  -   @ 07:00  IN: 1790 mL / OUT: 1639 mL / NET: 151 mL     HbA1C = 5.9 ()  LDL = 92 ()   HDL = 42 ()  TG = 106 ()  TSH = 0.152 ()    Bacteriology:   CSF NGTD     CSF NGTD   urine culture ESBL x2 strains   Blood NG5D x2   sputum:  pluralibacter gergoviae, mod strep pneumoniae    CSF studies:    L   *** [id number] WBC1 *** %N   %L1     EEG:  Neuroimaging:  Other imaging:    MEDICATIONS:     ·	carvedilol 3.125 milliGRAM(s) Oral every 12 hours  ·	albuterol/ipratropium for Nebulization 3 Nebulizer every 8 hours  ·	pantoprazole   Suspension 40 Oral daily  ·	psyllium Powder 1 Oral two times a day  ·	atorvastatin 80 Oral at bedtime  ·	ascorbic acid 500 Oral <User Schedule>  ·	ferrous    sulfate 325 Oral <User Schedule>  ·	lactobacillus acidophilus 1 Oral daily  ·	acetaminophen   Oral Liquid .. 650 Oral every 6 hours PRN  ·	oxyCODONE    IR 5 Oral every 4 hours PRN  ·	oxyCODONE    IR 10 Oral every 4 hours PRN    IV FLUIDS: IVL  DRIPS:  DIET: Jevity @50cc/hr - NPO after MN  Lines:  Drains:      External Ventricular Device (mL): 245 mL - @ 0 cm H20  /02 EVD raised to 5 output 367 ICPs 1-7  /03 EVD at 5cm H20 ICPs <10  /04 EVD at 5cm H20   05/05 EVD at 15cm H20 ICPs < 10  05/07 EVD at 20 cm H20 ICPs <10  Wounds:    CODE STATUS:  Full Code                       GOALS OF CARE:  aggressive                      DISPOSITION:  ICU

## 2023-05-07 NOTE — PROGRESS NOTE ADULT - SUBJECTIVE AND OBJECTIVE BOX
HPI:  58 yo F with PMH of Asthma, CAD (not on  meds), peripheral neuropathy and PSH of BATOOL, cholecystectomy, right knee surgery presented to [facility] ED on 4/20 with right sided weakness and right facial numbness. Patient was undergoing preparation for colonoscopy. As per daughter at 8am patient was playing with her grandchildren but around 840 am patient was getting dressed and fell and subsequently felt weak after. Daughter reports that patient had some episodes of vomiting at this time. She had a syncopal episode at around 10 am and was witnessed by brother. No head trauma, She regained conscious after few minutes. She remained in bed for the remainder of the day. Daughter reports some slurred speech noted 1230-1PM and also was confused after. and around 4PM, the patient's sister noted right sided weakness at which time EMS was called and patient was brought to ED. No c/o chest pain, shortness of breath, fever, headache, abdominal pain, urinary complaints. No recent travel/sickness/ change in meds. Stroke code in ER: CTH neg for heme, Right PICA distribution acute infarction. CTA with saccular aneurysms of b/l carotids, approximately 0.8 cm on the right and 1.2 x 0.9 cm on the left. Possible tiny third saccular aneurysm on the right Posterior intracranialcirculation: Right vertebral arterial occlusion at its dural crossing junction V3 Atlantic and V4 intracranial segments with likely reversal of flow in its intracranial segment from the basilar. Right PICA faintly seen. 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  Not candidate for TNK/mechanical thrombectomy. CT scan repeated which showed: 1. Brain: Progression of acute infarction within the right cerebellar hemisphere, also extending into the left superior cerebellar hemisphere. New mass effect on the fourth ventricle causing stenosis versus occlusion with new third and lateral ventricular dilatation indicating ventricular obstruction at the level of the fourth ventricle. New upward and downward herniation of cerebellar parenchyma Right carotid system: No hemodynamically significant stenosis. Left carotid system: No hemodynamically significant stenosis. Vertebral circulation: Patent. Anterior intracranial circulation: Intact. Bilateral internal carotid saccular aneurysms. These findings are unchanged. Posterior intracranial circulation:    Improved flow within the right vertebral artery since 4/20/2023. New focal stenosis mid left vertebral artery, etiology uncertain, consider vasospasm and extrinsic compression in addition to new embolic disease. Brain perfusion: Perfusion images demonstrate normalization of the perfusion abnormality in the right cerebellar hemisphere present on 4/20/2023 despite evidence of progression of acute infarction.  Areas of apparent ischemia within the posterior fossa have progressed in extent, also again involving the left posterior cerebral arterial distribution. Tx to [facility] for SOC watch. On admission to [facility], NIHSS 12.       Hospital Course:   4/21: Admitted for crani watch, CTH complete w/ unchanged hydrocephalus, taken for emergent occipital craniectomy.   4/22: POD1. Remains intubated overnight, on propofol and dank. EVD@[id number]. Pending repeat CTH this am. Given hydralazine 10mg x1. Started on cardene for SBP high 140s-150s. Sub-therapeutic on plavix, therapeutic on asa, rpt asa accumetrics until subtherapeutic. LE dopplers neg for DVT, but showing superficial venous thrombosis in the proximal portion of the right greater saphenous vein. Started on 3% @60 for na goal 145-150. NGT placed by ENT. Febrile, pancultured.  4/23: POD 2. 3% increased to 75. NGT readjusted. UA neg. SBP liberalized to 160. Plan to extubate. 3% decreased to 60. Failed extubation d/t no cuff leak, given 60mg solumedrol, plan to extubate in 6 hrs. SCx1 for post void residual >400, started on cardura at bedtime. New blood in buretrol, stopped SQL. Clot in EVD cleared. Neuro exam improving.   4/24: POD 3. Cont 3% with NS while NPO, start TF today. TF started. LFTs downtrending. Sodium 141. Increased 3% to 50, NS to 30. Repeat BMP ordered for 5:30PM. Tramadol 25 for pain with no relief, oxy 5 and 1g tylenol ordered, stability CT stable, speech language consult for dysarthria. Given hydralazine 10mg IVP for SBP>160, started amlodipine 5mg. C/o mild headache, given fioricet x1, stroke neuro rec SALVADOR and loop recorder placement. Echo with bubble completed, negative for PFO, EF 55-60%. J HFNC started for desats with suctioning.   4/25: POD 4. Cont HFNC. Increased secertions on HFNC, reintubated. CXR confirmed good placement. EVD dropped to 5cmH20 for goal of draining 5-10cc/hr and SG ELENA drain taken off suction. Pending CTH. EVD drained 0cc/hr, dropped to 0. NGT replaced. Dc'd fioricet. Started on PPI for intubation. Increased cardura to 4mg for urinary retention, given an additional 2mg now. Started salt tabs 3 q 6. Given 12.5mg fentanyl x1. NGT replaced, CXR shown in lung. NGT removed, repeat CXR showing no pneumothorax. Pending ENT consult for NGT placement. CTH stable. NGT replaced by ENT, confirmed in proper spot. Restarted TF. [id number]. Na 141 from 146. Increased 3% to 60. EVD raised to 3cmH20. ETT pulled back 1cm by RT.  4/26: POD 5 SOC. Intubated, remains on precedex, ABG ordered with improving PaO2, BMP sodium 148, 3% changed to 30cc/hr, cardura increased to 8mg for tonight, tolerating cpap  4/27: POD 6 SOC. Respiratory rate sustained 6, returned to FVS. Na 151, dc'd 3%, f/u AM sodium. Nurse noticed ETT 19 at the lip and was 20 prior, CXR shows ETT @ 5cm above jono, ET tube advanced 1cm, repeat CXR confirms appropriate placement. Thick inline secretions with suctioning. ENT trach placement Fri likely, PEG likely Mon. Keep ELENA drain until no output, EVD to remain @3. Start ASA 81mg daily tomorrow.   4/28: POD7 SOC, neuro stable, given fentanyl x 1 overnight for presumed discomfort (biting on ETT), remains on full vent support. CTH today stable in comparison to 4/25. 6 cuffed trach placed by ENT in OR, but came down without cuff. Replaced at bedside by ENT. On fentanyl gtt.    4/29: POD8 SOC, JIM overnight. Incision cleaned and dressing changed. On fentanyl gtt. EKG completed due to increased frequency PVCs on telemetry, frequency of PVCs improved with titrating up fentanyl gtt. ABG drawn.  Repeat LE dopplers completed showing persistant superficial thrombus, no DVT. No EVD waveform during day, flushed distally without improvement. Exam unchanged.  EVD dropped to 0 for low output in afternoon.   4/30: POD9. Neuro stable, febrile to 101.3F o/n, given tylenol and pan cx sent. SBP dipped to low 90s o/n, HR stable in 70s with some PVCs, decreased fentanyl gtt and given 500cc bolus of NS x 2. Pend PEG placement Mon and angio Tues. D/c'd ELENA drain today and suture placed. F/u heme recs. Positive UA. Infiltrates found on CXR. Started on Zosyn 4.5g Q6hrs .   5/1: POD 10. Neuro stable. Dc'd fentanyl gtt. PEG failed placement at bedside with Dr. Gant, plan for PEG in OR tomorrow afternoon with Dr. Layton. Dc'd amlodipine and started carvedilol 3.125 BID for PVCs . Made 3% inhalation and mucomyst q8hr. Failed PEG at bedside. Salt tabs made 1 q 6. Decreased cardura to 4mg daily. Urine culture growing E. Coli and sputum culture growing pluralibacter gergoviae and strep species. ID consulted, f/u recs. Failed CPAP trial @ 3pm. Added am labs per heme/onc for hypercoguable workup. Pending repeat LE dopplers in 2-3 days. NGT clogged, removed, ENT failed attempt at replacement, will keep NPO for PEG placement tmw. Repeat xray in am for concern for aspration. Pt abx switched from Zosyn to Ertapenem 1g Q24hrs. per ID recs, possible ESBL growth.   5/2: POD 11. Neuro stable. Pre-op for diagnostic cerebral angiogram and PEG placement. NPO. EVD raised to 5 cmH20. Sputum culture speciated to step pneumoniae, sensitive to ertapenem. 3% inhalation/mucomyst made q 6 hr d/t thick secretions. PEG placed in OR. POD0 dx cerebral angio. EVD raised to 10.   5/3: POD 12. Neuro stable. PEG feeds began @ 10 AM, plan to increase 10cc every 6h per general surgery. Repeat dopplers show stable R superficial thrombus and new L IM calf DVT. Vascular consulted for IVC filter. Plan to get CTH tomorrow.  5/4: POD 13. JIM o/n. s/p IVC filter with vascular today. Pending post-procedure CTH. FOBT negative. Started iron and vitamin c every other day for iron deficiency anemia.   5/5: POD14. CSF cx sent to r/o infection from new gas in right temporal horn seen on CTH. Restarted TF, changed to Jevity 1.2, f/u nutrition recs. EVD raised to 10 today, plan to challenge over the weekend and CTH on Monday, possible VPS next Wednesday. ENT removed sutures today. Salt tabs decreased 1q12.  5/6: POD15 Placed on CPAP briefly with low MV alert, placed back on full vent support. EVD remains open at [id number]. ICPs WNL. Neuro exam stable.  EVD raised to 15.   5/7: POD#16. JIM overnight, neuro stable. D/c'd salt tabs and 3% neb, f/u am Na. Wean trach to CPAP as tolerated, start in am. Pan cx NGTD. Raise EVD today to [id number].       Vital Signs Last 24 Hrs  T(C): 37.6 (07 May 2023 00:17), Max: 37.9 (06 May 2023 17:01)  T(F): 99.6 (07 May 2023 00:17), Max: 100.3 (06 May 2023 17:01)  HR: 66 (07 May 2023 03:00) (62 - 81)  BP: 105/52 (07 May 2023 03:00) (91/50 - 122/57)  BP(mean): 74 (07 May 2023 03:00) (63 - 82)  RR: 16 (07 May 2023 03:00) (16 - 24)  SpO2: 95% (07 May 2023 03:00) (92% - 99%)    Parameters below as of 07 May 2023 03:00  Patient On (Oxygen Delivery Method): ventilator,AC    O2 Concentration (%): 40    I&O's Detail    05 May 2023 07:01  -  06 May 2023 07:00  --------------------------------------------------------  IN:    Enteral Tube Flush: 200 mL    IV PiggyBack: 50 mL    Jevity 1.2: 1160 mL    sodium chloride 0.9%: 340 mL  Total IN: 1750 mL    OUT:    External Ventricular Device (mL): 71 mL    Voided (mL): 900 mL  Total OUT: 971 mL    Total NET: 779 mL      06 May 2023 07:01  -  07 May 2023 05:44  --------------------------------------------------------  IN:    Enteral Tube Flush: 480 mL    IV PiggyBack: 50 mL    Jevity 1.2: 1000 mL  Total IN: 1530 mL    OUT:    External Ventricular Device (mL): 30 mL    Rectal Tube (mL): 50 mL    Voided (mL): 800 mL  Total OUT: 880 mL    Total NET: 650 mL        I&O's Summary    05 May 2023 07:01  -  06 May 2023 07:00  --------------------------------------------------------  IN: 1750 mL / OUT: 971 mL / NET: 779 mL    06 May 2023 07:01  -  07 May 2023 05:44  --------------------------------------------------------  IN: 1530 mL / OUT: 880 mL / NET: 650 mL        PHYSICAL EXAM:      TUBES/LINES:  [] CVC  [] A-line  [] Lumbar Drain  [] Ventriculostomy  [] Other    DIET:  [] NPO  [] Mechanical  [] Tube feeds    LABS:          CAPILLARY BLOOD GLUCOSE          Drug Levels: [] N/A    CSF Analysis: [] N/A      Allergies    No Known Allergies    Intolerances      MEDICATIONS:  Antibiotics:    Neuro:  acetaminophen   Oral Liquid .. 650 milliGRAM(s) Oral every 6 hours PRN  oxyCODONE    IR 5 milliGRAM(s) Oral every 4 hours PRN    Anticoagulation:  enoxaparin Injectable 40 milliGRAM(s) SubCutaneous every 24 hours    OTHER:  albuterol/ipratropium for Nebulization 3 milliLiter(s) Nebulizer every 8 hours  atorvastatin 80 milliGRAM(s) Oral at bedtime  carvedilol 3.125 milliGRAM(s) Oral every 12 hours  chlorhexidine 0.12% Liquid 15 milliLiter(s) Oral Mucosa every 12 hours  chlorhexidine 2% Cloths 1 Application(s) Topical <User Schedule>  lactobacillus acidophilus 1 Tablet(s) Oral daily  pantoprazole   Suspension 40 milliGRAM(s) Oral daily  psyllium Powder 1 Packet(s) Oral two times a day    IVF:  ascorbic acid 500 milliGRAM(s) Oral <User Schedule>  ferrous    sulfate 325 milliGRAM(s) Oral <User Schedule>    CULTURES:  Culture Results:   No growth to date (05-05 @ 05:06)  Culture Results:   No growth to date (04-30 @ 06:51)    RADIOLOGY & ADDITIONAL TESTS:      ASSESSMENT:  57y Female s/p    STROKE    No pertinent family history in first degree relatives    Handoff    Asthma    CAD (coronary artery disease)    Peripheral neuropathy    Cerebellar stroke    Respiratory failure, unspecified with hypoxia    History of DVT (deep vein thrombosis)    Cerebellar stroke    Respiratory failure, unspecified with hypoxia    History of DVT of lower extremity    Cerebellar infarct    CAD (coronary artery disease)    Asthma    Peripheral neuropathy    Lupus anticoagulant positive    Anemia due to acute blood loss    Tracheostomy malfunction    Decompressive craniectomy    Insertion, external ventricular drain    Planned tracheostomy    Tracheostomy tube change    Esophagogastroduodenoscopy (EGD) with anesthesia    Insertion, PEG tube, laparoscopic    Angiogram, carotid and cerebral, bilateral    Tracheostomy tube change    IVC filter placement    S/P total abdominal hysterectomy    S/P cholecystectomy    S/P right knee surgery    [email or web address]        PLAN:  NEURO:    CARDIOVASCULAR:    PULMONARY:    RENAL:    GI:    HEME:    ID:    ENDO:    DVT PROPHYLAXIS:  [] Venodynes                                [] Heparin/Lovenox    FALL RISK:  [] Low Risk                                    [] Impulsive    DISPOSITION:  HPI:  56 yo F with PMH of Asthma, CAD (not on  meds), peripheral neuropathy and PSH of BATOOL, cholecystectomy, right knee surgery presented to [place] ED on 4/20 with right sided weakness and right facial numbness. Patient was undergoing preparation for colonoscopy. As per daughter at 8am patient was playing with her grandchildren but around 840 am patient was getting dressed and fell and subsequently felt weak after. Daughter reports that patient had some episodes of vomiting at this time. She had a syncopal episode at around 10 am and was witnessed by brother. No head trauma, She regained conscious after few minutes. She remained in bed for the remainder of the day. Daughter reports some slurred speech noted 1230-1PM and also was confused after. and around 4PM, the patient's sister noted right sided weakness at which time EMS was called and patient was brought to ED. No c/o chest pain, shortness of breath, fever, headache, abdominal pain, urinary complaints. No recent travel/sickness/ change in meds. Stroke code in ER: CTH neg for heme, Right PICA distribution acute infarction. CTA with saccular aneurysms of b/l carotids, approximately 0.8 cm on the right and 1.2 x 0.9 cm on the left. Possible tiny third saccular aneurysm on the right Posterior intracranialcirculation: Right vertebral arterial occlusion at its dural crossing junction V3 Atlantic and V4 intracranial segments with likely reversal of flow in its intracranial segment from the basilar. Right PICA faintly seen. 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  Not candidate for TNK/mechanical thrombectomy. CT scan repeated which showed: 1. Brain: Progression of acute infarction within the right cerebellar hemisphere, also extending into the left superior cerebellar hemisphere. New mass effect on the fourth ventricle causing stenosis versus occlusion with new third and lateral ventricular dilatation indicating ventricular obstruction at the level of the fourth ventricle. New upward and downward herniation of cerebellar parenchyma Right carotid system: No hemodynamically significant stenosis. Left carotid system: No hemodynamically significant stenosis. Vertebral circulation: Patent. Anterior intracranial circulation: Intact. Bilateral internal carotid saccular aneurysms. These findings are unchanged. Posterior intracranial circulation:    Improved flow within the right vertebral artery since 4/20/2023. New focal stenosis mid left vertebral artery, etiology uncertain, consider vasospasm and extrinsic compression in addition to new embolic disease. Brain perfusion: Perfusion images demonstrate normalization of the perfusion abnormality in the right cerebellar hemisphere present on 4/20/2023 despite evidence of progression of acute infarction.  Areas of apparent ischemia within the posterior fossa have progressed in extent, also again involving the left posterior cerebral arterial distribution. Tx to [facility] for SOC watch. On admission to [facility], NIHSS 12.       Hospital Course:   4/21: Admitted for crani watch, CTH complete w/ unchanged hydrocephalus, taken for emergent occipital craniectomy.   4/22: POD1. Remains intubated overnight, on propofol and dank. EVD@[id number]. Pending repeat CTH this am. Given hydralazine 10mg x1. Started on cardene for SBP high 140s-150s. Sub-therapeutic on plavix, therapeutic on asa, rpt asa accumetrics until subtherapeutic. LE dopplers neg for DVT, but showing superficial venous thrombosis in the proximal portion of the right greater saphenous vein. Started on 3% @60 for na goal 145-150. NGT placed by ENT. Febrile, pancultured.  4/23: POD 2. 3% increased to 75. NGT readjusted. UA neg. SBP liberalized to 160. Plan to extubate. 3% decreased to 60. Failed extubation d/t no cuff leak, given 60mg solumedrol, plan to extubate in 6 hrs. SCx1 for post void residual >400, started on cardura at bedtime. New blood in buretrol, stopped SQL. Clot in EVD cleared. Neuro exam improving.   4/24: POD 3. Cont 3% with NS while NPO, start TF today. TF started. LFTs downtrending. Sodium 141. Increased 3% to 50, NS to 30. Repeat BMP ordered for 5:30PM. Tramadol 25 for pain with no relief, oxy 5 and 1g tylenol ordered, stability CT stable, speech language consult for dysarthria. Given hydralazine 10mg IVP for SBP>160, started amlodipine 5mg. C/o mild headache, given fioricet x1, stroke neuro rec SALVADOR and loop recorder placement. Echo with bubble completed, negative for PFO, EF 55-60%. J HFNC started for desats with suctioning.   4/25: POD 4. Cont HFNC. Increased secertions on HFNC, reintubated. CXR confirmed good placement. EVD dropped to 5cmH20 for goal of draining 5-10cc/hr and SG ELENA drain taken off suction. Pending CTH. EVD drained 0cc/hr, dropped to 0. NGT replaced. Dc'd fioricet. Started on PPI for intubation. Increased cardura to 4mg for urinary retention, given an additional 2mg now. Started salt tabs 3 q 6. Given 12.5mg fentanyl x1. NGT replaced, CXR shown in lung. NGT removed, repeat CXR showing no pneumothorax. Pending ENT consult for NGT placement. CTH stable. NGT replaced by ENT, confirmed in proper spot. Restarted TF. [id number]. Na 141 from 146. Increased 3% to 60. EVD raised to 3cmH20. ETT pulled back 1cm by RT.  4/26: POD 5 SOC. Intubated, remains on precedex, ABG ordered with improving PaO2, BMP sodium 148, 3% changed to 30cc/hr, cardura increased to 8mg for tonight, tolerating cpap  4/27: POD 6 SOC. Respiratory rate sustained 6, returned to FVS. Na 151, dc'd 3%, f/u AM sodium. Nurse noticed ETT 19 at the lip and was 20 prior, CXR shows ETT @ 5cm above jono, ET tube advanced 1cm, repeat CXR confirms appropriate placement. Thick inline secretions with suctioning. ENT trach placement Fri likely, PEG likely Mon. Keep ELENA drain until no output, EVD to remain @3. Start ASA 81mg daily tomorrow.   4/28: POD7 SOC, neuro stable, given fentanyl x 1 overnight for presumed discomfort (biting on ETT), remains on full vent support. CTH today stable in comparison to 4/25. 6 cuffed trach placed by ENT in OR, but came down without cuff. Replaced at bedside by ENT. On fentanyl gtt.    4/29: POD8 SOC, JIM overnight. Incision cleaned and dressing changed. On fentanyl gtt. EKG completed due to increased frequency PVCs on telemetry, frequency of PVCs improved with titrating up fentanyl gtt. ABG drawn.  Repeat LE dopplers completed showing persistant superficial thrombus, no DVT. No EVD waveform during day, flushed distally without improvement. Exam unchanged.  EVD dropped to 0 for low output in afternoon.   4/30: POD9. Neuro stable, febrile to 101.3F o/n, given tylenol and pan cx sent. SBP dipped to low 90s o/n, HR stable in 70s with some PVCs, decreased fentanyl gtt and given 500cc bolus of NS x 2. Pend PEG placement Mon and angio Tues. D/c'd ELENA drain today and suture placed. F/u heme recs. Positive UA. Infiltrates found on CXR. Started on Zosyn 4.5g Q6hrs .   5/1: POD 10. Neuro stable. Dc'd fentanyl gtt. PEG failed placement at bedside with Dr. Gant, plan for PEG in OR tomorrow afternoon with Dr. Layton. Dc'd amlodipine and started carvedilol 3.125 BID for PVCs . Made 3% inhalation and mucomyst q8hr. Failed PEG at bedside. Salt tabs made 1 q 6. Decreased cardura to 4mg daily. Urine culture growing E. Coli and sputum culture growing pluralibacter gergoviae and strep species. ID consulted, f/u recs. Failed CPAP trial @ 3pm. Added am labs per heme/onc for hypercoguable workup. Pending repeat LE dopplers in 2-3 days. NGT clogged, removed, ENT failed attempt at replacement, will keep NPO for PEG placement tmw. Repeat xray in am for concern for aspration. Pt abx switched from Zosyn to Ertapenem 1g Q24hrs. per ID recs, possible ESBL growth.   5/2: POD 11. Neuro stable. Pre-op for diagnostic cerebral angiogram and PEG placement. NPO. EVD raised to 5 cmH20. Sputum culture speciated to step pneumoniae, sensitive to ertapenem. 3% inhalation/mucomyst made q 6 hr d/t thick secretions. PEG placed in OR. POD0 dx cerebral angio. EVD raised to 10.   5/3: POD 12. Neuro stable. PEG feeds began @ 10 AM, plan to increase 10cc every 6h per general surgery. Repeat dopplers show stable R superficial thrombus and new L IM calf DVT. Vascular consulted for IVC filter. Plan to get CTH tomorrow.  5/4: POD 13. JIM o/n. s/p IVC filter with vascular today. Pending post-procedure CTH. FOBT negative. Started iron and vitamin c every other day for iron deficiency anemia.   5/5: POD14. CSF cx sent to r/o infection from new gas in right temporal horn seen on CTH. Restarted TF, changed to Jevity 1.2, f/u nutrition recs. EVD raised to 10 today, plan to challenge over the weekend and CTH on Monday, possible VPS next Wednesday. ENT removed sutures today. Salt tabs decreased 1q12.  5/6: POD15 Placed on CPAP briefly with low MV alert, placed back on full vent support. EVD remains open at [id number]. ICPs WNL. Neuro exam stable.  EVD raised to 15.   5/7: POD#16. JIM overnight, neuro stable. D/c'd salt tabs and 3% neb, f/u am Na. Wean trach to CPAP as tolerated, start in am. Pan cx NGTD. Raise EVD today to [id number].       Vital Signs Last 24 Hrs  T(C): 37.6 (07 May 2023 00:17), Max: 37.9 (06 May 2023 17:01)  T(F): 99.6 (07 May 2023 00:17), Max: 100.3 (06 May 2023 17:01)  HR: 66 (07 May 2023 03:00) (62 - 81)  BP: 105/52 (07 May 2023 03:00) (91/50 - 122/57)  BP(mean): 74 (07 May 2023 03:00) (63 - 82)  RR: 16 (07 May 2023 03:00) (16 - 24)  SpO2: 95% (07 May 2023 03:00) (92% - 99%)    Parameters below as of 07 May 2023 03:00  Patient On (Oxygen Delivery Method): ventilator,AC    O2 Concentration (%): 40    I&O's Detail    05 May 2023 07:01  -  06 May 2023 07:00  --------------------------------------------------------  IN:    Enteral Tube Flush: 200 mL    IV PiggyBack: 50 mL    Jevity 1.2: 1160 mL    sodium chloride 0.9%: 340 mL  Total IN: 1750 mL    OUT:    External Ventricular Device (mL): 71 mL    Voided (mL): 900 mL  Total OUT: 971 mL    Total NET: 779 mL      06 May 2023 07:01  -  07 May 2023 05:44  --------------------------------------------------------  IN:    Enteral Tube Flush: 480 mL    IV PiggyBack: 50 mL    Jevity 1.2: 1000 mL  Total IN: 1530 mL    OUT:    External Ventricular Device (mL): 30 mL    Rectal Tube (mL): 50 mL    Voided (mL): 800 mL  Total OUT: 880 mL    Total NET: 650 mL        I&O's Summary    05 May 2023 07:01  -  06 May 2023 07:00  --------------------------------------------------------  IN: 1750 mL / OUT: 971 mL / NET: 779 mL    06 May 2023 07:01  -  07 May 2023 05:44  --------------------------------------------------------  IN: 1530 mL / OUT: 880 mL / NET: 650 mL        PHYSICAL EXAM:  General: NAD, pt is comfortably laying in bed, A&O x 2 with choice (nods head, examined in [place]), +trach to full vent   HEENT: OE spont and to voice, R gaze preference (does not cross midline), PERRL 2mm, attempts to track   Cardiovascular: RRR, normal S1 and S2 + PVCs   Respiratory: lungs CTAB, no wheezing, rhonchi, or crackles   GI: normoactive BS to auscultation, abd soft, NTND   Neuro: nods head Y/N to questions, moves all extremities spontaneously, PRAVEEN/LL 5/5, RU/RLE 4/5   Wound/incision: SOC incision site with sutures and dressing C/D/I   Drain: SG ELENA x1, EVD @02haL7Q, open     TUBES/LINES:  [] CVC  [] A-line  [] Lumbar Drain  [X] Ventriculostomy  [] Other    DIET:  [] NPO  [X] Mechanical  [] Tube feeds    LABS:          CAPILLARY BLOOD GLUCOSE          Drug Levels: [] N/A    CSF Analysis: [] N/A      Allergies    No Known Allergies    Intolerances      MEDICATIONS:  Antibiotics:    Neuro:  acetaminophen   Oral Liquid .. 650 milliGRAM(s) Oral every 6 hours PRN  oxyCODONE    IR 5 milliGRAM(s) Oral every 4 hours PRN    Anticoagulation:  enoxaparin Injectable 40 milliGRAM(s) SubCutaneous every 24 hours    OTHER:  albuterol/ipratropium for Nebulization 3 milliLiter(s) Nebulizer every 8 hours  atorvastatin 80 milliGRAM(s) Oral at bedtime  carvedilol 3.125 milliGRAM(s) Oral every 12 hours  chlorhexidine 0.12% Liquid 15 milliLiter(s) Oral Mucosa every 12 hours  chlorhexidine 2% Cloths 1 Application(s) Topical <User Schedule>  lactobacillus acidophilus 1 Tablet(s) Oral daily  pantoprazole   Suspension 40 milliGRAM(s) Oral daily  psyllium Powder 1 Packet(s) Oral two times a day    IVF:  ascorbic acid 500 milliGRAM(s) Oral <User Schedule>  ferrous    sulfate 325 milliGRAM(s) Oral <User Schedule>    CULTURES:  Culture Results:   No growth to date (05-05 @ 05:06)  Culture Results:   No growth to date (04-30 @ 06:51)    RADIOLOGY & ADDITIONAL TESTS:      ASSESSMENT:  56 y/o female transferred from [place] with right sided weakness and right facial numbness. Found to have acute infarction within the right cerebellar hemisphere, causing herniation. Now s/p SOC foramen magnum decompression and right parietal/occipital EVD placement (4/21). Course c/b respiratory insuffiency d/t bulbar dysfunction, s/p trach 4/28/23. s/p PEG 5/2 with GI, s/p dx cerebral angiogram 5/2/23.         STROKE    No pertinent family history in first degree relatives    Handoff    Asthma    CAD (coronary artery disease)    Peripheral neuropathy    Cerebellar stroke    Respiratory failure, unspecified with hypoxia    History of DVT (deep vein thrombosis)    Cerebellar stroke    Respiratory failure, unspecified with hypoxia    History of DVT of lower extremity    Cerebellar infarct    CAD (coronary artery disease)    Asthma    Peripheral neuropathy    Lupus anticoagulant positive    Anemia due to acute blood loss    Tracheostomy malfunction    Decompressive craniectomy    Insertion, external ventricular drain    Planned tracheostomy    Tracheostomy tube change    Esophagogastroduodenoscopy (EGD) with anesthesia    Insertion, PEG tube, laparoscopic    Angiogram, carotid and cerebral, bilateral    Tracheostomy tube change    IVC filter placement    S/P total abdominal hysterectomy    S/P cholecystectomy    S/P right knee surgery    [email or web address]        PLAN:  Neuro   - Vitals/neuro q1h   - dx angio 5/2: b/l cavernous ICA aneurysms, plan to be discussed vascular conference  - EVD @15cm H2O, monitor ICP/output -- raise to 20 today and CTH Mon 5/8   - CTH 4/28 stable; 5/4 new gas in right temporal horn   - Stroke consulted, f/u recs   - Pain control with tylenol prn, oxycodone prn, fent pushes 12.5mg q2hr prn     Cardio  - SBP    - TTE 4/24: negative for PFO, mild LVH, mild dilation of L atrium, EF 55-60%   - Carvedilol 3.125mg BID     Pulm  - VC//40/16/6, CPAP trials as tolerated  - Chest PT, 3% inhalation/duoneb/mucomyst q 8 hrs standing   - 6 cuffed trach placed by ENT 4/28, missing cuff, replaced trach at bedside. --> ENT  removed trach sutures POD7 (5/5)    GI  - TF via PEG (placed 5/2)  - hold regimen, last BM 5/6 (3x), RT placed  - Protonix while on vent  - Trend LFTs q 72 hrs   - FOBT 5/4 negative     Renal  - Na goal 135-145, off NaCl tabs   - Voiding via primafit   - IVL    Endo   - cont lipitor     Heme  - SQL   - s/p IVCF 5/4  - LE dopplers 4/22 neg for DVT, but showing superficial venous thrombosis in the proximal portion of the right greater saphenous vein; repeat on 4/29 with persistant superficial thrombus, 5/3 new DVT L IM calf and unchanged R superficial vein thrombus  - Heme following for positive anticardiolipin Ab 4/27, recs appreciated   - Iron and vitamin c every other day    ID  - ID recs: Ertapenem 1g Q24hrs (5/1-5/7) x 7 days  - Last panculture 4/30, MRSA (-), +UA cx ESBL, +sputum cx pluralibacter gergoviae, strep pneumoniae   - +isolation precautions - ESBL in urine   - f/u CSF culture 5/4  - ID cleared for VPS     DISPO:  - NSICU, full code   - PT/OT rec acute inpatient rehab: patient can tolerate 3 hrs PT/OT daily    D/w Dr. Valadez and Dr. Worley

## 2023-05-07 NOTE — PROGRESS NOTE ADULT - SUBJECTIVE AND OBJECTIVE BOX
INTERVAL HISTORY: HPI:  58 yo F with PMH of Asthma, CAD (not on  meds), peripheral neuropathy and PSH of BATOOL, cholecystectomy, right knee surgery presented to [place] ED on 4/20 with right sided weakness and right facial numbness. Patient was undergoing preparation for colonoscopy. As per daughter at 8am patient was playing with her grandchildren but around 840 am patient was getting dressed and fell and subsequently felt weak after. Daughter reports that patient had some episodes of vomiting at this time. She had a syncopal episode at around 10 am and was witnessed by brother. No head trauma, She regained conscious after few minutes. She remained in bed for the remainder of the day. Daughter reports some slurred speech noted 1230-1PM and also was confused after. and around 4PM, the patient's sister noted right sided weakness at which time EMS was called and patient was brought to ED. No c/o chest pain, shortness of breath, fever, headache, abdominal pain, urinary complaints. No recent travel/sickness/ change in meds. Stroke code in ER: CTH neg for heme, Right PICA distribution acute infarction. CTA with saccular aneurysms of b/l carotids, approximately 0.8 cm on the right and 1.2 x 0.9 cm on the left. Possible tiny third saccular aneurysm on the right Posterior intracranial circulation: Right vertebral arterial occlusion at its dural crossing junction V3 Atlantic and V4 intracranial segments with likely reversal of flow in its intracranial segment from the basilar. Right PICA faintly seen. 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  Not candidate for TNK/mechanical thrombectomy. CT scan repeated which showed: 1. Brain: Progression of acute infarction within the right cerebellar hemisphere, also extending into the left superior cerebellar hemisphere. New mass effect on the fourth ventricle causing stenosis versus occlusion with new third and lateral ventricular dilatation indicating ventricular obstruction at the level of the fourth ventricle. New upward and downward herniation of cerebellar parenchyma Right carotid system: No hemodynamically significant stenosis. Left carotid system: No hemodynamically significant stenosis. Vertebral circulation: Patent. Anterior intracranial circulation: Intact. Bilateral internal carotid saccular aneurysms. These findings are unchanged. Posterior intracranial circulation:    Improved flow within the right vertebral artery since 4/20/2023. New focal stenosis mid left vertebral artery, etiology uncertain, consider vasospasm and extrinsic compression in addition to new embolic disease. Brain perfusion: Perfusion images demonstrate normalization of the perfusion abnormality in the right cerebellar hemisphere present on 4/20/2023 despite evidence of progression of acute infarction.  Areas of apparent ischemia within the posterior fossa have progressed in extent, also again involving the left posterior cerebral arterial distribution. Tx to [facility] for SOC watch. On admission to [facility], NIHSS 12.  (21 Apr 2023 16:50)    PAST MEDICAL & SURGICAL HISTORY:  Asthma  CAD (coronary artery disease)  Peripheral neuropathy  S/P total abdominal hysterectomy  S/P cholecystectomy  S/P right knee surgery      REVIEW OF SYSTEMS: [x ] Unable to Assess due to neurologic exam   [ ] All ROS addressed below are non-contributory, except:  Neuro: [ ] Headache [ ] Back pain [ ] Numbness [ ] Weakness [ ] Ataxia [ ] Dizziness [ ] Aphasia [ ] Dysarthria [ ] Visual disturbance  Resp: [ ] Shortness of breath/dyspnea, [ ] Orthopnea [ ] Cough  CV: [ ] Chest pain [ ] Palpitation [ ] Lightheadedness [ ] Syncope  Renal: [ ] Thirst [ ] Edema  GI: [ ] Nausea [ ] Emesis [ ] Abdominal pain [ ] Constipation [ ] Diarrhea  Hem: [ ] Hematemesis [ ] bright red blood per rectum  ID: [ ] Fever [ ] Chills [ ] Dysuria  ENT: [ ] Rhinorrhea    PHYSICAL EXAM:  General: No Acute Distress, +trach in place   Neurological: AOx2 to choice, R eye 6th nerve palsy, b/l horizontal nystagmus, b/l UE & LE dysmetria, RUE & RLE 4+/5 w/ mild drift   Pulmonary: clear  Cardiovascular: sinus justo   Gastrointestinal: Soft, Nontender, Nondistended, peg   Extremities: No calf tenderness   Incision: CDI      ICU Vital Signs Last 24 Hrs  T(C): 36.4 (07 May 2023 06:01), Max: 37.9 (06 May 2023 17:01)  T(F): 97.5 (07 May 2023 06:01), Max: 100.3 (06 May 2023 17:01)  HR: 70 (07 May 2023 10:00) (61 - 81)  BP: 110/52 (07 May 2023 10:00) (93/55 - 126/53)  BP(mean): 75 (07 May 2023 10:00) (63 - 82)  RR: 15 (07 May 2023 10:00) (15 - 24)  SpO2: 97% (07 May 2023 10:00) (92% - 99%)      05-06-23 @ 07:01  -  05-07-23 @ 07:00  --------------------------------------------------------  IN: 1790 mL / OUT: 1639 mL / NET: 151 mL    05-07-23 @ 07:01  -  05-07-23 @ 10:33  --------------------------------------------------------  IN: 150 mL / OUT: 3 mL / NET: 147 mL        Mode: CPAP with PS, FiO2: 40, PEEP: 5, PS: 10, MAP: 8, PIP: 16    acetaminophen   Oral Liquid .. 650 milliGRAM(s) Oral every 6 hours PRN  albuterol/ipratropium for Nebulization 3 milliLiter(s) Nebulizer every 8 hours  ascorbic acid 500 milliGRAM(s) Oral <User Schedule>  atorvastatin 80 milliGRAM(s) Oral at bedtime  carvedilol 3.125 milliGRAM(s) Oral every 12 hours  chlorhexidine 0.12% Liquid 15 milliLiter(s) Oral Mucosa every 12 hours  chlorhexidine 2% Cloths 1 Application(s) Topical <User Schedule>  enoxaparin Injectable 40 milliGRAM(s) SubCutaneous every 24 hours  ferrous    sulfate 325 milliGRAM(s) Oral <User Schedule>  lactobacillus acidophilus 1 Tablet(s) Oral daily  oxyCODONE    IR 5 milliGRAM(s) Oral every 4 hours PRN  oxyCODONE    IR 5 milliGRAM(s) Oral every 4 hours PRN  oxyCODONE    IR 10 milliGRAM(s) Oral every 4 hours PRN  pantoprazole   Suspension 40 milliGRAM(s) Oral daily  psyllium Powder 1 Packet(s) Oral two times a day      LABS:  Na: 140 (05-07 @ 05:30), 138 (05-06 @ 05:30), 136 (05-05 @ 05:14)  K: 4.2 (05-07 @ 05:30), 4.0 (05-06 @ 05:30), 3.7 (05-05 @ 05:14)  Cl: 103 (05-07 @ 05:30), 103 (05-06 @ 05:30), 100 (05-05 @ 05:14)  CO2: 27 (05-07 @ 05:30), 27 (05-06 @ 05:30), 24 (05-05 @ 05:14)  BUN: 10 (05-07 @ 05:30), 14 (05-06 @ 05:30), 11 (05-05 @ 05:14)  Cr: 0.41 (05-07 @ 05:30), 0.45 (05-06 @ 05:30), 0.44 (05-05 @ 05:14)  Glu: 139(05-07 @ 05:30), 155(05-06 @ 05:30), 91(05-05 @ 05:14)    Hgb: 10.3 (05-07 @ 05:30), 10.5 (05-06 @ 05:30), 9.7 (05-05 @ 05:14)  Hct: 30.4 (05-07 @ 05:30), 31.6 (05-06 @ 05:30), 28.4 (05-05 @ 05:14)  WBC: 6.04 (05-07 @ 05:30), 5.88 (05-06 @ 05:30), 5.41 (05-05 @ 05:14)  Plt: 370 (05-07 @ 05:30), 354 (05-06 @ 05:30), 326 (05-05 @ 05:14)

## 2023-05-08 ENCOUNTER — RESULT REVIEW (OUTPATIENT)
Age: 58
End: 2023-05-08

## 2023-05-08 ENCOUNTER — TRANSCRIPTION ENCOUNTER (OUTPATIENT)
Age: 58
End: 2023-05-08

## 2023-05-08 LAB
ALBUMIN SERPL ELPH-MCNC: 3.4 G/DL — SIGNIFICANT CHANGE UP (ref 3.3–5)
ALP SERPL-CCNC: 121 U/L — HIGH (ref 40–120)
ALT FLD-CCNC: 31 U/L — SIGNIFICANT CHANGE UP (ref 10–45)
ANION GAP SERPL CALC-SCNC: 10 MMOL/L — SIGNIFICANT CHANGE UP (ref 5–17)
ANION GAP SERPL CALC-SCNC: 9 MMOL/L — SIGNIFICANT CHANGE UP (ref 5–17)
APTT BLD: 35.8 SEC — HIGH (ref 27.5–35.5)
AST SERPL-CCNC: 23 U/L — SIGNIFICANT CHANGE UP (ref 10–40)
BASE EXCESS BLDV CALC-SCNC: 2 MMOL/L — SIGNIFICANT CHANGE UP (ref -2–3)
BASOPHILS # BLD AUTO: 0.02 K/UL — SIGNIFICANT CHANGE UP (ref 0–0.2)
BASOPHILS NFR BLD AUTO: 0.2 % — SIGNIFICANT CHANGE UP (ref 0–2)
BILIRUB SERPL-MCNC: 0.3 MG/DL — SIGNIFICANT CHANGE UP (ref 0.2–1.2)
BLD GP AB SCN SERPL QL: NEGATIVE — SIGNIFICANT CHANGE UP
BUN SERPL-MCNC: 10 MG/DL — SIGNIFICANT CHANGE UP (ref 7–23)
BUN SERPL-MCNC: 12 MG/DL — SIGNIFICANT CHANGE UP (ref 7–23)
CA-I SERPL-SCNC: 1.24 MMOL/L — SIGNIFICANT CHANGE UP (ref 1.15–1.33)
CALCIUM SERPL-MCNC: 9.3 MG/DL — SIGNIFICANT CHANGE UP (ref 8.4–10.5)
CALCIUM SERPL-MCNC: 9.6 MG/DL — SIGNIFICANT CHANGE UP (ref 8.4–10.5)
CHLORIDE SERPL-SCNC: 102 MMOL/L — SIGNIFICANT CHANGE UP (ref 96–108)
CHLORIDE SERPL-SCNC: 103 MMOL/L — SIGNIFICANT CHANGE UP (ref 96–108)
CO2 BLDV-SCNC: 29.2 MMOL/L — HIGH (ref 22–26)
CO2 SERPL-SCNC: 27 MMOL/L — SIGNIFICANT CHANGE UP (ref 22–31)
CO2 SERPL-SCNC: 27 MMOL/L — SIGNIFICANT CHANGE UP (ref 22–31)
CREAT SERPL-MCNC: 0.51 MG/DL — SIGNIFICANT CHANGE UP (ref 0.5–1.3)
CREAT SERPL-MCNC: 0.57 MG/DL — SIGNIFICANT CHANGE UP (ref 0.5–1.3)
CULTURE RESULTS: NO GROWTH — SIGNIFICANT CHANGE UP
EGFR: 107 ML/MIN/1.73M2 — SIGNIFICANT CHANGE UP
EGFR: 109 ML/MIN/1.73M2 — SIGNIFICANT CHANGE UP
EOSINOPHIL # BLD AUTO: 0.11 K/UL — SIGNIFICANT CHANGE UP (ref 0–0.5)
EOSINOPHIL NFR BLD AUTO: 1.2 % — SIGNIFICANT CHANGE UP (ref 0–6)
GAS PNL BLDV: 133 MMOL/L — LOW (ref 136–145)
GAS PNL BLDV: SIGNIFICANT CHANGE UP
GLUCOSE CSF-MCNC: 73 MG/DL — HIGH (ref 40–70)
GLUCOSE SERPL-MCNC: 117 MG/DL — HIGH (ref 70–99)
GLUCOSE SERPL-MCNC: 121 MG/DL — HIGH (ref 70–99)
GRAM STN FLD: SIGNIFICANT CHANGE UP
HCG SERPL-ACNC: 0 MIU/ML — SIGNIFICANT CHANGE UP
HCO3 BLDV-SCNC: 28 MMOL/L — SIGNIFICANT CHANGE UP (ref 22–29)
HCT VFR BLD CALC: 33 % — LOW (ref 34.5–45)
HCT VFR BLD CALC: 33.7 % — LOW (ref 34.5–45)
HGB BLD-MCNC: 10.8 G/DL — LOW (ref 11.5–15.5)
HGB BLD-MCNC: 10.9 G/DL — LOW (ref 11.5–15.5)
IMM GRANULOCYTES NFR BLD AUTO: 0.4 % — SIGNIFICANT CHANGE UP (ref 0–0.9)
INR BLD: 1.13 — SIGNIFICANT CHANGE UP (ref 0.88–1.16)
LYMPHOCYTES # BLD AUTO: 1.07 K/UL — SIGNIFICANT CHANGE UP (ref 1–3.3)
LYMPHOCYTES # BLD AUTO: 11.9 % — LOW (ref 13–44)
MAGNESIUM SERPL-MCNC: 2.1 MG/DL — SIGNIFICANT CHANGE UP (ref 1.6–2.6)
MAGNESIUM SERPL-MCNC: 2.1 MG/DL — SIGNIFICANT CHANGE UP (ref 1.6–2.6)
MCHC RBC-ENTMCNC: 29.3 PG — SIGNIFICANT CHANGE UP (ref 27–34)
MCHC RBC-ENTMCNC: 29.5 PG — SIGNIFICANT CHANGE UP (ref 27–34)
MCHC RBC-ENTMCNC: 32.3 GM/DL — SIGNIFICANT CHANGE UP (ref 32–36)
MCHC RBC-ENTMCNC: 32.7 GM/DL — SIGNIFICANT CHANGE UP (ref 32–36)
MCV RBC AUTO: 89.7 FL — SIGNIFICANT CHANGE UP (ref 80–100)
MCV RBC AUTO: 91.3 FL — SIGNIFICANT CHANGE UP (ref 80–100)
MONOCYTES # BLD AUTO: 0.47 K/UL — SIGNIFICANT CHANGE UP (ref 0–0.9)
MONOCYTES NFR BLD AUTO: 5.2 % — SIGNIFICANT CHANGE UP (ref 2–14)
NEUTROPHILS # BLD AUTO: 7.25 K/UL — SIGNIFICANT CHANGE UP (ref 1.8–7.4)
NEUTROPHILS NFR BLD AUTO: 81.1 % — HIGH (ref 43–77)
NRBC # BLD: 0 /100 WBCS — SIGNIFICANT CHANGE UP (ref 0–0)
NRBC # BLD: 0 /100 WBCS — SIGNIFICANT CHANGE UP (ref 0–0)
PCO2 BLDV: 47 MMHG — HIGH (ref 39–42)
PH BLDV: 7.38 — SIGNIFICANT CHANGE UP (ref 7.32–7.43)
PHOSPHATE SERPL-MCNC: 4.3 MG/DL — SIGNIFICANT CHANGE UP (ref 2.5–4.5)
PHOSPHATE SERPL-MCNC: 5.4 MG/DL — HIGH (ref 2.5–4.5)
PLATELET # BLD AUTO: 398 K/UL — SIGNIFICANT CHANGE UP (ref 150–400)
PLATELET # BLD AUTO: 422 K/UL — HIGH (ref 150–400)
PO2 BLDV: <33 MMHG — LOW (ref 25–45)
POTASSIUM BLDV-SCNC: 4.6 MMOL/L — SIGNIFICANT CHANGE UP (ref 3.5–5.1)
POTASSIUM SERPL-MCNC: 4 MMOL/L — SIGNIFICANT CHANGE UP (ref 3.5–5.3)
POTASSIUM SERPL-MCNC: 4.8 MMOL/L — SIGNIFICANT CHANGE UP (ref 3.5–5.3)
POTASSIUM SERPL-SCNC: 4 MMOL/L — SIGNIFICANT CHANGE UP (ref 3.5–5.3)
POTASSIUM SERPL-SCNC: 4.8 MMOL/L — SIGNIFICANT CHANGE UP (ref 3.5–5.3)
PROT CSF-MCNC: 31 MG/DL — SIGNIFICANT CHANGE UP (ref 15–45)
PROT SERPL-MCNC: 6.9 G/DL — SIGNIFICANT CHANGE UP (ref 6–8.3)
PROTHROM AB SERPL-ACNC: 13.5 SEC — HIGH (ref 10.5–13.4)
RBC # BLD: 3.68 M/UL — LOW (ref 3.8–5.2)
RBC # BLD: 3.69 M/UL — LOW (ref 3.8–5.2)
RBC # FLD: 13.2 % — SIGNIFICANT CHANGE UP (ref 10.3–14.5)
RBC # FLD: 13.2 % — SIGNIFICANT CHANGE UP (ref 10.3–14.5)
RH IG SCN BLD-IMP: POSITIVE — SIGNIFICANT CHANGE UP
SAO2 % BLDV: 27.1 % — LOW (ref 67–88)
SODIUM SERPL-SCNC: 139 MMOL/L — SIGNIFICANT CHANGE UP (ref 135–145)
SODIUM SERPL-SCNC: 139 MMOL/L — SIGNIFICANT CHANGE UP (ref 135–145)
SPECIMEN SOURCE: SIGNIFICANT CHANGE UP
SPECIMEN SOURCE: SIGNIFICANT CHANGE UP
WBC # BLD: 6.68 K/UL — SIGNIFICANT CHANGE UP (ref 3.8–10.5)
WBC # BLD: 8.96 K/UL — SIGNIFICANT CHANGE UP (ref 3.8–10.5)
WBC # FLD AUTO: 6.68 K/UL — SIGNIFICANT CHANGE UP (ref 3.8–10.5)
WBC # FLD AUTO: 8.96 K/UL — SIGNIFICANT CHANGE UP (ref 3.8–10.5)

## 2023-05-08 PROCEDURE — 62223 ESTABLISH BRAIN CAVITY SHUNT: CPT | Mod: 62,58

## 2023-05-08 PROCEDURE — 88108 CYTOPATH CONCENTRATE TECH: CPT | Mod: 26

## 2023-05-08 PROCEDURE — 61781 SCAN PROC CRANIAL INTRA: CPT | Mod: 58

## 2023-05-08 PROCEDURE — 88305 TISSUE EXAM BY PATHOLOGIST: CPT | Mod: 26

## 2023-05-08 PROCEDURE — 99291 CRITICAL CARE FIRST HOUR: CPT | Mod: 24

## 2023-05-08 PROCEDURE — 70450 CT HEAD/BRAIN W/O DYE: CPT | Mod: 26

## 2023-05-08 DEVICE — GWIRE ANGL .035X180 STD: Type: IMPLANTABLE DEVICE | Status: FUNCTIONAL

## 2023-05-08 DEVICE — CATH DISTAL ARES: Type: IMPLANTABLE DEVICE | Status: FUNCTIONAL

## 2023-05-08 DEVICE — VALVE CODMAN CERTAS PLUS INLINE WITH SIPHONGUARD: Type: IMPLANTABLE DEVICE | Status: FUNCTIONAL

## 2023-05-08 DEVICE — CATH BACTISEAL VENTRICULAR: Type: IMPLANTABLE DEVICE | Status: FUNCTIONAL

## 2023-05-08 RX ORDER — AMLODIPINE BESYLATE 2.5 MG/1
5 TABLET ORAL DAILY
Refills: 0 | Status: DISCONTINUED | OUTPATIENT
Start: 2023-05-08 | End: 2023-05-08

## 2023-05-08 RX ORDER — DOXAZOSIN MESYLATE 4 MG
4 TABLET ORAL AT BEDTIME
Refills: 0 | Status: DISCONTINUED | OUTPATIENT
Start: 2023-05-08 | End: 2023-05-08

## 2023-05-08 RX ORDER — LEVETIRACETAM 250 MG/1
500 TABLET, FILM COATED ORAL EVERY 12 HOURS
Refills: 0 | Status: DISCONTINUED | OUTPATIENT
Start: 2023-05-08 | End: 2023-05-09

## 2023-05-08 RX ORDER — ATORVASTATIN CALCIUM 80 MG/1
80 TABLET, FILM COATED ORAL AT BEDTIME
Refills: 0 | Status: DISCONTINUED | OUTPATIENT
Start: 2023-05-08 | End: 2023-05-25

## 2023-05-08 RX ORDER — ATORVASTATIN CALCIUM 80 MG/1
80 TABLET, FILM COATED ORAL AT BEDTIME
Refills: 0 | Status: DISCONTINUED | OUTPATIENT
Start: 2023-05-08 | End: 2023-05-08

## 2023-05-08 RX ORDER — FERROUS SULFATE 325(65) MG
325 TABLET ORAL
Refills: 0 | Status: DISCONTINUED | OUTPATIENT
Start: 2023-05-08 | End: 2023-05-08

## 2023-05-08 RX ORDER — OXYCODONE HYDROCHLORIDE 5 MG/1
5 TABLET ORAL EVERY 4 HOURS
Refills: 0 | Status: DISCONTINUED | OUTPATIENT
Start: 2023-05-08 | End: 2023-05-08

## 2023-05-08 RX ORDER — POVIDONE-IODINE 5 %
1 AEROSOL (ML) TOPICAL ONCE
Refills: 0 | Status: COMPLETED | OUTPATIENT
Start: 2023-05-08 | End: 2023-05-08

## 2023-05-08 RX ORDER — OXYCODONE HYDROCHLORIDE 5 MG/1
5 TABLET ORAL EVERY 4 HOURS
Refills: 0 | Status: DISCONTINUED | OUTPATIENT
Start: 2023-05-08 | End: 2023-05-15

## 2023-05-08 RX ORDER — SENNA PLUS 8.6 MG/1
2 TABLET ORAL AT BEDTIME
Refills: 0 | Status: DISCONTINUED | OUTPATIENT
Start: 2023-05-08 | End: 2023-05-08

## 2023-05-08 RX ORDER — LEVETIRACETAM 250 MG/1
500 TABLET, FILM COATED ORAL EVERY 12 HOURS
Refills: 0 | Status: DISCONTINUED | OUTPATIENT
Start: 2023-05-08 | End: 2023-05-08

## 2023-05-08 RX ORDER — DOXAZOSIN MESYLATE 4 MG
4 TABLET ORAL AT BEDTIME
Refills: 0 | Status: DISCONTINUED | OUTPATIENT
Start: 2023-05-08 | End: 2023-05-10

## 2023-05-08 RX ORDER — POLYETHYLENE GLYCOL 3350 17 G/17G
17 POWDER, FOR SOLUTION ORAL DAILY
Refills: 0 | Status: DISCONTINUED | OUTPATIENT
Start: 2023-05-08 | End: 2023-05-08

## 2023-05-08 RX ORDER — AMLODIPINE BESYLATE 2.5 MG/1
5 TABLET ORAL DAILY
Refills: 0 | Status: DISCONTINUED | OUTPATIENT
Start: 2023-05-08 | End: 2023-05-15

## 2023-05-08 RX ORDER — CARVEDILOL PHOSPHATE 80 MG/1
3.12 CAPSULE, EXTENDED RELEASE ORAL EVERY 12 HOURS
Refills: 0 | Status: DISCONTINUED | OUTPATIENT
Start: 2023-05-08 | End: 2023-05-15

## 2023-05-08 RX ORDER — PANTOPRAZOLE SODIUM 20 MG/1
40 TABLET, DELAYED RELEASE ORAL DAILY
Refills: 0 | Status: DISCONTINUED | OUTPATIENT
Start: 2023-05-08 | End: 2023-05-13

## 2023-05-08 RX ORDER — CEFAZOLIN SODIUM 1 G
2000 VIAL (EA) INJECTION EVERY 8 HOURS
Refills: 0 | Status: COMPLETED | OUTPATIENT
Start: 2023-05-08 | End: 2023-05-09

## 2023-05-08 RX ORDER — CHLORHEXIDINE GLUCONATE 213 G/1000ML
1 SOLUTION TOPICAL DAILY
Refills: 0 | Status: DISCONTINUED | OUTPATIENT
Start: 2023-05-08 | End: 2023-05-22

## 2023-05-08 RX ORDER — SODIUM CHLORIDE 9 MG/ML
1000 INJECTION, SOLUTION INTRAVENOUS
Refills: 0 | Status: DISCONTINUED | OUTPATIENT
Start: 2023-05-08 | End: 2023-05-09

## 2023-05-08 RX ORDER — ASCORBIC ACID 60 MG
500 TABLET,CHEWABLE ORAL
Refills: 0 | Status: DISCONTINUED | OUTPATIENT
Start: 2023-05-08 | End: 2023-05-25

## 2023-05-08 RX ORDER — FERROUS SULFATE 325(65) MG
325 TABLET ORAL
Refills: 0 | Status: DISCONTINUED | OUTPATIENT
Start: 2023-05-09 | End: 2023-05-25

## 2023-05-08 RX ORDER — PANTOPRAZOLE SODIUM 20 MG/1
40 TABLET, DELAYED RELEASE ORAL
Refills: 0 | Status: DISCONTINUED | OUTPATIENT
Start: 2023-05-08 | End: 2023-05-08

## 2023-05-08 RX ORDER — CARVEDILOL PHOSPHATE 80 MG/1
3.12 CAPSULE, EXTENDED RELEASE ORAL EVERY 12 HOURS
Refills: 0 | Status: DISCONTINUED | OUTPATIENT
Start: 2023-05-08 | End: 2023-05-08

## 2023-05-08 RX ORDER — ASCORBIC ACID 60 MG
500 TABLET,CHEWABLE ORAL DAILY
Refills: 0 | Status: DISCONTINUED | OUTPATIENT
Start: 2023-05-08 | End: 2023-05-08

## 2023-05-08 RX ADMIN — Medication 1 TABLET(S): at 11:39

## 2023-05-08 RX ADMIN — PANTOPRAZOLE SODIUM 40 MILLIGRAM(S): 20 TABLET, DELAYED RELEASE ORAL at 11:39

## 2023-05-08 RX ADMIN — Medication 3 MILLILITER(S): at 05:39

## 2023-05-08 RX ADMIN — CHLORHEXIDINE GLUCONATE 15 MILLILITER(S): 213 SOLUTION TOPICAL at 06:25

## 2023-05-08 RX ADMIN — LEVETIRACETAM 500 MILLIGRAM(S): 250 TABLET, FILM COATED ORAL at 17:40

## 2023-05-08 RX ADMIN — CHLORHEXIDINE GLUCONATE 1 APPLICATION(S): 213 SOLUTION TOPICAL at 06:25

## 2023-05-08 RX ADMIN — ATORVASTATIN CALCIUM 80 MILLIGRAM(S): 80 TABLET, FILM COATED ORAL at 21:28

## 2023-05-08 RX ADMIN — Medication 100 MILLIGRAM(S): at 21:28

## 2023-05-08 RX ADMIN — Medication 4 MILLIGRAM(S): at 21:28

## 2023-05-08 RX ADMIN — Medication 1 APPLICATION(S): at 12:00

## 2023-05-08 RX ADMIN — CARVEDILOL PHOSPHATE 3.12 MILLIGRAM(S): 80 CAPSULE, EXTENDED RELEASE ORAL at 17:40

## 2023-05-08 RX ADMIN — Medication 1 PACKET(S): at 06:25

## 2023-05-08 NOTE — PROGRESS NOTE ADULT - ASSESSMENT
58 y/o female transferred from [place] with right sided weakness and right facial numbness. Found to have acute infarction within the right cerebellar hemisphere, causing herniation. Now s/p SOC foramen magnum decompression and right parietal/occipital EVD placement (4/21). Course c/b respiratory insuffiencey d/t bulbar dysfunction, s/p trach 4/28/23 failed bedside  PEG placement 5/1; now s/p VPS POD 0     -NC q4, VSq1   -CPAP trail as tolerated  -tube feeds on hold per GI, Rectal tube in place minimal output   -c/w coreg  -post operative state

## 2023-05-08 NOTE — BRIEF OPERATIVE NOTE - OPERATION/FINDINGS
-Right Kocher's point   shunt with laparoscopic intra-peritoneal access.  -Uneventful procedure.  -Certas at 4.  - Ventricle was accessed with US guidance, first pass, CSF was clear and under high pressure.  -Distal flow was confirmed prior to closure.  -Skin closed with nylon 3-0.  -R Biggs point EVD was removed.

## 2023-05-08 NOTE — PRE-ANESTHESIA EVALUATION ADULT - NSRADCARDRESULTSFT_GEN_ALL_CORE
Imaging Reviewed
CT this AM demonstrate predominantly CSF density fluid collection in the craniectomy bed which measures 7.3 cm transverse by 2.1 cm AP by 2.3 cm craniocaudal which has increased in size from prior exam and may represent pseudomeningocele. There is an extra-axial fluid collection deep to craniectomy site which measures 6.8 x 3.8 x 4 cm which is also increased in size from prior exam.

## 2023-05-08 NOTE — PRE-ANESTHESIA EVALUATION ADULT - NSANTHOSAYNRD_GEN_A_CORE
No. SAWYER screening performed.  STOP BANG Legend: 0-2 = LOW Risk; 3-4 = INTERMEDIATE Risk; 5-8 = HIGH Risk

## 2023-05-08 NOTE — BRIEF OPERATIVE NOTE - NSICDXBRIEFPOSTOP_GEN_ALL_CORE_FT
POST-OP DIAGNOSIS:  History of DVT (deep vein thrombosis) 04-May-2023 15:05:12  Nano Fong  
POST-OP DIAGNOSIS:  Cerebellar stroke 22-Apr-2023 00:27:38  Rylee Bravo  
POST-OP DIAGNOSIS:  Cerebellar stroke 22-Apr-2023 00:27:38  Rylee Bravo  Respiratory failure, unspecified with hypoxia 28-Apr-2023 14:11:16  Olesya Brennan  
POST-OP DIAGNOSIS:  Hydrocephalus 08-May-2023 15:20:07  Erickson Linares  
POST-OP DIAGNOSIS:  Respiratory failure, unspecified with hypoxia 28-Apr-2023 14:11:16  Olesya Brennan  
POST-OP DIAGNOSIS:  Tracheostomy malfunction 09-May-2023 14:00:17  Ramila Ribeiro  
POST-OP DIAGNOSIS:  Cerebellar stroke 22-Apr-2023 00:27:38  Rylee Bravo

## 2023-05-08 NOTE — PROGRESS NOTE ADULT - SUBJECTIVE AND OBJECTIVE BOX
***********************************************  ADULT NSICU PROGRESS NOTE  MAKSIM TRIVEDI [id number] [facility] 10EA 02  ***********************************************    24H INTERVAL EVENTS:    ROS: negative except per mentioned above in 24h interval events.      HOSPITAL COURSE CARRIED FORWARD:    VITALS:    ICU Vital Signs Last 24 Hrs  T(C): 36.9 (08 May 2023 12:04), Max: 37.4 (08 May 2023 04:55)  T(F): 98.5 (08 May 2023 12:04), Max: 99.4 (08 May 2023 04:55)  HR: 61 (08 May 2023 12:00) (58 - 69)  BP: 112/52 (08 May 2023 12:00) (95/45 - 142/56)  BP(mean): 75 (08 May 2023 12:00) (65 - 84)  ABP: --  ABP(mean): --  RR: 16 (08 May 2023 12:00) (16 - 28)  SpO2: 98% (08 May 2023 12:04) (96% - 99%)    O2 Parameters below as of 08 May 2023 12:00  Patient On (Oxygen Delivery Method): ventilator    O2 Concentration (%): 40        Mode: AC/ CMV (Assist Control/ Continuous Mandatory Ventilation)  RR (machine): 16  TV (machine): 400  FiO2: 40  PEEP: 5  ITime: 1  MAP: 8  PIP: 16      I&O's Summary    07 May 2023 07:01  -  08 May 2023 07:00  --------------------------------------------------------  IN: 1840 mL / OUT: 804 mL / NET: 1036 mL    08 May 2023 07:01  -  08 May 2023 14:36  --------------------------------------------------------  IN: 375 mL / OUT: 0 mL / NET: 375 mL        EXAM:     [place] Coma Scale: 3/1T/6 = 10    General: normocephalic, atraumatic, laying in bed, in no distress  Neuro     MS: [place] Coma Scale: 3/1T/6 = 10, follows commands, cooperative with examination, normal attention    CN: PERRL, (+)R. gaze, plegia to left gaze     Mot: bulk normal, tone normal, power UPPER 0/3, LOWER 3/3  Chest: mechanically ventilated, coarse breath sounds, heart regular rate/rhythm, present S1/S2, no murmurs or rubs  Abdomen: nondistended, soft and nontender without peritoneal signs, normoactive bowel sounds  Extremities: no clubbing, well-perfused, no edema                              10.8   6.68  )-----------( 422      ( 08 May 2023 05:30 )             33.0     05-08    139  |  102  |  10  ----------------------------<  117<H>  4.0   |  27  |  0.51    Ca    9.6      08 May 2023 05:30  Phos  4.3     05-08  Mg     2.1     05-08        MEDICATIONS  (STANDING):  povidone iodine 5% Nasal Swab 1 Application(s) Both Nostrils once    MEDICATIONS  (PRN):        ***********************************************  ASSESSMENT AND PLAN  ***********************************************    #Suboccipital pseudomeningocele  #S/p placement of VPS, 5/8/23  #Bilateral cerebellar hemispheric strokes  #S/p SOC for foramen magnum decompression and R. parieto-occipital placement of EVD, 4/22/23  #11mm wide-necked multilobulated L. cavernous ICA aneurysm & 8mm wide-necked R. cavernous ICA aneurysm  #R. V4 occlusion  #S/p tracheostomy placement w/ tube exchanged due to broken  balloon, 4/28/23  #S/p lap-assisted gastrostomy tube, 5/2/23  #L. intramuscular calf DVT  #S/p IVCF placement, 5/4/23  #History of peripheral neuropathy, CAD, and asthma      NEURO  - Admit NSICU, Q1h neuro checks / Q1h vital signs  - F/u vascular conference re plan for b/l cavernous ICA aneurysms  - OR for VPS placement (pseudomeningocele) today  - Stroke consultation, heme consultation  - Pain control, PT/OT when able    PULM  - Volume control/assist control ventilation, wean to pressure support as tolerated  - 6 Cuffed trach  - SpO2 goal > 92%, supplemental O2 and pulm toileting as needed    CARDIO  - BP goal: < 140 while postoperative    GI  - Diet: tube feeds on hold for OR  - Stress ulcer prophylaxis: PPI    /RENAL  - Monitor UOP/volume status, BUN/SCr    HEME  - Maintain Hb > 7.0, PLT > 100,000  - Heme consultation for stroke in the young  - SCDs, holding SQ chemoppx  - Removal of IVFC ASAP, hypercoag panel workup pending    ID  - Monitor for infectious s/s, fever curve, leukocytosis  - S/p ertapenem (5/1-5/17) for ESBL UTI, Pluralibacter gergoviae/Strep pneumo sputum  - Iso precautions  - Cleared for VPS placement by ID    ENDO    05-08-23 @ 14:36       ***********************************************  ADULT NSICU PROGRESS NOTE  MAKSIM TRIVEDI [id number] [facility] 10EA 02  ***********************************************    24H INTERVAL EVENTS:  - No acute overnight events  - Hypoventilating during PS trial this morning  - Back on VC/AC, to OR this afternoon for VPS placement and d/c EVD, will consider stimulant    ROS: negative except per mentioned above in 24h interval events.      HOSPITAL COURSE CARRIED FORWARD:    VITALS:    ICU Vital Signs Last 24 Hrs  T(C): 36.9 (08 May 2023 12:04), Max: 37.4 (08 May 2023 04:55)  T(F): 98.5 (08 May 2023 12:04), Max: 99.4 (08 May 2023 04:55)  HR: 61 (08 May 2023 12:00) (58 - 69)  BP: 112/52 (08 May 2023 12:00) (95/45 - 142/56)  BP(mean): 75 (08 May 2023 12:00) (65 - 84)  ABP: --  ABP(mean): --  RR: 16 (08 May 2023 12:00) (16 - 28)  SpO2: 98% (08 May 2023 12:04) (96% - 99%)    O2 Parameters below as of 08 May 2023 12:00  Patient On (Oxygen Delivery Method): ventilator    O2 Concentration (%): 40        Mode: AC/ CMV (Assist Control/ Continuous Mandatory Ventilation)  RR (machine): 16  TV (machine): 400  FiO2: 40  PEEP: 5  ITime: 1  MAP: 8  PIP: 16      I&O's Summary    07 May 2023 07:01  -  08 May 2023 07:00  --------------------------------------------------------  IN: 1840 mL / OUT: 804 mL / NET: 1036 mL    08 May 2023 07:01  -  08 May 2023 14:36  --------------------------------------------------------  IN: 375 mL / OUT: 0 mL / NET: 375 mL        EXAM:     [place] Coma Scale: 3/1T/6 = 10    General: normocephalic, atraumatic, laying in bed, in no distress  Neuro     MS: [place] Coma Scale: 3/1T/6 = 10, follows commands, cooperative with examination, normal attention    CN: PERRL, (+)R. gaze, plegia to left gaze     Mot: bulk normal, tone normal, power UPPER 2/3, LOWER 3/3  Chest: mechanically ventilated, coarse breath sounds, heart regular rate/rhythm, present S1/S2, no murmurs or rubs  Abdomen: nondistended, soft and nontender without peritoneal signs, normoactive bowel sounds  Extremities: no clubbing, well-perfused, no edema                              10.8   6.68  )-----------( 422      ( 08 May 2023 05:30 )             33.0     05-08    139  |  102  |  10  ----------------------------<  117<H>  4.0   |  27  |  0.51    Ca    9.6      08 May 2023 05:30  Phos  4.3     05-08  Mg     2.1     05-08        MEDICATIONS  (STANDING):  povidone iodine 5% Nasal Swab 1 Application(s) Both Nostrils once    MEDICATIONS  (PRN):        ***********************************************  ASSESSMENT AND PLAN  ***********************************************    #Suboccipital pseudomeningocele  #S/p placement of VPS, 5/8/23  #Bilateral cerebellar hemispheric strokes  #S/p SOC for foramen magnum decompression and R. parieto-occipital placement of EVD, 4/22/23  #11mm wide-necked multilobulated L. cavernous ICA aneurysm & 8mm wide-necked R. cavernous ICA aneurysm  #R. V4 occlusion  #S/p tracheostomy placement w/ tube exchanged due to broken  balloon, 4/28/23  #S/p lap-assisted gastrostomy tube, 5/2/23  #L. intramuscular calf DVT  #S/p IVCF placement, 5/4/23  #History of peripheral neuropathy, CAD, and asthma      NEURO  - Admit NSICU, Q1h neuro checks / Q1h vital signs  - F/u vascular conference re plan for b/l cavernous ICA aneurysms  - OR for VPS placement (pseudomeningocele) today  - Stroke consultation, heme consultation  - Pain control, PT/OT when able  - Discuss initiation of stimulant    PULM  - Volume control/assist control ventilation, wean to pressure support as tolerated  - 6 Cuffed trach  - SpO2 goal > 92%, supplemental O2 and pulm toileting as needed    CARDIO  - BP goal: < 140 while postoperative    GI  - Diet: tube feeds on hold for OR  - Stress ulcer prophylaxis: PPI    /RENAL  - Monitor UOP/volume status, BUN/SCr    HEME  - Maintain Hb > 7.0, PLT > 100,000  - Heme consultation for stroke in the young  - SCDs, holding SQ chemoppx  - Removal of IVFC ASAP, hypercoag panel workup pending    ID  - Monitor for infectious s/s, fever curve, leukocytosis  - S/p ertapenem (5/1-5/17) for ESBL UTI, Pluralibacter gergoviae/Strep pneumo sputum  - Iso precautions  - Cleared for VPS placement by ID    ENDO    05-08-23 @ 14:36

## 2023-05-08 NOTE — PROGRESS NOTE ADULT - SUBJECTIVE AND OBJECTIVE BOX
HPI:  56 yo F with PMH of Asthma, CAD (not on  meds), peripheral neuropathy and PSH of BATOOL, cholecystectomy, right knee surgery presented to [place] ED on 4/20 with right sided weakness and right facial numbness. Patient was undergoing preparation for colonoscopy. As per daughter at 8am patient was playing with her grandchildren but around 840 am patient was getting dressed and fell and subsequently felt weak after. Daughter reports that patient had some episodes of vomiting at this time. She had a syncopal episode at around 10 am and was witnessed by brother. No head trauma, She regained conscious after few minutes. She remained in bed for the remainder of the day. Daughter reports some slurred speech noted 1230-1PM and also was confused after. and around 4PM, the patient's sister noted right sided weakness at which time EMS was called and patient was brought to ED. No c/o chest pain, shortness of breath, fever, headache, abdominal pain, urinary complaints. No recent travel/sickness/ change in meds. Stroke code in ER: CTH neg for heme, Right PICA distribution acute infarction. CTA with saccular aneurysms of b/l carotids, approximately 0.8 cm on the right and 1.2 x 0.9 cm on the left. Possible tiny third saccular aneurysm on the right Posterior intracranial circulation: Right vertebral arterial occlusion at its dural crossing junction V3 Atlantic and V4 intracranial segments with likely reversal of flow in its intracranial segment from the basilar. Right PICA faintly seen. 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  Not candidate for TNK/mechanical thrombectomy. CT scan repeated which showed: 1. Brain: Progression of acute infarction within the right cerebellar hemisphere, also extending into the left superior cerebellar hemisphere. New mass effect on the fourth ventricle causing stenosis versus occlusion with new third and lateral ventricular dilatation indicating ventricular obstruction at the level of the fourth ventricle. New upward and downward herniation of cerebellar parenchyma Right carotid system: No hemodynamically significant stenosis. Left carotid system: No hemodynamically significant stenosis. Vertebral circulation: Patent. Anterior intracranial circulation: Intact. Bilateral internal carotid saccular aneurysms. These findings are unchanged. Posterior intracranial circulation:    Improved flow within the right vertebral artery since 4/20/2023. New focal stenosis mid left vertebral artery, etiology uncertain, consider vasospasm and extrinsic compression in addition to new embolic disease. Brain perfusion: Perfusion images demonstrate normalization of the perfusion abnormality in the right cerebellar hemisphere present on 4/20/2023 despite evidence of progression of acute infarction.  Areas of apparent ischemia within the posterior fossa have progressed in extent, also again involving the left posterior cerebral arterial distribution. Tx to [facility] for SOC watch. On admission to [facility], NIHSS 12.  (21 Apr 2023 16:50)    Hospital Course:  4/21: Admitted for crani watch, CTH complete w/ unchanged hydrocephalus, taken for emergent occipital craniectomy.   4/22: POD1. Remains intubated overnight, on propofol and dank. EVD@[id number]. Pending repeat CTH this am. Given hydralazine 10mg x1. Started on cardene for SBP high 140s-150s. Sub-therapeutic on plavix, therapeutic on asa, rpt asa accumetrics until subtherapeutic. LE dopplers neg for DVT, but showing superficial venous thrombosis in the proximal portion of the right greater saphenous vein. Started on 3% @60 for na goal 145-150. NGT placed by ENT. Febrile, pancultured.  4/23: POD 2. 3% increased to 75. NGT readjusted. UA neg. SBP liberalized to 160. Plan to extubate. 3% decreased to 60. Failed extubation d/t no cuff leak, given 60mg solumedrol, plan to extubate in 6 hrs. SCx1 for post void residual >400, started on cardura at bedtime. New blood in buretrol, stopped SQL. Clot in EVD cleared. Neuro exam improving.   4/24: POD 3. Cont 3% with NS while NPO, start TF today. TF started. LFTs downtrending. Sodium 141. Increased 3% to 50, NS to 30. Repeat BMP ordered for 5:30PM. Tramadol 25 for pain with no relief, oxy 5 and 1g tylenol ordered, stability CT stable, speech language consult for dysarthria. Given hydralazine 10mg IVP for SBP>160, started amlodipine 5mg. C/o mild headache, given fioricet x1, stroke neuro rec SALVADOR and loop recorder placement. Echo with bubble completed, negative for PFO, EF 55-60%. J HFNC started for desats with suctioning.   4/25: POD 4. Cont HFNC. Increased secertions on HFNC, reintubated. CXR confirmed good placement. EVD dropped to 5cmH20 for goal of draining 5-10cc/hr and SG ELENA drain taken off suction. Pending CTH. EVD drained 0cc/hr, dropped to 0. NGT replaced. Dc'd fioricet. Started on PPI for intubation. Increased cardura to 4mg for urinary retention, given an additional 2mg now. Started salt tabs 3 q 6. Given 12.5mg fentanyl x1. NGT replaced, CXR shown in lung. NGT removed, repeat CXR showing no pneumothorax. Pending ENT consult for NGT placement. CTH stable. NGT replaced by ENT, confirmed in proper spot. Restarted TF. [id number]. Na 141 from 146. Increased 3% to 60. EVD raised to 3cmH20. ETT pulled back 1cm by RT.  4/26: POD 5 SOC. Intubated, remains on precedex, ABG ordered with improving PaO2, BMP sodium 148, 3% changed to 30cc/hr, cardura increased to 8mg for tonight, tolerating cpap  4/27: POD 6 SOC. Respiratory rate sustained 6, returned to FVS. Na 151, dc'd 3%, f/u AM sodium. Nurse noticed ETT 19 at the lip and was 20 prior, CXR shows ETT @ 5cm above jono, ET tube advanced 1cm, repeat CXR confirms appropriate placement. Thick inline secretions with suctioning. ENT trach placement Fri likely, PEG likely Mon. Keep ELENA drain until no output, EVD to remain @3. Start ASA 81mg daily tomorrow.   4/28: POD7 SOC, neuro stable, given fentanyl x 1 overnight for presumed discomfort (biting on ETT), remains on full vent support. CTH today stable in comparison to 4/25. 6 cuffed trach placed by ENT in OR, but came down without cuff. Replaced at bedside by ENT. On fentanyl gtt.    4/29: POD8 SOC, JIM overnight. Incision cleaned and dressing changed. On fentanyl gtt. EKG completed due to increased frequency PVCs on telemetry, frequency of PVCs improved with titrating up fentanyl gtt. ABG drawn.  Repeat LE dopplers completed showing persistant superficial thrombus, no DVT. No EVD waveform during day, flushed distally without improvement. Exam unchanged.  EVD dropped to 0 for low output in afternoon.   4/30: POD9. Neuro stable, febrile to 101.3F o/n, given tylenol and pan cx sent. SBP dipped to low 90s o/n, HR stable in 70s with some PVCs, decreased fentanyl gtt and given 500cc bolus of NS x 2. Pend PEG placement Mon and angio Tues. D/c'd ELENA drain today and suture placed. F/u heme recs. Positive UA. Infiltrates found on CXR. Started on Zosyn 4.5g Q6hrs .   5/1: POD 10. Neuro stable. Dc'd fentanyl gtt. PEG failed placement at bedside with Dr. Gant, plan for PEG in OR tomorrow afternoon with Dr. Layton. Dc'd amlodipine and started carvedilol 3.125 BID for PVCs . Made 3% inhalation and mucomyst q8hr. Failed PEG at bedside. Salt tabs made 1 q 6. Decreased cardura to 4mg daily. Urine culture growing E. Coli and sputum culture growing pluralibacter gergoviae and strep species. ID consulted, f/u recs. Failed CPAP trial @ 3pm. Added am labs per heme/onc for hypercoguable workup. Pending repeat LE dopplers in 2-3 days. NGT clogged, removed, ENT failed attempt at replacement, will keep NPO for PEG placement tmw. Repeat xray in am for concern for aspration. Pt abx switched from Zosyn to Ertapenem 1g Q24hrs. per ID recs, possible ESBL growth.   5/2: POD 11. Neuro stable. Pre-op for diagnostic cerebral angiogram and PEG placement. NPO. EVD raised to 5 cmH20. Sputum culture speciated to step pneumoniae, sensitive to ertapenem. 3% inhalation/mucomyst made q 6 hr d/t thick secretions. PEG placed in OR. POD0 dx cerebral angio. EVD raised to 10.   5/3: POD 12. Neuro stable. PEG feeds began @ 10 AM, plan to increase 10cc every 6h per general surgery. Repeat dopplers show stable R superficial thrombus and new L IM calf DVT. Vascular consulted for IVC filter. Plan to get CTH tomorrow.  5/4: POD 13. JIM o/n. s/p IVC filter with vascular today. Pending post-procedure CTH. FOBT negative. Started iron and vitamin c every other day for iron deficiency anemia.   5/5: POD14. CSF cx sent to r/o infection from new gas in right temporal horn seen on CTH. Restarted TF, changed to Jevity 1.2, f/u nutrition recs. EVD raised to 10 today, plan to challenge over the weekend and CTH on Monday, possible VPS next Wednesday. ENT removed sutures today. Salt tabs decreased 1q12.  5/6: POD15 Placed on CPAP briefly with low MV alert, placed back on full vent support. EVD remains open at [id number]. ICPs WNL. Neuro exam stable.  EVD raised to 15. Tolerated CPAP x8h.   5/7: POD#16. JIM overnight, neuro stable. D/c'd salt tabs and 3% neb. Wean trach to CPAP as tolerated. Pan cx NGTD. EVD raised today to [id number].   5/8: POD17 JIM overnight. ICPs WNL. Pt remains on full vent support. Neuro exam stable. Plan for possible VPS today.    Vital Signs Last 24 Hrs  T(C): 37.3 (07 May 2023 21:40), Max: 37.5 (07 May 2023 11:00)  T(F): 99.2 (07 May 2023 21:40), Max: 99.5 (07 May 2023 11:00)  HR: 66 (08 May 2023 00:00) (61 - 78)  BP: 108/59 (08 May 2023 00:00) (95/54 - 142/56)  BP(mean): 78 (08 May 2023 00:00) (69 - 84)  RR: 28 (08 May 2023 00:00) (15 - 28)  SpO2: 97% (08 May 2023 00:00) (92% - 99%)    Parameters below as of 08 May 2023 00:00  Patient On (Oxygen Delivery Method): ventilator,VC-AC    O2 Concentration (%): 40    I&O's Summary    06 May 2023 07:01  -  07 May 2023 07:00  --------------------------------------------------------  IN: 1790 mL / OUT: 1639 mL / NET: 151 mL    07 May 2023 07:01  -  08 May 2023 00:26  --------------------------------------------------------  IN: 1390 mL / OUT: 804 mL / NET: 586 mL      PHYSICAL EXAM:   General: NAD, pt is comfortably laying in bed, A&O x 2 with choice (nods head appropriately, examined in Italian), +trach to full vent   HEENT: OE spont and to voice, R gaze preference (does not cross midline), PERRL 2mm  Cardiovascular: RRR, normal S1 and S2  Respiratory: lungs CTAB, no wheezing, rhonchi, or crackles   GI: normoactive BS to auscultation, abd soft, NTND   Neuro: nods head Y/N to questions, follows commands, moves all extremities spontaneously, LUE/LLE 5/5 throughout, RUE/RLE 4/5   Wound/incision: SOC incision site with sutures and dressing C/D/I   Drain: EVD @28agJ7D, open     TUBES/LINES:  [] CVC  [] A-line  [] Lumbar Drain  [X] Ventriculostomy  [] Other    DIET:  [] NPO  [] Mechanical  [x] Tube feeds    LABS:                        10.3   6.04  )-----------( 370      ( 07 May 2023 05:30 )             30.4     05-07    140  |  103  |  10  ----------------------------<  139<H>  4.2   |  27  |  0.41<L>    Ca    9.3      07 May 2023 05:30  Phos  4.3     05-07  Mg     2.1     05-07              CAPILLARY BLOOD GLUCOSE          Drug Levels: [] N/A    CSF Analysis: [] N/A      Allergies    No Known Allergies    Intolerances      MEDICATIONS:  Antibiotics:    Neuro:  acetaminophen   Oral Liquid .. 650 milliGRAM(s) Oral every 6 hours PRN  oxyCODONE    IR 5 milliGRAM(s) Oral every 4 hours PRN  oxyCODONE    IR 10 milliGRAM(s) Oral every 4 hours PRN    Anticoagulation:    OTHER:  albuterol/ipratropium for Nebulization 3 milliLiter(s) Nebulizer every 8 hours  atorvastatin 80 milliGRAM(s) Oral at bedtime  carvedilol 3.125 milliGRAM(s) Oral every 12 hours  chlorhexidine 0.12% Liquid 15 milliLiter(s) Oral Mucosa every 12 hours  chlorhexidine 2% Cloths 1 Application(s) Topical <User Schedule>  lactobacillus acidophilus 1 Tablet(s) Oral daily  pantoprazole   Suspension 40 milliGRAM(s) Oral daily  psyllium Powder 1 Packet(s) Oral two times a day    IVF:  ascorbic acid 500 milliGRAM(s) Oral <User Schedule>  ferrous    sulfate 325 milliGRAM(s) Oral <User Schedule>  sodium chloride 0.9%. 1000 milliLiter(s) IV Continuous <Continuous>    CULTURES:  Culture Results:   No growth to date (05-05 @ 05:06)  Culture Results:   No growth to date (04-30 @ 06:51)    RADIOLOGY & ADDITIONAL TESTS:      ASSESSMENT:  56 y/o female transferred from [place] with right sided weakness and right facial numbness. Found to have acute infarction within the right cerebellar hemisphere, causing herniation. Now s/p SOC foramen magnum decompression and right parietal/occipital EVD placement (4/21). Course c/b respiratory insuffiency d/t bulbar dysfunction, s/p trach 4/28/23. s/p PEG 5/2 with GI, s/p dx cerebral angiogram 5/2/23.       STROKE    No pertinent family history in first degree relatives    Handoff    Asthma    CAD (coronary artery disease)    Peripheral neuropathy    Cerebellar stroke    Respiratory failure, unspecified with hypoxia    History of DVT (deep vein thrombosis)    Cerebellar stroke    Respiratory failure, unspecified with hypoxia    History of DVT of lower extremity    Cerebellar infarct    CAD (coronary artery disease)    Asthma    Peripheral neuropathy    Lupus anticoagulant positive    Anemia due to acute blood loss    Tracheostomy malfunction    Decompressive craniectomy    Insertion, external ventricular drain    Planned tracheostomy    Tracheostomy tube change    Esophagogastroduodenoscopy (EGD) with anesthesia    Insertion, PEG tube, laparoscopic    Angiogram, carotid and cerebral, bilateral    Tracheostomy tube change    IVC filter placement    S/P total abdominal hysterectomy    S/P cholecystectomy    S/P right knee surgery    [email or web address]      PLAN:  Neuro   - Vitals/neuro q1h   - dx angio 5/2: b/l cavernous ICA aneurysms, plan to be discussed vascular conference  - EVD @20cm H2O, monitor ICP/output -- CTH Mon 5/8   - CTH 4/28 stable; 5/4 new gas in right temporal horn   - Stroke consulted, f/u recs   - Pain control with tylenol prn, oxycodone prn, fent pushes 12.5mg q2hr prn   - preop for possible VPS Monday    Cardio  - SBP    - TTE 4/24: negative for PFO, mild LVH, mild dilation of L atrium, EF 55-60%   - Carvedilol 3.125mg BID     Pulm  - VC//40/16/6, CPAP trials as tolerated  - Chest PT, duoneb q 8 hrs standing   - 6 cuffed trach placed by ENT 4/28, missing cuff, replaced trach at bedside. --> ENT  removed trach sutures POD7 (5/5)    GI  - TF via PEG (placed 5/2) NPO for possible VPS Monday  - hold bowel regimen, last BM 5/6 (3x), RT placed  - Protonix for GI ppx while on vent  - FOBT 5/4 negative     Renal  - Na goal 135-145, off NaCl tabs   - Voiding via primafit   - IVF while NPO    Endo   - cont lipitor     Heme  - SQL held  - s/p IVCF 5/4  - LE dopplers 4/22 neg for DVT, but showing superficial venous thrombosis in the proximal portion of the right greater saphenous vein; repeat on 4/29 with persistant superficial thrombus, 5/3 new DVT L IM calf and unchanged R superficial vein thrombus  - Heme following for positive anticardiolipin Ab 4/27, recs appreciated   - Iron and vitamin c every other day    ID  - ID recs: Ertapenem 1g Q24hrs (5/1-5/7) x 7 days  - Last panculture 4/30, MRSA (-), +UA cx ESBL, +sputum cx pluralibacter gergoviae, strep pneumoniae   - +isolation precautions - ESBL in urine   - f/u CSF culture 5/4  - ID cleared for VPS     DISPO:  - NSICU, full code   - PT/OT rec acute inpatient rehab: patient can tolerate 3 hrs PT/OT daily    D/w Dr. Valadez and Dr. Worley

## 2023-05-08 NOTE — PRE-ANESTHESIA EVALUATION ADULT - NSANTHPMHFT_GEN_ALL_CORE
prolonged complicated hospital illness 2/2 stroke, s/p trach for respo failure and failure to ween. pt has been without needing sedation, pt has not needed cardiac support eithere. stable x 7 days per nursing/family prolonged complicated hospital illness 2/2 stroke, s/p trach for respo failure and failure to ween. pt has been without needing sedation, pt has not needed cardiac support either. Stable x 7 days per nursing/family

## 2023-05-08 NOTE — PROGRESS NOTE ADULT - SUBJECTIVE AND OBJECTIVE BOX
INTERVAL HISTORY: HPI:  56 yo F with PMH of Asthma, CAD (not on  meds), peripheral neuropathy and PSH of BATOOL, cholecystectomy, right knee surgery presented to [facility] ED on 4/20 with right sided weakness and right facial numbness. Patient was undergoing preparation for colonoscopy. As per daughter at 8am patient was playing with her grandchildren but around 840 am patient was getting dressed and fell and subsequently felt weak after. Daughter reports that patient had some episodes of vomiting at this time. She had a syncopal episode at around 10 am and was witnessed by brother. No head trauma, She regained conscious after few minutes. She remained in bed for the remainder of the day. Daughter reports some slurred speech noted 1230-1PM and also was confused after. and around 4PM, the patient's sister noted right sided weakness at which time EMS was called and patient was brought to ED. No c/o chest pain, shortness of breath, fever, headache, abdominal pain, urinary complaints. No recent travel/sickness/ change in meds. Stroke code in ER: CTH neg for heme, Right PICA distribution acute infarction. CTA with saccular aneurysms of b/l carotids, approximately 0.8 cm on the right and 1.2 x 0.9 cm on the left. Possible tiny third saccular aneurysm on the right Posterior intracranial circulation: Right vertebral arterial occlusion at its dural crossing junction V3 Atlantic and V4 intracranial segments with likely reversal of flow in its intracranial segment from the basilar. Right PICA faintly seen. 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  Not candidate for TNK/mechanical thrombectomy. CT scan repeated which showed: 1. Brain: Progression of acute infarction within the right cerebellar hemisphere, also extending into the left superior cerebellar hemisphere. New mass effect on the fourth ventricle causing stenosis versus occlusion with new third and lateral ventricular dilatation indicating ventricular obstruction at the level of the fourth ventricle. New upward and downward herniation of cerebellar parenchyma Right carotid system: No hemodynamically significant stenosis. Left carotid system: No hemodynamically significant stenosis. Vertebral circulation: Patent. Anterior intracranial circulation: Intact. Bilateral internal carotid saccular aneurysms. These findings are unchanged. Posterior intracranial circulation:    Improved flow within the right vertebral artery since 4/20/2023. New focal stenosis mid left vertebral artery, etiology uncertain, consider vasospasm and extrinsic compression in addition to new embolic disease. Brain perfusion: Perfusion images demonstrate normalization of the perfusion abnormality in the right cerebellar hemisphere present on 4/20/2023 despite evidence of progression of acute infarction.  Areas of apparent ischemia within the posterior fossa have progressed in extent, also again involving the left posterior cerebral arterial distribution. Tx to [facility] for SOC watch. On admission to [facility], NIHSS 12.  (21 Apr 2023 16:50)      MEDICATIONS  (STANDING):  ceFAZolin   IVPB 2000 milliGRAM(s) IV Intermittent every 8 hours  chlorhexidine 2% Cloths 1 Application(s) Topical daily  lactated ringers. 1000 milliLiter(s) (75 mL/Hr) IV Continuous <Continuous>  pantoprazole   Suspension 40 milliGRAM(s) Enteral Tube daily    MEDICATIONS  (PRN):  oxyCODONE    IR 5 milliGRAM(s) Oral every 4 hours PRN Moderate Pain (4 - 6)      Drug Dosing Weight  Height (cm): 157.5 (08 May 2023 11:59)  Weight (kg): 88.4 (08 May 2023 11:59)  BMI (kg/m2): 35.6 (08 May 2023 11:59)  BSA (m2): 1.89 (08 May 2023 11:59)    PAST MEDICAL & SURGICAL HISTORY:  Asthma  CAD (coronary artery disease)  Peripheral neuropathy  s/P total abdominal hysterectomy  S/P cholecystectomy  S/P right knee surgery      REVIEW OF SYSTEMS: [ ] Unable to Assess due to neurologic exam   [x] All ROS addressed below are non-contributory, except:  Neuro: [ ] Headache [ ] Back pain [ ] Numbness [ ] Weakness [ ] Ataxia [ ] Dizziness [ ] Aphasia [ ] Dysarthria [ ] Visual disturbance  Resp: [ ] Shortness of breath/dyspnea, [ ] Orthopnea [ ] Cough  CV: [ ] Chest pain [ ] Palpitation [ ] Lightheadedness [ ] Syncope  Renal: [ ] Thirst [ ] Edema  GI: [ ] Nausea [ ] Emesis [ ] Abdominal pain [ ] Constipation [ ] Diarrhea  Hem: [ ] Hematemesis [ ] bright red blood per rectum  ID: [ ] Fever [ ] Chills [ ] Dysuria  ENT: [ ] Rhinorrhea    PHYSICAL EXAM:    General: No Acute Distress   Neurological:  AOx2 to choice, R eye 6th nerve palsy, b/l horizontal nystagmus, b/l UE & LE dysmetria, RUE & RLE 4+/5 w/ mild drift   Pulmonary: Clear to Auscultation, No Rales, No Rhonchi, No Wheezes   Cardiovascular: S1, S2, Regular Rate and Rhythm   Gastrointestinal: Soft, Nontender, Nondistended   Extremities: No calf tenderness   Incision: CDI    ICU Vital Signs Last 24 Hrs  T(C): 37.3 (08 May 2023 21:23), Max: 37.4 (08 May 2023 04:55)  T(F): 99.2 (08 May 2023 21:23), Max: 99.4 (08 May 2023 04:55)  HR: 62 (08 May 2023 21:08) (57 - 69)  BP: 119/57 (08 May 2023 21:00) (95/45 - 138/65)  BP(mean): 82 (08 May 2023 21:00) (65 - 104)  RR: 16 (08 May 2023 21:08) (16 - 32)  SpO2: 96% (08 May 2023 21:08) (95% - 99%)    O2 Parameters below as of 08 May 2023 21:08  Patient On (Oxygen Delivery Method): ventilator    O2 Concentration (%): 40        I&O's Detail    07 May 2023 07:01  -  08 May 2023 07:00  --------------------------------------------------------  IN:    Enteral Tube Flush: 440 mL    Jevity 1.2: 800 mL    sodium chloride 0.9%: 600 mL  Total IN: 1840 mL    OUT:    External Ventricular Device (mL): 4 mL    Voided (mL): 800 mL  Total OUT: 804 mL    Total NET: 1036 mL      08 May 2023 07:01  -  08 May 2023 22:42  --------------------------------------------------------  IN:    Enteral Tube Flush: 50 mL    IV PiggyBack: 50 mL    Lactated Ringers: 375 mL    sodium chloride 0.9%: 375 mL  Total IN: 850 mL    OUT:    Voided (mL): 1050 mL  Total OUT: 1050 mL    Total NET: -200 mL        Mode: AC/ CMV (Assist Control/ Continuous Mandatory Ventilation)  RR (machine): 16  TV (machine): 400  FiO2: 40  PEEP: 5  ITime: 1  MAP: 10  PIP: 24        LABS:  CBC Full  -  ( 08 May 2023 17:13 )  WBC Count : 8.96 K/uL  RBC Count : 3.69 M/uL  Hemoglobin : 10.9 g/dL  Hematocrit : 33.7 %  Platelet Count - Automated : 398 K/uL  Mean Cell Volume : 91.3 fl  Mean Cell Hemoglobin : 29.5 pg  Mean Cell Hemoglobin Concentration : 32.3 gm/dL  Auto Neutrophil # : 7.25 K/uL  Auto Lymphocyte # : 1.07 K/uL  Auto Monocyte # : 0.47 K/uL  Auto Eosinophil # : 0.11 K/uL  Auto Basophil # : 0.02 K/uL  Auto Neutrophil % : 81.1 %  Auto Lymphocyte % : 11.9 %  Auto Monocyte % : 5.2 %  Auto Eosinophil % : 1.2 %  Auto Basophil % : 0.2 %    05-08    139  |  103  |  12  ----------------------------<  121<H>  4.8   |  27  |  0.57    Ca    9.3      08 May 2023 17:13  Phos  5.4     05-08  Mg     2.1     05-08    TPro  6.9  /  Alb  3.4  /  TBili  0.3  /  DBili  x   /  AST  23  /  ALT  31  /  AlkPhos  121<H>  05-08    PT/INR - ( 08 May 2023 05:30 )   PT: 13.5 sec;   INR: 1.13          PTT - ( 08 May 2023 05:30 )  PTT:35.8 sec      RADIOLOGY & ADDITIONAL STUDIES:

## 2023-05-08 NOTE — CHART NOTE - NSCHARTNOTEFT_GEN_A_CORE
Procedure:  shunt  Surgeon: Karyn     S: Pt seen at bedside.  No acute complaints.      O:  T(C): --  T(F): --  HR: 57 (05-08-23 @ 16:30) (57 - 61)  BP: 137/82 (05-08-23 @ 16:30) (121/59 - 138/65)  RR: 19 (05-08-23 @ 16:30) (19 - 32)  SpO2: 96% (05-08-23 @ 16:30) (95% - 99%)  Wt(kg): --                        10.8   6.68  )-----------( 422      ( 08 May 2023 05:30 )             33.0     05-08    139  |  102  |  10  ----------------------------<  117<H>  4.0   |  27  |  0.51    Ca    9.6      08 May 2023 05:30  Phos  4.3     05-08  Mg     2.1     05-08        Gen: comfortable   C/V: NSR  Pulm: Nonlabored breathing on trach collar   Abd: soft, non-distended, nontender, no rebound/guarding, incisions c/d/i, PEG without erythema or drainage  Extrem: WWP      A/P: 57yFemale s/p  Shunt     -Care per Neurosurg team  -Team 4c will continue to follow.

## 2023-05-08 NOTE — BRIEF OPERATIVE NOTE - NSICDXBRIEFPROCEDURE_GEN_ALL_CORE_FT
PROCEDURES:  IVC filter placement 04-May-2023 15:04:52  Nano Fong  
PROCEDURES:  Insertion, ventriculopleural shunt 08-May-2023 16:00:02  Jamey Holland  
PROCEDURES:  Tracheostomy tube change 28-Apr-2023 19:13:14  Nito Alcazar  
PROCEDURES:  Planned tracheostomy 28-Apr-2023 14:10:56  Olesya Brennan  
PROCEDURES:  Esophagogastroduodenoscopy (EGD) with anesthesia 01-May-2023 10:18:20  Vargas Leon  
PROCEDURES:  Decompressive craniectomy 22-Apr-2023 00:27:14  Rylee Bravo  Insertion, external ventricular drain 22-Apr-2023 00:27:23  Rylee Bravo  
PROCEDURES:   shunt 08-May-2023 15:19:23  Erickson Linares  
PROCEDURES:  Angiogram, carotid and cerebral, bilateral 02-May-2023 16:15:59  Octavia Murillo  
PROCEDURES:  Insertion, PEG tube, laparoscopic 02-May-2023 10:08:00  Cecy Lombardo

## 2023-05-08 NOTE — BRIEF OPERATIVE NOTE - NSICDXBRIEFPREOP_GEN_ALL_CORE_FT
PRE-OP DIAGNOSIS:  Respiratory failure, unspecified with hypoxia 28-Apr-2023 14:11:11  Olesya Brennan  
PRE-OP DIAGNOSIS:  Cerebellar stroke 22-Apr-2023 00:26:16  Rylee Bravo  
PRE-OP DIAGNOSIS:  Cerebellar stroke 22-Apr-2023 00:26:16  Rylee Bravo  Respiratory failure, unspecified with hypoxia 28-Apr-2023 14:11:11  Olesya Brennan  
PRE-OP DIAGNOSIS:  History of DVT of lower extremity 04-May-2023 15:05:01  Nano Fong  
PRE-OP DIAGNOSIS:  Hydrocephalus 08-May-2023 15:19:57  Erickson Linares  
PRE-OP DIAGNOSIS:  Cerebellar stroke 22-Apr-2023 00:26:16  Rylee Bravo  
PRE-OP DIAGNOSIS:  Tracheostomy malfunction 09-May-2023 14:00:05  Ramila Ribeiro

## 2023-05-08 NOTE — PRE-ANESTHESIA EVALUATION ADULT - NSDENTALSD_ENT_ALL_CORE
appears normal and intact
appears normal and intact
on vent
Missing majority of teeth, poor dentition. Lower dentures./missing teeth

## 2023-05-08 NOTE — PROGRESS NOTE ADULT - SUBJECTIVE AND OBJECTIVE BOX
NEUROSURGERY POST OP NOTE:    POD# 0 S/P right frontal ventriculoperitoneal shunt (certas @ 4), laparoscopic assisted     S: Patient lying in bed, appears comfortable. Unable to assess further ROS d/t mental status.     T(C): 36.2 (05-08-23 @ 17:29), Max: 37.4 (05-08-23 @ 04:55)  HR: 69 (05-08-23 @ 17:30) (57 - 69)  BP: 123/60 (05-08-23 @ 17:30) (95/45 - 142/56)  RR: 20 (05-08-23 @ 17:30) (16 - 32)  SpO2: 98% (05-08-23 @ 17:30) (95% - 99%)    05-07-23 @ 07:01  -  05-08-23 @ 07:00  --------------------------------------------------------  IN: 1840 mL / OUT: 804 mL / NET: 1036 mL    05-08-23 @ 07:01  -  05-08-23 @ 17:44  --------------------------------------------------------  IN: 375 mL / OUT: 0 mL / NET: 375 mL    amLODIPine   Tablet 5 milliGRAM(s) Oral daily  ascorbic acid 500 milliGRAM(s) Oral daily  atorvastatin 80 milliGRAM(s) Oral at bedtime  carvedilol 3.125 milliGRAM(s) Oral every 12 hours  ceFAZolin   IVPB 2000 milliGRAM(s) IV Intermittent every 8 hours  chlorhexidine 2% Cloths 1 Application(s) Topical daily  doxazosin 4 milliGRAM(s) Oral at bedtime  ferrous    sulfate 325 milliGRAM(s) Oral <User Schedule>  lactated ringers. 1000 milliLiter(s) IV Continuous <Continuous>  levETIRAcetam 500 milliGRAM(s) Oral every 12 hours  oxyCODONE    IR 5 milliGRAM(s) Oral every 4 hours  pantoprazole   Suspension 40 milliGRAM(s) Enteral Tube daily    RADIOLOGY:     Exam:  General: NAD, pt is comfortably laying in bed, A&O x 1 (self), +trach to full vent   HEENT: OE spont and to voice, R gaze preference (does not cross midline), PERRL 2mm  Cardiovascular: RRR, normal S1 and S2  Respiratory: lungs CTAB, no wheezing, rhonchi, or crackles   GI: normoactive BS to auscultation, abd soft, NTND   Neuro: follows commands, moves all extremities spontaneously and to command antigravity  Wound/incision: headwrap intact    Assessment:   58 y/o female found to have acute infarction within the right cerebellar hemisphere, causing herniation. Now s/p SOC foramen magnum decompression and right parietal/occipital EVD placement (4/21). s/p trach (4/28/23). s/p PEG (5/2/23), s/p dx cerebral angiogram (5/2/23). now s/p VPS Certas @ 4 (5/8/23).     PLAN:  Neuro   - Vitals/neuro q1h   - s/p VPS (Certas @ 4)   - Keppra 500 BID x 1 week   - dx angio 5/2: b/l cavernous ICA aneurysms, plan to be discussed vascular conference   - CTH 4/28 stable; 5/4 new gas in right temporal horn, CT 5/8 chio increased vent size   - Pain control with tylenol prn, oxycodone prn, fent pushes 12.5 mg q 2hr prn   - Stroke consulted, appreciate reccs     Cardio  - SBP    - TTE 4/24: negative for PFO, mild LVH, mild dilation of L atrium, EF 55-60%    - Carvedilol 3.125 mg BID      Pulm  - + Trach (6 shiley) 400/40/16/6, CPAP trials as tolerated   - Chest PT, duoneb q 8 hrs standing      GI  - + PEG - f/u Gen surg regarding restarting tube feeds   - + RT - loose stool, BR held   - Protonix for GI ppx while on vent    Renal  - IVF while NPO  - Voiding via primafit     Endo   - cont lipitor     Heme  - SQL held for OR  - s/p IVCF 5/4   - LE dopplers 4/22 neg for DVT, but showing superficial venous thrombosis in the proximal portion of the right greater saphenous vein; repeat on 4/29 with persistant superficial thrombus, 5/3 new DVT L IM calf and unchanged R superficial vein thrombus  - Heme following for positive anticardiolipin Ab 4/27, recs appreciated   - Iron and vitamin c every other day     ID  - CSF sent from OR (5/8)   - MRSA (-), +UA cx ESBL, +sputum cx pluralibacter gergoviae, strep pneumoniae, s/p Ertapenem 1g Q24hrs (5/1-5/7)    DISPO:  - NSICU, full code   - PT/OT rec acute inpatient rehab: patient can tolerate 3 hrs PT/OT daily    D/w Dr. Valadez and Dr. Lopez        NEUROSURGERY POST OP NOTE:    POD# 0 S/P right frontal ventriculoperitoneal shunt (certas @ 4), laparoscopic assisted     S: Patient lying in bed, appears comfortable. Unable to assess further ROS d/t mental status.     T(C): 36.2 (05-08-23 @ 17:29), Max: 37.4 (05-08-23 @ 04:55)  HR: 69 (05-08-23 @ 17:30) (57 - 69)  BP: 123/60 (05-08-23 @ 17:30) (95/45 - 142/56)  RR: 20 (05-08-23 @ 17:30) (16 - 32)  SpO2: 98% (05-08-23 @ 17:30) (95% - 99%)    05-07-23 @ 07:01  -  05-08-23 @ 07:00  --------------------------------------------------------  IN: 1840 mL / OUT: 804 mL / NET: 1036 mL    05-08-23 @ 07:01  -  05-08-23 @ 17:44  --------------------------------------------------------  IN: 375 mL / OUT: 0 mL / NET: 375 mL    amLODIPine   Tablet 5 milliGRAM(s) Oral daily  ascorbic acid 500 milliGRAM(s) Oral daily  atorvastatin 80 milliGRAM(s) Oral at bedtime  carvedilol 3.125 milliGRAM(s) Oral every 12 hours  ceFAZolin   IVPB 2000 milliGRAM(s) IV Intermittent every 8 hours  chlorhexidine 2% Cloths 1 Application(s) Topical daily  doxazosin 4 milliGRAM(s) Oral at bedtime  ferrous    sulfate 325 milliGRAM(s) Oral <User Schedule>  lactated ringers. 1000 milliLiter(s) IV Continuous <Continuous>  levETIRAcetam 500 milliGRAM(s) Oral every 12 hours  oxyCODONE    IR 5 milliGRAM(s) Oral every 4 hours  pantoprazole   Suspension 40 milliGRAM(s) Enteral Tube daily    RADIOLOGY:     Exam:  General: NAD, pt is comfortably laying in bed, A&O x 1 (self), +trach to full vent   HEENT: OE spont and to voice, R gaze preference (does not cross midline), PERRL 2mm  Cardiovascular: RRR, normal S1 and S2  Respiratory: lungs CTAB, no wheezing, rhonchi, or crackles   GI: normoactive BS to auscultation, abd soft, NTND, + Abdominal port sites c/d/i closed with dermabond   Neuro: follows commands, moves all extremities spontaneously and to command antigravity  Wound/incision: headwrap intact    Assessment:   58 y/o female found to have acute infarction within the right cerebellar hemisphere, causing herniation. Now s/p SOC foramen magnum decompression and right parietal/occipital EVD placement (4/21). s/p trach (4/28/23). s/p PEG (5/2/23), s/p dx cerebral angiogram (5/2/23). now s/p VPS Certas @ 4 (5/8/23).     PLAN:  Neuro   - Vitals/neuro q1h   - s/p VPS (Certas @ 4)   - Keppra 500 BID x 1 week   - dx angio 5/2: b/l cavernous ICA aneurysms, plan to be discussed vascular conference   - CTH 4/28 stable; 5/4 new gas in right temporal horn, CT 5/8 chio increased vent size   - Pain control with tylenol prn, oxycodone prn, fent pushes 12.5 mg q 2hr prn   - Stroke consulted, appreciate reccs     Cardio  - SBP    - TTE 4/24: negative for PFO, mild LVH, mild dilation of L atrium, EF 55-60%    - Carvedilol 3.125 mg BID      Pulm  - + Trach (6 shiley) 400/40/16/6, CPAP trials as tolerated   - Chest PT, duoneb q 8 hrs standing      GI  - + PEG - f/u Gen surg regarding restarting tube feeds   - + RT - loose stool, BR held   - Protonix for GI ppx while on vent    Renal  - IVF while NPO  - Voiding via primafit     Endo   - cont lipitor     Heme  - SQL held for OR  - s/p IVCF 5/4   - LE dopplers 4/22 neg for DVT, but showing superficial venous thrombosis in the proximal portion of the right greater saphenous vein; repeat on 4/29 with persistant superficial thrombus, 5/3 new DVT L IM calf and unchanged R superficial vein thrombus  - Heme following for positive anticardiolipin Ab 4/27, recs appreciated   - Iron and vitamin c every other day     ID  - CSF sent from OR (5/8)   - MRSA (-), +UA cx ESBL, +sputum cx pluralibacter gergoviae, strep pneumoniae, s/p Ertapenem 1g Q24hrs (5/1-5/7)    DISPO:  - NSICU, full code   - PT/OT rec acute inpatient rehab: patient can tolerate 3 hrs PT/OT daily    D/w Dr. Valadez and Dr. Lopez

## 2023-05-08 NOTE — PRE PROCEDURE NOTE - PRE PROCEDURE EVALUATION
56 yo F with PMH of Asthma, CAD (not on  meds), peripheral neuropathy and PSH of BATOOL, cholecystectomy presented to [facility] ED on 4/20 with right sided weakness and right facial numbness. Daughter reports some slurred speech noted 1230-1PM and also was confused after. and around 4PM, the patient's sister noted right sided weakness at which time EMS was called and patient was brought to ED. 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 Right PICA faintly seen. 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  Not candidate for TNK/mechanical thrombectomy. CT scan repeated which showed: 1. Brain: Progression of acute infarction within the right cerebellar hemisphere, also extending into the left superior cerebellar hemisphere. New mass effect on the fourth ventricle causing stenosis versus occlusion with new third and lateral ventricular dilatation indicating ventricular obstruction at the level of the fourth ventricle. New upward and downward herniation of cerebellar parenchyma Right carotid system: No hemodynamically significant stenosis. Areas of apparent ischemia within the posterior fossa have progressed in extent, on 4/22/2023 was transferred to [facility] for further surgical management of posterior fossa mass effect including suboccipital craniectomy, foramen magnum decompression and right occipital EVD. Tolerated the procedure well. During hospital course required Trach and Peg, Neuro exam: AOX2, R gaze, HICKS strongly, multiple EVD challenges have failed and patient developed headaches and posterior fossa pseudomeningocele.   ID was consulted and clearance for VPS was given.   Hg: 10. 8  Plt 422  INR 1.1   CSF cell count : 0   CSF cx: Negative     CT this AM demonstrate predominantly CSF density fluid collection in the craniectomy bed which measures 7.3 cm transverse by 2.1 cm AP by 2.3 cm craniocaudal which has increased in size from prior exam and may represent pseudomeningocele. There is an extra-axial fluid collection deep to craniectomy site which measures 6.8 x 3.8 x 4 cm which is also increased in size from prior exam.       We discussed the risk and benefits of all options including but not limited to pain, bleeding, infection, vascular injury, need to further procedure, seizures, loss of neurologic function, abdominal related complications.     Patient and family asked many insightful questions, and elected to proceed with Right Frontal VPS

## 2023-05-08 NOTE — BRIEF OPERATIVE NOTE - OPERATION/FINDINGS
Refer to Neurosurgery note for cranial portion of VPS placement.     Peritoneum entered via cutdown. Additional 5mm port placed in RLQ. VPS catheter introduced into right side of peritoneum by Neurosurgery under visualization.   Noted CSF dripping from catheter tip. Redundant catheter length trimmed and removed. Catheter tip placed in RLQ/pelvis. Fascia closed. Port sites closed w/ 4-0 Monocryl & surgical glue.

## 2023-05-08 NOTE — PRE-ANESTHESIA EVALUATION ADULT - MALLAMPATI CLASS
Class II - visualization of the soft palate, fauces, and uvula
Class I (easy) - visualization of the soft palate, fauces, uvula, and both anterior and posterior pillars
Class II - visualization of the soft palate, fauces, and uvula
vent

## 2023-05-08 NOTE — PRE-ANESTHESIA EVALUATION ADULT - NSANTHPEFT_GEN_ALL_CORE
intubated
on ventilator through trach, well perfused
General: Appearance is consistent with chronological age, chronically ill, trach  Airway:  See Mallampati score  EENT: Anicteric sclera; oropharynx clear, moist mucus membranes  Cardiovascular:  Regular rate and rhythm  Respiratory: Unlabored breathing  Neurological: Awake and alert, moves all extremities
ANO x 1 (Self), RRR no M/R/G, RADHAB, KYA spontaneously

## 2023-05-08 NOTE — PRE-ANESTHESIA EVALUATION ADULT - LAST ECHOCARDIOGRAM
no right to left shunt, normal EF

## 2023-05-08 NOTE — PRE-ANESTHESIA EVALUATION ADULT - NSANTHRISKNONERD_GEN_ALL_CORE
No risk alerts present

## 2023-05-09 ENCOUNTER — TRANSCRIPTION ENCOUNTER (OUTPATIENT)
Age: 58
End: 2023-05-09

## 2023-05-09 LAB
ANION GAP SERPL CALC-SCNC: 10 MMOL/L — SIGNIFICANT CHANGE UP (ref 5–17)
BUN SERPL-MCNC: 13 MG/DL — SIGNIFICANT CHANGE UP (ref 7–23)
CALCIUM SERPL-MCNC: 9 MG/DL — SIGNIFICANT CHANGE UP (ref 8.4–10.5)
CHLORIDE SERPL-SCNC: 101 MMOL/L — SIGNIFICANT CHANGE UP (ref 96–108)
CO2 SERPL-SCNC: 25 MMOL/L — SIGNIFICANT CHANGE UP (ref 22–31)
CREAT SERPL-MCNC: 0.52 MG/DL — SIGNIFICANT CHANGE UP (ref 0.5–1.3)
EGFR: 109 ML/MIN/1.73M2 — SIGNIFICANT CHANGE UP
GLUCOSE SERPL-MCNC: 116 MG/DL — HIGH (ref 70–99)
HCT VFR BLD CALC: 32.4 % — LOW (ref 34.5–45)
HGB BLD-MCNC: 10.6 G/DL — LOW (ref 11.5–15.5)
MAGNESIUM SERPL-MCNC: 2 MG/DL — SIGNIFICANT CHANGE UP (ref 1.6–2.6)
MCHC RBC-ENTMCNC: 29.8 PG — SIGNIFICANT CHANGE UP (ref 27–34)
MCHC RBC-ENTMCNC: 32.7 GM/DL — SIGNIFICANT CHANGE UP (ref 32–36)
MCV RBC AUTO: 91 FL — SIGNIFICANT CHANGE UP (ref 80–100)
NRBC # BLD: 0 /100 WBCS — SIGNIFICANT CHANGE UP (ref 0–0)
PHOSPHATE SERPL-MCNC: 4.8 MG/DL — HIGH (ref 2.5–4.5)
PLATELET # BLD AUTO: 332 K/UL — SIGNIFICANT CHANGE UP (ref 150–400)
POTASSIUM SERPL-MCNC: 4.1 MMOL/L — SIGNIFICANT CHANGE UP (ref 3.5–5.3)
POTASSIUM SERPL-SCNC: 4.1 MMOL/L — SIGNIFICANT CHANGE UP (ref 3.5–5.3)
RBC # BLD: 3.56 M/UL — LOW (ref 3.8–5.2)
RBC # FLD: 13.2 % — SIGNIFICANT CHANGE UP (ref 10.3–14.5)
SODIUM SERPL-SCNC: 136 MMOL/L — SIGNIFICANT CHANGE UP (ref 135–145)
WBC # BLD: 6.86 K/UL — SIGNIFICANT CHANGE UP (ref 3.8–10.5)
WBC # FLD AUTO: 6.86 K/UL — SIGNIFICANT CHANGE UP (ref 3.8–10.5)

## 2023-05-09 PROCEDURE — 70450 CT HEAD/BRAIN W/O DYE: CPT | Mod: 26

## 2023-05-09 PROCEDURE — 99291 CRITICAL CARE FIRST HOUR: CPT | Mod: 24

## 2023-05-09 PROCEDURE — 71045 X-RAY EXAM CHEST 1 VIEW: CPT | Mod: 26

## 2023-05-09 RX ORDER — LACTOBACILLUS ACIDOPHILUS 100MM CELL
1 CAPSULE ORAL DAILY
Refills: 0 | Status: DISCONTINUED | OUTPATIENT
Start: 2023-05-09 | End: 2023-05-18

## 2023-05-09 RX ORDER — METHYLPHENIDATE HCL 5 MG
5 TABLET ORAL
Refills: 0 | Status: DISCONTINUED | OUTPATIENT
Start: 2023-05-09 | End: 2023-05-13

## 2023-05-09 RX ORDER — SODIUM CHLORIDE 9 MG/ML
1000 INJECTION, SOLUTION INTRAVENOUS
Refills: 0 | Status: DISCONTINUED | OUTPATIENT
Start: 2023-05-09 | End: 2023-05-10

## 2023-05-09 RX ORDER — ACETYLCYSTEINE 200 MG/ML
4 VIAL (ML) MISCELLANEOUS EVERY 6 HOURS
Refills: 0 | Status: DISCONTINUED | OUTPATIENT
Start: 2023-05-09 | End: 2023-05-25

## 2023-05-09 RX ORDER — PSYLLIUM SEED (WITH DEXTROSE)
1 POWDER (GRAM) ORAL EVERY 12 HOURS
Refills: 0 | Status: DISCONTINUED | OUTPATIENT
Start: 2023-05-09 | End: 2023-05-15

## 2023-05-09 RX ORDER — IPRATROPIUM/ALBUTEROL SULFATE 18-103MCG
3 AEROSOL WITH ADAPTER (GRAM) INHALATION EVERY 6 HOURS
Refills: 0 | Status: DISCONTINUED | OUTPATIENT
Start: 2023-05-09 | End: 2023-05-25

## 2023-05-09 RX ORDER — CHLORHEXIDINE GLUCONATE 213 G/1000ML
15 SOLUTION TOPICAL EVERY 12 HOURS
Refills: 0 | Status: DISCONTINUED | OUTPATIENT
Start: 2023-05-09 | End: 2023-05-12

## 2023-05-09 RX ADMIN — OXYCODONE HYDROCHLORIDE 5 MILLIGRAM(S): 5 TABLET ORAL at 15:56

## 2023-05-09 RX ADMIN — Medication 1 TABLET(S): at 12:08

## 2023-05-09 RX ADMIN — Medication 5 MILLIGRAM(S): at 17:50

## 2023-05-09 RX ADMIN — Medication 500 MILLIGRAM(S): at 05:18

## 2023-05-09 RX ADMIN — CARVEDILOL PHOSPHATE 3.12 MILLIGRAM(S): 80 CAPSULE, EXTENDED RELEASE ORAL at 17:50

## 2023-05-09 RX ADMIN — OXYCODONE HYDROCHLORIDE 5 MILLIGRAM(S): 5 TABLET ORAL at 12:15

## 2023-05-09 RX ADMIN — Medication 4 MILLIGRAM(S): at 22:49

## 2023-05-09 RX ADMIN — AMLODIPINE BESYLATE 5 MILLIGRAM(S): 2.5 TABLET ORAL at 05:18

## 2023-05-09 RX ADMIN — OXYCODONE HYDROCHLORIDE 5 MILLIGRAM(S): 5 TABLET ORAL at 11:17

## 2023-05-09 RX ADMIN — ATORVASTATIN CALCIUM 80 MILLIGRAM(S): 80 TABLET, FILM COATED ORAL at 22:49

## 2023-05-09 RX ADMIN — CARVEDILOL PHOSPHATE 3.12 MILLIGRAM(S): 80 CAPSULE, EXTENDED RELEASE ORAL at 05:18

## 2023-05-09 RX ADMIN — CHLORHEXIDINE GLUCONATE 15 MILLILITER(S): 213 SOLUTION TOPICAL at 05:18

## 2023-05-09 RX ADMIN — OXYCODONE HYDROCHLORIDE 5 MILLIGRAM(S): 5 TABLET ORAL at 15:19

## 2023-05-09 RX ADMIN — CHLORHEXIDINE GLUCONATE 1 APPLICATION(S): 213 SOLUTION TOPICAL at 12:05

## 2023-05-09 RX ADMIN — Medication 1 PACKET(S): at 17:50

## 2023-05-09 RX ADMIN — OXYCODONE HYDROCHLORIDE 5 MILLIGRAM(S): 5 TABLET ORAL at 05:00

## 2023-05-09 RX ADMIN — Medication 325 MILLIGRAM(S): at 05:18

## 2023-05-09 RX ADMIN — OXYCODONE HYDROCHLORIDE 5 MILLIGRAM(S): 5 TABLET ORAL at 04:14

## 2023-05-09 RX ADMIN — Medication 3 MILLILITER(S): at 11:18

## 2023-05-09 RX ADMIN — Medication 100 MILLIGRAM(S): at 05:17

## 2023-05-09 RX ADMIN — LEVETIRACETAM 500 MILLIGRAM(S): 250 TABLET, FILM COATED ORAL at 05:18

## 2023-05-09 RX ADMIN — PANTOPRAZOLE SODIUM 40 MILLIGRAM(S): 20 TABLET, DELAYED RELEASE ORAL at 11:18

## 2023-05-09 RX ADMIN — OXYCODONE HYDROCHLORIDE 5 MILLIGRAM(S): 5 TABLET ORAL at 21:00

## 2023-05-09 RX ADMIN — CHLORHEXIDINE GLUCONATE 15 MILLILITER(S): 213 SOLUTION TOPICAL at 17:50

## 2023-05-09 RX ADMIN — OXYCODONE HYDROCHLORIDE 5 MILLIGRAM(S): 5 TABLET ORAL at 19:31

## 2023-05-09 NOTE — PROGRESS NOTE ADULT - ASSESSMENT
58 y/o female transferred from [place] with right sided weakness and right facial numbness. Found to have acute infarction within the right cerebellar hemisphere, causing herniation. Now s/p SOC foramen magnum decompression and right parietal/occipital EVD placement (4/21). Course c/b respiratory insuffiencey d/t bulbar dysfunction, s/p trach 4/28/23 failed bedside  PEG placement 5/1; now s/p VPS    -NC q2, VSq2   -CPAP trail as tolerated  -c/w tube feeds, NPO for SALVADOR tomorrow   -c/w coreg

## 2023-05-09 NOTE — DISCHARGE NOTE PROVIDER - NSDCCPTREATMENT_GEN_ALL_CORE_FT
PRINCIPAL PROCEDURE  Procedure: Decompressive craniectomy  Findings and Treatment:       SECONDARY PROCEDURE  Procedure: Planned tracheostomy  Findings and Treatment:     Procedure: Esophagogastroduodenoscopy (EGD) with anesthesia  Findings and Treatment:     Procedure: Insertion, PEG tube, laparoscopic  Findings and Treatment:     Procedure:  shunt  Findings and Treatment:     Procedure: Insertion, external ventricular drain  Findings and Treatment:      PRINCIPAL PROCEDURE  Procedure: Decompressive craniectomy  Findings and Treatment:       SECONDARY PROCEDURE  Procedure: Insertion, external ventricular drain  Findings and Treatment:     Procedure: Planned tracheostomy  Findings and Treatment:     Procedure: Esophagogastroduodenoscopy (EGD) with anesthesia  Findings and Treatment:     Procedure: Insertion, PEG tube, laparoscopic  Findings and Treatment:     Procedure:  shunt  Findings and Treatment: Certas at 4

## 2023-05-09 NOTE — DISCHARGE NOTE PROVIDER - PROVIDER TOKENS
PROVIDER:[TOKEN:[[id number]]],PROVIDER:[TOKEN:[[id number]]],PROVIDER:[TOKEN:[[id number]]],PROVIDER:[TOKEN:[[id number]]],PROVIDER:[TOKEN:[[id number]]],PROVIDER:[TOKEN:[[id number]]] PROVIDER:[TOKEN:[[id number]]],PROVIDER:[TOKEN:[[id number]]],PROVIDER:[TOKEN:[[id number]]],PROVIDER:[TOKEN:[[id number]]],PROVIDER:[TOKEN:[[id number]]],PROVIDER:[TOKEN:[[id number]]],PROVIDER:[TOKEN:[[id number]]] PROVIDER:[TOKEN:[[id number]]],PROVIDER:[TOKEN:[[id number]]],PROVIDER:[TOKEN:[[id number]]],PROVIDER:[TOKEN:[[id number]]],PROVIDER:[TOKEN:[[id number]]],PROVIDER:[TOKEN:[[id number]]],PROVIDER:[TOKEN:[[id number]]],PROVIDER:[TOKEN:[[id number]]]

## 2023-05-09 NOTE — PROGRESS NOTE ADULT - SUBJECTIVE AND OBJECTIVE BOX
***********************************************  ADULT NSICU PROGRESS NOTE  MAKSIM TRIVEDI [id number] [facility] 10EA 02  ***********************************************    24H INTERVAL EVENTS:      ROS: negative except per mentioned above in 24h interval events.      HOSPITAL COURSE CARRIED FORWARD:    VITALS:    ICU Vital Signs Last 24 Hrs  T(C): 36.9 (08 May 2023 12:04), Max: 37.4 (08 May 2023 04:55)  T(F): 98.5 (08 May 2023 12:04), Max: 99.4 (08 May 2023 04:55)  HR: 61 (08 May 2023 12:00) (58 - 69)  BP: 112/52 (08 May 2023 12:00) (95/45 - 142/56)  BP(mean): 75 (08 May 2023 12:00) (65 - 84)  ABP: --  ABP(mean): --  RR: 16 (08 May 2023 12:00) (16 - 28)  SpO2: 98% (08 May 2023 12:04) (96% - 99%)    O2 Parameters below as of 08 May 2023 12:00  Patient On (Oxygen Delivery Method): ventilator    O2 Concentration (%): 40        Mode: AC/ CMV (Assist Control/ Continuous Mandatory Ventilation)  RR (machine): 16  TV (machine): 400  FiO2: 40  PEEP: 5  ITime: 1  MAP: 8  PIP: 16      I&O's Summary    07 May 2023 07:01  -  08 May 2023 07:00  --------------------------------------------------------  IN: 1840 mL / OUT: 804 mL / NET: 1036 mL    08 May 2023 07:01  -  08 May 2023 14:36  --------------------------------------------------------  IN: 375 mL / OUT: 0 mL / NET: 375 mL        EXAM:     Giovanna Coma Scale: 3/1T/6 = 10    General: normocephalic, atraumatic, laying in bed, in no distress  Neuro     MS: Casco Coma Scale: 3/1T/6 = 10, follows commands, cooperative with examination, normal attention    CN: PERRL, (+)R. gaze, plegia to left gaze     Mot: bulk normal, tone normal, power UPPER 2/3, LOWER 3/3  Chest: mechanically ventilated, coarse breath sounds, heart regular rate/rhythm, present S1/S2, no murmurs or rubs  Abdomen: nondistended, soft and nontender without peritoneal signs, normoactive bowel sounds  Extremities: no clubbing, well-perfused, no edema                              10.8   6.68  )-----------( 422      ( 08 May 2023 05:30 )             33.0     05-08    139  |  102  |  10  ----------------------------<  117<H>  4.0   |  27  |  0.51    Ca    9.6      08 May 2023 05:30  Phos  4.3     05-08  Mg     2.1     05-08        MEDICATIONS  (STANDING):  povidone iodine 5% Nasal Swab 1 Application(s) Both Nostrils once    MEDICATIONS  (PRN):        ***********************************************  ASSESSMENT AND PLAN  ***********************************************    #Suboccipital pseudomeningocele  #S/p placement of VPS, 5/8/23  #Bilateral cerebellar hemispheric strokes  #S/p SOC for foramen magnum decompression and R. parieto-occipital placement of EVD, 4/22/23  #11mm wide-necked multilobulated L. cavernous ICA aneurysm & 8mm wide-necked R. cavernous ICA aneurysm  #R. V4 occlusion  #S/p tracheostomy placement w/ tube exchanged due to broken  balloon, 4/28/23  #S/p lap-assisted gastrostomy tube, 5/2/23  #L. intramuscular calf DVT  #S/p IVCF placement, 5/4/23  #History of peripheral neuropathy, CAD, and asthma      NEURO  - Admit NSICU, Q1h neuro checks / Q1h vital signs  - F/u vascular conference re plan for b/l cavernous ICA aneurysms  - OR for VPS placement (pseudomeningocele) today  - Stroke consultation, heme consultation  - Pain control, PT/OT when able  - Discuss initiation of stimulant    PULM  - Volume control/assist control ventilation, wean to pressure support as tolerated  - 6 Cuffed trach  - SpO2 goal > 92%, supplemental O2 and pulm toileting as needed    CARDIO  - BP goal: < 140 while postoperative    GI  - Diet: tube feeds on hold for OR  - Stress ulcer prophylaxis: PPI    /RENAL  - Monitor UOP/volume status, BUN/SCr    HEME  - Maintain Hb > 7.0, PLT > 100,000  - Heme consultation for stroke in the young  - SCDs, holding SQ chemoppx  - Removal of IVFC ASAP, hypercoag panel workup pending    ID  - Monitor for infectious s/s, fever curve, leukocytosis  - S/p ertapenem (5/1-5/17) for ESBL UTI, Pluralibacter gergoviae/Strep pneumo sputum  - Iso precautions  - Cleared for VPS placement by ID    ENDO    05-08-23 @ 14:36       ***********************************************  ADULT NSICU PROGRESS NOTE  MAKSIM TRIVEDI [id number] [facility] 10EA 02  ***********************************************    24H INTERVAL EVENTS: S/p placement of R. frontal VPS.  Tolerated procedure well.        ROS: negative except per mentioned above in 24h interval events.      VITALS:    ICU Vital Signs Last 24 Hrs  T(C): 37.3 (09 May 2023 09:00), Max: 37.6 (09 May 2023 05:01)  T(F): 99.2 (09 May 2023 09:00), Max: 99.7 (09 May 2023 05:01)  HR: 68 (09 May 2023 12:37) (57 - 74)  BP: 109/54 (09 May 2023 12:00) (93/47 - 138/65)  BP(mean): 74 (09 May 2023 12:00) (67 - 104)  ABP: --  ABP(mean): --  RR: 23 (09 May 2023 12:00) (16 - 32)  SpO2: 98% (09 May 2023 12:37) (91% - 99%)    O2 Parameters below as of 09 May 2023 12:00  Patient On (Oxygen Delivery Method): BiPAP/CPAP    O2 Concentration (%): 60          EXAM:     Giovanna Coma Scale: 3/1T/6 = 10    General: normocephalic, head wrapped, laying in bed, in no distress  Neuro     MS: Stewart Coma Scale: 3/1T/6 = 10, follows commands, cooperative with examination, normal attention    CN: PERRL, (+)R. gaze, plegia to left gaze     Mot: bulk normal, tone normal, power UPPER 2/3, LOWER 3/3  Chest: mechanically ventilated, coarse breath sounds, heart regular rate/rhythm, present S1/S2, no murmurs or rubs  Abdomen: nondistended, soft and nontender without peritoneal signs, normoactive bowel sounds  Extremities: no clubbing, well-perfused, no edema                                  10.6   6.86  )-----------( 332      ( 09 May 2023 05:17 )             32.4     05-09    136  |  101  |  13  ----------------------------<  116<H>  4.1   |  25  |  0.52    Ca    9.0      09 May 2023 05:17  Phos  4.8     05-09  Mg     2.0     05-09    TPro  6.9  /  Alb  3.4  /  TBili  0.3  /  DBili  x   /  AST  23  /  ALT  31  /  AlkPhos  121<H>  05-08      Culture - CSF with Gram Stain (collected 05-08-23 @ 14:35)  Source: .CSF  Gram Stain (05-08-23 @ 16:05):    No organisms seen    Rare to few White blood cells  Preliminary Report (05-09-23 @ 09:58):    No growth to date.      MEDICATIONS  (STANDING):  amLODIPine   Tablet 5 milliGRAM(s) Oral daily  ascorbic acid 500 milliGRAM(s) Oral <User Schedule>  atorvastatin 80 milliGRAM(s) Oral at bedtime  carvedilol 3.125 milliGRAM(s) Oral every 12 hours  chlorhexidine 0.12% Liquid 15 milliLiter(s) Oral Mucosa every 12 hours  chlorhexidine 2% Cloths 1 Application(s) Topical daily  doxazosin 4 milliGRAM(s) Oral at bedtime  ferrous    sulfate 325 milliGRAM(s) Oral <User Schedule>  lactobacillus acidophilus 1 Tablet(s) Oral daily  methylphenidate 5 milliGRAM(s) Oral two times a day  pantoprazole   Suspension 40 milliGRAM(s) Oral daily  psyllium Powder 1 Packet(s) Oral every 12 hours    MEDICATIONS  (PRN):  acetylcysteine 10%  Inhalation 4 milliLiter(s) Inhalation every 6 hours PRN increased secretions  albuterol/ipratropium for Nebulization 3 milliLiter(s) Nebulizer every 6 hours PRN Respiratory Distress  oxyCODONE    IR 5 milliGRAM(s) Oral every 4 hours PRN Moderate Pain (4 - 6)        ***********************************************  ASSESSMENT AND PLAN  ***********************************************    #Suboccipital pseudomeningocele  #S/p placement of VPS, 5/8/23  #Bilateral cerebellar hemispheric strokes  #S/p SOC for foramen magnum decompression and R. parieto-occipital placement of EVD, 4/22/23  #11mm wide-necked multilobulated L. cavernous ICA aneurysm & 8mm wide-necked R. cavernous ICA aneurysm  #R. V4 occlusion  #S/p tracheostomy placement w/ tube exchanged due to broken  balloon, 4/28/23  #S/p lap-assisted gastrostomy tube, 5/2/23  #L. intramuscular calf DVT  #S/p IVCF placement, 5/4/23  #History of peripheral neuropathy, CAD, and asthma      NEURO  - Admit NSICU, Q2h neuro checks / Q2h vital signs  - F/u vascular conference re plan for b/l cavernous ICA aneurysms  - Stroke consultation, heme consultation  - Pain control, PT/OT when able  - Discuss initiation of stimulant    PULM  - Volume control/assist control ventilation, wean to pressure support as tolerated  - 6 Cuffed trach  - SpO2 goal > 92%, supplemental O2 and pulm toileting as needed    CARDIO  - BP goal: < 140 while postoperative  - Pending SALVADOR    GI  - Diet: tube feeds  - Stress ulcer prophylaxis: PPI    /RENAL  - Monitor UOP/volume status, BUN/SCr    HEME  - Maintain Hb > 7.0, PLT > 100,000  - Heme consultation for stroke in the young  - SCDs, holding SQ chemoppx  - Removal of IVFC ASAP, hypercoag panel workup pending    ID  - Monitor for infectious s/s, fever curve, leukocytosis  - S/p ertapenem (5/1-5/17) for ESBL UTI, Pluralibacter gergoviae/Strep pneumo sputum  - Cleared for VPS placement by ID    ENDO  - SGlu < 180

## 2023-05-09 NOTE — DISCHARGE NOTE PROVIDER - CARE PROVIDERS DIRECT ADDRESSES
,DirectAddress_Unknown,[email or web address],DirectAddress_Unknown,DirectAddress_Unknown,DirectAddress_Unknown,[email or web address] ,DirectAddress_Unknown,[email or web address],DirectAddress_Unknown,DirectAddress_Unknown,DirectAddress_Unknown,[email or web address],DirectAddress_Unknown ,DirectAddress_Unknown,[email or web address],DirectAddress_Unknown,DirectAddress_Unknown,DirectAddress_Unknown,[email or web address],DirectAddress_Unknown,DirectAddress_Unknown

## 2023-05-09 NOTE — DISCHARGE NOTE PROVIDER - NSDCMRMEDTOKEN_GEN_ALL_CORE_FT
Albuterol (Eqv-ProAir HFA) 90 mcg/inh inhalation aerosol: 2 puff(s) inhaled every 6 hours as needed for  shortness of breath and/or wheezing   acetaminophen 160 mg/5 mL oral suspension: 20.31 milliliter(s) orally every 6 hours as needed for Temp greater or equal to 38.5C (101.3F), Mild Pain (1 - 3)  acetylcysteine 10% inhalation solution: 4 milliliter(s) inhaled every 6 hours As needed increased secretions  amantadine 50 mg/5 mL oral syrup: 10 milliliter(s) orally once a day at 6AM  ascorbic acid 500 mg oral tablet: 1 tab(s) orally every other day  atorvastatin 80 mg oral tablet: 1 tab(s) orally once a day (at bedtime)  enoxaparin: 80 milligram(s) subcutaneous 2 times a day for superficial thrombosis  ferrous sulfate 325 mg (65 mg elemental iron) oral tablet: 1 tab(s) orally every other day  ipratropium-albuterol 0.5 mg-2.5 mg/3 mL inhalation solution: 3 milliliter(s) inhaled every 6 hours As needed Respiratory Distress  modafinil 100 mg oral tablet: 1 tab(s) orally once a day at 6AM  oxyCODONE 5 mg oral tablet: 1 tab(s) orally every 6 hours as needed for  severe pain  QUEtiapine: 12.5 milligram(s) by gastrostomy tube once a day (at bedtime) as needed for  agitation

## 2023-05-09 NOTE — DISCHARGE NOTE PROVIDER - HOSPITAL COURSE
HPI:  58 yo F with PMH of Asthma, CAD (not on  meds), peripheral neuropathy and PSH of BATOOL, cholecystectomy, right knee surgery presented to [facility] ED on 4/20 with right sided weakness and right facial numbness. Patient was undergoing preparation for colonoscopy. As per daughter at 8am patient was playing with her grandchildren but around 840 am patient was getting dressed and fell and subsequently felt weak after. Daughter reports that patient had some episodes of vomiting at this time. She had a syncopal episode at around 10 am and was witnessed by brother. No head trauma, She regained conscious after few minutes. She remained in bed for the remainder of the day. Daughter reports some slurred speech noted 1230-1PM and also was confused after. and around 4PM, the patient's sister noted right sided weakness at which time EMS was called and patient was brought to ED. No c/o chest pain, shortness of breath, fever, headache, abdominal pain, urinary complaints. No recent travel/sickness/ change in meds. Stroke code in ER: CTH neg for heme, Right PICA distribution acute infarction. CTA with saccular aneurysms of b/l carotids, approximately 0.8 cm on the right and 1.2 x 0.9 cm on the left. Possible tiny third saccular aneurysm on the right Posterior intracranial circulation: Right vertebral arterial occlusion at its dural crossing junction V3 Atlantic and V4 intracranial segments with likely reversal of flow in its intracranial segment from the basilar. Right PICA faintly seen. 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  Not candidate for TNK/mechanical thrombectomy. CT scan repeated which showed: 1. Brain: Progression of acute infarction within the right cerebellar hemisphere, also extending into the left superior cerebellar hemisphere. New mass effect on the fourth ventricle causing stenosis versus occlusion with new third and lateral ventricular dilatation indicating ventricular obstruction at the level of the fourth ventricle. New upward and downward herniation of cerebellar parenchyma Right carotid system: No hemodynamically significant stenosis. Left carotid system: No hemodynamically significant stenosis. Vertebral circulation: Patent. Anterior intracranial circulation: Intact. Bilateral internal carotid saccular aneurysms. These findings are unchanged. Posterior intracranial circulation:    Improved flow within the right vertebral artery since 4/20/2023. New focal stenosis mid left vertebral artery, etiology uncertain, consider vasospasm and extrinsic compression in addition to new embolic disease. Brain perfusion: Perfusion images demonstrate normalization of the perfusion abnormality in the right cerebellar hemisphere present on 4/20/2023 despite evidence of progression of acute infarction.  Areas of apparent ischemia within the posterior fossa have progressed in extent, also again involving the left posterior cerebral arterial distribution. Tx to [facility] for SOC watch. On admission to [facility], NIHSS 12.  (21 Apr 2023 16:50)      Hospital Course:  4/21: Admitted for crani watch, CTH complete w/ unchanged hydrocephalus, taken for emergent occipital craniectomy.   4/22: POD1. Remains intubated overnight, on propofol and dank. EVD@[id number]. Pending repeat CTH this am. Given hydralazine 10mg x1. Started on cardene for SBP high 140s-150s. Sub-therapeutic on plavix, therapeutic on asa, rpt asa accumetrics until subtherapeutic. LE dopplers neg for DVT, but showing superficial venous thrombosis in the proximal portion of the right greater saphenous vein. Started on 3% @60 for na goal 145-150. NGT placed by ENT. Febrile, pancultured.  4/23: POD 2. 3% increased to 75. NGT readjusted. UA neg. SBP liberalized to 160. Plan to extubate. 3% decreased to 60. Failed extubation d/t no cuff leak, given 60mg solumedrol, plan to extubate in 6 hrs. SCx1 for post void residual >400, started on cardura at bedtime. New blood in buretrol, stopped SQL. Clot in EVD cleared. Neuro exam improving.   4/24: POD 3. Cont 3% with NS while NPO, start TF today. TF started. LFTs downtrending. Sodium 141. Increased 3% to 50, NS to 30. Repeat BMP ordered for 5:30PM. Tramadol 25 for pain with no relief, oxy 5 and 1g tylenol ordered, stability CT stable, speech language consult for dysarthria. Given hydralazine 10mg IVP for SBP>160, started amlodipine 5mg. C/o mild headache, given fioricet x1, stroke neuro rec SALVADOR and loop recorder placement. Echo with bubble completed, negative for PFO, EF 55-60%. J HFNC started for desats with suctioning.   4/25: POD 4. Cont HFNC. Increased secertions on HFNC, reintubated. CXR confirmed good placement. EVD dropped to 5cmH20 for goal of draining 5-10cc/hr and SG ELENA drain taken off suction. Pending CTH. EVD drained 0cc/hr, dropped to 0. NGT replaced. Dc'd fioricet. Started on PPI for intubation. Increased cardura to 4mg for urinary retention, given an additional 2mg now. Started salt tabs 3 q 6. Given 12.5mg fentanyl x1. NGT replaced, CXR shown in lung. NGT removed, repeat CXR showing no pneumothorax. Pending ENT consult for NGT placement. CTH stable. NGT replaced by ENT, confirmed in proper spot. Restarted TF. [id number]. Na 141 from 146. Increased 3% to 60. EVD raised to 3cmH20. ETT pulled back 1cm by RT.  4/26: POD 5 SOC. Intubated, remains on precedex, ABG ordered with improving PaO2, BMP sodium 148, 3% changed to 30cc/hr, cardura increased to 8mg for tonight, tolerating cpap  4/27: POD 6 SOC. Respiratory rate sustained 6, returned to FVS. Na 151, dc'd 3%, f/u AM sodium. Nurse noticed ETT 19 at the lip and was 20 prior, CXR shows ETT @ 5cm above jono, ET tube advanced 1cm, repeat CXR confirms appropriate placement. Thick inline secretions with suctioning. ENT trach placement Fri likely, PEG likely Mon. Keep ELENA drain until no output, EVD to remain @3. Start ASA 81mg daily tomorrow.   4/28: POD7 SOC, neuro stable, given fentanyl x 1 overnight for presumed discomfort (biting on ETT), remains on full vent support. CTH today stable in comparison to 4/25. 6 cuffed trach placed by ENT in OR, but came down without cuff. Replaced at bedside by ENT. On fentanyl gtt.    4/29: POD8 SOC, JIM overnight. Incision cleaned and dressing changed. On fentanyl gtt. EKG completed due to increased frequency PVCs on telemetry, frequency of PVCs improved with titrating up fentanyl gtt. ABG drawn.  Repeat LE dopplers completed showing persistant superficial thrombus, no DVT. No EVD waveform during day, flushed distally without improvement. Exam unchanged.  EVD dropped to 0 for low output in afternoon.   4/30: POD9. Neuro stable, febrile to 101.3F o/n, given tylenol and pan cx sent. SBP dipped to low 90s o/n, HR stable in 70s with some PVCs, decreased fentanyl gtt and given 500cc bolus of NS x 2. Pend PEG placement Mon and angio Tues. D/c'd ELENA drain today and suture placed. F/u heme recs. Positive UA. Infiltrates found on CXR. Started on Zosyn 4.5g Q6hrs .   5/1: POD 10. Neuro stable. Dc'd fentanyl gtt. PEG failed placement at bedside with Dr. Gant, plan for PEG in OR tomorrow afternoon with Dr. Layton. Dc'd amlodipine and started carvedilol 3.125 BID for PVCs . Made 3% inhalation and mucomyst q8hr. Failed PEG at bedside. Salt tabs made 1 q 6. Decreased cardura to 4mg daily. Urine culture growing E. Coli and sputum culture growing pluralibacter gergoviae and strep species. ID consulted, f/u recs. Failed CPAP trial @ 3pm. Added am labs per heme/onc for hypercoguable workup. Pending repeat LE dopplers in 2-3 days. NGT clogged, removed, ENT failed attempt at replacement, will keep NPO for PEG placement tmw. Repeat xray in am for concern for aspration. Pt abx switched from Zosyn to Ertapenem 1g Q24hrs. per ID recs, possible ESBL growth.   5/2: POD 11. Neuro stable. Pre-op for diagnostic cerebral angiogram and PEG placement. NPO. EVD raised to 5 cmH20. Sputum culture speciated to step pneumoniae, sensitive to ertapenem. 3% inhalation/mucomyst made q 6 hr d/t thick secretions. PEG placed in OR. POD0 dx cerebral angio. EVD raised to 10.   5/3: POD 12. Neuro stable. PEG feeds began @ 10 AM, plan to increase 10cc every 6h per general surgery. Repeat dopplers show stable R superficial thrombus and new L IM calf DVT. Vascular consulted for IVC filter. Plan to get CTH tomorrow.  5/4: POD 13. JIM o/n. s/p IVC filter with vascular today. Pending post-procedure CTH. FOBT negative. Started iron and vitamin c every other day for iron deficiency anemia.   5/5: POD14. CSF cx sent to r/o infection from new gas in right temporal horn seen on CTH. Restarted TF, changed to Jevity 1.2, f/u nutrition recs. EVD raised to 10 today, plan to challenge over the weekend and CTH on Monday, possible VPS next Wednesday. ENT removed sutures today. Salt tabs decreased 1q12.  5/6: POD15 Placed on CPAP briefly with low MV alert, placed back on full vent support. EVD remains open at [id number]. ICPs WNL. Neuro exam stable.  EVD raised to 15. Tolerated CPAP x8h.   5/7: POD#16. JIM overnight, neuro stable. D/c'd salt tabs and 3% neb. Wean trach to CPAP as tolerated. Pan cx NGTD. EVD raised today to [id number].   5/8: POD17 JIM overnight. ICPs WNL. Pt remains on full vent support. Neuro exam stable. Plan for possible VPS today. CT chio complete showing increase in vent size.   5/9: POD18 SOC, POD1 VPS. Oxycodone given for incisional pain. Neuro exam stable.     Patient evaluated by PT/OT who recommended:  Patient is going home? rehab? hospice? Facility Name:     Hospital course c/b:     Exam on day of discharge:    Checklist:   - Obtained follow up appointment from NP  - Reviewed final recommendations of inpatient consults  - review discharge planning on provider handoff  - post op imaging completed  - Neurologically stable for discharge  - Vitals stable for discharge   - Afebrile for discharge  - WBC is stable  - Sodium level is normal  - Pain is adequately controlled  - Pt has PICC/walker/brace/collar   - LACE score (10 or > needs PCP apt)   - stroke patient? Discharge NIHSS score   HPI:  56 yo F with PMH of Asthma, CAD (not on  meds), peripheral neuropathy and PSH of BATOOL, cholecystectomy, right knee surgery presented to [facility] ED on 4/20 with right sided weakness and right facial numbness. Patient was undergoing preparation for colonoscopy. As per daughter at 8am patient was playing with her grandchildren but around 840 am patient was getting dressed and fell and subsequently felt weak after. Daughter reports that patient had some episodes of vomiting at this time. She had a syncopal episode at around 10 am and was witnessed by brother. No head trauma, She regained conscious after few minutes. She remained in bed for the remainder of the day. Daughter reports some slurred speech noted 1230-1PM and also was confused after. and around 4PM, the patient's sister noted right sided weakness at which time EMS was called and patient was brought to ED. No c/o chest pain, shortness of breath, fever, headache, abdominal pain, urinary complaints. No recent travel/sickness/ change in meds. Stroke code in ER: CTH neg for heme, Right PICA distribution acute infarction. CTA with saccular aneurysms of b/l carotids, approximately 0.8 cm on the right and 1.2 x 0.9 cm on the left. Possible tiny third saccular aneurysm on the right Posterior intracranial circulation: Right vertebral arterial occlusion at its dural crossing junction V3 Atlantic and V4 intracranial segments with likely reversal of flow in its intracranial segment from the basilar. Right PICA faintly seen. 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  Not candidate for TNK/mechanical thrombectomy. CT scan repeated which showed: 1. Brain: Progression of acute infarction within the right cerebellar hemisphere, also extending into the left superior cerebellar hemisphere. New mass effect on the fourth ventricle causing stenosis versus occlusion with new third and lateral ventricular dilatation indicating ventricular obstruction at the level of the fourth ventricle. New upward and downward herniation of cerebellar parenchyma Right carotid system: No hemodynamically significant stenosis. Left carotid system: No hemodynamically significant stenosis. Vertebral circulation: Patent. Anterior intracranial circulation: Intact. Bilateral internal carotid saccular aneurysms. These findings are unchanged. Posterior intracranial circulation:    Improved flow within the right vertebral artery since 4/20/2023. New focal stenosis mid left vertebral artery, etiology uncertain, consider vasospasm and extrinsic compression in addition to new embolic disease. Brain perfusion: Perfusion images demonstrate normalization of the perfusion abnormality in the right cerebellar hemisphere present on 4/20/2023 despite evidence of progression of acute infarction.  Areas of apparent ischemia within the posterior fossa have progressed in extent, also again involving the left posterior cerebral arterial distribution. Tx to [facility] for SOC watch. On admission to [facility], NIHSS 12.  (21 Apr 2023 16:50)      Hospital Course:  4/21: Admitted for crani watch, CTH complete w/ unchanged hydrocephalus, taken for emergent occipital craniectomy.   4/22: POD1. Remains intubated overnight, on propofol and dank. EVD@[id number]. Pending repeat CTH this am. Given hydralazine 10mg x1. Started on cardene for SBP high 140s-150s. Sub-therapeutic on plavix, therapeutic on asa, rpt asa accumetrics until subtherapeutic. LE dopplers neg for DVT, but showing superficial venous thrombosis in the proximal portion of the right greater saphenous vein. Started on 3% @60 for na goal 145-150. NGT placed by ENT. Febrile, pancultured.  4/23: POD 2. 3% increased to 75. NGT readjusted. UA neg. SBP liberalized to 160. Plan to extubate. 3% decreased to 60. Failed extubation d/t no cuff leak, given 60mg solumedrol, plan to extubate in 6 hrs. SCx1 for post void residual >400, started on cardura at bedtime. New blood in buretrol, stopped SQL. Clot in EVD cleared. Neuro exam improving.   4/24: POD 3. Cont 3% with NS while NPO, start TF today. TF started. LFTs downtrending. Sodium 141. Increased 3% to 50, NS to 30. Repeat BMP ordered for 5:30PM. Tramadol 25 for pain with no relief, oxy 5 and 1g tylenol ordered, stability CT stable, speech language consult for dysarthria. Given hydralazine 10mg IVP for SBP>160, started amlodipine 5mg. C/o mild headache, given fioricet x1, stroke neuro rec SALVADOR and loop recorder placement. Echo with bubble completed, negative for PFO, EF 55-60%. J HFNC started for desats with suctioning.   4/25: POD 4. Cont HFNC. Increased secertions on HFNC, reintubated. CXR confirmed good placement. EVD dropped to 5cmH20 for goal of draining 5-10cc/hr and SG ELENA drain taken off suction. Pending CTH. EVD drained 0cc/hr, dropped to 0. NGT replaced. Dc'd fioricet. Started on PPI for intubation. Increased cardura to 4mg for urinary retention, given an additional 2mg now. Started salt tabs 3 q 6. Given 12.5mg fentanyl x1. NGT replaced, CXR shown in lung. NGT removed, repeat CXR showing no pneumothorax. Pending ENT consult for NGT placement. CTH stable. NGT replaced by ENT, confirmed in proper spot. Restarted TF. [id number]. Na 141 from 146. Increased 3% to 60. EVD raised to 3cmH20. ETT pulled back 1cm by RT.  4/26: POD 5 SOC. Intubated, remains on precedex, ABG ordered with improving PaO2, BMP sodium 148, 3% changed to 30cc/hr, cardura increased to 8mg for tonight, tolerating cpap  4/27: POD 6 SOC. Respiratory rate sustained 6, returned to FVS. Na 151, dc'd 3%, f/u AM sodium. Nurse noticed ETT 19 at the lip and was 20 prior, CXR shows ETT @ 5cm above jono, ET tube advanced 1cm, repeat CXR confirms appropriate placement. Thick inline secretions with suctioning. ENT trach placement Fri likely, PEG likely Mon. Keep ELENA drain until no output, EVD to remain @3. Start ASA 81mg daily tomorrow.   4/28: POD7 SOC, neuro stable, given fentanyl x 1 overnight for presumed discomfort (biting on ETT), remains on full vent support. CTH today stable in comparison to 4/25. 6 cuffed trach placed by ENT in OR, but came down without cuff. Replaced at bedside by ENT. On fentanyl gtt.    4/29: POD8 SOC, JIM overnight. Incision cleaned and dressing changed. On fentanyl gtt. EKG completed due to increased frequency PVCs on telemetry, frequency of PVCs improved with titrating up fentanyl gtt. ABG drawn.  Repeat LE dopplers completed showing persistant superficial thrombus, no DVT. No EVD waveform during day, flushed distally without improvement. Exam unchanged.  EVD dropped to 0 for low output in afternoon.   4/30: POD9. Neuro stable, febrile to 101.3F o/n, given tylenol and pan cx sent. SBP dipped to low 90s o/n, HR stable in 70s with some PVCs, decreased fentanyl gtt and given 500cc bolus of NS x 2. Pend PEG placement Mon and angio Tues. D/c'd ELENA drain today and suture placed. F/u heme recs. Positive UA. Infiltrates found on CXR. Started on Zosyn 4.5g Q6hrs .   5/1: POD 10. Neuro stable. Dc'd fentanyl gtt. PEG failed placement at bedside with Dr. Gant, plan for PEG in OR tomorrow afternoon with Dr. Layton. Dc'd amlodipine and started carvedilol 3.125 BID for PVCs . Made 3% inhalation and mucomyst q8hr. Failed PEG at bedside. Salt tabs made 1 q 6. Decreased cardura to 4mg daily. Urine culture growing E. Coli and sputum culture growing pluralibacter gergoviae and strep species. ID consulted, f/u recs. Failed CPAP trial @ 3pm. Added am labs per heme/onc for hypercoguable workup. Pending repeat LE dopplers in 2-3 days. NGT clogged, removed, ENT failed attempt at replacement, will keep NPO for PEG placement tmw. Repeat xray in am for concern for aspration. Pt abx switched from Zosyn to Ertapenem 1g Q24hrs. per ID recs, possible ESBL growth.   5/2: POD 11. Neuro stable. Pre-op for diagnostic cerebral angiogram and PEG placement. NPO. EVD raised to 5 cmH20. Sputum culture speciated to step pneumoniae, sensitive to ertapenem. 3% inhalation/mucomyst made q 6 hr d/t thick secretions. PEG placed in OR. POD0 dx cerebral angio. EVD raised to 10.   5/3: POD 12. Neuro stable. PEG feeds began @ 10 AM, plan to increase 10cc every 6h per general surgery. Repeat dopplers show stable R superficial thrombus and new L IM calf DVT. Vascular consulted for IVC filter. Plan to get CTH tomorrow.  5/4: POD 13. JIM o/n. s/p IVC filter with vascular today. Pending post-procedure CTH. FOBT negative. Started iron and vitamin c every other day for iron deficiency anemia.   5/5: POD14. CSF cx sent to r/o infection from new gas in right temporal horn seen on CTH. Restarted TF, changed to Jevity 1.2, f/u nutrition recs. EVD raised to 10 today, plan to challenge over the weekend and CTH on Monday, possible VPS next Wednesday. ENT removed sutures today. Salt tabs decreased 1q12.  5/6: POD15 Placed on CPAP briefly with low MV alert, placed back on full vent support. EVD remains open at [id number]. ICPs WNL. Neuro exam stable.  EVD raised to 15. Tolerated CPAP x8h.   5/7: POD#16. JIM overnight, neuro stable. D/c'd salt tabs and 3% neb. Wean trach to CPAP as tolerated. Pan cx NGTD. EVD raised today to [id number].   5/8: POD17 JIM overnight. ICPs WNL. Pt remains on full vent support. Neuro exam stable. Plan for possible VPS today. CT chio complete showing increase in vent size.   5/9: POD18 SOC, POD1 VPS. Desat o/n to 80s, improved on own. CXR with LLL effusion. Another desat to 90% in AM, given duoneb and mucomyst. Oxycodone given for incisional pain. Neuro exam stable. CTH in AM stable. Keppra dc'd. Ritalin 5 BID started.  Tolerating CPAP trial. SALVADOR ordered. Rectal tube dc'd.   5/10: POD 19 SOC POD2 VPS. Remains on full vent support. Tolerated CPAP for 6 hours. Neuro exam stable. BP liberalized to 160. RLQ US 2/2 abd pain. Lactate WNL.  5/11: POD20 SOC POD3 VPS. Plan for CPAP trial in AM.  Dc'd cardura. F/u speech consult for communcation board. Has been tolerating CPAP since 9am. RLQ us w/ no acute pathology. Per heme/onc LMWH or coumadin rec for AC over DOAC due to antiphospholipid syndrome.   5/12: POD21 SOC POD4 VPS. CPAP 8/5, tolerated until 3pm. Can start ASA on 5/14 and full AC POD 10 from VPS 5/19 (remove IVC filter prior, f/u w/ vascular on timing).   5/13: POD22 SOC POD 5 VPS, neuro stable, tolerating trach collar. ASA ordered to start tomorrow, Ritalin increased to 10BID from 5BID, Simethicone ordered for gas, f/u gen surg for continued abd discomfort, PM VBG with PCO2 50 and repeat VBG pCO2 55, possible obesity hypoventilation, f/u AM VBG   5/14: POD23 SOC, [id number], neuro stable, VBG showed increased PCO2 indicating poor ventilation, failed CPAP due to low TV, put back on full vent support.   5/15: POD24 SOC, POD7 VPS. O/n CT A/P done for abd pain, f/u read. neuro stable. remains on full vent support. Simethicone, Amlodipine d/c'd. Given 1L bolus of NS. Dilaudid 0.5 for pain control. CT A/P negative for acute pathology. Restarted tube feeds. Started Modafinil for neurostim.  5/16: POD25 SOC, POD8 VPS. JIM o/n neuro stable. remains on full vent support. DC carvedilol for intermittent hypotensive episodes. UA for suprapelvic pain, concern for nephrolithiasis based on CT AP.  5/17: POD26 SOC, POD9 VPS. JIM o/n neuro stable.   Modafinil increased. CTH completed, . Urology consulted for bladder calculi vs mass, pending urine cytology study and outptaient urology follow-up for cystoscopy. Trialing SIMV for vent weaning.   5/18: POD27 SOC, POD10 VPS. JIM o/n remains on SIMV.   Baby aspirin discontinued. SQL 80mg bid started for DVT treatement per hematology/neurology. Plan for CTH in am. Added amantadine for neurostim.   5/19: POD28, POD 11 VPS. JIM o/n, reapplied pressure dressing. tolerating CPAP  5/20: POD29, POD 12 VPS, reapplied pressure dressing o/n, tolerating trach collar. CTH today stable. Anti xa 0.8 (therapeutic)  5/21: POD 30. POD 13 VPS. Tolerating trach collar. Loose stools. Ritalin dc'verito. Stepdown to telemetry. Neurologically stable.   5/22: POD 31. POD 14 VPS. Neurologically/hemodynamically stable. Pending dispo.       Patient evaluated by PT/OT who recommended: Acute Rehab  Patient is going home? rehab? hospice? Facility Name:     Hospital course c/b: prolonged ventilation (s/p Tracheostomy), Dysphagia (s/p PEG), hydrocephalus (s/p VPS, Certas at 4)    Exam on day of discharge:    Checklist:   - Obtained follow up appointment from NP  - Reviewed final recommendations of inpatient consults  - review discharge planning on provider handoff  - post op imaging completed  - Neurologically stable for discharge  - Vitals stable for discharge   - Afebrile for discharge  - WBC is stable  - Sodium level is normal  - Pain is adequately controlled  - Pt has PICC/walker/brace/collar   - LACE score (10 or > needs PCP apt)   - stroke patient? Discharge NIHSS score   HPI:  58 yo F with PMH of Asthma, CAD (not on  meds), peripheral neuropathy and PSH of BATOOL, cholecystectomy, right knee surgery presented to [place] [facility] ED on 4/20 with right sided weakness and right facial numbness. Patient was undergoing preparation for colonoscopy. As per daughter at 8am patient was playing with her grandchildren but around 840 am patient was getting dressed and fell and subsequently felt weak after. Daughter reports that patient had some episodes of vomiting at this time. She had a syncopal episode at around 10 am and was witnessed by brother. No head trauma, She regained conscious after few minutes. She remained in bed for the remainder of the day. Daughter reports some slurred speech noted 1230-1PM and also was confused after. and around 4PM, the patient's sister noted right sided weakness at which time EMS was called and patient was brought to ED. No c/o chest pain, shortness of breath, fever, headache, abdominal pain, urinary complaints. No recent travel/sickness/ change in meds. Stroke code in ER: CTH neg for heme, Right PICA distribution acute infarction. CTA with saccular aneurysms of b/l carotids, approximately 0.8 cm on the right and 1.2 x 0.9 cm on the left. Possible tiny third saccular aneurysm on the right Posterior intracranial circulation: Right vertebral arterial occlusion at its dural crossing junction V3 Atlantic and V4 intracranial segments with likely reversal of flow in its intracranial segment from the basilar. Right PICA faintly seen. 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  Not candidate for TNK/mechanical thrombectomy. CT scan repeated which showed: 1. Brain: Progression of acute infarction within the right cerebellar hemisphere, also extending into the left superior cerebellar hemisphere. New mass effect on the fourth ventricle causing stenosis versus occlusion with new third and lateral ventricular dilatation indicating ventricular obstruction at the level of the fourth ventricle. New upward and downward herniation of cerebellar parenchyma Right carotid system: No hemodynamically significant stenosis. Left carotid system: No hemodynamically significant stenosis. Vertebral circulation: Patent. Anterior intracranial circulation: Intact. Bilateral internal carotid saccular aneurysms. These findings are unchanged. Posterior intracranial circulation:    Improved flow within the right vertebral artery since 4/20/2023. New focal stenosis mid left vertebral artery, etiology uncertain, consider vasospasm and extrinsic compression in addition to new embolic disease. Brain perfusion: Perfusion images demonstrate normalization of the perfusion abnormality in the right cerebellar hemisphere present on 4/20/2023 despite evidence of progression of acute infarction.  Areas of apparent ischemia within the posterior fossa have progressed in extent, also again involving the left posterior cerebral arterial distribution. Tx to [facility] for SOC watch. On admission to [facility], NIHSS 12.  (21 Apr 2023 16:50)      Hospital Course:  4/21: Admitted for crani watch, CTH complete w/ unchanged hydrocephalus, taken for emergent occipital craniectomy.   4/22: POD1. Remains intubated overnight, on propofol and dank. EVD@[id number]. Pending repeat CTH this am. Given hydralazine 10mg x1. Started on cardene for SBP high 140s-150s. Sub-therapeutic on plavix, therapeutic on asa, rpt asa accumetrics until subtherapeutic. LE dopplers neg for DVT, but showing superficial venous thrombosis in the proximal portion of the right greater saphenous vein. Started on 3% @60 for na goal 145-150. NGT placed by ENT. Febrile, pancultured.  4/23: POD 2. 3% increased to 75. NGT readjusted. UA neg. SBP liberalized to 160. Plan to extubate. 3% decreased to 60. Failed extubation d/t no cuff leak, given 60mg solumedrol, plan to extubate in 6 hrs. SCx1 for post void residual >400, started on cardura at bedtime. New blood in buretrol, stopped SQL. Clot in EVD cleared. Neuro exam improving.   4/24: POD 3. Cont 3% with NS while NPO, start TF today. TF started. LFTs downtrending. Sodium 141. Increased 3% to 50, NS to 30. Repeat BMP ordered for 5:30PM. Tramadol 25 for pain with no relief, oxy 5 and 1g tylenol ordered, stability CT stable, speech language consult for dysarthria. Given hydralazine 10mg IVP for SBP>160, started amlodipine 5mg. C/o mild headache, given fioricet x1, stroke neuro rec SALVADOR and loop recorder placement. Echo with bubble completed, negative for PFO, EF 55-60%. J HFNC started for desats with suctioning.   4/25: POD 4. Cont HFNC. Increased secertions on HFNC, reintubated. CXR confirmed good placement. EVD dropped to 5cmH20 for goal of draining 5-10cc/hr and SG ELENA drain taken off suction. Pending CTH. EVD drained 0cc/hr, dropped to 0. NGT replaced. Dc'd fioricet. Started on PPI for intubation. Increased cardura to 4mg for urinary retention, given an additional 2mg now. Started salt tabs 3 q 6. Given 12.5mg fentanyl x1. NGT replaced, CXR shown in lung. NGT removed, repeat CXR showing no pneumothorax. Pending ENT consult for NGT placement. CTH stable. NGT replaced by ENT, confirmed in proper spot. Restarted TF. [id number]. Na 141 from 146. Increased 3% to 60. EVD raised to 3cmH20. ETT pulled back 1cm by RT.  4/26: POD 5 SOC. Intubated, remains on precedex, ABG ordered with improving PaO2, BMP sodium 148, 3% changed to 30cc/hr, cardura increased to 8mg for tonight, tolerating cpap  4/27: POD 6 SOC. Respiratory rate sustained 6, returned to FVS. Na 151, dc'd 3%, f/u AM sodium. Nurse noticed ETT 19 at the lip and was 20 prior, CXR shows ETT @ 5cm above jono, ET tube advanced 1cm, repeat CXR confirms appropriate placement. Thick inline secretions with suctioning. ENT trach placement Fri likely, PEG likely Mon. Keep ELENA drain until no output, EVD to remain @3. Start ASA 81mg daily tomorrow.   4/28: POD7 SOC, neuro stable, given fentanyl x 1 overnight for presumed discomfort (biting on ETT), remains on full vent support. CTH today stable in comparison to 4/25. 6 cuffed trach placed by ENT in OR, but came down without cuff. Replaced at bedside by ENT. On fentanyl gtt.    4/29: POD8 SOC, JIM overnight. Incision cleaned and dressing changed. On fentanyl gtt. EKG completed due to increased frequency PVCs on telemetry, frequency of PVCs improved with titrating up fentanyl gtt. ABG drawn.  Repeat LE dopplers completed showing persistant superficial thrombus, no DVT. No EVD waveform during day, flushed distally without improvement. Exam unchanged.  EVD dropped to 0 for low output in afternoon.   4/30: POD9. Neuro stable, febrile to 101.3F o/n, given tylenol and pan cx sent. SBP dipped to low 90s o/n, HR stable in 70s with some PVCs, decreased fentanyl gtt and given 500cc bolus of NS x 2. Pend PEG placement Mon and angio Tues. D/c'd ELENA drain today and suture placed. F/u heme recs. Positive UA. Infiltrates found on CXR. Started on Zosyn 4.5g Q6hrs .   5/1: POD 10. Neuro stable. Dc'd fentanyl gtt. PEG failed placement at bedside with Dr. Gant, plan for PEG in OR tomorrow afternoon with Dr. Layton. Dc'd amlodipine and started carvedilol 3.125 BID for PVCs . Made 3% inhalation and mucomyst q8hr. Failed PEG at bedside. Salt tabs made 1 q 6. Decreased cardura to 4mg daily. Urine culture growing E. Coli and sputum culture growing pluralibacter gergoviae and strep species. ID consulted, f/u recs. Failed CPAP trial @ 3pm. Added am labs per heme/onc for hypercoguable workup. Pending repeat LE dopplers in 2-3 days. NGT clogged, removed, ENT failed attempt at replacement, will keep NPO for PEG placement tmw. Repeat xray in am for concern for aspration. Pt abx switched from Zosyn to Ertapenem 1g Q24hrs. per ID recs, possible ESBL growth.   5/2: POD 11. Neuro stable. Pre-op for diagnostic cerebral angiogram and PEG placement. NPO. EVD raised to 5 cmH20. Sputum culture speciated to step pneumoniae, sensitive to ertapenem. 3% inhalation/mucomyst made q 6 hr d/t thick secretions. PEG placed in OR. POD0 dx cerebral angio. EVD raised to 10.   5/3: POD 12. Neuro stable. PEG feeds began @ 10 AM, plan to increase 10cc every 6h per general surgery. Repeat dopplers show stable R superficial thrombus and new L IM calf DVT. Vascular consulted for IVC filter. Plan to get CTH tomorrow.  5/4: POD 13. JIM o/n. s/p IVC filter with vascular today. Pending post-procedure CTH. FOBT negative. Started iron and vitamin c every other day for iron deficiency anemia.   5/5: POD14. CSF cx sent to r/o infection from new gas in right temporal horn seen on CTH. Restarted TF, changed to Jevity 1.2, f/u nutrition recs. EVD raised to 10 today, plan to challenge over the weekend and CTH on Monday, possible VPS next Wednesday. ENT removed sutures today. Salt tabs decreased 1q12.  5/6: POD15 Placed on CPAP briefly with low MV alert, placed back on full vent support. EVD remains open at [id number]. ICPs WNL. Neuro exam stable.  EVD raised to 15. Tolerated CPAP x8h.   5/7: POD#16. JIM overnight, neuro stable. D/c'd salt tabs and 3% neb. Wean trach to CPAP as tolerated. Pan cx NGTD. EVD raised today to [id number].   5/8: POD17 JIM overnight. ICPs WNL. Pt remains on full vent support. Neuro exam stable. Plan for possible VPS today. CT chio complete showing increase in vent size.   5/9: POD18 SOC, POD1 VPS. Desat o/n to 80s, improved on own. CXR with LLL effusion. Another desat to 90% in AM, given duoneb and mucomyst. Oxycodone given for incisional pain. Neuro exam stable. CTH in AM stable. Keppra dc'd. Ritalin 5 BID started.  Tolerating CPAP trial. SALVADOR ordered. Rectal tube dc'd.   5/10: POD 19 SOC POD2 VPS. Remains on full vent support. Tolerated CPAP for 6 hours. Neuro exam stable. BP liberalized to 160. RLQ US 2/2 abd pain. Lactate WNL.  5/11: POD20 SOC POD3 VPS. Plan for CPAP trial in AM.  Dc'd cardura. F/u speech consult for communcation board. Has been tolerating CPAP since 9am. RLQ us w/ no acute pathology. Per heme/onc LMWH or coumadin rec for AC over DOAC due to antiphospholipid syndrome.   5/12: POD21 SOC POD4 VPS. CPAP 8/5, tolerated until 3pm. Can start ASA on 5/14 and full AC POD 10 from VPS 5/19 (remove IVC filter prior, f/u w/ vascular on timing).   5/13: POD22 SOC POD 5 VPS, neuro stable, tolerating trach collar. ASA ordered to start tomorrow, Ritalin increased to 10BID from 5BID, Simethicone ordered for gas, f/u gen surg for continued abd discomfort, PM VBG with PCO2 50 and repeat VBG pCO2 55, possible obesity hypoventilation, f/u AM VBG   5/14: POD23 SOC, OCK3MRH, neuro stable, VBG showed increased PCO2 indicating poor ventilation, failed CPAP due to low TV, put back on full vent support.   5/15: POD24 SOC, POD7 VPS. O/n CT A/P done for abd pain, f/u read. neuro stable. remains on full vent support. Simethicone, Amlodipine d/c'd. Given 1L bolus of NS. Dilaudid 0.5 for pain control. CT A/P negative for acute pathology. Restarted tube feeds. Started Modafinil for neurostim.  5/16: POD25 SOC, POD8 VPS. JIM o/n neuro stable. remains on full vent support. DC carvedilol for intermittent hypotensive episodes. UA for suprapelvic pain, concern for nephrolithiasis based on CT AP.  5/17: POD26 SOC, POD9 VPS. JIM o/n neuro stable.   Modafinil increased. CTH completed, . Urology consulted for bladder calculi vs mass, pending urine cytology study and outptaient urology follow-up for cystoscopy. Trialing SIMV for vent weaning.   5/18: POD27 SOC, POD10 VPS. JIM o/n remains on SIMV.   Baby aspirin discontinued. SQL 80mg bid started for DVT treatement per hematology/neurology. Plan for CTH in am. Added amantadine for neurostim.   5/19: POD28, POD 11 VPS. JIM o/n, reapplied pressure dressing. tolerating CPAP  5/20: POD29, POD 12 VPS, reapplied pressure dressing o/n, tolerating trach collar. CTH today stable. Anti xa 0.8 (therapeutic)  5/21: POD 30. POD 13 VPS. Tolerating trach collar. Loose stools. Ritalin dc'd. Stepdown to telemetry. Neurologically stable.   5/22: POD 31. POD 14 VPS. Neurologically/hemodynamically stable. Pending dispo.       Patient evaluated by PT/OT who recommended: Acute Rehab  Patient is going home? rehab? hospice? Facility Name:     Hospital course c/b: prolonged ventilation (s/p Tracheostomy), Dysphagia (s/p PEG), hydrocephalus (s/p VPS, Certas at 4)    Exam on day of discharge:    Checklist:   - Obtained follow up appointment from NP  - Reviewed final recommendations of inpatient consults  - review discharge planning on provider handoff  - post op imaging completed  - Neurologically stable for discharge  - Vitals stable for discharge   - Afebrile for discharge  - WBC is stable  - Sodium level is normal  - Pain is adequately controlled  - Pt has PICC/walker/brace/collar   - LACE score (10 or > needs PCP apt)   - stroke patient? Discharge NIHSS score: 3   HPI:  56 yo F with PMH of Asthma, CAD (not on  meds), peripheral neuropathy and PSH of BATOOL, cholecystectomy, right knee surgery presented to [place] ED on 4/20 with right sided weakness and right facial numbness. Patient was undergoing preparation for colonoscopy. As per daughter at 8am patient was playing with her grandchildren but around 840 am patient was getting dressed and fell and subsequently felt weak after. Daughter reports that patient had some episodes of vomiting at this time. She had a syncopal episode at around 10 am and was witnessed by brother. No head trauma, She regained conscious after few minutes. She remained in bed for the remainder of the day. Daughter reports some slurred speech noted 1230-1PM and also was confused after. and around 4PM, the patient's sister noted right sided weakness at which time EMS was called and patient was brought to ED. No c/o chest pain, shortness of breath, fever, headache, abdominal pain, urinary complaints. No recent travel/sickness/ change in meds. Stroke code in ER: CTH neg for heme, Right PICA distribution acute infarction. CTA with saccular aneurysms of b/l carotids, approximately 0.8 cm on the right and 1.2 x 0.9 cm on the left. Possible tiny third saccular aneurysm on the right Posterior intracranial circulation: Right vertebral arterial occlusion at its dural crossing junction V3 Atlantic and V4 intracranial segments with likely reversal of flow in its intracranial segment from the basilar. Right PICA faintly seen. 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  Not candidate for TNK/mechanical thrombectomy. CT scan repeated which showed: 1. Brain: Progression of acute infarction within the right cerebellar hemisphere, also extending into the left superior cerebellar hemisphere. New mass effect on the fourth ventricle causing stenosis versus occlusion with new third and lateral ventricular dilatation indicating ventricular obstruction at the level of the fourth ventricle. New upward and downward herniation of cerebellar parenchyma Right carotid system: No hemodynamically significant stenosis. Left carotid system: No hemodynamically significant stenosis. Vertebral circulation: Patent. Anterior intracranial circulation: Intact. Bilateral internal carotid saccular aneurysms. These findings are unchanged. Posterior intracranial circulation:    Improved flow within the right vertebral artery since 4/20/2023. New focal stenosis mid left vertebral artery, etiology uncertain, consider vasospasm and extrinsic compression in addition to new embolic disease. Brain perfusion: Perfusion images demonstrate normalization of the perfusion abnormality in the right cerebellar hemisphere present on 4/20/2023 despite evidence of progression of acute infarction.  Areas of apparent ischemia within the posterior fossa have progressed in extent, also again involving the left posterior cerebral arterial distribution. Tx to [facility] for SOC watch. On admission to [facility], NIHSS 12.  (21 Apr 2023 16:50)      Hospital Course:  4/21: Admitted for crani watch, CTH complete w/ unchanged hydrocephalus, taken for emergent occipital craniectomy.   4/22: POD1. Remains intubated overnight, on propofol and dank. EVD@[id number]. Pending repeat CTH this am. Given hydralazine 10mg x1. Started on cardene for SBP high 140s-150s. Sub-therapeutic on plavix, therapeutic on asa, rpt asa accumetrics until subtherapeutic. LE dopplers neg for DVT, but showing superficial venous thrombosis in the proximal portion of the right greater saphenous vein. Started on 3% @60 for na goal 145-150. NGT placed by ENT. Febrile, pancultured.  4/23: POD 2. 3% increased to 75. NGT readjusted. UA neg. SBP liberalized to 160. Plan to extubate. 3% decreased to 60. Failed extubation d/t no cuff leak, given 60mg solumedrol, plan to extubate in 6 hrs. SCx1 for post void residual >400, started on cardura at bedtime. New blood in buretrol, stopped SQL. Clot in EVD cleared. Neuro exam improving.   4/24: POD 3. Cont 3% with NS while NPO, start TF today. TF started. LFTs downtrending. Sodium 141. Increased 3% to 50, NS to 30. Repeat BMP ordered for 5:30PM. Tramadol 25 for pain with no relief, oxy 5 and 1g tylenol ordered, stability CT stable, speech language consult for dysarthria. Given hydralazine 10mg IVP for SBP>160, started amlodipine 5mg. C/o mild headache, given fioricet x1, stroke neuro rec SALVADOR and loop recorder placement. Echo with bubble completed, negative for PFO, EF 55-60%. J HFNC started for desats with suctioning.   4/25: POD 4. Cont HFNC. Increased secertions on HFNC, reintubated. CXR confirmed good placement. EVD dropped to 5cmH20 for goal of draining 5-10cc/hr and SG ELENA drain taken off suction. Pending CTH. EVD drained 0cc/hr, dropped to 0. NGT replaced. Dc'd fioricet. Started on PPI for intubation. Increased cardura to 4mg for urinary retention, given an additional 2mg now. Started salt tabs 3 q 6. Given 12.5mg fentanyl x1. NGT replaced, CXR shown in lung. NGT removed, repeat CXR showing no pneumothorax. Pending ENT consult for NGT placement. CTH stable. NGT replaced by ENT, confirmed in proper spot. Restarted TF. [id number]. Na 141 from 146. Increased 3% to 60. EVD raised to 3cmH20. ETT pulled back 1cm by RT.  4/26: POD 5 SOC. Intubated, remains on precedex, ABG ordered with improving PaO2, BMP sodium 148, 3% changed to 30cc/hr, cardura increased to 8mg for tonight, tolerating cpap  4/27: POD 6 SOC. Respiratory rate sustained 6, returned to FVS. Na 151, dc'd 3%, f/u AM sodium. Nurse noticed ETT 19 at the lip and was 20 prior, CXR shows ETT @ 5cm above jono, ET tube advanced 1cm, repeat CXR confirms appropriate placement. Thick inline secretions with suctioning. ENT trach placement Fri likely, PEG likely Mon. Keep ELENA drain until no output, EVD to remain @3. Start ASA 81mg daily tomorrow.   4/28: POD7 SOC, neuro stable, given fentanyl x 1 overnight for presumed discomfort (biting on ETT), remains on full vent support. CTH today stable in comparison to 4/25. 6 cuffed trach placed by ENT in OR, but came down without cuff. Replaced at bedside by ENT. On fentanyl gtt.    4/29: POD8 SOC, JIM overnight. Incision cleaned and dressing changed. On fentanyl gtt. EKG completed due to increased frequency PVCs on telemetry, frequency of PVCs improved with titrating up fentanyl gtt. ABG drawn.  Repeat LE dopplers completed showing persistant superficial thrombus, no DVT. No EVD waveform during day, flushed distally without improvement. Exam unchanged.  EVD dropped to 0 for low output in afternoon.   4/30: POD9. Neuro stable, febrile to 101.3F o/n, given tylenol and pan cx sent. SBP dipped to low 90s o/n, HR stable in 70s with some PVCs, decreased fentanyl gtt and given 500cc bolus of NS x 2. Pend PEG placement Mon and angio Tues. D/c'd ELENA drain today and suture placed. F/u heme recs. Positive UA. Infiltrates found on CXR. Started on Zosyn 4.5g Q6hrs .   5/1: POD 10. Neuro stable. Dc'd fentanyl gtt. PEG failed placement at bedside with Dr. Gant, plan for PEG in OR tomorrow afternoon with Dr. Layton. Dc'd amlodipine and started carvedilol 3.125 BID for PVCs . Made 3% inhalation and mucomyst q8hr. Failed PEG at bedside. Salt tabs made 1 q 6. Decreased cardura to 4mg daily. Urine culture growing E. Coli and sputum culture growing pluralibacter gergoviae and strep species. ID consulted, f/u recs. Failed CPAP trial @ 3pm. Added am labs per heme/onc for hypercoguable workup. Pending repeat LE dopplers in 2-3 days. NGT clogged, removed, ENT failed attempt at replacement, will keep NPO for PEG placement tmw. Repeat xray in am for concern for aspration. Pt abx switched from Zosyn to Ertapenem 1g Q24hrs. per ID recs, possible ESBL growth.   5/2: POD 11. Neuro stable. Pre-op for diagnostic cerebral angiogram and PEG placement. NPO. EVD raised to 5 cmH20. Sputum culture speciated to step pneumoniae, sensitive to ertapenem. 3% inhalation/mucomyst made q 6 hr d/t thick secretions. PEG placed in OR. POD0 dx cerebral angio. EVD raised to 10.   5/3: POD 12. Neuro stable. PEG feeds began @ 10 AM, plan to increase 10cc every 6h per general surgery. Repeat dopplers show stable R superficial thrombus and new L IM calf DVT. Vascular consulted for IVC filter. Plan to get CTH tomorrow.  5/4: POD 13. JIM o/n. s/p IVC filter with vascular today. Pending post-procedure CTH. FOBT negative. Started iron and vitamin c every other day for iron deficiency anemia.   5/5: POD14. CSF cx sent to r/o infection from new gas in right temporal horn seen on CTH. Restarted TF, changed to Jevity 1.2, f/u nutrition recs. EVD raised to 10 today, plan to challenge over the weekend and CTH on Monday, possible VPS next Wednesday. ENT removed sutures today. Salt tabs decreased 1q12.  5/6: POD15 Placed on CPAP briefly with low MV alert, placed back on full vent support. EVD remains open at 43mlN2S. ICPs WNL. Neuro exam stable.  EVD raised to 15. Tolerated CPAP x8h.   5/7: POD#16. JIM overnight, neuro stable. D/c'd salt tabs and 3% neb. Wean trach to CPAP as tolerated. Pan cx NGTD. EVD raised today to [id number].   5/8: POD17 JIM overnight. ICPs WNL. Pt remains on full vent support. Neuro exam stable. Plan for possible VPS today. CT chio complete showing increase in vent size.   5/9: POD18 SOC, POD1 VPS. Desat o/n to 80s, improved on own. CXR with LLL effusion. Another desat to 90% in AM, given duoneb and mucomyst. Oxycodone given for incisional pain. Neuro exam stable. CTH in AM stable. Keppra dc'd. Ritalin 5 BID started.  Tolerating CPAP trial. SALVADOR ordered. Rectal tube dc'd.   5/10: POD 19 SOC POD2 VPS. Remains on full vent support. Tolerated CPAP for 6 hours. Neuro exam stable. BP liberalized to 160. RLQ US 2/2 abd pain. Lactate WNL.  5/11: POD20 SOC POD3 VPS. Plan for CPAP trial in AM.  Dc'd cardura. F/u speech consult for communcation board. Has been tolerating CPAP since 9am. RLQ us w/ no acute pathology. Per heme/onc LMWH or coumadin rec for AC over DOAC due to antiphospholipid syndrome.   5/12: POD21 SOC POD4 VPS. CPAP 8/5, tolerated until 3pm. Can start ASA on 5/14 and full AC POD 10 from VPS 5/19 (remove IVC filter prior, f/u w/ vascular on timing).   5/13: POD22 SOC POD 5 VPS, neuro stable, tolerating trach collar. ASA ordered to start tomorrow, Ritalin increased to 10BID from 5BID, Simethicone ordered for gas, f/u gen surg for continued abd discomfort, PM VBG with PCO2 50 and repeat VBG pCO2 55, possible obesity hypoventilation, f/u AM VBG   5/14: POD23 SOC, [id number], neuro stable, VBG showed increased PCO2 indicating poor ventilation, failed CPAP due to low TV, put back on full vent support.   5/15: POD24 SOC, POD7 VPS. O/n CT A/P done for abd pain, f/u read. neuro stable. remains on full vent support. Simethicone, Amlodipine d/c'd. Given 1L bolus of NS. Dilaudid 0.5 for pain control. CT A/P negative for acute pathology. Restarted tube feeds. Started Modafinil for neurostim.  5/16: POD25 SOC, POD8 VPS. JIM o/n neuro stable. remains on full vent support. DC carvedilol for intermittent hypotensive episodes. UA for suprapelvic pain, concern for nephrolithiasis based on CT AP.  5/17: POD26 SOC, POD9 VPS. JIM o/n neuro stable.   Modafinil increased. CTH completed, . Urology consulted for bladder calculi vs mass, pending urine cytology study and outptaient urology follow-up for cystoscopy. Trialing SIMV for vent weaning.   5/18: POD27 SOC, POD10 VPS. JIM o/n remains on SIMV.   Baby aspirin discontinued. SQL 80mg bid started for DVT treatement per hematology/neurology. Plan for CTH in am. Added amantadine for neurostim.   5/19: POD28, POD 11 VPS. JIM o/n, reapplied pressure dressing. tolerating CPAP  5/20: POD29, POD 12 VPS, reapplied pressure dressing o/n, tolerating trach collar. CTH today stable. Anti xa 0.8 (therapeutic)  5/21: POD 30. POD 13 VPS. Tolerating trach collar. Loose stools. Ritalin dc'd. Stepdown to telemetry. Neurologically stable.   5/22: POD 31. POD 14 VPS. Neurologically/hemodynamically stable. Pending dispo.   5/23: POD 32, POD 15 VPS. Headwrap in place, SOC sutures removed.   5/24: POD 33, POD 16 VPS. Patient coughed, trach dislodged. 6cuffed shiley replaced at bedside, bronch by MICU attending confirmed proper placement. O2 sats stable, CXR stable. Suture at SOC drain site d/c'd. VPS sutures remain in place. Pressure dressing changed. Pending dispo to [place]. Patient restless, trying to get out of bed. Given seroquel 12.5. Decreased amantadine to 100mg daily and modafinil to 100mg daily.   5/25: POD 34, POD17 VPS, JIM ovn, neuro stable, pending [facility]      Patient evaluated by PT/OT who recommended: Acute Rehab  Patient is going to [facility] course c/b: prolonged ventilation (s/p Tracheostomy), Dysphagia (s/p PEG), hydrocephalus (s/p VPS, Certas at 4), superficial vein thrombosis of right greater saphenous vein (s/p IVC filter), UTI (completed course of abx)      Exam on day of discharge:  General: patient seen laying supine in bed in NAD  Neuro: AAO x 2-3 (nods appropriately to questions self, place), R gaze, FC, OE spontaneously, RUE 4+/5 with RUE drift, otherwise 5/5 strength throughout   HEENT: PERRL +trach collar   Pulmonary: chest rise symmetric   Abdomen: soft, nontender, nondistended +PEG   Ext: perfusing well   Skin: warm, dry     Patient is neuro stable,     - Reviewed final recommendations of inpatient consults  - review discharge planning on provider handoff      Discharge NIHSS score: 3   HPI:  58 yo F with PMH of Asthma, CAD (not on  meds), peripheral neuropathy and PSH of BATOOL, cholecystectomy, right knee surgery presented to [place] ED on 4/20 with right sided weakness and right facial numbness. Patient was undergoing preparation for colonoscopy. As per daughter at 8am patient was playing with her grandchildren but around 840 am patient was getting dressed and fell and subsequently felt weak after. Daughter reports that patient had some episodes of vomiting at this time. She had a syncopal episode at around 10 am and was witnessed by brother. No head trauma, She regained conscious after few minutes. She remained in bed for the remainder of the day. Daughter reports some slurred speech noted 1230-1PM and also was confused after. and around 4PM, the patient's sister noted right sided weakness at which time EMS was called and patient was brought to ED. No c/o chest pain, shortness of breath, fever, headache, abdominal pain, urinary complaints. No recent travel/sickness/ change in meds. Stroke code in ER: CTH neg for heme, Right PICA distribution acute infarction. CTA with saccular aneurysms of b/l carotids, approximately 0.8 cm on the right and 1.2 x 0.9 cm on the left. Possible tiny third saccular aneurysm on the right Posterior intracranial circulation: Right vertebral arterial occlusion at its dural crossing junction V3 Atlantic and V4 intracranial segments with likely reversal of flow in its intracranial segment from the basilar. Right PICA faintly seen. 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  Not candidate for TNK/mechanical thrombectomy. CT scan repeated which showed: 1. Brain: Progression of acute infarction within the right cerebellar hemisphere, also extending into the left superior cerebellar hemisphere. New mass effect on the fourth ventricle causing stenosis versus occlusion with new third and lateral ventricular dilatation indicating ventricular obstruction at the level of the fourth ventricle. New upward and downward herniation of cerebellar parenchyma Right carotid system: No hemodynamically significant stenosis. Left carotid system: No hemodynamically significant stenosis. Vertebral circulation: Patent. Anterior intracranial circulation: Intact. Bilateral internal carotid saccular aneurysms. These findings are unchanged. Posterior intracranial circulation:    Improved flow within the right vertebral artery since 4/20/2023. New focal stenosis mid left vertebral artery, etiology uncertain, consider vasospasm and extrinsic compression in addition to new embolic disease. Brain perfusion: Perfusion images demonstrate normalization of the perfusion abnormality in the right cerebellar hemisphere present on 4/20/2023 despite evidence of progression of acute infarction.  Areas of apparent ischemia within the posterior fossa have progressed in extent, also again involving the left posterior cerebral arterial distribution. Tx to [facility] for SOC watch. On admission to [facility], NIHSS 12.  (21 Apr 2023 16:50)      Hospital Course:  4/21: Admitted for crani watch, CTH complete w/ unchanged hydrocephalus, taken for emergent occipital craniectomy.   4/22: POD1. Remains intubated overnight, on propofol and dank. EVD@[id number]. Pending repeat CTH this am. Given hydralazine 10mg x1. Started on cardene for SBP high 140s-150s. Sub-therapeutic on plavix, therapeutic on asa, rpt asa accumetrics until subtherapeutic. LE dopplers neg for DVT, but showing superficial venous thrombosis in the proximal portion of the right greater saphenous vein. Started on 3% @60 for na goal 145-150. NGT placed by ENT. Febrile, pancultured.  4/23: POD 2. 3% increased to 75. NGT readjusted. UA neg. SBP liberalized to 160. Plan to extubate. 3% decreased to 60. Failed extubation d/t no cuff leak, given 60mg solumedrol, plan to extubate in 6 hrs. SCx1 for post void residual >400, started on cardura at bedtime. New blood in buretrol, stopped SQL. Clot in EVD cleared. Neuro exam improving.   4/24: POD 3. Cont 3% with NS while NPO, start TF today. TF started. LFTs downtrending. Sodium 141. Increased 3% to 50, NS to 30. Repeat BMP ordered for 5:30PM. Tramadol 25 for pain with no relief, oxy 5 and 1g tylenol ordered, stability CT stable, speech language consult for dysarthria. Given hydralazine 10mg IVP for SBP>160, started amlodipine 5mg. C/o mild headache, given fioricet x1, stroke neuro rec SALVADOR and loop recorder placement. Echo with bubble completed, negative for PFO, EF 55-60%. J HFNC started for desats with suctioning.   4/25: POD 4. Cont HFNC. Increased secertions on HFNC, reintubated. CXR confirmed good placement. EVD dropped to 5cmH20 for goal of draining 5-10cc/hr and SG ELENA drain taken off suction. Pending CTH. EVD drained 0cc/hr, dropped to 0. NGT replaced. Dc'd fioricet. Started on PPI for intubation. Increased cardura to 4mg for urinary retention, given an additional 2mg now. Started salt tabs 3 q 6. Given 12.5mg fentanyl x1. NGT replaced, CXR shown in lung. NGT removed, repeat CXR showing no pneumothorax. Pending ENT consult for NGT placement. CTH stable. NGT replaced by ENT, confirmed in proper spot. Restarted TF. [id number]. Na 141 from 146. Increased 3% to 60. EVD raised to 3cmH20. ETT pulled back 1cm by RT.  4/26: POD 5 SOC. Intubated, remains on precedex, ABG ordered with improving PaO2, BMP sodium 148, 3% changed to 30cc/hr, cardura increased to 8mg for tonight, tolerating cpap  4/27: POD 6 SOC. Respiratory rate sustained 6, returned to FVS. Na 151, dc'd 3%, f/u AM sodium. Nurse noticed ETT 19 at the lip and was 20 prior, CXR shows ETT @ 5cm above jono, ET tube advanced 1cm, repeat CXR confirms appropriate placement. Thick inline secretions with suctioning. ENT trach placement Fri likely, PEG likely Mon. Keep ELENA drain until no output, EVD to remain @3. Start ASA 81mg daily tomorrow.   4/28: POD7 SOC, neuro stable, given fentanyl x 1 overnight for presumed discomfort (biting on ETT), remains on full vent support. CTH today stable in comparison to 4/25. 6 cuffed trach placed by ENT in OR, but came down without cuff. Replaced at bedside by ENT. On fentanyl gtt.    4/29: POD8 SOC, JIM overnight. Incision cleaned and dressing changed. On fentanyl gtt. EKG completed due to increased frequency PVCs on telemetry, frequency of PVCs improved with titrating up fentanyl gtt. ABG drawn.  Repeat LE dopplers completed showing persistant superficial thrombus, no DVT. No EVD waveform during day, flushed distally without improvement. Exam unchanged.  EVD dropped to 0 for low output in afternoon.   4/30: POD9. Neuro stable, febrile to 101.3F o/n, given tylenol and pan cx sent. SBP dipped to low 90s o/n, HR stable in 70s with some PVCs, decreased fentanyl gtt and given 500cc bolus of NS x 2. Pend PEG placement Mon and angio Tues. D/c'd ELENA drain today and suture placed. F/u heme recs. Positive UA. Infiltrates found on CXR. Started on Zosyn 4.5g Q6hrs .   5/1: POD 10. Neuro stable. Dc'd fentanyl gtt. PEG failed placement at bedside with Dr. Gant, plan for PEG in OR tomorrow afternoon with Dr. Layton. Dc'd amlodipine and started carvedilol 3.125 BID for PVCs . Made 3% inhalation and mucomyst q8hr. Failed PEG at bedside. Salt tabs made 1 q 6. Decreased cardura to 4mg daily. Urine culture growing E. Coli and sputum culture growing pluralibacter gergoviae and strep species. ID consulted, f/u recs. Failed CPAP trial @ 3pm. Added am labs per heme/onc for hypercoguable workup. Pending repeat LE dopplers in 2-3 days. NGT clogged, removed, ENT failed attempt at replacement, will keep NPO for PEG placement tmw. Repeat xray in am for concern for aspration. Pt abx switched from Zosyn to Ertapenem 1g Q24hrs. per ID recs, possible ESBL growth.   5/2: POD 11. Neuro stable. Pre-op for diagnostic cerebral angiogram and PEG placement. NPO. EVD raised to 5 cmH20. Sputum culture speciated to step pneumoniae, sensitive to ertapenem. 3% inhalation/mucomyst made q 6 hr d/t thick secretions. PEG placed in OR. POD0 dx cerebral angio. EVD raised to 10.   5/3: POD 12. Neuro stable. PEG feeds began @ 10 AM, plan to increase 10cc every 6h per general surgery. Repeat dopplers show stable R superficial thrombus and new L IM calf DVT. Vascular consulted for IVC filter. Plan to get CTH tomorrow.  5/4: POD 13. JMI o/n. s/p IVC filter with vascular today. Pending post-procedure CTH. FOBT negative. Started iron and vitamin c every other day for iron deficiency anemia.   5/5: POD14. CSF cx sent to r/o infection from new gas in right temporal horn seen on CTH. Restarted TF, changed to Jevity 1.2, f/u nutrition recs. EVD raised to 10 today, plan to challenge over the weekend and CTH on Monday, possible VPS next Wednesday. ENT removed sutures today. Salt tabs decreased 1q12.  5/6: POD15 Placed on CPAP briefly with low MV alert, placed back on full vent support. EVD remains open at [id number]. ICPs WNL. Neuro exam stable.  EVD raised to 15. Tolerated CPAP x8h.   5/7: POD#16. JIM overnight, neuro stable. D/c'd salt tabs and 3% neb. Wean trach to CPAP as tolerated. Pan cx NGTD. EVD raised today to [id number].   5/8: POD17 JIM overnight. ICPs WNL. Pt remains on full vent support. Neuro exam stable. Plan for possible VPS today. CT chio complete showing increase in vent size.   5/9: POD18 SOC, POD1 VPS. Desat o/n to 80s, improved on own. CXR with LLL effusion. Another desat to 90% in AM, given duoneb and mucomyst. Oxycodone given for incisional pain. Neuro exam stable. CTH in AM stable. Keppra dc'd. Ritalin 5 BID started.  Tolerating CPAP trial. SALVADOR ordered. Rectal tube dc'd.   5/10: POD 19 SOC POD2 VPS. Remains on full vent support. Tolerated CPAP for 6 hours. Neuro exam stable. BP liberalized to 160. RLQ US 2/2 abd pain. Lactate WNL.  5/11: POD20 SOC POD3 VPS. Plan for CPAP trial in AM.  Dc'd cardura. F/u speech consult for communcation board. Has been tolerating CPAP since 9am. RLQ us w/ no acute pathology. Per heme/onc LMWH or coumadin rec for AC over DOAC due to antiphospholipid syndrome.   5/12: POD21 SOC POD4 VPS. CPAP 8/5, tolerated until 3pm. Can start ASA on 5/14 and full AC POD 10 from VPS 5/19 (remove IVC filter prior, f/u w/ vascular on timing).   5/13: POD22 SOC POD 5 VPS, neuro stable, tolerating trach collar. ASA ordered to start tomorrow, Ritalin increased to 10BID from 5BID, Simethicone ordered for gas, f/u gen surg for continued abd discomfort, PM VBG with PCO2 50 and repeat VBG pCO2 55, possible obesity hypoventilation, f/u AM VBG   5/14: POD23 SOC, [id number], neuro stable, VBG showed increased PCO2 indicating poor ventilation, failed CPAP due to low TV, put back on full vent support.   5/15: POD24 SOC, POD7 VPS. O/n CT A/P done for abd pain, f/u read. neuro stable. remains on full vent support. Simethicone, Amlodipine d/c'd. Given 1L bolus of NS. Dilaudid 0.5 for pain control. CT A/P negative for acute pathology. Restarted tube feeds. Started Modafinil for neurostim.  5/16: POD25 SOC, POD8 VPS. JIM o/n neuro stable. remains on full vent support. DC carvedilol for intermittent hypotensive episodes. UA for suprapelvic pain, concern for nephrolithiasis based on CT AP.  5/17: POD26 SOC, POD9 VPS. JIM o/n neuro stable.   Modafinil increased. CTH completed, . Urology consulted for bladder calculi vs mass, pending urine cytology study and outptaient urology follow-up for cystoscopy. Trialing SIMV for vent weaning.   5/18: POD27 SOC, POD10 VPS. JIM o/n remains on SIMV.   Baby aspirin discontinued. SQL 80mg bid started for DVT treatement per hematology/neurology. Plan for CTH in am. Added amantadine for neurostim.   5/19: POD28, POD 11 VPS. JIM o/n, reapplied pressure dressing. tolerating CPAP  5/20: POD29, POD 12 VPS, reapplied pressure dressing o/n, tolerating trach collar. CTH today stable. Anti xa 0.8 (therapeutic)  5/21: POD 30. POD 13 VPS. Tolerating trach collar. Loose stools. Ritalin dc'd. Stepdown to telemetry. Neurologically stable.   5/22: POD 31. POD 14 VPS. Neurologically/hemodynamically stable. Pending dispo.   5/23: POD 32, POD 15 VPS. Headwrap in place, SOC sutures removed.   5/24: POD 33, POD 16 VPS. Patient coughed, trach dislodged. 6cuffed shiley replaced at bedside, bronch by MICU attending confirmed proper placement. O2 sats stable, CXR stable. Suture at SOC drain site d/c'd. VPS sutures remain in place. Pressure dressing changed. Pending dispo to [place]. Patient restless, trying to get out of bed. Given seroquel 12.5. Decreased amantadine to 100mg daily and modafinil to 100mg daily.   5/25: POD 34, POD17 VPS, JIM ovn, neuro stable, pending [facility]      Patient evaluated by PT/OT who recommended: Acute Rehab  Patient is going to [facility] course c/b: prolonged ventilation (s/p Tracheostomy), Dysphagia (s/p PEG), hydrocephalus (s/p VPS, Certas at 4), superficial vein thrombosis of right greater saphenous vein, left IM calf DVT (s/p IVC filter), UTI (completed course of abx)      Exam on day of discharge:  General: patient seen laying supine in bed in NAD  Neuro: AAO x 2-3 (nods appropriately to questions self, place), R gaze, FC, OE spontaneously, RUE 4+/5 with RUE drift, otherwise 5/5 strength throughout   HEENT: PERRL +trach collar   Pulmonary: chest rise symmetric   Abdomen: soft, nontender, nondistended +PEG   Ext: perfusing well   Skin: warm, dry     Patient is neuro stable,     - Reviewed final recommendations of inpatient consults  - review discharge planning on provider handoff      Discharge NIHSS score: 3   HPI:  58 yo F with PMH of Asthma, CAD (not on  meds), peripheral neuropathy and PSH of BATOOL, cholecystectomy, right knee surgery presented to [place] ED on 4/20 with right sided weakness and right facial numbness. Patient was undergoing preparation for colonoscopy. As per daughter at 8am patient was playing with her grandchildren but around 840 am patient was getting dressed and fell and subsequently felt weak after. Daughter reports that patient had some episodes of vomiting at this time. She had a syncopal episode at around 10 am and was witnessed by brother. No head trauma, She regained conscious after few minutes. She remained in bed for the remainder of the day. Daughter reports some slurred speech noted 1230-1PM and also was confused after. and around 4PM, the patient's sister noted right sided weakness at which time EMS was called and patient was brought to ED. No c/o chest pain, shortness of breath, fever, headache, abdominal pain, urinary complaints. No recent travel/sickness/ change in meds. Stroke code in ER: CTH neg for heme, Right PICA distribution acute infarction. CTA with saccular aneurysms of b/l carotids, approximately 0.8 cm on the right and 1.2 x 0.9 cm on the left. Possible tiny third saccular aneurysm on the right Posterior intracranial circulation: Right vertebral arterial occlusion at its dural crossing junction V3 Atlantic and V4 intracranial segments with likely reversal of flow in its intracranial segment from the basilar. Right PICA faintly seen. 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  Not candidate for TNK/mechanical thrombectomy. CT scan repeated which showed: 1. Brain: Progression of acute infarction within the right cerebellar hemisphere, also extending into the left superior cerebellar hemisphere. New mass effect on the fourth ventricle causing stenosis versus occlusion with new third and lateral ventricular dilatation indicating ventricular obstruction at the level of the fourth ventricle. New upward and downward herniation of cerebellar parenchyma Right carotid system: No hemodynamically significant stenosis. Left carotid system: No hemodynamically significant stenosis. Vertebral circulation: Patent. Anterior intracranial circulation: Intact. Bilateral internal carotid saccular aneurysms. These findings are unchanged. Posterior intracranial circulation:    Improved flow within the right vertebral artery since 4/20/2023. New focal stenosis mid left vertebral artery, etiology uncertain, consider vasospasm and extrinsic compression in addition to new embolic disease. Brain perfusion: Perfusion images demonstrate normalization of the perfusion abnormality in the right cerebellar hemisphere present on 4/20/2023 despite evidence of progression of acute infarction.  Areas of apparent ischemia within the posterior fossa have progressed in extent, also again involving the left posterior cerebral arterial distribution. Tx to [facility] for SOC watch. On admission to [facility], NIHSS 12.  (21 Apr 2023 16:50)      Hospital Course:  4/21: Admitted for crani watch, CTH complete w/ unchanged hydrocephalus, taken for emergent occipital craniectomy.   4/22: POD1. Remains intubated overnight, on propofol and dank. EVD@[id number]. Pending repeat CTH this am. Given hydralazine 10mg x1. Started on cardene for SBP high 140s-150s. Sub-therapeutic on plavix, therapeutic on asa, rpt asa accumetrics until subtherapeutic. LE dopplers neg for DVT, but showing superficial venous thrombosis in the proximal portion of the right greater saphenous vein. Started on 3% @60 for na goal 145-150. NGT placed by ENT. Febrile, pancultured.  4/23: POD 2. 3% increased to 75. NGT readjusted. UA neg. SBP liberalized to 160. Plan to extubate. 3% decreased to 60. Failed extubation d/t no cuff leak, given 60mg solumedrol, plan to extubate in 6 hrs. SCx1 for post void residual >400, started on cardura at bedtime. New blood in buretrol, stopped SQL. Clot in EVD cleared. Neuro exam improving.   4/24: POD 3. Cont 3% with NS while NPO, start TF today. TF started. LFTs downtrending. Sodium 141. Increased 3% to 50, NS to 30. Repeat BMP ordered for 5:30PM. Tramadol 25 for pain with no relief, oxy 5 and 1g tylenol ordered, stability CT stable, speech language consult for dysarthria. Given hydralazine 10mg IVP for SBP>160, started amlodipine 5mg. C/o mild headache, given fioricet x1, stroke neuro rec SALVADOR and loop recorder placement. Echo with bubble completed, negative for PFO, EF 55-60%. J HFNC started for desats with suctioning.   4/25: POD 4. Cont HFNC. Increased secertions on HFNC, reintubated. CXR confirmed good placement. EVD dropped to 5cmH20 for goal of draining 5-10cc/hr and SG ELENA drain taken off suction. Pending CTH. EVD drained 0cc/hr, dropped to 0. NGT replaced. Dc'd fioricet. Started on PPI for intubation. Increased cardura to 4mg for urinary retention, given an additional 2mg now. Started salt tabs 3 q 6. Given 12.5mg fentanyl x1. NGT replaced, CXR shown in lung. NGT removed, repeat CXR showing no pneumothorax. Pending ENT consult for NGT placement. CTH stable. NGT replaced by ENT, confirmed in proper spot. Restarted TF. [id number]. Na 141 from 146. Increased 3% to 60. EVD raised to 3cmH20. ETT pulled back 1cm by RT.  4/26: POD 5 SOC. Intubated, remains on precedex, ABG ordered with improving PaO2, BMP sodium 148, 3% changed to 30cc/hr, cardura increased to 8mg for tonight, tolerating cpap  4/27: POD 6 SOC. Respiratory rate sustained 6, returned to FVS. Na 151, dc'd 3%, f/u AM sodium. Nurse noticed ETT 19 at the lip and was 20 prior, CXR shows ETT @ 5cm above jono, ET tube advanced 1cm, repeat CXR confirms appropriate placement. Thick inline secretions with suctioning. ENT trach placement Fri likely, PEG likely Mon. Keep ELENA drain until no output, EVD to remain @3. Start ASA 81mg daily tomorrow.   4/28: POD7 SOC, neuro stable, given fentanyl x 1 overnight for presumed discomfort (biting on ETT), remains on full vent support. CTH today stable in comparison to 4/25. 6 cuffed trach placed by ENT in OR, but came down without cuff. Replaced at bedside by ENT. On fentanyl gtt.    4/29: POD8 SOC, JIM overnight. Incision cleaned and dressing changed. On fentanyl gtt. EKG completed due to increased frequency PVCs on telemetry, frequency of PVCs improved with titrating up fentanyl gtt. ABG drawn.  Repeat LE dopplers completed showing persistant superficial thrombus, no DVT. No EVD waveform during day, flushed distally without improvement. Exam unchanged.  EVD dropped to 0 for low output in afternoon.   4/30: POD9. Neuro stable, febrile to 101.3F o/n, given tylenol and pan cx sent. SBP dipped to low 90s o/n, HR stable in 70s with some PVCs, decreased fentanyl gtt and given 500cc bolus of NS x 2. Pend PEG placement Mon and angio Tues. D/c'd ELENA drain today and suture placed. F/u heme recs. Positive UA. Infiltrates found on CXR. Started on Zosyn 4.5g Q6hrs .   5/1: POD 10. Neuro stable. Dc'd fentanyl gtt. PEG failed placement at bedside with Dr. Gant, plan for PEG in OR tomorrow afternoon with Dr. Layton. Dc'd amlodipine and started carvedilol 3.125 BID for PVCs . Made 3% inhalation and mucomyst q8hr. Failed PEG at bedside. Salt tabs made 1 q 6. Decreased cardura to 4mg daily. Urine culture growing E. Coli and sputum culture growing pluralibacter gergoviae and strep species. ID consulted, f/u recs. Failed CPAP trial @ 3pm. Added am labs per heme/onc for hypercoguable workup. Pending repeat LE dopplers in 2-3 days. NGT clogged, removed, ENT failed attempt at replacement, will keep NPO for PEG placement tmw. Repeat xray in am for concern for aspration. Pt abx switched from Zosyn to Ertapenem 1g Q24hrs. per ID recs, possible ESBL growth.   5/2: POD 11. Neuro stable. Pre-op for diagnostic cerebral angiogram and PEG placement. NPO. EVD raised to 5 cmH20. Sputum culture speciated to step pneumoniae, sensitive to ertapenem. 3% inhalation/mucomyst made q 6 hr d/t thick secretions. PEG placed in OR. POD0 dx cerebral angio. EVD raised to 10.   5/3: POD 12. Neuro stable. PEG feeds began @ 10 AM, plan to increase 10cc every 6h per general surgery. Repeat dopplers show stable R superficial thrombus and new L IM calf DVT. Vascular consulted for IVC filter. Plan to get CTH tomorrow.  5/4: POD 13. JIM o/n. s/p IVC filter with vascular today. Pending post-procedure CTH. FOBT negative. Started iron and vitamin c every other day for iron deficiency anemia.   5/5: POD14. CSF cx sent to r/o infection from new gas in right temporal horn seen on CTH. Restarted TF, changed to Jevity 1.2, f/u nutrition recs. EVD raised to 10 today, plan to challenge over the weekend and CTH on Monday, possible VPS next Wednesday. ENT removed sutures today. Salt tabs decreased 1q12.  5/6: POD15 Placed on CPAP briefly with low MV alert, placed back on full vent support. EVD remains open at [id number]. ICPs WNL. Neuro exam stable.  EVD raised to 15. Tolerated CPAP x8h.   5/7: POD#16. JIM overnight, neuro stable. D/c'd salt tabs and 3% neb. Wean trach to CPAP as tolerated. Pan cx NGTD. EVD raised today to [id number].   5/8: POD17 JIM overnight. ICPs WNL. Pt remains on full vent support. Neuro exam stable. Plan for possible VPS today. CT chio complete showing increase in vent size.   5/9: POD18 SOC, POD1 VPS. Desat o/n to 80s, improved on own. CXR with LLL effusion. Another desat to 90% in AM, given duoneb and mucomyst. Oxycodone given for incisional pain. Neuro exam stable. CTH in AM stable. Keppra dc'd. Ritalin 5 BID started.  Tolerating CPAP trial. SALVADOR ordered. Rectal tube dc'd.   5/10: POD 19 SOC POD2 VPS. Remains on full vent support. Tolerated CPAP for 6 hours. Neuro exam stable. BP liberalized to 160. RLQ US 2/2 abd pain. Lactate WNL.  5/11: POD20 SOC POD3 VPS. Plan for CPAP trial in AM.  Dc'd cardura. F/u speech consult for communcation board. Has been tolerating CPAP since 9am. RLQ us w/ no acute pathology. Per heme/onc LMWH or coumadin rec for AC over DOAC due to antiphospholipid syndrome.   5/12: POD21 SOC POD4 VPS. CPAP 8/5, tolerated until 3pm. Can start ASA on 5/14 and full AC POD 10 from VPS 5/19 (remove IVC filter prior, f/u w/ vascular on timing).   5/13: POD22 SOC POD 5 VPS, neuro stable, tolerating trach collar. ASA ordered to start tomorrow, Ritalin increased to 10BID from 5BID, Simethicone ordered for gas, f/u gen surg for continued abd discomfort, PM VBG with PCO2 50 and repeat VBG pCO2 55, possible obesity hypoventilation, f/u AM VBG   5/14: POD23 SOC, [id number], neuro stable, VBG showed increased PCO2 indicating poor ventilation, failed CPAP due to low TV, put back on full vent support.   5/15: POD24 SOC, POD7 VPS. O/n CT A/P done for abd pain, f/u read. neuro stable. remains on full vent support. Simethicone, Amlodipine d/c'd. Given 1L bolus of NS. Dilaudid 0.5 for pain control. CT A/P negative for acute pathology. Restarted tube feeds. Started Modafinil for neurostim.  5/16: POD25 SOC, POD8 VPS. JIM o/n neuro stable. remains on full vent support. DC carvedilol for intermittent hypotensive episodes. UA for suprapelvic pain, concern for nephrolithiasis based on CT AP.  5/17: POD26 SOC, POD9 VPS. JIM o/n neuro stable.   Modafinil increased. CTH completed, . Urology consulted for bladder calculi vs mass, pending urine cytology study and outptaient urology follow-up for cystoscopy. Trialing SIMV for vent weaning.   5/18: POD27 SOC, POD10 VPS. JIM o/n remains on SIMV.   Baby aspirin discontinued. SQL 80mg bid started for DVT treatement per hematology/neurology. Plan for CTH in am. Added amantadine for neurostim.   5/19: POD28, POD 11 VPS. JIM o/n, reapplied pressure dressing. tolerating CPAP  5/20: POD29, POD 12 VPS, reapplied pressure dressing o/n, tolerating trach collar. CTH today stable. Anti xa 0.8 (therapeutic)  5/21: POD 30. POD 13 VPS. Tolerating trach collar. Loose stools. Ritalin dc'd. Stepdown to telemetry. Neurologically stable.   5/22: POD 31. POD 14 VPS. Neurologically/hemodynamically stable. Pending dispo.   5/23: POD 32, POD 15 VPS. Headwrap in place, SOC sutures removed.   5/24: POD 33, POD 16 VPS. Patient coughed, trach dislodged. 6cuffed shiley replaced at bedside, bronch by MICU attending confirmed proper placement. O2 sats stable, CXR stable. Suture at SOC drain site d/c'd. VPS sutures remain in place. Pressure dressing changed. Pending dispo to AR. Patient restless, trying to get out of bed. Given seroquel 12.5. Decreased amantadine to 100mg daily and modafinil to 100mg daily.   5/25: POD 34, POD17 VPS, JIM ovn, neuro stable, pending [facility]      Patient evaluated by PT/OT who recommended: Acute Rehab  Patient is going to [facility] course c/b: prolonged ventilation (s/p Tracheostomy), Dysphagia (s/p PEG), hydrocephalus (s/p VPS, Certas at 4), superficial vein thrombosis of right greater saphenous vein, left IM calf DVT (s/p IVC filter), UTI (completed course of abx)      Exam on day of discharge:  General: patient seen laying supine in bed in NAD  Neuro: AAO x 2-3 (nods appropriately to questions self, place), R gaze, FC, OE spontaneously, RUE 4+/5 with RUE drift, otherwise 5/5 strength throughout   HEENT: PERRL +trach collar   Pulmonary: chest rise symmetric   Abdomen: soft, nontender, nondistended +PEG   Ext: perfusing well   Skin: warm, dry     Patient is neuro stable, vitals stable, tolerating trach collar, afebrile, medically ready for discharge       Discharge NIHSS score: 3   HPI:  58 yo F with PMH of Asthma, CAD (not on  meds), peripheral neuropathy and PSH of BATOOL, cholecystectomy, right knee surgery presented to [facility] on 4/20 with right sided weakness and right facial numbness. Patient was undergoing preparation for colonoscopy. As per daughter at 8am patient was playing with her grandchildren but around 840 am patient was getting dressed and fell and subsequently felt weak after. Daughter reports that patient had some episodes of vomiting at this time. She had a syncopal episode at around 10 am and was witnessed by brother. No head trauma, She regained conscious after few minutes. She remained in bed for the remainder of the day. Daughter reports some slurred speech noted 1230-1PM and also was confused after. and around 4PM, the patient's sister noted right sided weakness at which time EMS was called and patient was brought to ED. No c/o chest pain, shortness of breath, fever, headache, abdominal pain, urinary complaints. No recent travel/sickness/ change in meds. Stroke code in ER: CTH neg for heme, Right PICA distribution acute infarction. CTA with saccular aneurysms of b/l carotids, approximately 0.8 cm on the right and 1.2 x 0.9 cm on the left. Possible tiny third saccular aneurysm on the right Posterior intracranial circulation: Right vertebral arterial occlusion at its dural crossing junction V3 Atlantic and V4 intracranial segments with likely reversal of flow in its intracranial segment from the basilar. Right PICA faintly seen. 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  Not candidate for TNK/mechanical thrombectomy. CT scan repeated which showed: 1. Brain: Progression of acute infarction within the right cerebellar hemisphere, also extending into the left superior cerebellar hemisphere. New mass effect on the fourth ventricle causing stenosis versus occlusion with new third and lateral ventricular dilatation indicating ventricular obstruction at the level of the fourth ventricle. New upward and downward herniation of cerebellar parenchyma Right carotid system: No hemodynamically significant stenosis. Left carotid system: No hemodynamically significant stenosis. Vertebral circulation: Patent. Anterior intracranial circulation: Intact. Bilateral internal carotid saccular aneurysms. These findings are unchanged. Posterior intracranial circulation:    Improved flow within the right vertebral artery since 4/20/2023. New focal stenosis mid left vertebral artery, etiology uncertain, consider vasospasm and extrinsic compression in addition to new embolic disease. Brain perfusion: Perfusion images demonstrate normalization of the perfusion abnormality in the right cerebellar hemisphere present on 4/20/2023 despite evidence of progression of acute infarction.  Areas of apparent ischemia within the posterior fossa have progressed in extent, also again involving the left posterior cerebral arterial distribution. Tx to [facility] for SOC watch. On admission to [facility], NIHSS 12.  (21 Apr 2023 16:50)      Hospital Course:  4/21: Admitted for crani watch, CTH complete w/ unchanged hydrocephalus, taken for emergent occipital craniectomy.   4/22: POD1. Remains intubated overnight, on propofol and dank. EVD@[id number]. Pending repeat CTH this am. Given hydralazine 10mg x1. Started on cardene for SBP high 140s-150s. Sub-therapeutic on plavix, therapeutic on asa, rpt asa accumetrics until subtherapeutic. LE dopplers neg for DVT, but showing superficial venous thrombosis in the proximal portion of the right greater saphenous vein. Started on 3% @60 for na goal 145-150. NGT placed by ENT. Febrile, pancultured.  4/23: POD 2. 3% increased to 75. NGT readjusted. UA neg. SBP liberalized to 160. Plan to extubate. 3% decreased to 60. Failed extubation d/t no cuff leak, given 60mg solumedrol, plan to extubate in 6 hrs. SCx1 for post void residual >400, started on cardura at bedtime. New blood in buretrol, stopped SQL. Clot in EVD cleared. Neuro exam improving.   4/24: POD 3. Cont 3% with NS while NPO, start TF today. TF started. LFTs downtrending. Sodium 141. Increased 3% to 50, NS to 30. Repeat BMP ordered for 5:30PM. Tramadol 25 for pain with no relief, oxy 5 and 1g tylenol ordered, stability CT stable, speech language consult for dysarthria. Given hydralazine 10mg IVP for SBP>160, started amlodipine 5mg. C/o mild headache, given fioricet x1, stroke neuro rec SALVADOR and loop recorder placement. Echo with bubble completed, negative for PFO, EF 55-60%. J HFNC started for desats with suctioning.   4/25: POD 4. Cont HFNC. Increased secertions on HFNC, reintubated. CXR confirmed good placement. EVD dropped to 5cmH20 for goal of draining 5-10cc/hr and SG ELENA drain taken off suction. Pending CTH. EVD drained 0cc/hr, dropped to 0. NGT replaced. Dc'd fioricet. Started on PPI for intubation. Increased cardura to 4mg for urinary retention, given an additional 2mg now. Started salt tabs 3 q 6. Given 12.5mg fentanyl x1. NGT replaced, CXR shown in lung. NGT removed, repeat CXR showing no pneumothorax. Pending ENT consult for NGT placement. CTH stable. NGT replaced by ENT, confirmed in proper spot. Restarted TF. [id number]. Na 141 from 146. Increased 3% to 60. EVD raised to 3cmH20. ETT pulled back 1cm by RT.  4/26: POD 5 SOC. Intubated, remains on precedex, ABG ordered with improving PaO2, BMP sodium 148, 3% changed to 30cc/hr, cardura increased to 8mg for tonight, tolerating cpap  4/27: POD 6 SOC. Respiratory rate sustained 6, returned to FVS. Na 151, dc'd 3%, f/u AM sodium. Nurse noticed ETT 19 at the lip and was 20 prior, CXR shows ETT @ 5cm above jono, ET tube advanced 1cm, repeat CXR confirms appropriate placement. Thick inline secretions with suctioning. ENT trach placement Fri likely, PEG likely Mon. Keep ELENA drain until no output, EVD to remain @3. Start ASA 81mg daily tomorrow.   4/28: POD7 SOC, neuro stable, given fentanyl x 1 overnight for presumed discomfort (biting on ETT), remains on full vent support. CTH today stable in comparison to 4/25. 6 cuffed trach placed by ENT in OR, but came down without cuff. Replaced at bedside by ENT. On fentanyl gtt.    4/29: POD8 SOC, JIM overnight. Incision cleaned and dressing changed. On fentanyl gtt. EKG completed due to increased frequency PVCs on telemetry, frequency of PVCs improved with titrating up fentanyl gtt. ABG drawn.  Repeat LE dopplers completed showing persistant superficial thrombus, no DVT. No EVD waveform during day, flushed distally without improvement. Exam unchanged.  EVD dropped to 0 for low output in afternoon.   4/30: POD9. Neuro stable, febrile to 101.3F o/n, given tylenol and pan cx sent. SBP dipped to low 90s o/n, HR stable in 70s with some PVCs, decreased fentanyl gtt and given 500cc bolus of NS x 2. Pend PEG placement Mon and angio Tues. D/c'd ELENA drain today and suture placed. F/u heme recs. Positive UA. Infiltrates found on CXR. Started on Zosyn 4.5g Q6hrs .   5/1: POD 10. Neuro stable. Dc'd fentanyl gtt. PEG failed placement at bedside with Dr. Gant, plan for PEG in OR tomorrow afternoon with Dr. Layton. Dc'd amlodipine and started carvedilol 3.125 BID for PVCs . Made 3% inhalation and mucomyst q8hr. Failed PEG at bedside. Salt tabs made 1 q 6. Decreased cardura to 4mg daily. Urine culture growing E. Coli and sputum culture growing pluralibacter gergoviae and strep species. ID consulted, f/u recs. Failed CPAP trial @ 3pm. Added am labs per heme/onc for hypercoguable workup. Pending repeat LE dopplers in 2-3 days. NGT clogged, removed, ENT failed attempt at replacement, will keep NPO for PEG placement tmw. Repeat xray in am for concern for aspration. Pt abx switched from Zosyn to Ertapenem 1g Q24hrs. per ID recs, possible ESBL growth.   5/2: POD 11. Neuro stable. Pre-op for diagnostic cerebral angiogram and PEG placement. NPO. EVD raised to 5 cmH20. Sputum culture speciated to step pneumoniae, sensitive to ertapenem. 3% inhalation/mucomyst made q 6 hr d/t thick secretions. PEG placed in OR. POD0 dx cerebral angio. EVD raised to 10.   5/3: POD 12. Neuro stable. PEG feeds began @ 10 AM, plan to increase 10cc every 6h per general surgery. Repeat dopplers show stable R superficial thrombus and new L IM calf DVT. Vascular consulted for IVC filter. Plan to get CTH tomorrow.  5/4: POD 13. JIM o/n. s/p IVC filter with vascular today. Pending post-procedure CTH. FOBT negative. Started iron and vitamin c every other day for iron deficiency anemia.   5/5: POD14. CSF cx sent to r/o infection from new gas in right temporal horn seen on CTH. Restarted TF, changed to Jevity 1.2, f/u nutrition recs. EVD raised to 10 today, plan to challenge over the weekend and CTH on Monday, possible VPS next Wednesday. ENT removed sutures today. Salt tabs decreased 1q12.  5/6: POD15 Placed on CPAP briefly with low MV alert, placed back on full vent support. EVD remains open at [id number]. ICPs WNL. Neuro exam stable.  EVD raised to 15. Tolerated CPAP x8h.   5/7: POD#16. JIM overnight, neuro stable. D/c'd salt tabs and 3% neb. Wean trach to CPAP as tolerated. Pan cx NGTD. EVD raised today to [id number].   5/8: POD17 JIM overnight. ICPs WNL. Pt remains on full vent support. Neuro exam stable. Plan for possible VPS today. CT chio complete showing increase in vent size.   5/9: POD18 SOC, POD1 VPS. Desat o/n to 80s, improved on own. CXR with LLL effusion. Another desat to 90% in AM, given duoneb and mucomyst. Oxycodone given for incisional pain. Neuro exam stable. CTH in AM stable. Keppra dc'd. Ritalin 5 BID started.  Tolerating CPAP trial. SALVADOR ordered. Rectal tube dc'd.   5/10: POD 19 SOC POD2 VPS. Remains on full vent support. Tolerated CPAP for 6 hours. Neuro exam stable. BP liberalized to 160. RLQ US 2/2 abd pain. Lactate WNL.  5/11: POD20 SOC POD3 VPS. Plan for CPAP trial in AM.  Dc'd cardura. F/u speech consult for communcation board. Has been tolerating CPAP since 9am. RLQ us w/ no acute pathology. Per heme/onc LMWH or coumadin rec for AC over DOAC due to antiphospholipid syndrome.   5/12: POD21 SOC POD4 VPS. CPAP 8/5, tolerated until 3pm. Can start ASA on 5/14 and full AC POD 10 from VPS 5/19 (remove IVC filter prior, f/u w/ vascular on timing).   5/13: POD22 SOC POD 5 VPS, neuro stable, tolerating trach collar. ASA ordered to start tomorrow, Ritalin increased to 10BID from 5BID, Simethicone ordered for gas, f/u gen surg for continued abd discomfort, PM VBG with PCO2 50 and repeat VBG pCO2 55, possible obesity hypoventilation, f/u AM VBG   5/14: POD23 SOC, [id number], neuro stable, VBG showed increased PCO2 indicating poor ventilation, failed CPAP due to low TV, put back on full vent support.   5/15: POD24 SOC, POD7 VPS. O/n CT A/P done for abd pain, f/u read. neuro stable. remains on full vent support. Simethicone, Amlodipine d/c'd. Given 1L bolus of NS. Dilaudid 0.5 for pain control. CT A/P negative for acute pathology. Restarted tube feeds. Started Modafinil for neurostim.  5/16: POD25 SOC, POD8 VPS. JIM o/n neuro stable. remains on full vent support. DC carvedilol for intermittent hypotensive episodes. UA for suprapelvic pain, concern for nephrolithiasis based on CT AP.  5/17: POD26 SOC, POD9 VPS. JIM o/n neuro stable.   Modafinil increased. CTH completed, . Urology consulted for bladder calculi vs mass, pending urine cytology study and outptaient urology follow-up for cystoscopy. Trialing SIMV for vent weaning.   5/18: POD27 SOC, POD10 VPS. JIM o/n remains on SIMV.   Baby aspirin discontinued. SQL 80mg bid started for DVT treatement per hematology/neurology. Plan for CTH in am. Added amantadine for neurostim.   5/19: POD28, POD 11 VPS. JIM o/n, reapplied pressure dressing. tolerating CPAP  5/20: POD29, POD 12 VPS, reapplied pressure dressing o/n, tolerating trach collar. CTH today stable. Anti xa 0.8 (therapeutic)  5/21: POD 30. POD 13 VPS. Tolerating trach collar. Loose stools. Ritalin dc'd. Stepdown to telemetry. Neurologically stable.   5/22: POD 31. POD 14 VPS. Neurologically/hemodynamically stable. Pending dispo.   5/23: POD 32, POD 15 VPS. Headwrap in place, SOC sutures removed.   5/24: POD 33, POD 16 VPS. Patient coughed, trach dislodged. 6cuffed shiley replaced at bedside, bronch by MICU attending confirmed proper placement. O2 sats stable, CXR stable. Suture at SOC drain site d/c'd. VPS sutures remain in place. Pressure dressing changed. Pending dispo to AR. Patient restless, trying to get out of bed. Given seroquel 12.5. Decreased amantadine to 100mg daily and modafinil to 100mg daily.   5/25: POD 34, POD17 VPS, JIM ovn, neuro stable, pending [facility]      Patient evaluated by PT/OT who recommended: Acute Rehab  Patient is going to [facility] course c/b: prolonged ventilation (s/p Tracheostomy), Dysphagia (s/p PEG), hydrocephalus (s/p VPS, Certas at 4), superficial vein thrombosis of right greater saphenous vein, left IM calf DVT (s/p IVC filter), UTI (completed course of abx)      Exam on day of discharge:  General: patient seen laying supine in bed in NAD  Neuro: AAO x 2-3 (nods appropriately to questions self, place), R gaze, FC, OE spontaneously, RUE 4+/5 with RUE drift, otherwise 5/5 strength throughout   HEENT: PERRL +trach collar   Pulmonary: chest rise symmetric   Abdomen: soft, nontender, nondistended +PEG   Ext: b/l delt/bicep hematomas, b/l UE pulses 2+  Skin: warm, dry     Patient is neuro stable, vitals stable, tolerating trach collar, afebrile, medically ready for discharge       Discharge NIHSS score: 3

## 2023-05-09 NOTE — DISCHARGE NOTE PROVIDER - CARE PROVIDER_API CALL
Jamey Patel)  Neurosurgery  [address] [place] [address]  Phone: [phone]  Fax: [phone]  Follow Up Time:     Dc Darling)  [facility]  [address] [place] [address]  Phone: [phone]  Fax: [phone]  Follow Up Time:     GRACE BYRD  General Surgery  Phone: ()-  Fax: ()-  Follow Up Time:     Luke Bay)  Vascular Surgery  [address] [place] [address]  Phone: [phone]  Fax: [phone]  Follow Up Time:     Cherrie Cota)  Surgery  [address]  [place] [address]  Phone: [phone]  Fax: [phone]  Follow Up Time:     Misha Minor; PhD)  Diagnostic Radiology; Neuroradiology  [address] [facility], [address]  [place] [address]  Phone: [phone]  Fax: [phone]  Follow Up Time:    Jamey Patel)  Neurosurgery  [address] [address]  Phone: [phone]  Fax: [phone]  Follow Up Time:     Dc Darling)  [facility]  [address] [place] [address]  Phone: [phone]  Fax: [phone]  Follow Up Time:     GRACE BYRD  General Surgery  Phone: ()-  Fax: ()-  Follow Up Time:     Luke Bay)  Vascular Surgery  [address] [place] [address]  Phone: [phone]  Fax: [phone]  Follow Up Time:     Cherrie Cota)  Surgery  [address]  [place] [address]  Phone: [phone]  Fax: [phone]  Follow Up Time:     Misha Minor; PhD)  Diagnostic Radiology; Neuroradiology  [address] [facility], [address]  [place] [address]  Phone: [phone]  Fax: [phone]  Follow Up Time:     Farrah Maria)  Neurology  [address] [address]  Phone: [phone]  Fax: [phone]  Follow Up Time:    Jamey Patel)  Neurosurgery  [address] [address]  Phone: [phone]  Fax: [phone]  Follow Up Time:     Dc Darling)  HeadNe Surgery; Otolaryngology  [address] [place] [address]  Phone: [phone]  Fax: [phone]  Follow Up Time:     GRACE BYRD  General Surgery  Phone: ()-  Fax: ()-  Follow Up Time:     Luke Bay)  Vascular Surgery  [address] [place] [address]  Phone: [phone]  Fax: [phone]  Follow Up Time:     Cherrie Cota   Surgery  [address]  [place] [address]  Phone: [phone]  Fax: [phone]  Follow Up Time:     Misha Minor; PhD)  Diagnostic Radiology; Neuroradiology  [address] [facility], [address]  [place] [address]  Phone: [phone]  Fax: [phone]  Follow Up Time:     Farrah Maria)  Neurology  [address] [address]  Phone: [phone]  Fax: [phone]  Follow Up Time:     Erica Villaseñor  Medical Oncology  [address]  [place] [address]  Phone: [phone]  Fax: [phone]  Follow Up Time:

## 2023-05-09 NOTE — PROGRESS NOTE ADULT - SUBJECTIVE AND OBJECTIVE BOX
SUBJECTIVE: Pt seen and examined at bedside this am. NO complaints overnight. Not verbally responsive    MEDICATIONS  (STANDING):  amLODIPine   Tablet 5 milliGRAM(s) Oral daily  ascorbic acid 500 milliGRAM(s) Oral <User Schedule>  atorvastatin 80 milliGRAM(s) Oral at bedtime  carvedilol 3.125 milliGRAM(s) Oral every 12 hours  chlorhexidine 0.12% Liquid 15 milliLiter(s) Oral Mucosa every 12 hours  chlorhexidine 2% Cloths 1 Application(s) Topical daily  doxazosin 4 milliGRAM(s) Oral at bedtime  ferrous    sulfate 325 milliGRAM(s) Oral <User Schedule>  lactated ringers. 1000 milliLiter(s) (75 mL/Hr) IV Continuous <Continuous>  levETIRAcetam 500 milliGRAM(s) Oral every 12 hours  pantoprazole   Suspension 40 milliGRAM(s) Enteral Tube daily    MEDICATIONS  (PRN):  oxyCODONE    IR 5 milliGRAM(s) Oral every 4 hours PRN Moderate Pain (4 - 6)      Vital Signs Last 24 Hrs  T(C): 37.6 (09 May 2023 05:01), Max: 37.6 (09 May 2023 05:01)  T(F): 99.7 (09 May 2023 05:01), Max: 99.7 (09 May 2023 05:01)  HR: 68 (09 May 2023 06:00) (57 - 74)  BP: 102/52 (09 May 2023 06:00) (94/46 - 138/65)  BP(mean): 73 (09 May 2023 06:00) (65 - 104)  RR: 16 (09 May 2023 06:00) (16 - 32)  SpO2: 96% (09 May 2023 06:00) (94% - 99%)    Parameters below as of 09 May 2023 06:00  Patient On (Oxygen Delivery Method): ventilator    O2 Concentration (%): 40    Physical Exam  General: NAD, resting comfortably in bed  Pulmonary: Nonlabored breathing, no respiratory distress  CV: NSR  Abd: soft, NT/ND, no guarding, incisions c/d/i  Extremities: (-) edema, warm, well-perfused      I&O's Detail    08 May 2023 07:01  -  09 May 2023 07:00  --------------------------------------------------------  IN:    Enteral Tube Flush: 170 mL    IV PiggyBack: 100 mL    Lactated Ringers: 1125 mL    sodium chloride 0.9%: 375 mL  Total IN: 1770 mL    OUT:    Rectal Tube (mL): 50 mL    Voided (mL): 1550 mL  Total OUT: 1600 mL    Total NET: 170 mL          LABS:                        10.6   6.86  )-----------( 332      ( 09 May 2023 05:17 )             32.4     05-09    136  |  101  |  13  ----------------------------<  116<H>  4.1   |  25  |  0.52    Ca    9.0      09 May 2023 05:17  Phos  4.8     05-09  Mg     2.0     05-09    TPro  6.9  /  Alb  3.4  /  TBili  0.3  /  DBili  x   /  AST  23  /  ALT  31  /  AlkPhos  121<H>  05-08    PT/INR - ( 08 May 2023 05:30 )   PT: 13.5 sec;   INR: 1.13          PTT - ( 08 May 2023 05:30 )  PTT:35.8 sec      RADIOLOGY & ADDITIONAL STUDIES:

## 2023-05-09 NOTE — PROGRESS NOTE ADULT - SUBJECTIVE AND OBJECTIVE BOX
HPI:  58 yo F with PMH of Asthma, CAD (not on  meds), peripheral neuropathy and PSH of BATOOL, cholecystectomy, right knee surgery presented to [place] ED on 4/20 with right sided weakness and right facial numbness. Patient was undergoing preparation for colonoscopy. As per daughter at 8am patient was playing with her grandchildren but around 840 am patient was getting dressed and fell and subsequently felt weak after. Daughter reports that patient had some episodes of vomiting at this time. She had a syncopal episode at around 10 am and was witnessed by brother. No head trauma, She regained conscious after few minutes. She remained in bed for the remainder of the day. Daughter reports some slurred speech noted 1230-1PM and also was confused after. and around 4PM, the patient's sister noted right sided weakness at which time EMS was called and patient was brought to ED. No c/o chest pain, shortness of breath, fever, headache, abdominal pain, urinary complaints. No recent travel/sickness/ change in meds. Stroke code in ER: CTH neg for heme, Right PICA distribution acute infarction. CTA with saccular aneurysms of b/l carotids, approximately 0.8 cm on the right and 1.2 x 0.9 cm on the left. Possible tiny third saccular aneurysm on the right Posterior intracranial circulation: Right vertebral arterial occlusion at its dural crossing junction V3 Atlantic and V4 intracranial segments with likely reversal of flow in its intracranial segment from the basilar. Right PICA faintly seen. 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  Not candidate for TNK/mechanical thrombectomy. CT scan repeated which showed: 1. Brain: Progression of acute infarction within the right cerebellar hemisphere, also extending into the left superior cerebellar hemisphere. New mass effect on the fourth ventricle causing stenosis versus occlusion with new third and lateral ventricular dilatation indicating ventricular obstruction at the level of the fourth ventricle. New upward and downward herniation of cerebellar parenchyma Right carotid system: No hemodynamically significant stenosis. Left carotid system: No hemodynamically significant stenosis. Vertebral circulation: Patent. Anterior intracranial circulation: Intact. Bilateral internal carotid saccular aneurysms. These findings are unchanged. Posterior intracranial circulation:    Improved flow within the right vertebral artery since 4/20/2023. New focal stenosis mid left vertebral artery, etiology uncertain, consider vasospasm and extrinsic compression in addition to new embolic disease. Brain perfusion: Perfusion images demonstrate normalization of the perfusion abnormality in the right cerebellar hemisphere present on 4/20/2023 despite evidence of progression of acute infarction.  Areas of apparent ischemia within the posterior fossa have progressed in extent, also again involving the left posterior cerebral arterial distribution. Tx to [facility] for SOC watch. On admission to [facility], NIHSS 12.  (21 Apr 2023 16:50)    Hospital Course:  4/21: Admitted for crani watch, CTH complete w/ unchanged hydrocephalus, taken for emergent occipital craniectomy.   4/22: POD1. Remains intubated overnight, on propofol and dank. EVD@[id number]. Pending repeat CTH this am. Given hydralazine 10mg x1. Started on cardene for SBP high 140s-150s. Sub-therapeutic on plavix, therapeutic on asa, rpt asa accumetrics until subtherapeutic. LE dopplers neg for DVT, but showing superficial venous thrombosis in the proximal portion of the right greater saphenous vein. Started on 3% @60 for na goal 145-150. NGT placed by ENT. Febrile, pancultured.  4/23: POD 2. 3% increased to 75. NGT readjusted. UA neg. SBP liberalized to 160. Plan to extubate. 3% decreased to 60. Failed extubation d/t no cuff leak, given 60mg solumedrol, plan to extubate in 6 hrs. SCx1 for post void residual >400, started on cardura at bedtime. New blood in buretrol, stopped SQL. Clot in EVD cleared. Neuro exam improving.   4/24: POD 3. Cont 3% with NS while NPO, start TF today. TF started. LFTs downtrending. Sodium 141. Increased 3% to 50, NS to 30. Repeat BMP ordered for 5:30PM. Tramadol 25 for pain with no relief, oxy 5 and 1g tylenol ordered, stability CT stable, speech language consult for dysarthria. Given hydralazine 10mg IVP for SBP>160, started amlodipine 5mg. C/o mild headache, given fioricet x1, stroke neuro rec SALVADOR and loop recorder placement. Echo with bubble completed, negative for PFO, EF 55-60%. J HFNC started for desats with suctioning.   4/25: POD 4. Cont HFNC. Increased secertions on HFNC, reintubated. CXR confirmed good placement. EVD dropped to 5cmH20 for goal of draining 5-10cc/hr and SG ELENA drain taken off suction. Pending CTH. EVD drained 0cc/hr, dropped to 0. NGT replaced. Dc'd fioricet. Started on PPI for intubation. Increased cardura to 4mg for urinary retention, given an additional 2mg now. Started salt tabs 3 q 6. Given 12.5mg fentanyl x1. NGT replaced, CXR shown in lung. NGT removed, repeat CXR showing no pneumothorax. Pending ENT consult for NGT placement. CTH stable. NGT replaced by ENT, confirmed in proper spot. Restarted TF. [id number]. Na 141 from 146. Increased 3% to 60. EVD raised to 3cmH20. ETT pulled back 1cm by RT.  4/26: POD 5 SOC. Intubated, remains on precedex, ABG ordered with improving PaO2, BMP sodium 148, 3% changed to 30cc/hr, cardura increased to 8mg for tonight, tolerating cpap  4/27: POD 6 SOC. Respiratory rate sustained 6, returned to FVS. Na 151, dc'd 3%, f/u AM sodium. Nurse noticed ETT 19 at the lip and was 20 prior, CXR shows ETT @ 5cm above jono, ET tube advanced 1cm, repeat CXR confirms appropriate placement. Thick inline secretions with suctioning. ENT trach placement Fri likely, PEG likely Mon. Keep ELENA drain until no output, EVD to remain @3. Start ASA 81mg daily tomorrow.   4/28: POD7 SOC, neuro stable, given fentanyl x 1 overnight for presumed discomfort (biting on ETT), remains on full vent support. CTH today stable in comparison to 4/25. 6 cuffed trach placed by ENT in OR, but came down without cuff. Replaced at bedside by ENT. On fentanyl gtt.    4/29: POD8 SOC, JIM overnight. Incision cleaned and dressing changed. On fentanyl gtt. EKG completed due to increased frequency PVCs on telemetry, frequency of PVCs improved with titrating up fentanyl gtt. ABG drawn.  Repeat LE dopplers completed showing persistant superficial thrombus, no DVT. No EVD waveform during day, flushed distally without improvement. Exam unchanged.  EVD dropped to 0 for low output in afternoon.   4/30: POD9. Neuro stable, febrile to 101.3F o/n, given tylenol and pan cx sent. SBP dipped to low 90s o/n, HR stable in 70s with some PVCs, decreased fentanyl gtt and given 500cc bolus of NS x 2. Pend PEG placement Mon and angio Tues. D/c'd ELENA drain today and suture placed. F/u heme recs. Positive UA. Infiltrates found on CXR. Started on Zosyn 4.5g Q6hrs .   5/1: POD 10. Neuro stable. Dc'd fentanyl gtt. PEG failed placement at bedside with Dr. Gant, plan for PEG in OR tomorrow afternoon with Dr. Layton. Dc'd amlodipine and started carvedilol 3.125 BID for PVCs . Made 3% inhalation and mucomyst q8hr. Failed PEG at bedside. Salt tabs made 1 q 6. Decreased cardura to 4mg daily. Urine culture growing E. Coli and sputum culture growing pluralibacter gergoviae and strep species. ID consulted, f/u recs. Failed CPAP trial @ 3pm. Added am labs per heme/onc for hypercoguable workup. Pending repeat LE dopplers in 2-3 days. NGT clogged, removed, ENT failed attempt at replacement, will keep NPO for PEG placement tmw. Repeat xray in am for concern for aspration. Pt abx switched from Zosyn to Ertapenem 1g Q24hrs. per ID recs, possible ESBL growth.   5/2: POD 11. Neuro stable. Pre-op for diagnostic cerebral angiogram and PEG placement. NPO. EVD raised to 5 cmH20. Sputum culture speciated to step pneumoniae, sensitive to ertapenem. 3% inhalation/mucomyst made q 6 hr d/t thick secretions. PEG placed in OR. POD0 dx cerebral angio. EVD raised to 10.   5/3: POD 12. Neuro stable. PEG feeds began @ 10 AM, plan to increase 10cc every 6h per general surgery. Repeat dopplers show stable R superficial thrombus and new L IM calf DVT. Vascular consulted for IVC filter. Plan to get CTH tomorrow.  5/4: POD 13. JIM o/n. s/p IVC filter with vascular today. Pending post-procedure CTH. FOBT negative. Started iron and vitamin c every other day for iron deficiency anemia.   5/5: POD14. CSF cx sent to r/o infection from new gas in right temporal horn seen on CTH. Restarted TF, changed to Jevity 1.2, f/u nutrition recs. EVD raised to 10 today, plan to challenge over the weekend and CTH on Monday, possible VPS next Wednesday. ENT removed sutures today. Salt tabs decreased 1q12.  5/6: POD15 Placed on CPAP briefly with low MV alert, placed back on full vent support. EVD remains open at [id number]. ICPs WNL. Neuro exam stable.  EVD raised to 15. Tolerated CPAP x8h.   5/7: POD#16. JIM overnight, neuro stable. D/c'd salt tabs and 3% neb. Wean trach to CPAP as tolerated. Pan cx NGTD. EVD raised today to [id number].   5/8: POD17 JIM overnight. ICPs WNL. Pt remains on full vent support. Neuro exam stable. Plan for possible VPS today. CT chio complete showing increase in vent size.   5/9: POD18 SOC, POD1 VPS. Oxycodone given for incisional pain. Neuro exam stable.     Vital Signs Last 24 Hrs  T(C): 37.5 (09 May 2023 01:01), Max: 37.5 (09 May 2023 01:01)  T(F): 99.5 (09 May 2023 01:01), Max: 99.5 (09 May 2023 01:01)  HR: 73 (09 May 2023 01:20) (57 - 73)  BP: 98/48 (09 May 2023 01:00) (95/45 - 138/65)  BP(mean): 69 (09 May 2023 01:00) (65 - 104)  RR: 20 (09 May 2023 01:20) (16 - 32)  SpO2: 95% (09 May 2023 01:20) (94% - 99%)    Parameters below as of 09 May 2023 01:20  Patient On (Oxygen Delivery Method): ventilator    O2 Concentration (%): 40    I&O's Summary    07 May 2023 07:01  -  08 May 2023 07:00  --------------------------------------------------------  IN: 1840 mL / OUT: 804 mL / NET: 1036 mL    08 May 2023 07:01  -  09 May 2023 01:57  --------------------------------------------------------  IN: 1075 mL / OUT: 1050 mL / NET: 25 mL        PHYSICAL EXAM:   General: NAD, pt is comfortably laying in bed, A&O x 2 with choice (nods head appropriately, examined in Pashto), +trach to full vent   HEENT: OE spont and to voice, R gaze preference (does not cross midline), PERRL 2mm  Cardiovascular: RRR, normal S1 and S2  Respiratory: lungs CTAB, no wheezing, rhonchi, or crackles   GI: normoactive BS to auscultation, abd soft, NTND   Neuro: nods head Y/N to questions, follows commands, moves all extremities spontaneously, LUE/LLE 5/5 throughout, RUE/RLE 4/5   Wound/incision: SOC incision site with sutures and dressing C/D/I, VPS scalp and abdominal incision sites C/D/I, headwrap in place    TUBES/LINES:  [] Garsia  [] Lumbar Drain  [] Wound Drains  [] Others    DIET:  [x] NPO  [] Mechanical  [] Tube feeds    LABS:                        10.9   8.96  )-----------( 398      ( 08 May 2023 17:13 )             33.7     05-08    139  |  103  |  12  ----------------------------<  121<H>  4.8   |  27  |  0.57    Ca    9.3      08 May 2023 17:13  Phos  5.4     05-08  Mg     2.1     05-08    TPro  6.9  /  Alb  3.4  /  TBili  0.3  /  DBili  x   /  AST  23  /  ALT  31  /  AlkPhos  121<H>  05-08    PT/INR - ( 08 May 2023 05:30 )   PT: 13.5 sec;   INR: 1.13          PTT - ( 08 May 2023 05:30 )  PTT:35.8 sec        CAPILLARY BLOOD GLUCOSE          Drug Levels: [] N/A    CSF Analysis: [] N/A  Protein, CSF: 31 mg/dL (05-08 @ 14:35)  Glucose, CSF: 73 mg/dL (05-08 @ 14:35)      Allergies    No Known Allergies    Intolerances      MEDICATIONS:  Antibiotics:  ceFAZolin   IVPB 2000 milliGRAM(s) IV Intermittent every 8 hours    Neuro:  levETIRAcetam 500 milliGRAM(s) Oral every 12 hours  oxyCODONE    IR 5 milliGRAM(s) Oral every 4 hours PRN    Anticoagulation:    OTHER:  amLODIPine   Tablet 5 milliGRAM(s) Oral daily  atorvastatin 80 milliGRAM(s) Oral at bedtime  carvedilol 3.125 milliGRAM(s) Oral every 12 hours  chlorhexidine 0.12% Liquid 15 milliLiter(s) Oral Mucosa every 12 hours  chlorhexidine 2% Cloths 1 Application(s) Topical daily  doxazosin 4 milliGRAM(s) Oral at bedtime  pantoprazole   Suspension 40 milliGRAM(s) Enteral Tube daily    IVF:  ascorbic acid 500 milliGRAM(s) Oral <User Schedule>  ferrous    sulfate 325 milliGRAM(s) Oral <User Schedule>  lactated ringers. 1000 milliLiter(s) IV Continuous <Continuous>    CULTURES:  Culture Results:   No growth to date (05-05 @ 05:06)  Culture Results:   No growth to date (04-30 @ 06:51)    RADIOLOGY & ADDITIONAL TESTS:      ASSESSMENT:  58 y/o female found to have acute infarction within the right cerebellar hemisphere, causing herniation. Now s/p SOC foramen magnum decompression and right parietal/occipital EVD placement (4/21). s/p trach (4/28/23). s/p PEG (5/2/23), s/p dx cerebral angiogram (5/2/23). now s/p VPS Certas @ 4 (5/8/23).       STROKE    No pertinent family history in first degree relatives    Handoff    Asthma    CAD (coronary artery disease)    Peripheral neuropathy    Cerebellar stroke    Respiratory failure, unspecified with hypoxia    History of DVT (deep vein thrombosis)    Hydrocephalus    Cerebellar stroke    Respiratory failure, unspecified with hypoxia    History of DVT of lower extremity    Hydrocephalus    Cerebellar infarct    CAD (coronary artery disease)    Asthma    Peripheral neuropathy    Lupus anticoagulant positive    Anemia due to acute blood loss    Tracheostomy malfunction    Decompressive craniectomy    Insertion, external ventricular drain    Planned tracheostomy    Tracheostomy tube change    Esophagogastroduodenoscopy (EGD) with anesthesia    Insertion, PEG tube, laparoscopic    Angiogram, carotid and cerebral, bilateral    Tracheostomy tube change    IVC filter placement     shunt    Insertion, ventriculopleural shunt    S/P total abdominal hysterectomy    S/P cholecystectomy    S/P right knee surgery    [email or web address]      PLAN:  Neuro   - Vitals/neuro q1h   - s/p VPS (Certas @ 4)   - dx angio 5/2: b/l cavernous ICA aneurysms, as discussed at vascular conference f/u outpt  - CTH 4/28 stable; 5/4 new gas in right temporal horn, CT 5/8 chio increased vent size   - Stroke consulted, appreciate reccs   - Pain control with tylenol prn, oxycodone prn, fent pushes 12.5mg q2hr prn     Cardio  - SBP   - TTE 4/24: negative for PFO, mild LVH, mild dilation of L atrium, EF 55-60%   - Carvedilol 3.125mg BID     Pulm  - + Trach (6 shiley) 400/40/16/6, CPAP trials as tolerated  - Chest PT, duoneb q 8 hrs standing     GI  - + PEG - f/u Gen surg regarding restarting tube feeds   - + RT - loose stool, BR held   - Protonix for GI ppx while on vent    Renal  - IVF while NPO  - Voiding via primafit     Endo   - cont lipitor     Heme  - SQL held for OR  - s/p IVCF 5/4   - LE dopplers 4/22 neg for DVT, but showing superficial venous thrombosis in the proximal portion of the right greater saphenous vein; repeat on 4/29 with persistant superficial thrombus, 5/3 new DVT L IM calf and unchanged R superficial vein thrombus  - Heme following for positive anticardiolipin Ab 4/27, recs appreciated   - Iron and vitamin c every other day     ID  - CSF sent from OR (5/8)   - MRSA (-), +UA cx ESBL, +sputum cx pluralibacter gergoviae, strep pneumoniae, s/p Ertapenem 1g Q24hrs (5/1-5/7)    DISPO:  - NSICU, full code   - PT/OT rec acute inpatient rehab: patient can tolerate 3 hrs PT/OT daily    D/w Dr. Valadez and Dr. Garcia

## 2023-05-09 NOTE — PROGRESS NOTE ADULT - SUBJECTIVE AND OBJECTIVE BOX
INTERVAL HISTORY: HPI:  58 yo F with PMH of Asthma, CAD (not on  meds), peripheral neuropathy and PSH of BATOOL, cholecystectomy, right knee surgery presented to [place] ED on 4/20 with right sided weakness and right facial numbness. Patient was undergoing preparation for colonoscopy. As per daughter at 8am patient was playing with her grandchildren but around 840 am patient was getting dressed and fell and subsequently felt weak after. Daughter reports that patient had some episodes of vomiting at this time. She had a syncopal episode at around 10 am and was witnessed by brother. No head trauma, She regained conscious after few minutes. She remained in bed for the remainder of the day. Daughter reports some slurred speech noted 1230-1PM and also was confused after. and around 4PM, the patient's sister noted right sided weakness at which time EMS was called and patient was brought to ED. No c/o chest pain, shortness of breath, fever, headache, abdominal pain, urinary complaints. No recent travel/sickness/ change in meds. Stroke code in ER: CTH neg for heme, Right PICA distribution acute infarction. CTA with saccular aneurysms of b/l carotids, approximately 0.8 cm on the right and 1.2 x 0.9 cm on the left. Possible tiny third saccular aneurysm on the right Posterior intracranial circulation: Right vertebral arterial occlusion at its dural crossing junction V3 Atlantic and V4 intracranial segments with likely reversal of flow in its intracranial segment from the basilar. Right PICA faintly seen. 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  Not candidate for TNK/mechanical thrombectomy. CT scan repeated which showed: 1. Brain: Progression of acute infarction within the right cerebellar hemisphere, also extending into the left superior cerebellar hemisphere. New mass effect on the fourth ventricle causing stenosis versus occlusion with new third and lateral ventricular dilatation indicating ventricular obstruction at the level of the fourth ventricle. New upward and downward herniation of cerebellar parenchyma Right carotid system: No hemodynamically significant stenosis. Left carotid system: No hemodynamically significant stenosis. Vertebral circulation: Patent. Anterior intracranial circulation: Intact. Bilateral internal carotid saccular aneurysms. These findings are unchanged. Posterior intracranial circulation:    Improved flow within the right vertebral artery since 4/20/2023. New focal stenosis mid left vertebral artery, etiology uncertain, consider vasospasm and extrinsic compression in addition to new embolic disease. Brain perfusion: Perfusion images demonstrate normalization of the perfusion abnormality in the right cerebellar hemisphere present on 4/20/2023 despite evidence of progression of acute infarction.  Areas of apparent ischemia within the posterior fossa have progressed in extent, also again involving the left posterior cerebral arterial distribution. Tx to [facility] for SOC watch. On admission to [facility], NIHSS 12.  (21 Apr 2023 16:50)    PAST MEDICAL & SURGICAL HISTORY:  Asthma  CAD (coronary artery disease)  Peripheral neuropathy  s/P total abdominal hysterectomy  S/P cholecystectomy  S/P right knee surgery      REVIEW OF SYSTEMS: [ ] Unable to Assess due to neurologic exam   [x] All ROS addressed below are non-contributory, except:  Neuro: [ ] Headache [ ] Back pain [ ] Numbness [ ] Weakness [ ] Ataxia [ ] Dizziness [ ] Aphasia [ ] Dysarthria [ ] Visual disturbance  Resp: [ ] Shortness of breath/dyspnea, [ ] Orthopnea [ ] Cough  CV: [ ] Chest pain [ ] Palpitation [ ] Lightheadedness [ ] Syncope  Renal: [ ] Thirst [ ] Edema  GI: [ ] Nausea [ ] Emesis [ ] Abdominal pain [ ] Constipation [ ] Diarrhea  Hem: [ ] Hematemesis [ ] bright red blood per rectum  ID: [ ] Fever [ ] Chills [ ] Dysuria  ENT: [ ] Rhinorrhea    PHYSICAL EXAM:    General: No Acute Distress   Neurological:  AOx2 to choice, R eye 6th nerve palsy, b/l horizontal nystagmus, b/l UE & LE dysmetria, RUE & RLE 4+/5 w/ mild drift   Pulmonary: Clear to Auscultation, No Rales, No Rhonchi, No Wheezes   Cardiovascular: S1, S2, Regular Rate and Rhythm   Gastrointestinal: Soft, Nontender, Nondistended   Extremities: No calf tenderness   Incision: CDI            ICU Vital Signs Last 24 Hrs  T(C): 37.6 (09 May 2023 22:05), Max: 37.6 (09 May 2023 05:01)  T(F): 99.7 (09 May 2023 22:05), Max: 99.7 (09 May 2023 05:01)  HR: 75 (09 May 2023 22:00) (60 - 77)  BP: 102/70 (09 May 2023 22:00) (93/47 - 111/52)  BP(mean): 82 (09 May 2023 22:00) (67 - 82)  ABP: --  ABP(mean): --  RR: 20 (09 May 2023 22:00) (15 - 26)  SpO2: 94% (09 May 2023 22:00) (91% - 98%)      05-08-23 @ 07:01  -  05-09-23 @ 07:00  --------------------------------------------------------  IN: 1770 mL / OUT: 1600 mL / NET: 170 mL    05-09-23 @ 07:01  -  05-09-23 @ 22:50  --------------------------------------------------------  IN: 960 mL / OUT: 400 mL / NET: 560 mL        Mode: CPAP with PS, RR (machine): , TV (machine): , FiO2: 60, PEEP: 6, PS: 12, ITime: 1, MAP: 8.5, PIP: 18    acetylcysteine 10%  Inhalation 4 milliLiter(s) Inhalation every 6 hours PRN  albuterol/ipratropium for Nebulization 3 milliLiter(s) Nebulizer every 6 hours PRN  amLODIPine   Tablet 5 milliGRAM(s) Oral daily  ascorbic acid 500 milliGRAM(s) Oral <User Schedule>  atorvastatin 80 milliGRAM(s) Oral at bedtime  carvedilol 3.125 milliGRAM(s) Oral every 12 hours  chlorhexidine 0.12% Liquid 15 milliLiter(s) Oral Mucosa every 12 hours  chlorhexidine 2% Cloths 1 Application(s) Topical daily  doxazosin 4 milliGRAM(s) Oral at bedtime  ferrous    sulfate 325 milliGRAM(s) Oral <User Schedule>  lactated ringers. 1000 milliLiter(s) (75 mL/Hr) IV Continuous <Continuous>  lactobacillus acidophilus 1 Tablet(s) Oral daily  methylphenidate 5 milliGRAM(s) Oral two times a day  oxyCODONE    IR 5 milliGRAM(s) Oral every 4 hours PRN  pantoprazole   Suspension 40 milliGRAM(s) Oral daily  psyllium Powder 1 Packet(s) Oral every 12 hours      LABS:  Na: 136 (05-09 @ 05:17), 139 (05-08 @ 17:13), 139 (05-08 @ 05:30), 140 (05-07 @ 05:30)  K: 4.1 (05-09 @ 05:17), 4.8 (05-08 @ 17:13), 4.0 (05-08 @ 05:30), 4.2 (05-07 @ 05:30)  Cl: 101 (05-09 @ 05:17), 103 (05-08 @ 17:13), 102 (05-08 @ 05:30), 103 (05-07 @ 05:30)  CO2: 25 (05-09 @ 05:17), 27 (05-08 @ 17:13), 27 (05-08 @ 05:30), 27 (05-07 @ 05:30)  BUN: 13 (05-09 @ 05:17), 12 (05-08 @ 17:13), 10 (05-08 @ 05:30), 10 (05-07 @ 05:30)  Cr: 0.52 (05-09 @ 05:17), 0.57 (05-08 @ 17:13), 0.51 (05-08 @ 05:30), 0.41 (05-07 @ 05:30)  Glu: 116(05-09 @ 05:17), 121(05-08 @ 17:13), 117(05-08 @ 05:30), 139(05-07 @ 05:30)    Hgb: 10.6 (05-09 @ 05:17), 10.9 (05-08 @ 17:13), 10.8 (05-08 @ 05:30), 10.3 (05-07 @ 05:30)  Hct: 32.4 (05-09 @ 05:17), 33.7 (05-08 @ 17:13), 33.0 (05-08 @ 05:30), 30.4 (05-07 @ 05:30)  WBC: 6.86 (05-09 @ 05:17), 8.96 (05-08 @ 17:13), 6.68 (05-08 @ 05:30), 6.04 (05-07 @ 05:30)  Plt: 332 (05-09 @ 05:17), 398 (05-08 @ 17:13), 422 (05-08 @ 05:30), 370 (05-07 @ 05:30)    INR: 1.13 05-08-23 @ 05:30  PTT: 35.8 05-08-23 @ 05:30          LIVER FUNCTIONS - ( 08 May 2023 17:13 )  Alb: 3.4 g/dL / Pro: 6.9 g/dL / ALK PHOS: 121 U/L / ALT: 31 U/L / AST: 23 U/L / GGT: x

## 2023-05-09 NOTE — PROGRESS NOTE ADULT - ASSESSMENT
57 year old female w/ PMH of asthma, CAD (not on meds), HTN, prior miscarriages X3, peripheral neuropathy and PSH of BATOOL, cholecystectomy and right knee surgery presented to [facility] on 4/20 w/ right sided weakness and right facial numbness. Now s/p Trach with NGT feeding access. General Surgery consulted for placement of PEG feeding access. Unable to place PEG at bedside (5/1), now s/p lap placement of PEG tube (5/2) and POD 1 from  shunt (5/8)    Plan:  Please start TF at 20cc and advance every 4 hours until goal  Rest of care per primary  Surgery Team 4C will continue to follow. Please page Team 4 with questions/clinical changes. [phone]     57 year old female w/ PMH of asthma, CAD (not on meds), HTN, prior miscarriages X3, peripheral neuropathy and PSH of BATOOL, cholecystectomy and right knee surgery presented to [facility] ED on 4/20 w/ right sided weakness and right facial numbness. Now s/p Trach with NGT feeding access. General Surgery consulted for placement of PEG feeding access. Unable to place PEG at bedside (5/1), now s/p lap placement of PEG tube (5/2) and POD 1 from  shunt (5/8)    Plan:  Please start TF at 20cc and advance every 4 hours until goal  Rest of care per primary  Surgery Team 4C will sign off

## 2023-05-09 NOTE — CHART NOTE - NSCHARTNOTEFT_GEN_A_CORE
Desat o/n to 80s, improved on own. CXR with LLL effusion. Another desat to 90% in AM, given duoneb and mucomyst. Oxycodone given for incisional pain. Neuro exam stable. CTH in AM stable. Keppra dc'd. Ritalin 5 BID started.

## 2023-05-09 NOTE — DISCHARGE NOTE PROVIDER - NSDCFUADDINST_GEN_ALL_CORE_FT
Neurosurgery follow up appointment date/time:  - are staples/sutures in place?  - what day should staples/sutures be removed (POD 10-14)?  - please call the office to confirm appointment:     Wound Care:  - can patient shower?  - does dressing need to be changed/removed?  - no picking at incision  - wears glasses?   - pressure ulcer?     Devices:  - does patient need collar or brace or helmet?   - does collar/brace need to be worn at all times or just when OOB?  - RW or cane for ambulation?    Drains/Lines:  - PICC in place? ID follow up? (Paper Rx for: antibiotics, heparin flush, weekly lab draws)  - ELENA in place? Management?  - barrios in place? Management/Urology follow up?  - Tracheostomy?  - PEG tube?      Activity:  - fatigue is common after surgery, rest if you feel tired   - no bending, lifting, twisting or heavy lifting   - walking is recommended, ambulate as tolerated  - you may shower when you get home, keep your incision dry  - no soaking in a tub/pool/hot tub   - no driving within 24 hours of anesthesia or while taking prescription pain medications   - keep hydrated, drink plenty of water   - skullbase precautions: no nose blowing, sneeze with mouth open, no drinking out of a straw, no straining      Inpatient consults:  - final recommendations  - you will need follow up with....    Please also follow up with your primary care doctor.     Pain Expectations:  - pain after surgery is expected  - please take pain meds as prescribed     Medications:  - changes to home meds (ex. AED's)?  - new meds?  - pain meds?  - when can antiplatelets or anticoagulants be restarted?  - were adverse affects of meds discussed with patients?   - pain medications can cause constipation, you should eat a high fiber diet and may take a stool softener while on pain meds   - Avoid taking Advil (ibuprofen), Motrin (naproxen), or Aspirin for pain as they can cause bleeding     Call the office or come to ED if:  - wound has drainage or bleeding, increased redness or pain at incision site, neurological change, fever (>101), chills, night sweats, syncope, nausea/vomiting, chest pain, shortness of breath      Playback:  - are discharge instruction on playback?  - is a picture of the incision on playback?     WITHIN 24 HOURS OF DISCHARGE, PLEASE CONTACT NEURO PA  WITH ANY QUESTIONS OR CONCERNS: [phone]   OTHERWISE, PLEASE CALL THE OFFICE WITH ANY QUESTIONS OR CONCERNS: [phone] Neurosurgery follow up appointment date/time:  - Follow up in the office for a wound check and suture removal   - please call the office to confirm appointment: [phone]    Wound Care:  - shower/ have hair washed daily with shampoo  - pat dry incision with a clean towel  - leave incision uncovered, open to air  - no picking at incision    Devices:  - does patient need collar or brace or helmet?   - does collar/brace need to be worn at all times or just when OOB?  - RW or cane for ambulation?    Drains/Lines:  - barrios in place? Management/Urology follow up?  - Tracheostomy in place   - PEG tube in place     Activity:  - fatigue is common after surgery, rest if you feel tired   - no bending, lifting, twisting or heavy lifting   - walking is recommended, ambulate as tolerated  - you may shower when you get home, keep your incision dry  - no soaking in a tub/pool/hot tub   - no driving within 24 hours of anesthesia or while taking prescription pain medications   - keep hydrated, drink plenty of water     Inpatient consults:  - ENT  - Heme/Onc  - ID  - Vascular Surgery  - General Surgery  - Urology    Please also follow up with your primary care doctor.     Pain Expectations:  - pain after surgery is expected  - please take pain meds as prescribed     Medications:  - changes to home meds (ex. AED's)?  - new meds?  - pain meds?  - when can antiplatelets or anticoagulants be restarted?  - were adverse affects of meds discussed with patients?   - pain medications can cause constipation, you should eat a high fiber diet and may take a stool softener while on pain meds   - Avoid taking Advil (ibuprofen), Motrin (naproxen), or Aspirin for pain as they can cause bleeding     Call the office or come to ED if:  - wound has drainage or bleeding, increased redness or pain at incision site, neurological change, fever (>101), chills, night sweats, syncope, nausea/vomiting, chest pain, shortness of breath      Playback:  - See playback health for a copy of your discharge paperwork     WITHIN 24 HOURS OF DISCHARGE, PLEASE CONTACT NEURO PA  WITH ANY QUESTIONS OR CONCERNS: [phone]   OTHERWISE, PLEASE CALL THE OFFICE WITH ANY QUESTIONS OR CONCERNS: [phone] Opt out Neurosurgery follow up appointment date/time:  - Follow up in the office for a wound check and suture removal on post operative day 21 (5/29)   - please call the office to confirm appointment: [phone]    Wound Care:  - shower/ have hair washed daily with shampoo  - pat dry incision with a clean towel  - leave incision uncovered, open to air  - no picking at incision    Devices/Drains/Lines:  - Tracheostomy in place   - PEG tube in place   - IVC filter in place  - VPS in place, Certas at 4    Activity:  - fatigue is common after surgery, rest if you feel tired   - no bending, lifting, twisting or heavy lifting   - walking is recommended, ambulate as tolerated  - you may shower when you get home, keep your incision dry  - no soaking in a tub/pool/hot tub   - no driving within 24 hours of anesthesia or while taking prescription pain medications   - keep hydrated, drink plenty of water     Inpatient consults:  - Stroke: Follow up with Dr. Maria  - ENT: Follow up for tracheostomy tube management  - Heme/Onc: Follow up for anemia, hypercoagulable state management   - Vascular Surgery: Follow up for management of IVC filter   - General Surgery: Follow up for management of PEG tube and abdomen wound   - Urology: Follow up for management of bladder mass     Please also follow up with your primary care doctor.     Pain Expectations:  - pain after surgery is expected  - please take pain meds as prescribed     Medications:  - current meds:  Lovenox 80mg twice daily for superficial thrombosis in the setting of hypercoagulable state  Seroquel 12.5mg at bed time as needed for agitation (can cause sedation)   Amantadine 100mg at 6AM daily for neurostimulation - wean off as tolerated (can cause dizziness)   Modafinil 100mg daily at 6AM for neurostimulation - wean off as tolerated   Mucomyst every 6 hours as needed for secretions  Duoneb every 6 hours as needed for wheezing/shortness of breath  Ferrous sulfate 325mg every other day for anemia  Vitamin C 500mg every other day for iron absorption  Atorvastatin 80mg daily for hyperlipidemia (can cause GI upset)   - pain meds: Tylenol 650mg every 6 hours as needed for mild to moderate pain, Oxycodone 5mg every 6 hours as needed for severe pain  - pain medications can cause constipation, you should eat a high fiber diet and may take a stool softener while on pain meds   - Avoid taking Advil (ibuprofen), Motrin (naproxen), or Aspirin for pain as they can cause bleeding     Call the office or come to ED if:  - wound has drainage or bleeding, increased redness or pain at incision site, neurological change, fever (>101), chills, night sweats, syncope, nausea/vomiting, chest pain, shortness of breath      Playback:  - See playback health for a copy of your discharge paperwork     WITHIN 24 HOURS OF DISCHARGE, PLEASE CONTACT NEURO PA  WITH ANY QUESTIONS OR CONCERNS: [phone]   OTHERWISE, PLEASE CALL THE OFFICE WITH ANY QUESTIONS OR CONCERNS: [phone] Neurosurgery follow up appointment date/time:  - Follow up in the office for a wound check and suture removal on post operative day 21 (5/29)   - please call the office to confirm appointment: [phone]    Wound Care:  - every other day head wrap changes until follow up with Dr. Ric Dumont  - shower/ have hair washed every other day with shampoo   - pat dry incision with a clean towel and then place a fresh head wrap on  - no picking or scratching at incision    Devices/Drains/Lines:  - Tracheostomy in place   - PEG tube in place   - IVC filter in place  - VPS in place, Certas at 4    Activity:  - fatigue is common after surgery, rest if you feel tired   - no bending, lifting, twisting or heavy lifting   - walking is recommended, ambulate as tolerated  - you may shower when you get home, keep your incision dry  - no soaking in a tub/pool/hot tub   - no driving within 24 hours of anesthesia or while taking prescription pain medications   - keep hydrated, drink plenty of water     Inpatient consults:  - Stroke: Follow up with Dr. Maria  - ENT: Follow up for tracheostomy tube management  - Heme/Onc: Follow up for anemia, hypercoagulable state management   - Vascular Surgery: Follow up for management of IVC filter   - General Surgery: Follow up for management of PEG tube and abdomen wound   - Urology: Follow up for management of bladder mass     Please also follow up with your primary care doctor.     Pain Expectations:  - pain after surgery is expected  - please take pain meds as prescribed     Medications:  - current meds:  Lovenox 80mg twice daily for superficial thrombosis in the setting of hypercoagulable state  Seroquel 12.5mg at bed time as needed for agitation (can cause sedation)   Amantadine 100mg at 6AM daily for neurostimulation - wean off as tolerated (can cause dizziness)   Modafinil 100mg daily at 6AM for neurostimulation - wean off as tolerated   Mucomyst every 6 hours as needed for secretions  Duoneb every 6 hours as needed for wheezing/shortness of breath  Ferrous sulfate 325mg every other day for anemia  Vitamin C 500mg every other day for iron absorption  Atorvastatin 80mg daily for hyperlipidemia (can cause GI upset)   - pain meds: Tylenol 650mg every 6 hours as needed for mild to moderate pain, Oxycodone 5mg every 6 hours as needed for severe pain  - pain medications can cause constipation, you should eat a high fiber diet and may take a stool softener while on pain meds   - Avoid taking Advil (ibuprofen), Motrin (naproxen), or Aspirin for pain as they can cause bleeding     Call the office or come to ED if:  - wound has drainage or bleeding, increased redness or pain at incision site, neurological change, fever (>101), chills, night sweats, syncope, nausea/vomiting, chest pain, shortness of breath      Playback:  - See playback health for a copy of your discharge paperwork     WITHIN 24 HOURS OF DISCHARGE, PLEASE CONTACT NEURO PA  WITH ANY QUESTIONS OR CONCERNS: [phone]   OTHERWISE, PLEASE CALL THE OFFICE WITH ANY QUESTIONS OR CONCERNS: [phone] Neurosurgery follow up appointment date/time:  - Follow up in the office for a wound check and suture removal on post operative day 21 (5/29)   - please call the office to confirm appointment: [phone]    Wound Care:  - every other day head wrap changes until follow up with Dr. Ric Dumont  - shower/ have hair washed every other day with shampoo   - pat dry incision with a clean towel and then place a fresh head wrap on  - no picking or scratching at incision    Nursing Care:   - DO NOT use bilateral arms for lovenox/heparin injections. Please use belly or other area  - Place ice packs to bilateral deltoid swelling  - Pain control as needed    Devices/Drains/Lines:  - Tracheostomy in place   - PEG tube in place   - IVC filter in place  - VPS in place, Certas at 4    Activity:  - fatigue is common after surgery, rest if you feel tired   - no bending, lifting, twisting or heavy lifting   - walking is recommended, ambulate as tolerated  - you may shower when you get home, keep your incision dry  - no soaking in a tub/pool/hot tub   - no driving within 24 hours of anesthesia or while taking prescription pain medications   - keep hydrated, drink plenty of water     Inpatient consults:  - Stroke: Follow up with Dr. Maria  - ENT: Follow up for tracheostomy tube management  - Heme/Onc: Follow up for anemia, hypercoagulable state management   - Vascular Surgery: Follow up for management of IVC filter   - General Surgery: Follow up for management of PEG tube and abdomen wound   - Urology: Follow up for management of bladder mass     Please also follow up with your primary care doctor.     Pain Expectations:  - pain after surgery is expected  - please take pain meds as prescribed     Medications:  - current meds:  Lovenox 80mg twice daily for superficial thrombosis in the setting of hypercoagulable state  Seroquel 12.5mg at bed time as needed for agitation (can cause sedation)   Amantadine 100mg at 6AM daily for neurostimulation - wean off as tolerated (can cause dizziness)   Modafinil 100mg daily at 6AM for neurostimulation - wean off as tolerated   Mucomyst every 6 hours as needed for secretions  Duoneb every 6 hours as needed for wheezing/shortness of breath  Ferrous sulfate 325mg every other day for anemia  Vitamin C 500mg every other day for iron absorption  Atorvastatin 80mg daily for hyperlipidemia (can cause GI upset)   - pain meds: Tylenol 650mg every 6 hours as needed for mild to moderate pain, Oxycodone 5mg every 6 hours as needed for severe pain  - pain medications can cause constipation, you should eat a high fiber diet and may take a stool softener while on pain meds   - Avoid taking Advil (ibuprofen), Motrin (naproxen), or Aspirin for pain as they can cause bleeding     Call the office or come to ED if:  - wound has drainage or bleeding, increased redness or pain at incision site, neurological change, fever (>101), chills, night sweats, syncope, nausea/vomiting, chest pain, shortness of breath      Playback:  - See playback health for a copy of your discharge paperwork     WITHIN 24 HOURS OF DISCHARGE, PLEASE CONTACT NEURO PA  WITH ANY QUESTIONS OR CONCERNS: [phone]   OTHERWISE, PLEASE CALL THE OFFICE WITH ANY QUESTIONS OR CONCERNS: [phone]

## 2023-05-09 NOTE — DISCHARGE NOTE PROVIDER - NSDCFUADDAPPT_GEN_ALL_CORE_FT
Please follow up with Dr. Ric Dumont    Please follow up with Dr. Minor    Please follow up with Dr. Issa for abdomen wound from VPS     Please follow up with Dr. Lombardo for PEG tube management    Please follow up with Dr. Mic Corado for Tracheostomy tube management    Please follow up with Dr. Bay for IVC filter management    Please follow up with your primary care doctor    Please follow up with Dr. Ric Dumont    Please follow up with Dr. Minor    Please follow up with Dr. Issa for abdomen wound from VPS     Please follow up with Dr. Lombardo for PEG tube management    Please follow up with Dr. Mic Corado for Tracheostomy tube management    Please follow up with Dr. Bay for IVC filter management    Please follow up with Dr. Farrah Maria for stroke, the office will reach out to make an appointment if you don't hear from us within 2 weeks please call [phone]    Please follow up with your primary care doctor    Please follow up with Dr. Ric Dumont    Please follow up with Dr. Minor    Please follow up with Dr. Cota for abdomen wound from VPS     Please follow up with Dr. Lombardo for PEG tube management    Please follow up with Dr. Mic Corado for Tracheostomy tube management    Please follow up with Dr. Bay for IVC filter management    Please follow up with Dr. Farrah Maria for stroke, the office will reach out to make an appointment if you don't hear from us within 2 weeks please call [phone]    Follow up with Urology outpatient for bladder mass: Urology clinic [address] [place] [address], [address]  TEL: [phone]     Please follow up with Dr. Villaseñor from Hematology Oncology for management of your hypercoagulable state and anemia     Please follow up with your primary care doctor

## 2023-05-09 NOTE — DISCHARGE NOTE PROVIDER - NSDCQMSTROKERISK_NEU_ALL_CORE
History of a stroke or TIA Blood clotting problems/High cholesterol/History of a stroke or TIA/Obesity

## 2023-05-09 NOTE — DISCHARGE NOTE PROVIDER - NSDCCPCAREPLAN_GEN_ALL_CORE_FT
PRINCIPAL DISCHARGE DIAGNOSIS  Diagnosis: Cerebellar infarct  Assessment and Plan of Treatment: s/p SOC and EVD placement on 4/22, s/p VPS 5/8,      SECONDARY DISCHARGE DIAGNOSES  Diagnosis: Asthma  Assessment and Plan of Treatment:     Diagnosis: Peripheral neuropathy  Assessment and Plan of Treatment:     Diagnosis: Lupus anticoagulant positive  Assessment and Plan of Treatment:     Diagnosis: CAD (coronary artery disease)  Assessment and Plan of Treatment:     Diagnosis: Acute respiratory failure with hypoxia  Assessment and Plan of Treatment: s/p Tracheostomy placement    Diagnosis: Dysphagia  Assessment and Plan of Treatment: s/p PEG tube placement    Diagnosis: Deep vein thrombosis (DVT)  Assessment and Plan of Treatment: s/p IVC filter    Diagnosis: Hydrocephalus  Assessment and Plan of Treatment: s/p VPS     PRINCIPAL DISCHARGE DIAGNOSIS  Diagnosis: Cerebellar infarct  Assessment and Plan of Treatment: s/p SOC and EVD placement on 4/22, s/p VPS 5/8 (certas at 4), follow up with Dr. iRc Dumont and Dr. Maria outpatient      SECONDARY DISCHARGE DIAGNOSES  Diagnosis: Asthma  Assessment and Plan of Treatment: duoneb as needed    Diagnosis: Peripheral neuropathy  Assessment and Plan of Treatment:     Diagnosis: Lupus anticoagulant positive  Assessment and Plan of Treatment:     Diagnosis: CAD (coronary artery disease)  Assessment and Plan of Treatment:     Diagnosis: Acute respiratory failure with hypoxia  Assessment and Plan of Treatment: s/p Tracheostomy placement    Diagnosis: Dysphagia  Assessment and Plan of Treatment: s/p PEG tube placement    Diagnosis: Hydrocephalus  Assessment and Plan of Treatment: s/p VPS    Diagnosis: Acute superficial venous thrombosis of lower extremity  Assessment and Plan of Treatment: s/p IVC filter, continue SQL 80mg BID    Diagnosis: Anemia of chronic disease  Assessment and Plan of Treatment: continue iron supplement    Diagnosis: Mass of bladder  Assessment and Plan of Treatment: Follow up with Urology outpatient    Diagnosis: Hypercoagulable state  Assessment and Plan of Treatment: follow up with Hematology Oncology outpatient     PRINCIPAL DISCHARGE DIAGNOSIS  Diagnosis: Cerebellar infarct  Assessment and Plan of Treatment: s/p SOC and EVD placement on 4/22, s/p VPS 5/8 (certas at 4), follow up with Dr. Ric Dumont and Dr. Maria outpatient      SECONDARY DISCHARGE DIAGNOSES  Diagnosis: Asthma  Assessment and Plan of Treatment: duoneb as needed    Diagnosis: Peripheral neuropathy  Assessment and Plan of Treatment:     Diagnosis: Lupus anticoagulant positive  Assessment and Plan of Treatment: follow up with Hematology Oncology    Diagnosis: CAD (coronary artery disease)  Assessment and Plan of Treatment:     Diagnosis: Acute respiratory failure with hypoxia  Assessment and Plan of Treatment: s/p Tracheostomy placement    Diagnosis: Dysphagia  Assessment and Plan of Treatment: s/p PEG tube placement    Diagnosis: Hydrocephalus  Assessment and Plan of Treatment: s/p VPS    Diagnosis: Acute superficial venous thrombosis of lower extremity  Assessment and Plan of Treatment: s/p IVC filter, continue SQL 80mg BID    Diagnosis: Anemia of chronic disease  Assessment and Plan of Treatment: continue iron supplement    Diagnosis: Mass of bladder  Assessment and Plan of Treatment: Follow up with Urology outpatient    Diagnosis: Hypercoagulable state  Assessment and Plan of Treatment: follow up with Hematology Oncology outpatient    Diagnosis: Deep vein thrombosis (DVT)  Assessment and Plan of Treatment: left IM calf    Diagnosis: Urinary tract infection  Assessment and Plan of Treatment: resolved

## 2023-05-09 NOTE — DISCHARGE NOTE PROVIDER - NPI NUMBER (FOR [employer] USE ONLY) :
[[id number]],[[id number]],[[id number]] [[id number]],[[id number]],[[id number]],[[id number] [[id number]],[[id number]],[[id number]],[[id number]],[[id number]],[[id number]]

## 2023-05-09 NOTE — CHART NOTE - NSCHARTNOTEFT_GEN_A_CORE
Admitting Diagnosis:   Patient is a 57y old  Female who presents with a chief complaint of bilateral cerebellar strokes (04 May 2023 10:53)    PAST MEDICAL & SURGICAL HISTORY:  Asthma  CAD (coronary artery disease)  Peripheral neuropathy  S/P total abdominal hysterectomy  S/P cholecystectomy  S/P right knee surgery    Current Nutrition Order: Jevity 1.2 50ml/hr + banatrol BID providing 1200ml, 1520kcal, 67g protein    PO Intake: Good (%) [   ]  Fair (50-75%) [   ] Poor (<25%) [   ]- N/A on EN    GI Issues:   No n/v/d/c  No abd distention noted  PEG in place    Pain:  Intermittent pain noted, regimen in place    Skin Integrity:  Surgical incision, yissel score 14  No edema noted  No pressure ulcers noted    Labs:   05-09    136  |  101  |  13  ----------------------------<  116<H>  4.1   |  25  |  0.52    Ca    9.0      09 May 2023 05:17  Phos  4.8     05-09  Mg     2.0     05-09    TPro  6.9  /  Alb  3.4  /  TBili  0.3  /  DBili  x   /  AST  23  /  ALT  31  /  AlkPhos  121<H>  05-08      Medications:  MEDICATIONS  (STANDING):  amLODIPine   Tablet 5 milliGRAM(s) Oral daily  ascorbic acid 500 milliGRAM(s) Oral <User Schedule>  atorvastatin 80 milliGRAM(s) Oral at bedtime  carvedilol 3.125 milliGRAM(s) Oral every 12 hours  chlorhexidine 0.12% Liquid 15 milliLiter(s) Oral Mucosa every 12 hours  chlorhexidine 2% Cloths 1 Application(s) Topical daily  doxazosin 4 milliGRAM(s) Oral at bedtime  ferrous    sulfate 325 milliGRAM(s) Oral <User Schedule>  lactated ringers. 1000 milliLiter(s) (75 mL/Hr) IV Continuous <Continuous>  levETIRAcetam 500 milliGRAM(s) Oral every 12 hours  pantoprazole   Suspension 40 milliGRAM(s) Enteral Tube daily    MEDICATIONS  (PRN):  oxyCODONE    IR 5 milliGRAM(s) Oral every 4 hours PRN Moderate Pain (4 - 6)      Admitting Anthropometrics:  Height for BMI (FEET)	5 Feet  Height for BMI (INCHES)	2 Inch(s)  Height for BMI (CENTIMETERS)	157.48 Centimeter(s)  Weight for BMI (lbs)	195 lb  Weight for BMI (kg)	88.5 kg  Body Mass Index	35.6     Weight Change: no new wts to review at this time, last wt taken on admit 4/21      Estimated energy needs:   IBW used for calculations as pt >120% of IBW (177%). Needs adjusted for critical care requiring vent support.  Kcal (25-30 kcal/kg): 1159-6752 kcal  Protein (1.3-1.5 g/kg): 65-75g pro  **Fluids per team    Subjective:  57 year old female w/ PMH of asthma, CAD (not on meds), HTN, prior miscarriages X3, peripheral neuropathy and PSH of BATOOL, cholecystectomy and right knee surgery presented to [place] ED on 4/20 w/ right sided weakness and right facial numbness. Now s/p Trach with NGT feeding access. General Surgery consulted for placement of PEG feeding access. Unable to place PEG at bedside (5/1), now s/p lap placement of PEG tube (5/2) and POD 1 from  shunt (5/8).    Pt assessed at bedside. EN running as ordered. Blood glucoses have been 91-155mg/dL last 4 days, 116mg/dL this am, well managed on Jevity feeds. Current EN meeting lower end EER, RD to follow.    Previous Nutrition Diagnosis: Inadequate Oral Intake RT inability to meet est needs via PO AEB NPO, reliant on EN feeds to meet 100% of est needs    Active [ X ]  Resolved [   ]    Goal:  - Consistently meets > 75% EER via most appropriate route of nutrition     Recommendations:   1. Continue Jevity 1.2 50ml/hr + banatrol BID providing 1200ml, 1520kcal, 67g protein, 968ml free water  >>FWF per team  >> Maintain aspiration precautions at all times  2. Pain and bowel regimen per team  3. Cont to monitor lytes and replete prn   4. RD to remain available for recs adjustment prn     Education: Deferred     Risk Level: High [ X ] Moderate [   ] Low [   ].

## 2023-05-10 LAB
ANION GAP SERPL CALC-SCNC: 6 MMOL/L — SIGNIFICANT CHANGE UP (ref 5–17)
BUN SERPL-MCNC: 9 MG/DL — SIGNIFICANT CHANGE UP (ref 7–23)
CALCIUM SERPL-MCNC: 8.8 MG/DL — SIGNIFICANT CHANGE UP (ref 8.4–10.5)
CHLORIDE SERPL-SCNC: 102 MMOL/L — SIGNIFICANT CHANGE UP (ref 96–108)
CO2 SERPL-SCNC: 29 MMOL/L — SIGNIFICANT CHANGE UP (ref 22–31)
CREAT SERPL-MCNC: 0.49 MG/DL — LOW (ref 0.5–1.3)
EGFR: 111 ML/MIN/1.73M2 — SIGNIFICANT CHANGE UP
GLUCOSE SERPL-MCNC: 121 MG/DL — HIGH (ref 70–99)
HCT VFR BLD CALC: 28.1 % — LOW (ref 34.5–45)
HGB BLD-MCNC: 9.2 G/DL — LOW (ref 11.5–15.5)
LACTATE SERPL-SCNC: 0.8 MMOL/L — SIGNIFICANT CHANGE UP (ref 0.5–2)
MAGNESIUM SERPL-MCNC: 2 MG/DL — SIGNIFICANT CHANGE UP (ref 1.6–2.6)
MCHC RBC-ENTMCNC: 29.4 PG — SIGNIFICANT CHANGE UP (ref 27–34)
MCHC RBC-ENTMCNC: 32.7 GM/DL — SIGNIFICANT CHANGE UP (ref 32–36)
MCV RBC AUTO: 89.8 FL — SIGNIFICANT CHANGE UP (ref 80–100)
NRBC # BLD: 0 /100 WBCS — SIGNIFICANT CHANGE UP (ref 0–0)
PHOSPHATE SERPL-MCNC: 3.3 MG/DL — SIGNIFICANT CHANGE UP (ref 2.5–4.5)
PLATELET # BLD AUTO: 300 K/UL — SIGNIFICANT CHANGE UP (ref 150–400)
POTASSIUM SERPL-MCNC: 3.9 MMOL/L — SIGNIFICANT CHANGE UP (ref 3.5–5.3)
POTASSIUM SERPL-SCNC: 3.9 MMOL/L — SIGNIFICANT CHANGE UP (ref 3.5–5.3)
RBC # BLD: 3.13 M/UL — LOW (ref 3.8–5.2)
RBC # FLD: 12.8 % — SIGNIFICANT CHANGE UP (ref 10.3–14.5)
SODIUM SERPL-SCNC: 137 MMOL/L — SIGNIFICANT CHANGE UP (ref 135–145)
WBC # BLD: 6.72 K/UL — SIGNIFICANT CHANGE UP (ref 3.8–10.5)
WBC # FLD AUTO: 6.72 K/UL — SIGNIFICANT CHANGE UP (ref 3.8–10.5)

## 2023-05-10 PROCEDURE — 99291 CRITICAL CARE FIRST HOUR: CPT | Mod: 24

## 2023-05-10 RX ORDER — SODIUM CHLORIDE 9 MG/ML
500 INJECTION INTRAMUSCULAR; INTRAVENOUS; SUBCUTANEOUS ONCE
Refills: 0 | Status: COMPLETED | OUTPATIENT
Start: 2023-05-10 | End: 2023-05-10

## 2023-05-10 RX ORDER — ENOXAPARIN SODIUM 100 MG/ML
40 INJECTION SUBCUTANEOUS EVERY 24 HOURS
Refills: 0 | Status: DISCONTINUED | OUTPATIENT
Start: 2023-05-10 | End: 2023-05-18

## 2023-05-10 RX ADMIN — PANTOPRAZOLE SODIUM 40 MILLIGRAM(S): 20 TABLET, DELAYED RELEASE ORAL at 12:08

## 2023-05-10 RX ADMIN — CHLORHEXIDINE GLUCONATE 15 MILLILITER(S): 213 SOLUTION TOPICAL at 17:25

## 2023-05-10 RX ADMIN — ATORVASTATIN CALCIUM 80 MILLIGRAM(S): 80 TABLET, FILM COATED ORAL at 21:31

## 2023-05-10 RX ADMIN — SODIUM CHLORIDE 500 MILLILITER(S): 9 INJECTION INTRAMUSCULAR; INTRAVENOUS; SUBCUTANEOUS at 06:15

## 2023-05-10 RX ADMIN — OXYCODONE HYDROCHLORIDE 5 MILLIGRAM(S): 5 TABLET ORAL at 18:34

## 2023-05-10 RX ADMIN — CHLORHEXIDINE GLUCONATE 1 APPLICATION(S): 213 SOLUTION TOPICAL at 12:08

## 2023-05-10 RX ADMIN — Medication 4 MILLIGRAM(S): at 21:31

## 2023-05-10 RX ADMIN — AMLODIPINE BESYLATE 5 MILLIGRAM(S): 2.5 TABLET ORAL at 05:27

## 2023-05-10 RX ADMIN — CARVEDILOL PHOSPHATE 3.12 MILLIGRAM(S): 80 CAPSULE, EXTENDED RELEASE ORAL at 18:58

## 2023-05-10 RX ADMIN — Medication 4 MILLILITER(S): at 05:41

## 2023-05-10 RX ADMIN — OXYCODONE HYDROCHLORIDE 5 MILLIGRAM(S): 5 TABLET ORAL at 21:31

## 2023-05-10 RX ADMIN — Medication 1 PACKET(S): at 17:25

## 2023-05-10 RX ADMIN — Medication 5 MILLIGRAM(S): at 12:08

## 2023-05-10 RX ADMIN — Medication 1 TABLET(S): at 12:08

## 2023-05-10 RX ADMIN — Medication 3 MILLILITER(S): at 05:41

## 2023-05-10 RX ADMIN — Medication 1 PACKET(S): at 05:27

## 2023-05-10 RX ADMIN — Medication 5 MILLIGRAM(S): at 21:30

## 2023-05-10 RX ADMIN — OXYCODONE HYDROCHLORIDE 5 MILLIGRAM(S): 5 TABLET ORAL at 17:26

## 2023-05-10 RX ADMIN — OXYCODONE HYDROCHLORIDE 5 MILLIGRAM(S): 5 TABLET ORAL at 23:00

## 2023-05-10 RX ADMIN — CHLORHEXIDINE GLUCONATE 15 MILLILITER(S): 213 SOLUTION TOPICAL at 05:26

## 2023-05-10 RX ADMIN — CARVEDILOL PHOSPHATE 3.12 MILLIGRAM(S): 80 CAPSULE, EXTENDED RELEASE ORAL at 05:27

## 2023-05-10 RX ADMIN — ENOXAPARIN SODIUM 40 MILLIGRAM(S): 100 INJECTION SUBCUTANEOUS at 21:31

## 2023-05-10 NOTE — PROGRESS NOTE ADULT - SUBJECTIVE AND OBJECTIVE BOX
***********************************************  ADULT NSICU PROGRESS NOTE  MKASIM TRIVEDI [id number] [facility] 10EA 02  ***********************************************    24H INTERVAL EVENTS:       ROS: negative except per mentioned above in 24h interval events.      VITALS:    ICU Vital Signs Last 24 Hrs  T(C): 37.3 (09 May 2023 09:00), Max: 37.6 (09 May 2023 05:01)  T(F): 99.2 (09 May 2023 09:00), Max: 99.7 (09 May 2023 05:01)  HR: 68 (09 May 2023 12:37) (57 - 74)  BP: 109/54 (09 May 2023 12:00) (93/47 - 138/65)  BP(mean): 74 (09 May 2023 12:00) (67 - 104)  ABP: --  ABP(mean): --  RR: 23 (09 May 2023 12:00) (16 - 32)  SpO2: 98% (09 May 2023 12:37) (91% - 99%)    O2 Parameters below as of 09 May 2023 12:00  Patient On (Oxygen Delivery Method): BiPAP/CPAP    O2 Concentration (%): 60          EXAM:     Giovanna Coma Scale: 3/1T/6 = 10    General: normocephalic, head wrapped, laying in bed, in no distress  Neuro     MS: [place] Coma Scale: 3/1T/6 = 10, follows commands, cooperative with examination, normal attention    CN: PERRL, (+)R. gaze, plegia to left gaze     Mot: bulk normal, tone normal, power UPPER 2/3, LOWER 3/3  Chest: mechanically ventilated, coarse breath sounds, heart regular rate/rhythm, present S1/S2, no murmurs or rubs  Abdomen: nondistended, soft and nontender without peritoneal signs, normoactive bowel sounds  Extremities: no clubbing, well-perfused, no edema                                  10.6   6.86  )-----------( 332      ( 09 May 2023 05:17 )             32.4     05-09    136  |  101  |  13  ----------------------------<  116<H>  4.1   |  25  |  0.52    Ca    9.0      09 May 2023 05:17  Phos  4.8     05-09  Mg     2.0     05-09    TPro  6.9  /  Alb  3.4  /  TBili  0.3  /  DBili  x   /  AST  23  /  ALT  31  /  AlkPhos  121<H>  05-08      Culture - CSF with Gram Stain (collected 05-08-23 @ 14:35)  Source: .CSF  Gram Stain (05-08-23 @ 16:05):    No organisms seen    Rare to few White blood cells  Preliminary Report (05-09-23 @ 09:58):    No growth to date.      MEDICATIONS  (STANDING):  amLODIPine   Tablet 5 milliGRAM(s) Oral daily  ascorbic acid 500 milliGRAM(s) Oral <User Schedule>  atorvastatin 80 milliGRAM(s) Oral at bedtime  carvedilol 3.125 milliGRAM(s) Oral every 12 hours  chlorhexidine 0.12% Liquid 15 milliLiter(s) Oral Mucosa every 12 hours  chlorhexidine 2% Cloths 1 Application(s) Topical daily  doxazosin 4 milliGRAM(s) Oral at bedtime  ferrous    sulfate 325 milliGRAM(s) Oral <User Schedule>  lactobacillus acidophilus 1 Tablet(s) Oral daily  methylphenidate 5 milliGRAM(s) Oral two times a day  pantoprazole   Suspension 40 milliGRAM(s) Oral daily  psyllium Powder 1 Packet(s) Oral every 12 hours    MEDICATIONS  (PRN):  acetylcysteine 10%  Inhalation 4 milliLiter(s) Inhalation every 6 hours PRN increased secretions  albuterol/ipratropium for Nebulization 3 milliLiter(s) Nebulizer every 6 hours PRN Respiratory Distress  oxyCODONE    IR 5 milliGRAM(s) Oral every 4 hours PRN Moderate Pain (4 - 6)        ***********************************************  ASSESSMENT AND PLAN  ***********************************************    #Suboccipital pseudomeningocele  #S/p placement of VPS, 5/8/23  #Bilateral cerebellar hemispheric strokes  #S/p SOC for foramen magnum decompression and R. parieto-occipital placement of EVD, 4/22/23  #11mm wide-necked multilobulated L. cavernous ICA aneurysm & 8mm wide-necked R. cavernous ICA aneurysm  #R. V4 occlusion  #S/p tracheostomy placement w/ tube exchanged due to broken  balloon, 4/28/23  #S/p lap-assisted gastrostomy tube, 5/2/23  #L. intramuscular calf DVT  #S/p IVCF placement, 5/4/23  #History of peripheral neuropathy, CAD, and asthma      NEURO  - Admit NSICU, Q2h neuro checks / Q2h vital signs  - F/u vascular conference re plan for b/l cavernous ICA aneurysms  - Stroke consultation, heme consultation  - Pain control, PT/OT when able  - Discuss initiation of stimulant    PULM  - Volume control/assist control ventilation, wean to pressure support as tolerated  - 6 Cuffed trach  - SpO2 goal > 92%, supplemental O2 and pulm toileting as needed    CARDIO  - BP goal: < 140 while postoperative  - Pending SALVADOR    GI  - Diet: tube feeds  - Stress ulcer prophylaxis: PPI    /RENAL  - Monitor UOP/volume status, BUN/SCr    HEME  - Maintain Hb > 7.0, PLT > 100,000  - Heme consultation for stroke in the young  - SCDs, holding SQ chemoppx  - Removal of IVFC ASAP, hypercoag panel workup pending    ID  - Monitor for infectious s/s, fever curve, leukocytosis  - S/p ertapenem (5/1-5/17) for ESBL UTI, Pluralibacter gergoviae/Strep pneumo sputum  - Cleared for VPS placement by ID    ENDO  - SGlu < 180     ***********************************************  ADULT NSICU PROGRESS NOTE  MAKSIM TRIVEDI [id number] [facility] 10EA 02  ***********************************************    24H INTERVAL EVENTS: low minute ventilation, placed back on AC/VC.  No acute events overnight.      ROS: negative except per mentioned above in 24h interval events.      VITALS:    ICU Vital Signs Last 24 Hrs  T(C): 36.4 (10 May 2023 14:32), Max: 37.6 (09 May 2023 22:05)  T(F): 97.6 (10 May 2023 14:32), Max: 99.7 (09 May 2023 22:05)  HR: 72 (10 May 2023 17:00) (62 - 80)  BP: 93/53 (10 May 2023 17:00) (84/48 - 111/52)  BP(mean): 65 (10 May 2023 17:00) (63 - 82)  ABP: --  ABP(mean): --  RR: 17 (10 May 2023 17:00) (12 - 35)  SpO2: 97% (10 May 2023 17:00) (75% - 99%)    O2 Parameters below as of 10 May 2023 17:00  Patient On (Oxygen Delivery Method): ventilator    O2 Concentration (%): 40        EXAM:     [place] Coma Scale: 3/1T/6 = 10    General: normocephalic, head wrapped, laying in bed, in no distress  Neuro     MS: Giovanna Coma Scale: 3/1T/6 = 10, follows commands, cooperative with examination, normal attention    CN: PERRL, (+)R. gaze, plegia to left gaze     Mot: bulk normal, tone normal, power UPPER 2/3, LOWER 3/3  Chest: mechanically ventilated, coarse breath sounds, heart regular rate/rhythm, present S1/S2, no murmurs or rubs  Abdomen: nondistended, soft and nontender without peritoneal signs, normoactive bowel sounds  Extremities: no clubbing, well-perfused, no edema                                  9.2    6.72  )-----------( 300      ( 10 May 2023 05:10 )             28.1     05-10    137  |  102  |  9   ----------------------------<  121<H>  3.9   |  29  |  0.49<L>    Ca    8.8      10 May 2023 05:10  Phos  3.3     05-10  Mg     2.0     05-10        MEDICATIONS  (STANDING):  amLODIPine   Tablet 5 milliGRAM(s) Oral daily  ascorbic acid 500 milliGRAM(s) Oral <User Schedule>  atorvastatin 80 milliGRAM(s) Oral at bedtime  carvedilol 3.125 milliGRAM(s) Oral every 12 hours  chlorhexidine 0.12% Liquid 15 milliLiter(s) Oral Mucosa every 12 hours  chlorhexidine 2% Cloths 1 Application(s) Topical daily  doxazosin 4 milliGRAM(s) Oral at bedtime  enoxaparin Injectable 40 milliGRAM(s) SubCutaneous every 24 hours  ferrous    sulfate 325 milliGRAM(s) Oral <User Schedule>  lactobacillus acidophilus 1 Tablet(s) Oral daily  methylphenidate 5 milliGRAM(s) Oral two times a day  pantoprazole   Suspension 40 milliGRAM(s) Oral daily  psyllium Powder 1 Packet(s) Oral every 12 hours    MEDICATIONS  (PRN):  acetylcysteine 10%  Inhalation 4 milliLiter(s) Inhalation every 6 hours PRN increased secretions  albuterol/ipratropium for Nebulization 3 milliLiter(s) Nebulizer every 6 hours PRN Respiratory Distress  oxyCODONE    IR 5 milliGRAM(s) Oral every 4 hours PRN Moderate Pain (4 - 6)      ***********************************************  ASSESSMENT AND PLAN  ***********************************************    #Suboccipital pseudomeningocele  #S/p placement of VPS, 5/8/23  #Bilateral cerebellar hemispheric strokes  #S/p SOC for foramen magnum decompression and R. parieto-occipital placement of EVD, 4/22/23  #11mm wide-necked multilobulated L. cavernous ICA aneurysm & 8mm wide-necked R. cavernous ICA aneurysm  #R. V4 occlusion  #S/p tracheostomy placement w/ tube exchanged due to broken  balloon, 4/28/23  #S/p lap-assisted gastrostomy tube, 5/2/23  #Abdominal discomfort  #L. intramuscular calf DVT  #S/p IVCF placement, 5/4/23  #History of peripheral neuropathy, CAD, and asthma      NEURO  - Admit NSICU, Q2h neuro checks / Q2h vital signs  - F/u vascular conference re plan for b/l cavernous ICA aneurysms  - Stroke consultation, heme consultation  - Pain control, PT/OT when able  - Discuss initiation of stimulant    PULM  - Volume control/assist control ventilation, wean to pressure support as tolerated  - 6 Cuffed trach  - SpO2 goal > 92%, supplemental O2 and pulm toileting as needed    CARDIO  - BP goal: < 140 while postoperative  - Pending SALVADOR    GI  - Diet: tube feeds  - Stress ulcer prophylaxis: PPI  - Check lactate, RLQ ultrasound, bladder scan    /RENAL  - Monitor UOP/volume status, BUN/SCr    HEME  - Maintain Hb > 7.0, PLT > 100,000  - Heme consultation for stroke in the young  - SCDs, holding SQ chemoppx  - Removal of IVFC ASAP, hypercoag panel workup pending  - AC/ASA start date TBD per discussion with stroke/heme/NSG    ID  - Monitor for infectious s/s, fever curve, leukocytosis  - S/p ertapenem (5/1-5/17) for ESBL UTI, Pluralibacter gergoviae/Strep pneumo sputum  - Cleared for VPS placement by ID    ENDO  - SGlu < 180

## 2023-05-10 NOTE — CHART NOTE - NSCHARTNOTEFT_GEN_A_CORE
POD 19 SOC POD2 VPS. Remains on full vent support. Tolerated CPAP for 6 hours. Neuro exam stable. BP liberalized to 160. RLQ US 2/2 abd pain. Lactate WNL.

## 2023-05-10 NOTE — PROGRESS NOTE ADULT - SUBJECTIVE AND OBJECTIVE BOX
INTERVAL HISTORY: HPI:  58 yo F with PMH of Asthma, CAD (not on  meds), peripheral neuropathy and PSH of BATOOL, cholecystectomy, right knee surgery presented to [facility] ED on 4/20 with right sided weakness and right facial numbness. Patient was undergoing preparation for colonoscopy. As per daughter at 8am patient was playing with her grandchildren but around 840 am patient was getting dressed and fell and subsequently felt weak after. Daughter reports that patient had some episodes of vomiting at this time. She had a syncopal episode at around 10 am and was witnessed by brother. No head trauma, She regained conscious after few minutes. She remained in bed for the remainder of the day. Daughter reports some slurred speech noted 1230-1PM and also was confused after. and around 4PM, the patient's sister noted right sided weakness at which time EMS was called and patient was brought to ED. No c/o chest pain, shortness of breath, fever, headache, abdominal pain, urinary complaints. No recent travel/sickness/ change in meds. Stroke code in ER: CTH neg for heme, Right PICA distribution acute infarction. CTA with saccular aneurysms of b/l carotids, approximately 0.8 cm on the right and 1.2 x 0.9 cm on the left. Possible tiny third saccular aneurysm on the right Posterior intracranial circulation: Right vertebral arterial occlusion at its dural crossing junction V3 [place] and V4 intracranial segments with likely reversal of flow in its intracranial segment from the basilar. Right PICA faintly seen. 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  Not candidate for TNK/mechanical thrombectomy. CT scan repeated which showed: 1. Brain: Progression of acute infarction within the right cerebellar hemisphere, also extending into the left superior cerebellar hemisphere. New mass effect on the fourth ventricle causing stenosis versus occlusion with new third and lateral ventricular dilatation indicating ventricular obstruction at the level of the fourth ventricle. New upward and downward herniation of cerebellar parenchyma Right carotid system: No hemodynamically significant stenosis. Left carotid system: No hemodynamically significant stenosis. Vertebral circulation: Patent. Anterior intracranial circulation: Intact. Bilateral internal carotid saccular aneurysms. These findings are unchanged. Posterior intracranial circulation:    Improved flow within the right vertebral artery since 4/20/2023. New focal stenosis mid left vertebral artery, etiology uncertain, consider vasospasm and extrinsic compression in addition to new embolic disease. Brain perfusion: Perfusion images demonstrate normalization of the perfusion abnormality in the right cerebellar hemisphere present on 4/20/2023 despite evidence of progression of acute infarction.  Areas of apparent ischemia within the posterior fossa have progressed in extent, also again involving the left posterior cerebral arterial distribution. Tx to [facility] for SOC watch. On admission to [facility], NIHSS 12.  (21 Apr 2023 16:50)    PAST MEDICAL & SURGICAL HISTORY:  Asthma  CAD (coronary artery disease)  Peripheral neuropathy  s/P total abdominal hysterectomy  S/P cholecystectomy  S/P right knee surgery      REVIEW OF SYSTEMS: [ ] Unable to Assess due to neurologic exam   [x] All ROS addressed below are non-contributory, except:  Neuro: [ ] Headache [ ] Back pain [ ] Numbness [ ] Weakness [ ] Ataxia [ ] Dizziness [ ] Aphasia [ ] Dysarthria [ ] Visual disturbance  Resp: [ ] Shortness of breath/dyspnea, [ ] Orthopnea [ ] Cough  CV: [ ] Chest pain [ ] Palpitation [ ] Lightheadedness [ ] Syncope  Renal: [ ] Thirst [ ] Edema  GI: [ ] Nausea [ ] Emesis [ ] Abdominal pain [ ] Constipation [ ] Diarrhea  Hem: [ ] Hematemesis [ ] bright red blood per rectum  ID: [ ] Fever [ ] Chills [ ] Dysuria  ENT: [ ] Rhinorrhea    PHYSICAL EXAM:    General: No Acute Distress   Neurological:  AOx2 to choice, R eye 6th nerve palsy, b/l horizontal nystagmus, b/l UE & LE dysmetria, RUE & RLE 4+/5 w/ mild drift   Pulmonary: Clear to Auscultation, No Rales, No Rhonchi, No Wheezes   Cardiovascular: S1, S2, Regular Rate and Rhythm   Gastrointestinal: Soft, Nontender, Nondistended   Extremities: No calf tenderness   Incision: CDI    ICU Vital Signs Last 24 Hrs  T(C): 36.9 (10 May 2023 21:42), Max: 37.4 (10 May 2023 18:00)  T(F): 98.5 (10 May 2023 21:42), Max: 99.3 (10 May 2023 18:00)  HR: 67 (10 May 2023 22:00) (62 - 80)  BP: 94/48 (10 May 2023 22:00) (84/48 - 109/52)  BP(mean): 69 (10 May 2023 22:00) (63 - 79)  RR: 16 (10 May 2023 22:00) (12 - 35)  SpO2: 97% (10 May 2023 22:00) (75% - 99%)      05-09-23 @ 07:01  -  05-10-23 @ 07:00  --------------------------------------------------------  IN: 2390 mL / OUT: 1200 mL / NET: 1190 mL    05-10-23 @ 07:01  -  05-10-23 @ 22:39  --------------------------------------------------------  IN: 835 mL / OUT: 850 mL / NET: -15 mL        Mode: AC/ CMV (Assist Control/ Continuous Mandatory Ventilation), RR (machine): 16, TV (machine): 400, FiO2: 40, PEEP: 5, ITime: 1, MAP: 9, PIP: 21    acetylcysteine 10%  Inhalation 4 milliLiter(s) Inhalation every 6 hours PRN  albuterol/ipratropium for Nebulization 3 milliLiter(s) Nebulizer every 6 hours PRN  amLODIPine   Tablet 5 milliGRAM(s) Oral daily  ascorbic acid 500 milliGRAM(s) Oral <User Schedule>  atorvastatin 80 milliGRAM(s) Oral at bedtime  carvedilol 3.125 milliGRAM(s) Oral every 12 hours  chlorhexidine 0.12% Liquid 15 milliLiter(s) Oral Mucosa every 12 hours  chlorhexidine 2% Cloths 1 Application(s) Topical daily  doxazosin 4 milliGRAM(s) Oral at bedtime  enoxaparin Injectable 40 milliGRAM(s) SubCutaneous every 24 hours  ferrous    sulfate 325 milliGRAM(s) Oral <User Schedule>  lactobacillus acidophilus 1 Tablet(s) Oral daily  methylphenidate 5 milliGRAM(s) Oral two times a day  oxyCODONE    IR 5 milliGRAM(s) Oral every 4 hours PRN  pantoprazole   Suspension 40 milliGRAM(s) Oral daily  psyllium Powder 1 Packet(s) Oral every 12 hours      LABS:  Na: 137 (05-10 @ 05:10), 136 (05-09 @ 05:17), 139 (05-08 @ 17:13), 139 (05-08 @ 05:30)  K: 3.9 (05-10 @ 05:10), 4.1 (05-09 @ 05:17), 4.8 (05-08 @ 17:13), 4.0 (05-08 @ 05:30)  Cl: 102 (05-10 @ 05:10), 101 (05-09 @ 05:17), 103 (05-08 @ 17:13), 102 (05-08 @ 05:30)  CO2: 29 (05-10 @ 05:10), 25 (05-09 @ 05:17), 27 (05-08 @ 17:13), 27 (05-08 @ 05:30)  BUN: 9 (05-10 @ 05:10), 13 (05-09 @ 05:17), 12 (05-08 @ 17:13), 10 (05-08 @ 05:30)  Cr: 0.49 (05-10 @ 05:10), 0.52 (05-09 @ 05:17), 0.57 (05-08 @ 17:13), 0.51 (05-08 @ 05:30)  Glu: 121(05-10 @ 05:10), 116(05-09 @ 05:17), 121(05-08 @ 17:13), 117(05-08 @ 05:30)    Hgb: 9.2 (05-10 @ 05:10), 10.6 (05-09 @ 05:17), 10.9 (05-08 @ 17:13), 10.8 (05-08 @ 05:30)  Hct: 28.1 (05-10 @ 05:10), 32.4 (05-09 @ 05:17), 33.7 (05-08 @ 17:13), 33.0 (05-08 @ 05:30)  WBC: 6.72 (05-10 @ 05:10), 6.86 (05-09 @ 05:17), 8.96 (05-08 @ 17:13), 6.68 (05-08 @ 05:30)  Plt: 300 (05-10 @ 05:10), 332 (05-09 @ 05:17), 398 (05-08 @ 17:13), 422 (05-08 @ 05:30)    INR: 1.13 05-08-23 @ 05:30  PTT: 35.8 05-08-23 @ 05:30

## 2023-05-10 NOTE — PROGRESS NOTE ADULT - ASSESSMENT
56 y/o female transferred from [place] with right sided weakness and right facial numbness. Found to have acute infarction within the right cerebellar hemisphere, causing herniation. Now s/p SOC foramen magnum decompression and right parietal/occipital EVD placement (4/21). Course c/b respiratory insuffiencey d/t bulbar dysfunction, s/p trach 4/28/23 failed bedside  PEG placement 5/1; now s/p VPS certas @ 4    -NC q4, VSq4   -CPAP trail as tolerated  -c/w tube feeds,   -c/w coreg  -pending restart of asprin   -communication board request from Speech

## 2023-05-10 NOTE — CHART NOTE - NSCHARTNOTEFT_GEN_A_CORE
Chart note:    Cardiology requested for SALVADOR to evaluate for cardioembolic source of CVA. The patient is admitted with bilateral cerebellar strokes that has been c/b respiratory failure and dysphagia and is now s/p tracheostomy and PEG placement.  She has also had persistent increased intracranial pressures s/p CVA and is now s/p VPS. Hematology has been consulted and has a high suspicion for APS and will start empiric anticoagulation. Additionally, she has an acute DVT of L intramuscular calf vein for which AC will be started. She had a TTE on 04/24/23 that revealed normal biventricular function without any LV thrombus, no significant valve abnormalities, and a negative bubble study.  She has no history of atrial fibrillation and has been admitted for nearly 2 weeks in the ICU under telemetry monitoring without any documented atrial fibrillation.  As such, there virtually zero chance of DEVIN thrombus given sinus rhythm. At this time, she is not a candidate for PFO closure given concern for APS and remains in the NSICU. As the patient already has multiple indications for systemic anticoagulation, the case was discussed with SALVADOR attendings and felt that an additional invasive test would not  at this time which was relayed to the primary team.     However, if the SALVADOR is critical to altering the patient's clinical course or , we will certainly reassess.

## 2023-05-10 NOTE — PROGRESS NOTE ADULT - SUBJECTIVE AND OBJECTIVE BOX
HPI:  58 yo F with PMH of Asthma, CAD (not on  meds), peripheral neuropathy and PSH of BATOOL, cholecystectomy, right knee surgery presented to [place] ED on 4/20 with right sided weakness and right facial numbness. Patient was undergoing preparation for colonoscopy. As per daughter at 8am patient was playing with her grandchildren but around 840 am patient was getting dressed and fell and subsequently felt weak after. Daughter reports that patient had some episodes of vomiting at this time. She had a syncopal episode at around 10 am and was witnessed by brother. No head trauma, She regained conscious after few minutes. She remained in bed for the remainder of the day. Daughter reports some slurred speech noted 1230-1PM and also was confused after. and around 4PM, the patient's sister noted right sided weakness at which time EMS was called and patient was brought to ED. No c/o chest pain, shortness of breath, fever, headache, abdominal pain, urinary complaints. No recent travel/sickness/ change in meds. Stroke code in ER: CTH neg for heme, Right PICA distribution acute infarction. CTA with saccular aneurysms of b/l carotids, approximately 0.8 cm on the right and 1.2 x 0.9 cm on the left. Possible tiny third saccular aneurysm on the right Posterior intracranial circulation: Right vertebral arterial occlusion at its dural crossing junction V3 Atlantic and V4 intracranial segments with likely reversal of flow in its intracranial segment from the basilar. Right PICA faintly seen. 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  Not candidate for TNK/mechanical thrombectomy. CT scan repeated which showed: 1. Brain: Progression of acute infarction within the right cerebellar hemisphere, also extending into the left superior cerebellar hemisphere. New mass effect on the fourth ventricle causing stenosis versus occlusion with new third and lateral ventricular dilatation indicating ventricular obstruction at the level of the fourth ventricle. New upward and downward herniation of cerebellar parenchyma Right carotid system: No hemodynamically significant stenosis. Left carotid system: No hemodynamically significant stenosis. Vertebral circulation: Patent. Anterior intracranial circulation: Intact. Bilateral internal carotid saccular aneurysms. These findings are unchanged. Posterior intracranial circulation:    Improved flow within the right vertebral artery since 4/20/2023. New focal stenosis mid left vertebral artery, etiology uncertain, consider vasospasm and extrinsic compression in addition to new embolic disease. Brain perfusion: Perfusion images demonstrate normalization of the perfusion abnormality in the right cerebellar hemisphere present on 4/20/2023 despite evidence of progression of acute infarction.  Areas of apparent ischemia within the posterior fossa have progressed in extent, also again involving the left posterior cerebral arterial distribution. Tx to [facility] for SOC watch. On admission to [facility], NIHSS 12.  (21 Apr 2023 16:50)    Hospital course:  4/21: Admitted for crani watch, CTH complete w/ unchanged hydrocephalus, taken for emergent occipital craniectomy.   4/22: POD1. Remains intubated overnight, on propofol and dank. EVD@[id number]. Pending repeat CTH this am. Given hydralazine 10mg x1. Started on cardene for SBP high 140s-150s. Sub-therapeutic on plavix, therapeutic on asa, rpt asa accumetrics until subtherapeutic. LE dopplers neg for DVT, but showing superficial venous thrombosis in the proximal portion of the right greater saphenous vein. Started on 3% @60 for na goal 145-150. NGT placed by ENT. Febrile, pancultured.  4/23: POD 2. 3% increased to 75. NGT readjusted. UA neg. SBP liberalized to 160. Plan to extubate. 3% decreased to 60. Failed extubation d/t no cuff leak, given 60mg solumedrol, plan to extubate in 6 hrs. SCx1 for post void residual >400, started on cardura at bedtime. New blood in buretrol, stopped SQL. Clot in EVD cleared. Neuro exam improving.   4/24: POD 3. Cont 3% with NS while NPO, start TF today. TF started. LFTs downtrending. Sodium 141. Increased 3% to 50, NS to 30. Repeat BMP ordered for 5:30PM. Tramadol 25 for pain with no relief, oxy 5 and 1g tylenol ordered, stability CT stable, speech language consult for dysarthria. Given hydralazine 10mg IVP for SBP>160, started amlodipine 5mg. C/o mild headache, given fioricet x1, stroke neuro rec SALVADOR and loop recorder placement. Echo with bubble completed, negative for PFO, EF 55-60%. J HFNC started for desats with suctioning.   4/25: POD 4. Cont HFNC. Increased secertions on HFNC, reintubated. CXR confirmed good placement. EVD dropped to 5cmH20 for goal of draining 5-10cc/hr and SG ELENA drain taken off suction. Pending CTH. EVD drained 0cc/hr, dropped to 0. NGT replaced. Dc'd fioricet. Started on PPI for intubation. Increased cardura to 4mg for urinary retention, given an additional 2mg now. Started salt tabs 3 q 6. Given 12.5mg fentanyl x1. NGT replaced, CXR shown in lung. NGT removed, repeat CXR showing no pneumothorax. Pending ENT consult for NGT placement. CTH stable. NGT replaced by ENT, confirmed in proper spot. Restarted TF. [id number]. Na 141 from 146. Increased 3% to 60. EVD raised to 3cmH20. ETT pulled back 1cm by RT.  4/26: POD 5 SOC. Intubated, remains on precedex, ABG ordered with improving PaO2, BMP sodium 148, 3% changed to 30cc/hr, cardura increased to 8mg for tonight, tolerating cpap  4/27: POD 6 SOC. Respiratory rate sustained 6, returned to FVS. Na 151, dc'd 3%, f/u AM sodium. Nurse noticed ETT 19 at the lip and was 20 prior, CXR shows ETT @ 5cm above jono, ET tube advanced 1cm, repeat CXR confirms appropriate placement. Thick inline secretions with suctioning. ENT trach placement Fri likely, PEG likely Mon. Keep ELENA drain until no output, EVD to remain @3. Start ASA 81mg daily tomorrow.   4/28: POD7 SOC, neuro stable, given fentanyl x 1 overnight for presumed discomfort (biting on ETT), remains on full vent support. CTH today stable in comparison to 4/25. 6 cuffed trach placed by ENT in OR, but came down without cuff. Replaced at bedside by ENT. On fentanyl gtt.    4/29: POD8 SOC, JIM overnight. Incision cleaned and dressing changed. On fentanyl gtt. EKG completed due to increased frequency PVCs on telemetry, frequency of PVCs improved with titrating up fentanyl gtt. ABG drawn.  Repeat LE dopplers completed showing persistant superficial thrombus, no DVT. No EVD waveform during day, flushed distally without improvement. Exam unchanged.  EVD dropped to 0 for low output in afternoon.   4/30: POD9. Neuro stable, febrile to 101.3F o/n, given tylenol and pan cx sent. SBP dipped to low 90s o/n, HR stable in 70s with some PVCs, decreased fentanyl gtt and given 500cc bolus of NS x 2. Pend PEG placement Mon and angio Tues. D/c'd ELENA drain today and suture placed. F/u heme recs. Positive UA. Infiltrates found on CXR. Started on Zosyn 4.5g Q6hrs .   5/1: POD 10. Neuro stable. Dc'd fentanyl gtt. PEG failed placement at bedside with Dr. Gant, plan for PEG in OR tomorrow afternoon with Dr. Layton. Dc'd amlodipine and started carvedilol 3.125 BID for PVCs . Made 3% inhalation and mucomyst q8hr. Failed PEG at bedside. Salt tabs made 1 q 6. Decreased cardura to 4mg daily. Urine culture growing E. Coli and sputum culture growing pluralibacter gergoviae and strep species. ID consulted, f/u recs. Failed CPAP trial @ 3pm. Added am labs per heme/onc for hypercoguable workup. Pending repeat LE dopplers in 2-3 days. NGT clogged, removed, ENT failed attempt at replacement, will keep NPO for PEG placement tmw. Repeat xray in am for concern for aspration. Pt abx switched from Zosyn to Ertapenem 1g Q24hrs. per ID recs, possible ESBL growth.   5/2: POD 11. Neuro stable. Pre-op for diagnostic cerebral angiogram and PEG placement. NPO. EVD raised to 5 cmH20. Sputum culture speciated to step pneumoniae, sensitive to ertapenem. 3% inhalation/mucomyst made q 6 hr d/t thick secretions. PEG placed in OR. POD0 dx cerebral angio. EVD raised to 10.   5/3: POD 12. Neuro stable. PEG feeds began @ 10 AM, plan to increase 10cc every 6h per general surgery. Repeat dopplers show stable R superficial thrombus and new L IM calf DVT. Vascular consulted for IVC filter. Plan to get CTH tomorrow.  5/4: POD 13. JIM o/n. s/p IVC filter with vascular today. Pending post-procedure CTH. FOBT negative. Started iron and vitamin c every other day for iron deficiency anemia.   5/5: POD14. CSF cx sent to r/o infection from new gas in right temporal horn seen on CTH. Restarted TF, changed to Jevity 1.2, f/u nutrition recs. EVD raised to 10 today, plan to challenge over the weekend and CTH on Monday, possible VPS next Wednesday. ENT removed sutures today. Salt tabs decreased 1q12.  5/6: POD15 Placed on CPAP briefly with low MV alert, placed back on full vent support. EVD remains open at [id number]. ICPs WNL. Neuro exam stable.  EVD raised to 15. Tolerated CPAP x8h.   5/7: POD#16. JIM overnight, neuro stable. D/c'd salt tabs and 3% neb. Wean trach to CPAP as tolerated. Pan cx NGTD. EVD raised today to [id number].   5/8: POD17 JIM overnight. ICPs WNL. Pt remains on full vent support. Neuro exam stable. Plan for possible VPS today. CT chio complete showing increase in vent size.   5/9: POD18 SOC, POD1 VPS. Desat o/n to 80s, improved on own. CXR with LLL effusion. Another desat to 90% in AM, given duoneb and mucomyst. Oxycodone given for incisional pain. Neuro exam stable. CTH in AM stable. Keppra dc'verito. Ritalin 5 BID started.  Tolerating CPAP trial. SALVADOR ordered.  5/10: POD 19 SOC POD2 VPS. Remains on full vent support. Neuro exam stable.      Vital Signs Last 24 Hrs  T(C): 37.6 (09 May 2023 22:05), Max: 37.6 (09 May 2023 05:01)  T(F): 99.7 (09 May 2023 22:05), Max: 99.7 (09 May 2023 05:01)  HR: 77 (09 May 2023 23:00) (60 - 77)  BP: 98/55 (09 May 2023 23:00) (93/47 - 111/52)  BP(mean): 73 (09 May 2023 23:00) (67 - 82)  RR: 16 (09 May 2023 23:00) (15 - 26)  SpO2: 97% (09 May 2023 23:00) (91% - 98%)    Parameters below as of 09 May 2023 23:00  Patient On (Oxygen Delivery Method): ventilator    O2 Concentration (%): 40    I&O's Summary    08 May 2023 07:01  -  09 May 2023 07:00  --------------------------------------------------------  IN: 1770 mL / OUT: 1600 mL / NET: 170 mL    09 May 2023 07:01  -  10 May 2023 00:30  --------------------------------------------------------  IN: 960 mL / OUT: 400 mL / NET: 560 mL      PHYSICAL EXAM:   General: NAD, pt is comfortably laying in bed, A&O x 2 with choice (nods head appropriately, examined in Qatari), +trach to full vent   HEENT: OE spont and to voice, R gaze preference (does not cross midline), PERRL 2mm  Cardiovascular: RRR, normal S1 and S2  Respiratory: lungs CTAB, no wheezing, rhonchi, or crackles   GI: normoactive BS to auscultation, abd soft, NTND   Neuro: nods head Y/N to questions, follows commands, moves all extremities spontaneously, LUE/LLE 5/5 throughout, RUE/RLE 4/5   Wound/incision: SOC incision site with sutures and dressing C/D/I, VPS scalp and abdominal incision sites C/D/I, headwrap in place    TUBES/LINES:  [] Garsia  [] Lumbar Drain  [] Wound Drains  [] Others    DIET:  [] NPO  [] Mechanical  [x] Tube feeds      LABS:                        10.6   6.86  )-----------( 332      ( 09 May 2023 05:17 )             32.4     05-09    136  |  101  |  13  ----------------------------<  116<H>  4.1   |  25  |  0.52    Ca    9.0      09 May 2023 05:17  Phos  4.8     05-09  Mg     2.0     05-09    TPro  6.9  /  Alb  3.4  /  TBili  0.3  /  DBili  x   /  AST  23  /  ALT  31  /  AlkPhos  121<H>  05-08    PT/INR - ( 08 May 2023 05:30 )   PT: 13.5 sec;   INR: 1.13          PTT - ( 08 May 2023 05:30 )  PTT:35.8 sec        CAPILLARY BLOOD GLUCOSE          Drug Levels: [] N/A    CSF Analysis: [] N/A      Allergies    No Known Allergies    Intolerances      MEDICATIONS:  Antibiotics:    Neuro:  methylphenidate 5 milliGRAM(s) Oral two times a day  oxyCODONE    IR 5 milliGRAM(s) Oral every 4 hours PRN    Anticoagulation:    OTHER:  acetylcysteine 10%  Inhalation 4 milliLiter(s) Inhalation every 6 hours PRN  albuterol/ipratropium for Nebulization 3 milliLiter(s) Nebulizer every 6 hours PRN  amLODIPine   Tablet 5 milliGRAM(s) Oral daily  atorvastatin 80 milliGRAM(s) Oral at bedtime  carvedilol 3.125 milliGRAM(s) Oral every 12 hours  chlorhexidine 0.12% Liquid 15 milliLiter(s) Oral Mucosa every 12 hours  chlorhexidine 2% Cloths 1 Application(s) Topical daily  doxazosin 4 milliGRAM(s) Oral at bedtime  lactobacillus acidophilus 1 Tablet(s) Oral daily  pantoprazole   Suspension 40 milliGRAM(s) Oral daily  psyllium Powder 1 Packet(s) Oral every 12 hours    IVF:  ascorbic acid 500 milliGRAM(s) Oral <User Schedule>  ferrous    sulfate 325 milliGRAM(s) Oral <User Schedule>  lactated ringers. 1000 milliLiter(s) IV Continuous <Continuous>    CULTURES:  Culture Results:   No growth to date. (05-08 @ 14:35)  Culture Results:   No growth to date (05-05 @ 05:06)    RADIOLOGY & ADDITIONAL TESTS:      ASSESSMENT:  58 y/o female found to have acute infarction within the right cerebellar hemisphere, causing herniation. Now s/p SOC foramen magnum decompression and right parietal/occipital EVD placement (4/21). s/p trach (4/28/23). s/p PEG (5/2/23), s/p dx cerebral angiogram (5/2/23). now s/p VPS Certas @ 4 (5/8/23).     STROKE    No pertinent family history in first degree relatives    Handoff    Asthma    CAD (coronary artery disease)    Peripheral neuropathy    Cerebellar stroke    Respiratory failure, unspecified with hypoxia    History of DVT (deep vein thrombosis)    Hydrocephalus    Tracheostomy malfunction    Cerebellar stroke    Respiratory failure, unspecified with hypoxia    History of DVT of lower extremity    Hydrocephalus    Tracheostomy malfunction    Decompressive craniectomy    Cerebellar infarct    Cerebellar infarct    CAD (coronary artery disease)    Asthma    Peripheral neuropathy    Lupus anticoagulant positive    Anemia due to acute blood loss    Tracheostomy malfunction    Decompressive craniectomy    Insertion, external ventricular drain    Planned tracheostomy    Tracheostomy tube change    Esophagogastroduodenoscopy (EGD) with anesthesia    Insertion, PEG tube, laparoscopic    Angiogram, carotid and cerebral, bilateral    Tracheostomy tube change    IVC filter placement     shunt    Insertion, ventriculopleural shunt    S/P total abdominal hysterectomy    S/P cholecystectomy    S/P right knee surgery    Insertion, external ventricular drain    Planned tracheostomy    Esophagogastroduodenoscopy (EGD) with anesthesia    Insertion, PEG tube, laparoscopic     shunt    CAD (coronary artery disease)    Asthma    Peripheral neuropathy    Lupus anticoagulant positive    Acute respiratory failure with hypoxia    Acute respiratory failure with hypoxia    Dysphagia    Deep vein thrombosis (DVT)    Hydrocephalus    [email or web address]    PLAN:  Neuro   - Vitals/neuro q2h   - s/p VPS (Certas @ 4)   - dx angio 5/2: b/l cavernous ICA aneurysms, as discussed at vascular conference f/u outpt   - CTH 4/28 stable; 5/4 new gas in right temporal horn, CT 5/8 chio increased vent size, 5/9 stable   - Stroke consulted, appreciate reccs   - Pain control with tylenol prn, oxycodone prn, fent pushes 12.5 mg q 2hr prn   - Ritalin 5 BID for neurostimulation  - f/u when can start ASA 81mg     Cardio  - SBP   - TTE 4/24: negative for PFO, mild LVH, mild dilation of L atrium, EF 55-60%   - Carvedilol 3.125mg BID   - pending SALVADOR    Pulm  - + Trach (6 shiley) 400/60/16/5, CPAP trials as tolerated  - Chest PT, duoneb, mucomist q 6 prn     GI  - + PEG - advance TFs  - + RT - loose stool, BR held   - Protonix for GI ppx while on vent    Renal  - IVL, LR at 75 when NPO overnight  - Voiding via primafit     Endo   - cont lipitor     Heme  - SQL held  postop  - s/p IVCF 5/4   - LE dopplers 4/22 neg for DVT, but showing superficial venous thrombosis in the proximal portion of the right greater saphenous vein; repeat on 4/29 with persistant superficial thrombus, 5/3 new DVT L IM calf and unchanged R superficial vein thrombus  - Heme following for positive anticardiolipin Ab 4/27, recs appreciated, f/u activated protein C    - Iron and vitamin c every other day     ID  - CSF sent from OR (5/8)   - MRSA (-), +UA cx ESBL, +sputum cx pluralibacter gergoviae, strep pneumoniae, s/p Ertapenem 1g Q24hrs (5/1-5/7)    DISPO:  - NSICU, full code   - PT/OT rec acute inpatient rehab: patient can tolerate 3 hrs PT/OT daily    D/w Dr. Valadez and Dr. Garcia

## 2023-05-11 LAB
ANION GAP SERPL CALC-SCNC: 8 MMOL/L — SIGNIFICANT CHANGE UP (ref 5–17)
BUN SERPL-MCNC: 11 MG/DL — SIGNIFICANT CHANGE UP (ref 7–23)
CALCIUM SERPL-MCNC: 8.8 MG/DL — SIGNIFICANT CHANGE UP (ref 8.4–10.5)
CHLORIDE SERPL-SCNC: 103 MMOL/L — SIGNIFICANT CHANGE UP (ref 96–108)
CO2 SERPL-SCNC: 28 MMOL/L — SIGNIFICANT CHANGE UP (ref 22–31)
CREAT SERPL-MCNC: 0.47 MG/DL — LOW (ref 0.5–1.3)
EGFR: 112 ML/MIN/1.73M2 — SIGNIFICANT CHANGE UP
GLUCOSE SERPL-MCNC: 134 MG/DL — HIGH (ref 70–99)
HCT VFR BLD CALC: 27.4 % — LOW (ref 34.5–45)
HGB BLD-MCNC: 8.9 G/DL — LOW (ref 11.5–15.5)
MAGNESIUM SERPL-MCNC: 2 MG/DL — SIGNIFICANT CHANGE UP (ref 1.6–2.6)
MCHC RBC-ENTMCNC: 29.3 PG — SIGNIFICANT CHANGE UP (ref 27–34)
MCHC RBC-ENTMCNC: 32.5 GM/DL — SIGNIFICANT CHANGE UP (ref 32–36)
MCV RBC AUTO: 90.1 FL — SIGNIFICANT CHANGE UP (ref 80–100)
NON-GYNECOLOGICAL CYTOLOGY STUDY: SIGNIFICANT CHANGE UP
NRBC # BLD: 0 /100 WBCS — SIGNIFICANT CHANGE UP (ref 0–0)
PHOSPHATE SERPL-MCNC: 3.7 MG/DL — SIGNIFICANT CHANGE UP (ref 2.5–4.5)
PLATELET # BLD AUTO: 271 K/UL — SIGNIFICANT CHANGE UP (ref 150–400)
POTASSIUM SERPL-MCNC: 3.5 MMOL/L — SIGNIFICANT CHANGE UP (ref 3.5–5.3)
POTASSIUM SERPL-SCNC: 3.5 MMOL/L — SIGNIFICANT CHANGE UP (ref 3.5–5.3)
RBC # BLD: 3.04 M/UL — LOW (ref 3.8–5.2)
RBC # FLD: 12.9 % — SIGNIFICANT CHANGE UP (ref 10.3–14.5)
SODIUM SERPL-SCNC: 139 MMOL/L — SIGNIFICANT CHANGE UP (ref 135–145)
WBC # BLD: 7.31 K/UL — SIGNIFICANT CHANGE UP (ref 3.8–10.5)
WBC # FLD AUTO: 7.31 K/UL — SIGNIFICANT CHANGE UP (ref 3.8–10.5)

## 2023-05-11 PROCEDURE — 99024 POSTOP FOLLOW-UP VISIT: CPT

## 2023-05-11 PROCEDURE — 99291 CRITICAL CARE FIRST HOUR: CPT | Mod: 24

## 2023-05-11 PROCEDURE — 76705 ECHO EXAM OF ABDOMEN: CPT | Mod: 26

## 2023-05-11 PROCEDURE — 99221 1ST HOSP IP/OBS SF/LOW 40: CPT

## 2023-05-11 RX ORDER — POTASSIUM CHLORIDE 20 MEQ
40 PACKET (EA) ORAL EVERY 4 HOURS
Refills: 0 | Status: COMPLETED | OUTPATIENT
Start: 2023-05-11 | End: 2023-05-11

## 2023-05-11 RX ORDER — ACETAMINOPHEN 500 MG
650 TABLET ORAL ONCE
Refills: 0 | Status: COMPLETED | OUTPATIENT
Start: 2023-05-11 | End: 2023-05-11

## 2023-05-11 RX ADMIN — Medication 500 MILLIGRAM(S): at 07:05

## 2023-05-11 RX ADMIN — Medication 1 PACKET(S): at 07:05

## 2023-05-11 RX ADMIN — AMLODIPINE BESYLATE 5 MILLIGRAM(S): 2.5 TABLET ORAL at 07:04

## 2023-05-11 RX ADMIN — Medication 650 MILLIGRAM(S): at 18:54

## 2023-05-11 RX ADMIN — PANTOPRAZOLE SODIUM 40 MILLIGRAM(S): 20 TABLET, DELAYED RELEASE ORAL at 11:46

## 2023-05-11 RX ADMIN — CARVEDILOL PHOSPHATE 3.12 MILLIGRAM(S): 80 CAPSULE, EXTENDED RELEASE ORAL at 17:02

## 2023-05-11 RX ADMIN — Medication 5 MILLIGRAM(S): at 17:03

## 2023-05-11 RX ADMIN — Medication 1 TABLET(S): at 11:46

## 2023-05-11 RX ADMIN — Medication 5 MILLIGRAM(S): at 07:04

## 2023-05-11 RX ADMIN — CHLORHEXIDINE GLUCONATE 15 MILLILITER(S): 213 SOLUTION TOPICAL at 07:05

## 2023-05-11 RX ADMIN — Medication 325 MILLIGRAM(S): at 07:04

## 2023-05-11 RX ADMIN — ENOXAPARIN SODIUM 40 MILLIGRAM(S): 100 INJECTION SUBCUTANEOUS at 22:00

## 2023-05-11 RX ADMIN — Medication 650 MILLIGRAM(S): at 19:24

## 2023-05-11 RX ADMIN — Medication 40 MILLIEQUIVALENT(S): at 09:55

## 2023-05-11 RX ADMIN — CARVEDILOL PHOSPHATE 3.12 MILLIGRAM(S): 80 CAPSULE, EXTENDED RELEASE ORAL at 07:05

## 2023-05-11 RX ADMIN — ATORVASTATIN CALCIUM 80 MILLIGRAM(S): 80 TABLET, FILM COATED ORAL at 22:00

## 2023-05-11 RX ADMIN — CHLORHEXIDINE GLUCONATE 15 MILLILITER(S): 213 SOLUTION TOPICAL at 17:03

## 2023-05-11 RX ADMIN — Medication 40 MILLIEQUIVALENT(S): at 13:19

## 2023-05-11 RX ADMIN — CHLORHEXIDINE GLUCONATE 1 APPLICATION(S): 213 SOLUTION TOPICAL at 14:44

## 2023-05-11 RX ADMIN — Medication 1 PACKET(S): at 17:03

## 2023-05-11 NOTE — SPEECH LANGUAGE PATHOLOGY EVALUATION - COMMENTS
Incomplete Unable to phonate at this time as pt is currently [id number]. If unable to wean pt to trach collar, can consider assessment of Passy Conifer Valve in-line with mechanical ventilation to allow for verbalization. Unable to consistently select dictated letters amongst a varying field of options despite hand-over-hand support and placement within preferred visual field. Unable to phonate at this time as pt is currently [id number].

## 2023-05-11 NOTE — BH CONSULTATION LIAISON ASSESSMENT NOTE - SUMMARY
58 yo F with PMH of Asthma, CAD (not on  meds), peripheral neuropathy and PSH of BATOOL, cholecystectomy, right knee surgery presented to [facility] ED on 4/20 with right sided weakness and right facial numbness, found to have acute infarction within the right cerebellar hemisphere, causing herniation. Now s/p SOC foramen magnum decompression and right parietal/occipital EVD placement (4/21). Course c/b respiratory insuffiencey d/t bulbar dysfunction, s/p trach 4/28/23 failed bedside  PEG placement 5/1; now s/p VPS certas @ 4. Psychiatry consulted for mood concerns.     Given 's collateral that patient has no psychiatric history, patient's acute and severe medical illness of CVA that is requiring ICU level care, and patient requiring restraints to maintain safety from pulling lines, there is high index of suspicion for delirium.    -Collect further collateral from patient's primary team during the daytime about their specific concerns and motivations for psychiatric evaluation (eg. are the concerns delirium/agitation related? Are they related to mood/motivation concerns prohibiting involvement in PT/care interventions?); given the critical medical status of patient, defer any psychotropic medication until there is more clear idea of primary team's psychiatric concerns that resulted in CL consult  -If possible, conduct further CL evaluations with in-person , as patient is able to mouth words in Kazakh but unable to speak loudly/clearly at this time; communicates via head-nod   -Delirium precautions: windows if possible, light during day / dark during night, speak in short & intentional sentences, avoid if possible deliriogenic agents such as benzo/anticholinergics, reduce environmental stimuli and invasive procedures to a minimum, continue to reassess necessity for restraints and remove when possible/safe     -CL will continue to follow

## 2023-05-11 NOTE — CHART NOTE - NSCHARTNOTEFT_GEN_A_CORE
POD20 SOC POD3 VPS. Plan for CPAP trial in AM.  Geovanni'd cardura. F/u speech consult for communication board. Has been tolerating CPAP since 9am. RLQ us w/ no acute pathology. Dispo plan [facility]A vs LTACH.

## 2023-05-11 NOTE — PROGRESS NOTE ADULT - SUBJECTIVE AND OBJECTIVE BOX
LENGTH OF HOSPITAL STAY: 20d    SUBJECTIVE: No acute overnight events    HISTORY OF PRESENTING ILLNESS:   56 yo F with PMH of Asthma, CAD (not on  meds), peripheral neuropathy and PSH of BATOOL, cholecystectomy, right knee surgery presented to [facility] ED on 4/20 with right sided weakness and right facial numbness. Patient was undergoing preparation for colonoscopy. As per daughter at 8am patient was playing with her grandchildren but around 840 am patient was getting dressed and fell and subsequently felt weak after. Daughter reports that patient had some episodes of vomiting at this time. She had a syncopal episode at around 10 am and was witnessed by brother. No head trauma, She regained conscious after few minutes. She remained in bed for the remainder of the day. Daughter reports some slurred speech noted 1230-1PM and also was confused after. and around 4PM, the patient's sister noted right sided weakness at which time EMS was called and patient was brought to ED. No c/o chest pain, shortness of breath, fever, headache, abdominal pain, urinary complaints. No recent travel/sickness/ change in meds. Stroke code in ER: CTH neg for heme, Right PICA distribution acute infarction. CTA with saccular aneurysms of b/l carotids, approximately 0.8 cm on the right and 1.2 x 0.9 cm on the left. Possible tiny third saccular aneurysm on the right Posterior intracranial circulation: Right vertebral arterial occlusion at its dural crossing junction V3 [place] and V4 intracranial segments with likely reversal of flow in its intracranial segment from the basilar. Right PICA faintly seen. 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  Not candidate for TNK/mechanical thrombectomy. CT scan repeated which showed: 1. Brain: Progression of acute infarction within the right cerebellar hemisphere, also extending into the left superior cerebellar hemisphere. New mass effect on the fourth ventricle causing stenosis versus occlusion with new third and lateral ventricular dilatation indicating ventricular obstruction at the level of the fourth ventricle. New upward and downward herniation of cerebellar parenchyma Right carotid system: No hemodynamically significant stenosis. Left carotid system: No hemodynamically significant stenosis. Vertebral circulation: Patent. Anterior intracranial circulation: Intact. Bilateral internal carotid saccular aneurysms. These findings are unchanged. Posterior intracranial circulation:    Improved flow within the right vertebral artery since 4/20/2023. New focal stenosis mid left vertebral artery, etiology uncertain, consider vasospasm and extrinsic compression in addition to new embolic disease. Brain perfusion: Perfusion images demonstrate normalization of the perfusion abnormality in the right cerebellar hemisphere present on 4/20/2023 despite evidence of progression of acute infarction.  Areas of apparent ischemia within the posterior fossa have progressed in extent, also again involving the left posterior cerebral arterial distribution. Tx to [facility] for SOC watch. On admission to [facility], NIHSS 12.  (21 Apr 2023 16:50)    PAST MEDICAL & SURGICAL HISTORY:  Asthma  CAD (coronary artery disease)  Peripheral neuropathy  S/P total abdominal hysterectomy  S/P cholecystectomy  S/P right knee surgery    ALLERGIES:  No Known Allergies    MEDICATIONS:  STANDING MEDICATIONS  amLODIPine   Tablet 5 milliGRAM(s) Oral daily  ascorbic acid 500 milliGRAM(s) Oral <User Schedule>  atorvastatin 80 milliGRAM(s) Oral at bedtime  carvedilol 3.125 milliGRAM(s) Oral every 12 hours  chlorhexidine 0.12% Liquid 15 milliLiter(s) Oral Mucosa every 12 hours  chlorhexidine 2% Cloths 1 Application(s) Topical daily  enoxaparin Injectable 40 milliGRAM(s) SubCutaneous every 24 hours  ferrous    sulfate 325 milliGRAM(s) Oral <User Schedule>  lactobacillus acidophilus 1 Tablet(s) Oral daily  methylphenidate 5 milliGRAM(s) Oral two times a day  pantoprazole   Suspension 40 milliGRAM(s) Oral daily  potassium chloride   Powder 40 milliEquivalent(s) Oral every 4 hours  psyllium Powder 1 Packet(s) Oral every 12 hours    PRN MEDICATIONS  acetylcysteine 10%  Inhalation 4 milliLiter(s) Inhalation every 6 hours PRN  albuterol/ipratropium for Nebulization 3 milliLiter(s) Nebulizer every 6 hours PRN  oxyCODONE    IR 5 milliGRAM(s) Oral every 4 hours PRN    VITALS:   T(F): 99.6  HR: 70  BP: 99/53  RR: 18  SpO2: 96%    LABS:                        8.9    7.31  )-----------( 271      ( 11 May 2023 06:03 )             27.4     05-11    139  |  103  |  11  ----------------------------<  134<H>  3.5   |  28  |  0.47<L>    Ca    8.8      11 May 2023 06:03  Phos  3.7     05-11  Mg     2.0     05-11    Culture - CSF with Gram Stain (collected 08 May 2023 14:35)  Source: .CSF  Gram Stain (08 May 2023 16:05):    No organisms seen    Rare to few White blood cells  Preliminary Report (10 May 2023 09:52):    No growth to date    RADIOLOGY:  Reviewed    PHYSICAL EXAM:  Constitutional: Resting comfortably in bed; NAD  Head: NC/AT  Eyes: PERRL, EOMI, anicteric sclera. Trach  Respiratory: CTA B/L  Cardiac: +S1/S2; RRR  Gastrointestinal: soft, NT/ND  Extremities: No pitting edema  Neurologic: AAOx3  Psychiatric: affect and characteristics of appearance, verbalizations, behaviors are appropriate

## 2023-05-11 NOTE — SPEECH LANGUAGE PATHOLOGY EVALUATION - SLP ABLE TO IDENTIFY PICTURES
inconsistent amongst both smaller and larger fields; however, unclear what was related to visual deficits vs auditory comprehension vs UE weakness

## 2023-05-11 NOTE — PROGRESS NOTE ADULT - SUBJECTIVE AND OBJECTIVE BOX
***********************************************  ADULT NSICU PROGRESS NOTE  MAKSIM TRIVEDI [id number] [facility] 10EA 02  ***********************************************    24H INTERVAL EVENTS: low minute ventilation, placed back on AC/VC.  No acute events overnight.      ROS: negative except per mentioned above in 24h interval events.      VITALS:    ICU Vital Signs Last 24 Hrs  T(C): 37.6 (11 May 2023 06:00), Max: 37.6 (11 May 2023 06:00)  T(F): 99.6 (11 May 2023 06:00), Max: 99.6 (11 May 2023 06:00)  HR: 68 (11 May 2023 07:00) (62 - 80)  BP: 106/79 (11 May 2023 07:00) (91/50 - 109/52)  BP(mean): 88 (11 May 2023 07:00) (64 - 88)  ABP: --  ABP(mean): --  RR: 22 (11 May 2023 07:00) (12 - 35)  SpO2: 96% (11 May 2023 07:00) (75% - 99%)    O2 Parameters below as of 11 May 2023 07:00  Patient On (Oxygen Delivery Method): ventilator    O2 Concentration (%): 40        EXAM:     [place] Coma Scale: 3/1T/6 = 10    General: normocephalic, head wrapped, laying in bed, in no distress  Neuro     MS: [place] Coma Scale: 3/1T/6 = 10, follows commands, cooperative with examination, normal attention    CN: PERRL, (+)R. gaze, plegia to left gaze     Mot: bulk normal, tone normal, power UPPER 2/3, LOWER 3/3  Chest: mechanically ventilated, coarse breath sounds, heart regular rate/rhythm, present S1/S2, no murmurs or rubs  Abdomen: nondistended, soft and nontender without peritoneal signs, normoactive bowel sounds  Extremities: no clubbing, well-perfused, no edema                                  9.2    6.72  )-----------( 300      ( 10 May 2023 05:10 )             28.1     05-10    137  |  102  |  9   ----------------------------<  121<H>  3.9   |  29  |  0.49<L>    Ca    8.8      10 May 2023 05:10  Phos  3.3     05-10  Mg     2.0     05-10        MEDICATIONS  (STANDING):  amLODIPine   Tablet 5 milliGRAM(s) Oral daily  ascorbic acid 500 milliGRAM(s) Oral <User Schedule>  atorvastatin 80 milliGRAM(s) Oral at bedtime  carvedilol 3.125 milliGRAM(s) Oral every 12 hours  chlorhexidine 0.12% Liquid 15 milliLiter(s) Oral Mucosa every 12 hours  chlorhexidine 2% Cloths 1 Application(s) Topical daily  doxazosin 4 milliGRAM(s) Oral at bedtime  enoxaparin Injectable 40 milliGRAM(s) SubCutaneous every 24 hours  ferrous    sulfate 325 milliGRAM(s) Oral <User Schedule>  lactobacillus acidophilus 1 Tablet(s) Oral daily  methylphenidate 5 milliGRAM(s) Oral two times a day  pantoprazole   Suspension 40 milliGRAM(s) Oral daily  psyllium Powder 1 Packet(s) Oral every 12 hours    MEDICATIONS  (PRN):  acetylcysteine 10%  Inhalation 4 milliLiter(s) Inhalation every 6 hours PRN increased secretions  albuterol/ipratropium for Nebulization 3 milliLiter(s) Nebulizer every 6 hours PRN Respiratory Distress  oxyCODONE    IR 5 milliGRAM(s) Oral every 4 hours PRN Moderate Pain (4 - 6)      ***********************************************  ASSESSMENT AND PLAN  ***********************************************    #Suboccipital pseudomeningocele  #S/p placement of VPS, 5/8/23  #Bilateral cerebellar hemispheric strokes  #S/p SOC for foramen magnum decompression and R. parieto-occipital placement of EVD, 4/22/23  #11mm wide-necked multilobulated L. cavernous ICA aneurysm & 8mm wide-necked R. cavernous ICA aneurysm  #R. V4 occlusion  #S/p tracheostomy placement w/ tube exchanged due to broken  balloon, 4/28/23  #S/p lap-assisted gastrostomy tube, 5/2/23  #Abdominal discomfort  #L. intramuscular calf DVT  #S/p IVCF placement, 5/4/23  #History of peripheral neuropathy, CAD, and asthma      NEURO  - Admit NSICU, Q2h neuro checks / Q2h vital signs  - F/u vascular conference re plan for b/l cavernous ICA aneurysms  - Stroke consultation, heme consultation  - Pain control, PT/OT when able  - Discuss initiation of stimulant    PULM  - Volume control/assist control ventilation, wean to pressure support as tolerated  - 6 Cuffed trach  - SpO2 goal > 92%, supplemental O2 and pulm toileting as needed    CARDIO  - BP goal: < 140 while postoperative  - Pending SALVADOR    GI  - Diet: tube feeds  - Stress ulcer prophylaxis: PPI  - Check lactate, RLQ ultrasound, bladder scan    /RENAL  - Monitor UOP/volume status, BUN/SCr    HEME  - Maintain Hb > 7.0, PLT > 100,000  - Heme consultation for stroke in the young  - SCDs, holding SQ chemoppx  - Removal of IVFC ASAP, hypercoag panel workup pending  - AC/ASA start date TBD per discussion with stroke/heme/NSG    ID  - Monitor for infectious s/s, fever curve, leukocytosis  - S/p ertapenem (5/1-5/17) for ESBL UTI, Pluralibacter gergoviae/Strep pneumo sputum  - Cleared for VPS placement by ID    ENDO  - SGlu < 180     ***********************************************  ADULT NSICU PROGRESS NOTE  MAKSIM TRIVEDI [id number] [facility] 10EA 02  ***********************************************    24H INTERVAL EVENTS: low minute ventilation, placed back on AC/VC.  No further events.        ROS: negative except per mentioned above in 24h interval events.      VITALS:    ICU Vital Signs Last 24 Hrs  T(C): 37.6 (11 May 2023 06:00), Max: 37.6 (11 May 2023 06:00)  T(F): 99.6 (11 May 2023 06:00), Max: 99.6 (11 May 2023 06:00)  HR: 68 (11 May 2023 07:00) (62 - 80)  BP: 106/79 (11 May 2023 07:00) (91/50 - 109/52)  BP(mean): 88 (11 May 2023 07:00) (64 - 88)  ABP: --  ABP(mean): --  RR: 22 (11 May 2023 07:00) (12 - 35)  SpO2: 96% (11 May 2023 07:00) (75% - 99%)    O2 Parameters below as of 11 May 2023 07:00  Patient On (Oxygen Delivery Method): ventilator    O2 Concentration (%): 40        EXAM:     [place] Coma Scale: 3/1T/6 = 10    General: normocephalic, head wrapped, laying in bed, in no distress  Neuro     MS: [place] Coma Scale: 3/1T/6 = 10, follows commands, cooperative with examination, normal attention    CN: PERRL, (+)R. gaze, plegia to left gaze     Mot: bulk normal, tone normal, power UPPER 2/3, LOWER 3/3  Chest: mechanically ventilated, coarse breath sounds, heart regular rate/rhythm, present S1/S2, no murmurs or rubs  Abdomen: nondistended, soft and nontender without peritoneal signs, normoactive bowel sounds  Extremities: no clubbing, well-perfused, no edema                                    8.9    7.31  )-----------( 271      ( 11 May 2023 06:03 )             27.4     05-11    139  |  103  |  11  ----------------------------<  134<H>  3.5   |  28  |  0.47<L>    Ca    8.8      11 May 2023 06:03  Phos  3.7     05-11  Mg     2.0     05-11        MEDICATIONS  (STANDING):  amLODIPine   Tablet 5 milliGRAM(s) Oral daily  ascorbic acid 500 milliGRAM(s) Oral <User Schedule>  atorvastatin 80 milliGRAM(s) Oral at bedtime  carvedilol 3.125 milliGRAM(s) Oral every 12 hours  chlorhexidine 0.12% Liquid 15 milliLiter(s) Oral Mucosa every 12 hours  chlorhexidine 2% Cloths 1 Application(s) Topical daily  enoxaparin Injectable 40 milliGRAM(s) SubCutaneous every 24 hours  ferrous    sulfate 325 milliGRAM(s) Oral <User Schedule>  lactobacillus acidophilus 1 Tablet(s) Oral daily  methylphenidate 5 milliGRAM(s) Oral two times a day  pantoprazole   Suspension 40 milliGRAM(s) Oral daily  psyllium Powder 1 Packet(s) Oral every 12 hours    MEDICATIONS  (PRN):  acetylcysteine 10%  Inhalation 4 milliLiter(s) Inhalation every 6 hours PRN increased secretions  albuterol/ipratropium for Nebulization 3 milliLiter(s) Nebulizer every 6 hours PRN Respiratory Distress  oxyCODONE    IR 5 milliGRAM(s) Oral every 4 hours PRN Moderate Pain (4 - 6)      ***********************************************  ASSESSMENT AND PLAN  ***********************************************    #Suboccipital pseudomeningocele  #S/p placement of VPS, 5/8/23  #Bilateral cerebellar hemispheric strokes  #S/p SOC for foramen magnum decompression and R. parieto-occipital placement of EVD, 4/22/23  #11mm wide-necked multilobulated L. cavernous ICA aneurysm & 8mm wide-necked R. cavernous ICA aneurysm  #R. V4 occlusion  #S/p tracheostomy placement w/ tube exchanged due to broken  balloon, 4/28/23  #S/p lap-assisted gastrostomy tube, 5/2/23  #Abdominal discomfort  #L. intramuscular calf DVT  #S/p IVCF placement, 5/4/23  #History of peripheral neuropathy, CAD, and asthma      NEURO  - Admit NSICU, Q2h neuro checks / Q2h vital signs  - F/u vascular conference re plan for b/l cavernous ICA aneurysms  - Stroke consultation, heme consultation  - Pain control, PT/OT when able  - Ritalin 5/5  - Start ASA this sunday, AC on POD #10    PULM  - Volume control/assist control ventilation, wean to pressure support as tolerated  - 6 Cuffed trach  - SpO2 goal > 92%, supplemental O2 and pulm toileting as needed    CARDIO  - BP goal: < 140 while postoperative  - Pending SALVADOR    GI  - Diet: tube feeds  - Stress ulcer prophylaxis: PPI  - RLQ ultrasound, bladder scan    /RENAL  - Monitor UOP/volume status, BUN/SCr    HEME  - Maintain Hb > 7.0, PLT > 100,000  - Heme consultation for stroke in the young  - SCDs, SQL  - Removal of IVFC ASAP, hypercoag panel workup pending  - AC/ASA on POD #10 and Sunday, respectively, once AC started, must remove IVCF    ID  - Monitor for infectious s/s, fever curve, leukocytosis  - S/p ertapenem (5/1-5/17) for ESBL UTI, Pluralibacter gergoviae/Strep pneumo sputum  - Cleared for VPS placement by ID    ENDO  - SGlu < 180

## 2023-05-11 NOTE — BH CONSULTATION LIAISON ASSESSMENT NOTE - CURRENT MEDICATION
MEDICATIONS  (STANDING):  amLODIPine   Tablet 5 milliGRAM(s) Oral daily  ascorbic acid 500 milliGRAM(s) Oral <User Schedule>  atorvastatin 80 milliGRAM(s) Oral at bedtime  carvedilol 3.125 milliGRAM(s) Oral every 12 hours  chlorhexidine 0.12% Liquid 15 milliLiter(s) Oral Mucosa every 12 hours  chlorhexidine 2% Cloths 1 Application(s) Topical daily  enoxaparin Injectable 40 milliGRAM(s) SubCutaneous every 24 hours  ferrous    sulfate 325 milliGRAM(s) Oral <User Schedule>  lactobacillus acidophilus 1 Tablet(s) Oral daily  methylphenidate 5 milliGRAM(s) Oral two times a day  pantoprazole   Suspension 40 milliGRAM(s) Oral daily  psyllium Powder 1 Packet(s) Oral every 12 hours    MEDICATIONS  (PRN):  acetylcysteine 10%  Inhalation 4 milliLiter(s) Inhalation every 6 hours PRN increased secretions  albuterol/ipratropium for Nebulization 3 milliLiter(s) Nebulizer every 6 hours PRN Respiratory Distress  oxyCODONE    IR 5 milliGRAM(s) Oral every 4 hours PRN Moderate Pain (4 - 6)

## 2023-05-11 NOTE — BH CONSULTATION LIAISON ASSESSMENT NOTE - MSE UNSTRUCTURED FT
Pt has trach in place; calm during writer's evaluation, with restraints on, not appearing agitated/trying to get up/pulling on lines during writer's evaluation, appears alert, pt able to nod seemingly reliably to some question (sleep, sadness) but doesn't nod in a decipherable way to other questions of interview; unable to ascertain thought content/thought process/mood/other aspects of MSE due to the patient's medical/nonverbal status

## 2023-05-11 NOTE — SPEECH LANGUAGE PATHOLOGY EVALUATION - SLP DIAGNOSIS
Suspect at least mild-moderate cognitive-linguistic deficits. Deficits appear to be confounded by UE weakness and fine motor weakness, visual deficits, fluctuating arousal (Required repeated stim for re-alertness/re-engagement), and [id number] impacting ability to phonate. Written expression was completely illegible (agraphia vs UE weakness). Given the aforementioned deficits, unable to successfully utilize low-tech communication board. If unable to wean pt to trach collar, can consider assessment of Passy Keith Valve in-line with mechanical ventilation to allow for verbalization. Pt is reliable for basic yes/no questions at this time. Further assessment warranted as medical status stabilizes.

## 2023-05-11 NOTE — BH CONSULTATION LIAISON ASSESSMENT NOTE - HPI (INCLUDE ILLNESS QUALITY, SEVERITY, DURATION, TIMING, CONTEXT, MODIFYING FACTORS, ASSOCIATED SIGNS AND SYMPTOMS)
Per  at the bedside: pt has no history of inpatient psychiatric hospitalizations, suicide attempts, psychotropic medication trials.  56 yo F with PMH of Asthma, CAD (not on  meds), peripheral neuropathy and PSH of BATOOL, cholecystectomy, right knee surgery presented to [facility] ED on 4/20 with right sided weakness and right facial numbness, found to have acute infarction within the right cerebellar hemisphere, causing herniation. Now s/p SOC foramen magnum decompression and right parietal/occipital EVD placement (4/21). Course c/b respiratory insuffiencey d/t bulbar dysfunction, s/p trach 4/28/23 failed bedside  PEG placement 5/1; now s/p VPS certas @ 4. Psychiatry consulted for mood concerns.     Per  at the bedside: pt has no history of inpatient psychiatric hospitalizations, suicide attempts, psychotropic medication trials. When asked if they believe patient has thoughts of harming self, they report that patient is repeatedly trying to get out of bed which can be dangerous for her, but deny concern for suicidal ideations or intent.     Per chart review, multiple restraint notes starting from 3AM today, stating that patient requires continued restraints for pulling lines/tubes.    Pt unable to speak (able to mouth words which is difficult in this instance as patient is primarily [place]ian-speaking and encounter was through  via phone). Per , patient has been communicating via head nods. It is unclear whether patient is able to understand and reliably head-nod writer's question. Pt nods head "no" when asked if she's able to sleep. Tears up when asked if she's "sad." Moves head in unclear direction when asked about SI.

## 2023-05-11 NOTE — PROGRESS NOTE ADULT - SUBJECTIVE AND OBJECTIVE BOX
S/Overnight events:  JIM overnight. Neuro stable. Remains on FVS o/n, will plan for CPAP trial in morning.       Hospital Course:   4/21: Admitted for crani watch, CTH complete w/ unchanged hydrocephalus, taken for emergent occipital craniectomy.   4/22: POD1. Remains intubated overnight, on propofol and dank. EVD@[id number]. Pending repeat CTH this am. Given hydralazine 10mg x1. Started on cardene for SBP high 140s-150s. Sub-therapeutic on plavix, therapeutic on asa, rpt asa accumetrics until subtherapeutic. LE dopplers neg for DVT, but showing superficial venous thrombosis in the proximal portion of the right greater saphenous vein. Started on 3% @60 for na goal 145-150. NGT placed by ENT. Febrile, pancultured.  4/23: POD 2. 3% increased to 75. NGT readjusted. UA neg. SBP liberalized to 160. Plan to extubate. 3% decreased to 60. Failed extubation d/t no cuff leak, given 60mg solumedrol, plan to extubate in 6 hrs. SCx1 for post void residual >400, started on cardura at bedtime. New blood in buretrol, stopped SQL. Clot in EVD cleared. Neuro exam improving.   4/24: POD 3. Cont 3% with NS while NPO, start TF today. TF started. LFTs downtrending. Sodium 141. Increased 3% to 50, NS to 30. Repeat BMP ordered for 5:30PM. Tramadol 25 for pain with no relief, oxy 5 and 1g tylenol ordered, stability CT stable, speech language consult for dysarthria. Given hydralazine 10mg IVP for SBP>160, started amlodipine 5mg. C/o mild headache, given fioricet x1, stroke neuro rec SALVADOR and loop recorder placement. Echo with bubble completed, negative for PFO, EF 55-60%. J HFNC started for desats with suctioning.   4/25: POD 4. Cont HFNC. Increased secertions on HFNC, reintubated. CXR confirmed good placement. EVD dropped to 5cmH20 for goal of draining 5-10cc/hr and SG ELENA drain taken off suction. Pending CTH. EVD drained 0cc/hr, dropped to 0. NGT replaced. Dc'd fioricet. Started on PPI for intubation. Increased cardura to 4mg for urinary retention, given an additional 2mg now. Started salt tabs 3 q 6. Given 12.5mg fentanyl x1. NGT replaced, CXR shown in lung. NGT removed, repeat CXR showing no pneumothorax. Pending ENT consult for NGT placement. CTH stable. NGT replaced by ENT, confirmed in proper spot. Restarted TF. [id number]. Na 141 from 146. Increased 3% to 60. EVD raised to 3cmH20. ETT pulled back 1cm by RT.  4/26: POD 5 SOC. Intubated, remains on precedex, ABG ordered with improving PaO2, BMP sodium 148, 3% changed to 30cc/hr, cardura increased to 8mg for tonight, tolerating cpap  4/27: POD 6 SOC. Respiratory rate sustained 6, returned to FVS. Na 151, dc'd 3%, f/u AM sodium. Nurse noticed ETT 19 at the lip and was 20 prior, CXR shows ETT @ 5cm above jono, ET tube advanced 1cm, repeat CXR confirms appropriate placement. Thick inline secretions with suctioning. ENT trach placement Fri likely, PEG likely Mon. Keep ELENA drain until no output, EVD to remain @3. Start ASA 81mg daily tomorrow.   4/28: POD7 SOC, neuro stable, given fentanyl x 1 overnight for presumed discomfort (biting on ETT), remains on full vent support. CTH today stable in comparison to 4/25. 6 cuffed trach placed by ENT in OR, but came down without cuff. Replaced at bedside by ENT. On fentanyl gtt.    4/29: POD8 SOC, JIM overnight. Incision cleaned and dressing changed. On fentanyl gtt. EKG completed due to increased frequency PVCs on telemetry, frequency of PVCs improved with titrating up fentanyl gtt. ABG drawn.  Repeat LE dopplers completed showing persistant superficial thrombus, no DVT. No EVD waveform during day, flushed distally without improvement. Exam unchanged.  EVD dropped to 0 for low output in afternoon.   4/30: POD9. Neuro stable, febrile to 101.3F o/n, given tylenol and pan cx sent. SBP dipped to low 90s o/n, HR stable in 70s with some PVCs, decreased fentanyl gtt and given 500cc bolus of NS x 2. Pend PEG placement Mon and angio Tues. D/c'd ELENA drain today and suture placed. F/u heme recs. Positive UA. Infiltrates found on CXR. Started on Zosyn 4.5g Q6hrs .   5/1: POD 10. Neuro stable. Dc'd fentanyl gtt. PEG failed placement at bedside with Dr. Gant, plan for PEG in OR tomorrow afternoon with Dr. Layton. Dc'd amlodipine and started carvedilol 3.125 BID for PVCs . Made 3% inhalation and mucomyst q8hr. Failed PEG at bedside. Salt tabs made 1 q 6. Decreased cardura to 4mg daily. Urine culture growing E. Coli and sputum culture growing pluralibacter gergoviae and strep species. ID consulted, f/u recs. Failed CPAP trial @ 3pm. Added am labs per heme/onc for hypercoguable workup. Pending repeat LE dopplers in 2-3 days. NGT clogged, removed, ENT failed attempt at replacement, will keep NPO for PEG placement tmw. Repeat xray in am for concern for aspration. Pt abx switched from Zosyn to Ertapenem 1g Q24hrs. per ID recs, possible ESBL growth.   5/2: POD 11. Neuro stable. Pre-op for diagnostic cerebral angiogram and PEG placement. NPO. EVD raised to 5 cmH20. Sputum culture speciated to step pneumoniae, sensitive to ertapenem. 3% inhalation/mucomyst made q 6 hr d/t thick secretions. PEG placed in OR. POD0 dx cerebral angio. EVD raised to 10.   5/3: POD 12. Neuro stable. PEG feeds began @ 10 AM, plan to increase 10cc every 6h per general surgery. Repeat dopplers show stable R superficial thrombus and new L IM calf DVT. Vascular consulted for IVC filter. Plan to get CTH tomorrow.  5/4: POD 13. JIM o/n. s/p IVC filter with vascular today. Pending post-procedure CTH. FOBT negative. Started iron and vitamin c every other day for iron deficiency anemia.   5/5: POD14. CSF cx sent to r/o infection from new gas in right temporal horn seen on CTH. Restarted TF, changed to Jevity 1.2, f/u nutrition recs. EVD raised to 10 today, plan to challenge over the weekend and CTH on Monday, possible VPS next Wednesday. ENT removed sutures today. Salt tabs decreased 1q12.  5/6: POD15 Placed on CPAP briefly with low MV alert, placed back on full vent support. EVD remains open at [id number]. ICPs WNL. Neuro exam stable.  EVD raised to 15. Tolerated CPAP x8h.   5/7: POD#16. JIM overnight, neuro stable. D/c'd salt tabs and 3% neb. Wean trach to CPAP as tolerated. Pan cx NGTD. EVD raised today to [id number].   5/8: POD17 JIM overnight. ICPs WNL. Pt remains on full vent support. Neuro exam stable. Plan for possible VPS today. CT chio complete showing increase in vent size.   5/9: POD18 SOC, POD1 VPS. Desat o/n to 80s, improved on own. CXR with LLL effusion. Another desat to 90% in AM, given duoneb and mucomyst. Oxycodone given for incisional pain. Neuro exam stable. CTH in AM stable. Keppra dc'd. Ritalin 5 BID started.  Tolerating CPAP trial. SALVADOR ordered. Rectal tube dc'd.   5/10: POD 19 SOC POD2 VPS. Remains on full vent support. Tolerated CPAP for 6 hours. Neuro exam stable. BP liberalized to 160. RLQ US 2/2 abd pain. Lactate WNL.  5/11: POD20 SOC POD3 VPS. Plan for CPAP trial in AM.  Dc'd cardura. F/u speech consult for communcation board.       Vital Signs Last 24 Hrs  T(C): 36.8 (11 May 2023 00:47), Max: 37.4 (10 May 2023 18:00)  T(F): 98.2 (11 May 2023 00:47), Max: 99.3 (10 May 2023 18:00)  HR: 65 (11 May 2023 00:00) (62 - 80)  BP: 107/52 (11 May 2023 00:00) (84/48 - 109/52)  BP(mean): 75 (11 May 2023 00:00) (63 - 79)  RR: 17 (11 May 2023 00:00) (12 - 35)  SpO2: 96% (11 May 2023 00:00) (75% - 99%)    Parameters below as of 11 May 2023 00:00  Patient On (Oxygen Delivery Method): ventilator    O2 Concentration (%): 40    I&O's Detail    09 May 2023 07:01  -  10 May 2023 07:00  --------------------------------------------------------  IN:    Enteral Tube Flush: 490 mL    Jevity 1.2: 500 mL    Lactated Ringers: 300 mL    Lactated Ringers: 600 mL    Sodium Chloride 0.9% Bolus: 500 mL  Total IN: 2390 mL    OUT:    Voided (mL): 1200 mL  Total OUT: 1200 mL    Total NET: 1190 mL      10 May 2023 07:01  -  11 May 2023 03:02  --------------------------------------------------------  IN:    Enteral Tube Flush: 100 mL    Jevity 1.2: 60 mL    Lactated Ringers: 675 mL  Total IN: 835 mL    OUT:    Voided (mL): 850 mL  Total OUT: 850 mL    Total NET: -15 mL        I&O's Summary    09 May 2023 07:01  -  10 May 2023 07:00  --------------------------------------------------------  IN: 2390 mL / OUT: 1200 mL / NET: 1190 mL    10 May 2023 07:01  -  11 May 2023 03:02  --------------------------------------------------------  IN: 835 mL / OUT: 850 mL / NET: -15 mL          PHYSICAL EXAM:  Constitutional: awake, alert, resting in bed, NAD.   Respiratory: +trach to vent. non-labored breathing. Normal chest rise.   Cardiovascular: Regular rate and rhythm  Gastrointestinal: +PEG. Soft, nontender, nondistended.  .  Vascular: Extremities warm, no ulcers, no discoloration of skin.   Neurological: Gen: AA&O x 3, nods to choices.     Right gaze, Right eye CN6 palsy. CN II-XII grossly intact    Motor: HICKS x 4, 5/5 throughout UE/LE.    Sens: Sensation intact to light touch throughout.    Extremities: distal pulses 2+ x 4 DPPT symmetric throughout.     Hoffmans negative bilaterally.  Plantar downgoing bilaterally.  No clonus.      No pronator drift, no dysmetria.  Wound/incision:      TUBES/LINES:  [] CVC  [] A-line  [] Lumbar Drain  [] Ventriculostomy  [] Other    DIET:  [] NPO  [] Mechanical  [] Tube feeds    LABS:                        9.2    6.72  )-----------( 300      ( 10 May 2023 05:10 )             28.1     05-10    137  |  102  |  9   ----------------------------<  121<H>  3.9   |  29  |  0.49<L>    Ca    8.8      10 May 2023 05:10  Phos  3.3     05-10  Mg     2.0     05-10              CAPILLARY BLOOD GLUCOSE          Drug Levels: [] N/A    CSF Analysis: [] N/A      Allergies    No Known Allergies    Intolerances      MEDICATIONS:  Antibiotics:    Neuro:  methylphenidate 5 milliGRAM(s) Oral two times a day  oxyCODONE    IR 5 milliGRAM(s) Oral every 4 hours PRN    Anticoagulation:  enoxaparin Injectable 40 milliGRAM(s) SubCutaneous every 24 hours    OTHER:  acetylcysteine 10%  Inhalation 4 milliLiter(s) Inhalation every 6 hours PRN  albuterol/ipratropium for Nebulization 3 milliLiter(s) Nebulizer every 6 hours PRN  amLODIPine   Tablet 5 milliGRAM(s) Oral daily  atorvastatin 80 milliGRAM(s) Oral at bedtime  carvedilol 3.125 milliGRAM(s) Oral every 12 hours  chlorhexidine 0.12% Liquid 15 milliLiter(s) Oral Mucosa every 12 hours  chlorhexidine 2% Cloths 1 Application(s) Topical daily  lactobacillus acidophilus 1 Tablet(s) Oral daily  pantoprazole   Suspension 40 milliGRAM(s) Oral daily  psyllium Powder 1 Packet(s) Oral every 12 hours    IVF:  ascorbic acid 500 milliGRAM(s) Oral <User Schedule>  ferrous    sulfate 325 milliGRAM(s) Oral <User Schedule>    CULTURES:  Culture Results:   No growth to date (05-08 @ 14:35)  Culture Results:   No growth to date (05-05 @ 05:06)    RADIOLOGY & ADDITIONAL TESTS:      ASSESSMENT:  57y Female s/p    STROKE    No pertinent family history in first degree relatives    Handoff    Asthma    CAD (coronary artery disease)    Peripheral neuropathy    Cerebellar stroke    Respiratory failure, unspecified with hypoxia    History of DVT (deep vein thrombosis)    Hydrocephalus    Tracheostomy malfunction    Cerebellar stroke    Respiratory failure, unspecified with hypoxia    History of DVT of lower extremity    Hydrocephalus    Tracheostomy malfunction    Decompressive craniectomy    Cerebellar infarct    Cerebellar infarct    CAD (coronary artery disease)    Asthma    Peripheral neuropathy    Lupus anticoagulant positive    Anemia due to acute blood loss    Tracheostomy malfunction    Decompressive craniectomy    Insertion, external ventricular drain    Planned tracheostomy    Tracheostomy tube change    Esophagogastroduodenoscopy (EGD) with anesthesia    Insertion, PEG tube, laparoscopic    Angiogram, carotid and cerebral, bilateral    Tracheostomy tube change    IVC filter placement     shunt    Insertion, ventriculopleural shunt    S/P total abdominal hysterectomy    S/P cholecystectomy    S/P right knee surgery    Insertion, external ventricular drain    Planned tracheostomy    Esophagogastroduodenoscopy (EGD) with anesthesia    Insertion, PEG tube, laparoscopic     shunt    CAD (coronary artery disease)    Asthma    Peripheral neuropathy    Lupus anticoagulant positive    Acute respiratory failure with hypoxia    Acute respiratory failure with hypoxia    Dysphagia    Deep vein thrombosis (DVT)    Hydrocephalus    [id number]        PLAN:  NEURO:    CARDIOVASCULAR:    PULMONARY:    RENAL:    GI:    HEME:    ID:    ENDO:    DVT PROPHYLAXIS:  [] Venodynes                                [] Heparin/Lovenox    FALL RISK:  [] Low Risk                                    [] Impulsive    DISPOSITION:       Assessment:  Present when checked    []  GCS  E   V  M     Heart Failure: []Acute, [] acute on chronic , []chronic  Heart Failure:  [] Diastolic (HFpEF), [] Systolic (HFrEF), []Combined (HFpEF and HFrEF), [] RHF, [] Pulm HTN, [] Other    [] MAMTA, [] ATN, [] AIN, [] other  [] CKD1, [] CKD2, [] CKD 3, [] CKD 4, [] CKD 5, []ESRD    Encephalopathy: [] Metabolic, [] Hepatic, [] toxic, [] Neurological, [] Other    Abnormal Nurtitional Status: [] malnurtition (see nutrition note), [ ]underweight: BMI < 19, [] morbid obesity: BMI >40, [] Cachexia    [] Sepsis  [] hypovolemic shock,[] cardiogenic shock, [] hemorrhagic shock, [] neuogenic shock  [] Acute Respiratory Failure  []Cerebral edema, [] Brain compression/ herniation,   [] Functional quadriplegia  [] Acute blood loss anemia   S/Overnight events:  JIM overnight. Neuro stable. Remains on FVS o/n, will plan for CPAP trial in morning.       Hospital Course:   4/21: Admitted for crani watch, CTH complete w/ unchanged hydrocephalus, taken for emergent occipital craniectomy.   4/22: POD1. Remains intubated overnight, on propofol and dank. EVD@[id number]. Pending repeat CTH this am. Given hydralazine 10mg x1. Started on cardene for SBP high 140s-150s. Sub-therapeutic on plavix, therapeutic on asa, rpt asa accumetrics until subtherapeutic. LE dopplers neg for DVT, but showing superficial venous thrombosis in the proximal portion of the right greater saphenous vein. Started on 3% @60 for na goal 145-150. NGT placed by ENT. Febrile, pancultured.  4/23: POD 2. 3% increased to 75. NGT readjusted. UA neg. SBP liberalized to 160. Plan to extubate. 3% decreased to 60. Failed extubation d/t no cuff leak, given 60mg solumedrol, plan to extubate in 6 hrs. SCx1 for post void residual >400, started on cardura at bedtime. New blood in buretrol, stopped SQL. Clot in EVD cleared. Neuro exam improving.   4/24: POD 3. Cont 3% with NS while NPO, start TF today. TF started. LFTs downtrending. Sodium 141. Increased 3% to 50, NS to 30. Repeat BMP ordered for 5:30PM. Tramadol 25 for pain with no relief, oxy 5 and 1g tylenol ordered, stability CT stable, speech language consult for dysarthria. Given hydralazine 10mg IVP for SBP>160, started amlodipine 5mg. C/o mild headache, given fioricet x1, stroke neuro rec SALVADOR and loop recorder placement. Echo with bubble completed, negative for PFO, EF 55-60%. J HFNC started for desats with suctioning.   4/25: POD 4. Cont HFNC. Increased secertions on HFNC, reintubated. CXR confirmed good placement. EVD dropped to 5cmH20 for goal of draining 5-10cc/hr and SG ELENA drain taken off suction. Pending CTH. EVD drained 0cc/hr, dropped to 0. NGT replaced. Dc'd fioricet. Started on PPI for intubation. Increased cardura to 4mg for urinary retention, given an additional 2mg now. Started salt tabs 3 q 6. Given 12.5mg fentanyl x1. NGT replaced, CXR shown in lung. NGT removed, repeat CXR showing no pneumothorax. Pending ENT consult for NGT placement. CTH stable. NGT replaced by ENT, confirmed in proper spot. Restarted TF. [id number]. Na 141 from 146. Increased 3% to 60. EVD raised to 3cmH20. ETT pulled back 1cm by RT.  4/26: POD 5 SOC. Intubated, remains on precedex, ABG ordered with improving PaO2, BMP sodium 148, 3% changed to 30cc/hr, cardura increased to 8mg for tonight, tolerating cpap  4/27: POD 6 SOC. Respiratory rate sustained 6, returned to FVS. Na 151, dc'd 3%, f/u AM sodium. Nurse noticed ETT 19 at the lip and was 20 prior, CXR shows ETT @ 5cm above jono, ET tube advanced 1cm, repeat CXR confirms appropriate placement. Thick inline secretions with suctioning. ENT trach placement Fri likely, PEG likely Mon. Keep ELENA drain until no output, EVD to remain @3. Start ASA 81mg daily tomorrow.   4/28: POD7 SOC, neuro stable, given fentanyl x 1 overnight for presumed discomfort (biting on ETT), remains on full vent support. CTH today stable in comparison to 4/25. 6 cuffed trach placed by ENT in OR, but came down without cuff. Replaced at bedside by ENT. On fentanyl gtt.    4/29: POD8 SOC, JIM overnight. Incision cleaned and dressing changed. On fentanyl gtt. EKG completed due to increased frequency PVCs on telemetry, frequency of PVCs improved with titrating up fentanyl gtt. ABG drawn.  Repeat LE dopplers completed showing persistant superficial thrombus, no DVT. No EVD waveform during day, flushed distally without improvement. Exam unchanged.  EVD dropped to 0 for low output in afternoon.   4/30: POD9. Neuro stable, febrile to 101.3F o/n, given tylenol and pan cx sent. SBP dipped to low 90s o/n, HR stable in 70s with some PVCs, decreased fentanyl gtt and given 500cc bolus of NS x 2. Pend PEG placement Mon and angio Tues. D/c'd ELENA drain today and suture placed. F/u heme recs. Positive UA. Infiltrates found on CXR. Started on Zosyn 4.5g Q6hrs .   5/1: POD 10. Neuro stable. Dc'd fentanyl gtt. PEG failed placement at bedside with Dr. Gant, plan for PEG in OR tomorrow afternoon with Dr. Layton. Dc'd amlodipine and started carvedilol 3.125 BID for PVCs . Made 3% inhalation and mucomyst q8hr. Failed PEG at bedside. Salt tabs made 1 q 6. Decreased cardura to 4mg daily. Urine culture growing E. Coli and sputum culture growing pluralibacter gergoviae and strep species. ID consulted, f/u recs. Failed CPAP trial @ 3pm. Added am labs per heme/onc for hypercoguable workup. Pending repeat LE dopplers in 2-3 days. NGT clogged, removed, ENT failed attempt at replacement, will keep NPO for PEG placement tmw. Repeat xray in am for concern for aspration. Pt abx switched from Zosyn to Ertapenem 1g Q24hrs. per ID recs, possible ESBL growth.   5/2: POD 11. Neuro stable. Pre-op for diagnostic cerebral angiogram and PEG placement. NPO. EVD raised to 5 cmH20. Sputum culture speciated to step pneumoniae, sensitive to ertapenem. 3% inhalation/mucomyst made q 6 hr d/t thick secretions. PEG placed in OR. POD0 dx cerebral angio. EVD raised to 10.   5/3: POD 12. Neuro stable. PEG feeds began @ 10 AM, plan to increase 10cc every 6h per general surgery. Repeat dopplers show stable R superficial thrombus and new L IM calf DVT. Vascular consulted for IVC filter. Plan to get CTH tomorrow.  5/4: POD 13. JIM o/n. s/p IVC filter with vascular today. Pending post-procedure CTH. FOBT negative. Started iron and vitamin c every other day for iron deficiency anemia.   5/5: POD14. CSF cx sent to r/o infection from new gas in right temporal horn seen on CTH. Restarted TF, changed to Jevity 1.2, f/u nutrition recs. EVD raised to 10 today, plan to challenge over the weekend and CTH on Monday, possible VPS next Wednesday. ENT removed sutures today. Salt tabs decreased 1q12.  5/6: POD15 Placed on CPAP briefly with low MV alert, placed back on full vent support. EVD remains open at [id number]. ICPs WNL. Neuro exam stable.  EVD raised to 15. Tolerated CPAP x8h.   5/7: POD#16. JIM overnight, neuro stable. D/c'd salt tabs and 3% neb. Wean trach to CPAP as tolerated. Pan cx NGTD. EVD raised today to [id number].   5/8: POD17 JIM overnight. ICPs WNL. Pt remains on full vent support. Neuro exam stable. Plan for possible VPS today. CT chio complete showing increase in vent size.   5/9: POD18 SOC, POD1 VPS. Desat o/n to 80s, improved on own. CXR with LLL effusion. Another desat to 90% in AM, given duoneb and mucomyst. Oxycodone given for incisional pain. Neuro exam stable. CTH in AM stable. Keppra dc'd. Ritalin 5 BID started.  Tolerating CPAP trial. SALVADOR ordered. Rectal tube dc'd.   5/10: POD 19 SOC POD2 VPS. Remains on full vent support. Tolerated CPAP for 6 hours. Neuro exam stable. BP liberalized to 160. RLQ US 2/2 abd pain. Lactate WNL.  5/11: POD20 SOC POD3 VPS. Plan for CPAP trial in AM.  Dc'd cardura. F/u speech consult for communcation board.       Vital Signs Last 24 Hrs  T(C): 36.8 (11 May 2023 00:47), Max: 37.4 (10 May 2023 18:00)  T(F): 98.2 (11 May 2023 00:47), Max: 99.3 (10 May 2023 18:00)  HR: 65 (11 May 2023 00:00) (62 - 80)  BP: 107/52 (11 May 2023 00:00) (84/48 - 109/52)  BP(mean): 75 (11 May 2023 00:00) (63 - 79)  RR: 17 (11 May 2023 00:00) (12 - 35)  SpO2: 96% (11 May 2023 00:00) (75% - 99%)    Parameters below as of 11 May 2023 00:00  Patient On (Oxygen Delivery Method): ventilator    O2 Concentration (%): 40    I&O's Detail    09 May 2023 07:01  -  10 May 2023 07:00  --------------------------------------------------------  IN:    Enteral Tube Flush: 490 mL    Jevity 1.2: 500 mL    Lactated Ringers: 300 mL    Lactated Ringers: 600 mL    Sodium Chloride 0.9% Bolus: 500 mL  Total IN: 2390 mL    OUT:    Voided (mL): 1200 mL  Total OUT: 1200 mL    Total NET: 1190 mL      10 May 2023 07:01  -  11 May 2023 03:02  --------------------------------------------------------  IN:    Enteral Tube Flush: 100 mL    Jevity 1.2: 60 mL    Lactated Ringers: 675 mL  Total IN: 835 mL    OUT:    Voided (mL): 850 mL  Total OUT: 850 mL    Total NET: -15 mL        I&O's Summary    09 May 2023 07:01  -  10 May 2023 07:00  --------------------------------------------------------  IN: 2390 mL / OUT: 1200 mL / NET: 1190 mL    10 May 2023 07:01  -  11 May 2023 03:02  --------------------------------------------------------  IN: 835 mL / OUT: 850 mL / NET: -15 mL          PHYSICAL EXAM:  Constitutional: awake, alert, resting in bed, NAD.   Respiratory: +trach to vent. non-labored breathing. Normal chest rise.   Cardiovascular: Regular rate and rhythm  Gastrointestinal: +PEG. Soft, nontender, nondistended.  .  Vascular: Extremities warm, no ulcers, no discoloration of skin.   Neurological: Gen: AA&O x 3, nods to choices.     Right gaze, Right eye CN6 palsy. CN II-XII grossly intact    Motor: LUE 5/5, RUE 4/5, LUE/RLE antigravity     Sens: Sensation intact to light touch throughout.    Extremities: warm and well perfused   Wound/incision: SOC incision c/d/i with baci and xeroform in place. Right VPS incision c/d/i with xeroform in place.      TUBES/LINES:  [] CVC  [] A-line  [] Lumbar Drain  [] Ventriculostomy  [] Other  [x] PEG    DIET:  [] NPO  [] Mechanical  [x] Tube feeds    LABS:                        9.2    6.72  )-----------( 300      ( 10 May 2023 05:10 )             28.1     05-10    137  |  102  |  9   ----------------------------<  121<H>  3.9   |  29  |  0.49<L>    Ca    8.8      10 May 2023 05:10  Phos  3.3     05-10  Mg     2.0     05-10              CAPILLARY BLOOD GLUCOSE          Drug Levels: [] N/A    CSF Analysis: [] N/A      Allergies    No Known Allergies    Intolerances      MEDICATIONS:  Antibiotics:    Neuro:  methylphenidate 5 milliGRAM(s) Oral two times a day  oxyCODONE    IR 5 milliGRAM(s) Oral every 4 hours PRN    Anticoagulation:  enoxaparin Injectable 40 milliGRAM(s) SubCutaneous every 24 hours    OTHER:  acetylcysteine 10%  Inhalation 4 milliLiter(s) Inhalation every 6 hours PRN  albuterol/ipratropium for Nebulization 3 milliLiter(s) Nebulizer every 6 hours PRN  amLODIPine   Tablet 5 milliGRAM(s) Oral daily  atorvastatin 80 milliGRAM(s) Oral at bedtime  carvedilol 3.125 milliGRAM(s) Oral every 12 hours  chlorhexidine 0.12% Liquid 15 milliLiter(s) Oral Mucosa every 12 hours  chlorhexidine 2% Cloths 1 Application(s) Topical daily  lactobacillus acidophilus 1 Tablet(s) Oral daily  pantoprazole   Suspension 40 milliGRAM(s) Oral daily  psyllium Powder 1 Packet(s) Oral every 12 hours    IVF:  ascorbic acid 500 milliGRAM(s) Oral <User Schedule>  ferrous    sulfate 325 milliGRAM(s) Oral <User Schedule>    CULTURES:  Culture Results:   No growth to date (05-08 @ 14:35)  Culture Results:   No growth to date (05-05 @ 05:06)    RADIOLOGY & ADDITIONAL TESTS:      ASSESSMENT:  58 y/o female found to have acute infarction within the right cerebellar hemisphere, causing herniation. Now s/p SOC foramen magnum decompression and right parietal/occipital EVD placement (4/21). s/p trach (4/28/23). s/p PEG (5/2/23), s/p dx cerebral angiogram (5/2/23). now s/p VPS Certas @ 4 (5/8/23).     STROKE    No pertinent family history in first degree relatives    Handoff    Asthma    CAD (coronary artery disease)    Peripheral neuropathy    Cerebellar stroke    Respiratory failure, unspecified with hypoxia    History of DVT (deep vein thrombosis)    Hydrocephalus    Tracheostomy malfunction    Cerebellar stroke    Respiratory failure, unspecified with hypoxia    History of DVT of lower extremity    Hydrocephalus    Tracheostomy malfunction    Decompressive craniectomy    Cerebellar infarct    Cerebellar infarct    CAD (coronary artery disease)    Asthma    Peripheral neuropathy    Lupus anticoagulant positive    Anemia due to acute blood loss    Tracheostomy malfunction    Decompressive craniectomy    Insertion, external ventricular drain    Planned tracheostomy    Tracheostomy tube change    Esophagogastroduodenoscopy (EGD) with anesthesia    Insertion, PEG tube, laparoscopic    Angiogram, carotid and cerebral, bilateral    Tracheostomy tube change    IVC filter placement     shunt    Insertion, ventriculopleural shunt    S/P total abdominal hysterectomy    S/P cholecystectomy    S/P right knee surgery    Insertion, external ventricular drain    Planned tracheostomy    Esophagogastroduodenoscopy (EGD) with anesthesia    Insertion, PEG tube, laparoscopic     shunt    CAD (coronary artery disease)    Asthma    Peripheral neuropathy    Lupus anticoagulant positive    Acute respiratory failure with hypoxia    Acute respiratory failure with hypoxia    Dysphagia    Deep vein thrombosis (DVT)    Hydrocephalus    SysAdmin_[email or web address]        PLAN:    PLAN:  Neuro   - Vitals/neuro q4h   - s/p VPS (Certas @ 4)   - dx angio 5/2: b/l cavernous ICA aneurysms, as discussed at vascular conference f/u outpt   - CTH 4/28 stable; 5/4 new gas in right temporal horn, CT 5/8 chio increased vent size, 5/9 stable   - Stroke consulted, appreciate reccs   - Pain control with tylenol prn, oxycodone prn, fent pushes 12.5 mg q 2hr prn   - Ritalin 5 BID for neurostimulation  - f/u when can start ASA 81mg     Cardio  - SBP   - TTE 4/24: negative for PFO, mild LVH, mild dilation of L atrium, EF 55-60%   - Carvedilol 3.125mg BID   - SALVADOR deferred, documented not indicated as it will not  at this time for starting patient on anticoagulation. Will reassess if indicated medically.     Pulm  - + Trach (6 shiley) 400/40/16/5, CPAP trials as tolerated   - Chest PT, duoneb, mucomist q 6 prn     GI  - + PEG - advance TFs  - RT removed 5/10  - Protonix for GI ppx while on vent    Renal  - IVL  - Voiding via primafit     Endo   - cont lipitor     Heme  - SQL for DVT ppx   - s/p IVCF 5/4   - LE dopplers 4/22 neg for DVT, but showing superficial venous thrombosis in the proximal portion of the right greater saphenous vein; repeat on 4/29 with persistant superficial thrombus, 5/3 new DVT L IM calf and unchanged R superficial vein thrombus  - Heme following for positive anticardiolipin Ab 4/27, recs appreciated, f/u activated protein C    - Iron and vitamin c every other day     ID  - CSF sent from OR (5/8)   - MRSA (-), +UA cx ESBL, +sputum cx pluralibacter gergoviae, strep pneumoniae, s/p Ertapenem 1g Q24hrs (5/1-5/7)    DISPO:  - NSICU, full code   - PT/OT rec acute inpatient rehab: patient can tolerate 3 hrs PT/OT daily    D/w Dr. Valadez and Dr. Garcia      Assessment:  Present when checked    []  GCS  E   V  M     Heart Failure: []Acute, [] acute on chronic , []chronic  Heart Failure:  [] Diastolic (HFpEF), [] Systolic (HFrEF), []Combined (HFpEF and HFrEF), [] RHF, [] Pulm HTN, [] Other    [] MAMTA, [] ATN, [] AIN, [] other  [] CKD1, [] CKD2, [] CKD 3, [] CKD 4, [] CKD 5, []ESRD    Encephalopathy: [] Metabolic, [] Hepatic, [] toxic, [] Neurological, [] Other    Abnormal Nurtitional Status: [] malnurtition (see nutrition note), [ ]underweight: BMI < 19, [] morbid obesity: BMI >40, [] Cachexia    [] Sepsis  [] hypovolemic shock,[] cardiogenic shock, [] hemorrhagic shock, [] neuogenic shock  [] Acute Respiratory Failure  []Cerebral edema, [] Brain compression/ herniation,   [] Functional quadriplegia  [] Acute blood loss anemia

## 2023-05-11 NOTE — SPEECH LANGUAGE PATHOLOGY EVALUATION - LEGABILITY
poorly legible with morphological errors; unclear if this was r/t acquired agraphia vs UE weakness; however, suspect the latter/impaired

## 2023-05-11 NOTE — PROGRESS NOTE ADULT - ASSESSMENT
57F w/ PMH of Asthma, CAD, HTN, prior miscarriage x3, peripheral neuropathy, presented w/ right sided weakness and facial numbness on 4/20 found to have bilateral cerebellar infarctions (R. and L. PICA) s/p posterior fossa craniectomy and s/p PEG and Trach. Catheter angiogram demonstrated bilateral cavernous ICA aneurysms but no atherosclerotic disease demonstrated.     TTE mild LA enlargement  A1c 5.9  LDL 92  DRVVT negative (04/29/23) while off heparin products. Anti-hfwf9ssglgldawcet screen (04/25) negative. Hexagonal phase (04/29/23) positive. Anticardiolipin Ab IgG < 5, IgM 28.4, IgA 9.2.   5.3.23 bilateral US LE +new L. 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 Will need repeat test in 12 weeks to definitely diagnose APLS.   -Once surgically safe, patient should be started on Coumadin for secondary stroke prevention; DOAC is not recommended; Per NSG, plan to start ASA 81mg on Sunday and AC on ?5/17  -Normotension  -Bilateral cavernous ICA aneurysms as per NSG; Monitoring for now given cavernous location.  -No need for SALVADOR or ILR given strong suspicion for APLS as the etiology of stroke

## 2023-05-11 NOTE — SPEECH LANGUAGE PATHOLOGY EVALUATION - SLP PERTINENT HISTORY OF CURRENT PROBLEM
PMH of Asthma, CAD, HTN, prior miscarriage x3, peripheral neuropathy, presented w/ right sided weakness and facial numbness on 4/20 found to have bilateral cerebellar infarctions (R. and L. PICA) s/p posterior fossa craniectomy and s/p PEG and Trach. Catheter angiogram demonstrated bilateral cavernous ICA aneurysms but no atherosclerotic disease demonstrated.

## 2023-05-11 NOTE — PROGRESS NOTE ADULT - ASSESSMENT
56 y/o female transferred from [facility] with right sided weakness and right facial numbness. Found to have acute infarction within the right cerebellar hemisphere, causing herniation. Now s/p SOC foramen magnum decompression and right parietal/occipital EVD placement (4/21). Course c/b respiratory insuffiencey d/t bulbar dysfunction, s/p trach 4/28/23 failed bedside  PEG placement 5/1; now s/p VPS certas @ 4    -NC q4, VSq4   -CPAP trail as tolerated  -c/w tube feeds,   -c/w coreg  -pending restart of asprin   -communication board request from Speech

## 2023-05-11 NOTE — BH CONSULTATION LIAISON ASSESSMENT NOTE - NSBHCHARTREVIEWVS_PSY_A_CORE FT
Vital Signs Last 24 Hrs  T(C): 37.6 (11 May 2023 17:00), Max: 37.6 (11 May 2023 06:00)  T(F): 99.6 (11 May 2023 17:00), Max: 99.6 (11 May 2023 06:00)  HR: 72 (11 May 2023 19:00) (62 - 77)  BP: 109/53 (11 May 2023 19:00) (91/50 - 115/58)  BP(mean): 76 (11 May 2023 19:00) (67 - 88)  RR: 19 (11 May 2023 19:00) (16 - 29)  SpO2: 97% (11 May 2023 19:00) (92% - 98%)    Parameters below as of 11 May 2023 19:00  Patient On (Oxygen Delivery Method): ventilator,cpap    O2 Concentration (%): 40

## 2023-05-11 NOTE — PROGRESS NOTE ADULT - SUBJECTIVE AND OBJECTIVE BOX
INTERVAL HISTORY: HPI:  56 yo F with PMH of Asthma, CAD (not on  meds), peripheral neuropathy and PSH of BATOOL, cholecystectomy, right knee surgery presented to [facility] ED on 4/20 with right sided weakness and right facial numbness. Patient was undergoing preparation for colonoscopy. As per daughter at 8am patient was playing with her grandchildren but around 840 am patient was getting dressed and fell and subsequently felt weak after. Daughter reports that patient had some episodes of vomiting at this time. She had a syncopal episode at around 10 am and was witnessed by brother. No head trauma, She regained conscious after few minutes. She remained in bed for the remainder of the day. Daughter reports some slurred speech noted 1230-1PM and also was confused after. and around 4PM, the patient's sister noted right sided weakness at which time EMS was called and patient was brought to ED. No c/o chest pain, shortness of breath, fever, headache, abdominal pain, urinary complaints. No recent travel/sickness/ change in meds. Stroke code in ER: CTH neg for heme, Right PICA distribution acute infarction. CTA with saccular aneurysms of b/l carotids, approximately 0.8 cm on the right and 1.2 x 0.9 cm on the left. Possible tiny third saccular aneurysm on the right Posterior intracranial circulation: Right vertebral arterial occlusion at its dural crossing junction V3 Atlantic and V4 intracranial segments with likely reversal of flow in its intracranial segment from the basilar. Right PICA faintly seen. 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  Not candidate for TNK/mechanical thrombectomy. CT scan repeated which showed: 1. Brain: Progression of acute infarction within the right cerebellar hemisphere, also extending into the left superior cerebellar hemisphere. New mass effect on the fourth ventricle causing stenosis versus occlusion with new third and lateral ventricular dilatation indicating ventricular obstruction at the level of the fourth ventricle. New upward and downward herniation of cerebellar parenchyma Right carotid system: No hemodynamically significant stenosis. Left carotid system: No hemodynamically significant stenosis. Vertebral circulation: Patent. Anterior intracranial circulation: Intact. Bilateral internal carotid saccular aneurysms. These findings are unchanged. Posterior intracranial circulation:    Improved flow within the right vertebral artery since 4/20/2023. New focal stenosis mid left vertebral artery, etiology uncertain, consider vasospasm and extrinsic compression in addition to new embolic disease. Brain perfusion: Perfusion images demonstrate normalization of the perfusion abnormality in the right cerebellar hemisphere present on 4/20/2023 despite evidence of progression of acute infarction.  Areas of apparent ischemia within the posterior fossa have progressed in extent, also again involving the left posterior cerebral arterial distribution. Tx to [facility] for SOC watch. On admission to [facility], NIHSS 12.  (21 Apr 2023 16:50)    PAST MEDICAL & SURGICAL HISTORY:  Asthma  CAD (coronary artery disease)  Peripheral neuropathy  s/P total abdominal hysterectomy  S/P cholecystectomy  S/P right knee surgery      REVIEW OF SYSTEMS: [ ] Unable to Assess due to neurologic exam   [x] All ROS addressed below are non-contributory, except:  Neuro: [ ] Headache [ ] Back pain [ ] Numbness [ ] Weakness [ ] Ataxia [ ] Dizziness [ ] Aphasia [ ] Dysarthria [ ] Visual disturbance  Resp: [ ] Shortness of breath/dyspnea, [ ] Orthopnea [ ] Cough  CV: [ ] Chest pain [ ] Palpitation [ ] Lightheadedness [ ] Syncope  Renal: [ ] Thirst [ ] Edema  GI: [ ] Nausea [ ] Emesis [ ] Abdominal pain [ ] Constipation [ ] Diarrhea  Hem: [ ] Hematemesis [ ] bright red blood per rectum  ID: [ ] Fever [ ] Chills [ ] Dysuria  ENT: [ ] Rhinorrhea    PHYSICAL EXAM:    General: No Acute Distress   Neurological:  AOx2 to choice, R eye 6th nerve palsy, b/l horizontal nystagmus, b/l UE & LE dysmetria, RUE & RLE 4+/5 w/ mild drift   Pulmonary: Clear to Auscultation, No Rales, No Rhonchi, No Wheezes   Cardiovascular: S1, S2, Regular Rate and Rhythm   Gastrointestinal: Soft, Nontender, Nondistended   Extremities: No calf tenderness   Incision: CDI        ICU Vital Signs Last 24 Hrs  T(C): 37.6 (11 May 2023 17:00), Max: 37.6 (11 May 2023 06:00)  T(F): 99.6 (11 May 2023 17:00), Max: 99.6 (11 May 2023 06:00)  HR: 72 (11 May 2023 19:00) (62 - 77)  BP: 109/53 (11 May 2023 19:00) (91/50 - 115/58)  BP(mean): 76 (11 May 2023 19:00) (67 - 88)  RR: 19 (11 May 2023 19:00) (16 - 29)  SpO2: 97% (11 May 2023 19:00) (92% - 98%)      05-10-23 @ 07:01  -  05-11-23 @ 07:00  --------------------------------------------------------  IN: 1265 mL / OUT: 1100 mL / NET: 165 mL    05-11-23 @ 07:01  -  05-11-23 @ 20:28  --------------------------------------------------------  IN: 810 mL / OUT: 850 mL / NET: -40 mL        Mode: CPAP with PS, FiO2: 40, PEEP: 5, PS: 8, MAP: 8, PIP: 13    acetylcysteine 10%  Inhalation 4 milliLiter(s) Inhalation every 6 hours PRN  albuterol/ipratropium for Nebulization 3 milliLiter(s) Nebulizer every 6 hours PRN  amLODIPine   Tablet 5 milliGRAM(s) Oral daily  ascorbic acid 500 milliGRAM(s) Oral <User Schedule>  atorvastatin 80 milliGRAM(s) Oral at bedtime  carvedilol 3.125 milliGRAM(s) Oral every 12 hours  chlorhexidine 0.12% Liquid 15 milliLiter(s) Oral Mucosa every 12 hours  chlorhexidine 2% Cloths 1 Application(s) Topical daily  enoxaparin Injectable 40 milliGRAM(s) SubCutaneous every 24 hours  ferrous    sulfate 325 milliGRAM(s) Oral <User Schedule>  lactobacillus acidophilus 1 Tablet(s) Oral daily  methylphenidate 5 milliGRAM(s) Oral two times a day  oxyCODONE    IR 5 milliGRAM(s) Oral every 4 hours PRN  pantoprazole   Suspension 40 milliGRAM(s) Oral daily  psyllium Powder 1 Packet(s) Oral every 12 hours      LABS:  Na: 139 (05-11 @ 06:03), 137 (05-10 @ 05:10), 136 (05-09 @ 05:17)  K: 3.5 (05-11 @ 06:03), 3.9 (05-10 @ 05:10), 4.1 (05-09 @ 05:17)  Cl: 103 (05-11 @ 06:03), 102 (05-10 @ 05:10), 101 (05-09 @ 05:17)  CO2: 28 (05-11 @ 06:03), 29 (05-10 @ 05:10), 25 (05-09 @ 05:17)  BUN: 11 (05-11 @ 06:03), 9 (05-10 @ 05:10), 13 (05-09 @ 05:17)  Cr: 0.47 (05-11 @ 06:03), 0.49 (05-10 @ 05:10), 0.52 (05-09 @ 05:17)  Glu: 134(05-11 @ 06:03), 121(05-10 @ 05:10), 116(05-09 @ 05:17)    Hgb: 8.9 (05-11 @ 06:03), 9.2 (05-10 @ 05:10), 10.6 (05-09 @ 05:17)  Hct: 27.4 (05-11 @ 06:03), 28.1 (05-10 @ 05:10), 32.4 (05-09 @ 05:17)  WBC: 7.31 (05-11 @ 06:03), 6.72 (05-10 @ 05:10), 6.86 (05-09 @ 05:17)  Plt: 271 (05-11 @ 06:03), 300 (05-10 @ 05:10), 332 (05-09 @ 05:17)

## 2023-05-11 NOTE — SPEECH LANGUAGE PATHOLOGY EVALUATION - SLP GENERAL OBSERVATIONS
Received sleeping in bed, awoke to verbal stim/tactile stim but remained drowsy,  at bedside, assessment completed in Cuban. According to pt's , pt has been attempting to write. He believes she has full comprehension but is just unable to phonate given presence of trach.

## 2023-05-11 NOTE — PROGRESS NOTE ADULT - ASSESSMENT
57 year old female w/ PMH of asthma, CAD (not on meds), HTN, prior miscarriages X3, peripheral neuropathy and PSH of BATOOL, cholecystectomy and right knee surgery presented to [place] ED on 4/20 w/ right sided weakness and right facial numbness. Initially found to have a right PICA distribution acute infarct and a right vertebral artery occlusion. Not a candidate for any intervention. On repeat imaging had progression of acute infarct within the right and left cerebellar hemisphere with mass effect on the fourth ventricle and hydrocephalus and upward and downward herniation of the cerebellar parenchyma. She was transferred to [facility] on 4/21 and underwent an emergent SOC foramen magnum decompression and right parietal/occipital EVD placement. Hematology consulted for hypercoagulable work up.    # Acute CVA  Per patient's children, no personal history of clotting or bleeding disorder. Patient's mother with history of stroke. Daughter endorses that patient had 3 miscarriages (early). She thinks her mother was possibly on some type of blood thinner following a pregnancy at [facility] in 2009, but does not remember the name or reason, which was stopped after a few months. No other reported family history of clotting. Factor V Leiden and Prothrombin gene mutation negative. Lupus anticoagulant was positive 1.05 (range 0-1.03), however, the patient had received low molecular weight heparin for DVT ppx and may represent a false positive (as LA is a clot based assay). Beta 2 glycoprotein negative. Anticardiolipin antibody screen positive, with elevated IgM 28.4, but titers below threshold for APS in [place] criteria (>40 Mpl).     Of note, patient with bilateral LE ultrasound on 4/22/23 showing no evidence of deep venous thrombosis in either lower extremity, but positive for superficial venous thrombosis is present in the proximal portion of the right greater saphenous vein in the thigh. Repeat US Duplex on 05/03/23 shows new DVT in left intramuscular calf vein.     Plan:  - DRVVT negative (04/29/23) while off heparin products. Anti-[id number] screen (04/25) negative. Hexagonal phase (04/29/23) positive. Anticardiolipin Ab IgG < 5, IgM 28.4, IgA 9.2.   - Repeat Duplex LE (04/29/23) shows persistent superficial venous thrombosis (superficial right greater saphenous vein); with minimal involvement of the junction with common femoral vein.   - There remains concern for hypercoagulable state given her history of possible DVT during pregnancy and early miscarriages. She has two hexagonal phase positive tests, indicating the presence of Lupus anticoagulant, possibly suggestive of APLS. She will need to repeat this test in 12 weeks to definitively diagnose APLS.  - While off heparin products, Protein C function assay with reflex (112%), Protein S activity (50%), Protein S free antigen (95%), and anti-thrombin III assay with reflex (105%). Although Protein S activity is low, free Protein S level is the best test for true deficiency. Activated protein C resistance can sometimes interfere with activity of protein S, although Factor V Leiden (a major cause of activated protein C resistance) has been ruled out. Follow-up activated protein C resistance.   - s/p IVC filter (05/04/23) with Vascular. Will recommend retrieval of the IVC as soon as possible as patient may have an underlying hypercoagulable state that will promote thrombosis. The IVC filter does not prevent formation of clot above the filter. Recommend initiation of anticoagulation whenever possible per primary team/Neurosurgery. Per Neurosurgery, can start AC on 05/17 (POD10) from  shunt.     # Anemia, normocytic  - No reported history of anemia. Possibly 2/2 critical illness and recent procedures.  - Iron studies consistent with anemia of chronic disease, possible component of concomitant iron deficiency anemia. Folate 12. B12 1403.   - ESR 19    Discussed with Dr. Gee Kilgore 57 year old female w/ PMH of asthma, CAD (not on meds), HTN, prior miscarriages X3, peripheral neuropathy and PSH of BATOOL, cholecystectomy and right knee surgery presented to [place] ED on 4/20 w/ right sided weakness and right facial numbness. Initially found to have a right PICA distribution acute infarct and a right vertebral artery occlusion. Not a candidate for any intervention. On repeat imaging had progression of acute infarct within the right and left cerebellar hemisphere with mass effect on the fourth ventricle and hydrocephalus and upward and downward herniation of the cerebellar parenchyma. She was transferred to [facility] on 4/21 and underwent an emergent SOC foramen magnum decompression and right parietal/occipital EVD placement. Hematology consulted for hypercoagulable work up.    # Acute CVA  Per patient's children, no personal history of clotting or bleeding disorder. Patient's mother with history of stroke. Daughter endorses that patient had 3 miscarriages (early). She thinks her mother was possibly on some type of blood thinner following a pregnancy at [facility] in 2009, but does not remember the name or reason, which was stopped after a few months. No other reported family history of clotting. Factor V Leiden and Prothrombin gene mutation negative. Lupus anticoagulant was positive 1.05 (range 0-1.03), however, the patient had received low molecular weight heparin for DVT ppx and may represent a false positive (as LA is a clot based assay). Beta 2 glycoprotein negative. Anticardiolipin antibody screen positive, with elevated IgM 28.4, but titers below threshold for APS in [place] criteria (>40 Mpl).     Of note, patient with bilateral LE ultrasound on 4/22/23 showing no evidence of deep venous thrombosis in either lower extremity, but positive for superficial venous thrombosis is present in the proximal portion of the right greater saphenous vein in the thigh. Repeat US Duplex on 05/03/23 shows new DVT in left intramuscular calf vein.     Plan:  - DRVVT negative (04/29/23) while off heparin products. Anti-injk0gcslrxrqcdap screen (04/25) negative. Hexagonal phase (04/29/23) positive. Anticardiolipin Ab IgG < 5, IgM 28.4, IgA 9.2.   - Repeat Duplex LE (04/29/23) shows persistent superficial venous thrombosis (superficial right greater saphenous vein); with minimal involvement of the junction with common femoral vein.   - There remains concern for hypercoagulable state given her history of possible DVT during pregnancy and early miscarriages. She has two hexagonal phase positive tests, indicating the presence of Lupus anticoagulant, possibly suggestive of APLS. She will need to repeat this test in 12 weeks to definitively diagnose APLS.  - While off heparin products, Protein C function assay with reflex (112%), Protein S activity (50%), Protein S free antigen (95%), and anti-thrombin III assay with reflex (105%). Although Protein S activity is low, free Protein S level is the best test for true deficiency. Activated protein C resistance can sometimes interfere with activity of protein S, although Factor V Leiden (a major cause of activated protein C resistance) has been ruled out. Follow-up activated protein C resistance.   - s/p IVC filter (05/04/23) with Vascular. Will recommend retrieval of the IVC as soon as possible as patient may have an underlying hypercoagulable state that will promote thrombosis. The IVC filter does not prevent formation of clot above the filter. Recommend initiation of anticoagulation whenever possible per primary team/Neurosurgery. Per Neurosurgery, can start AC on 05/17 (POD10) from  shunt.   - Anticoagulation should either be LMWH or Coumadin. DOAC is not recommended in the setting of suspected or confirmed APLS.     # Anemia, normocytic  - No reported history of anemia. Possibly 2/2 critical illness and recent procedures.  - Iron studies consistent with anemia of chronic disease, possible component of concomitant iron deficiency anemia. Folate 12. B12 1403.   - ESR 19    Upon discharge, should follow-up with Dr. Mónica Avalos (Hematology) at [facility] ([address] [place] [address]) in 3 weeks ([phone]) to establish care for management of hypercoagulable state.     Discussed with Dr. Gee Kilgore

## 2023-05-11 NOTE — PROGRESS NOTE ADULT - SUBJECTIVE AND OBJECTIVE BOX
MAKSIM TRIVEDI  Female  [id number]    Interval:    No acute events overnight    VITAL SIGNS:  T(F): 97.9  HR: 69  BP: 106/54  RR: 23  SpO2: 97%    MS: Eyes open; Tracks examiner; No verbal output; Follows most simple commands  CN: No gaze limitation OU on right gaze; Conjugate left gaze palsy (previously had a L. ERNESTO); Direction changing horizontal nystagmus; Face symmetric  Motor: LUE moves spontaneously at least antigravity; RUE some effort against gravity but falls to be within seconds; Some effort against gravity in both legs  Sensory: Grimaces equally to pain x4      LABS:                          8.9    7.31  )-----------( 271      ( 11 May 2023 06:03 )             27.4     05-11    139  |  103  |  11  ----------------------------<  134<H>  3.5   |  28  |  0.47<L>    Ca    8.8      11 May 2023 06:03  Phos  3.7     05-11  Mg     2.0     05-11          MEDICATIONS:   acetylcysteine 10%  Inhalation 4 milliLiter(s) Inhalation every 6 hours PRN  albuterol/ipratropium for Nebulization 3 milliLiter(s) Nebulizer every 6 hours PRN  amLODIPine   Tablet 5 milliGRAM(s) Oral daily  ascorbic acid 500 milliGRAM(s) Oral <User Schedule>  atorvastatin 80 milliGRAM(s) Oral at bedtime  carvedilol 3.125 milliGRAM(s) Oral every 12 hours  chlorhexidine 0.12% Liquid 15 milliLiter(s) Oral Mucosa every 12 hours  chlorhexidine 2% Cloths 1 Application(s) Topical daily  enoxaparin Injectable 40 milliGRAM(s) SubCutaneous every 24 hours  ferrous    sulfate 325 milliGRAM(s) Oral <User Schedule>  lactobacillus acidophilus 1 Tablet(s) Oral daily  methylphenidate 5 milliGRAM(s) Oral two times a day  oxyCODONE    IR 5 milliGRAM(s) Oral every 4 hours PRN  pantoprazole   Suspension 40 milliGRAM(s) Oral daily  psyllium Powder 1 Packet(s) Oral every 12 hours          RADIOLOGY & ADDITIONAL STUDIES:

## 2023-05-12 LAB
ALBUMIN SERPL ELPH-MCNC: 2.7 G/DL — LOW (ref 3.3–5)
ALP SERPL-CCNC: 115 U/L — SIGNIFICANT CHANGE UP (ref 40–120)
ALT FLD-CCNC: 16 U/L — SIGNIFICANT CHANGE UP (ref 10–45)
AMYLASE P1 CFR SERPL: 50 U/L — SIGNIFICANT CHANGE UP (ref 25–125)
ANION GAP SERPL CALC-SCNC: 8 MMOL/L — SIGNIFICANT CHANGE UP (ref 5–17)
APCR PPP: 2.44 RATIO — SIGNIFICANT CHANGE UP
AST SERPL-CCNC: 14 U/L — SIGNIFICANT CHANGE UP (ref 10–40)
BASE EXCESS BLDCOV CALC-SCNC: 5.8 MMOL/L — HIGH (ref -9.3–0.3)
BASE EXCESS BLDCOV CALC-SCNC: 6.7 MMOL/L — HIGH (ref -9.3–0.3)
BILIRUB DIRECT SERPL-MCNC: <0.2 MG/DL — SIGNIFICANT CHANGE UP (ref 0–0.3)
BILIRUB INDIRECT FLD-MCNC: SIGNIFICANT CHANGE UP MG/DL (ref 0.2–1)
BILIRUB SERPL-MCNC: 0.2 MG/DL — SIGNIFICANT CHANGE UP (ref 0.2–1.2)
BUN SERPL-MCNC: 10 MG/DL — SIGNIFICANT CHANGE UP (ref 7–23)
CALCIUM SERPL-MCNC: 8.6 MG/DL — SIGNIFICANT CHANGE UP (ref 8.4–10.5)
CHLORIDE SERPL-SCNC: 104 MMOL/L — SIGNIFICANT CHANGE UP (ref 96–108)
CO2 BLDCOV-SCNC: 33 MMOL/L — SIGNIFICANT CHANGE UP
CO2 BLDCOV-SCNC: 35 MMOL/L — SIGNIFICANT CHANGE UP
CO2 SERPL-SCNC: 27 MMOL/L — SIGNIFICANT CHANGE UP (ref 22–31)
CREAT SERPL-MCNC: 0.42 MG/DL — LOW (ref 0.5–1.3)
EGFR: 115 ML/MIN/1.73M2 — SIGNIFICANT CHANGE UP
GAS PNL BLDCOV: 7.39 — SIGNIFICANT CHANGE UP (ref 7.25–7.45)
GAS PNL BLDCOV: 7.41 — SIGNIFICANT CHANGE UP (ref 7.25–7.45)
GAS PNL BLDCOV: SIGNIFICANT CHANGE UP
GLUCOSE SERPL-MCNC: 161 MG/DL — HIGH (ref 70–99)
HCO3 BLDCOV-SCNC: 32 MMOL/L — SIGNIFICANT CHANGE UP
HCO3 BLDCOV-SCNC: 33 MMOL/L — SIGNIFICANT CHANGE UP
HCT VFR BLD CALC: 28.1 % — LOW (ref 34.5–45)
HGB BLD-MCNC: 9.3 G/DL — LOW (ref 11.5–15.5)
LIDOCAIN IGE QN: 49 U/L — SIGNIFICANT CHANGE UP (ref 7–60)
MAGNESIUM SERPL-MCNC: 2 MG/DL — SIGNIFICANT CHANGE UP (ref 1.6–2.6)
MCHC RBC-ENTMCNC: 30.1 PG — SIGNIFICANT CHANGE UP (ref 27–34)
MCHC RBC-ENTMCNC: 33.1 GM/DL — SIGNIFICANT CHANGE UP (ref 32–36)
MCV RBC AUTO: 90.9 FL — SIGNIFICANT CHANGE UP (ref 80–100)
NRBC # BLD: 0 /100 WBCS — SIGNIFICANT CHANGE UP (ref 0–0)
PCO2 BLDCOV: 50 MMHG — HIGH (ref 27–49)
PCO2 BLDCOV: 55 MMHG — HIGH (ref 27–49)
PHOSPHATE SERPL-MCNC: 3.8 MG/DL — SIGNIFICANT CHANGE UP (ref 2.5–4.5)
PLATELET # BLD AUTO: 253 K/UL — SIGNIFICANT CHANGE UP (ref 150–400)
PO2 BLDCOA: 53 MMHG — HIGH (ref 17–41)
PO2 BLDCOA: 58 MMHG — HIGH (ref 17–41)
POTASSIUM SERPL-MCNC: 4 MMOL/L — SIGNIFICANT CHANGE UP (ref 3.5–5.3)
POTASSIUM SERPL-SCNC: 4 MMOL/L — SIGNIFICANT CHANGE UP (ref 3.5–5.3)
PROT SERPL-MCNC: 6.3 G/DL — SIGNIFICANT CHANGE UP (ref 6–8.3)
RBC # BLD: 3.09 M/UL — LOW (ref 3.8–5.2)
RBC # FLD: 12.9 % — SIGNIFICANT CHANGE UP (ref 10.3–14.5)
SAO2 % BLDCOV: 86.2 % — SIGNIFICANT CHANGE UP
SAO2 % BLDCOV: 89.2 % — SIGNIFICANT CHANGE UP
SODIUM SERPL-SCNC: 139 MMOL/L — SIGNIFICANT CHANGE UP (ref 135–145)
WBC # BLD: 5.39 K/UL — SIGNIFICANT CHANGE UP (ref 3.8–10.5)
WBC # FLD AUTO: 5.39 K/UL — SIGNIFICANT CHANGE UP (ref 3.8–10.5)

## 2023-05-12 PROCEDURE — 36415 COLL VENOUS BLD VENIPUNCTURE: CPT

## 2023-05-12 PROCEDURE — 99291 CRITICAL CARE FIRST HOUR: CPT | Mod: 24

## 2023-05-12 PROCEDURE — 99231 SBSQ HOSP IP/OBS SF/LOW 25: CPT

## 2023-05-12 PROCEDURE — 99024 POSTOP FOLLOW-UP VISIT: CPT

## 2023-05-12 RX ORDER — SENNA PLUS 8.6 MG/1
2 TABLET ORAL AT BEDTIME
Refills: 0 | Status: DISCONTINUED | OUTPATIENT
Start: 2023-05-12 | End: 2023-05-21

## 2023-05-12 RX ORDER — POLYETHYLENE GLYCOL 3350 17 G/17G
17 POWDER, FOR SOLUTION ORAL DAILY
Refills: 0 | Status: DISCONTINUED | OUTPATIENT
Start: 2023-05-12 | End: 2023-05-21

## 2023-05-12 RX ORDER — SENNA PLUS 8.6 MG/1
2 TABLET ORAL AT BEDTIME
Refills: 0 | Status: DISCONTINUED | OUTPATIENT
Start: 2023-05-12 | End: 2023-05-12

## 2023-05-12 RX ORDER — ACETAMINOPHEN 500 MG
650 TABLET ORAL EVERY 6 HOURS
Refills: 0 | Status: DISCONTINUED | OUTPATIENT
Start: 2023-05-12 | End: 2023-05-25

## 2023-05-12 RX ORDER — POLYETHYLENE GLYCOL 3350 17 G/17G
17 POWDER, FOR SOLUTION ORAL DAILY
Refills: 0 | Status: DISCONTINUED | OUTPATIENT
Start: 2023-05-12 | End: 2023-05-12

## 2023-05-12 RX ADMIN — OXYCODONE HYDROCHLORIDE 5 MILLIGRAM(S): 5 TABLET ORAL at 14:12

## 2023-05-12 RX ADMIN — AMLODIPINE BESYLATE 5 MILLIGRAM(S): 2.5 TABLET ORAL at 05:54

## 2023-05-12 RX ADMIN — Medication 5 MILLIGRAM(S): at 11:23

## 2023-05-12 RX ADMIN — Medication 5 MILLIGRAM(S): at 18:01

## 2023-05-12 RX ADMIN — OXYCODONE HYDROCHLORIDE 5 MILLIGRAM(S): 5 TABLET ORAL at 13:50

## 2023-05-12 RX ADMIN — Medication 1 PACKET(S): at 05:54

## 2023-05-12 RX ADMIN — ENOXAPARIN SODIUM 40 MILLIGRAM(S): 100 INJECTION SUBCUTANEOUS at 22:33

## 2023-05-12 RX ADMIN — PANTOPRAZOLE SODIUM 40 MILLIGRAM(S): 20 TABLET, DELAYED RELEASE ORAL at 11:23

## 2023-05-12 RX ADMIN — SENNA PLUS 2 TABLET(S): 8.6 TABLET ORAL at 22:33

## 2023-05-12 RX ADMIN — POLYETHYLENE GLYCOL 3350 17 GRAM(S): 17 POWDER, FOR SOLUTION ORAL at 11:22

## 2023-05-12 RX ADMIN — CHLORHEXIDINE GLUCONATE 1 APPLICATION(S): 213 SOLUTION TOPICAL at 11:27

## 2023-05-12 RX ADMIN — CARVEDILOL PHOSPHATE 3.12 MILLIGRAM(S): 80 CAPSULE, EXTENDED RELEASE ORAL at 18:01

## 2023-05-12 RX ADMIN — Medication 1 TABLET(S): at 11:23

## 2023-05-12 RX ADMIN — Medication 1 PACKET(S): at 18:01

## 2023-05-12 RX ADMIN — ATORVASTATIN CALCIUM 80 MILLIGRAM(S): 80 TABLET, FILM COATED ORAL at 22:33

## 2023-05-12 RX ADMIN — CARVEDILOL PHOSPHATE 3.12 MILLIGRAM(S): 80 CAPSULE, EXTENDED RELEASE ORAL at 05:54

## 2023-05-12 RX ADMIN — CHLORHEXIDINE GLUCONATE 15 MILLILITER(S): 213 SOLUTION TOPICAL at 05:54

## 2023-05-12 NOTE — BH CONSULTATION LIAISON PROGRESS NOTE - NSBHATTESTCOMMENTATTENDFT_PSY_A_CORE
56yo woman with no reported PPH, PMH of Asthma, CAD, peripheral neuropathy and PSH of BATOOL, cholecystectomy, right knee surgery presented to [place] ED on 4/20 with right sided weakness and right facial numbness, found to have acute infarction within the right cerebellar hemisphere, causing herniation. Now s/p SOC foramen magnum decompression and right parietal/occipital EVD placement (4/21), with course c/b respiratory insuffiencey d/t bulbar dysfunction, s/p trach 4/28/23 failed bedside PEG placement 5/1; now s/p VPS. Undergoing hypercoagulability w/u. Psychiatry consulted re: therapeutic modalities for impaired coping in setting of periods of observed tearfulness. Pt also observed with some cognitive deficits (A&Ox2) and periods of impulsivity (trying to get out of bed) requiring use of soft restraints. Pt has been communicated by head gestures and occasionally mouthing words; is currently unable to speak or write per team.    On interview this morning with assistance of daughter (Togolese-speaking, with preference to assist rather than ), pt found arousable to tactile stimuli, calm, able to state her name, seemed to indicate 'hospital' when given choices about location, otherwise unable to indicate knowledge of date/situation. Pt denied feeling sad but was unable to otherwise indicate emotional state. Pt unable to provide any other hx. Per daughter, pt seems to recognize family and is focused on going home, at times impulsively trying to get out of bed to leave. Daughter notes that pt calms with family presence and [employer] music. No reported concern for any suicidality.    Impression is of acute delirium, r/o neurocognitive d/o s/p CVA, with behavioral changes including periods of restlessness and impulsivity. Adjustment d/o is also possible given significant acute stressor. Given cognitive and speech limitations, would recommend non-talk based therapies for emotional support. Also agree with SLP assistance with communication, such as use of letter/image board.    Recommend: Supervised room per ICU protocol for agitation risk. Consider creative arts consult for music therapy. PRN Haldol for acute agitation as above if not verbally redirectable. Delirium precautions. D/w primary team, please contact with questions.

## 2023-05-12 NOTE — PROGRESS NOTE ADULT - SUBJECTIVE AND OBJECTIVE BOX
***********************************************  ADULT NSICU PROGRESS NOTE  MAKSIM TRIVEDI [id number] [facility] 10EA 02  ***********************************************    24H INTERVAL EVENTS: no acute overnight events.      ROS: negative except per mentioned above in 24h interval events.      VITALS:    ICU Vital Signs Last 24 Hrs  T(C): 36.4 (12 May 2023 05:39), Max: 37.6 (11 May 2023 17:00)  T(F): 97.5 (12 May 2023 05:39), Max: 99.6 (11 May 2023 17:00)  HR: 65 (12 May 2023 05:55) (62 - 77)  BP: 100/55 (12 May 2023 05:00) (92/53 - 115/58)  BP(mean): 75 (12 May 2023 05:00) (70 - 82)  ABP: --  ABP(mean): --  RR: 14 (12 May 2023 05:55) (14 - 29)  SpO2: 95% (12 May 2023 05:55) (94% - 99%)    O2 Parameters below as of 12 May 2023 05:55  Patient On (Oxygen Delivery Method): ventilator    O2 Concentration (%): 40        EXAM:     [place] Coma Scale: 3/1T/6 = 10    General: normocephalic, head wrapped, laying in bed, in no distress  Neuro     MS: Giovanna Coma Scale: 3/1T/6 = 10, follows commands, cooperative with examination, normal attention    CN: PERRL, (+)R. gaze, plegia to left gaze     Mot: bulk normal, tone normal, power UPPER 2/3, LOWER 3/3  Chest: mechanically ventilated, coarse breath sounds, heart regular rate/rhythm, present S1/S2, no murmurs or rubs  Abdomen: nondistended, soft and nontender without peritoneal signs, normoactive bowel sounds  Extremities: no clubbing, well-perfused, no edema                                    8.9    7.31  )-----------( 271      ( 11 May 2023 06:03 )             27.4     05-11    139  |  103  |  11  ----------------------------<  134<H>  3.5   |  28  |  0.47<L>    Ca    8.8      11 May 2023 06:03  Phos  3.7     05-11  Mg     2.0     05-11        MEDICATIONS  (STANDING):  amLODIPine   Tablet 5 milliGRAM(s) Oral daily  ascorbic acid 500 milliGRAM(s) Oral <User Schedule>  atorvastatin 80 milliGRAM(s) Oral at bedtime  carvedilol 3.125 milliGRAM(s) Oral every 12 hours  chlorhexidine 0.12% Liquid 15 milliLiter(s) Oral Mucosa every 12 hours  chlorhexidine 2% Cloths 1 Application(s) Topical daily  enoxaparin Injectable 40 milliGRAM(s) SubCutaneous every 24 hours  ferrous    sulfate 325 milliGRAM(s) Oral <User Schedule>  lactobacillus acidophilus 1 Tablet(s) Oral daily  methylphenidate 5 milliGRAM(s) Oral two times a day  pantoprazole   Suspension 40 milliGRAM(s) Oral daily  psyllium Powder 1 Packet(s) Oral every 12 hours    MEDICATIONS  (PRN):  acetylcysteine 10%  Inhalation 4 milliLiter(s) Inhalation every 6 hours PRN increased secretions  albuterol/ipratropium for Nebulization 3 milliLiter(s) Nebulizer every 6 hours PRN Respiratory Distress  oxyCODONE    IR 5 milliGRAM(s) Oral every 4 hours PRN Moderate Pain (4 - 6)      ***********************************************  ASSESSMENT AND PLAN  ***********************************************    #Suboccipital pseudomeningocele  #S/p placement of VPS, 5/8/23  #Bilateral cerebellar hemispheric strokes  #S/p SOC for foramen magnum decompression and R. parieto-occipital placement of EVD, 4/22/23  #11mm wide-necked multilobulated L. cavernous ICA aneurysm & 8mm wide-necked R. cavernous ICA aneurysm  #R. V4 occlusion  #S/p tracheostomy placement w/ tube exchanged due to broken  balloon, 4/28/23  #S/p lap-assisted gastrostomy tube, 5/2/23  #Abdominal discomfort  #L. intramuscular calf DVT  #S/p IVCF placement, 5/4/23  #History of peripheral neuropathy, CAD, and asthma      NEURO  - Admit NSICU, Q2h neuro checks / Q2h vital signs  - F/u vascular conference re plan for b/l cavernous ICA aneurysms  - Stroke consultation, heme consultation  - Pain control, PT/OT when able  - Ritalin 5/5  - Start ASA this sunday, AC on POD #10    PULM  - Volume control/assist control ventilation, wean to pressure support as tolerated  - 6 Cuffed trach  - SpO2 goal > 92%, supplemental O2 and pulm toileting as needed    CARDIO  - BP goal: < 140 while postoperative  - Pending SALVADOR    GI  - Diet: tube feeds  - Stress ulcer prophylaxis: PPI  - RLQ ultrasound, bladder scan    /RENAL  - Monitor UOP/volume status, BUN/SCr    HEME  - Maintain Hb > 7.0, PLT > 100,000  - Heme consultation for stroke in the young  - SCDs, SQL  - Removal of IVFC ASAP, hypercoag panel workup pending  - AC/ASA on POD #10 and Sunday, respectively, once AC started, must remove IVCF    ID  - Monitor for infectious s/s, fever curve, leukocytosis  - S/p ertapenem (5/1-5/17) for ESBL UTI, Pluralibacter gergoviae/Strep pneumo sputum  - Cleared for VPS placement by ID    ENDO  - SGlu < 180     ***********************************************  ADULT NSICU PROGRESS NOTE  MAKSIM TRIVEDI [id number] [facility] 10EA 02  ***********************************************    24H INTERVAL EVENTS: no acute overnight events.  Tolerated PS x24.  OOB on trach collar, VBG.      ROS: negative except per mentioned above in 24h interval events.      VITALS:    ICU Vital Signs Last 24 Hrs  T(C): 36.4 (12 May 2023 05:39), Max: 37.6 (11 May 2023 17:00)  T(F): 97.5 (12 May 2023 05:39), Max: 99.6 (11 May 2023 17:00)  HR: 65 (12 May 2023 05:55) (62 - 77)  BP: 100/55 (12 May 2023 05:00) (92/53 - 115/58)  BP(mean): 75 (12 May 2023 05:00) (70 - 82)  ABP: --  ABP(mean): --  RR: 14 (12 May 2023 05:55) (14 - 29)  SpO2: 95% (12 May 2023 05:55) (94% - 99%)    O2 Parameters below as of 12 May 2023 05:55  Patient On (Oxygen Delivery Method): ventilator    O2 Concentration (%): 40        EXAM:     [place] Coma Scale: 3/1T/6 = 10    General: normocephalic, head wrapped, laying in bed, in no distress  Neuro     MS: Giovanna Coma Scale: 3/1T/6 = 10, follows commands, cooperative with examination, normal attention    CN: PERRL, (+)R. gaze, plegia to left gaze     Mot: bulk normal, tone normal, power UPPER 2/3, LOWER 3/3  Chest: mechanically ventilated, coarse breath sounds, heart regular rate/rhythm, present S1/S2, no murmurs or rubs  Abdomen: nondistended, soft and nontender without peritoneal signs, normoactive bowel sounds  Extremities: no clubbing, well-perfused, no edema                                  9.3    5.39  )-----------( 253      ( 12 May 2023 06:03 )             28.1     05-12    139  |  104  |  10  ----------------------------<  161<H>  4.0   |  27  |  0.42<L>    Ca    8.6      12 May 2023 06:03  Phos  3.8     05-12  Mg     2.0     05-12    TPro  6.3  /  Alb  2.7<L>  /  TBili  0.2  /  DBili  <0.2  /  AST  14  /  ALT  16  /  AlkPhos  115  05-12      MEDICATIONS  (STANDING):  amLODIPine   Tablet 5 milliGRAM(s) Oral daily  ascorbic acid 500 milliGRAM(s) Oral <User Schedule>  atorvastatin 80 milliGRAM(s) Oral at bedtime  carvedilol 3.125 milliGRAM(s) Oral every 12 hours  chlorhexidine 2% Cloths 1 Application(s) Topical daily  enoxaparin Injectable 40 milliGRAM(s) SubCutaneous every 24 hours  ferrous    sulfate 325 milliGRAM(s) Oral <User Schedule>  lactobacillus acidophilus 1 Tablet(s) Oral daily  methylphenidate 5 milliGRAM(s) Oral two times a day  pantoprazole   Suspension 40 milliGRAM(s) Oral daily  polyethylene glycol 3350 17 Gram(s) Oral daily  psyllium Powder 1 Packet(s) Oral every 12 hours  senna 2 Tablet(s) Oral at bedtime    MEDICATIONS  (PRN):  acetaminophen   Oral Liquid .. 650 milliGRAM(s) Oral every 6 hours PRN Temp greater or equal to 38.5C (101.3F), Mild Pain (1 - 3)  acetylcysteine 10%  Inhalation 4 milliLiter(s) Inhalation every 6 hours PRN increased secretions  albuterol/ipratropium for Nebulization 3 milliLiter(s) Nebulizer every 6 hours PRN Respiratory Distress  oxyCODONE    IR 5 milliGRAM(s) Oral every 4 hours PRN Moderate Pain (4 - 6)      ***********************************************  ASSESSMENT AND PLAN  ***********************************************    #Suboccipital pseudomeningocele  #S/p placement of VPS, 5/8/23  #Bilateral cerebellar hemispheric strokes  #S/p SOC for foramen magnum decompression and R. parieto-occipital placement of EVD, 4/22/23  #11mm wide-necked multilobulated L. cavernous ICA aneurysm & 8mm wide-necked R. cavernous ICA aneurysm  #R. V4 occlusion  #S/p tracheostomy placement w/ tube exchanged due to broken  balloon, 4/28/23  #S/p lap-assisted gastrostomy tube, 5/2/23  #Abdominal discomfort  #L. intramuscular calf DVT  #S/p IVCF placement, 5/4/23  #History of peripheral neuropathy, CAD, and asthma      NEURO  - Admit NSICU, Q2h neuro checks / Q2h vital signs  - F/u vascular conference re plan for b/l cavernous ICA aneurysms: follow up outpatient with Dr. Minor   - Stroke consultation, heme consultation  - Certas at 4  - Pain control, PT/OT when able  - Ritalin 5/5  - Start ASA this sunday, AC on POD #10    PULM  - Volume control/assist control ventilation, wean to pressure support & trach collar  - 6 Cuffed trach  - SpO2 goal > 92%, supplemental O2 and pulm toileting as needed    CARDIO  - BP goal: < 140 while postoperative  - Pending SALVADOR    GI  - Diet: tube feeds  - Stress ulcer prophylaxis: PPI  - RLQ ultrasound, bladder scan    /RENAL  - Monitor UOP/volume status, BUN/SCr    HEME  - Maintain Hb > 7.0, PLT > 100,000  - Heme consultation for stroke in the young  - SCDs, SQL  - Removal of IVFC ASAP, hypercoag panel workup pending  - AC/ASA on POD #10 and Sunday, respectively, once AC started, must remove IVCF    ID  - Monitor for infectious s/s, fever curve, leukocytosis  - S/p ertapenem (5/1-5/17) for ESBL UTI, Pluralibacter gergoviae/Strep pneumo sputum  - Cleared for VPS placement by ID    ENDO  - SGlu < 180

## 2023-05-12 NOTE — BH CONSULTATION LIAISON PROGRESS NOTE - NSBHCONSFOLLOWNEEDS_PSY_ALL_CORE
Pt's mother (Chloé) scheduled annual visit for Thursday 12/5/19 at 1:40 pm.  
No psychiatric contraindications to discharge

## 2023-05-12 NOTE — BH CONSULTATION LIAISON PROGRESS NOTE - NSBHCHARTREVIEWVS_PSY_A_CORE FT
Vital Signs Last 24 Hrs  T(C): 37.2 (12 May 2023 09:22), Max: 37.6 (11 May 2023 17:00)  T(F): 98.9 (12 May 2023 09:22), Max: 99.6 (11 May 2023 17:00)  HR: 67 (12 May 2023 09:00) (62 - 77)  BP: 105/55 (12 May 2023 09:00) (92/53 - 115/58)  BP(mean): 77 (12 May 2023 09:00) (70 - 82)  RR: 19 (12 May 2023 09:00) (14 - 29)  SpO2: 96% (12 May 2023 09:00) (95% - 99%)    Parameters below as of 12 May 2023 09:00  Patient On (Oxygen Delivery Method): ventilator    O2 Concentration (%): 40

## 2023-05-12 NOTE — CHART NOTE - NSCHARTNOTEFT_GEN_A_CORE
POD21 SOC POD4 VPS. CPAP 8/5, tolerated until 3pm. Can start ASA on 5/14 and full AC POD 10 from VPS 5/19 (remove IVC filter prior, f/u w/ vascular on timing).

## 2023-05-12 NOTE — BH CONSULTATION LIAISON PROGRESS NOTE - MSE UNSTRUCTURED FT
Pt has trach in place; calm during writer's evaluation, with restraints on, not appearing agitated/trying to get up/pulling on lines during writer's evaluation, appears alert, pt able to nod seemingly reliably to some questions; unable to ascertain thought content/thought process/mood/other aspects of MSE due to the patient's medical/nonverbal status  Pt has trach in place; calm during writer's evaluation, with soft restraints on, not appearing agitated/trying to get up/pulling on lines during writer's evaluation, appears alert, no speech, pt able to nod seemingly reliably to some questions when asked in Ukrainian; thought content impoverished, shook head 'no' when asked if sad otherwise unable to assess mood, affect constricted, unable to assess thought process, no overt perceptual disturbances, no reported SI/HI, unclear insight and judgment but suspected poor. Orientation: nodded in response to "hospital" otherwise unable to assess, unable to assess memory.

## 2023-05-12 NOTE — BH CONSULTATION LIAISON PROGRESS NOTE - CURRENT MEDICATION
MEDICATIONS  (STANDING):  amLODIPine   Tablet 5 milliGRAM(s) Oral daily  ascorbic acid 500 milliGRAM(s) Oral <User Schedule>  atorvastatin 80 milliGRAM(s) Oral at bedtime  carvedilol 3.125 milliGRAM(s) Oral every 12 hours  chlorhexidine 2% Cloths 1 Application(s) Topical daily  enoxaparin Injectable 40 milliGRAM(s) SubCutaneous every 24 hours  ferrous    sulfate 325 milliGRAM(s) Oral <User Schedule>  lactobacillus acidophilus 1 Tablet(s) Oral daily  methylphenidate 5 milliGRAM(s) Oral two times a day  pantoprazole   Suspension 40 milliGRAM(s) Oral daily  polyethylene glycol 3350 17 Gram(s) Oral daily  psyllium Powder 1 Packet(s) Oral every 12 hours  senna 2 Tablet(s) Oral at bedtime    MEDICATIONS  (PRN):  acetaminophen   Oral Liquid .. 650 milliGRAM(s) Oral every 6 hours PRN Temp greater or equal to 38.5C (101.3F), Mild Pain (1 - 3)  acetylcysteine 10%  Inhalation 4 milliLiter(s) Inhalation every 6 hours PRN increased secretions  albuterol/ipratropium for Nebulization 3 milliLiter(s) Nebulizer every 6 hours PRN Respiratory Distress  oxyCODONE    IR 5 milliGRAM(s) Oral every 4 hours PRN Moderate Pain (4 - 6)

## 2023-05-12 NOTE — PROGRESS NOTE ADULT - SUBJECTIVE AND OBJECTIVE BOX
INTERVAL HISTORY: HPI:  58 yo F with PMH of Asthma, CAD (not on  meds), peripheral neuropathy and PSH of BATOOL, cholecystectomy, right knee surgery presented to [place] ED on 4/20 with right sided weakness and right facial numbness. Patient was undergoing preparation for colonoscopy. As per daughter at 8am patient was playing with her grandchildren but around 840 am patient was getting dressed and fell and subsequently felt weak after. Daughter reports that patient had some episodes of vomiting at this time. She had a syncopal episode at around 10 am and was witnessed by brother. No head trauma, She regained conscious after few minutes. She remained in bed for the remainder of the day. Daughter reports some slurred speech noted 1230-1PM and also was confused after. and around 4PM, the patient's sister noted right sided weakness at which time EMS was called and patient was brought to ED. No c/o chest pain, shortness of breath, fever, headache, abdominal pain, urinary complaints. No recent travel/sickness/ change in meds. Stroke code in ER: CTH neg for heme, Right PICA distribution acute infarction. CTA with saccular aneurysms of b/l carotids, approximately 0.8 cm on the right and 1.2 x 0.9 cm on the left. Possible tiny third saccular aneurysm on the right Posterior intracranial circulation: Right vertebral arterial occlusion at its dural crossing junction V3 Atlantic and V4 intracranial segments with likely reversal of flow in its intracranial segment from the basilar. Right PICA faintly seen. 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  Not candidate for TNK/mechanical thrombectomy. CT scan repeated which showed: 1. Brain: Progression of acute infarction within the right cerebellar hemisphere, also extending into the left superior cerebellar hemisphere. New mass effect on the fourth ventricle causing stenosis versus occlusion with new third and lateral ventricular dilatation indicating ventricular obstruction at the level of the fourth ventricle. New upward and downward herniation of cerebellar parenchyma Right carotid system: No hemodynamically significant stenosis. Left carotid system: No hemodynamically significant stenosis. Vertebral circulation: Patent. Anterior intracranial circulation: Intact. Bilateral internal carotid saccular aneurysms. These findings are unchanged. Posterior intracranial circulation:    Improved flow within the right vertebral artery since 4/20/2023. New focal stenosis mid left vertebral artery, etiology uncertain, consider vasospasm and extrinsic compression in addition to new embolic disease. Brain perfusion: Perfusion images demonstrate normalization of the perfusion abnormality in the right cerebellar hemisphere present on 4/20/2023 despite evidence of progression of acute infarction.  Areas of apparent ischemia within the posterior fossa have progressed in extent, also again involving the left posterior cerebral arterial distribution. Tx to [facility] for SOC watch. On admission to [facility], NIHSS 12.  (21 Apr 2023 16:50)    PAST MEDICAL & SURGICAL HISTORY:  Asthma  CAD (coronary artery disease)  Peripheral neuropathy  s/P total abdominal hysterectomy  S/P cholecystectomy  S/P right knee surgery      REVIEW OF SYSTEMS: [ ] Unable to Assess due to neurologic exam   [x] All ROS addressed below are non-contributory, except:  Neuro: [ ] Headache [ ] Back pain [ ] Numbness [ ] Weakness [ ] Ataxia [ ] Dizziness [ ] Aphasia [ ] Dysarthria [ ] Visual disturbance  Resp: [ ] Shortness of breath/dyspnea, [ ] Orthopnea [ ] Cough  CV: [ ] Chest pain [ ] Palpitation [ ] Lightheadedness [ ] Syncope  Renal: [ ] Thirst [ ] Edema  GI: [ ] Nausea [ ] Emesis [ ] Abdominal pain [ ] Constipation [ ] Diarrhea  Hem: [ ] Hematemesis [ ] bright red blood per rectum  ID: [ ] Fever [ ] Chills [ ] Dysuria  ENT: [ ] Rhinorrhea    PHYSICAL EXAM:    General: No Acute Distress   Neurological:  AOx2 to choice, R eye 6th nerve palsy, b/l horizontal nystagmus, b/l UE & LE dysmetria, RUE & RLE 4+/5 w/ mild drift   Pulmonary: Clear to Auscultation, No Rales, No Rhonchi, No Wheezes   Cardiovascular: S1, S2, Regular Rate and Rhythm   Gastrointestinal: Soft, Nontender, Nondistended   Extremities: No calf tenderness   Incision: CDI        ICU Vital Signs Last 24 Hrs  T(C): 37.6 (11 May 2023 17:00), Max: 37.6 (11 May 2023 06:00)  T(F): 99.6 (11 May 2023 17:00), Max: 99.6 (11 May 2023 06:00)  HR: 72 (11 May 2023 19:00) (62 - 77)  BP: 109/53 (11 May 2023 19:00) (91/50 - 115/58)  BP(mean): 76 (11 May 2023 19:00) (67 - 88)  RR: 19 (11 May 2023 19:00) (16 - 29)  SpO2: 97% (11 May 2023 19:00) (92% - 98%)      05-10-23 @ 07:01  -  05-11-23 @ 07:00  --------------------------------------------------------  IN: 1265 mL / OUT: 1100 mL / NET: 165 mL    05-11-23 @ 07:01  -  05-11-23 @ 20:28  --------------------------------------------------------  IN: 810 mL / OUT: 850 mL / NET: -40 mL        Mode: CPAP with PS, FiO2: 40, PEEP: 5, PS: 8, MAP: 8, PIP: 13    acetylcysteine 10%  Inhalation 4 milliLiter(s) Inhalation every 6 hours PRN  albuterol/ipratropium for Nebulization 3 milliLiter(s) Nebulizer every 6 hours PRN  amLODIPine   Tablet 5 milliGRAM(s) Oral daily  ascorbic acid 500 milliGRAM(s) Oral <User Schedule>  atorvastatin 80 milliGRAM(s) Oral at bedtime  carvedilol 3.125 milliGRAM(s) Oral every 12 hours  chlorhexidine 0.12% Liquid 15 milliLiter(s) Oral Mucosa every 12 hours  chlorhexidine 2% Cloths 1 Application(s) Topical daily  enoxaparin Injectable 40 milliGRAM(s) SubCutaneous every 24 hours  ferrous    sulfate 325 milliGRAM(s) Oral <User Schedule>  lactobacillus acidophilus 1 Tablet(s) Oral daily  methylphenidate 5 milliGRAM(s) Oral two times a day  oxyCODONE    IR 5 milliGRAM(s) Oral every 4 hours PRN  pantoprazole   Suspension 40 milliGRAM(s) Oral daily  psyllium Powder 1 Packet(s) Oral every 12 hours      LABS:  Na: 139 (05-11 @ 06:03), 137 (05-10 @ 05:10), 136 (05-09 @ 05:17)  K: 3.5 (05-11 @ 06:03), 3.9 (05-10 @ 05:10), 4.1 (05-09 @ 05:17)  Cl: 103 (05-11 @ 06:03), 102 (05-10 @ 05:10), 101 (05-09 @ 05:17)  CO2: 28 (05-11 @ 06:03), 29 (05-10 @ 05:10), 25 (05-09 @ 05:17)  BUN: 11 (05-11 @ 06:03), 9 (05-10 @ 05:10), 13 (05-09 @ 05:17)  Cr: 0.47 (05-11 @ 06:03), 0.49 (05-10 @ 05:10), 0.52 (05-09 @ 05:17)  Glu: 134(05-11 @ 06:03), 121(05-10 @ 05:10), 116(05-09 @ 05:17)    Hgb: 8.9 (05-11 @ 06:03), 9.2 (05-10 @ 05:10), 10.6 (05-09 @ 05:17)  Hct: 27.4 (05-11 @ 06:03), 28.1 (05-10 @ 05:10), 32.4 (05-09 @ 05:17)  WBC: 7.31 (05-11 @ 06:03), 6.72 (05-10 @ 05:10), 6.86 (05-09 @ 05:17)  Plt: 271 (05-11 @ 06:03), 300 (05-10 @ 05:10), 332 (05-09 @ 05:17)

## 2023-05-12 NOTE — BH CONSULTATION LIAISON PROGRESS NOTE - NSBHFUPINTERVALHXFT_PSY_A_CORE
56 yo Palestinian-speaking F with PMH of Asthma, CAD (not on  meds), peripheral neuropathy and PSH of BATOOL, cholecystectomy, right knee surgery presented to [place] ED on 4/20 with right sided weakness and right facial numbness, found to have acute infarction within the right cerebellar hemisphere, causing herniation. Now s/p SOC foramen magnum decompression and right parietal/occipital EVD placement (4/21). Course c/b respiratory insuffiencey d/t bulbar dysfunction, s/p trach 4/28/23 failed bedside  PEG placement 5/1; now s/p VPS. Psychiatry consulted for mood concerns, pt was found to have delirium; psychiatry CL service here for follow up.     On approach, pt was lying in bed in ICU, appeared lethargic. Patient was not arousable to voice, but was briefly arousable to touch. Patient was able to nod and shake her head in response to questions asked in Palestinian, and was able to follow simple commands. Patient shook her head when asked if she was feeling sad, nodded her head when asked if she was in pain, and indicated that she was having pain on her head.     Spoke with patient's daughter at bedside, who states that patient has been doing better medically recently, and has been having more strength to attempt to get out of the bed. Daughter states that patient wants to go home, and is frustrated about her medical illness and needing to be in the hospital. Denies acute concerns for SI/HI. States that pt can be calmed down at times when the pt listens to music from [employer].    58 yo Icelandic-speaking F with PMH of Asthma, CAD (not on  meds), peripheral neuropathy and PSH of BATOOL, cholecystectomy, right knee surgery presented to [place] ED on 4/20 with right sided weakness and right facial numbness, found to have acute infarction within the right cerebellar hemisphere, causing herniation. Now s/p SOC foramen magnum decompression and right parietal/occipital EVD placement (4/21). Course c/b respiratory insuffiencey d/t bulbar dysfunction, s/p trach 4/28/23 failed bedside  PEG placement 5/1; now s/p VPS. Psychiatry consulted for mood concerns, although there was some difficulty clarifying the consult at that time, pt was found to have delirium; Psychiatry CL service here for follow up and possible further consult clarification.     Clarified consult with primary team, which states that consult was placed in seek of potential liaison services for management of patient's distress while being in the hospital (e.g. resources similar to therapy), primary team otherwise denies acute psychiatric concerns at this time.     On approach, pt was lying in bed in ICU, appeared lethargic. Patient was not arousable to voice, but was briefly arousable to touch. Patient was able to nod and shake her head in response to questions asked in Icelandic, and was able to follow simple commands. Patient shook her head when asked if she was feeling sad, nodded her head when asked if she was in pain, and indicated that she was having pain on her head.     Spoke with patient's daughter at bedside, who states that patient has been doing better medically recently, and has been having more strength to attempt to get out of the bed. Daughter states that patient wants to go home, and is frustrated about her medical illness and needing to be in the hospital. Denies acute concerns for SI/HI. States that pt can be calmed down at times when the pt listens to music from [employer].

## 2023-05-12 NOTE — PROGRESS NOTE ADULT - ASSESSMENT
58 y/o female transferred from [place] with right sided weakness and right facial numbness. Found to have acute infarction within the right cerebellar hemisphere, causing herniation. Now s/p SOC foramen magnum decompression and right parietal/occipital EVD placement (4/21). Course c/b respiratory insuffiencey d/t bulbar dysfunction, s/p trach 4/28/23 failed bedside  PEG placement 5/1; now s/p VPS certas @ 4    -NC q4, VSq4   -on trach collar   -c/w tube feeds,   -c/w coreg  -pending restart of asprin   -communication board request from Speech

## 2023-05-12 NOTE — PROGRESS NOTE ADULT - SUBJECTIVE AND OBJECTIVE BOX
S/Overnight events: JIM overnight. Neuro stable. Resting comfortably.       Hospital Course:   4/21: Admitted for crani watch, CTH complete w/ unchanged hydrocephalus, taken for emergent occipital craniectomy.   4/22: POD1. Remains intubated overnight, on propofol and dank. EVD@[id number]. Pending repeat CTH this am. Given hydralazine 10mg x1. Started on cardene for SBP high 140s-150s. Sub-therapeutic on plavix, therapeutic on asa, rpt asa accumetrics until subtherapeutic. LE dopplers neg for DVT, but showing superficial venous thrombosis in the proximal portion of the right greater saphenous vein. Started on 3% @60 for na goal 145-150. NGT placed by ENT. Febrile, pancultured.  4/23: POD 2. 3% increased to 75. NGT readjusted. UA neg. SBP liberalized to 160. Plan to extubate. 3% decreased to 60. Failed extubation d/t no cuff leak, given 60mg solumedrol, plan to extubate in 6 hrs. SCx1 for post void residual >400, started on cardura at bedtime. New blood in buretrol, stopped SQL. Clot in EVD cleared. Neuro exam improving.   4/24: POD 3. Cont 3% with NS while NPO, start TF today. TF started. LFTs downtrending. Sodium 141. Increased 3% to 50, NS to 30. Repeat BMP ordered for 5:30PM. Tramadol 25 for pain with no relief, oxy 5 and 1g tylenol ordered, stability CT stable, speech language consult for dysarthria. Given hydralazine 10mg IVP for SBP>160, started amlodipine 5mg. C/o mild headache, given fioricet x1, stroke neuro rec SALVADOR and loop recorder placement. Echo with bubble completed, negative for PFO, EF 55-60%. J HFNC started for desats with suctioning.   4/25: POD 4. Cont HFNC. Increased secertions on HFNC, reintubated. CXR confirmed good placement. EVD dropped to 5cmH20 for goal of draining 5-10cc/hr and SG ELENA drain taken off suction. Pending CTH. EVD drained 0cc/hr, dropped to 0. NGT replaced. Dc'd fioricet. Started on PPI for intubation. Increased cardura to 4mg for urinary retention, given an additional 2mg now. Started salt tabs 3 q 6. Given 12.5mg fentanyl x1. NGT replaced, CXR shown in lung. NGT removed, repeat CXR showing no pneumothorax. Pending ENT consult for NGT placement. CTH stable. NGT replaced by ENT, confirmed in proper spot. Restarted TF. [id number]. Na 141 from 146. Increased 3% to 60. EVD raised to 3cmH20. ETT pulled back 1cm by RT.  4/26: POD 5 SOC. Intubated, remains on precedex, ABG ordered with improving PaO2, BMP sodium 148, 3% changed to 30cc/hr, cardura increased to 8mg for tonight, tolerating cpap  4/27: POD 6 SOC. Respiratory rate sustained 6, returned to FVS. Na 151, dc'd 3%, f/u AM sodium. Nurse noticed ETT 19 at the lip and was 20 prior, CXR shows ETT @ 5cm above jono, ET tube advanced 1cm, repeat CXR confirms appropriate placement. Thick inline secretions with suctioning. ENT trach placement Fri likely, PEG likely Mon. Keep ELEAN drain until no output, EVD to remain @3. Start ASA 81mg daily tomorrow.   4/28: POD7 SOC, neuro stable, given fentanyl x 1 overnight for presumed discomfort (biting on ETT), remains on full vent support. CTH today stable in comparison to 4/25. 6 cuffed trach placed by ENT in OR, but came down without cuff. Replaced at bedside by ENT. On fentanyl gtt.    4/29: POD8 SOC, JIM overnight. Incision cleaned and dressing changed. On fentanyl gtt. EKG completed due to increased frequency PVCs on telemetry, frequency of PVCs improved with titrating up fentanyl gtt. ABG drawn.  Repeat LE dopplers completed showing persistant superficial thrombus, no DVT. No EVD waveform during day, flushed distally without improvement. Exam unchanged.  EVD dropped to 0 for low output in afternoon.   4/30: POD9. Neuro stable, febrile to 101.3F o/n, given tylenol and pan cx sent. SBP dipped to low 90s o/n, HR stable in 70s with some PVCs, decreased fentanyl gtt and given 500cc bolus of NS x 2. Pend PEG placement Mon and angio Tues. D/c'd ELENA drain today and suture placed. F/u heme recs. Positive UA. Infiltrates found on CXR. Started on Zosyn 4.5g Q6hrs .   5/1: POD 10. Neuro stable. Dc'd fentanyl gtt. PEG failed placement at bedside with Dr. Gant, plan for PEG in OR tomorrow afternoon with Dr. Layton. Dc'd amlodipine and started carvedilol 3.125 BID for PVCs . Made 3% inhalation and mucomyst q8hr. Failed PEG at bedside. Salt tabs made 1 q 6. Decreased cardura to 4mg daily. Urine culture growing E. Coli and sputum culture growing pluralibacter gergoviae and strep species. ID consulted, f/u recs. Failed CPAP trial @ 3pm. Added am labs per heme/onc for hypercoguable workup. Pending repeat LE dopplers in 2-3 days. NGT clogged, removed, ENT failed attempt at replacement, will keep NPO for PEG placement tmw. Repeat xray in am for concern for aspration. Pt abx switched from Zosyn to Ertapenem 1g Q24hrs. per ID recs, possible ESBL growth.   5/2: POD 11. Neuro stable. Pre-op for diagnostic cerebral angiogram and PEG placement. NPO. EVD raised to 5 cmH20. Sputum culture speciated to step pneumoniae, sensitive to ertapenem. 3% inhalation/mucomyst made q 6 hr d/t thick secretions. PEG placed in OR. POD0 dx cerebral angio. EVD raised to 10.   5/3: POD 12. Neuro stable. PEG feeds began @ 10 AM, plan to increase 10cc every 6h per general surgery. Repeat dopplers show stable R superficial thrombus and new L IM calf DVT. Vascular consulted for IVC filter. Plan to get CTH tomorrow.  5/4: POD 13. JIM o/n. s/p IVC filter with vascular today. Pending post-procedure CTH. FOBT negative. Started iron and vitamin c every other day for iron deficiency anemia.   5/5: POD14. CSF cx sent to r/o infection from new gas in right temporal horn seen on CTH. Restarted TF, changed to Jevity 1.2, f/u nutrition recs. EVD raised to 10 today, plan to challenge over the weekend and CTH on Monday, possible VPS next Wednesday. ENT removed sutures today. Salt tabs decreased 1q12.  5/6: POD15 Placed on CPAP briefly with low MV alert, placed back on full vent support. EVD remains open at [id number]. ICPs WNL. Neuro exam stable.  EVD raised to 15. Tolerated CPAP x8h.   5/7: POD#16. JIM overnight, neuro stable. D/c'd salt tabs and 3% neb. Wean trach to CPAP as tolerated. Pan cx NGTD. EVD raised today to [id number].   5/8: POD17 JIM overnight. ICPs WNL. Pt remains on full vent support. Neuro exam stable. Plan for possible VPS today. CT chio complete showing increase in vent size.   5/9: POD18 SOC, POD1 VPS. Desat o/n to 80s, improved on own. CXR with LLL effusion. Another desat to 90% in AM, given duoneb and mucomyst. Oxycodone given for incisional pain. Neuro exam stable. CTH in AM stable. Keppra dc'd. Ritalin 5 BID started.  Tolerating CPAP trial. SALVADOR ordered. Rectal tube dc'd.   5/10: POD 19 SOC POD2 VPS. Remains on full vent support. Tolerated CPAP for 6 hours. Neuro exam stable. BP liberalized to 160. RLQ US 2/2 abd pain. Lactate WNL.  5/11: POD20 SOC POD3 VPS. Plan for CPAP trial in AM.  Dc'd cardura. F/u speech consult for communcation board. Has been tolerating CPAP since 9am. RLQ us w/ no acute pathology. Per heme/onc LMWH or coumadin rec for AC over DOAC due to antiphospholipid syndrome.       Vital Signs Last 24 Hrs  T(C): 36.4 (12 May 2023 01:19), Max: 37.6 (11 May 2023 06:00)  T(F): 97.5 (12 May 2023 01:19), Max: 99.6 (11 May 2023 06:00)  HR: 65 (12 May 2023 00:00) (62 - 77)  BP: 95/53 (12 May 2023 00:00) (94/63 - 115/58)  BP(mean): 70 (12 May 2023 00:00) (69 - 88)  RR: 15 (12 May 2023 00:00) (15 - 29)  SpO2: 99% (12 May 2023 00:00) (92% - 99%)    Parameters below as of 12 May 2023 00:00  Patient On (Oxygen Delivery Method): ventilator,cpap    O2 Concentration (%): 40    I&O's Detail    10 May 2023 07:01  -  11 May 2023 07:00  --------------------------------------------------------  IN:    Enteral Tube Flush: 100 mL    Jevity 1.2: 490 mL    Lactated Ringers: 675 mL  Total IN: 1265 mL    OUT:    Voided (mL): 1100 mL  Total OUT: 1100 mL    Total NET: 165 mL      11 May 2023 07:01  -  12 May 2023 02:10  --------------------------------------------------------  IN:    Enteral Tube Flush: 160 mL    Jevity 1.2: 900 mL  Total IN: 1060 mL    OUT:    Voided (mL): 850 mL  Total OUT: 850 mL    Total NET: 210 mL        I&O's Summary    10 May 2023 07:01  -  11 May 2023 07:00  --------------------------------------------------------  IN: 1265 mL / OUT: 1100 mL / NET: 165 mL    11 May 2023 07:01  -  12 May 2023 02:10  --------------------------------------------------------  IN: 1060 mL / OUT: 850 mL / NET: 210 mL        PHYSICAL EXAM:  Constitutional: awake, OES, NAD.   Respiratory: +trach to vent. non-labored breathing. Normal chest rise.   Cardiovascular: Regular rate and rhythm  Gastrointestinal: +PEG. Soft, nontender, nondistended.   Vascular: Extremities warm, no ulcers, no discoloration of skin.   Neurological: Gen: AA&O x 3 mouths words.     R gaze, R eye CN6 palsy otherwise CN II-XII grossly intact.    Motor: RUE 4/5 with encouragement, LUE 5/5, lower extremities b/l AG.     Sens: Sensation intact to light touch throughout.    Extremities: warm and well perfused   Wound/incision: SOC incision c/d/i with baci and xeroform open to air. Right crani site c/d/i open to air.     DEVICE/DRAIN DRESSING:    TUBES/LINES:  [] CVC  [x] A-line  [] Lumbar Drain  [] Ventriculostomy  [] Other  [x] trach  [x] peg     DIET:  [] NPO  [] Mechanical  [x] Tube feeds    LABS:                        8.9    7.31  )-----------( 271      ( 11 May 2023 06:03 )             27.4     05-11    139  |  103  |  11  ----------------------------<  134<H>  3.5   |  28  |  0.47<L>    Ca    8.8      11 May 2023 06:03  Phos  3.7     05-11  Mg     2.0     05-11              CAPILLARY BLOOD GLUCOSE          Drug Levels: [] N/A    CSF Analysis: [] N/A      Allergies    No Known Allergies    Intolerances      MEDICATIONS:  Antibiotics:    Neuro:  methylphenidate 5 milliGRAM(s) Oral two times a day  oxyCODONE    IR 5 milliGRAM(s) Oral every 4 hours PRN    Anticoagulation:  enoxaparin Injectable 40 milliGRAM(s) SubCutaneous every 24 hours    OTHER:  acetylcysteine 10%  Inhalation 4 milliLiter(s) Inhalation every 6 hours PRN  albuterol/ipratropium for Nebulization 3 milliLiter(s) Nebulizer every 6 hours PRN  amLODIPine   Tablet 5 milliGRAM(s) Oral daily  atorvastatin 80 milliGRAM(s) Oral at bedtime  carvedilol 3.125 milliGRAM(s) Oral every 12 hours  chlorhexidine 0.12% Liquid 15 milliLiter(s) Oral Mucosa every 12 hours  chlorhexidine 2% Cloths 1 Application(s) Topical daily  lactobacillus acidophilus 1 Tablet(s) Oral daily  pantoprazole   Suspension 40 milliGRAM(s) Oral daily  psyllium Powder 1 Packet(s) Oral every 12 hours    IVF:  ascorbic acid 500 milliGRAM(s) Oral <User Schedule>  ferrous    sulfate 325 milliGRAM(s) Oral <User Schedule>    CULTURES:  Culture Results:   No growth to date (05-08 @ 14:35)  Culture Results:   No growth to date (05-05 @ 05:06)    RADIOLOGY & ADDITIONAL TESTS:      ASSESSMENT:  56 y/o female found to have acute infarction within the right cerebellar hemisphere, causing herniation. Now s/p SOC foramen magnum decompression and right parietal/occipital EVD placement (4/21). s/p trach (4/28/23). s/p PEG (5/2/23), s/p dx cerebral angiogram (5/2/23). now s/p VPS Certas @ 4 (5/8/23).     STROKE    No pertinent family history in first degree relatives    Handoff    Asthma    CAD (coronary artery disease)    Peripheral neuropathy    Cerebellar stroke    Respiratory failure, unspecified with hypoxia    History of DVT (deep vein thrombosis)    Hydrocephalus    Tracheostomy malfunction    Cerebellar stroke    Respiratory failure, unspecified with hypoxia    History of DVT of lower extremity    Hydrocephalus    Tracheostomy malfunction    Delirium    Decompressive craniectomy    Cerebellar infarct    Cerebellar infarct    CAD (coronary artery disease)    Asthma    Peripheral neuropathy    Lupus anticoagulant positive    Anemia due to acute blood loss    Tracheostomy malfunction    Decompressive craniectomy    Insertion, external ventricular drain    Planned tracheostomy    Tracheostomy tube change    Esophagogastroduodenoscopy (EGD) with anesthesia    Insertion, PEG tube, laparoscopic    Angiogram, carotid and cerebral, bilateral    Tracheostomy tube change    IVC filter placement     shunt    Insertion, ventriculopleural shunt    S/P total abdominal hysterectomy    S/P cholecystectomy    S/P right knee surgery    Insertion, external ventricular drain    Planned tracheostomy    Esophagogastroduodenoscopy (EGD) with anesthesia    Insertion, PEG tube, laparoscopic     shunt    CAD (coronary artery disease)    Asthma    Peripheral neuropathy    Lupus anticoagulant positive    Acute respiratory failure with hypoxia    Acute respiratory failure with hypoxia    Dysphagia    Deep vein thrombosis (DVT)    Hydrocephalus    [email or web address]        PLAN:    PLAN:  Neuro   - Vitals/neuro q4h   - s/p VPS (Certas @ 4)   - dx angio 5/2: b/l cavernous ICA aneurysms, as discussed at vascular conference f/u outpt   - CTH 4/28 stable; 5/4 new gas in right temporal horn, CT 5/8 chio increased vent size, 5/9 stable   - Stroke consulted, appreciate reccs   - Pain control with oxy 5 prn  - Ritalin 5 BID for neurostimulation  - ASA 81mg on hold, can restart 5/14     Cardio  - SBP   - TTE 4/24: negative for PFO, mild LVH, mild dilation of L atrium, EF 55-60%   - Carvedilol 3.125mg BID   - SALVADOR deferred, documented not indicated as it will not  at this time for starting patient on anticoagulation. Will reassess if indicated medically.     Pulm  - + Trach (6 shiley) 400/40/16/5, CPAP trials as tolerated   - Chest PT, duoneb, mucomist q 6 prn     GI  - + PEG - advance TFs  - RT removed 5/10  - Protonix for GI ppx while on vent  - RLQ US - no acute pathology    Renal  - IVL  - Voiding via primafit     Endo   - cont lipitor     Heme  - SQL for DVT ppx, NO SCDs  - s/p IVCF 5/4   - LE dopplers 4/22 neg for DVT, but showing superficial venous thrombosis in the proximal portion of the right greater saphenous vein; repeat on 4/29 with persistant superficial thrombus, 5/3 new DVT L IM calf and unchanged R superficial vein thrombus  - Heme following for positive anticardiolipin Ab 4/27, recs appreciated, f/u activated protein C    - Iron and vitamin c every other day     ID  - CSF sent from OR (5/8)   - MRSA (-), +UA cx ESBL, +sputum cx pluralibacter gergoviae, strep pneumoniae, s/p Ertapenem 1g Q24hrs (5/1-5/7)    DISPO:  - NSICU, full code   - PT/OT rec acute inpatient rehab: patient can tolerate 3 hrs PT/OT daily    D/w Dr. Valadez and Dr. Garcia    Assessment:  Present when checked    []  GCS  E   V  M     Heart Failure: []Acute, [] acute on chronic , []chronic  Heart Failure:  [] Diastolic (HFpEF), [] Systolic (HFrEF), []Combined (HFpEF and HFrEF), [] RHF, [] Pulm HTN, [] Other    [] MAMTA, [] ATN, [] AIN, [] other  [] CKD1, [] CKD2, [] CKD 3, [] CKD 4, [] CKD 5, []ESRD    Encephalopathy: [] Metabolic, [] Hepatic, [] toxic, [] Neurological, [] Other    Abnormal Nurtitional Status: [] malnurtition (see nutrition note), [ ]underweight: BMI < 19, [] morbid obesity: BMI >40, [] Cachexia    [] Sepsis  [] hypovolemic shock,[] cardiogenic shock, [] hemorrhagic shock, [] neuogenic shock  [] Acute Respiratory Failure  []Cerebral edema, [] Brain compression/ herniation,   [] Functional quadriplegia  [] Acute blood loss anemia

## 2023-05-12 NOTE — BH CONSULTATION LIAISON PROGRESS NOTE - NSBHASSESSMENTFT_PSY_ALL_CORE
58 yo Gabonese-speaking F with PMH of Asthma, CAD (not on  meds), peripheral neuropathy and PSH of BATOOL, cholecystectomy, right knee surgery presented to [place] ED on 4/20 with right sided weakness and right facial numbness, found to have acute infarction within the right cerebellar hemisphere, causing herniation. Now s/p SOC foramen magnum decompression and right parietal/occipital EVD placement (4/21). Course c/b respiratory insuffiencey d/t bulbar dysfunction, s/p trach 4/28/23 failed bedside  PEG placement 5/1; now s/p VPS. Psychiatry consulted for mood concerns, pt was found to have delirium; psychiatry CL service here for follow up.     On assessment, presentation is consistent with delirium and possible adjustment disorder with depressive features. Pt's ability to participate on interview is significantly limited due to significant medical illness. Patient did not appear to be able to verbalize responses, but was at times able to head nod and shake her head in response to questions asked in Gabonese. Patient was able to follow simple commands in Gabonese. Per primary team, patient would at times be tearful on the hospital. Per collateral, no acute safety concerns for SI/HI. Daughter today notes that pt has been frustrated about being in the hospital and would at times attempt to get out of bed.     Impression:  - delirium, multifactorial - secondary to acute medical illness requiring ICU  - adjustment disorder with depressed features secondary to acute medical illness    Recommendations:  - continued medical management as per primary team  - recommend behavioral interventions as first line management of delirium   - For severe agitation not responding to behavioral intervention, may give Haldol 2 mg po q8h prn, with escalation to IM if patient refusing PO and remains an imminent danger to self or others.  If IM antipsychotic is administered, please perform follow-up ECG for QTc monitoring (<500).   - pt is nonverbal at this time, not a candidate for talk therapy  - pt may benefit from music therapy if available; per daughter, pt's mood is improved when she is listening to music associated with Gnosticist, such as mass  - primary team may consider holding patient's ritalin if there are concerns for agitation, as this medication may worsen agitation risk    Delirium precautions include:  - place patient's bed near window  - optimize sleep-wake cycle, minimize environmental noise  - reorientation techniques and memory cues such as calendar, clocks, family photos  - use verbal redirection as first line  - minimize restraints and lines  - ensure adequate pain control, use opioid sparing regimens when possible  - minimize use of anticholinergic, antihistaminic, and benzodiazepine medications

## 2023-05-12 NOTE — CHART NOTE - NSCHARTNOTEFT_GEN_A_CORE
Admitting Diagnosis:   Patient is a 57y old  Female who presents with a chief complaint of bilateral cerebellar strokes (12 May 2023 08:07)      PAST MEDICAL & SURGICAL HISTORY:  Asthma      CAD (coronary artery disease)      Peripheral neuropathy      S/P total abdominal hysterectomy      S/P cholecystectomy      S/P right knee surgery      Current Nutrition Order: Jevity 1.2 50ml/hr + banatrol BID providing 1200ml, 1520kcal, 67g protein    PO Intake: Good (%) [   ]  Fair (50-75%) [   ] Poor (<25%) [   ]- N/A on EN    GI Issues:   No n/v/d/c  No abd distention noted  PEG in place    Pain:  Intermittent pain noted, regimen in place    Skin Integrity:  Surgical incision, yissel score 17  No edema noted  No pressure ulcers noted    Labs:   05-12    139  |  104  |  10  ----------------------------<  161<H>  4.0   |  27  |  0.42<L>    Ca    8.6      12 May 2023 06:03  Phos  3.8     05-12  Mg     2.0     05-12    TPro  6.3  /  Alb  2.7<L>  /  TBili  0.2  /  DBili  <0.2  /  AST  14  /  ALT  16  /  AlkPhos  115  05-12    CAPILLARY BLOOD GLUCOSE          Medications:  MEDICATIONS  (STANDING):  amLODIPine   Tablet 5 milliGRAM(s) Oral daily  ascorbic acid 500 milliGRAM(s) Oral <User Schedule>  atorvastatin 80 milliGRAM(s) Oral at bedtime  carvedilol 3.125 milliGRAM(s) Oral every 12 hours  chlorhexidine 2% Cloths 1 Application(s) Topical daily  enoxaparin Injectable 40 milliGRAM(s) SubCutaneous every 24 hours  ferrous    sulfate 325 milliGRAM(s) Oral <User Schedule>  lactobacillus acidophilus 1 Tablet(s) Oral daily  methylphenidate 5 milliGRAM(s) Oral two times a day  pantoprazole   Suspension 40 milliGRAM(s) Oral daily  polyethylene glycol 3350 17 Gram(s) Oral daily  psyllium Powder 1 Packet(s) Oral every 12 hours  senna 2 Tablet(s) Oral at bedtime    MEDICATIONS  (PRN):  acetaminophen   Oral Liquid .. 650 milliGRAM(s) Oral every 6 hours PRN Temp greater or equal to 38.5C (101.3F), Mild Pain (1 - 3)  acetylcysteine 10%  Inhalation 4 milliLiter(s) Inhalation every 6 hours PRN increased secretions  albuterol/ipratropium for Nebulization 3 milliLiter(s) Nebulizer every 6 hours PRN Respiratory Distress  oxyCODONE    IR 5 milliGRAM(s) Oral every 4 hours PRN Moderate Pain (4 - 6)        Admitting Anthropometrics:  Height for BMI (FEET)	5 Feet  Height for BMI (INCHES)	2 Inch(s)  Height for BMI (CENTIMETERS)	157.48 Centimeter(s)  Weight for BMI (lbs)	195 lb  Weight for BMI (kg)	88.5 kg  Body Mass Index	35.6     Weight Change: no new wts to review at this time, last wt taken on admit 4/21      Estimated energy needs:   IBW used for calculations as pt >120% of IBW (177%). Needs adjusted for critical care requiring vent support.  Kcal (25-30 kcal/kg): 9246-2050 kcal  Protein (1.3-1.5 g/kg): 65-75g pro  **Fluids per team    Subjective:  57 year old female w/ PMH of asthma, CAD (not on meds), HTN, prior miscarriages X3, peripheral neuropathy and PSH of BATOOL, cholecystectomy and right knee surgery presented to [place] ED on 4/20 w/ right sided weakness and right facial numbness. Now s/p Trach with NGT feeding access. General Surgery consulted for placement of PEG feeding access. Unable to place PEG at bedside (5/1), now s/p lap placement of PEG tube (5/2) and POD 1 from  shunt (5/8).    Pt assessed at bedside. EN running as ordered. BG well managed on Jevity feeds. Current EN meeting lower end EER, RD to follow.    Previous Nutrition Diagnosis: Inadequate Oral Intake RT inability to meet est needs via PO AEB NPO, reliant on EN feeds to meet 100% of est needs    Active [ X ]  Resolved [   ]    Goal:  - Consistently meets > 75% EER via most appropriate route of nutrition     Recommendations:   1. Continue Jevity 1.2 50ml/hr + banatrol BID providing 1200ml, 1520kcal, 67g protein, 968ml free water  >>FWF per team  >> Maintain aspiration precautions at all times  2. Pain and bowel regimen per team  3. Cont to monitor lytes and replete prn   4. RD to remain available for recs adjustment prn     Education: Deferred     Risk Level: High [ X ] Moderate [   ] Low [   ].

## 2023-05-13 LAB
ANION GAP SERPL CALC-SCNC: 5 MMOL/L — SIGNIFICANT CHANGE UP (ref 5–17)
BASE EXCESS BLDA CALC-SCNC: 9.2 MMOL/L — HIGH (ref -2–3)
BUN SERPL-MCNC: 9 MG/DL — SIGNIFICANT CHANGE UP (ref 7–23)
CALCIUM SERPL-MCNC: 9.3 MG/DL — SIGNIFICANT CHANGE UP (ref 8.4–10.5)
CHLORIDE SERPL-SCNC: 102 MMOL/L — SIGNIFICANT CHANGE UP (ref 96–108)
CO2 BLDA-SCNC: 36 MMOL/L — HIGH (ref 19–24)
CO2 SERPL-SCNC: 33 MMOL/L — HIGH (ref 22–31)
CREAT SERPL-MCNC: 0.48 MG/DL — LOW (ref 0.5–1.3)
EGFR: 111 ML/MIN/1.73M2 — SIGNIFICANT CHANGE UP
GLUCOSE SERPL-MCNC: 159 MG/DL — HIGH (ref 70–99)
HCO3 BLDA-SCNC: 35 MMOL/L — HIGH (ref 21–28)
HCT VFR BLD CALC: 31 % — LOW (ref 34.5–45)
HGB BLD-MCNC: 10 G/DL — LOW (ref 11.5–15.5)
MAGNESIUM SERPL-MCNC: 2.1 MG/DL — SIGNIFICANT CHANGE UP (ref 1.6–2.6)
MCHC RBC-ENTMCNC: 29.2 PG — SIGNIFICANT CHANGE UP (ref 27–34)
MCHC RBC-ENTMCNC: 32.3 GM/DL — SIGNIFICANT CHANGE UP (ref 32–36)
MCV RBC AUTO: 90.4 FL — SIGNIFICANT CHANGE UP (ref 80–100)
NRBC # BLD: 0 /100 WBCS — SIGNIFICANT CHANGE UP (ref 0–0)
PCO2 BLDA: 50 MMHG — HIGH (ref 32–45)
PH BLDA: 7.45 — SIGNIFICANT CHANGE UP (ref 7.35–7.45)
PHOSPHATE SERPL-MCNC: 4 MG/DL — SIGNIFICANT CHANGE UP (ref 2.5–4.5)
PLATELET # BLD AUTO: 338 K/UL — SIGNIFICANT CHANGE UP (ref 150–400)
PO2 BLDA: 106 MMHG — SIGNIFICANT CHANGE UP (ref 83–108)
POTASSIUM SERPL-MCNC: 4.2 MMOL/L — SIGNIFICANT CHANGE UP (ref 3.5–5.3)
POTASSIUM SERPL-SCNC: 4.2 MMOL/L — SIGNIFICANT CHANGE UP (ref 3.5–5.3)
RBC # BLD: 3.43 M/UL — LOW (ref 3.8–5.2)
RBC # FLD: 12.9 % — SIGNIFICANT CHANGE UP (ref 10.3–14.5)
SAO2 % BLDA: 99.3 % — HIGH (ref 94–98)
SODIUM SERPL-SCNC: 140 MMOL/L — SIGNIFICANT CHANGE UP (ref 135–145)
WBC # BLD: 5.36 K/UL — SIGNIFICANT CHANGE UP (ref 3.8–10.5)
WBC # FLD AUTO: 5.36 K/UL — SIGNIFICANT CHANGE UP (ref 3.8–10.5)

## 2023-05-13 PROCEDURE — 99291 CRITICAL CARE FIRST HOUR: CPT

## 2023-05-13 PROCEDURE — 99291 CRITICAL CARE FIRST HOUR: CPT | Mod: 24

## 2023-05-13 PROCEDURE — 74018 RADEX ABDOMEN 1 VIEW: CPT | Mod: 26

## 2023-05-13 RX ORDER — SIMETHICONE 80 MG/1
80 TABLET, CHEWABLE ORAL THREE TIMES A DAY
Refills: 0 | Status: DISCONTINUED | OUTPATIENT
Start: 2023-05-13 | End: 2023-05-15

## 2023-05-13 RX ORDER — METHYLPHENIDATE HCL 5 MG
10 TABLET ORAL
Refills: 0 | Status: DISCONTINUED | OUTPATIENT
Start: 2023-05-13 | End: 2023-05-16

## 2023-05-13 RX ORDER — ASPIRIN/CALCIUM CARB/MAGNESIUM 324 MG
81 TABLET ORAL DAILY
Refills: 0 | Status: DISCONTINUED | OUTPATIENT
Start: 2023-05-14 | End: 2023-05-18

## 2023-05-13 RX ADMIN — ENOXAPARIN SODIUM 40 MILLIGRAM(S): 100 INJECTION SUBCUTANEOUS at 22:01

## 2023-05-13 RX ADMIN — CARVEDILOL PHOSPHATE 3.12 MILLIGRAM(S): 80 CAPSULE, EXTENDED RELEASE ORAL at 05:22

## 2023-05-13 RX ADMIN — CARVEDILOL PHOSPHATE 3.12 MILLIGRAM(S): 80 CAPSULE, EXTENDED RELEASE ORAL at 18:11

## 2023-05-13 RX ADMIN — Medication 5 MILLIGRAM(S): at 08:41

## 2023-05-13 RX ADMIN — Medication 1 PACKET(S): at 18:11

## 2023-05-13 RX ADMIN — Medication 1 PACKET(S): at 05:22

## 2023-05-13 RX ADMIN — AMLODIPINE BESYLATE 5 MILLIGRAM(S): 2.5 TABLET ORAL at 05:22

## 2023-05-13 RX ADMIN — CHLORHEXIDINE GLUCONATE 1 APPLICATION(S): 213 SOLUTION TOPICAL at 11:42

## 2023-05-13 RX ADMIN — PANTOPRAZOLE SODIUM 40 MILLIGRAM(S): 20 TABLET, DELAYED RELEASE ORAL at 11:42

## 2023-05-13 RX ADMIN — Medication 1 TABLET(S): at 11:43

## 2023-05-13 RX ADMIN — Medication 325 MILLIGRAM(S): at 05:23

## 2023-05-13 RX ADMIN — OXYCODONE HYDROCHLORIDE 5 MILLIGRAM(S): 5 TABLET ORAL at 15:18

## 2023-05-13 RX ADMIN — POLYETHYLENE GLYCOL 3350 17 GRAM(S): 17 POWDER, FOR SOLUTION ORAL at 11:42

## 2023-05-13 RX ADMIN — ATORVASTATIN CALCIUM 80 MILLIGRAM(S): 80 TABLET, FILM COATED ORAL at 22:01

## 2023-05-13 RX ADMIN — Medication 10 MILLIGRAM(S): at 18:11

## 2023-05-13 RX ADMIN — OXYCODONE HYDROCHLORIDE 5 MILLIGRAM(S): 5 TABLET ORAL at 10:18

## 2023-05-13 RX ADMIN — Medication 500 MILLIGRAM(S): at 05:23

## 2023-05-13 NOTE — PROGRESS NOTE ADULT - SUBJECTIVE AND OBJECTIVE BOX
INTERVAL HISTORY: HPI:  58 yo F with PMH of Asthma, CAD (not on  meds), peripheral neuropathy and PSH of BATOOL, cholecystectomy, right knee surgery presented to [place] ED on 4/20 with right sided weakness and right facial numbness. Patient was undergoing preparation for colonoscopy. As per daughter at 8am patient was playing with her grandchildren but around 840 am patient was getting dressed and fell and subsequently felt weak after. Daughter reports that patient had some episodes of vomiting at this time. She had a syncopal episode at around 10 am and was witnessed by brother. No head trauma, She regained conscious after few minutes. She remained in bed for the remainder of the day. Daughter reports some slurred speech noted 1230-1PM and also was confused after. and around 4PM, the patient's sister noted right sided weakness at which time EMS was called and patient was brought to ED. No c/o chest pain, shortness of breath, fever, headache, abdominal pain, urinary complaints. No recent travel/sickness/ change in meds. Stroke code in ER: CTH neg for heme, Right PICA distribution acute infarction. CTA with saccular aneurysms of b/l carotids, approximately 0.8 cm on the right and 1.2 x 0.9 cm on the left. Possible tiny third saccular aneurysm on the right Posterior intracranial circulation: Right vertebral arterial occlusion at its dural crossing junction V3 Atlantic and V4 intracranial segments with likely reversal of flow in its intracranial segment from the basilar. Right PICA faintly seen. 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  Not candidate for TNK/mechanical thrombectomy. CT scan repeated which showed: 1. Brain: Progression of acute infarction within the right cerebellar hemisphere, also extending into the left superior cerebellar hemisphere. New mass effect on the fourth ventricle causing stenosis versus occlusion with new third and lateral ventricular dilatation indicating ventricular obstruction at the level of the fourth ventricle. New upward and downward herniation of cerebellar parenchyma Right carotid system: No hemodynamically significant stenosis. Left carotid system: No hemodynamically significant stenosis. Vertebral circulation: Patent. Anterior intracranial circulation: Intact. Bilateral internal carotid saccular aneurysms. These findings are unchanged. Posterior intracranial circulation:    Improved flow within the right vertebral artery since 4/20/2023. New focal stenosis mid left vertebral artery, etiology uncertain, consider vasospasm and extrinsic compression in addition to new embolic disease. Brain perfusion: Perfusion images demonstrate normalization of the perfusion abnormality in the right cerebellar hemisphere present on 4/20/2023 despite evidence of progression of acute infarction.  Areas of apparent ischemia within the posterior fossa have progressed in extent, also again involving the left posterior cerebral arterial distribution. Tx to [facility] for SOC watch. On admission to [facility], NIHSS 12.  (21 Apr 2023 16:50)    PAST MEDICAL & SURGICAL HISTORY:  Asthma  CAD (coronary artery disease)  Peripheral neuropathy  s/P total abdominal hysterectomy  S/P cholecystectomy  S/P right knee surgery      REVIEW OF SYSTEMS: [ ] Unable to Assess due to neurologic exam   [x] All ROS addressed below are non-contributory, except:  Neuro: [ ] Headache [ ] Back pain [ ] Numbness [ ] Weakness [ ] Ataxia [ ] Dizziness [ ] Aphasia [ ] Dysarthria [ ] Visual disturbance  Resp: [ ] Shortness of breath/dyspnea, [ ] Orthopnea [ ] Cough  CV: [ ] Chest pain [ ] Palpitation [ ] Lightheadedness [ ] Syncope  Renal: [ ] Thirst [ ] Edema  GI: [ ] Nausea [ ] Emesis [ ] Abdominal pain [ ] Constipation [ ] Diarrhea  Hem: [ ] Hematemesis [ ] bright red blood per rectum  ID: [ ] Fever [ ] Chills [ ] Dysuria  ENT: [ ] Rhinorrhea    PHYSICAL EXAM:    General: No Acute Distress   Neurological:  AOx2 to choice, R eye 6th nerve palsy, b/l horizontal nystagmus, b/l UE & LE dysmetria, RUE & RLE 4+/5 w/ mild drift   Pulmonary: Clear to Auscultation, No Rales, No Rhonchi, No Wheezes   Cardiovascular: S1, S2, Regular Rate and Rhythm   Gastrointestinal: Soft, Nontender, Nondistended   Extremities: No calf tenderness   Incision: CDI      ICU Vital Signs Last 24 Hrs  T(C): 37.1 (13 May 2023 17:51), Max: 37.4 (13 May 2023 01:01)  T(F): 98.7 (13 May 2023 17:51), Max: 99.3 (13 May 2023 01:01)  HR: 79 (13 May 2023 18:00) (68 - 82)  BP: 93/48 (13 May 2023 18:00) (90/44 - 122/60)  BP(mean): 68 (13 May 2023 18:00) (64 - 87)  RR: 14 (13 May 2023 18:00) (10 - 30)  SpO2: 96% (13 May 2023 18:00) (95% - 100%)      05-12-23 @ 07:01  -  05-13-23 @ 07:00  --------------------------------------------------------  IN: 1440 mL / OUT: 2300 mL / NET: -860 mL    05-13-23 @ 07:01  -  05-13-23 @ 19:04  --------------------------------------------------------  IN: 710 mL / OUT: 800 mL / NET: -90 mL    acetaminophen   Oral Liquid .. 650 milliGRAM(s) Oral every 6 hours PRN  acetylcysteine 10%  Inhalation 4 milliLiter(s) Inhalation every 6 hours PRN  albuterol/ipratropium for Nebulization 3 milliLiter(s) Nebulizer every 6 hours PRN  amLODIPine   Tablet 5 milliGRAM(s) Oral daily  ascorbic acid 500 milliGRAM(s) Oral <User Schedule>  atorvastatin 80 milliGRAM(s) Oral at bedtime  carvedilol 3.125 milliGRAM(s) Oral every 12 hours  chlorhexidine 2% Cloths 1 Application(s) Topical daily  enoxaparin Injectable 40 milliGRAM(s) SubCutaneous every 24 hours  ferrous    sulfate 325 milliGRAM(s) Oral <User Schedule>  lactobacillus acidophilus 1 Tablet(s) Oral daily  methylphenidate 10 milliGRAM(s) Oral two times a day  oxyCODONE    IR 5 milliGRAM(s) Oral every 4 hours PRN  polyethylene glycol 3350 17 Gram(s) Oral daily  psyllium Powder 1 Packet(s) Oral every 12 hours  senna 2 Tablet(s) Oral at bedtime  simethicone 80 milliGRAM(s) Chew three times a day PRN      LABS:  Na: 140 (05-13 @ 05:18), 139 (05-12 @ 06:03), 139 (05-11 @ 06:03)  K: 4.2 (05-13 @ 05:18), 4.0 (05-12 @ 06:03), 3.5 (05-11 @ 06:03)  Cl: 102 (05-13 @ 05:18), 104 (05-12 @ 06:03), 103 (05-11 @ 06:03)  CO2: 33 (05-13 @ 05:18), 27 (05-12 @ 06:03), 28 (05-11 @ 06:03)  BUN: 9 (05-13 @ 05:18), 10 (05-12 @ 06:03), 11 (05-11 @ 06:03)  Cr: 0.48 (05-13 @ 05:18), 0.42 (05-12 @ 06:03), 0.47 (05-11 @ 06:03)  Glu: 159(05-13 @ 05:18), 161(05-12 @ 06:03), 134(05-11 @ 06:03)    Hgb: 10.0 (05-13 @ 05:18), 9.3 (05-12 @ 06:03), 8.9 (05-11 @ 06:03)  Hct: 31.0 (05-13 @ 05:18), 28.1 (05-12 @ 06:03), 27.4 (05-11 @ 06:03)  WBC: 5.36 (05-13 @ 05:18), 5.39 (05-12 @ 06:03), 7.31 (05-11 @ 06:03)  Plt: 338 (05-13 @ 05:18), 253 (05-12 @ 06:03), 271 (05-11 @ 06:03)      LIVER FUNCTIONS - ( 12 May 2023 06:03 )  Alb: 2.7 g/dL / Pro: 6.3 g/dL / ALK PHOS: 115 U/L / ALT: 16 U/L / AST: 14 U/L / GGT: x           ABG - ( 13 May 2023 13:31 )  pH, Arterial: 7.45  pH, Blood: x     /  pCO2: 50    /  pO2: 106   / HCO3: 35    / Base Excess: 9.2   /  SaO2: 99.3

## 2023-05-13 NOTE — PROGRESS NOTE ADULT - ASSESSMENT
56 y/o female transferred from [place] with right sided weakness and right facial numbness. Found to have acute infarction within the right cerebellar hemisphere, causing herniation. Now s/p SOC foramen magnum decompression and right parietal/occipital EVD placement (4/21). Course c/b respiratory insuffiencey d/t bulbar dysfunction, s/p trach 4/28/23 failed bedside  PEG placement 5/1; now s/p VPS certas @ 4    -NC q4, VSq4   -on trach collar, ABG with elevated co2 ; repeat abg may need additional CPAP support; nebs PRN  -c/w tube feeds; simethicone for colonic GAS   -c/w coreg, ASA started today for CVA  -neurostimulation w/ methylphenidate increased to 10mg today  -goal -140  -dvt chemo ppx w/ Lovenox SQ

## 2023-05-13 NOTE — PROGRESS NOTE ADULT - SUBJECTIVE AND OBJECTIVE BOX
***********************************************  ADULT NSICU PROGRESS NOTE  MAKSIM TRIVEDI [id number] [facility] 10EA 02  ***********************************************    24H INTERVAL EVENTS: no acute overnight events.       ROS: negative except per mentioned above in 24h interval events.      VITALS:      ICU Vital Signs Last 24 Hrs  T(C): 37.4 (13 May 2023 05:29), Max: 37.4 (13 May 2023 01:01)  T(F): 99.3 (13 May 2023 05:29), Max: 99.3 (13 May 2023 01:01)  HR: 77 (13 May 2023 05:00) (65 - 81)  BP: 116/55 (13 May 2023 05:00) (101/63 - 129/58)  BP(mean): 79 (13 May 2023 05:00) (69 - 87)  ABP: --  ABP(mean): --  RR: 25 (13 May 2023 05:00) (19 - 48)  SpO2: 98% (13 May 2023 05:00) (95% - 100%)    O2 Parameters below as of 13 May 2023 05:00  Patient On (Oxygen Delivery Method): tracheostomy collar    O2 Concentration (%): 40        EXAM:     Giovanna Coma Scale: 3/1T/6 = 10    General: normocephalic, head wrapped, laying in bed, in no distress  Neuro     MS: [place] Coma Scale: 3/1T/6 = 10, follows commands, cooperative with examination, normal attention    CN: PERRL, (+)R. gaze, plegia to left gaze     Mot: bulk normal, tone normal, power UPPER 2/3, LOWER 3/3  Chest: mechanically ventilated, coarse breath sounds, heart regular rate/rhythm, present S1/S2, no murmurs or rubs  Abdomen: nondistended, soft and nontender without peritoneal signs, normoactive bowel sounds  Extremities: no clubbing, well-perfused, no edema                                  9.3    5.39  )-----------( 253      ( 12 May 2023 06:03 )             28.1     05-12    139  |  104  |  10  ----------------------------<  161<H>  4.0   |  27  |  0.42<L>    Ca    8.6      12 May 2023 06:03  Phos  3.8     05-12  Mg     2.0     05-12    TPro  6.3  /  Alb  2.7<L>  /  TBili  0.2  /  DBili  <0.2  /  AST  14  /  ALT  16  /  AlkPhos  115  05-12      MEDICATIONS  (STANDING):  amLODIPine   Tablet 5 milliGRAM(s) Oral daily  ascorbic acid 500 milliGRAM(s) Oral <User Schedule>  atorvastatin 80 milliGRAM(s) Oral at bedtime  carvedilol 3.125 milliGRAM(s) Oral every 12 hours  chlorhexidine 2% Cloths 1 Application(s) Topical daily  enoxaparin Injectable 40 milliGRAM(s) SubCutaneous every 24 hours  ferrous    sulfate 325 milliGRAM(s) Oral <User Schedule>  lactobacillus acidophilus 1 Tablet(s) Oral daily  methylphenidate 5 milliGRAM(s) Oral two times a day  pantoprazole   Suspension 40 milliGRAM(s) Oral daily  polyethylene glycol 3350 17 Gram(s) Oral daily  psyllium Powder 1 Packet(s) Oral every 12 hours  senna 2 Tablet(s) Oral at bedtime    MEDICATIONS  (PRN):  acetaminophen   Oral Liquid .. 650 milliGRAM(s) Oral every 6 hours PRN Temp greater or equal to 38.5C (101.3F), Mild Pain (1 - 3)  acetylcysteine 10%  Inhalation 4 milliLiter(s) Inhalation every 6 hours PRN increased secretions  albuterol/ipratropium for Nebulization 3 milliLiter(s) Nebulizer every 6 hours PRN Respiratory Distress  oxyCODONE    IR 5 milliGRAM(s) Oral every 4 hours PRN Moderate Pain (4 - 6)      ***********************************************  ASSESSMENT AND PLAN  ***********************************************    #Suboccipital pseudomeningocele  #S/p placement of VPS, 5/8/23  #Bilateral cerebellar hemispheric strokes  #S/p SOC for foramen magnum decompression and R. parieto-occipital placement of EVD, 4/22/23  #11mm wide-necked multilobulated L. cavernous ICA aneurysm & 8mm wide-necked R. cavernous ICA aneurysm  #R. V4 occlusion  #S/p tracheostomy placement w/ tube exchanged due to broken  balloon, 4/28/23  #S/p lap-assisted gastrostomy tube, 5/2/23  #Abdominal discomfort  #L. intramuscular calf DVT  #S/p IVCF placement, 5/4/23  #History of peripheral neuropathy, CAD, and asthma      NEURO  - Admit NSICU, Q2h neuro checks / Q2h vital signs  - F/u vascular conference re plan for b/l cavernous ICA aneurysms: follow up outpatient with Dr. Minor   - Stroke consultation, heme consultation  - Certas at 4  - Pain control, PT/OT when able  - Ritalin 5/5  - Start ASA this sunday, AC on POD #10    PULM  - Volume control/assist control ventilation, wean to pressure support & trach collar  - 6 Cuffed trach  - SpO2 goal > 92%, supplemental O2 and pulm toileting as needed    CARDIO  - BP goal: < 140 while postoperative  - Pending SALVADOR    GI  - Diet: tube feeds  - Stress ulcer prophylaxis: PPI  - RLQ ultrasound, bladder scan    /RENAL  - Monitor UOP/volume status, BUN/SCr    HEME  - Maintain Hb > 7.0, PLT > 100,000  - Heme consultation for stroke in the young  - SCDs, SQL  - Removal of IVFC ASAP, hypercoag panel workup pending  - AC/ASA on POD #10 and Sunday, respectively, once AC started, must remove IVCF    ID  - Monitor for infectious s/s, fever curve, leukocytosis  - S/p ertapenem (5/1-5/17) for ESBL UTI, Pluralibacter gergoviae/Strep pneumo sputum  - Cleared for VPS placement by ID    ENDO  - SGlu < 180     ***********************************************  ADULT NSICU PROGRESS NOTE  MAKSIM TRIVEDI [id number] [facility] 10EA 02  ***********************************************    24H INTERVAL EVENTS: no acute overnight events.  Tolerated trach collar.       ROS: negative except per mentioned above in 24h interval events.      VITALS:      ICU Vital Signs Last 24 Hrs  T(C): 37.4 (13 May 2023 05:29), Max: 37.4 (13 May 2023 01:01)  T(F): 99.3 (13 May 2023 05:29), Max: 99.3 (13 May 2023 01:01)  HR: 77 (13 May 2023 05:00) (65 - 81)  BP: 116/55 (13 May 2023 05:00) (101/63 - 129/58)  BP(mean): 79 (13 May 2023 05:00) (69 - 87)  ABP: --  ABP(mean): --  RR: 25 (13 May 2023 05:00) (19 - 48)  SpO2: 98% (13 May 2023 05:00) (95% - 100%)    O2 Parameters below as of 13 May 2023 05:00  Patient On (Oxygen Delivery Method): tracheostomy collar    O2 Concentration (%): 40        EXAM:     Giovanna Coma Scale: 3/1T/6 = 10    General: normocephalic, head wrapped, laying in bed, in no distress  Neuro     MS: [place] Coma Scale: 3/1T/6 = 10, follows commands, cooperative with examination, normal attention    CN: PERRL, (+)R. gaze, plegia to left gaze     Mot: bulk normal, tone normal, power UPPER 3/4, LOWER 3/3  Chest: mechanically ventilated, coarse breath sounds, heart regular rate/rhythm, present S1/S2, no murmurs or rubs  Abdomen: nondistended, soft and nontender without peritoneal signs, normoactive bowel sounds  Extremities: no clubbing, well-perfused, no edema                                10.0   5.36  )-----------( 338      ( 13 May 2023 05:18 )             31.0     05-13    140  |  102  |  9   ----------------------------<  159<H>  4.2   |  33<H>  |  0.48<L>    Ca    9.3      13 May 2023 05:18  Phos  4.0     05-13  Mg     2.1     05-13    TPro  6.3  /  Alb  2.7<L>  /  TBili  0.2  /  DBili  <0.2  /  AST  14  /  ALT  16  /  AlkPhos  115  05-12      MEDICATIONS  (STANDING):  amLODIPine   Tablet 5 milliGRAM(s) Oral daily  ascorbic acid 500 milliGRAM(s) Oral <User Schedule>  atorvastatin 80 milliGRAM(s) Oral at bedtime  carvedilol 3.125 milliGRAM(s) Oral every 12 hours  chlorhexidine 2% Cloths 1 Application(s) Topical daily  enoxaparin Injectable 40 milliGRAM(s) SubCutaneous every 24 hours  ferrous    sulfate 325 milliGRAM(s) Oral <User Schedule>  lactobacillus acidophilus 1 Tablet(s) Oral daily  methylphenidate 10 milliGRAM(s) Oral two times a day  polyethylene glycol 3350 17 Gram(s) Oral daily  psyllium Powder 1 Packet(s) Oral every 12 hours  senna 2 Tablet(s) Oral at bedtime    MEDICATIONS  (PRN):  acetaminophen   Oral Liquid .. 650 milliGRAM(s) Oral every 6 hours PRN Temp greater or equal to 38.5C (101.3F), Mild Pain (1 - 3)  acetylcysteine 10%  Inhalation 4 milliLiter(s) Inhalation every 6 hours PRN increased secretions  albuterol/ipratropium for Nebulization 3 milliLiter(s) Nebulizer every 6 hours PRN Respiratory Distress  oxyCODONE    IR 5 milliGRAM(s) Oral every 4 hours PRN Moderate Pain (4 - 6)        ***********************************************  ASSESSMENT AND PLAN  ***********************************************    #Suboccipital pseudomeningocele  #S/p placement of VPS, 5/8/23  #Bilateral cerebellar hemispheric strokes  #S/p SOC for foramen magnum decompression and R. parieto-occipital placement of EVD, 4/22/23  #11mm wide-necked multilobulated L. cavernous ICA aneurysm & 8mm wide-necked R. cavernous ICA aneurysm  #R. V4 occlusion  #S/p tracheostomy placement w/ tube exchanged due to broken  balloon, 4/28/23  #S/p lap-assisted gastrostomy tube, 5/2/23  #Abdominal discomfort  #L. intramuscular calf DVT  #S/p IVCF placement, 5/4/23  #History of peripheral neuropathy, CAD, and asthma  #Antiphospholipid syndrome      NEURO  - Admit NSICU, Q2h neuro checks / Q2h vital signs  - F/u vascular conference re plan for b/l cavernous ICA aneurysms: follow up outpatient with Dr. Minor   - Stroke consultation, heme consultation  - Certas at 4  - Pain control, PT/OT when able  - Ritalin 5/5  - Start ASA this sunday, AC on POD #10    PULM  - Volume control/assist control ventilation, wean to pressure support & trach collar  - 6 Cuffed trach  - SpO2 goal > 92%, supplemental O2 and pulm toileting as needed    CARDIO  - BP goal: < 160 while postoperative  - Pending SALVADOR    GI  - Diet: tube feeds  - Stress ulcer prophylaxis: PPI  - RLQ ultrasound unable to visualize appendix  - Abdominal XR, gen surg consult    /RENAL  - Monitor UOP/volume status, BUN/SCr    HEME  - Maintain Hb > 7.0, PLT > 100,000  - Heme consultation for stroke in the young  - SCDs, SQL  - Removal of IVFC ASAP  - AC/ASA on POD #10 and Sunday, respectively, once AC started, must remove IVCF    ID  - Monitor for infectious s/s, fever curve, leukocytosis  - S/p ertapenem (5/1-5/17) for ESBL UTI, Pluralibacter gergoviae/Strep pneumo sputum  - Cleared for VPS placement by ID    ENDO  - SGlu < 180

## 2023-05-13 NOTE — PROCEDURE NOTE - GENERAL PROCEDURE NAME
CSF Collection
Subgaleal ELENA drain removal
venipuncture
Blood cultures
arterial puncture
venipuncture x2 for blood cultures
CSF Collection

## 2023-05-13 NOTE — PROGRESS NOTE ADULT - SUBJECTIVE AND OBJECTIVE BOX
HPI:  56 yo F with PMH of Asthma, CAD (not on  meds), peripheral neuropathy and PSH of BATOOL, cholecystectomy, right knee surgery presented to [place] ED on 4/20 with right sided weakness and right facial numbness. Patient was undergoing preparation for colonoscopy. As per daughter at 8am patient was playing with her grandchildren but around 840 am patient was getting dressed and fell and subsequently felt weak after. Daughter reports that patient had some episodes of vomiting at this time. She had a syncopal episode at around 10 am and was witnessed by brother. No head trauma, She regained conscious after few minutes. She remained in bed for the remainder of the day. Daughter reports some slurred speech noted 1230-1PM and also was confused after. and around 4PM, the patient's sister noted right sided weakness at which time EMS was called and patient was brought to ED. No c/o chest pain, shortness of breath, fever, headache, abdominal pain, urinary complaints. No recent travel/sickness/ change in meds. Stroke code in ER: CTH neg for heme, Right PICA distribution acute infarction. CTA with saccular aneurysms of b/l carotids, approximately 0.8 cm on the right and 1.2 x 0.9 cm on the left. Possible tiny third saccular aneurysm on the right Posterior intracranial circulation: Right vertebral arterial occlusion at its dural crossing junction V3 Atlantic and V4 intracranial segments with likely reversal of flow in its intracranial segment from the basilar. Right PICA faintly seen. 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  Not candidate for TNK/mechanical thrombectomy. CT scan repeated which showed: 1. Brain: Progression of acute infarction within the right cerebellar hemisphere, also extending into the left superior cerebellar hemisphere. New mass effect on the fourth ventricle causing stenosis versus occlusion with new third and lateral ventricular dilatation indicating ventricular obstruction at the level of the fourth ventricle. New upward and downward herniation of cerebellar parenchyma Right carotid system: No hemodynamically significant stenosis. Left carotid system: No hemodynamically significant stenosis. Vertebral circulation: Patent. Anterior intracranial circulation: Intact. Bilateral internal carotid saccular aneurysms. These findings are unchanged. Posterior intracranial circulation:    Improved flow within the right vertebral artery since 4/20/2023. New focal stenosis mid left vertebral artery, etiology uncertain, consider vasospasm and extrinsic compression in addition to new embolic disease. Brain perfusion: Perfusion images demonstrate normalization of the perfusion abnormality in the right cerebellar hemisphere present on 4/20/2023 despite evidence of progression of acute infarction.  Areas of apparent ischemia within the posterior fossa have progressed in extent, also again involving the left posterior cerebral arterial distribution. Tx to [facility] for SOC watch. On admission to [facility], NIHSS 12.  (21 Apr 2023 16:50)    OVERNIGHT EVENTS: POD22 SOC POD 5 VPS, neuro stable, tolerating trach collar    Hospital Course:   4/21: Admitted for crani watch, CTH complete w/ unchanged hydrocephalus, taken for emergent occipital craniectomy.   4/22: POD1. Remains intubated overnight, on propofol and dank. EVD@[id number]. Pending repeat CTH this am. Given hydralazine 10mg x1. Started on cardene for SBP high 140s-150s. Sub-therapeutic on plavix, therapeutic on asa, rpt asa accumetrics until subtherapeutic. LE dopplers neg for DVT, but showing superficial venous thrombosis in the proximal portion of the right greater saphenous vein. Started on 3% @60 for na goal 145-150. NGT placed by ENT. Febrile, pancultured.  4/23: POD 2. 3% increased to 75. NGT readjusted. UA neg. SBP liberalized to 160. Plan to extubate. 3% decreased to 60. Failed extubation d/t no cuff leak, given 60mg solumedrol, plan to extubate in 6 hrs. SCx1 for post void residual >400, started on cardura at bedtime. New blood in buretrol, stopped SQL. Clot in EVD cleared. Neuro exam improving.   4/24: POD 3. Cont 3% with NS while NPO, start TF today. TF started. LFTs downtrending. Sodium 141. Increased 3% to 50, NS to 30. Repeat BMP ordered for 5:30PM. Tramadol 25 for pain with no relief, oxy 5 and 1g tylenol ordered, stability CT stable, speech language consult for dysarthria. Given hydralazine 10mg IVP for SBP>160, started amlodipine 5mg. C/o mild headache, given fioricet x1, stroke neuro rec SALVADOR and loop recorder placement. Echo with bubble completed, negative for PFO, EF 55-60%. J HFNC started for desats with suctioning.   4/25: POD 4. Cont HFNC. Increased secertions on HFNC, reintubated. CXR confirmed good placement. EVD dropped to 5cmH20 for goal of draining 5-10cc/hr and SG ELENA drain taken off suction. Pending CTH. EVD drained 0cc/hr, dropped to 0. NGT replaced. Dc'd fioricet. Started on PPI for intubation. Increased cardura to 4mg for urinary retention, given an additional 2mg now. Started salt tabs 3 q 6. Given 12.5mg fentanyl x1. NGT replaced, CXR shown in lung. NGT removed, repeat CXR showing no pneumothorax. Pending ENT consult for NGT placement. CTH stable. NGT replaced by ENT, confirmed in proper spot. Restarted TF. [id number]. Na 141 from 146. Increased 3% to 60. EVD raised to 3cmH20. ETT pulled back 1cm by RT.  4/26: POD 5 SOC. Intubated, remains on precedex, ABG ordered with improving PaO2, BMP sodium 148, 3% changed to 30cc/hr, cardura increased to 8mg for tonight, tolerating cpap  4/27: POD 6 SOC. Respiratory rate sustained 6, returned to FVS. Na 151, dc'd 3%, f/u AM sodium. Nurse noticed ETT 19 at the lip and was 20 prior, CXR shows ETT @ 5cm above jono, ET tube advanced 1cm, repeat CXR confirms appropriate placement. Thick inline secretions with suctioning. ENT trach placement Fri likely, PEG likely Mon. Keep ELENA drain until no output, EVD to remain @3. Start ASA 81mg daily tomorrow.   4/28: POD7 SOC, neuro stable, given fentanyl x 1 overnight for presumed discomfort (biting on ETT), remains on full vent support. CTH today stable in comparison to 4/25. 6 cuffed trach placed by ENT in OR, but came down without cuff. Replaced at bedside by ENT. On fentanyl gtt.    4/29: POD8 SOC, JIM overnight. Incision cleaned and dressing changed. On fentanyl gtt. EKG completed due to increased frequency PVCs on telemetry, frequency of PVCs improved with titrating up fentanyl gtt. ABG drawn.  Repeat LE dopplers completed showing persistant superficial thrombus, no DVT. No EVD waveform during day, flushed distally without improvement. Exam unchanged.  EVD dropped to 0 for low output in afternoon.   4/30: POD9. Neuro stable, febrile to 101.3F o/n, given tylenol and pan cx sent. SBP dipped to low 90s o/n, HR stable in 70s with some PVCs, decreased fentanyl gtt and given 500cc bolus of NS x 2. Pend PEG placement Mon and angio Tues. D/c'd ELENA drain today and suture placed. F/u heme recs. Positive UA. Infiltrates found on CXR. Started on Zosyn 4.5g Q6hrs .   5/1: POD 10. Neuro stable. Dc'd fentanyl gtt. PEG failed placement at bedside with Dr. Gant, plan for PEG in OR tomorrow afternoon with Dr. Layton. Dc'd amlodipine and started carvedilol 3.125 BID for PVCs . Made 3% inhalation and mucomyst q8hr. Failed PEG at bedside. Salt tabs made 1 q 6. Decreased cardura to 4mg daily. Urine culture growing E. Coli and sputum culture growing pluralibacter gergoviae and strep species. ID consulted, f/u recs. Failed CPAP trial @ 3pm. Added am labs per heme/onc for hypercoguable workup. Pending repeat LE dopplers in 2-3 days. NGT clogged, removed, ENT failed attempt at replacement, will keep NPO for PEG placement tmw. Repeat xray in am for concern for aspration. Pt abx switched from Zosyn to Ertapenem 1g Q24hrs. per ID recs, possible ESBL growth.   5/2: POD 11. Neuro stable. Pre-op for diagnostic cerebral angiogram and PEG placement. NPO. EVD raised to 5 cmH20. Sputum culture speciated to step pneumoniae, sensitive to ertapenem. 3% inhalation/mucomyst made q 6 hr d/t thick secretions. PEG placed in OR. POD0 dx cerebral angio. EVD raised to 10.   5/3: POD 12. Neuro stable. PEG feeds began @ 10 AM, plan to increase 10cc every 6h per general surgery. Repeat dopplers show stable R superficial thrombus and new L IM calf DVT. Vascular consulted for IVC filter. Plan to get CTH tomorrow.  5/4: POD 13. JIM o/n. s/p IVC filter with vascular today. Pending post-procedure CTH. FOBT negative. Started iron and vitamin c every other day for iron deficiency anemia.   5/5: POD14. CSF cx sent to r/o infection from new gas in right temporal horn seen on CTH. Restarted TF, changed to Jevity 1.2, f/u nutrition recs. EVD raised to 10 today, plan to challenge over the weekend and CTH on Monday, possible VPS next Wednesday. ENT removed sutures today. Salt tabs decreased 1q12.  5/6: POD15 Placed on CPAP briefly with low MV alert, placed back on full vent support. EVD remains open at [id number]. ICPs WNL. Neuro exam stable.  EVD raised to 15. Tolerated CPAP x8h.   5/7: POD#16. JIM overnight, neuro stable. D/c'd salt tabs and 3% neb. Wean trach to CPAP as tolerated. Pan cx NGTD. EVD raised today to [id number].   5/8: POD17 JIM overnight. ICPs WNL. Pt remains on full vent support. Neuro exam stable. Plan for possible VPS today. CT chio complete showing increase in vent size.   5/9: POD18 SOC, POD1 VPS. Desat o/n to 80s, improved on own. CXR with LLL effusion. Another desat to 90% in AM, given duoneb and mucomyst. Oxycodone given for incisional pain. Neuro exam stable. CTH in AM stable. Keppra dc'd. Ritalin 5 BID started.  Tolerating CPAP trial. SALVADOR ordered. Rectal tube dc'd.   5/10: POD 19 SOC POD2 VPS. Remains on full vent support. Tolerated CPAP for 6 hours. Neuro exam stable. BP liberalized to 160. RLQ US 2/2 abd pain. Lactate WNL.  5/11: POD20 SOC POD3 VPS. Plan for CPAP trial in AM.  Dc'd cardura. F/u speech consult for communcation board. Has been tolerating CPAP since 9am. RLQ us w/ no acute pathology. Per heme/onc LMWH or coumadin rec for AC over DOAC due to antiphospholipid syndrome.   5/12: POD21 SOC POD4 VPS. CPAP 8/5, tolerated until 3pm. Can start ASA on 5/14 and full AC POD 10 from VPS 5/19 (remove IVC filter prior, f/u w/ vascular on timing).   5/13: POD22 SOC POD 5 VPS, neuro stable, tolerating trach collar      Vital Signs Last 24 Hrs  T(C): 37.4 (13 May 2023 01:01), Max: 37.4 (13 May 2023 01:01)  T(F): 99.3 (13 May 2023 01:01), Max: 99.3 (13 May 2023 01:01)  HR: 68 (13 May 2023 01:00) (64 - 81)  BP: 110/57 (13 May 2023 01:00) (100/55 - 129/58)  BP(mean): 77 (13 May 2023 01:00) (69 - 87)  RR: 22 (13 May 2023 01:00) (14 - 48)  SpO2: 100% (12 May 2023 21:00) (95% - 100%)    Parameters below as of 13 May 2023 01:00  Patient On (Oxygen Delivery Method): tracheostomy collar    O2 Concentration (%): 40    I&O's Detail    11 May 2023 07:01  -  12 May 2023 07:00  --------------------------------------------------------  IN:    Enteral Tube Flush: 160 mL    Jevity 1.2: 1200 mL  Total IN: 1360 mL    OUT:    Voided (mL): 1300 mL  Total OUT: 1300 mL    Total NET: 60 mL      12 May 2023 07:01  -  13 May 2023 04:34  --------------------------------------------------------  IN:    Enteral Tube Flush: 140 mL    Jevity 1.2: 1000 mL  Total IN: 1140 mL    OUT:    Voided (mL): 1800 mL  Total OUT: 1800 mL    Total NET: -660 mL        I&O's Summary    11 May 2023 07:01  -  12 May 2023 07:00  --------------------------------------------------------  IN: 1360 mL / OUT: 1300 mL / NET: 60 mL    12 May 2023 07:01  -  13 May 2023 04:34  --------------------------------------------------------  IN: 1140 mL / OUT: 1800 mL / NET: -660 mL        PHYSICAL EXAM:  Constitutional: awake, OES, NAD.   Respiratory: +trach to vent. non-labored breathing. Normal chest rise.   Cardiovascular: Regular rate and rhythm  Gastrointestinal: +PEG. Soft, nontender, nondistended.   Vascular: Extremities warm, no ulcers, no discoloration of skin.   Neurological: Gen: AA&O x 3 mouths words.     R gaze, R eye CN6 palsy otherwise CN II-XII grossly intact.    Motor: RUE 4/5 with encouragement, LUE 5/5, lower extremities b/l AG.     Sens: Sensation intact to light touch throughout.    Extremities: warm and well perfused   Wound/incision: SOC incision c/d/i with baci and xeroform open to air. Right crani site c/d/i open to air.       TUBES/LINES:  [] CVC  [x] A-line  [] Lumbar Drain  [] Ventriculostomy  [] ELENA  [] Garsia  [] NGT   [x] trach    DIET:  [] NPO  [] Mechanical  [x] Tube feeds PEG    LABS:                        9.3    5.39  )-----------( 253      ( 12 May 2023 06:03 )             28.1     05-12    139  |  104  |  10  ----------------------------<  161<H>  4.0   |  27  |  0.42<L>    Ca    8.6      12 May 2023 06:03  Phos  3.8     05-12  Mg     2.0     05-12    TPro  6.3  /  Alb  2.7<L>  /  TBili  0.2  /  DBili  <0.2  /  AST  14  /  ALT  16  /  AlkPhos  115  05-12            CAPILLARY BLOOD GLUCOSE          Drug Levels: [] N/A    CSF Analysis: [] N/A      Allergies    No Known Allergies    Intolerances        Home Medications:  Albuterol (Eqv-ProAir HFA) 90 mcg/inh inhalation aerosol: 2 puff(s) inhaled every 6 hours as needed for  shortness of breath and/or wheezing (20 Apr 2023 22:36)      MEDICATIONS:  Antibiotics:    Neuro:  acetaminophen   Oral Liquid .. 650 milliGRAM(s) Oral every 6 hours PRN  methylphenidate 5 milliGRAM(s) Oral two times a day  oxyCODONE    IR 5 milliGRAM(s) Oral every 4 hours PRN    Anticoagulation:  enoxaparin Injectable 40 milliGRAM(s) SubCutaneous every 24 hours    OTHER:  acetylcysteine 10%  Inhalation 4 milliLiter(s) Inhalation every 6 hours PRN  albuterol/ipratropium for Nebulization 3 milliLiter(s) Nebulizer every 6 hours PRN  amLODIPine   Tablet 5 milliGRAM(s) Oral daily  atorvastatin 80 milliGRAM(s) Oral at bedtime  carvedilol 3.125 milliGRAM(s) Oral every 12 hours  chlorhexidine 2% Cloths 1 Application(s) Topical daily  lactobacillus acidophilus 1 Tablet(s) Oral daily  pantoprazole   Suspension 40 milliGRAM(s) Oral daily  polyethylene glycol 3350 17 Gram(s) Oral daily  psyllium Powder 1 Packet(s) Oral every 12 hours  senna 2 Tablet(s) Oral at bedtime    IVF:  ascorbic acid 500 milliGRAM(s) Oral <User Schedule>  ferrous    sulfate 325 milliGRAM(s) Oral <User Schedule>    CULTURES:  Culture Results:   No growth to date (05-08 @ 14:35)  Culture Results:   No growth to date (05-05 @ 05:06)    RADIOLOGY & ADDITIONAL TESTS:      ASSESSMENT:  58 y/o female found to have acute infarction within the right cerebellar hemisphere, causing herniation. Now s/p SOC foramen magnum decompression and right parietal/occipital EVD placement (4/21). s/p trach (4/28/23). s/p PEG (5/2/23), s/p dx cerebral angiogram (5/2/23). now s/p VPS Certas @ 4 (5/8/23).     STROKE    No pertinent family history in first degree relatives    Handoff    Asthma    CAD (coronary artery disease)    Peripheral neuropathy    Cerebellar stroke    Respiratory failure, unspecified with hypoxia    History of DVT (deep vein thrombosis)    Hydrocephalus    Tracheostomy malfunction    Cerebellar stroke    Respiratory failure, unspecified with hypoxia    History of DVT of lower extremity    Hydrocephalus    Tracheostomy malfunction    Delirium    Decompressive craniectomy    Cerebellar infarct    Cerebellar infarct    CAD (coronary artery disease)    Asthma    Peripheral neuropathy    Lupus anticoagulant positive    Anemia due to acute blood loss    Tracheostomy malfunction    Decompressive craniectomy    Insertion, external ventricular drain    Planned tracheostomy    Tracheostomy tube change    Esophagogastroduodenoscopy (EGD) with anesthesia    Insertion, PEG tube, laparoscopic    Angiogram, carotid and cerebral, bilateral    Tracheostomy tube change    IVC filter placement     shunt    Insertion, ventriculopleural shunt    S/P total abdominal hysterectomy    S/P cholecystectomy    S/P right knee surgery    Insertion, external ventricular drain    Planned tracheostomy    Esophagogastroduodenoscopy (EGD) with anesthesia    Insertion, PEG tube, laparoscopic     shunt    CAD (coronary artery disease)    Asthma    Peripheral neuropathy    Lupus anticoagulant positive    Acute respiratory failure with hypoxia    Acute respiratory failure with hypoxia    Dysphagia    Deep vein thrombosis (DVT)    Hydrocephalus    [email or web address]        PLAN:  PLAN:  Neuro   - Vitals/neuro q4h   - s/p VPS (Certas @ 4)   - dx angio 5/2: b/l cavernous ICA aneurysms, as discussed at vascular conference f/u outpt   - CTH 4/28 stable; 5/4 new gas in right temporal horn, CT 5/8 chio increased vent size, 5/9 stable   - Stroke consulted, appreciate reccs   - Pain control with oxy 5 prn  - Ritalin 5 BID for neurostimulation  - ASA 81mg on hold, can restart 5/14     Cardio  - SBP   - TTE 4/24: negative for PFO, mild LVH, mild dilation of L atrium, EF 55-60%   - Carvedilol 3.125mg BID   - SALVADOR deferred, documented not indicated as it will not  at this time for starting patient on anticoagulation. Will reassess if indicated medically.     Pulm  - + Trach (6 shiley) 400/40/16/5, CPAP trials as tolerated   - Chest PT, duoneb, mucomist q 6 prn     GI  - + PEG - advance TFs  - RT removed 5/10, last BM 5/8  - Protonix for GI ppx while on vent  - RLQ US - no acute pathology    Renal  - IVL  - Voiding via primafit     Endo   - cont lipitor     Heme  - SQL for DVT ppx, NO SCDs  - s/p IVCF 5/4   - LE dopplers 4/22 neg for DVT, but showing superficial venous thrombosis in the proximal portion of the right greater saphenous vein; repeat on 4/29 with persistant superficial thrombus, 5/3 new DVT L IM calf and unchanged R superficial vein thrombus  - Heme following for positive anticardiolipin Ab 4/27, recs appreciated, f/u activated protein C    - Iron and vitamin c every other day     ID  - CSF sent from OR (5/8)   - MRSA (-), +UA cx ESBL, +sputum cx pluralibacter gergoviae, strep pneumoniae, s/p Ertapenem 1g Q24hrs (5/1-5/7)    PSYCH  - behavioral health consulted for tearfulness, rec   DISPO:  - NSICU, full code   - PT/OT rec AR patient can tolerate 3 hrs PT/OT daily    D/w Dr. Valadez and Dr. Garcia    Assessment:  Present when checked    []  GCS  E   V  M     Heart Failure: []Acute, [] acute on chronic , []chronic  Heart Failure:  [] Diastolic (HFpEF), [] Systolic (HFrEF), []Combined (HFpEF and HFrEF), [] RHF, [] Pulm HTN, [] Other    [] MAMTA, [] ATN, [] AIN, [] other  [] CKD1, [] CKD2, [] CKD 3, [] CKD 4, [] CKD 5, []ESRD    Encephalopathy: [] Metabolic, [] Hepatic, [] toxic, [] Neurological, [] Other    Abnormal Nurtitional Status: [] malnurtition (see nutrition note), [ ]underweight: BMI < 19, [] morbid obesity: BMI >40, [] Cachexia    [] Sepsis  [] hypovolemic shock,[] cardiogenic shock, [] hemorrhagic shock, [] neuogenic shock  [] Acute Respiratory Failure  []Cerebral edema, [] Brain compression/ herniation,   [] Functional quadriplegia  [] Acute blood loss anemia

## 2023-05-14 LAB
ANION GAP SERPL CALC-SCNC: 8 MMOL/L — SIGNIFICANT CHANGE UP (ref 5–17)
BASE EXCESS BLDV CALC-SCNC: 10.3 MMOL/L — HIGH (ref -2–3)
BUN SERPL-MCNC: 12 MG/DL — SIGNIFICANT CHANGE UP (ref 7–23)
CA-I SERPL-SCNC: 1.25 MMOL/L — SIGNIFICANT CHANGE UP (ref 1.15–1.33)
CALCIUM SERPL-MCNC: 8.9 MG/DL — SIGNIFICANT CHANGE UP (ref 8.4–10.5)
CHLORIDE SERPL-SCNC: 98 MMOL/L — SIGNIFICANT CHANGE UP (ref 96–108)
CO2 BLDV-SCNC: 38.7 MMOL/L — HIGH (ref 22–26)
CO2 SERPL-SCNC: 34 MMOL/L — HIGH (ref 22–31)
CREAT SERPL-MCNC: 0.49 MG/DL — LOW (ref 0.5–1.3)
EGFR: 111 ML/MIN/1.73M2 — SIGNIFICANT CHANGE UP
GAS PNL BLDV: 137 MMOL/L — SIGNIFICANT CHANGE UP (ref 136–145)
GAS PNL BLDV: SIGNIFICANT CHANGE UP
GLUCOSE SERPL-MCNC: 147 MG/DL — HIGH (ref 70–99)
HCO3 BLDV-SCNC: 37 MMOL/L — HIGH (ref 22–29)
HCT VFR BLD CALC: 33.5 % — LOW (ref 34.5–45)
HGB BLD-MCNC: 10.5 G/DL — LOW (ref 11.5–15.5)
MAGNESIUM SERPL-MCNC: 2.2 MG/DL — SIGNIFICANT CHANGE UP (ref 1.6–2.6)
MCHC RBC-ENTMCNC: 29 PG — SIGNIFICANT CHANGE UP (ref 27–34)
MCHC RBC-ENTMCNC: 31.3 GM/DL — LOW (ref 32–36)
MCV RBC AUTO: 92.5 FL — SIGNIFICANT CHANGE UP (ref 80–100)
NRBC # BLD: 0 /100 WBCS — SIGNIFICANT CHANGE UP (ref 0–0)
PCO2 BLDV: 57 MMHG — HIGH (ref 39–42)
PH BLDV: 7.42 — SIGNIFICANT CHANGE UP (ref 7.32–7.43)
PHOSPHATE SERPL-MCNC: 4.6 MG/DL — HIGH (ref 2.5–4.5)
PLATELET # BLD AUTO: 360 K/UL — SIGNIFICANT CHANGE UP (ref 150–400)
PO2 BLDV: 52 MMHG — HIGH (ref 25–45)
POTASSIUM BLDV-SCNC: 4.6 MMOL/L — SIGNIFICANT CHANGE UP (ref 3.5–5.1)
POTASSIUM SERPL-MCNC: 4.5 MMOL/L — SIGNIFICANT CHANGE UP (ref 3.5–5.3)
POTASSIUM SERPL-SCNC: 4.5 MMOL/L — SIGNIFICANT CHANGE UP (ref 3.5–5.3)
RBC # BLD: 3.62 M/UL — LOW (ref 3.8–5.2)
RBC # FLD: 12.6 % — SIGNIFICANT CHANGE UP (ref 10.3–14.5)
SAO2 % BLDV: 85.4 % — SIGNIFICANT CHANGE UP (ref 67–88)
SODIUM SERPL-SCNC: 140 MMOL/L — SIGNIFICANT CHANGE UP (ref 135–145)
WBC # BLD: 5.5 K/UL — SIGNIFICANT CHANGE UP (ref 3.8–10.5)
WBC # FLD AUTO: 5.5 K/UL — SIGNIFICANT CHANGE UP (ref 3.8–10.5)

## 2023-05-14 PROCEDURE — 99291 CRITICAL CARE FIRST HOUR: CPT | Mod: 24

## 2023-05-14 PROCEDURE — 36600 WITHDRAWAL OF ARTERIAL BLOOD: CPT | Mod: LT

## 2023-05-14 RX ORDER — DEXTROSE MONOHYDRATE, SODIUM CHLORIDE, AND POTASSIUM CHLORIDE 50; .745; 4.5 G/1000ML; G/1000ML; G/1000ML
1000 INJECTION, SOLUTION INTRAVENOUS
Refills: 0 | Status: DISCONTINUED | OUTPATIENT
Start: 2023-05-14 | End: 2023-05-14

## 2023-05-14 RX ORDER — CHLORHEXIDINE GLUCONATE 213 G/1000ML
15 SOLUTION TOPICAL EVERY 12 HOURS
Refills: 0 | Status: DISCONTINUED | OUTPATIENT
Start: 2023-05-14 | End: 2023-05-14

## 2023-05-14 RX ORDER — SODIUM CHLORIDE 9 MG/ML
1000 INJECTION INTRAMUSCULAR; INTRAVENOUS; SUBCUTANEOUS
Refills: 0 | Status: DISCONTINUED | OUTPATIENT
Start: 2023-05-14 | End: 2023-05-15

## 2023-05-14 RX ORDER — CHLORHEXIDINE GLUCONATE 213 G/1000ML
15 SOLUTION TOPICAL EVERY 12 HOURS
Refills: 0 | Status: DISCONTINUED | OUTPATIENT
Start: 2023-05-14 | End: 2023-05-22

## 2023-05-14 RX ORDER — DIATRIZOATE MEGLUMINE 180 MG/ML
30 INJECTION, SOLUTION INTRAVESICAL ONCE
Refills: 0 | Status: COMPLETED | OUTPATIENT
Start: 2023-05-14 | End: 2023-05-14

## 2023-05-14 RX ORDER — PANTOPRAZOLE SODIUM 20 MG/1
40 TABLET, DELAYED RELEASE ORAL AT BEDTIME
Refills: 0 | Status: DISCONTINUED | OUTPATIENT
Start: 2023-05-14 | End: 2023-05-20

## 2023-05-14 RX ADMIN — POLYETHYLENE GLYCOL 3350 17 GRAM(S): 17 POWDER, FOR SOLUTION ORAL at 12:19

## 2023-05-14 RX ADMIN — Medication 10 MILLIGRAM(S): at 06:02

## 2023-05-14 RX ADMIN — Medication 1 PACKET(S): at 06:02

## 2023-05-14 RX ADMIN — SENNA PLUS 2 TABLET(S): 8.6 TABLET ORAL at 21:36

## 2023-05-14 RX ADMIN — PANTOPRAZOLE SODIUM 40 MILLIGRAM(S): 20 TABLET, DELAYED RELEASE ORAL at 21:35

## 2023-05-14 RX ADMIN — OXYCODONE HYDROCHLORIDE 5 MILLIGRAM(S): 5 TABLET ORAL at 13:01

## 2023-05-14 RX ADMIN — CHLORHEXIDINE GLUCONATE 15 MILLILITER(S): 213 SOLUTION TOPICAL at 19:08

## 2023-05-14 RX ADMIN — CARVEDILOL PHOSPHATE 3.12 MILLIGRAM(S): 80 CAPSULE, EXTENDED RELEASE ORAL at 06:02

## 2023-05-14 RX ADMIN — Medication 81 MILLIGRAM(S): at 12:19

## 2023-05-14 RX ADMIN — CHLORHEXIDINE GLUCONATE 1 APPLICATION(S): 213 SOLUTION TOPICAL at 12:54

## 2023-05-14 RX ADMIN — Medication 10 MILLIGRAM(S): at 19:08

## 2023-05-14 RX ADMIN — Medication 1 TABLET(S): at 12:19

## 2023-05-14 RX ADMIN — SODIUM CHLORIDE 90 MILLILITER(S): 9 INJECTION INTRAMUSCULAR; INTRAVENOUS; SUBCUTANEOUS at 19:55

## 2023-05-14 RX ADMIN — ATORVASTATIN CALCIUM 80 MILLIGRAM(S): 80 TABLET, FILM COATED ORAL at 21:37

## 2023-05-14 RX ADMIN — OXYCODONE HYDROCHLORIDE 5 MILLIGRAM(S): 5 TABLET ORAL at 12:19

## 2023-05-14 RX ADMIN — AMLODIPINE BESYLATE 5 MILLIGRAM(S): 2.5 TABLET ORAL at 06:02

## 2023-05-14 RX ADMIN — DIATRIZOATE MEGLUMINE 30 MILLILITER(S): 180 INJECTION, SOLUTION INTRAVESICAL at 22:22

## 2023-05-14 RX ADMIN — ENOXAPARIN SODIUM 40 MILLIGRAM(S): 100 INJECTION SUBCUTANEOUS at 21:36

## 2023-05-14 RX ADMIN — Medication 1 PACKET(S): at 19:09

## 2023-05-14 NOTE — PROGRESS NOTE ADULT - SUBJECTIVE AND OBJECTIVE BOX
HPI:  58 yo F with PMH of Asthma, CAD (not on  meds), peripheral neuropathy and PSH of BATOOL, cholecystectomy, right knee surgery presented to [facility] ED on 4/20 with right sided weakness and right facial numbness. Patient was undergoing preparation for colonoscopy. As per daughter at 8am patient was playing with her grandchildren but around 840 am patient was getting dressed and fell and subsequently felt weak after. Daughter reports that patient had some episodes of vomiting at this time. She had a syncopal episode at around 10 am and was witnessed by brother. No head trauma, She regained conscious after few minutes. She remained in bed for the remainder of the day. Daughter reports some slurred speech noted 1230-1PM and also was confused after. and around 4PM, the patient's sister noted right sided weakness at which time EMS was called and patient was brought to ED. No c/o chest pain, shortness of breath, fever, headache, abdominal pain, urinary complaints. No recent travel/sickness/ change in meds. Stroke code in ER: CTH neg for heme, Right PICA distribution acute infarction. CTA with saccular aneurysms of b/l carotids, approximately 0.8 cm on the right and 1.2 x 0.9 cm on the left. Possible tiny third saccular aneurysm on the right Posterior intracranial circulation: Right vertebral arterial occlusion at its dural crossing junction V3 Atlantic and V4 intracranial segments with likely reversal of flow in its intracranial segment from the basilar. Right PICA faintly seen. 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  Not candidate for TNK/mechanical thrombectomy. CT scan repeated which showed: 1. Brain: Progression of acute infarction within the right cerebellar hemisphere, also extending into the left superior cerebellar hemisphere. New mass effect on the fourth ventricle causing stenosis versus occlusion with new third and lateral ventricular dilatation indicating ventricular obstruction at the level of the fourth ventricle. New upward and downward herniation of cerebellar parenchyma Right carotid system: No hemodynamically significant stenosis. Left carotid system: No hemodynamically significant stenosis. Vertebral circulation: Patent. Anterior intracranial circulation: Intact. Bilateral internal carotid saccular aneurysms. These findings are unchanged. Posterior intracranial circulation:    Improved flow within the right vertebral artery since 4/20/2023. New focal stenosis mid left vertebral artery, etiology uncertain, consider vasospasm and extrinsic compression in addition to new embolic disease. Brain perfusion: Perfusion images demonstrate normalization of the perfusion abnormality in the right cerebellar hemisphere present on 4/20/2023 despite evidence of progression of acute infarction.  Areas of apparent ischemia within the posterior fossa have progressed in extent, also again involving the left posterior cerebral arterial distribution. Tx to [facility] for SOC watch. On admission to [facility], NIHSS 12.  (21 Apr 2023 16:50)    OVERNIGHT EVENTS: PM VBG with PCO2 50 and repeat VBG pCO2 55, possible obesity hypoventilation, f/u AM VBG   5/14: POD23 SOC, [id number], neuro stable, f/u vbg in AM    Hospital Course:   5/1: POD 10. Neuro stable. Dc'd fentanyl gtt. PEG failed placement at bedside with Dr. Gant, plan for PEG in OR tomorrow afternoon with Dr. Layton. Dc'd amlodipine and started carvedilol 3.125 BID for PVCs . Made 3% inhalation and mucomyst q8hr. Failed PEG at bedside. Salt tabs made 1 q 6. Decreased cardura to 4mg daily. Urine culture growing E. Coli and sputum culture growing pluralibacter gergoviae and strep species. ID consulted, f/u recs. Failed CPAP trial @ 3pm. Added am labs per heme/onc for hypercoguable workup. Pending repeat LE dopplers in 2-3 days. NGT clogged, removed, ENT failed attempt at replacement, will keep NPO for PEG placement tmw. Repeat xray in am for concern for aspration. Pt abx switched from Zosyn to Ertapenem 1g Q24hrs. per ID recs, possible ESBL growth.   5/2: POD 11. Neuro stable. Pre-op for diagnostic cerebral angiogram and PEG placement. NPO. EVD raised to 5 cmH20. Sputum culture speciated to step pneumoniae, sensitive to ertapenem. 3% inhalation/mucomyst made q 6 hr d/t thick secretions. PEG placed in OR. POD0 dx cerebral angio. EVD raised to 10.   5/3: POD 12. Neuro stable. PEG feeds began @ 10 AM, plan to increase 10cc every 6h per general surgery. Repeat dopplers show stable R superficial thrombus and new L IM calf DVT. Vascular consulted for IVC filter. Plan to get CTH tomorrow.  5/4: POD 13. JIM o/n. s/p IVC filter with vascular today. Pending post-procedure CTH. FOBT negative. Started iron and vitamin c every other day for iron deficiency anemia.   5/5: POD14. CSF cx sent to r/o infection from new gas in right temporal horn seen on CTH. Restarted TF, changed to Jevity 1.2, f/u nutrition recs. EVD raised to 10 today, plan to challenge over the weekend and CTH on Monday, possible VPS next Wednesday. ENT removed sutures today. Salt tabs decreased 1q12.  5/6: POD15 Placed on CPAP briefly with low MV alert, placed back on full vent support. EVD remains open at [id number]. ICPs WNL. Neuro exam stable.  EVD raised to 15. Tolerated CPAP x8h.   5/7: POD#16. JIM overnight, neuro stable. D/c'd salt tabs and 3% neb. Wean trach to CPAP as tolerated. Pan cx NGTD. EVD raised today to [id number].   5/8: POD17 JIM overnight. ICPs WNL. Pt remains on full vent support. Neuro exam stable. Plan for possible VPS today. CT chio complete showing increase in vent size.   5/9: POD18 SOC, POD1 VPS. Desat o/n to 80s, improved on own. CXR with LLL effusion. Another desat to 90% in AM, given duoneb and mucomyst. Oxycodone given for incisional pain. Neuro exam stable. CTH in AM stable. Keppra dc'd. Ritalin 5 BID started.  Tolerating CPAP trial. SALVADOR ordered. Rectal tube dc'd.   5/10: POD 19 SOC POD2 VPS. Remains on full vent support. Tolerated CPAP for 6 hours. Neuro exam stable. BP liberalized to 160. RLQ US 2/2 abd pain. Lactate WNL.  5/11: POD20 SOC POD3 VPS. Plan for CPAP trial in AM.  Dc'd cardura. F/u speech consult for communcation board. Has been tolerating CPAP since 9am. RLQ us w/ no acute pathology. Per heme/onc LMWH or coumadin rec for AC over DOAC due to antiphospholipid syndrome.   5/12: POD21 SOC POD4 VPS. CPAP 8/5, tolerated until 3pm. Can start ASA on 5/14 and full AC POD 10 from VPS 5/19 (remove IVC filter prior, f/u w/ vascular on timing).   5/13: POD22 SOC POD 5 VPS, neuro stable, tolerating trach collar. ASA ordered to start tomorrow, Ritalin increased to 10BID from 5BID, Simethicone ordered for gas, f/u gen surg for continued abd discomfort, PM VBG with PCO2 50 and repeat VBG pCO2 55, possible obesity hypoventilation, f/u AM VBG   5/14: POD23 SOC, RPH1IYP, neuro stable, f/u vbg in AM  Vital Signs Last 24 Hrs  T(C): 37.1 (14 May 2023 01:23), Max: 37.4 (13 May 2023 05:29)  T(F): 98.7 (14 May 2023 01:23), Max: 99.3 (13 May 2023 05:29)  HR: 77 (14 May 2023 02:00) (68 - 82)  BP: 105/52 (14 May 2023 02:00) (90/44 - 122/60)  BP(mean): 75 (14 May 2023 02:00) (64 - 83)  RR: 30 (14 May 2023 02:00) (10 - 30)  SpO2: 98% (14 May 2023 02:00) (95% - 98%)    Parameters below as of 14 May 2023 02:00  Patient On (Oxygen Delivery Method): tracheostomy collar    O2 Concentration (%): 40    I&O's Detail    12 May 2023 07:01  -  13 May 2023 07:00  --------------------------------------------------------  IN:    Enteral Tube Flush: 240 mL    Jevity 1.2: 1200 mL  Total IN: 1440 mL    OUT:    Voided (mL): 2300 mL  Total OUT: 2300 mL    Total NET: -860 mL      13 May 2023 07:01  -  14 May 2023 03:56  --------------------------------------------------------  IN:    Enteral Tube Flush: 110 mL    Jevity 1.2: 750 mL  Total IN: 860 mL    OUT:    Voided (mL): 800 mL  Total OUT: 800 mL    Total NET: 60 mL        I&O's Summary    12 May 2023 07:01  -  13 May 2023 07:00  --------------------------------------------------------  IN: 1440 mL / OUT: 2300 mL / NET: -860 mL    13 May 2023 07:01  -  14 May 2023 03:56  --------------------------------------------------------  IN: 860 mL / OUT: 800 mL / NET: 60 mL        PHYSICAL EXAM:  Constitutional: awake, OES, NAD.   Respiratory: +trach to vent. non-labored breathing. Normal chest rise.   Cardiovascular: Regular rate and rhythm  Gastrointestinal: +PEG. Soft, nontender, nondistended.   Vascular: Extremities warm, no ulcers, no discoloration of skin.   Neurological: Gen: AA&O x 3 mouths words.     R gaze, R eye CN6 palsy otherwise CN II-XII grossly intact.    Motor: RUE 4/5 with encouragement, LUE 5/5, lower extremities b/l AG.     Sens: Sensation intact to light touch throughout.    Extremities: warm and well perfused   Wound/incision: SOC incision c/d/i with baci and xeroform open to air. Right crani site c/d/i open to air.   Incision/Wound:  Staples in place, no purulent drainage, no erythema or edema     DEVICE/DRAIN DRESSING:  Dressing c/d/i    TUBES/LINES:  [] CVC  [x] A-line  [] Lumbar Drain  [] Ventriculostomy  [] ELENA  [] Garsia  [] NGT   [x] trach    DIET:  [] NPO  [] Mechanical  [x] Tube feeds    LABS:                        10.0   5.36  )-----------( 338      ( 13 May 2023 05:18 )             31.0     05-13    140  |  102  |  9   ----------------------------<  159<H>  4.2   |  33<H>  |  0.48<L>    Ca    9.3      13 May 2023 05:18  Phos  4.0     05-13  Mg     2.1     05-13    TPro  6.3  /  Alb  2.7<L>  /  TBili  0.2  /  DBili  <0.2  /  AST  14  /  ALT  16  /  AlkPhos  115  05-12            CAPILLARY BLOOD GLUCOSE          Drug Levels: [] N/A    CSF Analysis: [] N/A      Allergies    No Known Allergies    Intolerances        Home Medications:  Albuterol (Eqv-ProAir HFA) 90 mcg/inh inhalation aerosol: 2 puff(s) inhaled every 6 hours as needed for  shortness of breath and/or wheezing (20 Apr 2023 22:36)      MEDICATIONS:  Antibiotics:    Neuro:  acetaminophen   Oral Liquid .. 650 milliGRAM(s) Oral every 6 hours PRN  methylphenidate 10 milliGRAM(s) Oral two times a day  oxyCODONE    IR 5 milliGRAM(s) Oral every 4 hours PRN    Anticoagulation:  aspirin  chewable 81 milliGRAM(s) Oral daily  enoxaparin Injectable 40 milliGRAM(s) SubCutaneous every 24 hours    OTHER:  acetylcysteine 10%  Inhalation 4 milliLiter(s) Inhalation every 6 hours PRN  albuterol/ipratropium for Nebulization 3 milliLiter(s) Nebulizer every 6 hours PRN  amLODIPine   Tablet 5 milliGRAM(s) Oral daily  atorvastatin 80 milliGRAM(s) Oral at bedtime  carvedilol 3.125 milliGRAM(s) Oral every 12 hours  chlorhexidine 2% Cloths 1 Application(s) Topical daily  lactobacillus acidophilus 1 Tablet(s) Oral daily  polyethylene glycol 3350 17 Gram(s) Oral daily  psyllium Powder 1 Packet(s) Oral every 12 hours  senna 2 Tablet(s) Oral at bedtime  simethicone 80 milliGRAM(s) Chew three times a day PRN    IVF:  ascorbic acid 500 milliGRAM(s) Oral <User Schedule>  ferrous    sulfate 325 milliGRAM(s) Oral <User Schedule>    CULTURES:  Culture Results:   No growth to date (05-08 @ 14:35)  Culture Results:   No growth to date (05-05 @ 05:06)    RADIOLOGY & ADDITIONAL TESTS:      ASSESSMENT:  56 y/o female found to have acute infarction within the right cerebellar hemisphere, causing herniation. Now s/p SOC foramen magnum decompression and right parietal/occipital EVD placement (4/21). s/p trach (4/28/23). s/p PEG (5/2/23), s/p dx cerebral angiogram (5/2/23). now s/p VPS Certas @ 4 (5/8/23).     STROKE    No pertinent family history in first degree relatives    Handoff    Asthma    CAD (coronary artery disease)    Peripheral neuropathy    Cerebellar stroke    Respiratory failure, unspecified with hypoxia    History of DVT (deep vein thrombosis)    Hydrocephalus    Tracheostomy malfunction    Cerebellar stroke    Respiratory failure, unspecified with hypoxia    History of DVT of lower extremity    Hydrocephalus    Tracheostomy malfunction    Delirium    Decompressive craniectomy    Cerebellar infarct    Cerebellar infarct    CAD (coronary artery disease)    Asthma    Peripheral neuropathy    Lupus anticoagulant positive    Anemia due to acute blood loss    Tracheostomy malfunction    Decompressive craniectomy    Insertion, external ventricular drain    Planned tracheostomy    Tracheostomy tube change    Esophagogastroduodenoscopy (EGD) with anesthesia    Insertion, PEG tube, laparoscopic    Angiogram, carotid and cerebral, bilateral    Tracheostomy tube change    IVC filter placement     shunt    Insertion, ventriculopleural shunt    S/P total abdominal hysterectomy    S/P cholecystectomy    S/P right knee surgery    Insertion, external ventricular drain    Planned tracheostomy    Esophagogastroduodenoscopy (EGD) with anesthesia    Insertion, PEG tube, laparoscopic     shunt    CAD (coronary artery disease)    Asthma    Peripheral neuropathy    Lupus anticoagulant positive    Acute respiratory failure with hypoxia    Acute respiratory failure with hypoxia    Dysphagia    Deep vein thrombosis (DVT)    Hydrocephalus    [id number]        PLAN:  Neuro   - Vitals/neuro q4h   - s/p VPS (Certas @ 4)   - dx angio 5/2: b/l cavernous ICA aneurysms, as discussed at vascular conference f/u outpt   - CTH 4/28 stable; 5/4 new gas in right temporal horn, CT 5/8 chio increased vent size, 5/9 stable   - Stroke consulted, appreciate reccs   - Pain control with oxy 5 prn  - Ritalin 10 BID for neurostimulation  - ASA 81mg on hold, can restart 5/14     Cardio  - SBP   - TTE 4/24: negative for PFO, mild LVH, mild dilation of L atrium, EF 55-60%   - Carvedilol 3.125mg BID   - SALVADOR deferred, documented not indicated as it will not  at this time for starting patient on anticoagulation. Will reassess if indicated medically.     Pulm  - + Trach (6 shiley) 400/40/16/5, tolerating trach collar   - Chest PT, duoneb, mucomist q 6 prn     GI  - + PEG - at goal on TF   - RT removed 5/10, last BM 5/13  - Protonix for GI ppx while on vent  - Simethicone for gas  - RLQ US - no acute pathology    Renal  - IVL  - Voiding via primafit     Endo   - cont lipitor     Heme  - SQL for DVT ppx, NO SCDs  - s/p IVCF 5/4   - LE dopplers 4/22 neg for DVT, but showing superficial venous thrombosis in the proximal portion of the right greater saphenous vein; repeat on 4/29 with persistant superficial thrombus, 5/3 new DVT L IM calf and unchanged R superficial vein thrombus  - Heme following for positive anticardiolipin Ab 4/27, recs appreciated, f/u activated protein C    - Iron and vitamin c every other day     ID  - CSF sent from OR (5/8)   - MRSA (-), +UA cx ESBL, +sputum cx pluralibacter gergoviae, strep pneumoniae, s/p Ertapenem 1g Q24hrs (5/1-5/7)    PSYCH  - behavioral health consulted for tearfulness, rec   DISPO:  - NSICU, full code   - PT/OT rec AR patient can tolerate 3 hrs PT/OT daily    D/w Dr. Valadez and Dr. Garcia    Assessment:  Present when checked    []  GCS  E   V  M     Heart Failure: []Acute, [] acute on chronic , []chronic  Heart Failure:  [] Diastolic (HFpEF), [] Systolic (HFrEF), []Combined (HFpEF and HFrEF), [] RHF, [] Pulm HTN, [] Other    [] MAMTA, [] ATN, [] AIN, [] other  [] CKD1, [] CKD2, [] CKD 3, [] CKD 4, [] CKD 5, []ESRD    Encephalopathy: [] Metabolic, [] Hepatic, [] toxic, [] Neurological, [] Other    Abnormal Nurtitional Status: [] malnurtition (see nutrition note), [ ]underweight: BMI < 19, [] morbid obesity: BMI >40, [] Cachexia    [] Sepsis  [] hypovolemic shock,[] cardiogenic shock, [] hemorrhagic shock, [] neuogenic shock  [] Acute Respiratory Failure  []Cerebral edema, [] Brain compression/ herniation,   [] Functional quadriplegia  [] Acute blood loss anemia

## 2023-05-14 NOTE — CONSULT NOTE ADULT - SUBJECTIVE AND OBJECTIVE BOX
Surgery Consult      Consulting surgical team: [ ] 1 - colorectal/surg onc   [ ] 2 - MIS/bariatrics   [x] 4 - acute care   [ ] 5 - general surgery/breast/pediatrics  Consulting attending: Dr. Cota      HPI:  56 yo F with PMH of Asthma, CAD (not on  meds), peripheral neuropathy and PSH of BATOOL, cholecystectomy, right knee surgery presented to [facility] ED on 4/20 with right sided weakness and right facial numbness. Patient was undergoing preparation for colonoscopy. As per daughter at 8am patient was playing with her grandchildren but around 840 am patient was getting dressed and fell and subsequently felt weak after. Daughter reports that patient had some episodes of vomiting at this time. She had a syncopal episode at around 10 am and was witnessed by brother. No head trauma, She regained conscious after few minutes. She remained in bed for the remainder of the day. Daughter reports some slurred speech noted 1230-1PM and also was confused after. and around 4PM, the patient's sister noted right sided weakness at which time EMS was called and patient was brought to ED. No c/o chest pain, shortness of breath, fever, headache, abdominal pain, urinary complaints. No recent travel/sickness/ change in meds. Stroke code in ER: CTH neg for heme, Right PICA distribution acute infarction. CTA with saccular aneurysms of b/l carotids, approximately 0.8 cm on the right and 1.2 x 0.9 cm on the left. Possible tiny third saccular aneurysm on the right Posterior intracranial circulation: Right vertebral arterial occlusion at its dural crossing junction V3 Atlantic and V4 intracranial segments with likely reversal of flow in its intracranial segment from the basilar. Right PICA faintly seen. 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  Not candidate for TNK/mechanical thrombectomy. CT scan repeated which showed: 1. Brain: Progression of acute infarction within the right cerebellar hemisphere, also extending into the left superior cerebellar hemisphere. New mass effect on the fourth ventricle causing stenosis versus occlusion with new third and lateral ventricular dilatation indicating ventricular obstruction at the level of the fourth ventricle. New upward and downward herniation of cerebellar parenchyma Right carotid system: No hemodynamically significant stenosis. Left carotid system: No hemodynamically significant stenosis. Vertebral circulation: Patent. Anterior intracranial circulation: Intact. Bilateral internal carotid saccular aneurysms. These findings are unchanged. Posterior intracranial circulation:    Improved flow within the right vertebral artery since 4/20/2023. New focal stenosis mid left vertebral artery, etiology uncertain, consider vasospasm and extrinsic compression in addition to new embolic disease. Brain perfusion: Perfusion images demonstrate normalization of the perfusion abnormality in the right cerebellar hemisphere present on 4/20/2023 despite evidence of progression of acute infarction.  Areas of apparent ischemia within the posterior fossa have progressed in extent, also again involving the left posterior cerebral arterial distribution. Tx to [facility] for SOC watch. On admission to [facility], NIHSS 12.  (21 Apr 2023 16:50)    General surgery consulted for RLQ pain in setting of POD6  shunt, abdominal portion by Dr. Cota. Patient is having BMs per family and nursing. She is currently on a bowel regimen.         PAST MEDICAL HISTORY:  Asthma  CAD (coronary artery disease)  Peripheral neuropathy        PAST SURGICAL HISTORY:  S/P total abdominal hysterectomy  S/P cholecystectomy  S/P right knee surgery        MEDICATIONS: orders reviewed      ALLERGIES:  No Known Allergies      SOCHX:   Social History:      FAMHX:   FAMILY HISTORY:  No pertinent family history in first degree relatives          Vitals:  Vital Signs Last 24 Hrs  T(C): 37.1 (14 May 2023 14:15), Max: 37.2 (14 May 2023 09:02)  T(F): 98.7 (14 May 2023 14:15), Max: 99 (14 May 2023 09:02)  HR: 80 (14 May 2023 17:00) (74 - 84)  BP: 101/54 (14 May 2023 17:00) (93/48 - 108/49)  BP(mean): 71 (14 May 2023 17:00) (68 - 84)  RR: 16 (14 May 2023 17:00) (14 - 30)  SpO2: 94% (14 May 2023 17:00) (93% - 98%)    Parameters below as of 14 May 2023 13:00  Patient On (Oxygen Delivery Method): ventilator    O2 Concentration (%): 40  Mode: CPAP with PS  FiO2: 40  PEEP: 5  PS: 5  MAP: 8  PIP: 15      PHYSICAL EXAM:  General: NAD, resting comfortably in bed  Pulm: Nonlabored breathing, no respiratory distress  Abd: soft, mildly distended, RLQ tender to deep palpation (winces), nonperitoneal  Extrem: WWP, no edema  Neuro: nonverbal      I&o's:  I&O's Summary    13 May 2023 07:01  -  14 May 2023 07:00  --------------------------------------------------------  IN: 1410 mL / OUT: 1300 mL / NET: 110 mL    14 May 2023 07:01  -  14 May 2023 17:16  --------------------------------------------------------  IN: 545 mL / OUT: 550 mL / NET: -5 mL        LABS:                        10.5   5.50  )-----------( 360      ( 14 May 2023 04:54 )             33.5     05-14    140  |  98  |  12  ----------------------------<  147<H>  4.5   |  34<H>  |  0.49<L>    Ca    8.9      14 May 2023 04:54  Phos  4.6     05-14  Mg     2.2     05-14      Lactate:      ABG - ( 13 May 2023 13:31 )  pH, Arterial: 7.45  pH, Blood: x     /  pCO2: 50    /  pO2: 106   / HCO3: 35    / Base Excess: 9.2   /  SaO2: 99.3                        MICRO:     IMAGING:

## 2023-05-14 NOTE — PROGRESS NOTE ADULT - SUBJECTIVE AND OBJECTIVE BOX
***********************************************  ADULT NSICU PROGRESS NOTE  MAKSIM TRIVEDI [id number] [facility] 10EA 02  ***********************************************    24H INTERVAL EVENTS: no acute overnight events.    ROS: negative except per mentioned above in 24h interval events.      VITALS:    ICU Vital Signs Last 24 Hrs  T(C): 37.1 (14 May 2023 05:37), Max: 37.2 (13 May 2023 09:10)  T(F): 98.8 (14 May 2023 05:37), Max: 99 (13 May 2023 09:10)  HR: 74 (14 May 2023 09:00) (70 - 80)  BP: 106/73 (14 May 2023 09:00) (90/44 - 108/49)  BP(mean): 84 (14 May 2023 09:00) (64 - 84)  ABP: --  ABP(mean): --  RR: 25 (14 May 2023 09:00) (10 - 30)  SpO2: 95% (14 May 2023 09:00) (93% - 98%)    O2 Parameters below as of 14 May 2023 09:00  Patient On (Oxygen Delivery Method): tracheostomy collar    O2 Concentration (%): 40    I&O's Detail    13 May 2023 07:01  -  14 May 2023 07:00  --------------------------------------------------------  IN:    Enteral Tube Flush: 210 mL    Jevity 1.2: 1200 mL  Total IN: 1410 mL    OUT:    Voided (mL): 1300 mL  Total OUT: 1300 mL    Total NET: 110 mL          EXAM:     Tanner Coma Scale: 3/1T/6 = 10    General: normocephalic, head wrapped, laying in bed, in no distress  Neuro     MS: Tanner Coma Scale: 3/1T/6 = 10, follows commands, cooperative with examination, normal attention    CN: PERRL, (+)R. gaze, plegia to left gaze     Mot: bulk normal, tone normal, power UPPER 3/4, LOWER 3/3  Chest: mechanically ventilated, coarse breath sounds, heart regular rate/rhythm, present S1/S2, no murmurs or rubs  Abdomen: nondistended, soft and nontender without peritoneal signs, normoactive bowel sounds  Extremities: no clubbing, well-perfused, no edema                                10.0   5.36  )-----------( 338      ( 13 May 2023 05:18 )             31.0     05-13    140  |  102  |  9   ----------------------------<  159<H>  4.2   |  33<H>  |  0.48<L>    Ca    9.3      13 May 2023 05:18  Phos  4.0     05-13  Mg     2.1     05-13    TPro  6.3  /  Alb  2.7<L>  /  TBili  0.2  /  DBili  <0.2  /  AST  14  /  ALT  16  /  AlkPhos  115  05-12      MEDICATIONS  (STANDING):  amLODIPine   Tablet 5 milliGRAM(s) Oral daily  ascorbic acid 500 milliGRAM(s) Oral <User Schedule>  atorvastatin 80 milliGRAM(s) Oral at bedtime  carvedilol 3.125 milliGRAM(s) Oral every 12 hours  chlorhexidine 2% Cloths 1 Application(s) Topical daily  enoxaparin Injectable 40 milliGRAM(s) SubCutaneous every 24 hours  ferrous    sulfate 325 milliGRAM(s) Oral <User Schedule>  lactobacillus acidophilus 1 Tablet(s) Oral daily  methylphenidate 10 milliGRAM(s) Oral two times a day  polyethylene glycol 3350 17 Gram(s) Oral daily  psyllium Powder 1 Packet(s) Oral every 12 hours  senna 2 Tablet(s) Oral at bedtime    MEDICATIONS  (PRN):  acetaminophen   Oral Liquid .. 650 milliGRAM(s) Oral every 6 hours PRN Temp greater or equal to 38.5C (101.3F), Mild Pain (1 - 3)  acetylcysteine 10%  Inhalation 4 milliLiter(s) Inhalation every 6 hours PRN increased secretions  albuterol/ipratropium for Nebulization 3 milliLiter(s) Nebulizer every 6 hours PRN Respiratory Distress  oxyCODONE    IR 5 milliGRAM(s) Oral every 4 hours PRN Moderate Pain (4 - 6)        ***********************************************  ASSESSMENT AND PLAN  ***********************************************    #Suboccipital pseudomeningocele  #S/p placement of VPS, 5/8/23  #Bilateral cerebellar hemispheric strokes  #S/p SOC for foramen magnum decompression and R. parieto-occipital placement of EVD, 4/22/23  #11mm wide-necked multilobulated L. cavernous ICA aneurysm & 8mm wide-necked R. cavernous ICA aneurysm  #R. V4 occlusion  #S/p tracheostomy placement w/ tube exchanged due to broken  balloon, 4/28/23  #S/p lap-assisted gastrostomy tube, 5/2/23  #Abdominal discomfort  #L. intramuscular calf DVT  #S/p IVCF placement, 5/4/23  #History of peripheral neuropathy, CAD, and asthma  #Antiphospholipid syndrome      NEURO  - Admit NSICU, Q2h neuro checks / Q2h vital signs  - F/u vascular conference re plan for b/l cavernous ICA aneurysms: follow up outpatient with Dr. iMnor   - Stroke consultation, heme consultation  - Certas at 4  - Pain control, PT/OT when able  - Ritalin 5/5  - Start ASA this sunday, AC on POD #10    PULM  - Volume control/assist control ventilation, wean to pressure support & trach collar  - 6 Cuffed trach  - SpO2 goal > 92%, supplemental O2 and pulm toileting as needed    CARDIO  - BP goal: < 160 while postoperative  - Pending SALVADOR    GI  - Diet: tube feeds  - Stress ulcer prophylaxis: PPI  - RLQ ultrasound unable to visualize appendix  - Abdominal XR, gen surg consult    /RENAL  - Monitor UOP/volume status, BUN/SCr    HEME  - Maintain Hb > 7.0, PLT > 100,000  - Heme consultation for stroke in the young  - SCDs, SQL  - Removal of IVFC ASAP  - AC/ASA on POD #10 and Sunday, respectively, once AC started, must remove IVCF    ID  - Monitor for infectious s/s, fever curve, leukocytosis  - S/p ertapenem (5/1-5/17) for ESBL UTI, Pluralibacter gergoviae/Strep pneumo sputum  - Cleared for VPS placement by ID    ENDO  - SGlu < 180     ***********************************************  ADULT NSICU PROGRESS NOTE  MAKSIM TRIVEDI [id number] [facility] 10EA 02  ***********************************************    24H INTERVAL EVENTS: no acute overnight events.    ROS: negative except per mentioned above in 24h interval events.      VITALS:    ICU Vital Signs Last 24 Hrs  T(C): 37.1 (14 May 2023 05:37), Max: 37.2 (13 May 2023 09:10)  T(F): 98.8 (14 May 2023 05:37), Max: 99 (13 May 2023 09:10)  HR: 74 (14 May 2023 09:00) (70 - 80)  BP: 106/73 (14 May 2023 09:00) (90/44 - 108/49)  BP(mean): 84 (14 May 2023 09:00) (64 - 84)  ABP: --  ABP(mean): --  RR: 25 (14 May 2023 09:00) (10 - 30)  SpO2: 95% (14 May 2023 09:00) (93% - 98%)    O2 Parameters below as of 14 May 2023 09:00  Patient On (Oxygen Delivery Method): tracheostomy collar    O2 Concentration (%): 40    I&O's Detail    13 May 2023 07:01  -  14 May 2023 07:00  --------------------------------------------------------  IN:    Enteral Tube Flush: 210 mL    Jevity 1.2: 1200 mL  Total IN: 1410 mL    OUT:    Voided (mL): 1300 mL  Total OUT: 1300 mL    Total NET: 110 mL          EXAM:     Elida Coma Scale: 3/1T/6 = 10    General: normocephalic, head wrapped, laying in bed, in no distress  Neuro     MS: Elida Coma Scale: 3/1T/6 = 10, follows commands, cooperative with examination, normal attention    CN: PERRL, (+)R. gaze, plegia to left gaze     Mot: bulk normal, tone normal, power UPPER 3/4, LOWER 3/3  Chest: mechanically ventilated, coarse breath sounds, heart regular rate/rhythm, present S1/S2, no murmurs or rubs  Abdomen: nondistended, soft and nontender without peritoneal signs, normoactive bowel sounds  Extremities: no clubbing, well-perfused, no edema      ABG - ( 13 May 2023 13:31 )  pH, Arterial: 7.45  pH, Blood: x     /  pCO2: 50    /  pO2: 106   / HCO3: 35    / Base Excess: 9.2   /  SaO2: 99.3                                    10.5   5.50  )-----------( 360      ( 14 May 2023 04:54 )             33.5     05-14    140  |  98  |  12  ----------------------------<  147<H>  4.5   |  34<H>  |  0.49<L>    Ca    8.9      14 May 2023 04:54  Phos  4.6     05-14  Mg     2.2     05-14        MEDICATIONS  (STANDING):  amLODIPine   Tablet 5 milliGRAM(s) Oral daily  ascorbic acid 500 milliGRAM(s) Oral <User Schedule>  aspirin  chewable 81 milliGRAM(s) Oral daily  atorvastatin 80 milliGRAM(s) Oral at bedtime  carvedilol 3.125 milliGRAM(s) Oral every 12 hours  chlorhexidine 2% Cloths 1 Application(s) Topical daily  enoxaparin Injectable 40 milliGRAM(s) SubCutaneous every 24 hours  ferrous    sulfate 325 milliGRAM(s) Oral <User Schedule>  lactobacillus acidophilus 1 Tablet(s) Oral daily  methylphenidate 10 milliGRAM(s) Oral two times a day  polyethylene glycol 3350 17 Gram(s) Oral daily  psyllium Powder 1 Packet(s) Oral every 12 hours  senna 2 Tablet(s) Oral at bedtime    MEDICATIONS  (PRN):  acetaminophen   Oral Liquid .. 650 milliGRAM(s) Oral every 6 hours PRN Temp greater or equal to 38.5C (101.3F), Mild Pain (1 - 3)  acetylcysteine 10%  Inhalation 4 milliLiter(s) Inhalation every 6 hours PRN increased secretions  albuterol/ipratropium for Nebulization 3 milliLiter(s) Nebulizer every 6 hours PRN Respiratory Distress  oxyCODONE    IR 5 milliGRAM(s) Oral every 4 hours PRN Moderate Pain (4 - 6)  simethicone 80 milliGRAM(s) Chew three times a day PRN Gas      ***********************************************  ASSESSMENT AND PLAN  ***********************************************    #Suboccipital pseudomeningocele  #S/p placement of VPS, 5/8/23  #Bilateral cerebellar hemispheric strokes  #S/p SOC for foramen magnum decompression and R. parieto-occipital placement of EVD, 4/22/23  #11mm wide-necked multilobulated L. cavernous ICA aneurysm & 8mm wide-necked R. cavernous ICA aneurysm  #R. V4 occlusion  #S/p tracheostomy placement w/ tube exchanged due to broken  balloon, 4/28/23  #S/p lap-assisted gastrostomy tube, 5/2/23  #Abdominal discomfort  #L. intramuscular calf DVT  #S/p IVCF placement, 5/4/23  #History of peripheral neuropathy, CAD, and asthma  #Antiphospholipid syndrome      NEURO  - Admit NSICU, Q2h neuro checks / Q2h vital signs  - F/u vascular conference re plan for b/l cavernous ICA aneurysms: follow up outpatient with Dr. Minor   - Stroke consultation, heme consultation  - Certas at 4  - Pain control, PT/OT when able  - Ritalin 5/5  - Start ASA this sunday, AC on POD #10    PULM  - Volume control/assist control ventilation, wean to pressure support & trach collar  - 6 Cuffed trach  - SpO2 goal > 92%, supplemental O2 and pulm toileting as needed    CARDIO  - BP goal: < 160 while postoperative    GI  - Diet: tube feeds  - Stress ulcer prophylaxis: PPI  - RLQ ultrasound unable to visualize appendix  - Abdominal XR, gen surg consult    /RENAL  - Monitor UOP/volume status, BUN/SCr    HEME  - Maintain Hb > 7.0, PLT > 100,000  - Heme consultation for stroke in the young  - SCDs, SQL  - Removal of IVFC ASAP  - AC/ASA on POD #10 and Sunday, respectively, once AC started, must remove IVCF    ID  - Monitor for infectious s/s, fever curve, leukocytosis  - S/p ertapenem (5/1-5/17) for ESBL UTI, Pluralibacter gergoviae/Strep pneumo sputum  - Cleared for VPS placement by ID    ENDO  - SGlu < 180     ***********************************************  ADULT NSICU PROGRESS NOTE  MAKSIM TRIVEDI [id number] [facility] 10EA 02  ***********************************************    24H INTERVAL EVENTS: patient c/o fatigue breathing on trach collar.  VCO2 57    ROS: negative except per mentioned above in 24h interval events.      VITALS:    ICU Vital Signs Last 24 Hrs  T(C): 37.1 (14 May 2023 05:37), Max: 37.2 (13 May 2023 09:10)  T(F): 98.8 (14 May 2023 05:37), Max: 99 (13 May 2023 09:10)  HR: 74 (14 May 2023 09:00) (70 - 80)  BP: 106/73 (14 May 2023 09:00) (90/44 - 108/49)  BP(mean): 84 (14 May 2023 09:00) (64 - 84)  ABP: --  ABP(mean): --  RR: 25 (14 May 2023 09:00) (10 - 30)  SpO2: 95% (14 May 2023 09:00) (93% - 98%)    O2 Parameters below as of 14 May 2023 09:00  Patient On (Oxygen Delivery Method): tracheostomy collar    O2 Concentration (%): 40    I&O's Detail    13 May 2023 07:01  -  14 May 2023 07:00  --------------------------------------------------------  IN:    Enteral Tube Flush: 210 mL    Jevity 1.2: 1200 mL  Total IN: 1410 mL    OUT:    Voided (mL): 1300 mL  Total OUT: 1300 mL    Total NET: 110 mL          EXAM:     Severn Coma Scale: 3/1Trach/6 = 10    General: normocephalic, head wrapped, laying in bed, in no distress  Neuro     MS: Severn Coma Scale: 3/1T/6 = 10, follows commands, cooperative with examination, normal attention    CN: PERRL, (+)R. gaze, plegia to left gaze     Mot: bulk normal, tone normal, power UPPER 4/5, LOWER 3/3  Chest: trach collar, coarse breath sounds, heart regular rate/rhythm, present S1/S2, no murmurs or rubs  Abdomen: nondistended, painful to palpation w/ guarding, no rebound tenderness, normoactive bowel sounds  Extremities: no clubbing, well-perfused      ABG - ( 13 May 2023 13:31 )  pH, Arterial: 7.45  pH, Blood: x     /  pCO2: 50    /  pO2: 106   / HCO3: 35    / Base Excess: 9.2   /  SaO2: 99.3                                    10.5   5.50  )-----------( 360      ( 14 May 2023 04:54 )             33.5     05-14    140  |  98  |  12  ----------------------------<  147<H>  4.5   |  34<H>  |  0.49<L>    Ca    8.9      14 May 2023 04:54  Phos  4.6     05-14  Mg     2.2     05-14        MEDICATIONS  (STANDING):  amLODIPine   Tablet 5 milliGRAM(s) Oral daily  ascorbic acid 500 milliGRAM(s) Oral <User Schedule>  aspirin  chewable 81 milliGRAM(s) Oral daily  atorvastatin 80 milliGRAM(s) Oral at bedtime  carvedilol 3.125 milliGRAM(s) Oral every 12 hours  chlorhexidine 0.12% Liquid 15 milliLiter(s) Oral Mucosa every 12 hours  chlorhexidine 2% Cloths 1 Application(s) Topical daily  enoxaparin Injectable 40 milliGRAM(s) SubCutaneous every 24 hours  ferrous    sulfate 325 milliGRAM(s) Oral <User Schedule>  lactobacillus acidophilus 1 Tablet(s) Oral daily  methylphenidate 10 milliGRAM(s) Oral two times a day  polyethylene glycol 3350 17 Gram(s) Oral daily  psyllium Powder 1 Packet(s) Oral every 12 hours  senna 2 Tablet(s) Oral at bedtime    MEDICATIONS  (PRN):  acetaminophen   Oral Liquid .. 650 milliGRAM(s) Oral every 6 hours PRN Temp greater or equal to 38.5C (101.3F), Mild Pain (1 - 3)  acetylcysteine 10%  Inhalation 4 milliLiter(s) Inhalation every 6 hours PRN increased secretions  albuterol/ipratropium for Nebulization 3 milliLiter(s) Nebulizer every 6 hours PRN Respiratory Distress  oxyCODONE    IR 5 milliGRAM(s) Oral every 4 hours PRN Moderate Pain (4 - 6)  simethicone 80 milliGRAM(s) Chew three times a day PRN Gas    ***********************************************  ASSESSMENT AND PLAN  ***********************************************    #Suboccipital pseudomeningocele  #S/p placement of VPS, 5/8/23  #Bilateral cerebellar hemispheric strokes  #S/p SOC for foramen magnum decompression and R. parieto-occipital placement of EVD, 4/22/23  #11mm wide-necked multilobulated L. cavernous ICA aneurysm & 8mm wide-necked R. cavernous ICA aneurysm  #R. V4 occlusion  #S/p tracheostomy placement w/ tube exchanged due to broken  balloon, 4/28/23  #S/p lap-assisted gastrostomy tube, 5/2/23  #Abdominal discomfort  #L. intramuscular calf DVT  #S/p IVCF placement, 5/4/23  #History of peripheral neuropathy, CAD, and asthma  #Antiphospholipid syndrome      NEURO  - Admit NSICU, frequent vital signs and neuro checks  - F/u vascular conference re plan for b/l cavernous ICA aneurysms: follow up outpatient with Dr. Minor   - Stroke consultation, heme consultation  - Certas at 4  - Pain control, PT/OT when able  - Ritalin 10/10  - Start ASA this sunday, AC on POD #10    PULM  - Volume control/assist control ventilation, wean to pressure support & trach collar  - 6 Cuffed trach, 4/28/23  - SpO2 goal > 92%, supplemental O2 and pulm toileting as needed    CARDIO  - BP goal: < 160 while postoperative    GI  - Diet: tube feeds  - Stress ulcer prophylaxis: PPI  - RLQ ultrasound unable to visualize appendix  - Abdominal XR, gen surg consult    /RENAL  - Monitor UOP/volume status, BUN/SCr    HEME  - Maintain Hb > 7.0, PLT > 100,000  - Heme consultation for stroke in the young  - SCDs, SQL  - Removal of IVFC ASAP  - AC/ASA on POD #10 and Sunday, respectively, once AC started, must remove IVCF    ID  - Monitor for infectious s/s, fever curve, leukocytosis  - S/p ertapenem (5/1-5/17) for ESBL UTI, Pluralibacter gergoviae/Strep pneumo sputum  - Cleared for VPS placement by ID    ENDO  - SGlu < 180

## 2023-05-14 NOTE — PROGRESS NOTE ADULT - ASSESSMENT
58 y/o female transferred from [place] with right sided weakness and right facial numbness. Found to have acute infarction within the right cerebellar hemisphere, causing herniation. Now s/p SOC foramen magnum decompression and right parietal/occipital EVD placement (4/21). Course c/b respiratory insuffiencey d/t bulbar dysfunction, s/p trach 4/28/23 failed bedside  PEG placement 5/1; now s/p VPS certas @ 4    -NC q4, VSq4   -c/w CPAP vent   -held tube feeds; pending CT abd/pelvis w/ & w/o IV contrast   -c/w coreg, ASA   -methylphenidate 10mg   -goal -140  -dvt chemo ppx w/ Lovenox SQ   -?IVC filter removal and initiation of full AC

## 2023-05-14 NOTE — PROGRESS NOTE ADULT - SUBJECTIVE AND OBJECTIVE BOX
INTERVAL HISTORY: HPI:  58 yo F with PMH of Asthma, CAD (not on  meds), peripheral neuropathy and PSH of BATOOL, cholecystectomy, right knee surgery presented to [place] ED on 4/20 with right sided weakness and right facial numbness. Patient was undergoing preparation for colonoscopy. As per daughter at 8am patient was playing with her grandchildren but around 840 am patient was getting dressed and fell and subsequently felt weak after. Daughter reports that patient had some episodes of vomiting at this time. She had a syncopal episode at around 10 am and was witnessed by brother. No head trauma, She regained conscious after few minutes. She remained in bed for the remainder of the day. Daughter reports some slurred speech noted 1230-1PM and also was confused after. and around 4PM, the patient's sister noted right sided weakness at which time EMS was called and patient was brought to ED. No c/o chest pain, shortness of breath, fever, headache, abdominal pain, urinary complaints. No recent travel/sickness/ change in meds. Stroke code in ER: CTH neg for heme, Right PICA distribution acute infarction. CTA with saccular aneurysms of b/l carotids, approximately 0.8 cm on the right and 1.2 x 0.9 cm on the left. Possible tiny third saccular aneurysm on the right Posterior intracranial circulation: Right vertebral arterial occlusion at its dural crossing junction V3 Atlantic and V4 intracranial segments with likely reversal of flow in its intracranial segment from the basilar. Right PICA faintly seen. 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  Not candidate for TNK/mechanical thrombectomy. CT scan repeated which showed: 1. Brain: Progression of acute infarction within the right cerebellar hemisphere, also extending into the left superior cerebellar hemisphere. New mass effect on the fourth ventricle causing stenosis versus occlusion with new third and lateral ventricular dilatation indicating ventricular obstruction at the level of the fourth ventricle. New upward and downward herniation of cerebellar parenchyma Right carotid system: No hemodynamically significant stenosis. Left carotid system: No hemodynamically significant stenosis. Vertebral circulation: Patent. Anterior intracranial circulation: Intact. Bilateral internal carotid saccular aneurysms. These findings are unchanged. Posterior intracranial circulation:    Improved flow within the right vertebral artery since 4/20/2023. New focal stenosis mid left vertebral artery, etiology uncertain, consider vasospasm and extrinsic compression in addition to new embolic disease. Brain perfusion: Perfusion images demonstrate normalization of the perfusion abnormality in the right cerebellar hemisphere present on 4/20/2023 despite evidence of progression of acute infarction.  Areas of apparent ischemia within the posterior fossa have progressed in extent, also again involving the left posterior cerebral arterial distribution. Tx to [facility] for SOC watch. On admission to [facility], NIHSS 12.  (21 Apr 2023 16:50)    PAST MEDICAL & SURGICAL HISTORY:  Asthma  CAD (coronary artery disease)  Peripheral neuropathy  s/P total abdominal hysterectomy  S/P cholecystectomy  S/P right knee surgery      REVIEW OF SYSTEMS: [ ] Unable to Assess due to neurologic exam   [x] All ROS addressed below are non-contributory, except:  Neuro: [ ] Headache [ ] Back pain [ ] Numbness [ ] Weakness [ ] Ataxia [ ] Dizziness [ ] Aphasia [ ] Dysarthria [ ] Visual disturbance  Resp: [ ] Shortness of breath/dyspnea, [ ] Orthopnea [ ] Cough  CV: [ ] Chest pain [ ] Palpitation [ ] Lightheadedness [ ] Syncope  Renal: [ ] Thirst [ ] Edema  GI: [ ] Nausea [ ] Emesis [ ] Abdominal pain [ ] Constipation [ ] Diarrhea  Hem: [ ] Hematemesis [ ] bright red blood per rectum  ID: [ ] Fever [ ] Chills [ ] Dysuria  ENT: [ ] Rhinorrhea    PHYSICAL EXAM:    General: No Acute Distress   Neurological:  AOx2 to choice, R eye 6th nerve palsy, b/l horizontal nystagmus, b/l UE & LE dysmetria, RUE & RLE 4+/5 w/ mild drift   Pulmonary: Clear to Auscultation, No Rales, No Rhonchi, No Wheezes   Cardiovascular: S1, S2, Regular Rate and Rhythm   Gastrointestinal: Soft, umbilicus tender to touch Nondistended   Extremities: No calf tenderness   Incision: CDI            ICU Vital Signs Last 24 Hrs  T(C): 36.7 (14 May 2023 21:58), Max: 37.2 (14 May 2023 09:02)  T(F): 98 (14 May 2023 21:58), Max: 99 (14 May 2023 09:02)  HR: 80 (14 May 2023 19:00) (74 - 84)  BP: 108/51 (14 May 2023 19:00) (95/50 - 108/51)  BP(mean): 73 (14 May 2023 19:00) (70 - 84)  ABP: --  ABP(mean): --  RR: 18 (14 May 2023 19:00) (16 - 30)  SpO2: 94% (14 May 2023 19:00) (93% - 98%)      05-13-23 @ 07:01  -  05-14-23 @ 07:00  --------------------------------------------------------  IN: 1410 mL / OUT: 1300 mL / NET: 110 mL    05-14-23 @ 07:01  -  05-14-23 @ 22:08  --------------------------------------------------------  IN: 845 mL / OUT: 650 mL / NET: 195 mL        Mode: CPAP with PS, FiO2: 40, PEEP: 5, PS: 5, MAP: 8, PIP: 15    acetaminophen   Oral Liquid .. 650 milliGRAM(s) Oral every 6 hours PRN  acetylcysteine 10%  Inhalation 4 milliLiter(s) Inhalation every 6 hours PRN  albuterol/ipratropium for Nebulization 3 milliLiter(s) Nebulizer every 6 hours PRN  amLODIPine   Tablet 5 milliGRAM(s) Oral daily  ascorbic acid 500 milliGRAM(s) Oral <User Schedule>  aspirin  chewable 81 milliGRAM(s) Oral daily  atorvastatin 80 milliGRAM(s) Oral at bedtime  carvedilol 3.125 milliGRAM(s) Oral every 12 hours  chlorhexidine 0.12% Liquid 15 milliLiter(s) Oral Mucosa every 12 hours  chlorhexidine 2% Cloths 1 Application(s) Topical daily  diatrizoate meglumine/diatrizoate sodium. 30 milliLiter(s) Oral once  enoxaparin Injectable 40 milliGRAM(s) SubCutaneous every 24 hours  ferrous    sulfate 325 milliGRAM(s) Oral <User Schedule>  lactobacillus acidophilus 1 Tablet(s) Oral daily  methylphenidate 10 milliGRAM(s) Oral two times a day  oxyCODONE    IR 5 milliGRAM(s) Oral every 4 hours PRN  pantoprazole  Injectable 40 milliGRAM(s) IV Push at bedtime  polyethylene glycol 3350 17 Gram(s) Oral daily  psyllium Powder 1 Packet(s) Oral every 12 hours  senna 2 Tablet(s) Oral at bedtime  simethicone 80 milliGRAM(s) Chew three times a day PRN  sodium chloride 0.9%. 1000 milliLiter(s) (90 mL/Hr) IV Continuous <Continuous>      LABS:  Na: 140 (05-14 @ 04:54), 140 (05-13 @ 05:18), 139 (05-12 @ 06:03)  K: 4.5 (05-14 @ 04:54), 4.2 (05-13 @ 05:18), 4.0 (05-12 @ 06:03)  Cl: 98 (05-14 @ 04:54), 102 (05-13 @ 05:18), 104 (05-12 @ 06:03)  CO2: 34 (05-14 @ 04:54), 33 (05-13 @ 05:18), 27 (05-12 @ 06:03)  BUN: 12 (05-14 @ 04:54), 9 (05-13 @ 05:18), 10 (05-12 @ 06:03)  Cr: 0.49 (05-14 @ 04:54), 0.48 (05-13 @ 05:18), 0.42 (05-12 @ 06:03)  Glu: 147(05-14 @ 04:54), 159(05-13 @ 05:18), 161(05-12 @ 06:03)    Hgb: 10.5 (05-14 @ 04:54), 10.0 (05-13 @ 05:18), 9.3 (05-12 @ 06:03)  Hct: 33.5 (05-14 @ 04:54), 31.0 (05-13 @ 05:18), 28.1 (05-12 @ 06:03)  WBC: 5.50 (05-14 @ 04:54), 5.36 (05-13 @ 05:18), 5.39 (05-12 @ 06:03)  Plt: 360 (05-14 @ 04:54), 338 (05-13 @ 05:18), 253 (05-12 @ 06:03)      ABG - ( 13 May 2023 13:31 )  pH, Arterial: 7.45  pH, Blood: x     /  pCO2: 50    /  pO2: 106   / HCO3: 35    / Base Excess: 9.2   /  SaO2: 99.3

## 2023-05-14 NOTE — CONSULT NOTE ADULT - ASSESSMENT
56 y/o female found to have acute infarction within the right cerebellar hemisphere, causing herniation. Now s/p SOC foramen magnum decompression and right parietal/occipital EVD placement (4/21). s/p trach (4/28/23). s/p PEG (5/2/23), s/p dx cerebral angiogram (5/2/23). now s/p VPS Certas @ 4 (5/8/23). General surgery consulted for RLQ pain POD6 s/p  shunt.    Continue bowel regimen  Please give gastrografin via G tube  Team 4c monitoring abdominal exam  Discussed w/ Dr. Cota

## 2023-05-15 LAB
ANION GAP SERPL CALC-SCNC: 10 MMOL/L — SIGNIFICANT CHANGE UP (ref 5–17)
BUN SERPL-MCNC: 15 MG/DL — SIGNIFICANT CHANGE UP (ref 7–23)
CALCIUM SERPL-MCNC: 8.2 MG/DL — LOW (ref 8.4–10.5)
CHLORIDE SERPL-SCNC: 101 MMOL/L — SIGNIFICANT CHANGE UP (ref 96–108)
CO2 SERPL-SCNC: 27 MMOL/L — SIGNIFICANT CHANGE UP (ref 22–31)
CREAT SERPL-MCNC: 0.47 MG/DL — LOW (ref 0.5–1.3)
CULTURE RESULTS: NO GROWTH — SIGNIFICANT CHANGE UP
EGFR: 112 ML/MIN/1.73M2 — SIGNIFICANT CHANGE UP
GLUCOSE SERPL-MCNC: 115 MG/DL — HIGH (ref 70–99)
HCT VFR BLD CALC: 29.3 % — LOW (ref 34.5–45)
HGB BLD-MCNC: 9.4 G/DL — LOW (ref 11.5–15.5)
MAGNESIUM SERPL-MCNC: 2.1 MG/DL — SIGNIFICANT CHANGE UP (ref 1.6–2.6)
MCHC RBC-ENTMCNC: 29 PG — SIGNIFICANT CHANGE UP (ref 27–34)
MCHC RBC-ENTMCNC: 32.1 GM/DL — SIGNIFICANT CHANGE UP (ref 32–36)
MCV RBC AUTO: 90.4 FL — SIGNIFICANT CHANGE UP (ref 80–100)
NRBC # BLD: 0 /100 WBCS — SIGNIFICANT CHANGE UP (ref 0–0)
PHOSPHATE SERPL-MCNC: 4.5 MG/DL — SIGNIFICANT CHANGE UP (ref 2.5–4.5)
PLATELET # BLD AUTO: 325 K/UL — SIGNIFICANT CHANGE UP (ref 150–400)
POTASSIUM SERPL-MCNC: 3.9 MMOL/L — SIGNIFICANT CHANGE UP (ref 3.5–5.3)
POTASSIUM SERPL-SCNC: 3.9 MMOL/L — SIGNIFICANT CHANGE UP (ref 3.5–5.3)
RBC # BLD: 3.24 M/UL — LOW (ref 3.8–5.2)
RBC # FLD: 12.7 % — SIGNIFICANT CHANGE UP (ref 10.3–14.5)
SODIUM SERPL-SCNC: 138 MMOL/L — SIGNIFICANT CHANGE UP (ref 135–145)
SPECIMEN SOURCE: SIGNIFICANT CHANGE UP
WBC # BLD: 4.79 K/UL — SIGNIFICANT CHANGE UP (ref 3.8–10.5)
WBC # FLD AUTO: 4.79 K/UL — SIGNIFICANT CHANGE UP (ref 3.8–10.5)

## 2023-05-15 PROCEDURE — 74177 CT ABD & PELVIS W/CONTRAST: CPT | Mod: 26

## 2023-05-15 PROCEDURE — 71045 X-RAY EXAM CHEST 1 VIEW: CPT | Mod: 26

## 2023-05-15 PROCEDURE — 99291 CRITICAL CARE FIRST HOUR: CPT

## 2023-05-15 RX ORDER — HYDROMORPHONE HYDROCHLORIDE 2 MG/ML
0.5 INJECTION INTRAMUSCULAR; INTRAVENOUS; SUBCUTANEOUS ONCE
Refills: 0 | Status: DISCONTINUED | OUTPATIENT
Start: 2023-05-15 | End: 2023-05-15

## 2023-05-15 RX ORDER — MODAFINIL 200 MG/1
100 TABLET ORAL EVERY 24 HOURS
Refills: 0 | Status: DISCONTINUED | OUTPATIENT
Start: 2023-05-16 | End: 2023-05-16

## 2023-05-15 RX ORDER — CARVEDILOL PHOSPHATE 80 MG/1
3.12 CAPSULE, EXTENDED RELEASE ORAL EVERY 12 HOURS
Refills: 0 | Status: DISCONTINUED | OUTPATIENT
Start: 2023-05-15 | End: 2023-05-16

## 2023-05-15 RX ORDER — POTASSIUM CHLORIDE 20 MEQ
40 PACKET (EA) ORAL ONCE
Refills: 0 | Status: COMPLETED | OUTPATIENT
Start: 2023-05-15 | End: 2023-05-15

## 2023-05-15 RX ORDER — OXYCODONE HYDROCHLORIDE 5 MG/1
5 TABLET ORAL ONCE
Refills: 0 | Status: DISCONTINUED | OUTPATIENT
Start: 2023-05-15 | End: 2023-05-15

## 2023-05-15 RX ORDER — MODAFINIL 200 MG/1
100 TABLET ORAL DAILY
Refills: 0 | Status: DISCONTINUED | OUTPATIENT
Start: 2023-05-15 | End: 2023-05-15

## 2023-05-15 RX ORDER — SODIUM CHLORIDE 9 MG/ML
1000 INJECTION INTRAMUSCULAR; INTRAVENOUS; SUBCUTANEOUS ONCE
Refills: 0 | Status: COMPLETED | OUTPATIENT
Start: 2023-05-15 | End: 2023-05-15

## 2023-05-15 RX ADMIN — Medication 40 MILLIEQUIVALENT(S): at 09:37

## 2023-05-15 RX ADMIN — Medication 10 MILLIGRAM(S): at 06:43

## 2023-05-15 RX ADMIN — HYDROMORPHONE HYDROCHLORIDE 0.5 MILLIGRAM(S): 2 INJECTION INTRAMUSCULAR; INTRAVENOUS; SUBCUTANEOUS at 16:35

## 2023-05-15 RX ADMIN — Medication 650 MILLIGRAM(S): at 18:58

## 2023-05-15 RX ADMIN — SENNA PLUS 2 TABLET(S): 8.6 TABLET ORAL at 21:55

## 2023-05-15 RX ADMIN — Medication 1 TABLET(S): at 11:44

## 2023-05-15 RX ADMIN — HYDROMORPHONE HYDROCHLORIDE 0.5 MILLIGRAM(S): 2 INJECTION INTRAMUSCULAR; INTRAVENOUS; SUBCUTANEOUS at 09:37

## 2023-05-15 RX ADMIN — AMLODIPINE BESYLATE 5 MILLIGRAM(S): 2.5 TABLET ORAL at 06:44

## 2023-05-15 RX ADMIN — Medication 325 MILLIGRAM(S): at 06:43

## 2023-05-15 RX ADMIN — CARVEDILOL PHOSPHATE 3.12 MILLIGRAM(S): 80 CAPSULE, EXTENDED RELEASE ORAL at 06:44

## 2023-05-15 RX ADMIN — CHLORHEXIDINE GLUCONATE 1 APPLICATION(S): 213 SOLUTION TOPICAL at 11:42

## 2023-05-15 RX ADMIN — CHLORHEXIDINE GLUCONATE 15 MILLILITER(S): 213 SOLUTION TOPICAL at 06:44

## 2023-05-15 RX ADMIN — Medication 1 PACKET(S): at 06:44

## 2023-05-15 RX ADMIN — Medication 81 MILLIGRAM(S): at 11:44

## 2023-05-15 RX ADMIN — CHLORHEXIDINE GLUCONATE 15 MILLILITER(S): 213 SOLUTION TOPICAL at 18:59

## 2023-05-15 RX ADMIN — MODAFINIL 100 MILLIGRAM(S): 200 TABLET ORAL at 15:46

## 2023-05-15 RX ADMIN — OXYCODONE HYDROCHLORIDE 5 MILLIGRAM(S): 5 TABLET ORAL at 23:28

## 2023-05-15 RX ADMIN — SODIUM CHLORIDE 1000 MILLILITER(S): 9 INJECTION INTRAMUSCULAR; INTRAVENOUS; SUBCUTANEOUS at 09:00

## 2023-05-15 RX ADMIN — Medication 650 MILLIGRAM(S): at 06:46

## 2023-05-15 RX ADMIN — POLYETHYLENE GLYCOL 3350 17 GRAM(S): 17 POWDER, FOR SOLUTION ORAL at 11:44

## 2023-05-15 RX ADMIN — ATORVASTATIN CALCIUM 80 MILLIGRAM(S): 80 TABLET, FILM COATED ORAL at 21:55

## 2023-05-15 RX ADMIN — OXYCODONE HYDROCHLORIDE 5 MILLIGRAM(S): 5 TABLET ORAL at 20:45

## 2023-05-15 RX ADMIN — HYDROMORPHONE HYDROCHLORIDE 0.5 MILLIGRAM(S): 2 INJECTION INTRAMUSCULAR; INTRAVENOUS; SUBCUTANEOUS at 17:00

## 2023-05-15 RX ADMIN — Medication 10 MILLIGRAM(S): at 18:58

## 2023-05-15 RX ADMIN — HYDROMORPHONE HYDROCHLORIDE 0.5 MILLIGRAM(S): 2 INJECTION INTRAMUSCULAR; INTRAVENOUS; SUBCUTANEOUS at 10:00

## 2023-05-15 RX ADMIN — Medication 650 MILLIGRAM(S): at 19:30

## 2023-05-15 RX ADMIN — Medication 500 MILLIGRAM(S): at 06:44

## 2023-05-15 RX ADMIN — PANTOPRAZOLE SODIUM 40 MILLIGRAM(S): 20 TABLET, DELAYED RELEASE ORAL at 21:54

## 2023-05-15 RX ADMIN — OXYCODONE HYDROCHLORIDE 5 MILLIGRAM(S): 5 TABLET ORAL at 21:55

## 2023-05-15 RX ADMIN — OXYCODONE HYDROCHLORIDE 5 MILLIGRAM(S): 5 TABLET ORAL at 23:50

## 2023-05-15 RX ADMIN — ENOXAPARIN SODIUM 40 MILLIGRAM(S): 100 INJECTION SUBCUTANEOUS at 21:55

## 2023-05-15 RX ADMIN — Medication 650 MILLIGRAM(S): at 06:43

## 2023-05-15 NOTE — PROGRESS NOTE ADULT - SUBJECTIVE AND OBJECTIVE BOX
SUBJECTIVE: Patient seen and examined at bedside.  There were no acute events overnight.  Patient is awake and endorses abdominal pain, worse on R side than L.      amLODIPine   Tablet 5 milliGRAM(s) Oral daily  aspirin  chewable 81 milliGRAM(s) Oral daily  carvedilol 3.125 milliGRAM(s) Oral every 12 hours  enoxaparin Injectable 40 milliGRAM(s) SubCutaneous every 24 hours    MEDICATIONS  (PRN):  acetaminophen   Oral Liquid .. 650 milliGRAM(s) Oral every 6 hours PRN Temp greater or equal to 38.5C (101.3F), Mild Pain (1 - 3)  acetylcysteine 10%  Inhalation 4 milliLiter(s) Inhalation every 6 hours PRN increased secretions  albuterol/ipratropium for Nebulization 3 milliLiter(s) Nebulizer every 6 hours PRN Respiratory Distress  oxyCODONE    IR 5 milliGRAM(s) Oral every 4 hours PRN Moderate Pain (4 - 6)  simethicone 80 milliGRAM(s) Chew three times a day PRN Gas      I&O's Detail    13 May 2023 07:01  -  14 May 2023 07:00  --------------------------------------------------------  IN:    Enteral Tube Flush: 210 mL    Jevity 1.2: 1200 mL  Total IN: 1410 mL    OUT:    Voided (mL): 1300 mL  Total OUT: 1300 mL    Total NET: 110 mL      14 May 2023 07:01  -  15 May 2023 06:48  --------------------------------------------------------  IN:    Enteral Tube Flush: 105 mL    Jevity 1.2: 650 mL    sodium chloride 0.9%: 1080 mL  Total IN: 1835 mL    OUT:    Voided (mL): 650 mL  Total OUT: 650 mL    Total NET: 1185 mL          T(C): 37.2 (05-15-23 @ 05:49), Max: 37.2 (05-14-23 @ 09:02)  HR: 73 (05-15-23 @ 06:00) (69 - 84)  BP: 90/48 (05-15-23 @ 06:00) (90/48 - 119/52)  RR: 22 (05-15-23 @ 06:00) (16 - 25)  SpO2: 95% (05-15-23 @ 06:00) (93% - 97%)    GENERAL: NAD, Resting comfortably in bed, awake, opens eyes spontaneously  HEENT: NCAT, MMM, Normal conjunctiva  RESP: Nonlabored breathing on AC/VC ventilator support via tracheostomy, No respiratory distress  CARD: Normal rate, Normal peripheral perfusion  GI: Soft, ND, moderately tender R>L, No guarding, No rebound tenderness, PEG in place secured with binder without surrounding erythema/fluctuance/bleeding, umbilical incision is minimally tender  :  Purewick in place for urine collection  EXTREM: WWP, No edema, No gross deformity of extremities  SKIN: No rashes, no lesions, surgical incision sites are clean/dry/intact  NEURO: Awake and alert, No focal motor or sensory deficits  PSYCH: Affect and characteristics of appearance, verbalizations, and behaviors are appropriate    LABS:                        9.4    4.79  )-----------( 325      ( 15 May 2023 05:28 )             29.3     05-15    138  |  101  |  15  ----------------------------<  115<H>  3.9   |  27  |  0.47<L>    Ca    8.2<L>      15 May 2023 05:28  Phos  4.5     05-15  Mg     2.1     05-15            RADIOLOGY & ADDITIONAL STUDIES:

## 2023-05-15 NOTE — PROGRESS NOTE ADULT - ASSESSMENT
58 y/o female found to have acute infarction within the right cerebellar hemisphere, causing herniation. Now s/p SOC foramen magnum decompression and right parietal/occipital EVD placement (4/21). s/p trach (4/28/23). s/p PEG (5/2/23), s/p dx cerebral angiogram (5/2/23). now s/p VPS Certas @ 4 (5/8/23). General surgery consulted for RLQ pain POD6 s/p  shunt.  There were no acute events overnight, and patient continues to endorse diffuse abdominal pain.      PLAN  - Continue bowel regimen  - Continue pain management   - Follow-up read for CT scan  - Surgery Team 4C will continue to follow  - Please page Team 4 at [phone] with any questions and/or clinical changes

## 2023-05-15 NOTE — PROGRESS NOTE ADULT - ASSESSMENT
57y/F with  acute CVA, R cerebellar, brain compression cerebral edema, s/p SOC  UTI ESBL  acute respiratory failure, bulbar dysfunction  asthma  CAD  peripheral neuropathy  superficial thrombosis, proximal R greater saphenous  prediabetic     PLAN:   NEURO: neurochecks q1h, PRN pain meds with acetaminophen / opiates  EVD 15cm H20, challenge 20 sunday, clamp and scan on Monday   stroke on board, full AC once cleared by NSG  REHAB:  physical therapy evaluation and management    EARLY MOB:  HOB up    PULM:  CPAP tomorrow 10/5 40%, decrease nebs, s/p trach  CARDIO:  SBP goal 100-160mm Hg  ENDO:  Blood sugar goals 140-180 mg/dL, off insulin sliding scale, continue high dose statins  GI:  PPI for GI prophylaxis while intubated  DIET:  Jevity at goal  RENAL:  IVL off cardura, Na goal 135-145, d/c salt tabs  HEM/ONC: s/p 3 units platelets, 35 ug DDAVP, (+) aCL; ferrous sulfate  VTE Prophylaxis: SCDs, s/p IVC filter, SQL   ID: afebrile, no leukocytosis, continue ertapenem for ESBL UTI until 05/07 last day  Social: will update family    Active issues:  What's keeping patient in the ICU? close monitoring, EVD, vent requirements   What is this patient's dispo plan? stepdown once EVD removed and off vent    ATTENDING ATTESTATION:  I was physically present for the key portions of the evaluation and management (E/M) service provided.  I agree with the above history, physical and plan, which I have reviewed and edited where appropriate.    Patient at high risk for neurological deterioration or death due to:  ICU delirium, aspiration PNA, DVT / PE.  Critical care time:  I have personally provided 45 minutes of critical care time, excluding time spent on separate procedures.      Plan discussed with RN, house staff. 57y/F with  acute CVA, R cerebellar, brain compression cerebral edema, s/p SOC  UTI ESBL  acute respiratory failure, bulbar dysfunction  asthma  CAD  peripheral neuropathy  superficial thrombosis, proximal R greater saphenous  prediabetic     PLAN:   NEURO: neurochecks q4h, VS q1h, PRN pain meds with acetaminophen / dilaudid  s/p VPS  stroke on board, ASA - for full AC on 05/19  REHAB:  physical therapy evaluation and management    EARLY MOB:  HOB up    PULM:  CPAP after fluid bolus and pain control; ABG   CARDIO:  SBP goal 90-160mm Hg, d/c amlodipine, continue carvedilol  ENDO:  Blood sugar goals 140-180 mg/dL, off insulin sliding scale, continue high dose statins  GI:  cont PPI  DIET:  f/u CT abd results, if no leak then start tube feeds  RENAL:  IVF until jevity at goal  HEM/ONC: s/p 3 units platelets, 35 ug DDAVP, (+) aCL; ferrous sulfate  VTE Prophylaxis: SCDs, s/p IVC filter, SQL, will start full AC on 05/19  ID: afebrile, no leukocytosis, treated for ESBL UTI x 7 days  Social: family at bedside    Active issues:  What's keeping patient in the ICU? vent requirements  What is this patient's dispo plan? LTAC    ATTENDING ATTESTATION:  I was physically present for the key portions of the evaluation and management (E/M) service provided.  I agree with the above history, physical and plan, which I have reviewed and edited where appropriate.    Patient at high risk for neurological deterioration or death due to:  ICU delirium, aspiration PNA, DVT / PE.  Critical care time:  I have personally provided 60 minutes of critical care time, excluding time spent on separate procedures.      Plan discussed with RN, house staff.

## 2023-05-15 NOTE — PROGRESS NOTE ADULT - SUBJECTIVE AND OBJECTIVE BOX
HPI:  56 yo F with PMH of Asthma, CAD (not on  meds), peripheral neuropathy and PSH of BATOOL, cholecystectomy, right knee surgery presented to [facility] ED on 4/20 with right sided weakness and right facial numbness. Patient was undergoing preparation for colonoscopy. As per daughter at 8am patient was playing with her grandchildren but around 840 am patient was getting dressed and fell and subsequently felt weak after. Daughter reports that patient had some episodes of vomiting at this time. She had a syncopal episode at around 10 am and was witnessed by brother. No head trauma, She regained conscious after few minutes. She remained in bed for the remainder of the day. Daughter reports some slurred speech noted 1230-1PM and also was confused after. and around 4PM, the patient's sister noted right sided weakness at which time EMS was called and patient was brought to ED. No c/o chest pain, shortness of breath, fever, headache, abdominal pain, urinary complaints. No recent travel/sickness/ change in meds. Stroke code in ER: CTH neg for heme, Right PICA distribution acute infarction. CTA with saccular aneurysms of b/l carotids, approximately 0.8 cm on the right and 1.2 x 0.9 cm on the left. Possible tiny third saccular aneurysm on the right Posterior intracranial circulation: Right vertebral arterial occlusion at its dural crossing junction V3 Atlantic and V4 intracranial segments with likely reversal of flow in its intracranial segment from the basilar. Right PICA faintly seen. 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  Not candidate for TNK/mechanical thrombectomy. CT scan repeated which showed: 1. Brain: Progression of acute infarction within the right cerebellar hemisphere, also extending into the left superior cerebellar hemisphere. New mass effect on the fourth ventricle causing stenosis versus occlusion with new third and lateral ventricular dilatation indicating ventricular obstruction at the level of the fourth ventricle. New upward and downward herniation of cerebellar parenchyma Right carotid system: No hemodynamically significant stenosis. Left carotid system: No hemodynamically significant stenosis. Vertebral circulation: Patent. Anterior intracranial circulation: Intact. Bilateral internal carotid saccular aneurysms. These findings are unchanged. Posterior intracranial circulation:    Improved flow within the right vertebral artery since 4/20/2023. New focal stenosis mid left vertebral artery, etiology uncertain, consider vasospasm and extrinsic compression in addition to new embolic disease. Brain perfusion: Perfusion images demonstrate normalization of the perfusion abnormality in the right cerebellar hemisphere present on 4/20/2023 despite evidence of progression of acute infarction.  Areas of apparent ischemia within the posterior fossa have progressed in extent, also again involving the left posterior cerebral arterial distribution. Tx to [facility] for SOC watch. On admission to [facility], NIHSS 12.  (21 Apr 2023 16:50)    OVERNIGHT EVENTS: JIM    Hospital Course:   4/21: Admitted for crani watch, CTH complete w/ unchanged hydrocephalus, taken for emergent occipital craniectomy.   4/22: POD1. Remains intubated overnight, on propofol and dank. EVD@[id number]. Pending repeat CTH this am. Given hydralazine 10mg x1. Started on cardene for SBP high 140s-150s. Sub-therapeutic on plavix, therapeutic on asa, rpt asa accumetrics until subtherapeutic. LE dopplers neg for DVT, but showing superficial venous thrombosis in the proximal portion of the right greater saphenous vein. Started on 3% @60 for na goal 145-150. NGT placed by ENT. Febrile, pancultured.  4/23: POD 2. 3% increased to 75. NGT readjusted. UA neg. SBP liberalized to 160. Plan to extubate. 3% decreased to 60. Failed extubation d/t no cuff leak, given 60mg solumedrol, plan to extubate in 6 hrs. SCx1 for post void residual >400, started on cardura at bedtime. New blood in buretrol, stopped SQL. Clot in EVD cleared. Neuro exam improving.   4/24: POD 3. Cont 3% with NS while NPO, start TF today. TF started. LFTs downtrending. Sodium 141. Increased 3% to 50, NS to 30. Repeat BMP ordered for 5:30PM. Tramadol 25 for pain with no relief, oxy 5 and 1g tylenol ordered, stability CT stable, speech language consult for dysarthria. Given hydralazine 10mg IVP for SBP>160, started amlodipine 5mg. C/o mild headache, given fioricet x1, stroke neuro rec SALVADOR and loop recorder placement. Echo with bubble completed, negative for PFO, EF 55-60%. J HFNC started for desats with suctioning.   4/25: POD 4. Cont HFNC. Increased secertions on HFNC, reintubated. CXR confirmed good placement. EVD dropped to 5cmH20 for goal of draining 5-10cc/hr and SG ELENA drain taken off suction. Pending CTH. EVD drained 0cc/hr, dropped to 0. NGT replaced. Dc'd fioricet. Started on PPI for intubation. Increased cardura to 4mg for urinary retention, given an additional 2mg now. Started salt tabs 3 q 6. Given 12.5mg fentanyl x1. NGT replaced, CXR shown in lung. NGT removed, repeat CXR showing no pneumothorax. Pending ENT consult for NGT placement. CTH stable. NGT replaced by ENT, confirmed in proper spot. Restarted TF. [id number]. Na 141 from 146. Increased 3% to 60. EVD raised to 3cmH20. ETT pulled back 1cm by RT.  4/26: POD 5 SOC. Intubated, remains on precedex, ABG ordered with improving PaO2, BMP sodium 148, 3% changed to 30cc/hr, cardura increased to 8mg for tonight, tolerating cpap  4/27: POD 6 SOC. Respiratory rate sustained 6, returned to FVS. Na 151, dc'd 3%, f/u AM sodium. Nurse noticed ETT 19 at the lip and was 20 prior, CXR shows ETT @ 5cm above jono, ET tube advanced 1cm, repeat CXR confirms appropriate placement. Thick inline secretions with suctioning. ENT trach placement Fri likely, PEG likely Mon. Keep ELENA drain until no output, EVD to remain @3. Start ASA 81mg daily tomorrow.   4/28: POD7 SOC, neuro stable, given fentanyl x 1 overnight for presumed discomfort (biting on ETT), remains on full vent support. CTH today stable in comparison to 4/25. 6 cuffed trach placed by ENT in OR, but came down without cuff. Replaced at bedside by ENT. On fentanyl gtt.    4/29: POD8 SOC, JIM overnight. Incision cleaned and dressing changed. On fentanyl gtt. EKG completed due to increased frequency PVCs on telemetry, frequency of PVCs improved with titrating up fentanyl gtt. ABG drawn.  Repeat LE dopplers completed showing persistant superficial thrombus, no DVT. No EVD waveform during day, flushed distally without improvement. Exam unchanged.  EVD dropped to 0 for low output in afternoon.   4/30: POD9. Neuro stable, febrile to 101.3F o/n, given tylenol and pan cx sent. SBP dipped to low 90s o/n, HR stable in 70s with some PVCs, decreased fentanyl gtt and given 500cc bolus of NS x 2. Pend PEG placement Mon and angio Tues. D/c'd ELENA drain today and suture placed. F/u heme recs. Positive UA. Infiltrates found on CXR. Started on Zosyn 4.5g Q6hrs .   5/1: POD 10. Neuro stable. Dc'd fentanyl gtt. PEG failed placement at bedside with Dr. Gant, plan for PEG in OR tomorrow afternoon with Dr. Layton. Dc'd amlodipine and started carvedilol 3.125 BID for PVCs . Made 3% inhalation and mucomyst q8hr. Failed PEG at bedside. Salt tabs made 1 q 6. Decreased cardura to 4mg daily. Urine culture growing E. Coli and sputum culture growing pluralibacter gergoviae and strep species. ID consulted, f/u recs. Failed CPAP trial @ 3pm. Added am labs per heme/onc for hypercoguable workup. Pending repeat LE dopplers in 2-3 days. NGT clogged, removed, ENT failed attempt at replacement, will keep NPO for PEG placement tmw. Repeat xray in am for concern for aspration. Pt abx switched from Zosyn to Ertapenem 1g Q24hrs. per ID recs, possible ESBL growth.   5/2: POD 11. Neuro stable. Pre-op for diagnostic cerebral angiogram and PEG placement. NPO. EVD raised to 5 cmH20. Sputum culture speciated to step pneumoniae, sensitive to ertapenem. 3% inhalation/mucomyst made q 6 hr d/t thick secretions. PEG placed in OR. POD0 dx cerebral angio. EVD raised to 10.   5/3: POD 12. Neuro stable. PEG feeds began @ 10 AM, plan to increase 10cc every 6h per general surgery. Repeat dopplers show stable R superficial thrombus and new L IM calf DVT. Vascular consulted for IVC filter. Plan to get CTH tomorrow.  5/4: POD 13. JIM o/n. s/p IVC filter with vascular today. Pending post-procedure CTH. FOBT negative. Started iron and vitamin c every other day for iron deficiency anemia.   5/5: POD14. CSF cx sent to r/o infection from new gas in right temporal horn seen on CTH. Restarted TF, changed to Jevity 1.2, f/u nutrition recs. EVD raised to 10 today, plan to challenge over the weekend and CTH on Monday, possible VPS next Wednesday. ENT removed sutures today. Salt tabs decreased 1q12.  5/6: POD15 Placed on CPAP briefly with low MV alert, placed back on full vent support. EVD remains open at [id number]. ICPs WNL. Neuro exam stable.  EVD raised to 15. Tolerated CPAP x8h.   5/7: POD#16. JIM overnight, neuro stable. D/c'd salt tabs and 3% neb. Wean trach to CPAP as tolerated. Pan cx NGTD. EVD raised today to [id number].   5/8: POD17 JIM overnight. ICPs WNL. Pt remains on full vent support. Neuro exam stable. Plan for possible VPS today. CT chio complete showing increase in vent size.   5/9: POD18 SOC, POD1 VPS. Desat o/n to 80s, improved on own. CXR with LLL effusion. Another desat to 90% in AM, given duoneb and mucomyst. Oxycodone given for incisional pain. Neuro exam stable. CTH in AM stable. Keppra dc'd. Ritalin 5 BID started.  Tolerating CPAP trial. SALVADOR ordered. Rectal tube dc'd.   5/10: POD 19 SOC POD2 VPS. Remains on full vent support. Tolerated CPAP for 6 hours. Neuro exam stable. BP liberalized to 160. RLQ US 2/2 abd pain. Lactate WNL.  5/11: POD20 SOC POD3 VPS. Plan for CPAP trial in AM.  Dc'd cardura. F/u speech consult for communcation board. Has been tolerating CPAP since 9am. RLQ us w/ no acute pathology. Per heme/onc LMWH or coumadin rec for AC over DOAC due to antiphospholipid syndrome.   5/12: POD21 SOC POD4 VPS. CPAP 8/5, tolerated until 3pm. Can start ASA on 5/14 and full AC POD 10 from VPS 5/19 (remove IVC filter prior, f/u w/ vascular on timing).   5/13: POD22 SOC POD 5 VPS, neuro stable, tolerating trach collar. ASA ordered to start tomorrow, Ritalin increased to 10BID from 5BID, Simethicone ordered for gas, f/u gen surg for continued abd discomfort, PM VBG with PCO2 50 and repeat VBG pCO2 55, possible obesity hypoventilation, f/u AM VBG   5/14: POD23 SOC, [id number], neuro stable, VBG showed increased PCO2 indicating poor ventilation, failed CPAP due to low TV, put back on full vent support.   5/15: POD24 SOC, POD6 VPS. O/n CT A/P done for abd pain, f/u read. neuro stable. remains on full vent support.     Vital Signs Last 24 Hrs  T(C): 37.1 (15 May 2023 00:58), Max: 37.2 (14 May 2023 09:02)  T(F): 98.7 (15 May 2023 00:58), Max: 99 (14 May 2023 09:02)  HR: 72 (15 May 2023 00:22) (72 - 84)  BP: 99/56 (14 May 2023 23:00) (99/56 - 109/50)  BP(mean): 71 (14 May 2023 23:00) (71 - 84)  RR: 20 (15 May 2023 00:22) (16 - 30)  SpO2: 96% (15 May 2023 00:22) (93% - 98%)    Parameters below as of 15 May 2023 00:22  Patient On (Oxygen Delivery Method): ventilator    O2 Concentration (%): 40    I&O's Summary    13 May 2023 07:01  -  14 May 2023 07:00  --------------------------------------------------------  IN: 1410 mL / OUT: 1300 mL / NET: 110 mL    14 May 2023 07:01  -  15 May 2023 01:27  --------------------------------------------------------  IN: 845 mL / OUT: 650 mL / NET: 195 mL        PHYSICAL EXAM:  GEN: laying in bed, NAD  NEURO: AOx3 (nods). FC, OE spont, speech intact, face symmetric. +R gaze preference, can cross on L. PERRL. mild R pronator drift. RUE 4 proximally, o/w 5/5 throughout. SILT  CV: RRR +S1/S2  PULM: CTAB  GI: Abd soft, +tender to palpation around umbilical area  EXT: ext warm, dry, nontender    TUBES/LINES:  [] Garsia  [] Lumbar Drain  [] Wound Drains  [] Others      DIET:  [x] NPO  [] Mechanical  [] Tube feeds    LABS:                        10.5   5.50  )-----------( 360      ( 14 May 2023 04:54 )             33.5     05-14    140  |  98  |  12  ----------------------------<  147<H>  4.5   |  34<H>  |  0.49<L>    Ca    8.9      14 May 2023 04:54  Phos  4.6     05-14  Mg     2.2     05-14              CAPILLARY BLOOD GLUCOSE          Drug Levels: [] N/A    CSF Analysis: [] N/A      Allergies    No Known Allergies    Intolerances      MEDICATIONS:  Antibiotics:    Neuro:  acetaminophen   Oral Liquid .. 650 milliGRAM(s) Oral every 6 hours PRN  methylphenidate 10 milliGRAM(s) Oral two times a day  oxyCODONE    IR 5 milliGRAM(s) Oral every 4 hours PRN    Anticoagulation:  aspirin  chewable 81 milliGRAM(s) Oral daily  enoxaparin Injectable 40 milliGRAM(s) SubCutaneous every 24 hours    OTHER:  acetylcysteine 10%  Inhalation 4 milliLiter(s) Inhalation every 6 hours PRN  albuterol/ipratropium for Nebulization 3 milliLiter(s) Nebulizer every 6 hours PRN  amLODIPine   Tablet 5 milliGRAM(s) Oral daily  atorvastatin 80 milliGRAM(s) Oral at bedtime  carvedilol 3.125 milliGRAM(s) Oral every 12 hours  chlorhexidine 0.12% Liquid 15 milliLiter(s) Oral Mucosa every 12 hours  chlorhexidine 2% Cloths 1 Application(s) Topical daily  lactobacillus acidophilus 1 Tablet(s) Oral daily  pantoprazole  Injectable 40 milliGRAM(s) IV Push at bedtime  polyethylene glycol 3350 17 Gram(s) Oral daily  psyllium Powder 1 Packet(s) Oral every 12 hours  senna 2 Tablet(s) Oral at bedtime  simethicone 80 milliGRAM(s) Chew three times a day PRN    IVF:  ascorbic acid 500 milliGRAM(s) Oral <User Schedule>  ferrous    sulfate 325 milliGRAM(s) Oral <User Schedule>  sodium chloride 0.9%. 1000 milliLiter(s) IV Continuous <Continuous>    CULTURES:  Culture Results:   No growth to date (05-08 @ 14:35)  Culture Results:   No growth to date (05-05 @ 05:06)    RADIOLOGY & ADDITIONAL TESTS:      ASSESSMENT:  58 y/o female found to have acute infarction within the right cerebellar hemisphere, causing herniation. Now s/p SOC foramen magnum decompression and right parietal/occipital EVD placement (4/21). s/p trach (4/28/23). s/p PEG (5/2/23), s/p dx cerebral angiogram (5/2/23). now s/p VPS Certas @ 4 (5/8/23).     STROKE    No pertinent family history in first degree relatives    Handoff    Asthma    CAD (coronary artery disease)    Peripheral neuropathy    Cerebellar stroke    Respiratory failure, unspecified with hypoxia    History of DVT (deep vein thrombosis)    Hydrocephalus    Tracheostomy malfunction    Cerebellar stroke    Respiratory failure, unspecified with hypoxia    History of DVT of lower extremity    Hydrocephalus    Tracheostomy malfunction    Delirium    Decompressive craniectomy    Cerebellar infarct    Cerebellar infarct    CAD (coronary artery disease)    Asthma    Peripheral neuropathy    Lupus anticoagulant positive    Anemia due to acute blood loss    Tracheostomy malfunction    Decompressive craniectomy    Insertion, external ventricular drain    Planned tracheostomy    Tracheostomy tube change    Esophagogastroduodenoscopy (EGD) with anesthesia    Insertion, PEG tube, laparoscopic    Angiogram, carotid and cerebral, bilateral    Tracheostomy tube change    IVC filter placement     shunt    Insertion, ventriculopleural shunt    S/P total abdominal hysterectomy    S/P cholecystectomy    S/P right knee surgery    Insertion, external ventricular drain    Planned tracheostomy    Esophagogastroduodenoscopy (EGD) with anesthesia    Insertion, PEG tube, laparoscopic     shunt    CAD (coronary artery disease)    Asthma    Peripheral neuropathy    Lupus anticoagulant positive    Acute respiratory failure with hypoxia    Acute respiratory failure with hypoxia    Dysphagia    Deep vein thrombosis (DVT)    Hydrocephalus    [email or web address]        PLAN:  Neuro   - Vitals/neuro q4h   - s/p VPS (Certas @ 4)   - dx angio 5/2: b/l cavernous ICA aneurysms, as discussed at vascular conference f/u outpt   - CTH 4/28 stable; 5/4 new gas in right temporal horn, CT 5/8 chio increased vent size, 5/9 stable   - Stroke consulted, appreciate reccs   - ASA81  - Pain control with oxy 5 prn  - Ritalin 10 BID for neurostimulation    Cardio  - SBP   - TTE 4/24: negative for PFO, mild LVH, mild dilation of L atrium, EF 55-60%   - Carvedilol 3.125mg BID   - SALVADOR deferred, documented not indicated as it will not  at this time for starting patient on anticoagulation. Will reassess if indicated medically.     Pulm  - + Trach (6 shiley) full vent support  - Chest PT, duoneb, mucomist q 6 prn     GI  - + PEG, NPO due to abd pain  - last BM 5/13  - Protonix for GI ppx while on vent  - Simethicone for gas  - RLQ US - no acute pathology  - CT A/P 5/15:     Renal  - IVF while NPO  - Voiding via primafit     Endo   - cont lipitor     Heme  - SQL for DVT ppx, NO SCDs  - s/p IVCF 5/4   - LE dopplers 4/22 neg for DVT, but showing superficial venous thrombosis in the proximal portion of the right greater saphenous vein; repeat on 4/29 with persistant superficial thrombus, 5/3 new DVT L IM calf and unchanged R superficial vein thrombus  - Heme following for + anticardiolipin Ab 4/27, recs appreciated, f/u activated protein C    - Iron and vitamin c every other day     ID  - CSF sent from OR (5/8)   - MRSA (-), +UA cx ESBL, +sputum cx pluralibacter gergoviae, strep pneumoniae, s/p Ertapenem 1g Q24hrs (5/1-5/7)    PSYCH  - behavioral health consulted for tearfulness, rec   DISPO:  - NSICU, full code   - PT/OT rec AR patient can tolerate 3 hrs PT/OT daily    D/w Dr. Valadez and Dr. Garcia      Assessment:  Present when checked    []  GCS  E   V  M     Heart Failure: []Acute, [] acute on chronic , []chronic  Heart Failure:  [] Diastolic (HFpEF), [] Systolic (HFrEF), []Combined (HFpEF and HFrEF), [] RHF, [] Pulm HTN, [] Other    [] MAMTA, [] ATN, [] AIN, [] other  [] CKD1, [] CKD2, [] CKD 3, [] CKD 4, [] CKD 5, []ESRD    Encephalopathy: [] Metabolic, [] Hepatic, [] toxic, [] Neurological, [] Other    Abnormal Nurtitional Status: [] malnurtition (see nutrition note), [ ]underweight: BMI < 19, [] morbid obesity: BMI >40, [] Cachexia    [] Sepsis  [] hypovolemic shock,[] cardiogenic shock, [] hemorrhagic shock, [] neuogenic shock  [] Acute Respiratory Failure  []Cerebral edema, [] Brain compression/ herniation,   [] Functional quadriplegia  [] Acute blood loss anemia

## 2023-05-15 NOTE — PROGRESS NOTE ADULT - SUBJECTIVE AND OBJECTIVE BOX
NSCU ATTENDING -- ADDITIONAL PROGRESS NOTE    Nighttime rounds were performed -- please refer to earlier Progress Note for HPI details.      ICU Vital Signs Last 24 Hrs  T(C): 37.1 (15 May 2023 17:00), Max: 37.2 (15 May 2023 05:49)  T(F): 98.7 (15 May 2023 17:00), Max: 99 (15 May 2023 05:49)  HR: 68 (15 May 2023 21:20) (65 - 83)  BP: 127/63 (15 May 2023 21:00) (87/53 - 128/58)  BP(mean): 90 (15 May 2023 21:00) (62 - 90)  RR: 16 (15 May 2023 21:20) (12 - 24)  SpO2: 98% (15 May 2023 21:20) (65% - 100%)      05-14-23 @ 07:01  -  05-15-23 @ 07:00  --------------------------------------------------------  IN: 1835 mL / OUT: 650 mL / NET: 1185 mL    05-15-23 @ 07:01  -  05-15-23 @ 22:06  --------------------------------------------------------  IN: 2000 mL / OUT: 950 mL / NET: 1050 mL      Mode: AC/ CMV (Assist Control/ Continuous Mandatory Ventilation), RR (machine): 16, TV (machine): 400, FiO2: 40, PEEP: 6, ITime: 1, MAP: 12, PIP: 24    PHYSICAL EXAMINATION  NEUROLOGIC EXAMINATION:  Patient awake, alert, oriented x3, FC, RUE R 4/5 RLE 4/5 LUE 5/5  L LE 5/5  GENERAL: trached 400 40% 16 +6  EENT:  anicteric  CARDIOVASCULAR: (+) S1 S2, normal rate and regular rhythm  PULMONARY: clear to auscultation bilaterally  ABDOMEN: soft, with normoactive bowel sounds, tenderness periumbilical with mild guarding  EXTREMITIES: no edema; good distal pulses      MEDICATIONS:  acetaminophen   Oral Liquid .. 650 milliGRAM(s) Oral every 6 hours PRN  acetylcysteine 10%  Inhalation 4 milliLiter(s) Inhalation every 6 hours PRN  albuterol/ipratropium for Nebulization 3 milliLiter(s) Nebulizer every 6 hours PRN  ascorbic acid 500 milliGRAM(s) Oral <User Schedule>  aspirin  chewable 81 milliGRAM(s) Oral daily  atorvastatin 80 milliGRAM(s) Oral at bedtime  carvedilol 3.125 milliGRAM(s) Oral every 12 hours  chlorhexidine 0.12% Liquid 15 milliLiter(s) Oral Mucosa every 12 hours  chlorhexidine 2% Cloths 1 Application(s) Topical daily  enoxaparin Injectable 40 milliGRAM(s) SubCutaneous every 24 hours  ferrous    sulfate 325 milliGRAM(s) Oral <User Schedule>  lactobacillus acidophilus 1 Tablet(s) Oral daily  methylphenidate 10 milliGRAM(s) Oral two times a day  pantoprazole  Injectable 40 milliGRAM(s) IV Push at bedtime  polyethylene glycol 3350 17 Gram(s) Oral daily  senna 2 Tablet(s) Oral at bedtime    LABS:               9.4    4.79  )-----------( 325      ( 15 May 2023 05:28 )             29.3     05-15    138  |  101  |  15  ----------------------------<  115<H>  3.9   |  27  |  0.47<L>    Ca    8.2<L>      15 May 2023 05:28  Phos  4.5     05-15  Mg     2.1     05-15      ASSESSMENT/PLAN:   56y/o F with:    Acute CVA, R cerebellar, brain compression cerebral edema, s/p SOC  UTI ESBL  Acute respiratory failure, bulbar dysfunction  Asthma  CAD  Peripheral neuropathy  Superficial thrombosis, proximal R greater saphenous  Prediabetic     PLAN:   NEURO: neurochecks Q4h, VS Q1h, PRN analgesia  s/p VPS  Stroke neurology following, ASA - for full AC on 05/17  CPAP as tolerated  SBP goal 90-160mm Hg, continue carvedilol  Blood sugar goals 140-180 mg/dL, off insulin sliding scale, continue high dose statin  Cont PPI  IVF until Jevity at goal  Concern for aPL syndrome. Heme following. Will start full AC on 05/17 (POD 10 from VPS)  Continue  ferrous sulfate  VTE Prophylaxis: SCDs, s/p IVC filter, SQL,   Afebrile, no leukocytosis, treated for ESBL UTI x 7 days    Additional critical care time: 45 minutes

## 2023-05-15 NOTE — PROGRESS NOTE ADULT - SUBJECTIVE AND OBJECTIVE BOX
=================================  NEUROCRITICAL CARE ATTENDING NOTE  =================================    MAKSIM TRIVEDI   MRN-[id number]  Summary:  57y/F  with Asthma, CAD (not on  meds), peripheral neuropathy and PSH of BATOOL, cholecystectomy, right knee surgery presented to [place] ED on  with right sided weakness and right facial numbness. Patient was undergoing preparation for colonoscopy. As per daughter at 8am patient was playing with her grandchildren but around 840 am patient was getting dressed and fell and subsequently felt weak after. Daughter reports that patient had some episodes of vomiting at this time. She had a syncopal episode at around 10 am and was witnessed by brother. No head trauma, She regained conscious after few minutes. She remained in bed for the remainder of the day. Daughter reports some slurred speech noted 1230-1PM and also was confused after. and around 4PM, the patient's sister noted right sided weakness at which time EMS was called and patient was brought to ED. No c/o chest pain, shortness of breath, fever, headache, abdominal pain, urinary complaints. No recent travel/sickness/ change in meds. Stroke code in ER: CTH neg for heme, Right PICA distribution acute infarction. CTA with saccular aneurysms of b/l carotids, approximately 0.8 cm on the right and 1.2 x 0.9 cm on the left. Possible tiny third saccular aneurysm on the right Posterior intracranial circulation: Right vertebral arterial occlusion at its dural crossing junction V3 Atlantic and V4 intracranial segments with likely reversal of flow in its intracranial segment from the basilar. Right PICA faintly seen. 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  Not candidate for TNK/mechanical thrombectomy. CT scan repeated which showed: 1. Brain: Progression of acute infarction within the right cerebellar hemisphere, also extending into the left superior cerebellar hemisphere. New mass effect on the fourth ventricle causing stenosis versus occlusion with new third and lateral ventricular dilatation indicating ventricular obstruction at the level of the fourth ventricle. New upward and downward herniation of cerebellar parenchyma Right carotid system: No hemodynamically significant stenosis. Left carotid system: No hemodynamically significant stenosis. Vertebral circulation: Patent. Anterior intracranial circulation: Intact. Bilateral internal carotid saccular aneurysms. These findings are unchanged. Posterior intracranial circulation:    Improved flow within the right vertebral artery since 2023. New focal stenosis mid left vertebral artery, etiology uncertain, consider vasospasm and extrinsic compression in addition to new embolic disease. Brain perfusion: Perfusion images demonstrate normalization of the perfusion abnormality in the right cerebellar hemisphere present on 2023 despite evidence of progression of acute infarction.  Areas of apparent ischemia within the posterior fossa have progressed in extent, also again involving the left posterior cerebral arterial distribution. Tx to [facility] for SOC watch. On admission to [facility], NIHSS 12.  (2023 16:50)    COURSE IN THE HOSPITAL:  : Admitted for crani watch, CTH complete w/ unchanged hydrocephalus, taken for emergent occipital craniectomy.   : POD1. Remains intubated overnight, on propofol and dank. EVD@[id number]. Pending repeat CTH this am. Given hydralazine 10mg x1. Started on cardene for SBP high 140s-150s. Sub-therapeutic on plavix, therapeutic on asa, rpt asa accumetrics until subtherapeutic. LE dopplers neg for DVT, but showing superficial venous thrombosis in the proximal portion of the right greater saphenous vein. Started on 3% @60 for na goal 145-150. NGT placed by ENT. Febrile, pancultured.  : POD 2. 3% increased to 75. NGT readjusted. UA neg. SBP liberalized to 160. Plan to extubate. 3% decreased to 60. Failed extubation d/t no cuff leak, given 60mg solumedrol, plan to extubate in 6 hrs. SCx1 for post void residual >400, started on cardura at bedtime. New blood in buretrol, stopped SQL. Clot in EVD cleared. Neuro exam improving.   : POD 3. Cont 3% with NS while NPO, start TF today. TF started. LFTs downtrending. Sodium 141. Increased 3% to 50, NS to 30. Repeat BMP ordered for 5:30PM. Tramadol 25 for pain with no relief, oxy 5 and 1g tylenol ordered, stability CT stable, speech language consult for dysarthria. Given hydralazine 10mg IVP for SBP>160, started amlodipine 5mg. C/o mild headache, given fioricet x1, stroke neuro rec SALVADOR and loop recorder placement. Echo with bubble completed, negative for PFO, EF 55-60%. J HFNC started for desats with suctioning.   : POD 4. Cont HFNC. Increased secertions on HFNC, reintubated. CXR confirmed good placement. EVD dropped to 5cmH20 for goal of draining 5-10cc/hr and SG ELENA drain taken off suction. Pending CTH. EVD drained 0cc/hr, dropped to 0. NGT replaced. Dc'd fioricet. Started on PPI for intubation. Increased cardura to 4mg for urinary retention, given an additional 2mg now. Started salt tabs 3 q 6. Given 12.5mg fentanyl x1. NGT replaced, CXR shown in lung. NGT removed, repeat CXR showing no pneumothorax. Pending ENT consult for NGT placement. CTH stable. NGT replaced by ENT, confirmed in proper spot. Restarted TF. [id number]. Na 141 from 146. Increased 3% to 60. EVD raised to 3cmH20. ETT pulled back 1cm by RT.  : POD 5 SOC. Intubated, remains on precedex, ABG ordered with improving PaO2, BMP sodium 148, 3% changed to 30cc/hr, cardura increased to 8mg for tonight, tolerating cpap  : POD 6 SOC. Respiratory rate sustained 6, returned to FVS. Na 151, dc'd 3%, f/u AM sodium. Nurse noticed ETT 19 at the lip and was 20 prior, CXR shows ETT @ 5cm above jono, ET tube advanced 1cm, repeat CXR confirms appropriate placement. Thick inline secretions with suctioning. ENT trach placement Fri likely, PEG likely Mon. Keep ELENA drain until no output, EVD to remain @3. Start ASA 81mg daily tomorrow.   : POD7 SOC, neuro stable, given fentanyl x 1 overnight for presumed discomfort (biting on ETT), remains on full vent support. CTH today stable in comparison to . 6 cuffed trach placed by ENT in OR, but came down without cuff. Replaced at bedside by ENT. On fentanyl gtt.    : POD8 SOC, JIM overnight. Incision cleaned and dressing changed. On fentanyl gtt. EKG completed due to increased frequency PVCs on telemetry, frequency of PVCs improved with titrating up fentanyl gtt. ABG drawn.  Repeat LE dopplers completed showing persistant superficial thrombus, no DVT. No EVD waveform during day, flushed distally without improvement. Exam unchanged.  EVD dropped to 0 for low output in afternoon.   : POD9. Neuro stable, febrile to 101.3F o/n, given tylenol and pan cx sent. SBP dipped to low 90s o/n, HR stable in 70s with some PVCs, decreased fentanyl gtt and given 500cc bolus of NS x 2. Pend PEG placement Mon and angio Tu. D/c'd ELENA drain today and suture placed. F/u heme recs. Positive UA. Infiltrates found on CXR. Started on Zosyn 4.5g Q6hrs .   : POD 10. Neuro stable. Dc'd fentanyl gtt. PEG failed placement at bedside with Dr. Gant, plan for PEG in OR tomorrow afternoon with Dr. Layton. Dc'd amlodipine and started carvedilol 3.125 BID for PVCs . Made 3% inhalation and mucomyst q8hr. Failed PEG at bedside. Salt tabs made 1 q 6. Decreased cardura to 4mg daily. Urine culture growing E. Coli and sputum culture growing pluralibacter gergoviae and strep species. ID consulted, f/u recs. Failed CPAP trial @ 3pm. Added am labs per heme/onc for hypercoguable workup. Pending repeat LE dopplers in 2-3 days. NGT clogged, removed, ENT failed attempt at replacement, will keep NPO for PEG placement tmw. Repeat xray in am for concern for aspration. Pt abx switched from Zosyn to Ertapenem 1g Q24hrs. per ID recs, possible ESBL growth.   : POD 11. Neuro stable. diagnostic cerebral angiogram (bilateral carotid cavernous aneurysm) and PEG placement. NPO; pulled out radial TR band (right), EVD raised to 10    POD12 EVD raised to 15, then down to 5cm H20, CPAP today   POD13 EVD 5   POD 14    05 POD 15 EVD raised to 15; CPAP 8 hours    Past Medical History: Asthma CAD (coronary artery disease) Peripheral neuropathy  Allergies:  No Known Allergies  Home meds:   ·	Albuterol (Eqv-ProAir HFA) 90 mcg/inh inhalation aerosol: 2 puff(s) inhaled every 6 hours as needed for  shortness of breath and/or wheezing    PHYSICAL EXAMINATION  T(C): 37.2 ( @ 19:00), Max: 37.9 ( @ 17:01) HR: 67 ( @ :00) (60 - 79) BP: 96/51 ( @ 19:00) (90/51 - 122/57) RR: 16 ( @ :) (16 - 24) SpO2: 96% ( @ :) (92% - 99%)  NEUROLOGIC EXAMINATION:  Patient awake, alert, oriented x3, FC, R gaze preference, RUE R 4/5 RLE 4/5 L UE 5/5  L LE 5/5  GENERAL: trached CPAP 10/5 40%  EENT:  anicteric, trach  CARDIOVASCULAR: (+) S1 S2, normal rate and regular rhythm  PULMONARY: clear to auscultation bilaterally  ABDOMEN: soft, nontender with normoactive bowel sounds  EXTREMITIES: no edema; good distal pulses  SKIN: no rash    LABS:                10.5   5.88  )-----------( 354      ( 06 May 2023 05:30 )             31.6     138  |  103  |  14  ----------------------------<  155<H>  4.0   |  27  |  0.45<L>    Ca    8.7      06 May 2023 05:30  Phos  3.8       Mg     2.0      @ 07:01  -   @ 07:00  IN: 1750 mL / OUT: 971 mL / NET: 779 mL     @ 07:01  -   @ 19:31  IN: 1070 mL / OUT: 661 mL / NET: 409 mL     HbA1C = 5.9 (-)  LDL = 92 (-)   HDL = 42 (-)  TG = 106 (-)  TSH = 0.152 ()    Bacteriology:   CSF NGTD     CSF NGTD   urine culture ESBL x2 strains   Blood NG5D x2   sputum:  pluralibacter gergoviae, mod strep pneumoniae    CSF studies:    L   *** [id number] WBC1 *** %N   %L1     EEG:  Neuroimaging:  Other imaging:    MEDICATIONS:     ·	enoxaparin Injectable 40 SubCutaneous every 24 hours  ·	ertapenem  IVPB 1000 IV Intermittent every 24 hours  ·	carvedilol 3.125 milliGRAM(s) Oral every 12 hours  ·	albuterol/ipratropium for Nebulization 3 Nebulizer every 8 hours  ·	sodium chloride 3%  Inhalation 4 Inhalation every 8 hours  ·	pantoprazole   Suspension 40 Oral daily  ·	psyllium Powder 1 Oral two times a day  ·	atorvastatin 80 Oral at bedtime  ·	ascorbic acid 500 Oral <User Schedule>  ·	ferrous    sulfate 325 Oral <User Schedule>  ·	lactobacillus acidophilus 1 Oral daily  ·	sodium chloride 1 Oral two times a day  ·	acetaminophen   Oral Liquid .. 650 Oral every 6 hours PRN  ·	fentaNYL    Injectable 12.5 IV Push every 4 hours PRN  ·	oxyCODONE    IR 5 Oral every 4 hours PRN    IV FLUIDS: IVL  DRIPS:  DIET: Jevity @50cc/hr  Lines:  Drains:      External Ventricular Device (mL): 245 mL - @ 0 cm H20   EVD raised to 5 output 367 ICPs 1-7   EVD at 5cm H20 ICPs <10   EVD at 5cm H20    EVD at 15cm H20 ICPs < 10  Wounds:    CODE STATUS:  Full Code                       GOALS OF CARE:  aggressive                      DISPOSITION:  ICU =================================  NEUROCRITICAL CARE ATTENDING NOTE  =================================    MAKSIM TRIVEDI   MRN-[id number]  Summary:  57y/F  with Asthma, CAD (not on  meds), peripheral neuropathy and PSH of BATOOL, cholecystectomy, right knee surgery presented to [place] ED on  with right sided weakness and right facial numbness. Patient was undergoing preparation for colonoscopy. As per daughter at 8am patient was playing with her grandchildren but around 840 am patient was getting dressed and fell and subsequently felt weak after. Daughter reports that patient had some episodes of vomiting at this time. She had a syncopal episode at around 10 am and was witnessed by brother. No head trauma, She regained conscious after few minutes. She remained in bed for the remainder of the day. Daughter reports some slurred speech noted 1230-1PM and also was confused after. and around 4PM, the patient's sister noted right sided weakness at which time EMS was called and patient was brought to ED. No c/o chest pain, shortness of breath, fever, headache, abdominal pain, urinary complaints. No recent travel/sickness/ change in meds. Stroke code in ER: CTH neg for heme, Right PICA distribution acute infarction. CTA with saccular aneurysms of b/l carotids, approximately 0.8 cm on the right and 1.2 x 0.9 cm on the left. Possible tiny third saccular aneurysm on the right Posterior intracranial circulation: Right vertebral arterial occlusion at its dural crossing junction V3 Atlantic and V4 intracranial segments with likely reversal of flow in its intracranial segment from the basilar. Right PICA faintly seen. 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  Not candidate for TNK/mechanical thrombectomy. CT scan repeated which showed: 1. Brain: Progression of acute infarction within the right cerebellar hemisphere, also extending into the left superior cerebellar hemisphere. New mass effect on the fourth ventricle causing stenosis versus occlusion with new third and lateral ventricular dilatation indicating ventricular obstruction at the level of the fourth ventricle. New upward and downward herniation of cerebellar parenchyma Right carotid system: No hemodynamically significant stenosis. Left carotid system: No hemodynamically significant stenosis. Vertebral circulation: Patent. Anterior intracranial circulation: Intact. Bilateral internal carotid saccular aneurysms. These findings are unchanged. Posterior intracranial circulation:    Improved flow within the right vertebral artery since 2023. New focal stenosis mid left vertebral artery, etiology uncertain, consider vasospasm and extrinsic compression in addition to new embolic disease. Brain perfusion: Perfusion images demonstrate normalization of the perfusion abnormality in the right cerebellar hemisphere present on 2023 despite evidence of progression of acute infarction.  Areas of apparent ischemia within the posterior fossa have progressed in extent, also again involving the left posterior cerebral arterial distribution. Tx to [facility] for SOC watch. On admission to [facility], NIHSS 12.  (2023 16:50)    COURSE IN THE HOSPITAL:  : Admitted for crani watch, CTH complete w/ unchanged hydrocephalus, taken for emergent occipital craniectomy.   : POD1. Remains intubated overnight, on propofol and dank. EVD@[id number]. Pending repeat CTH this am. Given hydralazine 10mg x1. Started on cardene for SBP high 140s-150s. Sub-therapeutic on plavix, therapeutic on asa, rpt asa accumetrics until subtherapeutic. LE dopplers neg for DVT, but showing superficial venous thrombosis in the proximal portion of the right greater saphenous vein. Started on 3% @60 for na goal 145-150. NGT placed by ENT. Febrile, pancultured.  : POD 2. 3% increased to 75. NGT readjusted. UA neg. SBP liberalized to 160. Plan to extubate. 3% decreased to 60. Failed extubation d/t no cuff leak, given 60mg solumedrol, plan to extubate in 6 hrs. SCx1 for post void residual >400, started on cardura at bedtime. New blood in buretrol, stopped SQL. Clot in EVD cleared. Neuro exam improving.   : POD 3. Cont 3% with NS while NPO, start TF today. TF started. LFTs downtrending. Sodium 141. Increased 3% to 50, NS to 30. Repeat BMP ordered for 5:30PM. Tramadol 25 for pain with no relief, oxy 5 and 1g tylenol ordered, stability CT stable, speech language consult for dysarthria. Given hydralazine 10mg IVP for SBP>160, started amlodipine 5mg. C/o mild headache, given fioricet x1, stroke neuro rec SALVADOR and loop recorder placement. Echo with bubble completed, negative for PFO, EF 55-60%. J HFNC started for desats with suctioning.   : POD 4. Cont HFNC. Increased secertions on HFNC, reintubated. CXR confirmed good placement. EVD dropped to 5cmH20 for goal of draining 5-10cc/hr and SG ELENA drain taken off suction. Pending CTH. EVD drained 0cc/hr, dropped to 0. NGT replaced. Dc'd fioricet. Started on PPI for intubation. Increased cardura to 4mg for urinary retention, given an additional 2mg now. Started salt tabs 3 q 6. Given 12.5mg fentanyl x1. NGT replaced, CXR shown in lung. NGT removed, repeat CXR showing no pneumothorax. Pending ENT consult for NGT placement. CTH stable. NGT replaced by ENT, confirmed in proper spot. Restarted TF. [id number]. Na 141 from 146. Increased 3% to 60. EVD raised to 3cmH20. ETT pulled back 1cm by RT.  : POD 5 SOC. Intubated, remains on precedex, ABG ordered with improving PaO2, BMP sodium 148, 3% changed to 30cc/hr, cardura increased to 8mg for tonight, tolerating cpap  : POD 6 SOC. Respiratory rate sustained 6, returned to FVS. Na 151, dc'd 3%, f/u AM sodium. Nurse noticed ETT 19 at the lip and was 20 prior, CXR shows ETT @ 5cm above jono, ET tube advanced 1cm, repeat CXR confirms appropriate placement. Thick inline secretions with suctioning. ENT trach placement Fri likely, PEG likely Mon. Keep ELENA drain until no output, EVD to remain @3. Start ASA 81mg daily tomorrow.   : POD7 SOC, neuro stable, given fentanyl x 1 overnight for presumed discomfort (biting on ETT), remains on full vent support. CTH today stable in comparison to . 6 cuffed trach placed by ENT in OR, but came down without cuff. Replaced at bedside by ENT. On fentanyl gtt.    : POD8 SOC, JIM overnight. Incision cleaned and dressing changed. On fentanyl gtt. EKG completed due to increased frequency PVCs on telemetry, frequency of PVCs improved with titrating up fentanyl gtt. ABG drawn.  Repeat LE dopplers completed showing persistant superficial thrombus, no DVT. No EVD waveform during day, flushed distally without improvement. Exam unchanged.  EVD dropped to 0 for low output in afternoon.   : POD9. Neuro stable, febrile to 101.3F o/n, given tylenol and pan cx sent. SBP dipped to low 90s o/n, HR stable in 70s with some PVCs, decreased fentanyl gtt and given 500cc bolus of NS x 2. Pend PEG placement Mon and angio Tu. D/c'd ELENA drain today and suture placed. F/u heme recs. Positive UA. Infiltrates found on CXR. Started on Zosyn 4.5g Q6hrs .   : POD 10. Neuro stable. Dc'd fentanyl gtt. PEG failed placement at bedside with Dr. Gant, plan for PEG in OR tomorrow afternoon with Dr. Layton. Dc'd amlodipine and started carvedilol 3.125 BID for PVCs . Made 3% inhalation and mucomyst q8hr. Failed PEG at bedside. Salt tabs made 1 q 6. Decreased cardura to 4mg daily. Urine culture growing E. Coli and sputum culture growing pluralibacter gergoviae and strep species. ID consulted, f/u recs. Failed CPAP trial @ 3pm. Added am labs per heme/onc for hypercoguable workup. Pending repeat LE dopplers in 2-3 days. NGT clogged, removed, ENT failed attempt at replacement, will keep NPO for PEG placement tmw. Repeat xray in am for concern for aspration. Pt abx switched from Zosyn to Ertapenem 1g Q24hrs. per ID recs, possible ESBL growth.   : POD 11. Neuro stable. diagnostic cerebral angiogram (bilateral carotid cavernous aneurysm) and PEG placement. NPO; pulled out radial TR band (right), EVD raised to 10    POD12 EVD raised to 15, then down to 5cm H20, CPAP today   POD13 EVD 5   POD 14    05/06 POD 15 EVD raised to 15; CPAP 8 hours     required full vent support for low minute ventilation and hypercapnea, abdominal tenderness - CT AP WWO  05/15 CPAP this AM    Past Medical History: Asthma CAD (coronary artery disease) Peripheral neuropathy  Allergies:  No Known Allergies  Home meds:   ·	Albuterol (Eqv-ProAir HFA) 90 mcg/inh inhalation aerosol: 2 puff(s) inhaled every 6 hours as needed for  shortness of breath and/or wheezing    PHYSICAL EXAMINATION  T(C): 37.2 (05-15 @ 05:49), Max: 37.2 ( @ 09:02) HR: 73 (05-15 @ 06:00) (69 - 84) BP: 90/48 (05-15 @ 06:00) (90/48 - 119/52) RR: 22 (05-15 @ 06:00) (16 - 25) SpO2: 95% (05-15 @ 06:00) (93% - 97%)  NEUROLOGIC EXAMINATION:  Patient awake, alert, oriented x3, FC, RUE R 4/5 RLE 4/5 L UE 5/5  L LE 5/5  GENERAL: trached 400 40% 16 +6  EENT:  anicteric  CARDIOVASCULAR: (+) S1 S2, normal rate and regular rhythm  PULMONARY: clear to auscultation bilaterally  ABDOMEN: soft, with normoactive bowel sounds, tenderness periumbilical with mild guarding  EXTREMITIES: no edema; good distal pulses  SKIN: no rash    LABS: 05-15  CAPILLARY BLOOD GLUCOSE             (5.5)  9.4  *(10.5, 10)  4.79  )-----------( 325      ( 15 May 2023 05:28 )             29.3     138  |  101  |  15  ----------------------------<  115<H>  3.9   |  27  |  0.47<L>    Ca    8.2<L>      15 May 2023 05:28  Phos  4.5     05-15  Mg     2.1     05-15    05-14 @ 07:01  -  05-15 @ 07:00  IN: 1835 mL / OUT: 650 mL / NET: 1185 mL     HbA1C = 5.9 ()  LDL = 92 (-)   HDL = 42 (-)  TG = 106 ()  TSH = 0.152 ()    Bacteriology:   CSF culture NGTD   CSF NGTD     CSF NGTD   urine culture ESBL x2 strains   Blood NG5D x2   sputum:  pluralibacter gergoviae, mod strep pneumoniae    CSF studies:    L   *** [id number] WBC1 *** %N   %L1     EEG:  Neuroimaging:  Other imaging:    MEDICATIONS: 05-15    ·	aspirin  chewable 81 Oral daily  ·	enoxaparin Injectable 40 SubCutaneous every 24 hours  ·	methylphenidate 10 Oral two times a day  ·	amLODIPine   Tablet 5 milliGRAM(s) Oral daily  ·	carvedilol 3.125 milliGRAM(s) Oral every 12 hours  ·	pantoprazole  Injectable 40 IV Push at bedtime  ·	polyethylene glycol 3350 17 Oral daily  ·	psyllium Powder 1 Oral every 12 hours  ·	senna 2 Oral at bedtime  ·	atorvastatin 80 Oral at bedtime  ·	ascorbic acid 500 Oral <User Schedule>  ·	ferrous    sulfate 325 Oral <User Schedule>  ·	lactobacillus acidophilus 1 Oral daily  ·	acetaminophen   Oral Liquid .. 650 Oral every 6 hours PRN  ·	acetylcysteine 10%  Inhalation 4 Inhalation every 6 hours PRN  ·	albuterol/ipratropium for Nebulization 3 Nebulizer every 6 hours PRN  ·	oxyCODONE    IR 5 Oral every 4 hours PRN  ·	simethicone 80 Chew three times a day PRN    IV FLUIDS: NS@90cc/hr  DRIPS:  DIET: on hold  Lines:  Drains:      External Ventricular Device (mL): 245 mL - @ 0 cm H20   EVD raised to 5 output 367 ICPs 1-7   EVD at 5cm H20 ICPs <10  / EVD at 5cm H20    EVD at 15cm H20 ICPs < 10  Wounds:    CODE STATUS:  Full Code                       GOALS OF CARE:  aggressive                      DISPOSITION:  ICU

## 2023-05-16 ENCOUNTER — RESULT REVIEW (OUTPATIENT)
Age: 58
End: 2023-05-16

## 2023-05-16 DIAGNOSIS — I82.469 ACUTE EMBOLISM AND THROMBOSIS OF UNSPECIFIED CALF MUSCULAR VEIN: ICD-10-CM

## 2023-05-16 LAB
ANION GAP SERPL CALC-SCNC: 9 MMOL/L — SIGNIFICANT CHANGE UP (ref 5–17)
APPEARANCE UR: CLEAR — SIGNIFICANT CHANGE UP
BACTERIA # UR AUTO: ABNORMAL /HPF
BILIRUB UR-MCNC: NEGATIVE — SIGNIFICANT CHANGE UP
BUN SERPL-MCNC: 10 MG/DL — SIGNIFICANT CHANGE UP (ref 7–23)
CALCIUM SERPL-MCNC: 9 MG/DL — SIGNIFICANT CHANGE UP (ref 8.4–10.5)
CHLORIDE SERPL-SCNC: 105 MMOL/L — SIGNIFICANT CHANGE UP (ref 96–108)
CO2 SERPL-SCNC: 25 MMOL/L — SIGNIFICANT CHANGE UP (ref 22–31)
COLOR SPEC: YELLOW — SIGNIFICANT CHANGE UP
COMMENT - URINE: SIGNIFICANT CHANGE UP
CREAT SERPL-MCNC: 0.5 MG/DL — SIGNIFICANT CHANGE UP (ref 0.5–1.3)
DIFF PNL FLD: ABNORMAL
EGFR: 110 ML/MIN/1.73M2 — SIGNIFICANT CHANGE UP
EPI CELLS # UR: SIGNIFICANT CHANGE UP /HPF (ref 0–5)
GLUCOSE SERPL-MCNC: 113 MG/DL — HIGH (ref 70–99)
GLUCOSE UR QL: NEGATIVE — SIGNIFICANT CHANGE UP
HCT VFR BLD CALC: 27 % — LOW (ref 34.5–45)
HGB BLD-MCNC: 8.7 G/DL — LOW (ref 11.5–15.5)
KETONES UR-MCNC: NEGATIVE — SIGNIFICANT CHANGE UP
LEUKOCYTE ESTERASE UR-ACNC: ABNORMAL
MAGNESIUM SERPL-MCNC: 2.2 MG/DL — SIGNIFICANT CHANGE UP (ref 1.6–2.6)
MCHC RBC-ENTMCNC: 29.3 PG — SIGNIFICANT CHANGE UP (ref 27–34)
MCHC RBC-ENTMCNC: 32.2 GM/DL — SIGNIFICANT CHANGE UP (ref 32–36)
MCV RBC AUTO: 90.9 FL — SIGNIFICANT CHANGE UP (ref 80–100)
NITRITE UR-MCNC: NEGATIVE — SIGNIFICANT CHANGE UP
NRBC # BLD: 0 /100 WBCS — SIGNIFICANT CHANGE UP (ref 0–0)
PH UR: 5.5 — SIGNIFICANT CHANGE UP (ref 5–8)
PHOSPHATE SERPL-MCNC: 4.5 MG/DL — SIGNIFICANT CHANGE UP (ref 2.5–4.5)
PLATELET # BLD AUTO: 295 K/UL — SIGNIFICANT CHANGE UP (ref 150–400)
POTASSIUM SERPL-MCNC: 4.1 MMOL/L — SIGNIFICANT CHANGE UP (ref 3.5–5.3)
POTASSIUM SERPL-SCNC: 4.1 MMOL/L — SIGNIFICANT CHANGE UP (ref 3.5–5.3)
PROT UR-MCNC: NEGATIVE MG/DL — SIGNIFICANT CHANGE UP
RBC # BLD: 2.97 M/UL — LOW (ref 3.8–5.2)
RBC # FLD: 13.1 % — SIGNIFICANT CHANGE UP (ref 10.3–14.5)
RBC CASTS # UR COMP ASSIST: < 5 /HPF — SIGNIFICANT CHANGE UP
SODIUM SERPL-SCNC: 139 MMOL/L — SIGNIFICANT CHANGE UP (ref 135–145)
SP GR SPEC: 1.02 — SIGNIFICANT CHANGE UP (ref 1–1.03)
UROBILINOGEN FLD QL: 1 E.U./DL — SIGNIFICANT CHANGE UP
WBC # BLD: 5.46 K/UL — SIGNIFICANT CHANGE UP (ref 3.8–10.5)
WBC # FLD AUTO: 5.46 K/UL — SIGNIFICANT CHANGE UP (ref 3.8–10.5)
WBC UR QL: ABNORMAL /HPF

## 2023-05-16 PROCEDURE — 99291 CRITICAL CARE FIRST HOUR: CPT

## 2023-05-16 PROCEDURE — 99232 SBSQ HOSP IP/OBS MODERATE 35: CPT

## 2023-05-16 PROCEDURE — 88112 CYTOPATH CELL ENHANCE TECH: CPT | Mod: 26

## 2023-05-16 RX ORDER — SODIUM CHLORIDE 9 MG/ML
500 INJECTION INTRAMUSCULAR; INTRAVENOUS; SUBCUTANEOUS ONCE
Refills: 0 | Status: COMPLETED | OUTPATIENT
Start: 2023-05-16 | End: 2023-05-16

## 2023-05-16 RX ORDER — MODAFINIL 200 MG/1
100 TABLET ORAL EVERY 24 HOURS
Refills: 0 | Status: DISCONTINUED | OUTPATIENT
Start: 2023-05-16 | End: 2023-05-17

## 2023-05-16 RX ORDER — KETOROLAC TROMETHAMINE 30 MG/ML
30 SYRINGE (ML) INJECTION ONCE
Refills: 0 | Status: DISCONTINUED | OUTPATIENT
Start: 2023-05-16 | End: 2023-05-16

## 2023-05-16 RX ORDER — HYDROMORPHONE HYDROCHLORIDE 2 MG/ML
0.25 INJECTION INTRAMUSCULAR; INTRAVENOUS; SUBCUTANEOUS ONCE
Refills: 0 | Status: DISCONTINUED | OUTPATIENT
Start: 2023-05-16 | End: 2023-05-16

## 2023-05-16 RX ORDER — OXYCODONE HYDROCHLORIDE 5 MG/1
5 TABLET ORAL EVERY 4 HOURS
Refills: 0 | Status: DISCONTINUED | OUTPATIENT
Start: 2023-05-16 | End: 2023-05-22

## 2023-05-16 RX ORDER — METHYLPHENIDATE HCL 5 MG
10 TABLET ORAL
Refills: 0 | Status: DISCONTINUED | OUTPATIENT
Start: 2023-05-16 | End: 2023-05-21

## 2023-05-16 RX ORDER — OXYCODONE HYDROCHLORIDE 5 MG/1
5 TABLET ORAL EVERY 4 HOURS
Refills: 0 | Status: DISCONTINUED | OUTPATIENT
Start: 2023-05-16 | End: 2023-05-16

## 2023-05-16 RX ADMIN — Medication 30 MILLIGRAM(S): at 11:15

## 2023-05-16 RX ADMIN — Medication 10 MILLIGRAM(S): at 14:34

## 2023-05-16 RX ADMIN — ATORVASTATIN CALCIUM 80 MILLIGRAM(S): 80 TABLET, FILM COATED ORAL at 22:26

## 2023-05-16 RX ADMIN — Medication 30 MILLIGRAM(S): at 10:57

## 2023-05-16 RX ADMIN — MODAFINIL 100 MILLIGRAM(S): 200 TABLET ORAL at 05:19

## 2023-05-16 RX ADMIN — PANTOPRAZOLE SODIUM 40 MILLIGRAM(S): 20 TABLET, DELAYED RELEASE ORAL at 22:25

## 2023-05-16 RX ADMIN — Medication 650 MILLIGRAM(S): at 21:00

## 2023-05-16 RX ADMIN — SENNA PLUS 2 TABLET(S): 8.6 TABLET ORAL at 22:26

## 2023-05-16 RX ADMIN — Medication 650 MILLIGRAM(S): at 10:01

## 2023-05-16 RX ADMIN — SODIUM CHLORIDE 1000 MILLILITER(S): 9 INJECTION INTRAMUSCULAR; INTRAVENOUS; SUBCUTANEOUS at 04:10

## 2023-05-16 RX ADMIN — CHLORHEXIDINE GLUCONATE 15 MILLILITER(S): 213 SOLUTION TOPICAL at 18:06

## 2023-05-16 RX ADMIN — OXYCODONE HYDROCHLORIDE 5 MILLIGRAM(S): 5 TABLET ORAL at 14:34

## 2023-05-16 RX ADMIN — OXYCODONE HYDROCHLORIDE 5 MILLIGRAM(S): 5 TABLET ORAL at 15:35

## 2023-05-16 RX ADMIN — HYDROMORPHONE HYDROCHLORIDE 0.25 MILLIGRAM(S): 2 INJECTION INTRAMUSCULAR; INTRAVENOUS; SUBCUTANEOUS at 02:22

## 2023-05-16 RX ADMIN — POLYETHYLENE GLYCOL 3350 17 GRAM(S): 17 POWDER, FOR SOLUTION ORAL at 11:30

## 2023-05-16 RX ADMIN — ENOXAPARIN SODIUM 40 MILLIGRAM(S): 100 INJECTION SUBCUTANEOUS at 22:26

## 2023-05-16 RX ADMIN — Medication 650 MILLIGRAM(S): at 10:45

## 2023-05-16 RX ADMIN — HYDROMORPHONE HYDROCHLORIDE 0.25 MILLIGRAM(S): 2 INJECTION INTRAMUSCULAR; INTRAVENOUS; SUBCUTANEOUS at 02:37

## 2023-05-16 RX ADMIN — Medication 10 MILLIGRAM(S): at 05:19

## 2023-05-16 RX ADMIN — CHLORHEXIDINE GLUCONATE 1 APPLICATION(S): 213 SOLUTION TOPICAL at 11:30

## 2023-05-16 RX ADMIN — Medication 650 MILLIGRAM(S): at 22:00

## 2023-05-16 RX ADMIN — CHLORHEXIDINE GLUCONATE 15 MILLILITER(S): 213 SOLUTION TOPICAL at 05:20

## 2023-05-16 RX ADMIN — Medication 1 TABLET(S): at 11:30

## 2023-05-16 RX ADMIN — Medication 81 MILLIGRAM(S): at 11:30

## 2023-05-16 NOTE — PROGRESS NOTE ADULT - SUBJECTIVE AND OBJECTIVE BOX
=================================  NEUROCRITICAL CARE ATTENDING NOTE  =================================    MAKSIM TRIVEDI   MRN-[id number]  Summary:  57y/F  with Asthma, CAD (not on  meds), peripheral neuropathy and PSH of BATOOL, cholecystectomy, right knee surgery presented to [place] ED on  with right sided weakness and right facial numbness. Patient was undergoing preparation for colonoscopy. As per daughter at 8am patient was playing with her grandchildren but around 840 am patient was getting dressed and fell and subsequently felt weak after. Daughter reports that patient had some episodes of vomiting at this time. She had a syncopal episode at around 10 am and was witnessed by brother. No head trauma, She regained conscious after few minutes. She remained in bed for the remainder of the day. Daughter reports some slurred speech noted 1230-1PM and also was confused after. and around 4PM, the patient's sister noted right sided weakness at which time EMS was called and patient was brought to ED. No c/o chest pain, shortness of breath, fever, headache, abdominal pain, urinary complaints. No recent travel/sickness/ change in meds. Stroke code in ER: CTH neg for heme, Right PICA distribution acute infarction. CTA with saccular aneurysms of b/l carotids, approximately 0.8 cm on the right and 1.2 x 0.9 cm on the left. Possible tiny third saccular aneurysm on the right Posterior intracranial circulation: Right vertebral arterial occlusion at its dural crossing junction V3 Atlantic and V4 intracranial segments with likely reversal of flow in its intracranial segment from the basilar. Right PICA faintly seen. 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  Not candidate for TNK/mechanical thrombectomy. CT scan repeated which showed: 1. Brain: Progression of acute infarction within the right cerebellar hemisphere, also extending into the left superior cerebellar hemisphere. New mass effect on the fourth ventricle causing stenosis versus occlusion with new third and lateral ventricular dilatation indicating ventricular obstruction at the level of the fourth ventricle. New upward and downward herniation of cerebellar parenchyma Right carotid system: No hemodynamically significant stenosis. Left carotid system: No hemodynamically significant stenosis. Vertebral circulation: Patent. Anterior intracranial circulation: Intact. Bilateral internal carotid saccular aneurysms. These findings are unchanged. Posterior intracranial circulation:    Improved flow within the right vertebral artery since 2023. New focal stenosis mid left vertebral artery, etiology uncertain, consider vasospasm and extrinsic compression in addition to new embolic disease. Brain perfusion: Perfusion images demonstrate normalization of the perfusion abnormality in the right cerebellar hemisphere present on 2023 despite evidence of progression of acute infarction.  Areas of apparent ischemia within the posterior fossa have progressed in extent, also again involving the left posterior cerebral arterial distribution. Tx to [facility] for SOC watch. On admission to [facility], NIHSS 12.  (2023 16:50)    COURSE IN THE HOSPITAL:  : Admitted for crani watch, CTH complete w/ unchanged hydrocephalus, taken for emergent occipital craniectomy.   : POD1. Remains intubated overnight, on propofol and dank. EVD@[id number]. Pending repeat CTH this am. Given hydralazine 10mg x1. Started on cardene for SBP high 140s-150s. Sub-therapeutic on plavix, therapeutic on asa, rpt asa accumetrics until subtherapeutic. LE dopplers neg for DVT, but showing superficial venous thrombosis in the proximal portion of the right greater saphenous vein. Started on 3% @60 for na goal 145-150. NGT placed by ENT. Febrile, pancultured.  : POD 2. 3% increased to 75. NGT readjusted. UA neg. SBP liberalized to 160. Plan to extubate. 3% decreased to 60. Failed extubation d/t no cuff leak, given 60mg solumedrol, plan to extubate in 6 hrs. SCx1 for post void residual >400, started on cardura at bedtime. New blood in buretrol, stopped SQL. Clot in EVD cleared. Neuro exam improving.   : POD 3. Cont 3% with NS while NPO, start TF today. TF started. LFTs downtrending. Sodium 141. Increased 3% to 50, NS to 30. Repeat BMP ordered for 5:30PM. Tramadol 25 for pain with no relief, oxy 5 and 1g tylenol ordered, stability CT stable, speech language consult for dysarthria. Given hydralazine 10mg IVP for SBP>160, started amlodipine 5mg. C/o mild headache, given fioricet x1, stroke neuro rec SALVADOR and loop recorder placement. Echo with bubble completed, negative for PFO, EF 55-60%. J HFNC started for desats with suctioning.   : POD 4. Cont HFNC. Increased secertions on HFNC, reintubated. CXR confirmed good placement. EVD dropped to 5cmH20 for goal of draining 5-10cc/hr and SG ELENA drain taken off suction. Pending CTH. EVD drained 0cc/hr, dropped to 0. NGT replaced. Dc'd fioricet. Started on PPI for intubation. Increased cardura to 4mg for urinary retention, given an additional 2mg now. Started salt tabs 3 q 6. Given 12.5mg fentanyl x1. NGT replaced, CXR shown in lung. NGT removed, repeat CXR showing no pneumothorax. Pending ENT consult for NGT placement. CTH stable. NGT replaced by ENT, confirmed in proper spot. Restarted TF. [id number]. Na 141 from 146. Increased 3% to 60. EVD raised to 3cmH20. ETT pulled back 1cm by RT.  : POD 5 SOC. Intubated, remains on precedex, ABG ordered with improving PaO2, BMP sodium 148, 3% changed to 30cc/hr, cardura increased to 8mg for tonight, tolerating cpap  : POD 6 SOC. Respiratory rate sustained 6, returned to FVS. Na 151, dc'd 3%, f/u AM sodium. Nurse noticed ETT 19 at the lip and was 20 prior, CXR shows ETT @ 5cm above jono, ET tube advanced 1cm, repeat CXR confirms appropriate placement. Thick inline secretions with suctioning. ENT trach placement Fri likely, PEG likely Mon. Keep ELENA drain until no output, EVD to remain @3. Start ASA 81mg daily tomorrow.   : POD7 SOC, neuro stable, given fentanyl x 1 overnight for presumed discomfort (biting on ETT), remains on full vent support. CTH today stable in comparison to . 6 cuffed trach placed by ENT in OR, but came down without cuff. Replaced at bedside by ENT. On fentanyl gtt.    : POD8 SOC, JIM overnight. Incision cleaned and dressing changed. On fentanyl gtt. EKG completed due to increased frequency PVCs on telemetry, frequency of PVCs improved with titrating up fentanyl gtt. ABG drawn.  Repeat LE dopplers completed showing persistant superficial thrombus, no DVT. No EVD waveform during day, flushed distally without improvement. Exam unchanged.  EVD dropped to 0 for low output in afternoon.   : POD9. Neuro stable, febrile to 101.3F o/n, given tylenol and pan cx sent. SBP dipped to low 90s o/n, HR stable in 70s with some PVCs, decreased fentanyl gtt and given 500cc bolus of NS x 2. Pend PEG placement Mon and angio Tu. D/c'd ELENA drain today and suture placed. F/u heme recs. Positive UA. Infiltrates found on CXR. Started on Zosyn 4.5g Q6hrs .   : POD 10. Neuro stable. Dc'd fentanyl gtt. PEG failed placement at bedside with Dr. Gant, plan for PEG in OR tomorrow afternoon with Dr. Layton. Dc'd amlodipine and started carvedilol 3.125 BID for PVCs . Made 3% inhalation and mucomyst q8hr. Failed PEG at bedside. Salt tabs made 1 q 6. Decreased cardura to 4mg daily. Urine culture growing E. Coli and sputum culture growing pluralibacter gergoviae and strep species. ID consulted, f/u recs. Failed CPAP trial @ 3pm. Added am labs per heme/onc for hypercoguable workup. Pending repeat LE dopplers in 2-3 days. NGT clogged, removed, ENT failed attempt at replacement, will keep NPO for PEG placement tmw. Repeat xray in am for concern for aspration. Pt abx switched from Zosyn to Ertapenem 1g Q24hrs. per ID recs, possible ESBL growth.   : POD 11. Neuro stable. diagnostic cerebral angiogram (bilateral carotid cavernous aneurysm) and PEG placement. NPO; pulled out radial TR band (right), EVD raised to 10    POD12 EVD raised to 15, then down to 5cm H20, CPAP today   POD13 EVD 5   POD 14    05 POD 15 EVD raised to 15; CPAP 8 hours     required full vent support for low minute ventilation and hypercapnea, abdominal tenderness - CT AP WWO  05/15 CPAP this AM       Past Medical History: Asthma CAD (coronary artery disease) Peripheral neuropathy  Allergies:  No Known Allergies  Home meds:   ·	Albuterol (Eqv-ProAir HFA) 90 mcg/inh inhalation aerosol: 2 puff(s) inhaled every 6 hours as needed for  shortness of breath and/or wheezing    PHYSICAL EXAMINATION  T(C): 36.7 ( @ 05:07), Max: 37.7 ( @ 00:40) HR: 68 ( @ 07:00) (65 - 83) BP: 102/52 ( @ 07:00) (81/53 - 128/58) RR: 20 ( @ 07:00) (12 - 30) SpO2: 93% ( @ 07:00) (65% - 100%)  NEUROLOGIC EXAMINATION:  Patient awake, alert, oriented x3, FC, RUE R 4/5 RLE 4/5 L UE 5/5  L LE 5/5  GENERAL: trached 400 40% 16 +6  EENT:  anicteric  CARDIOVASCULAR: (+) S1 S2, normal rate and regular rhythm  PULMONARY: clear to auscultation bilaterally  ABDOMEN: soft, with normoactive bowel sounds, tenderness periumbilical with mild guarding  EXTREMITIES: no edema; good distal pulses  SKIN: no rash    LABS:              (4.79)  8.7  (9.4)  5.46  )-----------( 295      ( 16 May 2023 05:29 )             27.0     139  |  105  |  10  ----------------------------<  113<H>  4.1   |  25  |  0.50    Ca    9.0      16 May 2023 05:29  Phos  4.5       Mg     2.2     05-16    05-15 @ 07:01  -   @ 07:00  IN: 3220 mL / OUT: 1700 mL / NET: 1520 mL    HbA1C = 5.9 (04-22)  LDL = 92 ()   HDL = 42 ()  TG = 106 ()  TSH = 0.152 ()    Bacteriology:   CSF culture NGTD   CSF NGTD     CSF NGTD   urine culture ESBL x2 strains   Blood NG5D x2   sputum:  pluralibacter gergoviae, mod strep pneumoniae    CSF studies:    L   *** [id number] WBC1 *** %N   %L1     EEG:  Neuroimaging:  Other imaging:    MEDICATIONS:     ·	aspirin  chewable 81 Oral daily  ·	enoxaparin Injectable 40 SubCutaneous every 24 hours  ·	methylphenidate 10 Oral <User Schedule>  ·	modafinil 100 Oral every 24 hours  ·	carvedilol 3.125 milliGRAM(s) Oral every 12 hours  ·	pantoprazole  Injectable 40 IV Push at bedtime  ·	polyethylene glycol 3350 17 Oral daily  ·	senna 2 Oral at bedtime  ·	atorvastatin 80 Oral at bedtime  ·	ascorbic acid 500 Oral <User Schedule>  ·	ferrous    sulfate 325 Oral <User Schedule>  ·	lactobacillus acidophilus 1 Oral daily  ·	acetaminophen   Oral Liquid .. 650 Oral every 6 hours PRN  ·	acetylcysteine 10%  Inhalation 4 Inhalation every 6 hours PRN  ·	albuterol/ipratropium for Nebulization 3 Nebulizer every 6 hours PRN    IV FLUIDS: NS@90cc/hr  DRIPS:  DIET: on hold  Lines:  Drains:      External Ventricular Device (mL): 245 mL - @ 0 cm H20   EVD raised to 5 output 367 ICPs 1-7  / EVD at 5cm H20 ICPs <10   EVD at 5cm H20   / EVD at 15cm H20 ICPs < 10  Wounds:    CODE STATUS:  Full Code                       GOALS OF CARE:  aggressive                      DISPOSITION:  ICU =================================  NEUROCRITICAL CARE ATTENDING NOTE  =================================    MAKSIM TRIVEDI   MRN-[id number]  Summary:  57y/F  with Asthma, CAD (not on  meds), peripheral neuropathy and PSH of BATOOL, cholecystectomy, right knee surgery presented to [place] ED on  with right sided weakness and right facial numbness. Patient was undergoing preparation for colonoscopy. As per daughter at 8am patient was playing with her grandchildren but around 840 am patient was getting dressed and fell and subsequently felt weak after. Daughter reports that patient had some episodes of vomiting at this time. She had a syncopal episode at around 10 am and was witnessed by brother. No head trauma, She regained conscious after few minutes. She remained in bed for the remainder of the day. Daughter reports some slurred speech noted 1230-1PM and also was confused after. and around 4PM, the patient's sister noted right sided weakness at which time EMS was called and patient was brought to ED. No c/o chest pain, shortness of breath, fever, headache, abdominal pain, urinary complaints. No recent travel/sickness/ change in meds. Stroke code in ER: CTH neg for heme, Right PICA distribution acute infarction. CTA with saccular aneurysms of b/l carotids, approximately 0.8 cm on the right and 1.2 x 0.9 cm on the left. Possible tiny third saccular aneurysm on the right Posterior intracranial circulation: Right vertebral arterial occlusion at its dural crossing junction V3 Atlantic and V4 intracranial segments with likely reversal of flow in its intracranial segment from the basilar. Right PICA faintly seen. 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  Not candidate for TNK/mechanical thrombectomy. CT scan repeated which showed: 1. Brain: Progression of acute infarction within the right cerebellar hemisphere, also extending into the left superior cerebellar hemisphere. New mass effect on the fourth ventricle causing stenosis versus occlusion with new third and lateral ventricular dilatation indicating ventricular obstruction at the level of the fourth ventricle. New upward and downward herniation of cerebellar parenchyma Right carotid system: No hemodynamically significant stenosis. Left carotid system: No hemodynamically significant stenosis. Vertebral circulation: Patent. Anterior intracranial circulation: Intact. Bilateral internal carotid saccular aneurysms. These findings are unchanged. Posterior intracranial circulation:    Improved flow within the right vertebral artery since 2023. New focal stenosis mid left vertebral artery, etiology uncertain, consider vasospasm and extrinsic compression in addition to new embolic disease. Brain perfusion: Perfusion images demonstrate normalization of the perfusion abnormality in the right cerebellar hemisphere present on 2023 despite evidence of progression of acute infarction.  Areas of apparent ischemia within the posterior fossa have progressed in extent, also again involving the left posterior cerebral arterial distribution. Tx to [facility] for SOC watch. On admission to [facility], NIHSS 12.  (2023 16:50)    COURSE IN THE HOSPITAL:  : Admitted for crani watch, CTH complete w/ unchanged hydrocephalus, taken for emergent occipital craniectomy.   : POD1. Remains intubated overnight, on propofol and dank. EVD@[id number]. Pending repeat CTH this am. Given hydralazine 10mg x1. Started on cardene for SBP high 140s-150s. Sub-therapeutic on plavix, therapeutic on asa, rpt asa accumetrics until subtherapeutic. LE dopplers neg for DVT, but showing superficial venous thrombosis in the proximal portion of the right greater saphenous vein. Started on 3% @60 for na goal 145-150. NGT placed by ENT. Febrile, pancultured.  : POD 2. 3% increased to 75. NGT readjusted. UA neg. SBP liberalized to 160. Plan to extubate. 3% decreased to 60. Failed extubation d/t no cuff leak, given 60mg solumedrol, plan to extubate in 6 hrs. SCx1 for post void residual >400, started on cardura at bedtime. New blood in buretrol, stopped SQL. Clot in EVD cleared. Neuro exam improving.   : POD 3. Cont 3% with NS while NPO, start TF today. TF started. LFTs downtrending. Sodium 141. Increased 3% to 50, NS to 30. Repeat BMP ordered for 5:30PM. Tramadol 25 for pain with no relief, oxy 5 and 1g tylenol ordered, stability CT stable, speech language consult for dysarthria. Given hydralazine 10mg IVP for SBP>160, started amlodipine 5mg. C/o mild headache, given fioricet x1, stroke neuro rec SALVADOR and loop recorder placement. Echo with bubble completed, negative for PFO, EF 55-60%. J HFNC started for desats with suctioning.   : POD 4. Cont HFNC. Increased secertions on HFNC, reintubated. CXR confirmed good placement. EVD dropped to 5cmH20 for goal of draining 5-10cc/hr and SG ELENA drain taken off suction. Pending CTH. EVD drained 0cc/hr, dropped to 0. NGT replaced. Dc'd fioricet. Started on PPI for intubation. Increased cardura to 4mg for urinary retention, given an additional 2mg now. Started salt tabs 3 q 6. Given 12.5mg fentanyl x1. NGT replaced, CXR shown in lung. NGT removed, repeat CXR showing no pneumothorax. Pending ENT consult for NGT placement. CTH stable. NGT replaced by ENT, confirmed in proper spot. Restarted TF. [id number]. Na 141 from 146. Increased 3% to 60. EVD raised to 3cmH20. ETT pulled back 1cm by RT.  : POD 5 SOC. Intubated, remains on precedex, ABG ordered with improving PaO2, BMP sodium 148, 3% changed to 30cc/hr, cardura increased to 8mg for tonight, tolerating cpap  : POD 6 SOC. Respiratory rate sustained 6, returned to FVS. Na 151, dc'd 3%, f/u AM sodium. Nurse noticed ETT 19 at the lip and was 20 prior, CXR shows ETT @ 5cm above jono, ET tube advanced 1cm, repeat CXR confirms appropriate placement. Thick inline secretions with suctioning. ENT trach placement Fri likely, PEG likely Mon. Keep ELENA drain until no output, EVD to remain @3. Start ASA 81mg daily tomorrow.   : POD7 SOC, neuro stable, given fentanyl x 1 overnight for presumed discomfort (biting on ETT), remains on full vent support. CTH today stable in comparison to . 6 cuffed trach placed by ENT in OR, but came down without cuff. Replaced at bedside by ENT. On fentanyl gtt.    : POD8 SOC, JIM overnight. Incision cleaned and dressing changed. On fentanyl gtt. EKG completed due to increased frequency PVCs on telemetry, frequency of PVCs improved with titrating up fentanyl gtt. ABG drawn.  Repeat LE dopplers completed showing persistant superficial thrombus, no DVT. No EVD waveform during day, flushed distally without improvement. Exam unchanged.  EVD dropped to 0 for low output in afternoon.   : POD9. Neuro stable, febrile to 101.3F o/n, given tylenol and pan cx sent. SBP dipped to low 90s o/n, HR stable in 70s with some PVCs, decreased fentanyl gtt and given 500cc bolus of NS x 2. Pend PEG placement Mon and angio Tu. D/c'd ELENA drain today and suture placed. F/u heme recs. Positive UA. Infiltrates found on CXR. Started on Zosyn 4.5g Q6hrs .   : POD 10. Neuro stable. Dc'd fentanyl gtt. PEG failed placement at bedside with Dr. Gant, plan for PEG in OR tomorrow afternoon with Dr. Layton. Dc'd amlodipine and started carvedilol 3.125 BID for PVCs . Made 3% inhalation and mucomyst q8hr. Failed PEG at bedside. Salt tabs made 1 q 6. Decreased cardura to 4mg daily. Urine culture growing E. Coli and sputum culture growing pluralibacter gergoviae and strep species. ID consulted, f/u recs. Failed CPAP trial @ 3pm. Added am labs per heme/onc for hypercoguable workup. Pending repeat LE dopplers in 2-3 days. NGT clogged, removed, ENT failed attempt at replacement, will keep NPO for PEG placement tmw. Repeat xray in am for concern for aspration. Pt abx switched from Zosyn to Ertapenem 1g Q24hrs. per ID recs, possible ESBL growth.   : POD 11. Neuro stable. diagnostic cerebral angiogram (bilateral carotid cavernous aneurysm) and PEG placement. NPO; pulled out radial TR band (right), EVD raised to 10    POD12 EVD raised to 15, then down to 5cm H20, CPAP today   POD13 EVD 5   POD 14    05 POD 15 EVD raised to 15; CPAP 8 hours     required full vent support for low minute ventilation and hypercapnea, abdominal tenderness - CT AP WWO  05/15 CPAP this AM   hypotension to 80s after hydromorphone dose    Past Medical History: Asthma CAD (coronary artery disease) Peripheral neuropathy  Allergies:  No Known Allergies  Home meds:   ·	Albuterol (Eqv-ProAir HFA) 90 mcg/inh inhalation aerosol: 2 puff(s) inhaled every 6 hours as needed for  shortness of breath and/or wheezing    PHYSICAL EXAMINATION  T(C): 36.7 ( @ 05:07), Max: 37.7 ( @ 00:40) HR: 68 ( @ 07:00) (65 - 83) BP: 102/52 ( @ 07:00) (81/53 - 128/58) RR: 20 ( @ 07:00) (12 - 30) SpO2: 93% ( @ 07:00) (65% - 100%)  NEUROLOGIC EXAMINATION:  Patient awake, alert, oriented x2, FC, RUE R 4/5 RLE 4/5 L UE 5/5  L LE 5/5, L facial droop  GENERAL: trached 400 40% 16 +6  EENT:  anicteric  CARDIOVASCULAR: (+) S1 S2, normal rate and regular rhythm  PULMONARY: clear to auscultation bilaterally  ABDOMEN: soft, with normoactive bowel sounds, tenderness periumbilical with mild guarding  EXTREMITIES: no edema; good distal pulses  SKIN: no rash    LABS:              (4.79)  8.7  (9.4)  5.46  )-----------( 295      ( 16 May 2023 05:29 )             27.0     139  |  105  |  10  ----------------------------<  113<H>  4.1   |  25  |  0.50    Ca    9.0      16 May 2023 05:29  Phos  4.5       Mg     2.2     05-16    05-15 @ 07:01  -   @ 07:00  IN: 3220 mL / OUT: 1700 mL / NET: 1520 mL    HbA1C = 5.9 (-)  LDL = 92 ()   HDL = 42 ()  TG = 106 ()  TSH = 0.152 ()    Bacteriology:   CSF culture NGTD   CSF NGTD     CSF NGTD   urine culture ESBL x2 strains   Blood NG5D x2   sputum:  pluralibacter gergoviae, mod strep pneumoniae    CSF studies:    L   *** [id number] WBC1 *** %N   %L1     EEG:  Neuroimaging:  Other imaging:    MEDICATIONS:     ·	aspirin  chewable 81 Oral daily  ·	enoxaparin Injectable 40 SubCutaneous every 24 hours  ·	methylphenidate 10 Oral <User Schedule>  ·	modafinil 100 Oral every 24 hours  ·	carvedilol 3.125 milliGRAM(s) Oral every 12 hours  ·	pantoprazole  Injectable 40 IV Push at bedtime  ·	polyethylene glycol 3350 17 Oral daily  ·	senna 2 Oral at bedtime  ·	atorvastatin 80 Oral at bedtime  ·	ascorbic acid 500 Oral <User Schedule>  ·	ferrous    sulfate 325 Oral <User Schedule>  ·	lactobacillus acidophilus 1 Oral daily  ·	acetaminophen   Oral Liquid .. 650 Oral every 6 hours PRN  ·	acetylcysteine 10%  Inhalation 4 Inhalation every 6 hours PRN  ·	albuterol/ipratropium for Nebulization 3 Nebulizer every 6 hours PRN    IV FLUIDS: IVL  DRIPS:  DIET: Jevity  Lines:  Drains:      External Ventricular Device (mL): 245 mL - @ 0 cm H20   EVD raised to 5 output 367 ICPs 1-7   EVD at 5cm H20 ICPs <10   EVD at 5cm H20    EVD at 15cm H20 ICPs < 10  Wounds:    CODE STATUS:  Full Code                       GOALS OF CARE:  aggressive                      DISPOSITION:  ICU

## 2023-05-16 NOTE — PROGRESS NOTE ADULT - SUBJECTIVE AND OBJECTIVE BOX
NSCU ATTENDING -- ADDITIONAL PROGRESS NOTE    Nighttime rounds were performed -- please refer to earlier Progress Note for HPI details.      ICU Vital Signs Last 24 Hrs  T(C): 37 (16 May 2023 17:28), Max: 37.7 (16 May 2023 00:40)  T(F): 98.6 (16 May 2023 17:28), Max: 99.9 (16 May 2023 00:40)  HR: 72 (16 May 2023 19:00) (68 - 90)  BP: 106/54 (16 May 2023 19:00) (81/53 - 144/61)  BP(mean): 77 (16 May 2023 19:00) (63 - 97)  RR: 23 (16 May 2023 19:00) (16 - 33)  SpO2: 99% (16 May 2023 19:00) (93% - 100%)      05-15-23 @ 07:01  -  05-16-23 @ 07:00  --------------------------------------------------------  IN: 3220 mL / OUT: 1700 mL / NET: 1520 mL    05-16-23 @ 07:01  -  05-16-23 @ 19:57  --------------------------------------------------------  IN: 870 mL / OUT: 600 mL / NET: 270 mL      Mode: AC/ CMV (Assist Control/ Continuous Mandatory Ventilation), RR (machine): 16, TV (machine): 400, FiO2: 40, PEEP: 6, ITime: 1, MAP: 10, PIP: 19      PHYSICAL EXAMINATION  NEUROLOGIC EXAMINATION:  Patient awake, alert, oriented x3, FC, RUE R 4/5 RLE 4/5 LUE 5/5  L LE 5/5  EENT: Anicteric  CARDIOVASCULAR: (+) S1 S2, normal rate and regular rhythm  PULMONARY: Clear to auscultation bilaterally  ABDOMEN: Soft, with normoactive bowel sounds, tenderness periumbilical with mild guarding  EXTREMITIES: No edema; good distal pulses      MEDICATIONS:   acetaminophen   Oral Liquid .. 650 milliGRAM(s) Oral every 6 hours PRN  acetylcysteine 10%  Inhalation 4 milliLiter(s) Inhalation every 6 hours PRN  albuterol/ipratropium for Nebulization 3 milliLiter(s) Nebulizer every 6 hours PRN  ascorbic acid 500 milliGRAM(s) Oral <User Schedule>  aspirin  chewable 81 milliGRAM(s) Oral daily  atorvastatin 80 milliGRAM(s) Oral at bedtime  chlorhexidine 0.12% Liquid 15 milliLiter(s) Oral Mucosa every 12 hours  chlorhexidine 2% Cloths 1 Application(s) Topical daily  enoxaparin Injectable 40 milliGRAM(s) SubCutaneous every 24 hours  ferrous    sulfate 325 milliGRAM(s) Oral <User Schedule>  lactobacillus acidophilus 1 Tablet(s) Oral daily  methylphenidate 10 milliGRAM(s) Oral <User Schedule>  modafinil 100 milliGRAM(s) Oral every 24 hours  oxyCODONE    IR 5 milliGRAM(s) Oral every 4 hours PRN  pantoprazole  Injectable 40 milliGRAM(s) IV Push at bedtime  polyethylene glycol 3350 17 Gram(s) Oral daily  senna 2 Tablet(s) Oral at bedtime      LABS:                       8.7    5.46  )-----------( 295      ( 16 May 2023 05:29 )             27.0     05-16    139  |  105  |  10  ----------------------------<  113<H>  4.1   |  25  |  0.50    Ca    9.0      16 May 2023 05:29  Phos  4.5     05-16  Mg     2.2     05-16      ASSESSMENT/PLAN:   58y/o F with:    Acute CVA, R cerebellar, brain compression cerebral edema, s/p SOC  UTI ESBL  Acute respiratory failure, bulbar dysfunction  Asthma  CAD  Peripheral neuropathy  Superficial thrombosis, proximal R greater saphenous  Prediabetic     PLAN:   NEURO: Neurochecks Q4h, PRN analgesia  S/P VPS POD *  Stroke neurology following, ASA - for full AC on 05/17  Continue atorvastatin  CPAP as tolerated  SBP goal 90-160mm Hg, continue carvedilol  Left lower lobe endobronchial mucous ?atelectasis vs aspiration PNA. Aggressive suctioning and pulm toilet  Blood sugar goals 140-180 mg/dL, off insulin sliding scale. Left adrenal incidentaloma. Need outpatient MRI  RLQ Abd pain:   CT abd/pelvis: No evidence of appendicitis. ?Layering calculi versus mass.  Urinalysis:   LBM:  General surgery aware. Doubt acute abdomen or any surgical pathology.  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 Heme following. Will start full AC on 05/17 (POD 10 from VPS)  Continue ferrous sulfate  VTE Prophylaxis: SCDs, s/p IVC filter, SQL,   Afebrile, no leukocytosis, treated for ESBL UTI x 7 days    Additional critical care time: 45 minutes       NSCU ATTENDING -- ADDITIONAL PROGRESS NOTE    Nighttime rounds were performed -- please refer to earlier Progress Note for HPI details.      ICU Vital Signs Last 24 Hrs  T(C): 37 (16 May 2023 17:28), Max: 37.7 (16 May 2023 00:40)  T(F): 98.6 (16 May 2023 17:28), Max: 99.9 (16 May 2023 00:40)  HR: 72 (16 May 2023 19:00) (68 - 90)  BP: 106/54 (16 May 2023 19:00) (81/53 - 144/61)  BP(mean): 77 (16 May 2023 19:00) (63 - 97)  RR: 23 (16 May 2023 19:00) (16 - 33)  SpO2: 99% (16 May 2023 19:00) (93% - 100%)      05-15-23 @ 07:01  -  05-16-23 @ 07:00  --------------------------------------------------------  IN: 3220 mL / OUT: 1700 mL / NET: 1520 mL    05-16-23 @ 07:01  -  05-16-23 @ 19:57  --------------------------------------------------------  IN: 870 mL / OUT: 600 mL / NET: 270 mL      Mode: AC/ CMV (Assist Control/ Continuous Mandatory Ventilation), RR (machine): 16, TV (machine): 400, FiO2: 40, PEEP: 6, ITime: 1, MAP: 10, PIP: 19      PHYSICAL EXAMINATION  NEUROLOGIC EXAMINATION:  Patient awake, alert, oriented x3, FC, RUE R 4/5 RLE 4/5 LUE 5/5  L LE 5/5  EENT: Anicteric  CARDIOVASCULAR: (+) S1 S2, normal rate and regular rhythm  PULMONARY: Clear to auscultation bilaterally  ABDOMEN: Soft, with normoactive bowel sounds, tenderness periumbilical with mild guarding  EXTREMITIES: No edema; good distal pulses      MEDICATIONS:   acetaminophen   Oral Liquid .. 650 milliGRAM(s) Oral every 6 hours PRN  acetylcysteine 10%  Inhalation 4 milliLiter(s) Inhalation every 6 hours PRN  albuterol/ipratropium for Nebulization 3 milliLiter(s) Nebulizer every 6 hours PRN  ascorbic acid 500 milliGRAM(s) Oral <User Schedule>  aspirin  chewable 81 milliGRAM(s) Oral daily  atorvastatin 80 milliGRAM(s) Oral at bedtime  chlorhexidine 0.12% Liquid 15 milliLiter(s) Oral Mucosa every 12 hours  chlorhexidine 2% Cloths 1 Application(s) Topical daily  enoxaparin Injectable 40 milliGRAM(s) SubCutaneous every 24 hours  ferrous    sulfate 325 milliGRAM(s) Oral <User Schedule>  lactobacillus acidophilus 1 Tablet(s) Oral daily  methylphenidate 10 milliGRAM(s) Oral <User Schedule>  modafinil 100 milliGRAM(s) Oral every 24 hours  oxyCODONE    IR 5 milliGRAM(s) Oral every 4 hours PRN  pantoprazole  Injectable 40 milliGRAM(s) IV Push at bedtime  polyethylene glycol 3350 17 Gram(s) Oral daily  senna 2 Tablet(s) Oral at bedtime      LABS:                       8.7    5.46  )-----------( 295      ( 16 May 2023 05:29 )             27.0     05-16    139  |  105  |  10  ----------------------------<  113<H>  4.1   |  25  |  0.50    Ca    9.0      16 May 2023 05:29  Phos  4.5     05-16  Mg     2.2     05-16      ASSESSMENT/PLAN:   56y/o F with:    Acute CVA, R cerebellar, brain compression cerebral edema, s/p SOC  UTI ESBL  Acute respiratory failure, bulbar dysfunction  Asthma  CAD  Peripheral neuropathy  Superficial thrombosis, proximal R greater saphenous  Prediabetic     PLAN:   NEURO: Neurochecks Q4h, PRN analgesia  S/P VPS POD 8  Stroke neurology following, ASA - for full AC on 05/17  Continue atorvastatin  CPAP as tolerated  SBP goal 90-160mm Hg; off antiHTN  Left lower lobe endobronchial mucous ?atelectasis vs aspiration PNA. Aggressive suctioning and pulm toilet  Blood sugar goals 140-180 mg/dL, off insulin sliding scale. Left adrenal incidentaloma. Need outpatient MRI  RLQ Abd pain:   CT abd/pelvis: No evidence of appendicitis. ?Layering calculi versus mass.  LBM: 5/14  General surgery aware. Doubt acute abdomen or any surgical pathology.  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 Heme following. Will start full AC on 05/17 (POD ~10 from VPS)  Continue ferrous sulfate  VTE Prophylaxis: SCDs, s/p IVC filter (plan to retrieve as thrombogenic), SQL.   Afebrile, no leukocytosis, treated for ESBL UTI x 7 days    Additional critical care time: 45 minutes

## 2023-05-16 NOTE — PROGRESS NOTE ADULT - ASSESSMENT
57 year old female w/ PMH of asthma, CAD (not on meds), HTN, prior miscarriages X3, peripheral neuropathy and PSH of BATOOL, cholecystectomy and right knee surgery presented to [place] ED on 4/20 w/ right sided weakness and right facial numbness. Initially found to have a right PICA distribution acute infarct and a right vertebral artery occlusion. Not a candidate for any intervention. On repeat imaging had progression of acute infarct within the right and left cerebellar hemisphere with mass effect on the fourth ventricle and hydrocephalus and upward and downward herniation of the cerebellar parenchyma. She was transferred to [facility] on 4/21 and underwent an emergent SOC foramen magnum decompression and right parietal/occipital EVD placement. Hematology consulted for hypercoagulable work up.    # Acute CVA  Per patient's children, no personal history of clotting or bleeding disorder. Patient's mother with history of stroke. Daughter endorses that patient had 3 miscarriages (early). She thinks her mother was possibly on some type of blood thinner following a pregnancy at [facility] in 2009, but does not remember the name or reason, which was stopped after a few months. No other reported family history of clotting. Factor V Leiden and Prothrombin gene mutation negative. Lupus anticoagulant was positive 1.05 (range 0-1.03), however, the patient had received low molecular weight heparin for DVT ppx and may represent a false positive (as LA is a clot based assay). Beta 2 glycoprotein negative. Anticardiolipin antibody screen positive, with elevated IgM 28.4, but titers below threshold for APS in [place] criteria (>40 Mpl).     Of note, patient with bilateral LE ultrasound on 4/22/23 showing no evidence of deep venous thrombosis in either lower extremity, but positive for superficial venous thrombosis is present in the proximal portion of the right greater saphenous vein in the thigh. Repeat US Duplex on 05/03/23 shows new DVT in left intramuscular calf vein.     Plan:  - DRVVT negative (04/29/23) while off heparin products. Anti-[id number] screen (04/25) negative. Hexagonal phase (04/29/23) positive. Anticardiolipin Ab IgG < 5, IgM 28.4, IgA 9.2.   - Repeat Duplex LE (04/29/23) shows persistent superficial venous thrombosis (superficial right greater saphenous vein); with minimal involvement of the junction with common femoral vein.   - There remains concern for hypercoagulable state given her history of possible DVT during pregnancy and early miscarriages. She has two hexagonal phase positive tests, indicating the presence of Lupus anticoagulant, possibly suggestive of APLS. She will need to repeat this test in 12 weeks to definitively diagnose APLS.  - While off heparin products, Protein C function assay with reflex (112%), Protein S activity (50%), Protein S free antigen (95%), and anti-thrombin III assay with reflex (105%). Although Protein S activity is low, free Protein S level is the best test for true deficiency. Activated protein C resistance can sometimes interfere with activity of protein S, although Factor V Leiden (a major cause of activated protein C resistance) has been ruled out. Follow-up activated protein C resistance.   - s/p IVC filter (05/04/23) with Vascular, given severity of stroke and risk of bleeding, would keep IVC filter in until it is demonstrated that patient is tolerating anticoagulation.  Per Neurosurgery, can start AC on 05/17 (POD10) from  shunt.   - Recommend repeat CT Head for stability prior to initiation of anticoagulation (when cleared by NSGY)  - Anticoagulation should either be LMWH or Coumadin, favor LMWH (1mg/kg BID) as patient likely going to LTACH. DOAC is not recommended in the setting of suspected or confirmed APLS.     # Anemia, normocytic  - No reported history of anemia. Possibly 2/2 critical illness and recent procedures.  - Iron studies consistent with anemia of chronic disease, possible component of concomitant iron deficiency anemia. Folate 12. B12 1403.   - ESR 19    Upon discharge, should follow-up with Dr. Mónica Avalos (Hematology) at [facility] ([address] [place] [address]) in 3 weeks ([phone]) to establish care for management of hypercoagulable state.     Discussed with neurosurgery team and hematology oncology attending Dr. Erica Villaseñor. Recommendations are considered final after attending attestation.  57 year old female w/ PMH of asthma, CAD (not on meds), HTN, prior miscarriages X3, peripheral neuropathy and PSH of BATOOL, cholecystectomy and right knee surgery presented to [facility] [place] ED on 4/20 w/ right sided weakness and right facial numbness. Initially found to have a right PICA distribution acute infarct and a right vertebral artery occlusion. Not a candidate for any intervention. On repeat imaging had progression of acute infarct within the right and left cerebellar hemisphere with mass effect on the fourth ventricle and hydrocephalus and upward and downward herniation of the cerebellar parenchyma. She was transferred to [facility] on 4/21 and underwent an emergent SOC foramen magnum decompression and right parietal/occipital EVD placement. Hematology consulted for hypercoagulable work up.    # Acute CVA  Per patient's children, no personal history of clotting or bleeding disorder. Patient's mother with history of stroke. Daughter endorses that patient had 3 miscarriages (early). She thinks her mother was possibly on some type of blood thinner following a pregnancy at [facility] in 2009, but does not remember the name or reason, which was stopped after a few months. No other reported family history of clotting. Factor V Leiden and Prothrombin gene mutation negative. Lupus anticoagulant was positive 1.05 (range 0-1.03), however, the patient had received low molecular weight heparin for DVT ppx and may represent a false positive (as LA is a clot based assay). Beta 2 glycoprotein negative. Anticardiolipin antibody screen positive, with elevated IgM 28.4, but titers below threshold for APS in [place] criteria (>40 Mpl).     Of note, patient with bilateral LE ultrasound on 4/22/23 showing no evidence of deep venous thrombosis in either lower extremity, but positive for superficial venous thrombosis is present in the proximal portion of the right greater saphenous vein in the thigh. Repeat US Duplex on 05/03/23 shows new DVT in left intramuscular calf vein.     Plan:  - DRVVT negative (04/29/23) while off heparin products. Anti-[id number] screen (04/25) negative. Hexagonal phase (04/29/23) positive. Anticardiolipin Ab IgG < 5, IgM 28.4, IgA 9.2.   - Repeat Duplex LE (04/29/23) shows persistent superficial venous thrombosis (superficial right greater saphenous vein); with minimal involvement of the junction with common femoral vein.   - There remains concern for hypercoagulable state given her history of possible DVT during pregnancy and early miscarriages. She has two hexagonal phase positive tests, indicating the presence of Lupus anticoagulant, possibly suggestive of APLS. She will need to repeat this test in 12 weeks to definitively diagnose APLS.  - While off heparin products, Protein C function assay with reflex (112%), Protein S activity (50%), Protein S free antigen (95%), and anti-thrombin III assay with reflex (105%). Although Protein S activity is low, free Protein S level is the best test for true deficiency. Activated protein C resistance can sometimes interfere with activity of protein S, although Factor V Leiden (a major cause of activated protein C resistance) has been ruled out. Follow-up activated protein C resistance.   - s/p IVC filter (05/04/23) with Vascular, given severity of stroke and risk of bleeding, would keep IVC filter in until it is demonstrated that patient is tolerating anticoagulation.  Per Neurosurgery, can start AC on 05/17 (POD10) from  shunt.   - Recommend repeat CT Head for stability prior to initiation of anticoagulation (when cleared by NSGY)  - Anticoagulation should either be LMWH or Coumadin, favor LMWH (1mg/kg BID) as patient likely going to LTACH. DOAC is not recommended in the setting of suspected or confirmed APLS.     # Anemia, normocytic  - No reported history of anemia. Possibly 2/2 critical illness and recent procedures.  - Iron studies consistent with anemia of chronic disease, possible component of concomitant iron deficiency anemia. Folate 12. B12 1403.   - ESR 19    Upon discharge, should follow-up with Hematology at [facility] ([address] [place] [address]) in 3 weeks ([phone]) to establish care for management of hypercoagulable state.     Discussed with neurosurgery team and hematology oncology attending Dr. Erica Villaseñor. Recommendations are considered final after attending attestation.

## 2023-05-16 NOTE — PROGRESS NOTE ADULT - ASSESSMENT
56 y/o female found to have acute infarction within the right cerebellar hemisphere, causing herniation. Now s/p SOC foramen magnum decompression and right parietal/occipital EVD placement (4/21). s/p trach (4/28/23). s/p PEG (5/2/23), s/p dx cerebral angiogram (5/2/23). now s/p VPS Certas @ 4 (5/8/23). General surgery consulted for RLQ pain POD6 s/p  shunt.  There were no acute events overnight, and patient continues to endorse abdominal pain; however, there are no signs of a surgical etiology.      PLAN  - No indication for surgical intervention at this time  - Surgery Team 4C will sign-off  - Patient discussed with the Attending Surgeon and Chief Resident

## 2023-05-16 NOTE — PROGRESS NOTE ADULT - SUBJECTIVE AND OBJECTIVE BOX
HPI:  56 yo F with PMH of Asthma, CAD (not on  meds), peripheral neuropathy and PSH of BATOOL, cholecystectomy, right knee surgery presented to [place] ED on 4/20 with right sided weakness and right facial numbness. Patient was undergoing preparation for colonoscopy. As per daughter at 8am patient was playing with her grandchildren but around 840 am patient was getting dressed and fell and subsequently felt weak after. Daughter reports that patient had some episodes of vomiting at this time. She had a syncopal episode at around 10 am and was witnessed by brother. No head trauma, She regained conscious after few minutes. She remained in bed for the remainder of the day. Daughter reports some slurred speech noted 1230-1PM and also was confused after. and around 4PM, the patient's sister noted right sided weakness at which time EMS was called and patient was brought to ED. No c/o chest pain, shortness of breath, fever, headache, abdominal pain, urinary complaints. No recent travel/sickness/ change in meds. Stroke code in ER: CTH neg for heme, Right PICA distribution acute infarction. CTA with saccular aneurysms of b/l carotids, approximately 0.8 cm on the right and 1.2 x 0.9 cm on the left. Possible tiny third saccular aneurysm on the right Posterior intracranial circulation: Right vertebral arterial occlusion at its dural crossing junction V3 Atlantic and V4 intracranial segments with likely reversal of flow in its intracranial segment from the basilar. Right PICA faintly seen. 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  Not candidate for TNK/mechanical thrombectomy. CT scan repeated which showed: 1. Brain: Progression of acute infarction within the right cerebellar hemisphere, also extending into the left superior cerebellar hemisphere. New mass effect on the fourth ventricle causing stenosis versus occlusion with new third and lateral ventricular dilatation indicating ventricular obstruction at the level of the fourth ventricle. New upward and downward herniation of cerebellar parenchyma Right carotid system: No hemodynamically significant stenosis. Left carotid system: No hemodynamically significant stenosis. Vertebral circulation: Patent. Anterior intracranial circulation: Intact. Bilateral internal carotid saccular aneurysms. These findings are unchanged. Posterior intracranial circulation:    Improved flow within the right vertebral artery since 4/20/2023. New focal stenosis mid left vertebral artery, etiology uncertain, consider vasospasm and extrinsic compression in addition to new embolic disease. Brain perfusion: Perfusion images demonstrate normalization of the perfusion abnormality in the right cerebellar hemisphere present on 4/20/2023 despite evidence of progression of acute infarction.  Areas of apparent ischemia within the posterior fossa have progressed in extent, also again involving the left posterior cerebral arterial distribution. Tx to [facility] for SOC watch. On admission to [facility], NIHSS 12.  (21 Apr 2023 16:50)    OVERNIGHT EVENTS: JIM    Hospital Course:   4/21: Admitted for crani watch, CTH complete w/ unchanged hydrocephalus, taken for emergent occipital craniectomy.   4/22: POD1. Remains intubated overnight, on propofol and dank. EVD@[id number]. Pending repeat CTH this am. Given hydralazine 10mg x1. Started on cardene for SBP high 140s-150s. Sub-therapeutic on plavix, therapeutic on asa, rpt asa accumetrics until subtherapeutic. LE dopplers neg for DVT, but showing superficial venous thrombosis in the proximal portion of the right greater saphenous vein. Started on 3% @60 for na goal 145-150. NGT placed by ENT. Febrile, pancultured.  4/23: POD 2. 3% increased to 75. NGT readjusted. UA neg. SBP liberalized to 160. Plan to extubate. 3% decreased to 60. Failed extubation d/t no cuff leak, given 60mg solumedrol, plan to extubate in 6 hrs. SCx1 for post void residual >400, started on cardura at bedtime. New blood in buretrol, stopped SQL. Clot in EVD cleared. Neuro exam improving.   4/24: POD 3. Cont 3% with NS while NPO, start TF today. TF started. LFTs downtrending. Sodium 141. Increased 3% to 50, NS to 30. Repeat BMP ordered for 5:30PM. Tramadol 25 for pain with no relief, oxy 5 and 1g tylenol ordered, stability CT stable, speech language consult for dysarthria. Given hydralazine 10mg IVP for SBP>160, started amlodipine 5mg. C/o mild headache, given fioricet x1, stroke neuro rec SALVADOR and loop recorder placement. Echo with bubble completed, negative for PFO, EF 55-60%. J HFNC started for desats with suctioning.   4/25: POD 4. Cont HFNC. Increased secertions on HFNC, reintubated. CXR confirmed good placement. EVD dropped to 5cmH20 for goal of draining 5-10cc/hr and SG ELENA drain taken off suction. Pending CTH. EVD drained 0cc/hr, dropped to 0. NGT replaced. Dc'd fioricet. Started on PPI for intubation. Increased cardura to 4mg for urinary retention, given an additional 2mg now. Started salt tabs 3 q 6. Given 12.5mg fentanyl x1. NGT replaced, CXR shown in lung. NGT removed, repeat CXR showing no pneumothorax. Pending ENT consult for NGT placement. CTH stable. NGT replaced by ENT, confirmed in proper spot. Restarted TF. [id number]. Na 141 from 146. Increased 3% to 60. EVD raised to 3cmH20. ETT pulled back 1cm by RT.  4/26: POD 5 SOC. Intubated, remains on precedex, ABG ordered with improving PaO2, BMP sodium 148, 3% changed to 30cc/hr, cardura increased to 8mg for tonight, tolerating cpap  4/27: POD 6 SOC. Respiratory rate sustained 6, returned to FVS. Na 151, dc'd 3%, f/u AM sodium. Nurse noticed ETT 19 at the lip and was 20 prior, CXR shows ETT @ 5cm above jono, ET tube advanced 1cm, repeat CXR confirms appropriate placement. Thick inline secretions with suctioning. ENT trach placement Fri likely, PEG likely Mon. Keep ELENA drain until no output, EVD to remain @3. Start ASA 81mg daily tomorrow.   4/28: POD7 SOC, neuro stable, given fentanyl x 1 overnight for presumed discomfort (biting on ETT), remains on full vent support. CTH today stable in comparison to 4/25. 6 cuffed trach placed by ENT in OR, but came down without cuff. Replaced at bedside by ENT. On fentanyl gtt.    4/29: POD8 SOC, JIM overnight. Incision cleaned and dressing changed. On fentanyl gtt. EKG completed due to increased frequency PVCs on telemetry, frequency of PVCs improved with titrating up fentanyl gtt. ABG drawn.  Repeat LE dopplers completed showing persistant superficial thrombus, no DVT. No EVD waveform during day, flushed distally without improvement. Exam unchanged.  EVD dropped to 0 for low output in afternoon.   4/30: POD9. Neuro stable, febrile to 101.3F o/n, given tylenol and pan cx sent. SBP dipped to low 90s o/n, HR stable in 70s with some PVCs, decreased fentanyl gtt and given 500cc bolus of NS x 2. Pend PEG placement Mon and angio Tues. D/c'd ELENA drain today and suture placed. F/u heme recs. Positive UA. Infiltrates found on CXR. Started on Zosyn 4.5g Q6hrs .   5/1: POD 10. Neuro stable. Dc'd fentanyl gtt. PEG failed placement at bedside with Dr. Gant, plan for PEG in OR tomorrow afternoon with Dr. Layton. Dc'd amlodipine and started carvedilol 3.125 BID for PVCs . Made 3% inhalation and mucomyst q8hr. Failed PEG at bedside. Salt tabs made 1 q 6. Decreased cardura to 4mg daily. Urine culture growing E. Coli and sputum culture growing pluralibacter gergoviae and strep species. ID consulted, f/u recs. Failed CPAP trial @ 3pm. Added am labs per heme/onc for hypercoguable workup. Pending repeat LE dopplers in 2-3 days. NGT clogged, removed, ENT failed attempt at replacement, will keep NPO for PEG placement tmw. Repeat xray in am for concern for aspration. Pt abx switched from Zosyn to Ertapenem 1g Q24hrs. per ID recs, possible ESBL growth.   5/2: POD 11. Neuro stable. Pre-op for diagnostic cerebral angiogram and PEG placement. NPO. EVD raised to 5 cmH20. Sputum culture speciated to step pneumoniae, sensitive to ertapenem. 3% inhalation/mucomyst made q 6 hr d/t thick secretions. PEG placed in OR. POD0 dx cerebral angio. EVD raised to 10.   5/3: POD 12. Neuro stable. PEG feeds began @ 10 AM, plan to increase 10cc every 6h per general surgery. Repeat dopplers show stable R superficial thrombus and new L IM calf DVT. Vascular consulted for IVC filter. Plan to get CTH tomorrow.  5/4: POD 13. JIM o/n. s/p IVC filter with vascular today. Pending post-procedure CTH. FOBT negative. Started iron and vitamin c every other day for iron deficiency anemia.   5/5: POD14. CSF cx sent to r/o infection from new gas in right temporal horn seen on CTH. Restarted TF, changed to Jevity 1.2, f/u nutrition recs. EVD raised to 10 today, plan to challenge over the weekend and CTH on Monday, possible VPS next Wednesday. ENT removed sutures today. Salt tabs decreased 1q12.  5/6: POD15 Placed on CPAP briefly with low MV alert, placed back on full vent support. EVD remains open at [id number]. ICPs WNL. Neuro exam stable.  EVD raised to 15. Tolerated CPAP x8h.   5/7: POD#16. JIM overnight, neuro stable. D/c'd salt tabs and 3% neb. Wean trach to CPAP as tolerated. Pan cx NGTD. EVD raised today to [id number].   5/8: POD17 JIM overnight. ICPs WNL. Pt remains on full vent support. Neuro exam stable. Plan for possible VPS today. CT chio complete showing increase in vent size.   5/9: POD18 SOC, POD1 VPS. Desat o/n to 80s, improved on own. CXR with LLL effusion. Another desat to 90% in AM, given duoneb and mucomyst. Oxycodone given for incisional pain. Neuro exam stable. CTH in AM stable. Keppra dc'd. Ritalin 5 BID started.  Tolerating CPAP trial. SALVADOR ordered. Rectal tube dc'd.   5/10: POD 19 SOC POD2 VPS. Remains on full vent support. Tolerated CPAP for 6 hours. Neuro exam stable. BP liberalized to 160. RLQ US 2/2 abd pain. Lactate WNL.  5/11: POD20 SOC POD3 VPS. Plan for CPAP trial in AM.  Dc'd cardura. F/u speech consult for communcation board. Has been tolerating CPAP since 9am. RLQ us w/ no acute pathology. Per heme/onc LMWH or coumadin rec for AC over DOAC due to antiphospholipid syndrome.   5/12: POD21 SOC POD4 VPS. CPAP 8/5, tolerated until 3pm. Can start ASA on 5/14 and full AC POD 10 from VPS 5/19 (remove IVC filter prior, f/u w/ vascular on timing).   5/13: POD22 SOC POD 5 VPS, neuro stable, tolerating trach collar. ASA ordered to start tomorrow, Ritalin increased to 10BID from 5BID, Simethicone ordered for gas, f/u gen surg for continued abd discomfort, PM VBG with PCO2 50 and repeat VBG pCO2 55, possible obesity hypoventilation, f/u AM VBG   5/14: POD23 SOC, [id number], neuro stable, VBG showed increased PCO2 indicating poor ventilation, failed CPAP due to low TV, put back on full vent support.   5/15: POD24 SOC, POD6 VPS. O/n CT A/P done for abd pain, f/u read. neuro stable. remains on full vent support. Simethicone, Amlodipine d/c'd. Given 1L bolus of NS. Dilaudid 0.5 for pain control. CT A/P negative for acute pathology. Restarted tube feeds. Started Modafinil for neurostim.  5/16: POD25 SOC, POD7 VPS. JIM o/n neuro stable. remains on full vent support.    Vital Signs Last 24 Hrs  T(C): 36.9 (15 May 2023 22:00), Max: 37.2 (15 May 2023 05:49)  T(F): 98.4 (15 May 2023 22:00), Max: 99 (15 May 2023 05:49)  HR: 71 (16 May 2023 00:00) (65 - 83)  BP: 102/65 (16 May 2023 00:00) (87/53 - 128/58)  BP(mean): 77 (16 May 2023 00:00) (62 - 97)  RR: 28 (16 May 2023 00:00) (12 - 28)  SpO2: 97% (16 May 2023 00:00) (65% - 100%)    Parameters below as of 16 May 2023 00:00  Patient On (Oxygen Delivery Method): ventilator    O2 Concentration (%): 40    I&O's Summary    14 May 2023 07:01  -  15 May 2023 07:00  --------------------------------------------------------  IN: 1835 mL / OUT: 650 mL / NET: 1185 mL    15 May 2023 07:01  -  16 May 2023 00:38  --------------------------------------------------------  IN: 2200 mL / OUT: 1300 mL / NET: 900 mL        PHYSICAL EXAM:  GEN: laying in bed, NAD  NEURO: AOx3 (nods). FC, OE spont, speech intact, face symmetric. +R gaze preference, can cross on L. PERRL. mild R pronator drift. RUE 4 proximally, o/w 5/5 throughout. SILT  CV: RRR +S1/S2  PULM: CTAB  GI: Abd soft, +tender to palpation around umbilical area  EXT: ext warm, dry, nontender    TUBES/LINES:  [] Garsia  [] Lumbar Drain  [] Wound Drains  [] Others      DIET:  [] NPO  [] Mechanical  [x] Tube feeds    LABS:                        9.4    4.79  )-----------( 325      ( 15 May 2023 05:28 )             29.3     05-15    138  |  101  |  15  ----------------------------<  115<H>  3.9   |  27  |  0.47<L>    Ca    8.2<L>      15 May 2023 05:28  Phos  4.5     05-15  Mg     2.1     05-15              CAPILLARY BLOOD GLUCOSE          Drug Levels: [] N/A    CSF Analysis: [] N/A      Allergies    No Known Allergies    Intolerances      MEDICATIONS:  Antibiotics:    Neuro:  acetaminophen   Oral Liquid .. 650 milliGRAM(s) Oral every 6 hours PRN  methylphenidate 10 milliGRAM(s) Oral two times a day  modafinil 100 milliGRAM(s) Oral every 24 hours    Anticoagulation:  aspirin  chewable 81 milliGRAM(s) Oral daily  enoxaparin Injectable 40 milliGRAM(s) SubCutaneous every 24 hours    OTHER:  acetylcysteine 10%  Inhalation 4 milliLiter(s) Inhalation every 6 hours PRN  albuterol/ipratropium for Nebulization 3 milliLiter(s) Nebulizer every 6 hours PRN  atorvastatin 80 milliGRAM(s) Oral at bedtime  carvedilol 3.125 milliGRAM(s) Oral every 12 hours  chlorhexidine 0.12% Liquid 15 milliLiter(s) Oral Mucosa every 12 hours  chlorhexidine 2% Cloths 1 Application(s) Topical daily  lactobacillus acidophilus 1 Tablet(s) Oral daily  pantoprazole  Injectable 40 milliGRAM(s) IV Push at bedtime  polyethylene glycol 3350 17 Gram(s) Oral daily  senna 2 Tablet(s) Oral at bedtime    IVF:  ascorbic acid 500 milliGRAM(s) Oral <User Schedule>  ferrous    sulfate 325 milliGRAM(s) Oral <User Schedule>    CULTURES:  Culture Results:   No growth to date (05-08 @ 14:35)  Culture Results:   No growth to date (05-05 @ 05:06)    RADIOLOGY & ADDITIONAL TESTS:      ASSESSMENT:  56 y/o female found to have acute infarction within the right cerebellar hemisphere, causing herniation. Now s/p SOC foramen magnum decompression and right parietal/occipital EVD placement (4/21). s/p trach (4/28/23). s/p PEG (5/2/23), s/p dx cerebral angiogram (5/2/23). now s/p VPS Certas @ 4 (5/8/23).       STROKE    No pertinent family history in first degree relatives    Handoff    Asthma    CAD (coronary artery disease)    Peripheral neuropathy    Cerebellar stroke    Respiratory failure, unspecified with hypoxia    History of DVT (deep vein thrombosis)    Hydrocephalus    Tracheostomy malfunction    Cerebellar stroke    Respiratory failure, unspecified with hypoxia    History of DVT of lower extremity    Hydrocephalus    Tracheostomy malfunction    Delirium    Decompressive craniectomy    Cerebellar infarct    Cerebellar infarct    CAD (coronary artery disease)    Asthma    Peripheral neuropathy    Lupus anticoagulant positive    Anemia due to acute blood loss    Tracheostomy malfunction    Decompressive craniectomy    Insertion, external ventricular drain    Planned tracheostomy    Tracheostomy tube change    Esophagogastroduodenoscopy (EGD) with anesthesia    Insertion, PEG tube, laparoscopic    Angiogram, carotid and cerebral, bilateral    Tracheostomy tube change    IVC filter placement     shunt    Insertion, ventriculopleural shunt    S/P total abdominal hysterectomy    S/P cholecystectomy    S/P right knee surgery    Insertion, external ventricular drain    Planned tracheostomy    Esophagogastroduodenoscopy (EGD) with anesthesia    Insertion, PEG tube, laparoscopic     shunt    CAD (coronary artery disease)    Asthma    Peripheral neuropathy    Lupus anticoagulant positive    Acute respiratory failure with hypoxia    Acute respiratory failure with hypoxia    Dysphagia    Deep vein thrombosis (DVT)    Hydrocephalus    [email or web address]        PLAN:  Neuro   - Vitals q1h/neuro q4h   - s/p VPS (Certas @ 4)   - dx angio 5/2: b/l cavernous ICA aneurysms, as discussed at vascular conference f/u outpt   - CTH 4/28 stable; 5/4 new gas in right temporal horn, CT 5/8 chio increased vent size, 5/9 stable   - Stroke consulted, appreciate reccs   - [id number]  - Pain control with oxy 5 prn  - Ritalin 10 BID, Modafinil 100mg qd for neurostimulation    Cardio  - SBP   - TTE 4/24: negative for PFO, mild LVH, mild dilation of L atrium, EF 55-60%   - Carvedilol 3.125mg BID   - SALVADOR deferred, documented not indicated as it will not  at this time for starting patient on anticoagulation. Will reassess if indicated medically.     Pulm  - + Trach (6 shiley) full vent support  - Chest PT, duoneb, mucomist q 6 prn     GI  - + PEG wih TF  - last BM 5/14  - Protonix for GI ppx while on vent  - RLQ US - no acute pathology  - CT A/P 5/15: no acute pathology    Renal  - IVL  - Voiding via primafit     Endo   - cont lipitor     Heme  - SQL for DVT ppx, NO SCDs  - s/p IVCF 5/4   - LE dopplers 4/22 neg for DVT, but showing superficial venous thrombosis in the proximal portion of the right greater saphenous vein; repeat on 4/29 with persistant superficial thrombus, 5/3 new DVT L IM calf and unchanged R superficial vein thrombus  - Heme following for + anticardiolipin Ab 4/27, recs appreciated, f/u activated protein C    - Iron and vitamin c every other day     ID  - CSF sent from OR (5/8)   - MRSA (-), +UA cx ESBL, +sputum cx pluralibacter gergoviae, strep pneumoniae, s/p Ertapenem 1g Q24hrs (5/1-5/7)    PSYCH  - behavioral health consulted for tearfulness, rec   DISPO:  - NSICU, full code   - PT/OT rec AR patient can tolerate 3 hrs PT/OT daily    D/w Dr. Valadez and Dr. Bueno      Assessment:  Present when checked    []  GCS  E   V  M     Heart Failure: []Acute, [] acute on chronic , []chronic  Heart Failure:  [] Diastolic (HFpEF), [] Systolic (HFrEF), []Combined (HFpEF and HFrEF), [] RHF, [] Pulm HTN, [] Other    [] MAMTA, [] ATN, [] AIN, [] other  [] CKD1, [] CKD2, [] CKD 3, [] CKD 4, [] CKD 5, []ESRD    Encephalopathy: [] Metabolic, [] Hepatic, [] toxic, [] Neurological, [] Other    Abnormal Nurtitional Status: [] malnurtition (see nutrition note), [ ]underweight: BMI < 19, [] morbid obesity: BMI >40, [] Cachexia    [] Sepsis  [] hypovolemic shock,[] cardiogenic shock, [] hemorrhagic shock, [] neuogenic shock  [] Acute Respiratory Failure  []Cerebral edema, [] Brain compression/ herniation,   [] Functional quadriplegia  [] Acute blood loss anemia

## 2023-05-16 NOTE — PROGRESS NOTE ADULT - SUBJECTIVE AND OBJECTIVE BOX
SUBJECTIVE: Patient seen and examined at bedside.  There were no acute events overnight with the exception of an episode of hypotension s/p administration of Dilaudid for pain management resolved with fluid bolus.  Patient continues to complain of abdominal pain associated with nausea.  She is passing flatus, although has not had a bowel movement thus far.     aspirin  chewable 81 milliGRAM(s) Oral daily  carvedilol 3.125 milliGRAM(s) Oral every 12 hours  enoxaparin Injectable 40 milliGRAM(s) SubCutaneous every 24 hours    MEDICATIONS  (PRN):  acetaminophen   Oral Liquid .. 650 milliGRAM(s) Oral every 6 hours PRN Temp greater or equal to 38.5C (101.3F), Mild Pain (1 - 3)  acetylcysteine 10%  Inhalation 4 milliLiter(s) Inhalation every 6 hours PRN increased secretions  albuterol/ipratropium for Nebulization 3 milliLiter(s) Nebulizer every 6 hours PRN Respiratory Distress      I&O's Detail    15 May 2023 07:01  -  16 May 2023 07:00  --------------------------------------------------------  IN:    Enteral Tube Flush: 120 mL    IV PiggyBack: 500 mL    Jevity 1.2: 700 mL    sodium chloride 0.9%: 900 mL    Sodium Chloride 0.9% Bolus: 1000 mL  Total IN: 3220 mL    OUT:    Intermittent Catheterization - Urethral (mL): 550 mL    Voided (mL): 1150 mL  Total OUT: 1700 mL    Total NET: 1520 mL      16 May 2023 07:01  -  16 May 2023 07:46  --------------------------------------------------------  IN:    Jevity 1.2: 50 mL  Total IN: 50 mL    OUT:  Total OUT: 0 mL    Total NET: 50 mL          T(C): 36.7 (05-16-23 @ 05:07), Max: 37.7 (05-16-23 @ 00:40)  HR: 68 (05-16-23 @ 07:00) (65 - 83)  BP: 102/52 (05-16-23 @ 07:00) (81/53 - 128/58)  RR: 20 (05-16-23 @ 07:00) (12 - 30)  SpO2: 93% (05-16-23 @ 07:00) (65% - 100%)    GENERAL: NAD, Resting comfortably in bed, awake, opens eyes spontaneously  HEENT: NCAT, MMM, Normal conjunctiva  RESP: Nonlabored breathing on AC/VC ventilator support via tracheostomy, No respiratory distress  CARD: Normal rate, Normal peripheral perfusion  GI: Soft, ND, moderately tender RLQ, No guarding, No rebound tenderness, PEG in place secured with binder without surrounding erythema/fluctuance/bleeding, umbilical incision is minimally tender  :  Purewick in place for urine collection  EXTREM: WWP, No edema, No gross deformity of extremities  SKIN: No rashes, no lesions, surgical incision sites are clean/dry/intact  NEURO: Awake and alert, No focal motor or sensory deficits  PSYCH: Affect and characteristics of appearance, verbalizations, and behaviors are appropriate      LABS:                        8.7    5.46  )-----------( 295      ( 16 May 2023 05:29 )             27.0     05-16    139  |  105  |  10  ----------------------------<  113<H>  4.1   |  25  |  0.50    Ca    9.0      16 May 2023 05:29  Phos  4.5     05-16  Mg     2.2     05-16            RADIOLOGY & ADDITIONAL STUDIES:  CT Abdomen and Pelvis w/ Oral Cont and w/ IV Cont:   ACC: [id number] EXAM:  CT ABDOMEN AND PELVIS OC IC   ORDERED BY: SHAISTA TAVERA     *** ADDENDUM # 1 ***    Gastric tube is in place.    --- End of Report ---    *** END OF ADDENDUM # 1 ***      PROCEDURE DATE:  05/15/2023          INTERPRETATION:  CLINICAL INFORMATION: Abdominal pain, right lower   quadrant tenderness.    COMPARISON: Abdominal ultrasound 5/11/2023    CONTRAST/COMPLICATIONS:  IV Contrast: Isovue 370  97 cc administered   3 cc discarded  Oral Contrast: NONE  Complications: None reported at time of study completion    PROCEDURE:  CT of the Abdomen and Pelvis was performed.  Sagittal and coronal reformats were performed.    FINDINGS:  LOWER CHEST: Left lower lobe endobronchial mucous underlying   consolidation, may be combination of atelectasis and aspiration pneumonia.    LIVER: Within normal limits.  BILE DUCTS: Mildly dilated common bile duct, likely postoperative due to   cholecystectomy.  GALLBLADDER: Cholecystectomy.  SPLEEN: Within normal limits.  PANCREAS: Withinnormal limits.  ADRENALS: 1.2 cm hyperattenuating left adrenal nodule, indeterminate.   Right adrenal is normal.  KIDNEYS/URETERS: 1.2 cm calculus in the upper pole of the left kidney. No   hydronephrosis. 1.5 cm cyst upper pole right kidney. Subcentimeter   hypodensity in the lower pole of the left kidney is too small to   characterize.    BLADDER: There is a 2.0 x 0.7 cm hyperattenuating focus along the left   posterior bladder wall in the region of the ureterovesical junction.  REPRODUCTIVE ORGANS: Hysterectomy.    BOWEL: Percutaneous gastrostomy tube with tip in the stomach. No bowel   obstruction. The appendix is increased in caliber up to 1.0 cm,   containing fecal material and air. No intraluminal fluid or   appendicolith, wall thickening, or periappendiceal fat stranding.  PERITONEUM: No ascites.  VESSELS: IVC filter. Mild atherosclerotic changes of the aorta.  RETROPERITONEUM/LYMPH NODES: No lymphadenopathy.  ABDOMINAL WALL: Postsurgical change anterior abdominal wall/umbilicus.  BONES: Within normal limits.    IMPRESSION:    No evidence of appendicitis.    Left lower lobe endobronchial mucous underlying consolidation, may be   combination of atelectasis and aspiration pneumonia.    Hyperattenuating focus along the left posterior urinary bladder wall may   represent layering calculi versus mass. Recommend correlation with   urinalysis and/or cystoscopy to exclude underlying urothelial mass.    Indeterminate left adrenal nodule, likely incidental. There is a   characteristic with MRI or follow-up on subsequent imaging.        --- End of Report ---    ***Please see the addendum at the top of this report. It may contain   additional important information or changes.****      JEAN PIERRE HUNTER MD; Resident Radiologist  This document has been electronically signed.  NIC BLANK MD; Attending Radiologist  This document has been electronically signed. May 15 2023  9:13AM  1st Addendum: NIC BLANK MD; Attending Radiologist  The first addendum was electronically signed on: May 15 2023  2:05PM. (05-15-23 @ 00:20)

## 2023-05-16 NOTE — PROGRESS NOTE ADULT - ATTENDING COMMENTS
I evaluated the patient with the Hematology fellow, Dr Abdul.  Briefly, this is a patient admitted with idiopathic ischemic stroke.  APLAS testing was weakly positive with a positive hexagonal phase test and low titer anticardiolipin antibody.  She has a hx of obstetric morbidity meeting the criteria for APLAS, but the laboratory testing would need to be repeated after a 3 month interval for confirmation.  During this admission she was diagnosed with a calf vein DVT as well as extensive thrombosis of the greater saphenous vein;  due to inability to anticoagulate the patient due to the stroke and recent  shunt, an IVC filter was placed.    recommend repeating the head CT prior to transitioning the patient from prophylactic to therapeutic dosing of lovenox.  IVC filter should stay in place until we are confident that she is tolerating anticoagulation.  Rest of plan of care as above.  d/w JONATHAN CORTEZ.

## 2023-05-16 NOTE — PROGRESS NOTE ADULT - SUBJECTIVE AND OBJECTIVE BOX
Hematology Oncology Progress Note      HPI:  58 yo F with PMH of Asthma, CAD (not on  meds), peripheral neuropathy and PSH of BATOOL, cholecystectomy, right knee surgery presented to [place] ED on  with right sided weakness and right facial numbness. Patient was undergoing preparation for colonoscopy. As per daughter at 8am patient was playing with her grandchildren but around 840 am patient was getting dressed and fell and subsequently felt weak after. Daughter reports that patient had some episodes of vomiting at this time. She had a syncopal episode at around 10 am and was witnessed by brother. No head trauma, She regained conscious after few minutes. She remained in bed for the remainder of the day. Daughter reports some slurred speech noted 1230-1PM and also was confused after. and around 4PM, the patient's sister noted right sided weakness at which time EMS was called and patient was brought to ED. No c/o chest pain, shortness of breath, fever, headache, abdominal pain, urinary complaints. No recent travel/sickness/ change in meds. Stroke code in ER: CTH neg for heme, Right PICA distribution acute infarction. CTA with saccular aneurysms of b/l carotids, approximately 0.8 cm on the right and 1.2 x 0.9 cm on the left. Possible tiny third saccular aneurysm on the right Posterior intracranial circulation: Right vertebral arterial occlusion at its dural crossing junction V3 Atlantic and V4 intracranial segments with likely reversal of flow in its intracranial segment from the basilar. Right PICA faintly seen. 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  Not candidate for TNK/mechanical thrombectomy. CT scan repeated which showed: 1. Brain: Progression of acute infarction within the right cerebellar hemisphere, also extending into the left superior cerebellar hemisphere. New mass effect on the fourth ventricle causing stenosis versus occlusion with new third and lateral ventricular dilatation indicating ventricular obstruction at the level of the fourth ventricle. New upward and downward herniation of cerebellar parenchyma Right carotid system: No hemodynamically significant stenosis. Left carotid system: No hemodynamically significant stenosis. Vertebral circulation: Patent. Anterior intracranial circulation: Intact. Bilateral internal carotid saccular aneurysms. These findings are unchanged. Posterior intracranial circulation:    Improved flow within the right vertebral artery since 2023. New focal stenosis mid left vertebral artery, etiology uncertain, consider vasospasm and extrinsic compression in addition to new embolic disease. Brain perfusion: Perfusion images demonstrate normalization of the perfusion abnormality in the right cerebellar hemisphere present on 2023 despite evidence of progression of acute infarction.  Areas of apparent ischemia within the posterior fossa have progressed in extent, also again involving the left posterior cerebral arterial distribution. Tx to [facility] for SOC watch. On admission to [facility], NIHSS 12.  (2023 16:50)      Interval History: s/p IVC filter and  Shunt    SUBJECTIVE: Patient seen and examined at bedside. Nodding yes and no to some questions. Reviewed hematology work up with daughter Danya and patient's spouse.    OBJECTIVE:    VITAL SIGNS:  ICU Vital Signs Last 24 Hrs  T(C): 37.4 (16 May 2023 14:00), Max: 37.7 (16 May 2023 00:40)  T(F): 99.4 (16 May 2023 14:00), Max: 99.9 (16 May 2023 00:40)  HR: 69 (16 May 2023 13:00) (68 - 90)  BP: 117/56 (16 May 2023 13:00) (81/53 - 144/61)  BP(mean): 80 (16 May 2023 13:00) (63 - 97)  ABP: --  ABP(mean): --  RR: 21 (16 May 2023 13:00) (16 - 33)  SpO2: 98% (16 May 2023 13:00) (65% - 100%)    O2 Parameters below as of 16 May 2023 13:00  Patient On (Oxygen Delivery Method): ventilator,CPAP    O2 Concentration (%): 40      Mode: CPAP with PS, FiO2: 40, PEEP: 6, PS: 12, MAP: 11, PIP: 18    05-15 @ 07:  -   @ 07:00  --------------------------------------------------------  IN: 3220 mL / OUT: 1700 mL / NET: 1520 mL    16 @ 07:01  -   @ 15:28  --------------------------------------------------------  IN: 410 mL / OUT: 300 mL / NET: 110 mL      CAPILLARY BLOOD GLUCOSE          PHYSICAL EXAM:  General: uncomfortable, but non toxic  HEENT: surgical scar on head  Neck: trached  Respiratory: CTA b/l  Cardiovascular: RRR  Abdomen: soft, NT/ND; +BS x4, + PEG tube  Extremities: WWP, 2+ peripheral pulses b/l; no LE edema  Skin: normal color and turgor; no rash  Neurological: Awake and alert    MEDICATIONS:  MEDICATIONS  (STANDING):  ascorbic acid 500 milliGRAM(s) Oral <User Schedule>  aspirin  chewable 81 milliGRAM(s) Oral daily  atorvastatin 80 milliGRAM(s) Oral at bedtime  chlorhexidine 0.12% Liquid 15 milliLiter(s) Oral Mucosa every 12 hours  chlorhexidine 2% Cloths 1 Application(s) Topical daily  enoxaparin Injectable 40 milliGRAM(s) SubCutaneous every 24 hours  ferrous    sulfate 325 milliGRAM(s) Oral <User Schedule>  lactobacillus acidophilus 1 Tablet(s) Oral daily  methylphenidate 10 milliGRAM(s) Oral <User Schedule>  modafinil 100 milliGRAM(s) Oral every 24 hours  pantoprazole  Injectable 40 milliGRAM(s) IV Push at bedtime  polyethylene glycol 3350 17 Gram(s) Oral daily  senna 2 Tablet(s) Oral at bedtime    MEDICATIONS  (PRN):  acetaminophen   Oral Liquid .. 650 milliGRAM(s) Oral every 6 hours PRN Temp greater or equal to 38.5C (101.3F), Mild Pain (1 - 3)  acetylcysteine 10%  Inhalation 4 milliLiter(s) Inhalation every 6 hours PRN increased secretions  albuterol/ipratropium for Nebulization 3 milliLiter(s) Nebulizer every 6 hours PRN Respiratory Distress  oxyCODONE    IR 5 milliGRAM(s) Oral every 4 hours PRN Moderate Pain (4 - 6)      ALLERGIES:  Allergies    No Known Allergies    Intolerances        LABS:                        8.7    5.46  )-----------( 295      ( 16 May 2023 05:29 )             27.0     05-16    139  |  105  |  10  ----------------------------<  113<H>  4.1   |  25  |  0.50    Ca    9.0      16 May 2023 05:29  Phos  4.5     05-16  Mg     2.2     05-16        Urinalysis Basic - ( 16 May 2023 12:45 )    Color: Yellow / Appearance: Clear / S.025 / pH: x  Gluc: x / Ketone: NEGATIVE  / Bili: Negative / Urobili: 1.0 E.U./dL   Blood: x / Protein: NEGATIVE mg/dL / Nitrite: NEGATIVE   Leuk Esterase: Small / RBC: < 5 /HPF / WBC Many /HPF   Sq Epi: x / Non Sq Epi: x / Bacteria: Many /HPF                  RADIOLOGY & ADDITIONAL TESTS: Reviewed.

## 2023-05-16 NOTE — PROGRESS NOTE ADULT - ASSESSMENT
57y/F with  acute CVA, R cerebellar, brain compression cerebral edema, s/p SOC  UTI ESBL  acute respiratory failure, bulbar dysfunction  asthma  CAD  peripheral neuropathy  superficial thrombosis, proximal R greater saphenous  prediabetic     PLAN:   NEURO: neurochecks q4h, VS q1h, PRN pain meds with acetaminophen / dilaudid  s/p VPS  stroke on board, ASA - for full AC on 05/19  REHAB:  physical therapy evaluation and management    EARLY MOB:  HOB up    PULM:  CPAP after fluid bolus and pain control; ABG   CARDIO:  SBP goal 90-160mm Hg, d/c amlodipine, continue carvedilol  ENDO:  Blood sugar goals 140-180 mg/dL, off insulin sliding scale, continue high dose statins  GI:  cont PPI  DIET:  f/u CT abd results, if no leak then start tube feeds  RENAL:  IVF until jevity at goal  HEM/ONC: s/p 3 units platelets, 35 ug DDAVP, (+) aCL; ferrous sulfate  VTE Prophylaxis: SCDs, s/p IVC filter, SQL, will start full AC on 05/19  ID: afebrile, no leukocytosis, treated for ESBL UTI x 7 days  Social: family at bedside    Active issues:  What's keeping patient in the ICU? vent requirements  What is this patient's dispo plan? LTAC    ATTENDING ATTESTATION:  I was physically present for the key portions of the evaluation and management (E/M) service provided.  I agree with the above history, physical and plan, which I have reviewed and edited where appropriate.    Patient at high risk for neurological deterioration or death due to:  ICU delirium, aspiration PNA, DVT / PE.  Critical care time:  I have personally provided 60 minutes of critical care time, excluding time spent on separate procedures.      Plan discussed with RN, house staff. 57y/F with  acute CVA, R cerebellar, brain compression cerebral edema, s/p SOC  UTI ESBL  acute respiratory failure, bulbar dysfunction  asthma  CAD  peripheral neuropathy  superficial thrombosis, proximal R greater saphenous  prediabetic   nephrolithiasis    PLAN:   NEURO: neurochecks q4h, VS q4h, PRN pain meds with acetaminophen / dilaudid  s/p VPS  stroke on board, ASA - for full AC on 05/19  REHAB:  physical therapy evaluation and management    EARLY MOB:  HOB up    PULM:  CPAP 12/6 40%  CARDIO:  SBP goal 90-160mm Hg, off amlodipine, d/c carvedilol  ENDO:  Blood sugar goals 140-180 mg/dL, off insulin sliding scale, continue high dose statins  GI:  cont PPI  DIET:  cont jevity  RENAL:  IVL, check UA  HEM/ONC: s/p 3 units platelets, 35 ug DDAVP, (+) aCL; ferrous sulfate  VTE Prophylaxis: SCDs, s/p IVC filter, SQL, will start full AC on 05/19  ID: afebrile, no leukocytosis, treated for ESBL UTI x 7 days  Social: family at bedside    Active issues:  What's keeping patient in the ICU? vent requirements  What is this patient's dispo plan? LTAC    ATTENDING ATTESTATION:  I was physically present for the key portions of the evaluation and management (E/M) service provided.  I agree with the above history, physical and plan, which I have reviewed and edited where appropriate.    Patient at high risk for neurological deterioration or death due to:  ICU delirium, aspiration PNA, DVT / PE.  Critical care time:  I have personally provided 60 minutes of critical care time, excluding time spent on separate procedures.      Plan discussed with RN, house staff.

## 2023-05-17 DIAGNOSIS — N32.89 OTHER SPECIFIED DISORDERS OF BLADDER: ICD-10-CM

## 2023-05-17 LAB
ANION GAP SERPL CALC-SCNC: 6 MMOL/L — SIGNIFICANT CHANGE UP (ref 5–17)
BUN SERPL-MCNC: 11 MG/DL — SIGNIFICANT CHANGE UP (ref 7–23)
CALCIUM SERPL-MCNC: 8.6 MG/DL — SIGNIFICANT CHANGE UP (ref 8.4–10.5)
CHLORIDE SERPL-SCNC: 106 MMOL/L — SIGNIFICANT CHANGE UP (ref 96–108)
CO2 SERPL-SCNC: 28 MMOL/L — SIGNIFICANT CHANGE UP (ref 22–31)
CREAT SERPL-MCNC: 0.49 MG/DL — LOW (ref 0.5–1.3)
EGFR: 111 ML/MIN/1.73M2 — SIGNIFICANT CHANGE UP
GLUCOSE SERPL-MCNC: 155 MG/DL — HIGH (ref 70–99)
HCT VFR BLD CALC: 27.8 % — LOW (ref 34.5–45)
HGB BLD-MCNC: 9 G/DL — LOW (ref 11.5–15.5)
MAGNESIUM SERPL-MCNC: 2.1 MG/DL — SIGNIFICANT CHANGE UP (ref 1.6–2.6)
MCHC RBC-ENTMCNC: 29.2 PG — SIGNIFICANT CHANGE UP (ref 27–34)
MCHC RBC-ENTMCNC: 32.4 GM/DL — SIGNIFICANT CHANGE UP (ref 32–36)
MCV RBC AUTO: 90.3 FL — SIGNIFICANT CHANGE UP (ref 80–100)
NRBC # BLD: 0 /100 WBCS — SIGNIFICANT CHANGE UP (ref 0–0)
PHOSPHATE SERPL-MCNC: 4.1 MG/DL — SIGNIFICANT CHANGE UP (ref 2.5–4.5)
PLATELET # BLD AUTO: 275 K/UL — SIGNIFICANT CHANGE UP (ref 150–400)
POTASSIUM SERPL-MCNC: 3.8 MMOL/L — SIGNIFICANT CHANGE UP (ref 3.5–5.3)
POTASSIUM SERPL-SCNC: 3.8 MMOL/L — SIGNIFICANT CHANGE UP (ref 3.5–5.3)
RBC # BLD: 3.08 M/UL — LOW (ref 3.8–5.2)
RBC # FLD: 13.2 % — SIGNIFICANT CHANGE UP (ref 10.3–14.5)
SODIUM SERPL-SCNC: 140 MMOL/L — SIGNIFICANT CHANGE UP (ref 135–145)
WBC # BLD: 4.52 K/UL — SIGNIFICANT CHANGE UP (ref 3.8–10.5)
WBC # FLD AUTO: 4.52 K/UL — SIGNIFICANT CHANGE UP (ref 3.8–10.5)

## 2023-05-17 PROCEDURE — 99291 CRITICAL CARE FIRST HOUR: CPT

## 2023-05-17 PROCEDURE — 99221 1ST HOSP IP/OBS SF/LOW 40: CPT

## 2023-05-17 PROCEDURE — 70450 CT HEAD/BRAIN W/O DYE: CPT | Mod: 26

## 2023-05-17 RX ORDER — MODAFINIL 200 MG/1
200 TABLET ORAL EVERY 24 HOURS
Refills: 0 | Status: DISCONTINUED | OUTPATIENT
Start: 2023-05-18 | End: 2023-05-24

## 2023-05-17 RX ORDER — MODAFINIL 200 MG/1
100 TABLET ORAL ONCE
Refills: 0 | Status: DISCONTINUED | OUTPATIENT
Start: 2023-05-17 | End: 2023-05-17

## 2023-05-17 RX ORDER — POTASSIUM CHLORIDE 20 MEQ
20 PACKET (EA) ORAL ONCE
Refills: 0 | Status: COMPLETED | OUTPATIENT
Start: 2023-05-17 | End: 2023-05-17

## 2023-05-17 RX ADMIN — Medication 10 MILLIGRAM(S): at 06:04

## 2023-05-17 RX ADMIN — Medication 500 MILLIGRAM(S): at 06:06

## 2023-05-17 RX ADMIN — MODAFINIL 100 MILLIGRAM(S): 200 TABLET ORAL at 06:04

## 2023-05-17 RX ADMIN — OXYCODONE HYDROCHLORIDE 5 MILLIGRAM(S): 5 TABLET ORAL at 13:15

## 2023-05-17 RX ADMIN — Medication 20 MILLIEQUIVALENT(S): at 07:01

## 2023-05-17 RX ADMIN — Medication 81 MILLIGRAM(S): at 12:26

## 2023-05-17 RX ADMIN — OXYCODONE HYDROCHLORIDE 5 MILLIGRAM(S): 5 TABLET ORAL at 12:26

## 2023-05-17 RX ADMIN — Medication 1 TABLET(S): at 12:25

## 2023-05-17 RX ADMIN — Medication 650 MILLIGRAM(S): at 21:30

## 2023-05-17 RX ADMIN — Medication 325 MILLIGRAM(S): at 06:04

## 2023-05-17 RX ADMIN — Medication 10 MILLIGRAM(S): at 14:28

## 2023-05-17 RX ADMIN — ENOXAPARIN SODIUM 40 MILLIGRAM(S): 100 INJECTION SUBCUTANEOUS at 22:05

## 2023-05-17 RX ADMIN — MODAFINIL 100 MILLIGRAM(S): 200 TABLET ORAL at 12:25

## 2023-05-17 RX ADMIN — PANTOPRAZOLE SODIUM 40 MILLIGRAM(S): 20 TABLET, DELAYED RELEASE ORAL at 22:05

## 2023-05-17 RX ADMIN — Medication 650 MILLIGRAM(S): at 20:31

## 2023-05-17 RX ADMIN — CHLORHEXIDINE GLUCONATE 15 MILLILITER(S): 213 SOLUTION TOPICAL at 17:04

## 2023-05-17 RX ADMIN — CHLORHEXIDINE GLUCONATE 15 MILLILITER(S): 213 SOLUTION TOPICAL at 06:04

## 2023-05-17 RX ADMIN — CHLORHEXIDINE GLUCONATE 1 APPLICATION(S): 213 SOLUTION TOPICAL at 12:25

## 2023-05-17 RX ADMIN — ATORVASTATIN CALCIUM 80 MILLIGRAM(S): 80 TABLET, FILM COATED ORAL at 22:06

## 2023-05-17 NOTE — PROGRESS NOTE ADULT - SUBJECTIVE AND OBJECTIVE BOX
NSCU ATTENDING -- ADDITIONAL PROGRESS NOTE    Nighttime rounds were performed -- please refer to earlier Progress Note for HPI details.      ICU Vital Signs Last 24 Hrs  T(C): 37.7 (17 May 2023 21:42), Max: 37.7 (17 May 2023 21:42)  T(F): 99.9 (17 May 2023 21:42), Max: 99.9 (17 May 2023 21:42)  HR: 73 (17 May 2023 23:00) (61 - 75)  BP: 115/57 (17 May 2023 23:00) (94/53 - 128/62)  BP(mean): 80 (17 May 2023 23:00) (70 - 89)  RR: 18 (17 May 2023 23:00) (13 - 29)  SpO2: 100% (17 May 2023 23:00) (95% - 100%)      05-16-23 @ 07:01  -  05-17-23 @ 07:00  --------------------------------------------------------  IN: 1520 mL / OUT: 1300 mL / NET: 220 mL    05-17-23 @ 07:01  -  05-17-23 @ 23:50  --------------------------------------------------------  IN: 950 mL / OUT: 550 mL / NET: 400 mL      Mode: SIMV (Synchronized Intermittent Mandatory Ventilation), RR (machine): 10, TV (machine): 400, FiO2: 40, PEEP: 6, PS: 12, ITime: 1, MAP: 9, PIP: 16      PHYSICAL EXAMINATION  NEUROLOGIC EXAMINATION: Patient awake, alert, oriented x3, FC, R gaze preference can overcome  RUE R 4/5 RLE 4/5 LUE 5/5  L LE 5/5  EENT: Anicteric  CARDIOVASCULAR: (+) S1 S2, normal rate and regular rhythm  PULMONARY: Clear to auscultation bilaterally  ABDOMEN: Soft, with normoactive bowel sounds, tenderness periumbilical with mild guarding  EXTREMITIES: No edema; good distal pulses      MEDICATIONS:   acetaminophen   Oral Liquid .. 650 milliGRAM(s) Oral every 6 hours PRN  acetylcysteine 10%  Inhalation 4 milliLiter(s) Inhalation every 6 hours PRN  albuterol/ipratropium for Nebulization 3 milliLiter(s) Nebulizer every 6 hours PRN  ascorbic acid 500 milliGRAM(s) Oral <User Schedule>  aspirin  chewable 81 milliGRAM(s) Oral daily  atorvastatin 80 milliGRAM(s) Oral at bedtime  chlorhexidine 0.12% Liquid 15 milliLiter(s) Oral Mucosa every 12 hours  chlorhexidine 2% Cloths 1 Application(s) Topical daily  enoxaparin Injectable 40 milliGRAM(s) SubCutaneous every 24 hours  ferrous    sulfate 325 milliGRAM(s) Oral <User Schedule>  lactobacillus acidophilus 1 Tablet(s) Oral daily  methylphenidate 10 milliGRAM(s) Oral <User Schedule>  oxyCODONE    IR 5 milliGRAM(s) Oral every 4 hours PRN  pantoprazole  Injectable 40 milliGRAM(s) IV Push at bedtime  polyethylene glycol 3350 17 Gram(s) Oral daily  senna 2 Tablet(s) Oral at bedtime      LABS:                       9.0    4.52  )-----------( 275      ( 17 May 2023 04:38 )             27.8     05-17    140  |  106  |  11  ----------------------------<  155<H>  3.8   |  28  |  0.49<L>    Ca    8.6      17 May 2023 04:38  Phos  4.1     05-17  Mg     2.1     05-17        ASSESSMENT/PLAN:   58y/o F with:    Acute CVA, R cerebellar, brain compression cerebral edema, s/p SOC  UTI ESBL  Acute respiratory failure, bulbar dysfunction  Asthma  CAD  Peripheral neuropathy  Superficial thrombosis, proximal R greater saphenous  Prediabetic     PLAN:   NEURO: Neurochecks Q4h, PRN analgesia  S/P VPS POD 9  CT head 5/18 subocciptial pseudomeningocele, R SD hygroma. Monitor.   Stroke neurology following, ASA - for full AC on 05/18  Continue atorvastatin  CPAP/SIMV as tolerated  SBP goal 90-160mm Hg; off antiHTN  Left lower lobe endobronchial mucous ?atelectasis vs aspiration PNA. Aggressive suctioning and pulm toilet  Blood sugar goals 140-180 mg/dL, off insulin sliding scale. Left adrenal incidentaloma. Need outpatient MRI  RLQ Abd pain: CT abd/pelvis: No evidence of appendicitis. ?Layering calculi versus mass. Urology consulted. Urine cytology.   LBM: 5/17  General surgery aware of abd pain. Doubt acute abdomen or any surgical pathology.  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 Heme following. Will start full AC on 05/18 (POD ~10 from VPS)  Continue ferrous sulfate  VTE Prophylaxis: SCDs, s/p IVC filter; retrievable. To remain until assured tolerability of full AC. Currently on SQL.   Afebrile, no leukocytosis, treated for ESBL UTI x 7 days    Additional critical care time: 45 minutes

## 2023-05-17 NOTE — PROGRESS NOTE ADULT - SUBJECTIVE AND OBJECTIVE BOX
=================================  NEUROCRITICAL CARE ATTENDING NOTE  =================================    MAKSIM TRIVEDI   MRN-[id number]  Summary:  57y/F  with Asthma, CAD (not on  meds), peripheral neuropathy and PSH of BATOOL, cholecystectomy, right knee surgery presented to [place] ED on  with right sided weakness and right facial numbness. Patient was undergoing preparation for colonoscopy. As per daughter at 8am patient was playing with her grandchildren but around 840 am patient was getting dressed and fell and subsequently felt weak after. Daughter reports that patient had some episodes of vomiting at this time. She had a syncopal episode at around 10 am and was witnessed by brother. No head trauma, She regained conscious after few minutes. She remained in bed for the remainder of the day. Daughter reports some slurred speech noted 1230-1PM and also was confused after. and around 4PM, the patient's sister noted right sided weakness at which time EMS was called and patient was brought to ED. No c/o chest pain, shortness of breath, fever, headache, abdominal pain, urinary complaints. No recent travel/sickness/ change in meds. Stroke code in ER: CTH neg for heme, Right PICA distribution acute infarction. CTA with saccular aneurysms of b/l carotids, approximately 0.8 cm on the right and 1.2 x 0.9 cm on the left. Possible tiny third saccular aneurysm on the right Posterior intracranial circulation: Right vertebral arterial occlusion at its dural crossing junction V3 Atlantic and V4 intracranial segments with likely reversal of flow in its intracranial segment from the basilar. Right PICA faintly seen. 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  Not candidate for TNK/mechanical thrombectomy. CT scan repeated which showed: 1. Brain: Progression of acute infarction within the right cerebellar hemisphere, also extending into the left superior cerebellar hemisphere. New mass effect on the fourth ventricle causing stenosis versus occlusion with new third and lateral ventricular dilatation indicating ventricular obstruction at the level of the fourth ventricle. New upward and downward herniation of cerebellar parenchyma Right carotid system: No hemodynamically significant stenosis. Left carotid system: No hemodynamically significant stenosis. Vertebral circulation: Patent. Anterior intracranial circulation: Intact. Bilateral internal carotid saccular aneurysms. These findings are unchanged. Posterior intracranial circulation:    Improved flow within the right vertebral artery since 2023. New focal stenosis mid left vertebral artery, etiology uncertain, consider vasospasm and extrinsic compression in addition to new embolic disease. Brain perfusion: Perfusion images demonstrate normalization of the perfusion abnormality in the right cerebellar hemisphere present on 2023 despite evidence of progression of acute infarction.  Areas of apparent ischemia within the posterior fossa have progressed in extent, also again involving the left posterior cerebral arterial distribution. Tx to [facility] for SOC watch. On admission to [facility], NIHSS 12.  (2023 16:50)    COURSE IN THE HOSPITAL:  : Admitted for crani watch, CTH complete w/ unchanged hydrocephalus, taken for emergent occipital craniectomy.   : POD1. Remains intubated overnight, on propofol and dank. EVD@[id number]. Pending repeat CTH this am. Given hydralazine 10mg x1. Started on cardene for SBP high 140s-150s. Sub-therapeutic on plavix, therapeutic on asa, rpt asa accumetrics until subtherapeutic. LE dopplers neg for DVT, but showing superficial venous thrombosis in the proximal portion of the right greater saphenous vein. Started on 3% @60 for na goal 145-150. NGT placed by ENT. Febrile, pancultured.  : POD 2. 3% increased to 75. NGT readjusted. UA neg. SBP liberalized to 160. Plan to extubate. 3% decreased to 60. Failed extubation d/t no cuff leak, given 60mg solumedrol, plan to extubate in 6 hrs. SCx1 for post void residual >400, started on cardura at bedtime. New blood in buretrol, stopped SQL. Clot in EVD cleared. Neuro exam improving.   : POD 3. Cont 3% with NS while NPO, start TF today. TF started. LFTs downtrending. Sodium 141. Increased 3% to 50, NS to 30. Repeat BMP ordered for 5:30PM. Tramadol 25 for pain with no relief, oxy 5 and 1g tylenol ordered, stability CT stable, speech language consult for dysarthria. Given hydralazine 10mg IVP for SBP>160, started amlodipine 5mg. C/o mild headache, given fioricet x1, stroke neuro rec SALVADOR and loop recorder placement. Echo with bubble completed, negative for PFO, EF 55-60%. J HFNC started for desats with suctioning.   : POD 4. Cont HFNC. Increased secertions on HFNC, reintubated. CXR confirmed good placement. EVD dropped to 5cmH20 for goal of draining 5-10cc/hr and SG ELENA drain taken off suction. Pending CTH. EVD drained 0cc/hr, dropped to 0. NGT replaced. Dc'd fioricet. Started on PPI for intubation. Increased cardura to 4mg for urinary retention, given an additional 2mg now. Started salt tabs 3 q 6. Given 12.5mg fentanyl x1. NGT replaced, CXR shown in lung. NGT removed, repeat CXR showing no pneumothorax. Pending ENT consult for NGT placement. CTH stable. NGT replaced by ENT, confirmed in proper spot. Restarted TF. [id number]. Na 141 from 146. Increased 3% to 60. EVD raised to 3cmH20. ETT pulled back 1cm by RT.  : POD 5 SOC. Intubated, remains on precedex, ABG ordered with improving PaO2, BMP sodium 148, 3% changed to 30cc/hr, cardura increased to 8mg for tonight, tolerating cpap  : POD 6 SOC. Respiratory rate sustained 6, returned to FVS. Na 151, dc'd 3%, f/u AM sodium. Nurse noticed ETT 19 at the lip and was 20 prior, CXR shows ETT @ 5cm above jono, ET tube advanced 1cm, repeat CXR confirms appropriate placement. Thick inline secretions with suctioning. ENT trach placement Fri likely, PEG likely Mon. Keep ELENA drain until no output, EVD to remain @3. Start ASA 81mg daily tomorrow.   : POD7 SOC, neuro stable, given fentanyl x 1 overnight for presumed discomfort (biting on ETT), remains on full vent support. CTH today stable in comparison to . 6 cuffed trach placed by ENT in OR, but came down without cuff. Replaced at bedside by ENT. On fentanyl gtt.    : POD8 SOC, JIM overnight. Incision cleaned and dressing changed. On fentanyl gtt. EKG completed due to increased frequency PVCs on telemetry, frequency of PVCs improved with titrating up fentanyl gtt. ABG drawn.  Repeat LE dopplers completed showing persistant superficial thrombus, no DVT. No EVD waveform during day, flushed distally without improvement. Exam unchanged.  EVD dropped to 0 for low output in afternoon.   : POD9. Neuro stable, febrile to 101.3F o/n, given tylenol and pan cx sent. SBP dipped to low 90s o/n, HR stable in 70s with some PVCs, decreased fentanyl gtt and given 500cc bolus of NS x 2. Pend PEG placement Mon and angio Tu. D/c'd ELENA drain today and suture placed. F/u heme recs. Positive UA. Infiltrates found on CXR. Started on Zosyn 4.5g Q6hrs .   : POD 10. Neuro stable. Dc'd fentanyl gtt. PEG failed placement at bedside with Dr. Gant, plan for PEG in OR tomorrow afternoon with Dr. Layton. Dc'd amlodipine and started carvedilol 3.125 BID for PVCs . Made 3% inhalation and mucomyst q8hr. Failed PEG at bedside. Salt tabs made 1 q 6. Decreased cardura to 4mg daily. Urine culture growing E. Coli and sputum culture growing pluralibacter gergoviae and strep species. ID consulted, f/u recs. Failed CPAP trial @ 3pm. Added am labs per heme/onc for hypercoguable workup. Pending repeat LE dopplers in 2-3 days. NGT clogged, removed, ENT failed attempt at replacement, will keep NPO for PEG placement tmw. Repeat xray in am for concern for aspration. Pt abx switched from Zosyn to Ertapenem 1g Q24hrs. per ID recs, possible ESBL growth.   : POD 11. Neuro stable. diagnostic cerebral angiogram (bilateral carotid cavernous aneurysm) and PEG placement. NPO; pulled out radial TR band (right), EVD raised to 10    POD12 EVD raised to 15, then down to 5cm H20, CPAP today   POD13 EVD 5   POD 14    05 POD 15 EVD raised to 15; CPAP 8 hours     required full vent support for low minute ventilation and hypercapnea, abdominal tenderness - CT AP WWO  05/15 CPAP this AM   hypotension to 80s after hydromorphone dose   No significant events overnight.     Past Medical History: Asthma CAD (coronary artery disease) Peripheral neuropathy  Allergies:  No Known Allergies  Home meds:   ·	Albuterol (Eqv-ProAir HFA) 90 mcg/inh inhalation aerosol: 2 puff(s) inhaled every 6 hours as needed for  shortness of breath and/or wheezing    PHYSICAL EXAMINATION  T(C): 37.4 ( @ 05:37), Max: 37.5 ( @ 00:47) HR: 66 ( @ 07:00) (61 - 90) BP: 116/59 ( @ 07:00) (92/58 - 144/61) RR: 17 ( @ 07:00) (16 - 33) SpO2: 99% ( @ 07:00) (95% - 100%)  NEUROLOGIC EXAMINATION:  Patient awake, alert, oriented x2, FC, RUE R 4/5 RLE 4/5 L UE 5/5  L LE 5/5, L facial droop  GENERAL: trached 400 40% 16 +6  EENT:  anicteric  CARDIOVASCULAR: (+) S1 S2, normal rate and regular rhythm  PULMONARY: clear to auscultation bilaterally  ABDOMEN: soft, with normoactive bowel sounds, tenderness periumbilical with mild guarding  EXTREMITIES: no edema; good distal pulses  SKIN: no rash    LABS:     (5.46)   9.0   (8.7)  4.52  )-----------( 275      ( 17 May 2023 04:38 )             27.8     140  |  106  |  11  ----------------------------<  155<H>  3.8   |  28  |  0.49<L>    Ca    8.6      17 May 2023 04:38  Phos  4.1       Mg     2.1      @ 07:01  -  05-17 @ 07:00  IN: 1520 mL / OUT: 1300 mL / NET: 220 mL    Bacteriology:  UA trace blood many WBC, small LE no nitrates   CSF culture NGTD   CSF NGTD     CSF NGTD   urine culture ESBL x2 strains   Blood NG5D x2   sputum:  pluralibacter gergoviae, mod strep pneumoniae        CSF studies:    L   *** [id number] WBC1 *** %N   %L1     EEG:  Neuroimaging:  Other imaging:    MEDICATIONS:     ·	aspirin  chewable 81 Oral daily  ·	enoxaparin Injectable 40 SubCutaneous every 24 hours  ·	methylphenidate 10 Oral <User Schedule>  ·	modafinil 100 Oral every 24 hours  ·	pantoprazole  Injectable 40 IV Push at bedtime  ·	polyethylene glycol 3350 17 Oral daily  ·	senna 2 Oral at bedtime  ·	atorvastatin 80 Oral at bedtime  ·	ascorbic acid 500 Oral <User Schedule>  ·	ferrous    sulfate 325 Oral <User Schedule>  ·	lactobacillus acidophilus 1 Oral daily  ·	acetaminophen   Oral Liquid .. 650 Oral every 6 hours PRN  ·	acetylcysteine 10%  Inhalation 4 Inhalation every 6 hours PRN  ·	albuterol/ipratropium for Nebulization 3 Nebulizer every 6 hours PRN  ·	oxyCODONE    IR 5 Oral every 4 hours PRN    IV FLUIDS: IVL  DRIPS:  DIET: Jevity  Lines:  Drains:      External Ventricular Device (mL): 245 mL - @ 0 cm H20   EVD raised to 5 output 367 ICPs 1-7  /03 EVD at 5cm H20 ICPs <10  / EVD at 5cm H20    EVD at 15cm H20 ICPs < 10  Wounds:    CODE STATUS:  Full Code                       GOALS OF CARE:  aggressive                      DISPOSITION:  ICU

## 2023-05-17 NOTE — CHART NOTE - NSCHARTNOTEFT_GEN_A_CORE
Admitting Diagnosis:   Patient is a 57y old  Female who presents with a chief complaint of bilateral cerebellar strokes       PAST MEDICAL & SURGICAL HISTORY:  Asthma      CAD (coronary artery disease)      Peripheral neuropathy      S/P total abdominal hysterectomy      S/P cholecystectomy      S/P right knee surgery      Current Nutrition Order: Jevity 1.2 50ml/hr + banatrol BID providing 1200ml, 1520kcal, 67g protein    PO Intake: Good (%) [   ]  Fair (50-75%) [   ] Poor (<25%) [   ]- N/A on EN    GI Issues:   No n/v/d/c  No abd distention noted  PEG in place    Pain:  Intermittent pain noted, regimen in place    Skin Integrity:  Surgical incision, yissel score 15  No edema noted  No pressure ulcers noted    Labs:   05-17    140  |  106  |  11  ----------------------------<  155<H>  3.8   |  28  |  0.49<L>    Ca    8.6      17 May 2023 04:38  Phos  4.1     05-17  Mg     2.1     05-17        Medications:  MEDICATIONS  (STANDING):  ascorbic acid 500 milliGRAM(s) Oral <User Schedule>  aspirin  chewable 81 milliGRAM(s) Oral daily  atorvastatin 80 milliGRAM(s) Oral at bedtime  chlorhexidine 0.12% Liquid 15 milliLiter(s) Oral Mucosa every 12 hours  chlorhexidine 2% Cloths 1 Application(s) Topical daily  enoxaparin Injectable 40 milliGRAM(s) SubCutaneous every 24 hours  ferrous    sulfate 325 milliGRAM(s) Oral <User Schedule>  lactobacillus acidophilus 1 Tablet(s) Oral daily  methylphenidate 10 milliGRAM(s) Oral <User Schedule>  pantoprazole  Injectable 40 milliGRAM(s) IV Push at bedtime  polyethylene glycol 3350 17 Gram(s) Oral daily  senna 2 Tablet(s) Oral at bedtime    MEDICATIONS  (PRN):  acetaminophen   Oral Liquid .. 650 milliGRAM(s) Oral every 6 hours PRN Temp greater or equal to 38.5C (101.3F), Mild Pain (1 - 3)  acetylcysteine 10%  Inhalation 4 milliLiter(s) Inhalation every 6 hours PRN increased secretions  albuterol/ipratropium for Nebulization 3 milliLiter(s) Nebulizer every 6 hours PRN Respiratory Distress  oxyCODONE    IR 5 milliGRAM(s) Oral every 4 hours PRN Moderate Pain (4 - 6)        Admitting Anthropometrics:  Height for BMI (FEET)	5 Feet  Height for BMI (INCHES)	2 Inch(s)  Height for BMI (CENTIMETERS)	157.48 Centimeter(s)  Weight for BMI (lbs)	195 lb  Weight for BMI (kg)	88.5 kg  Body Mass Index	35.6     Weight Change: no new wts to review at this time, last wt taken on admit 4/21      Estimated energy needs:   IBW used for calculations as pt >120% of IBW (177%). Needs adjusted for critical care requiring vent support.  Kcal (25-30 kcal/kg): 0587-8934 kcal  Protein (1.3-1.5 g/kg): 65-75g pro  **Fluids per team    Subjective:  56 y/o female found to have acute infarction within the right cerebellar hemisphere, causing herniation. Now s/p SOC foramen magnum decompression and right parietal/occipital EVD placement (4/21). s/p trach (4/28/23). s/p PEG (5/2/23), s/p dx cerebral angiogram (5/2/23). now s/p VPS Certas @ 4 (5/8/23).     Pt assessed at bedside. EN running as ordered. Current EN meeting lower end EER, RD to follow.    Previous Nutrition Diagnosis: Inadequate Oral Intake RT inability to meet est needs via PO AEB NPO, reliant on EN feeds to meet 100% of est needs    Active [ X ]  Resolved [   ]    Goal:  - Consistently meets > 75% EER via most appropriate route of nutrition     Recommendations:   1. Continue Jevity 1.2 50ml/hr + banatrol BID providing 1200ml, 1520kcal, 67g protein, 968ml free water  >>FWF per team  >> Maintain aspiration precautions at all times  2. Pain and bowel regimen per team  3. Cont to monitor lytes and replete prn   4. RD to remain available for recs adjustment prn     Education: Deferred     Risk Level: High [ X ] Moderate [   ] Low [   ].

## 2023-05-17 NOTE — PROGRESS NOTE ADULT - ASSESSMENT
57y/F with  acute CVA, R cerebellar, brain compression cerebral edema, s/p SOC  UTI ESBL  acute respiratory failure, bulbar dysfunction  asthma  CAD  peripheral neuropathy  superficial thrombosis, proximal R greater saphenous  prediabetic   nephrolithiasis    PLAN:   NEURO: neurochecks q4h, VS q4h, PRN pain meds with acetaminophen / hydromorphone / oxycodone  s/p VPS  CT as per heme  stroke on board, ASA - for full AC on 05/19  REHAB:  physical therapy evaluation and management    EARLY MOB:  HOB up    PULM:  CPAP 12/6 40%  CARDIO:  SBP goal 90-160mm Hg, off amlodipine, d/c carvedilol  ENDO:  Blood sugar goals 140-180 mg/dL, off insulin sliding scale, continue high dose statins  GI:  cont PPI  DIET:  cont jevity  RENAL:  IVL,   HEM/ONC: s/p 3 units platelets, 35 ug DDAVP, (+) aCL; ferrous sulfate  VTE Prophylaxis: SCDs, s/p IVC filter, SQL, will start full AC on 05/19  ID: afebrile, no leukocytosis, treated for ESBL UTI x 7 days  Social: family at bedside    Active issues:  What's keeping patient in the ICU? vent requirements  What is this patient's dispo plan? LTAC    ATTENDING ATTESTATION:  I was physically present for the key portions of the evaluation and management (E/M) service provided.  I agree with the above history, physical and plan, which I have reviewed and edited where appropriate.    Patient at high risk for neurological deterioration or death due to:  ICU delirium, aspiration PNA, DVT / PE.  Critical care time:  I have personally provided 60 minutes of critical care time, excluding time spent on separate procedures.      Plan discussed with RN, house staff.

## 2023-05-17 NOTE — CONSULT NOTE ADULT - ASSESSMENT
58 yo female with hyperattenuating focus along L posterior urinary bladder ?layering calculis vs. urothelial mass

## 2023-05-17 NOTE — CONSULT NOTE ADULT - PROBLEM SELECTOR RECOMMENDATION 9
-?layering calculis vs mass urothelial  -send urine for cytology -?layering calculis vs mass urothelial  -send urine for cytology  -f/u PVR's -?layering calculis vs mass urothelial  -no acute urological findings.  -send urine for cytology  -f/u PVR's  -to f/u as outpt for further w/u when off ventilator. Urology clinic [address], [address]  TEL: [phone]   -continue present care per neurosurgery  -will f/u as needed

## 2023-05-17 NOTE — PROGRESS NOTE ADULT - SUBJECTIVE AND OBJECTIVE BOX
HPI:  56 yo F with PMH of Asthma, CAD (not on  meds), peripheral neuropathy and PSH of BATOOL, cholecystectomy, right knee surgery presented to [facility] on  with right sided weakness and right facial numbness. Patient was undergoing preparation for colonoscopy. As per daughter at 8am patient was playing with her grandchildren but around 840 am patient was getting dressed and fell and subsequently felt weak after. Daughter reports that patient had some episodes of vomiting at this time. She had a syncopal episode at around 10 am and was witnessed by brother. No head trauma, She regained conscious after few minutes. She remained in bed for the remainder of the day. Daughter reports some slurred speech noted 1230-1PM and also was confused after. and around 4PM, the patient's sister noted right sided weakness at which time EMS was called and patient was brought to ED. No c/o chest pain, shortness of breath, fever, headache, abdominal pain, urinary complaints. No recent travel/sickness/ change in meds. Stroke code in ER: CTH neg for heme, Right PICA distribution acute infarction. CTA with saccular aneurysms of b/l carotids, approximately 0.8 cm on the right and 1.2 x 0.9 cm on the left. Possible tiny third saccular aneurysm on the right Posterior intracranial circulation: Right vertebral arterial occlusion at its dural crossing junction V3 Atlantic and V4 intracranial segments with likely reversal of flow in its intracranial segment from the basilar. Right PICA faintly seen. 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  Not candidate for TNK/mechanical thrombectomy. CT scan repeated which showed: 1. Brain: Progression of acute infarction within the right cerebellar hemisphere, also extending into the left superior cerebellar hemisphere. New mass effect on the fourth ventricle causing stenosis versus occlusion with new third and lateral ventricular dilatation indicating ventricular obstruction at the level of the fourth ventricle. New upward and downward herniation of cerebellar parenchyma Right carotid system: No hemodynamically significant stenosis. Left carotid system: No hemodynamically significant stenosis. Vertebral circulation: Patent. Anterior intracranial circulation: Intact. Bilateral internal carotid saccular aneurysms. These findings are unchanged. Posterior intracranial circulation:    Improved flow within the right vertebral artery since 2023. New focal stenosis mid left vertebral artery, etiology uncertain, consider vasospasm and extrinsic compression in addition to new embolic disease. Brain perfusion: Perfusion images demonstrate normalization of the perfusion abnormality in the right cerebellar hemisphere present on 2023 despite evidence of progression of acute infarction.  Areas of apparent ischemia within the posterior fossa have progressed in extent, also again involving the left posterior cerebral arterial distribution. Tx to [facility] for SOC watch. On admission to [facility], NIHSS 12.  (2023 16:50)    OVERNIGHT EVENTS: JIM    Hospital Course:   : Admitted for crani watch, CTH complete w/ unchanged hydrocephalus, taken for emergent occipital craniectomy.   : POD1. Remains intubated overnight, on propofol and dank. EVD@[id number]. Pending repeat CTH this am. Given hydralazine 10mg x1. Started on cardene for SBP high 140s-150s. Sub-therapeutic on plavix, therapeutic on asa, rpt asa accumetrics until subtherapeutic. LE dopplers neg for DVT, but showing superficial venous thrombosis in the proximal portion of the right greater saphenous vein. Started on 3% @60 for na goal 145-150. NGT placed by ENT. Febrile, pancultured.  : POD 2. 3% increased to 75. NGT readjusted. UA neg. SBP liberalized to 160. Plan to extubate. 3% decreased to 60. Failed extubation d/t no cuff leak, given 60mg solumedrol, plan to extubate in 6 hrs. SCx1 for post void residual >400, started on cardura at bedtime. New blood in buretrol, stopped SQL. Clot in EVD cleared. Neuro exam improving.   : POD 3. Cont 3% with NS while NPO, start TF today. TF started. LFTs downtrending. Sodium 141. Increased 3% to 50, NS to 30. Repeat BMP ordered for 5:30PM. Tramadol 25 for pain with no relief, oxy 5 and 1g tylenol ordered, stability CT stable, speech language consult for dysarthria. Given hydralazine 10mg IVP for SBP>160, started amlodipine 5mg. C/o mild headache, given fioricet x1, stroke neuro rec SALVADOR and loop recorder placement. Echo with bubble completed, negative for PFO, EF 55-60%. J HFNC started for desats with suctioning.   : POD 4. Cont HFNC. Increased secertions on HFNC, reintubated. CXR confirmed good placement. EVD dropped to 5cmH20 for goal of draining 5-10cc/hr and SG ELENA drain taken off suction. Pending CTH. EVD drained 0cc/hr, dropped to 0. NGT replaced. Dc'd fioricet. Started on PPI for intubation. Increased cardura to 4mg for urinary retention, given an additional 2mg now. Started salt tabs 3 q 6. Given 12.5mg fentanyl x1. NGT replaced, CXR shown in lung. NGT removed, repeat CXR showing no pneumothorax. Pending ENT consult for NGT placement. CTH stable. NGT replaced by ENT, confirmed in proper spot. Restarted TF. [id number]. Na 141 from 146. Increased 3% to 60. EVD raised to 3cmH20. ETT pulled back 1cm by RT.  : POD 5 SOC. Intubated, remains on precedex, ABG ordered with improving PaO2, BMP sodium 148, 3% changed to 30cc/hr, cardura increased to 8mg for tonight, tolerating cpap  : POD 6 SOC. Respiratory rate sustained 6, returned to FVS. Na 151, dc'd 3%, f/u AM sodium. Nurse noticed ETT 19 at the lip and was 20 prior, CXR shows ETT @ 5cm above jono, ET tube advanced 1cm, repeat CXR confirms appropriate placement. Thick inline secretions with suctioning. ENT trach placement Fri likely, PEG likely Mon. Keep ELENA drain until no output, EVD to remain @3. Start ASA 81mg daily tomorrow.   : POD7 SOC, neuro stable, given fentanyl x 1 overnight for presumed discomfort (biting on ETT), remains on full vent support. CTH today stable in comparison to . 6 cuffed trach placed by ENT in OR, but came down without cuff. Replaced at bedside by ENT. On fentanyl gtt.    : POD8 SOC, JIM overnight. Incision cleaned and dressing changed. On fentanyl gtt. EKG completed due to increased frequency PVCs on telemetry, frequency of PVCs improved with titrating up fentanyl gtt. ABG drawn.  Repeat LE dopplers completed showing persistant superficial thrombus, no DVT. No EVD waveform during day, flushed distally without improvement. Exam unchanged.  EVD dropped to 0 for low output in afternoon.   : POD9. Neuro stable, febrile to 101.3F o/n, given tylenol and pan cx sent. SBP dipped to low 90s o/n, HR stable in 70s with some PVCs, decreased fentanyl gtt and given 500cc bolus of NS x 2. Pend PEG placement Mon and angio Tues. D/c'd ELENA drain today and suture placed. F/u heme recs. Positive UA. Infiltrates found on CXR. Started on Zosyn 4.5g Q6hrs .   : POD 10. Neuro stable. Dc'd fentanyl gtt. PEG failed placement at bedside with Dr. Gant, plan for PEG in OR tomorrow afternoon with Dr. Layton. Dc'd amlodipine and started carvedilol 3.125 BID for PVCs . Made 3% inhalation and mucomyst q8hr. Failed PEG at bedside. Salt tabs made 1 q 6. Decreased cardura to 4mg daily. Urine culture growing E. Coli and sputum culture growing pluralibacter gergoviae and strep species. ID consulted, f/u recs. Failed CPAP trial @ 3pm. Added am labs per heme/onc for hypercoguable workup. Pending repeat LE dopplers in 2-3 days. NGT clogged, removed, ENT failed attempt at replacement, will keep NPO for PEG placement tmw. Repeat xray in am for concern for aspration. Pt abx switched from Zosyn to Ertapenem 1g Q24hrs. per ID recs, possible ESBL growth.   : POD 11. Neuro stable. Pre-op for diagnostic cerebral angiogram and PEG placement. NPO. EVD raised to 5 cmH20. Sputum culture speciated to step pneumoniae, sensitive to ertapenem. 3% inhalation/mucomyst made q 6 hr d/t thick secretions. PEG placed in OR. POD0 dx cerebral angio. EVD raised to 10.   53: POD 12. Neuro stable. PEG feeds began @ 10 AM, plan to increase 10cc every 6h per general surgery. Repeat dopplers show stable R superficial thrombus and new L IM calf DVT. Vascular consulted for IVC filter. Plan to get CTH tomorrow.  : POD 13. JIM o/n. s/p IVC filter with vascular today. Pending post-procedure CTH. FOBT negative. Started iron and vitamin c every other day for iron deficiency anemia.   : POD14. CSF cx sent to r/o infection from new gas in right temporal horn seen on CTH. Restarted TF, changed to Jevity 1.2, f/u nutrition recs. EVD raised to 10 today, plan to challenge over the weekend and CTH on Monday, possible VPS next Wednesday. ENT removed sutures today. Salt tabs decreased 1q12.  : POD15 Placed on CPAP briefly with low MV alert, placed back on full vent support. EVD remains open at [id number]. ICPs WNL. Neuro exam stable.  EVD raised to 15. Tolerated CPAP x8h.   : POD#16. JIM overnight, neuro stable. D/c'd salt tabs and 3% neb. Wean trach to CPAP as tolerated. Pan cx NGTD. EVD raised today to [id number].   : POD17 JIM overnight. ICPs WNL. Pt remains on full vent support. Neuro exam stable. Plan for possible VPS today. CT chio complete showing increase in vent size.   : POD18 SOC, POD1 VPS. Desat o/n to 80s, improved on own. CXR with LLL effusion. Another desat to 90% in AM, given duoneb and mucomyst. Oxycodone given for incisional pain. Neuro exam stable. CTH in AM stable. Keppra dc'd. Ritalin 5 BID started.  Tolerating CPAP trial. SALVADOR ordered. Rectal tube dc'd.   5/10: POD 19 SOC POD2 VPS. Remains on full vent support. Tolerated CPAP for 6 hours. Neuro exam stable. BP liberalized to 160. RLQ US 2/2 abd pain. Lactate WNL.  : POD20 SOC POD3 VPS. Plan for CPAP trial in AM.  Dc'd cardura. F/u speech consult for communcation board. Has been tolerating CPAP since 9am. RLQ us w/ no acute pathology. Per heme/onc LMWH or coumadin rec for AC over DOAC due to antiphospholipid syndrome.   : POD21 SOC POD4 VPS. CPAP 8/5, tolerated until 3pm. Can start ASA on  and full AC POD 10 from VPS  (remove IVC filter prior, f/u w/ vascular on timing).   : POD22 SOC POD 5 VPS, neuro stable, tolerating trach collar. ASA ordered to start tomorrow, Ritalin increased to 10BID from 5BID, Simethicone ordered for gas, f/u gen surg for continued abd discomfort, PM VBG with PCO2 50 and repeat VBG pCO2 55, possible obesity hypoventilation, f/u AM VBG   : POD23 SOC, [id number], neuro stable, VBG showed increased PCO2 indicating poor ventilation, failed CPAP due to low TV, put back on full vent support.   5/15: POD24 SOC, POD6 VPS. O/n CT A/P done for abd pain, f/u read. neuro stable. remains on full vent support. Simethicone, Amlodipine d/c'd. Given 1L bolus of NS. Dilaudid 0.5 for pain control. CT A/P negative for acute pathology. Restarted tube feeds. Started Modafinil for neurostim.  : POD25 SOC, POD7 VPS. JIM o/n neuro stable. remains on full vent support. DC carvedilol for intermittent hypotensive episodes. UA for suprapelvic pain, concern for nephrolithiasis based on CT AP.  : POD26 SOC, POD8 VPS. JIM o/n neuro stable.    Vital Signs Last 24 Hrs  T(C): 37.5 (17 May 2023 00:47), Max: 37.5 (17 May 2023 00:47)  T(F): 99.5 (17 May 2023 00:47), Max: 99.5 (17 May 2023 00:47)  HR: 61 (17 May 2023 02:00) (61 - 90)  BP: 106/52 (17 May 2023 02:00) (81/53 - 144/61)  BP(mean): 73 (17 May 2023 02:00) (63 - 88)  RR: 16 (17 May 2023 02:00) (16 - 33)  SpO2: 99% (17 May 2023 02:00) (93% - 100%)    Parameters below as of 17 May 2023 02:00  Patient On (Oxygen Delivery Method): ventilator    O2 Concentration (%): 40    I&O's Summary    15 May 2023 07:01  -  16 May 2023 07:00  --------------------------------------------------------  IN: 3220 mL / OUT: 1700 mL / NET: 1520 mL    16 May 2023 07:01  -  17 May 2023 03:09  --------------------------------------------------------  IN: 1370 mL / OUT: 800 mL / NET: 570 mL        PHYSICAL EXAM:  GEN: laying in bed, NAD  NEURO: AOx3 (nods). FC, OE spont, speech intact, face symmetric. +R gaze preference, can cross on L. PERRL. mild R pronator drift. RUE 4 proximally, o/w 5/5 throughout. SILT  CV: RRR +S1/S2  PULM: CTAB  GI: Abd soft, +tender to palpation around umbilical area  EXT: ext warm, dry, nontender  WOUND: SOC site c/d/i, R VPS incision c/d/i    TUBES/LINES:  [] Garsia  [] Lumbar Drain  [] Wound Drains  [] Others      DIET:  [] NPO  [] Mechanical  [x] Tube feeds    LABS:                        8.7    5.46  )-----------( 295      ( 16 May 2023 05:29 )             27.0     05-16    139  |  105  |  10  ----------------------------<  113<H>  4.1   |  25  |  0.50    Ca    9.0      16 May 2023 05:29  Phos  4.5     05-16  Mg     2.2     05-16        Urinalysis Basic - ( 16 May 2023 12:45 )    Color: Yellow / Appearance: Clear / S.025 / pH: x  Gluc: x / Ketone: NEGATIVE  / Bili: Negative / Urobili: 1.0 E.U./dL   Blood: x / Protein: NEGATIVE mg/dL / Nitrite: NEGATIVE   Leuk Esterase: Small / RBC: < 5 /HPF / WBC Many /HPF   Sq Epi: x / Non Sq Epi: x / Bacteria: Many /HPF          CAPILLARY BLOOD GLUCOSE          Drug Levels: [] N/A    CSF Analysis: [] N/A      Allergies    No Known Allergies    Intolerances      MEDICATIONS:  Antibiotics:    Neuro:  acetaminophen   Oral Liquid .. 650 milliGRAM(s) Oral every 6 hours PRN  methylphenidate 10 milliGRAM(s) Oral <User Schedule>  modafinil 100 milliGRAM(s) Oral every 24 hours  oxyCODONE    IR 5 milliGRAM(s) Oral every 4 hours PRN    Anticoagulation:  aspirin  chewable 81 milliGRAM(s) Oral daily  enoxaparin Injectable 40 milliGRAM(s) SubCutaneous every 24 hours    OTHER:  acetylcysteine 10%  Inhalation 4 milliLiter(s) Inhalation every 6 hours PRN  albuterol/ipratropium for Nebulization 3 milliLiter(s) Nebulizer every 6 hours PRN  atorvastatin 80 milliGRAM(s) Oral at bedtime  chlorhexidine 0.12% Liquid 15 milliLiter(s) Oral Mucosa every 12 hours  chlorhexidine 2% Cloths 1 Application(s) Topical daily  lactobacillus acidophilus 1 Tablet(s) Oral daily  pantoprazole  Injectable 40 milliGRAM(s) IV Push at bedtime  polyethylene glycol 3350 17 Gram(s) Oral daily  senna 2 Tablet(s) Oral at bedtime    IVF:  ascorbic acid 500 milliGRAM(s) Oral <User Schedule>  ferrous    sulfate 325 milliGRAM(s) Oral <User Schedule>    CULTURES:  Culture Results:   No growth to date ( @ 14:35)  Culture Results:   No growth to date ( @ 05:06)    RADIOLOGY & ADDITIONAL TESTS:      ASSESSMENT:  58 y/o female found to have acute infarction within the right cerebellar hemisphere, causing herniation. Now s/p SOC foramen magnum decompression and right parietal/occipital EVD placement (). s/p trach (23). s/p PEG (23), s/p dx cerebral angiogram (23). now s/p VPS Certas @ 4 (23).     STROKE    No pertinent family history in first degree relatives    Handoff    Asthma    CAD (coronary artery disease)    Peripheral neuropathy    Cerebellar stroke    Respiratory failure, unspecified with hypoxia    History of DVT (deep vein thrombosis)    Hydrocephalus    Tracheostomy malfunction    Cerebellar stroke    Respiratory failure, unspecified with hypoxia    History of DVT of lower extremity    Hydrocephalus    Tracheostomy malfunction    Delirium    Decompressive craniectomy    Cerebellar infarct    Cerebellar infarct    CAD (coronary artery disease)    Asthma    Peripheral neuropathy    Lupus anticoagulant positive    Anemia due to acute blood loss    Tracheostomy malfunction    Deep vein thrombosis (DVT) of calf muscle vein    Decompressive craniectomy    Insertion, external ventricular drain    Planned tracheostomy    Tracheostomy tube change    Esophagogastroduodenoscopy (EGD) with anesthesia    Insertion, PEG tube, laparoscopic    Angiogram, carotid and cerebral, bilateral    Tracheostomy tube change    IVC filter placement     shunt    Insertion, ventriculopleural shunt    S/P total abdominal hysterectomy    S/P cholecystectomy    S/P right knee surgery    Insertion, external ventricular drain    Planned tracheostomy    Esophagogastroduodenoscopy (EGD) with anesthesia    Insertion, PEG tube, laparoscopic     shunt    CAD (coronary artery disease)    Asthma    Peripheral neuropathy    Lupus anticoagulant positive    Acute respiratory failure with hypoxia    Acute respiratory failure with hypoxia    Dysphagia    Deep vein thrombosis (DVT)    Hydrocephalus    [email or web address]        PLAN:  Neuro   - Vitals q4h/neuro q4h   - s/p VPS (Certas @ 4)   - dx angio : b/l cavernous ICA aneurysms, as discussed at vascular conference f/u outpt   - CTH  stable;  new gas in right temporal horn, CT  chio increased vent size,  stable   - Stroke consulted, appreciate reccs   - ASA81  - Pain control with oxy 5 prn  - Ritalin 10 BID, Modafinil 100mg qd for neurostimulation    Cardio  - SBP   - TTE : negative for PFO, mild LVH, mild dilation of L atrium, EF 55-60%   - SALVADOR deferred, not indicated as it will not  at this time for starting patient on anticoagulation. Will reassess if indicated medically.     Pulm  - + Trach (6 shiley) VC 40/400/16/6  - Chest PT, duoneb, mucomist q 6 prn     GI  - + PEG wih TF (Jevity 1.2)  - last BM 5/15  - Protonix for GI ppx while on vent  - RLQ US - no acute pathology  - CT A/P 5/15: no acute pathology    Renal  - IVL  - Voiding via primafit     Endo   - cont lipitor     Heme  - SQL for DVT ppx, No SCDs  - s/p IVCF    - LE dopplers  neg for DVT, but showing superficial venous thrombosis in the proximal portion of the right greater saphenous vein; repeat on  with persistant superficial thrombus, 5/3 new DVT L IM calf and unchanged R superficial vein thrombus  - Heme following for + anticardiolipin Ab , recs appreciated, f/u activated protein C    - Iron and vitamin c every other day     ID  - CSF sent from OR ()   - MRSA (-), +UA cx ESBL, +sputum cx pluralibacter gergoviae, strep pneumoniae, s/p Ertapenem 1g Q24hrs (-)    PSYCH  - behavioral health consulted for tearfulness, rec   DISPO:  - NSICU, full code   - PT/OT rec AR patient can tolerate 3 hrs PT/OT daily    D/w Dr. Valadez and Dr. Bueno      Assessment:  Present when checked    []  GCS  E   V  M     Heart Failure: []Acute, [] acute on chronic , []chronic  Heart Failure:  [] Diastolic (HFpEF), [] Systolic (HFrEF), []Combined (HFpEF and HFrEF), [] RHF, [] Pulm HTN, [] Other    [] MAMTA, [] ATN, [] AIN, [] other  [] CKD1, [] CKD2, [] CKD 3, [] CKD 4, [] CKD 5, []ESRD    Encephalopathy: [] Metabolic, [] Hepatic, [] toxic, [] Neurological, [] Other    Abnormal Nurtitional Status: [] malnurtition (see nutrition note), [ ]underweight: BMI < 19, [] morbid obesity: BMI >40, [] Cachexia    [] Sepsis  [] hypovolemic shock,[] cardiogenic shock, [] hemorrhagic shock, [] neuogenic shock  [] Acute Respiratory Failure  []Cerebral edema, [] Brain compression/ herniation,   [] Functional quadriplegia  [] Acute blood loss anemia

## 2023-05-17 NOTE — CONSULT NOTE ADULT - SUBJECTIVE AND OBJECTIVE BOX
HPI:  58 yo F with PMH of Asthma, CAD (not on  meds), peripheral neuropathy and PSH of BATOOL, cholecystectomy, right knee surgery presented to [place] ED on  with right sided weakness and right facial numbness. Patient was undergoing preparation for colonoscopy. As per daughter at 8am patient was playing with her grandchildren but around 840 am patient was getting dressed and fell and subsequently felt weak after. Daughter reports that patient had some episodes of vomiting at this time. She had a syncopal episode at around 10 am and was witnessed by brother. No head trauma, She regained conscious after few minutes. She remained in bed for the remainder of the day. Daughter reports some slurred speech noted 1230-1PM and also was confused after. and around 4PM, the patient's sister noted right sided weakness at which time EMS was called and patient was brought to ED. No c/o chest pain, shortness of breath, fever, headache, abdominal pain, urinary complaints. No recent travel/sickness/ change in meds. Stroke code in ER: CTH neg for heme, Right PICA distribution acute infarction. CTA with saccular aneurysms of b/l carotids, approximately 0.8 cm on the right and 1.2 x 0.9 cm on the left. Possible tiny third saccular aneurysm on the right Posterior intracranial circulation: Right vertebral arterial occlusion at its dural crossing junction V3 Atlantic and V4 intracranial segments with likely reversal of flow in its intracranial segment from the basilar. Right PICA faintly seen. 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  Not candidate for TNK/mechanical thrombectomy. CT scan repeated which showed: 1. Brain: Progression of acute infarction within the right cerebellar hemisphere, also extending into the left superior cerebellar hemisphere. New mass effect on the fourth ventricle causing stenosis versus occlusion with new third and lateral ventricular dilatation indicating ventricular obstruction at the level of the fourth ventricle. New upward and downward herniation of cerebellar parenchyma Right carotid system: No hemodynamically significant stenosis. Left carotid system: No hemodynamically significant stenosis. Vertebral circulation: Patent. Anterior intracranial circulation: Intact. Bilateral internal carotid saccular aneurysms. These findings are unchanged. Posterior intracranial circulation:    Improved flow within the right vertebral artery since 2023. New focal stenosis mid left vertebral artery, etiology uncertain, consider vasospasm and extrinsic compression in addition to new embolic disease. Brain perfusion: Perfusion images demonstrate normalization of the perfusion abnormality in the right cerebellar hemisphere present on 2023 despite evidence of progression of acute infarction.  Areas of apparent ischemia within the posterior fossa have progressed in extent, also again involving the left posterior cerebral arterial distribution. Tx to [facility] for SOC watch. On admission to [facility], NIHSS 12.  (2023 16:50)    : above noted. Transferred to [facility] for acute infarction within the right cerebellar hemisphere, causing herniation. Now s/p SOC foramen magnum decompression and right parietal/occipital EVD placement (). s/p trach (23). s/p PEG (23), s/p dx cerebral angiogram (23). now s/p VPS Certas @ 4 (23). Called to evaluate abnormal CT findings in bladder:  2.0 x 0.7 cm hyperattenuating focus along the left   posterior bladder wall in the region of the ureterovesical junction ? layering calculi versus urothelial mass.      PAST MEDICAL & SURGICAL HISTORY:  Asthma      CAD (coronary artery disease)      Peripheral neuropathy      S/P total abdominal hysterectomy      S/P cholecystectomy      S/P right knee surgery          MEDICATIONS  (STANDING):  ascorbic acid 500 milliGRAM(s) Oral <User Schedule>  aspirin  chewable 81 milliGRAM(s) Oral daily  atorvastatin 80 milliGRAM(s) Oral at bedtime  chlorhexidine 0.12% Liquid 15 milliLiter(s) Oral Mucosa every 12 hours  chlorhexidine 2% Cloths 1 Application(s) Topical daily  enoxaparin Injectable 40 milliGRAM(s) SubCutaneous every 24 hours  ferrous    sulfate 325 milliGRAM(s) Oral <User Schedule>  lactobacillus acidophilus 1 Tablet(s) Oral daily  methylphenidate 10 milliGRAM(s) Oral <User Schedule>  pantoprazole  Injectable 40 milliGRAM(s) IV Push at bedtime  polyethylene glycol 3350 17 Gram(s) Oral daily  senna 2 Tablet(s) Oral at bedtime    MEDICATIONS  (PRN):  acetaminophen   Oral Liquid .. 650 milliGRAM(s) Oral every 6 hours PRN Temp greater or equal to 38.5C (101.3F), Mild Pain (1 - 3)  acetylcysteine 10%  Inhalation 4 milliLiter(s) Inhalation every 6 hours PRN increased secretions  albuterol/ipratropium for Nebulization 3 milliLiter(s) Nebulizer every 6 hours PRN Respiratory Distress  oxyCODONE    IR 5 milliGRAM(s) Oral every 4 hours PRN Moderate Pain (4 - 6)      Allergies    No Known Allergies    Intolerances        SOCIAL HISTORY:    FAMILY HISTORY:  No pertinent family history in first degree relatives        Vital Signs Last 24 Hrs  T(C): 37.3 (17 May 2023 09:08), Max: 37.5 (17 May 2023 00:47)  T(F): 99.1 (17 May 2023 09:08), Max: 99.5 (17 May 2023 00:47)  HR: 70 (17 May 2023 12:00) (61 - 75)  BP: 119/66 (17 May 2023 12:00) (92/58 - 128/62)  BP(mean): 89 (17 May 2023 12:00) (67 - 89)  RR: 21 (17 May 2023 12:00) (15 - 23)  SpO2: 99% (17 May 2023 12:00) (95% - 100%)    Parameters below as of 17 May 2023 12:00  Patient On (Oxygen Delivery Method): ventilator    O2 Concentration (%): 40    On PE:  General:  Abdomen:    :    EXT:    LABS:                        9.0    4.52  )-----------( 275      ( 17 May 2023 04:38 )             27.8     05-17    140  |  106  |  11  ----------------------------<  155<H>  3.8   |  28  |  0.49<L>    Ca    8.6      17 May 2023 04:38  Phos  4.1     05-17  Mg     2.1     05-17        Urinalysis Basic - ( 16 May 2023 12:45 )    Color: Yellow / Appearance: Clear / S.025 / pH: x  Gluc: x / Ketone: NEGATIVE  / Bili: Negative / Urobili: 1.0 E.U./dL   Blood: x / Protein: NEGATIVE mg/dL / Nitrite: NEGATIVE   Leuk Esterase: Small / RBC: < 5 /HPF / WBC Many /HPF   Sq Epi: x / Non Sq Epi: x / Bacteria: Many /HPF        RADIOLOGY & ADDITIONAL STUDIES:< from: CT Abdomen and Pelvis w/ Oral Cont and w/ IV Cont (05.15.23 @ 00:20) >    INTERPRETATION:  CLINICAL INFORMATION: Abdominal pain, right lower   quadrant tenderness.    COMPARISON: Abdominal ultrasound 2023    CONTRAST/COMPLICATIONS:  IV Contrast: Isovue 370  97 cc administered   3 cc discarded  Oral Contrast: NONE  Complications: None reported at time of study completion    PROCEDURE:  CT of the Abdomen and Pelvis was performed.  Sagittal and coronal reformats were performed.    FINDINGS:  LOWER CHEST: Left lower lobe endobronchial mucous underlying   consolidation, may be combination of atelectasis and aspiration pneumonia.    LIVER: Within normal limits.  BILE DUCTS: Mildly dilated common bile duct, likely postoperative due to   cholecystectomy.  GALLBLADDER: Cholecystectomy.  SPLEEN: Within normal limits.  PANCREAS: Withinnormal limits.  ADRENALS: 1.2 cm hyperattenuating left adrenal nodule, indeterminate.   Right adrenal is normal.  KIDNEYS/URETERS: 1.2 cm calculus in the upper pole of the left kidney. No   hydronephrosis. 1.5 cm cyst upper pole right kidney. Subcentimeter   hypodensity in the lower pole of the left kidney is too small to   characterize.    BLADDER: There is a 2.0 x 0.7 cm hyperattenuating focus along the left   posterior bladder wall in the region of the ureterovesical junction.  REPRODUCTIVE ORGANS: Hysterectomy.    BOWEL: Percutaneous gastrostomy tube with tip in the stomach. No bowel   obstruction. The appendix is increased in caliber up to 1.0 cm,   containing fecal material and air. No intraluminal fluid or   appendicolith, wall thickening, or periappendiceal fat stranding.  PERITONEUM: No ascites.  VESSELS: IVC filter. Mild atherosclerotic changes of the aorta.  RETROPERITONEUM/LYMPH NODES: No lymphadenopathy.  ABDOMINAL WALL: Postsurgical change anterior abdominal wall/umbilicus.  BONES: Within normal limits.    IMPRESSION:    No evidence of appendicitis.    Left lower lobe endobronchial mucous underlying consolidation, may be   combination of atelectasis and aspiration pneumonia.    Hyperattenuating focus along the left posterior urinary bladder wall may   represent layering calculi versus mass. Recommend correlation with   urinalysis and/or cystoscopy to exclude underlying urothelial mass.    Indeterminate left adrenal nodule, likely incidental. There is a   characteristic with MRI or follow-up on subsequent imaging.          < end of copied text >   HPI:  56 yo F with PMH of Asthma, CAD (not on  meds), peripheral neuropathy and PSH of BATOOL, cholecystectomy, right knee surgery presented to [place] ED on  with right sided weakness and right facial numbness. Patient was undergoing preparation for colonoscopy. As per daughter at 8am patient was playing with her grandchildren but around 840 am patient was getting dressed and fell and subsequently felt weak after. Daughter reports that patient had some episodes of vomiting at this time. She had a syncopal episode at around 10 am and was witnessed by brother. No head trauma, She regained conscious after few minutes. She remained in bed for the remainder of the day. Daughter reports some slurred speech noted 1230-1PM and also was confused after. and around 4PM, the patient's sister noted right sided weakness at which time EMS was called and patient was brought to ED. No c/o chest pain, shortness of breath, fever, headache, abdominal pain, urinary complaints. No recent travel/sickness/ change in meds. Stroke code in ER: CTH neg for heme, Right PICA distribution acute infarction. CTA with saccular aneurysms of b/l carotids, approximately 0.8 cm on the right and 1.2 x 0.9 cm on the left. Possible tiny third saccular aneurysm on the right Posterior intracranial circulation: Right vertebral arterial occlusion at its dural crossing junction V3 Atlantic and V4 intracranial segments with likely reversal of flow in its intracranial segment from the basilar. Right PICA faintly seen. 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  Not candidate for TNK/mechanical thrombectomy. CT scan repeated which showed: 1. Brain: Progression of acute infarction within the right cerebellar hemisphere, also extending into the left superior cerebellar hemisphere. New mass effect on the fourth ventricle causing stenosis versus occlusion with new third and lateral ventricular dilatation indicating ventricular obstruction at the level of the fourth ventricle. New upward and downward herniation of cerebellar parenchyma Right carotid system: No hemodynamically significant stenosis. Left carotid system: No hemodynamically significant stenosis. Vertebral circulation: Patent. Anterior intracranial circulation: Intact. Bilateral internal carotid saccular aneurysms. These findings are unchanged. Posterior intracranial circulation:    Improved flow within the right vertebral artery since 2023. New focal stenosis mid left vertebral artery, etiology uncertain, consider vasospasm and extrinsic compression in addition to new embolic disease. Brain perfusion: Perfusion images demonstrate normalization of the perfusion abnormality in the right cerebellar hemisphere present on 2023 despite evidence of progression of acute infarction.  Areas of apparent ischemia within the posterior fossa have progressed in extent, also again involving the left posterior cerebral arterial distribution. Tx to [facility] for SOC watch. On admission to [facility], NIHSS 12.  (2023 16:50)    : above noted. Transferred to [facility] for acute infarction within the right cerebellar hemisphere, causing herniation. Now s/p SOC foramen magnum decompression and right parietal/occipital EVD placement (). s/p trach (23). s/p PEG (23), s/p dx cerebral angiogram (23). now s/p VPS Certas @ 4 (23). Called to evaluate abnormal CT findings in bladder:  2.0 x 0.7 cm hyperattenuating focus along the left   posterior bladder wall in the region of the ureterovesical junction ? layering calculi versus urothelial mass.  Unable to obtain history from Pt- on vent. Spoke with pt's daughter- Yanna. Patient with no smoking history. No history kidney stones. +h/o UTI in past. Patient PTA did c/o on and off discomfort lower abdomen. No dysuria/hematuria.       PAST MEDICAL & SURGICAL HISTORY:  Asthma      CAD (coronary artery disease)      Peripheral neuropathy      S/P total abdominal hysterectomy      S/P cholecystectomy      S/P right knee surgery          MEDICATIONS  (STANDING):  ascorbic acid 500 milliGRAM(s) Oral <User Schedule>  aspirin  chewable 81 milliGRAM(s) Oral daily  atorvastatin 80 milliGRAM(s) Oral at bedtime  chlorhexidine 0.12% Liquid 15 milliLiter(s) Oral Mucosa every 12 hours  chlorhexidine 2% Cloths 1 Application(s) Topical daily  enoxaparin Injectable 40 milliGRAM(s) SubCutaneous every 24 hours  ferrous    sulfate 325 milliGRAM(s) Oral <User Schedule>  lactobacillus acidophilus 1 Tablet(s) Oral daily  methylphenidate 10 milliGRAM(s) Oral <User Schedule>  pantoprazole  Injectable 40 milliGRAM(s) IV Push at bedtime  polyethylene glycol 3350 17 Gram(s) Oral daily  senna 2 Tablet(s) Oral at bedtime    MEDICATIONS  (PRN):  acetaminophen   Oral Liquid .. 650 milliGRAM(s) Oral every 6 hours PRN Temp greater or equal to 38.5C (101.3F), Mild Pain (1 - 3)  acetylcysteine 10%  Inhalation 4 milliLiter(s) Inhalation every 6 hours PRN increased secretions  albuterol/ipratropium for Nebulization 3 milliLiter(s) Nebulizer every 6 hours PRN Respiratory Distress  oxyCODONE    IR 5 milliGRAM(s) Oral every 4 hours PRN Moderate Pain (4 - 6)      Allergies    No Known Allergies    Intolerances        SOCIAL HISTORY:    FAMILY HISTORY:  No pertinent family history in first degree relatives        Vital Signs Last 24 Hrs  T(C): 37.3 (17 May 2023 09:08), Max: 37.5 (17 May 2023 00:47)  T(F): 99.1 (17 May 2023 09:08), Max: 99.5 (17 May 2023 00:47)  HR: 70 (17 May 2023 12:00) (61 - 75)  BP: 119/66 (17 May 2023 12:00) (92/58 - 128/62)  BP(mean): 89 (17 May 2023 12:00) (67 - 89)  RR: 21 (17 May 2023 12:00) (15 - 23)  SpO2: 99% (17 May 2023 12:00) (95% - 100%)    Parameters below as of 17 May 2023 12:00  Patient On (Oxygen Delivery Method): ventilator    O2 Concentration (%): 40    On PE:  General: Awake, responsive  Abdomen: soft, PEG intact, +discomfort lower abdomen. No rebound/guarding  : Primafit intact, urine clear         LABS:                        9.0    4.52  )-----------( 275      ( 17 May 2023 04:38 )             27.8     05-17    140  |  106  |  11  ----------------------------<  155<H>  3.8   |  28  |  0.49<L>    Ca    8.6      17 May 2023 04:38  Phos  4.1     05-17  Mg     2.1     05-17        Urinalysis Basic - ( 16 May 2023 12:45 )    Color: Yellow / Appearance: Clear / S.025 / pH: x  Gluc: x / Ketone: NEGATIVE  / Bili: Negative / Urobili: 1.0 E.U./dL   Blood: x / Protein: NEGATIVE mg/dL / Nitrite: NEGATIVE   Leuk Esterase: Small / RBC: < 5 /HPF / WBC Many /HPF   Sq Epi: x / Non Sq Epi: x / Bacteria: Many /HPF        RADIOLOGY & ADDITIONAL STUDIES:< from: CT Abdomen and Pelvis w/ Oral Cont and w/ IV Cont (05.15.23 @ 00:20) >    INTERPRETATION:  CLINICAL INFORMATION: Abdominal pain, right lower   quadrant tenderness.    COMPARISON: Abdominal ultrasound 2023    CONTRAST/COMPLICATIONS:  IV Contrast: Isovue 370  97 cc administered   3 cc discarded  Oral Contrast: NONE  Complications: None reported at time of study completion    PROCEDURE:  CT of the Abdomen and Pelvis was performed.  Sagittal and coronal reformats were performed.    FINDINGS:  LOWER CHEST: Left lower lobe endobronchial mucous underlying   consolidation, may be combination of atelectasis and aspiration pneumonia.    LIVER: Within normal limits.  BILE DUCTS: Mildly dilated common bile duct, likely postoperative due to   cholecystectomy.  GALLBLADDER: Cholecystectomy.  SPLEEN: Within normal limits.  PANCREAS: Withinnormal limits.  ADRENALS: 1.2 cm hyperattenuating left adrenal nodule, indeterminate.   Right adrenal is normal.  KIDNEYS/URETERS: 1.2 cm calculus in the upper pole of the left kidney. No   hydronephrosis. 1.5 cm cyst upper pole right kidney. Subcentimeter   hypodensity in the lower pole of the left kidney is too small to   characterize.    BLADDER: There is a 2.0 x 0.7 cm hyperattenuating focus along the left   posterior bladder wall in the region of the ureterovesical junction.  REPRODUCTIVE ORGANS: Hysterectomy.    BOWEL: Percutaneous gastrostomy tube with tip in the stomach. No bowel   obstruction. The appendix is increased in caliber up to 1.0 cm,   containing fecal material and air. No intraluminal fluid or   appendicolith, wall thickening, or periappendiceal fat stranding.  PERITONEUM: No ascites.  VESSELS: IVC filter. Mild atherosclerotic changes of the aorta.  RETROPERITONEUM/LYMPH NODES: No lymphadenopathy.  ABDOMINAL WALL: Postsurgical change anterior abdominal wall/umbilicus.  BONES: Within normal limits.    IMPRESSION:    No evidence of appendicitis.    Left lower lobe endobronchial mucous underlying consolidation, may be   combination of atelectasis and aspiration pneumonia.    Hyperattenuating focus along the left posterior urinary bladder wall may   represent layering calculi versus mass. Recommend correlation with   urinalysis and/or cystoscopy to exclude underlying urothelial mass.    Indeterminate left adrenal nodule, likely incidental. There is a   characteristic with MRI or follow-up on subsequent imaging.          < end of copied text >

## 2023-05-17 NOTE — CHART NOTE - NSCHARTNOTEFT_GEN_A_CORE
Modafinil increased. CTH completed, . Urology consulted for bladder calculi vs mass, pending urine cytology study and outpatient urology follow-up for cystoscopy. Trialing SIMV for vent weaning. Neuro exam stable.

## 2023-05-18 LAB
ANION GAP SERPL CALC-SCNC: 6 MMOL/L — SIGNIFICANT CHANGE UP (ref 5–17)
BUN SERPL-MCNC: 12 MG/DL — SIGNIFICANT CHANGE UP (ref 7–23)
CALCIUM SERPL-MCNC: 9 MG/DL — SIGNIFICANT CHANGE UP (ref 8.4–10.5)
CHLORIDE SERPL-SCNC: 104 MMOL/L — SIGNIFICANT CHANGE UP (ref 96–108)
CO2 SERPL-SCNC: 28 MMOL/L — SIGNIFICANT CHANGE UP (ref 22–31)
CREAT SERPL-MCNC: 0.44 MG/DL — LOW (ref 0.5–1.3)
EGFR: 113 ML/MIN/1.73M2 — SIGNIFICANT CHANGE UP
GLUCOSE SERPL-MCNC: 145 MG/DL — HIGH (ref 70–99)
HCT VFR BLD CALC: 28.7 % — LOW (ref 34.5–45)
HGB BLD-MCNC: 9.1 G/DL — LOW (ref 11.5–15.5)
MAGNESIUM SERPL-MCNC: 2.2 MG/DL — SIGNIFICANT CHANGE UP (ref 1.6–2.6)
MCHC RBC-ENTMCNC: 29.3 PG — SIGNIFICANT CHANGE UP (ref 27–34)
MCHC RBC-ENTMCNC: 31.7 GM/DL — LOW (ref 32–36)
MCV RBC AUTO: 92.3 FL — SIGNIFICANT CHANGE UP (ref 80–100)
NRBC # BLD: 0 /100 WBCS — SIGNIFICANT CHANGE UP (ref 0–0)
PHOSPHATE SERPL-MCNC: 4.6 MG/DL — HIGH (ref 2.5–4.5)
PLATELET # BLD AUTO: 281 K/UL — SIGNIFICANT CHANGE UP (ref 150–400)
POTASSIUM SERPL-MCNC: 4.2 MMOL/L — SIGNIFICANT CHANGE UP (ref 3.5–5.3)
POTASSIUM SERPL-SCNC: 4.2 MMOL/L — SIGNIFICANT CHANGE UP (ref 3.5–5.3)
RBC # BLD: 3.11 M/UL — LOW (ref 3.8–5.2)
RBC # FLD: 13.2 % — SIGNIFICANT CHANGE UP (ref 10.3–14.5)
SODIUM SERPL-SCNC: 138 MMOL/L — SIGNIFICANT CHANGE UP (ref 135–145)
WBC # BLD: 3.77 K/UL — LOW (ref 3.8–10.5)
WBC # FLD AUTO: 3.77 K/UL — LOW (ref 3.8–10.5)

## 2023-05-18 PROCEDURE — 99291 CRITICAL CARE FIRST HOUR: CPT

## 2023-05-18 RX ORDER — AMANTADINE HCL 100 MG
100 CAPSULE ORAL EVERY 12 HOURS
Refills: 0 | Status: DISCONTINUED | OUTPATIENT
Start: 2023-05-18 | End: 2023-05-23

## 2023-05-18 RX ORDER — ENOXAPARIN SODIUM 100 MG/ML
80 INJECTION SUBCUTANEOUS EVERY 12 HOURS
Refills: 0 | Status: DISCONTINUED | OUTPATIENT
Start: 2023-05-18 | End: 2023-05-18

## 2023-05-18 RX ORDER — ENOXAPARIN SODIUM 100 MG/ML
80 INJECTION SUBCUTANEOUS EVERY 12 HOURS
Refills: 0 | Status: DISCONTINUED | OUTPATIENT
Start: 2023-05-18 | End: 2023-05-25

## 2023-05-18 RX ADMIN — Medication 100 MILLIGRAM(S): at 17:42

## 2023-05-18 RX ADMIN — OXYCODONE HYDROCHLORIDE 5 MILLIGRAM(S): 5 TABLET ORAL at 18:45

## 2023-05-18 RX ADMIN — OXYCODONE HYDROCHLORIDE 5 MILLIGRAM(S): 5 TABLET ORAL at 17:43

## 2023-05-18 RX ADMIN — SENNA PLUS 2 TABLET(S): 8.6 TABLET ORAL at 21:16

## 2023-05-18 RX ADMIN — ATORVASTATIN CALCIUM 80 MILLIGRAM(S): 80 TABLET, FILM COATED ORAL at 21:16

## 2023-05-18 RX ADMIN — MODAFINIL 200 MILLIGRAM(S): 200 TABLET ORAL at 05:23

## 2023-05-18 RX ADMIN — Medication 650 MILLIGRAM(S): at 19:41

## 2023-05-18 RX ADMIN — CHLORHEXIDINE GLUCONATE 15 MILLILITER(S): 213 SOLUTION TOPICAL at 05:23

## 2023-05-18 RX ADMIN — CHLORHEXIDINE GLUCONATE 15 MILLILITER(S): 213 SOLUTION TOPICAL at 17:41

## 2023-05-18 RX ADMIN — ENOXAPARIN SODIUM 80 MILLIGRAM(S): 100 INJECTION SUBCUTANEOUS at 12:42

## 2023-05-18 RX ADMIN — PANTOPRAZOLE SODIUM 40 MILLIGRAM(S): 20 TABLET, DELAYED RELEASE ORAL at 21:16

## 2023-05-18 RX ADMIN — Medication 100 MILLIGRAM(S): at 10:13

## 2023-05-18 RX ADMIN — Medication 10 MILLIGRAM(S): at 14:38

## 2023-05-18 RX ADMIN — CHLORHEXIDINE GLUCONATE 1 APPLICATION(S): 213 SOLUTION TOPICAL at 12:35

## 2023-05-18 RX ADMIN — Medication 10 MILLIGRAM(S): at 05:22

## 2023-05-18 NOTE — PROGRESS NOTE ADULT - SUBJECTIVE AND OBJECTIVE BOX
NSCU ATTENDING -- ADDITIONAL PROGRESS NOTE    Nighttime rounds were performed -- please refer to earlier Progress Note for HPI details.      ICU Vital Signs Last 24 Hrs  T(C): 37.1 (18 May 2023 18:08), Max: 37.7 (17 May 2023 21:42)  T(F): 98.8 (18 May 2023 18:08), Max: 99.9 (17 May 2023 21:42)  HR: 69 (18 May 2023 18:00) (57 - 79)  BP: 103/72 (18 May 2023 18:00) (85/44 - 116/56)  BP(mean): 84 (18 May 2023 18:00) (60 - 86)  RR: 18 (18 May 2023 18:00) (10 - 25)  SpO2: 99% (18 May 2023 18:00) (97% - 100%)      05-17-23 @ 07:01  -  05-18-23 @ 07:00  --------------------------------------------------------  IN: 1400 mL / OUT: 800 mL / NET: 600 mL    05-18-23 @ 07:01  -  05-18-23 @ 19:15  --------------------------------------------------------  IN: 600 mL / OUT: 650 mL / NET: -50 mL      Mode: CPAP with PS, FiO2: 40, PEEP: 6, PS: 12, MAP: 8, PIP: 14      PHYSICAL EXAMINATION  NEUROLOGIC EXAMINATION: Patient awake, alert, oriented x3, FC, R gaze preference can overcome  RUE R 4/5 RLE 4/5 LUE 5/5  L LE 5/5  EENT: Anicteric  CARDIOVASCULAR: (+) S1 S2, normal rate and regular rhythm  PULMONARY: Clear to auscultation bilaterally  ABDOMEN: Soft, with normoactive bowel sounds, tenderness periumbilical with mild guarding  EXTREMITIES: No edema; good distal pulses      MEDICATIONS:   acetaminophen   Oral Liquid .. 650 milliGRAM(s) Oral every 6 hours PRN  acetylcysteine 10%  Inhalation 4 milliLiter(s) Inhalation every 6 hours PRN  albuterol/ipratropium for Nebulization 3 milliLiter(s) Nebulizer every 6 hours PRN  amantadine Syrup 100 milliGRAM(s) Oral every 12 hours  ascorbic acid 500 milliGRAM(s) Oral <User Schedule>  atorvastatin 80 milliGRAM(s) Oral at bedtime  chlorhexidine 0.12% Liquid 15 milliLiter(s) Oral Mucosa every 12 hours  chlorhexidine 2% Cloths 1 Application(s) Topical daily  enoxaparin Injectable 80 milliGRAM(s) SubCutaneous every 12 hours  ferrous    sulfate 325 milliGRAM(s) Oral <User Schedule>  methylphenidate 10 milliGRAM(s) Oral <User Schedule>  modafinil 200 milliGRAM(s) Oral every 24 hours  oxyCODONE    IR 5 milliGRAM(s) Oral every 4 hours PRN  pantoprazole  Injectable 40 milliGRAM(s) IV Push at bedtime  polyethylene glycol 3350 17 Gram(s) Oral daily  senna 2 Tablet(s) Oral at bedtime    LABS:                      9.1    3.77  )-----------( 281      ( 18 May 2023 04:25 )             28.7     05-18    138  |  104  |  12  ----------------------------<  145<H>  4.2   |  28  |  0.44<L>    Ca    9.0      18 May 2023 04:25  Phos  4.6     05-18  Mg     2.2     05-18        ASSESSMENT/PLAN:   56y/o F with:    Acute CVA, R cerebellar, brain compression cerebral edema, s/p SOC  UTI ESBL  Acute respiratory failure, bulbar dysfunction  Asthma  CAD  Peripheral neuropathy  Superficial thrombosis, proximal R greater saphenous  Prediabetic     PLAN:   NEURO: Neurochecks Q4h, PRN analgesia  S/P VPS POD 10  CT head 5/18 subocciptial pseudomeningocele, R SD hygroma. Monitor.   Stroke neurology following.   Continue atorvastatin  CPAP/SIMV as tolerated  Wean stimulants when appropriate  SBP goal 90-160mm Hg; off antiHTN  Left lower lobe endobronchial mucous ?atelectasis vs aspiration PNA. Aggressive suctioning and pulm toilet  Blood sugar goals 140-180 mg/dL, off insulin sliding scale. Left adrenal incidentaloma. Need outpatient MRI  RLQ Abd pain: CT abd/pelvis: No evidence of appendicitis. ?Layering calculi versus mass. Urology consulted. Urine cytology.   LBM: 5/17  General surgery aware of abd pain. Doubt acute abdomen or any surgical pathology.  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 Heme following. On full dose lovenox  Continue ferrous sulfate  VTE Prophylaxis: SCD, s/p IVC filter; retrievable. IVC to remain until assured tolerability of full AC. On lovenox 80mg bid.   Monitor antiXa levels  Afebrile, no leukocytosis    Additional critical care time: 45 minutes       NSCU ATTENDING -- ADDITIONAL PROGRESS NOTE    Nighttime rounds were performed -- please refer to earlier Progress Note for HPI details.      ICU Vital Signs Last 24 Hrs  T(C): 37.1 (18 May 2023 18:08), Max: 37.7 (17 May 2023 21:42)  T(F): 98.8 (18 May 2023 18:08), Max: 99.9 (17 May 2023 21:42)  HR: 69 (18 May 2023 18:00) (57 - 79)  BP: 103/72 (18 May 2023 18:00) (85/44 - 116/56)  BP(mean): 84 (18 May 2023 18:00) (60 - 86)  RR: 18 (18 May 2023 18:00) (10 - 25)  SpO2: 99% (18 May 2023 18:00) (97% - 100%)      05-17-23 @ 07:01  -  05-18-23 @ 07:00  --------------------------------------------------------  IN: 1400 mL / OUT: 800 mL / NET: 600 mL    05-18-23 @ 07:01  -  05-18-23 @ 19:15  --------------------------------------------------------  IN: 600 mL / OUT: 650 mL / NET: -50 mL      Mode: CPAP with PS, FiO2: 40, PEEP: 6, PS: 12, MAP: 8, PIP: 14      PHYSICAL EXAMINATION  NEUROLOGIC EXAMINATION: Patient awake, alert, oriented x3, FC, R gaze preference can overcome  RUE R 4/5 RLE 4/5 LUE 5/5  L LE 5/5  EENT: Anicteric  CARDIOVASCULAR: (+) S1 S2, normal rate and regular rhythm  PULMONARY: Clear to auscultation bilaterally  ABDOMEN: Soft, with normoactive bowel sounds, tenderness periumbilical with mild guarding  EXTREMITIES: No edema; good distal pulses      MEDICATIONS:   acetaminophen   Oral Liquid .. 650 milliGRAM(s) Oral every 6 hours PRN  acetylcysteine 10%  Inhalation 4 milliLiter(s) Inhalation every 6 hours PRN  albuterol/ipratropium for Nebulization 3 milliLiter(s) Nebulizer every 6 hours PRN  amantadine Syrup 100 milliGRAM(s) Oral every 12 hours  ascorbic acid 500 milliGRAM(s) Oral <User Schedule>  atorvastatin 80 milliGRAM(s) Oral at bedtime  chlorhexidine 0.12% Liquid 15 milliLiter(s) Oral Mucosa every 12 hours  chlorhexidine 2% Cloths 1 Application(s) Topical daily  enoxaparin Injectable 80 milliGRAM(s) SubCutaneous every 12 hours  ferrous    sulfate 325 milliGRAM(s) Oral <User Schedule>  methylphenidate 10 milliGRAM(s) Oral <User Schedule>  modafinil 200 milliGRAM(s) Oral every 24 hours  oxyCODONE    IR 5 milliGRAM(s) Oral every 4 hours PRN  pantoprazole  Injectable 40 milliGRAM(s) IV Push at bedtime  polyethylene glycol 3350 17 Gram(s) Oral daily  senna 2 Tablet(s) Oral at bedtime    LABS:                      9.1    3.77  )-----------( 281      ( 18 May 2023 04:25 )             28.7     05-18    138  |  104  |  12  ----------------------------<  145<H>  4.2   |  28  |  0.44<L>    Ca    9.0      18 May 2023 04:25  Phos  4.6     05-18  Mg     2.2     05-18        ASSESSMENT/PLAN:   56y/o F with:    Acute CVA, R cerebellar, brain compression cerebral edema, s/p SOC  UTI ESBL  Acute respiratory failure, bulbar dysfunction  Asthma  CAD  Peripheral neuropathy  Superficial thrombosis, proximal R greater saphenous  Prediabetic     PLAN:   NEURO: Neurochecks Q4h, PRN analgesia  S/P VPS POD 10  CT head 5/18 subocciptial pseudomeningocele, R SD hygroma. Monitor.   Stroke neurology following. Continue atorvastatin  Wean stimulants when appropriate  CPAP/SIMV as tolerated  Left lower lobe endobronchial mucous ?atelectasis vs aspiration PNA. Aggressive suctioning and pulm toilet  SBP goal 90-160mm Hg; off antiHTN  Blood sugar goals 140-180 mg/dL, off insulin sliding scale. Left adrenal incidentaloma. Need outpatient MRI  RLQ Abd pain: CT abd/pelvis: No evidence of appendicitis. ?Layering calculi versus mass. Urology consulted. Urine cytology.   LBM: 5/17  General surgery aware of abd pain. Doubt acute abdomen or any surgical pathology.  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 Heme following. On full dose lovenox. ASA Dced.   Continue ferrous sulfate  VTE Prophylaxis: SCD, s/p IVC filter; retrievable. IVC to remain until assured tolerability of full AC. On lovenox 80mg bid.   Monitor antiXa levels on full dose lovenox  Afebrile, no leukocytosis    Additional critical care time: 45 minutes

## 2023-05-18 NOTE — PROGRESS NOTE ADULT - NSPROGADDITIONALINFOA_GEN_ALL_CORE
Stroke Fellow Attestation    57F w/ PMH of Asthma, CAD, HTN, prior miscarriage x3, peripheral neuropathy, presented w/ right sided weakness and facial numbness on 4/20 found to have bilateral cerebellar infarctions (R. and L. PICA) s/p posterior fossa craniectomy and s/p PEG and Trach. Catheter angiogram demonstrated bilateral cavernous ICA aneurysms but no atherosclerotic disease demonstrated.     On exam (5/18/23): Patient trached; Eyes open, tracks examiner, follows all simple commands, R. ERNESTO, moving all extremities spontaneously at least antigravity, dysmetria on FNF in both UEs.     TTE mild LA enlargement  A1c 5.9  LDL 92  DRVVT negative (04/29/23) while off heparin products. Anti-rzga8jwdqbhqnhtye screen (04/25) negative. Hexagonal phase (04/29/23) positive. Anticardiolipin Ab IgG < 5, IgM 28.4, IgA 9.2.   5.3.23 bilateral US LE +new L. calf DVT   s/p IVC filter 5.4.23  CTH 5.17.23 demonstrates interval inc in R. frontotemporal hyodense SDH collection; Increased size of suboccipital fluid collection, now 1.5cm in greatest width.     Impression: Bilateral cerebellar infarction secondary to hypercoagulability related to APLS (stroke of other determined etiology)     Plan:  -Plan to start bridging patient with Lovenox to Coumadin bridge today  -Per hematology, her history of miscarriages and DVTS along with two hexagonal phase positive tests is suggestive of APLS. Will need repeat test in 12 weeks to definitely diagnose APLS.  -Normotension  -Bilateral cavernous ICA aneurysms as per NSG; Monitoring for now given cavernous location.  -No need for SALVADOR or ILR given strong suspicion for APLS as the etiology of stroke

## 2023-05-18 NOTE — PROGRESS NOTE ADULT - SUBJECTIVE AND OBJECTIVE BOX
Neurology Stroke Progress Note    INTERVAL HPI/OVERNIGHT EVENTS:  Patient seen and examined.  number ______ used.    MEDICATIONS  (STANDING):  amantadine Syrup 100 milliGRAM(s) Oral every 12 hours  ascorbic acid 500 milliGRAM(s) Oral <User Schedule>  atorvastatin 80 milliGRAM(s) Oral at bedtime  chlorhexidine 0.12% Liquid 15 milliLiter(s) Oral Mucosa every 12 hours  chlorhexidine 2% Cloths 1 Application(s) Topical daily  enoxaparin Injectable 80 milliGRAM(s) SubCutaneous every 12 hours  ferrous    sulfate 325 milliGRAM(s) Oral <User Schedule>  methylphenidate 10 milliGRAM(s) Oral <User Schedule>  modafinil 200 milliGRAM(s) Oral every 24 hours  pantoprazole  Injectable 40 milliGRAM(s) IV Push at bedtime  polyethylene glycol 3350 17 Gram(s) Oral daily  senna 2 Tablet(s) Oral at bedtime    MEDICATIONS  (PRN):  acetaminophen   Oral Liquid .. 650 milliGRAM(s) Oral every 6 hours PRN Temp greater or equal to 38.5C (101.3F), Mild Pain (1 - 3)  acetylcysteine 10%  Inhalation 4 milliLiter(s) Inhalation every 6 hours PRN increased secretions  albuterol/ipratropium for Nebulization 3 milliLiter(s) Nebulizer every 6 hours PRN Respiratory Distress  oxyCODONE    IR 5 milliGRAM(s) Oral every 4 hours PRN Moderate Pain (4 - 6)      Allergies    No Known Allergies    Intolerances        Vital Signs Last 24 Hrs  T(C): 37.2 (18 May 2023 09:19), Max: 37.7 (17 May 2023 21:42)  T(F): 99 (18 May 2023 09:19), Max: 99.9 (17 May 2023 21:42)  HR: 75 (18 May 2023 13:17) (57 - 79)  BP: 113/60 (18 May 2023 13:00) (85/44 - 116/62)  BP(mean): 81 (18 May 2023 13:00) (60 - 86)  RR: 23 (18 May 2023 13:00) (10 - 29)  SpO2: 99% (18 May 2023 13:17) (96% - 100%)    Parameters below as of 18 May 2023 13:00  Patient On (Oxygen Delivery Method): ventilator    O2 Concentration (%): 40    Physical exam:  General: No acute distress, awake and alert  Eyes: Anicteric sclerae, moist conjunctivae, see below for CNs  Neck: trachea midline, FROM, supple, no thyromegaly or lymphadenopathy  Cardiovascular: Regular rate and rhythm, no murmurs, rubs, or gallops. No carotid bruits.   Pulmonary: Anterior breath sounds clear bilaterally, no crackles or wheezing. No use of accessory muscles  GI: Abdomen soft, non-distended, non-tender  Extremities: Radial and DP pulses +2, no edema    Neurologic:  -Mental status: Awake, alert, oriented to person, place, and time. Speech is fluent with intact naming, repetition, and comprehension, no dysarthria. Recent and remote memory intact. Follows commands. Attention/concentration intact. Fund of knowledge appropriate.  -Cranial nerves:   II: Visual fields are full to confrontation.  III, IV, VI: Extraocular movements are intact without nystagmus. Pupils equally round and reactive to light  V:  Facial sensation V1-V3 equal and intact   VII: Face is symmetric with normal eye closure and smile  VIII: Hearing is bilaterally intact to finger rub  IX, X: Uvula is midline and soft palate rises symmetrically  XI: Head turning and shoulder shrug are intact.  XII: Tongue protrudes midline  Motor: Normal bulk and tone. No pronator drift. Strength bilateral upper extremity 5/5, bilateral lower extremities 5/5.  Rapid alternating movements intact and symmetric  Sensation: Intact to light touch bilaterally. No neglect or extinction on double simultaneous testing.  Coordination: No dysmetria on finger-to-nose and heel-to-shin bilaterally  Reflexes: Downgoing toes bilaterally   Gait: Narrow gait and steady    LABS:                        9.1    3.77  )-----------( 281      ( 18 May 2023 04:25 )             28.7     05-18    138  |  104  |  12  ----------------------------<  145<H>  4.2   |  28  |  0.44<L>    Ca    9.0      18 May 2023 04:25  Phos  4.6     05-18  Mg     2.2     05-18            RADIOLOGY & ADDITIONAL TESTS:     Neurology Stroke Progress Note    INTERVAL HPI/OVERNIGHT EVENTS:  No acute overnight events. Patient seen and examined, following simple commands.     MEDICATIONS  (STANDING):  amantadine Syrup 100 milliGRAM(s) Oral every 12 hours  ascorbic acid 500 milliGRAM(s) Oral <User Schedule>  atorvastatin 80 milliGRAM(s) Oral at bedtime  chlorhexidine 0.12% Liquid 15 milliLiter(s) Oral Mucosa every 12 hours  chlorhexidine 2% Cloths 1 Application(s) Topical daily  enoxaparin Injectable 80 milliGRAM(s) SubCutaneous every 12 hours  ferrous    sulfate 325 milliGRAM(s) Oral <User Schedule>  methylphenidate 10 milliGRAM(s) Oral <User Schedule>  modafinil 200 milliGRAM(s) Oral every 24 hours  pantoprazole  Injectable 40 milliGRAM(s) IV Push at bedtime  polyethylene glycol 3350 17 Gram(s) Oral daily  senna 2 Tablet(s) Oral at bedtime    MEDICATIONS  (PRN):  acetaminophen   Oral Liquid .. 650 milliGRAM(s) Oral every 6 hours PRN Temp greater or equal to 38.5C (101.3F), Mild Pain (1 - 3)  acetylcysteine 10%  Inhalation 4 milliLiter(s) Inhalation every 6 hours PRN increased secretions  albuterol/ipratropium for Nebulization 3 milliLiter(s) Nebulizer every 6 hours PRN Respiratory Distress  oxyCODONE    IR 5 milliGRAM(s) Oral every 4 hours PRN Moderate Pain (4 - 6)      Allergies    No Known Allergies    Intolerances        Vital Signs Last 24 Hrs  T(C): 37.2 (18 May 2023 09:19), Max: 37.7 (17 May 2023 21:42)  T(F): 99 (18 May 2023 09:19), Max: 99.9 (17 May 2023 21:42)  HR: 75 (18 May 2023 13:17) (57 - 79)  BP: 113/60 (18 May 2023 13:00) (85/44 - 116/62)  BP(mean): 81 (18 May 2023 13:00) (60 - 86)  RR: 23 (18 May 2023 13:00) (10 - 29)  SpO2: 99% (18 May 2023 13:17) (96% - 100%)    Parameters below as of 18 May 2023 13:00  Patient On (Oxygen Delivery Method): ventilator    O2 Concentration (%): 40    Physical exam:  General: No acute distress    Neurologic:  -Mental status: Awake, alert, s/p trach. Follows simple commands  -Cranial nerves:   II: Visual fields are full to confrontation.  III, IV, VI: Subtle L ERNESTO noted on exam.  VII: Face appears symmetric   Motor: Normal bulk and tone. RUE/RLE antigravity at least 3/5, Left side 5/5 throughout.  Sensation: Intact to light touch bilaterally  Coordination: LUE dysmetria on finger-to-nose      LABS:                        9.1    3.77  )-----------( 281      ( 18 May 2023 04:25 )             28.7     05-18    138  |  104  |  12  ----------------------------<  145<H>  4.2   |  28  |  0.44<L>    Ca    9.0      18 May 2023 04:25  Phos  4.6     05-18  Mg     2.2     05-18      RADIOLOGY & ADDITIONAL TESTS:    < from: CT Head No Cont (05.17.23 @ 11:08) >  IMPRESSION: Right-sided  shunt catheter in place with relatively slight   decrease in ventricular size. Interval increase in right frontotemporal   hypodense subdural collection/hygroma. Increased size of suboccipital   fluid collection, now 1.5 cm in greatest width.    < end of copied text >

## 2023-05-18 NOTE — PROGRESS NOTE ADULT - SUBJECTIVE AND OBJECTIVE BOX
=================================  NEUROCRITICAL CARE ATTENDING NOTE  =================================    MAKSIM TRIVEDI   MRN-[id number]  Summary:  57y/F  with Asthma, CAD (not on  meds), peripheral neuropathy and PSH of BATOOL, cholecystectomy, right knee surgery presented to [facility] ED on  with right sided weakness and right facial numbness. Patient was undergoing preparation for colonoscopy. As per daughter at 8am patient was playing with her grandchildren but around 840 am patient was getting dressed and fell and subsequently felt weak after. Daughter reports that patient had some episodes of vomiting at this time. She had a syncopal episode at around 10 am and was witnessed by brother. No head trauma, She regained conscious after few minutes. She remained in bed for the remainder of the day. Daughter reports some slurred speech noted 1230-1PM and also was confused after. and around 4PM, the patient's sister noted right sided weakness at which time EMS was called and patient was brought to ED. No c/o chest pain, shortness of breath, fever, headache, abdominal pain, urinary complaints. No recent travel/sickness/ change in meds. Stroke code in ER: CTH neg for heme, Right PICA distribution acute infarction. CTA with saccular aneurysms of b/l carotids, approximately 0.8 cm on the right and 1.2 x 0.9 cm on the left. Possible tiny third saccular aneurysm on the right Posterior intracranial circulation: Right vertebral arterial occlusion at its dural crossing junction V3 Atlantic and V4 intracranial segments with likely reversal of flow in its intracranial segment from the basilar. Right PICA faintly seen. 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  Not candidate for TNK/mechanical thrombectomy. CT scan repeated which showed: 1. Brain: Progression of acute infarction within the right cerebellar hemisphere, also extending into the left superior cerebellar hemisphere. New mass effect on the fourth ventricle causing stenosis versus occlusion with new third and lateral ventricular dilatation indicating ventricular obstruction at the level of the fourth ventricle. New upward and downward herniation of cerebellar parenchyma Right carotid system: No hemodynamically significant stenosis. Left carotid system: No hemodynamically significant stenosis. Vertebral circulation: Patent. Anterior intracranial circulation: Intact. Bilateral internal carotid saccular aneurysms. These findings are unchanged. Posterior intracranial circulation:    Improved flow within the right vertebral artery since 2023. New focal stenosis mid left vertebral artery, etiology uncertain, consider vasospasm and extrinsic compression in addition to new embolic disease. Brain perfusion: Perfusion images demonstrate normalization of the perfusion abnormality in the right cerebellar hemisphere present on 2023 despite evidence of progression of acute infarction.  Areas of apparent ischemia within the posterior fossa have progressed in extent, also again involving the left posterior cerebral arterial distribution. Tx to [facility] for SOC watch. On admission to [facility], NIHSS 12.  (2023 16:50)    COURSE IN THE HOSPITAL:  : Admitted for crani watch, CTH complete w/ unchanged hydrocephalus, taken for emergent occipital craniectomy.   : POD1. Remains intubated overnight, on propofol and dank. EVD@[id number]. Pending repeat CTH this am. Given hydralazine 10mg x1. Started on cardene for SBP high 140s-150s. Sub-therapeutic on plavix, therapeutic on asa, rpt asa accumetrics until subtherapeutic. LE dopplers neg for DVT, but showing superficial venous thrombosis in the proximal portion of the right greater saphenous vein. Started on 3% @60 for na goal 145-150. NGT placed by ENT. Febrile, pancultured.  : POD 2. 3% increased to 75. NGT readjusted. UA neg. SBP liberalized to 160. Plan to extubate. 3% decreased to 60. Failed extubation d/t no cuff leak, given 60mg solumedrol, plan to extubate in 6 hrs. SCx1 for post void residual >400, started on cardura at bedtime. New blood in buretrol, stopped SQL. Clot in EVD cleared. Neuro exam improving.   : POD 3. Cont 3% with NS while NPO, start TF today. TF started. LFTs downtrending. Sodium 141. Increased 3% to 50, NS to 30. Repeat BMP ordered for 5:30PM. Tramadol 25 for pain with no relief, oxy 5 and 1g tylenol ordered, stability CT stable, speech language consult for dysarthria. Given hydralazine 10mg IVP for SBP>160, started amlodipine 5mg. C/o mild headache, given fioricet x1, stroke neuro rec SALVADOR and loop recorder placement. Echo with bubble completed, negative for PFO, EF 55-60%. J HFNC started for desats with suctioning.   : POD 4. Cont HFNC. Increased secertions on HFNC, reintubated. CXR confirmed good placement. EVD dropped to 5cmH20 for goal of draining 5-10cc/hr and SG ELENA drain taken off suction. Pending CTH. EVD drained 0cc/hr, dropped to 0. NGT replaced. Dc'd fioricet. Started on PPI for intubation. Increased cardura to 4mg for urinary retention, given an additional 2mg now. Started salt tabs 3 q 6. Given 12.5mg fentanyl x1. NGT replaced, CXR shown in lung. NGT removed, repeat CXR showing no pneumothorax. Pending ENT consult for NGT placement. CTH stable. NGT replaced by ENT, confirmed in proper spot. Restarted TF. [id number]. Na 141 from 146. Increased 3% to 60. EVD raised to 3cmH20. ETT pulled back 1cm by RT.  : POD 5 SOC. Intubated, remains on precedex, ABG ordered with improving PaO2, BMP sodium 148, 3% changed to 30cc/hr, cardura increased to 8mg for tonight, tolerating cpap  : POD 6 SOC. Respiratory rate sustained 6, returned to FVS. Na 151, dc'd 3%, f/u AM sodium. Nurse noticed ETT 19 at the lip and was 20 prior, CXR shows ETT @ 5cm above jono, ET tube advanced 1cm, repeat CXR confirms appropriate placement. Thick inline secretions with suctioning. ENT trach placement Fri likely, PEG likely Mon. Keep ELENA drain until no output, EVD to remain @3. Start ASA 81mg daily tomorrow.   : POD7 SOC, neuro stable, given fentanyl x 1 overnight for presumed discomfort (biting on ETT), remains on full vent support. CTH today stable in comparison to . 6 cuffed trach placed by ENT in OR, but came down without cuff. Replaced at bedside by ENT. On fentanyl gtt.    : POD8 SOC, JIM overnight. Incision cleaned and dressing changed. On fentanyl gtt. EKG completed due to increased frequency PVCs on telemetry, frequency of PVCs improved with titrating up fentanyl gtt. ABG drawn.  Repeat LE dopplers completed showing persistant superficial thrombus, no DVT. No EVD waveform during day, flushed distally without improvement. Exam unchanged.  EVD dropped to 0 for low output in afternoon.   : POD9. Neuro stable, febrile to 101.3F o/n, given tylenol and pan cx sent. SBP dipped to low 90s o/n, HR stable in 70s with some PVCs, decreased fentanyl gtt and given 500cc bolus of NS x 2. Pend PEG placement Mon and angio Tu. D/c'd ELENA drain today and suture placed. F/u heme recs. Positive UA. Infiltrates found on CXR. Started on Zosyn 4.5g Q6hrs .   : POD 10. Neuro stable. Dc'd fentanyl gtt. PEG failed placement at bedside with Dr. Gant, plan for PEG in OR tomorrow afternoon with Dr. Layton. Dc'd amlodipine and started carvedilol 3.125 BID for PVCs . Made 3% inhalation and mucomyst q8hr. Failed PEG at bedside. Salt tabs made 1 q 6. Decreased cardura to 4mg daily. Urine culture growing E. Coli and sputum culture growing pluralibacter gergoviae and strep species. ID consulted, f/u recs. Failed CPAP trial @ 3pm. Added am labs per heme/onc for hypercoguable workup. Pending repeat LE dopplers in 2-3 days. NGT clogged, removed, ENT failed attempt at replacement, will keep NPO for PEG placement tmw. Repeat xray in am for concern for aspration. Pt abx switched from Zosyn to Ertapenem 1g Q24hrs. per ID recs, possible ESBL growth.   : POD 11. Neuro stable. diagnostic cerebral angiogram (bilateral carotid cavernous aneurysm) and PEG placement. NPO; pulled out radial TR band (right), EVD raised to 10    POD12 EVD raised to 15, then down to 5cm H20, CPAP today   POD13 EVD 5   POD 14    05 POD 15 EVD raised to 15; CPAP 8 hours     required full vent support for low minute ventilation and hypercapnea, abdominal tenderness - CT AP WWO  05/15 CPAP this AM   hypotension to 80s after hydromorphone dose   No significant events overnight.    SIMV overnight RR 10 PS 12        Past Medical History: Asthma CAD (coronary artery disease) Peripheral neuropathy  Allergies:  No Known Allergies  Home meds:   ·	Albuterol (Eqv-ProAir HFA) 90 mcg/inh inhalation aerosol: 2 puff(s) inhaled every 6 hours as needed for  shortness of breath and/or wheezing    PHYSICAL EXAMINATION  T(C): 37.1 ( @ 05:38), Max: 37.7 ( @ 21:42) HR: 72 ( @ 07:00) (57 - 75) BP: 108/63 ( @ 07:00) (85/44 - 128/62) RR: 16 ( @ 07:00) (10 - 29) SpO2: 100% ( @ 07:00) (96% - 100%)   NEUROLOGIC EXAMINATION:  Patient awake, alert, oriented x2, FC, RUE R 4/5 RLE 5/5 L UE 5/5  L LE 5/5  GENERAL: trached SIMV 400 40% 10   EENT:  anicteric  CARDIOVASCULAR: (+) S1 S2, normal rate and regular rhythm  PULMONARY: clear to auscultation bilaterally  ABDOMEN: soft, with normoactive bowel sounds, tenderness periumbilical with mild guarding  EXTREMITIES: no edema; good distal pulses  SKIN: no rash    LABS:            (4.52)  9.1   (9.0)  3.77  )-----------( 281      ( 18 May 2023 04:25 )             28.7     138  |  104  |  12  ----------------------------<  145<H>  4.2   |  28  |  0.44<L>    Ca    9.0      18 May 2023 04:25  Phos  4.6       Mg     2.2      @ 07:01  -   @ 07:00  IN: 1400 mL / OUT: 800 mL / NET: 600 mL    Bacteriology:  UA trace blood many WBC, small LE no nitrates   CSF culture NGTD   CSF NGTD     CSF NGTD   urine culture ESBL x2 strains   Blood NG5D x2   sputum:  pluralibacter gergoviae, mod strep pneumoniae    CSF studies:    L   *** [id number] WBC1 *** %N   %L1     EEG:  Neuroimaging:  Other imaging:    MEDICATIONS:     ·	aspirin  chewable 81 Oral daily  ·	enoxaparin Injectable 40 SubCutaneous every 24 hours  ·	methylphenidate 10 Oral <User Schedule>  ·	modafinil 200 Oral every 24 hours  ·	pantoprazole  Injectable 40 IV Push at bedtime  ·	polyethylene glycol 3350 17 Oral daily  ·	senna 2 Oral at bedtime  ·	atorvastatin 80 Oral at bedtime  ·	ascorbic acid 500 Oral <User Schedule>  ·	ferrous    sulfate 325 Oral <User Schedule>  ·	lactobacillus acidophilus 1 Oral daily  ·	acetaminophen   Oral Liquid .. 650 Oral every 6 hours PRN  ·	acetylcysteine 10%  Inhalation 4 Inhalation every 6 hours PRN  ·	albuterol/ipratropium for Nebulization 3 Nebulizer every 6 hours PRN  ·	oxyCODONE    IR 5 Oral every 4 hours PRN    IV FLUIDS: IVL  DRIPS:  DIET: Jevity  Lines:  Drains:      External Ventricular Device (mL): 245 mL - @ 0 cm H20   EVD raised to 5 output 367 ICPs 1-7  /03 EVD at 5cm H20 ICPs <10  /04 EVD at 5cm H20   05/05 EVD at 15cm H20 ICPs < 10  Wounds:    CODE STATUS:  Full Code                       GOALS OF CARE:  aggressive                      DISPOSITION:  ICU

## 2023-05-18 NOTE — CHART NOTE - NSCHARTNOTEFT_GEN_A_CORE
5/18: POD27 SOC, POD10 VPS. JIM o/n remains on SIMV.   Baby aspirin discontinued. SQL 80mg bid started for DVT treatement per hematology/neurology. Plan for CTH in am. Added amantadine for neurostim.

## 2023-05-18 NOTE — PROGRESS NOTE ADULT - ASSESSMENT
57y/F with  acute CVA, R cerebellar, brain compression cerebral edema, s/p SOC  UTI ESBL  acute respiratory failure, bulbar dysfunction  asthma  CAD  peripheral neuropathy  superficial thrombosis, proximal R greater saphenous  prediabetic   nephrolithiasis    PLAN:   NEURO: neurochecks q4h, VS q4h, PRN pain meds with acetaminophen / hydromorphone / oxycodone  s/p VPS, CT shows hygroma / pseudomeningocoele; - tight headwrap;   s/p CVA - d/c ASA, will start full AC today - cleared by NSG, CT tomorrow   REHAB:  physical therapy evaluation and management    EARLY MOB:  HOB up    PULM:  SIMV RR 8 PS 10/6 40%   CARDIO:  SBP goal 90-160mm Hg, off amlodipine, d/c carvedilol  ENDO:  Blood sugar goals 140-180 mg/dL, off insulin sliding scale, continue high dose statins  GI:  cont PPI  DIET:  cont jevity  RENAL:  IVL,   HEM/ONC: s/p 3 units platelets, 35 ug DDAVP, (+) aCL; ferrous sulfate  VTE Prophylaxis: SCDs, s/p IVC filter, full AC with SQL 80 BID - check anti-Xa levels  ID: afebrile, treated for ESBL UTI x 7 days; leukopenia etio?, d/c lactobacillus  Social: family at bedside    Active issues:  What's keeping patient in the ICU? vent requirements  What is this patient's dispo plan? LTAC    ATTENDING ATTESTATION:  I was physically present for the key portions of the evaluation and management (E/M) service provided.  I agree with the above history, physical and plan, which I have reviewed and edited where appropriate.    Patient at high risk for neurological deterioration or death due to:  ICU delirium, aspiration PNA, DVT / PE.  Critical care time:  I have personally provided 60 minutes of critical care time, excluding time spent on separate procedures.      Plan discussed with RN, house staff.

## 2023-05-18 NOTE — PROGRESS NOTE ADULT - SUBJECTIVE AND OBJECTIVE BOX
HPI:  56 yo F with PMH of Asthma, CAD (not on  meds), peripheral neuropathy and PSH of BATOOL, cholecystectomy, right knee surgery presented to [place] ED on  with right sided weakness and right facial numbness. Patient was undergoing preparation for colonoscopy. As per daughter at 8am patient was playing with her grandchildren but around 840 am patient was getting dressed and fell and subsequently felt weak after. Daughter reports that patient had some episodes of vomiting at this time. She had a syncopal episode at around 10 am and was witnessed by brother. No head trauma, She regained conscious after few minutes. She remained in bed for the remainder of the day. Daughter reports some slurred speech noted 1230-1PM and also was confused after. and around 4PM, the patient's sister noted right sided weakness at which time EMS was called and patient was brought to ED. No c/o chest pain, shortness of breath, fever, headache, abdominal pain, urinary complaints. No recent travel/sickness/ change in meds. Stroke code in ER: CTH neg for heme, Right PICA distribution acute infarction. CTA with saccular aneurysms of b/l carotids, approximately 0.8 cm on the right and 1.2 x 0.9 cm on the left. Possible tiny third saccular aneurysm on the right Posterior intracranial circulation: Right vertebral arterial occlusion at its dural crossing junction V3 Atlantic and V4 intracranial segments with likely reversal of flow in its intracranial segment from the basilar. Right PICA faintly seen. 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  Not candidate for TNK/mechanical thrombectomy. CT scan repeated which showed: 1. Brain: Progression of acute infarction within the right cerebellar hemisphere, also extending into the left superior cerebellar hemisphere. New mass effect on the fourth ventricle causing stenosis versus occlusion with new third and lateral ventricular dilatation indicating ventricular obstruction at the level of the fourth ventricle. New upward and downward herniation of cerebellar parenchyma Right carotid system: No hemodynamically significant stenosis. Left carotid system: No hemodynamically significant stenosis. Vertebral circulation: Patent. Anterior intracranial circulation: Intact. Bilateral internal carotid saccular aneurysms. These findings are unchanged. Posterior intracranial circulation:    Improved flow within the right vertebral artery since 2023. New focal stenosis mid left vertebral artery, etiology uncertain, consider vasospasm and extrinsic compression in addition to new embolic disease. Brain perfusion: Perfusion images demonstrate normalization of the perfusion abnormality in the right cerebellar hemisphere present on 2023 despite evidence of progression of acute infarction.  Areas of apparent ischemia within the posterior fossa have progressed in extent, also again involving the left posterior cerebral arterial distribution. Tx to [facility] for SOC watch. On admission to [facility], NIHSS 12.  (2023 16:50)    OVERNIGHT EVENTS: JIM, remains on SIMV    Hospital Course:   : Admitted for crani watch, CTH complete w/ unchanged hydrocephalus, taken for emergent occipital craniectomy.   : POD1. Remains intubated overnight, on propofol and dank. EVD@[id number]. Pending repeat CTH this am. Given hydralazine 10mg x1. Started on cardene for SBP high 140s-150s. Sub-therapeutic on plavix, therapeutic on asa, rpt asa accumetrics until subtherapeutic. LE dopplers neg for DVT, but showing superficial venous thrombosis in the proximal portion of the right greater saphenous vein. Started on 3% @60 for na goal 145-150. NGT placed by ENT. Febrile, pancultured.  : POD 2. 3% increased to 75. NGT readjusted. UA neg. SBP liberalized to 160. Plan to extubate. 3% decreased to 60. Failed extubation d/t no cuff leak, given 60mg solumedrol, plan to extubate in 6 hrs. SCx1 for post void residual >400, started on cardura at bedtime. New blood in buretrol, stopped SQL. Clot in EVD cleared. Neuro exam improving.   : POD 3. Cont 3% with NS while NPO, start TF today. TF started. LFTs downtrending. Sodium 141. Increased 3% to 50, NS to 30. Repeat BMP ordered for 5:30PM. Tramadol 25 for pain with no relief, oxy 5 and 1g tylenol ordered, stability CT stable, speech language consult for dysarthria. Given hydralazine 10mg IVP for SBP>160, started amlodipine 5mg. C/o mild headache, given fioricet x1, stroke neuro rec SALVADOR and loop recorder placement. Echo with bubble completed, negative for PFO, EF 55-60%. J HFNC started for desats with suctioning.   : POD 4. Cont HFNC. Increased secertions on HFNC, reintubated. CXR confirmed good placement. EVD dropped to 5cmH20 for goal of draining 5-10cc/hr and SG ELENA drain taken off suction. Pending CTH. EVD drained 0cc/hr, dropped to 0. NGT replaced. Dc'd fioricet. Started on PPI for intubation. Increased cardura to 4mg for urinary retention, given an additional 2mg now. Started salt tabs 3 q 6. Given 12.5mg fentanyl x1. NGT replaced, CXR shown in lung. NGT removed, repeat CXR showing no pneumothorax. Pending ENT consult for NGT placement. CTH stable. NGT replaced by ENT, confirmed in proper spot. Restarted TF. [id number]. Na 141 from 146. Increased 3% to 60. EVD raised to 3cmH20. ETT pulled back 1cm by RT.  : POD 5 SOC. Intubated, remains on precedex, ABG ordered with improving PaO2, BMP sodium 148, 3% changed to 30cc/hr, cardura increased to 8mg for tonight, tolerating cpap  : POD 6 SOC. Respiratory rate sustained 6, returned to FVS. Na 151, dc'd 3%, f/u AM sodium. Nurse noticed ETT 19 at the lip and was 20 prior, CXR shows ETT @ 5cm above jono, ET tube advanced 1cm, repeat CXR confirms appropriate placement. Thick inline secretions with suctioning. ENT trach placement Fri likely, PEG likely Mon. Keep ELENA drain until no output, EVD to remain @3. Start ASA 81mg daily tomorrow.   : POD7 SOC, neuro stable, given fentanyl x 1 overnight for presumed discomfort (biting on ETT), remains on full vent support. CTH today stable in comparison to . 6 cuffed trach placed by ENT in OR, but came down without cuff. Replaced at bedside by ENT. On fentanyl gtt.    : POD8 SOC, JIM overnight. Incision cleaned and dressing changed. On fentanyl gtt. EKG completed due to increased frequency PVCs on telemetry, frequency of PVCs improved with titrating up fentanyl gtt. ABG drawn.  Repeat LE dopplers completed showing persistant superficial thrombus, no DVT. No EVD waveform during day, flushed distally without improvement. Exam unchanged.  EVD dropped to 0 for low output in afternoon.   : POD9. Neuro stable, febrile to 101.3F o/n, given tylenol and pan cx sent. SBP dipped to low 90s o/n, HR stable in 70s with some PVCs, decreased fentanyl gtt and given 500cc bolus of NS x 2. Pend PEG placement Mon and angio Tues. D/c'd ELENA drain today and suture placed. F/u heme recs. Positive UA. Infiltrates found on CXR. Started on Zosyn 4.5g Q6hrs .   : POD 10. Neuro stable. Dc'd fentanyl gtt. PEG failed placement at bedside with Dr. Gant, plan for PEG in OR tomorrow afternoon with Dr. Layton. Dc'd amlodipine and started carvedilol 3.125 BID for PVCs . Made 3% inhalation and mucomyst q8hr. Failed PEG at bedside. Salt tabs made 1 q 6. Decreased cardura to 4mg daily. Urine culture growing E. Coli and sputum culture growing pluralibacter gergoviae and strep species. ID consulted, f/u recs. Failed CPAP trial @ 3pm. Added am labs per heme/onc for hypercoguable workup. Pending repeat LE dopplers in 2-3 days. NGT clogged, removed, ENT failed attempt at replacement, will keep NPO for PEG placement tmw. Repeat xray in am for concern for aspration. Pt abx switched from Zosyn to Ertapenem 1g Q24hrs. per ID recs, possible ESBL growth.   : POD 11. Neuro stable. Pre-op for diagnostic cerebral angiogram and PEG placement. NPO. EVD raised to 5 cmH20. Sputum culture speciated to step pneumoniae, sensitive to ertapenem. 3% inhalation/mucomyst made q 6 hr d/t thick secretions. PEG placed in OR. POD0 dx cerebral angio. EVD raised to 10.   5/3: POD 12. Neuro stable. PEG feeds began @ 10 AM, plan to increase 10cc every 6h per general surgery. Repeat dopplers show stable R superficial thrombus and new L IM calf DVT. Vascular consulted for IVC filter. Plan to get CTH tomorrow.  : POD 13. JIM o/n. s/p IVC filter with vascular today. Pending post-procedure CTH. FOBT negative. Started iron and vitamin c every other day for iron deficiency anemia.   : POD14. CSF cx sent to r/o infection from new gas in right temporal horn seen on CTH. Restarted TF, changed to Jevity 1.2, f/u nutrition recs. EVD raised to 10 today, plan to challenge over the weekend and CTH on Monday, possible VPS next Wednesday. ENT removed sutures today. Salt tabs decreased 1q12.  : POD15 Placed on CPAP briefly with low MV alert, placed back on full vent support. EVD remains open at 67lbW5L. ICPs WNL. Neuro exam stable.  EVD raised to 15. Tolerated CPAP x8h.   : POD#16. JIM overnight, neuro stable. D/c'd salt tabs and 3% neb. Wean trach to CPAP as tolerated. Pan cx NGTD. EVD raised today to 73wvP0A.   : POD17 JIM overnight. ICPs WNL. Pt remains on full vent support. Neuro exam stable. Plan for possible VPS today. CT chio complete showing increase in vent size.   : POD18 SOC, POD1 VPS. Desat o/n to 80s, improved on own. CXR with LLL effusion. Another desat to 90% in AM, given duoneb and mucomyst. Oxycodone given for incisional pain. Neuro exam stable. CTH in AM stable. Keppra dc'd. Ritalin 5 BID started.  Tolerating CPAP trial. SALVADOR ordered. Rectal tube dc'd.   5/10: POD 19 SOC POD2 VPS. Remains on full vent support. Tolerated CPAP for 6 hours. Neuro exam stable. BP liberalized to 160. RLQ US 2/2 abd pain. Lactate WNL.  : POD20 SOC POD3 VPS. Plan for CPAP trial in AM.  Dc'd cardura. F/u speech consult for communcation board. Has been tolerating CPAP since 9am. RLQ us w/ no acute pathology. Per heme/onc LMWH or coumadin rec for AC over DOAC due to antiphospholipid syndrome.   : POD21 SOC POD4 VPS. CPAP 8/, tolerated until 3pm. Can start ASA on  and full AC POD 10 from VPS  (remove IVC filter prior, f/u w/ vascular on timing).   : POD22 SOC POD 5 VPS, neuro stable, tolerating trach collar. ASA ordered to start tomorrow, Ritalin increased to 10BID from 5BID, Simethicone ordered for gas, f/u gen surg for continued abd discomfort, PM VBG with PCO2 50 and repeat VBG pCO2 55, possible obesity hypoventilation, f/u AM VBG   : POD23 SOC, AWH6DWL, neuro stable, VBG showed increased PCO2 indicating poor ventilation, failed CPAP due to low TV, put back on full vent support.   5/15: POD24 SOC, POD7 VPS. O/n CT A/P done for abd pain, f/u read. neuro stable. remains on full vent support. Simethicone, Amlodipine d/c'd. Given 1L bolus of NS. Dilaudid 0.5 for pain control. CT A/P negative for acute pathology. Restarted tube feeds. Started Modafinil for neurostim.  : POD25 SOC, POD8 VPS. JIM o/n neuro stable. remains on full vent support. DC carvedilol for intermittent hypotensive episodes. UA for suprapelvic pain, concern for nephrolithiasis based on CT AP.  : POD26 SOC, POD9 VPS. JIM o/n neuro stable.   Modafinil increased. CTH completed, . Urology consulted for bladder calculi vs mass, pending urine cytology study and outptaient urology follow-up for cystoscopy. Trialing SIMV for vent weaning.   : POD27 SOC, POD10 VPS. JIM o/n remains on SIMV.     Vital Signs Last 24 Hrs  T(C): 37.3 (18 May 2023 01:02), Max: 37.7 (17 May 2023 21:42)  T(F): 99.1 (18 May 2023 01:02), Max: 99.9 (17 May 2023 21:42)  HR: 65 (18 May 2023 01:00) (61 - 75)  BP: 100/55 (18 May 2023 01:00) (94/53 - 128/62)  BP(mean): 74 (18 May 2023 01:00) (70 - 89)  RR: 20 (18 May 2023 01:00) (13 - 29)  SpO2: 99% (18 May 2023 01:00) (96% - 100%)    Parameters below as of 18 May 2023 01:00  Patient On (Oxygen Delivery Method): ventilator    O2 Concentration (%): 40    I&O's Summary    16 May 2023 07:01  -  17 May 2023 07:00  --------------------------------------------------------  IN: 1520 mL / OUT: 1300 mL / NET: 220 mL    17 May 2023 07:01  -  18 May 2023 01:38  --------------------------------------------------------  IN: 1050 mL / OUT: 800 mL / NET: 250 mL        PHYSICAL EXAM:  General: patient seen laying supine in bed in NAD on SIMV   Neuro: AAOx2 (Ghanaian speaking) R gaze, pupils 2mm briskly reactive b/l, EOMI, FC, RUE 4/5 prox, HG 4+/5, R drift, RLE 4+/5  Pulmonary: chest rise symmetric   Abdomen: soft, nontender, nondistended  Ext: perfusing well  Skin: warm, dry  Wound: Crani incision, abdominal incision c/d/i     TUBES/LINES:  [] Garsia  [] Lumbar Drain  [] Wound Drains  [] Others      DIET:  [] NPO  [] Mechanical  [x] Tube feeds    LABS:                        9.0    4.52  )-----------( 275      ( 17 May 2023 04:38 )             27.8     05-17    140  |  106  |  11  ----------------------------<  155<H>  3.8   |  28  |  0.49<L>    Ca    8.6      17 May 2023 04:38  Phos  4.1     05-17  Mg     2.1     -17        Urinalysis Basic - ( 16 May 2023 12:45 )    Color: Yellow / Appearance: Clear / S.025 / pH: x  Gluc: x / Ketone: NEGATIVE  / Bili: Negative / Urobili: 1.0 E.U./dL   Blood: x / Protein: NEGATIVE mg/dL / Nitrite: NEGATIVE   Leuk Esterase: Small / RBC: < 5 /HPF / WBC Many /HPF   Sq Epi: x / Non Sq Epi: x / Bacteria: Many /HPF          CAPILLARY BLOOD GLUCOSE          Drug Levels: [] N/A    CSF Analysis: [] N/A      Allergies    No Known Allergies    Intolerances      MEDICATIONS:  Antibiotics:    Neuro:  acetaminophen   Oral Liquid .. 650 milliGRAM(s) Oral every 6 hours PRN  methylphenidate 10 milliGRAM(s) Oral <User Schedule>  modafinil 200 milliGRAM(s) Oral every 24 hours  oxyCODONE    IR 5 milliGRAM(s) Oral every 4 hours PRN    Anticoagulation:  aspirin  chewable 81 milliGRAM(s) Oral daily  enoxaparin Injectable 40 milliGRAM(s) SubCutaneous every 24 hours    OTHER:  acetylcysteine 10%  Inhalation 4 milliLiter(s) Inhalation every 6 hours PRN  albuterol/ipratropium for Nebulization 3 milliLiter(s) Nebulizer every 6 hours PRN  atorvastatin 80 milliGRAM(s) Oral at bedtime  chlorhexidine 0.12% Liquid 15 milliLiter(s) Oral Mucosa every 12 hours  chlorhexidine 2% Cloths 1 Application(s) Topical daily  lactobacillus acidophilus 1 Tablet(s) Oral daily  pantoprazole  Injectable 40 milliGRAM(s) IV Push at bedtime  polyethylene glycol 3350 17 Gram(s) Oral daily  senna 2 Tablet(s) Oral at bedtime    IVF:  ascorbic acid 500 milliGRAM(s) Oral <User Schedule>  ferrous    sulfate 325 milliGRAM(s) Oral <User Schedule>    CULTURES:  Culture Results:   No growth ( @ 14:35)  Culture Results:   No growth ( @ 05:06)    RADIOLOGY & ADDITIONAL TESTS:  < from: CT Head No Cont (23 @ 11:08) >  IMPRESSION: Right-sided  shunt catheter in place with relatively slight   decrease in ventricular size. Interval increase in right frontotemporal   hypodense subdural collection/hygroma. Increased size of suboccipital   fluid collection, now 1.5 cm in greatest width.    --- End of Report ---    < end of copied text >      ASSESSMENT:  58 y/o female found to have acute infarction within the right cerebellar hemisphere, causing herniation. Now s/p SOC foramen magnum decompression and right parietal/occipital EVD placement (). s/p trach (23). s/p PEG (23), s/p dx cerebral angiogram (23). now s/p VPS Certas @ 4 (23).     STROKE    No pertinent family history in first degree relatives    Handoff    CAD (coronary artery disease)    Asthma    Peripheral neuropathy    Cerebellar stroke    Respiratory failure, unspecified with hypoxia    History of DVT (deep vein thrombosis)    Hydrocephalus    Tracheostomy malfunction    Cerebellar stroke    Respiratory failure, unspecified with hypoxia    History of DVT of lower extremity    Hydrocephalus    Tracheostomy malfunction    Delirium    Decompressive craniectomy    Cerebellar infarct    Cerebellar infarct    CAD (coronary artery disease)    Asthma    Peripheral neuropathy    Lupus anticoagulant positive    Anemia due to acute blood loss    Tracheostomy malfunction    Deep vein thrombosis (DVT) of calf muscle vein    Mass of bladder    Decompressive craniectomy    Insertion, external ventricular drain    Planned tracheostomy    Tracheostomy tube change    Esophagogastroduodenoscopy (EGD) with anesthesia    Insertion, PEG tube, laparoscopic    Angiogram, carotid and cerebral, bilateral    Tracheostomy tube change    IVC filter placement     shunt    Insertion, ventriculopleural shunt    S/P total abdominal hysterectomy    S/P cholecystectomy    S/P right knee surgery    Insertion, external ventricular drain    Planned tracheostomy    Esophagogastroduodenoscopy (EGD) with anesthesia    Insertion, PEG tube, laparoscopic     shunt    CAD (coronary artery disease)    Asthma    Peripheral neuropathy    Lupus anticoagulant positive    Acute respiratory failure with hypoxia    Acute respiratory failure with hypoxia    Dysphagia    Deep vein thrombosis (DVT)    Hydrocephalus    [email or web address]        PLAN:  Neuro   - Vitals q4h/neuro q4h   - s/p VPS (Certas @ 4)   - dx angio : b/l cavernous ICA aneurysms, as discussed at vascular conference f/u outpt   - CTH  stable;  new gas in right temporal horn, CT  chio increased vent size,  stable, CTH  increased suboccipital fluid collection   - Stroke consulted, appreciate reccs   - ASA81  - Pain control with tyl, oxy 5 prn  - Ritalin 10mg BID, Modafinil 200mg qd for neurostimulation    Cardio  - SBP   - TTE : negative for PFO, mild LVH, mild dilation of L atrium, EF 55-60%   - SALVADOR deferred, not indicated as it will not  at this time for starting patient on anticoagulation. Will reassess if indicated medically.     Pulm  - + Trach (6 shiley) VC 40/400/, SIMV trials   - Chest PT, duoneb, mucomist q 6 prn     GI  - + PEG wih TF (Jevity 1.2)  - last BM   - Protonix for GI ppx while on vent  - RLQ US - no acute pathology  - CT A/P 5/15: no acute pathology    Renal  - IVL  - Voiding via primafit   - 5/15 CT A/P bladder calculi vs mass, f/u urology recs    Endo   - cont lipitor     Heme  - SQL for DVT ppx, No SCDs  - s/p IVCF , keep per heme recs  - plan for SQL 80 BID on    - LE dopplers  neg for DVT, but showing superficial venous thrombosis in the proximal portion of the right greater saphenous vein; repeat on  with persistant superficial thrombus, 5/3 new DVT L IM calf and unchanged R superficial vein thrombus  - Heme following for + anticardiolipin Ab , recs appreciated, f/u activated protein C    - Iron and vitamin c every other day     ID  - CSF sent from OR ()   - MRSA (-), +UA cx ESBL, +sputum cx pluralibacter gergoviae, strep pneumoniae, s/p Ertapenem 1g Q24hrs (-)    PSYCH  - behavioral health consulted for tearfulness, rec     DISPO:  - NSICU, full code   - PT/OT rec AR patient can tolerate 3 hrs PT/OT daily    D/w Dr. Valadez and Dr. Bueno    Assessment:  Present when checked    []  GCS  E   V  M     Heart Failure: []Acute, [] acute on chronic , []chronic  Heart Failure:  [] Diastolic (HFpEF), [] Systolic (HFrEF), []Combined (HFpEF and HFrEF), [] RHF, [] Pulm HTN, [] Other    [] MAMTA, [] ATN, [] AIN, [] other  [] CKD1, [] CKD2, [] CKD 3, [] CKD 4, [] CKD 5, []ESRD    Encephalopathy: [] Metabolic, [] Hepatic, [] toxic, [] Neurological, [] Other    Abnormal Nurtitional Status: [] malnurtition (see nutrition note), [ ]underweight: BMI < 19, [] morbid obesity: BMI >40, [] Cachexia    [] Sepsis  [] hypovolemic shock,[] cardiogenic shock, [] hemorrhagic shock, [] neuogenic shock  [] Acute Respiratory Failure  []Cerebral edema, [] Brain compression/ herniation,   [] Functional quadriplegia  [] Acute blood loss anemia

## 2023-05-19 LAB
ANION GAP SERPL CALC-SCNC: 9 MMOL/L — SIGNIFICANT CHANGE UP (ref 5–17)
BASE EXCESS BLDA CALC-SCNC: 6.6 MMOL/L — HIGH (ref -2–3)
BUN SERPL-MCNC: 12 MG/DL — SIGNIFICANT CHANGE UP (ref 7–23)
CALCIUM SERPL-MCNC: 9 MG/DL — SIGNIFICANT CHANGE UP (ref 8.4–10.5)
CHLORIDE SERPL-SCNC: 101 MMOL/L — SIGNIFICANT CHANGE UP (ref 96–108)
CO2 BLDA-SCNC: 34 MMOL/L — HIGH (ref 19–24)
CO2 SERPL-SCNC: 30 MMOL/L — SIGNIFICANT CHANGE UP (ref 22–31)
CREAT SERPL-MCNC: 0.46 MG/DL — LOW (ref 0.5–1.3)
CULTURE RESULTS: NO GROWTH — SIGNIFICANT CHANGE UP
EGFR: 112 ML/MIN/1.73M2 — SIGNIFICANT CHANGE UP
GAS PNL BLDA: SIGNIFICANT CHANGE UP
GLUCOSE SERPL-MCNC: 146 MG/DL — HIGH (ref 70–99)
HCO3 BLDA-SCNC: 32 MMOL/L — HIGH (ref 21–28)
HCT VFR BLD CALC: 29.9 % — LOW (ref 34.5–45)
HGB BLD-MCNC: 9.5 G/DL — LOW (ref 11.5–15.5)
MAGNESIUM SERPL-MCNC: 2.1 MG/DL — SIGNIFICANT CHANGE UP (ref 1.6–2.6)
MCHC RBC-ENTMCNC: 29.3 PG — SIGNIFICANT CHANGE UP (ref 27–34)
MCHC RBC-ENTMCNC: 31.8 GM/DL — LOW (ref 32–36)
MCV RBC AUTO: 92.3 FL — SIGNIFICANT CHANGE UP (ref 80–100)
NON-GYNECOLOGICAL CYTOLOGY STUDY: SIGNIFICANT CHANGE UP
NRBC # BLD: 0 /100 WBCS — SIGNIFICANT CHANGE UP (ref 0–0)
PCO2 BLDA: 50 MMHG — HIGH (ref 32–45)
PH BLDA: 7.42 — SIGNIFICANT CHANGE UP (ref 7.35–7.45)
PHOSPHATE SERPL-MCNC: 4.5 MG/DL — SIGNIFICANT CHANGE UP (ref 2.5–4.5)
PLATELET # BLD AUTO: 277 K/UL — SIGNIFICANT CHANGE UP (ref 150–400)
PO2 BLDA: 125 MMHG — HIGH (ref 83–108)
POTASSIUM SERPL-MCNC: 4.4 MMOL/L — SIGNIFICANT CHANGE UP (ref 3.5–5.3)
POTASSIUM SERPL-SCNC: 4.4 MMOL/L — SIGNIFICANT CHANGE UP (ref 3.5–5.3)
RBC # BLD: 3.24 M/UL — LOW (ref 3.8–5.2)
RBC # FLD: 13.2 % — SIGNIFICANT CHANGE UP (ref 10.3–14.5)
SAO2 % BLDA: 100 % — HIGH (ref 94–98)
SODIUM SERPL-SCNC: 140 MMOL/L — SIGNIFICANT CHANGE UP (ref 135–145)
SPECIMEN SOURCE: SIGNIFICANT CHANGE UP
WBC # BLD: 3.84 K/UL — SIGNIFICANT CHANGE UP (ref 3.8–10.5)
WBC # FLD AUTO: 3.84 K/UL — SIGNIFICANT CHANGE UP (ref 3.8–10.5)

## 2023-05-19 PROCEDURE — 71045 X-RAY EXAM CHEST 1 VIEW: CPT | Mod: 26

## 2023-05-19 PROCEDURE — 99291 CRITICAL CARE FIRST HOUR: CPT

## 2023-05-19 PROCEDURE — 36600 WITHDRAWAL OF ARTERIAL BLOOD: CPT

## 2023-05-19 RX ADMIN — Medication 500 MILLIGRAM(S): at 05:44

## 2023-05-19 RX ADMIN — Medication 100 MILLIGRAM(S): at 05:44

## 2023-05-19 RX ADMIN — ATORVASTATIN CALCIUM 80 MILLIGRAM(S): 80 TABLET, FILM COATED ORAL at 21:10

## 2023-05-19 RX ADMIN — OXYCODONE HYDROCHLORIDE 5 MILLIGRAM(S): 5 TABLET ORAL at 08:17

## 2023-05-19 RX ADMIN — OXYCODONE HYDROCHLORIDE 5 MILLIGRAM(S): 5 TABLET ORAL at 06:48

## 2023-05-19 RX ADMIN — PANTOPRAZOLE SODIUM 40 MILLIGRAM(S): 20 TABLET, DELAYED RELEASE ORAL at 21:10

## 2023-05-19 RX ADMIN — ENOXAPARIN SODIUM 80 MILLIGRAM(S): 100 INJECTION SUBCUTANEOUS at 17:25

## 2023-05-19 RX ADMIN — CHLORHEXIDINE GLUCONATE 15 MILLILITER(S): 213 SOLUTION TOPICAL at 17:25

## 2023-05-19 RX ADMIN — Medication 10 MILLIGRAM(S): at 14:48

## 2023-05-19 RX ADMIN — Medication 10 MILLIGRAM(S): at 05:44

## 2023-05-19 RX ADMIN — CHLORHEXIDINE GLUCONATE 15 MILLILITER(S): 213 SOLUTION TOPICAL at 05:44

## 2023-05-19 RX ADMIN — Medication 100 MILLIGRAM(S): at 17:25

## 2023-05-19 RX ADMIN — MODAFINIL 200 MILLIGRAM(S): 200 TABLET ORAL at 05:44

## 2023-05-19 RX ADMIN — Medication 325 MILLIGRAM(S): at 05:44

## 2023-05-19 RX ADMIN — ENOXAPARIN SODIUM 80 MILLIGRAM(S): 100 INJECTION SUBCUTANEOUS at 05:44

## 2023-05-19 NOTE — PROGRESS NOTE ADULT - SUBJECTIVE AND OBJECTIVE BOX
HPI:  56 yo F with PMH of Asthma, CAD (not on  meds), peripheral neuropathy and PSH of BATOOL, cholecystectomy, right knee surgery presented to [facility] ED on 4/20 with right sided weakness and right facial numbness. Patient was undergoing preparation for colonoscopy. As per daughter at 8am patient was playing with her grandchildren but around 840 am patient was getting dressed and fell and subsequently felt weak after. Daughter reports that patient had some episodes of vomiting at this time. She had a syncopal episode at around 10 am and was witnessed by brother. No head trauma, She regained conscious after few minutes. She remained in bed for the remainder of the day. Daughter reports some slurred speech noted 1230-1PM and also was confused after. and around 4PM, the patient's sister noted right sided weakness at which time EMS was called and patient was brought to ED. No c/o chest pain, shortness of breath, fever, headache, abdominal pain, urinary complaints. No recent travel/sickness/ change in meds. Stroke code in ER: CTH neg for heme, Right PICA distribution acute infarction. CTA with saccular aneurysms of b/l carotids, approximately 0.8 cm on the right and 1.2 x 0.9 cm on the left. Possible tiny third saccular aneurysm on the right Posterior intracranial circulation: Right vertebral arterial occlusion at its dural crossing junction V3 Atlantic and V4 intracranial segments with likely reversal of flow in its intracranial segment from the basilar. Right PICA faintly seen. 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  Not candidate for TNK/mechanical thrombectomy. CT scan repeated which showed: 1. Brain: Progression of acute infarction within the right cerebellar hemisphere, also extending into the left superior cerebellar hemisphere. New mass effect on the fourth ventricle causing stenosis versus occlusion with new third and lateral ventricular dilatation indicating ventricular obstruction at the level of the fourth ventricle. New upward and downward herniation of cerebellar parenchyma Right carotid system: No hemodynamically significant stenosis. Left carotid system: No hemodynamically significant stenosis. Vertebral circulation: Patent. Anterior intracranial circulation: Intact. Bilateral internal carotid saccular aneurysms. These findings are unchanged. Posterior intracranial circulation:    Improved flow within the right vertebral artery since 4/20/2023. New focal stenosis mid left vertebral artery, etiology uncertain, consider vasospasm and extrinsic compression in addition to new embolic disease. Brain perfusion: Perfusion images demonstrate normalization of the perfusion abnormality in the right cerebellar hemisphere present on 4/20/2023 despite evidence of progression of acute infarction.  Areas of apparent ischemia within the posterior fossa have progressed in extent, also again involving the left posterior cerebral arterial distribution. Tx to [facility] for SOC watch. On admission to [facility], NIHSS 12.  (21 Apr 2023 16:50)    OVERNIGHT EVENTS: Reapplied pressure dressing, remained on SIMV.     Hospital Course:   4/21: Admitted for crani watch, CTH complete w/ unchanged hydrocephalus, taken for emergent occipital craniectomy.   4/22: POD1. Remains intubated overnight, on propofol and dank. EVD@[id number]. Pending repeat CTH this am. Given hydralazine 10mg x1. Started on cardene for SBP high 140s-150s. Sub-therapeutic on plavix, therapeutic on asa, rpt asa accumetrics until subtherapeutic. LE dopplers neg for DVT, but showing superficial venous thrombosis in the proximal portion of the right greater saphenous vein. Started on 3% @60 for na goal 145-150. NGT placed by ENT. Febrile, pancultured.  4/23: POD 2. 3% increased to 75. NGT readjusted. UA neg. SBP liberalized to 160. Plan to extubate. 3% decreased to 60. Failed extubation d/t no cuff leak, given 60mg solumedrol, plan to extubate in 6 hrs. SCx1 for post void residual >400, started on cardura at bedtime. New blood in buretrol, stopped SQL. Clot in EVD cleared. Neuro exam improving.   4/24: POD 3. Cont 3% with NS while NPO, start TF today. TF started. LFTs downtrending. Sodium 141. Increased 3% to 50, NS to 30. Repeat BMP ordered for 5:30PM. Tramadol 25 for pain with no relief, oxy 5 and 1g tylenol ordered, stability CT stable, speech language consult for dysarthria. Given hydralazine 10mg IVP for SBP>160, started amlodipine 5mg. C/o mild headache, given fioricet x1, stroke neuro rec SALVADOR and loop recorder placement. Echo with bubble completed, negative for PFO, EF 55-60%. J HFNC started for desats with suctioning.   4/25: POD 4. Cont HFNC. Increased secertions on HFNC, reintubated. CXR confirmed good placement. EVD dropped to 5cmH20 for goal of draining 5-10cc/hr and SG ELENA drain taken off suction. Pending CTH. EVD drained 0cc/hr, dropped to 0. NGT replaced. Dc'd fioricet. Started on PPI for intubation. Increased cardura to 4mg for urinary retention, given an additional 2mg now. Started salt tabs 3 q 6. Given 12.5mg fentanyl x1. NGT replaced, CXR shown in lung. NGT removed, repeat CXR showing no pneumothorax. Pending ENT consult for NGT placement. CTH stable. NGT replaced by ENT, confirmed in proper spot. Restarted TF. [id number]. Na 141 from 146. Increased 3% to 60. EVD raised to 3cmH20. ETT pulled back 1cm by RT.  4/26: POD 5 SOC. Intubated, remains on precedex, ABG ordered with improving PaO2, BMP sodium 148, 3% changed to 30cc/hr, cardura increased to 8mg for tonight, tolerating cpap  4/27: POD 6 SOC. Respiratory rate sustained 6, returned to FVS. Na 151, dc'd 3%, f/u AM sodium. Nurse noticed ETT 19 at the lip and was 20 prior, CXR shows ETT @ 5cm above jono, ET tube advanced 1cm, repeat CXR confirms appropriate placement. Thick inline secretions with suctioning. ENT trach placement Fri likely, PEG likely Mon. Keep ELENA drain until no output, EVD to remain @3. Start ASA 81mg daily tomorrow.   4/28: POD7 SOC, neuro stable, given fentanyl x 1 overnight for presumed discomfort (biting on ETT), remains on full vent support. CTH today stable in comparison to 4/25. 6 cuffed trach placed by ENT in OR, but came down without cuff. Replaced at bedside by ENT. On fentanyl gtt.    4/29: POD8 SOC, JIM overnight. Incision cleaned and dressing changed. On fentanyl gtt. EKG completed due to increased frequency PVCs on telemetry, frequency of PVCs improved with titrating up fentanyl gtt. ABG drawn.  Repeat LE dopplers completed showing persistant superficial thrombus, no DVT. No EVD waveform during day, flushed distally without improvement. Exam unchanged.  EVD dropped to 0 for low output in afternoon.   4/30: POD9. Neuro stable, febrile to 101.3F o/n, given tylenol and pan cx sent. SBP dipped to low 90s o/n, HR stable in 70s with some PVCs, decreased fentanyl gtt and given 500cc bolus of NS x 2. Pend PEG placement Mon and angio Tues. D/c'd ELENA drain today and suture placed. F/u heme recs. Positive UA. Infiltrates found on CXR. Started on Zosyn 4.5g Q6hrs .   5/1: POD 10. Neuro stable. Dc'd fentanyl gtt. PEG failed placement at bedside with Dr. Gant, plan for PEG in OR tomorrow afternoon with Dr. Layton. Dc'd amlodipine and started carvedilol 3.125 BID for PVCs . Made 3% inhalation and mucomyst q8hr. Failed PEG at bedside. Salt tabs made 1 q 6. Decreased cardura to 4mg daily. Urine culture growing E. Coli and sputum culture growing pluralibacter gergoviae and strep species. ID consulted, f/u recs. Failed CPAP trial @ 3pm. Added am labs per heme/onc for hypercoguable workup. Pending repeat LE dopplers in 2-3 days. NGT clogged, removed, ENT failed attempt at replacement, will keep NPO for PEG placement tmw. Repeat xray in am for concern for aspration. Pt abx switched from Zosyn to Ertapenem 1g Q24hrs. per ID recs, possible ESBL growth.   5/2: POD 11. Neuro stable. Pre-op for diagnostic cerebral angiogram and PEG placement. NPO. EVD raised to 5 cmH20. Sputum culture speciated to step pneumoniae, sensitive to ertapenem. 3% inhalation/mucomyst made q 6 hr d/t thick secretions. PEG placed in OR. POD0 dx cerebral angio. EVD raised to 10.   5/3: POD 12. Neuro stable. PEG feeds began @ 10 AM, plan to increase 10cc every 6h per general surgery. Repeat dopplers show stable R superficial thrombus and new L IM calf DVT. Vascular consulted for IVC filter. Plan to get CTH tomorrow.  5/4: POD 13. JIM o/n. s/p IVC filter with vascular today. Pending post-procedure CTH. FOBT negative. Started iron and vitamin c every other day for iron deficiency anemia.   5/5: POD14. CSF cx sent to r/o infection from new gas in right temporal horn seen on CTH. Restarted TF, changed to Jevity 1.2, f/u nutrition recs. EVD raised to 10 today, plan to challenge over the weekend and CTH on Monday, possible VPS next Wednesday. ENT removed sutures today. Salt tabs decreased 1q12.  5/6: POD15 Placed on CPAP briefly with low MV alert, placed back on full vent support. EVD remains open at [id number]. ICPs WNL. Neuro exam stable.  EVD raised to 15. Tolerated CPAP x8h.   5/7: POD#16. JIM overnight, neuro stable. D/c'd salt tabs and 3% neb. Wean trach to CPAP as tolerated. Pan cx NGTD. EVD raised today to [id number].   5/8: POD17 JIM overnight. ICPs WNL. Pt remains on full vent support. Neuro exam stable. Plan for possible VPS today. CT chio complete showing increase in vent size.   5/9: POD18 SOC, POD1 VPS. Desat o/n to 80s, improved on own. CXR with LLL effusion. Another desat to 90% in AM, given duoneb and mucomyst. Oxycodone given for incisional pain. Neuro exam stable. CTH in AM stable. Keppra dc'd. Ritalin 5 BID started.  Tolerating CPAP trial. SALVADOR ordered. Rectal tube dc'd.   5/10: POD 19 SOC POD2 VPS. Remains on full vent support. Tolerated CPAP for 6 hours. Neuro exam stable. BP liberalized to 160. RLQ US 2/2 abd pain. Lactate WNL.  5/11: POD20 SOC POD3 VPS. Plan for CPAP trial in AM.  Dc'd cardura. F/u speech consult for communcation board. Has been tolerating CPAP since 9am. RLQ us w/ no acute pathology. Per heme/onc LMWH or coumadin rec for AC over DOAC due to antiphospholipid syndrome.   5/12: POD21 SOC POD4 VPS. CPAP 8/5, tolerated until 3pm. Can start ASA on 5/14 and full AC POD 10 from VPS 5/19 (remove IVC filter prior, f/u w/ vascular on timing).   5/13: POD22 SOC POD 5 VPS, neuro stable, tolerating trach collar. ASA ordered to start tomorrow, Ritalin increased to 10BID from 5BID, Simethicone ordered for gas, f/u gen surg for continued abd discomfort, PM VBG with PCO2 50 and repeat VBG pCO2 55, possible obesity hypoventilation, f/u AM VBG   5/14: POD23 SOC, [id number], neuro stable, VBG showed increased PCO2 indicating poor ventilation, failed CPAP due to low TV, put back on full vent support.   5/15: POD24 SOC, POD7 VPS. O/n CT A/P done for abd pain, f/u read. neuro stable. remains on full vent support. Simethicone, Amlodipine d/c'd. Given 1L bolus of NS. Dilaudid 0.5 for pain control. CT A/P negative for acute pathology. Restarted tube feeds. Started Modafinil for neurostim.  5/16: POD25 SOC, POD8 VPS. JIM o/n neuro stable. remains on full vent support. DC carvedilol for intermittent hypotensive episodes. UA for suprapelvic pain, concern for nephrolithiasis based on CT AP.  5/17: POD26 SOC, POD9 VPS. JIM o/n neuro stable.   Modafinil increased. CTH completed, . Urology consulted for bladder calculi vs mass, pending urine cytology study and outptaient urology follow-up for cystoscopy. Trialing SIMV for vent weaning.   5/18: POD27 SOC, POD10 VPS. JIM o/n remains on SIMV.   Baby aspirin discontinued. SQL 80mg bid started for DVT treatement per hematology/neurology. Plan for CTH in am. Added amantadine for neurostim.   5/19: POD28, POD 11 VPS. JIM o/n, reapplied pressure dressing and remained on SIMV.     Vital Signs Last 24 Hrs  T(C): 36.5 (18 May 2023 21:47), Max: 37.3 (18 May 2023 01:02)  T(F): 97.7 (18 May 2023 21:47), Max: 99.2 (18 May 2023 14:00)  HR: 72 (18 May 2023 23:00) (57 - 79)  BP: 104/56 (18 May 2023 23:00) (85/44 - 113/60)  BP(mean): 75 (18 May 2023 23:00) (60 - 84)  RR: 15 (18 May 2023 23:00) (10 - 25)  SpO2: 100% (18 May 2023 23:00) (94% - 100%)    Parameters below as of 18 May 2023 23:00  Patient On (Oxygen Delivery Method): ventilator, SIMV    O2 Concentration (%): 40    I&O's Summary    17 May 2023 07:01  -  18 May 2023 07:00  --------------------------------------------------------  IN: 1400 mL / OUT: 800 mL / NET: 600 mL    18 May 2023 07:01  -  19 May 2023 00:26  --------------------------------------------------------  IN: 910 mL / OUT: 900 mL / NET: 10 mL        PHYSICAL EXAM:  General: patient seen laying supine in bed in NAD on SIMV  Neuro: AAOx2-3 (nods appropriately to questions self, place), R gaze, FC, OE spontaneously, RUE 4/5, otherwise 5/5 strength throughout  HEENT: PERRL, EOMI  Pulmonary: chest rise symmetric  Abdomen: soft, nontender, nondistended  Ext: perfusing well  Skin: warm, dry  Wound: Crani incision c/d/i reinforced w/ pressure dressing       TUBES/LINES:  [] Garsia  [] Lumbar Drain  [] Wound Drains  [] Others      DIET:  [] NPO  [] Mechanical  [x] Tube feeds    LABS:                        9.1    3.77  )-----------( 281      ( 18 May 2023 04:25 )             28.7     05-18    138  |  104  |  12  ----------------------------<  145<H>  4.2   |  28  |  0.44<L>    Ca    9.0      18 May 2023 04:25  Phos  4.6     05-18  Mg     2.2     05-18              CAPILLARY BLOOD GLUCOSE          Drug Levels: [] N/A    CSF Analysis: [] N/A      Allergies    No Known Allergies    Intolerances      MEDICATIONS:  Antibiotics:    Neuro:  acetaminophen   Oral Liquid .. 650 milliGRAM(s) Oral every 6 hours PRN  amantadine Syrup 100 milliGRAM(s) Oral every 12 hours  methylphenidate 10 milliGRAM(s) Oral <User Schedule>  modafinil 200 milliGRAM(s) Oral every 24 hours  oxyCODONE    IR 5 milliGRAM(s) Oral every 4 hours PRN    Anticoagulation:  enoxaparin Injectable 80 milliGRAM(s) SubCutaneous every 12 hours    OTHER:  acetylcysteine 10%  Inhalation 4 milliLiter(s) Inhalation every 6 hours PRN  albuterol/ipratropium for Nebulization 3 milliLiter(s) Nebulizer every 6 hours PRN  atorvastatin 80 milliGRAM(s) Oral at bedtime  chlorhexidine 0.12% Liquid 15 milliLiter(s) Oral Mucosa every 12 hours  chlorhexidine 2% Cloths 1 Application(s) Topical daily  pantoprazole  Injectable 40 milliGRAM(s) IV Push at bedtime  polyethylene glycol 3350 17 Gram(s) Oral daily  senna 2 Tablet(s) Oral at bedtime    IVF:  ascorbic acid 500 milliGRAM(s) Oral <User Schedule>  ferrous    sulfate 325 milliGRAM(s) Oral <User Schedule>    CULTURES:  Culture Results:   No growth to date (05-08 @ 14:35)  Culture Results:   No growth to date (05-05 @ 05:06)      ASSESSMENT:  58 y/o female found to have acute infarction within the right cerebellar hemisphere, causing herniation. Now s/p SOC foramen magnum decompression and right parietal/occipital EVD placement (4/21). s/p trach (4/28/23). s/p PEG (5/2/23), s/p dx cerebral angiogram (5/2/23). now s/p VPS Certas @ 4 (5/8/23).   STROKE    No pertinent family history in first degree relatives    Handoff    CAD (coronary artery disease)    Asthma    Peripheral neuropathy    Cerebellar stroke    Respiratory failure, unspecified with hypoxia    History of DVT (deep vein thrombosis)    Hydrocephalus    Tracheostomy malfunction    Cerebellar stroke    Respiratory failure, unspecified with hypoxia    History of DVT of lower extremity    Hydrocephalus    Tracheostomy malfunction    Delirium    Decompressive craniectomy    Cerebellar infarct    Cerebellar infarct    CAD (coronary artery disease)    Asthma    Peripheral neuropathy    Lupus anticoagulant positive    Anemia due to acute blood loss    Tracheostomy malfunction    Deep vein thrombosis (DVT) of calf muscle vein    Mass of bladder    Decompressive craniectomy    Insertion, external ventricular drain    Planned tracheostomy    Tracheostomy tube change    Esophagogastroduodenoscopy (EGD) with anesthesia    Insertion, PEG tube, laparoscopic    Angiogram, carotid and cerebral, bilateral    Tracheostomy tube change    IVC filter placement     shunt    Insertion, ventriculopleural shunt    S/P total abdominal hysterectomy    S/P cholecystectomy    S/P right knee surgery    Insertion, external ventricular drain    Planned tracheostomy    Esophagogastroduodenoscopy (EGD) with anesthesia    Insertion, PEG tube, laparoscopic     shunt    CAD (coronary artery disease)    Asthma    Peripheral neuropathy    Lupus anticoagulant positive    Acute respiratory failure with hypoxia    Acute respiratory failure with hypoxia    Dysphagia    Deep vein thrombosis (DVT)    Hydrocephalus    [email or web address]        PLAN:  Neuro   - Vitals q4h/neuro q4h   - s/p VPS (Certas @ 4)   - dx angio 5/2: b/l cavernous ICA aneurysms, as discussed at vascular conference f/u outpt   - CTH 4/28 stable; 5/4 new gas in right temporal horn, CT 5/8 chio increased vent size, 5/9 stable, CTH 5/17 increased suboccipital fluid collection   - Stroke consulted, appreciate reccs   - ASA81 dc'd  - Pain control with tyl, oxy 5 prn  - Ritalin 10mg BID, Modafinil 200mg qd, amantadine for neurostimulation  - CTH in AM    Cardio  - SBP   - TTE 4/24: negative for PFO, mild LVH, mild dilation of L atrium, EF 55-60%   - SALVADOR deferred, not indicated as it will not  at this time for starting patient on anticoagulation. Will reassess if indicated medically.     Pulm  - + Trach (6 shiley) VC 40/400/16/6, SIMV trials   - Chest PT, duoneb, mucomist q 6 prn     GI  - + PEG wih TF (Jevity 1.2)  - last BM 5/16  - Protonix for GI ppx while on vent  - RLQ US - no acute pathology  - CT A/P 5/15: no acute pathology    Renal  - IVL  - Voiding via primafit   - 5/15 CT A/P bladder calculi vs mass, f/u urology recs    Endo   - cont lipitor     Heme  - No SCDs, SQL 80 BID started on 5/18  - s/p IVCF 5/4, keep per heme recs  - LE dopplers 4/22 neg for DVT, but showing superficial venous thrombosis in the proximal portion of the right greater saphenous vein; repeat on 4/29 with persistant superficial thrombus, 5/3 new DVT L IM calf and unchanged R superficial vein thrombus  - Heme following for + anticardiolipin Ab 4/27, recs appreciated, f/u activated protein C    - Iron and vitamin c every other day     ID  - CSF sent from OR (5/8)   - MRSA (-), +UA cx ESBL, +sputum cx pluralibacter gergoviae, strep pneumoniae, s/p Ertapenem 1g Q24hrs (5/1-5/7)    PSYCH  - behavioral health consulted for tearfulness, rec     DISPO:  - NSICU, full code   - PT/OT rec AR patient can tolerate 3 hrs PT/OT daily    D/w Dr. Valadez and Dr. Bueno      Assessment:  Present when checked    []  GCS  E   V  M     Heart Failure: []Acute, [] acute on chronic , []chronic  Heart Failure:  [] Diastolic (HFpEF), [] Systolic (HFrEF), []Combined (HFpEF and HFrEF), [] RHF, [] Pulm HTN, [] Other    [] MAMTA, [] ATN, [] AIN, [] other  [] CKD1, [] CKD2, [] CKD 3, [] CKD 4, [] CKD 5, []ESRD    Encephalopathy: [] Metabolic, [] Hepatic, [] toxic, [] Neurological, [] Other    Abnormal Nurtitional Status: [] malnurtition (see nutrition note), [ ]underweight: BMI < 19, [] morbid obesity: BMI >40, [] Cachexia    [] Sepsis  [] hypovolemic shock,[] cardiogenic shock, [] hemorrhagic shock, [] neuogenic shock  [] Acute Respiratory Failure  []Cerebral edema, [] Brain compression/ herniation,   [] Functional quadriplegia  [] Acute blood loss anemia   HPI:  56 yo F with PMH of Asthma, CAD (not on  meds), peripheral neuropathy and PSH of BATOOL, cholecystectomy, right knee surgery presented to [place] ED on 4/20 with right sided weakness and right facial numbness. Patient was undergoing preparation for colonoscopy. As per daughter at 8am patient was playing with her grandchildren but around 840 am patient was getting dressed and fell and subsequently felt weak after. Daughter reports that patient had some episodes of vomiting at this time. She had a syncopal episode at around 10 am and was witnessed by brother. No head trauma, She regained conscious after few minutes. She remained in bed for the remainder of the day. Daughter reports some slurred speech noted 1230-1PM and also was confused after. and around 4PM, the patient's sister noted right sided weakness at which time EMS was called and patient was brought to ED. No c/o chest pain, shortness of breath, fever, headache, abdominal pain, urinary complaints. No recent travel/sickness/ change in meds. Stroke code in ER: CTH neg for heme, Right PICA distribution acute infarction. CTA with saccular aneurysms of b/l carotids, approximately 0.8 cm on the right and 1.2 x 0.9 cm on the left. Possible tiny third saccular aneurysm on the right Posterior intracranial circulation: Right vertebral arterial occlusion at its dural crossing junction V3 Atlantic and V4 intracranial segments with likely reversal of flow in its intracranial segment from the basilar. Right PICA faintly seen. 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  Not candidate for TNK/mechanical thrombectomy. CT scan repeated which showed: 1. Brain: Progression of acute infarction within the right cerebellar hemisphere, also extending into the left superior cerebellar hemisphere. New mass effect on the fourth ventricle causing stenosis versus occlusion with new third and lateral ventricular dilatation indicating ventricular obstruction at the level of the fourth ventricle. New upward and downward herniation of cerebellar parenchyma Right carotid system: No hemodynamically significant stenosis. Left carotid system: No hemodynamically significant stenosis. Vertebral circulation: Patent. Anterior intracranial circulation: Intact. Bilateral internal carotid saccular aneurysms. These findings are unchanged. Posterior intracranial circulation:    Improved flow within the right vertebral artery since 4/20/2023. New focal stenosis mid left vertebral artery, etiology uncertain, consider vasospasm and extrinsic compression in addition to new embolic disease. Brain perfusion: Perfusion images demonstrate normalization of the perfusion abnormality in the right cerebellar hemisphere present on 4/20/2023 despite evidence of progression of acute infarction.  Areas of apparent ischemia within the posterior fossa have progressed in extent, also again involving the left posterior cerebral arterial distribution. Tx to [facility] for SOC watch. On admission to [facility], NIHSS 12.  (21 Apr 2023 16:50)    OVERNIGHT EVENTS: Reapplied pressure dressing, remained on SIMV.     Hospital Course:   4/21: Admitted for crani watch, CTH complete w/ unchanged hydrocephalus, taken for emergent occipital craniectomy.   4/22: POD1. Remains intubated overnight, on propofol and dank. EVD@[id number]. Pending repeat CTH this am. Given hydralazine 10mg x1. Started on cardene for SBP high 140s-150s. Sub-therapeutic on plavix, therapeutic on asa, rpt asa accumetrics until subtherapeutic. LE dopplers neg for DVT, but showing superficial venous thrombosis in the proximal portion of the right greater saphenous vein. Started on 3% @60 for na goal 145-150. NGT placed by ENT. Febrile, pancultured.  4/23: POD 2. 3% increased to 75. NGT readjusted. UA neg. SBP liberalized to 160. Plan to extubate. 3% decreased to 60. Failed extubation d/t no cuff leak, given 60mg solumedrol, plan to extubate in 6 hrs. SCx1 for post void residual >400, started on cardura at bedtime. New blood in buretrol, stopped SQL. Clot in EVD cleared. Neuro exam improving.   4/24: POD 3. Cont 3% with NS while NPO, start TF today. TF started. LFTs downtrending. Sodium 141. Increased 3% to 50, NS to 30. Repeat BMP ordered for 5:30PM. Tramadol 25 for pain with no relief, oxy 5 and 1g tylenol ordered, stability CT stable, speech language consult for dysarthria. Given hydralazine 10mg IVP for SBP>160, started amlodipine 5mg. C/o mild headache, given fioricet x1, stroke neuro rec SALVADOR and loop recorder placement. Echo with bubble completed, negative for PFO, EF 55-60%. J HFNC started for desats with suctioning.   4/25: POD 4. Cont HFNC. Increased secertions on HFNC, reintubated. CXR confirmed good placement. EVD dropped to 5cmH20 for goal of draining 5-10cc/hr and SG ELENA drain taken off suction. Pending CTH. EVD drained 0cc/hr, dropped to 0. NGT replaced. Dc'd fioricet. Started on PPI for intubation. Increased cardura to 4mg for urinary retention, given an additional 2mg now. Started salt tabs 3 q 6. Given 12.5mg fentanyl x1. NGT replaced, CXR shown in lung. NGT removed, repeat CXR showing no pneumothorax. Pending ENT consult for NGT placement. CTH stable. NGT replaced by ENT, confirmed in proper spot. Restarted TF. [id number]. Na 141 from 146. Increased 3% to 60. EVD raised to 3cmH20. ETT pulled back 1cm by RT.  4/26: POD 5 SOC. Intubated, remains on precedex, ABG ordered with improving PaO2, BMP sodium 148, 3% changed to 30cc/hr, cardura increased to 8mg for tonight, tolerating cpap  4/27: POD 6 SOC. Respiratory rate sustained 6, returned to FVS. Na 151, dc'd 3%, f/u AM sodium. Nurse noticed ETT 19 at the lip and was 20 prior, CXR shows ETT @ 5cm above jono, ET tube advanced 1cm, repeat CXR confirms appropriate placement. Thick inline secretions with suctioning. ENT trach placement Fri likely, PEG likely Mon. Keep ELENA drain until no output, EVD to remain @3. Start ASA 81mg daily tomorrow.   4/28: POD7 SOC, neuro stable, given fentanyl x 1 overnight for presumed discomfort (biting on ETT), remains on full vent support. CTH today stable in comparison to 4/25. 6 cuffed trach placed by ENT in OR, but came down without cuff. Replaced at bedside by ENT. On fentanyl gtt.    4/29: POD8 SOC, JIM overnight. Incision cleaned and dressing changed. On fentanyl gtt. EKG completed due to increased frequency PVCs on telemetry, frequency of PVCs improved with titrating up fentanyl gtt. ABG drawn.  Repeat LE dopplers completed showing persistant superficial thrombus, no DVT. No EVD waveform during day, flushed distally without improvement. Exam unchanged.  EVD dropped to 0 for low output in afternoon.   4/30: POD9. Neuro stable, febrile to 101.3F o/n, given tylenol and pan cx sent. SBP dipped to low 90s o/n, HR stable in 70s with some PVCs, decreased fentanyl gtt and given 500cc bolus of NS x 2. Pend PEG placement Mon and angio Tues. D/c'd ELENA drain today and suture placed. F/u heme recs. Positive UA. Infiltrates found on CXR. Started on Zosyn 4.5g Q6hrs .   5/1: POD 10. Neuro stable. Dc'd fentanyl gtt. PEG failed placement at bedside with Dr. Gant, plan for PEG in OR tomorrow afternoon with Dr. Layton. Dc'd amlodipine and started carvedilol 3.125 BID for PVCs . Made 3% inhalation and mucomyst q8hr. Failed PEG at bedside. Salt tabs made 1 q 6. Decreased cardura to 4mg daily. Urine culture growing E. Coli and sputum culture growing pluralibacter gergoviae and strep species. ID consulted, f/u recs. Failed CPAP trial @ 3pm. Added am labs per heme/onc for hypercoguable workup. Pending repeat LE dopplers in 2-3 days. NGT clogged, removed, ENT failed attempt at replacement, will keep NPO for PEG placement tmw. Repeat xray in am for concern for aspration. Pt abx switched from Zosyn to Ertapenem 1g Q24hrs. per ID recs, possible ESBL growth.   5/2: POD 11. Neuro stable. Pre-op for diagnostic cerebral angiogram and PEG placement. NPO. EVD raised to 5 cmH20. Sputum culture speciated to step pneumoniae, sensitive to ertapenem. 3% inhalation/mucomyst made q 6 hr d/t thick secretions. PEG placed in OR. POD0 dx cerebral angio. EVD raised to 10.   5/3: POD 12. Neuro stable. PEG feeds began @ 10 AM, plan to increase 10cc every 6h per general surgery. Repeat dopplers show stable R superficial thrombus and new L IM calf DVT. Vascular consulted for IVC filter. Plan to get CTH tomorrow.  5/4: POD 13. JIM o/n. s/p IVC filter with vascular today. Pending post-procedure CTH. FOBT negative. Started iron and vitamin c every other day for iron deficiency anemia.   5/5: POD14. CSF cx sent to r/o infection from new gas in right temporal horn seen on CTH. Restarted TF, changed to Jevity 1.2, f/u nutrition recs. EVD raised to 10 today, plan to challenge over the weekend and CTH on Monday, possible VPS next Wednesday. ENT removed sutures today. Salt tabs decreased 1q12.  5/6: POD15 Placed on CPAP briefly with low MV alert, placed back on full vent support. EVD remains open at [id number]. ICPs WNL. Neuro exam stable.  EVD raised to 15. Tolerated CPAP x8h.   5/7: POD#16. JIM overnight, neuro stable. D/c'd salt tabs and 3% neb. Wean trach to CPAP as tolerated. Pan cx NGTD. EVD raised today to [id number].   5/8: POD17 JIM overnight. ICPs WNL. Pt remains on full vent support. Neuro exam stable. Plan for possible VPS today. CT chio complete showing increase in vent size.   5/9: POD18 SOC, POD1 VPS. Desat o/n to 80s, improved on own. CXR with LLL effusion. Another desat to 90% in AM, given duoneb and mucomyst. Oxycodone given for incisional pain. Neuro exam stable. CTH in AM stable. Keppra dc'd. Ritalin 5 BID started.  Tolerating CPAP trial. SALVADOR ordered. Rectal tube dc'd.   5/10: POD 19 SOC POD2 VPS. Remains on full vent support. Tolerated CPAP for 6 hours. Neuro exam stable. BP liberalized to 160. RLQ US 2/2 abd pain. Lactate WNL.  5/11: POD20 SOC POD3 VPS. Plan for CPAP trial in AM.  Dc'd cardura. F/u speech consult for communcation board. Has been tolerating CPAP since 9am. RLQ us w/ no acute pathology. Per heme/onc LMWH or coumadin rec for AC over DOAC due to antiphospholipid syndrome.   5/12: POD21 SOC POD4 VPS. CPAP 8/5, tolerated until 3pm. Can start ASA on 5/14 and full AC POD 10 from VPS 5/19 (remove IVC filter prior, f/u w/ vascular on timing).   5/13: POD22 SOC POD 5 VPS, neuro stable, tolerating trach collar. ASA ordered to start tomorrow, Ritalin increased to 10BID from 5BID, Simethicone ordered for gas, f/u gen surg for continued abd discomfort, PM VBG with PCO2 50 and repeat VBG pCO2 55, possible obesity hypoventilation, f/u AM VBG   5/14: POD23 SOC, [id number], neuro stable, VBG showed increased PCO2 indicating poor ventilation, failed CPAP due to low TV, put back on full vent support.   5/15: POD24 SOC, POD7 VPS. O/n CT A/P done for abd pain, f/u read. neuro stable. remains on full vent support. Simethicone, Amlodipine d/c'd. Given 1L bolus of NS. Dilaudid 0.5 for pain control. CT A/P negative for acute pathology. Restarted tube feeds. Started Modafinil for neurostim.  5/16: POD25 SOC, POD8 VPS. JIM o/n neuro stable. remains on full vent support. DC carvedilol for intermittent hypotensive episodes. UA for suprapelvic pain, concern for nephrolithiasis based on CT AP.  5/17: POD26 SOC, POD9 VPS. JIM o/n neuro stable.   Modafinil increased. CTH completed, . Urology consulted for bladder calculi vs mass, pending urine cytology study and outptaient urology follow-up for cystoscopy. Trialing SIMV for vent weaning.   5/18: POD27 SOC, POD10 VPS. JIM o/n remains on SIMV.   Baby aspirin discontinued. SQL 80mg bid started for DVT treatement per hematology/neurology. Plan for CTH in am. Added amantadine for neurostim.   5/19: POD28, POD 11 VPS. JIM o/n, reapplied pressure dressing and remained on SIMV.     Vital Signs Last 24 Hrs  T(C): 36.5 (18 May 2023 21:47), Max: 37.3 (18 May 2023 01:02)  T(F): 97.7 (18 May 2023 21:47), Max: 99.2 (18 May 2023 14:00)  HR: 72 (18 May 2023 23:00) (57 - 79)  BP: 104/56 (18 May 2023 23:00) (85/44 - 113/60)  BP(mean): 75 (18 May 2023 23:00) (60 - 84)  RR: 15 (18 May 2023 23:00) (10 - 25)  SpO2: 100% (18 May 2023 23:00) (94% - 100%)    Parameters below as of 18 May 2023 23:00  Patient On (Oxygen Delivery Method): ventilator, SIMV    O2 Concentration (%): 40    I&O's Summary    17 May 2023 07:01  -  18 May 2023 07:00  --------------------------------------------------------  IN: 1400 mL / OUT: 800 mL / NET: 600 mL    18 May 2023 07:01  -  19 May 2023 00:26  --------------------------------------------------------  IN: 910 mL / OUT: 900 mL / NET: 10 mL        PHYSICAL EXAM:  General: patient seen laying supine in bed in NAD on SIMV  Neuro: AAOx2-3 (nods appropriately to questions self, place), R gaze, FC, OE spontaneously, RUE 4/5, otherwise 5/5 strength throughout  HEENT: PERRL +trach  Pulmonary: chest rise symmetric  Abdomen: soft, nontender, nondistended  Ext: perfusing well  Skin: warm, dry  Wound: Crani incision c/d/i reinforced w/ pressure dressing       TUBES/LINES:  [] Garsia  [] Lumbar Drain  [] Wound Drains  [] Others      DIET:  [] NPO  [] Mechanical  [x] Tube feeds    LABS:                        9.1    3.77  )-----------( 281      ( 18 May 2023 04:25 )             28.7     05-18    138  |  104  |  12  ----------------------------<  145<H>  4.2   |  28  |  0.44<L>    Ca    9.0      18 May 2023 04:25  Phos  4.6     05-18  Mg     2.2     05-18              CAPILLARY BLOOD GLUCOSE          Drug Levels: [] N/A    CSF Analysis: [] N/A      Allergies    No Known Allergies    Intolerances      MEDICATIONS:  Antibiotics:    Neuro:  acetaminophen   Oral Liquid .. 650 milliGRAM(s) Oral every 6 hours PRN  amantadine Syrup 100 milliGRAM(s) Oral every 12 hours  methylphenidate 10 milliGRAM(s) Oral <User Schedule>  modafinil 200 milliGRAM(s) Oral every 24 hours  oxyCODONE    IR 5 milliGRAM(s) Oral every 4 hours PRN    Anticoagulation:  enoxaparin Injectable 80 milliGRAM(s) SubCutaneous every 12 hours    OTHER:  acetylcysteine 10%  Inhalation 4 milliLiter(s) Inhalation every 6 hours PRN  albuterol/ipratropium for Nebulization 3 milliLiter(s) Nebulizer every 6 hours PRN  atorvastatin 80 milliGRAM(s) Oral at bedtime  chlorhexidine 0.12% Liquid 15 milliLiter(s) Oral Mucosa every 12 hours  chlorhexidine 2% Cloths 1 Application(s) Topical daily  pantoprazole  Injectable 40 milliGRAM(s) IV Push at bedtime  polyethylene glycol 3350 17 Gram(s) Oral daily  senna 2 Tablet(s) Oral at bedtime    IVF:  ascorbic acid 500 milliGRAM(s) Oral <User Schedule>  ferrous    sulfate 325 milliGRAM(s) Oral <User Schedule>    CULTURES:  Culture Results:   No growth to date (05-08 @ 14:35)  Culture Results:   No growth to date (05-05 @ 05:06)      ASSESSMENT:  58 y/o female found to have acute infarction within the right cerebellar hemisphere, causing herniation. Now s/p SOC foramen magnum decompression and right parietal/occipital EVD placement (4/21). s/p trach (4/28/23). s/p PEG (5/2/23), s/p dx cerebral angiogram (5/2/23). now s/p VPS Certas @ 4 (5/8/23).   STROKE    No pertinent family history in first degree relatives    Handoff    CAD (coronary artery disease)    Asthma    Peripheral neuropathy    Cerebellar stroke    Respiratory failure, unspecified with hypoxia    History of DVT (deep vein thrombosis)    Hydrocephalus    Tracheostomy malfunction    Cerebellar stroke    Respiratory failure, unspecified with hypoxia    History of DVT of lower extremity    Hydrocephalus    Tracheostomy malfunction    Delirium    Decompressive craniectomy    Cerebellar infarct    Cerebellar infarct    CAD (coronary artery disease)    Asthma    Peripheral neuropathy    Lupus anticoagulant positive    Anemia due to acute blood loss    Tracheostomy malfunction    Deep vein thrombosis (DVT) of calf muscle vein    Mass of bladder    Decompressive craniectomy    Insertion, external ventricular drain    Planned tracheostomy    Tracheostomy tube change    Esophagogastroduodenoscopy (EGD) with anesthesia    Insertion, PEG tube, laparoscopic    Angiogram, carotid and cerebral, bilateral    Tracheostomy tube change    IVC filter placement     shunt    Insertion, ventriculopleural shunt    S/P total abdominal hysterectomy    S/P cholecystectomy    S/P right knee surgery    Insertion, external ventricular drain    Planned tracheostomy    Esophagogastroduodenoscopy (EGD) with anesthesia    Insertion, PEG tube, laparoscopic     shunt    CAD (coronary artery disease)    Asthma    Peripheral neuropathy    Lupus anticoagulant positive    Acute respiratory failure with hypoxia    Acute respiratory failure with hypoxia    Dysphagia    Deep vein thrombosis (DVT)    Hydrocephalus    [email or web address]        PLAN:  Neuro   - Vitals q4h/neuro q4h   - s/p VPS (Certas @ 4)   - dx angio 5/2: b/l cavernous ICA aneurysms, as discussed at vascular conference f/u outpt   - CTH 4/28 stable; 5/4 new gas in right temporal horn, CT 5/8 chio increased vent size, 5/9 stable, CTH 5/17 increased suboccipital fluid collection   - Stroke consulted, appreciate reccs   - ASA81 dc'd  - Pain control with tyl, oxy 5 prn  - Ritalin 10mg BID, Modafinil 200mg qd, amantadine for neurostimulation  - CTH in AM    Cardio  - SBP   - TTE 4/24: negative for PFO, mild LVH, mild dilation of L atrium, EF 55-60%   - SALVADOR deferred, not indicated as it will not  at this time for starting patient on anticoagulation. Will reassess if indicated medically.     Pulm  - + Trach (6 shiley) VC 40/400/16/6, SIMV trials   - Chest PT, duoneb, mucomist q 6 prn     GI  - + PEG wih TF (Jevity 1.2)  - last BM 5/16  - Protonix for GI ppx while on vent  - RLQ US - no acute pathology  - CT A/P 5/15: no acute pathology    Renal  - IVL  - Voiding via primafit   - 5/15 CT A/P bladder calculi vs mass, f/u urology recs    Endo   - cont lipitor     Heme  - No SCDs, SQL 80 BID started on 5/18  - s/p IVCF 5/4, keep per heme recs  - LE dopplers 4/22 neg for DVT, but showing superficial venous thrombosis in the proximal portion of the right greater saphenous vein; repeat on 4/29 with persistant superficial thrombus, 5/3 new DVT L IM calf and unchanged R superficial vein thrombus  - Heme following for + anticardiolipin Ab 4/27, recs appreciated, f/u activated protein C    - Iron and vitamin c every other day     ID  - CSF sent from OR (5/8)   - MRSA (-), +UA cx ESBL, +sputum cx pluralibacter gergoviae, strep pneumoniae, s/p Ertapenem 1g Q24hrs (5/1-5/7)    PSYCH  - behavioral health consulted for tearfulness, rec     DISPO:  - NSICU, full code   - PT/OT rec AR patient can tolerate 3 hrs PT/OT daily    D/w Dr. Valadez and Dr. Bueno      Assessment:  Present when checked    []  GCS  E   V  M     Heart Failure: []Acute, [] acute on chronic , []chronic  Heart Failure:  [] Diastolic (HFpEF), [] Systolic (HFrEF), []Combined (HFpEF and HFrEF), [] RHF, [] Pulm HTN, [] Other    [] MAMTA, [] ATN, [] AIN, [] other  [] CKD1, [] CKD2, [] CKD 3, [] CKD 4, [] CKD 5, []ESRD    Encephalopathy: [] Metabolic, [] Hepatic, [] toxic, [] Neurological, [] Other    Abnormal Nurtitional Status: [] malnurtition (see nutrition note), [ ]underweight: BMI < 19, [] morbid obesity: BMI >40, [] Cachexia    [] Sepsis  [] hypovolemic shock,[] cardiogenic shock, [] hemorrhagic shock, [] neuogenic shock  [] Acute Respiratory Failure  []Cerebral edema, [] Brain compression/ herniation,   [] Functional quadriplegia  [] Acute blood loss anemia

## 2023-05-19 NOTE — PROCEDURE NOTE - NSPROCNAME_GEN_A_CORE
General
Arterial Puncture/Cannulation
General
Tracheal Intubation
General
General
Arterial Puncture/Cannulation
General

## 2023-05-19 NOTE — PROGRESS NOTE ADULT - SUBJECTIVE AND OBJECTIVE BOX
=================================  NEUROCRITICAL CARE ATTENDING NOTE  =================================    MAKSIM TRIVEDI   MRN-[id number]  Summary:  57y/F  with Asthma, CAD (not on  meds), peripheral neuropathy and PSH of BATOOL, cholecystectomy, right knee surgery presented to [facility] on  with right sided weakness and right facial numbness. Patient was undergoing preparation for colonoscopy. As per daughter at 8am patient was playing with her grandchildren but around 840 am patient was getting dressed and fell and subsequently felt weak after. Daughter reports that patient had some episodes of vomiting at this time. She had a syncopal episode at around 10 am and was witnessed by brother. No head trauma, She regained conscious after few minutes. She remained in bed for the remainder of the day. Daughter reports some slurred speech noted 1230-1PM and also was confused after. and around 4PM, the patient's sister noted right sided weakness at which time EMS was called and patient was brought to ED. No c/o chest pain, shortness of breath, fever, headache, abdominal pain, urinary complaints. No recent travel/sickness/ change in meds. Stroke code in ER: CTH neg for heme, Right PICA distribution acute infarction. CTA with saccular aneurysms of b/l carotids, approximately 0.8 cm on the right and 1.2 x 0.9 cm on the left. Possible tiny third saccular aneurysm on the right Posterior intracranial circulation: Right vertebral arterial occlusion at its dural crossing junction V3 Atlantic and V4 intracranial segments with likely reversal of flow in its intracranial segment from the basilar. Right PICA faintly seen. 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  Not candidate for TNK/mechanical thrombectomy. CT scan repeated which showed: 1. Brain: Progression of acute infarction within the right cerebellar hemisphere, also extending into the left superior cerebellar hemisphere. New mass effect on the fourth ventricle causing stenosis versus occlusion with new third and lateral ventricular dilatation indicating ventricular obstruction at the level of the fourth ventricle. New upward and downward herniation of cerebellar parenchyma Right carotid system: No hemodynamically significant stenosis. Left carotid system: No hemodynamically significant stenosis. Vertebral circulation: Patent. Anterior intracranial circulation: Intact. Bilateral internal carotid saccular aneurysms. These findings are unchanged. Posterior intracranial circulation:    Improved flow within the right vertebral artery since 2023. New focal stenosis mid left vertebral artery, etiology uncertain, consider vasospasm and extrinsic compression in addition to new embolic disease. Brain perfusion: Perfusion images demonstrate normalization of the perfusion abnormality in the right cerebellar hemisphere present on 2023 despite evidence of progression of acute infarction.  Areas of apparent ischemia within the posterior fossa have progressed in extent, also again involving the left posterior cerebral arterial distribution. Tx to [facility] for SOC watch. On admission to [facility], NIHSS 12.  (2023 16:50)    COURSE IN THE HOSPITAL:  : Admitted for crani watch, CTH complete w/ unchanged hydrocephalus, taken for emergent occipital craniectomy.   : POD1. Remains intubated overnight, on propofol and dank. EVD@[id number]. Pending repeat CTH this am. Given hydralazine 10mg x1. Started on cardene for SBP high 140s-150s. Sub-therapeutic on plavix, therapeutic on asa, rpt asa accumetrics until subtherapeutic. LE dopplers neg for DVT, but showing superficial venous thrombosis in the proximal portion of the right greater saphenous vein. Started on 3% @60 for na goal 145-150. NGT placed by ENT. Febrile, pancultured.  : POD 2. 3% increased to 75. NGT readjusted. UA neg. SBP liberalized to 160. Plan to extubate. 3% decreased to 60. Failed extubation d/t no cuff leak, given 60mg solumedrol, plan to extubate in 6 hrs. SCx1 for post void residual >400, started on cardura at bedtime. New blood in buretrol, stopped SQL. Clot in EVD cleared. Neuro exam improving.   : POD 3. Cont 3% with NS while NPO, start TF today. TF started. LFTs downtrending. Sodium 141. Increased 3% to 50, NS to 30. Repeat BMP ordered for 5:30PM. Tramadol 25 for pain with no relief, oxy 5 and 1g tylenol ordered, stability CT stable, speech language consult for dysarthria. Given hydralazine 10mg IVP for SBP>160, started amlodipine 5mg. C/o mild headache, given fioricet x1, stroke neuro rec SALVADOR and loop recorder placement. Echo with bubble completed, negative for PFO, EF 55-60%. J HFNC started for desats with suctioning.   : POD 4. Cont HFNC. Increased secertions on HFNC, reintubated. CXR confirmed good placement. EVD dropped to 5cmH20 for goal of draining 5-10cc/hr and SG ELENA drain taken off suction. Pending CTH. EVD drained 0cc/hr, dropped to 0. NGT replaced. Dc'd fioricet. Started on PPI for intubation. Increased cardura to 4mg for urinary retention, given an additional 2mg now. Started salt tabs 3 q 6. Given 12.5mg fentanyl x1. NGT replaced, CXR shown in lung. NGT removed, repeat CXR showing no pneumothorax. Pending ENT consult for NGT placement. CTH stable. NGT replaced by ENT, confirmed in proper spot. Restarted TF. Wahecmi527.4F. Na 141 from 146. Increased 3% to 60. EVD raised to 3cmH20. ETT pulled back 1cm by RT.  : POD 5 SOC. Intubated, remains on precedex, ABG ordered with improving PaO2, BMP sodium 148, 3% changed to 30cc/hr, cardura increased to 8mg for tonight, tolerating cpap  : POD 6 SOC. Respiratory rate sustained 6, returned to FVS. Na 151, dc'd 3%, f/u AM sodium. Nurse noticed ETT 19 at the lip and was 20 prior, CXR shows ETT @ 5cm above jono, ET tube advanced 1cm, repeat CXR confirms appropriate placement. Thick inline secretions with suctioning. ENT trach placement Fri likely, PEG likely Mon. Keep ELENA drain until no output, EVD to remain @3. Start ASA 81mg daily tomorrow.   : POD7 SOC, neuro stable, given fentanyl x 1 overnight for presumed discomfort (biting on ETT), remains on full vent support. CTH today stable in comparison to . 6 cuffed trach placed by ENT in OR, but came down without cuff. Replaced at bedside by ENT. On fentanyl gtt.    : POD8 SOC, JIM overnight. Incision cleaned and dressing changed. On fentanyl gtt. EKG completed due to increased frequency PVCs on telemetry, frequency of PVCs improved with titrating up fentanyl gtt. ABG drawn.  Repeat LE dopplers completed showing persistant superficial thrombus, no DVT. No EVD waveform during day, flushed distally without improvement. Exam unchanged.  EVD dropped to 0 for low output in afternoon.   : POD9. Neuro stable, febrile to 101.3F o/n, given tylenol and pan cx sent. SBP dipped to low 90s o/n, HR stable in 70s with some PVCs, decreased fentanyl gtt and given 500cc bolus of NS x 2. Pend PEG placement Mon and angio Tu. D/c'd ELENA drain today and suture placed. F/u heme recs. Positive UA. Infiltrates found on CXR. Started on Zosyn 4.5g Q6hrs .   : POD 10. Neuro stable. Dc'd fentanyl gtt. PEG failed placement at bedside with Dr. Gant, plan for PEG in OR tomorrow afternoon with Dr. Layton. Dc'd amlodipine and started carvedilol 3.125 BID for PVCs . Made 3% inhalation and mucomyst q8hr. Failed PEG at bedside. Salt tabs made 1 q 6. Decreased cardura to 4mg daily. Urine culture growing E. Coli and sputum culture growing pluralibacter gergoviae and strep species. ID consulted, f/u recs. Failed CPAP trial @ 3pm. Added am labs per heme/onc for hypercoguable workup. Pending repeat LE dopplers in 2-3 days. NGT clogged, removed, ENT failed attempt at replacement, will keep NPO for PEG placement tmw. Repeat xray in am for concern for aspration. Pt abx switched from Zosyn to Ertapenem 1g Q24hrs. per ID recs, possible ESBL growth.   : POD 11. Neuro stable. diagnostic cerebral angiogram (bilateral carotid cavernous aneurysm) and PEG placement. NPO; pulled out radial TR band (right), EVD raised to 10    POD12 EVD raised to 15, then down to 5cm H20, CPAP today   POD13 EVD 5   POD 14    05 POD 15 EVD raised to 15; CPAP 8 hours     required full vent support for low minute ventilation and hypercapnea, abdominal tenderness - CT AP WWO  05/15 CPAP this AM   hypotension to 80s after hydromorphone dose   No significant events overnight.    SIMV overnight RR 10 PS 12, full AC with Lovenox   No significant events overnight.       Past Medical History: Asthma CAD (coronary artery disease) Peripheral neuropathy  Allergies:  No Known Allergies  Home meds:   ·	Albuterol (Eqv-ProAir HFA) 90 mcg/inh inhalation aerosol: 2 puff(s) inhaled every 6 hours as needed for  shortness of breath and/or wheezing    PHYSICAL EXAMINATION   T(C): 37.2 ( @ 05:35), Max: 37.3 ( @ 14:00) HR: 77 ( @ 08:00) (60 - 79) BP: 92/54 ( @ 08:00) (92/50 - 113/60) RR: 17 ( @ 08:00) (10 - 25) SpO2: 98% ( @ 08:00) (94% - 100%)  NEUROLOGIC EXAMINATION:  Patient awake, alert, oriented x2, FC, R gaze, MIRTHA, RUE R 4/5 RLE 4+/5 L UE 5/5  L LE 5/5  GENERAL: trached SIMV 400 40% 10 12/6  EENT:  anicteric  CARDIOVASCULAR: (+) S1 S2, normal rate and regular rhythm  PULMONARY: clear to auscultation bilaterally  ABDOMEN: soft, with normoactive bowel sounds, tenderness periumbilical with mild guarding  EXTREMITIES: no edema; good distal pulses  SKIN: no rash    LABS:     (3.77)  9.5   (9.1)  3.84  )-----------( 277      ( 19 May 2023 05:30 )             29.9     140  |  101  |  12  ----------------------------<  146<H>  4.4   |  30  |  0.46<L>    Ca    9.0      19 May 2023 05:30  Phos  4.5       Mg     2.1      @ 07:01  -   @ 07:00  IN: 1380 mL / OUT: 1100 mL / NET: 280 mL    Bacteriology:  UA trace blood many WBC, small LE no nitrates   CSF culture NGTD   CSF NGTD     CSF NGTD   urine culture ESBL x2 strains   Blood NG5D x2   sputum:  pluralibacter gergoviae, mod strep pneumoniae    CSF studies:    L   *** [id number] WBC1 *** %N   %L1     EEG:  Neuroimaging:  Other imaging:    MEDICATIONS:     ·	enoxaparin Injectable 80 SubCutaneous every 12 hours  ·	amantadine Syrup 100 Oral every 12 hours  ·	methylphenidate 10 Oral <User Schedule>  ·	modafinil 200 Oral every 24 hours  ·	pantoprazole  Injectable 40 IV Push at bedtime  ·	polyethylene glycol 3350 17 Oral daily  ·	senna 2 Oral at bedtime  ·	atorvastatin 80 Oral at bedtime  ·	ascorbic acid 500 Oral <User Schedule>  ·	ferrous    sulfate 325 Oral <User Schedule>  ·	acetaminophen   Oral Liquid .. 650 Oral every 6 hours PRN  ·	acetylcysteine 10%  Inhalation 4 Inhalation every 6 hours PRN  ·	albuterol/ipratropium for Nebulization 3 Nebulizer every 6 hours PRN  ·	oxyCODONE    IR 5 Oral every 4 hours PRN    IV FLUIDS: IVL  DRIPS:  DIET: Jevity  Lines:  Drains:      External Ventricular Device (mL): 245 mL - @ 0 cm H20   EVD raised to 5 output 367 ICPs 1-7   EVD at 5cm H20 ICPs <10   EVD at 5cm H20    EVD at 15cm H20 ICPs < 10  Wounds:    CODE STATUS:  Full Code                       GOALS OF CARE:  aggressive                      DISPOSITION:  ICU

## 2023-05-19 NOTE — PROCEDURE NOTE - NSANATOMICLLOCATION_GEN_A_CORE
forearm/left/right
right
posterior head/left
right/radial artery
radial artery/left
arm/left
right/radial artery

## 2023-05-19 NOTE — PROGRESS NOTE ADULT - ASSESSMENT
57y/F with  acute CVA, R cerebellar, brain compression cerebral edema, s/p SOC  UTI ESBL  acute respiratory failure, bulbar dysfunction  asthma  CAD  peripheral neuropathy  superficial thrombosis, proximal R greater saphenous  prediabetic   nephrolithiasis    PLAN:   NEURO: neurochecks q4h, VS q4h, PRN pain meds with acetaminophen / hydromorphone / oxycodone  CT today - hygroma / pseudomeningocoele; - tight headwrap;   s/p CVA on full AC   REHAB:  physical therapy evaluation and management    EARLY MOB:  HOB up    PULM:  CPAP 12/5 with longer apnea alarms  CARDIO:  SBP goal 90-160mm Hg, off amlodipine  ENDO:  Blood sugar goals 140-180 mg/dL, off insulin sliding scale, continue high dose statins  GI:  cont PPI  DIET:  cont jevity  RENAL:  IVL,   HEM/ONC: s/p 3 units platelets, 35 ug DDAVP, (+) aCL; ferrous sulfate  VTE Prophylaxis: SCDs, s/p IVC filter, full AC with SQL 80 BID - check anti-Xa levels  ID: afebrile, treated for ESBL UTI x 7 days; leukopenia improving  Social: family at bedside    Active issues:  What's keeping patient in the ICU? vent requirements  What is this patient's dispo plan? LTAC    ATTENDING ATTESTATION:  I was physically present for the key portions of the evaluation and management (E/M) service provided.  I agree with the above history, physical and plan, which I have reviewed and edited where appropriate.    Patient at high risk for neurological deterioration or death due to:  ICU delirium, aspiration PNA, DVT / PE.  Critical care time:  I have personally provided 60 minutes of critical care time, excluding time spent on separate procedures.      Plan discussed with RN, house staff.

## 2023-05-19 NOTE — PROCEDURE NOTE - NSPROCDETAILS_GEN_ALL_CORE
location identified, draped/prepped, sterile technique used, needle inserted/introduced/positive blood return obtained via catheter
patient pre-oxygenated, tube inserted, placement confirmed
hemostasis with direct pressure, dressing applied

## 2023-05-19 NOTE — PROGRESS NOTE ADULT - SUBJECTIVE AND OBJECTIVE BOX
NSCU ATTENDING -- ADDITIONAL PROGRESS NOTE    Nighttime rounds were performed -- please refer to earlier Progress Note for HPI details.      ICU Vital Signs Last 24 Hrs  T(C): 36.7 (19 May 2023 18:02), Max: 37.3 (19 May 2023 01:14)  T(F): 98 (19 May 2023 18:02), Max: 99.1 (19 May 2023 01:14)  HR: 69 (19 May 2023 17:00) (60 - 83)  BP: 116/60 (19 May 2023 17:00) (92/50 - 116/60)  BP(mean): 77 (19 May 2023 17:00) (66 - 79)  RR: 22 (19 May 2023 17:00) (10 - 22)  SpO2: 93% (19 May 2023 17:00) (92% - 100%)      05-18-23 @ 07:01  -  05-19-23 @ 07:00  --------------------------------------------------------  IN: 1380 mL / OUT: 1100 mL / NET: 280 mL    05-19-23 @ 07:01  -  05-19-23 @ 19:10  --------------------------------------------------------  IN: 730 mL / OUT: 300 mL / NET: 430 mL      Mode: standby      PHYSICAL EXAMINATION  NEUROLOGIC EXAMINATION: Patient awake, alert, oriented x3, FC, R gaze preference can overcome  RUE R 4/5 RLE 4/5 LUE 5/5  L LE 5/5  EENT: Anicteric  CARDIOVASCULAR: (+) S1 S2, normal rate and regular rhythm  PULMONARY: Clear to auscultation bilaterally  ABDOMEN: Soft, with normoactive bowel sounds mild suprapubic tenderness  EXTREMITIES: No edema    MEDICATIONS:   acetaminophen   Oral Liquid .. 650 milliGRAM(s) Oral every 6 hours PRN  acetylcysteine 10%  Inhalation 4 milliLiter(s) Inhalation every 6 hours PRN  albuterol/ipratropium for Nebulization 3 milliLiter(s) Nebulizer every 6 hours PRN  amantadine Syrup 100 milliGRAM(s) Oral every 12 hours  ascorbic acid 500 milliGRAM(s) Oral <User Schedule>  atorvastatin 80 milliGRAM(s) Oral at bedtime  chlorhexidine 0.12% Liquid 15 milliLiter(s) Oral Mucosa every 12 hours  chlorhexidine 2% Cloths 1 Application(s) Topical daily  enoxaparin Injectable 80 milliGRAM(s) SubCutaneous every 12 hours  ferrous    sulfate 325 milliGRAM(s) Oral <User Schedule>  methylphenidate 10 milliGRAM(s) Oral <User Schedule>  modafinil 200 milliGRAM(s) Oral every 24 hours  oxyCODONE    IR 5 milliGRAM(s) Oral every 4 hours PRN  pantoprazole  Injectable 40 milliGRAM(s) IV Push at bedtime  polyethylene glycol 3350 17 Gram(s) Oral daily  senna 2 Tablet(s) Oral at bedtime    LABS:                      9.5    3.84  )-----------( 277      ( 19 May 2023 05:30 )             29.9     05-19    140  |  101  |  12  ----------------------------<  146<H>  4.4   |  30  |  0.46<L>    Ca    9.0      19 May 2023 05:30  Phos  4.5     05-19  Mg     2.1     05-19        ABG - ( 19 May 2023 10:57 )  pH, Arterial: 7.42  pH, Blood: x     /  pCO2: 50    /  pO2: 125   / HCO3: 32    / Base Excess: 6.6   /  SaO2: 100.0         ASSESSMENT/PLAN:   56 y/o F with:    Acute CVA, R cerebellar, brain compression cerebral edema, s/p SOC  UTI ESBL  Acute respiratory failure, bulbar dysfunction  Asthma  CAD  Peripheral neuropathy  Superficial thrombosis, proximal R greater saphenous  Prediabetic     PLAN:   NEURO: Neurochecks Q4h, PRN analgesia  S/P VPS POD 11  CT head 5/18 suboccipital pseudomeningocele, R SD hygroma. Monitor.   Stroke neurology following. Continue atorvastatin  Wean stimulants when appropriate  On trach collar  Left lower lobe endobronchial mucous ?atelectasis vs aspiration PNA. Aggressive suctioning and pulm toilet  SBP goal 90-160mm Hg; off antiHTN  Blood sugar goals 140-180 mg/dL, off insulin sliding scale. Left adrenal incidentaloma. Need outpatient MRI  RLQ Abd pain: CT abd/pelvis: No evidence of appendicitis. ?Layering calculi versus mass. Urology consulted. Urine cytology.   LBM: 5/17  General surgery aware of abd pain. No acute abdomen or any surgical pathology.  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 Heme following. On full dose lovenox.  Continue ferrous sulfate  VTE Prophylaxis: SCD, s/p IVC filter; retrievable. IVC to remain until assured tolerability of full AC. On lovenox 80mg bid.   Monitor antiXa levels on full dose lovenox  Afebrile, no leukocytosis    Additional critical care time: 45 minutes       NSCU ATTENDING -- ADDITIONAL PROGRESS NOTE    Nighttime rounds were performed -- please refer to earlier Progress Note for HPI details.      ICU Vital Signs Last 24 Hrs  T(C): 36.7 (19 May 2023 18:02), Max: 37.3 (19 May 2023 01:14)  T(F): 98 (19 May 2023 18:02), Max: 99.1 (19 May 2023 01:14)  HR: 69 (19 May 2023 17:00) (60 - 83)  BP: 116/60 (19 May 2023 17:00) (92/50 - 116/60)  BP(mean): 77 (19 May 2023 17:00) (66 - 79)  RR: 22 (19 May 2023 17:00) (10 - 22)  SpO2: 93% (19 May 2023 17:00) (92% - 100%)      05-18-23 @ 07:01  -  05-19-23 @ 07:00  --------------------------------------------------------  IN: 1380 mL / OUT: 1100 mL / NET: 280 mL    05-19-23 @ 07:01  -  05-19-23 @ 19:10  --------------------------------------------------------  IN: 730 mL / OUT: 300 mL / NET: 430 mL      Mode: standby      PHYSICAL EXAMINATION  NEUROLOGIC EXAMINATION: Patient awake, alert, oriented x3, FC, R gaze preference can overcome  RUE R 4/5 RLE 4/5 LUE 5/5  L LE 5/5  EENT: Anicteric  CARDIOVASCULAR: (+) S1 S2, normal rate and regular rhythm  PULMONARY: Clear to auscultation bilaterally  ABDOMEN: Soft, with normoactive bowel sounds mild suprapubic tenderness  EXTREMITIES: No edema    MEDICATIONS:   acetaminophen   Oral Liquid .. 650 milliGRAM(s) Oral every 6 hours PRN  acetylcysteine 10%  Inhalation 4 milliLiter(s) Inhalation every 6 hours PRN  albuterol/ipratropium for Nebulization 3 milliLiter(s) Nebulizer every 6 hours PRN  amantadine Syrup 100 milliGRAM(s) Oral every 12 hours  ascorbic acid 500 milliGRAM(s) Oral <User Schedule>  atorvastatin 80 milliGRAM(s) Oral at bedtime  chlorhexidine 0.12% Liquid 15 milliLiter(s) Oral Mucosa every 12 hours  chlorhexidine 2% Cloths 1 Application(s) Topical daily  enoxaparin Injectable 80 milliGRAM(s) SubCutaneous every 12 hours  ferrous    sulfate 325 milliGRAM(s) Oral <User Schedule>  methylphenidate 10 milliGRAM(s) Oral <User Schedule>  modafinil 200 milliGRAM(s) Oral every 24 hours  oxyCODONE    IR 5 milliGRAM(s) Oral every 4 hours PRN  pantoprazole  Injectable 40 milliGRAM(s) IV Push at bedtime  polyethylene glycol 3350 17 Gram(s) Oral daily  senna 2 Tablet(s) Oral at bedtime    LABS:                      9.5    3.84  )-----------( 277      ( 19 May 2023 05:30 )             29.9     05-19    140  |  101  |  12  ----------------------------<  146<H>  4.4   |  30  |  0.46<L>    Ca    9.0      19 May 2023 05:30  Phos  4.5     05-19  Mg     2.1     05-19        ABG - ( 19 May 2023 10:57 )  pH, Arterial: 7.42  pH, Blood: x     /  pCO2: 50    /  pO2: 125   / HCO3: 32    / Base Excess: 6.6   /  SaO2: 100.0       ASSESSMENT/PLAN:   58 y/o F with:    Acute CVA, R cerebellar, brain compression cerebral edema, s/p SOC  UTI ESBL  Acute respiratory failure, bulbar dysfunction  Asthma  CAD  Peripheral neuropathy  Superficial thrombosis, proximal R greater saphenous  Prediabetic     PLAN:   NEURO: Neurochecks Q4h, PRN analgesia  S/P VPS POD 11  CT head 5/18 suboccipital pseudomeningocele, R SD hygroma. Monitor.   Stroke neurology following. Continue atorvastatin  Wean stimulants   On trach collar  Left lower lobe endobronchial mucous ?atelectasis vs aspiration PNA. Aggressive suctioning and pulm toilet  SBP goal 90-160mm Hg; off antiHTN  Blood sugar goals 140-180 mg/dL, off insulin sliding scale. Left adrenal incidentaloma. 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 Heme following. On full dose lovenox.  Continue ferrous sulfate  VTE Prophylaxis: SCD, s/p IVC filter; retrievable. IVC to remain until assured tolerability of full AC. On lovenox 80mg bid.   Monitor antiXa levels on full dose lovenox; due 5/19 22:00  Afebrile, no leukocytosis    Additional critical care time: 45 minutes

## 2023-05-19 NOTE — PROCEDURE NOTE - NSSITEPREP_SKIN_A_CORE
chlorhexidine
alcohol
chlorhexidine

## 2023-05-19 NOTE — PROCEDURE NOTE - PROCEDURE DATE TIME, MLM
12-May-2023 12:44
13-May-2023 18:15
22-Apr-2023 22:00
22-Apr-2023 22:00
30-Apr-2023 07:45
19-May-2023 10:30
25-Apr-2023 06:50
29-Apr-2023 22:40
29-Apr-2023 13:15
30-Apr-2023 12:45

## 2023-05-20 LAB
ANION GAP SERPL CALC-SCNC: 8 MMOL/L — SIGNIFICANT CHANGE UP (ref 5–17)
BUN SERPL-MCNC: 11 MG/DL — SIGNIFICANT CHANGE UP (ref 7–23)
CALCIUM SERPL-MCNC: 9.2 MG/DL — SIGNIFICANT CHANGE UP (ref 8.4–10.5)
CHLORIDE SERPL-SCNC: 100 MMOL/L — SIGNIFICANT CHANGE UP (ref 96–108)
CO2 SERPL-SCNC: 31 MMOL/L — SIGNIFICANT CHANGE UP (ref 22–31)
CREAT SERPL-MCNC: 0.47 MG/DL — LOW (ref 0.5–1.3)
EGFR: 112 ML/MIN/1.73M2 — SIGNIFICANT CHANGE UP
GLUCOSE SERPL-MCNC: 172 MG/DL — HIGH (ref 70–99)
HCT VFR BLD CALC: 32.9 % — LOW (ref 34.5–45)
HGB BLD-MCNC: 10.7 G/DL — LOW (ref 11.5–15.5)
LMWH PPP CHRO-ACNC: 0.88 IU/ML — SIGNIFICANT CHANGE UP (ref 0.5–1.1)
MAGNESIUM SERPL-MCNC: 2.2 MG/DL — SIGNIFICANT CHANGE UP (ref 1.6–2.6)
MCHC RBC-ENTMCNC: 29.6 PG — SIGNIFICANT CHANGE UP (ref 27–34)
MCHC RBC-ENTMCNC: 32.5 GM/DL — SIGNIFICANT CHANGE UP (ref 32–36)
MCV RBC AUTO: 91.1 FL — SIGNIFICANT CHANGE UP (ref 80–100)
NRBC # BLD: 0 /100 WBCS — SIGNIFICANT CHANGE UP (ref 0–0)
PHOSPHATE SERPL-MCNC: 4.3 MG/DL — SIGNIFICANT CHANGE UP (ref 2.5–4.5)
PLATELET # BLD AUTO: 257 K/UL — SIGNIFICANT CHANGE UP (ref 150–400)
POTASSIUM SERPL-MCNC: 4 MMOL/L — SIGNIFICANT CHANGE UP (ref 3.5–5.3)
POTASSIUM SERPL-SCNC: 4 MMOL/L — SIGNIFICANT CHANGE UP (ref 3.5–5.3)
RBC # BLD: 3.61 M/UL — LOW (ref 3.8–5.2)
RBC # FLD: 13.2 % — SIGNIFICANT CHANGE UP (ref 10.3–14.5)
SODIUM SERPL-SCNC: 139 MMOL/L — SIGNIFICANT CHANGE UP (ref 135–145)
WBC # BLD: 4.06 K/UL — SIGNIFICANT CHANGE UP (ref 3.8–10.5)
WBC # FLD AUTO: 4.06 K/UL — SIGNIFICANT CHANGE UP (ref 3.8–10.5)

## 2023-05-20 PROCEDURE — 70450 CT HEAD/BRAIN W/O DYE: CPT | Mod: 26

## 2023-05-20 PROCEDURE — 71045 X-RAY EXAM CHEST 1 VIEW: CPT | Mod: 26

## 2023-05-20 PROCEDURE — 99291 CRITICAL CARE FIRST HOUR: CPT

## 2023-05-20 RX ADMIN — ENOXAPARIN SODIUM 80 MILLIGRAM(S): 100 INJECTION SUBCUTANEOUS at 06:10

## 2023-05-20 RX ADMIN — CHLORHEXIDINE GLUCONATE 15 MILLILITER(S): 213 SOLUTION TOPICAL at 06:10

## 2023-05-20 RX ADMIN — CHLORHEXIDINE GLUCONATE 1 APPLICATION(S): 213 SOLUTION TOPICAL at 06:12

## 2023-05-20 RX ADMIN — Medication 100 MILLIGRAM(S): at 17:35

## 2023-05-20 RX ADMIN — CHLORHEXIDINE GLUCONATE 15 MILLILITER(S): 213 SOLUTION TOPICAL at 17:35

## 2023-05-20 RX ADMIN — MODAFINIL 200 MILLIGRAM(S): 200 TABLET ORAL at 06:10

## 2023-05-20 RX ADMIN — ATORVASTATIN CALCIUM 80 MILLIGRAM(S): 80 TABLET, FILM COATED ORAL at 22:07

## 2023-05-20 RX ADMIN — ENOXAPARIN SODIUM 80 MILLIGRAM(S): 100 INJECTION SUBCUTANEOUS at 17:35

## 2023-05-20 RX ADMIN — Medication 10 MILLIGRAM(S): at 16:37

## 2023-05-20 RX ADMIN — Medication 100 MILLIGRAM(S): at 06:09

## 2023-05-20 RX ADMIN — Medication 10 MILLIGRAM(S): at 06:11

## 2023-05-20 NOTE — PROGRESS NOTE ADULT - ASSESSMENT
57y/F with  acute CVA, R cerebellar, brain compression cerebral edema, s/p SOC  UTI ESBL  acute respiratory failure, bulbar dysfunction  asthma  CAD  peripheral neuropathy  superficial thrombosis, proximal R greater saphenous  prediabetic   nephrolithiasis    PLAN:   NEURO: neurochecks q4h, VS q4h, PRN pain meds with acetaminophen / hydromorphone / oxycodone  CT today - hygroma / pseudomeningocoele; - tight headwrap;   s/p CVA on full AC   REHAB:  physical therapy evaluation and management    EARLY MOB:  HOB up    PULM:  CPAP 12/5 with longer apnea alarms  CARDIO:  SBP goal 90-160mm Hg, off amlodipine  ENDO:  Blood sugar goals 140-180 mg/dL, off insulin sliding scale, continue high dose statins  GI:  cont PPI  DIET:  cont jevity  RENAL:  IVL,   HEM/ONC: s/p 3 units platelets, 35 ug DDAVP, (+) aCL; ferrous sulfate  VTE Prophylaxis: SCDs, s/p IVC filter, full AC with SQL 80 BID - check anti-Xa levels  ID: afebrile, treated for ESBL UTI x 7 days; leukopenia improving  Social: family at bedside    Active issues:  What's keeping patient in the ICU? vent requirements  What is this patient's dispo plan? LTAC    ATTENDING ATTESTATION:  I was physically present for the key portions of the evaluation and management (E/M) service provided.  I agree with the above history, physical and plan, which I have reviewed and edited where appropriate.    Patient at high risk for neurological deterioration or death due to:  ICU delirium, aspiration PNA, DVT / PE.  Critical care time:  I have personally provided 60 minutes of critical care time, excluding time spent on separate procedures.      Plan discussed with RN, house staff. 57y/F with  acute CVA, R cerebellar, brain compression cerebral edema, s/p SOC  UTI ESBL  acute respiratory failure, bulbar dysfunction  asthma  CAD  peripheral neuropathy  superficial thrombosis, proximal R greater saphenous  prediabetic   nephrolithiasis    PLAN:   NEURO: neurochecks q4h, VS q4h, PRN pain meds with acetaminophen / hydromorphone / oxycodone  CT today   s/p CVA on full AC   REHAB:  physical therapy evaluation and management    EARLY MOB:  HOB up    PULM:  trach collar   CARDIO:  SBP goal 90-160mm Hg, off amlodipine, carvedilol  ENDO:  Blood sugar goals 140-180 mg/dL, off insulin sliding scale, continue high dose statins  GI:  cont PPI  DIET:  cont jevity + banatrol  RENAL:  IVL,   HEM/ONC: s/p 3 units platelets, 35 ug DDAVP, (+) aCL; ferrous sulfate  VTE Prophylaxis: no SCDs, s/p IVC filter, full AC with SQL 80 BID - check anti-Xa levels  ID: afebrile, treated for ESBL UTI x 7 days; no leukocytosis  Social: family at bedside    Active issues:  What's keeping patient in the ICU? vent requirements  What is this patient's dispo plan? LTAC    ATTENDING ATTESTATION:  I was physically present for the key portions of the evaluation and management (E/M) service provided.  I agree with the above history, physical and plan, which I have reviewed and edited where appropriate.    Patient at high risk for neurological deterioration or death due to:  ICU delirium, aspiration PNA, DVT / PE.  Critical care time:  I have personally provided 60 minutes of critical care time, excluding time spent on separate procedures.      Plan discussed with RN, house staff.

## 2023-05-20 NOTE — PROGRESS NOTE ADULT - SUBJECTIVE AND OBJECTIVE BOX
NSC[facility] ATTENDING -- ADDITIONAL PROGRESS NOTE    Nighttime rounds were performed -- please refer to earlier Progress Note for HPI details.      ICU Vital Signs Last 24 Hrs  T(C): 36.3 (20 May 2023 17:25), Max: 37.1 (20 May 2023 14:30)  T(F): 97.3 (20 May 2023 17:25), Max: 98.7 (20 May 2023 14:30)  HR: 69 (20 May 2023 16:15) (64 - 77)  BP: 120/57 (20 May 2023 16:15) (108/55 - 144/62)  BP(mean): 79 (20 May 2023 16:15) (78 - 89)  RR: 33 (20 May 2023 16:15) (14 - 33)  SpO2: 91% (20 May 2023 16:15) (91% - 100%)      05-19-23 @ 07:01  -  05-20-23 @ 07:00  --------------------------------------------------------  IN: 1430 mL / OUT: 950 mL / NET: 480 mL    05-20-23 @ 07:01  -  05-20-23 @ 19:09  --------------------------------------------------------  IN: 620 mL / OUT: 550 mL / NET: 70 mL      Mode: standby    PHYSICAL EXAMINATION  NEUROLOGIC EXAMINATION: Patient awake, alert, oriented x3, FC, R gaze preference can overcome  RUE R 4/5 RLE 4/5 LUE 5/5  L LE 5/5  EENT: Anicteric  CARDIOVASCULAR: (+) S1 S2, normal rate and regular rhythm  PULMONARY: Clear to auscultation bilaterally  ABDOMEN: Soft, with normoactive bowel sounds mild suprapubic tenderness  EXTREMITIES: No edema      MEDICATIONS:   acetaminophen   Oral Liquid .. 650 milliGRAM(s) Oral every 6 hours PRN  acetylcysteine 10%  Inhalation 4 milliLiter(s) Inhalation every 6 hours PRN  albuterol/ipratropium for Nebulization 3 milliLiter(s) Nebulizer every 6 hours PRN  amantadine Syrup 100 milliGRAM(s) Oral every 12 hours  ascorbic acid 500 milliGRAM(s) Oral <User Schedule>  atorvastatin 80 milliGRAM(s) Oral at bedtime  chlorhexidine 0.12% Liquid 15 milliLiter(s) Oral Mucosa every 12 hours  chlorhexidine 2% Cloths 1 Application(s) Topical daily  enoxaparin Injectable 80 milliGRAM(s) SubCutaneous every 12 hours  ferrous    sulfate 325 milliGRAM(s) Oral <User Schedule>  methylphenidate 10 milliGRAM(s) Oral <User Schedule>  modafinil 200 milliGRAM(s) Oral every 24 hours  oxyCODONE    IR 5 milliGRAM(s) Oral every 4 hours PRN  polyethylene glycol 3350 17 Gram(s) Oral daily  senna 2 Tablet(s) Oral at bedtime      LABS:                       10.7   4.06  )-----------( 257      ( 20 May 2023 05:15 )             32.9     05-20    139  |  100  |  11  ----------------------------<  172<H>  4.0   |  31  |  0.47<L>    Ca    9.2      20 May 2023 05:15  Phos  4.3     05-20  Mg     2.2     05-20      ABG - ( 19 May 2023 10:57 )  pH, Arterial: 7.42  pH, Blood: x     /  pCO2: 50    /  pO2: 125   / HCO3: 32    / Base Excess: 6.6   /  SaO2: 100.0         ASSESSMENT/PLAN:   56 y/o F with:    Acute CVA, R cerebellar, brain compression cerebral edema, s/p SOC  UTI ESBL  Acute respiratory failure, bulbar dysfunction  Asthma  CAD  Peripheral neuropathy  Superficial thrombosis, proximal R greater saphenous  Prediabetic     PLAN:   NEURO: Neurochecks Q4h, PRN analgesia  S/P VPS POD 12  CT head 5/18 suboccipital pseudomeningocele, R SD hygroma. Monitor.   Stroke neurology following. Continue atorvastatin  Wean stimulants   On trach collar  Left lower lobe endobronchial mucous ?atelectasis vs aspiration PNA. Aggressive suctioning and pulm toilet  SBP goal 90-160mm Hg; off antiHTN  Blood sugar goals 140-180 mg/dL, off insulin sliding scale. Left adrenal incidentaloma. 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 Heme following. On full dose lovenox.  Continue ferrous sulfate  VTE Prophylaxis: SCD, s/p IVC filter; retrievable. IVC to remain until assured tolerability of full AC. On lovenox 80mg bid.   Monitor antiXa levels on full dose lovenox; due 5/19 22:00  Afebrile, no leukocytosis    Additional critical care time: 45 minutes       NSCU ATTENDING -- ADDITIONAL PROGRESS NOTE    Nighttime rounds were performed -- please refer to earlier Progress Note for HPI details.      ICU Vital Signs Last 24 Hrs  T(C): 36.3 (20 May 2023 17:25), Max: 37.1 (20 May 2023 14:30)  T(F): 97.3 (20 May 2023 17:25), Max: 98.7 (20 May 2023 14:30)  HR: 69 (20 May 2023 16:15) (64 - 77)  BP: 120/57 (20 May 2023 16:15) (108/55 - 144/62)  BP(mean): 79 (20 May 2023 16:15) (78 - 89)  RR: 33 (20 May 2023 16:15) (14 - 33)  SpO2: 91% (20 May 2023 16:15) (91% - 100%)      05-19-23 @ 07:01  -  05-20-23 @ 07:00  --------------------------------------------------------  IN: 1430 mL / OUT: 950 mL / NET: 480 mL    05-20-23 @ 07:01  -  05-20-23 @ 19:09  --------------------------------------------------------  IN: 620 mL / OUT: 550 mL / NET: 70 mL      Mode: standby    PHYSICAL EXAMINATION  NEUROLOGIC EXAMINATION: Patient awake, alert, oriented x3,  mouths words, FC, R gaze preference can overcome  RUE R 4/5 RLE 4/5 LUE 5/5  L LE 5/5  EENT: Anicteric  CARDIOVASCULAR: (+) S1 S2, normal rate and regular rhythm  PULMONARY: Clear to auscultation bilaterally  ABDOMEN: Soft, with normoactive bowel sounds mild suprapubic tenderness  EXTREMITIES: No edema      MEDICATIONS:   acetaminophen   Oral Liquid .. 650 milliGRAM(s) Oral every 6 hours PRN  acetylcysteine 10%  Inhalation 4 milliLiter(s) Inhalation every 6 hours PRN  albuterol/ipratropium for Nebulization 3 milliLiter(s) Nebulizer every 6 hours PRN  amantadine Syrup 100 milliGRAM(s) Oral every 12 hours  ascorbic acid 500 milliGRAM(s) Oral <User Schedule>  atorvastatin 80 milliGRAM(s) Oral at bedtime  chlorhexidine 0.12% Liquid 15 milliLiter(s) Oral Mucosa every 12 hours  chlorhexidine 2% Cloths 1 Application(s) Topical daily  enoxaparin Injectable 80 milliGRAM(s) SubCutaneous every 12 hours  ferrous    sulfate 325 milliGRAM(s) Oral <User Schedule>  methylphenidate 10 milliGRAM(s) Oral <User Schedule>  modafinil 200 milliGRAM(s) Oral every 24 hours  oxyCODONE    IR 5 milliGRAM(s) Oral every 4 hours PRN  polyethylene glycol 3350 17 Gram(s) Oral daily  senna 2 Tablet(s) Oral at bedtime      LABS:                       10.7   4.06  )-----------( 257      ( 20 May 2023 05:15 )             32.9     05-20    139  |  100  |  11  ----------------------------<  172<H>  4.0   |  31  |  0.47<L>    Ca    9.2      20 May 2023 05:15  Phos  4.3     05-20  Mg     2.2     05-20      ABG - ( 19 May 2023 10:57 )  pH, Arterial: 7.42  pH, Blood: x     /  pCO2: 50    /  pO2: 125   / HCO3: 32    / Base Excess: 6.6   /  SaO2: 100.0         ASSESSMENT/PLAN:   56 y/o F with:    Acute CVA, R cerebellar, brain compression cerebral edema, s/p SOC  UTI ESBL  Acute respiratory failure, bulbar dysfunction  Asthma  CAD  Peripheral neuropathy  Superficial thrombosis, proximal R greater saphenous  Prediabetic     PLAN:   NEURO: Neurochecks Q4h, PRN analgesia  S/P VPS POD 12  CT head 5/18 suboccipital pseudomeningocele, R SD hygroma. Repeat on 5/20 stable  Stroke neurology following. Continue atorvastatin  Wean stimulants   On trach collar  Left lower lobe endobronchial mucous ?atelectasis vs aspiration PNA. Aggressive suctioning and pulm toilet  SBP goal 90-160mm Hg; off antiHTN  Blood sugar goals 140-180 mg/dL, off insulin sliding scale. Left adrenal incidentaloma. Need outpatient MRI.  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 Heme following. On full dose lovenox.  Continue ferrous sulfate  VTE Prophylaxis: SCD, s/p IVC filter; retrievable. IVC to remain until assured tolerability of full AC. On lovenox 80mg bid.   Monitor antiXa level 0.88  Afebrile, no leukocytosis    Additional critical care time: 45 minutes

## 2023-05-20 NOTE — PROGRESS NOTE ADULT - SUBJECTIVE AND OBJECTIVE BOX
=================================  NEUROCRITICAL CARE ATTENDING NOTE  =================================    MAKSIM TRIVEDI   MRN-[id number]  Summary:  57y/F  with Asthma, CAD (not on  meds), peripheral neuropathy and PSH of BATOOL, cholecystectomy, right knee surgery presented to [place] ED on  with right sided weakness and right facial numbness. Patient was undergoing preparation for colonoscopy. As per daughter at 8am patient was playing with her grandchildren but around 840 am patient was getting dressed and fell and subsequently felt weak after. Daughter reports that patient had some episodes of vomiting at this time. She had a syncopal episode at around 10 am and was witnessed by brother. No head trauma, She regained conscious after few minutes. She remained in bed for the remainder of the day. Daughter reports some slurred speech noted 1230-1PM and also was confused after. and around 4PM, the patient's sister noted right sided weakness at which time EMS was called and patient was brought to ED. No c/o chest pain, shortness of breath, fever, headache, abdominal pain, urinary complaints. No recent travel/sickness/ change in meds. Stroke code in ER: CTH neg for heme, Right PICA distribution acute infarction. CTA with saccular aneurysms of b/l carotids, approximately 0.8 cm on the right and 1.2 x 0.9 cm on the left. Possible tiny third saccular aneurysm on the right Posterior intracranial circulation: Right vertebral arterial occlusion at its dural crossing junction V3 Atlantic and V4 intracranial segments with likely reversal of flow in its intracranial segment from the basilar. Right PICA faintly seen. 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  Not candidate for TNK/mechanical thrombectomy. CT scan repeated which showed: 1. Brain: Progression of acute infarction within the right cerebellar hemisphere, also extending into the left superior cerebellar hemisphere. New mass effect on the fourth ventricle causing stenosis versus occlusion with new third and lateral ventricular dilatation indicating ventricular obstruction at the level of the fourth ventricle. New upward and downward herniation of cerebellar parenchyma Right carotid system: No hemodynamically significant stenosis. Left carotid system: No hemodynamically significant stenosis. Vertebral circulation: Patent. Anterior intracranial circulation: Intact. Bilateral internal carotid saccular aneurysms. These findings are unchanged. Posterior intracranial circulation:    Improved flow within the right vertebral artery since 2023. New focal stenosis mid left vertebral artery, etiology uncertain, consider vasospasm and extrinsic compression in addition to new embolic disease. Brain perfusion: Perfusion images demonstrate normalization of the perfusion abnormality in the right cerebellar hemisphere present on 2023 despite evidence of progression of acute infarction.  Areas of apparent ischemia within the posterior fossa have progressed in extent, also again involving the left posterior cerebral arterial distribution. Tx to [facility] for SOC watch. On admission to [facility], NIHSS 12.  (2023 16:50)    COURSE IN THE HOSPITAL:  : Admitted for crani watch, CTH complete w/ unchanged hydrocephalus, taken for emergent occipital craniectomy.   : POD1. Remains intubated overnight, on propofol and dank. EVD@[id number]. Pending repeat CTH this am. Given hydralazine 10mg x1. Started on cardene for SBP high 140s-150s. Sub-therapeutic on plavix, therapeutic on asa, rpt asa accumetrics until subtherapeutic. LE dopplers neg for DVT, but showing superficial venous thrombosis in the proximal portion of the right greater saphenous vein. Started on 3% @60 for na goal 145-150. NGT placed by ENT. Febrile, pancultured.  : POD 2. 3% increased to 75. NGT readjusted. UA neg. SBP liberalized to 160. Plan to extubate. 3% decreased to 60. Failed extubation d/t no cuff leak, given 60mg solumedrol, plan to extubate in 6 hrs. SCx1 for post void residual >400, started on cardura at bedtime. New blood in buretrol, stopped SQL. Clot in EVD cleared. Neuro exam improving.   : POD 3. Cont 3% with NS while NPO, start TF today. TF started. LFTs downtrending. Sodium 141. Increased 3% to 50, NS to 30. Repeat BMP ordered for 5:30PM. Tramadol 25 for pain with no relief, oxy 5 and 1g tylenol ordered, stability CT stable, speech language consult for dysarthria. Given hydralazine 10mg IVP for SBP>160, started amlodipine 5mg. C/o mild headache, given fioricet x1, stroke neuro rec SALVADOR and loop recorder placement. Echo with bubble completed, negative for PFO, EF 55-60%. J HFNC started for desats with suctioning.   : POD 4. Cont HFNC. Increased secertions on HFNC, reintubated. CXR confirmed good placement. EVD dropped to 5cmH20 for goal of draining 5-10cc/hr and SG ELENA drain taken off suction. Pending CTH. EVD drained 0cc/hr, dropped to 0. NGT replaced. Dc'd fioricet. Started on PPI for intubation. Increased cardura to 4mg for urinary retention, given an additional 2mg now. Started salt tabs 3 q 6. Given 12.5mg fentanyl x1. NGT replaced, CXR shown in lung. NGT removed, repeat CXR showing no pneumothorax. Pending ENT consult for NGT placement. CTH stable. NGT replaced by ENT, confirmed in proper spot. Restarted TF. [id number]. Na 141 from 146. Increased 3% to 60. EVD raised to 3cmH20. ETT pulled back 1cm by RT.  : POD 5 SOC. Intubated, remains on precedex, ABG ordered with improving PaO2, BMP sodium 148, 3% changed to 30cc/hr, cardura increased to 8mg for tonight, tolerating cpap  : POD 6 SOC. Respiratory rate sustained 6, returned to FVS. Na 151, dc'd 3%, f/u AM sodium. Nurse noticed ETT 19 at the lip and was 20 prior, CXR shows ETT @ 5cm above jono, ET tube advanced 1cm, repeat CXR confirms appropriate placement. Thick inline secretions with suctioning. ENT trach placement Fri likely, PEG likely Mon. Keep ELENA drain until no output, EVD to remain @3. Start ASA 81mg daily tomorrow.   : POD7 SOC, neuro stable, given fentanyl x 1 overnight for presumed discomfort (biting on ETT), remains on full vent support. CTH today stable in comparison to . 6 cuffed trach placed by ENT in OR, but came down without cuff. Replaced at bedside by ENT. On fentanyl gtt.    : POD8 SOC, JIM overnight. Incision cleaned and dressing changed. On fentanyl gtt. EKG completed due to increased frequency PVCs on telemetry, frequency of PVCs improved with titrating up fentanyl gtt. ABG drawn.  Repeat LE dopplers completed showing persistant superficial thrombus, no DVT. No EVD waveform during day, flushed distally without improvement. Exam unchanged.  EVD dropped to 0 for low output in afternoon.   : POD9. Neuro stable, febrile to 101.3F o/n, given tylenol and pan cx sent. SBP dipped to low 90s o/n, HR stable in 70s with some PVCs, decreased fentanyl gtt and given 500cc bolus of NS x 2. Pend PEG placement Mon and angio Tu. D/c'd ELENA drain today and suture placed. F/u heme recs. Positive UA. Infiltrates found on CXR. Started on Zosyn 4.5g Q6hrs .   : POD 10. Neuro stable. Dc'd fentanyl gtt. PEG failed placement at bedside with Dr. Gant, plan for PEG in OR tomorrow afternoon with Dr. Layton. Dc'd amlodipine and started carvedilol 3.125 BID for PVCs . Made 3% inhalation and mucomyst q8hr. Failed PEG at bedside. Salt tabs made 1 q 6. Decreased cardura to 4mg daily. Urine culture growing E. Coli and sputum culture growing pluralibacter gergoviae and strep species. ID consulted, f/u recs. Failed CPAP trial @ 3pm. Added am labs per heme/onc for hypercoguable workup. Pending repeat LE dopplers in 2-3 days. NGT clogged, removed, ENT failed attempt at replacement, will keep NPO for PEG placement tmw. Repeat xray in am for concern for aspration. Pt abx switched from Zosyn to Ertapenem 1g Q24hrs. per ID recs, possible ESBL growth.   : POD 11. Neuro stable. diagnostic cerebral angiogram (bilateral carotid cavernous aneurysm) and PEG placement. NPO; pulled out radial TR band (right), EVD raised to 10    POD12 EVD raised to 15, then down to 5cm H20, CPAP today   POD13 EVD 5   POD 14    05 POD 15 EVD raised to 15; CPAP 8 hours     required full vent support for low minute ventilation and hypercapnea, abdominal tenderness - CT AP WWO  05/15 CPAP this AM   hypotension to 80s after hydromorphone dose   No significant events overnight.    SIMV overnight RR 10 PS 12, full AC with Lovenox   No significant events overnight. trach collar   No significant events overnight.     Past Medical History: Asthma CAD (coronary artery disease) Peripheral neuropathy  Allergies:  No Known Allergies  Home meds:   ·	Albuterol (Eqv-ProAir HFA) 90 mcg/inh inhalation aerosol: 2 puff(s) inhaled every 6 hours as needed for  shortness of breath and/or wheezing    PHYSICAL EXAMINATION   T(C): 36.4 ( @ 04:29), Max: 37.3 ( @ 14:00) HR: 68 ( @ 05:26) (64 - 83) BP: 109/65 ( @ 05:00) (92/54 - 144/62) RR: 20 ( @ 05:26) (13 - 25) SpO2: 100% ( @ 05:26) (92% - 100%)  NEUROLOGIC EXAMINATION:  Patient awake, alert, oriented x2, FC, R gaze, MIRTHA, RUE R 4/5 RLE 4+/5 L UE 5/5  L LE 5/5  GENERAL: trached SIMV 400 40% 10 12/6  EENT:  anicteric  CARDIOVASCULAR: (+) S1 S2, normal rate and regular rhythm  PULMONARY: clear to auscultation bilaterally  ABDOMEN: soft, with normoactive bowel sounds, tenderness periumbilical with mild guarding  EXTREMITIES: no edema; good distal pulses  SKIN: no rash    LABS:     (3.84)   10.7 (9.5)   4.06  )-----------( 257      ( 20 May 2023 05:15 )             32.9     139  |  100  |  11  ----------------------------<  172<H>  4.0   |  31  |  0.47<L>    Ca    9.2      20 May 2023 05:15  Phos  4.3     05-20  Mg     2.2      @ 07:01  -   @ 07:00  IN: 1430 mL / OUT: 950 mL / NET: 480 mL    Bacteriology:  UA trace blood many WBC, small LE no nitrates   CSF culture NGTD   CSF NGTD     CSF NGTD   urine culture ESBL x2 strains   Blood NG5D x2   sputum:  pluralibacter gergoviae, mod strep pneumoniae    CSF studies:    L   *** [id number] WBC1 *** %N   %L1     EEG:  Neuroimaging:  Other imaging:    MEDICATIONS:     ·	enoxaparin Injectable 80 SubCutaneous every 12 hours  ·	amantadine Syrup 100 Oral every 12 hours  ·	methylphenidate 10 Oral <User Schedule>  ·	modafinil 200 Oral every 24 hours  ·	polyethylene glycol 3350 17 Oral daily  ·	senna 2 Oral at bedtime  ·	atorvastatin 80 Oral at bedtime  ·	ascorbic acid 500 Oral <User Schedule>  ·	ferrous    sulfate 325 Oral <User Schedule>  ·	acetaminophen   Oral Liquid .. 650 Oral every 6 hours PRN  ·	acetylcysteine 10%  Inhalation 4 Inhalation every 6 hours PRN  ·	albuterol/ipratropium for Nebulization 3 Nebulizer every 6 hours PRN  ·	oxyCODONE    IR 5 Oral every 4 hours PRN    IV FLUIDS: IVL  DRIPS:  DIET: Jevity  Lines:  Drains:      External Ventricular Device (mL): 245 mL - @ 0 cm H20  / EVD raised to 5 output 367 ICPs 1-7  / EVD at 5cm H20 ICPs <10  / EVD at 5cm H20   / EVD at 15cm H20 ICPs < 10  Wounds:    CODE STATUS:  Full Code                       GOALS OF CARE:  aggressive                      DISPOSITION:  ICU =================================  NEUROCRITICAL CARE ATTENDING NOTE  =================================    MAKSIM TRIVEDI   MRN-[id number]  Summary:  57y/F  with Asthma, CAD (not on  meds), peripheral neuropathy and PSH of BATOOL, cholecystectomy, right knee surgery presented to [place] ED on  with right sided weakness and right facial numbness. Patient was undergoing preparation for colonoscopy. As per daughter at 8am patient was playing with her grandchildren but around 840 am patient was getting dressed and fell and subsequently felt weak after. Daughter reports that patient had some episodes of vomiting at this time. She had a syncopal episode at around 10 am and was witnessed by brother. No head trauma, She regained conscious after few minutes. She remained in bed for the remainder of the day. Daughter reports some slurred speech noted 1230-1PM and also was confused after. and around 4PM, the patient's sister noted right sided weakness at which time EMS was called and patient was brought to ED. No c/o chest pain, shortness of breath, fever, headache, abdominal pain, urinary complaints. No recent travel/sickness/ change in meds. Stroke code in ER: CTH neg for heme, Right PICA distribution acute infarction. CTA with saccular aneurysms of b/l carotids, approximately 0.8 cm on the right and 1.2 x 0.9 cm on the left. Possible tiny third saccular aneurysm on the right Posterior intracranial circulation: Right vertebral arterial occlusion at its dural crossing junction V3 Atlantic and V4 intracranial segments with likely reversal of flow in its intracranial segment from the basilar. Right PICA faintly seen. 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  Not candidate for TNK/mechanical thrombectomy. CT scan repeated which showed: 1. Brain: Progression of acute infarction within the right cerebellar hemisphere, also extending into the left superior cerebellar hemisphere. New mass effect on the fourth ventricle causing stenosis versus occlusion with new third and lateral ventricular dilatation indicating ventricular obstruction at the level of the fourth ventricle. New upward and downward herniation of cerebellar parenchyma Right carotid system: No hemodynamically significant stenosis. Left carotid system: No hemodynamically significant stenosis. Vertebral circulation: Patent. Anterior intracranial circulation: Intact. Bilateral internal carotid saccular aneurysms. These findings are unchanged. Posterior intracranial circulation:    Improved flow within the right vertebral artery since 2023. New focal stenosis mid left vertebral artery, etiology uncertain, consider vasospasm and extrinsic compression in addition to new embolic disease. Brain perfusion: Perfusion images demonstrate normalization of the perfusion abnormality in the right cerebellar hemisphere present on 2023 despite evidence of progression of acute infarction.  Areas of apparent ischemia within the posterior fossa have progressed in extent, also again involving the left posterior cerebral arterial distribution. Tx to [facility] for SOC watch. On admission to [facility], NIHSS 12.  (2023 16:50)    COURSE IN THE HOSPITAL:  : Admitted for crani watch, CTH complete w/ unchanged hydrocephalus, taken for emergent occipital craniectomy.   : POD1. Remains intubated overnight, on propofol and dank. EVD@[id number]. Pending repeat CTH this am. Given hydralazine 10mg x1. Started on cardene for SBP high 140s-150s. Sub-therapeutic on plavix, therapeutic on asa, rpt asa accumetrics until subtherapeutic. LE dopplers neg for DVT, but showing superficial venous thrombosis in the proximal portion of the right greater saphenous vein. Started on 3% @60 for na goal 145-150. NGT placed by ENT. Febrile, pancultured.  : POD 2. 3% increased to 75. NGT readjusted. UA neg. SBP liberalized to 160. Plan to extubate. 3% decreased to 60. Failed extubation d/t no cuff leak, given 60mg solumedrol, plan to extubate in 6 hrs. SCx1 for post void residual >400, started on cardura at bedtime. New blood in buretrol, stopped SQL. Clot in EVD cleared. Neuro exam improving.   : POD 3. Cont 3% with NS while NPO, start TF today. TF started. LFTs downtrending. Sodium 141. Increased 3% to 50, NS to 30. Repeat BMP ordered for 5:30PM. Tramadol 25 for pain with no relief, oxy 5 and 1g tylenol ordered, stability CT stable, speech language consult for dysarthria. Given hydralazine 10mg IVP for SBP>160, started amlodipine 5mg. C/o mild headache, given fioricet x1, stroke neuro rec SALVADOR and loop recorder placement. Echo with bubble completed, negative for PFO, EF 55-60%. J HFNC started for desats with suctioning.   : POD 4. Cont HFNC. Increased secertions on HFNC, reintubated. CXR confirmed good placement. EVD dropped to 5cmH20 for goal of draining 5-10cc/hr and SG ELENA drain taken off suction. Pending CTH. EVD drained 0cc/hr, dropped to 0. NGT replaced. Dc'd fioricet. Started on PPI for intubation. Increased cardura to 4mg for urinary retention, given an additional 2mg now. Started salt tabs 3 q 6. Given 12.5mg fentanyl x1. NGT replaced, CXR shown in lung. NGT removed, repeat CXR showing no pneumothorax. Pending ENT consult for NGT placement. CTH stable. NGT replaced by ENT, confirmed in proper spot. Restarted TF. [id number]. Na 141 from 146. Increased 3% to 60. EVD raised to 3cmH20. ETT pulled back 1cm by RT.  : POD 5 SOC. Intubated, remains on precedex, ABG ordered with improving PaO2, BMP sodium 148, 3% changed to 30cc/hr, cardura increased to 8mg for tonight, tolerating cpap  : POD 6 SOC. Respiratory rate sustained 6, returned to FVS. Na 151, dc'd 3%, f/u AM sodium. Nurse noticed ETT 19 at the lip and was 20 prior, CXR shows ETT @ 5cm above jono, ET tube advanced 1cm, repeat CXR confirms appropriate placement. Thick inline secretions with suctioning. ENT trach placement Fri likely, PEG likely Mon. Keep ELENA drain until no output, EVD to remain @3. Start ASA 81mg daily tomorrow.   : POD7 SOC, neuro stable, given fentanyl x 1 overnight for presumed discomfort (biting on ETT), remains on full vent support. CTH today stable in comparison to . 6 cuffed trach placed by ENT in OR, but came down without cuff. Replaced at bedside by ENT. On fentanyl gtt.    : POD8 SOC, JIM overnight. Incision cleaned and dressing changed. On fentanyl gtt. EKG completed due to increased frequency PVCs on telemetry, frequency of PVCs improved with titrating up fentanyl gtt. ABG drawn.  Repeat LE dopplers completed showing persistant superficial thrombus, no DVT. No EVD waveform during day, flushed distally without improvement. Exam unchanged.  EVD dropped to 0 for low output in afternoon.   : POD9. Neuro stable, febrile to 101.3F o/n, given tylenol and pan cx sent. SBP dipped to low 90s o/n, HR stable in 70s with some PVCs, decreased fentanyl gtt and given 500cc bolus of NS x 2. Pend PEG placement Mon and angio Tu. D/c'd ELENA drain today and suture placed. F/u heme recs. Positive UA. Infiltrates found on CXR. Started on Zosyn 4.5g Q6hrs .   : POD 10. Neuro stable. Dc'd fentanyl gtt. PEG failed placement at bedside with Dr. Gant, plan for PEG in OR tomorrow afternoon with Dr. Layton. Dc'd amlodipine and started carvedilol 3.125 BID for PVCs . Made 3% inhalation and mucomyst q8hr. Failed PEG at bedside. Salt tabs made 1 q 6. Decreased cardura to 4mg daily. Urine culture growing E. Coli and sputum culture growing pluralibacter gergoviae and strep species. ID consulted, f/u recs. Failed CPAP trial @ 3pm. Added am labs per heme/onc for hypercoguable workup. Pending repeat LE dopplers in 2-3 days. NGT clogged, removed, ENT failed attempt at replacement, will keep NPO for PEG placement tmw. Repeat xray in am for concern for aspration. Pt abx switched from Zosyn to Ertapenem 1g Q24hrs. per ID recs, possible ESBL growth.   : POD 11. Neuro stable. diagnostic cerebral angiogram (bilateral carotid cavernous aneurysm) and PEG placement. NPO; pulled out radial TR band (right), EVD raised to 10    POD12 EVD raised to 15, then down to 5cm H20, CPAP today   POD13 EVD 5   POD 14    05 POD 15 EVD raised to 15; CPAP 8 hours     required full vent support for low minute ventilation and hypercapnea, abdominal tenderness - CT AP WWO  05/15 CPAP this AM   hypotension to 80s after hydromorphone dose   No significant events overnight.    SIMV overnight RR 10 PS 12, full AC with Lovenox   No significant events overnight. trach collar   diarrhea overnight    Past Medical History: Asthma CAD (coronary artery disease) Peripheral neuropathy  Allergies:  No Known Allergies  Home meds:   ·	Albuterol (Eqv-ProAir HFA) 90 mcg/inh inhalation aerosol: 2 puff(s) inhaled every 6 hours as needed for  shortness of breath and/or wheezing    PHYSICAL EXAMINATION   T(C): 36.4 ( @ 04:29), Max: 37.3 ( @ 14:00) HR: 68 ( @ 05:26) (64 - 83) BP: 109/65 ( @ 05:00) (92/54 - 144/62) RR: 20 ( @ 05:26) (13 - 25) SpO2: 100% ( @ 05:26) (92% - 100%)  NEUROLOGIC EXAMINATION:  Patient awake, alert, oriented x2, FC, R gaze, MIRTHA, RUE R 4/5 RLE 4+/5 L UE 5/5  L LE 5/5  GENERAL: trached SIMV 400 40% 10 12/6  EENT:  anicteric  CARDIOVASCULAR: (+) S1 S2, normal rate and regular rhythm  PULMONARY: clear to auscultation bilaterally  ABDOMEN: soft, with normoactive bowel sounds, tenderness periumbilical with mild guarding  EXTREMITIES: no edema; good distal pulses  SKIN: no rash    LABS:     (3.84)   10.7 (9.5)   4.06  )-----------( 257      ( 20 May 2023 05:15 )             32.9     139  |  100  |  11  ----------------------------<  172<H>  4.0   |  31  |  0.47<L>    Ca    9.2      20 May 2023 05:15  Phos  4.3     05-20  Mg     2.2      @ 07:01  -   @ 07:00  IN: 1430 mL / OUT: 950 mL / NET: 480 mL    Bacteriology:  UA trace blood many WBC, small LE no nitrates   CSF culture NGTD   CSF NGTD     CSF NGTD   urine culture ESBL x2 strains   Blood NG5D x2   sputum:  pluralibacter gergoviae, mod strep pneumoniae    CSF studies:    L   *** [id number] WBC1 *** %N   %L1     EEG:  Neuroimaging:  Other imaging:    MEDICATIONS:     ·	enoxaparin Injectable 80 SubCutaneous every 12 hours  ·	amantadine Syrup 100 Oral every 12 hours  ·	methylphenidate 10 Oral <User Schedule>  ·	modafinil 200 Oral every 24 hours  ·	polyethylene glycol 3350 17 Oral daily  ·	senna 2 Oral at bedtime  ·	atorvastatin 80 Oral at bedtime  ·	ascorbic acid 500 Oral <User Schedule>  ·	ferrous    sulfate 325 Oral <User Schedule>  ·	acetaminophen   Oral Liquid .. 650 Oral every 6 hours PRN  ·	acetylcysteine 10%  Inhalation 4 Inhalation every 6 hours PRN  ·	albuterol/ipratropium for Nebulization 3 Nebulizer every 6 hours PRN  ·	oxyCODONE    IR 5 Oral every 4 hours PRN    IV FLUIDS: IVL  DRIPS:  DIET: Jevity @ 50 + banatrol  Lines:  Drains:      External Ventricular Device (mL): 245 mL - @ 0 cm H20  / EVD raised to 5 output 367 ICPs 1-7  / EVD at 5cm H20 ICPs <10  / EVD at 5cm H20   / EVD at 15cm H20 ICPs < 10  Wounds:    CODE STATUS:  Full Code                       GOALS OF CARE:  aggressive                      DISPOSITION:  ICU

## 2023-05-20 NOTE — PROGRESS NOTE ADULT - SUBJECTIVE AND OBJECTIVE BOX
HPI:  58 yo F with PMH of Asthma, CAD (not on  meds), peripheral neuropathy and PSH of BATOOL, cholecystectomy, right knee surgery presented to [facility] ED on 4/20 with right sided weakness and right facial numbness. Patient was undergoing preparation for colonoscopy. As per daughter at 8am patient was playing with her grandchildren but around 840 am patient was getting dressed and fell and subsequently felt weak after. Daughter reports that patient had some episodes of vomiting at this time. She had a syncopal episode at around 10 am and was witnessed by brother. No head trauma, She regained conscious after few minutes. She remained in bed for the remainder of the day. Daughter reports some slurred speech noted 1230-1PM and also was confused after. and around 4PM, the patient's sister noted right sided weakness at which time EMS was called and patient was brought to ED. No c/o chest pain, shortness of breath, fever, headache, abdominal pain, urinary complaints. No recent travel/sickness/ change in meds. Stroke code in ER: CTH neg for heme, Right PICA distribution acute infarction. CTA with saccular aneurysms of b/l carotids, approximately 0.8 cm on the right and 1.2 x 0.9 cm on the left. Possible tiny third saccular aneurysm on the right Posterior intracranial circulation: Right vertebral arterial occlusion at its dural crossing junction V3 Atlantic and V4 intracranial segments with likely reversal of flow in its intracranial segment from the basilar. Right PICA faintly seen. 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  Not candidate for TNK/mechanical thrombectomy. CT scan repeated which showed: 1. Brain: Progression of acute infarction within the right cerebellar hemisphere, also extending into the left superior cerebellar hemisphere. New mass effect on the fourth ventricle causing stenosis versus occlusion with new third and lateral ventricular dilatation indicating ventricular obstruction at the level of the fourth ventricle. New upward and downward herniation of cerebellar parenchyma Right carotid system: No hemodynamically significant stenosis. Left carotid system: No hemodynamically significant stenosis. Vertebral circulation: Patent. Anterior intracranial circulation: Intact. Bilateral internal carotid saccular aneurysms. These findings are unchanged. Posterior intracranial circulation:    Improved flow within the right vertebral artery since 4/20/2023. New focal stenosis mid left vertebral artery, etiology uncertain, consider vasospasm and extrinsic compression in addition to new embolic disease. Brain perfusion: Perfusion images demonstrate normalization of the perfusion abnormality in the right cerebellar hemisphere present on 4/20/2023 despite evidence of progression of acute infarction.  Areas of apparent ischemia within the posterior fossa have progressed in extent, also again involving the left posterior cerebral arterial distribution. Tx to [facility] for SOC watch. On admission to [facility], NIHSS 12.  (21 Apr 2023 16:50)    OVERNIGHT EVENTS: JIM, tolerating trach collar, pressure dressing reapplied    Hospital Course:   4/21: Admitted for crani watch, CTH complete w/ unchanged hydrocephalus, taken for emergent occipital craniectomy.   4/22: POD1. Remains intubated overnight, on propofol and dank. EVD@[id number]. Pending repeat CTH this am. Given hydralazine 10mg x1. Started on cardene for SBP high 140s-150s. Sub-therapeutic on plavix, therapeutic on asa, rpt asa accumetrics until subtherapeutic. LE dopplers neg for DVT, but showing superficial venous thrombosis in the proximal portion of the right greater saphenous vein. Started on 3% @60 for na goal 145-150. NGT placed by ENT. Febrile, pancultured.  4/23: POD 2. 3% increased to 75. NGT readjusted. UA neg. SBP liberalized to 160. Plan to extubate. 3% decreased to 60. Failed extubation d/t no cuff leak, given 60mg solumedrol, plan to extubate in 6 hrs. SCx1 for post void residual >400, started on cardura at bedtime. New blood in buretrol, stopped SQL. Clot in EVD cleared. Neuro exam improving.   4/24: POD 3. Cont 3% with NS while NPO, start TF today. TF started. LFTs downtrending. Sodium 141. Increased 3% to 50, NS to 30. Repeat BMP ordered for 5:30PM. Tramadol 25 for pain with no relief, oxy 5 and 1g tylenol ordered, stability CT stable, speech language consult for dysarthria. Given hydralazine 10mg IVP for SBP>160, started amlodipine 5mg. C/o mild headache, given fioricet x1, stroke neuro rec SALVADOR and loop recorder placement. Echo with bubble completed, negative for PFO, EF 55-60%. J HFNC started for desats with suctioning.   4/25: POD 4. Cont HFNC. Increased secertions on HFNC, reintubated. CXR confirmed good placement. EVD dropped to 5cmH20 for goal of draining 5-10cc/hr and SG ELENA drain taken off suction. Pending CTH. EVD drained 0cc/hr, dropped to 0. NGT replaced. Dc'd fioricet. Started on PPI for intubation. Increased cardura to 4mg for urinary retention, given an additional 2mg now. Started salt tabs 3 q 6. Given 12.5mg fentanyl x1. NGT replaced, CXR shown in lung. NGT removed, repeat CXR showing no pneumothorax. Pending ENT consult for NGT placement. CTH stable. NGT replaced by ENT, confirmed in proper spot. Restarted TF. [id number]. Na 141 from 146. Increased 3% to 60. EVD raised to 3cmH20. ETT pulled back 1cm by RT.  4/26: POD 5 SOC. Intubated, remains on precedex, ABG ordered with improving PaO2, BMP sodium 148, 3% changed to 30cc/hr, cardura increased to 8mg for tonight, tolerating cpap  4/27: POD 6 SOC. Respiratory rate sustained 6, returned to FVS. Na 151, dc'd 3%, f/u AM sodium. Nurse noticed ETT 19 at the lip and was 20 prior, CXR shows ETT @ 5cm above jono, ET tube advanced 1cm, repeat CXR confirms appropriate placement. Thick inline secretions with suctioning. ENT trach placement Fri likely, PEG likely Mon. Keep ELENA drain until no output, EVD to remain @3. Start ASA 81mg daily tomorrow.   4/28: POD7 SOC, neuro stable, given fentanyl x 1 overnight for presumed discomfort (biting on ETT), remains on full vent support. CTH today stable in comparison to 4/25. 6 cuffed trach placed by ENT in OR, but came down without cuff. Replaced at bedside by ENT. On fentanyl gtt.    4/29: POD8 SOC, JIM overnight. Incision cleaned and dressing changed. On fentanyl gtt. EKG completed due to increased frequency PVCs on telemetry, frequency of PVCs improved with titrating up fentanyl gtt. ABG drawn.  Repeat LE dopplers completed showing persistant superficial thrombus, no DVT. No EVD waveform during day, flushed distally without improvement. Exam unchanged.  EVD dropped to 0 for low output in afternoon.   4/30: POD9. Neuro stable, febrile to 101.3F o/n, given tylenol and pan cx sent. SBP dipped to low 90s o/n, HR stable in 70s with some PVCs, decreased fentanyl gtt and given 500cc bolus of NS x 2. Pend PEG placement Mon and angio Tues. D/c'd ELENA drain today and suture placed. F/u heme recs. Positive UA. Infiltrates found on CXR. Started on Zosyn 4.5g Q6hrs .   5/1: POD 10. Neuro stable. Dc'd fentanyl gtt. PEG failed placement at bedside with Dr. Gant, plan for PEG in OR tomorrow afternoon with Dr. Layton. Dc'd amlodipine and started carvedilol 3.125 BID for PVCs . Made 3% inhalation and mucomyst q8hr. Failed PEG at bedside. Salt tabs made 1 q 6. Decreased cardura to 4mg daily. Urine culture growing E. Coli and sputum culture growing pluralibacter gergoviae and strep species. ID consulted, f/u recs. Failed CPAP trial @ 3pm. Added am labs per heme/onc for hypercoguable workup. Pending repeat LE dopplers in 2-3 days. NGT clogged, removed, ENT failed attempt at replacement, will keep NPO for PEG placement tmw. Repeat xray in am for concern for aspration. Pt abx switched from Zosyn to Ertapenem 1g Q24hrs. per ID recs, possible ESBL growth.   5/2: POD 11. Neuro stable. Pre-op for diagnostic cerebral angiogram and PEG placement. NPO. EVD raised to 5 cmH20. Sputum culture speciated to step pneumoniae, sensitive to ertapenem. 3% inhalation/mucomyst made q 6 hr d/t thick secretions. PEG placed in OR. POD0 dx cerebral angio. EVD raised to 10.   5/3: POD 12. Neuro stable. PEG feeds began @ 10 AM, plan to increase 10cc every 6h per general surgery. Repeat dopplers show stable R superficial thrombus and new L IM calf DVT. Vascular consulted for IVC filter. Plan to get CTH tomorrow.  5/4: POD 13. JIM o/n. s/p IVC filter with vascular today. Pending post-procedure CTH. FOBT negative. Started iron and vitamin c every other day for iron deficiency anemia.   5/5: POD14. CSF cx sent to r/o infection from new gas in right temporal horn seen on CTH. Restarted TF, changed to Jevity 1.2, f/u nutrition recs. EVD raised to 10 today, plan to challenge over the weekend and CTH on Monday, possible VPS next Wednesday. ENT removed sutures today. Salt tabs decreased 1q12.  5/6: POD15 Placed on CPAP briefly with low MV alert, placed back on full vent support. EVD remains open at 56gcL3L. ICPs WNL. Neuro exam stable.  EVD raised to 15. Tolerated CPAP x8h.   5/7: POD#16. JIM overnight, neuro stable. D/c'd salt tabs and 3% neb. Wean trach to CPAP as tolerated. Pan cx NGTD. EVD raised today to 67lmH4L.   5/8: POD17 JIM overnight. ICPs WNL. Pt remains on full vent support. Neuro exam stable. Plan for possible VPS today. CT chio complete showing increase in vent size.   5/9: POD18 SOC, POD1 VPS. Desat o/n to 80s, improved on own. CXR with LLL effusion. Another desat to 90% in AM, given duoneb and mucomyst. Oxycodone given for incisional pain. Neuro exam stable. CTH in AM stable. Keppra dc'd. Ritalin 5 BID started.  Tolerating CPAP trial. SALVADOR ordered. Rectal tube dc'd.   5/10: POD 19 SOC POD2 VPS. Remains on full vent support. Tolerated CPAP for 6 hours. Neuro exam stable. BP liberalized to 160. RLQ US 2/2 abd pain. Lactate WNL.  5/11: POD20 SOC POD3 VPS. Plan for CPAP trial in AM.  Dc'd cardura. F/u speech consult for communcation board. Has been tolerating CPAP since 9am. RLQ us w/ no acute pathology. Per heme/onc LMWH or coumadin rec for AC over DOAC due to antiphospholipid syndrome.   5/12: POD21 SOC POD4 VPS. CPAP 8/5, tolerated until 3pm. Can start ASA on 5/14 and full AC POD 10 from VPS 5/19 (remove IVC filter prior, f/u w/ vascular on timing).   5/13: POD22 SOC POD 5 VPS, neuro stable, tolerating trach collar. ASA ordered to start tomorrow, Ritalin increased to 10BID from 5BID, Simethicone ordered for gas, f/u gen surg for continued abd discomfort, PM VBG with PCO2 50 and repeat VBG pCO2 55, possible obesity hypoventilation, f/u AM VBG   5/14: POD23 SOC, VNO3OTB, neuro stable, VBG showed increased PCO2 indicating poor ventilation, failed CPAP due to low TV, put back on full vent support.   5/15: POD24 SOC, POD7 VPS. O/n CT A/P done for abd pain, f/u read. neuro stable. remains on full vent support. Simethicone, Amlodipine d/c'd. Given 1L bolus of NS. Dilaudid 0.5 for pain control. CT A/P negative for acute pathology. Restarted tube feeds. Started Modafinil for neurostim.  5/16: POD25 SOC, POD8 VPS. JIM o/n neuro stable. remains on full vent support. DC carvedilol for intermittent hypotensive episodes. UA for suprapelvic pain, concern for nephrolithiasis based on CT AP.  5/17: POD26 SOC, POD9 VPS. JIM o/n neuro stable.   Modafinil increased. CTH completed, . Urology consulted for bladder calculi vs mass, pending urine cytology study and outptaient urology follow-up for cystoscopy. Trialing SIMV for vent weaning.   5/18: POD27 SOC, POD10 VPS. JIM o/n remains on SIMV.   Baby aspirin discontinued. SQL 80mg bid started for DVT treatement per hematology/neurology. Plan for CTH in am. Added amantadine for neurostim.   5/19: POD28, POD 11 VPS. JIM o/n, reapplied pressure dressing. tolerating CPAP  5/20: POD29, POD 12 VPS, reapplied pressure dressing o/n, tolerating trach collar.     Vital Signs Last 24 Hrs  T(C): 36.4 (19 May 2023 22:09), Max: 37.3 (19 May 2023 01:14)  T(F): 97.5 (19 May 2023 22:09), Max: 99.1 (19 May 2023 01:14)  HR: 77 (19 May 2023 21:00) (60 - 83)  BP: 144/62 (19 May 2023 21:00) (92/50 - 144/62)  BP(mean): 89 (19 May 2023 21:00) (66 - 89)  RR: 22 (19 May 2023 21:00) (10 - 22)  SpO2: 94% (19 May 2023 21:00) (92% - 100%)    Parameters below as of 19 May 2023 21:00  Patient On (Oxygen Delivery Method): tracheostomy collar    O2 Concentration (%): 35    I&O's Summary    18 May 2023 07:01  -  19 May 2023 07:00  --------------------------------------------------------  IN: 1380 mL / OUT: 1100 mL / NET: 280 mL    19 May 2023 07:01  -  20 May 2023 00:17  --------------------------------------------------------  IN: 1030 mL / OUT: 950 mL / NET: 80 mL        PHYSICAL EXAM:  General: patient seen laying supine in bed in NAD on SIMV  Neuro: AAOx2-3 (nods appropriately to questions self, place), R gaze, FC, OE spontaneously, RUE 4/5, otherwise 5/5 strength throughout  HEENT: PERRL +trach  Pulmonary: chest rise symmetric  Abdomen: soft, nontender, nondistended +PEG  Ext: perfusing well  Skin: warm, dry  Wound: Crani incision c/d/i reinforced w/ pressure dressing       TUBES/LINES:  [] Garsia  [] Lumbar Drain  [] Wound Drains  [] Others      DIET:  [] NPO  [] Mechanical  [x] Tube feeds    LABS:                        9.5    3.84  )-----------( 277      ( 19 May 2023 05:30 )             29.9     05-19    140  |  101  |  12  ----------------------------<  146<H>  4.4   |  30  |  0.46<L>    Ca    9.0      19 May 2023 05:30  Phos  4.5     05-19  Mg     2.1     05-19              CAPILLARY BLOOD GLUCOSE          Drug Levels: [] N/A    CSF Analysis: [] N/A      Allergies    No Known Allergies    Intolerances      MEDICATIONS:  Antibiotics:    Neuro:  acetaminophen   Oral Liquid .. 650 milliGRAM(s) Oral every 6 hours PRN  amantadine Syrup 100 milliGRAM(s) Oral every 12 hours  methylphenidate 10 milliGRAM(s) Oral <User Schedule>  modafinil 200 milliGRAM(s) Oral every 24 hours  oxyCODONE    IR 5 milliGRAM(s) Oral every 4 hours PRN    Anticoagulation:  enoxaparin Injectable 80 milliGRAM(s) SubCutaneous every 12 hours    OTHER:  acetylcysteine 10%  Inhalation 4 milliLiter(s) Inhalation every 6 hours PRN  albuterol/ipratropium for Nebulization 3 milliLiter(s) Nebulizer every 6 hours PRN  atorvastatin 80 milliGRAM(s) Oral at bedtime  chlorhexidine 0.12% Liquid 15 milliLiter(s) Oral Mucosa every 12 hours  chlorhexidine 2% Cloths 1 Application(s) Topical daily  pantoprazole  Injectable 40 milliGRAM(s) IV Push at bedtime  polyethylene glycol 3350 17 Gram(s) Oral daily  senna 2 Tablet(s) Oral at bedtime    IVF:  ascorbic acid 500 milliGRAM(s) Oral <User Schedule>  ferrous    sulfate 325 milliGRAM(s) Oral <User Schedule>    CULTURES:  Culture Results:   No growth to date (05-08 @ 14:35)      ASSESSMENT:  56 y/o female found to have acute infarction within the right cerebellar hemisphere, causing herniation. Now s/p SOC foramen magnum decompression and right parietal/occipital EVD placement (4/21). s/p trach (4/28/23). s/p PEG (5/2/23), s/p dx cerebral angiogram (5/2/23). now s/p VPS Certas @ 4 (5/8/23).     STROKE    No pertinent family history in first degree relatives    Handoff    CAD (coronary artery disease)    Asthma    Peripheral neuropathy    Cerebellar stroke    Respiratory failure, unspecified with hypoxia    History of DVT (deep vein thrombosis)    Hydrocephalus    Tracheostomy malfunction    Cerebellar stroke    Respiratory failure, unspecified with hypoxia    History of DVT of lower extremity    Hydrocephalus    Tracheostomy malfunction    Delirium    Decompressive craniectomy    Cerebellar infarct    Cerebellar infarct    CAD (coronary artery disease)    Asthma    Peripheral neuropathy    Lupus anticoagulant positive    Anemia due to acute blood loss    Tracheostomy malfunction    Deep vein thrombosis (DVT) of calf muscle vein    Mass of bladder    Decompressive craniectomy    Insertion, external ventricular drain    Planned tracheostomy    Tracheostomy tube change    Esophagogastroduodenoscopy (EGD) with anesthesia    Insertion, PEG tube, laparoscopic    Angiogram, carotid and cerebral, bilateral    Tracheostomy tube change    IVC filter placement     shunt    Insertion, ventriculopleural shunt    S/P total abdominal hysterectomy    S/P cholecystectomy    S/P right knee surgery    Insertion, external ventricular drain    Planned tracheostomy    Esophagogastroduodenoscopy (EGD) with anesthesia    Insertion, PEG tube, laparoscopic     shunt    CAD (coronary artery disease)    Asthma    Peripheral neuropathy    Lupus anticoagulant positive    Acute respiratory failure with hypoxia    Acute respiratory failure with hypoxia    Dysphagia    Deep vein thrombosis (DVT)    Hydrocephalus    [email or web address]        PLAN:  Neuro   - Vitals q4h/neuro q4h   - s/p VPS (Certas @ 4)   - dx angio 5/2: b/l cavernous ICA aneurysms, as discussed at vascular conference f/u outpt   - CTH 4/28 stable; 5/4 new gas in right temporal horn, CT 5/8 chio increased vent size, 5/9 stable, CTH 5/17 increased suboccipital fluid collection   - Stroke consulted, appreciate reccs   - Pain control with tyl, oxy 5 prn  - Ritalin 10mg BID, Modafinil 200mg qd, amantadine for neurostimulation  - CTH in AM    Cardio  - SBP   - TTE 4/24: negative for PFO, mild LVH, mild dilation of L atrium, EF 55-60%   - SALVADOR deferred, not indicated as it will not  at this time for starting patient on anticoagulation. Will reassess if indicated medically.     Pulm  - + Trach (6 shiley), tolerating trach collar   - Chest PT, duoneb, mucomist q 6 prn     GI  - + PEG wih TF (Jevity 1.2)  - last BM 5/19  - Protonix for GI ppx while on vent  - RLQ US - no acute pathology  - CT A/P 5/15: no acute pathology    Renal  - IVL  - Voiding via primafit   - 5/15 CT A/P bladder calculi vs mass, f/u urology outpatient for cystoscopy    Endo   - cont lipitor     Heme  - No SCDs, SQL 80 BID started on 5/18  - s/p IVCF 5/4, keep per heme recs  - LE dopplers 4/22 neg for DVT, but showing superficial venous thrombosis in the proximal portion of the right greater saphenous vein; repeat on 4/29 with persistant superficial thrombus, 5/3 new DVT L IM calf and unchanged R superficial vein thrombus  - Heme following for + anticardiolipin Ab 4/27, recs appreciated, f/u activated protein C    - Iron and vitamin c every other day     ID  - CSF sent from OR (5/8)   - MRSA (-), +UA cx ESBL, +sputum cx pluralibacter gergoviae, strep pneumoniae, s/p Ertapenem 1g Q24hrs (5/1-5/7)    PSYCH  - behavioral health consulted for tearfulness, rec     DISPO:  - NSICU, full code   - PT/OT rec AR patient can tolerate 3 hrs PT/OT daily    D/w Dr. Valadez and Dr. Bueno      Assessment:  Present when checked    []  GCS  E   V  M     Heart Failure: []Acute, [] acute on chronic , []chronic  Heart Failure:  [] Diastolic (HFpEF), [] Systolic (HFrEF), []Combined (HFpEF and HFrEF), [] RHF, [] Pulm HTN, [] Other    [] MAMTA, [] ATN, [] AIN, [] other  [] CKD1, [] CKD2, [] CKD 3, [] CKD 4, [] CKD 5, []ESRD    Encephalopathy: [] Metabolic, [] Hepatic, [] toxic, [] Neurological, [] Other    Abnormal Nurtitional Status: [] malnurtition (see nutrition note), [ ]underweight: BMI < 19, [] morbid obesity: BMI >40, [] Cachexia    [] Sepsis  [] hypovolemic shock,[] cardiogenic shock, [] hemorrhagic shock, [] neuogenic shock  [] Acute Respiratory Failure  []Cerebral edema, [] Brain compression/ herniation,   [] Functional quadriplegia  [] Acute blood loss anemia

## 2023-05-21 LAB
ANION GAP SERPL CALC-SCNC: 10 MMOL/L — SIGNIFICANT CHANGE UP (ref 5–17)
BUN SERPL-MCNC: 11 MG/DL — SIGNIFICANT CHANGE UP (ref 7–23)
CALCIUM SERPL-MCNC: 8.8 MG/DL — SIGNIFICANT CHANGE UP (ref 8.4–10.5)
CHLORIDE SERPL-SCNC: 100 MMOL/L — SIGNIFICANT CHANGE UP (ref 96–108)
CO2 SERPL-SCNC: 32 MMOL/L — HIGH (ref 22–31)
CREAT SERPL-MCNC: 0.47 MG/DL — LOW (ref 0.5–1.3)
EGFR: 112 ML/MIN/1.73M2 — SIGNIFICANT CHANGE UP
GLUCOSE SERPL-MCNC: 139 MG/DL — HIGH (ref 70–99)
HCT VFR BLD CALC: 34.9 % — SIGNIFICANT CHANGE UP (ref 34.5–45)
HGB BLD-MCNC: 10.9 G/DL — LOW (ref 11.5–15.5)
MAGNESIUM SERPL-MCNC: 2.3 MG/DL — SIGNIFICANT CHANGE UP (ref 1.6–2.6)
MCHC RBC-ENTMCNC: 29.1 PG — SIGNIFICANT CHANGE UP (ref 27–34)
MCHC RBC-ENTMCNC: 31.2 GM/DL — LOW (ref 32–36)
MCV RBC AUTO: 93.3 FL — SIGNIFICANT CHANGE UP (ref 80–100)
NRBC # BLD: 0 /100 WBCS — SIGNIFICANT CHANGE UP (ref 0–0)
PHOSPHATE SERPL-MCNC: 4.9 MG/DL — HIGH (ref 2.5–4.5)
PLATELET # BLD AUTO: 277 K/UL — SIGNIFICANT CHANGE UP (ref 150–400)
POTASSIUM SERPL-MCNC: 4.2 MMOL/L — SIGNIFICANT CHANGE UP (ref 3.5–5.3)
POTASSIUM SERPL-SCNC: 4.2 MMOL/L — SIGNIFICANT CHANGE UP (ref 3.5–5.3)
RBC # BLD: 3.74 M/UL — LOW (ref 3.8–5.2)
RBC # FLD: 13.1 % — SIGNIFICANT CHANGE UP (ref 10.3–14.5)
SODIUM SERPL-SCNC: 142 MMOL/L — SIGNIFICANT CHANGE UP (ref 135–145)
WBC # BLD: 4.38 K/UL — SIGNIFICANT CHANGE UP (ref 3.8–10.5)
WBC # FLD AUTO: 4.38 K/UL — SIGNIFICANT CHANGE UP (ref 3.8–10.5)

## 2023-05-21 PROCEDURE — 99233 SBSQ HOSP IP/OBS HIGH 50: CPT

## 2023-05-21 RX ADMIN — CHLORHEXIDINE GLUCONATE 1 APPLICATION(S): 213 SOLUTION TOPICAL at 06:08

## 2023-05-21 RX ADMIN — ATORVASTATIN CALCIUM 80 MILLIGRAM(S): 80 TABLET, FILM COATED ORAL at 22:08

## 2023-05-21 RX ADMIN — ENOXAPARIN SODIUM 80 MILLIGRAM(S): 100 INJECTION SUBCUTANEOUS at 17:07

## 2023-05-21 RX ADMIN — Medication 325 MILLIGRAM(S): at 06:05

## 2023-05-21 RX ADMIN — CHLORHEXIDINE GLUCONATE 15 MILLILITER(S): 213 SOLUTION TOPICAL at 17:06

## 2023-05-21 RX ADMIN — Medication 500 MILLIGRAM(S): at 06:06

## 2023-05-21 RX ADMIN — CHLORHEXIDINE GLUCONATE 15 MILLILITER(S): 213 SOLUTION TOPICAL at 06:07

## 2023-05-21 RX ADMIN — Medication 100 MILLIGRAM(S): at 17:06

## 2023-05-21 RX ADMIN — Medication 100 MILLIGRAM(S): at 06:06

## 2023-05-21 RX ADMIN — ENOXAPARIN SODIUM 80 MILLIGRAM(S): 100 INJECTION SUBCUTANEOUS at 06:06

## 2023-05-21 RX ADMIN — Medication 10 MILLIGRAM(S): at 06:05

## 2023-05-21 RX ADMIN — MODAFINIL 200 MILLIGRAM(S): 200 TABLET ORAL at 06:04

## 2023-05-21 NOTE — PROGRESS NOTE ADULT - CRITICAL CARE SERVICES PROVIDED
Patient is critically ill, requiring critical care services.

## 2023-05-21 NOTE — PROGRESS NOTE ADULT - ASSESSMENT
57y/F with  acute CVA, R cerebellar, brain compression cerebral edema, s/p SOC  UTI ESBL  acute respiratory failure, bulbar dysfunction  asthma  CAD  peripheral neuropathy  superficial thrombosis, proximal R greater saphenous  prediabetic   nephrolithiasis    PLAN:   NEURO: neurochecks q4h, VS q4h, PRN pain meds with acetaminophen / hydromorphone / oxycodone  CT today   s/p CVA on full AC   REHAB:  physical therapy evaluation and management    EARLY MOB:  HOB up    PULM:  trach collar   CARDIO:  SBP goal 90-160mm Hg, off amlodipine, carvedilol  ENDO:  Blood sugar goals 140-180 mg/dL, off insulin sliding scale, continue high dose statins  GI:  cont PPI  DIET:  cont jevity + banatrol  RENAL:  IVL,   HEM/ONC: s/p 3 units platelets, 35 ug DDAVP, (+) aCL; ferrous sulfate  VTE Prophylaxis: no SCDs, s/p IVC filter, full AC with SQL 80 BID - check anti-Xa levels  ID: afebrile, treated for ESBL UTI x 7 days; no leukocytosis  Social: family at bedside    Active issues:  What's keeping patient in the ICU? vent requirements  What is this patient's dispo plan? LTAC    ATTENDING ATTESTATION:  I was physically present for the key portions of the evaluation and management (E/M) service provided.  I agree with the above history, physical and plan, which I have reviewed and edited where appropriate.    Patient at high risk for neurological deterioration or death due to:  ICU delirium, aspiration PNA, DVT / PE.  Critical care time:  I have personally provided 60 minutes of critical care time, excluding time spent on separate procedures.      Plan discussed with RN, house staff. 57y/F with  acute CVA, R cerebellar, brain compression cerebral edema, s/p SOC  UTI ESBL  acute respiratory failure, bulbar dysfunction  asthma  CAD  peripheral neuropathy  superficial thrombosis, proximal R greater saphenous  prediabetic   nephrolithiasis    PLAN:   NEURO: neurochecks q4h, VS q4h, PRN pain meds with acetaminophen / hydromorphone / oxycodone  s/p CVA on full AC   neurostimulants- d/c phenidate, continue modafinil and amantadine   REHAB:  physical therapy evaluation and management    EARLY MOB:  HOB up    PULM:  trach collar   CARDIO:  SBP goal 90-160mm Hg, off amlodipine, carvedilol  ENDO:  Blood sugar goals 140-180 mg/dL, off insulin sliding scale, continue high dose statins, check CMP  GI:  cont PPI, d/c laxatives  DIET:  cont jevity + banatrol  RENAL:  IVL,   HEM/ONC: s/p 3 units platelets, 35 ug DDAVP, (+) aCL; ferrous sulfate  VTE Prophylaxis: no SCDs, s/p IVC filter, full AC, Xa levels therapeutic  ID: afebrile, treated for ESBL UTI x 7 days; no leukocytosis  Social: family at bedside    Active issues:  What's keeping patient in the ICU? nothing   What is this patient's dispo plan? LTAC      ATTENDING ATTESTATION:  I was physically present for the key portions of the evaluation and management (E/M) service provided.  I agree with the above history, physical and plan which I have reviewed and edited where appropriate.     Patient not at high risk for neurologic deterioration / death.  Time spent on this noncritically ill patient: 45 minutes spent on total encounter, more than 50% of the visit was spent counseling and/or coordinating care by the attending physician.    Plan discussed with RN, house staff.    REVIEW OF SYSTEMS:  No headaches, no nausea or vomiting; 14 -point review of systems otherwise unremarkable.      ICU stepdown Checklist:    Completed: 05-21 @ 08:55    [X] hemodynamically stable – VS WNL and stable x 24hours, UO adequate  [n/a ] if  previously on HDA - off pressors x 24h with stable neuro exam    [X] no new symptoms x 24h (i.e. new fever, new-onset nausea/vomiting)  [X] stable labs: (i.e. WBC not rising, sodium not dropping)  [X] patient not at high risk for aspiration, if high risk then:                  [ ] should have definitive plans for trach/PEG (alternative option is to discharge from ICU to facilty)                  [ ] stepdown to bed close to nurse’s station  [n/a] low suctioning requirements (i.e. q4h or less)  [X] sign-off from primary RNKendall Burgos  [X] drains do not require ICU level of care  [X] if patient previously agitated or with behavioral issues – controlled   [X] pain controlled

## 2023-05-21 NOTE — CHART NOTE - NSCHARTNOTEFT_GEN_A_CORE
POD 30. POD 13 VPS. Tolerating trach collar. Loose stools, bowel regimen held, + banatrol. Ritalin dc'verito. Headwrap in place. Neuro stable. Pending transfer to SDU.

## 2023-05-21 NOTE — PROGRESS NOTE ADULT - SUBJECTIVE AND OBJECTIVE BOX
=================================  NEUROCRITICAL CARE ATTENDING NOTE  =================================    MAKSIM TRIVEDI   MRN-[id number]  Summary:  57y/F  with Asthma, CAD (not on  meds), peripheral neuropathy and PSH of BATOOL, cholecystectomy, right knee surgery presented to [place] ED on  with right sided weakness and right facial numbness. Patient was undergoing preparation for colonoscopy. As per daughter at 8am patient was playing with her grandchildren but around 840 am patient was getting dressed and fell and subsequently felt weak after. Daughter reports that patient had some episodes of vomiting at this time. She had a syncopal episode at around 10 am and was witnessed by brother. No head trauma, She regained conscious after few minutes. She remained in bed for the remainder of the day. Daughter reports some slurred speech noted 1230-1PM and also was confused after. and around 4PM, the patient's sister noted right sided weakness at which time EMS was called and patient was brought to ED. No c/o chest pain, shortness of breath, fever, headache, abdominal pain, urinary complaints. No recent travel/sickness/ change in meds. Stroke code in ER: CTH neg for heme, Right PICA distribution acute infarction. CTA with saccular aneurysms of b/l carotids, approximately 0.8 cm on the right and 1.2 x 0.9 cm on the left. Possible tiny third saccular aneurysm on the right Posterior intracranial circulation: Right vertebral arterial occlusion at its dural crossing junction V3 Atlantic and V4 intracranial segments with likely reversal of flow in its intracranial segment from the basilar. Right PICA faintly seen. 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  Not candidate for TNK/mechanical thrombectomy. CT scan repeated which showed: 1. Brain: Progression of acute infarction within the right cerebellar hemisphere, also extending into the left superior cerebellar hemisphere. New mass effect on the fourth ventricle causing stenosis versus occlusion with new third and lateral ventricular dilatation indicating ventricular obstruction at the level of the fourth ventricle. New upward and downward herniation of cerebellar parenchyma Right carotid system: No hemodynamically significant stenosis. Left carotid system: No hemodynamically significant stenosis. Vertebral circulation: Patent. Anterior intracranial circulation: Intact. Bilateral internal carotid saccular aneurysms. These findings are unchanged. Posterior intracranial circulation:    Improved flow within the right vertebral artery since 2023. New focal stenosis mid left vertebral artery, etiology uncertain, consider vasospasm and extrinsic compression in addition to new embolic disease. Brain perfusion: Perfusion images demonstrate normalization of the perfusion abnormality in the right cerebellar hemisphere present on 2023 despite evidence of progression of acute infarction.  Areas of apparent ischemia within the posterior fossa have progressed in extent, also again involving the left posterior cerebral arterial distribution. Tx to [facility] for SOC watch. On admission to [facility], NIHSS 12.  (2023 16:50)    COURSE IN THE HOSPITAL:  : Admitted for crani watch, CTH complete w/ unchanged hydrocephalus, taken for emergent occipital craniectomy.   : POD1. Remains intubated overnight, on propofol and dank. EVD@27muL61. Pending repeat CTH this am. Given hydralazine 10mg x1. Started on cardene for SBP high 140s-150s. Sub-therapeutic on plavix, therapeutic on asa, rpt asa accumetrics until subtherapeutic. LE dopplers neg for DVT, but showing superficial venous thrombosis in the proximal portion of the right greater saphenous vein. Started on 3% @60 for na goal 145-150. NGT placed by ENT. Febrile, pancultured.  : POD 2. 3% increased to 75. NGT readjusted. UA neg. SBP liberalized to 160. Plan to extubate. 3% decreased to 60. Failed extubation d/t no cuff leak, given 60mg solumedrol, plan to extubate in 6 hrs. SCx1 for post void residual >400, started on cardura at bedtime. New blood in buretrol, stopped SQL. Clot in EVD cleared. Neuro exam improving.   : POD 3. Cont 3% with NS while NPO, start TF today. TF started. LFTs downtrending. Sodium 141. Increased 3% to 50, NS to 30. Repeat BMP ordered for 5:30PM. Tramadol 25 for pain with no relief, oxy 5 and 1g tylenol ordered, stability CT stable, speech language consult for dysarthria. Given hydralazine 10mg IVP for SBP>160, started amlodipine 5mg. C/o mild headache, given fioricet x1, stroke neuro rec SALVADOR and loop recorder placement. Echo with bubble completed, negative for PFO, EF 55-60%. J HFNC started for desats with suctioning.   : POD 4. Cont HFNC. Increased secertions on HFNC, reintubated. CXR confirmed good placement. EVD dropped to 5cmH20 for goal of draining 5-10cc/hr and SG ELENA drain taken off suction. Pending CTH. EVD drained 0cc/hr, dropped to 0. NGT replaced. Dc'd fioricet. Started on PPI for intubation. Increased cardura to 4mg for urinary retention, given an additional 2mg now. Started salt tabs 3 q 6. Given 12.5mg fentanyl x1. NGT replaced, CXR shown in lung. NGT removed, repeat CXR showing no pneumothorax. Pending ENT consult for NGT placement. CTH stable. NGT replaced by ENT, confirmed in proper spot. Restarted TF. [id number]. Na 141 from 146. Increased 3% to 60. EVD raised to 3cmH20. ETT pulled back 1cm by RT.  : POD 5 SOC. Intubated, remains on precedex, ABG ordered with improving PaO2, BMP sodium 148, 3% changed to 30cc/hr, cardura increased to 8mg for tonight, tolerating cpap  : POD 6 SOC. Respiratory rate sustained 6, returned to FVS. Na 151, dc'd 3%, f/u AM sodium. Nurse noticed ETT 19 at the lip and was 20 prior, CXR shows ETT @ 5cm above jono, ET tube advanced 1cm, repeat CXR confirms appropriate placement. Thick inline secretions with suctioning. ENT trach placement Fri likely, PEG likely Mon. Keep ELENA drain until no output, EVD to remain @3. Start ASA 81mg daily tomorrow.   : POD7 SOC, neuro stable, given fentanyl x 1 overnight for presumed discomfort (biting on ETT), remains on full vent support. CTH today stable in comparison to . 6 cuffed trach placed by ENT in OR, but came down without cuff. Replaced at bedside by ENT. On fentanyl gtt.    : POD8 SOC, JIM overnight. Incision cleaned and dressing changed. On fentanyl gtt. EKG completed due to increased frequency PVCs on telemetry, frequency of PVCs improved with titrating up fentanyl gtt. ABG drawn.  Repeat LE dopplers completed showing persistant superficial thrombus, no DVT. No EVD waveform during day, flushed distally without improvement. Exam unchanged.  EVD dropped to 0 for low output in afternoon.   : POD9. Neuro stable, febrile to 101.3F o/n, given tylenol and pan cx sent. SBP dipped to low 90s o/n, HR stable in 70s with some PVCs, decreased fentanyl gtt and given 500cc bolus of NS x 2. Pend PEG placement Mon and angio Tu. D/c'd ELENA drain today and suture placed. F/u heme recs. Positive UA. Infiltrates found on CXR. Started on Zosyn 4.5g Q6hrs .   : POD 10. Neuro stable. Dc'd fentanyl gtt. PEG failed placement at bedside with Dr. Gant, plan for PEG in OR tomorrow afternoon with Dr. Layton. Dc'd amlodipine and started carvedilol 3.125 BID for PVCs . Made 3% inhalation and mucomyst q8hr. Failed PEG at bedside. Salt tabs made 1 q 6. Decreased cardura to 4mg daily. Urine culture growing E. Coli and sputum culture growing pluralibacter gergoviae and strep species. ID consulted, f/u recs. Failed CPAP trial @ 3pm. Added am labs per heme/onc for hypercoguable workup. Pending repeat LE dopplers in 2-3 days. NGT clogged, removed, ENT failed attempt at replacement, will keep NPO for PEG placement tmw. Repeat xray in am for concern for aspration. Pt abx switched from Zosyn to Ertapenem 1g Q24hrs. per ID recs, possible ESBL growth.   : POD 11. Neuro stable. diagnostic cerebral angiogram (bilateral carotid cavernous aneurysm) and PEG placement. NPO; pulled out radial TR band (right), EVD raised to 10    POD12 EVD raised to 15, then down to 5cm H20, CPAP today   POD13 EVD 5   POD 14    05 POD 15 EVD raised to 15; CPAP 8 hours     required full vent support for low minute ventilation and hypercapnea, abdominal tenderness - CT AP WWO  05/15 CPAP this AM   hypotension to 80s after hydromorphone dose   No significant events overnight.    SIMV overnight RR 10 PS 12, full AC with Lovenox   No significant events overnight. trach collar   diarrhea overnight       Past Medical History: Asthma CAD (coronary artery disease) Peripheral neuropathy  Allergies:  No Known Allergies  Home meds:   ·	Albuterol (Eqv-ProAir HFA) 90 mcg/inh inhalation aerosol: 2 puff(s) inhaled every 6 hours as needed for  shortness of breath and/or wheezing    PHYSICAL EXAMINATION   T(C): 36.4 ( @ 05:12), Max: 37.4 ( @ 21:40) HR: 75 ( @ 07:35) (67 - 77) BP: 126/66 ( @ 07:35) (107/68 - 126/66) RR: 22 ( @ 07:35) (20 - 33) SpO2: 100% ( @ 07:35) (91% - 100%)   NEUROLOGIC EXAMINATION:  Patient awake, alert, oriented x2, FC, R gaze, MIRTHA, RUE R 4/5 RLE 4+/5 L UE 5/5  L LE 5/5  GENERAL: trached SIMV 400 40% 10 12/6  EENT:  anicteric  CARDIOVASCULAR: (+) S1 S2, normal rate and regular rhythm  PULMONARY: clear to auscultation bilaterally  ABDOMEN: soft, with normoactive bowel sounds, tenderness periumbilical with mild guarding  EXTREMITIES: no edema; good distal pulses  SKIN: no rash    LABS:     (4.06)  10.9 (10.7)  4.38  )-----------( 277      ( 21 May 2023 05:30 )             34.9   (139)  142  |  100  |  11  ----------------------------<  139<H>  4.2   |  32<H>  |  0.47<L>    Ca    8.8      21 May 2023 05:30  Phos  4.9     -  Mg     2.3      @ 07:01  -   @ 07:00  IN: 1500 mL / OUT: 950 mL / NET: 550 mL     Bacteriology:  UA trace blood many WBC, small LE no nitrates   CSF culture NGTD   CSF NGTD     CSF NGTD   urine culture ESBL x2 strains   Blood NG5D x2   sputum:  pluralibacter gergoviae, mod strep pneumoniae    CSF studies:    L   *** [id number] WBC1 *** %N   %L1     EEG:  Neuroimaging:  Other imaging:    MEDICATIONS:     ·	enoxaparin Injectable 80 SubCutaneous every 12 hours  ·	amantadine Syrup 100 Oral every 12 hours  ·	methylphenidate 10 Oral <User Schedule>  ·	modafinil 200 Oral every 24 hours  ·	polyethylene glycol 3350 17 Oral daily  ·	senna 2 Oral at bedtime  ·	atorvastatin 80 Oral at bedtime  ·	ascorbic acid 500 Oral <User Schedule>  ·	chlorhexidine 0.12% Liquid 15 Oral Mucosa every 12 hours  ·	chlorhexidine 2% Cloths 1 Topical daily  ·	ferrous    sulfate 325 Oral <User Schedule>  ·	acetaminophen   Oral Liquid .. 650 Oral every 6 hours PRN  ·	acetylcysteine 10%  Inhalation 4 Inhalation every 6 hours PRN  ·	albuterol/ipratropium for Nebulization 3 Nebulizer every 6 hours PRN  ·	oxyCODONE    IR 5 Oral every 4 hours PRN    IV FLUIDS: IVL  DRIPS:  DIET: Jevity @ 50 + banatrol  Lines:  Drains:      External Ventricular Device (mL): 245 mL - @ 0 cm H20  / EVD raised to 5 output 367 ICPs 1-7  05/03 EVD at 5cm H20 ICPs <10  05/04 EVD at 5cm H20   05/05 EVD at 15cm H20 ICPs < 10  Wounds:    CODE STATUS:  Full Code                       GOALS OF CARE:  aggressive                      DISPOSITION:  ICU =================================  NEUROCRITICAL CARE ATTENDING NOTE  =================================    MAKSIM TRIVEDI   MRN-[id number]  Summary:  57y/F  with Asthma, CAD (not on  meds), peripheral neuropathy and PSH of BATOOL, cholecystectomy, right knee surgery presented to [place] ED on  with right sided weakness and right facial numbness. Patient was undergoing preparation for colonoscopy. As per daughter at 8am patient was playing with her grandchildren but around 840 am patient was getting dressed and fell and subsequently felt weak after. Daughter reports that patient had some episodes of vomiting at this time. She had a syncopal episode at around 10 am and was witnessed by brother. No head trauma, She regained conscious after few minutes. She remained in bed for the remainder of the day. Daughter reports some slurred speech noted 1230-1PM and also was confused after. and around 4PM, the patient's sister noted right sided weakness at which time EMS was called and patient was brought to ED. No c/o chest pain, shortness of breath, fever, headache, abdominal pain, urinary complaints. No recent travel/sickness/ change in meds. Stroke code in ER: CTH neg for heme, Right PICA distribution acute infarction. CTA with saccular aneurysms of b/l carotids, approximately 0.8 cm on the right and 1.2 x 0.9 cm on the left. Possible tiny third saccular aneurysm on the right Posterior intracranial circulation: Right vertebral arterial occlusion at its dural crossing junction V3 Atlantic and V4 intracranial segments with likely reversal of flow in its intracranial segment from the basilar. Right PICA faintly seen. 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  Not candidate for TNK/mechanical thrombectomy. CT scan repeated which showed: 1. Brain: Progression of acute infarction within the right cerebellar hemisphere, also extending into the left superior cerebellar hemisphere. New mass effect on the fourth ventricle causing stenosis versus occlusion with new third and lateral ventricular dilatation indicating ventricular obstruction at the level of the fourth ventricle. New upward and downward herniation of cerebellar parenchyma Right carotid system: No hemodynamically significant stenosis. Left carotid system: No hemodynamically significant stenosis. Vertebral circulation: Patent. Anterior intracranial circulation: Intact. Bilateral internal carotid saccular aneurysms. These findings are unchanged. Posterior intracranial circulation:    Improved flow within the right vertebral artery since 2023. New focal stenosis mid left vertebral artery, etiology uncertain, consider vasospasm and extrinsic compression in addition to new embolic disease. Brain perfusion: Perfusion images demonstrate normalization of the perfusion abnormality in the right cerebellar hemisphere present on 2023 despite evidence of progression of acute infarction.  Areas of apparent ischemia within the posterior fossa have progressed in extent, also again involving the left posterior cerebral arterial distribution. Tx to [facility] for SOC watch. On admission to [facility], NIHSS 12.  (2023 16:50)    COURSE IN THE HOSPITAL:  : Admitted for crani watch, CTH complete w/ unchanged hydrocephalus, taken for emergent occipital craniectomy.   : POD1. Remains intubated overnight, on propofol and dank. EVD@[id number]. Pending repeat CTH this am. Given hydralazine 10mg x1. Started on cardene for SBP high 140s-150s. Sub-therapeutic on plavix, therapeutic on asa, rpt asa accumetrics until subtherapeutic. LE dopplers neg for DVT, but showing superficial venous thrombosis in the proximal portion of the right greater saphenous vein. Started on 3% @60 for na goal 145-150. NGT placed by ENT. Febrile, pancultured.  : POD 2. 3% increased to 75. NGT readjusted. UA neg. SBP liberalized to 160. Plan to extubate. 3% decreased to 60. Failed extubation d/t no cuff leak, given 60mg solumedrol, plan to extubate in 6 hrs. SCx1 for post void residual >400, started on cardura at bedtime. New blood in buretrol, stopped SQL. Clot in EVD cleared. Neuro exam improving.   : POD 3. Cont 3% with NS while NPO, start TF today. TF started. LFTs downtrending. Sodium 141. Increased 3% to 50, NS to 30. Repeat BMP ordered for 5:30PM. Tramadol 25 for pain with no relief, oxy 5 and 1g tylenol ordered, stability CT stable, speech language consult for dysarthria. Given hydralazine 10mg IVP for SBP>160, started amlodipine 5mg. C/o mild headache, given fioricet x1, stroke neuro rec SALVADOR and loop recorder placement. Echo with bubble completed, negative for PFO, EF 55-60%. J HFNC started for desats with suctioning.   : POD 4. Cont HFNC. Increased secertions on HFNC, reintubated. CXR confirmed good placement. EVD dropped to 5cmH20 for goal of draining 5-10cc/hr and SG ELENA drain taken off suction. Pending CTH. EVD drained 0cc/hr, dropped to 0. NGT replaced. Dc'd fioricet. Started on PPI for intubation. Increased cardura to 4mg for urinary retention, given an additional 2mg now. Started salt tabs 3 q 6. Given 12.5mg fentanyl x1. NGT replaced, CXR shown in lung. NGT removed, repeat CXR showing no pneumothorax. Pending ENT consult for NGT placement. CTH stable. NGT replaced by ENT, confirmed in proper spot. Restarted TF. [id number]. Na 141 from 146. Increased 3% to 60. EVD raised to 3cmH20. ETT pulled back 1cm by RT.  : POD 5 SOC. Intubated, remains on precedex, ABG ordered with improving PaO2, BMP sodium 148, 3% changed to 30cc/hr, cardura increased to 8mg for tonight, tolerating cpap  : POD 6 SOC. Respiratory rate sustained 6, returned to FVS. Na 151, dc'd 3%, f/u AM sodium. Nurse noticed ETT 19 at the lip and was 20 prior, CXR shows ETT @ 5cm above jono, ET tube advanced 1cm, repeat CXR confirms appropriate placement. Thick inline secretions with suctioning. ENT trach placement Fri likely, PEG likely Mon. Keep ELENA drain until no output, EVD to remain @3. Start ASA 81mg daily tomorrow.   : POD7 SOC, neuro stable, given fentanyl x 1 overnight for presumed discomfort (biting on ETT), remains on full vent support. CTH today stable in comparison to . 6 cuffed trach placed by ENT in OR, but came down without cuff. Replaced at bedside by ENT. On fentanyl gtt.    : POD8 SOC, JIM overnight. Incision cleaned and dressing changed. On fentanyl gtt. EKG completed due to increased frequency PVCs on telemetry, frequency of PVCs improved with titrating up fentanyl gtt. ABG drawn.  Repeat LE dopplers completed showing persistant superficial thrombus, no DVT. No EVD waveform during day, flushed distally without improvement. Exam unchanged.  EVD dropped to 0 for low output in afternoon.   : POD9. Neuro stable, febrile to 101.3F o/n, given tylenol and pan cx sent. SBP dipped to low 90s o/n, HR stable in 70s with some PVCs, decreased fentanyl gtt and given 500cc bolus of NS x 2. Pend PEG placement Mon and angio Tu. D/c'd ELENA drain today and suture placed. F/u heme recs. Positive UA. Infiltrates found on CXR. Started on Zosyn 4.5g Q6hrs .   : POD 10. Neuro stable. Dc'd fentanyl gtt. PEG failed placement at bedside with Dr. Gant, plan for PEG in OR tomorrow afternoon with Dr. Layton. Dc'd amlodipine and started carvedilol 3.125 BID for PVCs . Made 3% inhalation and mucomyst q8hr. Failed PEG at bedside. Salt tabs made 1 q 6. Decreased cardura to 4mg daily. Urine culture growing E. Coli and sputum culture growing pluralibacter gergoviae and strep species. ID consulted, f/u recs. Failed CPAP trial @ 3pm. Added am labs per heme/onc for hypercoguable workup. Pending repeat LE dopplers in 2-3 days. NGT clogged, removed, ENT failed attempt at replacement, will keep NPO for PEG placement tmw. Repeat xray in am for concern for aspration. Pt abx switched from Zosyn to Ertapenem 1g Q24hrs. per ID recs, possible ESBL growth.   : POD 11. Neuro stable. diagnostic cerebral angiogram (bilateral carotid cavernous aneurysm) and PEG placement. NPO; pulled out radial TR band (right), EVD raised to 10    POD12 EVD raised to 15, then down to 5cm H20, CPAP today   POD13 EVD 5   POD 14    05 POD 15 EVD raised to 15; CPAP 8 hours     required full vent support for low minute ventilation and hypercapnea, abdominal tenderness - CT AP WWO  05/15 CPAP this AM   hypotension to 80s after hydromorphone dose   No significant events overnight.    SIMV overnight RR 10 PS 12, full AC with Lovenox   No significant events overnight. 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   No significant events overnight, remains on trach collar    Past Medical History: Asthma CAD (coronary artery disease) Peripheral neuropathy  Allergies:  No Known Allergies  Home meds:   ·	Albuterol (Eqv-ProAir HFA) 90 mcg/inh inhalation aerosol: 2 puff(s) inhaled every 6 hours as needed for  shortness of breath and/or wheezing    PHYSICAL EXAMINATION   T(C): 36.4 ( @ 05:12), Max: 37.4 ( @ 21:40) HR: 75 ( @ 07:35) (67 - 77) BP: 126/66 ( @ 07:35) (107/68 - 126/66) RR: 22 ( @ 07:35) (20 - 33) SpO2: 100% ( @ 07:35) (91% - 100%)   NEUROLOGIC EXAMINATION:  Patient awake, alert, oriented x2, FC, R gaze, MIRTHA, RUE R 4/5 RLE 4+/5 L UE 5/5  L LE 5/5  GENERAL: trach collar  EENT:  anicteric  CARDIOVASCULAR: (+) S1 S2, normal rate and regular rhythm  PULMONARY: clear to auscultation bilaterally  ABDOMEN: soft, with normoactive bowel sounds, tenderness periumbilical with mild guarding  EXTREMITIES: no edema; good distal pulses  SKIN: no rash    LABS:     (4.06)  10.9 (10.7)  4.38  )-----------( 277      ( 21 May 2023 05:30 )             34.9   (139)  142  |  100  |  11  ----------------------------<  139<H>  4.2   |  32<H>  |  0.47<L>    Ca    8.8      21 May 2023 05:30  Phos  4.9       Mg     2.3      @ 07:01  -   @ 07:00  IN: 1500 mL / OUT: 950 mL / NET: 550 mL     Bacteriology:  UA trace blood many WBC, small LE no nitrates   CSF culture NGTD   CSF NGTD     CSF NGTD   urine culture ESBL x2 strains   Blood NG5D x2   sputum:  pluralibacter gergoviae, mod strep pneumoniae    CSF studies:    L   *** [id number] WBC1 *** %N   %L1     EEG:  Neuroimaging:  Other imaging:    MEDICATIONS:     ·	enoxaparin Injectable 80 SubCutaneous every 12 hours  ·	amantadine Syrup 100 Oral every 12 hours  ·	methylphenidate 10 Oral <User Schedule>  ·	modafinil 200 Oral every 24 hours  ·	polyethylene glycol 3350 17 Oral daily  ·	senna 2 Oral at bedtime  ·	atorvastatin 80 Oral at bedtime  ·	ascorbic acid 500 Oral <User Schedule>  ·	ferrous    sulfate 325 Oral <User Schedule>  ·	acetaminophen   Oral Liquid .. 650 Oral every 6 hours PRN  ·	acetylcysteine 10%  Inhalation 4 Inhalation every 6 hours PRN  ·	albuterol/ipratropium for Nebulization 3 Nebulizer every 6 hours PRN  ·	oxyCODONE    IR 5 Oral every 4 hours PRN    IV FLUIDS: IVL  DRIPS:  DIET: Jevity @ 50 + banatrol  Lines:  Drains:      External Ventricular Device (mL): 245 mL - @ 0 cm H20   EVD raised to 5 output 367 ICPs 1-7  / EVD at 5cm H20 ICPs <10  / EVD at 5cm H20   / EVD at 15cm H20 ICPs < 10  Wounds:    CODE STATUS:  Full Code                       GOALS OF CARE:  aggressive                      DISPOSITION:  ICU

## 2023-05-21 NOTE — PROGRESS NOTE ADULT - SUBJECTIVE AND OBJECTIVE BOX
SUBJECTIVE: Patient points to lower abdomen when asked about pain. Denies further complaints.     HOSPITAL COURSE:  4/21: Admitted for crani watch, CTH complete w/ unchanged hydrocephalus, taken for emergent occipital craniectomy.   4/22: POD1. Remains intubated overnight, on propofol and dank. EVD@05tqP13. Pending repeat CTH this am. Given hydralazine 10mg x1. Started on cardene for SBP high 140s-150s. Sub-therapeutic on plavix, therapeutic on asa, rpt asa accumetrics until subtherapeutic. LE dopplers neg for DVT, but showing superficial venous thrombosis in the proximal portion of the right greater saphenous vein. Started on 3% @60 for na goal 145-150. NGT placed by ENT. Febrile, pancultured.  4/23: POD 2. 3% increased to 75. NGT readjusted. UA neg. SBP liberalized to 160. Plan to extubate. 3% decreased to 60. Failed extubation d/t no cuff leak, given 60mg solumedrol, plan to extubate in 6 hrs. SCx1 for post void residual >400, started on cardura at bedtime. New blood in buretrol, stopped SQL. Clot in EVD cleared. Neuro exam improving.   4/24: POD 3. Cont 3% with NS while NPO, start TF today. TF started. LFTs downtrending. Sodium 141. Increased 3% to 50, NS to 30. Repeat BMP ordered for 5:30PM. Tramadol 25 for pain with no relief, oxy 5 and 1g tylenol ordered, stability CT stable, speech language consult for dysarthria. Given hydralazine 10mg IVP for SBP>160, started amlodipine 5mg. C/o mild headache, given fioricet x1, stroke neuro rec SALVADOR and loop recorder placement. Echo with bubble completed, negative for PFO, EF 55-60%. J HFNC started for desats with suctioning.   4/25: POD 4. Cont HFNC. Increased secertions on HFNC, reintubated. CXR confirmed good placement. EVD dropped to 5cmH20 for goal of draining 5-10cc/hr and SG ELENA drain taken off suction. Pending CTH. EVD drained 0cc/hr, dropped to 0. NGT replaced. Dc'd fioricet. Started on PPI for intubation. Increased cardura to 4mg for urinary retention, given an additional 2mg now. Started salt tabs 3 q 6. Given 12.5mg fentanyl x1. NGT replaced, CXR shown in lung. NGT removed, repeat CXR showing no pneumothorax. Pending ENT consult for NGT placement. CTH stable. NGT replaced by ENT, confirmed in proper spot. Restarted TF. Hiftlnq375.4F. Na 141 from 146. Increased 3% to 60. EVD raised to 3cmH20. ETT pulled back 1cm by RT.  4/26: POD 5 SOC. Intubated, remains on precedex, ABG ordered with improving PaO2, BMP sodium 148, 3% changed to 30cc/hr, cardura increased to 8mg for tonight, tolerating cpap  4/27: POD 6 SOC. Respiratory rate sustained 6, returned to FVS. Na 151, dc'd 3%, f/u AM sodium. Nurse noticed ETT 19 at the lip and was 20 prior, CXR shows ETT @ 5cm above jono, ET tube advanced 1cm, repeat CXR confirms appropriate placement. Thick inline secretions with suctioning. ENT trach placement Fri likely, PEG likely Mon. Keep ELENA drain until no output, EVD to remain @3. Start ASA 81mg daily tomorrow.   4/28: POD7 SOC, neuro stable, given fentanyl x 1 overnight for presumed discomfort (biting on ETT), remains on full vent support. CTH today stable in comparison to 4/25. 6 cuffed trach placed by ENT in OR, but came down without cuff. Replaced at bedside by ENT. On fentanyl gtt.    4/29: POD8 SOC, JIM overnight. Incision cleaned and dressing changed. On fentanyl gtt. EKG completed due to increased frequency PVCs on telemetry, frequency of PVCs improved with titrating up fentanyl gtt. ABG drawn.  Repeat LE dopplers completed showing persistant superficial thrombus, no DVT. No EVD waveform during day, flushed distally without improvement. Exam unchanged.  EVD dropped to 0 for low output in afternoon.   4/30: POD9. Neuro stable, febrile to 101.3F o/n, given tylenol and pan cx sent. SBP dipped to low 90s o/n, HR stable in 70s with some PVCs, decreased fentanyl gtt and given 500cc bolus of NS x 2. Pend PEG placement Mon and angio Tues. D/c'd ELENA drain today and suture placed. F/u heme recs. Positive UA. Infiltrates found on CXR. Started on Zosyn 4.5g Q6hrs .   5/1: POD 10. Neuro stable. Dc'd fentanyl gtt. PEG failed placement at bedside with Dr. Gant, plan for PEG in OR tomorrow afternoon with Dr. Layton. Dc'd amlodipine and started carvedilol 3.125 BID for PVCs . Made 3% inhalation and mucomyst q8hr. Failed PEG at bedside. Salt tabs made 1 q 6. Decreased cardura to 4mg daily. Urine culture growing E. Coli and sputum culture growing pluralibacter gergoviae and strep species. ID consulted, f/u recs. Failed CPAP trial @ 3pm. Added am labs per heme/onc for hypercoguable workup. Pending repeat LE dopplers in 2-3 days. NGT clogged, removed, ENT failed attempt at replacement, will keep NPO for PEG placement tmw. Repeat xray in am for concern for aspration. Pt abx switched from Zosyn to Ertapenem 1g Q24hrs. per ID recs, possible ESBL growth.   5/2: POD 11. Neuro stable. Pre-op for diagnostic cerebral angiogram and PEG placement. NPO. EVD raised to 5 cmH20. Sputum culture speciated to step pneumoniae, sensitive to ertapenem. 3% inhalation/mucomyst made q 6 hr d/t thick secretions. PEG placed in OR. POD0 dx cerebral angio. EVD raised to 10.   5/3: POD 12. Neuro stable. PEG feeds began @ 10 AM, plan to increase 10cc every 6h per general surgery. Repeat dopplers show stable R superficial thrombus and new L IM calf DVT. Vascular consulted for IVC filter. Plan to get CTH tomorrow.  5/4: POD 13. JIM o/n. s/p IVC filter with vascular today. Pending post-procedure CTH. FOBT negative. Started iron and vitamin c every other day for iron deficiency anemia.   5/5: POD14. CSF cx sent to r/o infection from new gas in right temporal horn seen on CTH. Restarted TF, changed to Jevity 1.2, f/u nutrition recs. EVD raised to 10 today, plan to challenge over the weekend and CTH on Monday, possible VPS next Wednesday. ENT removed sutures today. Salt tabs decreased 1q12.  5/6: POD15 Placed on CPAP briefly with low MV alert, placed back on full vent support. EVD remains open at [id number]. ICPs WNL. Neuro exam stable.  EVD raised to 15. Tolerated CPAP x8h.   5/7: POD#16. JIM overnight, neuro stable. D/c'd salt tabs and 3% neb. Wean trach to CPAP as tolerated. Pan cx NGTD. EVD raised today to [id number].   5/8: POD17 JIM overnight. ICPs WNL. Pt remains on full vent support. Neuro exam stable. Plan for possible VPS today. CT chio complete showing increase in vent size.   5/9: POD18 SOC, POD1 VPS. Desat o/n to 80s, improved on own. CXR with LLL effusion. Another desat to 90% in AM, given duoneb and mucomyst. Oxycodone given for incisional pain. Neuro exam stable. CTH in AM stable. Keppra dc'd. Ritalin 5 BID started.  Tolerating CPAP trial. SALVADOR ordered. Rectal tube dc'd.   5/10: POD 19 SOC POD2 VPS. Remains on full vent support. Tolerated CPAP for 6 hours. Neuro exam stable. BP liberalized to 160. RLQ US 2/2 abd pain. Lactate WNL.  5/11: POD20 SOC POD3 VPS. Plan for CPAP trial in AM.  Dc'd cardura. F/u speech consult for communcation board. Has been tolerating CPAP since 9am. RLQ us w/ no acute pathology. Per heme/onc LMWH or coumadin rec for AC over DOAC due to antiphospholipid syndrome.   5/12: POD21 SOC POD4 VPS. CPAP 8/5, tolerated until 3pm. Can start ASA on 5/14 and full AC POD 10 from VPS 5/19 (remove IVC filter prior, f/u w/ vascular on timing).   5/13: POD22 SOC POD 5 VPS, neuro stable, tolerating trach collar. ASA ordered to start tomorrow, Ritalin increased to 10BID from 5BID, Simethicone ordered for gas, f/u gen surg for continued abd discomfort, PM VBG with PCO2 50 and repeat VBG pCO2 55, possible obesity hypoventilation, f/u AM VBG   5/14: POD23 SOC, [id number], neuro stable, VBG showed increased PCO2 indicating poor ventilation, failed CPAP due to low TV, put back on full vent support.   5/15: POD24 SOC, POD7 VPS. O/n CT A/P done for abd pain, f/u read. neuro stable. remains on full vent support. Simethicone, Amlodipine d/c'd. Given 1L bolus of NS. Dilaudid 0.5 for pain control. CT A/P negative for acute pathology. Restarted tube feeds. Started Modafinil for neurostim.  5/16: POD25 SOC, POD8 VPS. JIM o/n neuro stable. remains on full vent support. DC carvedilol for intermittent hypotensive episodes. UA for suprapelvic pain, concern for nephrolithiasis based on CT AP.  5/17: POD26 SOC, POD9 VPS. JIM o/n neuro stable.   Modafinil increased. CTH completed, . Urology consulted for bladder calculi vs mass, pending urine cytology study and outptaient urology follow-up for cystoscopy. Trialing SIMV for vent weaning.   5/18: POD27 SOC, POD10 VPS. JIM o/n remains on SIMV.   Baby aspirin discontinued. SQL 80mg bid started for DVT treatement per hematology/neurology. Plan for CTH in am. Added amantadine for neurostim.   5/19: POD28, POD 11 VPS. JIM o/n, reapplied pressure dressing. tolerating CPAP  5/20: POD29, POD 12 VPS, reapplied pressure dressing o/n, tolerating trach collar. CTH today stable. Anti xa 0.8 (therapeutic)  5/21: POD 30. POD 13 VPS. Tolerating trach collar.   Vital Signs Last 24 Hrs  T(C): 37.4 (20 May 2023 21:40), Max: 37.4 (20 May 2023 21:40)  T(F): 99.3 (20 May 2023 21:40), Max: 99.3 (20 May 2023 21:40)  HR: 77 (21 May 2023 00:00) (65 - 77)  BP: 107/68 (20 May 2023 21:00) (107/68 - 120/57)  BP(mean): 81 (20 May 2023 21:00) (78 - 82)  RR: 27 (21 May 2023 00:00) (14 - 33)  SpO2: 100% (21 May 2023 00:00) (91% - 100%)    Parameters below as of 21 May 2023 00:00  Patient On (Oxygen Delivery Method): tracheostomy collar  O2 Flow (L/min): 10  O2 Concentration (%): 35    I&O's Summary    19 May 2023 07:01  -  20 May 2023 07:00  --------------------------------------------------------  IN: 1430 mL / OUT: 950 mL / NET: 480 mL    20 May 2023 07:01  -  21 May 2023 00:23  --------------------------------------------------------  IN: 1080 mL / OUT: 850 mL / NET: 230 mL    PHYSICAL EXAM:   General: patient seen laying supine in bed in NAD on [facility]  Neuro: AAO x 2-3 (nods appropriately to questions self, place), R gaze, FC, OE spontaneously, RUE 4/5, otherwise 5/5 strength throughout   HEENT: PERRL +trach   Pulmonary: chest rise symmetric   Abdomen: soft, nontender, nondistended +PEG   Ext: perfusing well   Skin: warm, dry   Wound: Crani incision c/d/i reinforced w/ pressure dressing      LABS:                        10.7   4.06  )-----------( 257      ( 20 May 2023 05:15 )             32.9     05-20    139  |  100  |  11  ----------------------------<  172<H>  4.0   |  31  |  0.47<L>    Ca    9.2      20 May 2023 05:15  Phos  4.3     05-20  Mg     2.2     05-20    CAPILLARY BLOOD GLUCOSE    Drug Levels: [] N/A    CSF Analysis: [] N/A    Allergies    No Known Allergies    Intolerances    MEDICATIONS:  Antibiotics:    Neuro:  acetaminophen   Oral Liquid .. 650 milliGRAM(s) Oral every 6 hours PRN  amantadine Syrup 100 milliGRAM(s) Oral every 12 hours  methylphenidate 10 milliGRAM(s) Oral <User Schedule>  modafinil 200 milliGRAM(s) Oral every 24 hours  oxyCODONE    IR 5 milliGRAM(s) Oral every 4 hours PRN    Anticoagulation:  enoxaparin Injectable 80 milliGRAM(s) SubCutaneous every 12 hours    OTHER:  acetylcysteine 10%  Inhalation 4 milliLiter(s) Inhalation every 6 hours PRN  albuterol/ipratropium for Nebulization 3 milliLiter(s) Nebulizer every 6 hours PRN  atorvastatin 80 milliGRAM(s) Oral at bedtime  chlorhexidine 0.12% Liquid 15 milliLiter(s) Oral Mucosa every 12 hours  chlorhexidine 2% Cloths 1 Application(s) Topical daily  polyethylene glycol 3350 17 Gram(s) Oral daily  senna 2 Tablet(s) Oral at bedtime    IVF:  ascorbic acid 500 milliGRAM(s) Oral <User Schedule>  ferrous    sulfate 325 milliGRAM(s) Oral <User Schedule>    CULTURES:  Culture Results:   No growth to date (05-08 @ 14:35)    RADIOLOGY & ADDITIONAL TESTS:  < from: CT Head No Cont (05.20.23 @ 11:06) >  Findings: Comparison is made to a prior CT of the brain performed on   5/17/2023.    Again noted the patient is status post bilateral suboccipital   craniectomies. There are evolving cerebellar infarctions. No acute   hemorrhage is seen within the brain parenchyma. There is a small subdural   fluid collection overlying the bilateral posterior inferior cerebellar   hemispheres, unchanged There is a small extracranial fluid overlying the   bilateral posterior cerebellar convexities at the level craniectomy site,   unchanged from the prior study.    There is a ventricular shunt catheter coursing via a right frontal miroslava   hole with its tip extending through the right foramen of Bruno, crossing   the midline and abutting the left thalamus, unchanged there is no   evidence of hydrocephalus. There is a 6 tiny collection of subdural fluid   overlying the right frontal temporal convexity, unchanged.    There is no evidence of acute intracranial hemorrhage or acute   transcortical infarct. There is no evidence of hydrocephalus. There is no   evidence of mass-effect or midline shift.  Visualized paranasal sinuses and right mastoid air cells are clear. There   is opacification of the left mastoid air cells with soft tissue seen   within the middle ear cavity consistent with left otomastoiditis.    Impression: Status post bilateral suboccipital craniectomies with   extracranial fluid collection extending through the craniectomy site,   unchanged. Bilateral evolving cerebellar infarctions. Bilateral   suboccipital subdural fluid collections, unchanged. Right frontal VPS   with small right frontal temporal subdural fluid collection, unchanged.   No evidence of hydrocephalus.    Left otomastoiditis, unchanged.    < end of copied text >    ASSESSMENT:  58 y/o female found to have acute infarction within the right cerebellar hemisphere, causing herniation. Now s/p SOC foramen magnum decompression and right parietal/occipital EVD placement (4/21). s/p trach (4/28/23). s/p PEG (5/2/23), s/p dx cerebral angiogram (5/2/23). now s/p VPS Certas @ 4 (5/8/23).     Neuro   - Vitals q4h/neuro q4h   - s/p VPS (Certas @ 4)   - dx angio 5/2: b/l cavernous ICA aneurysms, as discussed at vascular conference f/u outpt   - CTH 4/28 stable; 5/4 new gas in right temporal horn, CT 5/8 chio increased vent size, 5/9 stable, CTH 5/17 increased suboccipital fluid collection   - Stroke consulted, appreciate reccs   - Pain control with tyl, oxy 5 prn  - Ritalin 10mg BID, Modafinil 200mg qd, amantadine for neurostimulation  - CTH 5/20 - stable     Cardio  - SBP   - TTE 4/24: negative for PFO, mild LVH, mild dilation of L atrium, EF 55-60%   - SALAVDOR deferred, not indicated as it will not  at this time for starting patient on anticoagulation. Will reassess if indicated medically.     Pulm  - + Trach (6 shiley), tolerating trach collar   - Chest PT, duoneb, mucomist q 6 prn     GI  - + PEG wih TF (Jevity 1.2)  - bowel regimen, last BM 5/19  - Protonix dc'd   - RLQ US - no acute pathology  - CT A/P 5/15: no acute pathology    Renal  - IVL  - Voiding via primafit   - 5/15 CT A/P bladder calculi vs mass, f/u urology outpatient for cystoscopy    Endo   - cont lipitor     Heme  - No SCDs, SQL 80 BID started on 5/18  - s/p IVCF 5/4, keep per heme recs  - LE dopplers 4/22 neg for DVT, but showing superficial venous thrombosis in the proximal portion of the right greater saphenous vein; repeat on 4/29 with persistant superficial thrombus, 5/3 new DVT L IM calf and unchanged R superficial vein thrombus  - Heme following for + anticardiolipin Ab 4/27, recs appreciated, f/u activated protein C    - Iron and vitamin c every other day   - anti xa on 5/20- 0.8 (therpeutic)    ID  - CSF sent from OR (5/8)   - MRSA (-), +UA cx ESBL, +sputum cx pluralibacter gergoviae, strep pneumoniae, s/p Ertapenem 1g Q24hrs (5/1-5/7)    PSYCH  - behavioral health consulted for tearfulness, rec     DISPO:  - NSICU, full code   - PT/OT rec AR patient can tolerate 3 hrs PT/OT daily    D/w Dr. Vaaldez and Dr. Bueno

## 2023-05-22 LAB
ALBUMIN SERPL ELPH-MCNC: 3.7 G/DL — SIGNIFICANT CHANGE UP (ref 3.3–5)
ALP SERPL-CCNC: 132 U/L — HIGH (ref 40–120)
ALT FLD-CCNC: 47 U/L — HIGH (ref 10–45)
ANION GAP SERPL CALC-SCNC: 10 MMOL/L — SIGNIFICANT CHANGE UP (ref 5–17)
AST SERPL-CCNC: 29 U/L — SIGNIFICANT CHANGE UP (ref 10–40)
BILIRUB SERPL-MCNC: 0.2 MG/DL — SIGNIFICANT CHANGE UP (ref 0.2–1.2)
BUN SERPL-MCNC: 13 MG/DL — SIGNIFICANT CHANGE UP (ref 7–23)
CALCIUM SERPL-MCNC: 9.4 MG/DL — SIGNIFICANT CHANGE UP (ref 8.4–10.5)
CHLORIDE SERPL-SCNC: 99 MMOL/L — SIGNIFICANT CHANGE UP (ref 96–108)
CO2 SERPL-SCNC: 34 MMOL/L — HIGH (ref 22–31)
CREAT SERPL-MCNC: 0.45 MG/DL — LOW (ref 0.5–1.3)
CULTURE RESULTS: NO GROWTH — SIGNIFICANT CHANGE UP
EGFR: 113 ML/MIN/1.73M2 — SIGNIFICANT CHANGE UP
GLUCOSE SERPL-MCNC: 125 MG/DL — HIGH (ref 70–99)
HCT VFR BLD CALC: 36.4 % — SIGNIFICANT CHANGE UP (ref 34.5–45)
HGB BLD-MCNC: 11.2 G/DL — LOW (ref 11.5–15.5)
MAGNESIUM SERPL-MCNC: 2.2 MG/DL — SIGNIFICANT CHANGE UP (ref 1.6–2.6)
MCHC RBC-ENTMCNC: 28.8 PG — SIGNIFICANT CHANGE UP (ref 27–34)
MCHC RBC-ENTMCNC: 30.8 GM/DL — LOW (ref 32–36)
MCV RBC AUTO: 93.6 FL — SIGNIFICANT CHANGE UP (ref 80–100)
NRBC # BLD: 0 /100 WBCS — SIGNIFICANT CHANGE UP (ref 0–0)
PHOSPHATE SERPL-MCNC: 6 MG/DL — HIGH (ref 2.5–4.5)
PLATELET # BLD AUTO: 290 K/UL — SIGNIFICANT CHANGE UP (ref 150–400)
POTASSIUM SERPL-MCNC: 4.1 MMOL/L — SIGNIFICANT CHANGE UP (ref 3.5–5.3)
POTASSIUM SERPL-SCNC: 4.1 MMOL/L — SIGNIFICANT CHANGE UP (ref 3.5–5.3)
PROT SERPL-MCNC: 6.9 G/DL — SIGNIFICANT CHANGE UP (ref 6–8.3)
RBC # BLD: 3.89 M/UL — SIGNIFICANT CHANGE UP (ref 3.8–5.2)
RBC # FLD: 13.2 % — SIGNIFICANT CHANGE UP (ref 10.3–14.5)
SARS-COV-2 RNA SPEC QL NAA+PROBE: NEGATIVE — SIGNIFICANT CHANGE UP
SODIUM SERPL-SCNC: 143 MMOL/L — SIGNIFICANT CHANGE UP (ref 135–145)
SPECIMEN SOURCE: SIGNIFICANT CHANGE UP
WBC # BLD: 5.37 K/UL — SIGNIFICANT CHANGE UP (ref 3.8–10.5)
WBC # FLD AUTO: 5.37 K/UL — SIGNIFICANT CHANGE UP (ref 3.8–10.5)

## 2023-05-22 PROCEDURE — 99221 1ST HOSP IP/OBS SF/LOW 40: CPT

## 2023-05-22 RX ORDER — OXYCODONE HYDROCHLORIDE 5 MG/1
5 TABLET ORAL EVERY 4 HOURS
Refills: 0 | Status: DISCONTINUED | OUTPATIENT
Start: 2023-05-22 | End: 2023-05-25

## 2023-05-22 RX ADMIN — Medication 100 MILLIGRAM(S): at 05:31

## 2023-05-22 RX ADMIN — Medication 650 MILLIGRAM(S): at 16:38

## 2023-05-22 RX ADMIN — ENOXAPARIN SODIUM 80 MILLIGRAM(S): 100 INJECTION SUBCUTANEOUS at 17:43

## 2023-05-22 RX ADMIN — ENOXAPARIN SODIUM 80 MILLIGRAM(S): 100 INJECTION SUBCUTANEOUS at 05:31

## 2023-05-22 RX ADMIN — OXYCODONE HYDROCHLORIDE 5 MILLIGRAM(S): 5 TABLET ORAL at 06:11

## 2023-05-22 RX ADMIN — MODAFINIL 200 MILLIGRAM(S): 200 TABLET ORAL at 05:31

## 2023-05-22 RX ADMIN — Medication 650 MILLIGRAM(S): at 15:00

## 2023-05-22 RX ADMIN — ATORVASTATIN CALCIUM 80 MILLIGRAM(S): 80 TABLET, FILM COATED ORAL at 22:04

## 2023-05-22 RX ADMIN — OXYCODONE HYDROCHLORIDE 5 MILLIGRAM(S): 5 TABLET ORAL at 07:11

## 2023-05-22 RX ADMIN — Medication 100 MILLIGRAM(S): at 17:43

## 2023-05-22 NOTE — CONSULT NOTE ADULT - REASON FOR ADMISSION
bilateral cerebellar strokes

## 2023-05-22 NOTE — PROGRESS NOTE ADULT - SUBJECTIVE AND OBJECTIVE BOX
SUBJECTIVE: Patient says "shelly" when asked how she feels. Denies new pain or symptoms at this time.     HOSPITAL COURSE:  4/21: Admitted for crani watch, CTH complete w/ unchanged hydrocephalus, taken for emergent occipital craniectomy.   4/22: POD1. Remains intubated overnight, on propofol and dank. EVD@[id number]. Pending repeat CTH this am. Given hydralazine 10mg x1. Started on cardene for SBP high 140s-150s. Sub-therapeutic on plavix, therapeutic on asa, rpt asa accumetrics until subtherapeutic. LE dopplers neg for DVT, but showing superficial venous thrombosis in the proximal portion of the right greater saphenous vein. Started on 3% @60 for na goal 145-150. NGT placed by ENT. Febrile, pancultured.  4/23: POD 2. 3% increased to 75. NGT readjusted. UA neg. SBP liberalized to 160. Plan to extubate. 3% decreased to 60. Failed extubation d/t no cuff leak, given 60mg solumedrol, plan to extubate in 6 hrs. SCx1 for post void residual >400, started on cardura at bedtime. New blood in buretrol, stopped SQL. Clot in EVD cleared. Neuro exam improving.   4/24: POD 3. Cont 3% with NS while NPO, start TF today. TF started. LFTs downtrending. Sodium 141. Increased 3% to 50, NS to 30. Repeat BMP ordered for 5:30PM. Tramadol 25 for pain with no relief, oxy 5 and 1g tylenol ordered, stability CT stable, speech language consult for dysarthria. Given hydralazine 10mg IVP for SBP>160, started amlodipine 5mg. C/o mild headache, given fioricet x1, stroke neuro rec SALVADOR and loop recorder placement. Echo with bubble completed, negative for PFO, EF 55-60%. J HFNC started for desats with suctioning.   4/25: POD 4. Cont HFNC. Increased secertions on HFNC, reintubated. CXR confirmed good placement. EVD dropped to 5cmH20 for goal of draining 5-10cc/hr and SG ELENA drain taken off suction. Pending CTH. EVD drained 0cc/hr, dropped to 0. NGT replaced. Dc'd fioricet. Started on PPI for intubation. Increased cardura to 4mg for urinary retention, given an additional 2mg now. Started salt tabs 3 q 6. Given 12.5mg fentanyl x1. NGT replaced, CXR shown in lung. NGT removed, repeat CXR showing no pneumothorax. Pending ENT consult for NGT placement. CTH stable. NGT replaced by ENT, confirmed in proper spot. Restarted TF. [id number]. Na 141 from 146. Increased 3% to 60. EVD raised to 3cmH20. ETT pulled back 1cm by RT.  4/26: POD 5 SOC. Intubated, remains on precedex, ABG ordered with improving PaO2, BMP sodium 148, 3% changed to 30cc/hr, cardura increased to 8mg for tonight, tolerating cpap  4/27: POD 6 SOC. Respiratory rate sustained 6, returned to FVS. Na 151, dc'd 3%, f/u AM sodium. Nurse noticed ETT 19 at the lip and was 20 prior, CXR shows ETT @ 5cm above jono, ET tube advanced 1cm, repeat CXR confirms appropriate placement. Thick inline secretions with suctioning. ENT trach placement Fri likely, PEG likely Mon. Keep ELENA drain until no output, EVD to remain @3. Start ASA 81mg daily tomorrow.   4/28: POD7 SOC, neuro stable, given fentanyl x 1 overnight for presumed discomfort (biting on ETT), remains on full vent support. CTH today stable in comparison to 4/25. 6 cuffed trach placed by ENT in OR, but came down without cuff. Replaced at bedside by ENT. On fentanyl gtt.    4/29: POD8 SOC, JIM overnight. Incision cleaned and dressing changed. On fentanyl gtt. EKG completed due to increased frequency PVCs on telemetry, frequency of PVCs improved with titrating up fentanyl gtt. ABG drawn.  Repeat LE dopplers completed showing persistant superficial thrombus, no DVT. No EVD waveform during day, flushed distally without improvement. Exam unchanged.  EVD dropped to 0 for low output in afternoon.   4/30: POD9. Neuro stable, febrile to 101.3F o/n, given tylenol and pan cx sent. SBP dipped to low 90s o/n, HR stable in 70s with some PVCs, decreased fentanyl gtt and given 500cc bolus of NS x 2. Pend PEG placement Mon and angio Tues. D/c'd ELENA drain today and suture placed. F/u heme recs. Positive UA. Infiltrates found on CXR. Started on Zosyn 4.5g Q6hrs .   5/1: POD 10. Neuro stable. Dc'd fentanyl gtt. PEG failed placement at bedside with Dr. Gant, plan for PEG in OR tomorrow afternoon with Dr. Layton. Dc'd amlodipine and started carvedilol 3.125 BID for PVCs . Made 3% inhalation and mucomyst q8hr. Failed PEG at bedside. Salt tabs made 1 q 6. Decreased cardura to 4mg daily. Urine culture growing E. Coli and sputum culture growing pluralibacter gergoviae and strep species. ID consulted, f/u recs. Failed CPAP trial @ 3pm. Added am labs per heme/onc for hypercoguable workup. Pending repeat LE dopplers in 2-3 days. NGT clogged, removed, ENT failed attempt at replacement, will keep NPO for PEG placement tmw. Repeat xray in am for concern for aspration. Pt abx switched from Zosyn to Ertapenem 1g Q24hrs. per ID recs, possible ESBL growth.   5/2: POD 11. Neuro stable. Pre-op for diagnostic cerebral angiogram and PEG placement. NPO. EVD raised to 5 cmH20. Sputum culture speciated to step pneumoniae, sensitive to ertapenem. 3% inhalation/mucomyst made q 6 hr d/t thick secretions. PEG placed in OR. POD0 dx cerebral angio. EVD raised to 10.   5/3: POD 12. Neuro stable. PEG feeds began @ 10 AM, plan to increase 10cc every 6h per general surgery. Repeat dopplers show stable R superficial thrombus and new L IM calf DVT. Vascular consulted for IVC filter. Plan to get CTH tomorrow.  5/4: POD 13. JMI o/n. s/p IVC filter with vascular today. Pending post-procedure CTH. FOBT negative. Started iron and vitamin c every other day for iron deficiency anemia.   5/5: POD14. CSF cx sent to r/o infection from new gas in right temporal horn seen on CTH. Restarted TF, changed to Jevity 1.2, f/u nutrition recs. EVD raised to 10 today, plan to challenge over the weekend and CTH on Monday, possible VPS next Wednesday. ENT removed sutures today. Salt tabs decreased 1q12.  5/6: POD15 Placed on CPAP briefly with low MV alert, placed back on full vent support. EVD remains open at [id number]. ICPs WNL. Neuro exam stable.  EVD raised to 15. Tolerated CPAP x8h.   5/7: POD#16. JIM overnight, neuro stable. D/c'd salt tabs and 3% neb. Wean trach to CPAP as tolerated. Pan cx NGTD. EVD raised today to [id number].   5/8: POD17 JIM overnight. ICPs WNL. Pt remains on full vent support. Neuro exam stable. Plan for possible VPS today. CT chio complete showing increase in vent size.   5/9: POD18 SOC, POD1 VPS. Desat o/n to 80s, improved on own. CXR with LLL effusion. Another desat to 90% in AM, given duoneb and mucomyst. Oxycodone given for incisional pain. Neuro exam stable. CTH in AM stable. Keppra dc'd. Ritalin 5 BID started.  Tolerating CPAP trial. SALVADOR ordered. Rectal tube dc'd.   5/10: POD 19 SOC POD2 VPS. Remains on full vent support. Tolerated CPAP for 6 hours. Neuro exam stable. BP liberalized to 160. RLQ US 2/2 abd pain. Lactate WNL.  5/11: POD20 SOC POD3 VPS. Plan for CPAP trial in AM.  Dc'd cardura. F/u speech consult for communcation board. Has been tolerating CPAP since 9am. RLQ us w/ no acute pathology. Per heme/onc LMWH or coumadin rec for AC over DOAC due to antiphospholipid syndrome.   5/12: POD21 SOC POD4 VPS. CPAP 8/5, tolerated until 3pm. Can start ASA on 5/14 and full AC POD 10 from VPS 5/19 (remove IVC filter prior, f/u w/ vascular on timing).   5/13: POD22 SOC POD 5 VPS, neuro stable, tolerating trach collar. ASA ordered to start tomorrow, Ritalin increased to 10BID from 5BID, Simethicone ordered for gas, f/u gen surg for continued abd discomfort, PM VBG with PCO2 50 and repeat VBG pCO2 55, possible obesity hypoventilation, f/u AM VBG   5/14: POD23 SOC, [id number], neuro stable, VBG showed increased PCO2 indicating poor ventilation, failed CPAP due to low TV, put back on full vent support.   5/15: POD24 SOC, POD7 VPS. O/n CT A/P done for abd pain, f/u read. neuro stable. remains on full vent support. Simethicone, Amlodipine d/c'd. Given 1L bolus of NS. Dilaudid 0.5 for pain control. CT A/P negative for acute pathology. Restarted tube feeds. Started Modafinil for neurostim.  5/16: POD25 SOC, POD8 VPS. JIM o/n neuro stable. remains on full vent support. DC carvedilol for intermittent hypotensive episodes. UA for suprapelvic pain, concern for nephrolithiasis based on CT AP.  5/17: POD26 SOC, POD9 VPS. JIM o/n neuro stable.   Modafinil increased. CTH completed, . Urology consulted for bladder calculi vs mass, pending urine cytology study and outptaient urology follow-up for cystoscopy. Trialing SIMV for vent weaning.   5/18: POD27 SOC, POD10 VPS. JIM o/n remains on SIMV.   Baby aspirin discontinued. SQL 80mg bid started for DVT treatement per hematology/neurology. Plan for CTH in am. Added amantadine for neurostim.   5/19: POD28, POD 11 VPS. JIM o/n, reapplied pressure dressing. tolerating CPAP  5/20: POD29, POD 12 VPS, reapplied pressure dressing o/n, tolerating trach collar. CTH today stable. Anti xa 0.8 (therapeutic)  5/21: POD 30. POD 13 VPS. Tolerating trach collar. Loose stools. Ritalin dc'd. Stepdown to telemetry. Neurologically stable.   5/22: POD 31. POD 14 VPS. Neurologically/hemodynamically stable. Pending dispo.     Vital Signs Last 24 Hrs  T(C): 37.2 (21 May 2023 21:54), Max: 37.2 (21 May 2023 21:54)  T(F): 98.9 (21 May 2023 21:54), Max: 98.9 (21 May 2023 21:54)  HR: 70 (21 May 2023 23:40) (68 - 86)  BP: 123/67 (21 May 2023 23:40) (101/61 - 126/66)  BP(mean): 84 (21 May 2023 23:40) (75 - 94)  RR: 18 (21 May 2023 23:40) (15 - 27)  SpO2: 98% (21 May 2023 23:40) (94% - 100%)    Parameters below as of 21 May 2023 23:40  Patient On (Oxygen Delivery Method): tracheostomy collar  O2 Flow (L/min): 10  O2 Concentration (%): 35    I&O's Summary    20 May 2023 07:01  -  21 May 2023 07:00  --------------------------------------------------------  IN: 1500 mL / OUT: 950 mL / NET: 550 mL    21 May 2023 07:01  -  22 May 2023 00:39  --------------------------------------------------------  IN: 810 mL / OUT: 750 mL / NET: 60 mL    PHYSICAL EXAM:  General: patient seen laying supine in bed in NAD  Neuro: AAO x 2-3 (nods appropriately to questions self, place), R gaze, FC, OE spontaneously, RUE 4+/5, otherwise 5/5 strength throughout   HEENT: PERRL +trach collar   Pulmonary: chest rise symmetric   Abdomen: soft, nontender, nondistended +PEG   Ext: perfusing well   Skin: warm, dry   Wound: Crani incision c/d/i reinforced w/ pressure dressing      LABS:                        10.9   4.38  )-----------( 277      ( 21 May 2023 05:30 )             34.9     05-21    142  |  100  |  11  ----------------------------<  139<H>  4.2   |  32<H>  |  0.47<L>    Ca    8.8      21 May 2023 05:30  Phos  4.9     05-21  Mg     2.3     05-21    CAPILLARY BLOOD GLUCOSE    Drug Levels: [] N/A    CSF Analysis: [] N/A    Allergies    No Known Allergies    Intolerances    MEDICATIONS:  Antibiotics:    Neuro:  acetaminophen   Oral Liquid .. 650 milliGRAM(s) Oral every 6 hours PRN  amantadine Syrup 100 milliGRAM(s) Oral every 12 hours  modafinil 200 milliGRAM(s) Oral every 24 hours  oxyCODONE    IR 5 milliGRAM(s) Oral every 4 hours PRN    Anticoagulation:  enoxaparin Injectable 80 milliGRAM(s) SubCutaneous every 12 hours    OTHER:  acetylcysteine 10%  Inhalation 4 milliLiter(s) Inhalation every 6 hours PRN  albuterol/ipratropium for Nebulization 3 milliLiter(s) Nebulizer every 6 hours PRN  atorvastatin 80 milliGRAM(s) Oral at bedtime  chlorhexidine 0.12% Liquid 15 milliLiter(s) Oral Mucosa every 12 hours  chlorhexidine 2% Cloths 1 Application(s) Topical daily    IVF:  ascorbic acid 500 milliGRAM(s) Oral <User Schedule>  ferrous    sulfate 325 milliGRAM(s) Oral <User Schedule>    CULTURES:  Culture Results:   No growth to date (05-08 @ 14:35)    RADIOLOGY & ADDITIONAL TESTS:    ASSESSMENT:  56 y/o female found to have acute infarction within the right cerebellar hemisphere, causing herniation. Now s/p SOC foramen magnum decompression and right parietal/occipital EVD placement (4/21). s/p trach (4/28/23). s/p PEG (5/2/23), s/p dx cerebral angiogram (5/2/23). now s/p VPS Certas @ 4 (5/8/23).     Neuro   - Vitals q4h/neuro q4h   - s/p VPS (Certas @ 4)   - dx angio 5/2: b/l cavernous ICA aneurysms, as discussed at vascular conference f/u outpt   - CTH 4/28 stable; 5/4 new gas in right temporal horn, CT 5/8 chio increased vent size, 5/9 stable, CTH 5/17 increased suboccipital fluid collection   - Stroke consulted, appreciate reccs   - Pain control with tyl, oxy 5 prn  - Modafinil 200mg qd, amantadine for neurostimulation. Ritalin d/c'd 5/21  - CTH 5/20 - stable     Cardio  - SBP   - TTE 4/24: negative for PFO, mild LVH, mild dilation of L atrium, EF 55-60%   - SALVADOR deferred, not indicated as it will not  at this time for starting patient on anticoagulation. Will reassess if indicated medically.     Pulm  - + Trach (6 shiley), tolerating trach collar   - Chest PT, duoneb, mucomyst q 6 prn     GI  - + PEG wih TF (Jevity 1.2)  - Bowel regimen held for loose stools 5/21, banatrol started  - CT A/P 5/15: no acute pathology    Renal  - Voiding via primafit   - 5/15 CT A/P bladder calculi vs mass, f/u urology outpatient for cystoscopy     Endo   - cont lipitor     Heme  - No SCDs, SQL 80 BID (5/18)   - s/p IVCF 5/4, keep per heme recs  - LE dopplers 5/3 DVT L IM calf and unchanged R superficial vein thrombus  - Heme following for + anticardiolipin Ab 4/27, recs appreciated  - Anemia: Iron and vitamin c every other day     ID  - CSF sent from OR (5/8)   - MRSA (-), +UA cx ESBL, +sputum cx pluralibacter gergoviae, strep pneumoniae, s/p Ertapenem 1g Q24hrs (5/1-5/7)    PSYCH  - behavioral health consulted for tearfulness, recs appreciated     DISPO:  - SDU status, full code   - PT/OT rec AR patient can tolerate 3 hrs PT/OT daily    D/w Dr. Valadez and Dr. Lopez

## 2023-05-22 NOTE — CONSULT NOTE ADULT - ASSESSMENT
# R cerebellar infarct with herniation s/p SOC foramen magnum decompression and R parietal/occipital EVD 4/21 and VPS 5/8  #s/p trach and  s/p PEG  - plan per NSGY pending dc to [facility]    #incidental finding of renal calculi vs mass  #incidental adrenal nodule  - UA negative, outpatient follow up with repeat CT A/P    #positive anticardiolipin antibody  #superficial venous thrombosis  #s/p IVC filter 5/4  #normocytic anemia due to acute illness and anemia of chronic disease  - continue full dose lovenox, heme/onc 5/16 notes reviewed and reccs appreciated  - labs reviewed, H/H stable  - heme/onc follow up as an outpatient.    #ESBL colonization in urine and UTI s/p treatment w ertapenem x 7 days  - UA negative

## 2023-05-22 NOTE — CONSULT NOTE ADULT - PROVIDER SPECIALTY LIST ADULT
ENT
Neurology
Internal Medicine
Vascular Surgery
Surgery
ENT
Heme/Onc
Surgery
Urology
ENT
Infectious Disease

## 2023-05-22 NOTE — CONSULT NOTE ADULT - CONSULT REQUESTED DATE/TIME
28-Apr-2023 13:41
22-Apr-2023 17:23
25-Apr-2023 13:18
28-Apr-2023 17:29
01-May-2023 16:34
14-May-2023 17:16
26-Apr-2023 07:00
03-May-2023 18:33
22-May-2023 13:27
22-Apr-2023 07:00
17-May-2023

## 2023-05-22 NOTE — CONSULT NOTE ADULT - TIME BILLING
review of inpatient records and 30 day hospital course  face to face encounter and physical exam  updated  by bedside using interpretor services  coordinating care with primary team  documentation

## 2023-05-22 NOTE — CONSULT NOTE ADULT - CONSULT REQUESTED BY NAME
NSICU
Jamey Valadez
NSICU
Ayaka Soliman
Dr. Valadez
NeuroICU
Rory
Neurosurgery
Rory
Dr. Ric Dumont
Neurosurgery

## 2023-05-22 NOTE — CONSULT NOTE ADULT - CONSULT REASON
PNA, UTI
RLQ pain
IVC filter
nose - NGT placement
CVA, r/o hypercoagulable state
bilateral cerebellar strokes
r/o urothelial mass
NGT placement
PEG
tracheostomy
medicine co-management

## 2023-05-22 NOTE — CONSULT NOTE ADULT - SUBJECTIVE AND OBJECTIVE BOX
MEDICINE CO-MANAGEMENT CONSULT NOTE    HPI:  56 yo F with PMH of Asthma, CAD (not on  meds), peripheral neuropathy and PSH of BATOOL, cholecystectomy, right knee surgery presented to [place] ED on 4/20 with right sided weakness and right facial numbness. Patient was undergoing preparation for colonoscopy. As per daughter at 8am patient was playing with her grandchildren but around 840 am patient was getting dressed and fell and subsequently felt weak after. Daughter reports that patient had some episodes of vomiting at this time. She had a syncopal episode at around 10 am and was witnessed by brother. No head trauma, She regained conscious after few minutes. She remained in bed for the remainder of the day. Daughter reports some slurred speech noted 1230-1PM and also was confused after. and around 4PM, the patient's sister noted right sided weakness at which time EMS was called and patient was brought to ED. No c/o chest pain, shortness of breath, fever, headache, abdominal pain, urinary complaints. No recent travel/sickness/ change in meds. Stroke code in ER: CTH neg for heme, Right PICA distribution acute infarction. CTA with saccular aneurysms of b/l carotids, approximately 0.8 cm on the right and 1.2 x 0.9 cm on the left. Possible tiny third saccular aneurysm on the right Posterior intracranial circulation: Right vertebral arterial occlusion at its dural crossing junction V3 Atlantic and V4 intracranial segments with likely reversal of flow in its intracranial segment from the basilar. Right PICA faintly seen. 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  Not candidate for TNK/mechanical thrombectomy. CT scan repeated which showed: 1. Brain: Progression of acute infarction within the right cerebellar hemisphere, also extending into the left superior cerebellar hemisphere. New mass effect on the fourth ventricle causing stenosis versus occlusion with new third and lateral ventricular dilatation indicating ventricular obstruction at the level of the fourth ventricle. New upward and downward herniation of cerebellar parenchyma Right carotid system: No hemodynamically significant stenosis. Left carotid system: No hemodynamically significant stenosis. Vertebral circulation: Patent. Anterior intracranial circulation: Intact. Bilateral internal carotid saccular aneurysms. These findings are unchanged. Posterior intracranial circulation:    Improved flow within the right vertebral artery since 4/20/2023. New focal stenosis mid left vertebral artery, etiology uncertain, consider vasospasm and extrinsic compression in addition to new embolic disease. Brain perfusion: Perfusion images demonstrate normalization of the perfusion abnormality in the right cerebellar hemisphere present on 4/20/2023 despite evidence of progression of acute infarction.  Areas of apparent ischemia within the posterior fossa have progressed in extent, also again involving the left posterior cerebral arterial distribution. Tx to [facility] for SOC watch. On admission to [facility], NIHSS 12.  (21 Apr 2023 16:50)      PAST MEDICAL & SURGICAL HISTORY:  Asthma      CAD (coronary artery disease)      Peripheral neuropathy      S/P total abdominal hysterectomy      S/P cholecystectomy      S/P right knee surgery          Home Meds: Home Medications:  Albuterol (Eqv-ProAir HFA) 90 mcg/inh inhalation aerosol: 2 puff(s) inhaled every 6 hours as needed for  shortness of breath and/or wheezing (20 Apr 2023 22:36)      FAMILY HISTORY:  No pertinent family history in first degree relatives        Social History:  per  denies smoking or alcohol use      Allergies    No Known Allergies    Intolerances        REVIEW OF SYSTEMS:  limited due to nonverbal status/dysarthria    PHYSICAL EXAM:    T(C): 36.7 (05-22-23 @ 09:16), Max: 37.2 (05-21-23 @ 21:54)  HR: 78 (05-22-23 @ 11:15) (66 - 86)  BP: 113/68 (05-22-23 @ 11:15) (101/61 - 130/68)  RR: 18 (05-22-23 @ 09:40) (17 - 27)  SpO2: 95% (05-22-23 @ 11:15) (94% - 100%)  General: NAD  HENMT: +MMM, no LAD  RESPIRATORY: no increased WOB, CTAB  CVS: RRR, no m/r/g, extremities warm and well perfused, DP 2+ bilaterally  ABD: +BS, NT/ND  EXT: no pitting edema  Neuro: alert and oriented to person and can follow commands.3/5 upper ext strength in the LUE 3/5 RUE, 3/5 in both bilateral LE    Labs:  CAPILLARY BLOOD GLUCOSE                              11.2   5.37  )-----------( 290      ( 22 May 2023 05:30 )             36.4         05-22    143  |  99  |  13  ----------------------------<  125<H>  4.1   |  34<H>  |  0.45<L>      Magnesium, Serum: 2.2 mg/dL (05-22-23 @ 05:30)      LFTs:             6.9  | 0.2  | 29       ------------------[132     ( 22 May 2023 05:30 )  3.7  | x    | 47          Lipase:x      Amylase:x           ABG - ( 19 May 2023 10:57 )  pH: 7.42  /  pCO2: 50    /  pO2: 125   / HCO3: 32    / Base Excess: 6.6   /  SaO2: 100.0             Coags:      IMAGING  c< from: CT Head No Cont (05.20.23 @ 11:06) >    Impression: Status post bilateral suboccipital craniectomies with   extracranial fluid collection extending through the craniectomy site,   unchanged. Bilateral evolving cerebellar infarctions. Bilateral   suboccipital subdural fluid collections, unchanged. Right frontal VPS   with small right frontal temporal subdural fluid collection, unchanged.   No evidence of hydrocephalus.    Left otomastoiditis, unchanged.    --- End of Report ---    < end of copied text >

## 2023-05-23 PROBLEM — J45.909 UNSPECIFIED ASTHMA, UNCOMPLICATED: Chronic | Status: ACTIVE | Noted: 2023-04-20

## 2023-05-23 PROBLEM — I25.10 ATHEROSCLEROTIC HEART DISEASE OF NATIVE CORONARY ARTERY WITHOUT ANGINA PECTORIS: Chronic | Status: ACTIVE | Noted: 2023-04-20

## 2023-05-23 LAB
ALBUMIN SERPL ELPH-MCNC: 3.5 G/DL — SIGNIFICANT CHANGE UP (ref 3.3–5)
ALP SERPL-CCNC: 125 U/L — HIGH (ref 40–120)
ALT FLD-CCNC: 54 U/L — HIGH (ref 10–45)
ANION GAP SERPL CALC-SCNC: 9 MMOL/L — SIGNIFICANT CHANGE UP (ref 5–17)
AST SERPL-CCNC: 31 U/L — SIGNIFICANT CHANGE UP (ref 10–40)
BILIRUB SERPL-MCNC: 0.4 MG/DL — SIGNIFICANT CHANGE UP (ref 0.2–1.2)
BUN SERPL-MCNC: 11 MG/DL — SIGNIFICANT CHANGE UP (ref 7–23)
CALCIUM SERPL-MCNC: 9.7 MG/DL — SIGNIFICANT CHANGE UP (ref 8.4–10.5)
CHLORIDE SERPL-SCNC: 96 MMOL/L — SIGNIFICANT CHANGE UP (ref 96–108)
CO2 SERPL-SCNC: 33 MMOL/L — HIGH (ref 22–31)
CREAT SERPL-MCNC: 0.45 MG/DL — LOW (ref 0.5–1.3)
EGFR: 113 ML/MIN/1.73M2 — SIGNIFICANT CHANGE UP
GLUCOSE SERPL-MCNC: 134 MG/DL — HIGH (ref 70–99)
HCT VFR BLD CALC: 37.7 % — SIGNIFICANT CHANGE UP (ref 34.5–45)
HGB BLD-MCNC: 12 G/DL — SIGNIFICANT CHANGE UP (ref 11.5–15.5)
MAGNESIUM SERPL-MCNC: 2.1 MG/DL — SIGNIFICANT CHANGE UP (ref 1.6–2.6)
MCHC RBC-ENTMCNC: 29.3 PG — SIGNIFICANT CHANGE UP (ref 27–34)
MCHC RBC-ENTMCNC: 31.8 GM/DL — LOW (ref 32–36)
MCV RBC AUTO: 92.2 FL — SIGNIFICANT CHANGE UP (ref 80–100)
NRBC # BLD: 0 /100 WBCS — SIGNIFICANT CHANGE UP (ref 0–0)
PHOSPHATE SERPL-MCNC: 4.8 MG/DL — HIGH (ref 2.5–4.5)
PLATELET # BLD AUTO: 272 K/UL — SIGNIFICANT CHANGE UP (ref 150–400)
POTASSIUM SERPL-MCNC: 4.4 MMOL/L — SIGNIFICANT CHANGE UP (ref 3.5–5.3)
POTASSIUM SERPL-SCNC: 4.4 MMOL/L — SIGNIFICANT CHANGE UP (ref 3.5–5.3)
PROT SERPL-MCNC: 7.2 G/DL — SIGNIFICANT CHANGE UP (ref 6–8.3)
RBC # BLD: 4.09 M/UL — SIGNIFICANT CHANGE UP (ref 3.8–5.2)
RBC # FLD: 13.1 % — SIGNIFICANT CHANGE UP (ref 10.3–14.5)
SODIUM SERPL-SCNC: 138 MMOL/L — SIGNIFICANT CHANGE UP (ref 135–145)
TROPONIN T SERPL-MCNC: 0.01 NG/ML — SIGNIFICANT CHANGE UP (ref 0–0.01)
WBC # BLD: 8.73 K/UL — SIGNIFICANT CHANGE UP (ref 3.8–10.5)
WBC # FLD AUTO: 8.73 K/UL — SIGNIFICANT CHANGE UP (ref 3.8–10.5)

## 2023-05-23 PROCEDURE — 99024 POSTOP FOLLOW-UP VISIT: CPT

## 2023-05-23 RX ORDER — AMANTADINE HCL 100 MG
100 CAPSULE ORAL
Refills: 0 | Status: DISCONTINUED | OUTPATIENT
Start: 2023-05-23 | End: 2023-05-24

## 2023-05-23 RX ADMIN — MODAFINIL 200 MILLIGRAM(S): 200 TABLET ORAL at 05:17

## 2023-05-23 RX ADMIN — Medication 100 MILLIGRAM(S): at 17:31

## 2023-05-23 RX ADMIN — ENOXAPARIN SODIUM 80 MILLIGRAM(S): 100 INJECTION SUBCUTANEOUS at 17:31

## 2023-05-23 RX ADMIN — Medication 100 MILLIGRAM(S): at 05:17

## 2023-05-23 RX ADMIN — Medication 650 MILLIGRAM(S): at 17:00

## 2023-05-23 RX ADMIN — Medication 500 MILLIGRAM(S): at 05:17

## 2023-05-23 RX ADMIN — Medication 325 MILLIGRAM(S): at 05:17

## 2023-05-23 RX ADMIN — ATORVASTATIN CALCIUM 80 MILLIGRAM(S): 80 TABLET, FILM COATED ORAL at 21:45

## 2023-05-23 RX ADMIN — Medication 650 MILLIGRAM(S): at 16:36

## 2023-05-23 RX ADMIN — ENOXAPARIN SODIUM 80 MILLIGRAM(S): 100 INJECTION SUBCUTANEOUS at 05:17

## 2023-05-23 NOTE — PROGRESS NOTE ADULT - SUBJECTIVE AND OBJECTIVE BOX
HPI:  56 yo F with PMH of Asthma, CAD (not on  meds), peripheral neuropathy and PSH of BATOOL, cholecystectomy, right knee surgery presented to [facility] on 4/20 with right sided weakness and right facial numbness. Patient was undergoing preparation for colonoscopy. As per daughter at 8am patient was playing with her grandchildren but around 840 am patient was getting dressed and fell and subsequently felt weak after. Daughter reports that patient had some episodes of vomiting at this time. She had a syncopal episode at around 10 am and was witnessed by brother. No head trauma, She regained conscious after few minutes. She remained in bed for the remainder of the day. Daughter reports some slurred speech noted 1230-1PM and also was confused after. and around 4PM, the patient's sister noted right sided weakness at which time EMS was called and patient was brought to ED. No c/o chest pain, shortness of breath, fever, headache, abdominal pain, urinary complaints. No recent travel/sickness/ change in meds. Stroke code in ER: CTH neg for heme, Right PICA distribution acute infarction. CTA with saccular aneurysms of b/l carotids, approximately 0.8 cm on the right and 1.2 x 0.9 cm on the left. Possible tiny third saccular aneurysm on the right Posterior intracranial circulation: Right vertebral arterial occlusion at its dural crossing junction V3 Atlantic and V4 intracranial segments with likely reversal of flow in its intracranial segment from the basilar. Right PICA faintly seen. 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  Not candidate for TNK/mechanical thrombectomy. CT scan repeated which showed: 1. Brain: Progression of acute infarction within the right cerebellar hemisphere, also extending into the left superior cerebellar hemisphere. New mass effect on the fourth ventricle causing stenosis versus occlusion with new third and lateral ventricular dilatation indicating ventricular obstruction at the level of the fourth ventricle. New upward and downward herniation of cerebellar parenchyma Right carotid system: No hemodynamically significant stenosis. Left carotid system: No hemodynamically significant stenosis. Vertebral circulation: Patent. Anterior intracranial circulation: Intact. Bilateral internal carotid saccular aneurysms. These findings are unchanged. Posterior intracranial circulation:    Improved flow within the right vertebral artery since 4/20/2023. New focal stenosis mid left vertebral artery, etiology uncertain, consider vasospasm and extrinsic compression in addition to new embolic disease. Brain perfusion: Perfusion images demonstrate normalization of the perfusion abnormality in the right cerebellar hemisphere present on 4/20/2023 despite evidence of progression of acute infarction.  Areas of apparent ischemia within the posterior fossa have progressed in extent, also again involving the left posterior cerebral arterial distribution. Tx to [facility] for SOC watch. On admission to [facility], NIHSS 12.  (21 Apr 2023 16:50)    INTERVAL EVENTS:  JIM o/n.     HOSPITAL COURSE:  4/21: Admitted for crani watch, CTH complete w/ unchanged hydrocephalus, taken for emergent occipital craniectomy.   4/22: POD1. Remains intubated overnight, on propofol and dank. EVD@15unD35. Pending repeat CTH this am. Given hydralazine 10mg x1. Started on cardene for SBP high 140s-150s. Sub-therapeutic on plavix, therapeutic on asa, rpt asa accumetrics until subtherapeutic. LE dopplers neg for DVT, but showing superficial venous thrombosis in the proximal portion of the right greater saphenous vein. Started on 3% @60 for na goal 145-150. NGT placed by ENT. Febrile, pancultured.  4/23: POD 2. 3% increased to 75. NGT readjusted. UA neg. SBP liberalized to 160. Plan to extubate. 3% decreased to 60. Failed extubation d/t no cuff leak, given 60mg solumedrol, plan to extubate in 6 hrs. SCx1 for post void residual >400, started on cardura at bedtime. New blood in buretrol, stopped SQL. Clot in EVD cleared. Neuro exam improving.   4/24: POD 3. Cont 3% with NS while NPO, start TF today. TF started. LFTs downtrending. Sodium 141. Increased 3% to 50, NS to 30. Repeat BMP ordered for 5:30PM. Tramadol 25 for pain with no relief, oxy 5 and 1g tylenol ordered, stability CT stable, speech language consult for dysarthria. Given hydralazine 10mg IVP for SBP>160, started amlodipine 5mg. C/o mild headache, given fioricet x1, stroke neuro rec SALVADOR and loop recorder placement. Echo with bubble completed, negative for PFO, EF 55-60%. J HFNC started for desats with suctioning.   4/25: POD 4. Cont HFNC. Increased secertions on HFNC, reintubated. CXR confirmed good placement. EVD dropped to 5cmH20 for goal of draining 5-10cc/hr and SG ELENA drain taken off suction. Pending CTH. EVD drained 0cc/hr, dropped to 0. NGT replaced. Dc'd fioricet. Started on PPI for intubation. Increased cardura to 4mg for urinary retention, given an additional 2mg now. Started salt tabs 3 q 6. Given 12.5mg fentanyl x1. NGT replaced, CXR shown in lung. NGT removed, repeat CXR showing no pneumothorax. Pending ENT consult for NGT placement. CTH stable. NGT replaced by ENT, confirmed in proper spot. Restarted TF. [id number]. Na 141 from 146. Increased 3% to 60. EVD raised to 3cmH20. ETT pulled back 1cm by RT.  4/26: POD 5 SOC. Intubated, remains on precedex, ABG ordered with improving PaO2, BMP sodium 148, 3% changed to 30cc/hr, cardura increased to 8mg for tonight, tolerating cpap  4/27: POD 6 SOC. Respiratory rate sustained 6, returned to FVS. Na 151, dc'd 3%, f/u AM sodium. Nurse noticed ETT 19 at the lip and was 20 prior, CXR shows ETT @ 5cm above jono, ET tube advanced 1cm, repeat CXR confirms appropriate placement. Thick inline secretions with suctioning. ENT trach placement Fri likely, PEG likely Mon. Keep ELENA drain until no output, EVD to remain @3. Start ASA 81mg daily tomorrow.   4/28: POD7 SOC, neuro stable, given fentanyl x 1 overnight for presumed discomfort (biting on ETT), remains on full vent support. CTH today stable in comparison to 4/25. 6 cuffed trach placed by ENT in OR, but came down without cuff. Replaced at bedside by ENT. On fentanyl gtt.    4/29: POD8 SOC, JIM overnight. Incision cleaned and dressing changed. On fentanyl gtt. EKG completed due to increased frequency PVCs on telemetry, frequency of PVCs improved with titrating up fentanyl gtt. ABG drawn.  Repeat LE dopplers completed showing persistant superficial thrombus, no DVT. No EVD waveform during day, flushed distally without improvement. Exam unchanged.  EVD dropped to 0 for low output in afternoon.   4/30: POD9. Neuro stable, febrile to 101.3F o/n, given tylenol and pan cx sent. SBP dipped to low 90s o/n, HR stable in 70s with some PVCs, decreased fentanyl gtt and given 500cc bolus of NS x 2. Pend PEG placement Mon and angio Tues. D/c'd ELENA drain today and suture placed. F/u heme recs. Positive UA. Infiltrates found on CXR. Started on Zosyn 4.5g Q6hrs .   5/1: POD 10. Neuro stable. Dc'd fentanyl gtt. PEG failed placement at bedside with Dr. Gant, plan for PEG in OR tomorrow afternoon with Dr. Layton. Dc'd amlodipine and started carvedilol 3.125 BID for PVCs . Made 3% inhalation and mucomyst q8hr. Failed PEG at bedside. Salt tabs made 1 q 6. Decreased cardura to 4mg daily. Urine culture growing E. Coli and sputum culture growing pluralibacter gergoviae and strep species. ID consulted, f/u recs. Failed CPAP trial @ 3pm. Added am labs per heme/onc for hypercoguable workup. Pending repeat LE dopplers in 2-3 days. NGT clogged, removed, ENT failed attempt at replacement, will keep NPO for PEG placement tmw. Repeat xray in am for concern for aspration. Pt abx switched from Zosyn to Ertapenem 1g Q24hrs. per ID recs, possible ESBL growth.   5/2: POD 11. Neuro stable. Pre-op for diagnostic cerebral angiogram and PEG placement. NPO. EVD raised to 5 cmH20. Sputum culture speciated to step pneumoniae, sensitive to ertapenem. 3% inhalation/mucomyst made q 6 hr d/t thick secretions. PEG placed in OR. POD0 dx cerebral angio. EVD raised to 10.   5/3: POD 12. Neuro stable. PEG feeds began @ 10 AM, plan to increase 10cc every 6h per general surgery. Repeat dopplers show stable R superficial thrombus and new L IM calf DVT. Vascular consulted for IVC filter. Plan to get CTH tomorrow.  5/4: POD 13. JIM o/n. s/p IVC filter with vascular today. Pending post-procedure CTH. FOBT negative. Started iron and vitamin c every other day for iron deficiency anemia.   5/5: POD14. CSF cx sent to r/o infection from new gas in right temporal horn seen on CTH. Restarted TF, changed to Jevity 1.2, f/u nutrition recs. EVD raised to 10 today, plan to challenge over the weekend and CTH on Monday, possible VPS next Wednesday. ENT removed sutures today. Salt tabs decreased 1q12.  5/6: POD15 Placed on CPAP briefly with low MV alert, placed back on full vent support. EVD remains open at [id number]. ICPs WNL. Neuro exam stable.  EVD raised to 15. Tolerated CPAP x8h.   5/7: POD#16. JIM overnight, neuro stable. D/c'd salt tabs and 3% neb. Wean trach to CPAP as tolerated. Pan cx NGTD. EVD raised today to [id number].   5/8: POD17 JIM overnight. ICPs WNL. Pt remains on full vent support. Neuro exam stable. Plan for possible VPS today. CT chio complete showing increase in vent size.   5/9: POD18 SOC, POD1 VPS. Desat o/n to 80s, improved on own. CXR with LLL effusion. Another desat to 90% in AM, given duoneb and mucomyst. Oxycodone given for incisional pain. Neuro exam stable. CTH in AM stable. Keppra dc'd. Ritalin 5 BID started.  Tolerating CPAP trial. SALVADOR ordered. Rectal tube dc'd.   5/10: POD 19 SOC POD2 VPS. Remains on full vent support. Tolerated CPAP for 6 hours. Neuro exam stable. BP liberalized to 160. RLQ US 2/2 abd pain. Lactate WNL.  5/11: POD20 SOC POD3 VPS. Plan for CPAP trial in AM.  Dc'd cardura. F/u speech consult for communcation board. Has been tolerating CPAP since 9am. RLQ us w/ no acute pathology. Per heme/onc LMWH or coumadin rec for AC over DOAC due to antiphospholipid syndrome.   5/12: POD21 SOC POD4 VPS. CPAP 8/5, tolerated until 3pm. Can start ASA on 5/14 and full AC POD 10 from VPS 5/19 (remove IVC filter prior, f/u w/ vascular on timing).   5/13: POD22 SOC POD 5 VPS, neuro stable, tolerating trach collar. ASA ordered to start tomorrow, Ritalin increased to 10BID from 5BID, Simethicone ordered for gas, f/u gen surg for continued abd discomfort, PM VBG with PCO2 50 and repeat VBG pCO2 55, possible obesity hypoventilation, f/u AM VBG   5/14: POD23 SOC, [id number], neuro stable, VBG showed increased PCO2 indicating poor ventilation, failed CPAP due to low TV, put back on full vent support.   5/15: POD24 SOC, POD7 VPS. O/n CT A/P done for abd pain, f/u read. neuro stable. remains on full vent support. Simethicone, Amlodipine d/c'd. Given 1L bolus of NS. Dilaudid 0.5 for pain control. CT A/P negative for acute pathology. Restarted tube feeds. Started Modafinil for neurostim.  5/16: POD25 SOC, POD8 VPS. JIM o/n neuro stable. remains on full vent support. DC carvedilol for intermittent hypotensive episodes. UA for suprapelvic pain, concern for nephrolithiasis based on CT AP.  5/17: POD26 SOC, POD9 VPS. JIM o/n neuro stable.   Modafinil increased. CTH completed, . Urology consulted for bladder calculi vs mass, pending urine cytology study and outptaient urology follow-up for cystoscopy. Trialing SIMV for vent weaning.   5/18: POD27 SOC, POD10 VPS. JIM o/n remains on SIMV.   Baby aspirin discontinued. SQL 80mg bid started for DVT treatement per hematology/neurology. Plan for CTH in am. Added amantadine for neurostim.   5/19: POD28, POD 11 VPS. JIM o/n, reapplied pressure dressing. tolerating CPAP  5/20: POD29, POD 12 VPS, reapplied pressure dressing o/n, tolerating trach collar. CTH today stable. Anti xa 0.8 (therapeutic)  5/21: POD 30. POD 13 VPS. Tolerating trach collar. Loose stools. Ritalin dc'nancy Stepdown to telemetry. Neurologically stable.   5/22: POD 31. POD 14 VPS. Neurologically/hemodynamically stable. Pending dispo.   5/23: POD 31, POD 15 VPS. Headwrap in place, SOC sutures removed.       Vital Signs Last 24 Hrs  T(C): 36.4 (22 May 2023 21:40), Max: 36.7 (22 May 2023 05:00)  T(F): 97.6 (22 May 2023 21:40), Max: 98.1 (22 May 2023 05:00)  HR: 78 (23 May 2023 00:00) (66 - 78)  BP: 116/59 (23 May 2023 00:00) (111/57 - 130/68)  BP(mean): 84 (23 May 2023 00:00) (80 - 88)  RR: 18 (23 May 2023 00:00) (16 - 20)  SpO2: 94% (23 May 2023 00:00) (94% - 100%)    Parameters below as of 23 May 2023 00:00  Patient On (Oxygen Delivery Method): tracheostomy collar  O2 Flow (L/min): 10  O2 Concentration (%): 35    I&O's Summary    21 May 2023 07:01  -  22 May 2023 07:00  --------------------------------------------------------  IN: 1330 mL / OUT: 1250 mL / NET: 80 mL    22 May 2023 07:01  -  23 May 2023 00:29  --------------------------------------------------------  IN: 920 mL / OUT: 800 mL / NET: 120 mL        PHYSICAL EXAM:  General: patient seen laying supine in bed in NAD  Neuro: AAO x 2-3 (nods appropriately to questions self, place), R gaze, FC, OE spontaneously, RUE 4+/5, otherwise 5/5 strength throughout   HEENT: PERRL +trach collar   Pulmonary: chest rise symmetric   Abdomen: soft, nontender, nondistended +PEG   Ext: perfusing well   Skin: warm, dry   Wound: SOC incision c/d/i reinforced w/ pressure dressing ; VPS incisions C/D/I     LABS:                        11.2   5.37  )-----------( 290      ( 22 May 2023 05:30 )             36.4     05-22    143  |  99  |  13  ----------------------------<  125<H>  4.1   |  34<H>  |  0.45<L>    Ca    9.4      22 May 2023 05:30  Phos  6.0     05-22  Mg     2.2     05-22    TPro  6.9  /  Alb  3.7  /  TBili  0.2  /  DBili  x   /  AST  29  /  ALT  47<H>  /  AlkPhos  132<H>  05-22            CAPILLARY BLOOD GLUCOSE          Drug Levels: [] N/A    CSF Analysis: [] N/A      Allergies    No Known Allergies    Intolerances      MEDICATIONS:  Antibiotics:    Neuro:  acetaminophen   Oral Liquid .. 650 milliGRAM(s) Oral every 6 hours PRN  amantadine Syrup 100 milliGRAM(s) Oral every 12 hours  modafinil 200 milliGRAM(s) Oral every 24 hours  oxyCODONE    IR 5 milliGRAM(s) Oral every 4 hours PRN    Anticoagulation:  enoxaparin Injectable 80 milliGRAM(s) SubCutaneous every 12 hours    OTHER:  acetylcysteine 10%  Inhalation 4 milliLiter(s) Inhalation every 6 hours PRN  albuterol/ipratropium for Nebulization 3 milliLiter(s) Nebulizer every 6 hours PRN  atorvastatin 80 milliGRAM(s) Oral at bedtime    IVF:  ascorbic acid 500 milliGRAM(s) Oral <User Schedule>  ferrous    sulfate 325 milliGRAM(s) Oral <User Schedule>    CULTURES:    RADIOLOGY & ADDITIONAL TESTS:      ASSESSMENT:  58 y/o female found to have acute infarction within the right cerebellar hemisphere, causing herniation. Now s/p SOC foramen magnum decompression and right parietal/occipital EVD placement (4/21). s/p trach (4/28/23). s/p PEG (5/2/23), s/p dx cerebral angiogram (5/2/23). now s/p VPS Certas @ 4 (5/8/23).     Neuro   - Vitals q4h/neuro q4h   - s/p VPS (Certas @ 4)   - dx angio 5/2: b/l cavernous ICA aneurysms, as discussed at vascular conference f/u outpt   - CTH 4/28 stable; 5/4 new gas in right temporal horn, CT 5/8 chio increased vent size, 5/9 stable, CTH 5/17 increased suboccipital fluid collection   - Stroke consulted, appreciate reccs   - Pain control with tyl, oxy 5mg prn  - Modafinil 200mg qd, amantadine for neurostimulation. Ritalin d/c'd 5/21  - CTH 5/20 - stable     Cardio  - SBP   - TTE 4/24: negative for PFO, mild LVH, mild dilation of L atrium, EF 55-60%   - SALVADOR deferred, not indicated as it will not  at this time    Pulm  - + Trach (6 shiley), tolerating trach collar   - Chest PT, duoneb, mucomyst q 6 prn     GI  - + PEG wih TF (Jevity 1.2)  - Bowel regimen held for loose stools 5/21, banatrol started  - CT A/P 5/15: no acute pathology    Renal  - Voiding via primafit   - 5/15 CT A/P bladder calculi vs mass, f/u urology outpatient for cystoscopy     Endo   - cont lipitor     Heme  - No SCDs, SQL 80 BID (5/18)   - s/p IVCF 5/4, keep per heme recs  - LE dopplers 5/3 DVT L IM calf and unchanged R superficial vein thrombus  - Heme following for + anticardiolipin Ab 4/27, recs appreciated  - Anemia: Iron and vitamin c every other day     ID  - +UA cx ESBL, +sputum cx pluralibacter gergoviae, strep pneumoniae, s/p Ertapenem (5/1-5/7)    PSYCH  - behavioral health consulted for tearfulness, recs appreciated     DISPO:  - SDU status, full code   - PT/OT rec AR     D/w Dr. Valadez

## 2023-05-24 PROBLEM — G62.9 POLYNEUROPATHY, UNSPECIFIED: Chronic | Status: ACTIVE | Noted: 2023-04-20

## 2023-05-24 PROCEDURE — 99232 SBSQ HOSP IP/OBS MODERATE 35: CPT

## 2023-05-24 PROCEDURE — 99024 POSTOP FOLLOW-UP VISIT: CPT

## 2023-05-24 PROCEDURE — 71045 X-RAY EXAM CHEST 1 VIEW: CPT | Mod: 26

## 2023-05-24 PROCEDURE — 31645 BRNCHSC W/THER ASPIR 1ST: CPT

## 2023-05-24 RX ORDER — AMANTADINE HCL 100 MG
100 CAPSULE ORAL
Refills: 0 | Status: DISCONTINUED | OUTPATIENT
Start: 2023-05-24 | End: 2023-05-25

## 2023-05-24 RX ORDER — QUETIAPINE FUMARATE 200 MG/1
12.5 TABLET, FILM COATED ORAL ONCE
Refills: 0 | Status: COMPLETED | OUTPATIENT
Start: 2023-05-24 | End: 2023-05-24

## 2023-05-24 RX ORDER — MODAFINIL 200 MG/1
100 TABLET ORAL
Refills: 0 | Status: DISCONTINUED | OUTPATIENT
Start: 2023-05-24 | End: 2023-05-25

## 2023-05-24 RX ADMIN — ATORVASTATIN CALCIUM 80 MILLIGRAM(S): 80 TABLET, FILM COATED ORAL at 21:34

## 2023-05-24 RX ADMIN — MODAFINIL 200 MILLIGRAM(S): 200 TABLET ORAL at 05:48

## 2023-05-24 RX ADMIN — QUETIAPINE FUMARATE 12.5 MILLIGRAM(S): 200 TABLET, FILM COATED ORAL at 15:53

## 2023-05-24 RX ADMIN — Medication 650 MILLIGRAM(S): at 11:32

## 2023-05-24 RX ADMIN — ENOXAPARIN SODIUM 80 MILLIGRAM(S): 100 INJECTION SUBCUTANEOUS at 18:55

## 2023-05-24 RX ADMIN — OXYCODONE HYDROCHLORIDE 5 MILLIGRAM(S): 5 TABLET ORAL at 21:25

## 2023-05-24 RX ADMIN — Medication 650 MILLIGRAM(S): at 02:50

## 2023-05-24 RX ADMIN — OXYCODONE HYDROCHLORIDE 5 MILLIGRAM(S): 5 TABLET ORAL at 20:45

## 2023-05-24 RX ADMIN — Medication 650 MILLIGRAM(S): at 12:30

## 2023-05-24 RX ADMIN — Medication 100 MILLIGRAM(S): at 13:32

## 2023-05-24 RX ADMIN — Medication 650 MILLIGRAM(S): at 03:55

## 2023-05-24 RX ADMIN — ENOXAPARIN SODIUM 80 MILLIGRAM(S): 100 INJECTION SUBCUTANEOUS at 05:49

## 2023-05-24 RX ADMIN — Medication 100 MILLIGRAM(S): at 05:49

## 2023-05-24 NOTE — CHART NOTE - NSCHARTNOTEFT_GEN_A_CORE
Admitting Diagnosis:   Patient is a 57y old  Female who presents with a chief complaint of bilateral cerebellar strokes       PAST MEDICAL & SURGICAL HISTORY:  Asthma      CAD (coronary artery disease)      Peripheral neuropathy      S/P total abdominal hysterectomy      S/P cholecystectomy      S/P right knee surgery      Current Nutrition Order: Jevity 1.2 50ml/hr + banatrol BID providing 1200ml, 1520kcal, 67g protein    PO Intake: Good (%) [   ]  Fair (50-75%) [   ] Poor (<25%) [   ]- N/A on EN    GI Issues:   No n/v/d/c  No abd distention noted  PEG in place    Pain:  No pain noted    Skin Integrity:  Surgical incision, yissel score 14  No edema noted  No pressure ulcers noted    Labs:   05-23    138  |  96  |  11  ----------------------------<  134<H>  4.4   |  33<H>  |  0.45<L>    Ca    9.7      23 May 2023 01:47  Phos  4.8     05-23  Mg     2.1     05-23    TPro  7.2  /  Alb  3.5  /  TBili  0.4  /  DBili  x   /  AST  31  /  ALT  54<H>  /  AlkPhos  125<H>  05-23        Medications:  MEDICATIONS  (STANDING):  amantadine Syrup 100 milliGRAM(s) Oral <User Schedule>  ascorbic acid 500 milliGRAM(s) Oral <User Schedule>  atorvastatin 80 milliGRAM(s) Oral at bedtime  enoxaparin Injectable 80 milliGRAM(s) SubCutaneous every 12 hours  ferrous    sulfate 325 milliGRAM(s) Oral <User Schedule>  modafinil 200 milliGRAM(s) Oral every 24 hours    MEDICATIONS  (PRN):  acetaminophen   Oral Liquid .. 650 milliGRAM(s) Oral every 6 hours PRN Temp greater or equal to 38.5C (101.3F), Mild Pain (1 - 3)  acetylcysteine 10%  Inhalation 4 milliLiter(s) Inhalation every 6 hours PRN increased secretions  albuterol/ipratropium for Nebulization 3 milliLiter(s) Nebulizer every 6 hours PRN Respiratory Distress  oxyCODONE    IR 5 milliGRAM(s) Oral every 4 hours PRN Moderate Pain (4 - 6)      Admitting Anthropometrics:  Height for BMI (FEET)	5 Feet  Height for BMI (INCHES)	2 Inch(s)  Height for BMI (CENTIMETERS)	157.48 Centimeter(s)  Weight for BMI (lbs)	195 lb  Weight for BMI (kg)	88.5 kg  Body Mass Index	35.6     Weight Change: no new wts to review at this time, last wt taken on admit 4/21      Estimated energy needs:   IBW used for calculations as pt >120% of IBW (177%). Needs adjusted for critical care requiring vent support.  Kcal (25-30 kcal/kg): 5037-1669 kcal  Protein (1.3-1.5 g/kg): 65-75g pro  **Fluids per team    Subjective:  58 y/o female found to have acute infarction within the right cerebellar hemisphere, causing herniation. Now s/p SOC foramen magnum decompression and right parietal/occipital EVD placement (4/21). s/p trach (4/28/23). s/p PEG (5/2/23), s/p dx cerebral angiogram (5/2/23). now s/p VPS Certas @ 4 (5/8/23).     Pt assessed at bedside. EN running as ordered. Current EN meeting lower end EER, RD to follow.    Previous Nutrition Diagnosis: Inadequate Oral Intake RT inability to meet est needs via PO AEB NPO, reliant on EN feeds to meet 100% of est needs    Active [ X ]  Resolved [   ]    Goal:  - Consistently meets > 75% EER via most appropriate route of nutrition     Recommendations:   1. Continue Jevity 1.2 50ml/hr + banatrol BID providing 1200ml, 1520kcal, 67g protein, 968ml free water  >>FWF per team  >> Maintain aspiration precautions at all times  2. Pain and bowel regimen per team  3. Cont to monitor lytes and replete prn   4. RD to remain available for recs adjustment prn     Education: Deferred     Risk Level: High [ X ] Moderate [   ] Low [   ].

## 2023-05-24 NOTE — CHART NOTE - NSCHARTNOTESELECT_GEN_ALL_CORE
5-11/Event Note
5/7/23/Event Note
Daily Events/Event Note
Decannulation/Event Note
Event Note
Follow Up Nutrition Assessment/Nutrition Services
Follow Up/Nutrition Services
Progress Note/Event Note
Reintubation/Event Note
daily notes/Event Note
4-27-23/Event Note
5-11/Event Note
Daily Events/Event Note
Event Note
Follow Up Nutrition Assessment/Nutrition Services
Follow Up/Nutrition Services
Follow Up/Nutrition Services
Nutrition Services
Nutrition Services
Progress Note/Event Note
post op check/Event Note

## 2023-05-24 NOTE — CHART NOTE - NSCHARTNOTEFT_GEN_A_CORE
Notified by RN around 2:15am that patient coughed and trach became dislodged. Using obturator, I replaced trach with new 6 Shiley cuffed trach without resistance. Obturator removed and cuff inflated. O2 sat remained around 95% during decannulation, decreased to 91-92 during replacement. Red rubber suction was easily passed approx 6-8 cm, but then met with resistance. Dr. Dallas ([facility]) performed bedside bronch confirming location of trach in trachea. O2 sats remained >94%. CXR stable. ENT to see in the morning.

## 2023-05-24 NOTE — PROVIDER CONTACT NOTE (OTHER) - NAME OF MD/NP/PA/DO NOTIFIED:
- Patient recently discharged after an episode of VT required ICD shock.   - Now readmitted after two ICD shocks. Per admission -ICD interrogation showed an episode of VT on 10/23 at 8:57 pm and one episode of VT->VF on 10/24 at 12:38 pm.   - Electrolytes WNL at time of event. Bedside echo with LV unchanged in size (no collapse).   - Loaded with IV amio, 10/24 -10/26. First dose of amiodarone 400 bid on 10/26 -> 2 weeks then on 11/09 change to 400 mg daily . Mexitil started at 150 mg TID.  - Appreciate EP help  - Will get [facility] in effort to pursue listing patient for transplant in case VT continues to be an issue.   
DIEGO Agee
DIEGO Mathew
DIEGO Mathew
DIEGO Hernandez
DIEGO Slade
DIEGO Agee
Neuro DIEGO Candelaria
DIEGO Agee
DIEGO Pompa
DIEGO Slade
Teresa CORTEZ
DIEGO Pompa
Kenyon Lopez DO and DIEGO Condon
Yuliana Solomon
neuro DIEGO Cardenas and Karl
DIEGO Lira

## 2023-05-24 NOTE — PROVIDER CONTACT NOTE (OTHER) - BACKGROUND
Small amt of bright red blood seen in EVD prior to extubation (seen by RN and NSICU team), but neuro status is same. CTH pending
pt was on HFNC throughout the night stating above 96%. At 6am, pt began desaturating to 86% and sustaining Pt was repositioned, suctioned orally, and percussion therapy was used.
Pt s/p foramen magnum decompression & R parietal/occipital EVD placement on 4/21
pt s/p EVD; trach PEG angio and VPS
s/p first tracheostomy today
Patient s/p foramen magnum decompression & R parietal/occipital EVD.
extubated 4/23 at 5:45pm
EVD was raised to [id number] above tragus this AM, ICP's have been ~7-8 since then and EVD has not drained more than about 0-1ml/hr since then
see previous note
s/p decompressive hemicrani and EVD  placement 4/21

## 2023-05-24 NOTE — PROVIDER CONTACT NOTE (OTHER) - DATE AND TIME:
02-May-2023 00:45
05-May-2023 20:43
22-Apr-2023 19:00
24-May-2023 02:15
27-Apr-2023 15:45
27-Apr-2023 18:00
28-Apr-2023 17:10
29-Apr-2023 07:00
29-Apr-2023 13:00
06-May-2023 14:30
26-Apr-2023 15:45
23-Apr-2023 19:00
24-Apr-2023 21:00
25-Apr-2023 06:00
28-Apr-2023 14:22
01-May-2023 20:40
23-May-2023 00:35
03-May-2023 21:42

## 2023-05-24 NOTE — PROVIDER CONTACT NOTE (OTHER) - SITUATION
pt daughter came out room staying her mother needed help; RN went inside and found pt trach decannulated with a lot of secretions. pt daughter came out room stating her mother needed help; RN went inside and found pt trach decannulated with a lot of secretions. pt not in distress

## 2023-05-24 NOTE — PROVIDER CONTACT NOTE (OTHER) - ACTION/TREATMENT ORDERED:
Clamp EVD until 22:00 & reassess next hour EVD output. No further interventions at this time. Will continue to monitor.
DIEGO Agee made aware of R on T. EKG & stat labs ordered.
states it ok due to pt being clamped for a long time during procedure.  to notify Team is output above 35 as per [facility]en and carlos neuro PAs
DIEGO Slade assessed and escalated to ENT providers Robert Phillips who also came to bedside to assess.
PA aware and states trach placement has been over a month; pa will come to asses pt; pt satting in 90s; Will wait for further instructions; ambu bag at bedside
Neuro PA Teagan aware, pt asymptomatic, no interventions at this time.  Night RN to continue to monitor.
instructed to clamp for 30 minutes then reopen
patient and drainage assessed by PA, no further intervention at this time
DIEGO Mcdaniels flushed tubing. No new interventions at this time. Continue to monitor EVD output and neuro checks.
Ice packs, blood cx, UA/UC. Pt to receive Tylenol IVPB.
per PA keep EVD clamped until 1600
Clamp EVD for 1 hour
High flow NC ordered, respiratory paged and set up HFNC. Primary RN Tila at [facility], will continue to monitor.
Clamp EVD until 21:00 and reassess EVD drainage during the next hour per PA order. No further interventions at this time. Will continue to monitor.
Not unexpected per DIEGO Agee, will endorse to night RN and continue to monitor.
EVD clamped for 30 minutes, vitals re-checked.
Hold tube feeds and prepare for intubation. Respiratory and Anesthesia were called overhead.
Will continue to monitor. No interventions

## 2023-05-24 NOTE — PROVIDER CONTACT NOTE (OTHER) - ASSESSMENT
pt vital sign as follows HR 74 /56 RR20 SPO2 91 % on 10L 35%   pt not on distress pt vital sign as follows HR 74 /56 RR20 SPO2 91 % on 10L 35%   pt not in resp. distress

## 2023-05-24 NOTE — PROGRESS NOTE ADULT - SUBJECTIVE AND OBJECTIVE BOX
HPI:  56 yo F with PMH of Asthma, CAD (not on  meds), peripheral neuropathy and PSH of BATOOL, cholecystectomy, right knee surgery presented to [place] ED on 4/20 with right sided weakness and right facial numbness. Patient was undergoing preparation for colonoscopy. As per daughter at 8am patient was playing with her grandchildren but around 840 am patient was getting dressed and fell and subsequently felt weak after. Daughter reports that patient had some episodes of vomiting at this time. She had a syncopal episode at around 10 am and was witnessed by brother. No head trauma, She regained conscious after few minutes. She remained in bed for the remainder of the day. Daughter reports some slurred speech noted 1230-1PM and also was confused after. and around 4PM, the patient's sister noted right sided weakness at which time EMS was called and patient was brought to ED. No c/o chest pain, shortness of breath, fever, headache, abdominal pain, urinary complaints. No recent travel/sickness/ change in meds. Stroke code in ER: CTH neg for heme, Right PICA distribution acute infarction. CTA with saccular aneurysms of b/l carotids, approximately 0.8 cm on the right and 1.2 x 0.9 cm on the left. Possible tiny third saccular aneurysm on the right Posterior intracranial circulation: Right vertebral arterial occlusion at its dural crossing junction V3 Atlantic and V4 intracranial segments with likely reversal of flow in its intracranial segment from the basilar. Right PICA faintly seen. 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  Not candidate for TNK/mechanical thrombectomy. CT scan repeated which showed: 1. Brain: Progression of acute infarction within the right cerebellar hemisphere, also extending into the left superior cerebellar hemisphere. New mass effect on the fourth ventricle causing stenosis versus occlusion with new third and lateral ventricular dilatation indicating ventricular obstruction at the level of the fourth ventricle. New upward and downward herniation of cerebellar parenchyma Right carotid system: No hemodynamically significant stenosis. Left carotid system: No hemodynamically significant stenosis. Vertebral circulation: Patent. Anterior intracranial circulation: Intact. Bilateral internal carotid saccular aneurysms. These findings are unchanged. Posterior intracranial circulation:    Improved flow within the right vertebral artery since 4/20/2023. New focal stenosis mid left vertebral artery, etiology uncertain, consider vasospasm and extrinsic compression in addition to new embolic disease. Brain perfusion: Perfusion images demonstrate normalization of the perfusion abnormality in the right cerebellar hemisphere present on 4/20/2023 despite evidence of progression of acute infarction.  Areas of apparent ischemia within the posterior fossa have progressed in extent, also again involving the left posterior cerebral arterial distribution. Tx to [facility] for SOC watch. On admission to [facility], NIHSS 12.  (21 Apr 2023 16:50)    INTERVAL EVENTS:  JIM o/n.     HOSPITAL COURSE:  4/21: Admitted for crani watch, CTH complete w/ unchanged hydrocephalus, taken for emergent occipital craniectomy.   4/22: POD1. Remains intubated overnight, on propofol and dank. EVD@[id number]. Pending repeat CTH this am. Given hydralazine 10mg x1. Started on cardene for SBP high 140s-150s. Sub-therapeutic on plavix, therapeutic on asa, rpt asa accumetrics until subtherapeutic. LE dopplers neg for DVT, but showing superficial venous thrombosis in the proximal portion of the right greater saphenous vein. Started on 3% @60 for na goal 145-150. NGT placed by ENT. Febrile, pancultured.  4/23: POD 2. 3% increased to 75. NGT readjusted. UA neg. SBP liberalized to 160. Plan to extubate. 3% decreased to 60. Failed extubation d/t no cuff leak, given 60mg solumedrol, plan to extubate in 6 hrs. SCx1 for post void residual >400, started on cardura at bedtime. New blood in buretrol, stopped SQL. Clot in EVD cleared. Neuro exam improving.   4/24: POD 3. Cont 3% with NS while NPO, start TF today. TF started. LFTs downtrending. Sodium 141. Increased 3% to 50, NS to 30. Repeat BMP ordered for 5:30PM. Tramadol 25 for pain with no relief, oxy 5 and 1g tylenol ordered, stability CT stable, speech language consult for dysarthria. Given hydralazine 10mg IVP for SBP>160, started amlodipine 5mg. C/o mild headache, given fioricet x1, stroke neuro rec SALVADOR and loop recorder placement. Echo with bubble completed, negative for PFO, EF 55-60%. J HFNC started for desats with suctioning.   4/25: POD 4. Cont HFNC. Increased secertions on HFNC, reintubated. CXR confirmed good placement. EVD dropped to 5cmH20 for goal of draining 5-10cc/hr and SG ELENA drain taken off suction. Pending CTH. EVD drained 0cc/hr, dropped to 0. NGT replaced. Dc'd fioricet. Started on PPI for intubation. Increased cardura to 4mg for urinary retention, given an additional 2mg now. Started salt tabs 3 q 6. Given 12.5mg fentanyl x1. NGT replaced, CXR shown in lung. NGT removed, repeat CXR showing no pneumothorax. Pending ENT consult for NGT placement. CTH stable. NGT replaced by ENT, confirmed in proper spot. Restarted TF. [id number]. Na 141 from 146. Increased 3% to 60. EVD raised to 3cmH20. ETT pulled back 1cm by RT.  4/26: POD 5 SOC. Intubated, remains on precedex, ABG ordered with improving PaO2, BMP sodium 148, 3% changed to 30cc/hr, cardura increased to 8mg for tonight, tolerating cpap  4/27: POD 6 SOC. Respiratory rate sustained 6, returned to FVS. Na 151, dc'd 3%, f/u AM sodium. Nurse noticed ETT 19 at the lip and was 20 prior, CXR shows ETT @ 5cm above jono, ET tube advanced 1cm, repeat CXR confirms appropriate placement. Thick inline secretions with suctioning. ENT trach placement Fri likely, PEG likely Mon. Keep ELENA drain until no output, EVD to remain @3. Start ASA 81mg daily tomorrow.   4/28: POD7 SOC, neuro stable, given fentanyl x 1 overnight for presumed discomfort (biting on ETT), remains on full vent support. CTH today stable in comparison to 4/25. 6 cuffed trach placed by ENT in OR, but came down without cuff. Replaced at bedside by ENT. On fentanyl gtt.    4/29: POD8 SOC, JIM overnight. Incision cleaned and dressing changed. On fentanyl gtt. EKG completed due to increased frequency PVCs on telemetry, frequency of PVCs improved with titrating up fentanyl gtt. ABG drawn.  Repeat LE dopplers completed showing persistant superficial thrombus, no DVT. No EVD waveform during day, flushed distally without improvement. Exam unchanged.  EVD dropped to 0 for low output in afternoon.   4/30: POD9. Neuro stable, febrile to 101.3F o/n, given tylenol and pan cx sent. SBP dipped to low 90s o/n, HR stable in 70s with some PVCs, decreased fentanyl gtt and given 500cc bolus of NS x 2. Pend PEG placement Mon and angio Tues. D/c'd ELENA drain today and suture placed. F/u heme recs. Positive UA. Infiltrates found on CXR. Started on Zosyn 4.5g Q6hrs .   5/1: POD 10. Neuro stable. Dc'd fentanyl gtt. PEG failed placement at bedside with Dr. Gant, plan for PEG in OR tomorrow afternoon with Dr. Layton. Dc'd amlodipine and started carvedilol 3.125 BID for PVCs . Made 3% inhalation and mucomyst q8hr. Failed PEG at bedside. Salt tabs made 1 q 6. Decreased cardura to 4mg daily. Urine culture growing E. Coli and sputum culture growing pluralibacter gergoviae and strep species. ID consulted, f/u recs. Failed CPAP trial @ 3pm. Added am labs per heme/onc for hypercoguable workup. Pending repeat LE dopplers in 2-3 days. NGT clogged, removed, ENT failed attempt at replacement, will keep NPO for PEG placement tmw. Repeat xray in am for concern for aspration. Pt abx switched from Zosyn to Ertapenem 1g Q24hrs. per ID recs, possible ESBL growth.   5/2: POD 11. Neuro stable. Pre-op for diagnostic cerebral angiogram and PEG placement. NPO. EVD raised to 5 cmH20. Sputum culture speciated to step pneumoniae, sensitive to ertapenem. 3% inhalation/mucomyst made q 6 hr d/t thick secretions. PEG placed in OR. POD0 dx cerebral angio. EVD raised to 10.   5/3: POD 12. Neuro stable. PEG feeds began @ 10 AM, plan to increase 10cc every 6h per general surgery. Repeat dopplers show stable R superficial thrombus and new L IM calf DVT. Vascular consulted for IVC filter. Plan to get CTH tomorrow.  5/4: POD 13. JIM o/n. s/p IVC filter with vascular today. Pending post-procedure CTH. FOBT negative. Started iron and vitamin c every other day for iron deficiency anemia.   5/5: POD14. CSF cx sent to r/o infection from new gas in right temporal horn seen on CTH. Restarted TF, changed to Jevity 1.2, f/u nutrition recs. EVD raised to 10 today, plan to challenge over the weekend and CTH on Monday, possible VPS next Wednesday. ENT removed sutures today. Salt tabs decreased 1q12.  5/6: POD15 Placed on CPAP briefly with low MV alert, placed back on full vent support. EVD remains open at [id number]. ICPs WNL. Neuro exam stable.  EVD raised to 15. Tolerated CPAP x8h.   5/7: POD#16. JIM overnight, neuro stable. D/c'd salt tabs and 3% neb. Wean trach to CPAP as tolerated. Pan cx NGTD. EVD raised today to [id number].   5/8: POD17 JIM overnight. ICPs WNL. Pt remains on full vent support. Neuro exam stable. Plan for possible VPS today. CT chio complete showing increase in vent size.   5/9: POD18 SOC, POD1 VPS. Desat o/n to 80s, improved on own. CXR with LLL effusion. Another desat to 90% in AM, given duoneb and mucomyst. Oxycodone given for incisional pain. Neuro exam stable. CTH in AM stable. Keppra dc'd. Ritalin 5 BID started.  Tolerating CPAP trial. SALVADOR ordered. Rectal tube dc'd.   5/10: POD 19 SOC POD2 VPS. Remains on full vent support. Tolerated CPAP for 6 hours. Neuro exam stable. BP liberalized to 160. RLQ US 2/2 abd pain. Lactate WNL.  5/11: POD20 SOC POD3 VPS. Plan for CPAP trial in AM.  Dc'd cardura. F/u speech consult for communcation board. Has been tolerating CPAP since 9am. RLQ us w/ no acute pathology. Per heme/onc LMWH or coumadin rec for AC over DOAC due to antiphospholipid syndrome.   5/12: POD21 SOC POD4 VPS. CPAP 8/5, tolerated until 3pm. Can start ASA on 5/14 and full AC POD 10 from VPS 5/19 (remove IVC filter prior, f/u w/ vascular on timing).   5/13: POD22 SOC POD 5 VPS, neuro stable, tolerating trach collar. ASA ordered to start tomorrow, Ritalin increased to 10BID from 5BID, Simethicone ordered for gas, f/u gen surg for continued abd discomfort, PM VBG with PCO2 50 and repeat VBG pCO2 55, possible obesity hypoventilation, f/u AM VBG   5/14: POD23 SOC, [id number], neuro stable, VBG showed increased PCO2 indicating poor ventilation, failed CPAP due to low TV, put back on full vent support.   5/15: POD24 SOC, POD7 VPS. O/n CT A/P done for abd pain, f/u read. neuro stable. remains on full vent support. Simethicone, Amlodipine d/c'd. Given 1L bolus of NS. Dilaudid 0.5 for pain control. CT A/P negative for acute pathology. Restarted tube feeds. Started Modafinil for neurostim.  5/16: POD25 SOC, POD8 VPS. JIM o/n neuro stable. remains on full vent support. DC carvedilol for intermittent hypotensive episodes. UA for suprapelvic pain, concern for nephrolithiasis based on CT AP.  5/17: POD26 SOC, POD9 VPS. JIM o/n neuro stable.   Modafinil increased. CTH completed, . Urology consulted for bladder calculi vs mass, pending urine cytology study and outptaient urology follow-up for cystoscopy. Trialing SIMV for vent weaning.   5/18: POD27 SOC, POD10 VPS. JIM o/n remains on SIMV.   Baby aspirin discontinued. SQL 80mg bid started for DVT treatement per hematology/neurology. Plan for CTH in am. Added amantadine for neurostim.   5/19: POD28, POD 11 VPS. JIM o/n, reapplied pressure dressing. tolerating CPAP  5/20: POD29, POD 12 VPS, reapplied pressure dressing o/n, tolerating trach collar. CTH today stable. Anti xa 0.8 (therapeutic)  5/21: POD 30. POD 13 VPS. Tolerating trach collar. Loose stools. Ritalin dc'd. Stepdown to telemetry. Neurologically stable.   5/22: POD 31. POD 14 VPS. Neurologically/hemodynamically stable. Pending dispo.   5/23: POD 32, POD 15 VPS. Headwrap in place, SOC sutures removed.   5/24: POD 33, POD 16 VPS. JIM o.n.    Vital Signs Last 24 Hrs  T(C): 36.2 (23 May 2023 22:04), Max: 37.5 (23 May 2023 09:00)  T(F): 97.1 (23 May 2023 22:04), Max: 99.5 (23 May 2023 09:00)  HR: 72 (23 May 2023 23:30) (70 - 80)  BP: 125/58 (23 May 2023 23:30) (113/54 - 125/58)  BP(mean): 84 (23 May 2023 23:30) (76 - 84)  RR: 17 (23 May 2023 23:30) (16 - 18)  SpO2: 96% (23 May 2023 23:30) (91% - 96%)    Parameters below as of 23 May 2023 23:30  Patient On (Oxygen Delivery Method): tracheostomy collar  O2 Flow (L/min): 10  O2 Concentration (%): 35    I&O's Summary    22 May 2023 07:01  -  23 May 2023 07:00  --------------------------------------------------------  IN: 1220 mL / OUT: 800 mL / NET: 420 mL    23 May 2023 07:01  -  24 May 2023 00:24  --------------------------------------------------------  IN: 1370 mL / OUT: 1100 mL / NET: 270 mL      PHYSICAL EXAM:  General: patient seen laying supine in bed in NAD  Neuro: AAO x 2-3 (nods appropriately to questions self, place), R gaze, FC, OE spontaneously, RUE 4+/5 with RUE drift, otherwise 5/5 strength throughout   HEENT: PERRL +trach collar   Pulmonary: chest rise symmetric   Abdomen: soft, nontender, nondistended +PEG   Ext: perfusing well   Skin: warm, dry   Wound: SOC incision c/d/i reinforced w/ pressure dressing ; VPS incisions C/D/I     LABS:                        12.0   8.73  )-----------( 272      ( 23 May 2023 01:47 )             37.7     05-23    138  |  96  |  11  ----------------------------<  134<H>  4.4   |  33<H>  |  0.45<L>    Ca    9.7      23 May 2023 01:47  Phos  4.8     05-23  Mg     2.1     05-23    TPro  7.2  /  Alb  3.5  /  TBili  0.4  /  DBili  x   /  AST  31  /  ALT  54<H>  /  AlkPhos  125<H>  05-23        CARDIAC MARKERS ( 23 May 2023 01:47 )  x     / 0.01 ng/mL / x     / x     / x          CAPILLARY BLOOD GLUCOSE          Drug Levels: [] N/A    CSF Analysis: [] N/A      Allergies    No Known Allergies    Intolerances      MEDICATIONS:  Antibiotics:    Neuro:  acetaminophen   Oral Liquid .. 650 milliGRAM(s) Oral every 6 hours PRN  amantadine Syrup 100 milliGRAM(s) Oral <User Schedule>  modafinil 200 milliGRAM(s) Oral every 24 hours  oxyCODONE    IR 5 milliGRAM(s) Oral every 4 hours PRN    Anticoagulation:  enoxaparin Injectable 80 milliGRAM(s) SubCutaneous every 12 hours    OTHER:  acetylcysteine 10%  Inhalation 4 milliLiter(s) Inhalation every 6 hours PRN  albuterol/ipratropium for Nebulization 3 milliLiter(s) Nebulizer every 6 hours PRN  atorvastatin 80 milliGRAM(s) Oral at bedtime    IVF:  ascorbic acid 500 milliGRAM(s) Oral <User Schedule>  ferrous    sulfate 325 milliGRAM(s) Oral <User Schedule>    CULTURES:    RADIOLOGY & ADDITIONAL TESTS:      ASSESSMENT:  56 y/o female found to have acute infarction within the right cerebellar hemisphere, causing herniation. Now s/p SOC foramen magnum decompression and right parietal/occipital EVD placement (4/21). s/p trach (4/28/23). s/p PEG (5/2/23), s/p dx cerebral angiogram (5/2/23). now s/p VPS Certas @ 4 (5/8/23).     Neuro   - Vitals q4h/neuro q4h   - s/p VPS (Certas @ 4)   - dx angio 5/2: b/l cavernous ICA aneurysms, as discussed at vascular conference f/u outpt   - CTH 4/28 stable; 5/4 new gas in right temporal horn, CT 5/8 chio increased vent size, 5/9 stable, CTH 5/17 increased suboccipital fluid collection   - Stroke consulted, appreciate reccs   - Pain control with tyl, oxy 5mg prn  - Modafinil 200mg qd, amantadine for neurostimulation. Ritalin d/c'd 5/21  - CTH 5/20 - stable     Cardio  - SBP   - TTE 4/24: negative for PFO, mild LVH, mild dilation of L atrium, EF 55-60%   - SALVADOR deferred, not indicated as it will not  at this time    Pulm  - + Trach (6 shiley), tolerating trach collar   - Chest PT, duoneb, mucomyst q 6 prn     GI  - + PEG wih TF (Jevity 1.2)  - Bowel regimen held for loose stools 5/21, banatrol started  - CT A/P 5/15: no acute pathology    Renal  - Voiding via primafit   - 5/15 CT A/P bladder calculi vs mass, f/u urology outpatient for cystoscopy     Endo   - cont lipitor     Heme  - No SCDs, SQL 80 BID (5/18)   - s/p IVCF 5/4, keep per heme recs  - LE dopplers 5/3 DVT L IM calf and unchanged R superficial vein thrombus  - Heme following for + anticardiolipin Ab 4/27, recs appreciated  - Anemia: Iron and vitamin c every other day     ID  - +UA cx ESBL, +sputum cx pluralibacter gergoviae, strep pneumoniae, s/p Ertapenem (5/1-5/7)    PSYCH  - behavioral health consulted for tearfulness, recs appreciated     DISPO:  - SDU status, full code   - PT/OT rec AR     D/w Dr. Valadez

## 2023-05-24 NOTE — PROVIDER CONTACT NOTE (OTHER) - REASON
EVD output 20cc
EVD output 22cc for this hour
EVD output 20cc
EVD waveform absent
R on T reported by tele
EVD output 21 mL for the hour
EVD output 21cc for 0700
EVD output 22 ml for the hour
Intubation
Now that pt is extubated, right facial droop visualized
EVD output 25cc within 25mins
Fever
high EVD drain o/p
hypotension
Pt's SpO2 desaturated to 89%; then while suctioning, SpO2 dropped to high 60's
RUE only with some effort against gravity now, previously had drift, low EVD output
pt found with trach decanulated
cuff missing from trach

## 2023-05-24 NOTE — PROGRESS NOTE ADULT - SUBJECTIVE AND OBJECTIVE BOX
58yo F w Bilateral cerebellar hemispheric strokes sp SOC for foramen magnum decompression and R parietooccipital EVD, Bilateral carotid terminus aneurysms, History of peripheral neuropathy and asthma, Bulbar dysfunction. Respiratory insufficiency. Intubated and failed extubation, reintubated 4/25/23. Now s/p creation tracheostomy 4/28/23 c/b tracheostomy tube exchange POD0 due to damaged tracheostomy tube.  Called for tracheostomy position check.  Spontaneously decannulated .  Trach replaced, but subsequently w difficulty suctioning. Namrata rotated, still w difficulty No respiratory distress, or O2 desaturation After verbal consult obtained, slim bronchoscope advanced through tracheostomy namrata w/o difficulty, and trachea and  jono visualized and inspected.  No obvious secretions or mucus plug.  Can reattempt suction w smaller suction catheter /w surgilube. Can also reposition and extend  head and neck If still w difficulty consider ENT reeval in AM   58yo F w Bilateral cerebellar hemispheric strokes sp SOC for foramen magnum decompression and R parietooccipital EVD, Bilateral carotid terminus aneurysms, History of peripheral neuropathy and asthma, Bulbar dysfunction. Respiratory insufficiency. Intubated and failed extubation, reintubated 4/25/23. Now s/p creation tracheostomy 4/28/23 c/b tracheostomy tube exchange POD0 due to damaged tracheostomy tube.  Called for tracheostomy position check.  Spontaneously decannulated .  Trach replaced, but subsequently w difficulty suctioning. Namrata rotated, still w difficulty No respiratory distress, or O2 desaturation After verbal consent obtained, slim bronchoscope advanced through tracheostomy namrata w/o difficulty, and trachea and  jono visualized and inspected.  No obvious secretions or mucus plug.  Can reattempt suction w smaller suction catheter /w surgilube. Can also reposition and extend  head and neck If still w difficulty consider ENT reeval in AM

## 2023-05-24 NOTE — PROGRESS NOTE ADULT - SUBJECTIVE AND OBJECTIVE BOX
O/N Events: Trach fell out overnight, replaced with bedside bronch showing good location.   Subjective/ROS: Denies HA, CP, SOB, n/v, changes in bowel/urinary habits.  12pt ROS otherwise negative.    VITALS  Vital Signs Last 24 Hrs  T(C): 36.4 (24 May 2023 14:02), Max: 37.3 (23 May 2023 18:06)  T(F): 97.6 (24 May 2023 14:02), Max: 99.2 (23 May 2023 18:06)  HR: 76 (24 May 2023 12:10) (66 - 78)  BP: 113/62 (24 May 2023 12:10) (109/75 - 125/58)  BP(mean): 73 (24 May 2023 12:10) (73 - 87)  RR: 18 (24 May 2023 12:10) (16 - 18)  SpO2: 97% (24 May 2023 12:10) (91% - 100%)    Parameters below as of 24 May 2023 12:10  Patient On (Oxygen Delivery Method): tracheostomy collar  O2 Flow (L/min): 10  O2 Concentration (%): 40    I&O's Summary    23 May 2023 07:01  -  24 May 2023 07:00  --------------------------------------------------------  IN: 1720 mL / OUT: 1100 mL / NET: 620 mL        CAPILLARY BLOOD GLUCOSE          PHYSICAL EXAM  General: NAD  Head: NC/AT; PERRL; EOMI; anicteric sclera  Neck: Supple; no JVD  Respiratory: CTA B/L; no wheezes/crackles/rales auscultated w/ good air movement  Cardiovascular: Regular rhythm/rate; S1/S2; no gallops or murmurs auscultated  Gastrointestinal: Soft; NTND w/out rebound tenderness or guarding; bowel sounds normal  Extremities: WWP; no edema or cyanosis; radial/pedal pulses palpable  Neurological:  CNII-XII grossly intact; 4/5 RUE  Skin: No rashes noted  Vasc: +2 DP/PT pulses b/l   Psych: Appropriate Affect    MEDICATIONS  (STANDING):  amantadine Syrup 100 milliGRAM(s) Oral <User Schedule>  ascorbic acid 500 milliGRAM(s) Oral <User Schedule>  atorvastatin 80 milliGRAM(s) Oral at bedtime  enoxaparin Injectable 80 milliGRAM(s) SubCutaneous every 12 hours  ferrous    sulfate 325 milliGRAM(s) Oral <User Schedule>  modafinil 200 milliGRAM(s) Oral every 24 hours    MEDICATIONS  (PRN):  acetaminophen   Oral Liquid .. 650 milliGRAM(s) Oral every 6 hours PRN Temp greater or equal to 38.5C (101.3F), Mild Pain (1 - 3)  acetylcysteine 10%  Inhalation 4 milliLiter(s) Inhalation every 6 hours PRN increased secretions  albuterol/ipratropium for Nebulization 3 milliLiter(s) Nebulizer every 6 hours PRN Respiratory Distress  oxyCODONE    IR 5 milliGRAM(s) Oral every 4 hours PRN Moderate Pain (4 - 6)      No Known Allergies      LABS                        12.0   8.73  )-----------( 272      ( 23 May 2023 01:47 )             37.7     05-23    138  |  96  |  11  ----------------------------<  134<H>  4.4   |  33<H>  |  0.45<L>    Ca    9.7      23 May 2023 01:47  Phos  4.8     05-23  Mg     2.1     05-23    TPro  7.2  /  Alb  3.5  /  TBili  0.4  /  DBili  x   /  AST  31  /  ALT  54<H>  /  AlkPhos  125<H>  05-23        CARDIAC MARKERS ( 23 May 2023 01:47 )  x     / 0.01 ng/mL / x     / x     / x            IMAGING/EKG/ETC: reviewed

## 2023-05-25 ENCOUNTER — TRANSCRIPTION ENCOUNTER (OUTPATIENT)
Age: 58
End: 2023-05-25

## 2023-05-25 VITALS — TEMPERATURE: 99 F

## 2023-05-25 DIAGNOSIS — I82.409 ACUTE EMBOLISM AND THROMBOSIS OF UNSPECIFIED DEEP VEINS OF UNSPECIFIED LOWER EXTREMITY: ICD-10-CM

## 2023-05-25 LAB
ANION GAP SERPL CALC-SCNC: 8 MMOL/L — SIGNIFICANT CHANGE UP (ref 5–17)
BUN SERPL-MCNC: 11 MG/DL — SIGNIFICANT CHANGE UP (ref 7–23)
CALCIUM SERPL-MCNC: 8.9 MG/DL — SIGNIFICANT CHANGE UP (ref 8.4–10.5)
CHLORIDE SERPL-SCNC: 97 MMOL/L — SIGNIFICANT CHANGE UP (ref 96–108)
CO2 SERPL-SCNC: 34 MMOL/L — HIGH (ref 22–31)
CREAT SERPL-MCNC: 0.47 MG/DL — LOW (ref 0.5–1.3)
EGFR: 112 ML/MIN/1.73M2 — SIGNIFICANT CHANGE UP
GLUCOSE SERPL-MCNC: 115 MG/DL — HIGH (ref 70–99)
HCT VFR BLD CALC: 37.2 % — SIGNIFICANT CHANGE UP (ref 34.5–45)
HGB BLD-MCNC: 11.6 G/DL — SIGNIFICANT CHANGE UP (ref 11.5–15.5)
MAGNESIUM SERPL-MCNC: 2.1 MG/DL — SIGNIFICANT CHANGE UP (ref 1.6–2.6)
MCHC RBC-ENTMCNC: 29.4 PG — SIGNIFICANT CHANGE UP (ref 27–34)
MCHC RBC-ENTMCNC: 31.2 GM/DL — LOW (ref 32–36)
MCV RBC AUTO: 94.4 FL — SIGNIFICANT CHANGE UP (ref 80–100)
NRBC # BLD: 0 /100 WBCS — SIGNIFICANT CHANGE UP (ref 0–0)
PHOSPHATE SERPL-MCNC: 5.1 MG/DL — HIGH (ref 2.5–4.5)
PLATELET # BLD AUTO: 262 K/UL — SIGNIFICANT CHANGE UP (ref 150–400)
POTASSIUM SERPL-MCNC: 4.2 MMOL/L — SIGNIFICANT CHANGE UP (ref 3.5–5.3)
POTASSIUM SERPL-SCNC: 4.2 MMOL/L — SIGNIFICANT CHANGE UP (ref 3.5–5.3)
RBC # BLD: 3.94 M/UL — SIGNIFICANT CHANGE UP (ref 3.8–5.2)
RBC # FLD: 13.1 % — SIGNIFICANT CHANGE UP (ref 10.3–14.5)
SODIUM SERPL-SCNC: 139 MMOL/L — SIGNIFICANT CHANGE UP (ref 135–145)
WBC # BLD: 4.14 K/UL — SIGNIFICANT CHANGE UP (ref 3.8–10.5)
WBC # FLD AUTO: 4.14 K/UL — SIGNIFICANT CHANGE UP (ref 3.8–10.5)

## 2023-05-25 PROCEDURE — 84295 ASSAY OF SERUM SODIUM: CPT

## 2023-05-25 PROCEDURE — 85652 RBC SED RATE AUTOMATED: CPT

## 2023-05-25 PROCEDURE — 83550 IRON BINDING TEST: CPT

## 2023-05-25 PROCEDURE — 80061 LIPID PANEL: CPT

## 2023-05-25 PROCEDURE — 86923 COMPATIBILITY TEST ELECTRIC: CPT

## 2023-05-25 PROCEDURE — 85520 HEPARIN ASSAY: CPT

## 2023-05-25 PROCEDURE — 87340 HEPATITIS B SURFACE AG IA: CPT

## 2023-05-25 PROCEDURE — 85045 AUTOMATED RETICULOCYTE COUNT: CPT

## 2023-05-25 PROCEDURE — 87389 HIV-1 AG W/HIV-1&-2 AB AG IA: CPT

## 2023-05-25 PROCEDURE — U0005: CPT

## 2023-05-25 PROCEDURE — 82150 ASSAY OF AMYLASE: CPT

## 2023-05-25 PROCEDURE — P9037: CPT

## 2023-05-25 PROCEDURE — 80076 HEPATIC FUNCTION PANEL: CPT

## 2023-05-25 PROCEDURE — 88305 TISSUE EXAM BY PATHOLOGIST: CPT

## 2023-05-25 PROCEDURE — 83930 ASSAY OF BLOOD OSMOLALITY: CPT

## 2023-05-25 PROCEDURE — 86709 HEPATITIS A IGM ANTIBODY: CPT

## 2023-05-25 PROCEDURE — 82330 ASSAY OF CALCIUM: CPT

## 2023-05-25 PROCEDURE — 86704 HEP B CORE ANTIBODY TOTAL: CPT

## 2023-05-25 PROCEDURE — 83516 IMMUNOASSAY NONANTIBODY: CPT

## 2023-05-25 PROCEDURE — C1769: CPT

## 2023-05-25 PROCEDURE — 87205 SMEAR GRAM STAIN: CPT

## 2023-05-25 PROCEDURE — 94002 VENT MGMT INPAT INIT DAY: CPT

## 2023-05-25 PROCEDURE — 87184 SC STD DISK METHOD PER PLATE: CPT

## 2023-05-25 PROCEDURE — L8699: CPT

## 2023-05-25 PROCEDURE — 84484 ASSAY OF TROPONIN QUANT: CPT

## 2023-05-25 PROCEDURE — 84145 PROCALCITONIN (PCT): CPT

## 2023-05-25 PROCEDURE — 86038 ANTINUCLEAR ANTIBODIES: CPT

## 2023-05-25 PROCEDURE — 83735 ASSAY OF MAGNESIUM: CPT

## 2023-05-25 PROCEDURE — 86431 RHEUMATOID FACTOR QUANT: CPT

## 2023-05-25 PROCEDURE — 81240 F2 GENE: CPT

## 2023-05-25 PROCEDURE — 0225U NFCT DS DNA&RNA 21 SARSCOV2: CPT

## 2023-05-25 PROCEDURE — 87640 STAPH A DNA AMP PROBE: CPT

## 2023-05-25 PROCEDURE — 86147 CARDIOLIPIN ANTIBODY EA IG: CPT

## 2023-05-25 PROCEDURE — 80048 BASIC METABOLIC PNL TOTAL CA: CPT

## 2023-05-25 PROCEDURE — 36415 COLL VENOUS BLD VENIPUNCTURE: CPT

## 2023-05-25 PROCEDURE — 86850 RBC ANTIBODY SCREEN: CPT

## 2023-05-25 PROCEDURE — 86706 HEP B SURFACE ANTIBODY: CPT

## 2023-05-25 PROCEDURE — 84132 ASSAY OF SERUM POTASSIUM: CPT

## 2023-05-25 PROCEDURE — 86225 DNA ANTIBODY NATIVE: CPT

## 2023-05-25 PROCEDURE — 84443 ASSAY THYROID STIM HORMONE: CPT

## 2023-05-25 PROCEDURE — 87040 BLOOD CULTURE FOR BACTERIA: CPT

## 2023-05-25 PROCEDURE — 93970 EXTREMITY STUDY: CPT

## 2023-05-25 PROCEDURE — 93307 TTE W/O DOPPLER COMPLETE: CPT

## 2023-05-25 PROCEDURE — C1889: CPT

## 2023-05-25 PROCEDURE — U0003: CPT

## 2023-05-25 PROCEDURE — 85379 FIBRIN DEGRADATION QUANT: CPT

## 2023-05-25 PROCEDURE — 76705 ECHO EXAM OF ABDOMEN: CPT

## 2023-05-25 PROCEDURE — 86160 COMPLEMENT ANTIGEN: CPT

## 2023-05-25 PROCEDURE — 80053 COMPREHEN METABOLIC PANEL: CPT

## 2023-05-25 PROCEDURE — 85598 HEXAGNAL PHOSPH PLTLT NEUTRL: CPT

## 2023-05-25 PROCEDURE — 97164 PT RE-EVAL EST PLAN CARE: CPT

## 2023-05-25 PROCEDURE — 97163 PT EVAL HIGH COMPLEX 45 MIN: CPT

## 2023-05-25 PROCEDURE — 88304 TISSUE EXAM BY PATHOLOGIST: CPT

## 2023-05-25 PROCEDURE — 86900 BLOOD TYPING SEROLOGIC ABO: CPT

## 2023-05-25 PROCEDURE — 97116 GAIT TRAINING THERAPY: CPT

## 2023-05-25 PROCEDURE — 82947 ASSAY GLUCOSE BLOOD QUANT: CPT

## 2023-05-25 PROCEDURE — 86780 TREPONEMA PALLIDUM: CPT

## 2023-05-25 PROCEDURE — 85306 CLOT INHIBIT PROT S FREE: CPT

## 2023-05-25 PROCEDURE — 84157 ASSAY OF PROTEIN OTHER: CPT

## 2023-05-25 PROCEDURE — 82945 GLUCOSE OTHER FLUID: CPT

## 2023-05-25 PROCEDURE — 74018 RADEX ABDOMEN 1 VIEW: CPT

## 2023-05-25 PROCEDURE — 88108 CYTOPATH CONCENTRATE TECH: CPT

## 2023-05-25 PROCEDURE — 76000 FLUOROSCOPY <1 HR PHYS/QHP: CPT

## 2023-05-25 PROCEDURE — 84702 CHORIONIC GONADOTROPIN TEST: CPT

## 2023-05-25 PROCEDURE — 85303 CLOT INHIBIT PROT C ACTIVITY: CPT

## 2023-05-25 PROCEDURE — 81241 F5 GENE: CPT

## 2023-05-25 PROCEDURE — 87641 MR-STAPH DNA AMP PROBE: CPT

## 2023-05-25 PROCEDURE — 87186 SC STD MICRODIL/AGAR DIL: CPT

## 2023-05-25 PROCEDURE — 85576 BLOOD PLATELET AGGREGATION: CPT

## 2023-05-25 PROCEDURE — 82962 GLUCOSE BLOOD TEST: CPT

## 2023-05-25 PROCEDURE — 82728 ASSAY OF FERRITIN: CPT

## 2023-05-25 PROCEDURE — 86901 BLOOD TYPING SEROLOGIC RH(D): CPT

## 2023-05-25 PROCEDURE — C1880: CPT

## 2023-05-25 PROCEDURE — 71045 X-RAY EXAM CHEST 1 VIEW: CPT

## 2023-05-25 PROCEDURE — 86705 HEP B CORE ANTIBODY IGM: CPT

## 2023-05-25 PROCEDURE — 85730 THROMBOPLASTIN TIME PARTIAL: CPT

## 2023-05-25 PROCEDURE — 81001 URINALYSIS AUTO W/SCOPE: CPT

## 2023-05-25 PROCEDURE — 83036 HEMOGLOBIN GLYCOSYLATED A1C: CPT

## 2023-05-25 PROCEDURE — 88112 CYTOPATH CELL ENHANCE TECH: CPT

## 2023-05-25 PROCEDURE — 70450 CT HEAD/BRAIN W/O DYE: CPT

## 2023-05-25 PROCEDURE — 86235 NUCLEAR ANTIGEN ANTIBODY: CPT

## 2023-05-25 PROCEDURE — 87070 CULTURE OTHR SPECIMN AEROBIC: CPT

## 2023-05-25 PROCEDURE — 85610 PROTHROMBIN TIME: CPT

## 2023-05-25 PROCEDURE — 86146 BETA-2 GLYCOPROTEIN ANTIBODY: CPT

## 2023-05-25 PROCEDURE — 74177 CT ABD & PELVIS W/CONTRAST: CPT

## 2023-05-25 PROCEDURE — 83540 ASSAY OF IRON: CPT

## 2023-05-25 PROCEDURE — 82550 ASSAY OF CK (CPK): CPT

## 2023-05-25 PROCEDURE — 36430 TRANSFUSION BLD/BLD COMPNT: CPT

## 2023-05-25 PROCEDURE — 87086 URINE CULTURE/COLONY COUNT: CPT

## 2023-05-25 PROCEDURE — 97112 NEUROMUSCULAR REEDUCATION: CPT

## 2023-05-25 PROCEDURE — 82607 VITAMIN B-12: CPT

## 2023-05-25 PROCEDURE — 97168 OT RE-EVAL EST PLAN CARE: CPT

## 2023-05-25 PROCEDURE — 85018 HEMOGLOBIN: CPT

## 2023-05-25 PROCEDURE — 87635 SARS-COV-2 COVID-19 AMP PRB: CPT

## 2023-05-25 PROCEDURE — 85613 RUSSELL VIPER VENOM DILUTED: CPT

## 2023-05-25 PROCEDURE — C1729: CPT

## 2023-05-25 PROCEDURE — 86036 ANCA SCREEN EACH ANTIBODY: CPT

## 2023-05-25 PROCEDURE — 85025 COMPLETE CBC W/AUTO DIFF WBC: CPT

## 2023-05-25 PROCEDURE — 89051 BODY FLUID CELL COUNT: CPT

## 2023-05-25 PROCEDURE — 94640 AIRWAY INHALATION TREATMENT: CPT

## 2023-05-25 PROCEDURE — 82272 OCCULT BLD FECES 1-3 TESTS: CPT

## 2023-05-25 PROCEDURE — 85307 ASSAY ACTIVATED PROTEIN C: CPT

## 2023-05-25 PROCEDURE — 84100 ASSAY OF PHOSPHORUS: CPT

## 2023-05-25 PROCEDURE — 97530 THERAPEUTIC ACTIVITIES: CPT

## 2023-05-25 PROCEDURE — 87181 SC STD AGAR DILUTION PER AGT: CPT

## 2023-05-25 PROCEDURE — 85300 ANTITHROMBIN III ACTIVITY: CPT

## 2023-05-25 PROCEDURE — 82803 BLOOD GASES ANY COMBINATION: CPT

## 2023-05-25 PROCEDURE — 86140 C-REACTIVE PROTEIN: CPT

## 2023-05-25 PROCEDURE — C1887: CPT

## 2023-05-25 PROCEDURE — 83605 ASSAY OF LACTIC ACID: CPT

## 2023-05-25 PROCEDURE — C1894: CPT

## 2023-05-25 PROCEDURE — 82746 ASSAY OF FOLIC ACID SERUM: CPT

## 2023-05-25 PROCEDURE — 83690 ASSAY OF LIPASE: CPT

## 2023-05-25 PROCEDURE — 88311 DECALCIFY TISSUE: CPT

## 2023-05-25 PROCEDURE — 97110 THERAPEUTIC EXERCISES: CPT

## 2023-05-25 PROCEDURE — P9100: CPT

## 2023-05-25 PROCEDURE — 94003 VENT MGMT INPAT SUBQ DAY: CPT

## 2023-05-25 PROCEDURE — 86803 HEPATITIS C AB TEST: CPT

## 2023-05-25 PROCEDURE — 85027 COMPLETE CBC AUTOMATED: CPT

## 2023-05-25 RX ORDER — ACETYLCYSTEINE 200 MG/ML
4 VIAL (ML) MISCELLANEOUS
Qty: 0 | Refills: 0 | DISCHARGE
Start: 2023-05-25

## 2023-05-25 RX ORDER — QUETIAPINE FUMARATE 200 MG/1
12.5 TABLET, FILM COATED ORAL ONCE
Refills: 0 | Status: COMPLETED | OUTPATIENT
Start: 2023-05-25 | End: 2023-05-25

## 2023-05-25 RX ORDER — QUETIAPINE FUMARATE 200 MG/1
12.5 TABLET, FILM COATED ORAL AT BEDTIME
Refills: 0 | Status: DISCONTINUED | OUTPATIENT
Start: 2023-05-25 | End: 2023-05-25

## 2023-05-25 RX ORDER — ATORVASTATIN CALCIUM 80 MG/1
1 TABLET, FILM COATED ORAL
Qty: 0 | Refills: 0 | DISCHARGE
Start: 2023-05-25

## 2023-05-25 RX ORDER — ENOXAPARIN SODIUM 100 MG/ML
80 INJECTION SUBCUTANEOUS
Qty: 0 | Refills: 0 | DISCHARGE
Start: 2023-05-25

## 2023-05-25 RX ORDER — IPRATROPIUM/ALBUTEROL SULFATE 18-103MCG
3 AEROSOL WITH ADAPTER (GRAM) INHALATION
Qty: 0 | Refills: 0 | DISCHARGE
Start: 2023-05-25

## 2023-05-25 RX ORDER — ALBUTEROL 90 UG/1
2 AEROSOL, METERED ORAL
Refills: 0 | DISCHARGE

## 2023-05-25 RX ORDER — MODAFINIL 200 MG/1
1 TABLET ORAL
Qty: 0 | Refills: 0 | DISCHARGE
Start: 2023-05-25

## 2023-05-25 RX ORDER — FERROUS SULFATE 325(65) MG
1 TABLET ORAL
Qty: 0 | Refills: 0 | DISCHARGE
Start: 2023-05-25

## 2023-05-25 RX ORDER — AMANTADINE HCL 100 MG
10 CAPSULE ORAL
Qty: 0 | Refills: 0 | DISCHARGE
Start: 2023-05-25

## 2023-05-25 RX ORDER — OXYCODONE HYDROCHLORIDE 5 MG/1
1 TABLET ORAL
Qty: 0 | Refills: 0 | DISCHARGE
Start: 2023-05-25

## 2023-05-25 RX ORDER — QUETIAPINE FUMARATE 200 MG/1
12.5 TABLET, FILM COATED ORAL
Qty: 0 | Refills: 0 | DISCHARGE
Start: 2023-05-25

## 2023-05-25 RX ORDER — ASCORBIC ACID 60 MG
1 TABLET,CHEWABLE ORAL
Qty: 0 | Refills: 0 | DISCHARGE
Start: 2023-05-25

## 2023-05-25 RX ORDER — ACETAMINOPHEN 500 MG
20.31 TABLET ORAL
Qty: 0 | Refills: 0 | DISCHARGE
Start: 2023-05-25

## 2023-05-25 RX ADMIN — OXYCODONE HYDROCHLORIDE 5 MILLIGRAM(S): 5 TABLET ORAL at 02:42

## 2023-05-25 RX ADMIN — MODAFINIL 100 MILLIGRAM(S): 200 TABLET ORAL at 05:47

## 2023-05-25 RX ADMIN — Medication 100 MILLIGRAM(S): at 05:47

## 2023-05-25 RX ADMIN — QUETIAPINE FUMARATE 12.5 MILLIGRAM(S): 200 TABLET, FILM COATED ORAL at 03:23

## 2023-05-25 RX ADMIN — OXYCODONE HYDROCHLORIDE 5 MILLIGRAM(S): 5 TABLET ORAL at 01:28

## 2023-05-25 RX ADMIN — Medication 325 MILLIGRAM(S): at 05:47

## 2023-05-25 RX ADMIN — ENOXAPARIN SODIUM 80 MILLIGRAM(S): 100 INJECTION SUBCUTANEOUS at 17:38

## 2023-05-25 RX ADMIN — ENOXAPARIN SODIUM 80 MILLIGRAM(S): 100 INJECTION SUBCUTANEOUS at 05:48

## 2023-05-25 RX ADMIN — Medication 500 MILLIGRAM(S): at 05:47

## 2023-05-25 NOTE — DISCHARGE NOTE NURSING/CASE MANAGEMENT/SOCIAL WORK - NSDCFUADDAPPT_GEN_ALL_CORE_FT
Please follow up with Dr. Ric Dumont    Please follow up with Dr. Minor    Please follow up with Dr. Cota for abdomen wound from VPS     Please follow up with Dr. Lombardo for PEG tube management    Please follow up with Dr. Mic Corado for Tracheostomy tube management    Please follow up with Dr. Bay for IVC filter management    Please follow up with Dr. Farrah Maria for stroke, the office will reach out to make an appointment if you don't hear from us within 2 weeks please call [phone]    Follow up with Urology outpatient for bladder mass: Urology clinic [address] [place] [address], [address]  TEL: [phone]     Please follow up with Dr. Villaseñor from [facility] for management of your hypercoagulable state and anemia     Please follow up with your primary care doctor

## 2023-05-25 NOTE — PROGRESS NOTE ADULT - REASON FOR ADMISSION
bilateral cerebellar strokes
Biilateral cerebellar strokes
bilateral cerebellar strokes

## 2023-05-25 NOTE — PROGRESS NOTE ADULT - SUBJECTIVE AND OBJECTIVE BOX
HPI:  58 yo F with PMH of Asthma, CAD (not on  meds), peripheral neuropathy and PSH of BATOOL, cholecystectomy, right knee surgery presented to [facility] ED on 4/20 with right sided weakness and right facial numbness. Patient was undergoing preparation for colonoscopy. As per daughter at 8am patient was playing with her grandchildren but around 840 am patient was getting dressed and fell and subsequently felt weak after. Daughter reports that patient had some episodes of vomiting at this time. She had a syncopal episode at around 10 am and was witnessed by brother. No head trauma, She regained conscious after few minutes. She remained in bed for the remainder of the day. Daughter reports some slurred speech noted 1230-1PM and also was confused after. and around 4PM, the patient's sister noted right sided weakness at which time EMS was called and patient was brought to ED. No c/o chest pain, shortness of breath, fever, headache, abdominal pain, urinary complaints. No recent travel/sickness/ change in meds. Stroke code in ER: CTH neg for heme, Right PICA distribution acute infarction. CTA with saccular aneurysms of b/l carotids, approximately 0.8 cm on the right and 1.2 x 0.9 cm on the left. Possible tiny third saccular aneurysm on the right Posterior intracranial circulation: Right vertebral arterial occlusion at its dural crossing junction V3 Atlantic and V4 intracranial segments with likely reversal of flow in its intracranial segment from the basilar. Right PICA faintly seen. 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  Not candidate for TNK/mechanical thrombectomy. CT scan repeated which showed: 1. Brain: Progression of acute infarction within the right cerebellar hemisphere, also extending into the left superior cerebellar hemisphere. New mass effect on the fourth ventricle causing stenosis versus occlusion with new third and lateral ventricular dilatation indicating ventricular obstruction at the level of the fourth ventricle. New upward and downward herniation of cerebellar parenchyma Right carotid system: No hemodynamically significant stenosis. Left carotid system: No hemodynamically significant stenosis. Vertebral circulation: Patent. Anterior intracranial circulation: Intact. Bilateral internal carotid saccular aneurysms. These findings are unchanged. Posterior intracranial circulation:    Improved flow within the right vertebral artery since 4/20/2023. New focal stenosis mid left vertebral artery, etiology uncertain, consider vasospasm and extrinsic compression in addition to new embolic disease. Brain perfusion: Perfusion images demonstrate normalization of the perfusion abnormality in the right cerebellar hemisphere present on 4/20/2023 despite evidence of progression of acute infarction.  Areas of apparent ischemia within the posterior fossa have progressed in extent, also again involving the left posterior cerebral arterial distribution. Tx to [facility] for SOC watch. On admission to [facility], NIHSS 12.  (21 Apr 2023 16:50)    OVERNIGHT EVENTS: POD 34, POD17 VPS, JIM ovn, neuro stable, pending ALMITA    Hospital course:   4/21: Admitted for crani watch, CTH complete w/ unchanged hydrocephalus, taken for emergent occipital craniectomy.   4/22: POD1. Remains intubated overnight, on propofol and dank. EVD@[id number]. Pending repeat CTH this am. Given hydralazine 10mg x1. Started on cardene for SBP high 140s-150s. Sub-therapeutic on plavix, therapeutic on asa, rpt asa accumetrics until subtherapeutic. LE dopplers neg for DVT, but showing superficial venous thrombosis in the proximal portion of the right greater saphenous vein. Started on 3% @60 for na goal 145-150. NGT placed by ENT. Febrile, pancultured.  4/23: POD 2. 3% increased to 75. NGT readjusted. UA neg. SBP liberalized to 160. Plan to extubate. 3% decreased to 60. Failed extubation d/t no cuff leak, given 60mg solumedrol, plan to extubate in 6 hrs. SCx1 for post void residual >400, started on cardura at bedtime. New blood in buretrol, stopped SQL. Clot in EVD cleared. Neuro exam improving.   4/24: POD 3. Cont 3% with NS while NPO, start TF today. TF started. LFTs downtrending. Sodium 141. Increased 3% to 50, NS to 30. Repeat BMP ordered for 5:30PM. Tramadol 25 for pain with no relief, oxy 5 and 1g tylenol ordered, stability CT stable, speech language consult for dysarthria. Given hydralazine 10mg IVP for SBP>160, started amlodipine 5mg. C/o mild headache, given fioricet x1, stroke neuro rec SALVADOR and loop recorder placement. Echo with bubble completed, negative for PFO, EF 55-60%. J HFNC started for desats with suctioning.   4/25: POD 4. Cont HFNC. Increased secertions on HFNC, reintubated. CXR confirmed good placement. EVD dropped to 5cmH20 for goal of draining 5-10cc/hr and SG ELENA drain taken off suction. Pending CTH. EVD drained 0cc/hr, dropped to 0. NGT replaced. Dc'd fioricet. Started on PPI for intubation. Increased cardura to 4mg for urinary retention, given an additional 2mg now. Started salt tabs 3 q 6. Given 12.5mg fentanyl x1. NGT replaced, CXR shown in lung. NGT removed, repeat CXR showing no pneumothorax. Pending ENT consult for NGT placement. CTH stable. NGT replaced by ENT, confirmed in proper spot. Restarted TF. [id number]. Na 141 from 146. Increased 3% to 60. EVD raised to 3cmH20. ETT pulled back 1cm by RT.  4/26: POD 5 SOC. Intubated, remains on precedex, ABG ordered with improving PaO2, BMP sodium 148, 3% changed to 30cc/hr, cardura increased to 8mg for tonight, tolerating cpap  4/27: POD 6 SOC. Respiratory rate sustained 6, returned to FVS. Na 151, dc'd 3%, f/u AM sodium. Nurse noticed ETT 19 at the lip and was 20 prior, CXR shows ETT @ 5cm above jono, ET tube advanced 1cm, repeat CXR confirms appropriate placement. Thick inline secretions with suctioning. ENT trach placement Fri likely, PEG likely Mon. Keep ELENA drain until no output, EVD to remain @3. Start ASA 81mg daily tomorrow.   4/28: POD7 SOC, neuro stable, given fentanyl x 1 overnight for presumed discomfort (biting on ETT), remains on full vent support. CTH today stable in comparison to 4/25. 6 cuffed trach placed by ENT in OR, but came down without cuff. Replaced at bedside by ENT. On fentanyl gtt.    4/29: POD8 SOC, JIM overnight. Incision cleaned and dressing changed. On fentanyl gtt. EKG completed due to increased frequency PVCs on telemetry, frequency of PVCs improved with titrating up fentanyl gtt. ABG drawn.  Repeat LE dopplers completed showing persistant superficial thrombus, no DVT. No EVD waveform during day, flushed distally without improvement. Exam unchanged.  EVD dropped to 0 for low output in afternoon.   4/30: POD9. Neuro stable, febrile to 101.3F o/n, given tylenol and pan cx sent. SBP dipped to low 90s o/n, HR stable in 70s with some PVCs, decreased fentanyl gtt and given 500cc bolus of NS x 2. Pend PEG placement Mon and angio Tues. D/c'd ELENA drain today and suture placed. F/u heme recs. Positive UA. Infiltrates found on CXR. Started on Zosyn 4.5g Q6hrs .   5/1: POD 10. Neuro stable. Dc'd fentanyl gtt. PEG failed placement at bedside with Dr. Gant, plan for PEG in OR tomorrow afternoon with Dr. Layton. Dc'd amlodipine and started carvedilol 3.125 BID for PVCs . Made 3% inhalation and mucomyst q8hr. Failed PEG at bedside. Salt tabs made 1 q 6. Decreased cardura to 4mg daily. Urine culture growing E. Coli and sputum culture growing pluralibacter gergoviae and strep species. ID consulted, f/u recs. Failed CPAP trial @ 3pm. Added am labs per heme/onc for hypercoguable workup. Pending repeat LE dopplers in 2-3 days. NGT clogged, removed, ENT failed attempt at replacement, will keep NPO for PEG placement tmw. Repeat xray in am for concern for aspration. Pt abx switched from Zosyn to Ertapenem 1g Q24hrs. per ID recs, possible ESBL growth.   5/2: POD 11. Neuro stable. Pre-op for diagnostic cerebral angiogram and PEG placement. NPO. EVD raised to 5 cmH20. Sputum culture speciated to step pneumoniae, sensitive to ertapenem. 3% inhalation/mucomyst made q 6 hr d/t thick secretions. PEG placed in OR. POD0 dx cerebral angio. EVD raised to 10.   5/3: POD 12. Neuro stable. PEG feeds began @ 10 AM, plan to increase 10cc every 6h per general surgery. Repeat dopplers show stable R superficial thrombus and new L IM calf DVT. Vascular consulted for IVC filter. Plan to get CTH tomorrow.  5/4: POD 13. JIM o/n. s/p IVC filter with vascular today. Pending post-procedure CTH. FOBT negative. Started iron and vitamin c every other day for iron deficiency anemia.   5/5: POD14. CSF cx sent to r/o infection from new gas in right temporal horn seen on CTH. Restarted TF, changed to Jevity 1.2, f/u nutrition recs. EVD raised to 10 today, plan to challenge over the weekend and CTH on Monday, possible VPS next Wednesday. ENT removed sutures today. Salt tabs decreased 1q12.  5/6: POD15 Placed on CPAP briefly with low MV alert, placed back on full vent support. EVD remains open at [id number]. ICPs WNL. Neuro exam stable.  EVD raised to 15. Tolerated CPAP x8h.   5/7: POD#16. JIM overnight, neuro stable. D/c'd salt tabs and 3% neb. Wean trach to CPAP as tolerated. Pan cx NGTD. EVD raised today to [id number].   5/8: POD17 JIM overnight. ICPs WNL. Pt remains on full vent support. Neuro exam stable. Plan for possible VPS today. CT chio complete showing increase in vent size.   5/9: POD18 SOC, POD1 VPS. Desat o/n to 80s, improved on own. CXR with LLL effusion. Another desat to 90% in AM, given duoneb and mucomyst. Oxycodone given for incisional pain. Neuro exam stable. CTH in AM stable. Keppra dc'd. Ritalin 5 BID started.  Tolerating CPAP trial. SALVADOR ordered. Rectal tube dc'd.   5/10: POD 19 SOC POD2 VPS. Remains on full vent support. Tolerated CPAP for 6 hours. Neuro exam stable. BP liberalized to 160. RLQ US 2/2 abd pain. Lactate WNL.  5/11: POD20 SOC POD3 VPS. Plan for CPAP trial in AM.  Dc'd cardura. F/u speech consult for communcation board. Has been tolerating CPAP since 9am. RLQ us w/ no acute pathology. Per heme/onc LMWH or coumadin rec for AC over DOAC due to antiphospholipid syndrome.   5/12: POD21 SOC POD4 VPS. CPAP 8/5, tolerated until 3pm. Can start ASA on 5/14 and full AC POD 10 from VPS 5/19 (remove IVC filter prior, f/u w/ vascular on timing).   5/13: POD22 SOC POD 5 VPS, neuro stable, tolerating trach collar. ASA ordered to start tomorrow, Ritalin increased to 10BID from 5BID, Simethicone ordered for gas, f/u gen surg for continued abd discomfort, PM VBG with PCO2 50 and repeat VBG pCO2 55, possible obesity hypoventilation, f/u AM VBG   5/14: POD23 SOC, [id number], neuro stable, VBG showed increased PCO2 indicating poor ventilation, failed CPAP due to low TV, put back on full vent support.   5/15: POD24 SOC, POD7 VPS. O/n CT A/P done for abd pain, f/u read. neuro stable. remains on full vent support. Simethicone, Amlodipine d/c'd. Given 1L bolus of NS. Dilaudid 0.5 for pain control. CT A/P negative for acute pathology. Restarted tube feeds. Started Modafinil for neurostim.  5/16: POD25 SOC, POD8 VPS. JIM o/n neuro stable. remains on full vent support. DC carvedilol for intermittent hypotensive episodes. UA for suprapelvic pain, concern for nephrolithiasis based on CT AP.  5/17: POD26 SOC, POD9 VPS. JIM o/n neuro stable.   Modafinil increased. CTH completed, . Urology consulted for bladder calculi vs mass, pending urine cytology study and outptaient urology follow-up for cystoscopy. Trialing SIMV for vent weaning.   5/18: POD27 SOC, POD10 VPS. JIM o/n remains on SIMV.   Baby aspirin discontinued. SQL 80mg bid started for DVT treatement per hematology/neurology. Plan for CTH in am. Added amantadine for neurostim.   5/19: POD28, POD 11 VPS. JIM o/n, reapplied pressure dressing. tolerating CPAP  5/20: POD29, POD 12 VPS, reapplied pressure dressing o/n, tolerating trach collar. CTH today stable. Anti xa 0.8 (therapeutic)  5/21: POD 30. POD 13 VPS. Tolerating trach collar. Loose stools. Ritalin dc'd. Stepdown to telemetry. Neurologically stable.   5/22: POD 31. POD 14 VPS. Neurologically/hemodynamically stable. Pending dispo.   5/23: POD 32, POD 15 VPS. Headwrap in place, SOC sutures removed.   5/24: POD 33, POD 16 VPS. Patient coughed, trach dislodged. 6cuffed shiley replaced at bedside, bronch by MICU attending confirmed proper placement. O2 sats stable, CXR stable. Suture at SOC drain site d/c'd. VPS sutures remain in place. Pressure dressing changed. Pending dispo to AR. Patient restless, trying to get out of bed. Given seroquel 12.5. Decreased amantadine to 100mg daily and modafinil to 100mg daily.   5/25: POD 34, POD17 VPS, JIM ovn, neuro stable, pending ALMITA    Vital Signs Last 24 Hrs  T(C): 37.2 (24 May 2023 21:55), Max: 37.2 (24 May 2023 21:55)  T(F): 99 (24 May 2023 21:55), Max: 99 (24 May 2023 21:55)  HR: 74 (24 May 2023 21:29) (66 - 76)  BP: 123/60 (24 May 2023 20:24) (109/75 - 123/60)  BP(mean): 86 (24 May 2023 20:24) (73 - 87)  RR: 16 (24 May 2023 21:29) (16 - 18)  SpO2: 95% (24 May 2023 21:29) (91% - 100%)    Parameters below as of 24 May 2023 21:29  Patient On (Oxygen Delivery Method): tracheostomy collar    O2 Concentration (%): 40    I&O's Summary    23 May 2023 07:01  -  24 May 2023 07:00  --------------------------------------------------------  IN: 1720 mL / OUT: 1100 mL / NET: 620 mL    24 May 2023 07:01  -  25 May 2023 00:44  --------------------------------------------------------  IN: 200 mL / OUT: 0 mL / NET: 200 mL        PHYSICAL EXAM:  General: patient seen laying supine in bed in NAD  Neuro: AAO x 2-3 (nods appropriately to questions self, place), R gaze, FC, OE spontaneously, RUE 4+/5 with RUE drift, otherwise 5/5 strength throughout   HEENT: PERRL +trach collar   Pulmonary: chest rise symmetric   Abdomen: soft, nontender, nondistended +PEG   Ext: perfusing well   Skin: warm, dry   Wound: SOC incision c/d/i reinforced w/ pressure dressing ; VPS incisions C/D/I     TUBES/LINES:  [] Garsia  [] A-line  [] Lumbar Drain  [] Wound Drains  [] NGT   [] EVD   [] CVC  [] Other      DIET:  [] NPO  [x] Mechanical  [] Tube feeds    LABS:                        12.0   8.73  )-----------( 272      ( 23 May 2023 01:47 )             37.7     05-23    138  |  96  |  11  ----------------------------<  134<H>  4.4   |  33<H>  |  0.45<L>    Ca    9.7      23 May 2023 01:47  Phos  4.8     05-23  Mg     2.1     05-23    TPro  7.2  /  Alb  3.5  /  TBili  0.4  /  DBili  x   /  AST  31  /  ALT  54<H>  /  AlkPhos  125<H>  05-23        CARDIAC MARKERS ( 23 May 2023 01:47 )  x     / 0.01 ng/mL / x     / x     / x          CAPILLARY BLOOD GLUCOSE          Drug Levels: [] N/A    CSF Analysis: [] N/A      Allergies    No Known Allergies    Intolerances        Home Medications:  Albuterol (Eqv-ProAir HFA) 90 mcg/inh inhalation aerosol: 2 puff(s) inhaled every 6 hours as needed for  shortness of breath and/or wheezing (20 Apr 2023 22:36)      MEDICATIONS:  MEDICATIONS  (STANDING):  amantadine Syrup 100 milliGRAM(s) Oral <User Schedule>  ascorbic acid 500 milliGRAM(s) Oral <User Schedule>  atorvastatin 80 milliGRAM(s) Oral at bedtime  enoxaparin Injectable 80 milliGRAM(s) SubCutaneous every 12 hours  ferrous    sulfate 325 milliGRAM(s) Oral <User Schedule>  modafinil 100 milliGRAM(s) Oral <User Schedule>    MEDICATIONS  (PRN):  acetaminophen   Oral Liquid .. 650 milliGRAM(s) Oral every 6 hours PRN Temp greater or equal to 38.5C (101.3F), Mild Pain (1 - 3)  acetylcysteine 10%  Inhalation 4 milliLiter(s) Inhalation every 6 hours PRN increased secretions  albuterol/ipratropium for Nebulization 3 milliLiter(s) Nebulizer every 6 hours PRN Respiratory Distress  oxyCODONE    IR 5 milliGRAM(s) Oral every 4 hours PRN Moderate Pain (4 - 6)      CULTURES:      RADIOLOGY & ADDITIONAL TESTS:      ASSESSMENT:  56 y/o female found to have acute infarction within the right cerebellar hemisphere, causing herniation. Now s/p SOC foramen magnum decompression and right parietal/occipital EVD placement (4/21). s/p trach (4/28/23). s/p PEG (5/2/23), s/p dx cerebral angiogram (5/2/23). now s/p VPS Certas @ 4 (5/8/23).     Neuro   - Vitals q4h/neuro q4h   - s/p VPS (Certas @ 4)   - dx angio 5/2: b/l cavernous ICA aneurysms, as discussed at vascular conference f/u outpt   - CTH 4/28 stable; 5/4 new gas in right temporal horn, CT 5/8 chio increased vent size, 5/9 stable, CTH 5/17 increased suboccipital fluid collection   - Stroke consulted, appreciate reccs   - Pain control with tyl, oxy 5mg prn  - Modafinil 100mg qd, amantadine 100mg qd for neurostimulation. Ritalin d/c'd 5/21  - CTH 5/20 - stable     Cardio  - SBP   - TTE 4/24: negative for PFO, mild LVH, mild dilation of L atrium, EF 55-60%   - SALVADOR deferred, not indicated as it will not  at this time    Pulm  - + Trach (6 shiley), tolerating trach collar   - Chest PT, duoneb, mucomyst q 6 prn     GI  - + PEG wih TF (Jevity 1.2)  - Bowel regimen held for loose stools 5/21, banatrol started  - last BM 5/23  - CT A/P 5/15: no acute pathology    Renal  - Voiding via primafit   - 5/15 CT A/P bladder calculi vs mass, f/u urology outpatient for cystoscopy     Endo   - cont lipitor     Heme  - No SCDs, SQL 80 BID (5/18)   - s/p IVCF 5/4, keep per heme recs  - LE dopplers 5/3 DVT L IM calf and unchanged R superficial vein thrombus  - Heme following for + anticardiolipin Ab 4/27, recs appreciated  - Anemia: Iron and vitamin c every other day     ID  - +UA cx ESBL, +sputum cx pluralibacter gergoviae, strep pneumoniae, s/p Ertapenem (5/1-5/7)    PSYCH  - behavioral health consulted for tearfulness, recs appreciated     DISPO:  - SDU status, full code   - PT/OT rec ALMITA     D/w Dr. Valadez     Assessment: present when checked     [] GCS   E   V   M     Heart Failure: [] Acute, [] acute on chronic, [] chronic   Heart Failure: [] Diastolic (HFpEF), [] Systolic (HRrEF), [] Combined (HFpEF and HFrEF), [] RHF, [] Pulm HTN, [] Other     [] MAMTA, [] ATN, [] AIN, [] other   [] CKD1, [] CKD2, [] CKD3, [] CKD4, [] CKD5, [] ESRD     Encephalopathy: [] Metabolic, [] Hepatic, [] Toxic, [] Neurological, [] Other     Abnormal Nutritional Status: [] malnutrition (see nutrition note), []underweight: BMI <19, [] morbid obesity: BMI >40, [] Cachexia     [] Sepsis   [] Hypovolemic shock, [] Cardiogenic shock, [] Hemorrhagic shock, [] Neurogenic shock   [] Acute respiratory failure   [] Cerebral edema, [] Brain compression / herniation   [] Functional quadriplegia   [] Acute blood loss anemia

## 2023-05-25 NOTE — DISCHARGE NOTE NURSING/CASE MANAGEMENT/SOCIAL WORK - NSDCPEFALRISK_GEN_ALL_CORE
For information on Fall & Injury Prevention, visit: [email or web address] OR  [email or web address] OR  https://www.cdc.gov/steadi/patient.html

## 2023-05-25 NOTE — PROGRESS NOTE ADULT - PROVIDER SPECIALTY LIST ADULT
ENT
Heme/Onc
Infectious Disease
Infectious Disease
NSICU
Neurology
Neurosurgery
Surgery
Vascular Surgery
ENT
Hospitalist
NSICU
Neurology
Neurology
Neurosurgery
Surgery
Surgery
Critical Care
Heme/Onc
Heme/Onc
NSICU
Neurology
Neurosurgery
Surgery
Vascular Surgery
ENT
Heme/Onc
Hospitalist
NSICU
Neurology
Neurology
Neurosurgery
Surgery
NSICU

## 2023-05-25 NOTE — DISCHARGE NOTE NURSING/CASE MANAGEMENT/SOCIAL WORK - PATIENT PORTAL LINK FT
You can access the FollowMyHealth Patient Portal offered by [facility] by registering at the following website: [email or web address]. By joining [employer]’s FollowMyHealth portal, you will also be able to view your health information using other applications (apps) compatible with our system.

## 2023-05-30 PROBLEM — Z00.00 ENCOUNTER FOR PREVENTIVE HEALTH EXAMINATION: Status: ACTIVE | Noted: 2023-05-30

## 2023-05-31 DIAGNOSIS — J95.03 MALFUNCTION OF TRACHEOSTOMY STOMA: ICD-10-CM

## 2023-05-31 DIAGNOSIS — G62.9 POLYNEUROPATHY, UNSPECIFIED: ICD-10-CM

## 2023-05-31 DIAGNOSIS — B96.1 KLEBSIELLA PNEUMONIAE [K. PNEUMONIAE] AS THE CAUSE OF DISEASES CLASSIFIED ELSEWHERE: ICD-10-CM

## 2023-05-31 DIAGNOSIS — Z90.49 ACQUIRED ABSENCE OF OTHER SPECIFIED PARTS OF DIGESTIVE TRACT: ICD-10-CM

## 2023-05-31 DIAGNOSIS — R33.9 RETENTION OF URINE, UNSPECIFIED: ICD-10-CM

## 2023-05-31 DIAGNOSIS — Z16.12 EXTENDED SPECTRUM BETA LACTAMASE (ESBL) RESISTANCE: ICD-10-CM

## 2023-05-31 DIAGNOSIS — I25.10 ATHEROSCLEROTIC HEART DISEASE OF NATIVE CORONARY ARTERY WITHOUT ANGINA PECTORIS: ICD-10-CM

## 2023-05-31 DIAGNOSIS — B96.20 UNSPECIFIED ESCHERICHIA COLI [E. COLI] AS THE CAUSE OF DISEASES CLASSIFIED ELSEWHERE: ICD-10-CM

## 2023-05-31 DIAGNOSIS — I67.1 CEREBRAL ANEURYSM, NONRUPTURED: ICD-10-CM

## 2023-05-31 DIAGNOSIS — I63.541 CEREBRAL INFARCTION DUE TO UNSPECIFIED OCCLUSION OR STENOSIS OF RIGHT CEREBELLAR ARTERY: ICD-10-CM

## 2023-05-31 DIAGNOSIS — H49.00 THIRD [OCULOMOTOR] NERVE PALSY, UNSPECIFIED EYE: ICD-10-CM

## 2023-05-31 DIAGNOSIS — J96.01 ACUTE RESPIRATORY FAILURE WITH HYPOXIA: ICD-10-CM

## 2023-05-31 DIAGNOSIS — I63.89 OTHER CEREBRAL INFARCTION: ICD-10-CM

## 2023-05-31 DIAGNOSIS — R29.712 NIHSS SCORE 12: ICD-10-CM

## 2023-05-31 DIAGNOSIS — R41.0 DISORIENTATION, UNSPECIFIED: ICD-10-CM

## 2023-05-31 DIAGNOSIS — Z78.1 PHYSICAL RESTRAINT STATUS: ICD-10-CM

## 2023-05-31 DIAGNOSIS — G93.5 COMPRESSION OF BRAIN: ICD-10-CM

## 2023-05-31 DIAGNOSIS — N20.0 CALCULUS OF KIDNEY: ICD-10-CM

## 2023-05-31 DIAGNOSIS — D68.61 ANTIPHOSPHOLIPID SYNDROME: ICD-10-CM

## 2023-05-31 DIAGNOSIS — G96.198 OTHER DISORDERS OF MENINGES, NOT ELSEWHERE CLASSIFIED: ICD-10-CM

## 2023-05-31 DIAGNOSIS — D50.9 IRON DEFICIENCY ANEMIA, UNSPECIFIED: ICD-10-CM

## 2023-05-31 DIAGNOSIS — F43.21 ADJUSTMENT DISORDER WITH DEPRESSED MOOD: ICD-10-CM

## 2023-05-31 DIAGNOSIS — H49.10 FOURTH [TROCHLEAR] NERVE PALSY, UNSPECIFIED EYE: ICD-10-CM

## 2023-05-31 DIAGNOSIS — R74.01 ELEVATION OF LEVELS OF LIVER TRANSAMINASE LEVELS: ICD-10-CM

## 2023-05-31 DIAGNOSIS — R73.03 PREDIABETES: ICD-10-CM

## 2023-05-31 DIAGNOSIS — R13.10 DYSPHAGIA, UNSPECIFIED: ICD-10-CM

## 2023-05-31 DIAGNOSIS — Z90.710 ACQUIRED ABSENCE OF BOTH CERVIX AND UTERUS: ICD-10-CM

## 2023-05-31 DIAGNOSIS — N39.0 URINARY TRACT INFECTION, SITE NOT SPECIFIED: ICD-10-CM

## 2023-05-31 DIAGNOSIS — G93.6 CEREBRAL EDEMA: ICD-10-CM

## 2023-05-31 DIAGNOSIS — G51.9 DISORDER OF FACIAL NERVE, UNSPECIFIED: ICD-10-CM

## 2023-05-31 DIAGNOSIS — J45.909 UNSPECIFIED ASTHMA, UNCOMPLICATED: ICD-10-CM

## 2023-05-31 DIAGNOSIS — N32.9 BLADDER DISORDER, UNSPECIFIED: ICD-10-CM

## 2023-05-31 DIAGNOSIS — D62 ACUTE POSTHEMORRHAGIC ANEMIA: ICD-10-CM

## 2023-05-31 DIAGNOSIS — G81.91 HEMIPLEGIA, UNSPECIFIED AFFECTING RIGHT DOMINANT SIDE: ICD-10-CM

## 2023-05-31 DIAGNOSIS — G91.8 OTHER HYDROCEPHALUS: ICD-10-CM

## 2023-05-31 DIAGNOSIS — I65.01 OCCLUSION AND STENOSIS OF RIGHT VERTEBRAL ARTERY: ICD-10-CM

## 2023-05-31 DIAGNOSIS — I82.462 ACUTE EMBOLISM AND THROMBOSIS OF LEFT CALF MUSCULAR VEIN: ICD-10-CM

## 2023-05-31 DIAGNOSIS — D63.8 ANEMIA IN OTHER CHRONIC DISEASES CLASSIFIED ELSEWHERE: ICD-10-CM

## 2023-06-06 ENCOUNTER — NON-APPOINTMENT (OUTPATIENT)
Age: 58
End: 2023-06-06

## 2023-06-06 ENCOUNTER — APPOINTMENT (OUTPATIENT)
Dept: NEUROSURGERY | Facility: CLINIC | Age: 58
End: 2023-06-06
Payer: MEDICAID

## 2023-06-06 VITALS
WEIGHT: 165 LBS | TEMPERATURE: 97.3 F | BODY MASS INDEX: 29.23 KG/M2 | SYSTOLIC BLOOD PRESSURE: 119 MMHG | RESPIRATION RATE: 15 BRPM | OXYGEN SATURATION: 96 % | HEIGHT: 63 IN | DIASTOLIC BLOOD PRESSURE: 75 MMHG | HEART RATE: 86 BPM

## 2023-06-06 PROCEDURE — 99024 POSTOP FOLLOW-UP VISIT: CPT

## 2023-06-07 NOTE — REASON FOR VISIT
[FreeTextEntry1] : 58 yo F with PMH of Asthma, CAD (not on  meds), peripheral neuropathy and PSH \par of BATOOL, cholecystectomy, right knee surgery presented to [place] ED on \par 4/20 with right sided weakness and right facial numbness. Patient was \par undergoing preparation for colonoscopy. As per daughter at 8am patient was \par playing with her grandchildren but around 840 am patient was getting dressed \par and fell and subsequently felt weak after. Daughter reports that patient had \par some episodes of vomiting at this time. She had a syncopal episode at around 10 \par am and was witnessed by brother. No head trauma, She regained conscious after \par few minutes. She remained in bed for the remainder of the day. Daughter reports \par some slurred speech noted 1230-1PM and also was confused after. and around 4PM, \par the patient's sister noted right sided weakness at which time EMS was called \par and patient was brought to ED.\par \par Found to have right PICA distribution ischemic stroke.NIHSS 12 on admission [facility].\par \par Underwent emergent decompressive craniectomy 4/21/23. \par \par POstop course complicated by UTI. She required PEG tube placement.\par \par In brief:\par  4/21/23 emergent decompressive craniectomy\par 4/28/23 Tracheostomy\par 5/2/23 G tube placemenr\par 5/4/23 IVCF \par 5/8/23 Certas  shunt set at 4\par SHe was discharged to [facility].\par \par She comes in today for routine postop visit.\par \par Scalp: Head shape appears symmetrical. Nylon sutures in place along the frontal shunt incision. Scalp incision sites are healing appropriately without erythema, dehiscence, drainage, or signs of infection. Edges are well-approximated. No other erythema. No fluctuance or palpable fluid collections.\par \par \par

## 2023-06-07 NOTE — ASSESSMENT
[FreeTextEntry1] : Radha is 57 y.o. male/female with recent history of cerebllar stroke, underwent Suboccipital craniectomy, followed by right frontal vps. now in rehab, doing well.  presented to clinic today for post op check. \par Wounds healing well, no signs of infections, suture removal was done today, and dressing was changed. \par \par \par  \par - Can leave incision open to air.\par - Gently wash surgical sites daily with baby shampoo and water. Pat dry\par - No swimming or soaking in bathtub for 6 weeks post-operatively or until wounds have fully healed.- Avoid applying cream/ointment directly to surgical incisions for 6 weeks post-op.\par - Continue monitoring for signs of infection including increased pain, redness, swelling, fever (temperature > 100.4 F), or yellow/green/brown drainage\par .- Please call immediately with any of these symptoms. During office hours, call your our office at [phone]. Call 9-1-1 for any life-threatening signs or symptoms\par - Follow up with rest of medical team as advised\par - Follow-up in 8 weeks with Dr. Valadez \par \par \par Patient verbalized understanding and agreement with treatment plan.\par I, Dr. Valadez, personally performed the evaluation and management (E/M) services for this new patient. That E/M includes conducting the initial examination, assessing all conditions, and establishing the plan of care. \par \par Today, I personally spent 45 minutes in direct face to face time with the patient, of which greater than 50% of the time was spent in patient education and counseling as described above.\par

## 2023-06-21 ENCOUNTER — APPOINTMENT (OUTPATIENT)
Dept: UROLOGY | Facility: CLINIC | Age: 58
End: 2023-06-21

## 2023-06-25 ENCOUNTER — INPATIENT (INPATIENT)
Facility: HOSPITAL | Age: 58
LOS: 4 days | Discharge: NURSING, REHAB OR LONG-TERM CARE | DRG: 814 | End: 2023-06-30
Attending: PSYCHIATRY & NEUROLOGY | Admitting: STUDENT IN AN ORGANIZED HEALTH CARE EDUCATION/TRAINING PROGRAM
Payer: COMMERCIAL

## 2023-06-25 VITALS
SYSTOLIC BLOOD PRESSURE: 127 MMHG | OXYGEN SATURATION: 98 % | HEART RATE: 61 BPM | HEIGHT: 62 IN | RESPIRATION RATE: 16 BRPM | TEMPERATURE: 98 F | WEIGHT: 171.96 LBS | DIASTOLIC BLOOD PRESSURE: 57 MMHG

## 2023-06-25 DIAGNOSIS — R09.89 OTHER SPECIFIED SYMPTOMS AND SIGNS INVOLVING THE CIRCULATORY AND RESPIRATORY SYSTEMS: ICD-10-CM

## 2023-06-25 DIAGNOSIS — Z90.49 ACQUIRED ABSENCE OF OTHER SPECIFIED PARTS OF DIGESTIVE TRACT: Chronic | ICD-10-CM

## 2023-06-25 DIAGNOSIS — Z98.890 OTHER SPECIFIED POSTPROCEDURAL STATES: Chronic | ICD-10-CM

## 2023-06-25 DIAGNOSIS — Z90.710 ACQUIRED ABSENCE OF BOTH CERVIX AND UTERUS: Chronic | ICD-10-CM

## 2023-06-25 DIAGNOSIS — Z98.2 PRESENCE OF CEREBROSPINAL FLUID DRAINAGE DEVICE: Chronic | ICD-10-CM

## 2023-06-25 LAB
ALBUMIN SERPL ELPH-MCNC: 3.8 G/DL — SIGNIFICANT CHANGE UP (ref 3.3–5)
ALP SERPL-CCNC: 120 U/L — SIGNIFICANT CHANGE UP (ref 40–120)
ALT FLD-CCNC: 119 U/L — HIGH (ref 10–45)
ANION GAP SERPL CALC-SCNC: 10 MMOL/L — SIGNIFICANT CHANGE UP (ref 5–17)
APPEARANCE UR: CLEAR — SIGNIFICANT CHANGE UP
APTT BLD: 43.7 SEC — HIGH (ref 27.5–35.5)
AST SERPL-CCNC: 49 U/L — HIGH (ref 10–40)
BASOPHILS # BLD AUTO: 0.01 K/UL — SIGNIFICANT CHANGE UP (ref 0–0.2)
BASOPHILS NFR BLD AUTO: 0.3 % — SIGNIFICANT CHANGE UP (ref 0–2)
BILIRUB SERPL-MCNC: 0.2 MG/DL — SIGNIFICANT CHANGE UP (ref 0.2–1.2)
BILIRUB UR-MCNC: NEGATIVE — SIGNIFICANT CHANGE UP
BLD GP AB SCN SERPL QL: NEGATIVE — SIGNIFICANT CHANGE UP
BUN SERPL-MCNC: 10 MG/DL — SIGNIFICANT CHANGE UP (ref 7–23)
CALCIUM SERPL-MCNC: 9.2 MG/DL — SIGNIFICANT CHANGE UP (ref 8.4–10.5)
CHLORIDE SERPL-SCNC: 100 MMOL/L — SIGNIFICANT CHANGE UP (ref 96–108)
CO2 SERPL-SCNC: 31 MMOL/L — SIGNIFICANT CHANGE UP (ref 22–31)
COLOR SPEC: YELLOW — SIGNIFICANT CHANGE UP
CREAT SERPL-MCNC: 0.47 MG/DL — LOW (ref 0.5–1.3)
DIFF PNL FLD: NEGATIVE — SIGNIFICANT CHANGE UP
EGFR: 112 ML/MIN/1.73M2 — SIGNIFICANT CHANGE UP
EOSINOPHIL # BLD AUTO: 0.15 K/UL — SIGNIFICANT CHANGE UP (ref 0–0.5)
EOSINOPHIL NFR BLD AUTO: 3.8 % — SIGNIFICANT CHANGE UP (ref 0–6)
GGT SERPL-CCNC: 101 U/L — HIGH (ref 8–40)
GLUCOSE BLDC GLUCOMTR-MCNC: 108 MG/DL — HIGH (ref 70–99)
GLUCOSE BLDC GLUCOMTR-MCNC: 109 MG/DL — HIGH (ref 70–99)
GLUCOSE BLDC GLUCOMTR-MCNC: 111 MG/DL — HIGH (ref 70–99)
GLUCOSE SERPL-MCNC: 120 MG/DL — HIGH (ref 70–99)
GLUCOSE UR QL: NEGATIVE — SIGNIFICANT CHANGE UP
HCT VFR BLD CALC: 35.4 % — SIGNIFICANT CHANGE UP (ref 34.5–45)
HGB BLD-MCNC: 11.7 G/DL — SIGNIFICANT CHANGE UP (ref 11.5–15.5)
IMM GRANULOCYTES NFR BLD AUTO: 0.3 % — SIGNIFICANT CHANGE UP (ref 0–0.9)
INR BLD: 1.07 — SIGNIFICANT CHANGE UP (ref 0.88–1.16)
KETONES UR-MCNC: NEGATIVE — SIGNIFICANT CHANGE UP
LEUKOCYTE ESTERASE UR-ACNC: NEGATIVE — SIGNIFICANT CHANGE UP
LIDOCAIN IGE QN: 25 U/L — SIGNIFICANT CHANGE UP (ref 7–60)
LYMPHOCYTES # BLD AUTO: 1.62 K/UL — SIGNIFICANT CHANGE UP (ref 1–3.3)
LYMPHOCYTES # BLD AUTO: 40.8 % — SIGNIFICANT CHANGE UP (ref 13–44)
MCHC RBC-ENTMCNC: 29 PG — SIGNIFICANT CHANGE UP (ref 27–34)
MCHC RBC-ENTMCNC: 33.1 GM/DL — SIGNIFICANT CHANGE UP (ref 32–36)
MCV RBC AUTO: 87.8 FL — SIGNIFICANT CHANGE UP (ref 80–100)
MONOCYTES # BLD AUTO: 0.32 K/UL — SIGNIFICANT CHANGE UP (ref 0–0.9)
MONOCYTES NFR BLD AUTO: 8.1 % — SIGNIFICANT CHANGE UP (ref 2–14)
NEUTROPHILS # BLD AUTO: 1.86 K/UL — SIGNIFICANT CHANGE UP (ref 1.8–7.4)
NEUTROPHILS NFR BLD AUTO: 46.7 % — SIGNIFICANT CHANGE UP (ref 43–77)
NITRITE UR-MCNC: NEGATIVE — SIGNIFICANT CHANGE UP
NRBC # BLD: 0 /100 WBCS — SIGNIFICANT CHANGE UP (ref 0–0)
PH UR: 7 — SIGNIFICANT CHANGE UP (ref 5–8)
PLATELET # BLD AUTO: 270 K/UL — SIGNIFICANT CHANGE UP (ref 150–400)
POTASSIUM SERPL-MCNC: 3.4 MMOL/L — LOW (ref 3.5–5.3)
POTASSIUM SERPL-SCNC: 3.4 MMOL/L — LOW (ref 3.5–5.3)
PROT SERPL-MCNC: 6.8 G/DL — SIGNIFICANT CHANGE UP (ref 6–8.3)
PROT UR-MCNC: NEGATIVE MG/DL — SIGNIFICANT CHANGE UP
PROTHROM AB SERPL-ACNC: 12.8 SEC — SIGNIFICANT CHANGE UP (ref 10.5–13.4)
RBC # BLD: 4.03 M/UL — SIGNIFICANT CHANGE UP (ref 3.8–5.2)
RBC # FLD: 13.2 % — SIGNIFICANT CHANGE UP (ref 10.3–14.5)
RH IG SCN BLD-IMP: POSITIVE — SIGNIFICANT CHANGE UP
SODIUM SERPL-SCNC: 141 MMOL/L — SIGNIFICANT CHANGE UP (ref 135–145)
SP GR SPEC: 1.01 — SIGNIFICANT CHANGE UP (ref 1–1.03)
UROBILINOGEN FLD QL: 0.2 E.U./DL — SIGNIFICANT CHANGE UP
WBC # BLD: 3.97 K/UL — SIGNIFICANT CHANGE UP (ref 3.8–10.5)
WBC # FLD AUTO: 3.97 K/UL — SIGNIFICANT CHANGE UP (ref 3.8–10.5)

## 2023-06-25 PROCEDURE — G1004: CPT

## 2023-06-25 PROCEDURE — 74177 CT ABD & PELVIS W/CONTRAST: CPT | Mod: 26,MG

## 2023-06-25 PROCEDURE — 99024 POSTOP FOLLOW-UP VISIT: CPT

## 2023-06-25 PROCEDURE — 76705 ECHO EXAM OF ABDOMEN: CPT | Mod: 26

## 2023-06-25 PROCEDURE — 71045 X-RAY EXAM CHEST 1 VIEW: CPT | Mod: 26

## 2023-06-25 PROCEDURE — 99285 EMERGENCY DEPT VISIT HI MDM: CPT

## 2023-06-25 PROCEDURE — 70450 CT HEAD/BRAIN W/O DYE: CPT | Mod: 26

## 2023-06-25 RX ORDER — SODIUM CHLORIDE 9 MG/ML
1000 INJECTION, SOLUTION INTRAVENOUS
Refills: 0 | Status: DISCONTINUED | OUTPATIENT
Start: 2023-06-25 | End: 2023-06-30

## 2023-06-25 RX ORDER — ONDANSETRON 8 MG/1
4 TABLET, FILM COATED ORAL ONCE
Refills: 0 | Status: COMPLETED | OUTPATIENT
Start: 2023-06-25 | End: 2023-06-25

## 2023-06-25 RX ORDER — IPRATROPIUM/ALBUTEROL SULFATE 18-103MCG
3 AEROSOL WITH ADAPTER (GRAM) INHALATION EVERY 6 HOURS
Refills: 0 | Status: DISCONTINUED | OUTPATIENT
Start: 2023-06-25 | End: 2023-06-30

## 2023-06-25 RX ORDER — MORPHINE SULFATE 50 MG/1
4 CAPSULE, EXTENDED RELEASE ORAL ONCE
Refills: 0 | Status: DISCONTINUED | OUTPATIENT
Start: 2023-06-25 | End: 2023-06-25

## 2023-06-25 RX ORDER — ACETYLCYSTEINE 200 MG/ML
4 VIAL (ML) MISCELLANEOUS EVERY 6 HOURS
Refills: 0 | Status: DISCONTINUED | OUTPATIENT
Start: 2023-06-25 | End: 2023-06-30

## 2023-06-25 RX ORDER — ENOXAPARIN SODIUM 100 MG/ML
80 INJECTION SUBCUTANEOUS EVERY 12 HOURS
Refills: 0 | Status: DISCONTINUED | OUTPATIENT
Start: 2023-06-25 | End: 2023-06-30

## 2023-06-25 RX ORDER — ATORVASTATIN CALCIUM 80 MG/1
80 TABLET, FILM COATED ORAL AT BEDTIME
Refills: 0 | Status: DISCONTINUED | OUTPATIENT
Start: 2023-06-25 | End: 2023-06-27

## 2023-06-25 RX ORDER — DEXTROSE 50 % IN WATER 50 %
12.5 SYRINGE (ML) INTRAVENOUS ONCE
Refills: 0 | Status: DISCONTINUED | OUTPATIENT
Start: 2023-06-25 | End: 2023-06-30

## 2023-06-25 RX ORDER — SIMETHICONE 80 MG/1
80 TABLET, CHEWABLE ORAL EVERY 6 HOURS
Refills: 0 | Status: DISCONTINUED | OUTPATIENT
Start: 2023-06-25 | End: 2023-06-30

## 2023-06-25 RX ORDER — ACETAMINOPHEN 500 MG
650 TABLET ORAL EVERY 6 HOURS
Refills: 0 | Status: DISCONTINUED | OUTPATIENT
Start: 2023-06-25 | End: 2023-06-27

## 2023-06-25 RX ORDER — FERROUS SULFATE 325(65) MG
325 TABLET ORAL EVERY 24 HOURS
Refills: 0 | Status: DISCONTINUED | OUTPATIENT
Start: 2023-06-25 | End: 2023-06-28

## 2023-06-25 RX ORDER — GLUCAGON INJECTION, SOLUTION 0.5 MG/.1ML
1 INJECTION, SOLUTION SUBCUTANEOUS ONCE
Refills: 0 | Status: DISCONTINUED | OUTPATIENT
Start: 2023-06-25 | End: 2023-06-30

## 2023-06-25 RX ORDER — DEXTROSE 50 % IN WATER 50 %
25 SYRINGE (ML) INTRAVENOUS ONCE
Refills: 0 | Status: DISCONTINUED | OUTPATIENT
Start: 2023-06-25 | End: 2023-06-30

## 2023-06-25 RX ORDER — SENNA PLUS 8.6 MG/1
2 TABLET ORAL AT BEDTIME
Refills: 0 | Status: DISCONTINUED | OUTPATIENT
Start: 2023-06-25 | End: 2023-06-30

## 2023-06-25 RX ORDER — SODIUM CHLORIDE 9 MG/ML
1000 INJECTION INTRAMUSCULAR; INTRAVENOUS; SUBCUTANEOUS ONCE
Refills: 0 | Status: COMPLETED | OUTPATIENT
Start: 2023-06-25 | End: 2023-06-25

## 2023-06-25 RX ORDER — INSULIN LISPRO 100/ML
VIAL (ML) SUBCUTANEOUS
Refills: 0 | Status: DISCONTINUED | OUTPATIENT
Start: 2023-06-25 | End: 2023-06-30

## 2023-06-25 RX ORDER — IOHEXOL 300 MG/ML
30 INJECTION, SOLUTION INTRAVENOUS ONCE
Refills: 0 | Status: COMPLETED | OUTPATIENT
Start: 2023-06-25 | End: 2023-06-25

## 2023-06-25 RX ORDER — POLYETHYLENE GLYCOL 3350 17 G/17G
17 POWDER, FOR SOLUTION ORAL DAILY
Refills: 0 | Status: DISCONTINUED | OUTPATIENT
Start: 2023-06-25 | End: 2023-06-30

## 2023-06-25 RX ORDER — DEXTROSE 50 % IN WATER 50 %
15 SYRINGE (ML) INTRAVENOUS ONCE
Refills: 0 | Status: DISCONTINUED | OUTPATIENT
Start: 2023-06-25 | End: 2023-06-30

## 2023-06-25 RX ORDER — OXYCODONE HYDROCHLORIDE 5 MG/1
5 TABLET ORAL EVERY 4 HOURS
Refills: 0 | Status: DISCONTINUED | OUTPATIENT
Start: 2023-06-25 | End: 2023-06-30

## 2023-06-25 RX ADMIN — Medication 325 MILLIGRAM(S): at 12:59

## 2023-06-25 RX ADMIN — SODIUM CHLORIDE 1000 MILLILITER(S): 9 INJECTION INTRAMUSCULAR; INTRAVENOUS; SUBCUTANEOUS at 05:38

## 2023-06-25 RX ADMIN — ONDANSETRON 4 MILLIGRAM(S): 8 TABLET, FILM COATED ORAL at 03:32

## 2023-06-25 RX ADMIN — IOHEXOL 30 MILLILITER(S): 300 INJECTION, SOLUTION INTRAVENOUS at 04:10

## 2023-06-25 RX ADMIN — MORPHINE SULFATE 4 MILLIGRAM(S): 50 CAPSULE, EXTENDED RELEASE ORAL at 03:33

## 2023-06-25 RX ADMIN — SODIUM CHLORIDE 1000 MILLILITER(S): 9 INJECTION INTRAMUSCULAR; INTRAVENOUS; SUBCUTANEOUS at 06:14

## 2023-06-25 RX ADMIN — ATORVASTATIN CALCIUM 80 MILLIGRAM(S): 80 TABLET, FILM COATED ORAL at 21:52

## 2023-06-25 RX ADMIN — SENNA PLUS 2 TABLET(S): 8.6 TABLET ORAL at 21:52

## 2023-06-25 RX ADMIN — SODIUM CHLORIDE 1000 MILLILITER(S): 9 INJECTION INTRAMUSCULAR; INTRAVENOUS; SUBCUTANEOUS at 03:32

## 2023-06-25 RX ADMIN — SODIUM CHLORIDE 1000 MILLILITER(S): 9 INJECTION INTRAMUSCULAR; INTRAVENOUS; SUBCUTANEOUS at 07:29

## 2023-06-25 RX ADMIN — MORPHINE SULFATE 4 MILLIGRAM(S): 50 CAPSULE, EXTENDED RELEASE ORAL at 04:00

## 2023-06-25 RX ADMIN — Medication 650 MILLIGRAM(S): at 23:43

## 2023-06-25 RX ADMIN — ENOXAPARIN SODIUM 80 MILLIGRAM(S): 100 INJECTION SUBCUTANEOUS at 17:01

## 2023-06-25 NOTE — ED PROVIDER NOTE - OBJECTIVE STATEMENT
58 y/o f hx CAD, asthma, peripheral neuropathy, now s/p CVA and craniectomy last month, s/p trach and peg presents sent from nursing facility with c/o right side abd pain and vomiting over the past 2 days.  Pt's family member reports pt has vomited several times over the past few days, pain has been constant for the past day.  Denies fever, chills, dysuria, diarrhea, all other ROS negative.

## 2023-06-25 NOTE — H&P ADULT - ASSESSMENT
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 and L. PICA) s/p SOC foramen magnum decompression and right parietal/occipital EVD placement (4/21), s/p trach (4/28/23), s/p PEG (5/2/23) sent from nursing facility c/o right side abdominal pain and vomiting x 2 days, CTH with findings of new acute intraparenchymal hemorrhage in the right cerebellar hemisphere.    Neuro:   - neuro/vitals q1h  - repeat CTH in 6 hours    Cardio:  - SBP<140    Pulm:  - room air, trach collar    GI:   - TF via PEG  - bowel regimen   - pending RUQ abdominal US for abdominal tenderness    Endo:  - ISS    Renal:  - voiding  - IVL    Heme:  - SCDs for DVT ppx, hold lovenox until repeat CTH    ID:   - afebrile  - h/o ecoli UTI, contact isolation for now    Dispo: ICU status, full code, pending PT/OT     Discussed w/ Dr. Bentley and Dr. Valadez

## 2023-06-25 NOTE — H&P ADULT - HISTORY OF PRESENT ILLNESS
56 y/o female PMHx CAD, asthma, peripheral neuropathy, found to have acute infarction within the right cerebellar hemisphere s/p SOC foramen magnum decompression and right parietal/occipital EVD placement (4/21), s/p trach (4/28/23). s/p PEG (5/2/23), s/p dx cerebral angiogram (5/2/23) with findings of . now s/p VPS Certas @ 4 (5/8/23) 57F w/ PMH of Asthma, CAD, HTN, prior miscarriage x3, peripheral neuropathy, bilateral cerebellar infarctions likely 2/2 APLS on therapeutic lovenox (GABRIEL and L. PICA) s/p SOC foramen magnum decompression and right parietal/occipital EVD placement (4/21), s/p trach (4/28/23), s/p PEG (5/2/23) sent from nursing facility c/o right side abdominal pain, vomiting and mild generalized headache x 2 days, CTH with new acute intraparenchymal   hemorrhage in the right cerebellar hemisphere. Pt denies recent injury/trauma to head, neck pain, acute weakness/paresthesias of extremities, urinary/bowel incontinence, seizures. Patient is currently on lovenox 80mg BID, last dose given 8pm last night.

## 2023-06-25 NOTE — H&P ADULT - NSICDXPASTSURGICALHX_GEN_ALL_CORE_FT
PAST SURGICAL HISTORY:  S/P cholecystectomy     S/P right knee surgery     S/P total abdominal hysterectomy     S/P ventriculoperitoneal shunt     Status post craniectomy

## 2023-06-25 NOTE — CONSULT NOTE ADULT - ASSESSMENT
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 and L. PICA) s/p SOC foramen magnum decompression and right parietal/occipital EVD placement (4/21), s/p trach (4/28/23), s/p PEG (5/2/23) sent from nursing facility c/o right side abdominal pain and vomiting x 2 days, CTH with hyperdensity concern for hemorrhagic transformation of right cerebellar infarct. [place] [facility] [place] after repeat CTH in which previously evident hyperdensity along the superior right cerebellum is no longer identified, likely contrast extraversion from CT ab/pelvis for abdomen pain w/u.     Neuro  - c/w full dose lovenox 80 BID with plan to bridge to coumadin 6/26  - family plans on going back home after discharge instead of back to rehab  - continue atorvastatin 80mg daily  - stroke neuro checks q4h, VS q4h  - CT head stable; unlikely cerebellar bleed   - Stroke education    Cards  #HTN  - monitor on telemetry   - vitals q4  - sbp goal 100-140    #HLD  - high dose statin as above in CVA  - LDL results: pending    Pulm  - call provider if SPO2 < 94%  - s/p trach w/ humidified air   - c/w Mucomyst, duoneb prn     GI  - start tube feeds, CT ab/pelvis reviewed no acute pathology   - abdomen pain likely gas now resolved ; start simethacone PRN 80mg q6   - ensure BMs w/ Miralax & senna - hold for lose stools  - transaminitis - add on GGT, trend LFTs review medications as cause; check hepatitis panel. d/c neurostimulates i.e amantadine & modafinil   - GI ppx : Protonix 40mg qd  - Daily stool count    RENAL/:  -check BMP qd  -Na goal 135-145    Infectious Disease  - Monitor for fevers     Endocrine  A1c- 5.9    HEME/ONC:  - hx DVT s/p IVC filter, APLS on lovenox SQ therapeutic dose will resume 80mg BID given hyperintensity on CT likely 2/2 contrast administered from CT ab/pelvis     DVT Prophylaxis  - lovenox sq full dose for DVT prophylaxis   - SCDs for DVT prophylaxis     Dispo: stroke tele     Discussed daily hospital plans and goals with patient and daughter at bedside.      Discussed with Neurology Attending Dr. Garcia     57F w/ PMH of Asthma, CAD, HTN, prior miscarriage x3, peripheral neuropathy, bilateral cerebellar infarctions likely 2/2 APLS on therapeutic lovenox (R. and L. PICA) s/p SOC foramen magnum decompression and right parietal/occipital EVD placement (4/21), s/p trach (4/28/23), s/p PEG (5/2/23) sent from nursing facility c/o right side abdominal pain and vomiting x 2 days, CTH with hyperdensity concern for hemorrhagic transformation of right cerebellar infarct. [place] [facility] [place] after repeat CTH in which previously evident hyperdensity along the superior right cerebellum is no longer identified, likely contrast extraversion from CT ab/pelvis for abdomen pain w/u.     Neuro  - c/w full dose lovenox 80 BID with plan to bridge to coumadin 6/26  - family plans on going back home after discharge instead of back to rehab  - continue atorvastatin 80mg daily  - stroke neuro checks q4h, VS q4h  - CT head stable; unlikely cerebellar bleed   - Stroke education    Cards  #HTN  - monitor on telemetry   - vitals q4  - sbp goal 100-140    #HLD  - high dose statin as above in CVA  - LDL results: pending    Pulm  - call provider if SPO2 < 94%  - s/p trach w/ humidified air   - c/w Mucomyst, duoneb prn     GI  - start tube feeds, CT ab/pelvis reviewed no acute pathology   - abdomen pain likely gas now resolved ; start simethacone PRN 80mg q6   - ensure BMs w/ Miralax & senna - hold for lose stools  - transaminitis - add on GGT, liver US, trend LFTs, review medications as cause; check hepatitis panel. d/c neurostimulates i.e amantadine & modafinil   - GI ppx : Protonix 40mg qd  - Daily stool count    RENAL/:  -check BMP qd  -Na goal 135-145    Infectious Disease  - Monitor for fevers     Endocrine  A1c- 5.9    HEME/ONC:  - hx DVT s/p IVC filter, APLS on lovenox SQ therapeutic dose will resume 80mg BID given hyperintensity on CT likely 2/2 contrast administered from CT ab/pelvis     DVT Prophylaxis  - lovenox sq full dose for DVT prophylaxis   - SCDs for DVT prophylaxis     Dispo: stroke tele     Discussed daily hospital plans and goals with patient and daughter at bedside.      Discussed with Neurology Attending Dr. Garcia     57F w/ PMH of Asthma, CAD, HTN, prior miscarriage x3, peripheral neuropathy, bilateral cerebellar infarctions likely 2/2 APLS on therapeutic lovenox (R. and L. PICA) s/p SOC foramen magnum decompression and right parietal/occipital EVD placement (4/21), s/p trach (4/28/23), s/p PEG (5/2/23) sent from nursing facility c/o right side abdominal pain and vomiting x 2 days, CTH with hyperdensity concern for hemorrhagic transformation of right cerebellar infarct. [place] [facility] [place] repeat [place]TH in which previously evident hyperdensity along the superior right cerebellum is no longer identified, likely contrast extraversion from CT ab/pelvis for abdomen pain w/u.     Neuro  - c/w full dose lovenox 80 BID with plan to bridge to coumadin 6/26  - family plans on going back home after discharge instead of back to rehab  - continue atorvastatin 80mg daily  - stroke neuro checks q4h, VS q4h  - CT head stable; unlikely cerebellar bleed   - Stroke education    Cards  #HTN  - monitor on telemetry   - vitals q4  - sbp goal 100-140    #HLD  - high dose statin as above in CVA  - LDL results: pending    Pulm  - call provider if SPO2 < 94%  - s/p trach w/ humidified air   - c/w Mucomyst, duoneb prn     GI  + peg, tube feeds, CT ab/pelvis reviewed no acute pathology   - abdomen pain likely gas now resolved ; start simethacone PRN 80mg q6   - ensure BMs w/ Miralax & senna - hold for lose stools  - transaminitis - add on GGT, liver US, trend LFTs, review medications as cause; check hepatitis panel. d/c neurostimulates i.e amantadine & modafinil   - GI ppx : Protonix 40mg qd  - Daily stool count    RENAL/:  -check BMP qd  -Na goal 135-145    Infectious Disease  - Monitor for fevers     Endocrine  A1c- 5.9    HEME/ONC:  - hx DVT s/p IVC filter, APLS on lovenox SQ therapeutic dose will resume 80mg BID given hyperintensity on CT likely 2/2 contrast administered from CT ab/pelvis     DVT Prophylaxis  - lovenox sq full dose for DVT prophylaxis   - SCDs for DVT prophylaxis     Dispo: stroke tele     Discussed daily hospital plans and goals with patient and daughter at bedside.      Discussed with Neurology Attending Dr. Garcia     57F w/ PMH of Asthma, CAD, HTN, prior miscarriage x3, peripheral neuropathy, bilateral cerebellar infarctions likely 2/2 APLS on therapeutic lovenox (R. and L. PICA) s/p SOC foramen magnum decompression and right parietal/occipital EVD placement (4/21), s/p trach (4/28/23), s/p PEG (5/2/23) sent from nursing facility c/o right side abdominal pain and vomiting x 2 days, CTH with hyperdensity concern for hemorrhagic transformation of right cerebellar infarct. [place] [facility] [place] after repeat CTH in which previously evident hyperdensity along the superior right cerebellum is no longer identified, likely contrast extraversion from CT ab/pelvis for abdomen pain w/u.     Neuro  - c/w full dose lovenox 80 BID with plan to bridge to coumadin 6/26  - family plans on going back home after discharge instead of back to rehab  - continue atorvastatin 80mg daily  - stroke neuro checks q4h, VS q4h  - CT head stable; unlikely cerebellar bleed   - Stroke education    Cards  #HTN  - monitor on telemetry   - vitals q4  - sbp goal 100-140    #HLD  - high dose statin as above in CVA  - LDL results: pending    Pulm  - call provider if SPO2 < 94%  - s/p trach w/ humidified air   - c/w Mucomyst, duoneb prn     GI  + peg, tube feeds, CT ab/pelvis reviewed no acute pathology   - abdomen pain likely gas now resolved ; start simethacone PRN 80mg q6   - ensure BMs w/ Miralax & senna - hold for lose stools  - transaminitis - add on GGT, liver US, trend LFTs, review medications as cause; check hepatitis panel. d/c neurostimulates i.e amantadine & modafinil   - GI ppx : Protonix 40mg qd  - Daily stool count    RENAL/:  -check BMP qd  -Na goal 135-145    Infectious Disease  - Monitor for fevers     Endocrine  A1c- 5.9    HEME/ONC:  - hx DVT s/p IVC filter, APLS, c/w full dose lovenox 80 BID with plan to bridge to coumadin 6/26 given hyperintensity on CT likely 2/2 contrast administered from CT ab/pelvis     DVT Prophylaxis  - lovenox sq full dose for DVT prophylaxis   - SCDs for DVT prophylaxis     Dispo: stroke tele     Discussed daily hospital plans and goals with patient and daughter at bedside.      Discussed with Neurology Attending Dr. Garcia

## 2023-06-25 NOTE — ED ADULT NURSE NOTE - OBJECTIVE STATEMENT
Patient is a 57yoF bib daughter from rehab center for abd pain x 3 days. Pt is awake and alert, daughter reports that patient has been c/o worsening abd pain, with nausea and vomiting. Denies diarrhea/constipation. Abs is soft and tender on assessment, also endorsing dysuria, patient currently on abx for uti, denies chest pain/diff breathing, denies numbness/tingling.

## 2023-06-25 NOTE — ED PROVIDER NOTE - NS ED ATTENDING STATEMENT MOD
This was a shared visit with the DEIDRE. I reviewed and verified the documentation and independently performed the documented:

## 2023-06-25 NOTE — CONSULT NOTE ADULT - SUBJECTIVE AND OBJECTIVE BOX
**STROKE CONSULT NOTE**    ACCEPTANCE NOTE FROM [facility] [place]OKE TELE    HPI: 57F w/ PMH of Asthma, CAD, HTN, prior miscarriage x3, peripheral neuropathy, bilateral cerebellar infarctions (R. and L. PICA) s/p SOC foramen magnum decompression and right parietal/occipital EVD placement (4/21). s/p trach (4/28/23). s/p PEG (5/2/23) sent from nursing facility with c/o right side abd pain and vomiting over the past 2 days, CTH with hemorrhagic transformation of right cerebellar infarct. Transferred to stroke tele from [facility] [place] 6/25.     During 4/2023 admission, catheter angiogram demonstrated bilateral cavernous ICA aneurysms but no atherosclerotic disease demonstrated. TTE mild LA enlargement. A1c 5.9, LDL 92. DRVVT negative (04/29/23) while off heparin products. Anti-ezdl4umzmahhqmrkx screen (04/25) negative. Hexagonal phase (04/29/23) positive. Anticardiolipin Ab IgG < 5, IgM 28.4, IgA 9.2. Bilateral US LE +new L. calf DVT s/p IVC filter. s/p VPS Certas @ 4 (5/8/23). CTH 5/17 demonstrated interval increase in R. frontotemporal hyodense SDH collection; Increased size of suboccipital fluid collection, 1.5cm in greatest width. Per hematology, her history of miscarriages and DVTS along with two hexagonal phase positive tests is suggestive of APLS. Discharged on full dose therapeutic lovenox to LTAC. SALVADOR/ILR was deferred given strong suspicion for APLS as the etiology of stroke.      T(C): 36.8 (06-25-23 @ 07:24), Max: 36.8 (06-25-23 @ 07:24)  HR: 73 (06-25-23 @ 11:00) (61 - 88)  BP: 135/70 (06-25-23 @ 10:45) (127/57 - 164/80)  RR: 20 (06-25-23 @ 11:00) (16 - 20)  SpO2: 100% (06-25-23 @ 11:00) (98% - 100%)    PAST MEDICAL & SURGICAL HISTORY:  Asthma      CAD (coronary artery disease)      Peripheral neuropathy      S/P total abdominal hysterectomy      S/P cholecystectomy      S/P right knee surgery          FAMILY HISTORY:  No pertinent family history in first degree relatives        ROS:   see HPI    MEDICATIONS  (STANDING):  polyethylene glycol 3350 17 Gram(s) Oral daily  senna 2 Tablet(s) Oral at bedtime    MEDICATIONS  (PRN):  acetaminophen     Tablet .. 650 milliGRAM(s) Oral every 6 hours PRN Temp greater or equal to 38C (100.4F), Mild Pain (1 - 3)    Allergies    No Known Allergies    Intolerances      Vital Signs Last 24 Hrs  T(C): 36.8 (25 Jun 2023 07:24), Max: 36.8 (25 Jun 2023 07:24)  T(F): 98.2 (25 Jun 2023 07:24), Max: 98.2 (25 Jun 2023 07:24)  HR: 73 (25 Jun 2023 11:00) (61 - 88)  BP: 135/70 (25 Jun 2023 10:45) (127/57 - 164/80)  BP(mean): 95 (25 Jun 2023 10:45) (88 - 98)  RR: 20 (25 Jun 2023 11:00) (16 - 20)  SpO2: 100% (25 Jun 2023 11:00) (98% - 100%)    Parameters below as of 25 Jun 2023 11:00  Patient On (Oxygen Delivery Method): tracheostomy collar  O2 Flow (L/min): 10  O2 Concentration (%): 35    Physical exam:  Neurologic:  -Mental status: Patient trached; Eyes open, tracks examiner, follows all simple commands  -Cranial nerves:   III, IV, VI: Extraocular movements are intact without nystagmus. Pupils equally round and reactive to light  VII: Face is symmetric with normal eye closure and smile  Motor: moving all extremities spontaneously at least antigravity  Sensation: Intact to light touch bilaterally. No neglect or extinction on double simultaneous testing.  Coordination: dysmetria on FNF in both UEs.      NIHSS: **** ICH Score: ******     Fingerstick Blood Glucose: CAPILLARY BLOOD GLUCOSE        LABS:                        11.7   3.97  )-----------( 270      ( 25 Jun 2023 03:31 )             35.4     06-25    141  |  100  |  10  ----------------------------<  120<H>  3.4<L>   |  31  |  0.47<L>    Ca    9.2      25 Jun 2023 03:31    TPro  6.8  /  Alb  3.8  /  TBili  0.2  /  DBili  x   /  AST  49<H>  /  ALT  119<H>  /  AlkPhos  120  06-25    PT/INR - ( 25 Jun 2023 07:33 )   PT: 12.8 sec;   INR: 1.07          PTT - ( 25 Jun 2023 07:33 )  PTT:43.7 sec      Urinalysis Basic - ( 25 Jun 2023 03:31 )    Color: x / Appearance: x / SG: x / pH: x  Gluc: 120 mg/dL / Ketone: x  / Bili: x / Urobili: x   Blood: x / Protein: x / Nitrite: x   Leuk Esterase: x / RBC: x / WBC x   Sq Epi: x / Non Sq Epi: x / Bacteria: x        RADIOLOGY & ADDITIONAL STUDIES:      -----------------------------------------------------------------------------------------------------------------      **STROKE CONSULT NOTE**    ACCEPTANCE NOTE FROM [facility] [place] TO STROKE TELE    HPI: 57F w/ PMH of Asthma, CAD, HTN, prior miscarriage x3, peripheral neuropathy, bilateral cerebellar infarctions (R. and L. PICA) s/p SOC foramen magnum decompression and right parietal/occipital EVD placement (4/21). s/p trach (4/28/23). s/p PEG (5/2/23) sent from nursing facility with c/o right side abd pain and vomiting over the past 2 days, CTH with hemorrhagic transformation of right cerebellar infarct. Transferred to stroke tele from [facility] [place] 6/25.     During 4/2023 admission, catheter angiogram demonstrated bilateral cavernous ICA aneurysms but no atherosclerotic disease demonstrated. TTE mild LA enlargement. A1c 5.9, LDL 92. DRVVT negative (04/29/23) while off heparin products. Anti-gtbf7pwouggpcpvir screen (04/25) negative. Hexagonal phase (04/29/23) positive. Anticardiolipin Ab IgG < 5, IgM 28.4, IgA 9.2. Bilateral US LE +new L. calf DVT s/p IVC filter. s/p VPS Certas @ 4 (5/8/23). CTH 5/17 demonstrated interval increase in R. frontotemporal hyodense SDH collection; Increased size of suboccipital fluid collection, 1.5cm in greatest width. Per hematology, her history of miscarriages and DVTS along with two hexagonal phase positive tests is suggestive of APLS. Discharged on full dose therapeutic lovenox to LTAC. SALVADOR/ILR was deferred given strong suspicion for APLS as the etiology of stroke.      T(C): 36.8 (06-25-23 @ 07:24), Max: 36.8 (06-25-23 @ 07:24)  HR: 73 (06-25-23 @ 11:00) (61 - 88)  BP: 135/70 (06-25-23 @ 10:45) (127/57 - 164/80)  RR: 20 (06-25-23 @ 11:00) (16 - 20)  SpO2: 100% (06-25-23 @ 11:00) (98% - 100%)    PAST MEDICAL & SURGICAL HISTORY:  Asthma      CAD (coronary artery disease)      Peripheral neuropathy      S/P total abdominal hysterectomy      S/P cholecystectomy      S/P right knee surgery          FAMILY HISTORY:  No pertinent family history in first degree relatives        ROS:   see HPI    MEDICATIONS  (STANDING):  polyethylene glycol 3350 17 Gram(s) Oral daily  senna 2 Tablet(s) Oral at bedtime    MEDICATIONS  (PRN):  acetaminophen     Tablet .. 650 milliGRAM(s) Oral every 6 hours PRN Temp greater or equal to 38C (100.4F), Mild Pain (1 - 3)    Allergies    No Known Allergies    Intolerances      Vital Signs Last 24 Hrs  T(C): 36.8 (25 Jun 2023 07:24), Max: 36.8 (25 Jun 2023 07:24)  T(F): 98.2 (25 Jun 2023 07:24), Max: 98.2 (25 Jun 2023 07:24)  HR: 73 (25 Jun 2023 11:00) (61 - 88)  BP: 135/70 (25 Jun 2023 10:45) (127/57 - 164/80)  BP(mean): 95 (25 Jun 2023 10:45) (88 - 98)  RR: 20 (25 Jun 2023 11:00) (16 - 20)  SpO2: 100% (25 Jun 2023 11:00) (98% - 100%)    Parameters below as of 25 Jun 2023 11:00  Patient On (Oxygen Delivery Method): tracheostomy collar  O2 Flow (L/min): 10  O2 Concentration (%): 35    Physical exam:  Neurologic:  -Mental status: Patient trached; Eyes open, tracks examiner, follows all simple commands  -Cranial nerves:   III, IV, VI: bi-directional nystagmus R>L. Pupils equally round and reactive to light  VII: Face appears symmetric  Motor: b/l LE 5/5. LUE 5/5. RUE 4/5. Decreased  strength right hand.  Sensation: Intact to light touch bilaterally. No neglect or extinction on double simultaneous testing.  Coordination: dysmetria on FNF in both UEs.      NIHSS: **** ICH Score: ******     Fingerstick Blood Glucose: CAPILLARY BLOOD GLUCOSE        LABS:                        11.7   3.97  )-----------( 270      ( 25 Jun 2023 03:31 )             35.4     06-25    141  |  100  |  10  ----------------------------<  120<H>  3.4<L>   |  31  |  0.47<L>    Ca    9.2      25 Jun 2023 03:31    TPro  6.8  /  Alb  3.8  /  TBili  0.2  /  DBili  x   /  AST  49<H>  /  ALT  119<H>  /  AlkPhos  120  06-25    PT/INR - ( 25 Jun 2023 07:33 )   PT: 12.8 sec;   INR: 1.07          PTT - ( 25 Jun 2023 07:33 )  PTT:43.7 sec      Urinalysis Basic - ( 25 Jun 2023 03:31 )    Color: x / Appearance: x / SG: x / pH: x  Gluc: 120 mg/dL / Ketone: x  / Bili: x / Urobili: x   Blood: x / Protein: x / Nitrite: x   Leuk Esterase: x / RBC: x / WBC x   Sq Epi: x / Non Sq Epi: x / Bacteria: x        RADIOLOGY & ADDITIONAL STUDIES:      -----------------------------------------------------------------------------------------------------------------      **STROKE CONSULT NOTE**    ACCEPTANCE NOTE FROM [facility] [place] TO STROKE TELE    HPI: 57F w/ PMH of Asthma, CAD, HTN, prior miscarriage x3, peripheral neuropathy, bilateral cerebellar infarctions (R. and L. PICA) s/p SOC foramen magnum decompression and right parietal/occipital EVD placement (4/21). s/p trach (4/28/23). s/p PEG (5/2/23) sent from nursing facility with c/o right side abd pain and vomiting over the past 2 days, CTH with hemorrhagic transformation of right cerebellar infarct. Transferred to stroke tele from [facility] [place] 6/25 after repeat CTH showing ***.     During 4/2023 admission, catheter angiogram demonstrated bilateral cavernous ICA aneurysms but no atherosclerotic disease demonstrated. TTE mild LA enlargement. A1c 5.9, LDL 92. DRVVT negative (04/29/23) while off heparin products. Anti-ltto0lsbqrrqwtrit screen (04/25) negative. Hexagonal phase (04/29/23) positive. Anticardiolipin Ab IgG < 5, IgM 28.4, IgA 9.2. Bilateral US LE +new L. calf DVT s/p IVC filter. s/p VPS Certas @ 4 (5/8/23). CTH 5/17 demonstrated interval increase in R. frontotemporal hyodense SDH collection; Increased size of suboccipital fluid collection, 1.5cm in greatest width. Per hematology, her history of miscarriages and DVTS along with two hexagonal phase positive tests is suggestive of APLS. Discharged on full dose therapeutic lovenox to LTAC. SALVADOR/ILR was deferred given strong suspicion for APLS as the etiology of stroke.      T(C): 36.8 (06-25-23 @ 07:24), Max: 36.8 (06-25-23 @ 07:24)  HR: 73 (06-25-23 @ 11:00) (61 - 88)  BP: 135/70 (06-25-23 @ 10:45) (127/57 - 164/80)  RR: 20 (06-25-23 @ 11:00) (16 - 20)  SpO2: 100% (06-25-23 @ 11:00) (98% - 100%)    PAST MEDICAL & SURGICAL HISTORY:  Asthma      CAD (coronary artery disease)      Peripheral neuropathy      S/P total abdominal hysterectomy      S/P cholecystectomy      S/P right knee surgery          FAMILY HISTORY:  No pertinent family history in first degree relatives        ROS:   see HPI    MEDICATIONS  (STANDING):  polyethylene glycol 3350 17 Gram(s) Oral daily  senna 2 Tablet(s) Oral at bedtime    MEDICATIONS  (PRN):  acetaminophen     Tablet .. 650 milliGRAM(s) Oral every 6 hours PRN Temp greater or equal to 38C (100.4F), Mild Pain (1 - 3)    Allergies    No Known Allergies    Intolerances      Vital Signs Last 24 Hrs  T(C): 36.8 (25 Jun 2023 07:24), Max: 36.8 (25 Jun 2023 07:24)  T(F): 98.2 (25 Jun 2023 07:24), Max: 98.2 (25 Jun 2023 07:24)  HR: 73 (25 Jun 2023 11:00) (61 - 88)  BP: 135/70 (25 Jun 2023 10:45) (127/57 - 164/80)  BP(mean): 95 (25 Jun 2023 10:45) (88 - 98)  RR: 20 (25 Jun 2023 11:00) (16 - 20)  SpO2: 100% (25 Jun 2023 11:00) (98% - 100%)    Parameters below as of 25 Jun 2023 11:00  Patient On (Oxygen Delivery Method): tracheostomy collar  O2 Flow (L/min): 10  O2 Concentration (%): 35    Physical exam:  Neurologic:  -Mental status: Patient trached; Eyes open, tracks examiner, follows all simple commands  -Cranial nerves:   III, IV, VI: bi-directional nystagmus R>L. Pupils equally round and reactive to light  VII: Face appears symmetric  Motor: b/l LE 5/5. LUE 5/5. RUE 4/5. Decreased  strength right hand.  Sensation: Intact to light touch bilaterally. No neglect or extinction on double simultaneous testing.  Coordination: dysmetria on FNF in both UEs.      NIHSS: **** ICH Score: ******     Fingerstick Blood Glucose: CAPILLARY BLOOD GLUCOSE        LABS:                        11.7   3.97  )-----------( 270      ( 25 Jun 2023 03:31 )             35.4     06-25    141  |  100  |  10  ----------------------------<  120<H>  3.4<L>   |  31  |  0.47<L>    Ca    9.2      25 Jun 2023 03:31    TPro  6.8  /  Alb  3.8  /  TBili  0.2  /  DBili  x   /  AST  49<H>  /  ALT  119<H>  /  AlkPhos  120  06-25    PT/INR - ( 25 Jun 2023 07:33 )   PT: 12.8 sec;   INR: 1.07          PTT - ( 25 Jun 2023 07:33 )  PTT:43.7 sec      Urinalysis Basic - ( 25 Jun 2023 03:31 )    Color: x / Appearance: x / SG: x / pH: x  Gluc: 120 mg/dL / Ketone: x  / Bili: x / Urobili: x   Blood: x / Protein: x / Nitrite: x   Leuk Esterase: x / RBC: x / WBC x   Sq Epi: x / Non Sq Epi: x / Bacteria: x        RADIOLOGY & ADDITIONAL STUDIES:      -----------------------------------------------------------------------------------------------------------------      **STROKE CONSULT NOTE**    ACCEPTANCE NOTE FROM [facility] [place] TO STROKE TELE    HPI: 57F w/ PMH of Asthma, CAD, HTN, prior miscarriage x3, peripheral neuropathy, bilateral cerebellar infarctions (R. and L. PICA) s/p SOC foramen magnum decompression and right parietal/occipital EVD placement (4/21). s/p trach (4/28/23). s/p PEG (5/2/23) sent from nursing facility with c/o right side abd pain and vomiting over the past 2 days, CTH with concern for hemorrhagic transformation of right cerebellar infarct. Transferred to stroke tele from [facility] [place] 6/25 after repeat CTH stable, likely contrast extraversion from CT ab/pelvis for abdomen pain w/u.     During 4/2023 admission, catheter angiogram demonstrated bilateral cavernous ICA aneurysms but no atherosclerotic disease demonstrated. TTE mild LA enlargement. A1c 5.9, LDL 92. DRVVT negative (04/29/23) while off heparin products. Anti-vkws5nchevciooldh screen (04/25) negative. Hexagonal phase (04/29/23) positive. Anticardiolipin Ab IgG < 5, IgM 28.4, IgA 9.2. Bilateral US LE +new L. calf DVT s/p IVC filter. s/p VPS Certas @ 4 (5/8/23). CTH 5/17 demonstrated interval increase in R. frontotemporal hyodense SDH collection; Increased size of suboccipital fluid collection, 1.5cm in greatest width. Per hematology, her history of miscarriages and DVTS along with two hexagonal phase positive tests is suggestive of APLS. Discharged on full dose therapeutic lovenox to LTAC. SALVADOR/ILR was deferred given strong suspicion for APLS as the etiology of stroke.      T(C): 36.8 (06-25-23 @ 07:24), Max: 36.8 (06-25-23 @ 07:24)  HR: 73 (06-25-23 @ 11:00) (61 - 88)  BP: 135/70 (06-25-23 @ 10:45) (127/57 - 164/80)  RR: 20 (06-25-23 @ 11:00) (16 - 20)  SpO2: 100% (06-25-23 @ 11:00) (98% - 100%)    PAST MEDICAL & SURGICAL HISTORY:  Asthma      CAD (coronary artery disease)      Peripheral neuropathy      S/P total abdominal hysterectomy      S/P cholecystectomy      S/P right knee surgery          FAMILY HISTORY:  No pertinent family history in first degree relatives        ROS:   see HPI    MEDICATIONS  (STANDING):  polyethylene glycol 3350 17 Gram(s) Oral daily  senna 2 Tablet(s) Oral at bedtime    MEDICATIONS  (PRN):  acetaminophen     Tablet .. 650 milliGRAM(s) Oral every 6 hours PRN Temp greater or equal to 38C (100.4F), Mild Pain (1 - 3)    Allergies    No Known Allergies    Intolerances      Vital Signs Last 24 Hrs  T(C): 36.8 (25 Jun 2023 07:24), Max: 36.8 (25 Jun 2023 07:24)  T(F): 98.2 (25 Jun 2023 07:24), Max: 98.2 (25 Jun 2023 07:24)  HR: 73 (25 Jun 2023 11:00) (61 - 88)  BP: 135/70 (25 Jun 2023 10:45) (127/57 - 164/80)  BP(mean): 95 (25 Jun 2023 10:45) (88 - 98)  RR: 20 (25 Jun 2023 11:00) (16 - 20)  SpO2: 100% (25 Jun 2023 11:00) (98% - 100%)    Parameters below as of 25 Jun 2023 11:00  Patient On (Oxygen Delivery Method): tracheostomy collar  O2 Flow (L/min): 10  O2 Concentration (%): 35    Physical exam:  Neurologic:  -Mental status: Patient trached; Eyes open, tracks examiner, follows all simple commands  -Cranial nerves:   III, IV, VI: bi-directional nystagmus R>L. Pupils equally round and reactive to light  VII: Face appears symmetric  Motor: b/l LE 5/5. LUE 5/5. RUE 4/5. Decreased  strength right hand.  Sensation: Intact to light touch bilaterally. No neglect or extinction on double simultaneous testing.  Coordination: dysmetria on FNF in both UEs.      NIHSS: **** ICH Score: ******     Fingerstick Blood Glucose: CAPILLARY BLOOD GLUCOSE        LABS:                        11.7   3.97  )-----------( 270      ( 25 Jun 2023 03:31 )             35.4     06-25    141  |  100  |  10  ----------------------------<  120<H>  3.4<L>   |  31  |  0.47<L>    Ca    9.2      25 Jun 2023 03:31    TPro  6.8  /  Alb  3.8  /  TBili  0.2  /  DBili  x   /  AST  49<H>  /  ALT  119<H>  /  AlkPhos  120  06-25    PT/INR - ( 25 Jun 2023 07:33 )   PT: 12.8 sec;   INR: 1.07          PTT - ( 25 Jun 2023 07:33 )  PTT:43.7 sec      Urinalysis Basic - ( 25 Jun 2023 03:31 )    Color: x / Appearance: x / SG: x / pH: x  Gluc: 120 mg/dL / Ketone: x  / Bili: x / Urobili: x   Blood: x / Protein: x / Nitrite: x   Leuk Esterase: x / RBC: x / WBC x   Sq Epi: x / Non Sq Epi: x / Bacteria: x        RADIOLOGY & ADDITIONAL STUDIES:      -----------------------------------------------------------------------------------------------------------------      **STROKE CONSULT NOTE**    ACCEPTANCE [place] FROM [facility] [place]KE [place]    HPI: 57F w/ PMH of Asthma, CAD, HTN, prior miscarriage x3, peripheral neuropathy, bilateral cerebellar infarctions (R. and L. PICA) s/p SOC foramen magnum decompression and right parietal/occipital EVD placement (4/21). s/p trach (4/28/23). s/p PEG (5/2/23) sent from nursing facility with c/o right side abd pain and vomiting over the past 2 days, CTH with hyperdensity concern for hemorrhagic transformation of right cerebellar infarct. Transferred to stroke tele from [facility] [place] after repeat CTH in which previously evident hyperdensity along the superior right cerebellum is no longer identified, likely contrast extraversion from CT ab/pelvis for abdomen pain w/u.     During 4/2023 admission, catheter angiogram demonstrated bilateral cavernous ICA aneurysms but no atherosclerotic disease demonstrated. TTE mild LA enlargement. A1c 5.9, LDL 92. DRVVT negative (04/29/23) while off heparin products. Anti-zidq4txdwjdjxgduq screen (04/25) negative. Hexagonal phase (04/29/23) positive. Anticardiolipin Ab IgG < 5, IgM 28.4, IgA 9.2. Bilateral US LE +new L. calf DVT s/p IVC filter. s/p VPS Certas @ 4 (5/8/23). CTH 5/17 demonstrated interval increase in R. frontotemporal hyodense SDH collection; Increased size of suboccipital fluid collection, 1.5cm in greatest width. Repeat CTH 5/20 stable. Per heme, her history of miscarriages and DVTS along with two hexagonal phase positive tests is suggestive of APLS. Discharged on full dose therapeutic lovenox to rehab. SALVADOR/ILR was deferred given strong suspicion for APLS as the etiology of stroke.      T(C): 36.8 (06-25-23 @ 07:24), Max: 36.8 (06-25-23 @ 07:24)  HR: 73 (06-25-23 @ 11:00) (61 - 88)  BP: 135/70 (06-25-23 @ 10:45) (127/57 - 164/80)  RR: 20 (06-25-23 @ 11:00) (16 - 20)  SpO2: 100% (06-25-23 @ 11:00) (98% - 100%)    PAST MEDICAL & SURGICAL HISTORY:  Asthma      CAD (coronary artery disease)      Peripheral neuropathy      S/P total abdominal hysterectomy      S/P cholecystectomy      S/P right knee surgery          FAMILY HISTORY:  No pertinent family history in first degree relatives        ROS:   see HPI    MEDICATIONS  (STANDING):  polyethylene glycol 3350 17 Gram(s) Oral daily  senna 2 Tablet(s) Oral at bedtime    MEDICATIONS  (PRN):  acetaminophen     Tablet .. 650 milliGRAM(s) Oral every 6 hours PRN Temp greater or equal to 38C (100.4F), Mild Pain (1 - 3)    Allergies    No Known Allergies    Intolerances      Vital Signs Last 24 Hrs  T(C): 36.8 (25 Jun 2023 07:24), Max: 36.8 (25 Jun 2023 07:24)  T(F): 98.2 (25 Jun 2023 07:24), Max: 98.2 (25 Jun 2023 07:24)  HR: 73 (25 Jun 2023 11:00) (61 - 88)  BP: 135/70 (25 Jun 2023 10:45) (127/57 - 164/80)  BP(mean): 95 (25 Jun 2023 10:45) (88 - 98)  RR: 20 (25 Jun 2023 11:00) (16 - 20)  SpO2: 100% (25 Jun 2023 11:00) (98% - 100%)    Parameters below as of 25 Jun 2023 11:00  Patient On (Oxygen Delivery Method): tracheostomy collar  O2 Flow (L/min): 10  O2 Concentration (%): 35    Physical exam:  Neurologic:  -Mental status: Patient trached; Eyes open, tracks examiner, follows all simple commands, oriented to person, place and time. speech fluent.  -Cranial nerves:   III, IV, VI: no nystagmus. Pupils equally round and reactive to light  VII: Face appears symmetric  Motor: L side 5/5. R side 4/5. Decreased  strength right hand.  Sensation: Intact to light touch bilaterally. No neglect or extinction on double simultaneous testing.  Coordination: dysmetria on FNF in both UEs.      NIHSS: ****     Fingerstick Blood Glucose: CAPILLARY BLOOD GLUCOSE        LABS:                        11.7   3.97  )-----------( 270      ( 25 Jun 2023 03:31 )             35.4     06-25    141  |  100  |  10  ----------------------------<  120<H>  3.4<L>   |  31  |  0.47<L>    Ca    9.2      25 Jun 2023 03:31    TPro  6.8  /  Alb  3.8  /  TBili  0.2  /  DBili  x   /  AST  49<H>  /  ALT  119<H>  /  AlkPhos  120  06-25    PT/INR - ( 25 Jun 2023 07:33 )   PT: 12.8 sec;   INR: 1.07          PTT - ( 25 Jun 2023 07:33 )  PTT:43.7 sec      Urinalysis Basic - ( 25 Jun 2023 03:31 )    Color: x / Appearance: x / SG: x / pH: x  Gluc: 120 mg/dL / Ketone: x  / Bili: x / Urobili: x   Blood: x / Protein: x / Nitrite: x   Leuk Esterase: x / RBC: x / WBC x   Sq Epi: x / Non Sq Epi: x / Bacteria: x        RADIOLOGY & ADDITIONAL STUDIES:  < from: CT Head No Cont (06.25.23 @ 06:52) >  Findings are presumed to reflect hemorrhagic transformation of right   cerebellar infarct.    < end of copied text >    < from: CT Head No Cont (06.25.23 @ 12:25) >  IMPRESSION:    Previously evident hyperdensity along the superior right cerebellum is no   longer identified. Findings are otherwise stable.    < end of copied text >      -----------------------------------------------------------------------------------------------------------------      **STROKE CONSULT NOTE**    ACCEPTANCE NOTE FROM [facility] [place] TO STROKE TELE    HPI: 57F w/ PMH of Asthma, CAD, HTN, prior miscarriage x3, peripheral neuropathy, bilateral cerebellar infarctions (R. and L. PICA) s/p SOC foramen magnum decompression and right parietal/occipital EVD placement (4/21), s/p trach (4/28/23), s/p PEG (5/2/23) sent from nursing facility c/o right side abdominal pain and vomiting x 2 days, CTH with hyperdensity concern for hemorrhagic transformation of right cerebellar infarct. Transferred to stroke tele from [facility] [place] 6/25 after repeat CTH in which previously evident hyperdensity along the superior right cerebellum is no longer identified, likely contrast extraversion from CT ab/pelvis for abdomen pain w/u.     During 4/2023 admission, catheter angiogram demonstrated bilateral cavernous ICA aneurysms but no atherosclerotic disease demonstrated. TTE mild LA enlargement. A1c 5.9, LDL 92. DRVVT negative (04/29/23) while off heparin products. Anti-cjiz5bzhppumibowi screen (04/25) negative. Hexagonal phase (04/29/23) positive. Anticardiolipin Ab IgG < 5, IgM 28.4, IgA 9.2. Bilateral US LE +new L. calf DVT s/p IVC filter. Per heme, her history of miscarriages and DVTS along with two hexagonal phase positive tests is suggestive of APLS. Discharged on full dose therapeutic lovenox to rehab. SALVADOR/ILR was deferred given strong suspicion for APLS as the etiology of stroke.      T(C): 36.8 (06-25-23 @ 07:24), Max: 36.8 (06-25-23 @ 07:24)  HR: 73 (06-25-23 @ 11:00) (61 - 88)  BP: 135/70 (06-25-23 @ 10:45) (127/57 - 164/80)  RR: 20 (06-25-23 @ 11:00) (16 - 20)  SpO2: 100% (06-25-23 @ 11:00) (98% - 100%)    PAST MEDICAL & SURGICAL HISTORY:  Asthma      CAD (coronary artery disease)      Peripheral neuropathy      S/P total abdominal hysterectomy      S/P cholecystectomy      S/P right knee surgery          FAMILY HISTORY:  No pertinent family history in first degree relatives        ROS:   see HPI    MEDICATIONS  (STANDING):  polyethylene glycol 3350 17 Gram(s) Oral daily  senna 2 Tablet(s) Oral at bedtime    MEDICATIONS  (PRN):  acetaminophen     Tablet .. 650 milliGRAM(s) Oral every 6 hours PRN Temp greater or equal to 38C (100.4F), Mild Pain (1 - 3)    Allergies    No Known Allergies    Intolerances      Vital Signs Last 24 Hrs  T(C): 36.8 (25 Jun 2023 07:24), Max: 36.8 (25 Jun 2023 07:24)  T(F): 98.2 (25 Jun 2023 07:24), Max: 98.2 (25 Jun 2023 07:24)  HR: 73 (25 Jun 2023 11:00) (61 - 88)  BP: 135/70 (25 Jun 2023 10:45) (127/57 - 164/80)  BP(mean): 95 (25 Jun 2023 10:45) (88 - 98)  RR: 20 (25 Jun 2023 11:00) (16 - 20)  SpO2: 100% (25 Jun 2023 11:00) (98% - 100%)    Parameters below as of 25 Jun 2023 11:00  Patient On (Oxygen Delivery Method): tracheostomy collar  O2 Flow (L/min): 10  O2 Concentration (%): 35    Physical exam:  Neurologic:  -Mental status: Patient trached; Eyes open, tracks examiner, follows all simple commands, oriented to person, place and time.   -Cranial nerves:   III, IV, VI: no nystagmus. Pupils equally round and reactive to light  VII: Face appears symmetric  Motor: L side 5/5. R side 4/5. Decreased  strength right hand.  Sensation: Intact to light touch bilaterally. No neglect or extinction on double simultaneous testing.  Coordination: dysmetria on FNF in both UEs      NIHSS: 4     Fingerstick Blood Glucose: CAPILLARY BLOOD GLUCOSE        LABS:                        11.7   3.97  )-----------( 270      ( 25 Jun 2023 03:31 )             35.4     06-25    141  |  100  |  10  ----------------------------<  120<H>  3.4<L>   |  31  |  0.47<L>    Ca    9.2      25 Jun 2023 03:31    TPro  6.8  /  Alb  3.8  /  TBili  0.2  /  DBili  x   /  AST  49<H>  /  ALT  119<H>  /  AlkPhos  120  06-25    PT/INR - ( 25 Jun 2023 07:33 )   PT: 12.8 sec;   INR: 1.07          PTT - ( 25 Jun 2023 07:33 )  PTT:43.7 sec      Urinalysis Basic - ( 25 Jun 2023 03:31 )    Color: x / Appearance: x / SG: x / pH: x  Gluc: 120 mg/dL / Ketone: x  / Bili: x / Urobili: x   Blood: x / Protein: x / Nitrite: x   Leuk Esterase: x / RBC: x / WBC x   Sq Epi: x / Non Sq Epi: x / Bacteria: x        RADIOLOGY & ADDITIONAL STUDIES:  < from: CT Head No Cont (06.25.23 @ 06:52) >  Findings are presumed to reflect hemorrhagic transformation of right   cerebellar infarct.    < end of copied text >    < from: CT Head No Cont (06.25.23 @ 12:25) >  IMPRESSION:    Previously evident hyperdensity along the superior right cerebellum is no   longer identified. Findings are otherwise stable.    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 and L. PICA) s/p SOC foramen magnum decompression and right parietal/occipital EVD placement (4/21), s/p trach (4/28/23), s/p PEG (5/2/23) sent from nursing facility c/o right side abdominal pain and vomiting x 2 days, CTH with hyperdensity concern for hemorrhagic transformation of right cerebellar infarct. Transferred to stroke tele from [facility] [place] 6/25 after repeat CTH in which previously evident hyperdensity along the superior right cerebellum is no longer identified, likely contrast extraversion from CT ab/pelvis for abdomen pain w/u.     During 4/2023 admission, catheter angiogram demonstrated bilateral cavernous ICA aneurysms but no atherosclerotic disease demonstrated. TTE mild LA enlargement. A1c 5.9, LDL 92. DRVVT negative (04/29/23) while off heparin products. Anti-[id number] screen (04/25) negative. Hexagonal phase (04/29/23) positive. Anticardiolipin Ab IgG < 5, IgM 28.4, IgA 9.2. Bilateral US LE +new L. calf DVT s/p IVC filter. Per heme, her history of miscarriages and DVTS along with two hexagonal phase positive tests is suggestive of APLS. Discharged on full dose therapeutic lovenox to rehab. SALVADOR/ILR was deferred given strong suspicion for APLS as the etiology of stroke.      T(C): 36.8 (06-25-23 @ 07:24), Max: 36.8 (06-25-23 @ 07:24)  HR: 73 (06-25-23 @ 11:00) (61 - 88)  BP: 135/70 (06-25-23 @ 10:45) (127/57 - 164/80)  RR: 20 (06-25-23 @ 11:00) (16 - 20)  SpO2: 100% (06-25-23 @ 11:00) (98% - 100%)    PAST MEDICAL & SURGICAL HISTORY:  Asthma      CAD (coronary artery disease)      Peripheral neuropathy      S/P total abdominal hysterectomy      S/P cholecystectomy      S/P right knee surgery          FAMILY HISTORY:  No pertinent family history in first degree relatives        ROS:   see HPI    MEDICATIONS  (STANDING):  polyethylene glycol 3350 17 Gram(s) Oral daily  senna 2 Tablet(s) Oral at bedtime    MEDICATIONS  (PRN):  acetaminophen     Tablet .. 650 milliGRAM(s) Oral every 6 hours PRN Temp greater or equal to 38C (100.4F), Mild Pain (1 - 3)    Allergies    No Known Allergies    Intolerances      Vital Signs Last 24 Hrs  T(C): 36.8 (25 Jun 2023 07:24), Max: 36.8 (25 Jun 2023 07:24)  T(F): 98.2 (25 Jun 2023 07:24), Max: 98.2 (25 Jun 2023 07:24)  HR: 73 (25 Jun 2023 11:00) (61 - 88)  BP: 135/70 (25 Jun 2023 10:45) (127/57 - 164/80)  BP(mean): 95 (25 Jun 2023 10:45) (88 - 98)  RR: 20 (25 Jun 2023 11:00) (16 - 20)  SpO2: 100% (25 Jun 2023 11:00) (98% - 100%)    Parameters below as of 25 Jun 2023 11:00  Patient On (Oxygen Delivery Method): tracheostomy collar  O2 Flow (L/min): 10  O2 Concentration (%): 35    Physical exam:  Neurologic:  -Mental status: Patient trached; Eyes open, tracks examiner, follows all simple commands, oriented to person, place and time.   -Cranial nerves:   III, IV, VI: no nystagmus. Pupils equally round and reactive to light  VII: Face appears symmetric  Motor: L side 5/5. R side 4/5. Decreased  strength right hand.  Sensation: Intact to light touch bilaterally. No neglect or extinction on double simultaneous testing.  Coordination: dysmetria on FNF in both UEs      NIHSS: 4     Fingerstick Blood Glucose: CAPILLARY BLOOD GLUCOSE        LABS:                        11.7   3.97  )-----------( 270      ( 25 Jun 2023 03:31 )             35.4     06-25    141  |  100  |  10  ----------------------------<  120<H>  3.4<L>   |  31  |  0.47<L>    Ca    9.2      25 Jun 2023 03:31    TPro  6.8  /  Alb  3.8  /  TBili  0.2  /  DBili  x   /  AST  49<H>  /  ALT  119<H>  /  AlkPhos  120  06-25    PT/INR - ( 25 Jun 2023 07:33 )   PT: 12.8 sec;   INR: 1.07          PTT - ( 25 Jun 2023 07:33 )  PTT:43.7 sec      Urinalysis Basic - ( 25 Jun 2023 03:31 )    Color: x / Appearance: x / SG: x / pH: x  Gluc: 120 mg/dL / Ketone: x  / Bili: x / Urobili: x   Blood: x / Protein: x / Nitrite: x   Leuk Esterase: x / RBC: x / WBC x   Sq Epi: x / Non Sq Epi: x / Bacteria: x        RADIOLOGY & ADDITIONAL STUDIES:  < from: CT Head No Cont (06.25.23 @ 06:52) >  Findings are presumed to reflect hemorrhagic transformation of right   cerebellar infarct.    < end of copied text >    < from: CT Head No Cont (06.25.23 @ 12:25) >  IMPRESSION:    Previously evident hyperdensity along the superior right cerebellum is no   longer identified. Findings are otherwise stable.    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 and L. PICA) s/p SOC foramen magnum decompression and right parietal/occipital EVD placement (4/21), s/p trach (4/28/23), s/p PEG (5/2/23) sent from nursing facility c/o right side abdominal pain and vomiting x 2 days, CTH with hyperdensity concern for hemorrhagic transformation of right cerebellar infarct. Transferred to stroke tele from [facility] 6/25 after repeat CTH in which previously evident hyperdensity along the superior right cerebellum is no longer identified, likely contrast extraversion from CT ab/pelvis for abdomen pain w/u.       T(C): 36.8 (06-25-23 @ 07:24), Max: 36.8 (06-25-23 @ 07:24)  HR: 73 (06-25-23 @ 11:00) (61 - 88)  BP: 135/70 (06-25-23 @ 10:45) (127/57 - 164/80)  RR: 20 (06-25-23 @ 11:00) (16 - 20)  SpO2: 100% (06-25-23 @ 11:00) (98% - 100%)    PAST MEDICAL & SURGICAL HISTORY:  Asthma      CAD (coronary artery disease)      Peripheral neuropathy      S/P total abdominal hysterectomy      S/P cholecystectomy      S/P right knee surgery          FAMILY HISTORY:  No pertinent family history in first degree relatives        ROS:   see HPI    MEDICATIONS  (STANDING):  polyethylene glycol 3350 17 Gram(s) Oral daily  senna 2 Tablet(s) Oral at bedtime    MEDICATIONS  (PRN):  acetaminophen     Tablet .. 650 milliGRAM(s) Oral every 6 hours PRN Temp greater or equal to 38C (100.4F), Mild Pain (1 - 3)    Allergies    No Known Allergies    Intolerances      Vital Signs Last 24 Hrs  T(C): 36.8 (25 Jun 2023 07:24), Max: 36.8 (25 Jun 2023 07:24)  T(F): 98.2 (25 Jun 2023 07:24), Max: 98.2 (25 Jun 2023 07:24)  HR: 73 (25 Jun 2023 11:00) (61 - 88)  BP: 135/70 (25 Jun 2023 10:45) (127/57 - 164/80)  BP(mean): 95 (25 Jun 2023 10:45) (88 - 98)  RR: 20 (25 Jun 2023 11:00) (16 - 20)  SpO2: 100% (25 Jun 2023 11:00) (98% - 100%)    Parameters below as of 25 Jun 2023 11:00  Patient On (Oxygen Delivery Method): tracheostomy collar  O2 Flow (L/min): 10  O2 Concentration (%): 35    Physical exam:  Neurologic:  -Mental status: Patient trached; Eyes open, tracks examiner, follows all simple commands, oriented to person, place and time.   -Cranial nerves:   III, IV, VI: no nystagmus. Pupils equally round and reactive to light  VII: Face appears symmetric  Motor: L side 5/5. R side 4/5. Decreased  strength right hand.  Sensation: Intact to light touch bilaterally. No neglect or extinction on double simultaneous testing.  Coordination: dysmetria on FNF in both UEs      NIHSS: 4     Fingerstick Blood Glucose: CAPILLARY BLOOD GLUCOSE        LABS:                        11.7   3.97  )-----------( 270      ( 25 Jun 2023 03:31 )             35.4     06-25    141  |  100  |  10  ----------------------------<  120<H>  3.4<L>   |  31  |  0.47<L>    Ca    9.2      25 Jun 2023 03:31    TPro  6.8  /  Alb  3.8  /  TBili  0.2  /  DBili  x   /  AST  49<H>  /  ALT  119<H>  /  AlkPhos  120  06-25    PT/INR - ( 25 Jun 2023 07:33 )   PT: 12.8 sec;   INR: 1.07          PTT - ( 25 Jun 2023 07:33 )  PTT:43.7 sec      Urinalysis Basic - ( 25 Jun 2023 03:31 )    Color: x / Appearance: x / SG: x / pH: x  Gluc: 120 mg/dL / Ketone: x  / Bili: x / Urobili: x   Blood: x / Protein: x / Nitrite: x   Leuk Esterase: x / RBC: x / WBC x   Sq Epi: x / Non Sq Epi: x / Bacteria: x        RADIOLOGY & ADDITIONAL STUDIES:  < from: CT Head No Cont (06.25.23 @ 06:52) >  Findings are presumed to reflect hemorrhagic transformation of right   cerebellar infarct.    < end of copied text >    < from: CT Head No Cont (06.25.23 @ 12:25) >  IMPRESSION:    Previously evident hyperdensity along the superior right cerebellum is no   longer identified. Findings are otherwise stable.    < end of copied text >      -----------------------------------------------------------------------------------------------------------------

## 2023-06-25 NOTE — PROGRESS NOTE ADULT - SUBJECTIVE AND OBJECTIVE BOX
MEDICATIONS  (STANDING):  atorvastatin 80 milliGRAM(s) Oral at bedtime  ferrous    sulfate 325 milliGRAM(s) Oral every 24 hours  polyethylene glycol 3350 17 Gram(s) Oral daily  senna 2 Tablet(s) Oral at bedtime    MEDICATIONS  (PRN):  acetaminophen     Tablet .. 650 milliGRAM(s) Oral every 6 hours PRN Temp greater or equal to 38C (100.4F), Mild Pain (1 - 3)  acetylcysteine 10%  Inhalation 4 milliLiter(s) Inhalation every 6 hours PRN increased secretions  albuterol/ipratropium for Nebulization 3 milliLiter(s) Nebulizer every 6 hours PRN Respiratory Distress  oxyCODONE    IR 5 milliGRAM(s) Oral every 4 hours PRN Moderate Pain (4 - 6)      Drug Dosing Weight  Height (cm): 157.5 (25 Jun 2023 02:19)  Weight (kg): 78 (25 Jun 2023 02:19)  BMI (kg/m2): 31.4 (25 Jun 2023 02:19)  BSA (m2): 1.79 (25 Jun 2023 02:19)    PAST MEDICAL & SURGICAL HISTORY:  Asthma  CAD (coronary artery disease)  Peripheral neuropathy  S/P total abdominal hysterectomy  S/P cholecystectomy  S/P right knee surgery    REVIEW OF SYSTEMS: [ ] Unable to Assess due to neurologic exam   [x] All ROS addressed below are non-contributory, except:  Neuro: [ ] Headache [ ] Back pain [ ] Numbness [ ] Weakness [ ] Ataxia [ ] Dizziness [ ] Aphasia [ ] Dysarthria [ ] Visual disturbance  Resp: [ ] Shortness of breath/dyspnea, [ ] Orthopnea [ ] Cough  CV: [ ] Chest pain [ ] Palpitation [ ] Lightheadedness [ ] Syncope  Renal: [ ] Thirst [ ] Edema  GI: [ ] Nausea [ ] Emesis [ ] Abdominal pain [ ] Constipation [ ] Diarrhea  Hem: [ ] Hematemesis [ ] bright red blood per rectum  ID: [ ] Fever [ ] Chills [ ] Dysuria  ENT: [ ] Rhinorrhea    PHYSICAL EXAM:    General: No Acute Distress     Neurological: Awake, alert oriented to person, place and time, Following Commands, PERRL, EOMI, Face Symmetrical, Speech Fluent, Moving all extremities No Drift, Sensation to Light Touch Intact, RUE 4/5 w/ drift, RLE 4/5 HF all other extremities 5/5 no drift no nystagmus   Pulmonary: Clear to Auscultation, No Rales, No Rhonchi, No Wheezes   Cardiovascular: S1, S2, Regular Rate and Rhythm   Gastrointestinal: Soft, Nontender, Nondistended   Extremities: No calf tenderness       ICU Vital Signs Last 24 Hrs  T(C): 36.8 (25 Jun 2023 07:24), Max: 36.8 (25 Jun 2023 07:24)  T(F): 98.2 (25 Jun 2023 07:24), Max: 98.2 (25 Jun 2023 07:24)  HR: 86 (25 Jun 2023 12:00) (61 - 88)  BP: 131/63 (25 Jun 2023 12:00) (127/57 - 164/80)  BP(mean): 91 (25 Jun 2023 12:00) (88 - 104)  RR: 20 (25 Jun 2023 11:00) (16 - 20)  SpO2: 99% (25 Jun 2023 12:00) (98% - 100%)    O2 Parameters below as of 25 Jun 2023 12:00  Patient On (Oxygen Delivery Method): tracheostomy collar  O2 Flow (L/min): 10  O2 Concentration (%): 35        I&O's Detail    25 Jun 2023 07:01  -  25 Jun 2023 12:45  --------------------------------------------------------  IN:  Total IN: 0 mL    OUT:    Voided (mL): 200 mL  Total OUT: 200 mL    Total NET: -200 mL    LABS:  CBC Full  -  ( 25 Jun 2023 03:31 )  WBC Count : 3.97 K/uL  RBC Count : 4.03 M/uL  Hemoglobin : 11.7 g/dL  Hematocrit : 35.4 %  Platelet Count - Automated : 270 K/uL  Mean Cell Volume : 87.8 fl  Mean Cell Hemoglobin : 29.0 pg  Mean Cell Hemoglobin Concentration : 33.1 gm/dL  Auto Neutrophil # : 1.86 K/uL  Auto Lymphocyte # : 1.62 K/uL  Auto Monocyte # : 0.32 K/uL  Auto Eosinophil # : 0.15 K/uL  Auto Basophil # : 0.01 K/uL  Auto Neutrophil % : 46.7 %  Auto Lymphocyte % : 40.8 %  Auto Monocyte % : 8.1 %  Auto Eosinophil % : 3.8 %  Auto Basophil % : 0.3 %    06-25    141  |  100  |  10  ----------------------------<  120<H>  3.4<L>   |  31  |  0.47<L>    Ca    9.2      25 Jun 2023 03:31    TPro  6.8  /  Alb  3.8  /  TBili  0.2  /  DBili  x   /  AST  49<H>  /  ALT  119<H>  /  AlkPhos  120  06-25    PT/INR - ( 25 Jun 2023 07:33 )   PT: 12.8 sec;   INR: 1.07          PTT - ( 25 Jun 2023 07:33 )  PTT:43.7 sec  Urinalysis Basic - ( 25 Jun 2023 03:31 )    Color: x / Appearance: x / SG: x / pH: x  Gluc: 120 mg/dL / Ketone: x  / Bili: x / Urobili: x   Blood: x / Protein: x / Nitrite: x   Leuk Esterase: x / RBC: x / WBC x   Sq Epi: x / Non Sq Epi: x / Bacteria: x        RADIOLOGY & ADDITIONAL STUDIES: reviewed

## 2023-06-25 NOTE — ED ADULT NURSE NOTE - CHIEF COMPLAINT QUOTE
Pt, with hx of CVA (4/20), CAD and asthma, presents to ER from [facility] c/o RLQ abdominal pain with N/V since Thursday.

## 2023-06-25 NOTE — ED ADULT TRIAGE NOTE - CHIEF COMPLAINT QUOTE
Pt, with hx of CVA (4/20), CAD and asthma, presents to ER from [facility] c/o RLQ abdominal pain with N/V since Thursday.

## 2023-06-25 NOTE — ED ADULT NURSE NOTE - NSFALLRISKINTERV_ED_ALL_ED
Assistance OOB with selected safe patient handling equipment if applicable/Assistance with ambulation/Communicate fall risk and risk factors to all staff, patient, and family/Monitor gait and stability/Monitor for mental status changes and reorient to person, place, and time, as needed/Move patient closer to nursing station/within visual sight of ED staff/Provide visual cue: yellow wristband, yellow gown, etc/Reinforce activity limits and safety measures with patient and family/Toileting schedule using arm’s reach rule for commode and bathroom/Use of alarms - bed, stretcher, chair and/or video monitoring/Call bell, personal items and telephone in reach/Instruct patient to call for assistance before getting out of bed/chair/stretcher/Non-slip footwear applied when patient is off stretcher/[place] to call system/Physically safe environment - no spills, clutter or unnecessary equipment/Purposeful Proactive Rounding/Room/bathroom lighting operational, light cord in reach

## 2023-06-25 NOTE — ED PROVIDER NOTE - ATTENDING APP SHARED VISIT CONTRIBUTION OF CARE
56 y/o f hx CAD, asthma, peripheral neuropathy, now s/p CVA and craniectomy last month, s/p trach and peg presents sent from nursing facility with c/o right side abd pain and vomiting over the past 2 days.  Pt's family member reports pt has vomited several times over the past few days, pain has been constant for the past day.  on exam patient with dressing on hed that is clean/intact. abdominal exam with modeate ttp on right side no guarding. mild weakness on right leg/arm otherwise neurologically itnact. abdominal workup without obvious pathology. ct head with new cerebellar bleed, nsjose paged, signedout to am team pending nsgy eval

## 2023-06-25 NOTE — PROGRESS NOTE ADULT - ASSESSMENT
Assessment:57m hx asthma, CAD, HTN, cerebellar infarction s/p SOC, trach/peg, hexagonal phase ab +,DVT/ PE on therapeutic Lovenox sent by rehab for concern of cerebellar hemorrhage but likely contrast extraversion from CT ab/pelvis for abdomen pain w/u       NEURO:  -neuro check q1  -pain management w/ tylenol & oxycodon, PCA pump   -CT head stable; unlikely cerebellar bleed   -Monitor Drain output   -PT/OT/speech  will d/c neurostimulates i.e amantadine & modafinil     PULMONARY:  s/p trach w/ humidified air   c/w Mucomyst, duoneb prn       CARDIOVASCULAR:  monitor on telemetry   vitals q4  sbp goal 100-140      GASTROENTEROLOGY:  start tube feeds, CT ab/pelvis reviewed no acute pathology   cc: abdomen pain likely gas now resolved ; start simethacone PRN 80mg q6   ensure BMs w/ Miralax & senna - hold for lose stools  transaminitis - add on GGT, trend LFTs review medications as cause; check hepatitis panel  GI ppx : Protonix 40mg qd  Daily stool count, 6/24    RENAL/:  IVL  -check BMP qd  -strict i/o's ;  -Na goal 135-145    ENDOCRINE:  A1c- 5.9      HEME/ONC:  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 SQ therapeutic dose will resume 80mg BiD ; given hyperintensity on CT likely 2/2 contrast administered from CT ab/pelvis   b/l SCDs    INFECTIOUS:   Monitor for fevers     ICU stepdown Checklist:    Completed: 06-25 @ 13:00    [X] hemodynamically stable – VS WNL and stable x 24hours, UO adequate  [n/a ] if  previously on HDA - off pressors x 24h with stable neuro exam    [X] no new symptoms x 24h (i.e. new fever, new-onset nausea/vomiting)  [X] stable labs: (i.e. WBC not rising, sodium not dropping)  [X] patient not at high risk for aspiration, if high risk then:                  [ ] should have definitive plans for trach/PEG (alternative option is to discharge from ICU to facilty)                  [ ] stepdown to bed close to nurse’s station  [n/a] low suctioning requirements (i.e. q4h or less)  [X] sign-off from primary RN*  [X] drains do not require ICU level of care  [X] if patient previously agitated or with behavioral issues – controlled   [X] pain controlled     Assessment:57m hx asthma, CAD, HTN, cerebellar infarction s/p SOC, trach/peg, hexagonal phase ab +,DVT/ PE on therapeutic Lovenox sent by rehab for concern of cerebellar hemorrhage but likely contrast extraversion from CT ab/pelvis for abdomen pain w/u       NEURO:  -neuro check q1  -pain management w/ tylenol & oxycodone   -CT head stable; unlikely cerebellar bleed   -Monitor Drain output   -PT/OT/speech  will d/c neurostimulates i.e amantadine & modafinil     PULMONARY:  s/p trach w/ humidified air   c/w Mucomyst, duoneb prn       CARDIOVASCULAR:  monitor on telemetry   vitals q4  sbp goal 100-140      GASTROENTEROLOGY:  start tube feeds, CT ab/pelvis reviewed no acute pathology   cc: abdomen pain likely gas now resolved ; start simethacone PRN 80mg q6   ensure BMs w/ Miralax & senna - hold for lose stools  transaminitis - add on GGT, trend LFTs review medications as cause; check hepatitis panel  GI ppx : Protonix 40mg qd  Daily stool count, 6/24    RENAL/:  IVL  -check BMP qd  -strict i/o's ;  -Na goal 135-145    ENDOCRINE:  A1c- 5.9      HEME/ONC:  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 SQ therapeutic dose will resume 80mg BiD ; given hyperintensity on CT likely 2/2 contrast administered from CT ab/pelvis   b/l SCDs    INFECTIOUS:   Monitor for fevers     ICU stepdown Checklist:    Completed: 06-25 @ 13:00    [X] hemodynamically stable – VS WNL and stable x 24hours, UO adequate  [n/a ] if  previously on HDA - off pressors x 24h with stable neuro exam    [X] no new symptoms x 24h (i.e. new fever, new-onset nausea/vomiting)  [X] stable labs: (i.e. WBC not rising, sodium not dropping)  [X] patient not at high risk for aspiration, if high risk then:                  [ ] should have definitive plans for trach/PEG (alternative option is to discharge from ICU to facilty)                  [ ] stepdown to bed close to nurse’s station  [n/a] low suctioning requirements (i.e. q4h or less)  [X] sign-off from primary RN*  [X] drains do not require ICU level of care  [X] if patient previously agitated or with behavioral issues – controlled   [X] pain controlled

## 2023-06-25 NOTE — PROGRESS NOTE ADULT - SUBJECTIVE AND OBJECTIVE BOX
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 Pt denies recent injury/trauma to head, neck pain, acute weakness/paresthesias of extremities, urinary/bowel incontinence, seizures. Patient is currently on lovenox 80mg BID, last dose given 8pm last night.  (25 Jun 2023 17:20)    On admission, the patient was:  GCS:  Noe-Norman:  modified Michelle:  ICH score:  NIHSS:      ICU Vital Signs Last 24 Hrs  T(C): 37.1 (25 Jun 2023 14:01), Max: 37.1 (25 Jun 2023 14:01)  T(F): 98.8 (25 Jun 2023 14:01), Max: 98.8 (25 Jun 2023 14:01)  HR: 64 (25 Jun 2023 19:00) (61 - 88)  BP: 117/55 (25 Jun 2023 19:00) (117/55 - 164/80)  BP(mean): 79 (25 Jun 2023 19:00) (79 - 104)  RR: 18 (25 Jun 2023 19:00) (16 - 21)  SpO2: 96% (25 Jun 2023 19:00) (95% - 100%)      06-25-23 @ 07:01  -  06-25-23 @ 19:47  --------------------------------------------------------  IN: 100 mL / OUT: 450 mL / NET: -350 mL      PHYSICAL EXAM:  General: No Acute Distress   Neurological: Awake, alert oriented to person, place and time, Following Commands, PERRL, EOMI, Face Symmetrical, Speech Fluent, Moving all extremities No Drift, Sensation to Light Touch Intact, RUE 4/5 w/ drift, RLE 4/5 HF all other extremities 5/5 no drift no nystagmus   Pulmonary: Clear to Auscultation, No Rales, No Rhonchi, No Wheezes   Cardiovascular: S1, S2, Regular Rate and Rhythm   Gastrointestinal: Soft, Nontender, Nondistended   Extremities: No calf tenderness       MEDICATIONS:   acetaminophen     Tablet .. 650 milliGRAM(s) Oral every 6 hours PRN  acetylcysteine 10%  Inhalation 4 milliLiter(s) Inhalation every 6 hours PRN  albuterol/ipratropium for Nebulization 3 milliLiter(s) Nebulizer every 6 hours PRN  atorvastatin 80 milliGRAM(s) Oral at bedtime  dextrose 5%. 1000 milliLiter(s) (50 mL/Hr) IV Continuous <Continuous>  dextrose 5%. 1000 milliLiter(s) (100 mL/Hr) IV Continuous <Continuous>  dextrose 50% Injectable 25 Gram(s) IV Push once  dextrose 50% Injectable 12.5 Gram(s) IV Push once  dextrose 50% Injectable 25 Gram(s) IV Push once  dextrose Oral Gel 15 Gram(s) Oral once PRN  enoxaparin Injectable 80 milliGRAM(s) SubCutaneous every 12 hours  ferrous    sulfate 325 milliGRAM(s) Oral every 24 hours  glucagon  Injectable 1 milliGRAM(s) IntraMuscular once  insulin lispro (ADMELOG) corrective regimen sliding scale   SubCutaneous Before meals and at bedtime  oxyCODONE    IR 5 milliGRAM(s) Oral every 4 hours PRN  polyethylene glycol 3350 17 Gram(s) Oral daily  senna 2 Tablet(s) Oral at bedtime  simethicone 80 milliGRAM(s) Chew every 6 hours PRN     LABS:                     11.7   3.97  )-----------( 270      ( 25 Jun 2023 03:31 )             35.4     06-25    141  |  100  |  10  ----------------------------<  120<H>  3.4<L>   |  31  |  0.47<L>    Ca    9.2      25 Jun 2023 03:31    TPro  6.8  /  Alb  3.8  /  TBili  0.2  /  DBili  x   /  AST  49<H>  /  ALT  119<H>  /  AlkPhos  120  06-25    LIVER FUNCTIONS - ( 25 Jun 2023 03:31 )  Alb: 3.8 g/dL / Pro: 6.8 g/dL / ALK PHOS: 120 U/L / ALT: 119 U/L / AST: 49 U/L / GGT: 101 U/L         ASSESSMENT:    PLAN:   NEURO:  Neurochecks   Analgesia  CT head stable; unlikely cerebellar bleed    PT/OT/speech      PULMONARY:  s/p trach w/ humidified air   c/w Mucomyst, duoneb prn       CARDIOVASCULAR:  SBP goal 100-140    GASTROENTEROLOGY:  Tube feeds, CT ab/pelvis reviewed no acute pathology   Transaminitis - add on GGT, trend LFTs review medications as cause  Hepatitis panel  GI ppx : Protonix 40mg qd  Daily stool count, 6/24    RENAL/:  IVL  Na goal 135-145    ENDOCRINE:  A1c- 5.9    HEME/ONC:  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 SQ therapeutic dose will resume 80mg BiD ; given hyperintensity on CT likely 2/2 contrast administered from CT ab/pelvis   b/l SCDs    INFECTIOUS:   Monitor for fevers     MISC:    SOCIAL/FAMILY:  [] awaiting [] updated at bedside [] family meeting    CODE STATUS:  [] Full Code [] DNR [] DNI [] Palliative/Comfort Care    DISPOSITION:  [] ICU [] Stroke Unit [] Floor [] EMU [] RCU [] PCU         NSCU ATTENDING -- ADDITIONAL PROGRESS NOTE    Nighttime rounds were performed    HPI:  56 y/o F w/ PMH of Asthma, CAD, HTN, prior miscarriage x3, peripheral neuropathy, 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 Pt denies recent injury/trauma to head, neck pain, acute weakness/paresthesias of extremities, urinary/bowel incontinence, seizures. Patient is currently on lovenox 80mg BID, last dose given 8pm last night.  (25 Jun 2023 17:20)    On admission, the patient was:  GCS: 15  Hunt-Norman:  modified Michelle:  ICH score:  NIHSS:    ICU Vital Signs Last 24 Hrs  T(C): 37.1 (25 Jun 2023 14:01), Max: 37.1 (25 Jun 2023 14:01)  T(F): 98.8 (25 Jun 2023 14:01), Max: 98.8 (25 Jun 2023 14:01)  HR: 64 (25 Jun 2023 19:00) (61 - 88)  BP: 117/55 (25 Jun 2023 19:00) (117/55 - 164/80)  BP(mean): 79 (25 Jun 2023 19:00) (79 - 104)  RR: 18 (25 Jun 2023 19:00) (16 - 21)  SpO2: 96% (25 Jun 2023 19:00) (95% - 100%)      06-25-23 @ 07:01  -  06-25-23 @ 19:47  --------------------------------------------------------  IN: 100 mL / OUT: 450 mL / NET: -350 mL      PHYSICAL EXAM:  General: No Acute Distress   HEENT: Trach collar  Neurological: Awake, alert oriented to person, place and time, following commands, PERRL L3mm R2mm and reactive, EOMI, face symmetric, speech fluent, moving all extremities. No Drift,   Sensation: Intact to light touch   RUE 4/5 w/ drift, RLE 4/5 HF, all other extremities 5/5   Mild RUE dysmetria  Pulmonary: Clear to Auscultation, no rales, no rhonchi, no wheezes   Cardiovascular: S1, S2, Regular Rate and Rhythm   Gastrointestinal: Soft, Nontender, Nondistended   Extremities: No calf tenderness       MEDICATIONS:   acetaminophen     Tablet .. 650 milliGRAM(s) Oral every 6 hours PRN  acetylcysteine 10%  Inhalation 4 milliLiter(s) Inhalation every 6 hours PRN  albuterol/ipratropium for Nebulization 3 milliLiter(s) Nebulizer every 6 hours PRN  atorvastatin 80 milliGRAM(s) Oral at bedtime  dextrose 5%. 1000 milliLiter(s) (50 mL/Hr) IV Continuous <Continuous>  dextrose 5%. 1000 milliLiter(s) (100 mL/Hr) IV Continuous <Continuous>  dextrose 50% Injectable 25 Gram(s) IV Push once  dextrose 50% Injectable 12.5 Gram(s) IV Push once  dextrose 50% Injectable 25 Gram(s) IV Push once  dextrose Oral Gel 15 Gram(s) Oral once PRN  enoxaparin Injectable 80 milliGRAM(s) SubCutaneous every 12 hours  ferrous    sulfate 325 milliGRAM(s) Oral every 24 hours  glucagon  Injectable 1 milliGRAM(s) IntraMuscular once  insulin lispro (ADMELOG) corrective regimen sliding scale   SubCutaneous Before meals and at bedtime  oxyCODONE    IR 5 milliGRAM(s) Oral every 4 hours PRN  polyethylene glycol 3350 17 Gram(s) Oral daily  senna 2 Tablet(s) Oral at bedtime  simethicone 80 milliGRAM(s) Chew every 6 hours PRN     LABS:                     11.7   3.97  )-----------( 270      ( 25 Jun 2023 03:31 )             35.4     06-25    141  |  100  |  10  ----------------------------<  120<H>  3.4<L>   |  31  |  0.47<L>    Ca    9.2      25 Jun 2023 03:31    TPro  6.8  /  Alb  3.8  /  TBili  0.2  /  DBili  x   /  AST  49<H>  /  ALT  119<H>  /  AlkPhos  120  06-25    LIVER FUNCTIONS - ( 25 Jun 2023 03:31 )  Alb: 3.8 g/dL / Pro: 6.8 g/dL / ALK PHOS: 120 U/L / ALT: 119 U/L / AST: 49 U/L / GGT: 101 U/L         ASSESSMENT:  56 y/o F with suspected cerebellar hemorrhage but likely contrast extraversion  Nausea and vomiting  CAD, HTN, cerebellar infarction s/p SOC    PLAN:   NEURO:  Neurochecks Q4  Analgesia  CT head stable; unlikely cerebellar bleed.   Neurostimulants held: Amantadine and modafinil  Activity: PT/OT/speech    PULMONARY:  Status post trach w/ humidified air. Trach collar.  Continue mucomyst, duoneb prn   Speaking valve eval in am    CARDIOVASCULAR:  SBP goal 100-160    GASTROENTEROLOGY: N/V  Unclear etiology  Tube feeds resumed. Monitor symptoms.  CT ab/pelvis reviewed no acute pathology   Transaminitis. Trend LFTs. Abd US; no pathology  Hepatitis panel  GI ppx: Not indicated  LBM: Unknown    RENAL/:  IVL  Na goal 135-145    ENDOCRINE:  A1c- 5.9    HEME/ONC: History of DVT/PE, APLs   On lovenox SQ therapeutic dose 80mg bid. CT hyperintensity resolved; favor contrast staining.   Anti Xa level on 6/27 5:00am  B/L SCDs    INFECTIOUS:   Monitor for fevers     MISC:    SOCIAL/FAMILY:  [] awaiting [x] updated at bedside [] family meeting    CODE STATUS:  [x] Full Code [] DNR [] DNI [] Palliative/Comfort Care    DISPOSITION:  [] ICU [x] Stroke Unit [] Floor [] EMU [] RCU [] PCU    Not critically ill

## 2023-06-25 NOTE — PATIENT PROFILE ADULT - FALL HARM RISK - FALL HARM RISK
Discussed with Dr. Gupta -   Increase fluid intake to  oz daily, complete BMP and NT proBNP next week.     Called left message asking patient to call back or respond through the patient portal.      Other

## 2023-06-25 NOTE — H&P ADULT - NSHPPHYSICALEXAM_GEN_ALL_CORE
General: patient seen laying supine in bed in NAD  Neuro: AAO x 3  (nods appropriately to questions and mouths words), FC, OE spontaneously,   CNII-XII: EOM intact, PERRL, face symmetric, tongue midline  RUE 4+/5, otherwise LUE/RLE/LLE 5/5 strength throughout   HEENT: +trach collar   Pulmonary: chest rise symmetric   Abdomen: soft, nontender, nondistended +PEG   Ext: perfusing well   Skin: warm, dry   Wound: SOC incision c/d/i healing well

## 2023-06-25 NOTE — ED PROVIDER NOTE - CLINICAL SUMMARY MEDICAL DECISION MAKING FREE TEXT BOX
58 y/o f hx CAD, asthma, peripheral neuropathy, now s/p CVA and craniectomy last month, s/p trach and peg presents c/o right side abd pain, n/v over the past 2 days.  Pt afebrile in ED, +RUQ tenderness on exam, is s/p cholecystectomy.  Labs unremarkable, urine pending, plan for CT a/p.  F/u results and reassess.

## 2023-06-25 NOTE — PATIENT PROFILE ADULT - FALL HARM RISK - HARM RISK INTERVENTIONS

## 2023-06-26 ENCOUNTER — TRANSCRIPTION ENCOUNTER (OUTPATIENT)
Age: 58
End: 2023-06-26

## 2023-06-26 LAB
A1C WITH ESTIMATED AVERAGE GLUCOSE RESULT: 5.9 % — HIGH (ref 4–5.6)
ALBUMIN SERPL ELPH-MCNC: 3.4 G/DL — SIGNIFICANT CHANGE UP (ref 3.3–5)
ALP SERPL-CCNC: 103 U/L — SIGNIFICANT CHANGE UP (ref 40–120)
ALT FLD-CCNC: 136 U/L — HIGH (ref 10–45)
ANION GAP SERPL CALC-SCNC: 9 MMOL/L — SIGNIFICANT CHANGE UP (ref 5–17)
APTT BLD: 48.8 SEC — HIGH (ref 27.5–35.5)
AST SERPL-CCNC: 68 U/L — HIGH (ref 10–40)
BILIRUB DIRECT SERPL-MCNC: 0.2 MG/DL — SIGNIFICANT CHANGE UP (ref 0–0.3)
BILIRUB INDIRECT FLD-MCNC: 0 MG/DL — LOW (ref 0.2–1)
BILIRUB SERPL-MCNC: 0.2 MG/DL — SIGNIFICANT CHANGE UP (ref 0.2–1.2)
BUN SERPL-MCNC: 6 MG/DL — LOW (ref 7–23)
CALCIUM SERPL-MCNC: 8.6 MG/DL — SIGNIFICANT CHANGE UP (ref 8.4–10.5)
CHLORIDE SERPL-SCNC: 102 MMOL/L — SIGNIFICANT CHANGE UP (ref 96–108)
CHOLEST SERPL-MCNC: 100 MG/DL — SIGNIFICANT CHANGE UP
CO2 SERPL-SCNC: 28 MMOL/L — SIGNIFICANT CHANGE UP (ref 22–31)
CREAT SERPL-MCNC: 0.45 MG/DL — LOW (ref 0.5–1.3)
EGFR: 113 ML/MIN/1.73M2 — SIGNIFICANT CHANGE UP
ESTIMATED AVERAGE GLUCOSE: 123 MG/DL — HIGH (ref 68–114)
GLUCOSE BLDC GLUCOMTR-MCNC: 112 MG/DL — HIGH (ref 70–99)
GLUCOSE BLDC GLUCOMTR-MCNC: 126 MG/DL — HIGH (ref 70–99)
GLUCOSE BLDC GLUCOMTR-MCNC: 126 MG/DL — HIGH (ref 70–99)
GLUCOSE BLDC GLUCOMTR-MCNC: 132 MG/DL — HIGH (ref 70–99)
GLUCOSE SERPL-MCNC: 131 MG/DL — HIGH (ref 70–99)
HAV IGM SER-ACNC: SIGNIFICANT CHANGE UP
HBV CORE IGM SER-ACNC: SIGNIFICANT CHANGE UP
HBV SURFACE AG SER-ACNC: SIGNIFICANT CHANGE UP
HCT VFR BLD CALC: 33.9 % — LOW (ref 34.5–45)
HCV AB S/CO SERPL IA: 0.04 S/CO — SIGNIFICANT CHANGE UP
HCV AB SERPL-IMP: SIGNIFICANT CHANGE UP
HDLC SERPL-MCNC: 38 MG/DL — LOW
HGB BLD-MCNC: 11.1 G/DL — LOW (ref 11.5–15.5)
INR BLD: 1.03 — SIGNIFICANT CHANGE UP (ref 0.88–1.16)
LIPID PNL WITH DIRECT LDL SERPL: 41 MG/DL — SIGNIFICANT CHANGE UP
MAGNESIUM SERPL-MCNC: 2 MG/DL — SIGNIFICANT CHANGE UP (ref 1.6–2.6)
MCHC RBC-ENTMCNC: 29.4 PG — SIGNIFICANT CHANGE UP (ref 27–34)
MCHC RBC-ENTMCNC: 32.7 GM/DL — SIGNIFICANT CHANGE UP (ref 32–36)
MCV RBC AUTO: 89.9 FL — SIGNIFICANT CHANGE UP (ref 80–100)
NON HDL CHOLESTEROL: 62 MG/DL — SIGNIFICANT CHANGE UP
NRBC # BLD: 0 /100 WBCS — SIGNIFICANT CHANGE UP (ref 0–0)
PHOSPHATE SERPL-MCNC: 4.2 MG/DL — SIGNIFICANT CHANGE UP (ref 2.5–4.5)
PLATELET # BLD AUTO: 252 K/UL — SIGNIFICANT CHANGE UP (ref 150–400)
POTASSIUM SERPL-MCNC: 3.9 MMOL/L — SIGNIFICANT CHANGE UP (ref 3.5–5.3)
POTASSIUM SERPL-SCNC: 3.9 MMOL/L — SIGNIFICANT CHANGE UP (ref 3.5–5.3)
PROT SERPL-MCNC: 6.5 G/DL — SIGNIFICANT CHANGE UP (ref 6–8.3)
PROTHROM AB SERPL-ACNC: 12.3 SEC — SIGNIFICANT CHANGE UP (ref 10.5–13.4)
RBC # BLD: 3.77 M/UL — LOW (ref 3.8–5.2)
RBC # FLD: 13.4 % — SIGNIFICANT CHANGE UP (ref 10.3–14.5)
SODIUM SERPL-SCNC: 139 MMOL/L — SIGNIFICANT CHANGE UP (ref 135–145)
TRIGL SERPL-MCNC: 106 MG/DL — SIGNIFICANT CHANGE UP
TSH SERPL-MCNC: 1.24 UIU/ML — SIGNIFICANT CHANGE UP (ref 0.27–4.2)
WBC # BLD: 3.89 K/UL — SIGNIFICANT CHANGE UP (ref 3.8–10.5)
WBC # FLD AUTO: 3.89 K/UL — SIGNIFICANT CHANGE UP (ref 3.8–10.5)

## 2023-06-26 PROCEDURE — 99222 1ST HOSP IP/OBS MODERATE 55: CPT

## 2023-06-26 RX ORDER — WARFARIN SODIUM 2.5 MG/1
5 TABLET ORAL ONCE
Refills: 0 | Status: COMPLETED | OUTPATIENT
Start: 2023-06-26 | End: 2023-06-26

## 2023-06-26 RX ADMIN — ENOXAPARIN SODIUM 80 MILLIGRAM(S): 100 INJECTION SUBCUTANEOUS at 05:13

## 2023-06-26 RX ADMIN — POLYETHYLENE GLYCOL 3350 17 GRAM(S): 17 POWDER, FOR SOLUTION ORAL at 11:18

## 2023-06-26 RX ADMIN — Medication 650 MILLIGRAM(S): at 21:15

## 2023-06-26 RX ADMIN — Medication 325 MILLIGRAM(S): at 11:19

## 2023-06-26 RX ADMIN — ATORVASTATIN CALCIUM 80 MILLIGRAM(S): 80 TABLET, FILM COATED ORAL at 22:47

## 2023-06-26 RX ADMIN — SENNA PLUS 2 TABLET(S): 8.6 TABLET ORAL at 22:47

## 2023-06-26 RX ADMIN — OXYCODONE HYDROCHLORIDE 5 MILLIGRAM(S): 5 TABLET ORAL at 04:20

## 2023-06-26 RX ADMIN — Medication 650 MILLIGRAM(S): at 20:14

## 2023-06-26 RX ADMIN — Medication 650 MILLIGRAM(S): at 00:43

## 2023-06-26 RX ADMIN — WARFARIN SODIUM 5 MILLIGRAM(S): 2.5 TABLET ORAL at 22:47

## 2023-06-26 RX ADMIN — OXYCODONE HYDROCHLORIDE 5 MILLIGRAM(S): 5 TABLET ORAL at 12:00

## 2023-06-26 RX ADMIN — ENOXAPARIN SODIUM 80 MILLIGRAM(S): 100 INJECTION SUBCUTANEOUS at 17:03

## 2023-06-26 RX ADMIN — OXYCODONE HYDROCHLORIDE 5 MILLIGRAM(S): 5 TABLET ORAL at 11:18

## 2023-06-26 RX ADMIN — OXYCODONE HYDROCHLORIDE 5 MILLIGRAM(S): 5 TABLET ORAL at 04:35

## 2023-06-26 NOTE — CONSULT NOTE ADULT - ASSESSMENT
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 and L. PICA) s/p SOC foramen magnum decompression and right parietal/occipital EVD placement (4/21), s/p trach (4/28/23), s/p PEG (5/2/23) sent from nursing facility c/o right side abdominal pain and vomiting x 2 days, CTH with findings of new acute intraparenchymal hemorrhage in the right cerebellar hemisphere.    Neuro:   - neuro/vitals q1h  - repeat CTH in 6 hours    Cardio:  - SBP<140    Pulm:  - room air, trach collar    GI:   - TF via PEG  - bowel regimen   - pending RUQ abdominal US for abdominal tenderness    Endo:  - ISS    Renal:  - voiding  - IVL    Heme:  - SCDs for DVT ppx, hold lovenox until repeat CTH    ID:   - afebrile  - h/o ecoli UTI, contact isolation for now    Dispo: ICU status, full code, pending PM&R  /PT/OT

## 2023-06-26 NOTE — CONSULT NOTE ADULT - SUBJECTIVE AND OBJECTIVE BOX
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 and L. PICA) s/p SOC foramen magnum decompression and right parietal/occipital EVD placement (4/21), s/p trach (4/28/23), s/p PEG (5/2/23) sent from nursing facility c/o right side abdominal pain, vomiting and mild generalized headache x 2 days, CTH with new acute intraparenchymal   hemorrhage in the right cerebellar hemisphere. Pt denies recent injury/trauma to head, neck pain, acute weakness/paresthesias of extremities, urinary/bowel incontinence, seizures. Patient is currently on lovenox 80mg BID, last dose given 8pm last night.     initially admitted to the neurosurgical ICU, and now stepped down to the stroke service.       OVERNIGHT EVENTS: NAEO      Remaining ROS negative     PHYSICAL EXAM:    General: no acute distress, sitting up in bed  HEENT: MMM, atraumatic  Neck: supple  Cardiovascular: +S1/S2, RRR, no mrg  Respiratory: CTA B/L; no W/R/R  Gastrointestinal: soft, NT/ND; +BSx4  Extremities: WWP; no edema  Neurological: bilateral dysmetria on finger to nose  Skin: warm and dry, no obvious rashes or lesions    VITAL SIGNS:  Vital Signs Last 24 Hrs  T(C): 36.7 (26 Jun 2023 10:06), Max: 37.1 (25 Jun 2023 14:01)  T(F): 98.1 (26 Jun 2023 10:06), Max: 98.8 (25 Jun 2023 14:01)  HR: 71 (26 Jun 2023 11:33) (64 - 94)  BP: 116/56 (26 Jun 2023 11:33) (114/59 - 131/74)  BP(mean): 81 (26 Jun 2023 11:33) (72 - 97)  RR: 29 (26 Jun 2023 11:33) (16 - 37)  SpO2: 99% (26 Jun 2023 11:33) (94% - 100%)    Parameters below as of 26 Jun 2023 11:33  Patient On (Oxygen Delivery Method): tracheostomy collar  O2 Flow (L/min): 8  O2 Concentration (%): 40      MEDICATIONS:  MEDICATIONS  (STANDING):  atorvastatin 80 milliGRAM(s) Oral at bedtime  dextrose 5%. 1000 milliLiter(s) (50 mL/Hr) IV Continuous <Continuous>  dextrose 5%. 1000 milliLiter(s) (100 mL/Hr) IV Continuous <Continuous>  dextrose 50% Injectable 25 Gram(s) IV Push once  dextrose 50% Injectable 25 Gram(s) IV Push once  dextrose 50% Injectable 12.5 Gram(s) IV Push once  enoxaparin Injectable 80 milliGRAM(s) SubCutaneous every 12 hours  ferrous    sulfate 325 milliGRAM(s) Oral every 24 hours  glucagon  Injectable 1 milliGRAM(s) IntraMuscular once  insulin lispro (ADMELOG) corrective regimen sliding scale   SubCutaneous Before meals and at bedtime  polyethylene glycol 3350 17 Gram(s) Oral daily  senna 2 Tablet(s) Oral at bedtime    MEDICATIONS  (PRN):  acetaminophen     Tablet .. 650 milliGRAM(s) Oral every 6 hours PRN Temp greater or equal to 38C (100.4F), Mild Pain (1 - 3)  acetylcysteine 10%  Inhalation 4 milliLiter(s) Inhalation every 6 hours PRN increased secretions  albuterol/ipratropium for Nebulization 3 milliLiter(s) Nebulizer every 6 hours PRN Respiratory Distress  dextrose Oral Gel 15 Gram(s) Oral once PRN Blood Glucose LESS THAN 70 milliGRAM(s)/deciliter  oxyCODONE    IR 5 milliGRAM(s) Oral every 4 hours PRN Moderate Pain (4 - 6)  simethicone 80 milliGRAM(s) Chew every 6 hours PRN Gas      ALLERGIES:  Allergies    No Known Allergies    Intolerances        LABS:                        11.1   3.89  )-----------( 252      ( 26 Jun 2023 05:30 )             33.9     06-26    139  |  102  |  6<L>  ----------------------------<  131<H>  3.9   |  28  |  0.45<L>    Ca    8.6      26 Jun 2023 05:30  Phos  4.2     06-26  Mg     2.0     06-26    TPro  6.5  /  Alb  3.4  /  TBili  0.2  /  DBili  0.2  /  AST  68<H>  /  ALT  136<H>  /  AlkPhos  103  06-26    PT/INR - ( 25 Jun 2023 07:33 )   PT: 12.8 sec;   INR: 1.07          PTT - ( 25 Jun 2023 07:33 )  PTT:43.7 sec  Urinalysis Basic - ( 26 Jun 2023 05:30 )    Color: x / Appearance: x / SG: x / pH: x  Gluc: 131 mg/dL / Ketone: x  / Bili: x / Urobili: x   Blood: x / Protein: x / Nitrite: x   Leuk Esterase: x / RBC: x / WBC x   Sq Epi: x / Non Sq Epi: x / Bacteria: x      CAPILLARY BLOOD GLUCOSE      POCT Blood Glucose.: 132 mg/dL (26 Jun 2023 11:55)      RADIOLOGY & ADDITIONAL TESTS: Reviewed.

## 2023-06-26 NOTE — OCCUPATIONAL THERAPY INITIAL EVALUATION ADULT - ADDITIONAL COMMENTS
Pt admitted from rehab (where pt has been since her last d/c from [facility] in May 2023). At rehab, pt's son reports she was ambulating fairly long distances in the hallway with RW and assist x 1 person. Pt required assist for all ADLs. // Prior to previous admission in April 2023, pt was living with her  and 3 children in an apartment with [address]STE. Pt is R hand dominant.

## 2023-06-26 NOTE — DISCHARGE NOTE PROVIDER - NSDCCONDITION_GEN_ALL_CORE
This is a chronic health problem that is well controlled with current medications and lifestyle measures.  Taking amlodipine 5 mg daily and lisinopril 20 mg daily.  Reports home blood pressure readings range from 130s/70s to 85.  Has intentionally lost 12 pounds since the new year.  Has been doing karate for exercise.  Unfortunately, I had to stop karate due to shoulder pain.  Please see additional notes.  The patient denies chest pain, shortness of breath, headache, vision changes, epistaxis, or dyspnea on exertion.       Stable

## 2023-06-26 NOTE — OCCUPATIONAL THERAPY INITIAL EVALUATION ADULT - DIAGNOSIS, OT EVAL
Pt presents with BUE weakness (R>L), impaired postural control, decreased coordination, impaired functional endurance, motor control, and balance impacting her ability to independently complete ADLs, functional transfers, and functional mobility.

## 2023-06-26 NOTE — PHYSICAL THERAPY INITIAL EVALUATION ADULT - MODALITIES TREATMENT COMMENTS
R hand dominant; CN Testing: B/L Frontalis intact; B/L buccinator intact; smile symmetrical; tongue protrusion at midline; B/L eyes open/close intact; Shoulder elevation: intact bilaterally; Vision H-Test: bilateral tracking and smooth pursuit with 2 beats of nystagmus when tracking from R>L// Quadrant Testing: R Eye WNL, L Eye with impaired superior/inferior temporal quadrants.

## 2023-06-26 NOTE — OCCUPATIONAL THERAPY INITIAL EVALUATION ADULT - GENERAL OBSERVATIONS, REHAB EVAL
OT IE Completed. MRS 4. Pt's ASAEL Aggarwal cleared pt for OT. Pt received semisupine in bed, +tele, +trach (40%, 8L), +b/l SCDs, +primafit, +cranial headwrap, +PEG (feeds held), NAD, agreeable to OT. Pt's son present and able to translate. Pt tolerated session well. Pt left as found, +bed alarm, ASAEL Aggarwal present.

## 2023-06-26 NOTE — DISCHARGE NOTE PROVIDER - HOSPITAL COURSE
Hospital course:  57y Female with PMH  Asthma, CAD, HTN, prior miscarriage x3, peripheral neuropathy, bilateral cerebellar infarctions likely 2/2 APLS on therapeutic Lovenox (R. and L. PICA) s/p SOC foramen magnum decompression and right parietal/occipital EVD placement (4/21), s/p trach (4/28/23), s/p PEG (5/2/23) sent from nursing facility c/o right side abdominal pain and vomiting x 2 days, CTH with hyperdensity concern for hemorrhagic transformation of right cerebellar infarct. [place] [facility] [place] CTH in which previously evident hyperdensity along the superior right cerebellum is no longer identified, likely contrast extraversion from CT ab/pelvis for abdomen pain w/u. Plan to bridge from therapeutic Lovenox to Coumadin. CT abdomen/pelvis negative. AST/ALT elevated, RUQ US unremarkable. TF resumed.    Discharge NIHSS:     Patient had the following workup done in house:  CT Head:   MR Head Non Contrast:  CT Angio Head:  CT Angio Neck:  []echo  []labs  []other    Physical exam at discharge:    New medications on discharge:  Labs to be followed up:  Imaging to be done as outpatient:  Further outpatient workup:   Hospital course:  57y Female with PMH  Asthma, CAD, HTN, prior miscarriage x3, peripheral neuropathy, bilateral cerebellar infarctions likely 2/2 APLS on therapeutic Lovenox (R. and L. PICA) s/p SOC foramen magnum decompression and right parietal/occipital EVD placement (4/21), s/p trach (4/28/23), s/p PEG (5/2/23) sent from nursing facility c/o right side abdominal pain and vomiting x 2 days, CTH with hyperdensity concern for hemorrhagic transformation of right cerebellar infarct. [place] stroke tele [place] [facility] [place] after repeat CTH in which previously evident hyperdensity along the superior right cerebellum is no longer identified, likely contrast extraversion from CT ab/pelvis for abdomen pain w/u. Plan to bridge from therapeutic Lovenox to Coumadin. CT abdomen/pelvis negative. AST/ALT elevated, RUQ US unremarkable. TF resumed.        Physical exam at discharge:    New medications on discharge:  Labs to be followed up:  Imaging to be done as outpatient:  Further outpatient workup:   Hospital course:  57y Female with PMH  Asthma, CAD, HTN, prior miscarriage x3, peripheral neuropathy, bilateral cerebellar infarctions likely 2/2 APLS on therapeutic Lovenox (R. and L. PICA) s/p SOC foramen magnum decompression and right parietal/occipital EVD placement (4/21), s/p trach (4/28/23), s/p PEG (5/2/23) sent from nursing facility c/o right side abdominal pain and vomiting x 2 days, CTH with hyperdensity concern for hemorrhagic transformation of right cerebellar infarct. Transfer[place] stroke tele [place] [facility] [place] after repeat CTH in which previously evident hyperdensity along the superior right cerebellum is no longer identified, likely contrast extraversion from CT ab/pelvis for abdomen pain w/u. Plan to bridge from therapeutic Lovenox to Coumadin. CT abdomen/pelvis negative. AST/ALT elevated, RUQ US unremarkable. TF resumed.    #hx Stroke: #hx b/l cerebellar stroke from APLS. c/w with lovenox to warfarin bridge, INR goal 2-3. Hold lipitor due to elevated LFT. Serial CTH stable, no bleed        Physical exam at discharge:    New medications on discharge:  Labs to be followed up:  Imaging to be done as outpatient:  Further outpatient workup:   Hospital course:  57y Female with PMH  Asthma, CAD, HTN, prior miscarriage x3, peripheral neuropathy, bilateral cerebellar infarctions likely 2/2 APLS on therapeutic Lovenox (R. and L. PICA) s/p SOC foramen magnum decompression and right parietal/occipital EVD placement (4/21), s/p trach (4/28/23), s/p PEG (5/2/23) sent from nursing facility c/o right side abdominal pain and vomiting x 2 days, CTH with hyperdensity concern for hemorrhagic transformation of right cerebellar infarct. Transferred to stroke tele [place] [facility] [place] after repeat CTH in which previously evident hyperdensity along the superior right cerebellum is no longer identified, likely contrast extraversion from CT ab/pelvis for abdomen pain w/u. Plan to bridge from therapeutic Lovenox to Coumadin. CT abdomen/pelvis negative. AST/ALT elevated, RUQ US unremarkable. TF resumed.    #hx Stroke: #hx b/l cerebellar stroke from APLS. c/w with lovenox to warfarin bridge, INR goal 2-3. Hold lipitor due to elevated LFT. Serial CTH stable, no bleed    #Abdominal pain: Presented with acute abdominal pain. CTA/P without acute pathology. Likely 2/2 flatulence, now resolved. C/w PEG tube feeds. C/w bowel regimen. C/w simethacone PRN 80mg q6.    #Transaminitis: Chronic elevation of LFTs, seen on on previous admission. No abdominal pain on examination- resolved. Hepatitis panel negative. RUQ U/S: Cholecystectomy. Continue Holding Lipitor  . Neurostimulants dc: amantadine, modafinil.    #Tracheostomy present: Known trach collar, placed 04/28. s/p trach w/ humidified air. currently on room air not connected to oxygen. passed speaking valve trial 6/26. c/w Mucomyst, duoneb prn.    #History of DVT (deep vein thrombosis): Known history of APLS. Patient with history of DVT s/p IVC filter. Currently on full dose Lovenox 80mg BID. INR goal 2-3. Daily INR checks until INR stable at correct dose INR 06/30 of 1.22--> after speaking with pharmacy, redose suggested. start coumadin _____ mg. Will establish care at INR clinic post ALMITA.       Physical exam at discharge:    New medications on discharge:  Labs to be followed up:  Imaging to be done as outpatient:  Further outpatient workup:   Hospital course:  57y Female with PMH  Asthma, CAD, HTN, prior miscarriage x3, peripheral neuropathy, bilateral cerebellar infarctions likely 2/2 APLS on therapeutic Lovenox (R. and L. PICA) s/p SOC foramen magnum decompression and right parietal/occipital EVD placement (4/21), s/p trach (4/28/23), s/p PEG (5/2/23) sent from nursing facility c/o right side abdominal pain and vomiting x 2 days, CTH with hyperdensity concern for hemorrhagic transformation of right cerebellar infarct. [place] [facility] [place] CTH in which previously evident hyperdensity along the superior right cerebellum is no longer identified, likely contrast extraversion from CT ab/pelvis for abdomen pain w/u. Plan to bridge from therapeutic Lovenox to Coumadin. CT abdomen/pelvis negative. AST/ALT elevated, RUQ US unremarkable. TF resumed.    #hx Stroke: #hx b/l cerebellar stroke from APLS. c/w with lovenox to warfarin bridge, INR goal 2-3. Hold lipitor due to elevated LFT. Serial CTH stable, no bleed    #Abdominal pain: Presented with acute abdominal pain. CTA/P without acute pathology. Likely 2/2 flatulence, now resolved. C/w PEG tube feeds. C/w bowel regimen. C/w simethacone PRN 80mg q6.    #Transaminitis: Chronic elevation of LFTs, seen on on previous admission. No abdominal pain on examination- resolved. Hepatitis panel negative. RUQ U/S: Cholecystectomy. Continue Holding Lipitor and f/u outpatient for resuming safetly.   . Neurostimulants dc: amantadine, modafinil.    #Tracheostomy present: Known trach collar, placed 04/28. s/p trach w/ humidified air. currently on room air not connected to oxygen. passed speaking valve trial 6/26. c/w Mucomyst, duoneb prn.    #History of DVT (deep vein thrombosis): Known history of APLS. Patient with history of DVT s/p IVC filter. Currently on full dose Lovenox 80mg BID. INR goal 2-3. Daily INR checks until INR stable at correct dose INR 06/30 of 1.22--> after speaking with pharmacy, redose suggested. start coumadin _____ mg. Will establish care at INR clinic post [facility].       Physical exam at discharge:    New medications on discharge:  Labs to be followed up:  Imaging to be done as outpatient:  Further outpatient workup:   Hospital course:  57y Female with PMH  Asthma, CAD, HTN, prior miscarriage x3, peripheral neuropathy, bilateral cerebellar infarctions likely 2/2 APLS on therapeutic Lovenox (R. and L. PICA) s/p SOC foramen magnum decompression and right parietal/occipital EVD placement (4/21), s/p trach (4/28/23), s/p PEG (5/2/23) sent from nursing facility c/o right side abdominal pain and vomiting x 2 days, CTH with hyperdensity concern for hemorrhagic transformation of right cerebellar infarct. [place] [facility] [place] repeat CTH in which previously evident hyperdensity along the superior right cerebellum is no longer identified, likely contrast extraversion from CT ab/pelvis for abdomen pain w/u. Plan to bridge from therapeutic Lovenox to Coumadin. CT abdomen/pelvis negative. AST/ALT elevated, RUQ US unremarkable. TF resumed.    #hx Stroke: #hx b/l cerebellar stroke from APLS. c/w with lovenox to warfarin bridge, INR goal 2-3. Hold lipitor due to elevated LFT. Serial CTH stable, no bleed    #Abdominal pain: Presented with acute abdominal pain. CTA/P without acute pathology. Likely 2/2 flatulence, now resolved. C/w PEG tube feeds. C/w bowel regimen. C/w simethacone PRN 80mg q6.    #Transaminitis: Chronic elevation of LFTs, seen on on previous admission. No abdominal pain on examination- resolved. Hepatitis panel negative. RUQ U/S: Cholecystectomy. Continue Holding Lipitor and f/u outpatient for resuming safetly.   . Neurostimulants dc: amantadine, modafinil.    #Tracheostomy present: Known trach collar, placed 04/28. s/p trach w/ humidified air. currently on room air not connected to oxygen. passed speaking valve trial 6/26. c/w Mucomyst, duoneb prn.    #History of DVT (deep vein thrombosis): Known history of APLS. Patient with history of DVT s/p IVC filter. Currently on full dose Lovenox 80mg BID. INR goal 2-3. Daily INR checks until INR stable at correct dose INR 06/30 of 1.22--> after speaking with pharmacy, redose suggested. start coumadin 7.5mg daily. Will establish care at INR clinic post [facility].       Physical exam at discharge:  Neurologic:  -Mental status: Awake, alert, oriented to person, place, and time. Speech is fluent, mild to moderate dysarthria, but comprehensible. Moderate dysarthria. Recent and remote memory intact. Follows commands. Attention/concentration intact.   -Cranial nerves:   II: Visual fields are full to confrontation.  III, IV, VI: Unable to bury on left gaze. Pupils equally round and reactive to light  V:  Facial sensation V1-V3 equal and intact   VII: Mild L facial.   VIII: Hearing is bilaterally intact   Motor: Normal bulk and tone. No pronator drift. Strength bilateral upper extremity 5/5 biceps, 4/5 triceps with left stronger than right, bilateral lower extremities at least 3/5.  Sensation: Intact to light touch bilaterally. No neglect or extinction on double simultaneous testing.  Coordination: mild dysmetria b/l UE.     Discharge NIHSS: 4      New medications on discharge: coumadin 7.5mg daily   Labs to be followed up:  Imaging to be done as outpatient:  Further outpatient workup: INR clinic

## 2023-06-26 NOTE — PHYSICAL THERAPY INITIAL EVALUATION ADULT - ADDITIONAL COMMENTS
Pt admitted from rehab (where pt has been since her last d/c from [facility] in May 2023). At rehab, pt's son reports she was ambulating fairly long distances in the hallway with RW and assist x 1 person. Pt required assist for all ADLs. // Prior to previous admission in April 2023, pt was living with her  and 3 children in an apartment with [address]STE. Pt is R hand dominant.

## 2023-06-26 NOTE — OCCUPATIONAL THERAPY INITIAL EVALUATION ADULT - PHYSICAL ASSIST/NONPHYSICAL ASSIST:DRESS LOWER BODY, OT EVAL
pt able to marco b/l socks over toes, however required assist for heel clearance/verbal cues/nonverbal cues (demo/gestures)/1 person assist

## 2023-06-26 NOTE — PHYSICAL THERAPY INITIAL EVALUATION ADULT - PERTINENT HX OF CURRENT PROBLEM, REHAB EVAL
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 and L. PICA) s/p SOC foramen magnum decompression and right parietal/occipital EVD placement (4/21), s/p trach (4/28/23), s/p PEG (5/2/23) sent from nursing facility c/o right side abdominal pain and vomiting x 2 days, CTH with hyperdensity concern for hemorrhagic transformation of right cerebellar infarct. Transferred to stroke tele from [facility] 6/25 after repeat CTH in which previously evident hyperdensity along the superior right cerebellum is no longer identified, likely contrast extraversion from CT ab/pelvis for abdomen pain w/u.

## 2023-06-26 NOTE — CONSULT NOTE ADULT - SUBJECTIVE AND OBJECTIVE BOX
Patient is a 57y old  Female who presents with a chief complaint of RUQ abdominal pain and CTH concerning for acute right cerebellar hemorrhage (26 Jun 2023 05:32)      HPI:  57F w/ PMH of Asthma, CAD, HTN, prior miscarriage x3, peripheral neuropathy, bilateral cerebellar infarctions likely 2/2 APLS on therapeutic lovenox (R. and L. PICA) s/p SOC foramen magnum decompression and right parietal/occipital EVD placement (4/21), s/p trach (4/28/23), s/p PEG (5/2/23) sent from nursing facility c/o right side abdominal pain, vomiting and mild generalized headache x 2 days, CTH with new acute intraparenchymal   hemorrhage in the right cerebellar hemisphere. Pt denies recent injury/trauma to head, neck pain, acute weakness/paresthesias of extremities, urinary/bowel incontinence, seizures. Patient is currently on lovenox 80mg BID, last dose given 8pm last night.  (25 Jun 2023 17:20)    PAST MEDICAL & SURGICAL HISTORY:  Asthma      CAD (coronary artery disease)      Peripheral neuropathy      S/P total abdominal hysterectomy      S/P cholecystectomy      S/P right knee surgery      Status post craniectomy      S/P ventriculoperitoneal shunt        MEDICATIONS  (STANDING):  atorvastatin 80 milliGRAM(s) Oral at bedtime  dextrose 5%. 1000 milliLiter(s) (50 mL/Hr) IV Continuous <Continuous>  dextrose 5%. 1000 milliLiter(s) (100 mL/Hr) IV Continuous <Continuous>  dextrose 50% Injectable 25 Gram(s) IV Push once  dextrose 50% Injectable 12.5 Gram(s) IV Push once  dextrose 50% Injectable 25 Gram(s) IV Push once  enoxaparin Injectable 80 milliGRAM(s) SubCutaneous every 12 hours  ferrous    sulfate 325 milliGRAM(s) Oral every 24 hours  glucagon  Injectable 1 milliGRAM(s) IntraMuscular once  insulin lispro (ADMELOG) corrective regimen sliding scale   SubCutaneous Before meals and at bedtime  polyethylene glycol 3350 17 Gram(s) Oral daily  senna 2 Tablet(s) Oral at bedtime    MEDICATIONS  (PRN):  acetaminophen     Tablet .. 650 milliGRAM(s) Oral every 6 hours PRN Temp greater or equal to 38C (100.4F), Mild Pain (1 - 3)  acetylcysteine 10%  Inhalation 4 milliLiter(s) Inhalation every 6 hours PRN increased secretions  albuterol/ipratropium for Nebulization 3 milliLiter(s) Nebulizer every 6 hours PRN Respiratory Distress  dextrose Oral Gel 15 Gram(s) Oral once PRN Blood Glucose LESS THAN 70 milliGRAM(s)/deciliter  oxyCODONE    IR 5 milliGRAM(s) Oral every 4 hours PRN Moderate Pain (4 - 6)  simethicone 80 milliGRAM(s) Chew every 6 hours PRN Gas        FAMILY HISTORY:  No pertinent family history in first degree relatives        CBC Full  -  ( 26 Jun 2023 05:30 )  WBC Count : 3.89 K/uL  RBC Count : 3.77 M/uL  Hemoglobin : 11.1 g/dL  Hematocrit : 33.9 %  Platelet Count - Automated : 252 K/uL  Mean Cell Volume : 89.9 fl  Mean Cell Hemoglobin : 29.4 pg  Mean Cell Hemoglobin Concentration : 32.7 gm/dL  Auto Neutrophil # : x  Auto Lymphocyte # : x  Auto Monocyte # : x  Auto Eosinophil # : x  Auto Basophil # : x  Auto Neutrophil % : x  Auto Lymphocyte % : x  Auto Monocyte % : x  Auto Eosinophil % : x  Auto Basophil % : x      06-26    139  |  102  |  6<L>  ----------------------------<  131<H>  3.9   |  28  |  0.45<L>    Ca    8.6      26 Jun 2023 05:30  Phos  4.2     06-26  Mg     2.0     06-26    TPro  6.5  /  Alb  3.4  /  TBili  0.2  /  DBili  0.2  /  AST  68<H>  /  ALT  136<H>  /  AlkPhos  103  06-26      Urinalysis Basic - ( 26 Jun 2023 05:30 )    Color: x / Appearance: x / SG: x / pH: x  Gluc: 131 mg/dL / Ketone: x  / Bili: x / Urobili: x   Blood: x / Protein: x / Nitrite: x   Leuk Esterase: x / RBC: x / WBC x   Sq Epi: x / Non Sq Epi: x / Bacteria: x        Radiology :     < from: CT Head No Cont (06.25.23 @ 12:25) >  ACC: [id number] EXAM:  CT BRAIN   ORDERED BY: ANTELMO JEROME     PROCEDURE DATE:  06/25/2023          INTERPRETATION:  Axial images were obtained from the skull base to the   cranial vertex without intravenous contrast. Soft tissue and bone   algorithm images were evaluated.    Clinical information: Assess right cerebellar hemorrhage.    The current study is compared with previous scan of earlier the same day.    Previously noted acute hemorrhage along the superior right cerebellum is   no longer identified. Other findings are stable on comparison with the   prior study. Specifically, the appearance of evolving cerebellar   infarction is otherwise stable. There remains no evidence of significant   mass effect on the fourth ventricle. Right transfrontal  shunt position   and lateral/third ventricular caliber are stable. The appearance of the   post craniectomy posterior fossa surgical bed is similarly stable. There   is no large extracerebral collection. There remains opacification of   selected left mastoid air cells.    IMPRESSION:    Previously evident hyperdensity along the superior right cerebellum is no   longer identified. Findings are otherwise stable.         REVIEW OF SYSTEMS:  per hpi         Vital Signs Last 24 Hrs  T(C): 36.7 (26 Jun 2023 10:06), Max: 37.1 (25 Jun 2023 14:01)  T(F): 98.1 (26 Jun 2023 10:06), Max: 98.8 (25 Jun 2023 14:01)  HR: 70 (26 Jun 2023 09:42) (64 - 94)  BP: 114/59 (26 Jun 2023 09:02) (114/59 - 131/74)  BP(mean): 84 (26 Jun 2023 09:02) (72 - 97)  RR: 25 (26 Jun 2023 09:42) (16 - 25)  SpO2: 100% (26 Jun 2023 09:42) (94% - 100%)    Parameters below as of 26 Jun 2023 09:42  Patient On (Oxygen Delivery Method): tracheostomy collar  O2 Flow (L/min): 8  O2 Concentration (%): 40        Physical Exam :  ESBL/ECOI urine CONTACT isolation ,  o 57 y o woman  lying comfortably in semi Hector's position , awake , alert , no acute complaints     Head : normocephalic , atraumatic    Eyes : PERRLA , EOMI , no nystagmus , sclera anicteric    ENT / FACE : neg nasal discharge , uvula midline , no oropharyngeal erythema / exudate    Neck : supple , negative JVD , negative carotid bruits , no thyromegaly    Chest : CTA bilaterally , neg wheeze / rhonchi / rales / crackles / egophany    Cardiovascular : regular rate and rhythm , neg murmurs / rubs / gallops    Abdomen : soft , non distended , non tender to palpation in all 4 quadrants ,  normal bowel sounds     Extremities : WWP , neg cyanosis /clubbing / edema     Neurologic Exam :    Alert and oriented to person , place , date/year , speech fluent w/o dysarthria , follows commands , recent and remote memory intact , repetition intact , comprehension intact ,  attention/concentration intact , fund of knowledge appropriate    Cranial Nerves:     II :                         pupils equal , round and reactive to light , visual fields intact   III/ IV/VI :              extraocular movements intact , neg nystagmus , neg ptosis  V :                        facial sensation intact , V1-3 normal  VII :                      face symmetric , no droop , normal eye closure and smile  VIII :                     hearing intact to finger rub bilaterally  IX and X :             no hoarseness , gag intact , palate/ uvula rise symmetrically  XI :                       SCM / trapezius strength intact bilateral  XII :                      no tongue deviation    Motor Exam:          Right UE:               4-/5    negative pronator drift    Left UE:                 5/5    negative pronator drift        Right LE:    4/5      Left LE:    5/5      Sensation :         intact to light touch x 4 extremities                            no neglect or extinction on double simultaneous testing      DTR :     biceps/brachioradialis : equal                      patella/ankle : equal     neg Babinski       Coordination :      Finger to Nose :  siri dysmetria bilaterally       Gait :  not tested          PM&R Impression :     admitted for c/o c/o right side abdominal pain and vomiting x 2 days, CTH with findings of new acute intraparenchymal hemorrhage in the right cerebellar hemisphere.         Recommendations / Plan :              1) Physical / Occupational therapy focusing on therapeutic exercises , equipment evaluation , bed mobility/transfer out of bed evaluation , progressive ambulation with assistive devices prn .    2) Current disposition plan recommendation  :  pending functional progress , came from NH

## 2023-06-26 NOTE — OCCUPATIONAL THERAPY INITIAL EVALUATION ADULT - MODIFIED CLINICAL TEST OF SENSORY INTEGRATION IN BALANCE TEST
Pt performed ~4 sidesteps to R and to Lx 2, approx 4 steps forwards/backwards x 2 with b/l handheld assist and Cynthia x2 for trunk control as pt also with decreased BLE coordination.

## 2023-06-26 NOTE — DISCHARGE NOTE PROVIDER - NSDCCPCAREPLAN_GEN_ALL_CORE_FT
PRINCIPAL DISCHARGE DIAGNOSIS  Diagnosis: Intracranial hemorrhage, nontraumatic  Assessment and Plan of Treatment: YOU ARE BEING TRANSITIONED FROM INJECTION BLOOD THINNER TO ORAL BLOOD THINNER. YOUR INR LEVEL NEEDS TO BE MONITED DAILY WITH A GOAL OF 2-3.   ONCE AT STABLE DOSE, THIS LEVEL REQUIRES LESS MONITORING  THE [facility] INR CLINIC WAS NOTIFIED OF YOUR CASE THEY WILL REACH OUT TO SCHEDULE AT APPOINTMENT  IF THEY DO NOT REACH OUT PLEASE CALL [phone]  OTHER INR CLINICS  [facility] [address] [place] [address], [facility], [place], [place], [address]  Phone:  [phone]  The [address] [address]  [phone]  [facility] + [facility]/[place], [place] · [phone]       PRINCIPAL DISCHARGE DIAGNOSIS  Diagnosis: Intracranial hemorrhage, nontraumatic  Assessment and Plan of Treatment: YOU ARE BEING TRANSITIONED FROM INJECTION BLOOD THINNER TO ORAL BLOOD THINNER. YOUR INR LEVEL NEEDS TO BE MONITED DAILY WITH A GOAL OF 2-3.   ONCE AT STABLE DOSE, THIS LEVEL REQUIRES LESS MONITORING  THE [facility] INR CLINIC WAS NOTIFIED OF YOUR CASE THEY WILL REACH OUT TO SCHEDULE AT APPOINTMENT  IF THEY DO NOT REACH OUT PLEASE CALL [phone]  OTHER INR CLINICS  [facility] [address] [place] [address], [facility], [place], [place], [address]  Phone:  [phone]  [address] [address]  [phone]  [facility] + [facility]/[place], [place] · [phone]  [facility]  Provider Address:[address] [place] [address], [address]  Coumadin Clinic [phone]

## 2023-06-26 NOTE — DISCHARGE NOTE PROVIDER - CARE PROVIDER_API CALL
Farrah Maria  Neurology  [address] [address]  Phone: [phone]  Fax: [phone]  Follow Up Time:    Farrah Maria  Neurology  [address] [address]  Phone: [phone]  Fax: [phone]  Follow Up Time:     Erica Villaseñor  Medical Oncology  [address]  [place] [address]  Phone: [phone]  Fax: [phone]  Follow Up Time:     Jamey Valadez  Neurosurgery  [address] [place] [address]  Phone: [phone]  Fax: [phone]  Follow Up Time:

## 2023-06-26 NOTE — DISCHARGE NOTE PROVIDER - NSDCMRMEDTOKEN_GEN_ALL_CORE_FT
acetaminophen 160 mg/5 mL oral suspension: 20.31 milliliter(s) orally every 6 hours as needed for Temp greater or equal to 38.5C (101.3F), Mild Pain (1 - 3)  acetylcysteine 10% inhalation solution: 4 milliliter(s) inhaled every 6 hours As needed increased secretions  amantadine 50 mg/5 mL oral syrup: 10 milliliter(s) orally once a day at 6AM  ascorbic acid 500 mg oral tablet: 1 tab(s) orally every other day  atorvastatin 80 mg oral tablet: 1 tab(s) orally once a day (at bedtime)  enoxaparin: 80 milligram(s) subcutaneous 2 times a day for superficial thrombosis  ferrous sulfate 325 mg (65 mg elemental iron) oral tablet: 1 tab(s) orally every other day  ipratropium-albuterol 0.5 mg-2.5 mg/3 mL inhalation solution: 3 milliliter(s) inhaled every 6 hours As needed Respiratory Distress  oxyCODONE 5 mg oral tablet: 1 tab(s) orally every 6 hours as needed for  severe pain   acetaminophen 160 mg/5 mL oral suspension: 20.31 milliliter(s) orally every 6 hours as needed for Temp greater or equal to 38.5C (101.3F), Mild Pain (1 - 3)  acetylcysteine 10% inhalation solution: 4 milliliter(s) inhaled every 6 hours As needed increased secretions  amantadine 50 mg/5 mL oral syrup: 10 milliliter(s) orally once a day at 6AM  ascorbic acid 500 mg oral tablet: 1 tab(s) orally every other day  enoxaparin: 80 milligram(s) subcutaneous 2 times a day for superficial thrombosis  ferrous sulfate 325 mg (65 mg elemental iron) oral tablet: 1 tab(s) orally every other day  ipratropium-albuterol 0.5 mg-2.5 mg/3 mL inhalation solution: 3 milliliter(s) inhaled every 6 hours As needed Respiratory Distress  oxyCODONE 5 mg oral tablet: 1 tab(s) orally every 6 hours as needed for  severe pain  polyethylene glycol 3350 oral powder for reconstitution: 17 gram(s) orally once a day  senna leaf extract oral tablet: 2 tab(s) orally once a day (at bedtime)  simethicone 80 mg oral tablet, chewable: 1 tab(s) orally every 6 hours As needed Gas   acetaminophen 160 mg/5 mL oral suspension: 20.31 milliliter(s) orally every 6 hours as needed for Temp greater or equal to 38.5C (101.3F), Mild Pain (1 - 3)  acetylcysteine 10% inhalation solution: 4 milliliter(s) inhaled every 6 hours As needed increased secretions  ascorbic acid 500 mg oral tablet: 1 tab(s) orally every other day  enoxaparin: 80 milligram(s) subcutaneous 2 times a day for superficial thrombosis  ferrous sulfate 325 mg (65 mg elemental iron) oral tablet: 1 tab(s) orally every other day  ipratropium-albuterol 0.5 mg-2.5 mg/3 mL inhalation solution: 3 milliliter(s) inhaled every 6 hours As needed Respiratory Distress  oxyCODONE 5 mg oral tablet: 1 tab(s) orally every 6 hours as needed for  severe pain  polyethylene glycol 3350 oral powder for reconstitution: 17 gram(s) orally once a day  senna leaf extract oral tablet: 2 tab(s) orally once a day (at bedtime)  simethicone 80 mg oral tablet, chewable: 1 tab(s) orally every 6 hours As needed Gas

## 2023-06-26 NOTE — PROGRESS NOTE ADULT - SUBJECTIVE AND OBJECTIVE BOX
Neurology Stroke Progress Note   # [id number]    INTERVAL HPI/OVERNIGHT EVENTS:  Patient seen and examined. No overnight events    MEDICATIONS  (STANDING):  atorvastatin 80 milliGRAM(s) Oral at bedtime  dextrose 5%. 1000 milliLiter(s) (100 mL/Hr) IV Continuous <Continuous>  dextrose 5%. 1000 milliLiter(s) (50 mL/Hr) IV Continuous <Continuous>  dextrose 50% Injectable 25 Gram(s) IV Push once  dextrose 50% Injectable 25 Gram(s) IV Push once  dextrose 50% Injectable 12.5 Gram(s) IV Push once  enoxaparin Injectable 80 milliGRAM(s) SubCutaneous every 12 hours  ferrous    sulfate 325 milliGRAM(s) Oral every 24 hours  glucagon  Injectable 1 milliGRAM(s) IntraMuscular once  insulin lispro (ADMELOG) corrective regimen sliding scale   SubCutaneous Before meals and at bedtime  polyethylene glycol 3350 17 Gram(s) Oral daily  senna 2 Tablet(s) Oral at bedtime    MEDICATIONS  (PRN):  acetaminophen     Tablet .. 650 milliGRAM(s) Oral every 6 hours PRN Temp greater or equal to 38C (100.4F), Mild Pain (1 - 3)  acetylcysteine 10%  Inhalation 4 milliLiter(s) Inhalation every 6 hours PRN increased secretions  albuterol/ipratropium for Nebulization 3 milliLiter(s) Nebulizer every 6 hours PRN Respiratory Distress  dextrose Oral Gel 15 Gram(s) Oral once PRN Blood Glucose LESS THAN 70 milliGRAM(s)/deciliter  oxyCODONE    IR 5 milliGRAM(s) Oral every 4 hours PRN Moderate Pain (4 - 6)  simethicone 80 milliGRAM(s) Chew every 6 hours PRN Gas      Allergies    No Known Allergies    Intolerances        Vital Signs Last 24 Hrs  T(C): 36.4 (26 Jun 2023 14:55), Max: 37 (25 Jun 2023 18:18)  T(F): 97.5 (26 Jun 2023 14:55), Max: 98.6 (25 Jun 2023 18:18)  HR: 72 (26 Jun 2023 14:18) (64 - 94)  BP: 119/62 (26 Jun 2023 14:18) (114/59 - 130/75)  BP(mean): 83 (26 Jun 2023 14:18) (72 - 97)  RR: 22 (26 Jun 2023 14:18) (16 - 37)  SpO2: 100% (26 Jun 2023 14:18) (94% - 100%)    Parameters below as of 26 Jun 2023 14:18  Patient On (Oxygen Delivery Method): tracheostomy collar  O2 Flow (L/min): 8  O2 Concentration (%): 40    Physical exam:  General: No acute distress, awake and alert  Eyes: Anicteric sclerae, moist conjunctivae, see below for CNs  Neck: trachea midline, FROM, supple, no thyromegaly or lymphadenopathy  Cardiovascular: Regular rate and rhythm  Pulmonary:  No use of accessory muscles  GI: Abdomen soft, non-distended, non-tender  Extremities: no edema    Neurologic:  -Mental status: Awake, alert, oriented to person, place, and time. Speech is fluent, but slow, requires effort. Moderate dysarthria. Recent and remote memory intact. Follows commands. Attention/concentration intact.   -Cranial nerves:   II: Visual fields are full to confrontation.  III, IV, VI: Unable to bury on left gaze. Pupils equally round and reactive to light  V:  Facial sensation V1-V3 equal and intact   VII: Face is symmetric with normal eye closure and smile  VIII: Hearing is bilaterally intact   Motor: Normal bulk and tone. No pronator drift. Strength bilateral upper extremity 5/5 biceps, 4/5 triceps with left stronger than right, bilateral lower extremities at least 3/5.  Sensation: Intact to light touch bilaterally. No neglect or extinction on double simultaneous testing.  Coordination: Minimal dysmetria on RUE, unable to fully assess LUE due to IV placement      LABS:                        11.1   3.89  )-----------( 252      ( 26 Jun 2023 05:30 )             33.9     06-26    139  |  102  |  6<L>  ----------------------------<  131<H>  3.9   |  28  |  0.45<L>    Ca    8.6      26 Jun 2023 05:30  Phos  4.2     06-26  Mg     2.0     06-26    TPro  6.5  /  Alb  3.4  /  TBili  0.2  /  DBili  0.2  /  AST  68<H>  /  ALT  136<H>  /  AlkPhos  103  06-26    PT/INR - ( 25 Jun 2023 07:33 )   PT: 12.8 sec;   INR: 1.07          PTT - ( 25 Jun 2023 07:33 )  PTT:43.7 sec  Urinalysis Basic - ( 26 Jun 2023 05:30 )    Color: x / Appearance: x / SG: x / pH: x  Gluc: 131 mg/dL / Ketone: x  / Bili: x / Urobili: x   Blood: x / Protein: x / Nitrite: x   Leuk Esterase: x / RBC: x / WBC x   Sq Epi: x / Non Sq Epi: x / Bacteria: x        RADIOLOGY & ADDITIONAL TESTS:

## 2023-06-26 NOTE — DISCHARGE NOTE PROVIDER - NSDCFUSCHEDAPPT_GEN_ALL_CORE_FT
Erica Villaseñor  [facility] Physician [facility]  [facility] [address]  Scheduled Appointment: 07/19/2023    Jamey Valadez  [facility] Physician [facility]  NEUROSURG [address]  Scheduled Appointment: 08/08/2023

## 2023-06-26 NOTE — CONSULT NOTE ADULT - ASSESSMENT
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    #Intraparenchymal hemorrhage of brain  - plan per stroke team  - appreciate input from neurosurgery  - PT/OT consult    #APLS  - normally on lovenox, but currently being held in the setting of bleeding    #Asthma  - s/p trach.  No respiratory distress, no wheeze on exam  - outpatient follow up     #HTN  - not currently on any antihypertensive medications  - plan per primary team    #Mild transaminitis  - similar slight elevations in level, hepatitis panel is negative.  S/p cholecystectomy, there is some mild CBD dilatation, which is secondary to surgery and is stable from earlier exams.  Not on any antibiotics that would cause transaminitis, such as cephalosporins.  No abdominal pain on exam, and no concerning intrahepatic findings on CT or ultrasound.  Can be repeated outpatient.  bilirubin levels are normal.      75 minutes spent on this encounter, including face to face with patient, care coordination and documentation.   Plan of care discussed with stroke team.

## 2023-06-26 NOTE — OCCUPATIONAL THERAPY INITIAL EVALUATION ADULT - PERTINENT HX OF CURRENT PROBLEM, REHAB EVAL
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 and L. PICA) s/p SOC foramen magnum decompression and right parietal/occipital EVD placement (4/21), s/p trach (4/28/23), s/p PEG (5/2/23). Pt sent from nursing facility c/o right side abdominal pain and vomiting x 2 days, CTH with findings of new acute intraparenchymal hemorrhage in the right cerebellar hemisphere.

## 2023-06-26 NOTE — OCCUPATIONAL THERAPY INITIAL EVALUATION ADULT - MANUAL MUSCLE TESTING RESULTS, REHAB EVAL
RUE shoulder flexion 3-/5, RUE elbow flexion/extension 3+/5, R  strength 3+/5. LUE shoulder flexion 3+/5, L elbow flexion/extension 4/5, L  strength 4/5. BLE 4+/5 throughout.

## 2023-06-26 NOTE — DISCHARGE NOTE PROVIDER - PROVIDER TOKENS
PROVIDER:[TOKEN:[[id number]]] PROVIDER:[TOKEN:[[id number]]],PROVIDER:[TOKEN:[[id number]]],PROVIDER:[TOKEN:[[id number]]]

## 2023-06-26 NOTE — PHYSICAL THERAPY INITIAL EVALUATION ADULT - GENERAL OBSERVATIONS, REHAB EVAL
Pt received semisupine in bed, +tele, +trach (FiO2 40%), +b/l SCDs, +primafit, +cranial headwrap, +PEG (feeds held,) +heplock, NAD, agreeable to PT.

## 2023-06-26 NOTE — SPEAKING VALVE EVALUATION - SLP PERTINENT HISTORY OF CURRENT PROBLEM
58 yo W w complex medical history adm from NH w new intraparenchymal hemorrhage in the R cerebellar hem

## 2023-06-26 NOTE — PHYSICAL THERAPY INITIAL EVALUATION ADULT - STANDING BALANCE: DYNAMIC, REHAB EVAL
Patient presents for blood pressure check.  Patient reports he has not been sleeping well. He denies chest pain and shortness of breath.  Patient is compliant with taking medications. Patient last took blood pressure medication about 1 hour ago.    Patient taking the following blood pressure medications:  Amlodipine 10 mg daily  Losartan 100 mg daily    Patients blood pressure readings:  Left-  140/92  Pulse  68   Right-  138/88  Pulse  68    Per Eleanor Thakkar NP start HCTZ 12.5 mg daily, continue with Amlodipine and Losartan, recheck again in 1 week and do BMP in 1 week.  Verbal instruction provided to patient. Patient agreeable and  verbalized complete understanding. Script sent to [facility], lab entered into [facility] and [facility] scheduled for 10-9-19.  
poor balance

## 2023-06-26 NOTE — PROGRESS NOTE ADULT - ASSESSMENT
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 and L. PICA) s/p SOC foramen magnum decompression and right parietal/occipital EVD placement (4/21), s/p trach (4/28/23), s/p PEG (5/2/23) sent from nursing facility c/o right side abdominal pain and vomiting x 2 days, CTH with hyperdensity concern for hemorrhagic transformation of right cerebellar infarct. Transferred to stroke tele from [facility] 6/25 after repeat CTH in which previously evident hyperdensity along the superior right cerebellum is no longer identified, likely contrast extraversion from CT ab/pelvis for abdomen pain w/u.     Neuro  - c/w full dose lovenox 80 BID with plan to bridge to coumadin 6/26  - family plans on going back home after discharge instead of back to rehab  - continue atorvastatin 80mg daily  - stroke neuro checks q4h, VS q4h  - CT head stable; unlikely cerebellar bleed   - Stroke education    Cards  #HTN  - monitor on telemetry   - vitals q4  - sbp goal 100-140    #HLD  - high dose statin as above in CVA  - LDL results: 41    Pulm  - call provider if SPO2 < 94%  - s/p trach w/ humidified air   - passed speaking valve trial 6/26  - c/w Mucomyst, duoneb prn     GI  + peg, tube feeds, CT ab/pelvis reviewed no acute pathology   - abdomen pain likely gas now resolved ; start simethacone PRN 80mg q6   - ensure BMs w/ Miralax & senna - hold for lose stools  - GI ppx : Protonix 40mg qd  - Daily stool count    #tranaminitis  - f/u LFTs, appear to be a transient chronic as with elevated LFT on prior admission too  - hepatitis panel neg  - neurostimulants dc: amantadine, modafinil     Endocrine  A1c- 5.9    HEME/ONC:  - hx DVT s/p IVC filter, APLS, c/w full dose lovenox 80 BID with plan to bridge to coumadin 6/26    DVT Prophylaxis  - lovenox sq full dose for DVT prophylaxis   - SCDs for DVT prophylaxis     Dispo: [facility], but family wants to take patient home     Discussed daily hospital plans and goals with patient and daughter at bedside.      Discussed with Neurology Attending Dr. Maria

## 2023-06-27 DIAGNOSIS — I25.10 ATHEROSCLEROTIC HEART DISEASE OF NATIVE CORONARY ARTERY WITHOUT ANGINA PECTORIS: ICD-10-CM

## 2023-06-27 DIAGNOSIS — O99.119 OTHER DISEASES OF THE BLOOD AND BLOOD-FORMING ORGANS AND CERTAIN DISORDERS INVOLVING THE IMMUNE MECHANISM COMPLICATING PREGNANCY, UNSPECIFIED TRIMESTER: ICD-10-CM

## 2023-06-27 DIAGNOSIS — Z93.0 TRACHEOSTOMY STATUS: ICD-10-CM

## 2023-06-27 DIAGNOSIS — Z86.718 PERSONAL HISTORY OF OTHER VENOUS THROMBOSIS AND EMBOLISM: ICD-10-CM

## 2023-06-27 DIAGNOSIS — R10.9 UNSPECIFIED ABDOMINAL PAIN: ICD-10-CM

## 2023-06-27 DIAGNOSIS — J45.909 UNSPECIFIED ASTHMA, UNCOMPLICATED: ICD-10-CM

## 2023-06-27 DIAGNOSIS — R74.01 ELEVATION OF LEVELS OF LIVER TRANSAMINASE LEVELS: ICD-10-CM

## 2023-06-27 DIAGNOSIS — Z29.9 ENCOUNTER FOR PROPHYLACTIC MEASURES, UNSPECIFIED: ICD-10-CM

## 2023-06-27 LAB
ALT FLD-CCNC: 155 U/L — HIGH (ref 10–45)
APTT BLD: 46.3 SEC — HIGH (ref 27.5–35.5)
AST SERPL-CCNC: 70 U/L — HIGH (ref 10–40)
GLUCOSE BLDC GLUCOMTR-MCNC: 127 MG/DL — HIGH (ref 70–99)
GLUCOSE BLDC GLUCOMTR-MCNC: 128 MG/DL — HIGH (ref 70–99)
GLUCOSE BLDC GLUCOMTR-MCNC: 146 MG/DL — HIGH (ref 70–99)
GLUCOSE BLDC GLUCOMTR-MCNC: 167 MG/DL — HIGH (ref 70–99)
INR BLD: 1.03 — SIGNIFICANT CHANGE UP (ref 0.88–1.16)
LMWH PPP CHRO-ACNC: 0.75 IU/ML — SIGNIFICANT CHANGE UP (ref 0.5–1.1)
PROTHROM AB SERPL-ACNC: 12.3 SEC — SIGNIFICANT CHANGE UP (ref 10.5–13.4)

## 2023-06-27 PROCEDURE — 99233 SBSQ HOSP IP/OBS HIGH 50: CPT

## 2023-06-27 PROCEDURE — 99418 PROLNG IP/OBS E/M EA 15 MIN: CPT

## 2023-06-27 RX ORDER — ACETAMINOPHEN 500 MG
650 TABLET ORAL ONCE
Refills: 0 | Status: COMPLETED | OUTPATIENT
Start: 2023-06-27 | End: 2023-06-27

## 2023-06-27 RX ORDER — CHLORHEXIDINE GLUCONATE 213 G/1000ML
1 SOLUTION TOPICAL
Refills: 0 | Status: DISCONTINUED | OUTPATIENT
Start: 2023-06-27 | End: 2023-06-30

## 2023-06-27 RX ORDER — WARFARIN SODIUM 2.5 MG/1
5 TABLET ORAL ONCE
Refills: 0 | Status: COMPLETED | OUTPATIENT
Start: 2023-06-27 | End: 2023-06-27

## 2023-06-27 RX ADMIN — Medication 650 MILLIGRAM(S): at 18:08

## 2023-06-27 RX ADMIN — Medication 325 MILLIGRAM(S): at 10:44

## 2023-06-27 RX ADMIN — WARFARIN SODIUM 5 MILLIGRAM(S): 2.5 TABLET ORAL at 21:53

## 2023-06-27 RX ADMIN — Medication 2: at 12:37

## 2023-06-27 RX ADMIN — ENOXAPARIN SODIUM 80 MILLIGRAM(S): 100 INJECTION SUBCUTANEOUS at 06:04

## 2023-06-27 RX ADMIN — Medication 650 MILLIGRAM(S): at 19:08

## 2023-06-27 RX ADMIN — SENNA PLUS 2 TABLET(S): 8.6 TABLET ORAL at 21:53

## 2023-06-27 RX ADMIN — ENOXAPARIN SODIUM 80 MILLIGRAM(S): 100 INJECTION SUBCUTANEOUS at 18:09

## 2023-06-27 NOTE — PROGRESS NOTE ADULT - ASSESSMENT
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 and L. PICA) s/p SOC foramen magnum decompression and right parietal/occipital EVD placement (4/21), s/p trach (4/28/23), s/p PEG (5/2/23) sent from nursing facility c/o right side abdominal pain and vomiting x 2 days, CTH with hyperdensity concern for hemorrhagic transformation of right cerebellar infarct. Transferred to stroke tele from [facility] 6/25 after repeat CTH in which previously evident hyperdensity along the superior right cerebellum is no longer identified, likely contrast extraversion from CT ab/pelvis for abdomen pain w/u.     Neuro  #hx b/l cerebellar stroke from APLS  - c/w with lovenox to warfarin bridge, INR goal 2-3  - family plans on going back home after discharge instead of back to rehab  - stop lipitor due to elevated LFT  - gen neuro and vitals q8h  - serial CTH stable, no bleed  - Stroke education    Cards  #HTN  Currently normotensive, not requiring anti-HTNsive   - sbp goal 100-140    Pulm  - call provider if SPO2 < 94%  - s/p trach w/ humidified air   - passed speaking valve trial 6/26  - c/w Mucomyst, duoneb prn     GI  + peg, tube feeds, CT ab/pelvis reviewed no acute pathology   - abdomen pain likely gas now resolved ; start simethacone PRN 80mg q6   - ensure BMs w/ Miralax & senna - hold for lose stools  - GI ppx : Protonix 40mg qd  - Daily stool count    #tranaminitis  With similar transaminitis on previous admission with thorough GI w/u with no significant findings. Patient with known hx of fatty liver dz.   - f/u LFTs, appear to be a transient chronic as with elevated LFT on prior admission too  - hepatitis panel neg  - stopped neurostimulants: amantadine, modafinil     Endocrine  A1c- 5.9    HEME/ONC:  #APLS  #DVT s/p IVC filter  - c/w full dose lovenox 80 BID with bridge to warfarin    DVT Prophylaxis  - lovenox sq full dose for DVT prophylaxis   - SCDs for DVT prophylaxis     Dispo: [facility], but family wants to take patient home. Will need to discuss with family if they are able to care for trach, peg, and subq injection while patient is bridging to warfarin. If patient is going home will need method for INR checks     Discussed daily hospital plans and goals with patient and daughter at bedside.      Discussed with Neurology Attending Dr. Maria

## 2023-06-27 NOTE — PROGRESS NOTE ADULT - PROBLEM SELECTOR PLAN 1
Presented with acute abdominal pain. CTA/P without acute pathology. Likely 2/2 flatulence, now resolved.  - C/w PEG tube feeds  - C/w bowel regimen   - C/w simethacone PRN 80mg q6

## 2023-06-27 NOTE — PROGRESS NOTE ADULT - SUBJECTIVE AND OBJECTIVE BOX
Doing OK today.  She denies any abdominal pain.  Discussed liver function tests with her daughter who was at the bedside.  States that she was diagnosed with a fatty liver, after an outpatient doctor noted some abnormal liver tests.      ***Note in progress***    OVERNIGHT EVENTS: NAEO    SUBJECTIVE / INTERVAL HPI: Patient seen and examined at bedside. Patient denying chest pain, SOB, palpitations, cough. Patient denies fever, chills, HA, Dizziness, change in vision/hearing, N/V, abdominal pain, diarrhea, constipation, hematochezia/melena, dysuria, hematuria, new onset weakness/numbness, LE pain and/or swelling.    Remaining ROS negative       PHYSICAL EXAM:    General: WDWN  HEENT: NC/AT; PERRL, anicteric sclera; MMM  Neck: supple  Cardiovascular: +S1/S2, RRR  Respiratory: CTA B/L; no W/R/R  Gastrointestinal: soft, NT/ND; +BSx4  Extremities: WWP; no edema, clubbing or cyanosis  Vascular: 2+ radial, DP/PT pulses B/L  Neurological: AAOx3; no focal deficits  Psychiatric: pleasant mood and affect  Dermatologic: no appreciable wounds or damage to the skin    VITAL SIGNS:  Vital Signs Last 24 Hrs  T(C): 37.1 (27 Jun 2023 20:23), Max: 37.1 (27 Jun 2023 17:05)  T(F): 98.7 (27 Jun 2023 20:23), Max: 98.7 (27 Jun 2023 17:05)  HR: 67 (27 Jun 2023 21:09) (60 - 69)  BP: 102/67 (27 Jun 2023 20:23) (100/51 - 117/55)  BP(mean): 79 (27 Jun 2023 09:15) (73 - 79)  RR: 19 (27 Jun 2023 21:09) (15 - 24)  SpO2: 98% (27 Jun 2023 21:09) (95% - 98%)    Parameters below as of 27 Jun 2023 21:09  Patient On (Oxygen Delivery Method): tracheostomy collar  O2 Flow (L/min): 10  O2 Concentration (%): 40      MEDICATIONS:  MEDICATIONS  (STANDING):  chlorhexidine 2% Cloths 1 Application(s) Topical <User Schedule>  dextrose 5%. 1000 milliLiter(s) (50 mL/Hr) IV Continuous <Continuous>  dextrose 5%. 1000 milliLiter(s) (100 mL/Hr) IV Continuous <Continuous>  dextrose 50% Injectable 25 Gram(s) IV Push once  dextrose 50% Injectable 12.5 Gram(s) IV Push once  dextrose 50% Injectable 25 Gram(s) IV Push once  enoxaparin Injectable 80 milliGRAM(s) SubCutaneous every 12 hours  ferrous    sulfate 325 milliGRAM(s) Oral every 24 hours  glucagon  Injectable 1 milliGRAM(s) IntraMuscular once  insulin lispro (ADMELOG) corrective regimen sliding scale   SubCutaneous Before meals and at bedtime  polyethylene glycol 3350 17 Gram(s) Oral daily  senna 2 Tablet(s) Oral at bedtime  warfarin 5 milliGRAM(s) Oral once    MEDICATIONS  (PRN):  acetylcysteine 10%  Inhalation 4 milliLiter(s) Inhalation every 6 hours PRN increased secretions  albuterol/ipratropium for Nebulization 3 milliLiter(s) Nebulizer every 6 hours PRN Respiratory Distress  dextrose Oral Gel 15 Gram(s) Oral once PRN Blood Glucose LESS THAN 70 milliGRAM(s)/deciliter  oxyCODONE    IR 5 milliGRAM(s) Oral every 4 hours PRN Moderate Pain (4 - 6)  simethicone 80 milliGRAM(s) Chew every 6 hours PRN Gas      ALLERGIES:  Allergies    No Known Allergies    Intolerances        LABS:                        11.1   3.89  )-----------( 252      ( 26 Jun 2023 05:30 )             33.9     06-26    139  |  102  |  6<L>  ----------------------------<  131<H>  3.9   |  28  |  0.45<L>    Ca    8.6      26 Jun 2023 05:30  Phos  4.2     06-26  Mg     2.0     06-26    TPro  x   /  Alb  x   /  TBili  x   /  DBili  x   /  AST  70<H>  /  ALT  155<H>  /  AlkPhos  x   06-27    PT/INR - ( 27 Jun 2023 05:30 )   PT: 12.3 sec;   INR: 1.03          PTT - ( 27 Jun 2023 05:30 )  PTT:46.3 sec  Urinalysis Basic - ( 26 Jun 2023 05:30 )    Color: x / Appearance: x / SG: x / pH: x  Gluc: 131 mg/dL / Ketone: x  / Bili: x / Urobili: x   Blood: x / Protein: x / Nitrite: x   Leuk Esterase: x / RBC: x / WBC x   Sq Epi: x / Non Sq Epi: x / Bacteria: x      CAPILLARY BLOOD GLUCOSE      POCT Blood Glucose.: 127 mg/dL (27 Jun 2023 18:30)      RADIOLOGY & ADDITIONAL TESTS: Reviewed.

## 2023-06-27 NOTE — PROGRESS NOTE ADULT - ASSESSMENT
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 and L. PICA) s/p SOC foramen magnum decompression and right parietal/occipital EVD placement (4/21), s/p trach (4/28/23), s/p PEG (5/2/23) sent from nursing facility c/o right side abdominal pain and vomiting x 2 days, CTH with hyperdensity concern for hemorrhagic transformation of right cerebellar infarct. Repeat CTH with resolution of hyperdensity. Transferred to stroke tele and subsequently [facility].

## 2023-06-27 NOTE — DIETITIAN INITIAL EVALUATION ADULT - ADD RECOMMEND
1. Recommend Jevity 1.2 @55ml/hr x 24hrs; provides 1320ml total volume, 1584 kcals, 73.3g protein, 1065ml free water  - would provides 31.8 kcals/kg & 1.47g protein/kg IBW   - closely monitor for s/s GI intolerance  2. Monitor lytes, replete prn  3. GI  - bowel regimen per team  - prokinetic/antiemetic agent prn to promote tolerance  4. Monitor chemsitry, GI function, skin integrity

## 2023-06-27 NOTE — DIETITIAN INITIAL EVALUATION ADULT - PERTINENT LABORATORY DATA
06-26    139  |  102  |  6<L>  ----------------------------<  131<H>  3.9   |  28  |  0.45<L>    Ca    8.6      26 Jun 2023 05:30  Phos  4.2     06-26  Mg     2.0     06-26    TPro  x   /  Alb  x   /  TBili  x   /  DBili  x   /  AST  70<H>  /  ALT  155<H>  /  AlkPhos  x   06-27  POCT Blood Glucose.: 167 mg/dL (06-27-23 @ 12:08)  A1C with Estimated Average Glucose Result: 5.9 % (06-26-23 @ 05:30)  A1C with Estimated Average Glucose Result: 5.9 % (04-22-23 @ 05:30)

## 2023-06-27 NOTE — PROGRESS NOTE ADULT - PROBLEM SELECTOR PLAN 2
Chronic elevation of LFTs, seen on on previous admission. No abdominal pain on examination.  - Hepatitis panel negative  - RUQ U/S: Cholecystectomy  - Holding Lipitor  - neurostimulants dc: amantadine, modafinil

## 2023-06-27 NOTE — PROGRESS NOTE ADULT - ASSESSMENT
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    #Intraparenchymal hemorrhage of brain  - plan per stroke team  - appreciate input from neurosurgery  - PT/OT consult    #APLS  - normally on lovenox, but currently being held in the setting of bleeding    #Asthma  - s/p trach.  No respiratory distress, no wheeze on exam  - outpatient follow up     #HTN  - not currently on any antihypertensive medications  - plan per primary team    #Mild transaminitis  - similar slight elevations in level, hepatitis panel is negative.  S/p cholecystectomy, there is some mild CBD dilatation, which is secondary to surgery and is stable from earlier exams.  Not on any antibiotics that would cause transaminitis, such as cephalosporins.  No abdominal pain on exam, and no concerning intrahepatic findings on CT or ultrasound.  Can be repeated outpatient.  bilirubin levels are normal.    - patient's family reports that she has a history of fatty liver and prior history of elevated liver tests  - holding tylenol, and holding statin for now.  Liver function tests can be repeated outpatient.  Can consider starting lower dose of statin.     75 minutes spent on this encounter, including face to face with patient, care coordination and documentation.   Plan of care discussed with stroke team.

## 2023-06-27 NOTE — PROGRESS NOTE ADULT - SUBJECTIVE AND OBJECTIVE BOX
OVERNIGHT EVENTS:     SUBJECTIVE:    VITAL SIGNS:  Vital Signs Last 24 Hrs  T(C): 36.7 (27 Jun 2023 14:00), Max: 36.7 (27 Jun 2023 14:00)  T(F): 98.1 (27 Jun 2023 14:00), Max: 98.1 (27 Jun 2023 14:00)  HR: 69 (27 Jun 2023 09:15) (60 - 69)  BP: 117/55 (27 Jun 2023 09:15) (100/51 - 118/64)  BP(mean): 79 (27 Jun 2023 09:15) (73 - 87)  RR: 18 (27 Jun 2023 09:15) (15 - 24)  SpO2: 96% (27 Jun 2023 09:15) (95% - 100%)    Parameters below as of 27 Jun 2023 09:15  Patient On (Oxygen Delivery Method): tracheostomy collar  O2 Flow (L/min): 8  O2 Concentration (%): 40    PHYSICAL EXAM:  General: NAD; speaking in full sentences  HEENT: NC/AT; PERRL; EOMI; MMM  Neck: supple; no JVD  Cardiac: RRR; +S1/S2  Pulm: CTA B/L; no W/R/R  GI: soft, NT/ND, +BS  Extremities: WWP; no edema, clubbing or cyanosis  Vasc: 2+ radial, DP pulses B/L  Neuro: AAOx3; no focal deficits    MEDICATIONS:  MEDICATIONS  (STANDING):  chlorhexidine 2% Cloths 1 Application(s) Topical <User Schedule>  dextrose 5%. 1000 milliLiter(s) (50 mL/Hr) IV Continuous <Continuous>  dextrose 5%. 1000 milliLiter(s) (100 mL/Hr) IV Continuous <Continuous>  dextrose 50% Injectable 25 Gram(s) IV Push once  dextrose 50% Injectable 12.5 Gram(s) IV Push once  dextrose 50% Injectable 25 Gram(s) IV Push once  enoxaparin Injectable 80 milliGRAM(s) SubCutaneous every 12 hours  ferrous    sulfate 325 milliGRAM(s) Oral every 24 hours  glucagon  Injectable 1 milliGRAM(s) IntraMuscular once  insulin lispro (ADMELOG) corrective regimen sliding scale   SubCutaneous Before meals and at bedtime  polyethylene glycol 3350 17 Gram(s) Oral daily  senna 2 Tablet(s) Oral at bedtime  warfarin 5 milliGRAM(s) Oral once    MEDICATIONS  (PRN):  acetylcysteine 10%  Inhalation 4 milliLiter(s) Inhalation every 6 hours PRN increased secretions  albuterol/ipratropium for Nebulization 3 milliLiter(s) Nebulizer every 6 hours PRN Respiratory Distress  dextrose Oral Gel 15 Gram(s) Oral once PRN Blood Glucose LESS THAN 70 milliGRAM(s)/deciliter  oxyCODONE    IR 5 milliGRAM(s) Oral every 4 hours PRN Moderate Pain (4 - 6)  simethicone 80 milliGRAM(s) Chew every 6 hours PRN Gas      ALLERGIES:  Allergies    No Known Allergies    Intolerances        LABS:                        11.1   3.89  )-----------( 252      ( 26 Jun 2023 05:30 )             33.9     06-26    139  |  102  |  6<L>  ----------------------------<  131<H>  3.9   |  28  |  0.45<L>    Ca    8.6      26 Jun 2023 05:30  Phos  4.2     06-26  Mg     2.0     06-26    TPro  x   /  Alb  x   /  TBili  x   /  DBili  x   /  AST  70<H>  /  ALT  155<H>  /  AlkPhos  x   06-27    PT/INR - ( 27 Jun 2023 05:30 )   PT: 12.3 sec;   INR: 1.03          PTT - ( 27 Jun 2023 05:30 )  PTT:46.3 sec    RADIOLOGY & ADDITIONAL TESTS: Reviewed. OVERNIGHT EVENTS:   No acute events overnight.     SUBJECTIVE:  Patient seen and examined at bedside, resting comfortably in bed, and does not appear to be in any acute distress. Patient is Mongolian speaking. Patient denies headaches, changes in vision, fevers, chills, nausea, SOB, chest pain, abdominal pain, genitourinary symptoms, extremity pain or swelling.    VITAL SIGNS:  Vital Signs Last 24 Hrs  T(C): 36.7 (27 Jun 2023 14:00), Max: 36.7 (27 Jun 2023 14:00)  T(F): 98.1 (27 Jun 2023 14:00), Max: 98.1 (27 Jun 2023 14:00)  HR: 69 (27 Jun 2023 09:15) (60 - 69)  BP: 117/55 (27 Jun 2023 09:15) (100/51 - 118/64)  BP(mean): 79 (27 Jun 2023 09:15) (73 - 87)  RR: 18 (27 Jun 2023 09:15) (15 - 24)  SpO2: 96% (27 Jun 2023 09:15) (95% - 100%)    Parameters below as of 27 Jun 2023 09:15  Patient On (Oxygen Delivery Method): tracheostomy collar  O2 Flow (L/min): 8  O2 Concentration (%): 40    PHYSICAL EXAM:  General: AAOx3, no acute distress, laying in bed comfortably  HEENT: head NCAT, eyes EOMI, PERRLA, no conjunctival pallor, no scleral icterus, no oropharyngeal exudates, erythema, or lesions, moist mucous membranes  Neck: +Trach collar, supple, non-tender, no cervical LAD, no thyromegaly  Pulmonary: lung fields CTAB, no wheezing, rales, or rhonchi, no tenderness to palpation  Cardiovascular: RRR, normal S1/S2, no m/r/g, no JVD, no carotid bruits b/l, PMI not displaced  Abdominal: +PEG feeds, soft, NTND, +BS, no hepatosplenomegaly, no CVA tenderness, no masses, no bruits  Extremities: no peripheral edema or cyanosis, pulses 2+ in upper and lower extremities b/l, capillary refill < 2 sec.  Neuro: CN II-XII grossly intact and symmetric b/l, motor, sensory, coordination grossly intact in all extremities, DTR 2+ and symmetric b/l  Skin: warm, dry, no visible jaundice, no new rashes    MEDICATIONS:  MEDICATIONS  (STANDING):  chlorhexidine 2% Cloths 1 Application(s) Topical <User Schedule>  dextrose 5%. 1000 milliLiter(s) (50 mL/Hr) IV Continuous <Continuous>  dextrose 5%. 1000 milliLiter(s) (100 mL/Hr) IV Continuous <Continuous>  dextrose 50% Injectable 25 Gram(s) IV Push once  dextrose 50% Injectable 12.5 Gram(s) IV Push once  dextrose 50% Injectable 25 Gram(s) IV Push once  enoxaparin Injectable 80 milliGRAM(s) SubCutaneous every 12 hours  ferrous    sulfate 325 milliGRAM(s) Oral every 24 hours  glucagon  Injectable 1 milliGRAM(s) IntraMuscular once  insulin lispro (ADMELOG) corrective regimen sliding scale   SubCutaneous Before meals and at bedtime  polyethylene glycol 3350 17 Gram(s) Oral daily  senna 2 Tablet(s) Oral at bedtime  warfarin 5 milliGRAM(s) Oral once    MEDICATIONS  (PRN):  acetylcysteine 10%  Inhalation 4 milliLiter(s) Inhalation every 6 hours PRN increased secretions  albuterol/ipratropium for Nebulization 3 milliLiter(s) Nebulizer every 6 hours PRN Respiratory Distress  dextrose Oral Gel 15 Gram(s) Oral once PRN Blood Glucose LESS THAN 70 milliGRAM(s)/deciliter  oxyCODONE    IR 5 milliGRAM(s) Oral every 4 hours PRN Moderate Pain (4 - 6)  simethicone 80 milliGRAM(s) Chew every 6 hours PRN Gas      ALLERGIES:  Allergies    No Known Allergies    Intolerances        LABS:                        11.1   3.89  )-----------( 252      ( 26 Jun 2023 05:30 )             33.9     06-26    139  |  102  |  6<L>  ----------------------------<  131<H>  3.9   |  28  |  0.45<L>    Ca    8.6      26 Jun 2023 05:30  Phos  4.2     06-26  Mg     2.0     06-26    TPro  x   /  Alb  x   /  TBili  x   /  DBili  x   /  AST  70<H>  /  ALT  155<H>  /  AlkPhos  x   06-27    PT/INR - ( 27 Jun 2023 05:30 )   PT: 12.3 sec;   INR: 1.03          PTT - ( 27 Jun 2023 05:30 )  PTT:46.3 sec    RADIOLOGY & ADDITIONAL TESTS: Reviewed.

## 2023-06-27 NOTE — DIETITIAN INITIAL EVALUATION ADULT - PERTINENT MEDS FT
MEDICATIONS  (STANDING):  dextrose 5%. 1000 milliLiter(s) (50 mL/Hr) IV Continuous <Continuous>  dextrose 5%. 1000 milliLiter(s) (100 mL/Hr) IV Continuous <Continuous>  dextrose 50% Injectable 25 Gram(s) IV Push once  dextrose 50% Injectable 12.5 Gram(s) IV Push once  dextrose 50% Injectable 25 Gram(s) IV Push once  enoxaparin Injectable 80 milliGRAM(s) SubCutaneous every 12 hours  ferrous    sulfate 325 milliGRAM(s) Oral every 24 hours  glucagon  Injectable 1 milliGRAM(s) IntraMuscular once  insulin lispro (ADMELOG) corrective regimen sliding scale   SubCutaneous Before meals and at bedtime  polyethylene glycol 3350 17 Gram(s) Oral daily  senna 2 Tablet(s) Oral at bedtime  warfarin 5 milliGRAM(s) Oral once    MEDICATIONS  (PRN):  acetylcysteine 10%  Inhalation 4 milliLiter(s) Inhalation every 6 hours PRN increased secretions  albuterol/ipratropium for Nebulization 3 milliLiter(s) Nebulizer every 6 hours PRN Respiratory Distress  dextrose Oral Gel 15 Gram(s) Oral once PRN Blood Glucose LESS THAN 70 milliGRAM(s)/deciliter  oxyCODONE    IR 5 milliGRAM(s) Oral every 4 hours PRN Moderate Pain (4 - 6)  simethicone 80 milliGRAM(s) Chew every 6 hours PRN Gas

## 2023-06-27 NOTE — PROGRESS NOTE ADULT - PROBLEM SELECTOR PLAN 3
Known trach collar, placed 04/28.   - call provider if SPO2 < 94%  - s/p trach w/ humidified air   - passed speaking valve trial 6/26  - c/w Mucomyst, duoneb prn

## 2023-06-27 NOTE — PROGRESS NOTE ADULT - SUBJECTIVE AND OBJECTIVE BOX
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 and L. PICA) s/p SOC foramen magnum decompression and right parietal/occipital EVD placement (4/21), s/p trach (4/28/23), s/p PEG (5/2/23) sent from nursing facility c/o right side abdominal pain and vomiting x 2 days. CT abdomen/pelvis with contrast with no significant findings. CTH with hyperdensity concern for hemorrhagic transformation of right cerebellar infarct. Repeat CTH 4-6 hours later with complete resolution of hyperdensity; initial hyperdensity most likely due contrast from CT abdomen. Abdominal pain and nausea resolved. Patient currently bridging from lovenox to warfarin. Stable for RMF. Recommended for [facility], however family would like to take patient home.     INTERVAL HPI/OVERNIGHT EVENTS:  Patient seen and examined. No overnight events. Family would like to take patient home instead of [facility].     MEDICATIONS  (STANDING):  chlorhexidine 2% Cloths 1 Application(s) Topical <User Schedule>  dextrose 5%. 1000 milliLiter(s) (100 mL/Hr) IV Continuous <Continuous>  dextrose 5%. 1000 milliLiter(s) (50 mL/Hr) IV Continuous <Continuous>  dextrose 50% Injectable 25 Gram(s) IV Push once  dextrose 50% Injectable 25 Gram(s) IV Push once  dextrose 50% Injectable 12.5 Gram(s) IV Push once  enoxaparin Injectable 80 milliGRAM(s) SubCutaneous every 12 hours  ferrous    sulfate 325 milliGRAM(s) Oral every 24 hours  glucagon  Injectable 1 milliGRAM(s) IntraMuscular once  insulin lispro (ADMELOG) corrective regimen sliding scale   SubCutaneous Before meals and at bedtime  polyethylene glycol 3350 17 Gram(s) Oral daily  senna 2 Tablet(s) Oral at bedtime  warfarin 5 milliGRAM(s) Oral once    MEDICATIONS  (PRN):  acetylcysteine 10%  Inhalation 4 milliLiter(s) Inhalation every 6 hours PRN increased secretions  albuterol/ipratropium for Nebulization 3 milliLiter(s) Nebulizer every 6 hours PRN Respiratory Distress  dextrose Oral Gel 15 Gram(s) Oral once PRN Blood Glucose LESS THAN 70 milliGRAM(s)/deciliter  oxyCODONE    IR 5 milliGRAM(s) Oral every 4 hours PRN Moderate Pain (4 - 6)  simethicone 80 milliGRAM(s) Chew every 6 hours PRN Gas      Allergies    No Known Allergies    Intolerances        Vital Signs Last 24 Hrs  T(C): 37.1 (27 Jun 2023 17:05), Max: 37.1 (27 Jun 2023 17:05)  T(F): 98.7 (27 Jun 2023 17:05), Max: 98.7 (27 Jun 2023 17:05)  HR: 69 (27 Jun 2023 17:05) (60 - 69)  BP: 111/71 (27 Jun 2023 17:05) (100/51 - 117/55)  BP(mean): 79 (27 Jun 2023 09:15) (73 - 79)  RR: 18 (27 Jun 2023 17:05) (15 - 24)  SpO2: 98% (27 Jun 2023 17:05) (95% - 98%)    Parameters below as of 27 Jun 2023 17:05  Patient On (Oxygen Delivery Method): tracheostomy collar  O2 Flow (L/min): 8  O2 Concentration (%): 40    Physical exam:  General: No acute distress, awake and alert  Eyes: Anicteric sclerae, moist conjunctivae, see below for CNs  Neck: trachea midline, FROM, supple, no thyromegaly or lymphadenopathy  Cardiovascular: Regular rate and rhythm  Pulmonary: No use of accessory muscles  GI: Abdomen soft, non-distended, non-tender  Extremities:  no edema    Neurologic:  -Mental status: Awake, alert, oriented to person, place, and time. Speech is fluent, but slow, requires effort. Moderate dysarthria. Recent and remote memory intact. Follows commands. Attention/concentration intact.   -Cranial nerves:   II: Visual fields are full to confrontation.  III, IV, VI: Unable to bury on left gaze. Pupils equally round and reactive to light  V:  Facial sensation V1-V3 equal and intact   VII: Face is symmetric with normal eye closure and smile  VIII: Hearing is bilaterally intact   Motor: Normal bulk and tone. No pronator drift. Strength bilateral upper extremity 5/5 biceps, 4/5 triceps with left stronger than right, bilateral lower extremities at least 3/5.  Sensation: Intact to light touch bilaterally. No neglect or extinction on double simultaneous testing.  Coordination: Minimal dysmetria on RUE, unable to fully assess LUE due to IV placement      LABS:                        11.1   3.89  )-----------( 252      ( 26 Jun 2023 05:30 )             33.9     06-26    139  |  102  |  6<L>  ----------------------------<  131<H>  3.9   |  28  |  0.45<L>    Ca    8.6      26 Jun 2023 05:30  Phos  4.2     06-26  Mg     2.0     06-26    TPro  x   /  Alb  x   /  TBili  x   /  DBili  x   /  AST  70<H>  /  ALT  155<H>  /  AlkPhos  x   06-27    PT/INR - ( 27 Jun 2023 05:30 )   PT: 12.3 sec;   INR: 1.03          PTT - ( 27 Jun 2023 05:30 )  PTT:46.3 sec  Urinalysis Basic - ( 26 Jun 2023 05:30 )    Color: x / Appearance: x / SG: x / pH: x  Gluc: 131 mg/dL / Ketone: x  / Bili: x / Urobili: x   Blood: x / Protein: x / Nitrite: x   Leuk Esterase: x / RBC: x / WBC x   Sq Epi: x / Non Sq Epi: x / Bacteria: x        RADIOLOGY & ADDITIONAL TESTS:

## 2023-06-27 NOTE — DIETITIAN INITIAL EVALUATION ADULT - OTHER INFO
"57F w/ PMH of Asthma, CAD, HTN, prior miscarriage x3, peripheral neuropathy, bilateral cerebellar infarctions likely 2/2 APLS on therapeutic lovenox (R. and L. PICA) s/p SOC foramen magnum decompression and right parietal/occipital EVD placement (4/21), s/p trach (4/28/23), s/p PEG (5/2/23) sent from nursing facility c/o right side abdominal pain, vomiting and mild generalized headache x 2 days, CTH with new acute intraparenchymal   hemorrhage in the right cerebellar hemisphere. Pt denies recent injury/trauma to head, neck pain, acute weakness/paresthesias of extremities, urinary/bowel incontinence, seizures. Patient is currently on lovenox 80mg BID, last dose given 8pm last night.     Chart reviewed. Afebrile. HR & BP WNL. MAPs >65 mmHg. Pt seen on 5LA with son at bedside. Trach in place, son provided subjective hx. TF initiated May 2023, son believes pt receiving Jevity 1.2 @70ml/hr x 24hrs at NH. Reported previous n/v/abd pain, now improved. NKFA. No muscle wasting/subcutaneous losses per NFPE however wt of 88.5kg May 2023 per EMR- sig. wt loss of 10.5kg/11.9% loss x 1 month. Labs reviewed- POC BG trending 108-167 mg/dL. BUN & Creat low [? 2/2 inadequate kcal/protein intake]. HDL low, other lipids WNL. Meds reviewed- on ferrous sulfate, bowel regimen, and simethicone. Need close monitoring for s/s GI distress. RDN will continue to monitor, reassess, and intervene as appropriate.     Pain: no nonverbal indicators of pain  GI: no s/s GI distress at present, last BM noted today  Skin: front head sx incision

## 2023-06-27 NOTE — PROGRESS NOTE ADULT - PROBLEM SELECTOR PLAN 4
Known history of APLS. Patient with history of DVT s/p IVC filter. Currently on full dose Lovenox 80mg BID  - Continue with bridge to Coumadin, give 5mg today  - INR goal 2-3  - Daily INR checks

## 2023-06-27 NOTE — DIETITIAN INITIAL EVALUATION ADULT - OTHER CALCULATIONS
Ideal body weight (49.8kg) used for calculations as pt >120% of IBW. Needs estimated for age and adjusted for current clinical status, increased needs for repletion

## 2023-06-27 NOTE — DIETITIAN INITIAL EVALUATION ADULT - [id number]_GEN_A_CORE
06-26-23 @ 07:01  -  06-27-23 @ 07:00  --------------------------------------------------------  OUT:  Total OUT: 0 mL    Total NET: 50 mL      06-27-23 @ 07:01  -  06-27-23 @ 13:47  --------------------------------------------------------  OUT:  Total OUT: 0 mL    Total NET: 300 mL

## 2023-06-28 LAB
ALBUMIN SERPL ELPH-MCNC: 3.5 G/DL — SIGNIFICANT CHANGE UP (ref 3.3–5)
ALP SERPL-CCNC: 116 U/L — SIGNIFICANT CHANGE UP (ref 40–120)
ALT FLD-CCNC: 165 U/L — HIGH (ref 10–45)
ANION GAP SERPL CALC-SCNC: 8 MMOL/L — SIGNIFICANT CHANGE UP (ref 5–17)
APTT BLD: 54 SEC — HIGH (ref 27.5–35.5)
AST SERPL-CCNC: 65 U/L — HIGH (ref 10–40)
BASOPHILS # BLD AUTO: 0.03 K/UL — SIGNIFICANT CHANGE UP (ref 0–0.2)
BASOPHILS NFR BLD AUTO: 0.7 % — SIGNIFICANT CHANGE UP (ref 0–2)
BILIRUB SERPL-MCNC: <0.2 MG/DL — SIGNIFICANT CHANGE UP (ref 0.2–1.2)
BUN SERPL-MCNC: 11 MG/DL — SIGNIFICANT CHANGE UP (ref 7–23)
CALCIUM SERPL-MCNC: 9.4 MG/DL — SIGNIFICANT CHANGE UP (ref 8.4–10.5)
CHLORIDE SERPL-SCNC: 106 MMOL/L — SIGNIFICANT CHANGE UP (ref 96–108)
CO2 SERPL-SCNC: 30 MMOL/L — SIGNIFICANT CHANGE UP (ref 22–31)
CREAT SERPL-MCNC: 0.45 MG/DL — LOW (ref 0.5–1.3)
EGFR: 113 ML/MIN/1.73M2 — SIGNIFICANT CHANGE UP
EOSINOPHIL # BLD AUTO: 0.15 K/UL — SIGNIFICANT CHANGE UP (ref 0–0.5)
EOSINOPHIL NFR BLD AUTO: 3.5 % — SIGNIFICANT CHANGE UP (ref 0–6)
GLUCOSE BLDC GLUCOMTR-MCNC: 112 MG/DL — HIGH (ref 70–99)
GLUCOSE BLDC GLUCOMTR-MCNC: 123 MG/DL — HIGH (ref 70–99)
GLUCOSE BLDC GLUCOMTR-MCNC: 123 MG/DL — HIGH (ref 70–99)
GLUCOSE SERPL-MCNC: 146 MG/DL — HIGH (ref 70–99)
HCT VFR BLD CALC: 35 % — SIGNIFICANT CHANGE UP (ref 34.5–45)
HGB BLD-MCNC: 11.4 G/DL — LOW (ref 11.5–15.5)
IMM GRANULOCYTES NFR BLD AUTO: 0 % — SIGNIFICANT CHANGE UP (ref 0–0.9)
INR BLD: 1.07 — SIGNIFICANT CHANGE UP (ref 0.88–1.16)
LYMPHOCYTES # BLD AUTO: 1.47 K/UL — SIGNIFICANT CHANGE UP (ref 1–3.3)
LYMPHOCYTES # BLD AUTO: 34.5 % — SIGNIFICANT CHANGE UP (ref 13–44)
MCHC RBC-ENTMCNC: 28.8 PG — SIGNIFICANT CHANGE UP (ref 27–34)
MCHC RBC-ENTMCNC: 32.6 GM/DL — SIGNIFICANT CHANGE UP (ref 32–36)
MCV RBC AUTO: 88.4 FL — SIGNIFICANT CHANGE UP (ref 80–100)
MONOCYTES # BLD AUTO: 0.32 K/UL — SIGNIFICANT CHANGE UP (ref 0–0.9)
MONOCYTES NFR BLD AUTO: 7.5 % — SIGNIFICANT CHANGE UP (ref 2–14)
NEUTROPHILS # BLD AUTO: 2.29 K/UL — SIGNIFICANT CHANGE UP (ref 1.8–7.4)
NEUTROPHILS NFR BLD AUTO: 53.8 % — SIGNIFICANT CHANGE UP (ref 43–77)
NRBC # BLD: 0 /100 WBCS — SIGNIFICANT CHANGE UP (ref 0–0)
PLATELET # BLD AUTO: 271 K/UL — SIGNIFICANT CHANGE UP (ref 150–400)
POTASSIUM SERPL-MCNC: 4.1 MMOL/L — SIGNIFICANT CHANGE UP (ref 3.5–5.3)
POTASSIUM SERPL-SCNC: 4.1 MMOL/L — SIGNIFICANT CHANGE UP (ref 3.5–5.3)
PROT SERPL-MCNC: 6.5 G/DL — SIGNIFICANT CHANGE UP (ref 6–8.3)
PROTHROM AB SERPL-ACNC: 12.8 SEC — SIGNIFICANT CHANGE UP (ref 10.5–13.4)
RBC # BLD: 3.96 M/UL — SIGNIFICANT CHANGE UP (ref 3.8–5.2)
RBC # FLD: 13.5 % — SIGNIFICANT CHANGE UP (ref 10.3–14.5)
SODIUM SERPL-SCNC: 144 MMOL/L — SIGNIFICANT CHANGE UP (ref 135–145)
WBC # BLD: 4.26 K/UL — SIGNIFICANT CHANGE UP (ref 3.8–10.5)
WBC # FLD AUTO: 4.26 K/UL — SIGNIFICANT CHANGE UP (ref 3.8–10.5)

## 2023-06-28 PROCEDURE — 99233 SBSQ HOSP IP/OBS HIGH 50: CPT

## 2023-06-28 RX ORDER — WARFARIN SODIUM 2.5 MG/1
5 TABLET ORAL ONCE
Refills: 0 | Status: COMPLETED | OUTPATIENT
Start: 2023-06-28 | End: 2023-06-28

## 2023-06-28 RX ORDER — WARFARIN SODIUM 2.5 MG/1
5 TABLET ORAL AT BEDTIME
Refills: 0 | Status: DISCONTINUED | OUTPATIENT
Start: 2023-06-28 | End: 2023-06-28

## 2023-06-28 RX ORDER — ACETAMINOPHEN 500 MG
650 TABLET ORAL EVERY 6 HOURS
Refills: 0 | Status: DISCONTINUED | OUTPATIENT
Start: 2023-06-28 | End: 2023-06-30

## 2023-06-28 RX ORDER — FERROUS SULFATE 325(65) MG
300 TABLET ORAL
Refills: 0 | Status: DISCONTINUED | OUTPATIENT
Start: 2023-06-29 | End: 2023-06-30

## 2023-06-28 RX ADMIN — Medication 650 MILLIGRAM(S): at 16:39

## 2023-06-28 RX ADMIN — ENOXAPARIN SODIUM 80 MILLIGRAM(S): 100 INJECTION SUBCUTANEOUS at 18:49

## 2023-06-28 RX ADMIN — WARFARIN SODIUM 5 MILLIGRAM(S): 2.5 TABLET ORAL at 21:59

## 2023-06-28 RX ADMIN — SENNA PLUS 2 TABLET(S): 8.6 TABLET ORAL at 21:59

## 2023-06-28 RX ADMIN — POLYETHYLENE GLYCOL 3350 17 GRAM(S): 17 POWDER, FOR SOLUTION ORAL at 13:04

## 2023-06-28 RX ADMIN — CHLORHEXIDINE GLUCONATE 1 APPLICATION(S): 213 SOLUTION TOPICAL at 06:07

## 2023-06-28 RX ADMIN — Medication 3 MILLILITER(S): at 15:41

## 2023-06-28 RX ADMIN — Medication 4 MILLILITER(S): at 18:50

## 2023-06-28 RX ADMIN — ENOXAPARIN SODIUM 80 MILLIGRAM(S): 100 INJECTION SUBCUTANEOUS at 06:07

## 2023-06-28 RX ADMIN — Medication 650 MILLIGRAM(S): at 17:39

## 2023-06-28 NOTE — PROGRESS NOTE ADULT - SUBJECTIVE AND OBJECTIVE BOX
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 and L. PICA) s/p SOC foramen magnum decompression and right parietal/occipital EVD placement (4/21), s/p trach (4/28/23), s/p PEG (5/2/23) sent from nursing facility c/o right side abdominal pain, vomiting and mild generalized headache x 2 days, CTH with new acute intraparenchymal   hemorrhage in the right cerebellar hemisphere. Pt denies recent injury/trauma to head, neck pain, acute weakness/paresthesias of extremities, urinary/bowel incontinence, seizures. Patient is currently on lovenox 80mg BID, last dose given 8pm last night.  (25 Jun 2023 17:20)                            11.4   4.26  )-----------( 271      ( 28 Jun 2023 05:30 )             35.0       06-28    144  |  106  |  11  ----------------------------<  146<H>  4.1   |  30  |  0.45<L>    Ca    9.4      28 Jun 2023 05:30    TPro  6.5  /  Alb  3.5  /  TBili  <0.2  /  DBili  x   /  AST  65<H>  /  ALT  165<H>  /  AlkPhos  116  06-28    Vital Signs Last 24 Hrs  T(C): 36.4 (28 Jun 2023 05:44), Max: 37.1 (27 Jun 2023 17:05)  T(F): 97.5 (28 Jun 2023 05:44), Max: 98.7 (27 Jun 2023 17:05)  HR: 78 (28 Jun 2023 10:30) (64 - 78)  BP: 116/72 (28 Jun 2023 05:44) (102/67 - 116/72)  BP(mean): --  RR: 19 (28 Jun 2023 10:30) (18 - 19)  SpO2: 96% (28 Jun 2023 10:30) (95% - 98%)    Parameters below as of 28 Jun 2023 10:30  Patient On (Oxygen Delivery Method): tracheostomy collar, passy martina valve        MEDICATIONS  (STANDING):  chlorhexidine 2% Cloths 1 Application(s) Topical <User Schedule>  dextrose 5%. 1000 milliLiter(s) (50 mL/Hr) IV Continuous <Continuous>  dextrose 5%. 1000 milliLiter(s) (100 mL/Hr) IV Continuous <Continuous>  dextrose 50% Injectable 25 Gram(s) IV Push once  dextrose 50% Injectable 12.5 Gram(s) IV Push once  dextrose 50% Injectable 25 Gram(s) IV Push once  enoxaparin Injectable 80 milliGRAM(s) SubCutaneous every 12 hours  glucagon  Injectable 1 milliGRAM(s) IntraMuscular once  insulin lispro (ADMELOG) corrective regimen sliding scale   SubCutaneous every 6 hours  polyethylene glycol 3350 17 Gram(s) Oral daily  senna 2 Tablet(s) Oral at bedtime  warfarin 5 milliGRAM(s) Oral once    MEDICATIONS  (PRN):  acetylcysteine 10%  Inhalation 4 milliLiter(s) Inhalation every 6 hours PRN increased secretions  albuterol/ipratropium for Nebulization 3 milliLiter(s) Nebulizer every 6 hours PRN Respiratory Distress  dextrose Oral Gel 15 Gram(s) Oral once PRN Blood Glucose LESS THAN 70 milliGRAM(s)/deciliter  oxyCODONE    IR 5 milliGRAM(s) Oral every 4 hours PRN Moderate Pain (4 - 6)  simethicone 80 milliGRAM(s) Chew every 6 hours PRN Gas        Initial Functional Status Assessment :     Previous Level of Function:     · Ambulation Skills	needed assist; needs device  · Transfer Skills	needed assist; needs device  · ADL Skills	needed assist; needs device  · Work/Leisure Activity	needed assist; needs device  · Additional Comments	Pt admitted from rehab (where pt has been since her last d/c from [facility] in May 2023). At rehab, pt's son reports she was ambulating fairly long distances in the hallway with RW and assist x 1 person. Pt required assist for all ADLs. // Prior to previous admission in April 2023, pt was living with her  and 3 children in an apartment with 20STE. Pt is R hand dominant.    Cognitive Status Examination:   · Orientation	person; time; Oriented to "hospital" however unable to state "[facility]"  · Level of Consciousness	alert  · Follows Commands and Answers Questions	100% of the time; Increased time required for processing    Range of Motion Exam:   · Active Range of Motion Examination	bilateral upper extremity Active ROM was WFL (within functional limits); bilateral  lower extremity Active ROM was WFL (within functional limits)    Manual Muscle Testing:   · Manual Muscle Testing Results	RUE shoulder flexion 3-/5, RUE elbow flexion/extension 3+/5, R  strength 3+/5. LUE shoulder flexion 3+/5, L elbow flexion/extension 4/5, L  strength 4/5. BLE 4+/5 throughout.    Bed Mobility: Rolling/Turning:     · Level of [place]	contact guard  · Physical Assist/Nonphysical Assist	verbal cues; 1 person assist    Bed Mobility: Scooting/Bridging:     · Level of [place]	contact guard  · Physical Assist/Nonphysical Assist	verbal cues; 1 person assist    Bed Mobility: Sit to Supine:     · Level of [place]	minimum assist (75% patients effort)  · Physical Assist/Nonphysical Assist	verbal cues; 1 person assist    Bed Mobility: Supine to Sit:     · Level of [place]	minimum assist (75% patients effort)  · Physical Assist/Nonphysical Assist	verbal cues; 1 person assist    Bed Mobility Analysis:     · Impairments Contributing to Impaired Bed Mobility	pain; decreased ROM; decreased strength; impaired balance    Transfer: Sit to Stand:     · Level of [place]	minimum assist (75% patients effort)  · Physical Assist/Nonphysical Assist	verbal cues; 1 person assist  · Weight-Bearing Restrictions	weight-bearing as tolerated  · Assistive Device	handheld assist    Transfer: Stand to Sit:     · Level of [place]	minimum assist (75% patients effort)  · Physical Assist/Nonphysical Assist	verbal cues; 1 person assist  · Weight-Bearing Restrictions	weight-bearing as tolerated  · Assistive Device	handheld assist    Sit/Stand Transfer Safety Analysis:     · Impairments Contributing to Impaired Transfers	pain; decreased ROM; decreased strength; impaired balance; cognition; impaired postural control    Gait Skills:     · Level of [place]	minimum assist (75% patients effort)  · Physical Assist/Nonphysical Assist	verbal cues; 2 person assist  · Weight-Bearing Restrictions	weight-bearing as tolerated  · Assistive Device	bilateral handheld assist  · Gait Distance	4 side steps to R and L x2, 2 steps forward and back    Gait Analysis:     · Gait Deviations Noted	decreased weight-shifting ability  · Impairments Contributing to Gait Deviations	impaired balance; cognition; pain; impaired postural control; decreased ROM; decreased strength    Stair Negotiation:     · Level of [place]	TBA    Balance Skills Assessment:     · Sitting Balance: Static	fair balance  · Sitting Balance: Dynamic	fair balance  · Sit-to-Stand Balance	poor balance  · Standing Balance: Static	poor plus  · Standing Balance: Dynamic	poor balance  · Systems Impairment Contributing to Balance Disturbance	musculoskeletal  · Identified Impairments Contributing to Balance Disturbance	pain; impaired postural control; decreased ROM; decreased strength    Sensory Examination:   Sensory Examination:    Grossly Intact:   · Gross Sensory Examination	Grossly Intact      Treatment Location:   · Comments	R hand dominant; CN Testing: B/L Frontalis intact; B/L buccinator intact; smile symmetrical; tongue protrusion at midline; B/L eyes open/close intact; Shoulder elevation: intact bilaterally; Vision H-Test: bilateral tracking and smooth pursuit with 2 beats of nystagmus when tracking from R>L// Quadrant Testing: R Eye WNL, L Eye with impaired superior/inferior temporal quadrants.    Clinical Impressions:   · Criteria for Skilled Therapeutic Interventions	impairments found; functional limitations in following categories; rehab potential; anticipated discharge recommendation  · Impairments Found (describe specific impairments)	aerobic capacity/endurance; gait, locomotion, and balance; posture; ROM; muscle strength; gross motor  · Functional Limitations in Following Categories (describe specific limitations)	self-care; home management; community/leisure      Proprioception:   · Coordination Assessed	finger to nose    · Sensory Tests	CN Testing: V1-V3 sensation in tact; b/l frontalis intact; b/l buccinator intact; smile symmetrical; tongue protrusion at midline w/ b/l lateralization in tact; b/l eyes open/close intact; b/l shoulder elevation intact    Visual Assessment:   Visual Assessment:    Visual Assessment:   · Visual Acuity	Quadrant Testing: R Eye WNL, L Eye with impaired superior/inferior temporal quadrants.  · Visual Tracking	2-3 beat nystagmus noted when tracking horizontally from R to L and diagonally upwards from R to L  · Visual Neglect	none  · Visual Convergence	normal    Fine Motor Coordination:     Fine Motor Coordination:   · Left Hand, Finger to Nose	normal performance   · Right Hand, Finger to Nose	mild impairment   · Left Hand Thumb/Finger Opposition Skills	difficulty understanding task   · Right Hand Thumb/Finger Opposition Skills	difficulty understanding task     Lower Body Dressing Training:     · Level of [place]	moderate assist (50% patients effort)  · Physical Assist/Nonphysical Assist	1 person assist; verbal cues; nonverbal cues (demo/gestures); pt able to marco b/l socks over toes, however required assist for heel clearance      PM&R Impression : as above    Current Disposition Plan Recommendations :    subacute rehab placement

## 2023-06-28 NOTE — PROGRESS NOTE ADULT - SUBJECTIVE AND OBJECTIVE BOX
OVERNIGHT EVENTS:   No acute events overnight.     SUBJECTIVE:  Patient seen and examined at bedside, resting comfortably in bed, and does not appear to be in any acute distress. Patient is Portuguese speaking. Patient denies headaches, changes in vision, fevers, chills, nausea, SOB, chest pain, abdominal pain, genitourinary symptoms, extremity pain or swelling.    VITAL SIGNS:    Vital Signs Last 24 Hrs  T(C): 36.4 (28 Jun 2023 05:44), Max: 37.1 (27 Jun 2023 17:05)  T(F): 97.5 (28 Jun 2023 05:44), Max: 98.7 (27 Jun 2023 17:05)  HR: 78 (28 Jun 2023 10:30) (64 - 78)  BP: 116/72 (28 Jun 2023 05:44) (102/67 - 116/72)  BP(mean): --  RR: 19 (28 Jun 2023 10:30) (18 - 19)  SpO2: 96% (28 Jun 2023 10:30) (95% - 98%)    Parameters below as of 28 Jun 2023 10:30  Patient On (Oxygen Delivery Method): tracheostomy collar, passy martina valve    I&O's Summary    27 Jun 2023 07:01  -  28 Jun 2023 07:00  --------------------------------------------------------  IN (Jevity feeds): 300 mL / OUT: 400 mL / NET: -100 mL    PHYSICAL EXAM:  General: AAOx3, no acute distress, laying in bed comfortably  HEENT: head NCAT, eyes EOMI, PERRLA, no conjunctival pallor, no scleral icterus, no oropharyngeal exudates, erythema, or lesions, moist mucous membranes  Neck: +Trach collar, supple, non-tender, no cervical LAD, no thyromegaly  Pulmonary: lung fields CTAB, no wheezing, rales, or rhonchi, no tenderness to palpation  Cardiovascular: RRR, normal S1/S2, no m/r/g, no JVD, no carotid bruits b/l, PMI not displaced  Abdominal: +PEG feeds, soft, NTND, +BS, no hepatosplenomegaly, no CVA tenderness, no masses, no bruits  Extremities: no peripheral edema or cyanosis, pulses 2+ in upper and lower extremities b/l, capillary refill < 2 sec.  Neuro: CN II-XII grossly intact and symmetric b/l, motor, sensory, coordination grossly intact in all extremities, DTR 2+ and symmetric b/l  Skin: warm, dry, no visible jaundice, no new rashes    MEDICATIONS:  MEDICATIONS  (STANDING):  chlorhexidine 2% Cloths 1 Application(s) Topical <User Schedule>  dextrose 5%. 1000 milliLiter(s) (50 mL/Hr) IV Continuous <Continuous>  dextrose 5%. 1000 milliLiter(s) (100 mL/Hr) IV Continuous <Continuous>  dextrose 50% Injectable 25 Gram(s) IV Push once  dextrose 50% Injectable 12.5 Gram(s) IV Push once  dextrose 50% Injectable 25 Gram(s) IV Push once  enoxaparin Injectable 80 milliGRAM(s) SubCutaneous every 12 hours  ferrous    sulfate 325 milliGRAM(s) Oral every 24 hours  glucagon  Injectable 1 milliGRAM(s) IntraMuscular once  insulin lispro (ADMELOG) corrective regimen sliding scale   SubCutaneous Before meals and at bedtime  polyethylene glycol 3350 17 Gram(s) Oral daily  senna 2 Tablet(s) Oral at bedtime  warfarin 5 milliGRAM(s) Oral once    MEDICATIONS  (PRN):  acetylcysteine 10%  Inhalation 4 milliLiter(s) Inhalation every 6 hours PRN increased secretions  albuterol/ipratropium for Nebulization 3 milliLiter(s) Nebulizer every 6 hours PRN Respiratory Distress  dextrose Oral Gel 15 Gram(s) Oral once PRN Blood Glucose LESS THAN 70 milliGRAM(s)/deciliter  oxyCODONE    IR 5 milliGRAM(s) Oral every 4 hours PRN Moderate Pain (4 - 6)  simethicone 80 milliGRAM(s) Chew every 6 hours PRN Gas      ALLERGIES:  Allergies    No Known Allergies    Intolerances        LABS:                                   11.4   4.26  )-----------( 271      ( 28 Jun 2023 05:30 )             35.0     06-28    144  |  106  |  11  ----------------------------<  146<H>  4.1   |  30  |  0.45<L>    Ca    9.4      28 Jun 2023 05:30    TPro  6.5  /  Alb  3.5  /  TBili  <0.2  /  DBili  x   /  AST  65<H>  /  ALT  165<H>  /  AlkPhos  116  06-28    PT/INR - ( 27 Jun 2023 05:30 )   PT: 12.3 sec;   INR: 1.03       PTT - ( 27 Jun 2023 05:30 )  PTT:46.3 sec    RADIOLOGY & ADDITIONAL TESTS: Reviewed. OVERNIGHT EVENTS:   No acute events overnight.     SUBJECTIVE:  Patient seen and examined at bedside. She is sitting upright and is able to ambulate for a few steps with assistance. She states she has 8/10 pain at the base of her neck at the site of her shunt placement. The pain does not radiate. She states her dressing change typically occurs every 4 days and her last dressing change was 4 years ago. Denies changes in vision, fever, chills, chest pain, SOB, abdominal pain. Patient is Panamanian speaking. Her daughter Diamante was present during the encounter.   VITAL SIGNS:    Vital Signs Last 24 Hrs  T(C): 36.4 (28 Jun 2023 05:44), Max: 37.1 (27 Jun 2023 17:05)  T(F): 97.5 (28 Jun 2023 05:44), Max: 98.7 (27 Jun 2023 17:05)  HR: 78 (28 Jun 2023 10:30) (64 - 78)  BP: 116/72 (28 Jun 2023 05:44) (102/67 - 116/72)  BP(mean): --  RR: 19 (28 Jun 2023 10:30) (18 - 19)  SpO2: 96% (28 Jun 2023 10:30) (95% - 98%)    Parameters below as of 28 Jun 2023 10:30  Patient On (Oxygen Delivery Method): tracheostomy collar, passy martina valve    I&O's Summary    27 Jun 2023 07:01  -  28 Jun 2023 07:00  --------------------------------------------------------  IN (Jevity feeds): 300 mL / OUT: 400 mL / NET: -100 mL    PHYSICAL EXAM:  General: AAOx3, no acute distress, sitting upright in chair comfortably  HEENT: head NCAT, eyes EOMI, PERRLA, no conjunctival pallor, no scleral icterus, no oropharyngeal exudates, erythema, or lesions, moist mucous membranes. Dressing applied around head as a band covering site of shunt placement from craniectomy. Tenderness present at site.   Neck: supple, non-tender, no cervical LAD, no thyromegaly. Decreased ROM on extension of neck  Pulmonary: lung fields CTAB, no wheezing, rales, or rhonchi, no tenderness to palpation  Cardiovascular: RRR, normal S1/S2, no m/r/g, no JVD, no carotid bruits b/l, PMI not displaced  Abdominal: +PEG feeds, soft, NTND, +BS, no hepatosplenomegaly, no CVA tenderness, no masses, no bruits  Extremities: no peripheral edema or cyanosis, pulses 2+ in upper and lower extremities b/l, capillary refill < 2 sec.  Neuro: CN II-XII grossly intact and symmetric b/l, motor, sensory, coordination grossly intact in all extremities, DTR 2+ and symmetric b/l  Skin: warm, dry, no visible jaundice, no new rashes    MEDICATIONS:  MEDICATIONS  (STANDING):  chlorhexidine 2% Cloths 1 Application(s) Topical <User Schedule>  dextrose 5%. 1000 milliLiter(s) (50 mL/Hr) IV Continuous <Continuous>  dextrose 5%. 1000 milliLiter(s) (100 mL/Hr) IV Continuous <Continuous>  dextrose 50% Injectable 25 Gram(s) IV Push once  dextrose 50% Injectable 12.5 Gram(s) IV Push once  dextrose 50% Injectable 25 Gram(s) IV Push once  enoxaparin Injectable 80 milliGRAM(s) SubCutaneous every 12 hours  ferrous    sulfate 325 milliGRAM(s) Oral every 24 hours  glucagon  Injectable 1 milliGRAM(s) IntraMuscular once  insulin lispro (ADMELOG) corrective regimen sliding scale   SubCutaneous Before meals and at bedtime  polyethylene glycol 3350 17 Gram(s) Oral daily  senna 2 Tablet(s) Oral at bedtime  warfarin 5 milliGRAM(s) Oral once    MEDICATIONS  (PRN):  acetylcysteine 10%  Inhalation 4 milliLiter(s) Inhalation every 6 hours PRN increased secretions  albuterol/ipratropium for Nebulization 3 milliLiter(s) Nebulizer every 6 hours PRN Respiratory Distress  dextrose Oral Gel 15 Gram(s) Oral once PRN Blood Glucose LESS THAN 70 milliGRAM(s)/deciliter  oxyCODONE    IR 5 milliGRAM(s) Oral every 4 hours PRN Moderate Pain (4 - 6)  simethicone 80 milliGRAM(s) Chew every 6 hours PRN Gas      ALLERGIES:  Allergies    No Known Allergies    Intolerances        LABS:                                   11.4   4.26  )-----------( 271      ( 28 Jun 2023 05:30 )             35.0     06-28    144  |  106  |  11  ----------------------------<  146<H>  4.1   |  30  |  0.45<L>    Ca    9.4      28 Jun 2023 05:30    TPro  6.5  /  Alb  3.5  /  TBili  <0.2  /  DBili  x   /  AST  65<H>  /  ALT  165<H>  /  AlkPhos  116  06-28    PT/INR - ( 27 Jun 2023 05:30 )   PT: 12.3 sec;   INR: 1.03       PTT - ( 27 Jun 2023 05:30 )  PTT:46.3 sec    RADIOLOGY & ADDITIONAL TESTS: Reviewed.

## 2023-06-28 NOTE — PROGRESS NOTE ADULT - ASSESSMENT
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 and L. PICA) s/p SOC foramen magnum decompression and right parietal/occipital EVD placement (4/21), s/p trach (4/28/23), s/p PEG (5/2/23) sent from nursing facility c/o right side abdominal pain and vomiting x 2 days, CTH with hyperdensity concern for hemorrhagic transformation of right cerebellar infarct. Repeat CTH with resolution of hyperdensity. Transferred to stroke tele and subsequently [facility].

## 2023-06-28 NOTE — PROGRESS NOTE ADULT - ASSESSMENT
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    #Intraparenchymal hemorrhage of brain  - plan per stroke team  - appreciate input from neurosurgery  - PT/OT consult    #APLS  - normally on lovenox, but currently being held in the setting of bleeding    #Asthma  - s/p trach.  No respiratory distress, no wheeze on exam  - outpatient follow up     #HTN  - not currently on any antihypertensive medications  - plan per primary team    #Mild transaminitis  - similar slight elevations in level, hepatitis panel is negative.  S/p cholecystectomy, there is some mild CBD dilatation, which is secondary to surgery and is stable from earlier exams.  Not on any antibiotics that would cause transaminitis, such as cephalosporins.  No abdominal pain on exam, and no concerning intrahepatic findings on CT or ultrasound.  Can be repeated outpatient.  bilirubin levels are normal.    - patient's family reports that she has a history of fatty liver and prior history of elevated liver tests  - holding tylenol, and holding statin for now.  Liver function tests can be repeated outpatient.  Can consider starting lower dose of statin.     50 minutes spent on this encounter, including face to face with patient, care coordination and documentation.   Plan of care discussed with stroke team.

## 2023-06-28 NOTE — PROGRESS NOTE ADULT - ASSESSMENT
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 and L. PICA) s/p SOC foramen magnum decompression and right parietal/occipital EVD placement (4/21), s/p trach (4/28/23), s/p PEG (5/2/23) sent from nursing facility c/o right side abdominal pain and vomiting x 2 days, CTH with hyperdensity concern for hemorrhagic transformation of right cerebellar infarct. Transferred to stroke tele from [facility] 6/25 after repeat CTH in which previously evident hyperdensity along the superior right cerebellum is no longer identified, likely contrast extraversion from CT ab/pelvis for abdomen pain w/u.     Neuro  #hx b/l cerebellar stroke from APLS  - c/w with lovenox to warfarin bridge, INR goal 2-3  - family plans on going back home after discharge instead of back to rehab  - stop lipitor due to elevated LFT  - gen neuro and vitals q8h  - serial CTH stable, no bleed  - Stroke education    Cards  #HTN  Currently normotensive, not requiring anti-HTNsive   - sbp goal 100-140    Pulm  - call provider if SPO2 < 94%  - s/p trach w/ humidified air   - passed speaking valve trial 6/26  - c/w Mucomyst, duoneb prn     GI  + peg, tube feeds, CT ab/pelvis reviewed no acute pathology   - abdomen pain likely gas now resolved ; start simethacone PRN 80mg q6   - ensure BMs w/ Miralax & senna - hold for lose stools  - GI ppx : Protonix 40mg qd  - Daily stool count    #tranaminitis  With similar transaminitis on previous admission with thorough GI w/u with no significant findings. Patient with known hx of fatty liver dz.   - f/u LFTs, appear to be a transient chronic as with elevated LFT on prior admission too  - hepatitis panel neg  - stopped neurostimulants: amantadine, modafinil     Endocrine  A1c- 5.9    HEME/ONC:  #APLS  #DVT s/p IVC filter  - c/w full dose lovenox 80 BID with bridge to warfarin    DVT Prophylaxis  - lovenox sq full dose for DVT prophylaxis   - SCDs for DVT prophylaxis     Dispo: [facility], but family wants to take patient home. Will need to discuss with family if they are able to care for trach, peg, and subq injection while patient is bridging to warfarin. If patient is going home will need method for INR checks     Discussed daily hospital plans and goals with patient and daughter at bedside.      Discussed with Neurology Attending Dr. Maria

## 2023-06-28 NOTE — PROGRESS NOTE ADULT - SUBJECTIVE AND OBJECTIVE BOX
Feeling okay, does have a slight frontal headache.      OVERNIGHT EVENTS: NAEO      Remaining ROS negative       PHYSICAL EXAM:    General: no acute distress, resting in bed  HEENT: NC/AT; MMM  Cardiovascular: +S1/S2, RRR  Respiratory: CTA B/L; no W/R/R  Gastrointestinal: soft, NT/ND; +BSx4  Extremities: WWP; no edema  Psychiatric: pleasant mood and affect  Dermatologic: no appreciable wounds or damage to the skin    VITAL SIGNS:  Vital Signs Last 24 Hrs  T(C): 36.9 (29 Jun 2023 06:08), Max: 36.9 (29 Jun 2023 06:08)  T(F): 98.5 (29 Jun 2023 06:08), Max: 98.5 (29 Jun 2023 06:08)  HR: 66 (29 Jun 2023 06:08) (66 - 84)  BP: 115/77 (29 Jun 2023 06:08) (104/67 - 121/73)  BP(mean): --  RR: 18 (29 Jun 2023 06:08) (18 - 19)  SpO2: 93% (29 Jun 2023 06:08) (93% - 98%)    Parameters below as of 28 Jun 2023 23:33  Patient On (Oxygen Delivery Method): room air, TRACH          MEDICATIONS:  MEDICATIONS  (STANDING):  chlorhexidine 2% Cloths 1 Application(s) Topical <User Schedule>  dextrose 5%. 1000 milliLiter(s) (100 mL/Hr) IV Continuous <Continuous>  dextrose 5%. 1000 milliLiter(s) (50 mL/Hr) IV Continuous <Continuous>  dextrose 50% Injectable 25 Gram(s) IV Push once  dextrose 50% Injectable 12.5 Gram(s) IV Push once  dextrose 50% Injectable 25 Gram(s) IV Push once  enoxaparin Injectable 80 milliGRAM(s) SubCutaneous every 12 hours  ferrous    sulfate Liquid 300 milliGRAM(s) Enteral Tube <User Schedule>  glucagon  Injectable 1 milliGRAM(s) IntraMuscular once  insulin lispro (ADMELOG) corrective regimen sliding scale   SubCutaneous every 6 hours  polyethylene glycol 3350 17 Gram(s) Oral daily  senna 2 Tablet(s) Oral at bedtime    MEDICATIONS  (PRN):  acetaminophen     Tablet .. 650 milliGRAM(s) Oral every 6 hours PRN Mild Pain (1 - 3)  acetylcysteine 10%  Inhalation 4 milliLiter(s) Inhalation every 6 hours PRN increased secretions  albuterol/ipratropium for Nebulization 3 milliLiter(s) Nebulizer every 6 hours PRN Respiratory Distress  dextrose Oral Gel 15 Gram(s) Oral once PRN Blood Glucose LESS THAN 70 milliGRAM(s)/deciliter  oxyCODONE    IR 5 milliGRAM(s) Oral every 4 hours PRN Moderate Pain (4 - 6)  simethicone 80 milliGRAM(s) Chew every 6 hours PRN Gas      ALLERGIES:  Allergies    No Known Allergies    Intolerances        LABS:                        11.4   4.26  )-----------( 271      ( 28 Jun 2023 05:30 )             35.0     06-28    144  |  106  |  11  ----------------------------<  146<H>  4.1   |  30  |  0.45<L>    Ca    9.4      28 Jun 2023 05:30    TPro  6.5  /  Alb  3.5  /  TBili  <0.2  /  DBili  x   /  AST  65<H>  /  ALT  165<H>  /  AlkPhos  116  06-28    PT/INR - ( 28 Jun 2023 05:30 )   PT: 12.8 sec;   INR: 1.07          PTT - ( 28 Jun 2023 05:30 )  PTT:54.0 sec  Urinalysis Basic - ( 28 Jun 2023 05:30 )    Color: x / Appearance: x / SG: x / pH: x  Gluc: 146 mg/dL / Ketone: x  / Bili: x / Urobili: x   Blood: x / Protein: x / Nitrite: x   Leuk Esterase: x / RBC: x / WBC x   Sq Epi: x / Non Sq Epi: x / Bacteria: x      CAPILLARY BLOOD GLUCOSE      POCT Blood Glucose.: 145 mg/dL (29 Jun 2023 06:22)      RADIOLOGY & ADDITIONAL TESTS: Reviewed.

## 2023-06-29 LAB
ALBUMIN SERPL ELPH-MCNC: 3.5 G/DL — SIGNIFICANT CHANGE UP (ref 3.3–5)
ALP SERPL-CCNC: 104 U/L — SIGNIFICANT CHANGE UP (ref 40–120)
ALT FLD-CCNC: 170 U/L — HIGH (ref 10–45)
ANION GAP SERPL CALC-SCNC: 10 MMOL/L — SIGNIFICANT CHANGE UP (ref 5–17)
APTT BLD: 49.5 SEC — HIGH (ref 27.5–35.5)
AST SERPL-CCNC: 67 U/L — HIGH (ref 10–40)
BASOPHILS # BLD AUTO: 0.02 K/UL — SIGNIFICANT CHANGE UP (ref 0–0.2)
BASOPHILS NFR BLD AUTO: 0.5 % — SIGNIFICANT CHANGE UP (ref 0–2)
BILIRUB SERPL-MCNC: <0.2 MG/DL — SIGNIFICANT CHANGE UP (ref 0.2–1.2)
BUN SERPL-MCNC: 11 MG/DL — SIGNIFICANT CHANGE UP (ref 7–23)
CALCIUM SERPL-MCNC: 9 MG/DL — SIGNIFICANT CHANGE UP (ref 8.4–10.5)
CHLORIDE SERPL-SCNC: 102 MMOL/L — SIGNIFICANT CHANGE UP (ref 96–108)
CO2 SERPL-SCNC: 30 MMOL/L — SIGNIFICANT CHANGE UP (ref 22–31)
CREAT SERPL-MCNC: 0.46 MG/DL — LOW (ref 0.5–1.3)
EGFR: 112 ML/MIN/1.73M2 — SIGNIFICANT CHANGE UP
EOSINOPHIL # BLD AUTO: 0.14 K/UL — SIGNIFICANT CHANGE UP (ref 0–0.5)
EOSINOPHIL NFR BLD AUTO: 3.4 % — SIGNIFICANT CHANGE UP (ref 0–6)
GLUCOSE BLDC GLUCOMTR-MCNC: 117 MG/DL — HIGH (ref 70–99)
GLUCOSE BLDC GLUCOMTR-MCNC: 136 MG/DL — HIGH (ref 70–99)
GLUCOSE BLDC GLUCOMTR-MCNC: 140 MG/DL — HIGH (ref 70–99)
GLUCOSE BLDC GLUCOMTR-MCNC: 145 MG/DL — HIGH (ref 70–99)
GLUCOSE SERPL-MCNC: 159 MG/DL — HIGH (ref 70–99)
HCT VFR BLD CALC: 35.6 % — SIGNIFICANT CHANGE UP (ref 34.5–45)
HGB BLD-MCNC: 11.5 G/DL — SIGNIFICANT CHANGE UP (ref 11.5–15.5)
IMM GRANULOCYTES NFR BLD AUTO: 0.2 % — SIGNIFICANT CHANGE UP (ref 0–0.9)
INR BLD: 1.21 — HIGH (ref 0.88–1.16)
LYMPHOCYTES # BLD AUTO: 1.52 K/UL — SIGNIFICANT CHANGE UP (ref 1–3.3)
LYMPHOCYTES # BLD AUTO: 36.7 % — SIGNIFICANT CHANGE UP (ref 13–44)
MAGNESIUM SERPL-MCNC: 2.1 MG/DL — SIGNIFICANT CHANGE UP (ref 1.6–2.6)
MCHC RBC-ENTMCNC: 28.9 PG — SIGNIFICANT CHANGE UP (ref 27–34)
MCHC RBC-ENTMCNC: 32.3 GM/DL — SIGNIFICANT CHANGE UP (ref 32–36)
MCV RBC AUTO: 89.4 FL — SIGNIFICANT CHANGE UP (ref 80–100)
MONOCYTES # BLD AUTO: 0.27 K/UL — SIGNIFICANT CHANGE UP (ref 0–0.9)
MONOCYTES NFR BLD AUTO: 6.5 % — SIGNIFICANT CHANGE UP (ref 2–14)
NEUTROPHILS # BLD AUTO: 2.18 K/UL — SIGNIFICANT CHANGE UP (ref 1.8–7.4)
NEUTROPHILS NFR BLD AUTO: 52.7 % — SIGNIFICANT CHANGE UP (ref 43–77)
NRBC # BLD: 0 /100 WBCS — SIGNIFICANT CHANGE UP (ref 0–0)
PHOSPHATE SERPL-MCNC: 5 MG/DL — HIGH (ref 2.5–4.5)
PLATELET # BLD AUTO: 262 K/UL — SIGNIFICANT CHANGE UP (ref 150–400)
POTASSIUM SERPL-MCNC: 3.8 MMOL/L — SIGNIFICANT CHANGE UP (ref 3.5–5.3)
POTASSIUM SERPL-SCNC: 3.8 MMOL/L — SIGNIFICANT CHANGE UP (ref 3.5–5.3)
PROT SERPL-MCNC: 6.7 G/DL — SIGNIFICANT CHANGE UP (ref 6–8.3)
PROTHROM AB SERPL-ACNC: 14.4 SEC — HIGH (ref 10.5–13.4)
RBC # BLD: 3.98 M/UL — SIGNIFICANT CHANGE UP (ref 3.8–5.2)
RBC # FLD: 13.7 % — SIGNIFICANT CHANGE UP (ref 10.3–14.5)
SODIUM SERPL-SCNC: 142 MMOL/L — SIGNIFICANT CHANGE UP (ref 135–145)
WBC # BLD: 4.14 K/UL — SIGNIFICANT CHANGE UP (ref 3.8–10.5)
WBC # FLD AUTO: 4.14 K/UL — SIGNIFICANT CHANGE UP (ref 3.8–10.5)

## 2023-06-29 PROCEDURE — 99233 SBSQ HOSP IP/OBS HIGH 50: CPT

## 2023-06-29 RX ORDER — WARFARIN SODIUM 2.5 MG/1
7.5 TABLET ORAL AT BEDTIME
Refills: 0 | Status: COMPLETED | OUTPATIENT
Start: 2023-06-29 | End: 2023-06-29

## 2023-06-29 RX ORDER — SODIUM CHLORIDE 9 MG/ML
3 INJECTION INTRAMUSCULAR; INTRAVENOUS; SUBCUTANEOUS DAILY
Refills: 0 | Status: DISCONTINUED | OUTPATIENT
Start: 2023-06-29 | End: 2023-06-30

## 2023-06-29 RX ADMIN — SENNA PLUS 2 TABLET(S): 8.6 TABLET ORAL at 22:30

## 2023-06-29 RX ADMIN — Medication 4 MILLILITER(S): at 15:11

## 2023-06-29 RX ADMIN — Medication 650 MILLIGRAM(S): at 15:10

## 2023-06-29 RX ADMIN — POLYETHYLENE GLYCOL 3350 17 GRAM(S): 17 POWDER, FOR SOLUTION ORAL at 13:10

## 2023-06-29 RX ADMIN — Medication 650 MILLIGRAM(S): at 16:10

## 2023-06-29 RX ADMIN — Medication 300 MILLIGRAM(S): at 13:10

## 2023-06-29 RX ADMIN — ENOXAPARIN SODIUM 80 MILLIGRAM(S): 100 INJECTION SUBCUTANEOUS at 06:54

## 2023-06-29 RX ADMIN — WARFARIN SODIUM 7.5 MILLIGRAM(S): 2.5 TABLET ORAL at 22:29

## 2023-06-29 RX ADMIN — ENOXAPARIN SODIUM 80 MILLIGRAM(S): 100 INJECTION SUBCUTANEOUS at 19:06

## 2023-06-29 NOTE — PROGRESS NOTE ADULT - NS ATTEND AMEND GEN_ALL_CORE FT
The patient is a 57 female w/ PMH of Asthma, CAD, HTN, prior miscarriage x3, and bilateral cerebellar infarctions (R. and L. PICA) s/p posterior fossa craniectomy s/p PEG and Trach, and APLS (on Lovenox, bridging to Coumadin) admitted with abdominal pain nausea/vomiting with HCT ? ICH but repeat HCT showing clearing of hyperdensity consistent with contrast from CT A/P (which was neg). Abdominal pain improved. LFTs remain elevated but had similar issue w prior admission with unrevealing eval-likely due to fatty liver. Will hold statin for now.  SBP: normotension. PT/OT to reassess; family wishes to take patient home - to assist w service. Will step-down to regional
The patient is a 57 female w/ PMH of Asthma, CAD, HTN, prior miscarriage x3, and bilateral cerebellar infarctions (R. and L. PICA) s/p posterior fossa craniectomy s/p PEG and Trach, and APLS (on Lovenox, bridging to Coumadin) admitted with abdominal pain nausea/vomiting with HCT ? ICH but repeat HCT showing clearing of hyperdensity consistent with contrast from CT A/P (which was neg). Abdominal pain improved. LFTs remain elevated but had similar issue w prior admission with unrevealing eval.  SBP: normotension. PT/OT to reassess; family wishes to take patient home vs [facility] if unable.
The patient is a 57 female w/ PMH of Asthma, CAD, HTN, prior miscarriage x3, and bilateral cerebellar infarctions (R. and L. PICA) s/p posterior fossa craniectomy s/p PEG and Trach, and APLS (on Lovenox, bridging to Coumadin) admitted with abdominal pain nausea/vomiting with HCT ? ICH but repeat HCT showing clearing of hyperdensity consistent with contrast from CT A/P (which was neg). Abdominal pain improved. LFTs remain elevated but had similar issue w prior admission with unrevealing eval-likely due to fatty liver. Will hold statin for now.  SBP: normotension. PT/OT to reassess; family wishes to take patient home -SW to assist w necessary services.
The patient is a 57 female w/ PMH of Asthma, CAD, HTN, prior miscarriage x3, and bilateral cerebellar infarctions (R. and L. PICA) s/p posterior fossa craniectomy s/p PEG and Trach, and APLS (on Lovenox, bridging to Coumadin) admitted with abdominal pain nausea/vomiting with HCT ? ICH but repeat HCT showing clearing of hyperdensity consistent with contrast from CT A/P (which was neg). Abdominal pain improved. LFTs remain elevated but had similar issue w prior admission with unrevealing eval-likely due to fatty liver. Will hold statin for now.  SBP: normotension. PT/OT to reassess; family now wishes to have patient in rehab as home care would be too difficult. Awaiting placement.

## 2023-06-29 NOTE — PROGRESS NOTE ADULT - ASSESSMENT
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 and L. PICA) s/p SOC foramen magnum decompression and right parietal/occipital EVD placement (4/21), s/p trach (4/28/23), s/p PEG (5/2/23) sent from nursing facility c/o right side abdominal pain and vomiting x 2 days, CTH with hyperdensity concern for hemorrhagic transformation of right cerebellar infarct. Repeat CTH with resolution of hyperdensity. Transferred to stroke tele and subsequently [facility].        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 and L. PICA) s/p SOC foramen magnum decompression and right parietal/occipital EVD placement (4/21), s/p trach (4/28/23), s/p PEG (5/2/23) sent from nursing facility c/o right side abdominal pain and vomiting x 2 days, CTH with hyperdensity concern for hemorrhagic transformation of right cerebellar infarct. Repeat CTH with resolution of hyperdensity. Transferred to stroke tele and subsequently [facility]. Awaiting discharge to [facility] pending insurance approval.         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 and L. PICA) s/p SOC foramen magnum decompression and right parietal/occipital EVD placement (4/21), s/p trach (4/28/23), s/p PEG (5/2/23) sent from nursing facility c/o right side abdominal pain and vomiting x 2 days, CTH with hyperdensity concern for hemorrhagic transformation of right cerebellar infarct. Repeat CTH with resolution of hyperdensity. Transferred to stroke tele and subsequently [facility].

## 2023-06-29 NOTE — PROGRESS NOTE ADULT - SUBJECTIVE AND OBJECTIVE BOX
******INCOMPLETE******    OVERNIGHT EVENTS/INTERVAL HPI:     Patient was seen and examined at bedside.     OBJECTIVE:  Vital Signs Last 24 Hrs  T(C): 36.8 (28 Jun 2023 20:56), Max: 36.8 (28 Jun 2023 20:56)  T(F): 98.3 (28 Jun 2023 20:56), Max: 98.3 (28 Jun 2023 20:56)  HR: 73 (28 Jun 2023 23:33) (70 - 84)  BP: 104/67 (28 Jun 2023 20:56) (104/67 - 121/73)  BP(mean): --  RR: 18 (28 Jun 2023 23:33) (18 - 19)  SpO2: 95% (28 Jun 2023 23:33) (93% - 98%)    Parameters below as of 28 Jun 2023 23:33  Patient On (Oxygen Delivery Method): room air, TRACH      I&O's Detail    27 Jun 2023 07:01  -  28 Jun 2023 07:00  --------------------------------------------------------  IN:    Jevity 1.2: 300 mL  Total IN: 300 mL    OUT:    Voided (mL): 400 mL  Total OUT: 400 mL    Total NET: -100 mL        Physical Exam:  GENERAL: Awake, alert and interactive, no acute distress, appears comfortable  NEURO: A&Ox4, no focal deficits, 5/5 strength in all ext, reflexes 2+ throughout, CN 2-12 intact  HEENT: Normocephalic, atraumatic, no conjunctivitis or scleral icterus, oral mucosa moist, no oral lesions noted  NECK: Supple, no LAD, no JVD, thyroid not palpable  CARDIAC: Regular rate and rhythm, +S1/S2, no murmurs/rubs/gallops  PULM: Breathing comfortably on RA, clear to auscultation bilaterally, no wheezes/rales/rhonchi  ABDOMEN: Soft, nontender, nondistended, +bs, no hepatosplenomegaly, no rebound tenderness or fluid wave, no CVA tenderness  : Deferred  MSK: Range of motion grossly intact, no back tenderness  SKIN: Warm and dry, no rashes, lesions  VASC: Cap refil < 2 sec, 2+ peripheral pulses, no edema, no LE tenderness  Psych: Appropriate affect    Medications:  MEDICATIONS  (STANDING):  chlorhexidine 2% Cloths 1 Application(s) Topical <User Schedule>  dextrose 5%. 1000 milliLiter(s) (100 mL/Hr) IV Continuous <Continuous>  dextrose 5%. 1000 milliLiter(s) (50 mL/Hr) IV Continuous <Continuous>  dextrose 50% Injectable 25 Gram(s) IV Push once  dextrose 50% Injectable 12.5 Gram(s) IV Push once  dextrose 50% Injectable 25 Gram(s) IV Push once  enoxaparin Injectable 80 milliGRAM(s) SubCutaneous every 12 hours  ferrous    sulfate Liquid 300 milliGRAM(s) Enteral Tube <User Schedule>  glucagon  Injectable 1 milliGRAM(s) IntraMuscular once  insulin lispro (ADMELOG) corrective regimen sliding scale   SubCutaneous every 6 hours  polyethylene glycol 3350 17 Gram(s) Oral daily  senna 2 Tablet(s) Oral at bedtime    MEDICATIONS  (PRN):  acetaminophen     Tablet .. 650 milliGRAM(s) Oral every 6 hours PRN Mild Pain (1 - 3)  acetylcysteine 10%  Inhalation 4 milliLiter(s) Inhalation every 6 hours PRN increased secretions  albuterol/ipratropium for Nebulization 3 milliLiter(s) Nebulizer every 6 hours PRN Respiratory Distress  dextrose Oral Gel 15 Gram(s) Oral once PRN Blood Glucose LESS THAN 70 milliGRAM(s)/deciliter  oxyCODONE    IR 5 milliGRAM(s) Oral every 4 hours PRN Moderate Pain (4 - 6)  simethicone 80 milliGRAM(s) Chew every 6 hours PRN Gas      Labs:                        11.4   4.26  )-----------( 271      ( 28 Jun 2023 05:30 )             35.0     06-28    144  |  106  |  11  ----------------------------<  146<H>  4.1   |  30  |  0.45<L>    Ca    9.4      28 Jun 2023 05:30    TPro  6.5  /  Alb  3.5  /  TBili  <0.2  /  DBili  x   /  AST  65<H>  /  ALT  165<H>  /  AlkPhos  116  06-28    PT/INR - ( 28 Jun 2023 05:30 )   PT: 12.8 sec;   INR: 1.07          PTT - ( 28 Jun 2023 05:30 )  PTT:54.0 sec    Urinalysis Basic - ( 28 Jun 2023 05:30 )    Color: x / Appearance: x / SG: x / pH: x  Gluc: 146 mg/dL / Ketone: x  / Bili: x / Urobili: x   Blood: x / Protein: x / Nitrite: x   Leuk Esterase: x / RBC: x / WBC x   Sq Epi: x / Non Sq Epi: x / Bacteria: x      COVID-19 PCR: Negative (22 May 2023 18:23)  COVID-19 PCR: NotDetec (07 May 2023 05:30)  SARS-CoV-2: NotDetec (02 May 2023 18:40)      Radiology: Reviewed OVERNIGHT EVENTS: LOU    Patient was seen and examined at bedside. She states she does not have a headache this morning. Denies chest pain, SOB, abdominal pain.     OBJECTIVE:    Vital Signs Last 24 Hrs  T(C): 36.9 (29 Jun 2023 06:08), Max: 36.9 (29 Jun 2023 06:08)  T(F): 98.5 (29 Jun 2023 06:08), Max: 98.5 (29 Jun 2023 06:08)  HR: 76 (29 Jun 2023 07:40) (66 - 84)  BP: 115/77 (29 Jun 2023 06:08) (104/67 - 121/73)  BP(mean): --  RR: 18 (29 Jun 2023 07:40) (18 - 18)  SpO2: 95% (29 Jun 2023 07:40) (93% - 98%)    Parameters below as of 29 Jun 2023 07:40  Patient On (Oxygen Delivery Method): room air, trach    Physical Exam:  GENERAL: Awake, alert and interactive, no acute distress, appears comfortable  NEURO: A&Ox3, no focal deficits, 5/5 strength in all ext  HEENT: no conjunctivitis or scleral icterus, oral mucosa moist, no oral lesions noted; dressing wrapped around head at site of SOC shunt   NECK: Supple, no LAD, no JVD, thyroid not palpable; tracheostomy tube in place and not connected to oxygen  CARDIAC: Regular rate and rhythm, +S1/S2, no murmurs/rubs/gallops  PULM: Breathing comfortably on RA, clear to auscultation bilaterally, no wheezes/rales/rhonchi  ABDOMEN: Soft, nontender, nondistended, +bs  : Deferred  MSK: Range of motion grossly intact, no back tenderness  SKIN: Warm and dry, no rashes, lesions  VASC: Cap refil < 2 sec, 2+ peripheral pulses, no edema, no LE tenderness  Psych: Appropriate affect    Medications:  MEDICATIONS  (STANDING):  chlorhexidine 2% Cloths 1 Application(s) Topical <User Schedule>  dextrose 5%. 1000 milliLiter(s) (100 mL/Hr) IV Continuous <Continuous>  dextrose 5%. 1000 milliLiter(s) (50 mL/Hr) IV Continuous <Continuous>  dextrose 50% Injectable 25 Gram(s) IV Push once  dextrose 50% Injectable 12.5 Gram(s) IV Push once  dextrose 50% Injectable 25 Gram(s) IV Push once  enoxaparin Injectable 80 milliGRAM(s) SubCutaneous every 12 hours  ferrous    sulfate Liquid 300 milliGRAM(s) Enteral Tube <User Schedule>  glucagon  Injectable 1 milliGRAM(s) IntraMuscular once  insulin lispro (ADMELOG) corrective regimen sliding scale   SubCutaneous every 6 hours  polyethylene glycol 3350 17 Gram(s) Oral daily  senna 2 Tablet(s) Oral at bedtime    MEDICATIONS  (PRN):  acetaminophen     Tablet .. 650 milliGRAM(s) Oral every 6 hours PRN Mild Pain (1 - 3)  acetylcysteine 10%  Inhalation 4 milliLiter(s) Inhalation every 6 hours PRN increased secretions  albuterol/ipratropium for Nebulization 3 milliLiter(s) Nebulizer every 6 hours PRN Respiratory Distress  dextrose Oral Gel 15 Gram(s) Oral once PRN Blood Glucose LESS THAN 70 milliGRAM(s)/deciliter  oxyCODONE    IR 5 milliGRAM(s) Oral every 4 hours PRN Moderate Pain (4 - 6)  simethicone 80 milliGRAM(s) Chew every 6 hours PRN Gas      Labs:                                   11.5   4.14  )-----------( 262      ( 29 Jun 2023 05:30 )             35.6   06-29    142  |  102  |  11  ----------------------------<  159<H>  3.8   |  30  |  0.46<L>    Ca    9.0      29 Jun 2023 05:30  Phos  5.0     06-29  Mg     2.1     06-29    TPro  6.7  /  Alb  3.5  /  TBili  <0.2  /  DBili  x   /  AST  67<H>  /  ALT  170<H>  /  AlkPhos  104  06-29    PT/INR - ( 29 Jun 2023 05:30 )   PT: 14.4 sec;   INR: 1.21          PTT - ( 29 Jun 2023 05:30 )  PTT:49.5 sec    Radiology: Reviewed SUBJECTIVE:     OVERNIGHT EVENTS: LOU    Patient was seen and examined at bedside. She states she does not have a headache this morning. Denies chest pain, SOB, abdominal pain.     OBJECTIVE:    Vital Signs Last 24 Hrs  T(C): 36.9 (29 Jun 2023 06:08), Max: 36.9 (29 Jun 2023 06:08)  T(F): 98.5 (29 Jun 2023 06:08), Max: 98.5 (29 Jun 2023 06:08)  HR: 76 (29 Jun 2023 07:40) (66 - 84)  BP: 115/77 (29 Jun 2023 06:08) (104/67 - 121/73)  BP(mean): --  RR: 18 (29 Jun 2023 07:40) (18 - 18)  SpO2: 95% (29 Jun 2023 07:40) (93% - 98%)    Parameters below as of 29 Jun 2023 07:40  Patient On (Oxygen Delivery Method): room air, trach    Physical Exam:  GENERAL: Awake, alert and interactive, no acute distress, appears comfortable  NEURO: A&Ox3, no focal deficits, 5/5 strength in all ext  HEENT: no conjunctivitis or scleral icterus, oral mucosa moist, no oral lesions noted; dressing wrapped around head at site of SOC shunt   NECK: Supple, no LAD, no JVD, thyroid not palpable; tracheostomy tube in place and not connected to oxygen  CARDIAC: Regular rate and rhythm, +S1/S2, no murmurs/rubs/gallops  PULM: Breathing comfortably on RA, clear to auscultation bilaterally, no wheezes/rales/rhonchi  ABDOMEN: Soft, nontender, nondistended, +bs  : Deferred  MSK: Range of motion grossly intact, no back tenderness  SKIN: Warm and dry, no rashes, lesions  VASC: Cap refil < 2 sec, 2+ peripheral pulses, no edema, no LE tenderness  Psych: Appropriate affect    Medications:  MEDICATIONS  (STANDING):  chlorhexidine 2% Cloths 1 Application(s) Topical <User Schedule>  dextrose 5%. 1000 milliLiter(s) (100 mL/Hr) IV Continuous <Continuous>  dextrose 5%. 1000 milliLiter(s) (50 mL/Hr) IV Continuous <Continuous>  dextrose 50% Injectable 25 Gram(s) IV Push once  dextrose 50% Injectable 12.5 Gram(s) IV Push once  dextrose 50% Injectable 25 Gram(s) IV Push once  enoxaparin Injectable 80 milliGRAM(s) SubCutaneous every 12 hours  ferrous    sulfate Liquid 300 milliGRAM(s) Enteral Tube <User Schedule>  glucagon  Injectable 1 milliGRAM(s) IntraMuscular once  insulin lispro (ADMELOG) corrective regimen sliding scale   SubCutaneous every 6 hours  polyethylene glycol 3350 17 Gram(s) Oral daily  senna 2 Tablet(s) Oral at bedtime    MEDICATIONS  (PRN):  acetaminophen     Tablet .. 650 milliGRAM(s) Oral every 6 hours PRN Mild Pain (1 - 3)  acetylcysteine 10%  Inhalation 4 milliLiter(s) Inhalation every 6 hours PRN increased secretions  albuterol/ipratropium for Nebulization 3 milliLiter(s) Nebulizer every 6 hours PRN Respiratory Distress  dextrose Oral Gel 15 Gram(s) Oral once PRN Blood Glucose LESS THAN 70 milliGRAM(s)/deciliter  oxyCODONE    IR 5 milliGRAM(s) Oral every 4 hours PRN Moderate Pain (4 - 6)  simethicone 80 milliGRAM(s) Chew every 6 hours PRN Gas      Labs:                                   11.5   4.14  )-----------( 262      ( 29 Jun 2023 05:30 )             35.6   06-29    142  |  102  |  11  ----------------------------<  159<H>  3.8   |  30  |  0.46<L>    Ca    9.0      29 Jun 2023 05:30  Phos  5.0     06-29  Mg     2.1     06-29    TPro  6.7  /  Alb  3.5  /  TBili  <0.2  /  DBili  x   /  AST  67<H>  /  ALT  170<H>  /  AlkPhos  104  06-29    PT/INR - ( 29 Jun 2023 05:30 )   PT: 14.4 sec;   INR: 1.21          PTT - ( 29 Jun 2023 05:30 )  PTT:49.5 sec    Radiology: Reviewed OVERNIGHT EVENTS: NAEON    SUBJECTIVE:   Patient was seen and examined at bedside. Denies any concerns today. She is awaiting discharge to [facility]. Patient is accompanied by her daughter Yanna in the room this morning.     OBJECTIVE:  Vital Signs Last 24 Hrs  T(C): 36.6 (30 Jun 2023 06:48), Max: 37 (29 Jun 2023 14:00)  T(F): 97.8 (30 Jun 2023 06:48), Max: 98.6 (29 Jun 2023 14:00)  HR: 72 (30 Jun 2023 06:48) (65 - 76)  BP: 120/74 (30 Jun 2023 06:48) (102/62 - 120/74)  BP(mean): --  RR: 18 (30 Jun 2023 06:48) (18 - 18)  SpO2: 93% (30 Jun 2023 06:48) (93% - 95%)    Parameters below as of 30 Jun 2023 06:48  Patient On (Oxygen Delivery Method): room air      I&O's Detail    Physical Exam:  GENERAL: Awake, alert and interactive, no acute distress, appears comfortable  NEURO: AA&Ox3, no focal deficits  HEENT: no conjunctivitis or scleral icterus, oral mucosa moist, no oral lesions noted; dressing over SOC shunt intact  NECK: Supple, no LAD, no JVD, thyroid not palpable; tracheostomy tube in place, not connected to oxygen  CARDIAC: Regular rate and rhythm, +S1/S2, no murmurs/rubs/gallops  PULM: Breathing comfortably on RA, clear to auscultation bilaterally, no wheezes/rales/rhonchi  ABDOMEN: Soft, nontender, nondistended, +bs, no hepatosplenomegaly, no rebound tenderness or fluid wave, no CVA tenderness  : Deferred  MSK: Range of motion grossly intact, no back tenderness  SKIN: Warm and dry, no rashes, lesions  VASC: Cap refil < 2 sec, 2+ peripheral pulses, no edema, no LE tenderness  Psych: Appropriate affect    Medications:  MEDICATIONS  (STANDING):  chlorhexidine 2% Cloths 1 Application(s) Topical <User Schedule>  dextrose 5%. 1000 milliLiter(s) (50 mL/Hr) IV Continuous <Continuous>  dextrose 5%. 1000 milliLiter(s) (100 mL/Hr) IV Continuous <Continuous>  dextrose 50% Injectable 25 Gram(s) IV Push once  dextrose 50% Injectable 25 Gram(s) IV Push once  dextrose 50% Injectable 12.5 Gram(s) IV Push once  enoxaparin Injectable 80 milliGRAM(s) SubCutaneous every 12 hours  ferrous    sulfate Liquid 300 milliGRAM(s) Enteral Tube <User Schedule>  glucagon  Injectable 1 milliGRAM(s) IntraMuscular once  insulin lispro (ADMELOG) corrective regimen sliding scale   SubCutaneous every 6 hours  polyethylene glycol 3350 17 Gram(s) Oral daily  senna 2 Tablet(s) Oral at bedtime  sodium chloride 0.9% lock flush 3 milliLiter(s) IV Push daily    MEDICATIONS  (PRN):  acetaminophen     Tablet .. 650 milliGRAM(s) Oral every 6 hours PRN Mild Pain (1 - 3)  acetylcysteine 10%  Inhalation 4 milliLiter(s) Inhalation every 6 hours PRN increased secretions  albuterol/ipratropium for Nebulization 3 milliLiter(s) Nebulizer every 6 hours PRN Respiratory Distress  dextrose Oral Gel 15 Gram(s) Oral once PRN Blood Glucose LESS THAN 70 milliGRAM(s)/deciliter  oxyCODONE    IR 5 milliGRAM(s) Oral every 4 hours PRN Moderate Pain (4 - 6)  simethicone 80 milliGRAM(s) Chew every 6 hours PRN Gas      Labs:                        11.5   4.14  )-----------( 262      ( 29 Jun 2023 05:30 )             35.6     06-29    142  |  102  |  11  ----------------------------<  159<H>  3.8   |  30  |  0.46<L>    Ca    9.0      29 Jun 2023 05:30  Phos  5.0     06-29  Mg     2.1     06-29    TPro  6.7  /  Alb  3.5  /  TBili  <0.2  /  DBili  x   /  AST  67<H>  /  ALT  170<H>  /  AlkPhos  104  06-29    PT/INR - ( 29 Jun 2023 05:30 )   PT: 14.4 sec;   INR: 1.21          PTT - ( 29 Jun 2023 05:30 )  PTT:49.5 sec    Urinalysis Basic - ( 29 Jun 2023 05:30 )      Radiology: Reviewed OVERNIGHT EVENTS: NAEON    SUBJECTIVE:   Patient was seen and examined at bedside. Denies any concerns today. She is awaiting discharge to [facility]. Patient is accompanied by her spouse James in the room this morning.     OBJECTIVE:        I&O's Detail    Physical Exam:  GENERAL: Awake, alert and interactive, no acute distress, appears comfortable  NEURO: AA&Ox3, no focal deficits  HEENT: no conjunctivitis or scleral icterus, oral mucosa moist, no oral lesions noted; dressing over SOC shunt intact  NECK: Supple, no LAD, no JVD, thyroid not palpable; tracheostomy tube in place, not connected to oxygen  CARDIAC: Regular rate and rhythm, +S1/S2, no murmurs/rubs/gallops  PULM: Breathing comfortably on RA, clear to auscultation bilaterally, no wheezes/rales/rhonchi  ABDOMEN: Soft, nontender, nondistended, +bs,   : Deferred  MSK: Range of motion grossly intact, no back tenderness  SKIN: Warm and dry, no rashes, lesions  VASC: Cap refil < 2 sec, 2+ peripheral pulses, no edema, no LE tenderness  Psych: Appropriate affect    Medications:  MEDICATIONS  (STANDING):  chlorhexidine 2% Cloths 1 Application(s) Topical <User Schedule>  dextrose 5%. 1000 milliLiter(s) (50 mL/Hr) IV Continuous <Continuous>  dextrose 5%. 1000 milliLiter(s) (100 mL/Hr) IV Continuous <Continuous>  dextrose 50% Injectable 25 Gram(s) IV Push once  dextrose 50% Injectable 25 Gram(s) IV Push once  dextrose 50% Injectable 12.5 Gram(s) IV Push once  enoxaparin Injectable 80 milliGRAM(s) SubCutaneous every 12 hours  ferrous    sulfate Liquid 300 milliGRAM(s) Enteral Tube <User Schedule>  glucagon  Injectable 1 milliGRAM(s) IntraMuscular once  insulin lispro (ADMELOG) corrective regimen sliding scale   SubCutaneous every 6 hours  polyethylene glycol 3350 17 Gram(s) Oral daily  senna 2 Tablet(s) Oral at bedtime  sodium chloride 0.9% lock flush 3 milliLiter(s) IV Push daily    MEDICATIONS  (PRN):  acetaminophen     Tablet .. 650 milliGRAM(s) Oral every 6 hours PRN Mild Pain (1 - 3)  acetylcysteine 10%  Inhalation 4 milliLiter(s) Inhalation every 6 hours PRN increased secretions  albuterol/ipratropium for Nebulization 3 milliLiter(s) Nebulizer every 6 hours PRN Respiratory Distress  dextrose Oral Gel 15 Gram(s) Oral once PRN Blood Glucose LESS THAN 70 milliGRAM(s)/deciliter  oxyCODONE    IR 5 milliGRAM(s) Oral every 4 hours PRN Moderate Pain (4 - 6)  simethicone 80 milliGRAM(s) Chew every 6 hours PRN Gas      Labs:                        11.5   4.14  )-----------( 262      ( 29 Jun 2023 05:30 )             35.6     06-29    142  |  102  |  11  ----------------------------<  159<H>  3.8   |  30  |  0.46<L>    Ca    9.0      29 Jun 2023 05:30  Phos  5.0     06-29  Mg     2.1     06-29    TPro  6.7  /  Alb  3.5  /  TBili  <0.2  /  DBili  x   /  AST  67<H>  /  ALT  170<H>  /  AlkPhos  104  06-29    PT/INR - ( 29 Jun 2023 05:30 )   PT: 14.4 sec;   INR: 1.21          PTT - ( 29 Jun 2023 05:30 )  PTT:49.5 sec    Urinalysis Basic - ( 29 Jun 2023 05:30 )      Radiology: Reviewed OVERNIGHT EVENTS: NAEON    SUBJECTIVE:   Patient was seen and examined at bedside. Denies any concerns today. She is awaiting discharge to [facility]. Patient is accompanied by her spouse James in the room this morning.     OBJECTIVE:  Vital Signs  T: 98.5   HR: 66  BP: 115/77  RR: 18  O2: 93% RA      Physical Exam:  GENERAL: Awake, alert and interactive, no acute distress, appears comfortable  NEURO: AA&Ox3, no focal deficits  HEENT: no conjunctivitis or scleral icterus, oral mucosa moist, no oral lesions noted; dressing over SOC shunt intact  NECK: Supple, no LAD, no JVD, thyroid not palpable; tracheostomy tube in place, not connected to oxygen  CARDIAC: Regular rate and rhythm, +S1/S2, no murmurs/rubs/gallops  PULM: Breathing comfortably on RA, clear to auscultation bilaterally, no wheezes/rales/rhonchi  ABDOMEN: Soft, nontender, nondistended, +bs,   : Deferred  MSK: Range of motion grossly intact, no back tenderness  SKIN: Warm and dry, no rashes, lesions  VASC: Cap refil < 2 sec, 2+ peripheral pulses, no edema, no LE tenderness  Psych: Appropriate affect    Medications:  MEDICATIONS  (STANDING):  chlorhexidine 2% Cloths 1 Application(s) Topical <User Schedule>  dextrose 5%. 1000 milliLiter(s) (50 mL/Hr) IV Continuous <Continuous>  dextrose 5%. 1000 milliLiter(s) (100 mL/Hr) IV Continuous <Continuous>  dextrose 50% Injectable 25 Gram(s) IV Push once  dextrose 50% Injectable 25 Gram(s) IV Push once  dextrose 50% Injectable 12.5 Gram(s) IV Push once  enoxaparin Injectable 80 milliGRAM(s) SubCutaneous every 12 hours  ferrous    sulfate Liquid 300 milliGRAM(s) Enteral Tube <User Schedule>  glucagon  Injectable 1 milliGRAM(s) IntraMuscular once  insulin lispro (ADMELOG) corrective regimen sliding scale   SubCutaneous every 6 hours  polyethylene glycol 3350 17 Gram(s) Oral daily  senna 2 Tablet(s) Oral at bedtime  sodium chloride 0.9% lock flush 3 milliLiter(s) IV Push daily    MEDICATIONS  (PRN):  acetaminophen     Tablet .. 650 milliGRAM(s) Oral every 6 hours PRN Mild Pain (1 - 3)  acetylcysteine 10%  Inhalation 4 milliLiter(s) Inhalation every 6 hours PRN increased secretions  albuterol/ipratropium for Nebulization 3 milliLiter(s) Nebulizer every 6 hours PRN Respiratory Distress  dextrose Oral Gel 15 Gram(s) Oral once PRN Blood Glucose LESS THAN 70 milliGRAM(s)/deciliter  oxyCODONE    IR 5 milliGRAM(s) Oral every 4 hours PRN Moderate Pain (4 - 6)  simethicone 80 milliGRAM(s) Chew every 6 hours PRN Gas      Labs:                        11.5   4.14  )-----------( 262      ( 29 Jun 2023 05:30 )             35.6     06-29    142  |  102  |  11  ----------------------------<  159<H>  3.8   |  30  |  0.46<L>    Ca    9.0      29 Jun 2023 05:30  Phos  5.0     06-29  Mg     2.1     06-29    TPro  6.7  /  Alb  3.5  /  TBili  <0.2  /  DBili  x   /  AST  67<H>  /  ALT  170<H>  /  AlkPhos  104  06-29    PT/INR - ( 29 Jun 2023 05:30 )   PT: 14.4 sec;   INR: 1.21          PTT - ( 29 Jun 2023 05:30 )  PTT:49.5 sec    Urinalysis Basic - ( 29 Jun 2023 05:30 )      Radiology: Reviewed OVERNIGHT EVENTS: NAEON    SUBJECTIVE:   Patient was seen and examined at bedside. Denies any concerns today. She is awaiting discharge to [facility]. Patient is accompanied by her spouse James in the room this morning.     OBJECTIVE:  Vital Signs (06/29 @ 6am)  T: 98.5   HR: 66  BP: 115/77  RR: 18  O2: 93% RA    Physical Exam:  GENERAL: Awake, alert and interactive, no acute distress, appears comfortable  NEURO: AA&Ox3, no focal deficits  HEENT: no conjunctivitis or scleral icterus, oral mucosa moist, no oral lesions noted; dressing over SOC shunt intact  NECK: Supple, no LAD, no JVD, thyroid not palpable; tracheostomy tube in place, not connected to oxygen  CARDIAC: Regular rate and rhythm, +S1/S2, no murmurs/rubs/gallops  PULM: Breathing comfortably on RA, clear to auscultation bilaterally, no wheezes/rales/rhonchi  ABDOMEN: Soft, nontender, nondistended, +bs,   : Deferred  MSK: Range of motion grossly intact, no back tenderness  SKIN: Warm and dry, no rashes, lesions  VASC: Cap refil < 2 sec, 2+ peripheral pulses, no edema, no LE tenderness  Psych: Appropriate affect    Medications:  MEDICATIONS  (STANDING):  chlorhexidine 2% Cloths 1 Application(s) Topical <User Schedule>  dextrose 5%. 1000 milliLiter(s) (50 mL/Hr) IV Continuous <Continuous>  dextrose 5%. 1000 milliLiter(s) (100 mL/Hr) IV Continuous <Continuous>  dextrose 50% Injectable 25 Gram(s) IV Push once  dextrose 50% Injectable 25 Gram(s) IV Push once  dextrose 50% Injectable 12.5 Gram(s) IV Push once  enoxaparin Injectable 80 milliGRAM(s) SubCutaneous every 12 hours  ferrous    sulfate Liquid 300 milliGRAM(s) Enteral Tube <User Schedule>  glucagon  Injectable 1 milliGRAM(s) IntraMuscular once  insulin lispro (ADMELOG) corrective regimen sliding scale   SubCutaneous every 6 hours  polyethylene glycol 3350 17 Gram(s) Oral daily  senna 2 Tablet(s) Oral at bedtime  sodium chloride 0.9% lock flush 3 milliLiter(s) IV Push daily    MEDICATIONS  (PRN):  acetaminophen     Tablet .. 650 milliGRAM(s) Oral every 6 hours PRN Mild Pain (1 - 3)  acetylcysteine 10%  Inhalation 4 milliLiter(s) Inhalation every 6 hours PRN increased secretions  albuterol/ipratropium for Nebulization 3 milliLiter(s) Nebulizer every 6 hours PRN Respiratory Distress  dextrose Oral Gel 15 Gram(s) Oral once PRN Blood Glucose LESS THAN 70 milliGRAM(s)/deciliter  oxyCODONE    IR 5 milliGRAM(s) Oral every 4 hours PRN Moderate Pain (4 - 6)  simethicone 80 milliGRAM(s) Chew every 6 hours PRN Gas      Labs:                        11.5   4.14  )-----------( 262      ( 29 Jun 2023 05:30 )             35.6     06-29    142  |  102  |  11  ----------------------------<  159<H>  3.8   |  30  |  0.46<L>    Ca    9.0      29 Jun 2023 05:30  Phos  5.0     06-29  Mg     2.1     06-29    TPro  6.7  /  Alb  3.5  /  TBili  <0.2  /  DBili  x   /  AST  67<H>  /  ALT  170<H>  /  AlkPhos  104  06-29    PT/INR - ( 29 Jun 2023 05:30 )   PT: 14.4 sec;   INR: 1.21          PTT - ( 29 Jun 2023 05:30 )  PTT:49.5 sec    Urinalysis Basic - ( 29 Jun 2023 05:30 )      Radiology: Reviewed

## 2023-06-29 NOTE — PROGRESS NOTE ADULT - PROBLEM SELECTOR PLAN 3
Known trach collar, placed 04/28.   - call provider if SPO2 < 94%  - s/p trach w/ humidified air   - passed speaking valve trial 6/26  - c/w Mucomyst, duoneb prn Known trach collar, placed 04/28.   - call provider if SPO2 < 94%  - s/p trach w/ humidified air. currently on room air not connected to oxygen  - passed speaking valve trial 6/26  - c/w Mucomyst, duoneb prn

## 2023-06-29 NOTE — PROGRESS NOTE ADULT - ATTENDING COMMENTS
The patient is a 57 female w/ PMH of Asthma, CAD, HTN, prior miscarriage x3, and bilateral cerebellar infarctions (R. and L. PICA) s/p posterior fossa craniectomy s/p PEG and Trach, and APLS (on Lovenox, bridging to Coumadin) admitted with abdominal pain nausea/vomiting with HCT ? ICH but repeat HCT showing clearing of hyperdensity consistent with contrast from CT A/P (which was neg). Abdominal pain improved. LFTs remain elevated but had similar issue w prior admission with unrevealing eval-likely due to fatty liver. Will hold statin for now.  SBP: normotension. PT/OT to reassess; family wishes to take patient home but open to discussion ALMITA to assist with coumadin bridge/Lovenox.

## 2023-06-29 NOTE — PROGRESS NOTE ADULT - PROBLEM SELECTOR PLAN 1
Presented with acute abdominal pain. CTA/P without acute pathology. Likely 2/2 flatulence, now resolved.  - C/w PEG tube feeds  - C/w bowel regimen   - C/w simethacone PRN 80mg q6 Presented with acute abdominal pain. CTA/P without acute pathology. Likely 2/2 flatulence, now resolved.  - C/w PEG tube feeds  - C/w bowel regimen   - C/w simethacone PRN 80mg q6  - Potential discharge to [facility] Presented with acute abdominal pain. CTA/P without acute pathology. Likely 2/2 flatulence, now resolved.  - C/w PEG tube feeds  - C/w bowel regimen   - C/w simethacone PRN 80mg q6  - Potential discharge to [facility] pending insurance approval

## 2023-06-29 NOTE — PROGRESS NOTE ADULT - ASSESSMENT
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    #Intraparenchymal hemorrhage of brain  - plan per stroke team  - appreciate input from neurosurgery  - PT/OT consult    #APLS  bridging to coumadin with lovenox    #Asthma  - s/p trach.  No respiratory distress, no wheeze on exam  - outpatient follow up     #HTN  - not currently on any antihypertensive medications  - plan per primary team    #Mild transaminitis  - similar slight elevations in level, hepatitis panel is negative.  S/p cholecystectomy, there is some mild CBD dilatation, which is secondary to surgery and is stable from earlier exams.  Not on any antibiotics that would cause transaminitis, such as cephalosporins.  No abdominal pain on exam, and no concerning intrahepatic findings on CT or ultrasound.  Can be repeated outpatient.  bilirubin levels are normal.    - patient's family reports that she has a history of fatty liver and prior history of elevated liver tests  - holding tylenol, and holding statin for now.  Liver function tests can be repeated outpatient.  Can consider starting lower dose of statin.     50 minutes spent on this encounter, including face to face with patient, care coordination and documentation.   Plan of care discussed with stroke team.

## 2023-06-29 NOTE — PROGRESS NOTE ADULT - PROBLEM SELECTOR PLAN 4
Known history of APLS. Patient with history of DVT s/p IVC filter. Currently on full dose Lovenox 80mg BID  - Continue with bridge to Coumadin, give 5mg today  - INR goal 2-3  - Daily INR checks Known history of APLS. Patient with history of DVT s/p IVC filter. Currently on full dose Lovenox 80mg BID  - INR goal 2-3  - Daily INR checks  - INR 06/29 of 1.21- increase daily coumadin to 7.5 mg with approval from pharmacy Known history of APLS. Patient with history of DVT s/p IVC filter. Currently on full dose Lovenox 80mg BID  - INR goal 2-3  - Daily INR checks  - INR 06/30 of ___ Known history of APLS. Patient with history of DVT s/p IVC filter. Currently on full dose Lovenox 80mg BID  - INR goal 2-3  - Daily INR checks  - INR 06/29 of 1.21 --> increase coumadin to 7.5 mg after approval from pharmacy

## 2023-06-29 NOTE — PROGRESS NOTE ADULT - TIME BILLING
review of objective data, interview of patient, and discussion of assessment plan with patient/family/multidisciplinary team. I agree with ACP, Fellow documentation except where indicated above.
.
.
review of objective data, interview of patient, and discussion of assessment plan with patient/family/multidisciplinary team. I agree with ACP, Fellow documentation except where indicated above.

## 2023-06-29 NOTE — PROGRESS NOTE ADULT - SUBJECTIVE AND OBJECTIVE BOX
Doing okay today.  Son at the bedside.  No new issues to report.     Remaining ROS negative       PHYSICAL EXAM:    General: no acute distress, resting in bed  HEENT: NC/AT; MMM  Cardiovascular: +S1/S2, RRR  Respiratory: CTA B/L; no W/R/R  Gastrointestinal: soft, NT/ND; +BSx4  Extremities: WWP; no edema  Psychiatric: pleasant mood and affect  Dermatologic: no appreciable wounds or damage to the skin    VITAL SIGNS:  Vital Signs Last 24 Hrs  T(C): 36.6 (30 Jun 2023 06:48), Max: 37 (29 Jun 2023 14:00)  T(F): 97.8 (30 Jun 2023 06:48), Max: 98.6 (29 Jun 2023 14:00)  HR: 72 (30 Jun 2023 06:48) (65 - 76)  BP: 120/74 (30 Jun 2023 06:48) (102/62 - 120/74)  BP(mean): --  RR: 18 (30 Jun 2023 06:48) (18 - 18)  SpO2: 93% (30 Jun 2023 06:48) (93% - 95%)    Parameters below as of 30 Jun 2023 06:48  Patient On (Oxygen Delivery Method): room air          MEDICATIONS:  MEDICATIONS  (STANDING):  chlorhexidine 2% Cloths 1 Application(s) Topical <User Schedule>  dextrose 5%. 1000 milliLiter(s) (50 mL/Hr) IV Continuous <Continuous>  dextrose 5%. 1000 milliLiter(s) (100 mL/Hr) IV Continuous <Continuous>  dextrose 50% Injectable 25 Gram(s) IV Push once  dextrose 50% Injectable 25 Gram(s) IV Push once  dextrose 50% Injectable 12.5 Gram(s) IV Push once  enoxaparin Injectable 80 milliGRAM(s) SubCutaneous every 12 hours  ferrous    sulfate Liquid 300 milliGRAM(s) Enteral Tube <User Schedule>  glucagon  Injectable 1 milliGRAM(s) IntraMuscular once  insulin lispro (ADMELOG) corrective regimen sliding scale   SubCutaneous every 6 hours  polyethylene glycol 3350 17 Gram(s) Oral daily  senna 2 Tablet(s) Oral at bedtime  sodium chloride 0.9% lock flush 3 milliLiter(s) IV Push daily    MEDICATIONS  (PRN):  acetaminophen     Tablet .. 650 milliGRAM(s) Oral every 6 hours PRN Mild Pain (1 - 3)  acetylcysteine 10%  Inhalation 4 milliLiter(s) Inhalation every 6 hours PRN increased secretions  albuterol/ipratropium for Nebulization 3 milliLiter(s) Nebulizer every 6 hours PRN Respiratory Distress  dextrose Oral Gel 15 Gram(s) Oral once PRN Blood Glucose LESS THAN 70 milliGRAM(s)/deciliter  oxyCODONE    IR 5 milliGRAM(s) Oral every 4 hours PRN Moderate Pain (4 - 6)  simethicone 80 milliGRAM(s) Chew every 6 hours PRN Gas      ALLERGIES:  Allergies    No Known Allergies    Intolerances        LABS:                        11.6   3.98  )-----------( 291      ( 30 Jun 2023 05:30 )             36.0     06-29    142  |  102  |  11  ----------------------------<  159<H>  3.8   |  30  |  0.46<L>    Ca    9.0      29 Jun 2023 05:30  Phos  5.0     06-29  Mg     2.1     06-29    TPro  6.7  /  Alb  3.5  /  TBili  <0.2  /  DBili  x   /  AST  67<H>  /  ALT  170<H>  /  AlkPhos  104  06-29    PT/INR - ( 30 Jun 2023 05:30 )   PT: 14.6 sec;   INR: 1.22          PTT - ( 30 Jun 2023 05:30 )  PTT:47.4 sec  Urinalysis Basic - ( 29 Jun 2023 05:30 )    Color: x / Appearance: x / SG: x / pH: x  Gluc: 159 mg/dL / Ketone: x  / Bili: x / Urobili: x   Blood: x / Protein: x / Nitrite: x   Leuk Esterase: x / RBC: x / WBC x   Sq Epi: x / Non Sq Epi: x / Bacteria: x      CAPILLARY BLOOD GLUCOSE      POCT Blood Glucose.: 138 mg/dL (30 Jun 2023 07:00)      RADIOLOGY & ADDITIONAL TESTS: Reviewed.

## 2023-06-29 NOTE — PROGRESS NOTE ADULT - PROBLEM SELECTOR PLAN 2
Chronic elevation of LFTs, seen on on previous admission. No abdominal pain on examination.  - Hepatitis panel negative  - RUQ U/S: Cholecystectomy  - Holding Lipitor  - neurostimulants dc: amantadine, modafinil Chronic elevation of LFTs, seen on on previous admission. No abdominal pain on examination- resolved   - Hepatitis panel negative  - RUQ U/S: Cholecystectomy  - Holding Lipitor  - neurostimulants dc: amantadine, modafinil

## 2023-06-30 ENCOUNTER — TRANSCRIPTION ENCOUNTER (OUTPATIENT)
Age: 58
End: 2023-06-30

## 2023-06-30 VITALS
HEART RATE: 79 BPM | RESPIRATION RATE: 18 BRPM | DIASTOLIC BLOOD PRESSURE: 69 MMHG | TEMPERATURE: 99 F | SYSTOLIC BLOOD PRESSURE: 116 MMHG | OXYGEN SATURATION: 92 %

## 2023-06-30 LAB
ALBUMIN SERPL ELPH-MCNC: 3.7 G/DL — SIGNIFICANT CHANGE UP (ref 3.3–5)
ALP SERPL-CCNC: 112 U/L — SIGNIFICANT CHANGE UP (ref 40–120)
ALT FLD-CCNC: 149 U/L — HIGH (ref 10–45)
ANION GAP SERPL CALC-SCNC: 11 MMOL/L — SIGNIFICANT CHANGE UP (ref 5–17)
APTT BLD: 47.4 SEC — HIGH (ref 27.5–35.5)
AST SERPL-CCNC: 44 U/L — HIGH (ref 10–40)
BASOPHILS # BLD AUTO: 0.02 K/UL — SIGNIFICANT CHANGE UP (ref 0–0.2)
BASOPHILS NFR BLD AUTO: 0.5 % — SIGNIFICANT CHANGE UP (ref 0–2)
BILIRUB SERPL-MCNC: <0.2 MG/DL — SIGNIFICANT CHANGE UP (ref 0.2–1.2)
BUN SERPL-MCNC: 13 MG/DL — SIGNIFICANT CHANGE UP (ref 7–23)
CALCIUM SERPL-MCNC: 9.3 MG/DL — SIGNIFICANT CHANGE UP (ref 8.4–10.5)
CHLORIDE SERPL-SCNC: 103 MMOL/L — SIGNIFICANT CHANGE UP (ref 96–108)
CO2 SERPL-SCNC: 30 MMOL/L — SIGNIFICANT CHANGE UP (ref 22–31)
CREAT SERPL-MCNC: 0.43 MG/DL — LOW (ref 0.5–1.3)
EGFR: 114 ML/MIN/1.73M2 — SIGNIFICANT CHANGE UP
EOSINOPHIL # BLD AUTO: 0.18 K/UL — SIGNIFICANT CHANGE UP (ref 0–0.5)
EOSINOPHIL NFR BLD AUTO: 4.5 % — SIGNIFICANT CHANGE UP (ref 0–6)
GLUCOSE BLDC GLUCOMTR-MCNC: 126 MG/DL — HIGH (ref 70–99)
GLUCOSE BLDC GLUCOMTR-MCNC: 130 MG/DL — HIGH (ref 70–99)
GLUCOSE BLDC GLUCOMTR-MCNC: 138 MG/DL — HIGH (ref 70–99)
GLUCOSE SERPL-MCNC: 138 MG/DL — HIGH (ref 70–99)
HCT VFR BLD CALC: 36 % — SIGNIFICANT CHANGE UP (ref 34.5–45)
HGB BLD-MCNC: 11.6 G/DL — SIGNIFICANT CHANGE UP (ref 11.5–15.5)
IMM GRANULOCYTES NFR BLD AUTO: 0.3 % — SIGNIFICANT CHANGE UP (ref 0–0.9)
INR BLD: 1.22 — HIGH (ref 0.88–1.16)
LYMPHOCYTES # BLD AUTO: 1.57 K/UL — SIGNIFICANT CHANGE UP (ref 1–3.3)
LYMPHOCYTES # BLD AUTO: 39.4 % — SIGNIFICANT CHANGE UP (ref 13–44)
MAGNESIUM SERPL-MCNC: 2.1 MG/DL — SIGNIFICANT CHANGE UP (ref 1.6–2.6)
MCHC RBC-ENTMCNC: 29.2 PG — SIGNIFICANT CHANGE UP (ref 27–34)
MCHC RBC-ENTMCNC: 32.2 GM/DL — SIGNIFICANT CHANGE UP (ref 32–36)
MCV RBC AUTO: 90.7 FL — SIGNIFICANT CHANGE UP (ref 80–100)
MONOCYTES # BLD AUTO: 0.29 K/UL — SIGNIFICANT CHANGE UP (ref 0–0.9)
MONOCYTES NFR BLD AUTO: 7.3 % — SIGNIFICANT CHANGE UP (ref 2–14)
NEUTROPHILS # BLD AUTO: 1.91 K/UL — SIGNIFICANT CHANGE UP (ref 1.8–7.4)
NEUTROPHILS NFR BLD AUTO: 48 % — SIGNIFICANT CHANGE UP (ref 43–77)
NRBC # BLD: 0 /100 WBCS — SIGNIFICANT CHANGE UP (ref 0–0)
PHOSPHATE SERPL-MCNC: 5.1 MG/DL — HIGH (ref 2.5–4.5)
PLATELET # BLD AUTO: 291 K/UL — SIGNIFICANT CHANGE UP (ref 150–400)
POTASSIUM SERPL-MCNC: 3.9 MMOL/L — SIGNIFICANT CHANGE UP (ref 3.5–5.3)
POTASSIUM SERPL-SCNC: 3.9 MMOL/L — SIGNIFICANT CHANGE UP (ref 3.5–5.3)
PROT SERPL-MCNC: 6.9 G/DL — SIGNIFICANT CHANGE UP (ref 6–8.3)
PROTHROM AB SERPL-ACNC: 14.6 SEC — HIGH (ref 10.5–13.4)
RBC # BLD: 3.97 M/UL — SIGNIFICANT CHANGE UP (ref 3.8–5.2)
RBC # FLD: 14 % — SIGNIFICANT CHANGE UP (ref 10.3–14.5)
SODIUM SERPL-SCNC: 144 MMOL/L — SIGNIFICANT CHANGE UP (ref 135–145)
WBC # BLD: 3.98 K/UL — SIGNIFICANT CHANGE UP (ref 3.8–10.5)
WBC # FLD AUTO: 3.98 K/UL — SIGNIFICANT CHANGE UP (ref 3.8–10.5)

## 2023-06-30 PROCEDURE — 85027 COMPLETE CBC AUTOMATED: CPT

## 2023-06-30 PROCEDURE — 97110 THERAPEUTIC EXERCISES: CPT

## 2023-06-30 PROCEDURE — 85610 PROTHROMBIN TIME: CPT

## 2023-06-30 PROCEDURE — 84460 ALANINE AMINO (ALT) (SGPT): CPT

## 2023-06-30 PROCEDURE — 97161 PT EVAL LOW COMPLEX 20 MIN: CPT

## 2023-06-30 PROCEDURE — 83036 HEMOGLOBIN GLYCOSYLATED A1C: CPT

## 2023-06-30 PROCEDURE — 99233 SBSQ HOSP IP/OBS HIGH 50: CPT

## 2023-06-30 PROCEDURE — 96361 HYDRATE IV INFUSION ADD-ON: CPT

## 2023-06-30 PROCEDURE — 86900 BLOOD TYPING SEROLOGIC ABO: CPT

## 2023-06-30 PROCEDURE — 99285 EMERGENCY DEPT VISIT HI MDM: CPT | Mod: 25

## 2023-06-30 PROCEDURE — 74177 CT ABD & PELVIS W/CONTRAST: CPT | Mod: MG

## 2023-06-30 PROCEDURE — 85025 COMPLETE CBC W/AUTO DIFF WBC: CPT

## 2023-06-30 PROCEDURE — 80053 COMPREHEN METABOLIC PANEL: CPT

## 2023-06-30 PROCEDURE — 82962 GLUCOSE BLOOD TEST: CPT

## 2023-06-30 PROCEDURE — 84443 ASSAY THYROID STIM HORMONE: CPT

## 2023-06-30 PROCEDURE — 81003 URINALYSIS AUTO W/O SCOPE: CPT

## 2023-06-30 PROCEDURE — 99239 HOSP IP/OBS DSCHRG MGMT >30: CPT

## 2023-06-30 PROCEDURE — 70450 CT HEAD/BRAIN W/O DYE: CPT

## 2023-06-30 PROCEDURE — 80048 BASIC METABOLIC PNL TOTAL CA: CPT

## 2023-06-30 PROCEDURE — 80076 HEPATIC FUNCTION PANEL: CPT

## 2023-06-30 PROCEDURE — 92597 ORAL SPEECH DEVICE EVAL: CPT

## 2023-06-30 PROCEDURE — 86901 BLOOD TYPING SEROLOGIC RH(D): CPT

## 2023-06-30 PROCEDURE — 84450 TRANSFERASE (AST) (SGOT): CPT

## 2023-06-30 PROCEDURE — 97116 GAIT TRAINING THERAPY: CPT

## 2023-06-30 PROCEDURE — 83735 ASSAY OF MAGNESIUM: CPT

## 2023-06-30 PROCEDURE — 86850 RBC ANTIBODY SCREEN: CPT

## 2023-06-30 PROCEDURE — 96374 THER/PROPH/DIAG INJ IV PUSH: CPT

## 2023-06-30 PROCEDURE — 96375 TX/PRO/DX INJ NEW DRUG ADDON: CPT

## 2023-06-30 PROCEDURE — 82977 ASSAY OF GGT: CPT

## 2023-06-30 PROCEDURE — 36415 COLL VENOUS BLD VENIPUNCTURE: CPT

## 2023-06-30 PROCEDURE — 97166 OT EVAL MOD COMPLEX 45 MIN: CPT

## 2023-06-30 PROCEDURE — 97535 SELF CARE MNGMENT TRAINING: CPT

## 2023-06-30 PROCEDURE — 76705 ECHO EXAM OF ABDOMEN: CPT

## 2023-06-30 PROCEDURE — 80061 LIPID PANEL: CPT

## 2023-06-30 PROCEDURE — 71045 X-RAY EXAM CHEST 1 VIEW: CPT

## 2023-06-30 PROCEDURE — 84100 ASSAY OF PHOSPHORUS: CPT

## 2023-06-30 PROCEDURE — 80074 ACUTE HEPATITIS PANEL: CPT

## 2023-06-30 PROCEDURE — 83690 ASSAY OF LIPASE: CPT

## 2023-06-30 PROCEDURE — 85520 HEPARIN ASSAY: CPT

## 2023-06-30 PROCEDURE — 94640 AIRWAY INHALATION TREATMENT: CPT

## 2023-06-30 PROCEDURE — G1004: CPT

## 2023-06-30 PROCEDURE — 85730 THROMBOPLASTIN TIME PARTIAL: CPT

## 2023-06-30 RX ORDER — SENNA PLUS 8.6 MG/1
2 TABLET ORAL
Qty: 0 | Refills: 0 | DISCHARGE
Start: 2023-06-30

## 2023-06-30 RX ORDER — SIMETHICONE 80 MG/1
1 TABLET, CHEWABLE ORAL
Qty: 0 | Refills: 0 | DISCHARGE
Start: 2023-06-30

## 2023-06-30 RX ORDER — POLYETHYLENE GLYCOL 3350 17 G/17G
17 POWDER, FOR SOLUTION ORAL
Qty: 0 | Refills: 0 | DISCHARGE
Start: 2023-06-30

## 2023-06-30 RX ADMIN — CHLORHEXIDINE GLUCONATE 1 APPLICATION(S): 213 SOLUTION TOPICAL at 06:55

## 2023-06-30 RX ADMIN — POLYETHYLENE GLYCOL 3350 17 GRAM(S): 17 POWDER, FOR SOLUTION ORAL at 12:56

## 2023-06-30 RX ADMIN — Medication 650 MILLIGRAM(S): at 14:44

## 2023-06-30 RX ADMIN — Medication 650 MILLIGRAM(S): at 13:44

## 2023-06-30 RX ADMIN — ENOXAPARIN SODIUM 80 MILLIGRAM(S): 100 INJECTION SUBCUTANEOUS at 06:15

## 2023-06-30 RX ADMIN — SODIUM CHLORIDE 3 MILLILITER(S): 9 INJECTION INTRAMUSCULAR; INTRAVENOUS; SUBCUTANEOUS at 12:57

## 2023-06-30 NOTE — PROGRESS NOTE ADULT - SUBJECTIVE AND OBJECTIVE BOX
SUBJECTIVE:     OVERNIGHT EVENTS: LOU    Patient seen and examined at bedside. Denies any concerns today. She is accompanied by her daughter Yanna in the room. They are awaiting discharge to [facility].   Denies headache, chest pain, SOB, abdominal pain.       OBJECTIVE:  Vital Signs Last 24 Hrs  T(C): 36.6 (30 Jun 2023 06:48), Max: 37 (29 Jun 2023 14:00)  T(F): 97.8 (30 Jun 2023 06:48), Max: 98.6 (29 Jun 2023 14:00)  HR: 72 (30 Jun 2023 06:48) (65 - 76)  BP: 120/74 (30 Jun 2023 06:48) (102/62 - 120/74)  BP(mean): --  RR: 18 (30 Jun 2023 06:48) (18 - 18)  SpO2: 93% (30 Jun 2023 06:48) (93% - 95%)    Parameters below as of 30 Jun 2023 06:48  Patient On (Oxygen Delivery Method): room air      I&O's Detail    Physical Exam:  GENERAL: Awake, alert and interactive, no acute distress, appears comfortable  NEURO: A&Ox3, no focal deficits  HEENT: no conjunctivitis or scleral icterus, oral mucosa moist, no oral lesions noted; dressing for SOC shunt in place   NECK: Supple, no LAD, no JVD, thyroid not palpable; tracheostomy tube in place, not connected to oxygen  CARDIAC: Regular rate and rhythm, +S1/S2, no murmurs/rubs/gallops  PULM: Breathing comfortably on RA, clear to auscultation bilaterally, no wheezes/rales/rhonchi  ABDOMEN: Soft, nontender, nondistended, +bs,   : Ecchymoses present on hypogastric region of stomach and mons pubis   MSK: Range of motion grossly intact, no back tenderness  SKIN: Warm and dry, no rashes, lesions  VASC: Cap refill < 2 sec, 2+ peripheral pulses, no edema, no LE tenderness  Psych: Appropriate affect    Medications:  MEDICATIONS  (STANDING):  chlorhexidine 2% Cloths 1 Application(s) Topical <User Schedule>  dextrose 5%. 1000 milliLiter(s) (50 mL/Hr) IV Continuous <Continuous>  dextrose 5%. 1000 milliLiter(s) (100 mL/Hr) IV Continuous <Continuous>  dextrose 50% Injectable 25 Gram(s) IV Push once  dextrose 50% Injectable 25 Gram(s) IV Push once  dextrose 50% Injectable 12.5 Gram(s) IV Push once  enoxaparin Injectable 80 milliGRAM(s) SubCutaneous every 12 hours  ferrous    sulfate Liquid 300 milliGRAM(s) Enteral Tube <User Schedule>  glucagon  Injectable 1 milliGRAM(s) IntraMuscular once  insulin lispro (ADMELOG) corrective regimen sliding scale   SubCutaneous every 6 hours  polyethylene glycol 3350 17 Gram(s) Oral daily  senna 2 Tablet(s) Oral at bedtime  sodium chloride 0.9% lock flush 3 milliLiter(s) IV Push daily    MEDICATIONS  (PRN):  acetaminophen     Tablet .. 650 milliGRAM(s) Oral every 6 hours PRN Mild Pain (1 - 3)  acetylcysteine 10%  Inhalation 4 milliLiter(s) Inhalation every 6 hours PRN increased secretions  albuterol/ipratropium for Nebulization 3 milliLiter(s) Nebulizer every 6 hours PRN Respiratory Distress  dextrose Oral Gel 15 Gram(s) Oral once PRN Blood Glucose LESS THAN 70 milliGRAM(s)/deciliter  oxyCODONE    IR 5 milliGRAM(s) Oral every 4 hours PRN Moderate Pain (4 - 6)  simethicone 80 milliGRAM(s) Chew every 6 hours PRN Gas      Labs:                        11.6   3.98  )-----------( 291      ( 30 Jun 2023 05:30 )             36.0     06-30    144  |  103  |  13  ----------------------------<  138<H>  3.9   |  30  |  0.43<L>    Ca    9.3      30 Jun 2023 05:30  Phos  5.1     06-30  Mg     2.1     06-30    TPro  6.9  /  Alb  3.7  /  TBili  <0.2  /  DBili  x   /  AST  44<H>  /  ALT  149<H>  /  AlkPhos  112  06-30    PT/INR - ( 30 Jun 2023 05:30 )   PT: 14.6 sec;   INR: 1.22          PTT - ( 30 Jun 2023 05:30 )  PTT:47.4 sec    Urinalysis Basic - ( 30 Jun 2023 05:30 )    Color: x / Appearance: x / SG: x / pH: x  Gluc: 138 mg/dL / Ketone: x  / Bili: x / Urobili: x   Blood: x / Protein: x / Nitrite: x   Leuk Esterase: x / RBC: x / WBC x   Sq Epi: x / Non Sq Epi: x / Bacteria: x      COVID-19 PCR: Negative (22 May 2023 18:23)  COVID-19 PCR: NotDetec (07 May 2023 05:30)  SARS-CoV-2: NotDetec (02 May 2023 18:40)      Radiology: Reviewed

## 2023-06-30 NOTE — PROGRESS NOTE ADULT - ASSESSMENT
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 and L. PICA) s/p SOC foramen magnum decompression and right parietal/occipital EVD placement (4/21), s/p trach (4/28/23), s/p PEG (5/2/23) sent from nursing facility c/o right side abdominal pain and vomiting x 2 days, CTH with hyperdensity concern for hemorrhagic transformation of right cerebellar infarct. Repeat CTH with resolution of hyperdensity. Transferred to stroke tele and subsequently [facility]. Currently awaiting discharge to [facility].

## 2023-06-30 NOTE — PROGRESS NOTE ADULT - REASON FOR ADMISSION
RUQ abdominal pain and CTH concerning for acute right cerebellar hemorrhage
RUQ abdominal pain and CTH concerning for acute right cerebellar hemorrhage
CT head abnormality
RUQ abdominal pain and CTH concerning for acute right cerebellar hemorrhage

## 2023-06-30 NOTE — PROGRESS NOTE ADULT - PROBLEM SELECTOR PLAN 5
Plan:  F: None  E: replete K<4, Mg<2  N: PEG feeds  VTE Prophylaxis: Lovenox to Warfarin bridge  C: Full Code  D: F

## 2023-06-30 NOTE — DISCHARGE NOTE NURSING/CASE MANAGEMENT/SOCIAL WORK - PATIENT PORTAL LINK FT
You can access the FollowMyHealth Patient Portal offered by [facility] by registering at the following website: [email or web address]. By joining [facility]’s FollowMyHealth portal, you will also be able to view your health information using other applications (apps) compatible with our system.

## 2023-06-30 NOTE — PROGRESS NOTE ADULT - ASSESSMENT
I M    57 y o F with PMH of APLS on lovenox, R parietal and occipital EVD, asthma, HTN, peripheral neuropathy, trach and peg, presenting with vomiting, headache and intraparenchymal hemorrhage of the R cerebellum, admitted for further workup.    #Intraparenchymal hemorrhage of brain  - plan per stroke team  - appreciate input from neurosurgery  - PT/OT consult    #APLS  bridging to coumadin with lovenox    #Asthma  - s/p trach.  No respiratory distress, no wheeze on exam  - outpatient follow up     #HTN  - not currently on any antihypertensive medications  - plan per primary team    #Mild transaminitis  - similar slight elevations in level, hepatitis panel is negative.  S/p cholecystectomy, there is some mild CBD dilatation, which is secondary to surgery and is stable from earlier exams.  Not on any antibiotics that would cause transaminitis, such as cephalosporins.  No abdominal pain on exam, and no concerning intrahepatic findings on CT or ultrasound.  Can be repeated outpatient.  bilirubin levels are normal.    - patient's family reports that she has a history of fatty liver and prior history of elevated liver tests  - holding tylenol, and holding statin for now.  Liver function tests can be repeated outpatient.  Can consider starting lower dose of statin.

## 2023-06-30 NOTE — DISCHARGE NOTE NURSING/CASE MANAGEMENT/SOCIAL WORK - NSDCPEFALRISK_GEN_ALL_CORE
For information on Fall & Injury Prevention, visit: [email or web address] OR  [email or web address] OR  https://www.cdc.gov/steadi/patient.html

## 2023-06-30 NOTE — PROGRESS NOTE ADULT - PROBLEM SELECTOR PLAN 4
Known history of APLS. Patient with history of DVT s/p IVC filter. Currently on full dose Lovenox 80mg BID  - INR goal 2-3  - Daily INR checks  - INR 06/30 of 1.22--> after speaking with pharmacy, redose suggested. start coumadin _____ mg Known history of APLS. Patient with history of DVT s/p IVC filter. Currently on full dose Lovenox 80mg BID  - INR goal 2-3  - Daily INR checks  - INR 06/30 of 1.22--> c/w coumadin 7.5 mg

## 2023-06-30 NOTE — PROGRESS NOTE ADULT - PROBLEM SELECTOR PLAN 3
Known trach collar, placed 04/28.   - call provider if SPO2 < 94%  - s/p trach w/ humidified air. currently on room air not connected to oxygen  - passed speaking valve trial 6/26  - c/w Mucomyst, duoneb prn

## 2023-06-30 NOTE — PROGRESS NOTE ADULT - SUBJECTIVE AND OBJECTIVE BOX
***Note in progress***    OVERNIGHT EVENTS: NAEO    SUBJECTIVE / INTERVAL HPI: Patient seen and examined at bedside. Patient denying chest pain, SOB, palpitations, cough. Patient denies fever, chills, HA, Dizziness, change in vision/hearing, N/V, abdominal pain, diarrhea, constipation, hematochezia/melena, dysuria, hematuria, new onset weakness/numbness, LE pain and/or swelling.    Remaining ROS negative       PHYSICAL EXAM:    General: WDWN  HEENT: NC/AT; PERRL, anicteric sclera; MMM  Neck: supple  Cardiovascular: +S1/S2, RRR  Respiratory: CTA B/L; no W/R/R  Gastrointestinal: soft, NT/ND; +BSx4  Extremities: WWP; no edema, clubbing or cyanosis  Vascular: 2+ radial, DP/PT pulses B/L  Neurological: AAOx3; no focal deficits  Psychiatric: pleasant mood and affect  Dermatologic: no appreciable wounds or damage to the skin    VITAL SIGNS:  Vital Signs Last 24 Hrs  T(C): 36.6 (30 Jun 2023 06:48), Max: 37 (29 Jun 2023 14:00)  T(F): 97.8 (30 Jun 2023 06:48), Max: 98.6 (29 Jun 2023 14:00)  HR: 72 (30 Jun 2023 06:48) (65 - 76)  BP: 120/74 (30 Jun 2023 06:48) (102/62 - 120/74)  BP(mean): --  RR: 18 (30 Jun 2023 06:48) (18 - 18)  SpO2: 93% (30 Jun 2023 06:48) (93% - 95%)    Parameters below as of 30 Jun 2023 06:48  Patient On (Oxygen Delivery Method): room air          MEDICATIONS:  MEDICATIONS  (STANDING):  chlorhexidine 2% Cloths 1 Application(s) Topical <User Schedule>  dextrose 5%. 1000 milliLiter(s) (50 mL/Hr) IV Continuous <Continuous>  dextrose 5%. 1000 milliLiter(s) (100 mL/Hr) IV Continuous <Continuous>  dextrose 50% Injectable 25 Gram(s) IV Push once  dextrose 50% Injectable 25 Gram(s) IV Push once  dextrose 50% Injectable 12.5 Gram(s) IV Push once  enoxaparin Injectable 80 milliGRAM(s) SubCutaneous every 12 hours  ferrous    sulfate Liquid 300 milliGRAM(s) Enteral Tube <User Schedule>  glucagon  Injectable 1 milliGRAM(s) IntraMuscular once  insulin lispro (ADMELOG) corrective regimen sliding scale   SubCutaneous every 6 hours  polyethylene glycol 3350 17 Gram(s) Oral daily  senna 2 Tablet(s) Oral at bedtime  sodium chloride 0.9% lock flush 3 milliLiter(s) IV Push daily    MEDICATIONS  (PRN):  acetaminophen     Tablet .. 650 milliGRAM(s) Oral every 6 hours PRN Mild Pain (1 - 3)  acetylcysteine 10%  Inhalation 4 milliLiter(s) Inhalation every 6 hours PRN increased secretions  albuterol/ipratropium for Nebulization 3 milliLiter(s) Nebulizer every 6 hours PRN Respiratory Distress  dextrose Oral Gel 15 Gram(s) Oral once PRN Blood Glucose LESS THAN 70 milliGRAM(s)/deciliter  oxyCODONE    IR 5 milliGRAM(s) Oral every 4 hours PRN Moderate Pain (4 - 6)  simethicone 80 milliGRAM(s) Chew every 6 hours PRN Gas      ALLERGIES:  Allergies    No Known Allergies    Intolerances        LABS:                        11.6   3.98  )-----------( 291      ( 30 Jun 2023 05:30 )             36.0     06-30    144  |  103  |  13  ----------------------------<  138<H>  3.9   |  30  |  0.43<L>    Ca    9.3      30 Jun 2023 05:30  Phos  5.1     06-30  Mg     2.1     06-30    TPro  6.9  /  Alb  3.7  /  TBili  <0.2  /  DBili  x   /  AST  44<H>  /  ALT  149<H>  /  AlkPhos  112  06-30    PT/INR - ( 30 Jun 2023 05:30 )   PT: 14.6 sec;   INR: 1.22          PTT - ( 30 Jun 2023 05:30 )  PTT:47.4 sec  Urinalysis Basic - ( 30 Jun 2023 05:30 )    Color: x / Appearance: x / SG: x / pH: x  Gluc: 138 mg/dL / Ketone: x  / Bili: x / Urobili: x   Blood: x / Protein: x / Nitrite: x   Leuk Esterase: x / RBC: x / WBC x   Sq Epi: x / Non Sq Epi: x / Bacteria: x      CAPILLARY BLOOD GLUCOSE      POCT Blood Glucose.: 138 mg/dL (30 Jun 2023 07:00)      RADIOLOGY & ADDITIONAL TESTS: Reviewed. Doing well today, daughter is at bedside.  Denies any headache today.      OVERNIGHT EVENTS: NAEO    SUBJECTIVE / INTERVAL HPI: Patient seen and examined at bedside. Patient denying chest pain, SOB, palpitations, cough. Patient denies fever, chills, HA, Dizziness, change in vision/hearing, N/V, abdominal pain, diarrhea, constipation, hematochezia/melena, dysuria, hematuria, new onset weakness/numbness, LE pain and/or swelling.    Remaining ROS negative       PHYSICAL EXAM:      General: no acute distress, resting in bed  HEENT: NC/AT; MMM  Cardiovascular: +S1/S2, RRR  Respiratory: CTA B/L; no W/R/R  Gastrointestinal: soft, NT/ND; +BSx4  Extremities: WWP; no edema  Psychiatric: pleasant mood and affect  Dermatologic: no appreciable wounds or damage to the skin    VITAL SIGNS:  Vital Signs Last 24 Hrs  T(C): 36.6 (30 Jun 2023 06:48), Max: 37 (29 Jun 2023 14:00)  T(F): 97.8 (30 Jun 2023 06:48), Max: 98.6 (29 Jun 2023 14:00)  HR: 72 (30 Jun 2023 06:48) (65 - 76)  BP: 120/74 (30 Jun 2023 06:48) (102/62 - 120/74)  BP(mean): --  RR: 18 (30 Jun 2023 06:48) (18 - 18)  SpO2: 93% (30 Jun 2023 06:48) (93% - 95%)    Parameters below as of 30 Jun 2023 06:48  Patient On (Oxygen Delivery Method): room air          MEDICATIONS:  MEDICATIONS  (STANDING):  chlorhexidine 2% Cloths 1 Application(s) Topical <User Schedule>  dextrose 5%. 1000 milliLiter(s) (50 mL/Hr) IV Continuous <Continuous>  dextrose 5%. 1000 milliLiter(s) (100 mL/Hr) IV Continuous <Continuous>  dextrose 50% Injectable 25 Gram(s) IV Push once  dextrose 50% Injectable 25 Gram(s) IV Push once  dextrose 50% Injectable 12.5 Gram(s) IV Push once  enoxaparin Injectable 80 milliGRAM(s) SubCutaneous every 12 hours  ferrous    sulfate Liquid 300 milliGRAM(s) Enteral Tube <User Schedule>  glucagon  Injectable 1 milliGRAM(s) IntraMuscular once  insulin lispro (ADMELOG) corrective regimen sliding scale   SubCutaneous every 6 hours  polyethylene glycol 3350 17 Gram(s) Oral daily  senna 2 Tablet(s) Oral at bedtime  sodium chloride 0.9% lock flush 3 milliLiter(s) IV Push daily    MEDICATIONS  (PRN):  acetaminophen     Tablet .. 650 milliGRAM(s) Oral every 6 hours PRN Mild Pain (1 - 3)  acetylcysteine 10%  Inhalation 4 milliLiter(s) Inhalation every 6 hours PRN increased secretions  albuterol/ipratropium for Nebulization 3 milliLiter(s) Nebulizer every 6 hours PRN Respiratory Distress  dextrose Oral Gel 15 Gram(s) Oral once PRN Blood Glucose LESS THAN 70 milliGRAM(s)/deciliter  oxyCODONE    IR 5 milliGRAM(s) Oral every 4 hours PRN Moderate Pain (4 - 6)  simethicone 80 milliGRAM(s) Chew every 6 hours PRN Gas      ALLERGIES:  Allergies    No Known Allergies    Intolerances        LABS:                        11.6   3.98  )-----------( 291      ( 30 Jun 2023 05:30 )             36.0     06-30    144  |  103  |  13  ----------------------------<  138<H>  3.9   |  30  |  0.43<L>    Ca    9.3      30 Jun 2023 05:30  Phos  5.1     06-30  Mg     2.1     06-30    TPro  6.9  /  Alb  3.7  /  TBili  <0.2  /  DBili  x   /  AST  44<H>  /  ALT  149<H>  /  AlkPhos  112  06-30    PT/INR - ( 30 Jun 2023 05:30 )   PT: 14.6 sec;   INR: 1.22          PTT - ( 30 Jun 2023 05:30 )  PTT:47.4 sec  Urinalysis Basic - ( 30 Jun 2023 05:30 )    Color: x / Appearance: x / SG: x / pH: x  Gluc: 138 mg/dL / Ketone: x  / Bili: x / Urobili: x   Blood: x / Protein: x / Nitrite: x   Leuk Esterase: x / RBC: x / WBC x   Sq Epi: x / Non Sq Epi: x / Bacteria: x      CAPILLARY BLOOD GLUCOSE      POCT Blood Glucose.: 138 mg/dL (30 Jun 2023 07:00)      RADIOLOGY & ADDITIONAL TESTS: Reviewed.

## 2023-06-30 NOTE — PROGRESS NOTE ADULT - PROVIDER SPECIALTY LIST ADULT
Hospitalist
Neurology
Hospitalist
Neurology
Hospitalist
Internal Medicine
Rehab Medicine
Hospitalist
Rehab Medicine
NSICU
NSICU
Internal Medicine

## 2023-06-30 NOTE — PROGRESS NOTE ADULT - PROBLEM SELECTOR PLAN 2
Chronic elevation of LFTs, seen on on previous admission. No abdominal pain on examination- resolved   - Hepatitis panel negative  - RUQ U/S: Cholecystectomy  - Holding Lipitor  - neurostimulants dc: amantadine, modafinil

## 2023-06-30 NOTE — PROGRESS NOTE ADULT - SUBJECTIVE AND OBJECTIVE BOX
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 and L. PICA) s/p SOC foramen magnum decompression and right parietal/occipital EVD placement (4/21), s/p trach (4/28/23), s/p PEG (5/2/23) sent from nursing facility c/o right side abdominal pain, vomiting and mild generalized headache x 2 days, CTH with new acute intraparenchymal   hemorrhage in the right cerebellar hemisphere. Pt denies recent injury/trauma to head, neck pain, acute weakness/paresthesias of extremities, urinary/bowel incontinence, seizures. Patient is currently on lovenox 80mg BID, last dose given 8pm last night.  (25 Jun 2023 17:20)                            11.6   3.98  )-----------( 291      ( 30 Jun 2023 05:30 )             36.0       06-30    144  |  103  |  13  ----------------------------<  138<H>  3.9   |  30  |  0.43<L>    Ca    9.3      30 Jun 2023 05:30  Phos  5.1     06-30  Mg     2.1     06-30    TPro  6.9  /  Alb  3.7  /  TBili  <0.2  /  DBili  x   /  AST  44<H>  /  ALT  149<H>  /  AlkPhos  112  06-30    Vital Signs Last 24 Hrs  T(C): 36.6 (30 Jun 2023 06:48), Max: 37 (29 Jun 2023 14:00)  T(F): 97.8 (30 Jun 2023 06:48), Max: 98.6 (29 Jun 2023 14:00)  HR: 72 (30 Jun 2023 06:48) (65 - 76)  BP: 120/74 (30 Jun 2023 06:48) (102/62 - 120/74)  BP(mean): --  RR: 18 (30 Jun 2023 06:48) (18 - 18)  SpO2: 93% (30 Jun 2023 06:48) (93% - 95%)    Parameters below as of 30 Jun 2023 06:48  Patient On (Oxygen Delivery Method): room air        MEDICATIONS  (STANDING):  chlorhexidine 2% Cloths 1 Application(s) Topical <User Schedule>  dextrose 5%. 1000 milliLiter(s) (50 mL/Hr) IV Continuous <Continuous>  dextrose 5%. 1000 milliLiter(s) (100 mL/Hr) IV Continuous <Continuous>  dextrose 50% Injectable 25 Gram(s) IV Push once  dextrose 50% Injectable 25 Gram(s) IV Push once  dextrose 50% Injectable 12.5 Gram(s) IV Push once  enoxaparin Injectable 80 milliGRAM(s) SubCutaneous every 12 hours  ferrous    sulfate Liquid 300 milliGRAM(s) Enteral Tube <User Schedule>  glucagon  Injectable 1 milliGRAM(s) IntraMuscular once  insulin lispro (ADMELOG) corrective regimen sliding scale   SubCutaneous every 6 hours  polyethylene glycol 3350 17 Gram(s) Oral daily  senna 2 Tablet(s) Oral at bedtime  sodium chloride 0.9% lock flush 3 milliLiter(s) IV Push daily    MEDICATIONS  (PRN):  acetaminophen     Tablet .. 650 milliGRAM(s) Oral every 6 hours PRN Mild Pain (1 - 3)  acetylcysteine 10%  Inhalation 4 milliLiter(s) Inhalation every 6 hours PRN increased secretions  albuterol/ipratropium for Nebulization 3 milliLiter(s) Nebulizer every 6 hours PRN Respiratory Distress  dextrose Oral Gel 15 Gram(s) Oral once PRN Blood Glucose LESS THAN 70 milliGRAM(s)/deciliter  oxyCODONE    IR 5 milliGRAM(s) Oral every 4 hours PRN Moderate Pain (4 - 6)  simethicone 80 milliGRAM(s) Chew every 6 hours PRN Gas       6/29/2023 Functional Status Assessment :       Pain Assessment/Number Scale (0-10) Adult  Presence of Pain: denies pain/discomfort (Rating = 0)  Pain Rating (0-10): Rest: 0 (no pain/absence of nonverbal indicators of pain)  Pain Rating (0-10): Activity: 0 (no pain/absence of nonverbal indicators of pain)    Safety      AM-PAC Functional Assessment: Basic Mobility  Type of Assessment: Daily assessment  Turning from your back to your side while in a flat bed without using bedrails?: 3 = A little assistance  Moving from lying on your back to sitting on the flat side of a flat bed without using bedrails?: 3 = A little assistance  Moving to and from a bed to a chair (including a wheelchair)?: 3 = A little assistance  Standing up from a chair using your arms (e.g. wheelchair or bedside chair)?: 3 = A little assistance  Walking in hospital room?: 3 = A little assistance  Climbing 3-5 steps with a railing?: 3-calculated by average   Score: 18   Row Comment: Ask the patient "How much help from another person do you currently need? (If the patient hasn't done an activity recently, how much help from another person do you think he/she needs if he/she tried?)    Cognitive/Neuro      Cognitive/Neuro/Behavioral  Cognitive/Neuro/Behavioral [WDL Definition: Alert; opens eyes spontaneously; arouses to voice or touch; oriented x 4; follows commands; speech spontaneous, logical; purposeful motor response; behavior appropriate to situation]: WDL except  Speech: trached;  hypophonic;  trached with PMV    Language Assistance  Preferred Language to Address Healthcare Preferred Language to Address Healthcare: [place]   ID: [id number] Prescott and family assist with translate t/o     Therapeutic Interventions      Bed Mobility  Bed Mobility Training Scooting: minimum assist (75% patient effort);  1 person assist;  verbal cues  Bed Mobility Training Sit-to-Supine: minimum assist (75% patient effort);  1 person assist;  verbal cues  Bed Mobility Training Supine-to-Sit: minimum assist (75% patient effort);  1 person assist;  verbal cues  Bed Mobility Training Limitations: decreased ability to use arms for pushing/pulling;  decreased ability to use legs for bridging/pushing;  impaired ability to control trunk for mobility;  decreased strength;  impaired balance;  impaired postural control    Sit-Stand Transfer Training  Transfer Training Sit-to-Stand Transfer: minimum assist (75% patient effort);  1 person assist;  verbal cues;  rolling walker  Transfer Training Stand-to-Sit Transfer: minimum assist (75% patient effort);  1 person assist;  verbal cues;  rolling walker  Sit-to-Stand Transfer Training Transfer Safety Analysis: decreased balance;  decreased strength;  impaired balance;  impaired postural control;  rolling walker    Gait Training  Gait Training: minimum assist (75% patient effort);  1 person assist;  verbal cues;  rolling walker;  20 feet x 4   Gait Analysis: swing-to gait   decreased daphnie;  increased time in double stance;  decreased hip/knee flexion;  decreased step length;  slightly unsteady gait, occasional R listing during ambulation, requires occasional assist from PT to steer rolling walker. ;  decreased strength;  impaired balance;  impaired postural control;  rolling walker  Duration of Rest Duration of Rest: Pt unable to ambulate further distance secondary pt complains feeling fatigue.             PM&R Impression : as above    Current Disposition Plan Recommendations :    subacute rehab placement

## 2023-07-01 ENCOUNTER — EMERGENCY (EMERGENCY)
Facility: HOSPITAL | Age: 58
LOS: 1 days | Discharge: HOME | End: 2023-07-01
Attending: EMERGENCY MEDICINE | Admitting: EMERGENCY MEDICINE
Payer: COMMERCIAL

## 2023-07-01 VITALS
RESPIRATION RATE: 19 BRPM | HEART RATE: 76 BPM | HEIGHT: 62 IN | OXYGEN SATURATION: 95 % | TEMPERATURE: 98 F | SYSTOLIC BLOOD PRESSURE: 108 MMHG | DIASTOLIC BLOOD PRESSURE: 59 MMHG

## 2023-07-01 VITALS
RESPIRATION RATE: 16 BRPM | HEART RATE: 88 BPM | DIASTOLIC BLOOD PRESSURE: 84 MMHG | TEMPERATURE: 98 F | OXYGEN SATURATION: 96 % | SYSTOLIC BLOOD PRESSURE: 137 MMHG

## 2023-07-01 DIAGNOSIS — Z79.01 LONG TERM (CURRENT) USE OF ANTICOAGULANTS: ICD-10-CM

## 2023-07-01 DIAGNOSIS — R11.2 NAUSEA WITH VOMITING, UNSPECIFIED: ICD-10-CM

## 2023-07-01 DIAGNOSIS — Z98.890 OTHER SPECIFIED POSTPROCEDURAL STATES: Chronic | ICD-10-CM

## 2023-07-01 DIAGNOSIS — Z98.2 PRESENCE OF CEREBROSPINAL FLUID DRAINAGE DEVICE: ICD-10-CM

## 2023-07-01 DIAGNOSIS — G62.9 POLYNEUROPATHY, UNSPECIFIED: ICD-10-CM

## 2023-07-01 DIAGNOSIS — Z98.2 PRESENCE OF CEREBROSPINAL FLUID DRAINAGE DEVICE: Chronic | ICD-10-CM

## 2023-07-01 DIAGNOSIS — R10.10 UPPER ABDOMINAL PAIN, UNSPECIFIED: ICD-10-CM

## 2023-07-01 DIAGNOSIS — Z93.1 GASTROSTOMY STATUS: ICD-10-CM

## 2023-07-01 DIAGNOSIS — I10 ESSENTIAL (PRIMARY) HYPERTENSION: ICD-10-CM

## 2023-07-01 DIAGNOSIS — Z93.0 TRACHEOSTOMY STATUS: ICD-10-CM

## 2023-07-01 DIAGNOSIS — I25.10 ATHEROSCLEROTIC HEART DISEASE OF NATIVE CORONARY ARTERY WITHOUT ANGINA PECTORIS: ICD-10-CM

## 2023-07-01 DIAGNOSIS — Z86.69 PERSONAL HISTORY OF OTHER DISEASES OF THE NERVOUS SYSTEM AND SENSE ORGANS: ICD-10-CM

## 2023-07-01 DIAGNOSIS — Z90.710 ACQUIRED ABSENCE OF BOTH CERVIX AND UTERUS: Chronic | ICD-10-CM

## 2023-07-01 DIAGNOSIS — Z90.710 ACQUIRED ABSENCE OF BOTH CERVIX AND UTERUS: ICD-10-CM

## 2023-07-01 DIAGNOSIS — Z90.49 ACQUIRED ABSENCE OF OTHER SPECIFIED PARTS OF DIGESTIVE TRACT: Chronic | ICD-10-CM

## 2023-07-01 DIAGNOSIS — R14.3 FLATULENCE: ICD-10-CM

## 2023-07-01 DIAGNOSIS — J45.909 UNSPECIFIED ASTHMA, UNCOMPLICATED: ICD-10-CM

## 2023-07-01 DIAGNOSIS — Z87.39 PERSONAL HISTORY OF OTHER DISEASES OF THE MUSCULOSKELETAL SYSTEM AND CONNECTIVE TISSUE: ICD-10-CM

## 2023-07-01 DIAGNOSIS — Z90.49 ACQUIRED ABSENCE OF OTHER SPECIFIED PARTS OF DIGESTIVE TRACT: ICD-10-CM

## 2023-07-01 LAB
ALBUMIN SERPL ELPH-MCNC: 3.8 G/DL — SIGNIFICANT CHANGE UP (ref 3.3–5)
ALP SERPL-CCNC: 116 U/L — SIGNIFICANT CHANGE UP (ref 40–120)
ALT FLD-CCNC: 129 U/L — HIGH (ref 10–45)
ANION GAP SERPL CALC-SCNC: 10 MMOL/L — SIGNIFICANT CHANGE UP (ref 5–17)
APPEARANCE UR: CLEAR — SIGNIFICANT CHANGE UP
APTT BLD: 46.8 SEC — HIGH (ref 27.5–35.5)
AST SERPL-CCNC: 32 U/L — SIGNIFICANT CHANGE UP (ref 10–40)
BACTERIA # UR AUTO: PRESENT /HPF
BASOPHILS # BLD AUTO: 0.02 K/UL — SIGNIFICANT CHANGE UP (ref 0–0.2)
BASOPHILS NFR BLD AUTO: 0.5 % — SIGNIFICANT CHANGE UP (ref 0–2)
BILIRUB SERPL-MCNC: 0.2 MG/DL — SIGNIFICANT CHANGE UP (ref 0.2–1.2)
BILIRUB UR-MCNC: NEGATIVE — SIGNIFICANT CHANGE UP
BUN SERPL-MCNC: 15 MG/DL — SIGNIFICANT CHANGE UP (ref 7–23)
CALCIUM SERPL-MCNC: 9.8 MG/DL — SIGNIFICANT CHANGE UP (ref 8.4–10.5)
CHLORIDE SERPL-SCNC: 104 MMOL/L — SIGNIFICANT CHANGE UP (ref 96–108)
CO2 SERPL-SCNC: 30 MMOL/L — SIGNIFICANT CHANGE UP (ref 22–31)
COLOR SPEC: YELLOW — SIGNIFICANT CHANGE UP
COMMENT - URINE: SIGNIFICANT CHANGE UP
CREAT SERPL-MCNC: 0.49 MG/DL — LOW (ref 0.5–1.3)
DIFF PNL FLD: NEGATIVE — SIGNIFICANT CHANGE UP
EGFR: 111 ML/MIN/1.73M2 — SIGNIFICANT CHANGE UP
EOSINOPHIL # BLD AUTO: 0.16 K/UL — SIGNIFICANT CHANGE UP (ref 0–0.5)
EOSINOPHIL NFR BLD AUTO: 3.6 % — SIGNIFICANT CHANGE UP (ref 0–6)
EPI CELLS # UR: ABNORMAL /HPF (ref 0–5)
GLUCOSE SERPL-MCNC: 123 MG/DL — HIGH (ref 70–99)
GLUCOSE UR QL: NEGATIVE — SIGNIFICANT CHANGE UP
HCT VFR BLD CALC: 38 % — SIGNIFICANT CHANGE UP (ref 34.5–45)
HGB BLD-MCNC: 12.5 G/DL — SIGNIFICANT CHANGE UP (ref 11.5–15.5)
IMM GRANULOCYTES NFR BLD AUTO: 0.2 % — SIGNIFICANT CHANGE UP (ref 0–0.9)
INR BLD: 1.3 — HIGH (ref 0.88–1.16)
KETONES UR-MCNC: NEGATIVE — SIGNIFICANT CHANGE UP
LEUKOCYTE ESTERASE UR-ACNC: ABNORMAL
LIDOCAIN IGE QN: 26 U/L — SIGNIFICANT CHANGE UP (ref 7–60)
LYMPHOCYTES # BLD AUTO: 1.81 K/UL — SIGNIFICANT CHANGE UP (ref 1–3.3)
LYMPHOCYTES # BLD AUTO: 41.1 % — SIGNIFICANT CHANGE UP (ref 13–44)
MCHC RBC-ENTMCNC: 29.6 PG — SIGNIFICANT CHANGE UP (ref 27–34)
MCHC RBC-ENTMCNC: 32.9 GM/DL — SIGNIFICANT CHANGE UP (ref 32–36)
MCV RBC AUTO: 89.8 FL — SIGNIFICANT CHANGE UP (ref 80–100)
MONOCYTES # BLD AUTO: 0.3 K/UL — SIGNIFICANT CHANGE UP (ref 0–0.9)
MONOCYTES NFR BLD AUTO: 6.8 % — SIGNIFICANT CHANGE UP (ref 2–14)
NEUTROPHILS # BLD AUTO: 2.1 K/UL — SIGNIFICANT CHANGE UP (ref 1.8–7.4)
NEUTROPHILS NFR BLD AUTO: 47.8 % — SIGNIFICANT CHANGE UP (ref 43–77)
NITRITE UR-MCNC: NEGATIVE — SIGNIFICANT CHANGE UP
NRBC # BLD: 0 /100 WBCS — SIGNIFICANT CHANGE UP (ref 0–0)
PH UR: 7 — SIGNIFICANT CHANGE UP (ref 5–8)
PLATELET # BLD AUTO: 302 K/UL — SIGNIFICANT CHANGE UP (ref 150–400)
POTASSIUM SERPL-MCNC: 4.1 MMOL/L — SIGNIFICANT CHANGE UP (ref 3.5–5.3)
POTASSIUM SERPL-SCNC: 4.1 MMOL/L — SIGNIFICANT CHANGE UP (ref 3.5–5.3)
PROT SERPL-MCNC: 6.8 G/DL — SIGNIFICANT CHANGE UP (ref 6–8.3)
PROT UR-MCNC: NEGATIVE MG/DL — SIGNIFICANT CHANGE UP
PROTHROM AB SERPL-ACNC: 15.5 SEC — HIGH (ref 10.5–13.4)
RBC # BLD: 4.23 M/UL — SIGNIFICANT CHANGE UP (ref 3.8–5.2)
RBC # FLD: 13.8 % — SIGNIFICANT CHANGE UP (ref 10.3–14.5)
RBC CASTS # UR COMP ASSIST: < 5 /HPF — SIGNIFICANT CHANGE UP
SODIUM SERPL-SCNC: 144 MMOL/L — SIGNIFICANT CHANGE UP (ref 135–145)
SP GR SPEC: 1.02 — SIGNIFICANT CHANGE UP (ref 1–1.03)
UROBILINOGEN FLD QL: 1 E.U./DL — SIGNIFICANT CHANGE UP
WBC # BLD: 4.4 K/UL — SIGNIFICANT CHANGE UP (ref 3.8–10.5)
WBC # FLD AUTO: 4.4 K/UL — SIGNIFICANT CHANGE UP (ref 3.8–10.5)
WBC UR QL: ABNORMAL /HPF

## 2023-07-01 PROCEDURE — 85610 PROTHROMBIN TIME: CPT

## 2023-07-01 PROCEDURE — 80053 COMPREHEN METABOLIC PANEL: CPT

## 2023-07-01 PROCEDURE — 74019 RADEX ABDOMEN 2 VIEWS: CPT | Mod: 26

## 2023-07-01 PROCEDURE — 85025 COMPLETE CBC W/AUTO DIFF WBC: CPT

## 2023-07-01 PROCEDURE — 81001 URINALYSIS AUTO W/SCOPE: CPT

## 2023-07-01 PROCEDURE — 71045 X-RAY EXAM CHEST 1 VIEW: CPT

## 2023-07-01 PROCEDURE — 99285 EMERGENCY DEPT VISIT HI MDM: CPT

## 2023-07-01 PROCEDURE — 70250 X-RAY EXAM OF SKULL: CPT | Mod: 26

## 2023-07-01 PROCEDURE — 85730 THROMBOPLASTIN TIME PARTIAL: CPT

## 2023-07-01 PROCEDURE — 74018 RADEX ABDOMEN 1 VIEW: CPT

## 2023-07-01 PROCEDURE — 70450 CT HEAD/BRAIN W/O DYE: CPT | Mod: 26,MA

## 2023-07-01 PROCEDURE — 83690 ASSAY OF LIPASE: CPT

## 2023-07-01 PROCEDURE — 74177 CT ABD & PELVIS W/CONTRAST: CPT | Mod: 26,MA

## 2023-07-01 PROCEDURE — 72170 X-RAY EXAM OF PELVIS: CPT | Mod: 26

## 2023-07-01 PROCEDURE — 70250 X-RAY EXAM OF SKULL: CPT

## 2023-07-01 PROCEDURE — 96374 THER/PROPH/DIAG INJ IV PUSH: CPT | Mod: XU

## 2023-07-01 PROCEDURE — 70450 CT HEAD/BRAIN W/O DYE: CPT | Mod: MA

## 2023-07-01 PROCEDURE — 36415 COLL VENOUS BLD VENIPUNCTURE: CPT

## 2023-07-01 PROCEDURE — 99284 EMERGENCY DEPT VISIT MOD MDM: CPT | Mod: 25

## 2023-07-01 PROCEDURE — 74177 CT ABD & PELVIS W/CONTRAST: CPT | Mod: MA

## 2023-07-01 PROCEDURE — 87086 URINE CULTURE/COLONY COUNT: CPT

## 2023-07-01 RX ORDER — IOHEXOL 300 MG/ML
30 INJECTION, SOLUTION INTRAVENOUS ONCE
Refills: 0 | Status: COMPLETED | OUTPATIENT
Start: 2023-07-01 | End: 2023-07-01

## 2023-07-01 RX ORDER — SODIUM CHLORIDE 9 MG/ML
1000 INJECTION INTRAMUSCULAR; INTRAVENOUS; SUBCUTANEOUS ONCE
Refills: 0 | Status: COMPLETED | OUTPATIENT
Start: 2023-07-01 | End: 2023-07-01

## 2023-07-01 RX ORDER — ONDANSETRON 8 MG/1
4 TABLET, FILM COATED ORAL ONCE
Refills: 0 | Status: COMPLETED | OUTPATIENT
Start: 2023-07-01 | End: 2023-07-01

## 2023-07-01 RX ADMIN — SODIUM CHLORIDE 1000 MILLILITER(S): 9 INJECTION INTRAMUSCULAR; INTRAVENOUS; SUBCUTANEOUS at 07:45

## 2023-07-01 RX ADMIN — ONDANSETRON 4 MILLIGRAM(S): 8 TABLET, FILM COATED ORAL at 07:45

## 2023-07-01 RX ADMIN — IOHEXOL 30 MILLILITER(S): 300 INJECTION, SOLUTION INTRAVENOUS at 10:21

## 2023-07-01 NOTE — ED ADULT TRIAGE NOTE - CHIEF COMPLAINT QUOTE
Patient with reported nausea and vomiting. Patient is fed via PEG. Reported to vomit intermittently the past 12 hours - unable to tolerate feeding.

## 2023-07-01 NOTE — ED ADULT NURSE NOTE - OBJECTIVE STATEMENT
57 y.o. female BIBA from rehab for complaining of nausea and vomiting during PEG TUBE feeds. Patient's daughter reports she vomited two times yesterday and the rehab facility decided to send her to the hospital for evaluation. Patient has a trach to room air,  shunt. Patient denies fever, chills, sob, chest pain, diarrhea. Patient's daughter reports she had a bowel movement yesterday. Patient is AAOx3, breathing spontaneously, even and unlabored, chest rise visible and symmetrical.

## 2023-07-01 NOTE — ED PROVIDER NOTE - PROGRESS NOTE DETAILS
labs wnl, CT head w/ no acute pathology, vps in place.  CT a/p no acute pathology, peg/vps in place.  shunt xray confirms appropriate positioning.  pt w/o any vomiting in ED.  CM eval pt and plan to dc back to [facility], will likely be dc from [facility] later this week which family would like.  recommend slowing down feeds and discussed strict return parameters

## 2023-07-01 NOTE — ED PROVIDER NOTE - OBJECTIVE STATEMENT
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 and L. PICA) s/p SOC foramen magnum decompression and right parietal/occipital EVD placement (4/21), s/p trach (4/28/23), s/p PEG (5/2/23) dc to [facility] yesterday 6/30 bibems for NV and inability to tolerate tube feeds.  per daughter at bedside pt started tube feed last night around 11pm and immediately felt nauseas w/ 2 episodes of nbnb emesis.  tube feed was stopped but nausea persisted.  also w/ minimal upper abd discomfort, improved from most recent admit.  last BM yesterday (no blood), + flatus today.  pt currently on bowel regimen and abx for esbl ecoli uti.  no change to persistent HAs.  denies f/c, dizziness, fainting, chest pain, sob, uri sxs, dysuria, hematuria, rash/skin changes, trauma

## 2023-07-01 NOTE — ED ADULT NURSE NOTE - NSFALLRISKINTERV_ED_ALL_ED
Assistance OOB with selected safe patient handling equipment if applicable/Assistance with ambulation/Communicate fall risk and risk factors to all staff, patient, and family/Monitor gait and stability/Provide visual cue: yellow wristband, yellow gown, etc/Reinforce activity limits and safety measures with patient and family/Call bell, personal items and telephone in reach/Instruct patient to call for assistance before getting out of bed/chair/stretcher/Non-slip footwear applied when patient is off stretcher/Ryan to call system/Physically safe environment - no spills, clutter or unnecessary equipment/Purposeful Proactive Rounding/Room/bathroom lighting operational, light cord in reach

## 2023-07-01 NOTE — ED PROVIDER NOTE - PATIENT PORTAL LINK FT
You can access the FollowMyHealth Patient Portal offered by [facility] by registering at the following website: [email or web address]. By joining [email or web address]’s FollowMyHealth portal, you will also be able to view your health information using other applications (apps) compatible with our system.

## 2023-07-01 NOTE — ED PROVIDER NOTE - PHYSICAL EXAMINATION
Vitals reviewed  Gen: chronically ill but nontoxic appearing, nad, answers questions w/ one word responses when asked by daughter  Skin: wwp, no rash/lesions  HEENT: nc, R parietal VPS tense but compressible, eomi, mmm  Neck: + trach site cdi  CV: rrr, no audible m/r/g  Resp: symmetrical expansion, ctab, no w/r/r  Abd: nondistended, + PEG site cdi, no erythema around site, soft, nontender, no r/g  Ext: FROM throughout, no peripheral edema, distal pulses intact   Neuro: alert/oriented x3, no focal deficits

## 2023-07-01 NOTE — ED ADULT NURSE NOTE - NSFALLDEVICES_ED_ALL_ED
Entered by Negrito Abdul MD on November 12, 2019 11:27:31 AM CST  ---------------------  From: Negrito Abdul MD   To: [id number]    Sent: 11/12/2019 11:27:31 AM CST  Subject: Medication Management     ** Approved **  Order:azelastine ophthalmic (azelastine 0.05% ophthalmic solution)  INSTILL 1 DROP INTO BOTH EYES TWICE A DAY AS NEEDED FOR ALLERGY SYMPTOMS  Qty:  18 mL        Days Supply:  90        Refills:  2          Substitutions Allowed     Route To Pharmacy - [facility]   Note from Pharmacy:  REQUEST FOR 90 DAYS PRESCRIPTION.  Signed by Negrito Abdul MD            From: [facility]  To: Negrito Abdul MD  Sent: November 12, 2019 11:03:23 AM CST  Subject: Medication Management  Due: November 13, 2019 11:03:23 AM CST     Originally Prescribed Drug:  Drug: azelastine ophthalmic (azelastine 0.05% ophthalmic solution), INSTILL 1 DROP INTO BOTH EYES TWICE A DAY AS NEEDED FOR ALLERGY SYMPTOMS  Quantity: 6 mL  Days Supply: 30  Refills: 5  Substitutions Allowed  Notes from Pharmacy: REQUEST FOR 90 DAYS PRESCRIPTION.     ** On Hold Pending Signature **  Preferred Alternative Drug: azelastine ophthalmic (azelastine 0.05% ophthalmic solution), INSTILL 1 DROP INTO BOTH EYES TWICE A DAY AS NEEDED FOR ALLERGY SYMPTOMS  Quantity: 18 mL  Days Supply: 90  Refills: 2  Substitutions Allowed  Notes from Pharmacy: REQUEST FOR 90 DAYS PRESCRIPTION.   patient in rehab for therapy

## 2023-07-01 NOTE — ED PROVIDER NOTE - CLINICAL SUMMARY MEDICAL DECISION MAKING FREE TEXT BOX
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 and L. PICA) s/p SOC foramen magnum decompression and right parietal/occipital EVD placement (4/21), s/p trach (4/28/23), s/p PEG (5/2/23) dc to [facility] yesterday 6/30 bibems for NV and inability to tolerate tube feeds.  recent admission w/ similar sxs.  daughter reports only minimal upper abd discomfort now, mostly just NV in setting tube feeds.  on exam vss, + trach/peg, a/ox3, R parietal VPS tense but compressible, lungs ctab, hrrr, abd soft/nt, no acute focal neurologic deficits.  will check peg/vps as source of nv, less likely other intraabd pathology as no ttp.  will obtain labs, ct head, ct a/p and shunt series

## 2023-07-01 NOTE — ED ADULT NURSE REASSESSMENT NOTE - NS ED NURSE REASSESS COMMENT FT1
Received patient. patient is stable at bed, A&OX4. no resp, distress. will continued to monitor, will obtained CT.

## 2023-07-01 NOTE — ED PROVIDER NOTE - NSFOLLOWUPINSTRUCTIONS_ED_ALL_ED_FT
Consider slowing down tube feeds to prevent nausea and vomiting.      Please continue to check INR- today the level was 1.3.  the rest of labs/electrolytes were within normal limits    How To Give Medicine Through a Feeding Tube  A feeding tube is a soft, flexible tube through which medicine, water, and liquid food can be given. A person may have a feeding tube if she or he has trouble swallowing or cannot have food, liquid, or medicine by mouth.    Supplies needed:  60 mL catheter-tip syringe or syringes.  Medicine or medicines.  Pill , if the medicine is in tablet form.  Small, clean medicine cup or cups, one for each medicine that you will be giving.  Purified water, at room temperature. To purify and prepare drinking water:  Boil water with lid on for 1 minute or longer.  Cool water to room temperature.  Germ-free (sterile) water, at room temperature (if you will not be using purified water). Use this if:  The person has a weak disease-fighting system (immune system) and his or her body has difficulty protecting itself from infections.  You are unsure of the amount of chemical contaminants in purified water or drinking water.  How to prepare the medicine    If the medicine cannot be given with the tube feeding, stop the feeding and wait for as long as you are told (hold time). You may need to wait 60 minutes or up to 2 hours, depending on whether the person needs to take medicine on an empty stomach.  Raise the person's head so it is above the level of his or her stomach. Do this with pillows or by raising the head of the bed.  Wash your hands with soap and water for at least 20 seconds.  Check the placement of the tube as directed.  Prepare the medicine that will go into the tube:  If the medicine is a liquid, mix it in a clean medicine cup with 30 mL of water, or with the amount of water recommended by the health care provider.  If the medicine is in tablet form, use a pill-crushing device to make it into a fine powder. In a medicine cup, mix the powder with 30 mL of water.  If the medicine is a capsule that contains dry pellets and the health care provider has told you to open it:  Open the capsule.  Empty the pellets into 30 mL of water in a medicine cup.  If the medicine is a capsule that contains liquid (gelcap), use it in one of these ways:  Poke a small hole in the gelcap and then empty its liquid into 30 mL of warm water in a medicine cup, or as directed.  Dissolve the unopened gelcap in warm water in a medicine cup, or as directed.  Do not crush or use extended-release, long-acting, or delayed-release medicines with any type of feeding tube. Those may deliver too much medicine at one time.  How to flush the tube    Always flush the tube before and after giving medicine. Follow these steps to make sure the tube is clear:  Push the plunger all the way into a new, clean syringe.  Put the tip of the syringe into the purified or sterile water. While the syringe is in the water, pull back on the syringe plunger until the water level on the syringe barrel is 15 mL.  Before removing the tube cap or connecting the syringe, close the tube by using a clamp or bending the tube. This is called clamping or kinking.  Connect the syringe to the tube and then open the tube by removing the clamp or straightening the tube. This is called unclamping or unkinking.  Slowly push 15 mL of water from the syringe into the tube to flush the tube.  How to give the medicine through the feeding tube  Put the tip of a new, clean syringe into the medicine-and-water mixture and pull back the plunger to bring the mixture into the syringe. Do not mix medicines.  Close (clamp or kink) the tube.  Disconnect the syringe that you used to flush the tube and then connect the medicine-and-water mixture syringe to the tube.  Open (unclamp or unkink) the tube.  Slowly push the medicine-and-water mixture into the tube.  Close (clamp or kink) the tube.  Disconnect the syringe from the tube.  Put the tip of the syringe into the purified or sterile water. While the syringe is in the water, pull back on the syringe plunger until the water level on the syringe barrel is 30 mL.  Connect the syringe to the tube and then open (unclamp or unkink) the tube.  Slowly push the water from the syringe into the tube to flush the tube. Always flush the tube before and after giving medicine.  Close (clamp or kink) the tube and then disconnect the syringe from the tube.  Put the cap on the tube.  General tips    Use liquid medicines when possible.  Give the medicine only as directed. Do not mix medicines together.  If giving only one dose of medicine, flush the tube with water after giving the medicine.  If giving more than one medicine, give each medicine separately. Flush the tube between different medicines and after the last dose of medicine.  Use a new, clean syringe for each medicine.  If a tube feeding will be continued after the medicine, but the medicine cannot be given with the feeding:  Wait 30–60 minutes after giving medicine. Then give the feeding.  When appropriate, reconnect the feeding bag set or the gravity drip tubing set to the tube.  If a tube feeding will not be continued after the medicine:  Put the cap on the tube.  Put the person in a comfortable position, but keep his or her head raised for 1 hour after you have given the medicine.  Always clamp or kink the tube before you remove the cap or before you disconnect a syringe.  Contact a health care provider if:  You are unsure what types of medicines to give.  The length of tube from the insertion site to the tube gets longer.  You have any problems with or questions about the feeding tube.  Get help right away if:  The feeding tube gets clogged, falls out, or does not work.  The skin area around the tube is red, swollen, warm to the touch, or tender.  More drainage than normal is coming from around the tube.  The drainage from the tube has a bad smell.  The person who is getting the feeding has any of these problems:  Fever.  Shortness of breath or trouble breathing.  Vomiting or nausea.  Constipation or diarrhea.  Pain, tenderness, or bloating in the abdomen.  Summary  A person may have a feeding tube if she or he has trouble swallowing or cannot have food, liquid, or medicine by mouth.  Before and after giving medicine through the tube, slowly push water through a syringe into the tube to flush the tube. Always flush the tube between different medicines and after the last dose of medicine.  Contact a health care provider if you are unsure what types of medicines to give or if the length of tube from the insertion site to the tube gets longer.  This information is not intended to replace advice given to you by your health care provider. Make sure you discuss any questions you have with your health care provider.

## 2023-07-02 LAB
CULTURE RESULTS: SIGNIFICANT CHANGE UP
SPECIMEN SOURCE: SIGNIFICANT CHANGE UP

## 2023-07-10 DIAGNOSIS — I62.03 NONTRAUMATIC CHRONIC SUBDURAL HEMORRHAGE: ICD-10-CM

## 2023-07-10 DIAGNOSIS — J45.909 UNSPECIFIED ASTHMA, UNCOMPLICATED: ICD-10-CM

## 2023-07-10 DIAGNOSIS — Z93.1 GASTROSTOMY STATUS: ICD-10-CM

## 2023-07-10 DIAGNOSIS — R29.704 NIHSS SCORE 4: ICD-10-CM

## 2023-07-10 DIAGNOSIS — Z86.718 PERSONAL HISTORY OF OTHER VENOUS THROMBOSIS AND EMBOLISM: ICD-10-CM

## 2023-07-10 DIAGNOSIS — K76.0 FATTY (CHANGE OF) LIVER, NOT ELSEWHERE CLASSIFIED: ICD-10-CM

## 2023-07-10 DIAGNOSIS — Z95.828 PRESENCE OF OTHER VASCULAR IMPLANTS AND GRAFTS: ICD-10-CM

## 2023-07-10 DIAGNOSIS — Z93.0 TRACHEOSTOMY STATUS: ICD-10-CM

## 2023-07-10 DIAGNOSIS — Z90.49 ACQUIRED ABSENCE OF OTHER SPECIFIED PARTS OF DIGESTIVE TRACT: ICD-10-CM

## 2023-07-10 DIAGNOSIS — R10.9 UNSPECIFIED ABDOMINAL PAIN: ICD-10-CM

## 2023-07-10 DIAGNOSIS — I25.10 ATHEROSCLEROTIC HEART DISEASE OF NATIVE CORONARY ARTERY WITHOUT ANGINA PECTORIS: ICD-10-CM

## 2023-07-10 DIAGNOSIS — D68.61 ANTIPHOSPHOLIPID SYNDROME: ICD-10-CM

## 2023-07-10 DIAGNOSIS — G62.9 POLYNEUROPATHY, UNSPECIFIED: ICD-10-CM

## 2023-07-10 DIAGNOSIS — I10 ESSENTIAL (PRIMARY) HYPERTENSION: ICD-10-CM

## 2023-07-10 DIAGNOSIS — Z98.2 PRESENCE OF CEREBROSPINAL FLUID DRAINAGE DEVICE: ICD-10-CM

## 2023-07-10 DIAGNOSIS — E78.5 HYPERLIPIDEMIA, UNSPECIFIED: ICD-10-CM

## 2023-07-10 DIAGNOSIS — I69.222 DYSARTHRIA FOLLOWING OTHER NONTRAUMATIC INTRACRANIAL HEMORRHAGE: ICD-10-CM

## 2023-07-10 DIAGNOSIS — Z79.01 LONG TERM (CURRENT) USE OF ANTICOAGULANTS: ICD-10-CM

## 2023-07-18 ENCOUNTER — NON-APPOINTMENT (OUTPATIENT)
Age: 58
End: 2023-07-18

## 2023-07-19 ENCOUNTER — APPOINTMENT (OUTPATIENT)
Dept: HEMATOLOGY ONCOLOGY | Facility: CLINIC | Age: 58
End: 2023-07-19
Payer: MEDICAID

## 2023-07-19 ENCOUNTER — LABORATORY RESULT (OUTPATIENT)
Age: 58
End: 2023-07-19

## 2023-07-19 VITALS
HEART RATE: 73 BPM | WEIGHT: 158 LBS | HEIGHT: 63 IN | OXYGEN SATURATION: 94 % | DIASTOLIC BLOOD PRESSURE: 52 MMHG | BODY MASS INDEX: 28 KG/M2 | SYSTOLIC BLOOD PRESSURE: 107 MMHG | TEMPERATURE: 96.5 F | RESPIRATION RATE: 18 BRPM

## 2023-07-19 PROCEDURE — 99215 OFFICE O/P EST HI 40 MIN: CPT

## 2023-07-19 RX ORDER — OMEGA-3/DHA/EPA/FISH OIL 300-1000MG
CAPSULE ORAL
Refills: 0 | Status: ACTIVE | COMMUNITY

## 2023-07-19 RX ORDER — GLUC/MSM/COLGN2/HYAL/ANTIARTH3 375-375-20
TABLET ORAL
Refills: 0 | Status: ACTIVE | COMMUNITY

## 2023-07-20 ENCOUNTER — APPOINTMENT (OUTPATIENT)
Dept: INTERNAL MEDICINE | Facility: CLINIC | Age: 58
End: 2023-07-20
Payer: MEDICAID

## 2023-07-20 ENCOUNTER — APPOINTMENT (OUTPATIENT)
Dept: OTOLARYNGOLOGY | Facility: CLINIC | Age: 58
End: 2023-07-20
Payer: MEDICAID

## 2023-07-20 LAB
INR PPP: 4.64
PT BLD: 54.7

## 2023-07-20 PROCEDURE — 99024 POSTOP FOLLOW-UP VISIT: CPT

## 2023-07-20 PROCEDURE — 93793 ANTICOAG MGMT PT WARFARIN: CPT

## 2023-07-20 RX ORDER — WARFARIN 5 MG/1
5 TABLET ORAL
Refills: 1 | Status: ACTIVE | COMMUNITY

## 2023-07-20 NOTE — PHYSICAL EXAM
[Normal] : normal appearance, no rash, nodules, vesicles, ulcers, erythema [de-identified] : chronically ill appearing.  head is wrapped in bandage which was clean and dry so left intact. [de-identified] : no thrush [de-identified] : supple [de-identified] : normal RR, breathing pattern [de-identified] : normal HR [de-identified] : no edema [de-identified] : soft, nontender, nondistended [de-identified] : Alert.  Speech delayed.  Moves extremities x 4.  walks with walker.

## 2023-07-20 NOTE — HISTORY OF PRESENT ILLNESS
[de-identified] : The patient is a 57 year old female with suspected antiphospholipid antibody syndrome presenting for a hospital follow up visit.\par She is accompanied by her daughter Yanna who provides the history.\par \par She has a complex recent medical history.  Hospitalized 4/21/23 with acute CVA that caused cerebral edema and impending brain herniation that required decompressive craniotomy.  Hypercoagulable workup performed to evaluate cause of stroke.  Had positive hexagonal phase coagulation testing concerning for lupus anticoagulant.  anticardiolipin IgM was also mildly positive at 28.  Hematology followed the patient in the hospital;  attention was paid to the coagulation study testing to avoid interference of heparins with the result.  Neurology did not have any other suspect causes for the CVA.  While hospitalized, she was also found to have a calf vein DVT + extensive greater saphenous vein thrombosis and was unable to be anticoagulated at the time due to an EVD;  an IVC filter was placed.  She subsequently was discharged to rehab on therapeutic lovenox.  She had a brief hospitalization at [facility] and they started her on a warfarin bridge 7/14/23.  7/18/23 there was an INR check while taking both lovenox 80 mg BID and warfarin 5 mg po daily.  INR was 3 and the patient's PCP advised to stop lovenox so she did not take on 7/19.  seeking more input about this. \par \par Yanna denies any bleeding concerns - denies hematuria, hematochezia, epistaxis.\par \par PMH notable for multiple (3?) first trimester miscarriages.  Note that this history is from the patient's daughter and unconfirmed by the patient due to her cognitive status.\par also per the daughter, the patient had either a DVT or PE post-partum in 1999, requiring treatment with warfarin.\par \par \par \par

## 2023-07-20 NOTE — ASSESSMENT
[FreeTextEntry1] : Radha garcia is a 57 year old female who was hospitalized in 4/2023 for a massive cerebellar stroke requiring an emergent decompressive craniectomy due to edema and impending herniation.  Workup in the hospital was positive for a lupus anticoagulant, concerning for the possibility that antiphospholipid antibody syndrome was the cause of her CVA.  She subsequently had a calf vein DVT + extensive greater saphenous vein thrombosis and at the time was unable to be anticoagulated;  hence an IVC filter was placed.  She is now therapeutic on warfarin (started 7/14/23) after bridging w/ lovenox.  \par \par Plan:\par 1.  presumptive/suspected antiphospholipid antibody syndrome resulting in CVA\par --multiple positive tests for lupus anticoagulant (hexagonal phase analysis) while hospitalized in Spring 2023.  Care was taken by the hematology service to avoid a false postive result due to heparin;  we believe these are genuine positive results.\par --unfortunately she has now been bridged to warfarin and is therapeutic on warfarin.  It would be extremely difficult to accurately repeat the hexagonal phase analysis or do any lupus anticoagulant testing because it is affected by warfarin.  I would be extremely reluctant to stop anticoagulation just to do this testing due to the thrombotic risk.  Therefore she is a presumptive positive, noting that she doesn't meet the [place] criteria for APLAS diagnosis because we aren't able to repeat testing 12 weeks later.\par --serologies for B2 glycoprotein were negative;  anticardiolipin IgM was positve at 28 but not at the level to fulfill [place] criteria\par --she does have a hx of multiple miscarriages (per her daughter) which is an additional factor for APLAS\par --From what I understand from Neurology - no other etiologies have been identified for the stroke\par \par --therefore - recommend continued indefinite anticoagulation\par --DOACs are inferior to warfarin in APLAS so would strongly recommend continuing warfarin indefinitely\par --INR 3 on 7/18/23.  stop lovenox.  Continue warfarin 5 mg daily. Needs repeat INR on 7/21 or 724.  Goal 2-3\par --refer to [facility] Anticoagulation management service for INR monitoring and warfarin dose adjustments\par --needs to f/u with Neurology and other services (NSGY, ENT, Urology, vascular surgery) involved in her care\par --has IVC filter in place - should be removed in the future although reasonable to keep at this time - address in follow up.\par \par return precautions reviewed\par

## 2023-07-23 ENCOUNTER — LABORATORY RESULT (OUTPATIENT)
Age: 58
End: 2023-07-23

## 2023-07-25 ENCOUNTER — APPOINTMENT (OUTPATIENT)
Dept: INTERNAL MEDICINE | Facility: CLINIC | Age: 58
End: 2023-07-25
Payer: MEDICAID

## 2023-07-25 PROCEDURE — 93793 ANTICOAG MGMT PT WARFARIN: CPT

## 2023-07-25 NOTE — PROGRESS NOTE ADULT - ASSESSMENT
57F w/ PMH of Asthma, CAD, HTN, prior miscarriage x3, peripheral neuropathy, presented w/ right sided weakness and facial numbness on 4/20 found to have bilateral cerebellar infarctions (R. and L. PICA) s/p posterior fossa craniectomy and s/p PEG, trach, and IVC filter. Catheter angiogram demonstrated bilateral cavernous ICA aneurysms but no atherosclerotic disease demonstrated. HCT 5/17 with interval increase in R frontotemporal hypodense subdural collection/hygroma. Bilateral cerebellar infarction secondary to hypercoagulability related to APLS (stroke of other determined etiology)     1)Secondary stroke prevention  - Continue statin  - plan to start lovenox full AC if cleared by neurosurgery due to R frontotemporal subdural collection with bridge to Coumadin for secondary stroke prevention due to possible APLS.  Will need repeat test in 12 weeks to definitely diagnose APLS.   - discontinue aspirin 81mg daily upon initiation of AC     2) Stroke risk factors  - A1C: 5.9  - LDL: 92  - TSH: 0.152  - Heme following for positive anticardiolipin Ab x2, new diagnosis APLS on this admission  - CRP 17.4/ESR 19  - Collateral from family that patient suffered from 3 miscarriages after her first 2 children and that the patient's mother suffered from an "aortic tear" that has been worsening    3) Further management  - no need for MRI as strokes seen on CTH with known mechanism  - dx angio 5/2: b/l cavernous ICA aneurysms, no surgical intervention unless symptomatic per vascular conference discussion   - recommend normotension BP goal   - continue q4hr stroke neuro checks  - outpt neurology follow up  - provide stroke education    Discussed with Stroke Fellow Dr. Bueno and Stroke Attending Dr. Fajardo Quality 130: Documentation Of Current Medications In The Medical Record: Current Medications Documented Quality 111:Pneumonia Vaccination Status For Older Adults: Pneumococcal vaccine (PPSV23) administered on or after patient’s 60th birthday and before the end of the measurement period Quality 110: Preventive Care And Screening: Influenza Immunization: Influenza Immunization Administered during Influenza season Detail Level: Detailed

## 2023-07-30 ENCOUNTER — LABORATORY RESULT (OUTPATIENT)
Age: 58
End: 2023-07-30

## 2023-08-01 ENCOUNTER — APPOINTMENT (OUTPATIENT)
Dept: INTERNAL MEDICINE | Facility: CLINIC | Age: 58
End: 2023-08-01
Payer: MEDICAID

## 2023-08-01 PROCEDURE — 93793 ANTICOAG MGMT PT WARFARIN: CPT

## 2023-08-04 PROBLEM — M95.2 ACQUIRED SKULL DEFECT: Status: ACTIVE | Noted: 2023-08-04

## 2023-08-07 ENCOUNTER — APPOINTMENT (OUTPATIENT)
Dept: OTOLARYNGOLOGY | Facility: CLINIC | Age: 58
End: 2023-08-07
Payer: MEDICAID

## 2023-08-07 ENCOUNTER — LABORATORY RESULT (OUTPATIENT)
Age: 58
End: 2023-08-07

## 2023-08-07 DIAGNOSIS — Z43.0 ENCOUNTER FOR ATTENTION TO TRACHEOSTOMY: ICD-10-CM

## 2023-08-07 PROCEDURE — 99214 OFFICE O/P EST MOD 30 MIN: CPT | Mod: 25

## 2023-08-07 PROCEDURE — 31615 TRCHEOBRNCHSC EST TRACHS INC: CPT

## 2023-08-08 ENCOUNTER — APPOINTMENT (OUTPATIENT)
Dept: NEUROSURGERY | Facility: CLINIC | Age: 58
End: 2023-08-08
Payer: MEDICAID

## 2023-08-08 ENCOUNTER — APPOINTMENT (OUTPATIENT)
Dept: INTERNAL MEDICINE | Facility: CLINIC | Age: 58
End: 2023-08-08
Payer: MEDICAID

## 2023-08-08 VITALS
HEIGHT: 63 IN | OXYGEN SATURATION: 96 % | WEIGHT: 158 LBS | HEART RATE: 74 BPM | SYSTOLIC BLOOD PRESSURE: 114 MMHG | DIASTOLIC BLOOD PRESSURE: 83 MMHG | TEMPERATURE: 97.2 F | RESPIRATION RATE: 18 BRPM | BODY MASS INDEX: 28 KG/M2

## 2023-08-08 DIAGNOSIS — M95.2 OTHER ACQUIRED DEFORMITY OF HEAD: ICD-10-CM

## 2023-08-08 PROCEDURE — 93793 ANTICOAG MGMT PT WARFARIN: CPT

## 2023-08-08 PROCEDURE — XXXXX: CPT | Mod: 1L

## 2023-08-08 PROCEDURE — 99024 POSTOP FOLLOW-UP VISIT: CPT

## 2023-08-10 ENCOUNTER — APPOINTMENT (OUTPATIENT)
Dept: INTERNAL MEDICINE | Facility: CLINIC | Age: 58
End: 2023-08-10
Payer: MEDICAID

## 2023-08-10 VITALS — RESPIRATION RATE: 16 BRPM | SYSTOLIC BLOOD PRESSURE: 114 MMHG | DIASTOLIC BLOOD PRESSURE: 90 MMHG | HEART RATE: 60 BPM

## 2023-08-10 LAB
INR PPP: 2.47
INR PPP: 2.8 RATIO
PT BLD: 27.4 SEC
QUALITY CONTROL: YES

## 2023-08-10 PROCEDURE — 85610 PROTHROMBIN TIME: CPT | Mod: QW

## 2023-08-10 PROCEDURE — 93793 ANTICOAG MGMT PT WARFARIN: CPT

## 2023-08-13 NOTE — REASON FOR VISIT
[Follow-Up: _____] : a [unfilled] follow-up visit [FreeTextEntry1] : 58 yo F with PMH of Asthma, CAD (not on  meds), peripheral neuropathy and PSH  of BATOOL, cholecystectomy, right knee surgery presented to [place] ED on  4/20 with right sided weakness and right facial numbness. Patient was  undergoing preparation for colonoscopy. As per daughter at 8am patient was  playing with her grandchildren but around 840 am patient was getting dressed  and fell and subsequently felt weak after. Daughter reports that patient had  some episodes of vomiting at this time. She had a syncopal episode at around 10  am and was witnessed by brother. No head trauma, She regained conscious after  few minutes. She remained in bed for the remainder of the day. Daughter reports  some slurred speech noted 1230-1PM and also was confused after. and around 4PM,  the patient's sister noted right sided weakness at which time EMS was called  and patient was brought to ED.  Found to have right PICA distribution ischemic stroke.NIHSS 12 on admission [facility].  Underwent emergent decompressive craniectomy 4/21/23.   Postop course complicated by UTI. She required PEG tube placement.  In brief:  4/21/23 emergent decompressive craniectomy 4/28/23 Tracheostomy 5/2/23 G tube placement 5/4/23 IVCF  5/8/23 Certas  shunt set at 4 SHe was discharged to [facility].  She comes in today for routine follow up.   Scalp: Head shape appears symmetrical. Scalp incision sites are healing appropriately without erythema, dehiscence, drainage, or signs of infection. Edges are well-approximated. No other erythema. No fluctuance or palpable fluid collections. VPS check is on CERTAS 4  SHe saw her pulmonologist yesterday, she is not yet ready for decannulation secondary to secretions. She is walking with no assistive device and minimal one person assist. Her speech is improving and her daughter reports she will start outpatient ST in the coming weeks, and she is currently getting OT/PT twice weekly.

## 2023-08-13 NOTE — ASSESSMENT
[FreeTextEntry1] : Radha is 57 y.o. male/female with recent history of cerebllar stroke, underwent Suboccipital craniectomy, followed by right frontal vps.Comes in today for routine follow up.    - Can leave incision open to air. - Continue monitoring for signs of infection including increased pain, redness, swelling, fever (temperature > 100.4 F), or yellow/green/brown drainage .- Please call immediately with any of these symptoms. During office hours, call our office at [phone]. Call 9-1-1 for any life-threatening signs or symptoms - Follow up with rest of medical team as advised -follow up PRN -cleared for travel without restriction    Patient verbalized understanding and agreement with treatment plan. I, Dr. Valadez, personally performed the evaluation and management (E/M) services for this new patient. That E/M includes conducting the initial examination, assessing all conditions, and establishing the plan of care.   Today, I personally spent 45 minutes in direct face to face time with the patient, of which greater than 50% of the time was spent in patient education and counseling as described above.

## 2023-08-15 ENCOUNTER — APPOINTMENT (OUTPATIENT)
Dept: INTERNAL MEDICINE | Facility: CLINIC | Age: 58
End: 2023-08-15

## 2023-08-17 ENCOUNTER — APPOINTMENT (OUTPATIENT)
Dept: INTERNAL MEDICINE | Facility: CLINIC | Age: 58
End: 2023-08-17
Payer: MEDICAID

## 2023-08-17 VITALS — HEART RATE: 80 BPM | DIASTOLIC BLOOD PRESSURE: 70 MMHG | RESPIRATION RATE: 16 BRPM | SYSTOLIC BLOOD PRESSURE: 90 MMHG

## 2023-08-17 LAB
INR PPP: 1.86
PT BLD: 20.8 SEC

## 2023-08-17 PROCEDURE — 93793 ANTICOAG MGMT PT WARFARIN: CPT

## 2023-08-17 PROCEDURE — 85610 PROTHROMBIN TIME: CPT | Mod: QW

## 2023-08-22 ENCOUNTER — APPOINTMENT (OUTPATIENT)
Dept: INTERNAL MEDICINE | Facility: CLINIC | Age: 58
End: 2023-08-22

## 2023-08-23 ENCOUNTER — APPOINTMENT (OUTPATIENT)
Dept: UROLOGY | Facility: CLINIC | Age: 58
End: 2023-08-23
Payer: MEDICAID

## 2023-08-23 ENCOUNTER — APPOINTMENT (OUTPATIENT)
Dept: INTERNAL MEDICINE | Facility: CLINIC | Age: 58
End: 2023-08-23
Payer: MEDICAID

## 2023-08-23 VITALS — SYSTOLIC BLOOD PRESSURE: 104 MMHG | HEART RATE: 72 BPM | RESPIRATION RATE: 16 BRPM | DIASTOLIC BLOOD PRESSURE: 76 MMHG

## 2023-08-23 VITALS
HEART RATE: 66 BPM | SYSTOLIC BLOOD PRESSURE: 121 MMHG | TEMPERATURE: 97.3 F | OXYGEN SATURATION: 97 % | DIASTOLIC BLOOD PRESSURE: 81 MMHG

## 2023-08-23 DIAGNOSIS — N32.9 BLADDER DISORDER, UNSPECIFIED: ICD-10-CM

## 2023-08-23 DIAGNOSIS — Z80.3 FAMILY HISTORY OF MALIGNANT NEOPLASM OF BREAST: ICD-10-CM

## 2023-08-23 DIAGNOSIS — Z80.42 FAMILY HISTORY OF MALIGNANT NEOPLASM OF PROSTATE: ICD-10-CM

## 2023-08-23 LAB
INR PPP: 3 RATIO
QUALITY CONTROL: YES

## 2023-08-23 PROCEDURE — 99203 OFFICE O/P NEW LOW 30 MIN: CPT

## 2023-08-23 PROCEDURE — 93793 ANTICOAG MGMT PT WARFARIN: CPT

## 2023-08-23 PROCEDURE — 85610 PROTHROMBIN TIME: CPT | Mod: QW

## 2023-08-23 RX ORDER — MULTIVIT-MIN/IRON/FOLIC ACID/K 18-600-40
500 CAPSULE ORAL
Refills: 0 | Status: ACTIVE | COMMUNITY

## 2023-08-23 NOTE — LETTER BODY
[Dear  ___] : Dear  [unfilled], [Courtesy Letter:] : I had the pleasure of seeing your patient, [unfilled], in my office today. [Please see my note below.] : Please see my note below. [Consult Closing:] : Thank you very much for allowing me to participate in the care of this patient.  If you have any questions, please do not hesitate to contact me. [Sincerely,] : Sincerely, [FreeTextEntry3] : Trini Marie MD

## 2023-08-23 NOTE — ASSESSMENT
[FreeTextEntry1] : 57 year old woman with recent complex medical history due to CVA in 4/2023 and antiphopholipid antibody syndrome. During initial hospital stay, CT A/P demonstrated a layering debris within the bladder that was read as possible bladder mass. Follow up CTs in June and July did not demonstrate this finding and were read as normal. On my review, the bladder finding on initial CT is very likely dependent contrast or layering debris, not likely a bladder tumor. Given resolution on subsequent CTs, very little concern for malignancy. No further follow up needed for this.  - UA, UCx, cytology -  f/u prn

## 2023-08-23 NOTE — PHYSICAL EXAM
[General Appearance - Well Developed] : well developed [General Appearance - Well Nourished] : well nourished [Normal Appearance] : normal appearance [Well Groomed] : well groomed [General Appearance - In No Acute Distress] : no acute distress [Edema] : no peripheral edema [FreeTextEntry1] : Trach in place [] : no rash

## 2023-08-23 NOTE — HISTORY OF PRESENT ILLNESS
[FreeTextEntry1] : 57 year old woman with recent complex medical history due to CVA in 4/2023 and antiphopholipid antibody syndrome. During initial hospital stay, CT A/P demonstrated a layering debris within the bladder that was read as possible bladder mass. Follow up CTs in June and July did not demonstrate this finding and were read as normal. Patient denies fevers, chills, dysuria, hematuria, incontinence, frequency or urgency.

## 2023-08-24 LAB
ALBUMIN SERPL ELPH-MCNC: 4 G/DL
ALP BLD-CCNC: 108 U/L
ALT SERPL-CCNC: 17 U/L
ANION GAP SERPL CALC-SCNC: 7 MMOL/L
AST SERPL-CCNC: 11 U/L
BILIRUB SERPL-MCNC: 0.2 MG/DL
BUN SERPL-MCNC: 9 MG/DL
CALCIUM SERPL-MCNC: 9.5 MG/DL
CHLORIDE SERPL-SCNC: 104 MMOL/L
CO2 SERPL-SCNC: 30 MMOL/L
CREAT SERPL-MCNC: 0.56 MG/DL
EGFR: 107 ML/MIN/1.73M2
GLUCOSE SERPL-MCNC: 100 MG/DL
HCT VFR BLD CALC: 39.6 %
HGB BLD-MCNC: 12.4 G/DL
INR PPP: 2.46 RATIO
MCHC RBC-ENTMCNC: 28.6 PG
MCHC RBC-ENTMCNC: 31.3 GM/DL
MCV RBC AUTO: 91.2 FL
PLATELET # BLD AUTO: 349 K/UL
POTASSIUM SERPL-SCNC: 4.6 MMOL/L
PROT SERPL-MCNC: 6.6 G/DL
PT BLD: 27.3 SEC
RBC # BLD: 4.34 M/UL
RBC # FLD: 14.9 %
SODIUM SERPL-SCNC: 142 MMOL/L
WBC # FLD AUTO: 3.65 K/UL

## 2023-08-25 LAB
APPEARANCE: CLEAR
BACTERIA UR CULT: NORMAL
BACTERIA: NEGATIVE /HPF
BILIRUB UR QL STRIP: NORMAL
BILIRUBIN URINE: NEGATIVE
BLOOD URINE: NEGATIVE
CAST: 2 /LPF
CLARITY UR: CLEAR
COLLECTION METHOD: NORMAL
COLOR: YELLOW
EPITHELIAL CELLS: 3 /HPF
GLUCOSE QUALITATIVE U: NEGATIVE MG/DL
GLUCOSE UR-MCNC: NORMAL
HCG UR QL: 0.2 EU/DL
HGB UR QL STRIP.AUTO: NORMAL
KETONES UR-MCNC: NORMAL
KETONES URINE: NEGATIVE MG/DL
LEUKOCYTE ESTERASE UR QL STRIP: NORMAL
LEUKOCYTE ESTERASE URINE: ABNORMAL
MICROSCOPIC-UA: NORMAL
NITRITE UR QL STRIP: NORMAL
NITRITE URINE: NEGATIVE
PH UR STRIP: 5.5
PH URINE: 6
PROT UR STRIP-MCNC: NORMAL
PROTEIN URINE: NEGATIVE MG/DL
RED BLOOD CELLS URINE: 1 /HPF
REVIEW: NORMAL
SP GR UR STRIP: 1.01
SPECIFIC GRAVITY URINE: 1.01
UROBILINOGEN URINE: 0.2 MG/DL
WHITE BLOOD CELLS URINE: 2 /HPF

## 2023-08-28 ENCOUNTER — LABORATORY RESULT (OUTPATIENT)
Age: 58
End: 2023-08-28

## 2023-08-28 LAB — URINE CYTOLOGY: NORMAL

## 2023-08-29 ENCOUNTER — APPOINTMENT (OUTPATIENT)
Dept: CT IMAGING | Facility: HOSPITAL | Age: 58
End: 2023-08-29
Payer: MEDICAID

## 2023-08-29 ENCOUNTER — APPOINTMENT (OUTPATIENT)
Dept: INTERNAL MEDICINE | Facility: CLINIC | Age: 58
End: 2023-08-29

## 2023-09-11 ENCOUNTER — LABORATORY RESULT (OUTPATIENT)
Age: 58
End: 2023-09-11

## 2023-09-12 ENCOUNTER — LABORATORY RESULT (OUTPATIENT)
Age: 58
End: 2023-09-12

## 2023-09-13 ENCOUNTER — APPOINTMENT (OUTPATIENT)
Dept: INTERNAL MEDICINE | Facility: CLINIC | Age: 58
End: 2023-09-13
Payer: MEDICAID

## 2023-09-13 ENCOUNTER — LABORATORY RESULT (OUTPATIENT)
Age: 58
End: 2023-09-13

## 2023-09-13 PROCEDURE — 93793 ANTICOAG MGMT PT WARFARIN: CPT

## 2023-09-15 NOTE — ED PROVIDER NOTE - CPE EDP SKIN NORM
normal... 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

## 2023-09-18 ENCOUNTER — LABORATORY RESULT (OUTPATIENT)
Age: 58
End: 2023-09-18

## 2023-09-19 ENCOUNTER — APPOINTMENT (OUTPATIENT)
Dept: INTERNAL MEDICINE | Facility: CLINIC | Age: 58
End: 2023-09-19
Payer: MEDICAID

## 2023-09-19 PROCEDURE — 93793 ANTICOAG MGMT PT WARFARIN: CPT

## 2023-09-20 ENCOUNTER — OUTPATIENT (OUTPATIENT)
Dept: OUTPATIENT SERVICES | Facility: HOSPITAL | Age: 58
LOS: 1 days | End: 2023-09-20
Payer: MEDICAID

## 2023-09-20 ENCOUNTER — APPOINTMENT (OUTPATIENT)
Dept: CT IMAGING | Facility: HOSPITAL | Age: 58
End: 2023-09-20

## 2023-09-20 DIAGNOSIS — Z90.49 ACQUIRED ABSENCE OF OTHER SPECIFIED PARTS OF DIGESTIVE TRACT: Chronic | ICD-10-CM

## 2023-09-20 DIAGNOSIS — Z90.710 ACQUIRED ABSENCE OF BOTH CERVIX AND UTERUS: Chronic | ICD-10-CM

## 2023-09-20 DIAGNOSIS — Z98.890 OTHER SPECIFIED POSTPROCEDURAL STATES: Chronic | ICD-10-CM

## 2023-09-20 DIAGNOSIS — Z98.2 PRESENCE OF CEREBROSPINAL FLUID DRAINAGE DEVICE: Chronic | ICD-10-CM

## 2023-09-20 DIAGNOSIS — M95.2 OTHER ACQUIRED DEFORMITY OF HEAD: ICD-10-CM

## 2023-09-20 PROCEDURE — 70450 CT HEAD/BRAIN W/O DYE: CPT

## 2023-09-20 PROCEDURE — 70450 CT HEAD/BRAIN W/O DYE: CPT | Mod: 26

## 2023-09-25 ENCOUNTER — LABORATORY RESULT (OUTPATIENT)
Age: 58
End: 2023-09-25

## 2023-09-26 ENCOUNTER — APPOINTMENT (OUTPATIENT)
Dept: INTERNAL MEDICINE | Facility: CLINIC | Age: 58
End: 2023-09-26
Payer: MEDICAID

## 2023-09-26 ENCOUNTER — APPOINTMENT (OUTPATIENT)
Dept: NEUROSURGERY | Facility: CLINIC | Age: 58
End: 2023-09-26
Payer: MEDICAID

## 2023-09-26 VITALS
DIASTOLIC BLOOD PRESSURE: 76 MMHG | HEIGHT: 63 IN | SYSTOLIC BLOOD PRESSURE: 114 MMHG | RESPIRATION RATE: 16 BRPM | HEART RATE: 83 BPM | OXYGEN SATURATION: 98 % | TEMPERATURE: 98.6 F | WEIGHT: 158 LBS | BODY MASS INDEX: 28 KG/M2

## 2023-09-26 PROCEDURE — 93793 ANTICOAG MGMT PT WARFARIN: CPT

## 2023-09-26 PROCEDURE — 99212 OFFICE O/P EST SF 10 MIN: CPT

## 2023-10-02 ENCOUNTER — LABORATORY RESULT (OUTPATIENT)
Age: 58
End: 2023-10-02

## 2023-10-03 ENCOUNTER — APPOINTMENT (OUTPATIENT)
Dept: INTERNAL MEDICINE | Facility: CLINIC | Age: 58
End: 2023-10-03
Payer: MEDICAID

## 2023-10-03 DIAGNOSIS — J38.02 PARALYSIS OF VOCAL CORDS AND LARYNX, BILATERAL: ICD-10-CM

## 2023-10-03 PROCEDURE — 93793 ANTICOAG MGMT PT WARFARIN: CPT

## 2023-10-03 RX ORDER — PREGABALIN 25 MG/1
25 CAPSULE ORAL TWICE DAILY
Qty: 60 | Refills: 0 | Status: ACTIVE | COMMUNITY
Start: 2023-10-03 | End: 1900-01-01

## 2023-10-04 ENCOUNTER — APPOINTMENT (OUTPATIENT)
Dept: OTOLARYNGOLOGY | Facility: CLINIC | Age: 58
End: 2023-10-04

## 2023-10-09 ENCOUNTER — LABORATORY RESULT (OUTPATIENT)
Age: 58
End: 2023-10-09

## 2023-10-10 ENCOUNTER — APPOINTMENT (OUTPATIENT)
Dept: INTERNAL MEDICINE | Facility: CLINIC | Age: 58
End: 2023-10-10
Payer: MEDICAID

## 2023-10-10 PROCEDURE — 93793 ANTICOAG MGMT PT WARFARIN: CPT

## 2023-10-10 PROCEDURE — 85610 PROTHROMBIN TIME: CPT | Mod: QW

## 2023-10-16 ENCOUNTER — LABORATORY RESULT (OUTPATIENT)
Age: 58
End: 2023-10-16

## 2023-10-17 ENCOUNTER — APPOINTMENT (OUTPATIENT)
Dept: INTERNAL MEDICINE | Facility: CLINIC | Age: 58
End: 2023-10-17
Payer: MEDICAID

## 2023-10-17 PROCEDURE — 93793 ANTICOAG MGMT PT WARFARIN: CPT

## 2023-10-19 ENCOUNTER — APPOINTMENT (OUTPATIENT)
Dept: NEUROLOGY | Facility: CLINIC | Age: 58
End: 2023-10-19

## 2023-10-23 ENCOUNTER — LABORATORY RESULT (OUTPATIENT)
Age: 58
End: 2023-10-23

## 2023-10-24 ENCOUNTER — APPOINTMENT (OUTPATIENT)
Dept: INTERNAL MEDICINE | Facility: CLINIC | Age: 58
End: 2023-10-24
Payer: MEDICAID

## 2023-10-24 PROCEDURE — 85610 PROTHROMBIN TIME: CPT | Mod: QW

## 2023-10-24 PROCEDURE — 93793 ANTICOAG MGMT PT WARFARIN: CPT

## 2023-10-25 ENCOUNTER — APPOINTMENT (OUTPATIENT)
Dept: HEMATOLOGY ONCOLOGY | Facility: CLINIC | Age: 58
End: 2023-10-25

## 2023-10-31 ENCOUNTER — APPOINTMENT (OUTPATIENT)
Dept: INTERNAL MEDICINE | Facility: CLINIC | Age: 58
End: 2023-10-31
Payer: MEDICAID

## 2023-10-31 PROCEDURE — 93793 ANTICOAG MGMT PT WARFARIN: CPT

## 2023-11-07 ENCOUNTER — APPOINTMENT (OUTPATIENT)
Dept: INTERNAL MEDICINE | Facility: CLINIC | Age: 58
End: 2023-11-07
Payer: MEDICAID

## 2023-11-07 LAB
INR PPP: 2.81
PT BLD: NORMAL

## 2023-11-07 PROCEDURE — 85610 PROTHROMBIN TIME: CPT | Mod: QW

## 2023-11-07 PROCEDURE — 93793 ANTICOAG MGMT PT WARFARIN: CPT

## 2023-11-13 ENCOUNTER — LABORATORY RESULT (OUTPATIENT)
Age: 58
End: 2023-11-13

## 2023-11-14 ENCOUNTER — APPOINTMENT (OUTPATIENT)
Dept: INTERNAL MEDICINE | Facility: CLINIC | Age: 58
End: 2023-11-14
Payer: MEDICAID

## 2023-11-14 PROCEDURE — 93793 ANTICOAG MGMT PT WARFARIN: CPT

## 2023-11-14 PROCEDURE — 85610 PROTHROMBIN TIME: CPT | Mod: QW

## 2023-11-20 ENCOUNTER — LABORATORY RESULT (OUTPATIENT)
Age: 58
End: 2023-11-20

## 2023-11-20 ENCOUNTER — APPOINTMENT (OUTPATIENT)
Dept: OTOLARYNGOLOGY | Facility: CLINIC | Age: 58
End: 2023-11-20

## 2023-11-21 ENCOUNTER — LABORATORY RESULT (OUTPATIENT)
Age: 58
End: 2023-11-21

## 2023-11-22 ENCOUNTER — APPOINTMENT (OUTPATIENT)
Dept: INTERNAL MEDICINE | Facility: CLINIC | Age: 58
End: 2023-11-22
Payer: MEDICAID

## 2023-11-22 PROCEDURE — 93793 ANTICOAG MGMT PT WARFARIN: CPT

## 2023-11-27 ENCOUNTER — LABORATORY RESULT (OUTPATIENT)
Age: 58
End: 2023-11-27

## 2023-11-28 ENCOUNTER — APPOINTMENT (OUTPATIENT)
Dept: INTERNAL MEDICINE | Facility: CLINIC | Age: 58
End: 2023-11-28
Payer: MEDICAID

## 2023-11-28 PROCEDURE — 93793 ANTICOAG MGMT PT WARFARIN: CPT

## 2023-11-28 PROCEDURE — 85610 PROTHROMBIN TIME: CPT | Mod: QW

## 2023-12-04 ENCOUNTER — LABORATORY RESULT (OUTPATIENT)
Age: 58
End: 2023-12-04

## 2023-12-05 ENCOUNTER — APPOINTMENT (OUTPATIENT)
Dept: INTERNAL MEDICINE | Facility: CLINIC | Age: 58
End: 2023-12-05
Payer: MEDICAID

## 2023-12-05 PROCEDURE — 93793 ANTICOAG MGMT PT WARFARIN: CPT

## 2023-12-05 PROCEDURE — 85610 PROTHROMBIN TIME: CPT | Mod: QW

## 2023-12-11 ENCOUNTER — LABORATORY RESULT (OUTPATIENT)
Age: 58
End: 2023-12-11

## 2023-12-12 ENCOUNTER — APPOINTMENT (OUTPATIENT)
Dept: INTERNAL MEDICINE | Facility: CLINIC | Age: 58
End: 2023-12-12
Payer: MEDICAID

## 2023-12-12 PROCEDURE — 85610 PROTHROMBIN TIME: CPT | Mod: QW

## 2023-12-12 PROCEDURE — 93793 ANTICOAG MGMT PT WARFARIN: CPT

## 2023-12-18 ENCOUNTER — LABORATORY RESULT (OUTPATIENT)
Age: 58
End: 2023-12-18

## 2023-12-19 ENCOUNTER — APPOINTMENT (OUTPATIENT)
Dept: INTERNAL MEDICINE | Facility: CLINIC | Age: 58
End: 2023-12-19
Payer: MEDICAID

## 2023-12-19 DIAGNOSIS — Z95.828 PRESENCE OF OTHER VASCULAR IMPLANTS AND GRAFTS: ICD-10-CM

## 2023-12-19 PROCEDURE — 93793 ANTICOAG MGMT PT WARFARIN: CPT

## 2023-12-19 PROCEDURE — 85610 PROTHROMBIN TIME: CPT | Mod: QW

## 2023-12-26 ENCOUNTER — LABORATORY RESULT (OUTPATIENT)
Age: 58
End: 2023-12-26

## 2023-12-27 ENCOUNTER — APPOINTMENT (OUTPATIENT)
Dept: INTERNAL MEDICINE | Facility: CLINIC | Age: 58
End: 2023-12-27
Payer: MEDICAID

## 2023-12-27 PROCEDURE — 93793 ANTICOAG MGMT PT WARFARIN: CPT

## 2024-01-02 ENCOUNTER — LABORATORY RESULT (OUTPATIENT)
Age: 59
End: 2024-01-02

## 2024-01-03 ENCOUNTER — APPOINTMENT (OUTPATIENT)
Dept: INTERNAL MEDICINE | Facility: CLINIC | Age: 59
End: 2024-01-03
Payer: MEDICAID

## 2024-01-03 PROCEDURE — 93793 ANTICOAG MGMT PT WARFARIN: CPT

## 2024-01-08 ENCOUNTER — LABORATORY RESULT (OUTPATIENT)
Age: 59
End: 2024-01-08

## 2024-01-09 ENCOUNTER — LABORATORY RESULT (OUTPATIENT)
Age: 59
End: 2024-01-09

## 2024-01-09 ENCOUNTER — APPOINTMENT (OUTPATIENT)
Dept: INTERNAL MEDICINE | Facility: CLINIC | Age: 59
End: 2024-01-09

## 2024-01-10 ENCOUNTER — APPOINTMENT (OUTPATIENT)
Dept: INTERNAL MEDICINE | Facility: CLINIC | Age: 59
End: 2024-01-10
Payer: MEDICAID

## 2024-01-10 PROCEDURE — 93793 ANTICOAG MGMT PT WARFARIN: CPT

## 2024-01-15 ENCOUNTER — LABORATORY RESULT (OUTPATIENT)
Age: 59
End: 2024-01-15

## 2024-01-16 ENCOUNTER — LABORATORY RESULT (OUTPATIENT)
Age: 59
End: 2024-01-16

## 2024-01-16 ENCOUNTER — APPOINTMENT (OUTPATIENT)
Dept: INTERNAL MEDICINE | Facility: CLINIC | Age: 59
End: 2024-01-16

## 2024-01-17 ENCOUNTER — APPOINTMENT (OUTPATIENT)
Dept: INTERNAL MEDICINE | Facility: CLINIC | Age: 59
End: 2024-01-17
Payer: MEDICAID

## 2024-01-17 PROCEDURE — 93793 ANTICOAG MGMT PT WARFARIN: CPT

## 2024-01-23 ENCOUNTER — LABORATORY RESULT (OUTPATIENT)
Age: 59
End: 2024-01-23

## 2024-01-23 ENCOUNTER — APPOINTMENT (OUTPATIENT)
Dept: INTERNAL MEDICINE | Facility: CLINIC | Age: 59
End: 2024-01-23

## 2024-01-23 NOTE — ED ADULT NURSE NOTE - TEMPLATE
no lesions,  no deformities,  no traumatic injuries,  no significant scars are present,  chest wall non-tender,  no masses present, breathing is unlabored without accessory muscle use, normal breath sounds Abdominal Pain, N/V/D

## 2024-01-24 ENCOUNTER — APPOINTMENT (OUTPATIENT)
Dept: INTERNAL MEDICINE | Facility: CLINIC | Age: 59
End: 2024-01-24
Payer: MEDICAID

## 2024-01-24 ENCOUNTER — APPOINTMENT (OUTPATIENT)
Dept: OTOLARYNGOLOGY | Facility: CLINIC | Age: 59
End: 2024-01-24

## 2024-01-24 PROCEDURE — 93793 ANTICOAG MGMT PT WARFARIN: CPT

## 2024-01-29 ENCOUNTER — LABORATORY RESULT (OUTPATIENT)
Age: 59
End: 2024-01-29

## 2024-01-30 ENCOUNTER — LABORATORY RESULT (OUTPATIENT)
Age: 59
End: 2024-01-30

## 2024-01-30 ENCOUNTER — APPOINTMENT (OUTPATIENT)
Dept: INTERNAL MEDICINE | Facility: CLINIC | Age: 59
End: 2024-01-30

## 2024-01-31 ENCOUNTER — APPOINTMENT (OUTPATIENT)
Dept: INTERNAL MEDICINE | Facility: CLINIC | Age: 59
End: 2024-01-31
Payer: MEDICAID

## 2024-01-31 PROCEDURE — 93793 ANTICOAG MGMT PT WARFARIN: CPT

## 2024-02-05 ENCOUNTER — APPOINTMENT (OUTPATIENT)
Dept: OTOLARYNGOLOGY | Facility: CLINIC | Age: 59
End: 2024-02-05

## 2024-02-07 ENCOUNTER — APPOINTMENT (OUTPATIENT)
Dept: INTERNAL MEDICINE | Facility: CLINIC | Age: 59
End: 2024-02-07
Payer: MEDICAID

## 2024-02-07 PROCEDURE — 93793 ANTICOAG MGMT PT WARFARIN: CPT

## 2024-02-13 ENCOUNTER — LABORATORY RESULT (OUTPATIENT)
Age: 59
End: 2024-02-13

## 2024-02-14 ENCOUNTER — APPOINTMENT (OUTPATIENT)
Dept: INTERNAL MEDICINE | Facility: CLINIC | Age: 59
End: 2024-02-14
Payer: MEDICAID

## 2024-02-14 PROCEDURE — 93793 ANTICOAG MGMT PT WARFARIN: CPT

## 2024-02-20 ENCOUNTER — LABORATORY RESULT (OUTPATIENT)
Age: 59
End: 2024-02-20

## 2024-02-21 ENCOUNTER — APPOINTMENT (OUTPATIENT)
Dept: INTERNAL MEDICINE | Facility: CLINIC | Age: 59
End: 2024-02-21
Payer: MEDICAID

## 2024-02-21 PROCEDURE — 93793 ANTICOAG MGMT PT WARFARIN: CPT

## 2024-02-21 NOTE — ED ADULT NURSE NOTE - CAS TRG GENERAL NORM CIRC DET
on the discharge service for the patient. I have reviewed and made amendments to the documentation where necessary. Strong peripheral pulses

## 2024-02-24 NOTE — PATIENT PROFILE ADULT - TOBACCO USE
Nursing Transfer Note      Reason patient is being transferred: Post procedure    Transfer To: [id number] SICU    Transfer via bed    Transfer with Ventilator, Transport monitor  & infusion pumps    Transported by RN & RT    Medicines sent: None    Any special needs or follow-up needed: Routine    Chart send with patient: Yes    Notified: friend    Patient reassessed at: 7795 2/23/2024     Never smoker

## 2024-02-27 ENCOUNTER — LABORATORY RESULT (OUTPATIENT)
Age: 59
End: 2024-02-27

## 2024-02-28 ENCOUNTER — APPOINTMENT (OUTPATIENT)
Dept: INTERNAL MEDICINE | Facility: CLINIC | Age: 59
End: 2024-02-28
Payer: MEDICAID

## 2024-02-28 PROCEDURE — 93793 ANTICOAG MGMT PT WARFARIN: CPT

## 2024-03-05 ENCOUNTER — LABORATORY RESULT (OUTPATIENT)
Age: 59
End: 2024-03-05

## 2024-03-06 ENCOUNTER — APPOINTMENT (OUTPATIENT)
Dept: INTERNAL MEDICINE | Facility: CLINIC | Age: 59
End: 2024-03-06
Payer: MEDICAID

## 2024-03-06 PROCEDURE — 93793 ANTICOAG MGMT PT WARFARIN: CPT

## 2024-03-12 ENCOUNTER — LABORATORY RESULT (OUTPATIENT)
Age: 59
End: 2024-03-12

## 2024-03-13 ENCOUNTER — APPOINTMENT (OUTPATIENT)
Dept: INTERNAL MEDICINE | Facility: CLINIC | Age: 59
End: 2024-03-13
Payer: MEDICAID

## 2024-03-13 PROCEDURE — 93793 ANTICOAG MGMT PT WARFARIN: CPT

## 2024-03-19 ENCOUNTER — LABORATORY RESULT (OUTPATIENT)
Age: 59
End: 2024-03-19

## 2024-03-20 ENCOUNTER — APPOINTMENT (OUTPATIENT)
Dept: INTERNAL MEDICINE | Facility: CLINIC | Age: 59
End: 2024-03-20
Payer: MEDICAID

## 2024-03-20 PROCEDURE — 93793 ANTICOAG MGMT PT WARFARIN: CPT

## 2024-03-26 ENCOUNTER — LABORATORY RESULT (OUTPATIENT)
Age: 59
End: 2024-03-26

## 2024-03-27 ENCOUNTER — APPOINTMENT (OUTPATIENT)
Dept: INTERNAL MEDICINE | Facility: CLINIC | Age: 59
End: 2024-03-27
Payer: MEDICAID

## 2024-03-27 PROCEDURE — 93793 ANTICOAG MGMT PT WARFARIN: CPT

## 2024-04-02 ENCOUNTER — LABORATORY RESULT (OUTPATIENT)
Age: 59
End: 2024-04-02

## 2024-04-03 ENCOUNTER — APPOINTMENT (OUTPATIENT)
Dept: INTERNAL MEDICINE | Facility: CLINIC | Age: 59
End: 2024-04-03
Payer: MEDICAID

## 2024-04-03 PROCEDURE — 93793 ANTICOAG MGMT PT WARFARIN: CPT

## 2024-04-09 ENCOUNTER — LABORATORY RESULT (OUTPATIENT)
Age: 59
End: 2024-04-09

## 2024-04-10 ENCOUNTER — APPOINTMENT (OUTPATIENT)
Dept: INTERNAL MEDICINE | Facility: CLINIC | Age: 59
End: 2024-04-10
Payer: MEDICAID

## 2024-04-10 PROCEDURE — 93793 ANTICOAG MGMT PT WARFARIN: CPT

## 2024-04-17 ENCOUNTER — APPOINTMENT (OUTPATIENT)
Dept: INTERNAL MEDICINE | Facility: CLINIC | Age: 59
End: 2024-04-17
Payer: MEDICAID

## 2024-04-17 PROCEDURE — 93793 ANTICOAG MGMT PT WARFARIN: CPT

## 2024-04-23 ENCOUNTER — LABORATORY RESULT (OUTPATIENT)
Age: 59
End: 2024-04-23

## 2024-04-24 ENCOUNTER — APPOINTMENT (OUTPATIENT)
Dept: INTERNAL MEDICINE | Facility: CLINIC | Age: 59
End: 2024-04-24
Payer: MEDICAID

## 2024-04-24 PROCEDURE — 93793 ANTICOAG MGMT PT WARFARIN: CPT

## 2024-04-29 RX ORDER — WARFARIN 5 MG/1
5 TABLET ORAL
Qty: 180 | Refills: 3 | Status: ACTIVE | COMMUNITY
Start: 2023-08-22 | End: 1900-01-01

## 2024-04-29 NOTE — ED PROVIDER NOTE - NS ED MD DISPO SPECIAL CONSIDERATION1
Speech Language Pathology      Sarah NADER Saravia  MRN: [id number]    SLP evaluation orders received and reviewed. Upon am attempts, Patient not available (off the floor for dialysis.) Upon later attempt, Patient not available (MD at the bedside meeting with Patient.) SLP unable to make 4th attempt. ST to continue to monitor and re-attempt 4/30/24.     4/29/2024     None

## 2024-04-30 ENCOUNTER — LABORATORY RESULT (OUTPATIENT)
Age: 59
End: 2024-04-30

## 2024-05-01 ENCOUNTER — APPOINTMENT (OUTPATIENT)
Dept: INTERNAL MEDICINE | Facility: CLINIC | Age: 59
End: 2024-05-01
Payer: MEDICAID

## 2024-05-01 PROCEDURE — 93793 ANTICOAG MGMT PT WARFARIN: CPT

## 2024-05-07 ENCOUNTER — LABORATORY RESULT (OUTPATIENT)
Age: 59
End: 2024-05-07

## 2024-05-08 ENCOUNTER — APPOINTMENT (OUTPATIENT)
Dept: INTERNAL MEDICINE | Facility: CLINIC | Age: 59
End: 2024-05-08
Payer: MEDICAID

## 2024-05-08 PROCEDURE — 93793 ANTICOAG MGMT PT WARFARIN: CPT

## 2024-05-14 ENCOUNTER — LABORATORY RESULT (OUTPATIENT)
Age: 59
End: 2024-05-14

## 2024-05-15 ENCOUNTER — APPOINTMENT (OUTPATIENT)
Dept: INTERNAL MEDICINE | Facility: CLINIC | Age: 59
End: 2024-05-15
Payer: MEDICAID

## 2024-05-15 PROCEDURE — 93793 ANTICOAG MGMT PT WARFARIN: CPT

## 2024-05-21 ENCOUNTER — LABORATORY RESULT (OUTPATIENT)
Age: 59
End: 2024-05-21

## 2024-05-22 ENCOUNTER — APPOINTMENT (OUTPATIENT)
Dept: INTERNAL MEDICINE | Facility: CLINIC | Age: 59
End: 2024-05-22
Payer: MEDICAID

## 2024-05-22 PROCEDURE — 93793 ANTICOAG MGMT PT WARFARIN: CPT

## 2024-05-28 ENCOUNTER — LABORATORY RESULT (OUTPATIENT)
Age: 59
End: 2024-05-28

## 2024-05-29 ENCOUNTER — APPOINTMENT (OUTPATIENT)
Dept: INTERNAL MEDICINE | Facility: CLINIC | Age: 59
End: 2024-05-29
Payer: MEDICAID

## 2024-05-29 PROCEDURE — 93793 ANTICOAG MGMT PT WARFARIN: CPT

## 2024-06-04 ENCOUNTER — LABORATORY RESULT (OUTPATIENT)
Age: 59
End: 2024-06-04

## 2024-06-05 ENCOUNTER — APPOINTMENT (OUTPATIENT)
Dept: INTERNAL MEDICINE | Facility: CLINIC | Age: 59
End: 2024-06-05
Payer: MEDICAID

## 2024-06-05 PROCEDURE — 93793 ANTICOAG MGMT PT WARFARIN: CPT

## 2024-06-11 ENCOUNTER — LABORATORY RESULT (OUTPATIENT)
Age: 59
End: 2024-06-11

## 2024-06-12 ENCOUNTER — APPOINTMENT (OUTPATIENT)
Dept: INTERNAL MEDICINE | Facility: CLINIC | Age: 59
End: 2024-06-12
Payer: MEDICAID

## 2024-06-12 PROCEDURE — 93793 ANTICOAG MGMT PT WARFARIN: CPT

## 2024-06-18 ENCOUNTER — LABORATORY RESULT (OUTPATIENT)
Age: 59
End: 2024-06-18

## 2024-06-19 ENCOUNTER — APPOINTMENT (OUTPATIENT)
Dept: INTERNAL MEDICINE | Facility: CLINIC | Age: 59
End: 2024-06-19
Payer: MEDICAID

## 2024-06-19 DIAGNOSIS — I82.469 UNSP CALF MUSCULAR VEIN ACUTE EMBOLISM AND THROMBOSIS: ICD-10-CM

## 2024-06-19 DIAGNOSIS — Z79.01 ENCOUNTER FOR THERAPEUTIC DRUG LVL MONITORING: ICD-10-CM

## 2024-06-19 DIAGNOSIS — Z51.81 ENCOUNTER FOR THERAPEUTIC DRUG LVL MONITORING: ICD-10-CM

## 2024-06-19 DIAGNOSIS — Z79.01 LONG TERM (CURRENT) USE OF ANTICOAGULANTS: ICD-10-CM

## 2024-06-19 DIAGNOSIS — I63.9 CEREBRAL INFARCTION, UNSPECIFIED: ICD-10-CM

## 2024-06-19 DIAGNOSIS — D68.61 ANTIPHOSPHOLIPID SYNDROME: ICD-10-CM

## 2024-06-19 PROCEDURE — 93793 ANTICOAG MGMT PT WARFARIN: CPT

## 2024-06-24 NOTE — STROKE CODE NOTE - NIH STROKE SCALE: 10. DYSARTHRIA, QM
From: Jordan Anderson  To: Victoria Bleser, NP  Sent: 6/24/2024 4:56 PM CDT  Subject: prescriptions talked about on 6/10/24    Hi Staff,    Dr. Bleser said she would prescribe Trazodone 50 mg and Escitalopram 20mg during Jorge L's appointment on 6/10/24. [facility] said they do not have any records of the prescriptions being prescribed. Could you please submit the prescriptions to [facility]?    Sincerely,  Idalmis Anderson   (0) Normal

## 2024-07-03 ENCOUNTER — APPOINTMENT (OUTPATIENT)
Dept: INTERNAL MEDICINE | Facility: CLINIC | Age: 59
End: 2024-07-03
Payer: MEDICAID

## 2024-07-03 DIAGNOSIS — I82.469 UNSP CALF MUSCULAR VEIN ACUTE EMBOLISM AND THROMBOSIS: ICD-10-CM

## 2024-07-03 DIAGNOSIS — I63.9 CEREBRAL INFARCTION, UNSPECIFIED: ICD-10-CM

## 2024-07-03 PROCEDURE — 93793 ANTICOAG MGMT PT WARFARIN: CPT

## 2024-07-10 ENCOUNTER — NON-APPOINTMENT (OUTPATIENT)
Age: 59
End: 2024-07-10

## 2024-07-10 ENCOUNTER — LABORATORY RESULT (OUTPATIENT)
Age: 59
End: 2024-07-10

## 2024-07-12 ENCOUNTER — APPOINTMENT (OUTPATIENT)
Dept: INTERNAL MEDICINE | Facility: CLINIC | Age: 59
End: 2024-07-12
Payer: MEDICAID

## 2024-07-12 PROCEDURE — 93793 ANTICOAG MGMT PT WARFARIN: CPT

## 2024-07-17 ENCOUNTER — LABORATORY RESULT (OUTPATIENT)
Age: 59
End: 2024-07-17

## 2024-07-19 ENCOUNTER — APPOINTMENT (OUTPATIENT)
Dept: INTERNAL MEDICINE | Facility: CLINIC | Age: 59
End: 2024-07-19

## 2024-07-19 DIAGNOSIS — Z79.01 LONG TERM (CURRENT) USE OF ANTICOAGULANTS: ICD-10-CM

## 2024-07-19 PROCEDURE — 85610 PROTHROMBIN TIME: CPT | Mod: QW

## 2024-07-19 PROCEDURE — 93793 ANTICOAG MGMT PT WARFARIN: CPT

## 2024-07-24 ENCOUNTER — LABORATORY RESULT (OUTPATIENT)
Age: 59
End: 2024-07-24

## 2024-07-25 ENCOUNTER — LABORATORY RESULT (OUTPATIENT)
Age: 59
End: 2024-07-25

## 2024-07-26 ENCOUNTER — APPOINTMENT (OUTPATIENT)
Dept: INTERNAL MEDICINE | Facility: CLINIC | Age: 59
End: 2024-07-26
Payer: MEDICAID

## 2024-07-26 ENCOUNTER — APPOINTMENT (OUTPATIENT)
Dept: INTERNAL MEDICINE | Facility: CLINIC | Age: 59
End: 2024-07-26

## 2024-07-26 PROCEDURE — ZZZZZ: CPT

## 2024-07-26 PROCEDURE — 93793 ANTICOAG MGMT PT WARFARIN: CPT

## 2024-07-31 ENCOUNTER — LABORATORY RESULT (OUTPATIENT)
Age: 59
End: 2024-07-31

## 2024-08-01 ENCOUNTER — APPOINTMENT (OUTPATIENT)
Dept: INTERNAL MEDICINE | Facility: CLINIC | Age: 59
End: 2024-08-01
Payer: MEDICAID

## 2024-08-01 DIAGNOSIS — I63.9 CEREBRAL INFARCTION, UNSPECIFIED: ICD-10-CM

## 2024-08-01 DIAGNOSIS — Z79.01 LONG TERM (CURRENT) USE OF ANTICOAGULANTS: ICD-10-CM

## 2024-08-01 PROCEDURE — 93793 ANTICOAG MGMT PT WARFARIN: CPT

## 2024-08-07 ENCOUNTER — LABORATORY RESULT (OUTPATIENT)
Age: 59
End: 2024-08-07

## 2024-08-09 ENCOUNTER — APPOINTMENT (OUTPATIENT)
Dept: INTERNAL MEDICINE | Facility: CLINIC | Age: 59
End: 2024-08-09

## 2024-08-09 PROCEDURE — 85610 PROTHROMBIN TIME: CPT | Mod: QW

## 2024-08-09 PROCEDURE — 93793 ANTICOAG MGMT PT WARFARIN: CPT

## 2024-08-14 ENCOUNTER — LABORATORY RESULT (OUTPATIENT)
Age: 59
End: 2024-08-14

## 2024-08-15 ENCOUNTER — APPOINTMENT (OUTPATIENT)
Dept: INTERNAL MEDICINE | Facility: CLINIC | Age: 59
End: 2024-08-15
Payer: MEDICAID

## 2024-08-15 DIAGNOSIS — I63.9 CEREBRAL INFARCTION, UNSPECIFIED: ICD-10-CM

## 2024-08-15 DIAGNOSIS — I82.469 UNSP CALF MUSCULAR VEIN ACUTE EMBOLISM AND THROMBOSIS: ICD-10-CM

## 2024-08-15 PROCEDURE — 93793 ANTICOAG MGMT PT WARFARIN: CPT

## 2024-08-16 ENCOUNTER — APPOINTMENT (OUTPATIENT)
Dept: INTERNAL MEDICINE | Facility: CLINIC | Age: 59
End: 2024-08-16

## 2024-08-21 ENCOUNTER — LABORATORY RESULT (OUTPATIENT)
Age: 59
End: 2024-08-21

## 2024-08-22 ENCOUNTER — APPOINTMENT (OUTPATIENT)
Dept: INTERNAL MEDICINE | Facility: CLINIC | Age: 59
End: 2024-08-22
Payer: MEDICAID

## 2024-08-22 PROCEDURE — 93793 ANTICOAG MGMT PT WARFARIN: CPT

## 2024-08-23 ENCOUNTER — APPOINTMENT (OUTPATIENT)
Dept: INTERNAL MEDICINE | Facility: CLINIC | Age: 59
End: 2024-08-23

## 2024-08-27 ENCOUNTER — LABORATORY RESULT (OUTPATIENT)
Age: 59
End: 2024-08-27

## 2024-08-28 ENCOUNTER — APPOINTMENT (OUTPATIENT)
Dept: INTERNAL MEDICINE | Facility: CLINIC | Age: 59
End: 2024-08-28
Payer: MEDICAID

## 2024-08-28 PROCEDURE — 93793 ANTICOAG MGMT PT WARFARIN: CPT

## 2024-09-03 ENCOUNTER — LABORATORY RESULT (OUTPATIENT)
Age: 59
End: 2024-09-03

## 2024-09-04 ENCOUNTER — APPOINTMENT (OUTPATIENT)
Dept: INTERNAL MEDICINE | Facility: CLINIC | Age: 59
End: 2024-09-04
Payer: MEDICAID

## 2024-09-04 PROCEDURE — 93793 ANTICOAG MGMT PT WARFARIN: CPT

## 2024-09-05 LAB
INR PPP: 1.94
PT BLD: 21.8

## 2024-09-06 ENCOUNTER — NON-APPOINTMENT (OUTPATIENT)
Age: 59
End: 2024-09-06

## 2024-09-10 ENCOUNTER — LABORATORY RESULT (OUTPATIENT)
Age: 59
End: 2024-09-10

## 2024-09-11 ENCOUNTER — APPOINTMENT (OUTPATIENT)
Dept: INTERNAL MEDICINE | Facility: CLINIC | Age: 59
End: 2024-09-11
Payer: SELF-PAY

## 2024-09-11 DIAGNOSIS — Z79.01 LONG TERM (CURRENT) USE OF ANTICOAGULANTS: ICD-10-CM

## 2024-09-11 DIAGNOSIS — Z79.01 ENCOUNTER FOR THERAPEUTIC DRUG LVL MONITORING: ICD-10-CM

## 2024-09-11 DIAGNOSIS — D68.61 ANTIPHOSPHOLIPID SYNDROME: ICD-10-CM

## 2024-09-11 DIAGNOSIS — Z51.81 ENCOUNTER FOR THERAPEUTIC DRUG LVL MONITORING: ICD-10-CM

## 2024-09-11 PROCEDURE — 93793 ANTICOAG MGMT PT WARFARIN: CPT

## 2024-09-24 ENCOUNTER — LABORATORY RESULT (OUTPATIENT)
Age: 59
End: 2024-09-24

## 2024-09-25 ENCOUNTER — APPOINTMENT (OUTPATIENT)
Dept: INTERNAL MEDICINE | Facility: CLINIC | Age: 59
End: 2024-09-25

## 2024-09-26 ENCOUNTER — APPOINTMENT (OUTPATIENT)
Dept: INTERNAL MEDICINE | Facility: CLINIC | Age: 59
End: 2024-09-26

## 2024-09-26 PROCEDURE — ZZZZZ: CPT

## 2024-10-02 ENCOUNTER — APPOINTMENT (OUTPATIENT)
Dept: INTERNAL MEDICINE | Facility: CLINIC | Age: 59
End: 2024-10-02

## 2024-10-28 NOTE — PROGRESS NOTE ADULT - THIS PATIENT HAS THE FOLLOWING CONDITION(S)/DIAGNOSES ON THIS ADMISSION:
None
Cerebral Edema/Acute Respiratory Failure
Cerebral Edema/Brain Compression / Herniation/Acute Respiratory Failure
None
Yes
None
Cerebral Edema/Brain Compression / Herniation/Acute Respiratory Failure
Cerebral Edema/Brain Compression / Herniation/Acute Respiratory Failure
None
Cerebral Edema/Brain Compression / Herniation/Acute Respiratory Failure
None

## 2025-06-25 NOTE — BH CONSULTATION LIAISON PROGRESS NOTE - ADDITIONAL PSYCHIATRIC MEDICATIONS
no edema, no murmurs, regular rate and rhythm per  - no psych history ; unable to ascertain/verify from patient interview

## (undated) DEVICE — STERIS DEFENDO 3-PIECE KIT (AIR/WATER, SUCTION & BIOPSY VALVES)

## (undated) DEVICE — SUT MAXON 0 30" HGU-46

## (undated) DEVICE — TUBING SUCTION NONCONDUCTIVE 6MM X 12FT

## (undated) DEVICE — SUT BOOT STANDARD (ORIGINAL YELLOW) 5 PAIR

## (undated) DEVICE — TROCAR COVIDIEN VERSAPORT BLADELESS OPTICAL 5MM STANDARD

## (undated) DEVICE — PACK UPPER BODY

## (undated) DEVICE — GLV 6.5 PROTEXIS (WHITE)

## (undated) DEVICE — TUBING STRYKER PNEUMOSURE HI FLOW INSUFFLATOR

## (undated) DEVICE — TUBING HYBRID CO2

## (undated) DEVICE — SUT VICRYL PLUS 3-0 27" RB-1 UNDYED

## (undated) DEVICE — TAPE SILK 3"

## (undated) DEVICE — DRSG MASTISOL

## (undated) DEVICE — Device

## (undated) DEVICE — SUT SILK 2-0 30" PSL

## (undated) DEVICE — SUT ETHILON 2-0 18" PS

## (undated) DEVICE — TROCAR APPLIED MEDICAL KII BALLOON BLUNT TIP 12MM X 100MM

## (undated) DEVICE — GLV 8 PROTEXIS (WHITE)

## (undated) DEVICE — STAPLER SKIN PROXIMATE

## (undated) DEVICE — SUT PDS II 3-0 27" SH UNDYED

## (undated) DEVICE — GLV 6.5 DERMAPRENE ULTRA

## (undated) DEVICE — WARMING BLANKET LOWER ADULT

## (undated) DEVICE — SUT SILK 2-0 30" SH

## (undated) DEVICE — SUT VICRYL 0 27" UR-6

## (undated) DEVICE — MIDAS REX MR8 ACORN LG BORE 7.5MM X 9CM

## (undated) DEVICE — SUT SILK 3-0 18" TIES

## (undated) DEVICE — TROCAR COVIDIEN VERSAONE FIXATION CANNULA 5MM

## (undated) DEVICE — LUBRICATING JELLY ONESHOT 1.25OZ

## (undated) DEVICE — TROCAR ETHICON ENDOPATH XCEL BLADELESS 5MM X 100MM STABILITY

## (undated) DEVICE — D HELP - CLEARVIEW CLEARIFY SYSTEM

## (undated) DEVICE — GLV 8.5 PROTEXIS (WHITE)

## (undated) DEVICE — TUBING CAP SET AIR AND WATER FOR OLYMPUS SCOPE 0.4M

## (undated) DEVICE — SUT NUROLON 4-0 8-18" TF (POP-OFF)

## (undated) DEVICE — SUT MONOCRYL 4-0 27" PS-2 UNDYED

## (undated) DEVICE — DRSG AQUACEL 3.5 X 6"

## (undated) DEVICE — APPLICATOR SKIN PREP CHG 3CC

## (undated) DEVICE — DRAPE SPLIT SHEET 77" X 108"

## (undated) DEVICE — PACK CRANIOTOMY LNX SURGICOUNT

## (undated) DEVICE — BIPOLAR FORCEP SYMMETRY BAYONET STR 8.25" X 1.5MM (BLUE)

## (undated) DEVICE — SOL ANTI FOG

## (undated) DEVICE — PREP CHLORAPREP HI-LITE ORANGE 26ML

## (undated) DEVICE — TROCAR ETHICON ENDOPATH XCEL UNIVERSAL SLEEVE WITH OPTIVIEW 5MM X 100MM

## (undated) DEVICE — DRAIN DRAINAGE SYSTEM W/O CATH

## (undated) DEVICE — CODMAN PERFORATOR 14MM (BLUE)

## (undated) DEVICE — DRAPE INSTRUMENT POUCH 6.75" X 11"

## (undated) DEVICE — BLADE SCALPEL SAFETY #11 WITH PLASTIC GREEN HANDLE

## (undated) DEVICE — MARKING PEN W RULER

## (undated) DEVICE — SYR LUER LOK 50CC

## (undated) DEVICE — GOWN XL

## (undated) DEVICE — POLISHER OR CAUTERY TIP

## (undated) DEVICE — SUT VICRYL PLUS 2-0 18" CT-1 (POP-OFF)

## (undated) DEVICE — BAG BILE

## (undated) DEVICE — LUMBAR PUNCTURE KIT 20G X 3.5"

## (undated) DEVICE — ELCTR COLORADO 3CM

## (undated) DEVICE — DRAPE MAGNETIC INSTRUMENT MEDIUM

## (undated) DEVICE — VENODYNE/SCD SLEEVE CALF MEDIUM

## (undated) DEVICE — TUBING ERBE CO2 OLYMPUS CONNECTOR

## (undated) DEVICE — TIP METZENBAUM SCISSOR MONOPOLAR ENDOCUT (ORANGE)

## (undated) DEVICE — KIT ENDO PROCEDURE CUST W/VLV

## (undated) DEVICE — PREP BETADINE SPONGE STICKS

## (undated) DEVICE — FOLEY TRAY 16FR 5CC LF UMETER CLOSED

## (undated) DEVICE — GLV 7 PROTEXIS (WHITE)

## (undated) DEVICE — CRANIAL MASK TRACKER

## (undated) DEVICE — MIDAS REX MR8 BALL FLUTED LG BORE 5MM X 9CM

## (undated) DEVICE — TUBING SUCTION 20FT

## (undated) DEVICE — SUT ETHILON 3-0 18" PS-1

## (undated) DEVICE — MIDAS REX MR8 BALL DIAMOND LG BORE 4MM X 9CM

## (undated) DEVICE — ADAPTER LUER STUB 15G

## (undated) DEVICE — SUT SILK 2-0 12-18"